# Patient Record
Sex: FEMALE | Race: WHITE | NOT HISPANIC OR LATINO | Employment: OTHER | URBAN - METROPOLITAN AREA
[De-identification: names, ages, dates, MRNs, and addresses within clinical notes are randomized per-mention and may not be internally consistent; named-entity substitution may affect disease eponyms.]

---

## 2017-01-02 DIAGNOSIS — I48.19 PERSISTENT ATRIAL FIBRILLATION (H): Primary | ICD-10-CM

## 2017-01-02 RX ORDER — APIXABAN 5 MG/1
TABLET, FILM COATED ORAL
Qty: 180 TABLET | Refills: 3 | Status: SHIPPED | OUTPATIENT
Start: 2017-01-02 | End: 2017-10-06

## 2017-01-02 NOTE — TELEPHONE ENCOUNTER
apixaban ANTICOAGULANT (ELIQUIS) 5 MG tablet 180 tablet 1 8/25/2016                Last Written Prescription Date: 08/25/2016  Last Fill Quantity: 180 tablet, # refills: 1    Last Office Visit with G, [facility] or [facility] prescribing provider:  10/31/2016   Future Office Visit:    Next 5 appointments (look out 90 days)     Jan 05, 2017  9:00 AM   Return Visit with Anabell Paiz MD   [facility] ([facility])    [facility]  [address] [address]   [phone]            Feb 13, 2017  9:30 AM   Office Visit with Christian Caro MD   [facility]    [address] [facility] [address]   [phone]                   WBC      5.4   10/31/2016  RBC     4.37   10/31/2016  HGB     13.5   10/31/2016  HCT     40.8   10/31/2016  No components found with this name: mct  MCV       93   10/31/2016  MCH     30.9   10/31/2016  MCHC     33.1   10/31/2016  RDW     17.0   10/31/2016  PLT      328   10/31/2016  PLT       73   5/9/2011  AST       43   10/22/2016  ALT       42   10/22/2016  CREATININE   Date Value Ref Range Status   10/24/2016 0.61 0.52 - 1.04 mg/dL Final

## 2017-01-04 ENCOUNTER — PRE VISIT (OUTPATIENT)
Dept: CARDIOLOGY | Facility: CLINIC | Age: 77
End: 2017-01-04

## 2017-01-05 ENCOUNTER — OFFICE VISIT (OUTPATIENT)
Dept: CARDIOLOGY | Facility: CLINIC | Age: 77
End: 2017-01-05
Payer: COMMERCIAL

## 2017-01-05 ENCOUNTER — ONCOLOGY VISIT (OUTPATIENT)
Dept: ONCOLOGY | Facility: CLINIC | Age: 77
End: 2017-01-05
Attending: INTERNAL MEDICINE
Payer: COMMERCIAL

## 2017-01-05 VITALS
BODY MASS INDEX: 41.96 KG/M2 | SYSTOLIC BLOOD PRESSURE: 110 MMHG | WEIGHT: 228 LBS | OXYGEN SATURATION: 90 % | HEIGHT: 62 IN | HEART RATE: 72 BPM | DIASTOLIC BLOOD PRESSURE: 64 MMHG

## 2017-01-05 VITALS
BODY MASS INDEX: 40.23 KG/M2 | WEIGHT: 220 LBS | DIASTOLIC BLOOD PRESSURE: 72 MMHG | HEART RATE: 69 BPM | RESPIRATION RATE: 14 BRPM | SYSTOLIC BLOOD PRESSURE: 124 MMHG | TEMPERATURE: 97.7 F

## 2017-01-05 DIAGNOSIS — C34.32 MALIGNANT NEOPLASM OF LOWER LOBE OF LEFT LUNG (H): Primary | ICD-10-CM

## 2017-01-05 DIAGNOSIS — I27.20 PULMONARY HTN (H): Primary | ICD-10-CM

## 2017-01-05 PROCEDURE — 99213 OFFICE O/P EST LOW 20 MIN: CPT | Performed by: INTERNAL MEDICINE

## 2017-01-05 PROCEDURE — 99211 OFF/OP EST MAY X REQ PHY/QHP: CPT

## 2017-01-05 ASSESSMENT — PAIN SCALES - GENERAL: PAINLEVEL: NO PAIN (0)

## 2017-01-05 NOTE — PROGRESS NOTES
"Jaqueline Canales is a 76 year old female who presents for:  Chief Complaint   Patient presents with     Oncology Clinic Visit     F/U of lung cancer        Initial Vitals:  /72 mmHg  Pulse 69  Temp(Src) 97.7  F (36.5  C) (Oral)  Resp 14  Wt 99.791 kg (220 lb) Estimated body mass index is 40.23 kg/(m^2) as calculated from the following:    Height as of 10/31/16: 1.575 m (5' 2\").    Weight as of this encounter: 99.791 kg (220 lb).. There is no height on file to calculate BSA. BP completed using cuff size: regular  No Pain (0) No LMP recorded. Patient is postmenopausal. Allergies and medications reviewed.     Medications: Medication refills not needed today.  Pharmacy name entered into Single [employer] Systems:    [facility] [address] [facility] - [place], [place] - [address]    Comments: Patient here for hx of lung ca     5 minutes for nursing intake (face to face time)   Iona Spears, RN      DISCHARGE PLAN:  Next appointments: See patient instruction section-- Rosalind  Departure Mode: Ambulatory  Accompanied by: daughter  4 minutes for nursing discharge (face to face time)   Anushka Felix, RN    1.  RTC MD in 3 months  2.  CT scan chest without contrast before next visit      4/6/2017 Thu  10:00 A 15 [facility] [[id number]] CHESTER DELATORRE [[id number]] RETURN [657] Return - Lung Cancer f/u       4/4/2017 Tue  10:00 A 15 [facility] [[id number]] SHCT1 [[id number]] CT CHEST WO [[id number]] sb Rosalind [phone]  CT CHEST W/O CONTRAST [[id number]]       "

## 2017-01-05 NOTE — MR AVS SNAPSHOT
After Visit Summary   1/5/2017    Jaqueline Canales    MRN: [id number]           Patient Information     Date Of Birth          1940        Visit Information        Provider Department      1/5/2017 11:00 AM Shay Marquez MD [facility]ANS HEART AT [place]        Care Instructions    Medication Changes:  No medication changes at this time. Please continue current medication regiment.     Patient Instructions:      Follow up Appointment Information:  1 year with an Echocardiogram    For scheduling at [facility] please call [phone]  For scheduling at the [place] please call [phone]  We are located on the second floor [address] at the [facility].  Our address is     [address] [place] [address] [facility],   [address]  [place]  [address]    Thank you for allowing us to be a part of your care here at the [facility]    If you have questions or concerns please contact us at:    Saritha Liriano, RN      Nurse Coordinator       Pulmonary Hypertension     [facility]   [phone])[phone]  (F)[phone]    ** Please note that you will NOT receive a reminder call regarding your scheduled testing, reminder calls are for provider appointments only.  If you are scheduled for testing within the [facility] system you may receive a call regarding pre-registration for billing purposes only.**     Remember to weigh yourself daily after voiding and before you consume any food or beverages and log the numbers.  If you have gained/lost 2 pounds overnight or 5 pounds in a week contact us immediately for medication adjustments or further instructions.        Follow-ups after your visit        Your next 10 appointments already scheduled     Feb 08, 2017  8:00 AM   [facility]  [facility]    [address] [facility]  3rd [facility] [address]    [phone]            Feb 08, 2017  9:00 AM   (Arrive by 8:45 AM)   Return Interstitial Lung with Rubens Quispe MD   [facility] ([facility])    [address] [facility]  3rd [facility] [address]   [phone]            Feb 13, 2017  9:30 AM   Office Visit with Christian Caro MD   [facility]    [address] [facility] [address] [phone]           Bring a current list of meds and any records pertaining to this visit.  For Physicals, please bring immunization records and any forms needing to be filled out.  Please arrive 10 minutes early to complete paperwork.            Apr 04, 2017 10:00 AM   CT CHEST W/O CONTRAST with SHCT1   [facility] ([facility])    [address] [facility] [address] [phone]           Please bring any scans or X-rays taken at other hospitals, if similar tests were done. Also bring a list of your medicines, including vitamins, minerals and over-the-counter drugs. It is safest to leave personal items at home.  Be sure to tell your doctor:   If you have any allergies.   If there s any chance you are pregnant.   If you are breastfeeding.   If you have any special needs.  You do not need to do anything special to prepare.  Please wear loose clothing, such as a sweat suit or jogging clothes. Avoid snaps, zippers and other metal. We may ask you to undress and put on a hospital gown.            Apr 13, 2017 10:00 AM   Return Visit with Anabell Paiz MD   [facility] ([facility])    n [facility] Ctr [facility]  [address]  [facility] [address] [phone]              Future tests that were ordered for you today     Open Future Orders        Priority Expected Expires Ordered    CT Chest w/o Contrast Routine  1/5/2018 1/5/2017            Who to contact     If you have questions  "or need follow up information about today's clinic visit or your schedule please contact [facility] PHYSICIANS HEART AT [place] directly at [phone].  Normal or non-critical lab and imaging results will be communicated to you by MyChart, letter or phone within 4 business days after the clinic has received the results. If you do not hear from us within 7 days, please contact the clinic through MyChart or phone. If you have a critical or abnormal lab result, we will notify you by phone as soon as possible.  Submit refill requests through [employer] or call your pharmacy and they will forward the refill request to us. Please allow 3 business days for your refill to be completed.          Additional Information About Your Visit        Care EveryWhere ID     This is your Care EveryWhere ID. This could be used by other organizations to access your [place] medical records  [id number]        Your Vitals Were     Pulse Height BMI (Body Mass Index) Pulse Oximetry          72 1.575 m (5' 2\") 41.69 kg/m2 90%         Blood Pressure from Last 3 Encounters:   01/05/17 110/64   01/05/17 124/72   11/28/16 112/61    Weight from Last 3 Encounters:   01/05/17 103.42 kg (228 lb)   01/05/17 99.791 kg (220 lb)   11/02/16 101.606 kg (224 lb)              Today, you had the following     No orders found for display       Primary Care Provider Office Phone # Fax #    Christian Carlos Caro [phone]       [facility] [address] [facility] [address]  [facility] [address]        Thank you!     Thank you for choosing [facility] HEART AT [place]  for your care. Our goal is always to provide you with excellent care. Hearing back from our patients is one way we can continue to improve our services. Please take a few minutes to complete the written survey that you may receive in the mail after your visit with us. Thank you!             Your Updated Medication List - Protect " others around you: Learn how to safely use, store and throw away your medicines at www.disposemymeds.org.          This list is accurate as of: 1/5/17 11:30 AM.  Always use your most recent med list.                   Brand Name Dispense Instructions for use    albuterol 108 (90 BASE) MCG/ACT Inhaler    PROAIR HFA/PROVENTIL HFA/VENTOLIN HFA    3 Inhaler    Inhale 2 puffs into the lungs every 6 hours       * ICAPS Caps      Take 1 capsule by mouth 2 times daily       * CENTRUM SILVER per tablet      1 TABLET DAILY       CITRACAL + D PO      Take 2 tablets by mouth 2 times daily       ELIQUIS 5 MG tablet   Generic drug:  apixaban ANTICOAGULANT     180 tablet    TAKE 1 TABLET TWICE DAILY       folic acid 1 MG tablet    FOLVITE     1 TABLET DAILY       Lactobacillus Acidophilus Powd      Take 1 packet by mouth daily       levothyroxine 75 MCG tablet    SYNTHROID/LEVOTHROID    90 tablet    Take 1 tablet (75 mcg) by mouth daily       MELATONIN PO      Take 5 mg by mouth nightly as needed       METHOTREXATE PO      Take 2.5 mg by mouth 10 tablets weekly       metoprolol 25 MG 24 hr tablet    TOPROL-XL    90 tablet    Take 1 tablet (25 mg) by mouth daily       NIFEDICAL XL 60 MG Tb24   Generic drug:  NIFEdipine ER osmotic      1 TABLET DAILY       omeprazole 20 MG CR capsule    priLOSEC    180 capsule    Take 1 capsule (20 mg) by mouth 2 times daily Take 30-60 minutes prior to meals       REMICADE IV      Inject 600 mg into the vein Every 8 weeks       simvastatin 40 MG tablet    ZOCOR    90 tablet    Take 1 tablet (40 mg) by mouth At Bedtime       * Notice:  This list has 2 medication(s) that are the same as other medications prescribed for you. Read the directions carefully, and ask your doctor or other care provider to review them with you.

## 2017-01-05 NOTE — PATIENT INSTRUCTIONS
Medication Changes:  No medication changes at this time. Please continue current medication regiment.     Patient Instructions:      Follow up Appointment Information:  1 year with an Echocardiogram    For scheduling at [facility] please call [phone]  For scheduling at the [facility] please call [phone]  We are located on the second floor [address] at the [facility].  Our address is     [facility] [address]   [address]  [place]  [address]    Thank you for allowing us to be a part of your care here at the [facility]    If you have questions or concerns please contact us at:    Saritha Liriano, RN      Nurse Coordinator       Pulmonary Hypertension     [facility]   [phone])[phone]  (F)[phone]    ** Please note that you will NOT receive a reminder call regarding your scheduled testing, reminder calls are for provider appointments only.  If you are scheduled for testing within the [place] system you may receive a call regarding pre-registration for billing purposes only.**     Remember to weigh yourself daily after voiding and before you consume any food or beverages and log the numbers.  If you have gained/lost 2 pounds overnight or 5 pounds in a week contact us immediately for medication adjustments or further instructions.

## 2017-01-05 NOTE — NURSING NOTE
Med Reconcile: Reviewed and verified all current medications with the patient. The updated medication list was printed and given to the patient.  Return Appointment: Patient given instructions regarding scheduling next clinic visit. Patient demonstrated understanding of this information and agreed to call with further questions or concerns.  Patient stated she understood all health information given and agreed to call with further questions or concerns.     Medication Changes:   No medication changes at this time. Please continue current medication regiment.   Patient Instructions:   Follow up Appointment Information:   1 year with an Echocardiogram

## 2017-01-05 NOTE — MR AVS SNAPSHOT
After Visit Summary   1/5/2017    Jaqueline Canales    MRN: [id number]           Patient Information     Date Of Birth          1940        Visit Information        Provider Department      1/5/2017 9:00 AM Anabell Paiz MD [facility]        Today's Diagnoses     Malignant neoplasm of lower lobe of left lung (H)    -  1       Care Instructions    1.  RTC MD in 3 months  2.  CT scan chest without contrast before next visit        Follow-ups after your visit        Your next 10 appointments already scheduled     Jan 05, 2017 11:00 AM   Pulmonary Hypertension Return with Shay Marquez MD   [facility] AT [place] ([facility])    [id number]  [facility] [address] [phone]            Feb 08, 2017  8:00 AM   FULL PULMONARY FUNCTION with  P[facility] Pulmonary Function Testing ([facility])    [address] [facility]  3rd [facility] [address] [phone]            Feb 08, 2017  9:00 AM   (Arrive by 8:45 AM)   Return Interstitial Lung with Rubens Quispe MD   [facility] ([facility])    [address] [address] [phone]            Feb 13, 2017  9:30 AM   Office Visit with Christian Caro MD   [facility]    [address] [facility] [id number] [phone]           Bring a current list of meds and any records pertaining to this visit.  For Physicals, please bring immunization records and any forms needing to be filled out.  Please arrive 10 minutes early to complete paperwork.            Apr 04, 2017 10:00 AM   CT CHEST W/O CONTRAST with SHCT1   [facility] CT ([facility])    [address] [facility] [address] [phone]           Please bring any scans or X-rays taken at other  hospitals, if similar tests were done. Also bring a list of your medicines, including vitamins, minerals and over-the-counter drugs. It is safest to leave personal items at home.  Be sure to tell your doctor:   If you have any allergies.   If there s any chance you are pregnant.   If you are breastfeeding.   If you have any special needs.  You do not need to do anything special to prepare.  Please wear loose clothing, such as a sweat suit or jogging clothes. Avoid snaps, zippers and other metal. We may ask you to undress and put on a hospital gown.            Apr 13, 2017 10:00 AM   Return Visit with Anabell Paiz MD   [facility] ([facility])    [facility]  [address] [facility] [address]  [facility] [address] [phone]              Future tests that were ordered for you today     Open Future Orders        Priority Expected Expires Ordered    CT Chest w/o Contrast Routine  1/5/2018 1/5/2017            Who to contact     If you have questions or need follow up information about today's clinic visit or your schedule please contact [facility] directly at [phone].  Normal or non-critical lab and imaging results will be communicated to you by MyChart, letter or phone within 4 business days after the clinic has received the results. If you do not hear from us within 7 days, please contact the clinic through MyChart or phone. If you have a critical or abnormal lab result, we will notify you by phone as soon as possible.  Submit refill requests through [employer] or call your pharmacy and they will forward the refill request to us. Please allow 3 business days for your refill to be completed.          Additional Information About Your Visit        Care EveryWhere ID     This is your Care EveryWhere ID. This could be used by other organizations to access your [place] medical records  [id number]        Your Vitals Were     Pulse Temperature Respirations              69 97.7  F (36.5  C) (Oral) 14          Blood Pressure from Last 3 Encounters:   01/05/17 124/72   11/28/16 112/61   11/02/16 127/57    Weight from Last 3 Encounters:   01/05/17 99.791 kg (220 lb)   11/02/16 101.606 kg (224 lb)   10/31/16 100.88 kg (222 lb 6.4 oz)               Primary Care Provider Office Phone # Fax #    Christian Gonzalez Bill Caro [phone]       [facility] [address]  [place] [address]        Thank you!     Thank you for choosing [facility]  for your care. Our goal is always to provide you with excellent care. Hearing back from our patients is one way we can continue to improve our services. Please take a few minutes to complete the written survey that you may receive in the mail after your visit with us. Thank you!             Your Updated Medication List - Protect others around you: Learn how to safely use, store and throw away your medicines at www.disposemymeds.org.          This list is accurate as of: 1/5/17  9:36 AM.  Always use your most recent med list.                   Brand Name Dispense Instructions for use    albuterol 108 (90 BASE) MCG/ACT Inhaler    PROAIR HFA/PROVENTIL HFA/VENTOLIN HFA    3 Inhaler    Inhale 2 puffs into the lungs every 6 hours       * ICAPS Caps      Take 1 capsule by mouth 2 times daily       * CENTRUM SILVER per tablet      1 TABLET DAILY       CITRACAL + D PO      Take 2 tablets by mouth 2 times daily       ELIQUIS 5 MG tablet   Generic drug:  apixaban ANTICOAGULANT     180 tablet    TAKE 1 TABLET TWICE DAILY       folic acid 1 MG tablet    FOLVITE     1 TABLET DAILY       Lactobacillus Acidophilus Powd      Take 1 packet by mouth daily       levothyroxine 75 MCG tablet    SYNTHROID/LEVOTHROID    90 tablet    Take 1 tablet (75 mcg) by mouth daily       MELATONIN PO      Take 5 mg by mouth nightly as needed       METHOTREXATE PO      Take 2.5 mg by mouth 10 tablets weekly       metoprolol 25  MG 24 hr tablet    TOPROL-XL    90 tablet    Take 1 tablet (25 mg) by mouth daily       NIFEDICAL XL 60 MG Tb24   Generic drug:  NIFEdipine ER osmotic      1 TABLET DAILY       omeprazole 20 MG CR capsule    priLOSEC    180 capsule    Take 1 capsule (20 mg) by mouth 2 times daily Take 30-60 minutes prior to meals       REMICADE IV      Inject 600 mg into the vein Every 8 weeks       simvastatin 40 MG tablet    ZOCOR    90 tablet    Take 1 tablet (40 mg) by mouth At Bedtime       * Notice:  This list has 2 medication(s) that are the same as other medications prescribed for you. Read the directions carefully, and ask your doctor or other care provider to review them with you.

## 2017-01-05 NOTE — PROGRESS NOTES
[facility] PHYSICIANS  HEMATOLOGY ONCOLOGY     DIAGNOSES:     1.  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 The patient had a left lower lobe nodule 11/2015.  It was biopsied 12/04/2015, indicated a poorly differentiated squamous cell carcinoma.  PET CT scan 12/14/2015 hypermetabolic nodular lesion in the left lower lobe.     2.  History of breast cancer in 1990, treated with right-sided mastectomy.   3.  Interstitial lung disease.   4.  Rheumatoid arthritis.  Jaqueline Canales is on Remicade for interstitial lung disease and rheumatoid arthritis.       TREATMENT:  S/P SBRT for lung cancer.      SUBJECTIVE:  The patient comes in for followup today. She has been feeling well. No change in breathing status.     REVIEW OF SYSTEMS:  A complete review of systems was performed and found to be negative other than pertinent positives mentioned in history of present illness.     Past medical, social histories reviewed.    Meds- Reviewed.     PHYSICAL EXAMINATION:   VITAL SIGNS:/72 mmHg  Pulse 69  Temp(Src) 97.7  F (36.5  C) (Oral)  Resp 14  Wt 99.791 kg (220 lb)  GENERAL: Sitting comfortably.   HEENT: Pupils are equal. Oropharynx is clear.   NECK: No cervical or supraclavicular lymphadenopathy.   LUNGS: Clear bilaterally.   HEART: S1, S2, regular.   ABDOMEN: Soft, nontender, nondistended, no hepatosplenomegaly.   EXTREMITIES: Warm, well perfused.   NEUROLOGIC: Alert, awake.   SKIN: No rash.   LYMPHATICS: Bilateral edema.     DATA:      Results for orders placed or performed during the hospital encounter of 12/27/16   CT Chest w/o contrast    Narrative    CT CHEST WITHOUT CONTRAST December 27, 2016 11:40 AM     HISTORY: Follow up on right sided lung lesions. Malignant neoplasm of  lower lobe, left bronchus or lung.    COMPARISON: 10/22/2016.    TECHNIQUE: Volumetric helical acquisition of CT images of the chest  from the clavicles to the kidneys were acquired without IV  contrast.  Radiation dose for this scan was reduced using automated exposure  control, adjustment of the mA and/or kV according to patient size, or  iterative reconstruction technique.    FINDINGS: Right-sided fibrotic changes appear stable. Somewhat nodular  density at the left base has resolved and may have been an area of  consolidation and/or active inflammation. Honeycombing and traction  bronchiectasis persist. Moderate hiatal hernia. No pleural or  pericardial effusion. Mild cardiomegaly. There are moderate coronary  vascular calcifications consistent with coronary artery disease. There  are mild atherosclerotic changes of the visualized aorta and its  branches. There is no evidence of aortic aneurysm. No acute findings  in the visualized upper abdomen.      Impression    IMPRESSION:  1. 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  2. The nodular density at the left base has resolved.    ARELI RICKETTS MD     ECOG PS: 1    ASSESSMENT:  A 74-year-old lady with squamous cell carcinoma of the lung which was clinically T1 N0 MX.  She had a 1.7 to 1.1 cm left lower lobe lesion which was FDG avid, multiple non-FDG avid mediastinal lymph nodes, prevascular, precarinal and subcarinal which appeared to be stable.  There was FDG activity in tonsil and neck lymph nodes which were evaluated by Dr. Trevor Murdock of ENT and this was considered to be a physiological uptake.  Given her history of interstitial lung disease she was referred to Radiation Oncology for SBRT of this lung lesion.  Her MRI of brain was negative for metastases.     PET CT scan 10/31/16 showed right upper and middle lobe areas of hypermetabolism without defined nodularities. Her case was reviewed at our multidisciplinary thoracic oncology conference 11/1/16 and consensus recommendation was to proceed with close observation.   CT scan 12/27/16 results and images were reviewed with the  patient-  showed stability of findings.  I will repeat a CT scan in three months.      PLAN:   1.  RTC MD in 3 months  2.  CT scan chest without contrast before next visit    CHESTER DELATORRE MD    11/2/2016

## 2017-01-17 ENCOUNTER — TRANSFERRED RECORDS (OUTPATIENT)
Dept: HEALTH INFORMATION MANAGEMENT | Facility: CLINIC | Age: 77
End: 2017-01-17

## 2017-01-17 LAB
ALT SERPL-CCNC: 19 IU/L (ref 5–35)
AST SERPL-CCNC: 24 U/L (ref 5–34)
CREAT SERPL-MCNC: 0.72 MG/DL (ref 0.5–1.3)
GFR SERPL CREATININE-BSD FRML MDRD: 83.5 ML/MIN/1.73M2

## 2017-01-20 NOTE — PROGRESS NOTES
76 year old white female seen for follow-up.  The patient has scleroderma and ILD and was incidentally found to have SCC of left lower lobe that was treated with radiation therapy.  She is clinically stable and has no interim history of right hear failure.  The most recent echocardiogram discloses elevated, estimated PA pressure.  She has no significant functional limitation at this time.  We discussed the nature of right heart catherization and medication for PH; and for the moment, in the absence of symptoms, she wishes to defer diagnostic and therapeutic intervention.    Results for GINA MCKEON (MRN [id number]) as of 1/20/2017 08:43   Ref. Range 10/31/2016 14:55   CRP Inflammation Latest Ref Range: 0.0-8.0 mg/L 4.1   WBC Latest Ref Range: 4.0-11.0 10e9/L 5.4   Hemoglobin Latest Ref Range: 11.7-15.7 g/dL 13.5   Hematocrit Latest Ref Range: 35.0-47.0 % 40.8   Platelet Count Latest Ref Range: 150-450 10e9/L 328   RBC Count Latest Ref Range: 3.8-5.2 10e12/L 4.37   MCV Latest Ref Range:  fl 93   MCH Latest Ref Range: 26.5-33.0 pg 30.9   MCHC Latest Ref Range: 31.5-36.5 g/dL 33.1   RDW Latest Ref Range: 10.0-15.0 % 17.0 (H)   Diff Method Unknown Automated Method   % Neutrophils Latest Units: % 73.8   % Lymphocytes Latest Units: % 17.1   % Monocytes Latest Units: % 6.5   % Eosinophils Latest Units: % 2.0   % Basophils Latest Units: % 0.6   Absolute Neutrophil Latest Ref Range: 1.6-8.3 10e9/L 4.0   Absolute Lymphocytes Latest Ref Range: 0.8-5.3 10e9/L 0.9   Absolute Monocytes Latest Ref Range: 0.0-1.3 10e9/L 0.4   Absolute Eosinophils Latest Ref Range: 0.0-0.7 10e9/L 0.1   Absolute Basophils Latest Ref Range: 0.0-0.2 10e9/L 0.0   CT CHEST WITHOUT CONTRAST December 27, 2016 11:40 AM       HISTORY: Follow up on right sided lung lesions. Malignant neoplasm of  lower lobe, left bronchus or lung.     COMPARISON: 10/22/2016.     TECHNIQUE: Volumetric helical acquisition of CT images of the chest  from the  clavicles to the kidneys were acquired without IV contrast.  Radiation dose for this scan was reduced using automated exposure  control, adjustment of the mA and/or kV according to patient size, or  iterative reconstruction technique.     FINDINGS: Right-sided fibrotic changes appear stable. Somewhat nodular  density at the left base has resolved and may have been an area of  consolidation and/or active inflammation. Honeycombing and traction  bronchiectasis persist. Moderate hiatal hernia. No pleural or  pericardial effusion. Mild cardiomegaly. There are moderate coronary  vascular calcifications consistent with coronary artery disease. There  are mild atherosclerotic changes of the visualized aorta and its  branches. There is no evidence of aortic aneurysm. No acute findings  in the visualized upper abdomen.                                                                       IMPRESSION:  1. 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  2. The nodular density at the left base has resolved  Davie Iyer MD 2016             Narrative           Interpretation Summary              [facility]  [facility]  [address]  [address]  [place] [address]  Phone [phone]  Fax [phone]        Name: GINA MCKEON  MRN: [id number]  : 1940  Study Date: 2016 07:39 AM  Age: 75 yrs  Gender: Female  Patient Location: [facility]  Reason For Study: Unspecified atrial fibrillation  Ordering Physician: HAZEL RIBEIRO  Referring Physician: HAZEL RIBEIRO  Performed By: Rosanna Manning RDCS  BSA: 2.0 m2  Height: 62 in  Weight: 226 lb  HR: 89  BP: 100/58 mmHg     ______________________________________________________________________________     Procedure  Complete Echo  Adult.     ______________________________________________________________________________     Interpretation Summary     Left ventricular systolic function is normal.The visual ejection fraction is  estimated at 60-65%.  The right ventricle is mildly dilated.The right ventricular systolic function  is normal.  The left atrium is severely dilated.  There is moderate mitral annular calcification.There is mild (1+) mitral  regurgitation.  Pulmonary hypertension- RVSP 42 mm hg +RA.  The IVC is normal in size and reactivity with respiration, suggesting normal  central venous pressure.  ______________________________________________________________________________           Left Ventricle  The left ventricle is normal in size. There is mild concentric left  ventricular hypertrophy. Left ventricular systolic function is normal. The  visual ejection fraction is estimated at 60-65%. E prime velocity may be  unreliable due to presence of significant mitral annular calcification. No  regional wall motion abnormalities noted.  Right Ventricle  The right ventricle is mildly dilated. The right ventricular systolic  function is normal.     Atria  The left atrium is severely dilated. The right atrium is mild to moderately  dilated. There is no color Doppler evidence of an atrial shunt.     Mitral Valve  There is moderate mitral annular calcification. There is mild (1+) mitral  regurgitation.     Tricuspid Valve  There is mild (1+) tricuspid regurgitation. The right ventricular systolic  pressure is approximated at 42.2 mmHg plus the right atrial pressure.  Pulmonary hypertension.     Aortic Valve  There is mild trileaflet aortic sclerosis. No aortic regurgitation is  present. No aortic stenosis is present.     Pulmonic Valve  The pulmonic valve is not well visualized. There is no pulmonic valvular  stenosis.  Vessels  The aortic root is normal size. Normal size ascending aorta. The IVC is  normal in size and reactivity with  respiration, suggesting normal central  venous pressure.     Pericardium  There is no pericardial effusion.     Rhythm  The rhythm was normal sinus.  ______________________________________________________________________________     MMode/2D Measurements & Calculations  IVSd: 1.2 cm  LVIDd: 5.3 cm  LVIDs: 3.4 cm  LVPWd: 0.96 cm  FS: 35.9 %  EDV(Teich): 132.7 ml  ESV(Teich): 46.3 ml  LV mass(C)d: 224.6 grams  Ao root diam: 2.8 cm  LA dimension: 4.7 cm  asc Aorta Diam: 3.5 cm  LA/Ao: 1.7  LVOT diam: 1.9 cm  LVOT area: 2.7 cm2  LA Volume (BP): 101.0 ml  LA Volume Index (BP): 50.2 ml/m2           Doppler Measurements & Calculations  MV E max carrington: 89.7 cm/sec  MV A max carrington: 94.6 cm/sec  MV E/A: 0.95  MV dec time: 0.20 sec  TR max carrington: 324.7 cm/sec  TR max P.2 mmHg  Lateral E/e': 12.9  Medial E/e': 17.2                   Current Outpatient Prescriptions   Medication     ELIQUIS 5 MG tablet     simvastatin (ZOCOR) 40 MG tablet     metoprolol (TOPROL-XL) 25 MG 24 hr tablet     Lactobacillus Acidophilus POWD     levothyroxine (SYNTHROID, LEVOTHROID) 75 MCG tablet     omeprazole (PRILOSEC) 20 MG capsule     Methotrexate Sodium (METHOTREXATE PO)     albuterol (PROAIR HFA, PROVENTIL HFA, VENTOLIN HFA) 108 (90 BASE) MCG/ACT inhaler     MELATONIN PO     Calcium Citrate-Vitamin D (CITRACAL + D PO)     Multiple Vitamins-Minerals (ICAPS) CAPS     InFLIXimab (REMICADE IV)     NIFEDICAL XL 60 MG PO TB24     FOLIC ACID 1 MG OR TABS     CENTRUM SILVER OR TABS     No current facility-administered medications for this visit.       We spent 20 minutes of which > 50% centered on diagnostic and therapeutic counseling.

## 2017-01-23 NOTE — TELEPHONE ENCOUNTER
SHOULD HAVE ANOTHER REFILL ON FILE   --we  escribed ok #90 with 2 adiditonal refills to [facility]  On 11-    simvastatin (ZOCOR) 40 MG tablet 90 tablet 2 11/28/2016  No      Sig: Take 1 tablet (40 mg) by mouth At Bedtime     Class: E-Prescribe     Route: Oral     Order: [id number]     E-Prescribing Status: Receipt confirmed by pharmacy (11/28/2016  9:49 AM CST)       Pharmacy      [facility] - [place] [address] [address]         Last Lipids 7/2016 , saw cardiology 1-5-2017

## 2017-01-25 RX ORDER — SIMVASTATIN 40 MG
40 TABLET ORAL AT BEDTIME
Qty: 90 TABLET | Refills: 0
Start: 2017-01-25

## 2017-02-08 ENCOUNTER — OFFICE VISIT (OUTPATIENT)
Dept: PULMONOLOGY | Facility: CLINIC | Age: 77
End: 2017-02-08
Attending: INTERNAL MEDICINE
Payer: MEDICARE

## 2017-02-08 VITALS
HEIGHT: 62 IN | WEIGHT: 228 LBS | DIASTOLIC BLOOD PRESSURE: 76 MMHG | BODY MASS INDEX: 41.96 KG/M2 | HEART RATE: 86 BPM | TEMPERATURE: 98.4 F | OXYGEN SATURATION: 96 % | RESPIRATION RATE: 18 BRPM | SYSTOLIC BLOOD PRESSURE: 124 MMHG

## 2017-02-08 DIAGNOSIS — J84.9 ILD (INTERSTITIAL LUNG DISEASE) (H): Primary | ICD-10-CM

## 2017-02-08 DIAGNOSIS — R91.8 PULMONARY NODULES: ICD-10-CM

## 2017-02-08 PROCEDURE — 99212 OFFICE O/P EST SF 10 MIN: CPT | Mod: ZF

## 2017-02-08 ASSESSMENT — ENCOUNTER SYMPTOMS
HYPOTENSION: 0
LIGHT-HEADEDNESS: 0
SLEEP DISTURBANCES DUE TO BREATHING: 0
PALPITATIONS: 0
CLAUDICATION: 0
ORTHOPNEA: 0
SYNCOPE: 0
TACHYCARDIA: 0
HYPERTENSION: 0
LEG SWELLING: 0
LEG PAIN: 0
EXERCISE INTOLERANCE: 0

## 2017-02-08 ASSESSMENT — PAIN SCALES - GENERAL: PAINLEVEL: NO PAIN (0)

## 2017-02-08 NOTE — LETTER
2/8/2017       RE: Jaqueline Canales  [address]  [facility] [address]     Dear Colleague,    Thank you for referring your patient, Jaqueline Canales, to the [facility] at [facility]. Please see a copy of my visit note below.          [facility]   Pulmonary Clinic Follow Up Note  February 8, 2017      Jaqueline Canales MRN# [id number]   Age: 76 year old YOB: 1940     Date of Consultation: February 8, 2017    Primary care provider: Christian Caro     History taken from; Patient      Jaqueline Canales is a 76 year old female seen in the Pulmonary Clinic  for f/u.  Chief Complaint   Patient presents with     RECHECK     4 month recheck   .          Pulmonary Problem List / Reason for follow up:   1. ILD  2. Lung cancer  3. Airway disease           Assessment and Plan:     ASSESSMENT AND PLAN:  The patient is a 76-year-old lady with a history of interstitial lung disease-associated scleroderma and lung cancer, coming for the followup visit.  The patient is currently stable.   1.  Interstitial lung disease associated with scleroderma.  The patient's PFTs are stable.  We will continue to follow the patient with another pulmonary function test in 6 months.   2.  Lung cancer.  The patient is followed by Oncology, and the patient is going to have a CT of the chest in follow up.   3.  Airway disease.  The patient has mild wheezing.  We advised the patient to start to use albuterol on a scheduled basis when she has symptoms and signs of the cold.  Otherwise, after that the patient can do it on an as-needed basis.      We explained the plan to the patient, including the risks and benefits.  The patient expressed understanding and agreed with the plan.      Thank you very much for the opportunity to participate in the care of this very pleasant patient.         Return visit in 6  months      Rubens Quispe M.D.         History of Present Illness / Interval History:     CHIEF COMPLAINT:  Scleroderma-associated interstitial lung disease and lung cancer.      HISTORY OF PRESENT ILLNESS:  The patient is a 76-year-old lady with a history of interstitial lung disease associated with scleroderma, currently stable.  The patient recently had an upper respiratory infection.  However, the patient is feeling that her symptoms are subsiding.  The patient had pulmonary function tests, which are stable.  The patient also had lung cancer, and had a suspicious lesion, which on the followup scan resolved.  Overall, the patient is doing well and she is stable.                      Pulmonary Data:         Exposure history: Asbestos;  No , TB;  No , Radiation;   No          Medications:     Current Outpatient Prescriptions   Medication Sig     ELIQUIS 5 MG tablet TAKE 1 TABLET TWICE DAILY     simvastatin (ZOCOR) 40 MG tablet Take 1 tablet (40 mg) by mouth At Bedtime     metoprolol (TOPROL-XL) 25 MG 24 hr tablet Take 1 tablet (25 mg) by mouth daily     Lactobacillus Acidophilus POWD Take 1 packet by mouth daily     levothyroxine (SYNTHROID, LEVOTHROID) 75 MCG tablet Take 1 tablet (75 mcg) by mouth daily     omeprazole (PRILOSEC) 20 MG capsule Take 1 capsule (20 mg) by mouth 2 times daily Take 30-60 minutes prior to meals     Methotrexate Sodium (METHOTREXATE PO) Take 2.5 mg by mouth 10 tablets weekly     albuterol (PROAIR HFA, PROVENTIL HFA, VENTOLIN HFA) 108 (90 BASE) MCG/ACT inhaler Inhale 2 puffs into the lungs every 6 hours     MELATONIN PO Take 5 mg by mouth nightly as needed     Calcium Citrate-Vitamin D (CITRACAL + D PO) Take 2 tablets by mouth 2 times daily      Multiple Vitamins-Minerals (ICAPS) CAPS Take 1 capsule by mouth 2 times daily      InFLIXimab (REMICADE IV) Inject 600 mg into the vein Every 8 weeks     NIFEDICAL XL 60 MG PO TB24 1 TABLET DAILY     FOLIC ACID 1 MG OR TABS 1 TABLET DAILY     CENTRUM  SILVER OR TABS 1 TABLET DAILY     No current facility-administered medications for this visit.             Past Medical History:     Past Medical History   Diagnosis Date     Amaurosis fugax 5-11     Right     RA (rheumatoid arthritis) (H) 2/8/2010     Hyperlipidemia LDL goal <70 6/20/2011     hx of CA in situ RT breast-1990.mastectomy 4/17/2007     HELICOBACTER PYLORI INFECTION 7/11/2006     Raynaud's syndrome 4/10/2006     Esophageal reflux 3/11/2004     OSTEOPOROSIS NOS 3/11/2004     OBESITY NOS 3/11/2004     Other psoriasis 3/11/2004     Sleep apnea      CPAP     Carotid artery stenosis      Lung cancer (H)      squamous cell lung ca left lower lobe     Scleroderma (H)              Past Surgical History:     Past Surgical History   Procedure Laterality Date     C nonspecific procedure  1990     mastectomy RT breast     Tonsillectomy       Repair hammer toe       Conization       Endarterectomy carotid  2011     Hc colonoscopy thru stoma, diagnostic  2001 2011     diverticulosis     Colonoscopy  5/24/16     Breast surgery               Social History:     Social History     Social History     Marital Status:      Spouse Name: N/A     Number of Children: N/A     Years of Education: N/A     Occupational History     Not on file.     Social History Main Topics     Smoking status: Former Smoker -- 1.00 packs/day for 30 years     Types: Cigarettes     Quit date: 03/11/1992     Smokeless tobacco: Never Used     Alcohol Use: 0.0 oz/week     0 Standard drinks or equivalent per week      Comment: rare     Drug Use: No     Sexual Activity:     Partners: Male     Other Topics Concern     Parent/Sibling W/ Cabg, Mi Or Angioplasty Before 65f 55m? No     Social History Narrative             Family History:     Family History   Problem Relation Age of Onset     Cancer - colorectal Mother      CEREBROVASCULAR DISEASE Father      CANCER Sister 31     ovarian     HEART DISEASE Sister      GASTROINTESTINAL DISEASE Sister       crohns     GASTROINTESTINAL DISEASE Sister      diverticulitis     GASTROINTESTINAL DISEASE Brother      diverticulitis     Alzheimer Disease Sister      CEREBROVASCULAR DISEASE Maternal Grandmother      DIABETES Maternal Grandmother      MENTAL ILLNESS Maternal Grandmother      DIABETES Maternal Uncle              Immunization:     Immunization History   Administered Date(s) Administered     Hepatitis A Vac Ped/Adol-2 Dose 03/21/1996, 09/26/1996     Hepatitis B 03/21/1996, 04/18/1996, 09/26/1996     Influenza (H1N1) 01/04/2010     Influenza (High Dose) 3 valent vaccine 10/05/2014, 10/12/2015, 10/26/2016     Influenza (IIV3) 11/11/2003, 11/17/2005, 10/23/2006, 10/17/2007, 11/17/2008, 09/27/2010, 09/01/2012     Pneumococcal (PCV 13) 07/07/2016     Pneumococcal 23 valent 10/17/2007     TD (ADULT, 7+) 03/05/2009, 03/05/2009     TDAP (ADACEL AGES 11-64) 06/07/2012     Zoster vaccine, live 08/27/2012              Review of Systems:   I have done 10 points of review systems and pertinent findings are as above , otherwise negative.             Physical Examination:   General: Alert, oriented, not in distress  B/P: 124/76, T: 98.4, P: 86, R: 18  HEENT: neck supple, symmetrical, no lymph node enlargement, thyromegaly, bruit, JVD, pupils are isocoric and equally responsive to the light.   Lungs: both hemithoraces are symmetrical, normal to palpation, no dullness to percussion, auscultation of lungs revealed few wheezes.  CVS: Normal S1 and S2, no additional heart sounds, murmur, rub, normal peripheral pulses  Abdomen: Bowel sounds present, soft, non tender, non distended, no organomegaly, ascitis, mass  Extremities/musculoskeletal: no peripheral edema, deformity, cyanosis, clubbing  Neurology: alert, orientedx3, no motor/sensorial deficits, cranial nerves grossly normal  Skin: no rash  Psychiatry: Mood and affect are appropriate             DATA:     CBC RESULTS:     Recent Labs   Lab Test  10/31/16   1455  10/23/16   0553    WBC  5.4  7.3   RBC  4.37  3.55*   HGB  13.5  10.7*   HCT  40.8  32.4*   PLT  328  112*       Basic Metabolic Panel:  Recent Labs   Lab Test 01/17/17  10/24/16   0530  10/23/16   0533   NA   --   137  138   POTASSIUM   --   3.9  3.9   CHLORIDE   --   103  104   CO2   --   28  28   BUN   --   18  12   CR  0.720  0.61  0.58   GLC   --   96  99   TU   --   8.9  8.4*       INR  Recent Labs   Lab Test  12/04/15   0745   INR  0.95        PFT   PFT 2/8/2017   FVC 1.41   FEV1 1.24   FVC% 57   FEV1% 65       Thank you for allowing me participate in the care of Jaqueline Canales.      Rubens Quispe M.D.  Associate Professor of Medicine  Pulmonary, Allergy, Critical Care and Sleep

## 2017-02-08 NOTE — LETTER
2/8/2017      RE: Jaqueline Canales  [address]  [facility] [address]             [facility]   Pulmonary Clinic Follow Up Note  February 8, 2017      Jaqueline Canales MRN# [id number]   Age: 76 year old YOB: 1940     Date of Consultation: February 8, 2017    Primary care provider: Christian Caro     History taken from; Patient      Jaqueline Canales is a 76 year old female seen in the Pulmonary Clinic  for f/u.  Chief Complaint   Patient presents with     RECHECK     4 month recheck   .          Pulmonary Problem List / Reason for follow up:   1. ILD  2. Lung cancer  3. Airway disease           Assessment and Plan:     ASSESSMENT AND PLAN:  The patient is a 76-year-old lady with a history of interstitial lung disease-associated scleroderma and lung cancer, coming for the followup visit.  The patient is currently stable.   1.  Interstitial lung disease associated with scleroderma.  The patient's PFTs are stable.  We will continue to follow the patient with another pulmonary function test in 6 months.   2.  Lung cancer.  The patient is followed by Oncology, and the patient is going to have a CT of the chest in follow up.   3.  Airway disease.  The patient has mild wheezing.  We advised the patient to start to use albuterol on a scheduled basis when she has symptoms and signs of the cold.  Otherwise, after that the patient can do it on an as-needed basis.      We explained the plan to the patient, including the risks and benefits.  The patient expressed understanding and agreed with the plan.      Thank you very much for the opportunity to participate in the care of this very pleasant patient.         Return visit in 6 months      Rubens Quispe M.D.         History of Present Illness / Interval History:     CHIEF COMPLAINT:  Scleroderma-associated interstitial lung disease and lung cancer.      HISTORY OF PRESENT ILLNESS:  The patient is a  76-year-old lady with a history of interstitial lung disease associated with scleroderma, currently stable.  The patient recently had an upper respiratory infection.  However, the patient is feeling that her symptoms are subsiding.  The patient had pulmonary function tests, which are stable.  The patient also had lung cancer, and had a suspicious lesion, which on the followup scan resolved.  Overall, the patient is doing well and she is stable.                      Pulmonary Data:         Exposure history: Asbestos;  No , TB;  No , Radiation;   No          Medications:     Current Outpatient Prescriptions   Medication Sig     ELIQUIS 5 MG tablet TAKE 1 TABLET TWICE DAILY     simvastatin (ZOCOR) 40 MG tablet Take 1 tablet (40 mg) by mouth At Bedtime     metoprolol (TOPROL-XL) 25 MG 24 hr tablet Take 1 tablet (25 mg) by mouth daily     Lactobacillus Acidophilus POWD Take 1 packet by mouth daily     levothyroxine (SYNTHROID, LEVOTHROID) 75 MCG tablet Take 1 tablet (75 mcg) by mouth daily     omeprazole (PRILOSEC) 20 MG capsule Take 1 capsule (20 mg) by mouth 2 times daily Take 30-60 minutes prior to meals     Methotrexate Sodium (METHOTREXATE PO) Take 2.5 mg by mouth 10 tablets weekly     albuterol (PROAIR HFA, PROVENTIL HFA, VENTOLIN HFA) 108 (90 BASE) MCG/ACT inhaler Inhale 2 puffs into the lungs every 6 hours     MELATONIN PO Take 5 mg by mouth nightly as needed     Calcium Citrate-Vitamin D (CITRACAL + D PO) Take 2 tablets by mouth 2 times daily      Multiple Vitamins-Minerals (ICAPS) CAPS Take 1 capsule by mouth 2 times daily      InFLIXimab (REMICADE IV) Inject 600 mg into the vein Every 8 weeks     NIFEDICAL XL 60 MG PO TB24 1 TABLET DAILY     FOLIC ACID 1 MG OR TABS 1 TABLET DAILY     CENTRUM SILVER OR TABS 1 TABLET DAILY     No current facility-administered medications for this visit.             Past Medical History:     Past Medical History   Diagnosis Date     Amaurosis fugax 5-11     Right     RA  (rheumatoid arthritis) (H) 2/8/2010     Hyperlipidemia LDL goal <70 6/20/2011     hx of CA in situ RT breast-1990.mastectomy 4/17/2007     HELICOBACTER PYLORI INFECTION 7/11/2006     Raynaud's syndrome 4/10/2006     Esophageal reflux 3/11/2004     OSTEOPOROSIS NOS 3/11/2004     OBESITY NOS 3/11/2004     Other psoriasis 3/11/2004     Sleep apnea      CPAP     Carotid artery stenosis      Lung cancer (H)      squamous cell lung ca left lower lobe     Scleroderma (H)              Past Surgical History:     Past Surgical History   Procedure Laterality Date     C nonspecific procedure  1990     mastectomy RT breast     Tonsillectomy       Repair hammer toe       Conization       Endarterectomy carotid  2011     Hc colonoscopy thru stoma, diagnostic  2001 2011     diverticulosis     Colonoscopy  5/24/16     Breast surgery               Social History:     Social History     Social History     Marital Status:      Spouse Name: N/A     Number of Children: N/A     Years of Education: N/A     Occupational History     Not on file.     Social History Main Topics     Smoking status: Former Smoker -- 1.00 packs/day for 30 years     Types: Cigarettes     Quit date: 03/11/1992     Smokeless tobacco: Never Used     Alcohol Use: 0.0 oz/week     0 Standard drinks or equivalent per week      Comment: rare     Drug Use: No     Sexual Activity:     Partners: Male     Other Topics Concern     Parent/Sibling W/ Cabg, Mi Or Angioplasty Before 65f 55m? No     Social History Narrative             Family History:     Family History   Problem Relation Age of Onset     Cancer - colorectal Mother      CEREBROVASCULAR DISEASE Father      CANCER Sister 31     ovarian     HEART DISEASE Sister      GASTROINTESTINAL DISEASE Sister      crohns     GASTROINTESTINAL DISEASE Sister      diverticulitis     GASTROINTESTINAL DISEASE Brother      diverticulitis     Alzheimer Disease Sister      CEREBROVASCULAR DISEASE Maternal Grandmother      DIABETES  Maternal Grandmother      MENTAL ILLNESS Maternal Grandmother      DIABETES Maternal Uncle              Immunization:     Immunization History   Administered Date(s) Administered     Hepatitis A Vac Ped/Adol-2 Dose 03/21/1996, 09/26/1996     Hepatitis B 03/21/1996, 04/18/1996, 09/26/1996     Influenza (H1N1) 01/04/2010     Influenza (High Dose) 3 valent vaccine 10/05/2014, 10/12/2015, 10/26/2016     Influenza (IIV3) 11/11/2003, 11/17/2005, 10/23/2006, 10/17/2007, 11/17/2008, 09/27/2010, 09/01/2012     Pneumococcal (PCV 13) 07/07/2016     Pneumococcal 23 valent 10/17/2007     TD (ADULT, 7+) 03/05/2009, 03/05/2009     TDAP (ADACEL AGES 11-64) 06/07/2012     Zoster vaccine, live 08/27/2012              Review of Systems:   I have done 10 points of review systems and pertinent findings are as above , otherwise negative.             Physical Examination:   General: Alert, oriented, not in distress  B/P: 124/76, T: 98.4, P: 86, R: 18  HEENT: neck supple, symmetrical, no lymph node enlargement, thyromegaly, bruit, JVD, pupils are isocoric and equally responsive to the light.   Lungs: both hemithoraces are symmetrical, normal to palpation, no dullness to percussion, auscultation of lungs revealed few wheezes.  CVS: Normal S1 and S2, no additional heart sounds, murmur, rub, normal peripheral pulses  Abdomen: Bowel sounds present, soft, non tender, non distended, no organomegaly, ascitis, mass  Extremities/musculoskeletal: no peripheral edema, deformity, cyanosis, clubbing  Neurology: alert, orientedx3, no motor/sensorial deficits, cranial nerves grossly normal  Skin: no rash  Psychiatry: Mood and affect are appropriate             DATA:     CBC RESULTS:     Recent Labs   Lab Test  10/31/16   1455  10/23/16   0533   WBC  5.4  7.3   RBC  4.37  3.55*   HGB  13.5  10.7*   HCT  40.8  32.4*   PLT  328  112*       Basic Metabolic Panel:  Recent Labs   Lab Test 01/17/17  10/24/16   0530  10/23/16   0533   NA   --   137  138    POTASSIUM   --   3.9  3.9   CHLORIDE   --   103  104   CO2   --   28  28   BUN   --   18  12   CR  0.720  0.61  0.58   GLC   --   96  99   TU   --   8.9  8.4*       INR  Recent Labs   Lab Test  12/04/15   0745   INR  0.95        PFT   PFT 2/8/2017   FVC 1.41   FEV1 1.24   FVC% 57   FEV1% 65             Thank you for allowing me participate in the care of Jaqueline Canales.      Rubens Quispe M.D.  Associate Professor of Medicine  Pulmonary, Allergy, Critical Care and Sleep        Answers for HPI/ROS submitted by the patient on 2/8/2017   General Symptoms: No  Skin Symptoms: No  HENT Symptoms: No  EYE SYMPTOMS: No  HEART SYMPTOMS: Yes  LUNG SYMPTOMS: No  INTESTINAL SYMPTOMS: No  URINARY SYMPTOMS: No  GYNECOLOGIC SYMPTOMS: No  BREAST SYMPTOMS: No  SKELETAL SYMPTOMS: No  BLOOD SYMPTOMS: No  NERVOUS SYSTEM SYMPTOMS: No  MENTAL HEALTH SYMPTOMS: No  Chest pain or pressure: No  Fast or irregular heartbeat: No  Pain in legs with walking: No  Swelling in feet or ankles: No  Trouble breathing while lying down: No  Fingers or Toes appear blue: No  High blood pressure: No  Low blood pressure: No  Fainting: No  Murmurs: No  Chest pain on exertion: No  Chest pain at rest: No  Cramping pain in leg during exercise: No  Pacemaker: No  Varicose veins: No  Edema or swelling: No  Fast heart beat: No  Wake up at night with shortness of breath: No  Heart flutters: No  Light-headedness: No  Exercise intolerance: No        Rubens Quispe MD

## 2017-02-08 NOTE — MR AVS SNAPSHOT
After Visit Summary   2/8/2017    Jaqueline Canales    MRN: [id number]           Patient Information     Date Of Birth          1940        Visit Information        Provider Department      2/8/2017 9:00 AM Rubens Quispe MD [facility]        Today's Diagnoses     ILD (interstitial lung disease) (H)    -  1       Care Instructions    Patient is instructed to call if there is any changes in symptoms.        Follow-ups after your visit        Follow-up notes from your care team     Return in about 6 months (around 8/8/2017).      Your next 10 appointments already scheduled     Feb 13, 2017  9:30 AM   [facility] Visit with Christian Caro MD   [facility]    [address] [facility] [id number] [phone]           Bring a current list of meds and any records pertaining to this visit.  For Physicals, please bring immunization records and any forms needing to be filled out.  Please arrive 10 minutes early to complete paperwork.            Apr 04, 2017 10:00 AM   CT CHEST W/O CONTRAST with SHCT1   [facility] ([facility])    [address] [id number] [phone]           Please bring any scans or X-rays taken at other hospitals, if similar tests were done. Also bring a list of your medicines, including vitamins, minerals and over-the-counter drugs. It is safest to leave personal items at home.  Be sure to tell your doctor:   If you have any allergies.   If there s any chance you are pregnant.   If you are breastfeeding.   If you have any special needs.  You do not need to do anything special to prepare.  Please wear loose clothing, such as a sweat suit or jogging clothes. Avoid snaps, zippers and other metal. We may ask you to undress and put on a hospital gown.            Apr 13, 2017 10:00 AM   Return Visit with Anabell Paiz MD   [facility]  "([facility])    [facility]  [address]  [facility] [address]   [phone]            Aug 09, 2017 10:30 AM   FULL PULMONARY FUNCTION with  PFL C   [facility] ([facility])    [address]  [address] [facility] [id number] [phone]            Aug 09, 2017 11:30 AM   (Arrive by 11:15 AM)   Return Interstitial Lung with Rubens Quispe MD   [facility] ([facility])    [address]  [address] [facility] [id number] [phone]              Future tests that were ordered for you today     Open Future Orders        Priority Expected Expires Ordered    General PFT Lab (Please always keep checked) Routine  2/8/2018 2/8/2017    Pulmonary Function Test Routine  2/8/2018 2/8/2017            Who to contact     If you have questions or need follow up information about today's clinic visit or your schedule please contact [facility] directly at [phone].  Normal or non-critical lab and imaging results will be communicated to you by [employer], letter or phone within 4 business days after the clinic has received the results. If you do not hear from us within 7 days, please contact the clinic through [employer] or phone. If you have a critical or abnormal lab result, we will notify you by phone as soon as possible.  Submit refill requests through [employer] or call your pharmacy and they will forward the refill request to us. Please allow 3 business days for your refill to be completed.          Additional Information About Your Visit        [employer] Information     [employer] lets you send messages to your doctor, view your test results, renew your prescriptions, schedule appointments and more. To sign up, go to [email or web address] . Click on \"Log in\" on the left side of the screen, which will take you to the Welcome " "page. Then click on \"Sign up Now\" on the right side of the page.     You will be asked to enter the access code listed below, as well as some personal information. Please follow the directions to create your username and password.     Your access code is: [email or web address]  Expires: 2017 11:44 AM     Your access code will  in 90 days. If you need help or a new code, please call your [facility] clinic or [phone].        Care EveryWhere ID     This is your Care EveryWhere ID. This could be used by other organizations to access your [facility] medical records  [id number]        Your Vitals Were     Pulse Temperature Respirations Height BMI (Body Mass Index) Pulse Oximetry    86 98.4  F (36.9  C) 18 1.575 m (5' 2\") 41.69 kg/m2 96%       Blood Pressure from Last 3 Encounters:   17 124/76   17 110/64   17 124/72    Weight from Last 3 Encounters:   17 103.42 kg (228 lb)   17 103.42 kg (228 lb)   17 99.791 kg (220 lb)               Primary Care Provider Office Phone # Fax #    [facility] Carlos Caro [phone]       [facility] [address]  [facility] [address]        Thank you!     Thank you for choosing [facility]  for your care. Our goal is always to provide you with excellent care. Hearing back from our patients is one way we can continue to improve our services. Please take a few minutes to complete the written survey that you may receive in the mail after your visit with us. Thank you!             Your Updated Medication List - Protect others around you: Learn how to safely use, store and throw away your medicines at www.disposemymeds.org.          This list is accurate as of: 17  9:31 AM.  Always use your most recent med list.                   Brand Name Dispense Instructions for use    albuterol 108 (90 BASE) MCG/ACT Inhaler    PROAIR HFA/PROVENTIL HFA/VENTOLIN HFA    3 Inhaler    " Inhale 2 puffs into the lungs every 6 hours       * ICAPS Caps      Take 1 capsule by mouth 2 times daily       * CENTRUM SILVER per tablet      1 TABLET DAILY       CITRACAL + D PO      Take 2 tablets by mouth 2 times daily       ELIQUIS 5 MG tablet   Generic drug:  apixaban ANTICOAGULANT     180 tablet    TAKE 1 TABLET TWICE DAILY       folic acid 1 MG tablet    FOLVITE     1 TABLET DAILY       Lactobacillus Acidophilus Powd      Take 1 packet by mouth daily       levothyroxine 75 MCG tablet    SYNTHROID/LEVOTHROID    90 tablet    Take 1 tablet (75 mcg) by mouth daily       MELATONIN PO      Take 5 mg by mouth nightly as needed       METHOTREXATE PO      Take 2.5 mg by mouth 10 tablets weekly       metoprolol 25 MG 24 hr tablet    TOPROL-XL    90 tablet    Take 1 tablet (25 mg) by mouth daily       NIFEDICAL XL 60 MG Tb24   Generic drug:  NIFEdipine ER osmotic      1 TABLET DAILY       omeprazole 20 MG CR capsule    priLOSEC    180 capsule    Take 1 capsule (20 mg) by mouth 2 times daily Take 30-60 minutes prior to meals       REMICADE IV      Inject 600 mg into the vein Every 8 weeks       simvastatin 40 MG tablet    ZOCOR    90 tablet    Take 1 tablet (40 mg) by mouth At Bedtime       * Notice:  This list has 2 medication(s) that are the same as other medications prescribed for you. Read the directions carefully, and ask your doctor or other care provider to review them with you.

## 2017-02-08 NOTE — PROGRESS NOTES
[facility]   Pulmonary Clinic Follow Up Note  February 8, 2017      Jaqueline Canales MRN# [id number]   Age: 76 year old YOB: 1940     Date of Consultation: February 8, 2017    Primary care provider: Christian Caro     History taken from; Patient      Jaqueline Canales is a 76 year old female seen in the Pulmonary Clinic  for f/u.  Chief Complaint   Patient presents with     RECHECK     4 month recheck   .          Pulmonary Problem List / Reason for follow up:   1. ILD  2. Lung cancer  3. Airway disease           Assessment and Plan:     ASSESSMENT AND PLAN:  The patient is a 76-year-old lady with a history of interstitial lung disease-associated scleroderma and lung cancer, coming for the followup visit.  The patient is currently stable.   1.  Interstitial lung disease associated with scleroderma.  The patient's PFTs are stable.  We will continue to follow the patient with another pulmonary function test in 6 months.   2.  Lung cancer.  The patient is followed by Oncology, and the patient is going to have a CT of the chest in follow up.   3.  Airway disease.  The patient has mild wheezing.  We advised the patient to start to use albuterol on a scheduled basis when she has symptoms and signs of the cold.  Otherwise, after that the patient can do it on an as-needed basis.      We explained the plan to the patient, including the risks and benefits.  The patient expressed understanding and agreed with the plan.      Thank you very much for the opportunity to participate in the care of this very pleasant patient.         Return visit in 6 months      Rubens Quispe M.D.         History of Present Illness / Interval History:     CHIEF COMPLAINT:  Scleroderma-associated interstitial lung disease and lung cancer.      HISTORY OF PRESENT ILLNESS:  The patient is a 76-year-old lady with a history of interstitial lung disease associated with  scleroderma, currently stable.  The patient recently had an upper respiratory infection.  However, the patient is feeling that her symptoms are subsiding.  The patient had pulmonary function tests, which are stable.  The patient also had lung cancer, and had a suspicious lesion, which on the followup scan resolved.  Overall, the patient is doing well and she is stable.                      Pulmonary Data:         Exposure history: Asbestos;  No , TB;  No , Radiation;   No          Medications:     Current Outpatient Prescriptions   Medication Sig     ELIQUIS 5 MG tablet TAKE 1 TABLET TWICE DAILY     simvastatin (ZOCOR) 40 MG tablet Take 1 tablet (40 mg) by mouth At Bedtime     metoprolol (TOPROL-XL) 25 MG 24 hr tablet Take 1 tablet (25 mg) by mouth daily     Lactobacillus Acidophilus POWD Take 1 packet by mouth daily     levothyroxine (SYNTHROID, LEVOTHROID) 75 MCG tablet Take 1 tablet (75 mcg) by mouth daily     omeprazole (PRILOSEC) 20 MG capsule Take 1 capsule (20 mg) by mouth 2 times daily Take 30-60 minutes prior to meals     Methotrexate Sodium (METHOTREXATE PO) Take 2.5 mg by mouth 10 tablets weekly     albuterol (PROAIR HFA, PROVENTIL HFA, VENTOLIN HFA) 108 (90 BASE) MCG/ACT inhaler Inhale 2 puffs into the lungs every 6 hours     MELATONIN PO Take 5 mg by mouth nightly as needed     Calcium Citrate-Vitamin D (CITRACAL + D PO) Take 2 tablets by mouth 2 times daily      Multiple Vitamins-Minerals (ICAPS) CAPS Take 1 capsule by mouth 2 times daily      InFLIXimab (REMICADE IV) Inject 600 mg into the vein Every 8 weeks     NIFEDICAL XL 60 MG PO TB24 1 TABLET DAILY     FOLIC ACID 1 MG OR TABS 1 TABLET DAILY     CENTRUM SILVER OR TABS 1 TABLET DAILY     No current facility-administered medications for this visit.             Past Medical History:     Past Medical History   Diagnosis Date     Amaurosis fugax 5-11     Right     RA (rheumatoid arthritis) (H) 2/8/2010     Hyperlipidemia LDL goal <70 6/20/2011     hx  of CA in situ RT breast-1990.mastectomy 4/17/2007     HELICOBACTER PYLORI INFECTION 7/11/2006     Raynaud's syndrome 4/10/2006     Esophageal reflux 3/11/2004     OSTEOPOROSIS NOS 3/11/2004     OBESITY NOS 3/11/2004     Other psoriasis 3/11/2004     Sleep apnea      CPAP     Carotid artery stenosis      Lung cancer (H)      squamous cell lung ca left lower lobe     Scleroderma (H)              Past Surgical History:     Past Surgical History   Procedure Laterality Date     C nonspecific procedure  1990     mastectomy RT breast     Tonsillectomy       Repair hammer toe       Conization       Endarterectomy carotid  2011     Hc colonoscopy thru stoma, diagnostic  2001 2011     diverticulosis     Colonoscopy  5/24/16     Breast surgery               Social History:     Social History     Social History     Marital Status:      Spouse Name: N/A     Number of Children: N/A     Years of Education: N/A     Occupational History     Not on file.     Social History Main Topics     Smoking status: Former Smoker -- 1.00 packs/day for 30 years     Types: Cigarettes     Quit date: 03/11/1992     Smokeless tobacco: Never Used     Alcohol Use: 0.0 oz/week     0 Standard drinks or equivalent per week      Comment: rare     Drug Use: No     Sexual Activity:     Partners: Male     Other Topics Concern     Parent/Sibling W/ Cabg, Mi Or Angioplasty Before 65f 55m? No     Social History Narrative             Family History:     Family History   Problem Relation Age of Onset     Cancer - colorectal Mother      CEREBROVASCULAR DISEASE Father      CANCER Sister 31     ovarian     HEART DISEASE Sister      GASTROINTESTINAL DISEASE Sister      crohns     GASTROINTESTINAL DISEASE Sister      diverticulitis     GASTROINTESTINAL DISEASE Brother      diverticulitis     Alzheimer Disease Sister      CEREBROVASCULAR DISEASE Maternal Grandmother      DIABETES Maternal Grandmother      MENTAL ILLNESS Maternal Grandmother      DIABETES Maternal  Uncle              Immunization:     Immunization History   Administered Date(s) Administered     Hepatitis A Vac Ped/Adol-2 Dose 03/21/1996, 09/26/1996     Hepatitis B 03/21/1996, 04/18/1996, 09/26/1996     Influenza (H1N1) 01/04/2010     Influenza (High Dose) 3 valent vaccine 10/05/2014, 10/12/2015, 10/26/2016     Influenza (IIV3) 11/11/2003, 11/17/2005, 10/23/2006, 10/17/2007, 11/17/2008, 09/27/2010, 09/01/2012     Pneumococcal (PCV 13) 07/07/2016     Pneumococcal 23 valent 10/17/2007     TD (ADULT, 7+) 03/05/2009, 03/05/2009     TDAP (ADACEL AGES 11-64) 06/07/2012     Zoster vaccine, live 08/27/2012              Review of Systems:   I have done 10 points of review systems and pertinent findings are as above , otherwise negative.             Physical Examination:   General: Alert, oriented, not in distress  B/P: 124/76, T: 98.4, P: 86, R: 18  HEENT: neck supple, symmetrical, no lymph node enlargement, thyromegaly, bruit, JVD, pupils are isocoric and equally responsive to the light.   Lungs: both hemithoraces are symmetrical, normal to palpation, no dullness to percussion, auscultation of lungs revealed few wheezes.  CVS: Normal S1 and S2, no additional heart sounds, murmur, rub, normal peripheral pulses  Abdomen: Bowel sounds present, soft, non tender, non distended, no organomegaly, ascitis, mass  Extremities/musculoskeletal: no peripheral edema, deformity, cyanosis, clubbing  Neurology: alert, orientedx3, no motor/sensorial deficits, cranial nerves grossly normal  Skin: no rash  Psychiatry: Mood and affect are appropriate             DATA:     CBC RESULTS:     Recent Labs   Lab Test  10/31/16   1455  10/23/16   0533   WBC  5.4  7.3   RBC  4.37  3.55*   HGB  13.5  10.7*   HCT  40.8  32.4*   PLT  328  112*       Basic Metabolic Panel:  Recent Labs   Lab Test 01/17/17  10/24/16   0530  10/23/16   0533   NA   --   137  138   POTASSIUM   --   3.9  3.9   CHLORIDE   --   103  104   CO2   --   28  28   BUN   --   18   12   CR  0.720  0.61  0.58   GLC   --   96  99   TU   --   8.9  8.4*       INR  Recent Labs   Lab Test  12/04/15   0745   INR  0.95        PFT   PFT 2/8/2017   FVC 1.41   FEV1 1.24   FVC% 57   FEV1% 65             Thank you for allowing me participate in the care of Jaqueline Canales.      Rubens Quispe M.D.  Associate Professor of Medicine  Pulmonary, Allergy, Critical Care and Sleep        Answers for HPI/ROS submitted by the patient on 2/8/2017   General Symptoms: No  Skin Symptoms: No  HENT Symptoms: No  EYE SYMPTOMS: No  HEART SYMPTOMS: Yes  LUNG SYMPTOMS: No  INTESTINAL SYMPTOMS: No  URINARY SYMPTOMS: No  GYNECOLOGIC SYMPTOMS: No  BREAST SYMPTOMS: No  SKELETAL SYMPTOMS: No  BLOOD SYMPTOMS: No  NERVOUS SYSTEM SYMPTOMS: No  MENTAL HEALTH SYMPTOMS: No  Chest pain or pressure: No  Fast or irregular heartbeat: No  Pain in legs with walking: No  Swelling in feet or ankles: No  Trouble breathing while lying down: No  Fingers or Toes appear blue: No  High blood pressure: No  Low blood pressure: No  Fainting: No  Murmurs: No  Chest pain on exertion: No  Chest pain at rest: No  Cramping pain in leg during exercise: No  Pacemaker: No  Varicose veins: No  Edema or swelling: No  Fast heart beat: No  Wake up at night with shortness of breath: No  Heart flutters: No  Light-headedness: No  Exercise intolerance: No

## 2017-02-08 NOTE — NURSING NOTE
Chief Complaint   Patient presents with     RECHECK     4 month recheck     Swati Flynn LPN  Neuroscience- Movement Disorders and Epilepsy

## 2017-02-13 ENCOUNTER — RADIANT APPOINTMENT (OUTPATIENT)
Dept: GENERAL RADIOLOGY | Facility: CLINIC | Age: 77
End: 2017-02-13
Attending: PREVENTIVE MEDICINE
Payer: COMMERCIAL

## 2017-02-13 ENCOUNTER — OFFICE VISIT (OUTPATIENT)
Dept: FAMILY MEDICINE | Facility: CLINIC | Age: 77
End: 2017-02-13
Payer: COMMERCIAL

## 2017-02-13 VITALS
WEIGHT: 220 LBS | SYSTOLIC BLOOD PRESSURE: 103 MMHG | BODY MASS INDEX: 40.48 KG/M2 | DIASTOLIC BLOOD PRESSURE: 67 MMHG | OXYGEN SATURATION: 96 % | TEMPERATURE: 96.8 F | HEART RATE: 76 BPM | HEIGHT: 62 IN

## 2017-02-13 DIAGNOSIS — R05.9 COUGH: Primary | ICD-10-CM

## 2017-02-13 DIAGNOSIS — R05.9 COUGH: ICD-10-CM

## 2017-02-13 PROCEDURE — 71020 XR CHEST 2 VW: CPT

## 2017-02-13 PROCEDURE — 99214 OFFICE O/P EST MOD 30 MIN: CPT | Performed by: PREVENTIVE MEDICINE

## 2017-02-13 RX ORDER — DOXYCYCLINE 100 MG/1
100 CAPSULE ORAL 2 TIMES DAILY
Qty: 14 CAPSULE | Refills: 0 | Status: SHIPPED | OUTPATIENT
Start: 2017-02-13 | End: 2017-02-20

## 2017-02-13 RX ORDER — PREDNISONE 20 MG/1
60 TABLET ORAL DAILY
Qty: 15 TABLET | Refills: 0 | Status: SHIPPED | OUTPATIENT
Start: 2017-02-13 | End: 2017-04-13

## 2017-02-13 NOTE — PROGRESS NOTES
"SUBJECTIVE:  Jaqueline Canales, a 76 year old female scheduled an appointment to discuss the following issues:  Cough  Pt with cough for a couple weeks.  Also some wheezing, no fever.  Has had uri symptoms prior    Medical, social, surgical, and family histories reviewed.    ROS:  C: NEGATIVE for fever, chills  E: NEGATIVE for vision changes   R: NEGATIVE for significant cough or SOB  CV: NEGATIVE for chest pain, palpitations   GI: NEGATIVE for nausea, abdominal pain, heartburn, or change in bowel habits  : NEGATIVE for frequency, dysuria, or hematuria  M: NEGATIVE for significant arthralgias or myalgia  N: NEGATIVE for weakness, dizziness or paresthesias or headache    OBJECTIVE:  /67 (Patient Position: Left side, Cuff Size: Adult Large)  Pulse 76  Temp 96.8  F (36  C) (Oral)  Ht 5' 2\" (1.575 m)  Wt 220 lb (99.8 kg)  SpO2 96%  Breastfeeding? Unknown  BMI 40.24 kg/m2  EXAM:  GENERAL APPEARANCE: healthy, alert and no distress  EYES: EOMI,  PERRL  HENT: ear canals and TM's normal and nose and mouth without ulcers or lesions  RESP: lungs with bilateral expiratory wheezing  CV: regular rates and rhythm, normal S1 S2, no S3 or S4 and no murmur, click or rub -  ABDOMEN:  soft, nontender, no HSM or masses and bowel sounds normal    ASSESSMENT/PLAN:  (R05) Cough  (primary encounter diagnosis)  Plan: XR Chest 2 Views, predniSONE (DELTASONE) 20 MG         tablet, doxycycline Monohydrate 100 MG CAPS  Most consistent with reactive airways  Treat with prednisone and doxycycline (given immunosuppression)  Follow up if not improving    25 minutes spent with patient, over 50% time counseling, coordinating care and explaining about nature of the patient's conditions.    All risks, benefits of treatment and further evaluation was reviewed with patient.  Pt expressed understanding.  Pt was in agreement with this plan.  Christian Caro MD    "

## 2017-02-13 NOTE — NURSING NOTE
"Chief Complaint   Patient presents with     RECHECK       Initial /67 (Patient Position: Left side, Cuff Size: Adult Large)  Pulse 76  Temp 96.8  F (36  C) (Oral)  Ht 5' 2\" (1.575 m)  Wt 220 lb (99.8 kg)  SpO2 96%  Breastfeeding? Unknown  BMI 40.24 kg/m2 Estimated body mass index is 40.24 kg/(m^2) as calculated from the following:    Height as of this encounter: 5' 2\" (1.575 m).    Weight as of this encounter: 220 lb (99.8 kg).  Medication Reconciliation: complete   Charlette HILL CMA      "

## 2017-02-13 NOTE — MR AVS SNAPSHOT
After Visit Summary   2/13/2017    Jaqueline Canales    MRN: [id number]           Patient Information     Date Of Birth          1940        Visit Information        Provider Department      2/13/2017 9:30 AM Christian Caro MD [facility]         Follow-ups after your visit        Your next 10 appointments already scheduled     Apr 04, 2017 10:00 AM CDT   CT CHEST W/O CONTRAST with SHCT1   [facility] CT ([facility])    [address] [address]   [phone]           Please bring any scans or X-rays taken at other hospitals, if similar tests were done. Also bring a list of your medicines, including vitamins, minerals and over-the-counter drugs. It is safest to leave personal items at home.  Be sure to tell your doctor:   If you have any allergies.   If there s any chance you are pregnant.   If you are breastfeeding.   If you have any special needs.  You do not need to do anything special to prepare.  Please wear loose clothing, such as a sweat suit or jogging clothes. Avoid snaps, zippers and other metal. We may ask you to undress and put on a hospital gown.            Apr 13, 2017 10:00 AM CDT   Return Visit with Anabell Paiz MD   [facility] ([facility])    [facility]  [address] [place] [id number]   [phone]            Aug 09, 2017 10:30 AM CDT   FULL PULMONARY FUNCTION with  PFL C   [facility]monary Function Testing ([facility])    [address] [address] [phone]            Aug 09, 2017 11:30 AM CDT   (Arrive by 11:15 AM)   Return Interstitial Lung with Rubens Quispe MD   [facility] ([facility])    [address] [place] [id number] [phone]              Who to contact     If you have questions  "or need follow up information about today's clinic visit or your schedule please contact [facility] directly at [phone].  Normal or non-critical lab and imaging results will be communicated to you by MyChart, letter or phone within 4 business days after the clinic has received the results. If you do not hear from us within 7 days, please contact the clinic through [employer] or phone. If you have a critical or abnormal lab result, we will notify you by phone as soon as possible.  Submit refill requests through [employer] or call your pharmacy and they will forward the refill request to us. Please allow 3 business days for your refill to be completed.          Additional Information About Your Visit        [employer] Information     [employer] lets you send messages to your doctor, view your test results, renew your prescriptions, schedule appointments and more. To sign up, go to [email or web address] . Click on \"Log in\" on the left side of the screen, which will take you to the Welcome page. Then click on \"Sign up Now\" on the right side of the page.     You will be asked to enter the access code listed below, as well as some personal information. Please follow the directions to create your username and password.     Your access code is: [email or web address]  Expires: 2017 11:44 AM     Your access code will  in 90 days. If you need help or a new code, please call your [facility] clinic or [phone].        Care EveryWhere ID     This is your Care EveryWhere ID. This could be used by other organizations to access your [facility] medical records  [id number]        Your Vitals Were     Pulse Temperature Height Pulse Oximetry Breastfeeding? BMI (Body Mass Index)    76 96.8  F (36  C) (Oral) 5' 2\" (1.575 m) 96% Unknown 40.24 kg/m2       Blood Pressure from Last 3 Encounters:   17 103/67   17 124/76   17 110/64    Weight from Last 3 Encounters:   17 220 lb (99.8 kg)   17 228 lb " (103.4 kg)   01/05/17 228 lb (103.4 kg)              Today, you had the following     No orders found for display       Primary Care Provider Office Phone # Fax #    Christian Gonzalez Bill Caro [phone]       [facility] [address]  [facility] [address]        Thank you!     Thank you for choosing [facility]  for your care. Our goal is always to provide you with excellent care. Hearing back from our patients is one way we can continue to improve our services. Please take a few minutes to complete the written survey that you may receive in the mail after your visit with us. Thank you!             Your Updated Medication List - Protect others around you: Learn how to safely use, store and throw away your medicines at www.disposemymeds.org.          This list is accurate as of: 2/13/17  9:43 AM.  Always use your most recent med list.                   Brand Name Dispense Instructions for use    albuterol 108 (90 BASE) MCG/ACT Inhaler    PROAIR HFA/PROVENTIL HFA/VENTOLIN HFA    3 Inhaler    Inhale 2 puffs into the lungs every 6 hours       * ICAPS Caps      Take 1 capsule by mouth 2 times daily       * CENTRUM SILVER per tablet      1 TABLET DAILY       CITRACAL + D PO      Take 2 tablets by mouth 2 times daily       ELIQUIS 5 MG tablet   Generic drug:  apixaban ANTICOAGULANT     180 tablet    TAKE 1 TABLET TWICE DAILY       folic acid 1 MG tablet    FOLVITE     1 TABLET DAILY       Lactobacillus Acidophilus Powd      Take 1 packet by mouth daily       levothyroxine 75 MCG tablet    SYNTHROID/LEVOTHROID    90 tablet    Take 1 tablet (75 mcg) by mouth daily       MELATONIN PO      Take 5 mg by mouth nightly as needed       METHOTREXATE PO      Take 2.5 mg by mouth 10 tablets weekly       metoprolol 25 MG 24 hr tablet    TOPROL-XL    90 tablet    Take 1 tablet (25 mg) by mouth daily       NIFEDICAL XL 60 MG Tb24   Generic drug:  NIFEdipine ER osmotic      1  TABLET DAILY       omeprazole 20 MG CR capsule    priLOSEC    180 capsule    Take 1 capsule (20 mg) by mouth 2 times daily Take 30-60 minutes prior to meals       REMICADE IV      Inject 600 mg into the vein Every 8 weeks       simvastatin 40 MG tablet    ZOCOR    90 tablet    Take 1 tablet (40 mg) by mouth At Bedtime       * Notice:  This list has 2 medication(s) that are the same as other medications prescribed for you. Read the directions carefully, and ask your doctor or other care provider to review them with you.

## 2017-02-15 ENCOUNTER — TELEPHONE (OUTPATIENT)
Dept: FAMILY MEDICINE | Facility: CLINIC | Age: 77
End: 2017-02-15

## 2017-02-15 NOTE — TELEPHONE ENCOUNTER
Your chest xray shows an infiltrate at the left base. I advise repeating this chest xray in 2-3 weeks. I left a message for patient to call us back.

## 2017-02-17 ENCOUNTER — TELEPHONE (OUTPATIENT)
Dept: FAMILY MEDICINE | Facility: CLINIC | Age: 77
End: 2017-02-17

## 2017-02-17 DIAGNOSIS — R91.8 PULMONARY INFILTRATE: Primary | ICD-10-CM

## 2017-02-17 NOTE — TELEPHONE ENCOUNTER
Reason for Call:  Other     Detailed comments: Pt is requesting to get xray done.. States she got a letter to have an xray from dr. alvarez    Phone Number Patient can be reached at: Home number on file [phone] (home)    Best Time: anytime    Can we leave a detailed message on this number? YES    Call taken on 2/17/2017 at 11:08 AM by Nicolle Herrera

## 2017-02-17 NOTE — TELEPHONE ENCOUNTER
Notes from 02/15 say       Your chest xray shows an infiltrate at the left base. I advise repeating this chest xray in 2-3 weeks.

## 2017-03-08 ENCOUNTER — RADIANT APPOINTMENT (OUTPATIENT)
Dept: GENERAL RADIOLOGY | Facility: CLINIC | Age: 77
End: 2017-03-08
Attending: PREVENTIVE MEDICINE
Payer: COMMERCIAL

## 2017-03-08 DIAGNOSIS — R91.8 PULMONARY INFILTRATE: ICD-10-CM

## 2017-03-08 PROCEDURE — 71020 XR CHEST 2 VW: CPT

## 2017-03-09 LAB
DLCOUNC-%PRED-PRE: 65 %
DLCOUNC-PRE: 12.44 ML/MIN/MMHG
DLCOUNC-PRED: 18.91 ML/MIN/MMHG
ERV-PRE: 0.13 L
EXPTIME-PRE: 7.01 SEC
FEF2575-%PRED-PRE: 95 %
FEF2575-PRE: 1.5 L/SEC
FEF2575-PRED: 1.57 L/SEC
FEFMAX-%PRED-PRE: 97 %
FEFMAX-PRE: 4.63 L/SEC
FEFMAX-PRED: 4.76 L/SEC
FEV1-%PRED-PRE: 65 %
FEV1-PRE: 1.24 L
FEV1FEV6-PRE: 88 %
FEV1FEV6-PRED: 78 %
FEV1FVC-PRE: 88 %
FEV1FVC-PRED: 78 %
FEV1SVC-PRE: 87 %
FEV1SVC-PRED: 71 %
FIFMAX-PRE: 3.36 L/SEC
FVC-%PRED-PRE: 57 %
FVC-PRE: 1.41 L
FVC-PRED: 2.44 L
IC-%PRED-PRE: 48 %
IC-PRE: 1.3 L
IC-PRED: 2.66 L
VA-%PRED-PRE: 59 %
VA-PRE: 2.8 L
VC-%PRED-PRE: 53 %
VC-PRE: 1.43 L
VC-PRED: 2.66 L

## 2017-04-10 ENCOUNTER — TRANSFERRED RECORDS (OUTPATIENT)
Dept: HEALTH INFORMATION MANAGEMENT | Facility: CLINIC | Age: 77
End: 2017-04-10

## 2017-04-10 LAB
ALT SERPL-CCNC: 20 IU/L (ref 5–35)
AST SERPL-CCNC: 27 U/L (ref 5–34)
CREAT SERPL-MCNC: 0.57 MG/DL (ref 0.5–1.3)
GFR SERPL CREATININE-BSD FRML MDRD: 109.3 ML/MIN/1.73M2

## 2017-04-11 ENCOUNTER — HOSPITAL ENCOUNTER (OUTPATIENT)
Dept: CT IMAGING | Facility: CLINIC | Age: 77
Discharge: HOME | End: 2017-04-11
Attending: INTERNAL MEDICINE | Admitting: INTERNAL MEDICINE
Payer: MEDICARE

## 2017-04-11 DIAGNOSIS — C34.32 MALIGNANT NEOPLASM OF LOWER LOBE OF LEFT LUNG (H): ICD-10-CM

## 2017-04-11 PROCEDURE — 71250 CT THORAX DX C-: CPT

## 2017-04-13 ENCOUNTER — ONCOLOGY VISIT (OUTPATIENT)
Dept: ONCOLOGY | Facility: CLINIC | Age: 77
End: 2017-04-13
Attending: INTERNAL MEDICINE
Payer: COMMERCIAL

## 2017-04-13 VITALS
OXYGEN SATURATION: 94 % | RESPIRATION RATE: 18 BRPM | TEMPERATURE: 97.4 F | WEIGHT: 234.6 LBS | BODY MASS INDEX: 42.91 KG/M2 | SYSTOLIC BLOOD PRESSURE: 101 MMHG | HEART RATE: 66 BPM | DIASTOLIC BLOOD PRESSURE: 64 MMHG

## 2017-04-13 DIAGNOSIS — C34.32 MALIGNANT NEOPLASM OF LOWER LOBE OF LEFT LUNG (H): Primary | ICD-10-CM

## 2017-04-13 PROCEDURE — 99213 OFFICE O/P EST LOW 20 MIN: CPT | Performed by: INTERNAL MEDICINE

## 2017-04-13 PROCEDURE — 99211 OFF/OP EST MAY X REQ PHY/QHP: CPT

## 2017-04-13 ASSESSMENT — PAIN SCALES - GENERAL: PAINLEVEL: NO PAIN (0)

## 2017-04-13 NOTE — MR AVS SNAPSHOT
After Visit Summary   4/13/2017    Jaqueline Canales    MRN: [id number]           Patient Information     Date Of Birth          1940        Visit Information        Provider Department      4/13/2017 10:00 AM Anabell Paiz MD [facility]        Today's Diagnoses     Malignant neoplasm of lower lobe of left lung (H)    -  1      Care Instructions    1.  RTC MD in 3 months  2.  CT scan chest without contrast before next visit  3- Refer to lymphedema for right arm edema        Follow-ups after your visit        Additional Services     LYMPHEDEMA THERAPY REFERRAL       *This therapy referral will be filtered to a centralized scheduling office at [facility] and the patient will receive a call to schedule an appointment at a [place] location most convenient for them. *   If you have not heard from the scheduling office within 2 business days, please call [phone] for all locations, with the exception of [place], please call [phone].     Treatment: PT or OT Evaluation & Treatment  Special Instructions: history of right breast cancer  PT/OT Treatment Diagnosis: Edema    Please be aware that coverage of these services is subject to the terms and limitations of your health insurance plan.  Call member services at your health plan with any benefit or coverage questions.      **Note to Provider:  If you are referring outside of [place] for the therapy appointment, please list the name of the location in the  special instructions  above, print the referral and give to the patient to schedule the appointment.                  Your next 10 appointments already scheduled     Jun 26, 2017  9:00 AM CDT   MA SCREENING DIGITAL BILATERAL with SHBCMA2   [facility] ([facility])    [address] [place] [address], [address] [place] [id number] [phone]           Do not use any powder, lotion or deodorant under your arms or on your breast.  If you do, we will ask you to remove it before your exam.  Wear comfortable, two-piece clothing.  If you have any allergies, tell your care team.  Bring any previous mammograms from other facilities or have them mailed to the breast center. This mammogram location, [facility], now offers 3D mammography. It doesn't replace a screening mammogram and can be done with a regular screening mammogram. It is optional and not all insurances will pay for it. 3D mammography is a special kind of mammogram that produces a three-dimensional image of the breast by using low dose-xrays. 3D allows the radiologist to see the breast tissue differently from 2D, which reduces the chance of repeat testing due to overlapping breast tissue. If you are interested in have a 3D mammogram, please check with your insurance before you arrive for your exam. On the day of your exam you will be asked if you would like 3D imaging.            Jul 11, 2017 10:00 AM CDT   CT CHEST W/O CONTRAST with SHCT1   [facility] ([facility])    [facility] [address] [phone]           Please bring any scans or X-rays taken at other hospitals, if similar tests were done. Also bring a list of your medicines, including vitamins, minerals and over-the-counter drugs. It is safest to leave personal items at home.  Be sure to tell your doctor:   If you have any allergies.   If there s any chance you are pregnant.   If you are breastfeeding.   If you have any special needs.  You do not need to do anything special to prepare.  Please wear loose clothing, such as a sweat suit or jogging clothes. Avoid snaps, zippers and other metal. We may ask you to undress and put on a hospital gown.            Jul 13, 2017 10:30 AM CDT   Return Visit with Anabell Paiz MD   [facility] ([facility])    [facility]  [address] [address]  "  [phone]            Aug 09, 2017 10:30 AM CDT   FULL PULMONARY FUNCTION with  PFL C   [facility] ([facility])    [address] [place] [address] [phone]            Aug 09, 2017 11:30 AM CDT   (Arrive by 11:15 AM)   Return Interstitial Lung with Rubens Quispe MD   [facility] ([facility])    [address] [place] [address] [phone]              Who to contact     If you have questions or need follow up information about today's clinic visit or your schedule please contact [facility] directly at [phone].  Normal or non-critical lab and imaging results will be communicated to you by [employer], letter or phone within 4 business days after the clinic has received the results. If you do not hear from us within 7 days, please contact the clinic through [employer] or phone. If you have a critical or abnormal lab result, we will notify you by phone as soon as possible.  Submit refill requests through [employer] or call your pharmacy and they will forward the refill request to us. Please allow 3 business days for your refill to be completed.          Additional Information About Your Visit        [employer] Information     [employer] lets you send messages to your doctor, view your test results, renew your prescriptions, schedule appointments and more. To sign up, go to [email or web address] . Click on \"Log in\" on the left side of the screen, which will take you to the Welcome page. Then click on \"Sign up Now\" on the right side of the page.     You will be asked to enter the access code listed below, as well as some personal information. Please follow the directions to create your username and password.     Your access code is: [email or web address]  Expires: 2017  4:14 PM     Your access code will  in 90 days. If you need help or a new " code, please call your [place] clinic or [phone].        Care EveryWhere ID     This is your Care EveryWhere ID. This could be used by other organizations to access your [place] medical records  [id number]        Your Vitals Were     Pulse Temperature Respirations Pulse Oximetry BMI (Body Mass Index)       66 97.4  F (36.3  C) (Oral) 18 94% 42.91 kg/m2        Blood Pressure from Last 3 Encounters:   04/13/17 101/64   02/13/17 103/67   02/08/17 124/76    Weight from Last 3 Encounters:   04/13/17 106.4 kg (234 lb 9.6 oz)   02/13/17 99.8 kg (220 lb)   02/08/17 103.4 kg (228 lb)              We Performed the Following     LYMPHEDEMA THERAPY REFERRAL        Primary Care Provider Office Phone # Fax #    Christian Gonzalez Bill Caro [phone]       [facility] [address]  [place] [address]        Thank you!     Thank you for choosing [facility]  for your care. Our goal is always to provide you with excellent care. Hearing back from our patients is one way we can continue to improve our services. Please take a few minutes to complete the written survey that you may receive in the mail after your visit with us. Thank you!             Your Updated Medication List - Protect others around you: Learn how to safely use, store and throw away your medicines at www.disposemymeds.org.          This list is accurate as of: 4/13/17 11:59 PM.  Always use your most recent med list.                   Brand Name Dispense Instructions for use    albuterol 108 (90 BASE) MCG/ACT Inhaler    PROAIR HFA/PROVENTIL HFA/VENTOLIN HFA    3 Inhaler    Inhale 2 puffs into the lungs every 6 hours       * ICAPS Caps      Take 1 capsule by mouth 2 times daily       * CENTRUM SILVER per tablet      1 TABLET DAILY       CITRACAL + D PO      Take 2 tablets by mouth 2 times daily       ELIQUIS 5 MG tablet   Generic drug:  apixaban ANTICOAGULANT     180 tablet    TAKE 1 TABLET TWICE DAILY        folic acid 1 MG tablet    FOLVITE     1 TABLET DAILY       Lactobacillus Acidophilus Powd      Take 1 packet by mouth daily       levothyroxine 75 MCG tablet    SYNTHROID/LEVOTHROID    90 tablet    Take 1 tablet (75 mcg) by mouth daily       MELATONIN PO      Take 5 mg by mouth nightly as needed       METHOTREXATE PO      Take 2.5 mg by mouth 10 tablets weekly       metoprolol 25 MG 24 hr tablet    TOPROL-XL    90 tablet    Take 1 tablet (25 mg) by mouth daily       NIFEDICAL XL 60 MG Tb24   Generic drug:  NIFEdipine ER osmotic      1 TABLET DAILY       omeprazole 20 MG CR capsule    priLOSEC    180 capsule    Take 1 capsule (20 mg) by mouth 2 times daily Take 30-60 minutes prior to meals       REMICADE IV      Inject 600 mg into the vein Every 8 weeks       simvastatin 40 MG tablet    ZOCOR    90 tablet    Take 1 tablet (40 mg) by mouth At Bedtime       * Notice:  This list has 2 medication(s) that are the same as other medications prescribed for you. Read the directions carefully, and ask your doctor or other care provider to review them with you.

## 2017-04-13 NOTE — PROGRESS NOTES
"Jaqueline Canales is a 76 year old female who presents for:  Chief Complaint   Patient presents with     Oncology Clinic Visit     lung cancer        Initial Vitals:  /64 (BP Location: Left arm, Patient Position: Chair, Cuff Size: Adult Large)  Pulse 66  Temp 97.4  F (36.3  C) (Oral)  Resp 18  Wt 106.4 kg (234 lb 9.6 oz)  SpO2 94%  BMI 42.91 kg/m2 Estimated body mass index is 42.91 kg/(m^2) as calculated from the following:    Height as of 2/13/17: 1.575 m (5' 2\").    Weight as of this encounter: 106.4 kg (234 lb 9.6 oz).. Body surface area is 2.16 meters squared. BP completed using cuff size: large  No Pain (0) No LMP recorded. Patient is postmenopausal. Allergies and medications reviewed.     Medications: Medication refills not needed today.  Pharmacy name entered into [facility]: [facility] - [facility] [address]    Comments: Follow up lung cancer    4 minutes for nursing intake (face to face time)   Aurea Tran MA    DISCHARGE PLAN:  Next appointments: See patient instruction section-- Melania  Departure Mode: Ambulatory  Accompanied by: self  4 minutes for nursing discharge (face to face time)   Anushka Felix, RN    1.  RTC MD in 3 months  2.  CT scan chest without contrast before next visit  3- Refer to lymphedema for right arm edema--ordered in [facility] & Pt was told to call back next week if she hasn't received a call back to schedule by Lymphedema.      5/15/2017 Mon  9:30 AM  9:30 A 75 SHLDOT [[id number]] MARKELL BRAVO [[id number]] LYMPHEDEMA EVALUATION [[id number]] Medicare//edema, Malignant neoplasm of lower lobe of left lung (H  LYMPHEDEMA THERAPY REFERRAL [9050.065]     7/11/2017 Tue 10:00 AM 10:00 A 15 SHCT [[id number]] SHCT1 [[id number]] CT CHEST WO [[id number]] Epic Order, Sb Melania [phone]  CT CHEST W/O CONTRAST [[id number]]     7/13/2017 Thu 10:30 AM 10:30 A 15 SHCC [[id number]] [place] [facility] [[id number]] RETURN [657] Return - Lung Cancer f/u     "

## 2017-04-13 NOTE — PROGRESS NOTES
[facility]  HEMATOLOGY ONCOLOGY     DIAGNOSES:     1.  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 The patient had a left lower lobe nodule 11/2015.  It was biopsied 12/04/2015, indicated a poorly differentiated squamous cell carcinoma.  PET CT scan 12/14/2015 hypermetabolic nodular lesion in the left lower lobe.     2.  History of breast cancer in 1990, treated with right-sided mastectomy.   3.  Interstitial lung disease.   4.  Rheumatoid arthritis.  Jaqueline Canales is on Remicade for interstitial lung disease and rheumatoid arthritis.       TREATMENT:  S/P SBRT for lung cancer.      SUBJECTIVE:  The patient comes in for followup today. She has been at her baseline. No new respiratory symptoms.     REVIEW OF SYSTEMS:  A complete review of systems was performed and found to be negative other than pertinent positives mentioned in history of present illness.     Past medical, social histories reviewed.    Meds- Reviewed.     PHYSICAL EXAMINATION:   VITAL SIGNS:/64 (BP Location: Left arm, Patient Position: Chair, Cuff Size: Adult Large)  Pulse 66  Temp 97.4  F (36.3  C) (Oral)  Resp 18  Wt 106.4 kg (234 lb 9.6 oz)  SpO2 94%  BMI 42.91 kg/m2  GENERAL: Sitting comfortably.   HEENT: Pupils are equal. Oropharynx is clear.   NECK: No cervical or supraclavicular lymphadenopathy.   LUNGS: Clear bilaterally.   HEART: S1, S2, regular.   ABDOMEN: Soft, nontender, nondistended, no hepatosplenomegaly.   EXTREMITIES: Warm, well perfused.   NEUROLOGIC: Alert, awake.   SKIN: No rash.   LYMPHATICS: Bilateral edema.     DATA:    Results for orders placed or performed during the hospital encounter of 04/11/17   CT Chest w/o Contrast    Narrative    CT CHEST WITHOUT CONTRAST  4/11/2017 10:55 AM     HISTORY: Follow up lung cancer.    TECHNIQUE:  CT chest without contrast. Radiation dose for this scan  was reduced using automated exposure control, adjustment of the  mA  and/or kV according to patient size, or iterative reconstruction  technique. MIP and coronal images were reformatted.    COMPARISON: 12/27/2016, 10/22/2016, 8/15/2016, 7/7/2016, 4/29/2016,  12/4/2015.    FINDINGS:   Chest: Fibrotic changes throughout the right lung are stable.  Minimally increased size of nodular density in the medial left midlung  (series 3, images 24 through 29). Again noted there is honeycombing  and traction bronchiectasis.    Moderate hiatal hernia, unchanged. No pleural effusion or pericardial  effusion. Scattered mildly prominent mediastinal lymph nodes are  unchanged; for example there is a pretracheal lymph node measuring 9.4  x 1.4 cm, not significantly changed. No axillary lymphadenopathy.  Thyroid is normal. Atherosclerotic disease is seen throughout the  aortic arch. Mitral valve annulus is present. Coronary artery  calcifications are seen.    Upper abdomen: Limited evaluation of the upper abdomen shows no focal  abnormality.    Bones: No suspicious bony lesions.      Impression    IMPRESSION:  1. Increased size of nodular area of consolidation in the medial left  mid lung; followup with attention to this area is recommended.  2. Large hiatal hernia.  3. Fibrotic changes in the lungs with traction bronchiectasis,  unchanged.  4. No change in mildly prominent mediastinal lymph nodes.       ECOG PS: 1    ASSESSMENT:  A 74-year-old lady with squamous cell carcinoma of the lung which was clinically T1 N0 MX.  She had a 1.7 to 1.1 cm left lower lobe lesion which was FDG avid, multiple non-FDG avid mediastinal lymph nodes, prevascular, precarinal and subcarinal which appeared to be stable.  There was FDG activity in tonsil and neck lymph nodes which were evaluated by Dr. Trevor Murdock of ENT and this was considered to be a physiological uptake.  Given her history of interstitial lung disease she was referred to Radiation Oncology for SBRT of this lung lesion.  Her MRI of brain was negative  for metastases. PET CT scan 10/31/16 showed right upper and middle lobe areas of hypermetabolism without defined nodularities. Her case was reviewed at our multidisciplinary thoracic oncology conference 11/1/16 and consensus recommendation was to proceed with close observation.   - CT scan 4/11/17 results were reviewed with the patient-  It is read as minimal increase in size of medial left mid lung. The images were reviewed by myself. It appears that change is minimal and a repeat scan in three month is reasonable. If there is significant change in next scan then we will get a PET scan.  - She has history of right breast surgery and has right arm lymphedema. We will refer her for therapy.      PLAN:   1.  RTC MD in 3 months  2.  CT scan chest without contrast before next visit  3- Refer to lymphedema for right arm edema    [place] DELATORRE MD    4/13/2017

## 2017-04-13 NOTE — PATIENT INSTRUCTIONS
1.  RTC MD in 3 months  2.  CT scan chest without contrast before next visit  3- Refer to lymphedema for right arm edema

## 2017-04-18 DIAGNOSIS — E78.5 HYPERLIPIDEMIA LDL GOAL <100: ICD-10-CM

## 2017-04-19 RX ORDER — SIMVASTATIN 40 MG
TABLET ORAL
Qty: 90 TABLET | Refills: 1 | OUTPATIENT
Start: 2017-04-19

## 2017-04-19 NOTE — TELEPHONE ENCOUNTER
simvastatin (ZOCOR) 40 MG tablet       Last Written Prescription Date: 11/28/2016  Last Fill Quantity: 90, # refills: 2  Last Office Visit with G, P or [facility] prescribing provider: 2/13/2017  Next 5 appointments (look out 90 days)     Jul 13, 2017 10:30 AM CDT   Return Visit with Anabell Paiz MD   [facility] ([facility])    [facility]  [address] [facility] [address]  [facility] [address]   [phone]                   Lab Results   Component Value Date    CHOL 160 07/01/2016     Lab Results   Component Value Date    HDL 54 07/01/2016     Lab Results   Component Value Date    LDL 75 07/01/2016     Lab Results   Component Value Date    TRIG 156 07/01/2016     Lab Results   Component Value Date    CHOLHDLRATIO 2.6 06/01/2015

## 2017-05-03 ENCOUNTER — TELEPHONE (OUTPATIENT)
Dept: FAMILY MEDICINE | Facility: CLINIC | Age: 77
End: 2017-05-03

## 2017-05-03 NOTE — TELEPHONE ENCOUNTER
Dr. Yadiel Burroughs is the provider patient is thinking of wanting to establish care with once he starts. But would like your input to see maybe who else may be best for her. Please give her a call at [phone] (home) to discuss this. Thank you.    Central SchedulerOsmin.

## 2017-05-22 ENCOUNTER — HOSPITAL ENCOUNTER (OUTPATIENT)
Dept: OCCUPATIONAL THERAPY | Facility: CLINIC | Age: 77
Setting detail: THERAPIES SERIES
End: 2017-05-22
Attending: INTERNAL MEDICINE
Payer: MEDICARE

## 2017-05-22 PROCEDURE — G8989 SELF CARE D/C STATUS: HCPCS | Mod: GO,CH | Performed by: OCCUPATIONAL THERAPIST

## 2017-05-22 PROCEDURE — 40000445 ZZHC STATISTIC OT VISIT, LYMPHEDEMA: Performed by: OCCUPATIONAL THERAPIST

## 2017-05-22 PROCEDURE — G8988 SELF CARE GOAL STATUS: HCPCS | Mod: GO,CH | Performed by: OCCUPATIONAL THERAPIST

## 2017-05-22 PROCEDURE — 97165 OT EVAL LOW COMPLEX 30 MIN: CPT | Mod: GO | Performed by: OCCUPATIONAL THERAPIST

## 2017-05-22 PROCEDURE — G8987 SELF CARE CURRENT STATUS: HCPCS | Mod: GO,CH | Performed by: OCCUPATIONAL THERAPIST

## 2017-05-22 PROCEDURE — 97535 SELF CARE MNGMENT TRAINING: CPT | Mod: GO | Performed by: OCCUPATIONAL THERAPIST

## 2017-05-22 NOTE — PROGRESS NOTES
"   05/22/17 0921   Rehab Discipline   Discipline OT   Type of Visit   Type of visit Initial Edema Evaluation   General Information   Start of care 05/22/17   Referring physician Anabell Paiz MD   Orders Evaluate and treat as indicated   Order date 04/13/17   Medical diagnosis Lung Cancer  (Will seek clarification of MD order for dx lymphedema)   Onset of illness / date of surgery 04/13/17   Edema onset 04/13/17   Edema etiology comments R breast cancer 1990 with mastectomy, no RT or chemo, pt unsure re # of LND.   Dr. Paiz requested eval for RUE lymphedema, pt reports she is unsure re onset RUE lymphedema and states it has not bothered her much.   Pertinent history of current problem (PT: include personal factors and/or comorbidities that impact the POC; OT: include additional occupational profile info) PMHx includes: SCCa L lower lobe lung dx'd 12'15 treated successfully with RT, interstitial lung dz, RA, obese, scleroderma, acute pericarditis 10'16, persistent A-fib, hypothyroid, SRINIVAS, HTN, s/p carotid endarterectom d/t TIA, hammer toe surgery, Raynaud's syndrome   Surgical / medical history reviewed Yes   Prior level of functional mobility Uses single point cane for ambulation, \"That's for balance.\"   Pt cites decr balance d/t arthritic knees.   Living environment House / [facility]   Living environment comments Lives with spouse in own home.  Has difficulty with stairs, \"It's difficult, but I do it.\"  Pt rarely enters upstairs in her house, has 2 steps to enter from outside with grab bars.   Fall Screening   Fall screen completed by OT   Per patient, fall 2 or more times in past year? No   Per patient, fall with injury in past year? No   Is patient a fall risk? No   System Outcome Measures   Lymphedema Life Impact Scale (score range 0-72). A higher score indicates greater impairment. 2   Subjective Report   Patient report of symptoms Mild swelling RUE   Patient / Family Goals   Patient / family goals statement Pt " presents for ED re RUE lymphedema post mastectomy per Dr. Paiz's recommendation   Cognitive Status   Orientation Orientation to person, place and time   Level of consciousness Alert   Follows commands and answers questions 100% of the time   Personal safety and judgement Intact   Memory Intact   Edema Exam / Assessment   Skin condition comments BUE and BUQ appear approx symmetrical.   Soft-tissues BUE are firm to palpation, fingers appear puffy (R side potentially involved with post-mastectomy lymphedema) likely d/t scleroderma.   Skin is otherwise intact.   Girth Measurements   Girth Measurements Refer to separate girth measurement flowsheet   Volume UE   Right UE (mL) 2758   Left UE (mL) 2682   UE volume comparison RUE volume greater than LUE volume  (Pt is R hand dominant)   % difference RUE > LUE = 3% (WNL for R dominance)   Activities of Daily Living   Activities of Daily Living Pt reports independent all ADLs/IADLs   Gait / Locomotion   Gait / Locomotion Uses standard cane for community ambulation, is otherwise independent in all transfers, mobility, and ambulation.   Has grab bars in her bathroom.   Sensory   Sensory perception comments Pt has no complaint of sensory deficits RUE/RUQ s/p mastectomy   Clinical Impression   Criteria for skilled therapeutic intervention met Yes   Therapy diagnosis Stage 0 lymphedema RUE/RUQ s/p mastectomy (stage 0 = potential for lymphedema s/p LND)   Assessment of Occupational Performance 1-3 Performance Deficits   Identified Performance Deficits Pt has had no education re potential for lymphedema s/p mastectomy, complex PMHx   Clinical Decision Making (Complexity) Low complexity   Treatment frequency (1 x eval & education only)   Patient / family and/or staff in agreement with plan of care Yes   Risks and benefits of therapy have been explained Yes   Clinical impression comments No obvious lymphedema RUE/RUQ.   Skin appears firm & puffy BUE likely secondary to scleroderma.    Goal 1   Goal identifier Lymphedema precautions   Goal description For decreased risk infection and progressive soft-tissue fibrosis patient will verbalize understanding re lymphedema precautions.   Target date 05/22/17   Date met 05/22/17   Total Evaluation Time   Total evaluation time 30   Self Care   Self Care: Current Status , Goal ,  Discharge -Rsqx Only- Modifier the same for all G-codes CH: 0% impairment   Self Care: Current & Discharge Modifier Rationale-Eval Only No apparent lymphedema   Self Care Comments Pt attentive to ED provided re potential of RUE/RUQ lymphedema s/p mastectomy & verbalized + understanding re lymphedema precautions.

## 2017-06-19 ENCOUNTER — TRANSFERRED RECORDS (OUTPATIENT)
Dept: HEALTH INFORMATION MANAGEMENT | Facility: CLINIC | Age: 77
End: 2017-06-19

## 2017-06-26 ENCOUNTER — HOSPITAL ENCOUNTER (OUTPATIENT)
Dept: MAMMOGRAPHY | Facility: CLINIC | Age: 77
Discharge: HOME | End: 2017-06-26
Attending: OPHTHALMOLOGY | Admitting: OPHTHALMOLOGY
Payer: MEDICARE

## 2017-06-26 DIAGNOSIS — Z12.31 VISIT FOR SCREENING MAMMOGRAM: ICD-10-CM

## 2017-06-26 PROCEDURE — G0202 SCR MAMMO BI INCL CAD: HCPCS | Mod: 52

## 2017-07-06 DIAGNOSIS — I48.19 PERSISTENT ATRIAL FIBRILLATION (H): ICD-10-CM

## 2017-07-06 DIAGNOSIS — K22.2 ESOPHAGEAL STRICTURE: ICD-10-CM

## 2017-07-06 NOTE — TELEPHONE ENCOUNTER
Pending Prescriptions:                       Disp   Refills    omeprazole (PRILOSEC) 20 MG CR capsule    180 ca*3            Sig: Take 1 capsule (20 mg) by mouth 2 times daily           Take 30-60 minutes prior to meals    metoprolol (TOPROL-XL) 25 MG 24 hr tablet 90 tab*1            Sig: Take 1 tablet (25 mg) by mouth daily      Omeprazole      Last Written Prescription Date: 7/7/16  Last Fill Quantity: 180,  # refills: 3   Last Office Visit with [facility], [facility] or [facility] prescribing provider: 2/13/17 Gordo                                         Next 5 appointments (look out 90 days)     Jul 13, 2017 10:30 AM CDT   Return Visit with Anabell Piaz MD   [facility] ([facility])    [facility] [place] [id number]  [facility] [address]   [phone]                  Metoprolol      Last Written Prescription Date: 10/31/16  Last Fill Quantity: 90, # refills: 1    Last Office Visit with [facility], [facility] or [facility] prescribing provider:  2/13/17 Gordo     Future Office Visit:    Next 5 appointments (look out 90 days)     Jul 13, 2017 10:30 AM CDT   Return Visit with Anabell Paiz MD   [facility] ([facility])    [facility] [place] [id number]  [facility] [address]   [phone]                    BP Readings from Last 3 Encounters:   04/13/17 101/64   02/13/17 103/67   02/08/17 124/76         Anastasiia Russell, RT(R)

## 2017-07-07 RX ORDER — METOPROLOL SUCCINATE 25 MG/1
25 TABLET, EXTENDED RELEASE ORAL DAILY
Qty: 90 TABLET | Refills: 0 | Status: SHIPPED | OUTPATIENT
Start: 2017-07-07 | End: 2017-10-06

## 2017-07-07 NOTE — TELEPHONE ENCOUNTER
Routing refill request to provider for review/approval because:  Drug interaction warning  Methotrexate / Proton Pump Inhibitors    Significance: Major     Warning: Proton pump inhibitors may decrease the renal elimination of methotrexate, METHOTREXATE PO and potentially increase toxicity related to methotrexate, METHOTREXATE PO, especially in patients receiving high-dose intravenous methotrexate for the treatment of cancer.    Onset: Rapid    Document Level: Suspected    Please review refill.  Thank you.  Kenya Shukla RN

## 2017-07-11 ENCOUNTER — HOSPITAL ENCOUNTER (OUTPATIENT)
Dept: CT IMAGING | Facility: CLINIC | Age: 77
Discharge: HOME | End: 2017-07-11
Attending: INTERNAL MEDICINE | Admitting: INTERNAL MEDICINE
Payer: MEDICARE

## 2017-07-11 DIAGNOSIS — C34.32 MALIGNANT NEOPLASM OF LOWER LOBE OF LEFT LUNG (H): ICD-10-CM

## 2017-07-11 PROCEDURE — 71250 CT THORAX DX C-: CPT

## 2017-07-13 ENCOUNTER — ONCOLOGY VISIT (OUTPATIENT)
Dept: ONCOLOGY | Facility: CLINIC | Age: 77
End: 2017-07-13
Attending: INTERNAL MEDICINE
Payer: COMMERCIAL

## 2017-07-13 VITALS
BODY MASS INDEX: 42.8 KG/M2 | DIASTOLIC BLOOD PRESSURE: 69 MMHG | RESPIRATION RATE: 16 BRPM | SYSTOLIC BLOOD PRESSURE: 107 MMHG | TEMPERATURE: 98 F | OXYGEN SATURATION: 97 % | HEART RATE: 75 BPM | WEIGHT: 234 LBS

## 2017-07-13 DIAGNOSIS — C34.32 MALIGNANT NEOPLASM OF LOWER LOBE OF LEFT LUNG (H): Primary | ICD-10-CM

## 2017-07-13 PROCEDURE — 99213 OFFICE O/P EST LOW 20 MIN: CPT | Performed by: INTERNAL MEDICINE

## 2017-07-13 PROCEDURE — 99211 OFF/OP EST MAY X REQ PHY/QHP: CPT

## 2017-07-13 ASSESSMENT — PAIN SCALES - GENERAL: PAINLEVEL: NO PAIN (0)

## 2017-07-13 NOTE — PATIENT INSTRUCTIONS
1.  RTC MD in 3 months   Done - Bella  2.  CT scan chest without contrast before next visit   Done - Bella    AVS given to patient

## 2017-07-13 NOTE — MR AVS SNAPSHOT
After Visit Summary   7/13/2017    Jaqueline Canales    MRN: [id number]           Patient Information     Date Of Birth          1940        Visit Information        Provider Department      7/13/2017 10:30 AM Anabell Paiz MD [facility]        Today's Diagnoses     Malignant neoplasm of lower lobe of left lung (H)    -  1      Care Instructions    1.  RTC MD in 3 months  2.  CT scan chest without contrast before next visit          Follow-ups after your visit        Your next 10 appointments already scheduled     Aug 09, 2017 10:30 AM CDT   FULL PULMONARY FUNCTION with  PFL C   [facility] Pulmonary Function Testing ([facility])    [address] [place] [id number]   [phone]            Aug 09, 2017 11:30 AM CDT   (Arrive by 11:15 AM)   Return Interstitial Lung with Rubens Quispe MD   [facility] ([facility])    [address] [place] [id number]   [phone]            Oct 16, 2017 10:30 AM CDT   CT CHEST W/O CONTRAST with SHCT1   [facility] CT ([facility])    [address] [place] [id number] [phone]           Please bring any scans or X-rays taken at other hospitals, if similar tests were done. Also bring a list of your medicines, including vitamins, minerals and over-the-counter drugs. It is safest to leave personal items at home.  Be sure to tell your doctor:   If you have any allergies.   If there s any chance you are pregnant.   If you are breastfeeding.   If you have any special needs.  You do not need to do anything special to prepare.  Please wear loose clothing, such as a sweat suit or jogging clothes. Avoid snaps, zippers and other metal. We may ask you to undress and put on a hospital gown.            Oct 19, 2017 10:30 AM CDT   Return Visit with Anabell Paiz MD   [facility] ([place]  "[facility])    [facility] [place]  [address]  [place] [id number] [phone]              Future tests that were ordered for you today     Open Future Orders        Priority Expected Expires Ordered    CT Chest w/o contrast Routine  2018            Who to contact     If you have questions or need follow up information about today's clinic visit or your schedule please contact [facility] directly at [phone].  Normal or non-critical lab and imaging results will be communicated to you by [employer] Sp[employer], letter or phone within 4 business days after the clinic has received the results. If you do not hear from us within 7 days, please contact the clinic through [employer] or phone. If you have a critical or abnormal lab result, we will notify you by phone as soon as possible.  Submit refill requests through [employer] or call your pharmacy and they will forward the refill request to us. Please allow 3 business days for your refill to be completed.          Additional Information About Your Visit        [employer] Information     [employer] lets you send messages to your doctor, view your test results, renew your prescriptions, schedule appointments and more. To sign up, go to [email or web address] . Click on \"Log in\" on the left side of the screen, which will take you to the Welcome page. Then click on \"Sign up Now\" on the right side of the page.     You will be asked to enter the access code listed below, as well as some personal information. Please follow the directions to create your username and password.     Your access code is: [email or web address]  Expires: 2017  4:14 PM     Your access code will  in 90 days. If you need help or a new code, please call your [place] clinic or [phone].        Care EveryWhere ID     This is your Care EveryWhere ID. This could be used by other organizations to access your [place] medical records  [id number]        Your " Vitals Were     Pulse Temperature Respirations Pulse Oximetry BMI (Body Mass Index)       75 98  F (36.7  C) (Oral) 16 97% 42.8 kg/m2        Blood Pressure from Last 3 Encounters:   07/13/17 107/69   04/13/17 101/64   02/13/17 103/67    Weight from Last 3 Encounters:   07/13/17 106.1 kg (234 lb)   04/13/17 106.4 kg (234 lb 9.6 oz)   02/13/17 99.8 kg (220 lb)               Primary Care Provider Office Phone # Fax #    Carissa Yadiel Burroughs [phone]       [facility] [address] [place] [address]  [facility] [address]        Equal Access to Services     ERVIN CERNA : Catrachito quevedoo Soana luisa, waaxda luqadaha, qaybta kaalmada adeannabelyaelsie, phillip gaines . So [facility] [phone].    ATENCIÓN: Si habla español, tiene a esquivel disposición servicios gratuitos de asistencia lingüística. [facility] [phone].    We comply with applicable federal civil rights laws and [place] laws. We do not discriminate on the basis of race, color, national origin, age, disability sex, sexual orientation or gender identity.            Thank you!     Thank you for choosing [facility]  for your care. Our goal is always to provide you with excellent care. Hearing back from our patients is one way we can continue to improve our services. Please take a few minutes to complete the written survey that you may receive in the mail after your visit with us. Thank you!             Your Updated Medication List - Protect others around you: Learn how to safely use, store and throw away your medicines at www.disposemymeds.org.          This list is accurate as of: 7/13/17 10:56 AM.  Always use your most recent med list.                   Brand Name Dispense Instructions for use Diagnosis    albuterol 108 (90 BASE) MCG/ACT Inhaler    PROAIR HFA/PROVENTIL HFA/VENTOLIN HFA    3 Inhaler    Inhale 2 puffs into the lungs every 6 hours    ILD (interstitial lung disease) (H)       * ICAPS Caps      Take 1 capsule  by mouth 2 times daily        * CENTRUM SILVER per tablet      1 TABLET DAILY        CITRACAL + D PO      Take 2 tablets by mouth 2 times daily        ELIQUIS 5 MG tablet   Generic drug:  apixaban ANTICOAGULANT     180 tablet    TAKE 1 TABLET TWICE DAILY    Persistent atrial fibrillation (H)       folic acid 1 MG tablet    FOLVITE     1 TABLET DAILY    Mixed hyperlipidemia       Lactobacillus Acidophilus Powd      Take 1 packet by mouth daily        levothyroxine 75 MCG tablet    SYNTHROID/LEVOTHROID    90 tablet    Take 1 tablet (75 mcg) by mouth daily    Hypothyroidism       MELATONIN PO      Take 5 mg by mouth nightly as needed        METHOTREXATE PO      Take 2.5 mg by mouth 10 tablets weekly        metoprolol 25 MG 24 hr tablet    TOPROL-XL    90 tablet    Take 1 tablet (25 mg) by mouth daily    Persistent atrial fibrillation (H)       NIFEDICAL XL 60 MG Tb24   Generic drug:  NIFEdipine ER osmotic      1 TABLET DAILY    Raynaud's syndrome       omeprazole 20 MG CR capsule    priLOSEC    180 capsule    Take 1 capsule (20 mg) by mouth 2 times daily Take 30-60 minutes prior to meals    Esophageal stricture       REMICADE IV      Inject 600 mg into the vein Every 8 weeks        simvastatin 40 MG tablet    ZOCOR    90 tablet    Take 1 tablet (40 mg) by mouth At Bedtime    Hyperlipidemia LDL goal <100       * Notice:  This list has 2 medication(s) that are the same as other medications prescribed for you. Read the directions carefully, and ask your doctor or other care provider to review them with you.

## 2017-07-13 NOTE — PROGRESS NOTES
"Oncology Rooming Note    July 13, 2017 10:30 AM   Jaqueline Canales is a 76 year old female who presents for:    Chief Complaint   Patient presents with     Oncology Clinic Visit     Initial Vitals: /69 (BP Location: Left arm, Patient Position: Chair, Cuff Size: Adult Large)  Pulse 75  Temp 98  F (36.7  C) (Oral)  Resp 16  Wt 106.1 kg (234 lb)  SpO2 97%  BMI 42.8 kg/m2 Estimated body mass index is 42.8 kg/(m^2) as calculated from the following:    Height as of 2/13/17: 1.575 m (5' 2\").    Weight as of this encounter: 106.1 kg (234 lb). Body surface area is 2.15 meters squared.  No Pain (0) Comment: Data Unavailable   No LMP recorded. Patient is postmenopausal.  Allergies reviewed: Yes  Medications reviewed: Yes    Medications: Medication refills not needed today.  Pharmacy name entered into [employer]: [facility] - [facility] [id number]    Clinical concerns: None     5 minutes for nursing intake (face to face time)     Winsome Reid CMA              "

## 2017-07-13 NOTE — PROGRESS NOTES
[facility]  HEMATOLOGY ONCOLOGY     DIAGNOSES:     1.  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 The patient had a left lower lobe nodule 11/2015.  It was biopsied 12/04/2015, indicated a poorly differentiated squamous cell carcinoma.  PET CT scan 12/14/2015 hypermetabolic nodular lesion in the left lower lobe.     2.  History of breast cancer in 1990, treated with right-sided mastectomy.   3.  Interstitial lung disease.   4.  Rheumatoid arthritis.  Jaqueline Canales is on Remicade for interstitial lung disease and rheumatoid arthritis.       TREATMENT:  S/P SBRT for lung cancer.      SUBJECTIVE:  The patient comes in for followup today. She has been feeling well. No new complaints.     REVIEW OF SYSTEMS:  A complete review of systems was performed and found to be negative other than pertinent positives mentioned in history of present illness.     Past medical, social histories reviewed.    Meds- Reviewed.     PHYSICAL EXAMINATION:   VITAL SIGNS:/69 (BP Location: Left arm, Patient Position: Chair, Cuff Size: Adult Large)  Pulse 75  Temp 98  F (36.7  C) (Oral)  Resp 16  Wt 106.1 kg (234 lb)  SpO2 97%  BMI 42.8 kg/m2  CONSTITUTIONAL: Sitting comfortably.   HEENT: Pupils are equal. Oropharynx is clear.   NECK: No cervical or supraclavicular lymphadenopathy.   RESPIRATORY: Clear bilaterally.   CARD/VASC: S1, S2, regular.   GI: Soft, nontender, nondistended, no hepatosplenomegaly.   MUSKULOSKELETAL: Warm, well perfused.   NEUROLOGIC: Alert, awake.   INTEGUMENT: No rash.   LYMPHATICS: No edema.   PSYCH: Mood and affect was normal.    LABORATORY DATA AND IMAGING REVIEWED DURING THIS VISIT:    Results for orders placed or performed during the hospital encounter of 07/11/17   CT Chest w/o contrast    Narrative    CT CHEST WITHOUT CONTRAST July 11, 2017 10:00 AM    HISTORY: Follow up lung cancer, squamous cell left lower lobe lung  cancer. Right  mastectomy.    TECHNIQUE: Axial images from thoracic inlet to diaphragm. Noncontrast  technique. Radiation dose for this scan was reduced using automated  exposure control, adjustment of the mA and/or kV according to patient  size, or iterative reconstruction technique.    COMPARISON: 4/11/2017 CT chest.    FINDINGS:   CHEST: Mild mediastinal adenopathy unchanged. Coronary artery  calcifications and calcification in the thoracic aorta unchanged.     Redemonstrated volume loss of the left lung. The focal rounded opacity  in the posteromedial left lower lung series 4 image 23 and 24 is not  significantly changed since April 2017. This is 1.8 cm AP dimension  but increased since prior study of 12/27/2016.    Bilateral pulmonary fibrosis with traction bronchiectasis greatest in  the lingula and right middle lobe redemonstrated. No acute appearing  pulmonary disease. No significant interval change since 4/11/2017.  Large hiatal hernia.       Impression    IMPRESSION: Stable CT since 4/11/2017. The focal consolidation medial  left lower lung is not significantly changed. Few mildly prominent  mediastinal nodes also unchanged.    TIFFANY MODI MD       ECOG PS: 1    ASSESSMENT:  A 74-year-old lady with squamous cell carcinoma of the lung which was clinically T1 N0 MX.  She had a 1.7 to 1.1 cm left lower lobe lesion which was FDG avid, multiple non-FDG avid mediastinal lymph nodes, prevascular, precarinal and subcarinal which appeared to be stable.  There was FDG activity in tonsil and neck lymph nodes which were evaluated by Dr. Trevor Murdock of ENT and this was considered to be a physiological uptake.  Given her history of interstitial lung disease she was referred to Radiation Oncology for SBRT of this lung lesion.  Her MRI of brain was negative for metastases. PET CT scan 10/31/16 showed right upper and middle lobe areas of hypermetabolism without defined nodularities. Her case was reviewed at our multidisciplinary  thoracic oncology conference 11/1/16 and consensus recommendation was to proceed with close observation.   - CT scan 7/11/71 results were reviewed with the patient-  Stable findings.   - I will repeat a scan in three months.      PLAN:   1.  RTC MD in 3 months  2.  CT scan chest without contrast before next visit    CHESTER DELATORRE MD    7/13/2017

## 2017-08-09 ENCOUNTER — OFFICE VISIT (OUTPATIENT)
Dept: PULMONOLOGY | Facility: CLINIC | Age: 77
End: 2017-08-09
Attending: INTERNAL MEDICINE
Payer: MEDICARE

## 2017-08-09 VITALS
HEIGHT: 62 IN | WEIGHT: 234 LBS | RESPIRATION RATE: 18 BRPM | DIASTOLIC BLOOD PRESSURE: 80 MMHG | BODY MASS INDEX: 43.06 KG/M2 | OXYGEN SATURATION: 96 % | HEART RATE: 72 BPM | SYSTOLIC BLOOD PRESSURE: 128 MMHG

## 2017-08-09 DIAGNOSIS — J84.9 ILD (INTERSTITIAL LUNG DISEASE) (H): Primary | ICD-10-CM

## 2017-08-09 DIAGNOSIS — J84.9 ILD (INTERSTITIAL LUNG DISEASE) (H): ICD-10-CM

## 2017-08-09 DIAGNOSIS — R06.00 DYSPNEA, UNSPECIFIED TYPE: ICD-10-CM

## 2017-08-09 PROCEDURE — 99212 OFFICE O/P EST SF 10 MIN: CPT | Mod: ZF

## 2017-08-09 ASSESSMENT — PAIN SCALES - GENERAL: PAINLEVEL: NO PAIN (0)

## 2017-08-09 NOTE — NURSING NOTE
Chief Complaint   Patient presents with     Interstitial Lung Disease (ILD)     Follow up on Jaqueline and her ILD     Marco Euceda CMA at 9:54 AM on 8/9/2017

## 2017-08-09 NOTE — PROGRESS NOTES
[facility]   Pulmonary Clinic Follow Up Note  August 9, 2017      Jaqueline Canales MRN# [id number]   Age: 76 year old YOB: 1940     Date of Consultation: August 9, 2017    Primary care provider: Carissa Burroughs     History taken from; Patient      Jaqueline Canales is a 76 year old female seen in the Pulmonary Clinic  for f/u.  Chief Complaint   Patient presents with     Interstitial Lung Disease (ILD)     Follow up on Jaqueline and her ILD   .          Pulmonary Problem List / Reason for follow up:   1. ILD  2. 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  The patient is doing well.      1.  Interstitial lung disease.  We will continue with current regimen.  The patient's FVC is stable for a long period of time.  The patient is doing well.   2.  Airway disease.  The patient is using albuterol on and off with good results.  We will follow up with the patient in 6 months with another PFT, and the patient is stable.     3.  The patient's diffusion capacity is slightly low.  On the last visit, the patient had borderline pulmonary hypertension.  We will repeat echocardiogram today.     4.  Lung cancer.  The patient is followed by Oncology, and the patient is currently in remission.      We explained the plan to the patient including the risks and benefits.  The patient expressed understanding and agreed with the plan.      Thank you very much for the opportunity to participate in the care of this very pleasant patient.             Return visit in 6 months      Rubens Quispe M.D.         History of Present Illness / Interval History:     CHIEF COMPLAINT:  Interstitial lung disease and lung cancer.      HISTORY OF PRESENT ILLNESS:  The patient is currently doing very well.  The patient is stable on the Remicade and  methotrexate.  The patient's pulmonary function tests are stable.  The patient denies any increase in shortness of breath.  The patient is using albuterol from time to time but not on a regular basis, and the patient is improving.  The patient also has a history of lung cancer which is followed by Oncology, and the patient is currently in remission.  The patient has borderline pulmonary hypertension and diffusion capacity on this visit is slightly worse.  Overall, the patient is stable.                      Pulmonary Data:       Exposure history: Asbestos;  No , TB;  No , Radiation;   No          Medications:     Current Outpatient Prescriptions   Medication Sig     omeprazole (PRILOSEC) 20 MG CR capsule Take 1 capsule (20 mg) by mouth 2 times daily Take 30-60 minutes prior to meals     metoprolol (TOPROL-XL) 25 MG 24 hr tablet Take 1 tablet (25 mg) by mouth daily     ELIQUIS 5 MG tablet TAKE 1 TABLET TWICE DAILY     simvastatin (ZOCOR) 40 MG tablet Take 1 tablet (40 mg) by mouth At Bedtime     Lactobacillus Acidophilus POWD Take 1 packet by mouth daily     levothyroxine (SYNTHROID, LEVOTHROID) 75 MCG tablet Take 1 tablet (75 mcg) by mouth daily     Methotrexate Sodium (METHOTREXATE PO) Take 2.5 mg by mouth 10 tablets weekly     albuterol (PROAIR HFA, PROVENTIL HFA, VENTOLIN HFA) 108 (90 BASE) MCG/ACT inhaler Inhale 2 puffs into the lungs every 6 hours     MELATONIN PO Take 5 mg by mouth nightly as needed     Calcium Citrate-Vitamin D (CITRACAL + D PO) Take 2 tablets by mouth 2 times daily      Multiple Vitamins-Minerals (ICAPS) CAPS Take 1 capsule by mouth 2 times daily      InFLIXimab (REMICADE IV) Inject 600 mg into the vein Every 8 weeks     NIFEDICAL XL 60 MG PO TB24 1 TABLET DAILY     FOLIC ACID 1 MG OR TABS 1 TABLET DAILY     CENTRUM SILVER OR TABS 1 TABLET DAILY     No current facility-administered medications for this visit.              Past Medical History:     Past Medical History:   Diagnosis Date      Amaurosis fugax 5-11    Right     Carotid artery stenosis      Esophageal reflux 3/11/2004     HELICOBACTER PYLORI INFECTION 7/11/2006     hx of CA in situ RT breast-1990.mastectomy 4/17/2007     Hyperlipidemia LDL goal <70 6/20/2011     Lung cancer (H)     squamous cell lung ca left lower lobe     OBESITY NOS 3/11/2004     OSTEOPOROSIS NOS 3/11/2004     Other psoriasis 3/11/2004     RA (rheumatoid arthritis) (H) 2/8/2010     Raynaud's syndrome 4/10/2006     Scleroderma (H)      Sleep apnea     CPAP             Past Surgical History:     Past Surgical History:   Procedure Laterality Date     BREAST SURGERY       C NONSPECIFIC PROCEDURE  1990    mastectomy RT breast     COLONOSCOPY  5/24/16     CONIZATION       ENDARTERECTOMY CAROTID  2011     HC COLONOSCOPY THRU STOMA, DIAGNOSTIC  2001 2011    diverticulosis     REPAIR HAMMER TOE       TONSILLECTOMY               Social History:     Social History     Social History     Marital status:      Spouse name: N/A     Number of children: N/A     Years of education: N/A     Occupational History     Not on file.     Social History Main Topics     Smoking status: Former Smoker     Packs/day: 1.00     Years: 30.00     Types: Cigarettes     Quit date: 3/11/1992     Smokeless tobacco: Never Used     Alcohol use 0.0 oz/week     0 Standard drinks or equivalent per week      Comment: rare     Drug use: No     Sexual activity: Not Currently     Partners: Male     Other Topics Concern     Parent/Sibling W/ Cabg, Mi Or Angioplasty Before 65f 55m? No     Social History Narrative             Family History:     Family History   Problem Relation Age of Onset     Cancer - colorectal Mother      CEREBROVASCULAR DISEASE Father      CANCER Sister 31     ovarian     HEART DISEASE Sister      GASTROINTESTINAL DISEASE Sister      crohns     GASTROINTESTINAL DISEASE Sister      diverticulitis     GASTROINTESTINAL DISEASE Brother      diverticulitis     Alzheimer Disease Sister       CEREBROVASCULAR DISEASE Maternal Grandmother      DIABETES Maternal Grandmother      MENTAL ILLNESS Maternal Grandmother      DIABETES Maternal Uncle              Immunization:     Immunization History   Administered Date(s) Administered     HepB-Peds 03/21/1996, 04/18/1996, 09/26/1996     Hepatitis A Vac Ped/Adol-2 Dose 03/21/1996, 09/26/1996     Influenza (H1N1) 01/04/2010     Influenza (High Dose) 3 valent vaccine 10/05/2014, 10/12/2015, 10/26/2016     Influenza (IIV3) 11/11/2003, 11/17/2005, 10/23/2006, 10/17/2007, 11/17/2008, 09/27/2010, 09/01/2012     Pneumococcal (PCV 13) 07/07/2016     Pneumococcal 23 valent 10/17/2007     TD (ADULT, 7+) 03/05/2009, 03/05/2009     TDAP Vaccine (Adacel) 06/07/2012     Zoster vaccine, live 08/27/2012              Review of Systems:   I have done 10 points of review systems and pertinent findings are as above, otherwise negative.             Physical Examination:   General: Alert, oriented, not in distress  B/P: 128/80, T: Data Unavailable, P: 72, R: 18  HEENT: neck supple, symmetrical, no lymph node enlargement, thyromegaly, bruit, JVD, pupils are isocoric and equally responsive to the light.   Lungs: both hemithoraces are symmetrical, normal to palpation, no dullness to percussion, auscultation of lungs revealed bibasilar crackles  CVS: Normal S1 and S2, no additional heart sounds, murmur, rub, normal peripheral pulses  Abdomen: Bowel sounds present, soft, non tender, non distended, no organomegaly, ascitis, mass  Extremities/musculoskeletal: no peripheral edema, deformity, cyanosis, clubbing  Neurology: alert, orientedx3, no motor/sensorial deficits, cranial nerves grossly normal  Skin: no rash  Psychiatry: Mood and affect are appropriate             DATA:     CBC RESULTS:     Recent Labs   Lab Test  10/31/16   1455  10/23/16   0533   WBC  5.4  7.3   RBC  4.37  3.55*   HGB  13.5  10.7*   HCT  40.8  32.4*   PLT  328  112*       Basic Metabolic Panel:  Recent Labs   Lab Test  04/10/17 01/17/17  10/24/16   0530  10/23/16   0533   NA   --    --   137  138   POTASSIUM   --    --   3.9  3.9   CHLORIDE   --    --   103  104   CO2   --    --   28  28   BUN   --    --   18  12   CR  0.570  0.720  0.61  0.58   GLC   --    --   96  99   TU   --    --   8.9  8.4*       INR  Recent Labs   Lab Test  12/04/15   0745   INR  0.95        PFT   PFT Latest Ref Rng & Units 8/9/2017   FVC L 1.46   FEV1 L 1.33   FVC% % 60   FEV1% % 71               Thank you for allowing me participate in the care of Jaqueline SHINE Hacketttt.      Rubens Quispe M.D.  Associate Professor of Medicine  Pulmonary, Allergy, Critical Care and Sleep

## 2017-08-09 NOTE — LETTER
8/9/2017      RE: Jaqueline Canales  [address]  [facility] [address]             [facility]   Pulmonary Clinic Follow Up Note  August 9, 2017      Jaqueline Canales MRN# [id number]   Age: 76 year old YOB: 1940     Date of Consultation: August 9, 2017    Primary care provider: Carissa Burroughs     History taken from; Patient      Jaqueline Canales is a 76 year old female seen in the Pulmonary Clinic  for f/u.  Chief Complaint   Patient presents with     Interstitial Lung Disease (ILD)     Follow up on Jaqueline and her ILD   .          Pulmonary Problem List / Reason for follow up:   1. ILD  2. 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  The patient is doing well.      1.  Interstitial lung disease.  We will continue with current regimen.  The patient's FVC is stable for a long period of time.  The patient is doing well.   2.  Airway disease.  The patient is using albuterol on and off with good results.  We will follow up with the patient in 6 months with another PFT, and the patient is stable.     3.  The patient's diffusion capacity is slightly low.  On the last visit, the patient had borderline pulmonary hypertension.  We will repeat echocardiogram today.     4.  Lung cancer.  The patient is followed by Oncology, and the patient is currently in remission.      We explained the plan to the patient including the risks and benefits.  The patient expressed understanding and agreed with the plan.      Thank you very much for the opportunity to participate in the care of this very pleasant patient.             Return visit in 6 months      Rubens Quispe M.D.         History of Present Illness / Interval History:     CHIEF COMPLAINT:  Interstitial lung disease and lung cancer.      HISTORY OF PRESENT ILLNESS:   The patient is currently doing very well.  The patient is stable on the Remicade and methotrexate.  The patient's pulmonary function tests are stable.  The patient denies any increase in shortness of breath.  The patient is using albuterol from time to time but not on a regular basis, and the patient is improving.  The patient also has a history of lung cancer which is followed by Oncology, and the patient is currently in remission.  The patient has borderline pulmonary hypertension and diffusion capacity on this visit is slightly worse.  Overall, the patient is stable.                      Pulmonary Data:       Exposure history: Asbestos;  No , TB;  No , Radiation;   No          Medications:     Current Outpatient Prescriptions   Medication Sig     omeprazole (PRILOSEC) 20 MG CR capsule Take 1 capsule (20 mg) by mouth 2 times daily Take 30-60 minutes prior to meals     metoprolol (TOPROL-XL) 25 MG 24 hr tablet Take 1 tablet (25 mg) by mouth daily     ELIQUIS 5 MG tablet TAKE 1 TABLET TWICE DAILY     simvastatin (ZOCOR) 40 MG tablet Take 1 tablet (40 mg) by mouth At Bedtime     Lactobacillus Acidophilus POWD Take 1 packet by mouth daily     levothyroxine (SYNTHROID, LEVOTHROID) 75 MCG tablet Take 1 tablet (75 mcg) by mouth daily     Methotrexate Sodium (METHOTREXATE PO) Take 2.5 mg by mouth 10 tablets weekly     albuterol (PROAIR HFA, PROVENTIL HFA, VENTOLIN HFA) 108 (90 BASE) MCG/ACT inhaler Inhale 2 puffs into the lungs every 6 hours     MELATONIN PO Take 5 mg by mouth nightly as needed     Calcium Citrate-Vitamin D (CITRACAL + D PO) Take 2 tablets by mouth 2 times daily      Multiple Vitamins-Minerals (ICAPS) CAPS Take 1 capsule by mouth 2 times daily      InFLIXimab (REMICADE IV) Inject 600 mg into the vein Every 8 weeks     NIFEDICAL XL 60 MG PO TB24 1 TABLET DAILY     FOLIC ACID 1 MG OR TABS 1 TABLET DAILY     CENTRUM SILVER OR TABS 1 TABLET DAILY     No current facility-administered medications for this  visit.              Past Medical History:     Past Medical History:   Diagnosis Date     Amaurosis fugax 5-11    Right     Carotid artery stenosis      Esophageal reflux 3/11/2004     HELICOBACTER PYLORI INFECTION 7/11/2006     hx of CA in situ RT breast-1990.mastectomy 4/17/2007     Hyperlipidemia LDL goal <70 6/20/2011     Lung cancer (H)     squamous cell lung ca left lower lobe     OBESITY NOS 3/11/2004     OSTEOPOROSIS NOS 3/11/2004     Other psoriasis 3/11/2004     RA (rheumatoid arthritis) (H) 2/8/2010     Raynaud's syndrome 4/10/2006     Scleroderma (H)      Sleep apnea     CPAP             Past Surgical History:     Past Surgical History:   Procedure Laterality Date     BREAST SURGERY       C NONSPECIFIC PROCEDURE  1990    mastectomy RT breast     COLONOSCOPY  5/24/16     CONIZATION       ENDARTERECTOMY CAROTID  2011     HC COLONOSCOPY THRU STOMA, DIAGNOSTIC  2001 2011    diverticulosis     REPAIR HAMMER TOE       TONSILLECTOMY               Social History:     Social History     Social History     Marital status:      Spouse name: N/A     Number of children: N/A     Years of education: N/A     Occupational History     Not on file.     Social History Main Topics     Smoking status: Former Smoker     Packs/day: 1.00     Years: 30.00     Types: Cigarettes     Quit date: 3/11/1992     Smokeless tobacco: Never Used     Alcohol use 0.0 oz/week     0 Standard drinks or equivalent per week      Comment: rare     Drug use: No     Sexual activity: Not Currently     Partners: Male     Other Topics Concern     Parent/Sibling W/ Cabg, Mi Or Angioplasty Before 65f 55m? No     Social History Narrative             Family History:     Family History   Problem Relation Age of Onset     Cancer - colorectal Mother      CEREBROVASCULAR DISEASE Father      CANCER Sister 31     ovarian     HEART DISEASE Sister      GASTROINTESTINAL DISEASE Sister      crohns     GASTROINTESTINAL DISEASE Sister      diverticulitis      GASTROINTESTINAL DISEASE Brother      diverticulitis     Alzheimer Disease Sister      CEREBROVASCULAR DISEASE Maternal Grandmother      DIABETES Maternal Grandmother      MENTAL ILLNESS Maternal Grandmother      DIABETES Maternal Uncle              Immunization:     Immunization History   Administered Date(s) Administered     HepB-Peds 03/21/1996, 04/18/1996, 09/26/1996     Hepatitis A Vac Ped/Adol-2 Dose 03/21/1996, 09/26/1996     Influenza (H1N1) 01/04/2010     Influenza (High Dose) 3 valent vaccine 10/05/2014, 10/12/2015, 10/26/2016     Influenza (IIV3) 11/11/2003, 11/17/2005, 10/23/2006, 10/17/2007, 11/17/2008, 09/27/2010, 09/01/2012     Pneumococcal (PCV 13) 07/07/2016     Pneumococcal 23 valent 10/17/2007     TD (ADULT, 7+) 03/05/2009, 03/05/2009     TDAP Vaccine (Adacel) 06/07/2012     Zoster vaccine, live 08/27/2012              Review of Systems:   I have done 10 points of review systems and pertinent findings are as above, otherwise negative.             Physical Examination:   General: Alert, oriented, not in distress  B/P: 128/80, T: Data Unavailable, P: 72, R: 18  HEENT: neck supple, symmetrical, no lymph node enlargement, thyromegaly, bruit, JVD, pupils are isocoric and equally responsive to the light.   Lungs: both hemithoraces are symmetrical, normal to palpation, no dullness to percussion, auscultation of lungs revealed bibasilar crackles  CVS: Normal S1 and S2, no additional heart sounds, murmur, rub, normal peripheral pulses  Abdomen: Bowel sounds present, soft, non tender, non distended, no organomegaly, ascitis, mass  Extremities/musculoskeletal: no peripheral edema, deformity, cyanosis, clubbing  Neurology: alert, orientedx3, no motor/sensorial deficits, cranial nerves grossly normal  Skin: no rash  Psychiatry: Mood and affect are appropriate             DATA:     CBC RESULTS:     Recent Labs   Lab Test  10/31/16   1455  10/23/16   0533   WBC  5.4  7.3   RBC  4.37  3.55*   HGB  13.5  10.7*    HCT  40.8  32.4*   PLT  328  112*       Basic Metabolic Panel:  Recent Labs   Lab Test 04/10/17 01/17/17  10/24/16   0530  10/23/16   0533   NA   --    --   137  138   POTASSIUM   --    --   3.9  3.9   CHLORIDE   --    --   103  104   CO2   --    --   28  28   BUN   --    --   18  12   CR  0.570  0.720  0.61  0.58   GLC   --    --   96  99   TU   --    --   8.9  8.4*       INR  Recent Labs   Lab Test  12/04/15   0745   INR  0.95        PFT   PFT Latest Ref Rng & Units 8/9/2017   FVC L 1.46   FEV1 L 1.33   FVC% % 60   FEV1% % 71     Thank you for allowing me participate in the care of Jaqueline Canales.      Rubens Quispe M.D.  Associate Professor of Medicine  Pulmonary, Allergy, Critical Care and Sleep

## 2017-08-09 NOTE — LETTER
8/9/2017       RE: Jaqueline Canales  [address]  [facility] [address]     Dear Colleague,    Thank you for referring your patient, Jaqueline Canales, to the [facility] at [facility]. Please see a copy of my visit note below.          [facility]   Pulmonary Clinic Follow Up Note  August 9, 2017      Jaqueline Canales MRN# [id number]   Age: 76 year old YOB: 1940     Date of Consultation: August 9, 2017    Primary care provider: Carissa Burroughs     History taken from; Patient      Jaqueline Canales is a 76 year old female seen in the Pulmonary Clinic  for f/u.  Chief Complaint   Patient presents with     Interstitial Lung Disease (ILD)     Follow up on Jaqueline and her ILD   .          Pulmonary Problem List / Reason for follow up:   1. ILD  2. 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  The patient is doing well.      1.  Interstitial lung disease.  We will continue with current regimen.  The patient's FVC is stable for a long period of time.  The patient is doing well.   2.  Airway disease.  The patient is using albuterol on and off with good results.  We will follow up with the patient in 6 months with another PFT, and the patient is stable.     3.  The patient's diffusion capacity is slightly low.  On the last visit, the patient had borderline pulmonary hypertension.  We will repeat echocardiogram today.     4.  Lung cancer.  The patient is followed by Oncology, and the patient is currently in remission.      We explained the plan to the patient including the risks and benefits.  The patient expressed understanding and agreed with the plan.      Thank you very much for the opportunity to participate in the care of this very pleasant  patient.             Return visit in 6 months      Rubens Quispe M.D.         History of Present Illness / Interval History:     CHIEF COMPLAINT:  Interstitial lung disease and lung cancer.      HISTORY OF PRESENT ILLNESS:  The patient is currently doing very well.  The patient is stable on the Remicade and methotrexate.  The patient's pulmonary function tests are stable.  The patient denies any increase in shortness of breath.  The patient is using albuterol from time to time but not on a regular basis, and the patient is improving.  The patient also has a history of lung cancer which is followed by Oncology, and the patient is currently in remission.  The patient has borderline pulmonary hypertension and diffusion capacity on this visit is slightly worse.  Overall, the patient is stable.                      Pulmonary Data:       Exposure history: Asbestos;  No , TB;  No , Radiation;   No          Medications:     Current Outpatient Prescriptions   Medication Sig     omeprazole (PRILOSEC) 20 MG CR capsule Take 1 capsule (20 mg) by mouth 2 times daily Take 30-60 minutes prior to meals     metoprolol (TOPROL-XL) 25 MG 24 hr tablet Take 1 tablet (25 mg) by mouth daily     ELIQUIS 5 MG tablet TAKE 1 TABLET TWICE DAILY     simvastatin (ZOCOR) 40 MG tablet Take 1 tablet (40 mg) by mouth At Bedtime     Lactobacillus Acidophilus POWD Take 1 packet by mouth daily     levothyroxine (SYNTHROID, LEVOTHROID) 75 MCG tablet Take 1 tablet (75 mcg) by mouth daily     Methotrexate Sodium (METHOTREXATE PO) Take 2.5 mg by mouth 10 tablets weekly     albuterol (PROAIR HFA, PROVENTIL HFA, VENTOLIN HFA) 108 (90 BASE) MCG/ACT inhaler Inhale 2 puffs into the lungs every 6 hours     MELATONIN PO Take 5 mg by mouth nightly as needed     Calcium Citrate-Vitamin D (CITRACAL + D PO) Take 2 tablets by mouth 2 times daily      Multiple Vitamins-Minerals (ICAPS) CAPS Take 1 capsule by mouth 2 times daily      InFLIXimab (REMICADE IV) Inject 600 mg  into the vein Every 8 weeks     NIFEDICAL XL 60 MG PO TB24 1 TABLET DAILY     FOLIC ACID 1 MG OR TABS 1 TABLET DAILY     CENTRUM SILVER OR TABS 1 TABLET DAILY     No current facility-administered medications for this visit.              Past Medical History:     Past Medical History:   Diagnosis Date     Amaurosis fugax 5-11    Right     Carotid artery stenosis      Esophageal reflux 3/11/2004     HELICOBACTER PYLORI INFECTION 7/11/2006     hx of CA in situ RT breast-1990.mastectomy 4/17/2007     Hyperlipidemia LDL goal <70 6/20/2011     Lung cancer (H)     squamous cell lung ca left lower lobe     OBESITY NOS 3/11/2004     OSTEOPOROSIS NOS 3/11/2004     Other psoriasis 3/11/2004     RA (rheumatoid arthritis) (H) 2/8/2010     Raynaud's syndrome 4/10/2006     Scleroderma (H)      Sleep apnea     CPAP             Past Surgical History:     Past Surgical History:   Procedure Laterality Date     BREAST SURGERY       C NONSPECIFIC PROCEDURE  1990    mastectomy RT breast     COLONOSCOPY  5/24/16     CONIZATION       ENDARTERECTOMY CAROTID  2011     HC COLONOSCOPY THRU STOMA, DIAGNOSTIC  2001 2011    diverticulosis     REPAIR HAMMER TOE       TONSILLECTOMY               Social History:     Social History     Social History     Marital status:      Spouse name: N/A     Number of children: N/A     Years of education: N/A     Occupational History     Not on file.     Social History Main Topics     Smoking status: Former Smoker     Packs/day: 1.00     Years: 30.00     Types: Cigarettes     Quit date: 3/11/1992     Smokeless tobacco: Never Used     Alcohol use 0.0 oz/week     0 Standard drinks or equivalent per week      Comment: rare     Drug use: No     Sexual activity: Not Currently     Partners: Male     Other Topics Concern     Parent/Sibling W/ Cabg, Mi Or Angioplasty Before 65f 55m? No     Social History Narrative             Family History:     Family History   Problem Relation Age of Onset     Cancer -  colorectal Mother      CEREBROVASCULAR DISEASE Father      CANCER Sister 31     ovarian     HEART DISEASE Sister      GASTROINTESTINAL DISEASE Sister      crohns     GASTROINTESTINAL DISEASE Sister      diverticulitis     GASTROINTESTINAL DISEASE Brother      diverticulitis     Alzheimer Disease Sister      CEREBROVASCULAR DISEASE Maternal Grandmother      DIABETES Maternal Grandmother      MENTAL ILLNESS Maternal Grandmother      DIABETES Maternal Uncle              Immunization:     Immunization History   Administered Date(s) Administered     HepB-Peds 03/21/1996, 04/18/1996, 09/26/1996     Hepatitis A Vac Ped/Adol-2 Dose 03/21/1996, 09/26/1996     Influenza (H1N1) 01/04/2010     Influenza (High Dose) 3 valent vaccine 10/05/2014, 10/12/2015, 10/26/2016     Influenza (IIV3) 11/11/2003, 11/17/2005, 10/23/2006, 10/17/2007, 11/17/2008, 09/27/2010, 09/01/2012     Pneumococcal (PCV 13) 07/07/2016     Pneumococcal 23 valent 10/17/2007     TD (ADULT, 7+) 03/05/2009, 03/05/2009     TDAP Vaccine (Adacel) 06/07/2012     Zoster vaccine, live 08/27/2012              Review of Systems:   I have done 10 points of review systems and pertinent findings are as above, otherwise negative.             Physical Examination:   General: Alert, oriented, not in distress  B/P: 128/80, T: Data Unavailable, P: 72, R: 18  HEENT: neck supple, symmetrical, no lymph node enlargement, thyromegaly, bruit, JVD, pupils are isocoric and equally responsive to the light.   Lungs: both hemithoraces are symmetrical, normal to palpation, no dullness to percussion, auscultation of lungs revealed bibasilar crackles  CVS: Normal S1 and S2, no additional heart sounds, murmur, rub, normal peripheral pulses  Abdomen: Bowel sounds present, soft, non tender, non distended, no organomegaly, ascitis, mass  Extremities/musculoskeletal: no peripheral edema, deformity, cyanosis, clubbing  Neurology: alert, orientedx3, no motor/sensorial deficits, cranial nerves  grossly normal  Skin: no rash  Psychiatry: Mood and affect are appropriate             DATA:     CBC RESULTS:     Recent Labs   Lab Test  10/31/16   1455  10/23/16   0533   WBC  5.4  7.3   RBC  4.37  3.55*   HGB  13.5  10.7*   HCT  40.8  32.4*   PLT  328  112*       Basic Metabolic Panel:  Recent Labs   Lab Test 04/10/17 01/17/17  10/24/16   0530  10/23/16   0533   NA   --    --   137  138   POTASSIUM   --    --   3.9  3.9   CHLORIDE   --    --   103  104   CO2   --    --   28  28   BUN   --    --   18  12   CR  0.570  0.720  0.61  0.58   GLC   --    --   96  99   TU   --    --   8.9  8.4*       INR  Recent Labs   Lab Test  12/04/15   0745   INR  0.95        PFT   PFT Latest Ref Rng & Units 8/9/2017   FVC L 1.46   FEV1 L 1.33   FVC% % 60   FEV1% % 71               Thank you for allowing me participate in the care of Jaqueline Canales.      Rubens Quispe M.D.  Associate Professor of Medicine  Pulmonary, Allergy, Critical Care and Sleep      Again, thank you for allowing me to participate in the care of your patient.      Sincerely,    Rubens Quispe MD

## 2017-08-09 NOTE — MR AVS SNAPSHOT
After Visit Summary   8/9/2017    Jaqueline Canales    MRN: [id number]           Patient Information     Date Of Birth          1940        Visit Information        Provider Department      8/9/2017 11:30 AM Rubens Quispe MD [facility]        Today's Diagnoses     ILD (interstitial lung disease) (H)    -  1    Dyspnea, unspecified type          Care Instructions    Patient is instructed to call if there is any changes in symptoms.          Follow-ups after your visit        Follow-up notes from your care team     Return in about 6 months (around 2/9/2018).      Your next 10 appointments already scheduled     Aug 09, 2017 11:30 AM CDT   (Arrive by 11:15 AM)   Return Interstitial Lung with MD OFELIA Seaman [facility] ([facility])    [address] [place] [address] [phone]            Oct 16, 2017 10:30 AM CDT   CT CHEST W/O CONTRAST with SHCT1   [facility] CT ([facility])    [address] [place] [address] [phone]           Please bring any scans or X-rays taken at other hospitals, if similar tests were done. Also bring a list of your medicines, including vitamins, minerals and over-the-counter drugs. It is safest to leave personal items at home.  Be sure to tell your doctor:   If you have any allergies.   If there s any chance you are pregnant.   If you are breastfeeding.   If you have any special needs.  You do not need to do anything special to prepare.  Please wear loose clothing, such as a sweat suit or jogging clothes. Avoid snaps, zippers and other metal. We may ask you to undress and put on a hospital gown.            Oct 19, 2017 10:30 AM CDT   Return Visit with Anabell Paiz MD   [facility] ([facility])    [facility]  [address] [address]  "  [phone]            Feb 14, 2018 10:00 AM CST   PFT VISIT with UC PFL A   [facility] Pulmonary Function Testing ([facility])    [address] [address] [phone]            Feb 14, 2018 10:30 AM CST   (Arrive by 10:15 AM)   Return Interstitial Lung with Rubens Quispe MD   [facility] ([facility])    [address] [address] [phone]              Future tests that were ordered for you today     Open Future Orders        Priority Expected Expires Ordered    Echocardiogram Complete Routine  8/9/2018 8/9/2017    General PFT Lab (Please always keep checked) Routine  8/9/2018 8/9/2017    Pulmonary Function Test Routine  8/9/2018 8/9/2017            Who to contact     If you have questions or need follow up information about today's clinic visit or your schedule please contact [facility] directly at [phone].  Normal or non-critical lab and imaging results will be communicated to you by Acerhart, letter or phone within 4 business days after the clinic has received the results. If you do not hear from us within 7 days, please contact the clinic through [employer] or phone. If you have a critical or abnormal lab result, we will notify you by phone as soon as possible.  Submit refill requests through [employer] or call your pharmacy and they will forward the refill request to us. Please allow 3 business days for your refill to be completed.          Additional Information About Your Visit        [employer] Information     [employer] lets you send messages to your doctor, view your test results, renew your prescriptions, schedule appointments and more. To sign up, go to [email or web address] . Click on \"Log in\" on the left side of the screen, which will take you to the Welcome page. Then click on \"Sign up Now\" on the right side of the " "page.     You will be asked to enter the access code listed below, as well as some personal information. Please follow the directions to create your username and password.     Your access code is: [email or web address]  Expires: 2017  4:14 PM     Your access code will  in 90 days. If you need help or a new code, please call your [place] clinic or [phone].        Care EveryWhere ID     This is your Care EveryWhere ID. This could be used by other organizations to access your [place] medical records  [id number]        Your Vitals Were     Pulse Respirations Height Pulse Oximetry BMI (Body Mass Index)       72 18 1.575 m (5' 2\") 96% 42.8 kg/m2        Blood Pressure from Last 3 Encounters:   17 128/80   17 107/69   17 101/64    Weight from Last 3 Encounters:   17 106.1 kg (234 lb)   17 106.1 kg (234 lb)   17 106.4 kg (234 lb 9.6 oz)               Primary Care Provider Office Phone # Fax #    Carissa Yadiel Burroughs [phone]       [address] [address]        Equal Access to Services     ERVIN CERNA AH: Hadii aad ku hadasho Soomaali, waaxda luqadaha, qaybta kaalmada adeegyada, phillip small hayreggie gaines . So [facility] [phone].    ATENCIÓN: Si habla español, tiene a esquivel disposición servicios gratuitos de asistencia lingüística. Llame al [phone].    We comply with applicable federal civil rights laws and [place] laws. We do not discriminate on the basis of race, color, national origin, age, disability sex, sexual orientation or gender identity.            Thank you!     Thank you for choosing [facility]  for your care. Our goal is always to provide you with excellent care. Hearing back from our patients is one way we can continue to improve our services. Please take a few minutes to complete the written survey that you may receive in the mail after your visit with us. Thank " you!             Your Updated Medication List - Protect others around you: Learn how to safely use, store and throw away your medicines at www.disposemymeds.org.          This list is accurate as of: 8/9/17 11:06 AM.  Always use your most recent med list.                   Brand Name Dispense Instructions for use Diagnosis    albuterol 108 (90 BASE) MCG/ACT Inhaler    PROAIR HFA/PROVENTIL HFA/VENTOLIN HFA    3 Inhaler    Inhale 2 puffs into the lungs every 6 hours    ILD (interstitial lung disease) (H)       * ICAPS Caps      Take 1 capsule by mouth 2 times daily        * CENTRUM SILVER per tablet      1 TABLET DAILY        CITRACAL + D PO      Take 2 tablets by mouth 2 times daily        ELIQUIS 5 MG tablet   Generic drug:  apixaban ANTICOAGULANT     180 tablet    TAKE 1 TABLET TWICE DAILY    Persistent atrial fibrillation (H)       folic acid 1 MG tablet    FOLVITE     1 TABLET DAILY    Mixed hyperlipidemia       Lactobacillus Acidophilus Powd      Take 1 packet by mouth daily        levothyroxine 75 MCG tablet    SYNTHROID/LEVOTHROID    90 tablet    Take 1 tablet (75 mcg) by mouth daily    Hypothyroidism       MELATONIN PO      Take 5 mg by mouth nightly as needed        METHOTREXATE PO      Take 2.5 mg by mouth 10 tablets weekly        metoprolol 25 MG 24 hr tablet    TOPROL-XL    90 tablet    Take 1 tablet (25 mg) by mouth daily    Persistent atrial fibrillation (H)       NIFEDICAL XL 60 MG Tb24   Generic drug:  NIFEdipine ER osmotic      1 TABLET DAILY    Raynaud's syndrome       omeprazole 20 MG CR capsule    priLOSEC    180 capsule    Take 1 capsule (20 mg) by mouth 2 times daily Take 30-60 minutes prior to meals    Esophageal stricture       REMICADE IV      Inject 600 mg into the vein Every 8 weeks        simvastatin 40 MG tablet    ZOCOR    90 tablet    Take 1 tablet (40 mg) by mouth At Bedtime    Hyperlipidemia LDL goal <100       * Notice:  This list has 2 medication(s) that are the same as other  medications prescribed for you. Read the directions carefully, and ask your doctor or other care provider to review them with you.

## 2017-08-15 LAB
DLCOUNC-%PRED-PRE: 57 %
DLCOUNC-PRE: 10.88 ML/MIN/MMHG
DLCOUNC-PRED: 18.86 ML/MIN/MMHG
ERV-%PRED-PRE: -694 %
ERV-PRE: 0.21 L
ERV-PRED: -0.03 L
EXPTIME-PRE: 6.7 SEC
FEF2575-%PRED-PRE: 146 %
FEF2575-PRE: 2.28 L/SEC
FEF2575-PRED: 1.55 L/SEC
FEFMAX-%PRED-PRE: 89 %
FEFMAX-PRE: 4.22 L/SEC
FEFMAX-PRED: 4.71 L/SEC
FEV1-%PRED-PRE: 71 %
FEV1-PRE: 1.33 L
FEV1FEV6-PRE: 91 %
FEV1FEV6-PRED: 78 %
FEV1FVC-PRE: 91 %
FEV1FVC-PRED: 78 %
FEV1SVC-PRE: 80 %
FEV1SVC-PRED: 70 %
FIFMAX-PRE: 4.5 L/SEC
FRCPLETH-%PRED-PRE: 58 %
FRCPLETH-PRE: 1.54 L
FRCPLETH-PRED: 2.61 L
FVC-%PRED-PRE: 60 %
FVC-PRE: 1.46 L
FVC-PRED: 2.43 L
IC-%PRED-PRE: 54 %
IC-PRE: 1.46 L
IC-PRED: 2.68 L
RVPLETH-%PRED-PRE: 63 %
RVPLETH-PRE: 1.33 L
RVPLETH-PRED: 2.09 L
TLCPLETH-%PRED-PRE: 65 %
TLCPLETH-PRE: 3 L
TLCPLETH-PRED: 4.6 L
VA-%PRED-PRE: 56 %
VA-PRE: 2.69 L
VC-%PRED-PRE: 62 %
VC-PRE: 1.67 L
VC-PRED: 2.65 L

## 2017-08-21 ENCOUNTER — TRANSFERRED RECORDS (OUTPATIENT)
Dept: HEALTH INFORMATION MANAGEMENT | Facility: CLINIC | Age: 77
End: 2017-08-21

## 2017-10-06 ENCOUNTER — OFFICE VISIT (OUTPATIENT)
Dept: FAMILY MEDICINE | Facility: CLINIC | Age: 77
End: 2017-10-06
Payer: COMMERCIAL

## 2017-10-06 VITALS
HEART RATE: 66 BPM | OXYGEN SATURATION: 96 % | BODY MASS INDEX: 42.69 KG/M2 | DIASTOLIC BLOOD PRESSURE: 66 MMHG | HEIGHT: 62 IN | TEMPERATURE: 96.8 F | SYSTOLIC BLOOD PRESSURE: 109 MMHG | WEIGHT: 232 LBS

## 2017-10-06 DIAGNOSIS — E78.5 HYPERLIPIDEMIA LDL GOAL <100: ICD-10-CM

## 2017-10-06 DIAGNOSIS — E03.9 HYPOTHYROIDISM, UNSPECIFIED TYPE: ICD-10-CM

## 2017-10-06 DIAGNOSIS — R22.2 ABDOMINAL WALL MASS: Primary | ICD-10-CM

## 2017-10-06 DIAGNOSIS — I48.19 PERSISTENT ATRIAL FIBRILLATION (H): ICD-10-CM

## 2017-10-06 DIAGNOSIS — K22.2 ESOPHAGEAL STRICTURE: ICD-10-CM

## 2017-10-06 LAB — TSH SERPL DL<=0.005 MIU/L-ACNC: 3.51 MU/L (ref 0.4–4)

## 2017-10-06 PROCEDURE — 99214 OFFICE O/P EST MOD 30 MIN: CPT | Performed by: INTERNAL MEDICINE

## 2017-10-06 PROCEDURE — 36415 COLL VENOUS BLD VENIPUNCTURE: CPT | Performed by: INTERNAL MEDICINE

## 2017-10-06 PROCEDURE — 84443 ASSAY THYROID STIM HORMONE: CPT | Performed by: INTERNAL MEDICINE

## 2017-10-06 RX ORDER — METOPROLOL SUCCINATE 25 MG/1
25 TABLET, EXTENDED RELEASE ORAL DAILY
Qty: 90 TABLET | Refills: 0 | Status: SHIPPED | OUTPATIENT
Start: 2017-10-06 | End: 2018-02-05

## 2017-10-06 RX ORDER — SIMVASTATIN 40 MG
40 TABLET ORAL AT BEDTIME
Qty: 90 TABLET | Refills: 2 | Status: SHIPPED | OUTPATIENT
Start: 2017-10-06 | End: 2018-07-18

## 2017-10-06 RX ORDER — LEVOTHYROXINE SODIUM 75 UG/1
75 TABLET ORAL DAILY
Qty: 90 TABLET | Refills: 3 | Status: SHIPPED | OUTPATIENT
Start: 2017-10-06 | End: 2018-10-08

## 2017-10-06 NOTE — MR AVS SNAPSHOT
After Visit Summary   10/6/2017    Jaqueline Canales    MRN: [id number]           Patient Information     Date Of Birth          1940        Visit Information        Provider Department      10/6/2017 9:30 AM Carissa Burroughs MD [facility]        Today's Diagnoses     Abdominal wall mass    -  1    Hyperlipidemia LDL goal <100        Persistent atrial fibrillation (H)        Esophageal stricture        Hypothyroidism, unspecified type          Care Instructions      Preventive Health Recommendations    Female Ages 65 +    Yearly exam:     See your health care provider every year in order to  o Review health changes.   o Discuss preventive care.    o Review your medicines if your doctor has prescribed any.      You no longer need a yearly Pap test unless you've had an abnormal Pap test in the past 10 years. If you have vaginal symptoms, such as bleeding or discharge, be sure to talk with your provider about a Pap test.      Every 1 to 2 years, have a mammogram.  If you are over 69, talk with your health care provider about whether or not you want to continue having screening mammograms.      Every 10 years, have a colonoscopy. Or, have a yearly FIT test (stool test). These exams will check for colon cancer.       Have a cholesterol test every 5 years, or more often if your doctor advises it.       Have a diabetes test (fasting glucose) every three years. If you are at risk for diabetes, you should have this test more often.       At age 65, have a bone density scan (DEXA) to check for osteoporosis (brittle bone disease).    Shots:    Get a flu shot each year.    Get a tetanus shot every 10 years.    Talk to your doctor about your pneumonia vaccines. There are now two you should receive - Pneumovax (PPSV 23) and Prevnar (PCV 13).    Talk to your doctor about the shingles vaccine.    Talk to your doctor about the hepatitis B vaccine.    Nutrition:     Eat at least 5 servings of fruits  and vegetables each day.      Eat whole-grain bread, whole-wheat pasta and brown rice instead of white grains and rice.      Talk to your provider about Calcium and Vitamin D.     Lifestyle    Exercise at least 150 minutes a week (30 minutes a day, 5 days a week). This will help you control your weight and prevent disease.      Limit alcohol to one drink per day.      No smoking.       Wear sunscreen to prevent skin cancer.       See your dentist twice a year for an exam and cleaning.      See your eye doctor every 1 to 2 years to screen for conditions such as glaucoma, macular degeneration and cataracts.          Follow-ups after your visit        Follow-up notes from your care team     Return in about 1 year (around 10/6/2018).      Your next 10 appointments already scheduled     Oct 16, 2017  9:00 AM CDT   US ABDOMEN LIMITED with SHUS5   [facility] Ultrasound ([facility])    [address] [facility] [id number] [phone]           Please bring a list of your medicines (including vitamins, minerals and over-the-counter drugs). Also, tell your doctor about any allergies you may have. Wear comfortable clothes and leave your valuables at home.  Adults: No eating or drinking for 8 hours before the exam. You may take medicine with a small sip of water.  Children: - Children 6+ years: No food or drink for 6 hours before exam. - Children 1-5 years: No food or drink for 4 hours before exam. - Infants, breast-fed: may have breast milk up to 2 hours before exam. - Infants, formula: may have bottle until 4 hours before exam.  Please call the Imaging Department at your exam site with any questions.            Oct 16, 2017  9:30 AM CDT   Ech Complete with SHCVECHR2   [facility] CV Echocardiography ([facility])    [address] [address] [phone]           1. Please bring or wear a comfortable two-piece  outfit. 2. You may eat, drink and take your normal medicines. 3. For any questions that cannot be answered, please contact the ordering physician            Oct 16, 2017 10:30 AM CDT   CT CHEST W/O CONTRAST with SHCT1   [facility] CT ([facility])    [address] [facility] [address]   [phone]           Please bring any scans or X-rays taken at other hospitals, if similar tests were done. Also bring a list of your medicines, including vitamins, minerals and over-the-counter drugs. It is safest to leave personal items at home.  Be sure to tell your doctor:   If you have any allergies.   If there s any chance you are pregnant.   If you are breastfeeding.   If you have any special needs.  You do not need to do anything special to prepare.  Please wear loose clothing, such as a sweat suit or jogging clothes. Avoid snaps, zippers and other metal. We may ask you to undress and put on a hospital gown.            Oct 19, 2017 10:30 AM CDT   Return Visit with Anabell Paiz MD   [facility] ([facility])    [facility]  [address] [address]   [phone]            Feb 14, 2018 10:00 AM CST   PFT VISIT with  PFL SUHAS   [facility] Pulmonary Function Testing ([facility])    [address] [facility]  [address] [facility] [address] [phone]            Feb 14, 2018 10:30 AM CST   (Arrive by 10:15 AM)   Return Interstitial Lung with Rubens Quispe MD   [facility] for Lung Science and Health ([facility])    [address] [facility]  [address] [facility] [address] [phone]              Future tests that were ordered for you today     Open Future Orders        Priority Expected Expires Ordered    US Abdomen Limited Routine  10/6/2018 10/6/2017            Who to contact     If you have questions or need follow up information about today's clinic visit or your  "schedule please contact [facility] directly at [phone].  Normal or non-critical lab and imaging results will be communicated to you by MyChart, letter or phone within 4 business days after the clinic has received the results. If you do not hear from us within 7 days, please contact the clinic through MyChart or phone. If you have a critical or abnormal lab result, we will notify you by phone as soon as possible.  Submit refill requests through [employer]ula or call your pharmacy and they will forward the refill request to us. Please allow 3 business days for your refill to be completed.          Additional Information About Your Visit        Care EveryWhere ID     This is your Care EveryWhere ID. This could be used by other organizations to access your [place] medical records  [id number]        Your Vitals Were     Pulse Temperature Height Pulse Oximetry BMI (Body Mass Index)       66 96.8  F (36  C) (Oral) 5' 2\" (1.575 m) 96% 42.43 kg/m2        Blood Pressure from Last 3 Encounters:   10/06/17 109/66   08/09/17 128/80   07/13/17 107/69    Weight from Last 3 Encounters:   10/06/17 232 lb (105.2 kg)   08/09/17 234 lb (106.1 kg)   07/13/17 234 lb (106.1 kg)              We Performed the Following     TSH with free T4 reflex          Today's Medication Changes          These changes are accurate as of: 10/6/17 10:36 AM.  If you have any questions, ask your nurse or doctor.               These medicines have changed or have updated prescriptions.        Dose/Directions    apixaban ANTICOAGULANT 5 MG tablet   Commonly known as:  ELIQUIS   This may have changed:  See the new instructions.   Used for:  Persistent atrial fibrillation (H)   Changed by:  Carissa Burroughs MD        Dose:  5 mg   Take 1 tablet (5 mg) by mouth 2 times daily   Quantity:  180 tablet   Refills:  3            Where to get your medicines      These medications were sent to [facility] - [facility] [address]   [address] [facility] [address], [facility] [address]     Phone:  [phone]     apixaban ANTICOAGULANT 5 MG tablet    levothyroxine 75 MCG tablet    metoprolol 25 MG 24 hr tablet    omeprazole 20 MG CR capsule    simvastatin 40 MG tablet                Primary Care Provider Office Phone # Fax #    Carissa Yadiel Burroughs -[phone]       [facility] [address] [facility] [address]  [facility] [address]        Equal Access to Services     ERVIN CERNA : Hadii aad ku hadasho Soomaali, waaxda luqadaha, qaybta kaalmada adeegyada, waxay idiin hayaan adeeg kharabina la'reggie . So [facility] [phone].    ATENCIÓN: Si habla español, tiene a esquivel disposición servicios gratuitos de asistencia lingüística. [facility] [phone].    We comply with applicable federal civil rights laws and [place] laws. We do not discriminate on the basis of race, color, national origin, age, disability, sex, sexual orientation, or gender identity.            Thank you!     Thank you for choosing [facility]  for your care. Our goal is always to provide you with excellent care. Hearing back from our patients is one way we can continue to improve our services. Please take a few minutes to complete the written survey that you may receive in the mail after your visit with us. Thank you!             Your Updated Medication List - Protect others around you: Learn how to safely use, store and throw away your medicines at www.disposemymeds.org.          This list is accurate as of: 10/6/17 10:36 AM.  Always use your most recent med list.                   Brand Name Dispense Instructions for use Diagnosis    albuterol 108 (90 BASE) MCG/ACT Inhaler    PROAIR HFA/PROVENTIL HFA/VENTOLIN HFA    3 Inhaler    Inhale 2 puffs into the lungs every 6 hours    ILD (interstitial lung disease) (H)       apixaban ANTICOAGULANT 5 MG tablet    ELIQUIS    180 tablet    Take 1 tablet (5 mg) by mouth 2 times daily    Persistent atrial fibrillation (H)       *  ICAPS Caps      Take 1 capsule by mouth 2 times daily        * CENTRUM SILVER per tablet      1 TABLET DAILY        CITRACAL + D PO      Take 2 tablets by mouth 2 times daily        folic acid 1 MG tablet    FOLVITE     1 TABLET DAILY    Mixed hyperlipidemia       Lactobacillus Acidophilus Powd      Take 1 packet by mouth daily        levothyroxine 75 MCG tablet    SYNTHROID/LEVOTHROID    90 tablet    Take 1 tablet (75 mcg) by mouth daily    Hypothyroidism, unspecified type       MELATONIN PO      Take 5 mg by mouth nightly as needed        METHOTREXATE PO      Take 2.5 mg by mouth 10 tablets weekly        metoprolol 25 MG 24 hr tablet    TOPROL-XL    90 tablet    Take 1 tablet (25 mg) by mouth daily    Persistent atrial fibrillation (H)       NIFEDICAL XL 60 MG Tb24   Generic drug:  NIFEdipine ER osmotic      1 TABLET DAILY    Raynaud's syndrome       omeprazole 20 MG CR capsule    priLOSEC    180 capsule    Take 1 capsule (20 mg) by mouth 2 times daily Take 30-60 minutes prior to meals    Esophageal stricture       REMICADE IV      Inject 600 mg into the vein Every 8 weeks        simvastatin 40 MG tablet    ZOCOR    90 tablet    Take 1 tablet (40 mg) by mouth At Bedtime    Hyperlipidemia LDL goal <100       * Notice:  This list has 2 medication(s) that are the same as other medications prescribed for you. Read the directions carefully, and ask your doctor or other care provider to review them with you.

## 2017-10-06 NOTE — PROGRESS NOTES
Chief Complaint:     Establish care    HPI:   Patient Jaqueline Canales is a very pleasant 76 year old female with history of hypothyroidism, hyperlipidemia who presents to Internal Medicine clinic today to establish care and follow up of multiple health concerns and medication refills. Regarding the patient's atrial fibrillation, the patient is due for a refill of her Eliquis and metoprolol medications today. Patient denies any current chest pain, headaches, fever or chills. Patient is also concerned about a palpable subcutaneous nodule noted present over the upper abdominal wall above the umbilicus recently. Regarding the patient's hypothyroidism, the patient is due for a repeat TSH lab today to monitor hypothyroidism control. In addition, the patient is due for a refill of her levothyroxine thyroid medication at this time. Patient is also due for a refill of her Omeprazole medication for esophageal reflux.         Current Medications:     Current Outpatient Prescriptions   Medication Sig Dispense Refill     levothyroxine (SYNTHROID/LEVOTHROID) 75 MCG tablet Take 1 tablet (75 mcg) by mouth daily 90 tablet 3     simvastatin (ZOCOR) 40 MG tablet Take 1 tablet (40 mg) by mouth At Bedtime 90 tablet 2     apixaban ANTICOAGULANT (ELIQUIS) 5 MG tablet Take 1 tablet (5 mg) by mouth 2 times daily 180 tablet 3     metoprolol (TOPROL-XL) 25 MG 24 hr tablet Take 1 tablet (25 mg) by mouth daily 90 tablet 0     omeprazole (PRILOSEC) 20 MG CR capsule Take 1 capsule (20 mg) by mouth 2 times daily Take 30-60 minutes prior to meals 180 capsule 0     Lactobacillus Acidophilus POWD Take 1 packet by mouth daily       Methotrexate Sodium (METHOTREXATE PO) Take 2.5 mg by mouth 10 tablets weekly       albuterol (PROAIR HFA, PROVENTIL HFA, VENTOLIN HFA) 108 (90 BASE) MCG/ACT inhaler Inhale 2 puffs into the lungs every 6 hours 3 Inhaler 3     MELATONIN PO Take 5 mg by mouth nightly as needed       Calcium Citrate-Vitamin D (CITRACAL + D  PO) Take 2 tablets by mouth 2 times daily        Multiple Vitamins-Minerals (ICAPS) CAPS Take 1 capsule by mouth 2 times daily        InFLIXimab (REMICADE IV) Inject 600 mg into the vein Every 8 weeks       NIFEDICAL XL 60 MG PO TB24 1 TABLET DAILY       FOLIC ACID 1 MG OR TABS 1 TABLET DAILY       CENTRUM SILVER OR TABS 1 TABLET DAILY       [DISCONTINUED] metoprolol (TOPROL-XL) 25 MG 24 hr tablet Take 1 tablet (25 mg) by mouth daily 90 tablet 0     [DISCONTINUED] simvastatin (ZOCOR) 40 MG tablet Take 1 tablet (40 mg) by mouth At Bedtime 90 tablet 2     [DISCONTINUED] levothyroxine (SYNTHROID, LEVOTHROID) 75 MCG tablet Take 1 tablet (75 mcg) by mouth daily 90 tablet 3         Allergies:      Allergies   Allergen Reactions     No Known Allergies             Past Medical History:     Past Medical History:   Diagnosis Date     Amaurosis fugax 5-11    Right     Carotid artery stenosis      Esophageal reflux 3/11/2004     HELICOBACTER PYLORI INFECTION 7/11/2006     hx of CA in situ RT breast-1990.mastectomy 4/17/2007     Hyperlipidemia LDL goal <70 6/20/2011     Lung cancer (H)     squamous cell lung ca left lower lobe     OBESITY NOS 3/11/2004     OSTEOPOROSIS NOS 3/11/2004     Other psoriasis 3/11/2004     RA (rheumatoid arthritis) (H) 2/8/2010     Raynaud's syndrome 4/10/2006     Scleroderma (H)      Sleep apnea     CPAP         Past Surgical History:     Past Surgical History:   Procedure Laterality Date     BREAST SURGERY       C NONSPECIFIC PROCEDURE  1990    mastectomy RT breast     COLONOSCOPY  5/24/16     CONIZATION       ENDARTERECTOMY CAROTID  2011     HC COLONOSCOPY THRU STOMA, DIAGNOSTIC  2001 2011    diverticulosis     REPAIR HAMMER TOE       TONSILLECTOMY           Family Medical History:     Family History   Problem Relation Age of Onset     Cancer - colorectal Mother      CEREBROVASCULAR DISEASE Father      CANCER Sister 31     ovarian     HEART DISEASE Sister      GASTROINTESTINAL DISEASE Sister       "crohns     GASTROINTESTINAL DISEASE Sister      diverticulitis     GASTROINTESTINAL DISEASE Brother      diverticulitis     Alzheimer Disease Sister      CEREBROVASCULAR DISEASE Maternal Grandmother      DIABETES Maternal Grandmother      MENTAL ILLNESS Maternal Grandmother      DIABETES Maternal Uncle          Social History:     Social History     Social History     Marital status:      Spouse name: N/A     Number of children: N/A     Years of education: N/A     Occupational History     Not on file.     Social History Main Topics     Smoking status: Former Smoker     Packs/day: 1.00     Years: 30.00     Types: Cigarettes     Quit date: 3/11/1992     Smokeless tobacco: Never Used     Alcohol use 0.0 oz/week     0 Standard drinks or equivalent per week      Comment: rare     Drug use: No     Sexual activity: Not Currently     Partners: Male     Other Topics Concern     Parent/Sibling W/ Cabg, Mi Or Angioplasty Before 65f 55m? No     Social History Narrative           Review of System:     Constitutional: Negative for fever or chills  Skin: Negative for rashes.   Ears/Nose/Throat: Negative for nasal congestion, sore throat  Respiratory: No shortness of breath, dyspnea on exertion, cough, or hemoptysis  Cardiovascular: Negative for chest pain. Positive for chronic atrial fibrillation  Gastrointestinal: Negative for nausea, vomiting. Positive for recent palpable subcutaneous nodule noted present over the upper abdominal wall above the umbilicus  Genitourinary: Negative for dysuria, hematuria  Musculoskeletal: Negative for myalgias  Neurologic: Negative for headaches  Psychiatric: Negative for depression, anxiety  Hematologic/Lymphatic/Immunologic: Negative  Endocrine: Negative  Behavioral: Negative for tobacco use       Physical Exam:   /66 (BP Location: Left arm, Cuff Size: Adult Large)  Pulse 66  Temp 96.8  F (36  C) (Oral)  Ht 5' 2\" (1.575 m)  Wt 232 lb (105.2 kg)  SpO2 96%  BMI 42.43 " kg/m2    GENERAL: healthy, alert and no distress  EYES: eyes grossly normal to inspection, and conjunctivae and sclerae normal  HENT: Normocephalic atraumatic. Nose and mouth without ulcers or lesions  NECK: supple  RESP: lungs clear to auscultation   CV: irregularly irregular, heart rate is well controlled  LYMPH: no peripheral edema   ABDOMEN: obese, palpable subcutaneous nodule noted present over the upper abdominal wall above the umbilicus.  MS: no gross musculoskeletal defects noted  SKIN: no suspicious lesions or rashes  NEURO: Alert & Oriented x 3.   PSYCH: mentation appears normal, affect normal        Diagnostic Test Results:     US abdomen wall soft tissue ordered today is pending at this time.    ASSESSMENT/PLAN:       (R22.2) Abdominal wall mass  (primary encounter diagnosis)  Comment: recent new symptoms of a subcutaneous nodule of the upper anterior abdominal wall above the umbilicus of unclear etiology.  Plan: I have ordered US Abdomen Limited to further evaluation.      (E78.5) Hyperlipidemia LDL goal <100  Comment: Patient is due for a refill of her Zocor cholesterol medication today.  Plan: I have ordered refill of simvastatin (ZOCOR) 40 MG tablet medication today.            (I48.1) Persistent atrial fibrillation (H)  Comment: The patient's heart rate is currently well controlled.  Plan: I have ordered refill of the patient's apixaban ANTICOAGULANT (ELIQUIS) 5 MG tablet, metoprolol (TOPROL-XL) 25 MG 24 hr tablet cardiac medications today.      (K22.2) Esophageal stricture  Comment: Patient's is due for refill of her omeprazole medication today.  Plan: I have refilled omeprazole (PRILOSEC) 20 MG CR capsule today.      (E03.9) Hypothyroidism, unspecified type  Comment: Patient is due for TSH lab for monitoring of hypothyroidism control.  Plan: I have ordered lab for TSH for monitoring of hypothyroidism control. I have also refilled the patient's levothyroxine (SYNTHROID/LEVOTHROID) 75 MCG tablet  medication today.        Follow Up Plan:     Patient is instructed to return to Internal Medicine clinic for follow-up visit in 1 year or sooner as needed      Carissa Burroughs MD  Internal Medicine  [facility]

## 2017-10-06 NOTE — NURSING NOTE
"Chief Complaint   Patient presents with     Wellness Visit     fasting       Initial /66 (BP Location: Left arm, Cuff Size: Adult Large)  Pulse 66  Temp 96.8  F (36  C) (Oral)  Ht 5' 2\" (1.575 m)  Wt 232 lb (105.2 kg)  SpO2 96%  BMI 42.43 kg/m2 Estimated body mass index is 42.43 kg/(m^2) as calculated from the following:    Height as of this encounter: 5' 2\" (1.575 m).    Weight as of this encounter: 232 lb (105.2 kg).  Medication Reconciliation: complete       Clare Armstrong CMA      "

## 2017-10-06 NOTE — LETTER
[address]  [address]  [place]  [address]  Tel: [phone]    October 17, 2017    Jaqueline Canales  [address] [place] [id number]        Dear Ms. Canales,    Your TSH lab result is OK.     If you have any further questions or problems, please contact our office.      Sincerely,    Yadiel Burroughs MD/ Joyce PORTER, CMA  Results for orders placed or performed in visit on 10/06/17   TSH with free T4 reflex   Result Value Ref Range    TSH 3.51 0.40 - 4.00 mU/L               Enclosure: Lab Results

## 2017-10-16 ENCOUNTER — HOSPITAL ENCOUNTER (OUTPATIENT)
Dept: ULTRASOUND IMAGING | Facility: CLINIC | Age: 77
End: 2017-10-16
Attending: INTERNAL MEDICINE
Payer: MEDICARE

## 2017-10-16 ENCOUNTER — HOSPITAL ENCOUNTER (OUTPATIENT)
Dept: CT IMAGING | Facility: CLINIC | Age: 77
Discharge: HOME | End: 2017-10-16
Attending: INTERNAL MEDICINE | Admitting: INTERNAL MEDICINE
Payer: MEDICARE

## 2017-10-16 ENCOUNTER — HOSPITAL ENCOUNTER (OUTPATIENT)
Dept: CARDIOLOGY | Facility: CLINIC | Age: 77
End: 2017-10-16
Attending: INTERNAL MEDICINE
Payer: MEDICARE

## 2017-10-16 DIAGNOSIS — C34.32 MALIGNANT NEOPLASM OF LOWER LOBE OF LEFT LUNG (H): ICD-10-CM

## 2017-10-16 DIAGNOSIS — R22.2 ABDOMINAL WALL MASS: ICD-10-CM

## 2017-10-16 DIAGNOSIS — R06.00 DYSPNEA, UNSPECIFIED TYPE: ICD-10-CM

## 2017-10-16 LAB
CREAT BLD-MCNC: 0.7 MG/DL (ref 0.52–1.04)
GFR SERPL CREATININE-BSD FRML MDRD: 81 ML/MIN/1.7M2

## 2017-10-16 PROCEDURE — 93306 TTE W/DOPPLER COMPLETE: CPT | Mod: 26 | Performed by: INTERNAL MEDICINE

## 2017-10-16 PROCEDURE — 93306 TTE W/DOPPLER COMPLETE: CPT

## 2017-10-16 RX ORDER — IOPAMIDOL 755 MG/ML
75 INJECTION, SOLUTION INTRAVASCULAR ONCE
Status: COMPLETED | OUTPATIENT
Start: 2017-10-16 | End: 2017-10-16

## 2017-10-16 RX ADMIN — SODIUM CHLORIDE 70 ML: 9 INJECTION, SOLUTION INTRAVENOUS at 10:49

## 2017-10-16 RX ADMIN — IOPAMIDOL 75 ML: 755 INJECTION, SOLUTION INTRAVENOUS at 10:49

## 2017-10-17 ENCOUNTER — CARE COORDINATION (OUTPATIENT)
Dept: PULMONOLOGY | Facility: CLINIC | Age: 77
End: 2017-10-17

## 2017-10-17 NOTE — PROGRESS NOTES
Telephone Encounter: Outgoing    Reason for Outgoing Call: Dr Quispe reviewed Echo results, shows mild worsening, recommending following up with Cardiology.    Nursing Action/Patient Instruction: Informed patient.     Patient Response/Questions/Concerns: Agreed with plan.

## 2017-10-19 ENCOUNTER — ONCOLOGY VISIT (OUTPATIENT)
Dept: ONCOLOGY | Facility: CLINIC | Age: 77
End: 2017-10-19
Attending: INTERNAL MEDICINE
Payer: COMMERCIAL

## 2017-10-19 VITALS
TEMPERATURE: 97.5 F | SYSTOLIC BLOOD PRESSURE: 102 MMHG | HEART RATE: 79 BPM | WEIGHT: 237.6 LBS | BODY MASS INDEX: 43.46 KG/M2 | OXYGEN SATURATION: 92 % | DIASTOLIC BLOOD PRESSURE: 64 MMHG

## 2017-10-19 DIAGNOSIS — C34.32 MALIGNANT NEOPLASM OF LOWER LOBE OF LEFT LUNG (H): Primary | ICD-10-CM

## 2017-10-19 DIAGNOSIS — I89.0 LYMPHEDEMA: ICD-10-CM

## 2017-10-19 PROCEDURE — 99211 OFF/OP EST MAY X REQ PHY/QHP: CPT

## 2017-10-19 PROCEDURE — 99213 OFFICE O/P EST LOW 20 MIN: CPT | Performed by: INTERNAL MEDICINE

## 2017-10-19 ASSESSMENT — PAIN SCALES - GENERAL: PAINLEVEL: NO PAIN (0)

## 2017-10-19 NOTE — PATIENT INSTRUCTIONS
1.  RTC MD in 3 months  Scheduled/janice  2.  CT scan chest without contrast before next visit  Scheduled/Janice  3- Refer to lymphedema     AVS printed & given to patient/janice

## 2017-10-19 NOTE — PROGRESS NOTES
[facility]  HEMATOLOGY ONCOLOGY     DIAGNOSES:     1.  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 The patient had a left lower lobe nodule 11/2015.  It was biopsied 12/04/2015, indicated a poorly differentiated squamous cell carcinoma.  PET CT scan 12/14/2015 hypermetabolic nodular lesion in the left lower lobe.     2.  History of breast cancer in 1990, treated with right-sided mastectomy.   3.  Interstitial lung disease.   4.  Rheumatoid arthritis.  Jaqueline Canales is on Remicade for interstitial lung disease and rheumatoid arthritis.       TREATMENT:  S/P SBRT for lung cancer. Completed 02/2016.      SUBJECTIVE:  The patient comes in for followup today. She has been feeling well. No new complaints.     REVIEW OF SYSTEMS:  A complete review of systems was performed and found to be negative other than pertinent positives mentioned in history of present illness.     Past medical, social histories reviewed.    Meds- Reviewed.     PHYSICAL EXAMINATION:   VITAL SIGNS:/64 (BP Location: Left arm, Patient Position: Chair, Cuff Size: Adult Large)  Pulse 79  Temp 97.5  F (36.4  C) (Oral)  Wt 107.8 kg (237 lb 9.6 oz)  SpO2 92%  BMI 43.46 kg/m2  CONSTITUTIONAL: Sitting comfortably.   HEENT: Pupils are equal. Oropharynx is clear.   NECK: No cervical or supraclavicular lymphadenopathy.   RESPIRATORY: Clear bilaterally.   CARD/VASC: S1, S2, regular.   GI: Soft, nontender, nondistended, no hepatosplenomegaly.   MUSKULOSKELETAL: Warm, well perfused.   NEUROLOGIC: Alert, awake.   INTEGUMENT: No rash.   LYMPHATICS:Bilteral edema.   PSYCH: Mood and affect was normal.    LABORATORY DATA AND IMAGING REVIEWED DURING THIS VISIT:  Results for orders placed or performed during the hospital encounter of 10/16/17   CT Chest w/o contrast    Narrative    CT CHEST WITHOUT CONTRAST  10/16/2017 10:54 AM     HISTORY: Follow up lung cancer.    TECHNIQUE:  75 mL Isovue-370. CT  images of the chest after nonionic  intravenous contrast. Radiation dose for this scan was reduced using  automated exposure control, adjustment of the mA and/or kV according  to patient size, or iterative reconstruction technique.    COMPARISON: 7/11/2017, 4/11/2017    FINDINGS: Severe fibrotic lung abnormalities again noted. There is  abnormal airspace opacity in the pleural-based medial aspect of the  superior segment of the left lower lobe. This abnormality measures  approximately 2.7 x 2.0 cm (series 4, image 25) compared to 2.7 x 1.8  cm when measured in a similar manner on the prior study. No new lung  abnormality. Prominent mediastinal lymph nodes are unchanged. No hilar  or axillary adenopathy. No pleural or pericardial effusion. Patient  has undergone right mastectomy. The thyroid gland appears normal.  Coronary and aortic atherosclerosis is noted. Patient has a large  hiatal hernia.      Impression    IMPRESSION: Appearance of the chest not significantly changed compared  to 7/11/2017 and 4/11/2017. Airspace consolidation in the medial  aspect of the superior segment left lower lobe is unchanged. Prominent  mediastinal lymph nodes also unchanged.    JOSH MOULTON MD       ECOG PS: 1    ASSESSMENT:  A 76-year-old lady with squamous cell carcinoma of the lung which was clinically T1 N0 MX.  She had a 1.7 to 1.1 cm left lower lobe lesion which was FDG avid, multiple non-FDG avid mediastinal lymph nodes, prevascular, precarinal and subcarinal which appeared to be stable.  There was FDG activity in tonsil and neck lymph nodes which were evaluated by Dr. Trevor Murdock of ENT and this was considered to be a physiological uptake.  Given her history of interstitial lung disease she was referred to Radiation Oncology for SBRT of this lung lesion.  Her MRI of brain was negative for metastases.   She is on surveillance.     - CT scan 10/16/17 results were reviewed with the patient. I reviewed the images myself-   Stable findings.   - I will repeat a scan in three months.   - I will refer her to lymphedema for leg swelling.      PLAN:   1.  RTC MD in 3 months  2.  CT scan chest without contrast before next visit  3- Refer to lymphedema     CHESTER DELATORRE MD    10/19/2017

## 2017-10-19 NOTE — MR AVS SNAPSHOT
"              After Visit Summary   10/19/2017    Jaqueline Canales    MRN: [id number]           Patient Information     Date Of Birth          1940        Visit Information        Provider Department      10/19/2017 10:30 AM Anabell Paiz MD [facility]        Today's Diagnoses     Malignant neoplasm of lower lobe of left lung (H)    -  1    Lymphedema          Care Instructions    1.  RTC MD in 3 months  2.  CT scan chest without contrast before next visit  3- Refer to lymphedema           Follow-ups after your visit        Additional Services     LYMPHEDEMA THERAPY REFERRAL       *This therapy referral will be filtered to a centralized scheduling office at [facility] and the patient will receive a call to schedule an appointment at a [facility] location most convenient for them. *   If you have not heard from the scheduling office within 2 business days, please call [phone] for all locations, with the exception of [place], please call [phone].     Treatment: PT or OT Evaluation & Treatment  Special Instructions: None  PT/OT Treatment Diagnosis: Edema    Please be aware that coverage of these services is subject to the terms and limitations of your health insurance plan.  Call member services at your health plan with any benefit or coverage questions.      **Note to Provider:  If you are referring outside of [facility] for the therapy appointment, please list the name of the location in the \"special instructions\" above, print the referral and give to the patient to schedule the appointment.                  Your next 10 appointments already scheduled     Jan 29, 2018 10:30 AM CST   CT CHEST W/O CONTRAST with SHCT1   [facility] CT ([facility])    [address] [place] [address] [phone]           Please bring any scans or X-rays taken at other hospitals, if similar tests were done. Also bring a list of your medicines, including " vitamins, minerals and over-the-counter drugs. It is safest to leave personal items at home.  Be sure to tell your doctor:   If you have any allergies.   If there s any chance you are pregnant.   If you are breastfeeding.   If you have any special needs.  You do not need to do anything special to prepare.  Please wear loose clothing, such as a sweat suit or jogging clothes. Avoid snaps, zippers and other metal. We may ask you to undress and put on a hospital gown.            Jan 31, 2018 10:30 AM CST   Return Visit with Anabell Paiz MD   [facility] ([facility])    [facility]  [address]  [facility] [address]   [phone]            Feb 14, 2018 10:00 AM CST   PFT VISIT with  PFL SUHAS   [facility] ([facility])    [address] [place] [address] [phone]            Feb 14, 2018 10:30 AM CST   (Arrive by 10:15 AM)   Return Interstitial Lung with Rubens Quispe MD   [facility] ([facility])    [address] [place] [address] [phone]              Future tests that were ordered for you today     Open Future Orders        Priority Expected Expires Ordered    CT Chest w/o contrast Routine  10/19/2018 10/19/2017            Who to contact     If you have questions or need follow up information about today's clinic visit or your schedule please contact [facility] directly at [phone].  Normal or non-critical lab and imaging results will be communicated to you by MyChart, letter or phone within 4 business days after the clinic has received the results. If you do not hear from us within 7 days, please contact the clinic through MyChart or phone. If you have a critical or abnormal lab result, we will notify you by phone as soon as possible.  Submit refill requests through archify or  call your pharmacy and they will forward the refill request to us. Please allow 3 business days for your refill to be completed.          Additional Information About Your Visit        Care EveryWhere ID     This is your Care EveryWhere ID. This could be used by other organizations to access your [facility] medical records  [id number]        Your Vitals Were     Pulse Temperature Pulse Oximetry BMI (Body Mass Index)          79 97.5  F (36.4  C) (Oral) 92% 43.46 kg/m2         Blood Pressure from Last 3 Encounters:   10/19/17 102/64   10/06/17 109/66   08/09/17 128/80    Weight from Last 3 Encounters:   10/19/17 107.8 kg (237 lb 9.6 oz)   10/06/17 105.2 kg (232 lb)   08/09/17 106.1 kg (234 lb)              We Performed the Following     LYMPHEDEMA THERAPY REFERRAL        Primary Care Provider Office Phone # Fax #    Carissa Yadiel Burroughs [phone]       [address] [address]        Equal Access to Services     [facility]: Hadii aad ku hadasho Soana luisa, waaxda luqadaha, qaybta kaalmada judd, phillip gaines . So [facility] [phone].    ATENCIÓN: Si habla español, tiene a esquivel disposición servicios gratuitos de asistencia lingüística. [facility] [phone].    We comply with applicable federal civil rights laws and [place] laws. We do not discriminate on the basis of race, color, national origin, age, disability, sex, sexual orientation, or gender identity.            Thank you!     Thank you for choosing [facility]  for your care. Our goal is always to provide you with excellent care. Hearing back from our patients is one way we can continue to improve our services. Please take a few minutes to complete the written survey that you may receive in the mail after your visit with us. Thank you!             Your Updated Medication List - Protect others around you: Learn how to safely use, store and throw away your medicines at  www.disposemymeds.org.          This list is accurate as of: 10/19/17 10:39 AM.  Always use your most recent med list.                   Brand Name Dispense Instructions for use Diagnosis    albuterol 108 (90 BASE) MCG/ACT Inhaler    PROAIR HFA/PROVENTIL HFA/VENTOLIN HFA    3 Inhaler    Inhale 2 puffs into the lungs every 6 hours    ILD (interstitial lung disease) (H)       apixaban ANTICOAGULANT 5 MG tablet    ELIQUIS    180 tablet    Take 1 tablet (5 mg) by mouth 2 times daily    Persistent atrial fibrillation (H)       * ICAPS Caps      Take 1 capsule by mouth 2 times daily        * CENTRUM SILVER per tablet      1 TABLET DAILY        CITRACAL + D PO      Take 2 tablets by mouth 2 times daily        folic acid 1 MG tablet    FOLVITE     1 TABLET DAILY    Mixed hyperlipidemia       Lactobacillus Acidophilus Powd      Take 1 packet by mouth daily        levothyroxine 75 MCG tablet    SYNTHROID/LEVOTHROID    90 tablet    Take 1 tablet (75 mcg) by mouth daily    Hypothyroidism, unspecified type       MELATONIN PO      Take 5 mg by mouth nightly as needed        METHOTREXATE PO      Take 2.5 mg by mouth 10 tablets weekly        metoprolol 25 MG 24 hr tablet    TOPROL-XL    90 tablet    Take 1 tablet (25 mg) by mouth daily    Persistent atrial fibrillation (H)       NIFEDICAL XL 60 MG Tb24   Generic drug:  NIFEdipine ER osmotic      1 TABLET DAILY    Raynaud's syndrome       omeprazole 20 MG CR capsule    priLOSEC    180 capsule    Take 1 capsule (20 mg) by mouth 2 times daily Take 30-60 minutes prior to meals    Esophageal stricture       REMICADE IV      Inject 600 mg into the vein Every 8 weeks        simvastatin 40 MG tablet    ZOCOR    90 tablet    Take 1 tablet (40 mg) by mouth At Bedtime    Hyperlipidemia LDL goal <100       * Notice:  This list has 2 medication(s) that are the same as other medications prescribed for you. Read the directions carefully, and ask your doctor or other care provider to review them  with you.

## 2017-10-19 NOTE — PROGRESS NOTES
"Oncology Rooming Note    October 19, 2017 10:22 AM   Jaqueline Canales is a 76 year old female who presents for:    Chief Complaint   Patient presents with     Oncology Clinic Visit     Malignant neoplasm of lower lobe of left lung      Initial Vitals: /64 (BP Location: Left arm, Patient Position: Chair, Cuff Size: Adult Large)  Pulse 79  Temp 97.5  F (36.4  C) (Oral)  Wt 107.8 kg (237 lb 9.6 oz)  SpO2 92%  BMI 43.46 kg/m2 Estimated body mass index is 43.46 kg/(m^2) as calculated from the following:    Height as of 10/6/17: 1.575 m (5' 2\").    Weight as of this encounter: 107.8 kg (237 lb 9.6 oz). Body surface area is 2.17 meters squared.  No Pain (0) Comment: Data Unavailable   No LMP recorded. Patient is postmenopausal.  Allergies reviewed: Yes  Medications reviewed: Yes    Medications: Medication refills not needed today.  Pharmacy name entered into [employer]: [facility] [place] [id number] [address]    Clinical concerns: None     5 minutes for nursing intake (face to face time)     Winsome Reid CMA        1.  RTC MD in 3 months  Scheduled/janice  2.  CT scan chest without contrast before next visit  Scheduled/Janice  3- Refer to lymphedema     AVS printed & given to patient/janice    DISCHARGE PLAN:  Next appointments: See patient instruction section  Departure Mode: Ambulatory  Accompanied by: unknown/self discharged  0 minutes for nursing discharge (face to face time)   Iona Spears RN          "

## 2017-10-30 ENCOUNTER — TRANSFERRED RECORDS (OUTPATIENT)
Dept: HEALTH INFORMATION MANAGEMENT | Facility: CLINIC | Age: 77
End: 2017-10-30

## 2017-10-30 LAB
ALT SERPL-CCNC: 21 IU/L (ref 5–35)
AST SERPL-CCNC: 27 U/L (ref 5–34)
CREAT SERPL-MCNC: 0.56 MG/DL (ref 0.5–1.3)
GFR SERPL CREATININE-BSD FRML MDRD: 111.6 ML/MIN/1.73M2

## 2017-11-06 NOTE — PROGRESS NOTES
76 year old white female seen for follow-up.  The patient has scleroderma and ILD and was incidentally found to have SCC of left lower lobe that was treated with radiation therapy.  She is clinically stable and has no interim history of right hear failure.  The most recent echocardiogram discloses elevated, estimated PA pressure.  She has no significant functional limitation at this time.  We discussed the nature of right heart catherization and medication for PH; and for the moment, in the absence of symptoms, she wishes to defer diagnostic and therapeutic intervention.    She returns now with exertional shortness of breath and decreased exercise tolerance but without symptoms of right heart failure, exertional -pre-syncope or syncope.      REVIEW of SYMPTOMS    Constitutional: without fever, chills, night sweats.  Weight is ___  HEENT: without dry eyes, dry mouth, sinusitis, corryza, visual changes  Endocrine: without polyuria, polydypsia, polyphagia, heat or cold intolerance, changing mental status  Cardiology: without chest pain, tightness, heaviness, pressure, paroxysmal dyspnea, orthopnea, palpitation, pre-syncope or syncope or device discharge (if present)  Pulmonary: without asthma, wheezing, cough, hemoptysis  GI: without nausea, emesis, jaundice, pain, hematemesis, melena  : without frequency, urgency, hematuria, stones, pain, abnormal bleeding, frequency, urgency  Neurologic: without TIA, CVA, trauma, seizure  Dermatologic: without lesions, abrasion rash,   Orthopedic/Rheum: without significant joint pain, impairment, limb, polyserositis, ulceration, Raynauds  Heme: without mass, bruising, frequent infection, anemia  Psychiatric: without substance abuse, hallucination, medication, depression    Exercise tolerance:    PERRLA, EOM full, normal gait and station, normal mentation, skin without lesions, chest is clear, no evidence of right or left ventricular overactivity, sternum stable, no carotid bruits,  regular rhythm, S1, S2, no murmurs, abdomen without tenderness, rebound guarding or masses, no edema, no focal neurologic defects.    Results for GINA MCKEON (MRN [id number]) as of 1/20/2017 08:43   Ref. Range 10/31/2016 14:55   CRP Inflammation Latest Ref Range: 0.0-8.0 mg/L 4.1   WBC Latest Ref Range: 4.0-11.0 10e9/L 5.4   Hemoglobin Latest Ref Range: 11.7-15.7 g/dL 13.5   Hematocrit Latest Ref Range: 35.0-47.0 % 40.8   Platelet Count Latest Ref Range: 150-450 10e9/L 328   RBC Count Latest Ref Range: 3.8-5.2 10e12/L 4.37   MCV Latest Ref Range:  fl 93   MCH Latest Ref Range: 26.5-33.0 pg 30.9   MCHC Latest Ref Range: 31.5-36.5 g/dL 33.1   RDW Latest Ref Range: 10.0-15.0 % 17.0 (H)   Diff Method Unknown Automated Method   % Neutrophils Latest Units: % 73.8   % Lymphocytes Latest Units: % 17.1   % Monocytes Latest Units: % 6.5   % Eosinophils Latest Units: % 2.0   % Basophils Latest Units: % 0.6   Absolute Neutrophil Latest Ref Range: 1.6-8.3 10e9/L 4.0   Absolute Lymphocytes Latest Ref Range: 0.8-5.3 10e9/L 0.9   Absolute Monocytes Latest Ref Range: 0.0-1.3 10e9/L 0.4   Absolute Eosinophils Latest Ref Range: 0.0-0.7 10e9/L 0.1   Absolute Basophils Latest Ref Range: 0.0-0.2 10e9/L 0.0   CT CHEST WITHOUT CONTRAST December 27, 2016 11:40 AM       HISTORY: Follow up on right sided lung lesions. Malignant neoplasm of  lower lobe, left bronchus or lung.     COMPARISON: 10/22/2016.     TECHNIQUE: Volumetric helical acquisition of CT images of the chest  from the clavicles to the kidneys were acquired without IV contrast.  Radiation dose for this scan was reduced using automated exposure  control, adjustment of the mA and/or kV according to patient size, or  iterative reconstruction technique.     FINDINGS: Right-sided fibrotic changes appear stable. Somewhat nodular  density at the left base has resolved and may have been an area of  consolidation and/or active inflammation. Honeycombing and  traction  bronchiectasis persist. Moderate hiatal hernia. No pleural or  pericardial effusion. Mild cardiomegaly. There are moderate coronary  vascular calcifications consistent with coronary artery disease. There  are mild atherosclerotic changes of the visualized aorta and its  branches. There is no evidence of aortic aneurysm. No acute findings  in the visualized upper abdomen.                                                                       IMPRESSION:  1. 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  2. The nodular density at the left base has resolved  Davie Iyer MD 2016             Narrative           Interpretation Summary              [facility]  [address]  [place] [address]  Phone [phone]  Fax [phone]        Name: GINA MCKEON  MRN: [id number]  : 1940  Study Date: 2016 07:39 AM  Age: 75 yrs  Gender: Female  Patient Location: [facility]  Reason For Study: Unspecified atrial fibrillation  Ordering Physician: HAZEL RIBEIRO  Referring Physician: HAZEL RIBEIRO  Performed By: Rosanna Manning RDCS  BSA: 2.0 m2  Height: 62 in  Weight: 226 lb  HR: 89  BP: 100/58 mmHg     ______________________________________________________________________________     Procedure  Complete Echo Adult.     ______________________________________________________________________________     Interpretation Summary     Left ventricular systolic function is normal.The visual ejection fraction is  estimated at 60-65%.  The right ventricle is mildly dilated.The right ventricular systolic function  is normal.  The left atrium is severely dilated.  There is moderate mitral annular calcification.There is mild (1+) mitral  regurgitation.  Pulmonary hypertension- RVSP 42 mm hg +RA.  The IVC is normal in size and reactivity with  respiration, suggesting normal  central venous pressure.  ______________________________________________________________________________           Left Ventricle  The left ventricle is normal in size. There is mild concentric left  ventricular hypertrophy. Left ventricular systolic function is normal. The  visual ejection fraction is estimated at 60-65%. E prime velocity may be  unreliable due to presence of significant mitral annular calcification. No  regional wall motion abnormalities noted.  Right Ventricle  The right ventricle is mildly dilated. The right ventricular systolic  function is normal.     Atria  The left atrium is severely dilated. The right atrium is mild to moderately  dilated. There is no color Doppler evidence of an atrial shunt.     Mitral Valve  There is moderate mitral annular calcification. There is mild (1+) mitral  regurgitation.     Tricuspid Valve  There is mild (1+) tricuspid regurgitation. The right ventricular systolic  pressure is approximated at 42.2 mmHg plus the right atrial pressure.  Pulmonary hypertension.     Aortic Valve  There is mild trileaflet aortic sclerosis. No aortic regurgitation is  present. No aortic stenosis is present.     Pulmonic Valve  The pulmonic valve is not well visualized. There is no pulmonic valvular  stenosis.  Vessels  The aortic root is normal size. Normal size ascending aorta. The IVC is  normal in size and reactivity with respiration, suggesting normal central  venous pressure.     Pericardium  There is no pericardial effusion.     Rhythm  The rhythm was normal sinus.  ______________________________________________________________________________     MMode/2D Measurements & Calculations  IVSd: 1.2 cm  LVIDd: 5.3 cm  LVIDs: 3.4 cm  LVPWd: 0.96 cm  FS: 35.9 %  EDV(Teich): 132.7 ml  ESV(Teich): 46.3 ml  LV mass(C)d: 224.6 grams  Ao root diam: 2.8 cm  LA dimension: 4.7 cm  asc Aorta Diam: 3.5 cm  LA/Ao: 1.7  LVOT diam: 1.9 cm  LVOT area: 2.7 cm2  LA  Volume (BP): 101.0 ml  LA Volume Index (BP): 50.2 ml/m2           Doppler Measurements & Calculations  MV E max carrington: 89.7 cm/sec  MV A max carrington: 94.6 cm/sec  MV E/A: 0.95  MV dec time: 0.20 sec  TR max carrington: 324.7 cm/sec  TR max P.2 mmHg  Lateral E/e': 12.9  Medial E/e': 17.2                   Current Outpatient Prescriptions   Medication     levothyroxine (SYNTHROID/LEVOTHROID) 75 MCG tablet     simvastatin (ZOCOR) 40 MG tablet     apixaban ANTICOAGULANT (ELIQUIS) 5 MG tablet     metoprolol (TOPROL-XL) 25 MG 24 hr tablet     omeprazole (PRILOSEC) 20 MG CR capsule     Lactobacillus Acidophilus POWD     Methotrexate Sodium (METHOTREXATE PO)     albuterol (PROAIR HFA, PROVENTIL HFA, VENTOLIN HFA) 108 (90 BASE) MCG/ACT inhaler     MELATONIN PO     Calcium Citrate-Vitamin D (CITRACAL + D PO)     Multiple Vitamins-Minerals (ICAPS) CAPS     InFLIXimab (REMICADE IV)     NIFEDICAL XL 60 MG PO TB24     FOLIC ACID 1 MG OR TABS     CENTRUM SILVER OR TABS     No current facility-administered medications for this visit.        The patient was seen with her niece and we discussed the nature of pulmonary hypertension, potential causes, the nature of right heart catherization and its risks and benefits.  She wishes to proceed with diagnostic right heart catherization to establish diagnosis, define PCP and therefore define therapy.  Statistically this is WHO II.  .

## 2017-11-07 ENCOUNTER — OFFICE VISIT (OUTPATIENT)
Dept: CARDIOLOGY | Facility: CLINIC | Age: 77
End: 2017-11-07
Payer: COMMERCIAL

## 2017-11-07 VITALS
HEIGHT: 62 IN | BODY MASS INDEX: 42.14 KG/M2 | OXYGEN SATURATION: 94 % | HEART RATE: 69 BPM | WEIGHT: 229 LBS | DIASTOLIC BLOOD PRESSURE: 72 MMHG | SYSTOLIC BLOOD PRESSURE: 126 MMHG

## 2017-11-07 DIAGNOSIS — I27.20 PULMONARY HTN (H): ICD-10-CM

## 2017-11-07 PROCEDURE — 99214 OFFICE O/P EST MOD 30 MIN: CPT | Performed by: INTERNAL MEDICINE

## 2017-11-07 RX ORDER — POTASSIUM CHLORIDE 1500 MG/1
20 TABLET, EXTENDED RELEASE ORAL
Status: CANCELLED | OUTPATIENT
Start: 2017-11-07

## 2017-11-07 RX ORDER — SODIUM CHLORIDE 9 MG/ML
1000 INJECTION, SOLUTION INTRAVENOUS CONTINUOUS
Status: CANCELLED | OUTPATIENT
Start: 2017-11-07

## 2017-11-07 RX ORDER — LIDOCAINE 40 MG/G
CREAM TOPICAL
Status: CANCELLED | OUTPATIENT
Start: 2017-11-07

## 2017-11-07 NOTE — MR AVS SNAPSHOT
After Visit Summary   11/7/2017    Jaqueline Canales    MRN: [id number]           Patient Information     Date Of Birth          1940        Visit Information        Provider Department      11/7/2017 3:30 PM Shay Marquez MD [facility]        Today's Diagnoses     Pulmonary HTN          Care Instructions    Medication Changes:  No medication changes at this time. Please continue current medication regiment.      Patient Instructions:  Continue heart healthy, low sodium diet.    Check-In  Time Check-In Location Estimated Length Procedure   Name        [facility]  waiting room 60-90 minutes Right Heart Catheterization**     Procedure Preparations & Instructions     This is an invasive procedure that DOES require preparation:    - Nothing to eat for 6 hours   - You may have clear liquids up until the time of your procedure  - A ride should be arranged for you in the instance you are unable to drive home, however you should be able to function as you normally would after the procedure     *For Patients with Diabetes: ? DO NOT take any oral diabetic medication, short-acting diabetes medications/insulin, humalog or regular insulin the morning of your test  ? Take   dose of long-acting insulin (Lantus, Levemir) the day of your test  ? Remember to  bring your glucometer and insulin with you to take after your test if needed     *For Patients on anticoagulants: ? Your Coumadin Clinic will give you instructions on medication adjustments or bridging prior to the procedure      HOLD Eliquis the day before your procedure and the morning of procedure.      Follow up Appointment Information:  If necessary, you will see Dr. Marquez after your right heart cath at the hospital.    Celestino will call you with time of procedure. Call Sasha with questions/concerns at [phone].    For scheduling at [facility] please call [phone]  For scheduling at the [place]  please call [phone]  We are located on the second floor [address] at the [facility].  Our address is     [address],   [address]  [place]  [address]    Thank you for allowing us to be a part of your care here at the [facility]    If you have questions or concerns please contact us at:    Sasha Alicea, RN      Nurse Coordinator       Pulmonary Hypertension     [facility]   (P)[phone]  (F)[phone]    ** Please note that you will NOT receive a reminder call regarding your scheduled testing, reminder calls are for provider appointments only.  If you are scheduled for testing within the [facility] system you may receive a call regarding pre-registration for billing purposes only.**     Remember to weigh yourself daily after voiding and before you consume any food or beverages and log the numbers.  If you have gained/lost 2 pounds overnight or 5 pounds in a week contact us immediately for medication adjustments or further instructions.    Support Group:  Pulmonary Hypertension Association  Https://www.phassociation.org/  **Look at the Events Tab** They even have Support Groups that you can call into    [facility] PH Support Group  First Saturday of the Month from 1-3 PM   [facility] [address]  Leader: Johny Cuello  Phone: [phone]  Email: [email or web address]              Follow-ups after your visit        Follow-up notes from your care team     Return if symptoms worsen or fail to improve.      Your next 10 appointments already scheduled     Jan 29, 2018 10:30 AM CST   CT CHEST W/O CONTRAST with SHCT1   [facility] ([facility])    [address] [id number] [phone]           Please bring any scans or X-rays taken at other hospitals, if similar tests were done. Also bring a list of your medicines, including vitamins, minerals and over-the-counter  drugs. It is safest to leave personal items at home.  Be sure to tell your doctor:   If you have any allergies.   If there s any chance you are pregnant.   If you are breastfeeding.   If you have any special needs.  You do not need to do anything special to prepare.  Please wear loose clothing, such as a sweat suit or jogging clothes. Avoid snaps, zippers and other metal. We may ask you to undress and put on a hospital gown.            Jan 31, 2018 10:30 AM CST   Return Visit with Anabell Paiz MD   [facility] ([facility])    [facility]  [address]  [facility] [address]   [phone]            Feb 14, 2018 10:00 AM CST   PFT VISIT with SUNSHINE PFL SUHAS   [facility] Pulmonary Function Testing ([facility])    [address] [place] [address] [phone]            Feb 14, 2018 10:30 AM CST   (Arrive by 10:15 AM)   Return Interstitial Lung with Rubens Quispe MD   [facility] ([facility])    [address] [place] [address] [phone]              Future tests that were ordered for you today     Open Future Orders        Priority Expected Expires Ordered    Heart Cath Right heart cath Routine  11/7/2018 11/7/2017            Who to contact     If you have questions or need follow up information about today's clinic visit or your schedule please contact [facility] directly at [phone].  Normal or non-critical lab and imaging results will be communicated to you by MyChart, letter or phone within 4 business days after the clinic has received the results. If you do not hear from us within 7 days, please contact the clinic through MyChart or phone. If you have a critical or abnormal lab result, we will notify you by phone as soon as possible.  Submit refill requests through [employer] or call your  "pharmacy and they will forward the refill request to us. Please allow 3 business days for your refill to be completed.          Additional Information About Your Visit        Care EveryWhere ID     This is your Care EveryWhere ID. This could be used by other organizations to access your [place] medical records  [id number]        Your Vitals Were     Pulse Height Pulse Oximetry BMI (Body Mass Index)          69 1.575 m (5' 2\") 94% 41.88 kg/m2         Blood Pressure from Last 3 Encounters:   11/07/17 126/72   10/19/17 102/64   10/06/17 109/66    Weight from Last 3 Encounters:   11/07/17 103.9 kg (229 lb)   10/19/17 107.8 kg (237 lb 9.6 oz)   10/06/17 105.2 kg (232 lb)              We Performed the Following     Follow-Up with Pulmonary Hypertension Clinic        Primary Care Provider Office Phone # Fax #    Carissa Yadiel Burroughs [phone] [address] [address]        Equal Access to Services     [facility]: Hadii aad ku hadasho Soomaali, waaxda luqadaha, qaybta kaalmada adeegyada, phillip small hayreggie gaines . So [facility] [phone].    ATENCIÓN: Si habla español, tiene a esquivel disposición servicios gratuitos de asistencia lingüística. Llame al [phone].    We comply with applicable federal civil rights laws and [place] laws. We do not discriminate on the basis of race, color, national origin, age, disability, sex, sexual orientation, or gender identity.            Thank you!     Thank you for choosing [facility]  for your care. Our goal is always to provide you with excellent care. Hearing back from our patients is one way we can continue to improve our services. Please take a few minutes to complete the written survey that you may receive in the mail after your visit with us. Thank you!             Your Updated Medication List - Protect others around you: Learn how to safely use, store and throw away your medicines at " www.disposemymeds.org.          This list is accurate as of: 11/7/17  4:34 PM.  Always use your most recent med list.                   Brand Name Dispense Instructions for use Diagnosis    albuterol 108 (90 BASE) MCG/ACT Inhaler    PROAIR HFA/PROVENTIL HFA/VENTOLIN HFA    3 Inhaler    Inhale 2 puffs into the lungs every 6 hours    ILD (interstitial lung disease) (H)       apixaban ANTICOAGULANT 5 MG tablet    ELIQUIS    180 tablet    Take 1 tablet (5 mg) by mouth 2 times daily    Persistent atrial fibrillation (H)       * ICAPS Caps      Take 1 capsule by mouth 2 times daily        * CENTRUM SILVER per tablet      1 TABLET DAILY        CITRACAL + D PO      Take 2 tablets by mouth 2 times daily        folic acid 1 MG tablet    FOLVITE     1 TABLET DAILY    Mixed hyperlipidemia       Lactobacillus Acidophilus Powd      Take 1 packet by mouth daily        levothyroxine 75 MCG tablet    SYNTHROID/LEVOTHROID    90 tablet    Take 1 tablet (75 mcg) by mouth daily    Hypothyroidism, unspecified type       MELATONIN PO      Take 5 mg by mouth nightly as needed        METHOTREXATE PO      Take 2.5 mg by mouth 10 tablets weekly        metoprolol 25 MG 24 hr tablet    TOPROL-XL    90 tablet    Take 1 tablet (25 mg) by mouth daily    Persistent atrial fibrillation (H)       MUCUS D PO           NIFEDICAL XL 60 MG Tb24   Generic drug:  NIFEdipine ER osmotic      1 TABLET DAILY    Raynaud's syndrome       omeprazole 20 MG CR capsule    priLOSEC    180 capsule    Take 1 capsule (20 mg) by mouth 2 times daily Take 30-60 minutes prior to meals    Esophageal stricture       REMICADE IV      Inject 600 mg into the vein Every 8 weeks        simvastatin 40 MG tablet    ZOCOR    90 tablet    Take 1 tablet (40 mg) by mouth At Bedtime    Hyperlipidemia LDL goal <100       * Notice:  This list has 2 medication(s) that are the same as other medications prescribed for you. Read the directions carefully, and ask your doctor or other care  provider to review them with you.

## 2017-11-07 NOTE — LETTER
11/7/2017    Carissa Burroughs MD  [address] [facility] [address]  [facility] [address]    RE: Jaqueline Canales       Dear Colleague,    I had the pleasure of seeing Jaqueline RIOJAS Parker in the [facility].    76 year old white female seen for follow-up.  The patient has scleroderma and ILD and was incidentally found to have SCC of left lower lobe that was treated with radiation therapy.  She is clinically stable and has no interim history of right hear failure.  The most recent echocardiogram discloses elevated, estimated PA pressure.  She has no significant functional limitation at this time.  We discussed the nature of right heart catherization and medication for PH; and for the moment, in the absence of symptoms, she wishes to defer diagnostic and therapeutic intervention.    She returns now with exertional shortness of breath and decreased exercise tolerance but without symptoms of right heart failure, exertional -pre-syncope or syncope.      REVIEW of SYMPTOMS    Constitutional: without fever, chills, night sweats.  Weight is ___  HEENT: without dry eyes, dry mouth, sinusitis, corryza, visual changes  Endocrine: without polyuria, polydypsia, polyphagia, heat or cold intolerance, changing mental status  Cardiology: without chest pain, tightness, heaviness, pressure, paroxysmal dyspnea, orthopnea, palpitation, pre-syncope or syncope or device discharge (if present)  Pulmonary: without asthma, wheezing, cough, hemoptysis  GI: without nausea, emesis, jaundice, pain, hematemesis, melena  : without frequency, urgency, hematuria, stones, pain, abnormal bleeding, frequency, urgency  Neurologic: without TIA, CVA, trauma, seizure  Dermatologic: without lesions, abrasion rash,   Orthopedic/Rheum: without significant joint pain, impairment, limb, polyserositis, ulceration, Raynauds  Heme: without mass, bruising, frequent infection, anemia  Psychiatric: without substance abuse, hallucination, medication,  depression    Exercise tolerance:    PERRLA, EOM full, normal gait and station, normal mentation, skin without lesions, chest is clear, no evidence of right or left ventricular overactivity, sternum stable, no carotid bruits, regular rhythm, S1, S2, no murmurs, abdomen without tenderness, rebound guarding or masses, no edema, no focal neurologic defects.    Results for GINA MCKEON (MRN [id number]) as of 1/20/2017 08:43   Ref. Range 10/31/2016 14:55   CRP Inflammation Latest Ref Range: 0.0-8.0 mg/L 4.1   WBC Latest Ref Range: 4.0-11.0 10e9/L 5.4   Hemoglobin Latest Ref Range: 11.7-15.7 g/dL 13.5   Hematocrit Latest Ref Range: 35.0-47.0 % 40.8   Platelet Count Latest Ref Range: 150-450 10e9/L 328   RBC Count Latest Ref Range: 3.8-5.2 10e12/L 4.37   MCV Latest Ref Range:  fl 93   MCH Latest Ref Range: 26.5-33.0 pg 30.9   MCHC Latest Ref Range: 31.5-36.5 g/dL 33.1   RDW Latest Ref Range: 10.0-15.0 % 17.0 (H)   Diff Method Unknown Automated Method   % Neutrophils Latest Units: % 73.8   % Lymphocytes Latest Units: % 17.1   % Monocytes Latest Units: % 6.5   % Eosinophils Latest Units: % 2.0   % Basophils Latest Units: % 0.6   Absolute Neutrophil Latest Ref Range: 1.6-8.3 10e9/L 4.0   Absolute Lymphocytes Latest Ref Range: 0.8-5.3 10e9/L 0.9   Absolute Monocytes Latest Ref Range: 0.0-1.3 10e9/L 0.4   Absolute Eosinophils Latest Ref Range: 0.0-0.7 10e9/L 0.1   Absolute Basophils Latest Ref Range: 0.0-0.2 10e9/L 0.0   CT CHEST WITHOUT CONTRAST December 27, 2016 11:40 AM       HISTORY: Follow up on right sided lung lesions. Malignant neoplasm of  lower lobe, left bronchus or lung.     COMPARISON: 10/22/2016.     TECHNIQUE: Volumetric helical acquisition of CT images of the chest  from the clavicles to the kidneys were acquired without IV contrast.  Radiation dose for this scan was reduced using automated exposure  control, adjustment of the mA and/or kV according to patient size, or  iterative reconstruction  technique.     FINDINGS: Right-sided fibrotic changes appear stable. Somewhat nodular  density at the left base has resolved and may have been an area of  consolidation and/or active inflammation. Honeycombing and traction  bronchiectasis persist. Moderate hiatal hernia. No pleural or  pericardial effusion. Mild cardiomegaly. There are moderate coronary  vascular calcifications consistent with coronary artery disease. There  are mild atherosclerotic changes of the visualized aorta and its  branches. There is no evidence of aortic aneurysm. No acute findings  in the visualized upper abdomen.                                                                       IMPRESSION:  1. 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  2. The nodular density at the left base has resolved  Davie Iyer MD 2016             Narrative           Interpretation Summary              [facility]  [facility]  Echocardiography Laboratory  [address]  [place] [address]  Phone [phone]  Fax [phone]        Name: GINA MCKEON  MRN: [id number]  : 1940  Study Date: 2016 07:39 AM  Age: 75 yrs  Gender: Female  Patient Location: [facility]  Reason For Study: Unspecified atrial fibrillation  Ordering Physician: HAZEL RIBEIRO  Referring Physician: HAZEL RIBEIRO  Performed By: Rosanna Manning RDCS  BSA: 2.0 m2  Height: 62 in  Weight: 226 lb  HR: 89  BP: 100/58 mmHg     ______________________________________________________________________________     Procedure  Complete Echo Adult.     ______________________________________________________________________________     Interpretation Summary     Left ventricular systolic function is normal.The visual ejection fraction is  estimated at 60-65%.  The right ventricle is mildly dilated.The right ventricular systolic function  is normal.  The  left atrium is severely dilated.  There is moderate mitral annular calcification.There is mild (1+) mitral  regurgitation.  Pulmonary hypertension- RVSP 42 mm hg +RA.  The IVC is normal in size and reactivity with respiration, suggesting normal  central venous pressure.  ______________________________________________________________________________           Left Ventricle  The left ventricle is normal in size. There is mild concentric left  ventricular hypertrophy. Left ventricular systolic function is normal. The  visual ejection fraction is estimated at 60-65%. E prime velocity may be  unreliable due to presence of significant mitral annular calcification. No  regional wall motion abnormalities noted.  Right Ventricle  The right ventricle is mildly dilated. The right ventricular systolic  function is normal.     Atria  The left atrium is severely dilated. The right atrium is mild to moderately  dilated. There is no color Doppler evidence of an atrial shunt.     Mitral Valve  There is moderate mitral annular calcification. There is mild (1+) mitral  regurgitation.     Tricuspid Valve  There is mild (1+) tricuspid regurgitation. The right ventricular systolic  pressure is approximated at 42.2 mmHg plus the right atrial pressure.  Pulmonary hypertension.     Aortic Valve  There is mild trileaflet aortic sclerosis. No aortic regurgitation is  present. No aortic stenosis is present.     Pulmonic Valve  The pulmonic valve is not well visualized. There is no pulmonic valvular  stenosis.  Vessels  The aortic root is normal size. Normal size ascending aorta. The IVC is  normal in size and reactivity with respiration, suggesting normal central  venous pressure.     Pericardium  There is no pericardial effusion.     Rhythm  The rhythm was normal sinus.  ______________________________________________________________________________     MMode/2D Measurements & Calculations  IVSd: 1.2 cm  LVIDd: 5.3 cm  LVIDs: 3.4 cm  LVPWd:  0.96 cm  FS: 35.9 %  EDV(Teich): 132.7 ml  ESV(Teich): 46.3 ml  LV mass(C)d: 224.6 grams  Ao root diam: 2.8 cm  LA dimension: 4.7 cm  asc Aorta Diam: 3.5 cm  LA/Ao: 1.7  LVOT diam: 1.9 cm  LVOT area: 2.7 cm2  LA Volume (BP): 101.0 ml  LA Volume Index (BP): 50.2 ml/m2           Doppler Measurements & Calculations  MV E max carrington: 89.7 cm/sec  MV A max carrington: 94.6 cm/sec  MV E/A: 0.95  MV dec time: 0.20 sec  TR max carrington: 324.7 cm/sec  TR max P.2 mmHg  Lateral E/e': 12.9  Medial E/e': 17.2                   Current Outpatient Prescriptions   Medication     levothyroxine (SYNTHROID/LEVOTHROID) 75 MCG tablet     simvastatin (ZOCOR) 40 MG tablet     apixaban ANTICOAGULANT (ELIQUIS) 5 MG tablet     metoprolol (TOPROL-XL) 25 MG 24 hr tablet     omeprazole (PRILOSEC) 20 MG CR capsule     Lactobacillus Acidophilus POWD     Methotrexate Sodium (METHOTREXATE PO)     albuterol (PROAIR HFA, PROVENTIL HFA, VENTOLIN HFA) 108 (90 BASE) MCG/ACT inhaler     MELATONIN PO     Calcium Citrate-Vitamin D (CITRACAL + D PO)     Multiple Vitamins-Minerals (ICAPS) CAPS     InFLIXimab (REMICADE IV)     NIFEDICAL XL 60 MG PO TB24     FOLIC ACID 1 MG OR TABS     CENTRUM SILVER OR TABS     No current facility-administered medications for this visit.        The patient was seen with her niece and we discussed the nature of pulmonary hypertension, potential causes, the nature of right heart catherization and its risks and benefits.  She wishes to proceed with diagnostic right heart catherization to establish diagnosis, define PCP and therefore define therapy.  Statistically this is WHO II.  .      Thank you for allowing me to participate in the care of your patient.    Sincerely,     Shay Marquez MD     [facility]

## 2017-11-07 NOTE — PATIENT INSTRUCTIONS
Medication Changes:  No medication changes at this time. Please continue current medication regiment.      Patient Instructions:  Continue heart healthy, low sodium diet.    Check-In  Time Check-In Location Estimated Length Procedure   Name        GOLD  waiting room 60-90 minutes Right Heart Catheterization**     Procedure Preparations & Instructions     This is an invasive procedure that DOES require preparation:    - Nothing to eat for 6 hours   - You may have clear liquids up until the time of your procedure  - A ride should be arranged for you in the instance you are unable to drive home, however you should be able to function as you normally would after the procedure     *For Patients with Diabetes: ? DO NOT take any oral diabetic medication, short-acting diabetes medications/insulin, humalog or regular insulin the morning of your test  ? Take   dose of long-acting insulin (Lantus, Levemir) the day of your test  ? Remember to  bring your glucometer and insulin with you to take after your test if needed     *For Patients on anticoagulants: ? Your Coumadin Clinic will give you instructions on medication adjustments or bridging prior to the procedure      HOLD Eliquis the day before your procedure and the morning of procedure.      Follow up Appointment Information:  If necessary, you will see Dr. Marquez after your right heart cath at the hospital.    Celestino will call you with time of procedure. Call Sasha with questions/concerns at [phone].    For scheduling at [facility] please call [phone]  For scheduling at the [facility] please call [phone]  We are located on the second floor [address] at the [facility].  Our address is     [address] [place] [address]   [address]  [place]  [address]    Thank you for allowing us to be a part of your care here at the [facility]    If you have questions or concerns please contact us at:    Sasha Alicea RN      Nurse  Coordinator       Pulmonary Hypertension     [facility]   [phone])[phone]  (F)[phone]    ** Please note that you will NOT receive a reminder call regarding your scheduled testing, reminder calls are for provider appointments only.  If you are scheduled for testing within the [employer] system you may receive a call regarding pre-registration for billing purposes only.**     Remember to weigh yourself daily after voiding and before you consume any food or beverages and log the numbers.  If you have gained/lost 2 pounds overnight or 5 pounds in a week contact us immediately for medication adjustments or further instructions.    Support Group:  Pulmonary Hypertension Association  Https://www.phassociation.org/  **Look at the Events Tab** They even have Support Groups that you can call into    [facility] PH Support Group  First Saturday of the Month from 1-3 PM   Location: [address] [place] [id number]  Leader: Johny Cuello  Phone: [phone]  Email: [email or web address]

## 2017-11-15 ENCOUNTER — HOSPITAL ENCOUNTER (OUTPATIENT)
Facility: CLINIC | Age: 77
Discharge: HOME | End: 2017-11-15
Attending: INTERNAL MEDICINE | Admitting: INTERNAL MEDICINE
Payer: MEDICARE

## 2017-11-15 ENCOUNTER — APPOINTMENT (OUTPATIENT)
Dept: CARDIOLOGY | Facility: CLINIC | Age: 77
End: 2017-11-15
Attending: INTERNAL MEDICINE
Payer: MEDICARE

## 2017-11-15 ENCOUNTER — APPOINTMENT (OUTPATIENT)
Dept: MEDSURG UNIT | Facility: CLINIC | Age: 77
End: 2017-11-15
Attending: INTERNAL MEDICINE
Payer: MEDICARE

## 2017-11-15 VITALS
SYSTOLIC BLOOD PRESSURE: 132 MMHG | TEMPERATURE: 97.8 F | BODY MASS INDEX: 42.33 KG/M2 | DIASTOLIC BLOOD PRESSURE: 49 MMHG | WEIGHT: 230 LBS | HEART RATE: 60 BPM | OXYGEN SATURATION: 97 % | RESPIRATION RATE: 18 BRPM | HEIGHT: 62 IN

## 2017-11-15 DIAGNOSIS — I27.20 PULMONARY HTN (H): ICD-10-CM

## 2017-11-15 PROCEDURE — 40000166 ZZH STATISTIC PP CARE STAGE 1

## 2017-11-15 PROCEDURE — 93451 RIGHT HEART CATH: CPT

## 2017-11-15 PROCEDURE — 4A023N6 MEASUREMENT OF CARDIAC SAMPLING AND PRESSURE, RIGHT HEART, PERCUTANEOUS APPROACH: ICD-10-PCS | Performed by: INTERNAL MEDICINE

## 2017-11-15 PROCEDURE — 93451 RIGHT HEART CATH: CPT | Mod: 26 | Performed by: INTERNAL MEDICINE

## 2017-11-15 PROCEDURE — 27211181 ZZH BALLOON TIP PRESSURE CR5

## 2017-11-15 PROCEDURE — 27210982 ZZH KIT RT HC TOTES DISP CR7

## 2017-11-15 PROCEDURE — 27210787 ZZH MANIFOLD CR2

## 2017-11-15 RX ORDER — LIDOCAINE 40 MG/G
CREAM TOPICAL
Status: COMPLETED | OUTPATIENT
Start: 2017-11-15 | End: 2017-11-15

## 2017-11-15 RX ADMIN — LIDOCAINE: 40 CREAM TOPICAL at 08:38

## 2017-11-15 NOTE — IP AVS SNAPSHOT
[address] [address]                                       After Visit Summary   11/15/2017    Jaqueline Canales    MRN: [id number]           After Visit Summary Signature Page     I have received my discharge instructions, and my questions have been answered. I have discussed any challenges I see with this plan with the nurse or doctor.    ..........................................................................................................................................  Patient/Patient Representative Signature      ..........................................................................................................................................  Patient Representative Print Name and Relationship to Patient    ..................................................               ................................................  Date                                            Time    ..........................................................................................................................................  Reviewed by Signature/Title    ...................................................              ..............................................  Date                                                            Time

## 2017-11-15 NOTE — IP AVS SNAPSHOT
MRN:[id number]                      After Visit Summary   11/15/2017    Jaqueline Canales    MRN: [id number]           Visit Information        Department      11/15/2017  8:05 AM [address] [facility]          Review of your medicines      UNREVIEWED medicines. Ask your doctor about these medicines        Dose / Directions    albuterol 108 (90 BASE) MCG/ACT Inhaler   Commonly known as:  PROAIR HFA/PROVENTIL HFA/VENTOLIN HFA   Used for:  ILD (interstitial lung disease) (H)        Dose:  2 puff   Inhale 2 puffs into the lungs every 6 hours   Quantity:  3 Inhaler   Refills:  3       apixaban ANTICOAGULANT 5 MG tablet   Commonly known as:  ELIQUIS   Used for:  Persistent atrial fibrillation (H)        Dose:  5 mg   Take 1 tablet (5 mg) by mouth 2 times daily   Quantity:  180 tablet   Refills:  3       * ICAPS Caps        Dose:  1 capsule   Take 1 capsule by mouth 2 times daily   Refills:  0       * CENTRUM SILVER per tablet        1 TABLET DAILY   Refills:  0       CITRACAL + D PO        Dose:  2 tablet   Take 2 tablets by mouth 2 times daily   Refills:  0       folic acid 1 MG tablet   Commonly known as:  FOLVITE   Used for:  Mixed hyperlipidemia        1 TABLET DAILY   Refills:  0       Lactobacillus Acidophilus Powd        Dose:  1 packet   Take 1 packet by mouth daily   Refills:  0       levothyroxine 75 MCG tablet   Commonly known as:  SYNTHROID/LEVOTHROID   Used for:  Hypothyroidism, unspecified type        Dose:  75 mcg   Take 1 tablet (75 mcg) by mouth daily   Quantity:  90 tablet   Refills:  3       MELATONIN PO        Dose:  5 mg   Take 5 mg by mouth nightly as needed   Refills:  0       METHOTREXATE PO        Dose:  2.5 mg   Take 2.5 mg by mouth 10 tablets weekly   Refills:  0       metoprolol 25 MG 24 hr tablet   Commonly known as:  TOPROL-XL   Used for:  Persistent atrial fibrillation (H)        Dose:  25 mg   Take 1 tablet (25 mg) by mouth daily   Quantity:  90 tablet   Refills:  0        MUCUS D PO        Refills:  0       NIFEDICAL XL 60 MG Tb24   Used for:  Raynaud's syndrome   Generic drug:  NIFEdipine ER osmotic        1 TABLET DAILY   Refills:  0       omeprazole 20 MG CR capsule   Commonly known as:  priLOSEC   Used for:  Esophageal stricture        Dose:  20 mg   Take 1 capsule (20 mg) by mouth 2 times daily Take 30-60 minutes prior to meals   Quantity:  180 capsule   Refills:  0       REMICADE IV        Dose:  600 mg   Inject 600 mg into the vein Every 8 weeks   Refills:  0       simvastatin 40 MG tablet   Commonly known as:  ZOCOR   Used for:  Hyperlipidemia LDL goal <100        Dose:  40 mg   Take 1 tablet (40 mg) by mouth At Bedtime   Quantity:  90 tablet   Refills:  2       * Notice:  This list has 2 medication(s) that are the same as other medications prescribed for you. Read the directions carefully, and ask your doctor or other care provider to review them with you.             Protect others around you: Learn how to safely use, store and throw away your medicines at www.disposemymeds.org.         Follow-ups after your visit        Your next 10 appointments already scheduled     Nov 15, 2017  9:30 AM CST   Heart Cath Right Heart Cath with [id number]   [facility],  Heart Cath Lab ([facility])    [address] [id number] [phone]            Jan 29, 2018 10:30 AM CST   CT CHEST W/O CONTRAST with SHCT1   [facility] CT ([facility])    [address] [place] [id number] [phone]           Please bring any scans or X-rays taken at other hospitals, if similar tests were done. Also bring a list of your medicines, including vitamins, minerals and over-the-counter drugs. It is safest to leave personal items at home.  Be sure to tell your doctor:   If you have any allergies.   If there s any chance you are pregnant.   If you are breastfeeding.   If you have any special needs.  You do not  need to do anything special to prepare.  Please wear loose clothing, such as a sweat suit or jogging clothes. Avoid snaps, zippers and other metal. We may ask you to undress and put on a hospital gown.            Jan 31, 2018 10:30 AM CST   Return Visit with Anabell Paiz MD   [facility] ([facility])    [facility]  [address]  [facility] [address]   [phone]            Feb 14, 2018 10:00 AM CST   PFT VISIT with  PFL SUHAS   [facility] Pulmonary Function Testing ([facility])    [address] [id number] [phone]            Feb 14, 2018 10:30 AM CST   (Arrive by 10:15 AM)   Return Interstitial Lung with Rubens Quispe MD   [facility] ([facility] [place])    [address] [place] [id number] [phone]               Care Instructions        Further instructions from your care team       [facility]                        Interventional Cardiology  Discharge Instructions   Post Right Heart Cath      AFTER YOU GO HOME:    DO drink plenty of fluids    DO resume your regular diet and medications unless otherwise instructed by your Primary Physician    Do Not scrub the procedure site vigorously    No lotion or powder to the puncture site for 3 days    CALL YOUR PRIMARY PHYSICIAN IF: You may resume all normal activity.  Monitor neck site for bleeding, swelling, or voice changes. If you notice bleeding or swelling immediately apply pressure to the site and call number below to speak with Cardiology Fellow.  If you experience any changes in your breathing you should call your doctor immediately or come to the closest Emergency Department.  Do not drive yourself.    ADDITIONAL INSTRUCTIONS: Medications: You are to resume all home medications including anticoagulation therapy unless otherwise advised by your  primary cardiologist or nurse coordinator.    Follow Up: Per your primary cardiology team    If you have any questions or concerns regarding your procedure site please call [phone] at anytime and ask for Cardiology Fellow on call.  They are available 24 hours a day.  You may also contact the Cardiology Clinic after hours number at [phone].                                                       Telephone Numbers [phone] Monday-Friday 8:00 am to 4:30 pm    [phone] After 4:30 pm Monday-Friday, Weekends & Holidays  Ask for Interventional Cardiologist on call. Someone is on call 24 hours/day   [facility] toll free number [phone] Monday-Friday 8:00 am to 4:30 pm   [facility] Emergency Dept [phone]                 [facility]                        Interventional Cardiology  Discharge Instructions   Post Right Heart Cath      AFTER YOU GO HOME:    DO drink plenty of fluids    DO resume your regular diet and medications unless otherwise instructed by your Primary Physician    Do Not scrub the procedure site vigorously    No lotion or powder to the puncture site for 3 days    CALL YOUR PRIMARY PHYSICIAN IF: You may resume all normal activity.  Monitor neck site for bleeding, swelling, or voice changes. If you notice bleeding or swelling immediately apply pressure to the site and call number below to speak with Cardiology Fellow.  If you experience any changes in your breathing you should call your doctor immediately or come to the closest Emergency Department.  Do not drive yourself.    ADDITIONAL INSTRUCTIONS: Medications: You are to resume all home medications including anticoagulation therapy unless otherwise advised by your primary cardiologist or nurse coordinator.    Follow Up: Per your primary cardiology team    If you have any questions or concerns regarding your procedure site please call [phone] at anytime and ask for Cardiology Fellow on call.  They are  "available 24 hours a day.  You may also contact the Cardiology Clinic after hours number at [phone].                                                       Telephone Numbers [phone] Monday-Friday 8:00 am to 4:30 pm    [phone] After 4:30 pm Monday-Friday, Weekends & Holidays  Ask for Interventional Cardiologist on call. Someone is on call 24 hours/day   [facility] toll free number [phone] Monday-Friday 8:00 am to 4:30 pm   [facility] Emergency Dept [phone]                    Additional Information About Your Visit        Care EveryWhere ID     This is your Care EveryWhere ID. This could be used by other organizations to access your [facility] medical records  [id number]        Your Vitals Were     Blood Pressure Pulse Temperature Respirations Height Weight    139/70 (BP Location: Left arm) 70 97.8  F (36.6  C) (Oral) 20 1.575 m (5' 2\") 104.3 kg (230 lb)    Pulse Oximetry BMI (Body Mass Index)                97% 42.07 kg/m2           Primary Care Provider Office Phone # Fax #    Carissa Yadiel Burroughs [phone]      Equal Access to Services     ERVIN CERNA AH: Hadii aad ku hadasho Soomaali, waaxda luqadaha, qaybta kaalmada adeegyada, phillip kwokin hayreggie monroy. So [facility] [phone].    ATENCIÓN: Si habla español, tiene a esquivel disposición servicios gratuitos de asistencia lingüística. Llame al [phone].    We comply with applicable federal civil rights laws and [place] laws. We do not discriminate on the basis of race, color, national origin, age, disability, sex, sexual orientation, or gender identity.            Thank you!     Thank you for choosing [facility] for your care. Our goal is always to provide you with excellent care. Hearing back from our patients is one way we can continue to improve our services. Please take a few minutes to complete the written survey that you may receive in the mail after you visit with us. Thank you!             Medication " List: This is a list of all your medications and when to take them. Check marks below indicate your daily home schedule. Keep this list as a reference.      Medications           Morning Afternoon Evening Bedtime As Needed    albuterol 108 (90 BASE) MCG/ACT Inhaler   Commonly known as:  PROAIR HFA/PROVENTIL HFA/VENTOLIN HFA   Inhale 2 puffs into the lungs every 6 hours                                apixaban ANTICOAGULANT 5 MG tablet   Commonly known as:  ELIQUIS   Take 1 tablet (5 mg) by mouth 2 times daily                                * ICAPS Caps   Take 1 capsule by mouth 2 times daily                                * CENTRUM SILVER per tablet   1 TABLET DAILY                                CITRACAL + D PO   Take 2 tablets by mouth 2 times daily                                folic acid 1 MG tablet   Commonly known as:  FOLVITE   1 TABLET DAILY                                Lactobacillus Acidophilus Powd   Take 1 packet by mouth daily                                levothyroxine 75 MCG tablet   Commonly known as:  SYNTHROID/LEVOTHROID   Take 1 tablet (75 mcg) by mouth daily                                MELATONIN PO   Take 5 mg by mouth nightly as needed                                METHOTREXATE PO   Take 2.5 mg by mouth 10 tablets weekly                                metoprolol 25 MG 24 hr tablet   Commonly known as:  TOPROL-XL   Take 1 tablet (25 mg) by mouth daily                                MUCUS D PO                                NIFEDICAL XL 60 MG Tb24   1 TABLET DAILY   Generic drug:  NIFEdipine ER osmotic                                omeprazole 20 MG CR capsule   Commonly known as:  priLOSEC   Take 1 capsule (20 mg) by mouth 2 times daily Take 30-60 minutes prior to meals                                REMICADE IV   Inject 600 mg into the vein Every 8 weeks                                simvastatin 40 MG tablet   Commonly known as:  ZOCOR   Take 1 tablet (40 mg) by mouth At Bedtime                                 * Notice:  This list has 2 medication(s) that are the same as other medications prescribed for you. Read the directions carefully, and ask your doctor or other care provider to review them with you.

## 2017-11-15 NOTE — PROGRESS NOTES
Returned form [facility] s/p RHC.  VSS.  Denies pain.  [facility] site CDI.  Paperwork for discharge previously reviewed.  Discharged to home.

## 2017-11-15 NOTE — DISCHARGE INSTRUCTIONS
[facility]                        Interventional Cardiology  Discharge Instructions   Post Right Heart Cath      AFTER YOU GO HOME:    DO drink plenty of fluids    DO resume your regular diet and medications unless otherwise instructed by your Primary Physician    Do Not scrub the procedure site vigorously    No lotion or powder to the puncture site for 3 days    CALL YOUR PRIMARY PHYSICIAN IF: You may resume all normal activity.  Monitor neck site for bleeding, swelling, or voice changes. If you notice bleeding or swelling immediately apply pressure to the site and call number below to speak with Cardiology Fellow.  If you experience any changes in your breathing you should call your doctor immediately or come to the closest Emergency Department.  Do not drive yourself.    ADDITIONAL INSTRUCTIONS: Medications: You are to resume all home medications including anticoagulation therapy unless otherwise advised by your primary cardiologist or nurse coordinator.    Follow Up: Per your primary cardiology team    If you have any questions or concerns regarding your procedure site please call [phone] at anytime and ask for Cardiology Fellow on call.  They are available 24 hours a day.  You may also contact the Cardiology Clinic after hours number at [phone].                                                       Telephone Numbers [phone] Monday-Friday 8:00 am to 4:30 pm    [phone] After 4:30 pm Monday-Friday, Weekends & Holidays  Ask for Interventional Cardiologist on call. Someone is on call 24 hours/day   [facility] toll free number [phone] Monday-Friday 8:00 am to 4:30 pm   [facility] Emergency Dept [phone]                 [facility]                        Interventional Cardiology  Discharge Instructions   Post Right Heart Cath      AFTER YOU GO HOME:    DO drink plenty of fluids    DO resume your regular diet and medications unless otherwise  instructed by your Primary Physician    Do Not scrub the procedure site vigorously    No lotion or powder to the puncture site for 3 days    CALL YOUR PRIMARY PHYSICIAN IF: You may resume all normal activity.  Monitor neck site for bleeding, swelling, or voice changes. If you notice bleeding or swelling immediately apply pressure to the site and call number below to speak with Cardiology Fellow.  If you experience any changes in your breathing you should call your doctor immediately or come to the closest Emergency Department.  Do not drive yourself.    ADDITIONAL INSTRUCTIONS: Medications: You are to resume all home medications including anticoagulation therapy unless otherwise advised by your primary cardiologist or nurse coordinator.    Follow Up: Per your primary cardiology team    If you have any questions or concerns regarding your procedure site please call [phone] at anytime and ask for Cardiology Fellow on call.  They are available 24 hours a day.  You may also contact the Cardiology Clinic after hours number at [phone].                                                       Telephone Numbers [phone] Monday-Friday 8:00 am to 4:30 pm    [phone] After 4:30 pm Monday-Friday, Weekends & Holidays  Ask for Interventional Cardiologist on call. Someone is on call 24 hours/day   [facility] toll free number [phone] Monday-Friday 8:00 am to 4:30 pm   [facility] Emergency Dept [phone]

## 2017-11-15 NOTE — PROGRESS NOTES
Patient prepped for RHC.  Denies pain.  Family with her.  Consent needed.  No labs ordered.  H & P current.

## 2017-11-15 NOTE — PROCEDURES
PRELIMINARY CARDIAC CATH REPORT:     PROCEDURES PERFORMED:   Right Heart Catheterization    PHYSICIANS:  Attending Physician: Keith Benoit MD  Interventional Cardiology Fellow: None  Cardiology Fellow: Milton Altamirano MD    INDICATION:  Jaqueline Canales is a 76 year old female with scleroderma and ILD with elevated pulmonary pressures on echocardiograms referred for right heart catheterization.    DESCRIPTION:  1. Consent obtained with discussion of risks.  All questions were answered.  2. Sterile prep and procedure.  3. Location with Sheaths:   Rt IJ  7 Fr 10 cm [short]  4. Access: Local anesthetic with lidocaine.  A standard 18 guage needle with ultrasound guidance was used to establish vascular access using a modified Seldinger technique.  5. Diagnostic Catheters:   7 Fr  [place]  6. Guiding Catheters:  None  6. Estimated blood loss: < 5 ml    MEDICATIONS:  Heart rate, BP, respiration, oxygen saturation and patient responses were monitored throughout the procedure with the assistance of the RN under my supervision.    Procedures:    HEMODYNAMICS:  BSA 1.589 m2  1. HR 62 bpm  2. /63/91 mmHg  3. RA 8/2/4   4. RV 48/12  5. PA 47/11/27   6. PCW 11/11/12   7. PA sat 67%   8. PCW sat 88%  9. Hgb 11.6 g/dL   10. Josh CO 3.7   11. Josh CI 1.8   12. TD CO 4.6   13. TD CI 2.3   14. PVR 3.3  15. SVR 1513    Sheath Removal:  The RIJ sheath was manually removed in the cardiac catheterization laboratory.    Contrast: Isovue, 0 ml     Fluoroscopy Time: 1.3 min    COMPLICATIONS:  1. None    SUMMARY:   >> Normal right sided filling pressures.  >> Normal left sided filling pressures.  >> Mild pulmonary artery hypertension  >> Normal cardiac output, 4.6 L/min with index 2.3 L/min/m2 by TD      PLAN:   > Follow up as scheduled with Dr. Marquez for ongoing medical management    The attending interventional cardiologist was present and supervised all critical aspects the procedure.    Findings discussed with patient and   Cy contacted Dr. Marquez.    See CVIS report for final draft.    Milton Altamirano MD  Cardiology Fellow    Keith Benoit MD  Cardiology Staff

## 2017-11-28 ENCOUNTER — TELEPHONE (OUTPATIENT)
Dept: FAMILY MEDICINE | Facility: CLINIC | Age: 77
End: 2017-11-28

## 2017-11-28 NOTE — TELEPHONE ENCOUNTER
"Spoke with patient:   Was given discharge paperwork from 11/15/17 hospital stay and notes state, \"Notice:  This list has 2 medication(s) that are the same as other medications prescribed for you. Read the directions carefully, and ask your doctor or other care provider to review them with you.\"    Patient states she does take all the medications on the discharge list she was given. Reviewed and unable to find duplicate medication(s).   Pt asking PCP to review med list also     Carmen PORTER RN      "

## 2017-11-28 NOTE — TELEPHONE ENCOUNTER
Reason for Call:  Medication or medication refill:    Name of the medication requested: She doesn't know which Medications but received a   Letter from [employer] stating she is taking 2 medications which are used for the same thing and  She doesn't know which one's & wants a call back to discuss    Other request: none    Can we leave a detailed message on this number? YES    Phone number patient can be reached at: Home number on file [phone] (home)    Best Time: anytime    Call taken on 11/28/2017 at 3:17 PM by Keith Farah

## 2017-12-11 ENCOUNTER — TRANSFERRED RECORDS (OUTPATIENT)
Dept: HEALTH INFORMATION MANAGEMENT | Facility: CLINIC | Age: 77
End: 2017-12-11

## 2017-12-11 LAB
ALT SERPL-CCNC: 20 IU/L (ref 5–35)
AST SERPL-CCNC: 25 U/L (ref 5–34)
CREAT SERPL-MCNC: 0.57 MG/DL (ref 0.5–1.3)
GFR SERPL CREATININE-BSD FRML MDRD: 109.3 ML/MIN/1.73M2

## 2018-01-02 DIAGNOSIS — E03.9 HYPOTHYROIDISM, UNSPECIFIED TYPE: ICD-10-CM

## 2018-01-02 NOTE — TELEPHONE ENCOUNTER
Reason for Call:  Medication or medication refill:    Do you use a [facility]?  Name of the pharmacy and phone number for the current request:    [facility] - [place], [place] - [address]      Name of the medication requested: Levothyroxine 75mcg    Other request: none    Can we leave a detailed message on this number? YES    Phone number patient can be reached at: Home number on file [phone] (home)    Best Time: anytime    Call taken on 1/2/2018 at 4:54 PM by Keith Farah

## 2018-01-03 RX ORDER — LEVOTHYROXINE SODIUM 75 UG/1
75 TABLET ORAL DAILY
Start: 2018-01-03

## 2018-01-03 NOTE — TELEPHONE ENCOUNTER
Requested Prescriptions   Pending Prescriptions Disp Refills     levothyroxine (SYNTHROID/LEVOTHROID) 75 MCG tablet 90 tablet 3     Sig: Take 1 tablet (75 mcg) by mouth daily    There is no refill protocol information for this order        Pending Prescriptions:                       Disp   Refills    levothyroxine (SYNTHROID/LEVOTHROID) 75 M*90 tab*3            Sig: Take 1 tablet (75 mcg) by mouth daily    LOV:10/06/2017

## 2018-01-29 ENCOUNTER — HOSPITAL ENCOUNTER (OUTPATIENT)
Dept: CT IMAGING | Facility: CLINIC | Age: 78
Discharge: HOME | End: 2018-01-29
Attending: INTERNAL MEDICINE | Admitting: INTERNAL MEDICINE
Payer: MEDICARE

## 2018-01-29 DIAGNOSIS — C34.32 MALIGNANT NEOPLASM OF LOWER LOBE OF LEFT LUNG (H): ICD-10-CM

## 2018-01-29 PROCEDURE — 71250 CT THORAX DX C-: CPT

## 2018-01-31 ENCOUNTER — ONCOLOGY VISIT (OUTPATIENT)
Dept: ONCOLOGY | Facility: CLINIC | Age: 78
End: 2018-01-31
Attending: INTERNAL MEDICINE
Payer: MEDICARE

## 2018-01-31 VITALS
SYSTOLIC BLOOD PRESSURE: 126 MMHG | RESPIRATION RATE: 20 BRPM | TEMPERATURE: 97.8 F | BODY MASS INDEX: 42.47 KG/M2 | DIASTOLIC BLOOD PRESSURE: 67 MMHG | WEIGHT: 232.2 LBS | HEART RATE: 78 BPM

## 2018-01-31 DIAGNOSIS — C34.32 MALIGNANT NEOPLASM OF LOWER LOBE OF LEFT LUNG (H): Primary | ICD-10-CM

## 2018-01-31 PROCEDURE — 99213 OFFICE O/P EST LOW 20 MIN: CPT | Performed by: INTERNAL MEDICINE

## 2018-01-31 PROCEDURE — G0463 HOSPITAL OUTPT CLINIC VISIT: HCPCS

## 2018-01-31 ASSESSMENT — PAIN SCALES - GENERAL: PAINLEVEL: NO PAIN (0)

## 2018-01-31 NOTE — PATIENT INSTRUCTIONS
1.  RTC MD in 4 months  Scheduled/janice  2.  CT scan chest without contrast before next visit  Scheduled/janice    AVS printed & given to patient/michelle

## 2018-01-31 NOTE — PROGRESS NOTES
[facility] PHYSICIANS  HEMATOLOGY ONCOLOGY     DIAGNOSES:     1.  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 The patient had a left lower lobe nodule 11/2015.  It was biopsied 12/04/2015, indicated a poorly differentiated squamous cell carcinoma.  PET CT scan 12/14/2015 hypermetabolic nodular lesion in the left lower lobe.     2.  History of breast cancer in 1990, treated with right-sided mastectomy.   3.  Interstitial lung disease.   4.  Rheumatoid arthritis.       TREATMENT:  S/P SBRT for lung cancer. Completed 02/2016.      SUBJECTIVE:  The patient comes in for followup today. She has been feeling well. No new complaints.     REVIEW OF SYSTEMS:  A complete review of systems was performed and found to be negative other than pertinent positives mentioned in history of present illness.     Past medical, social histories reviewed.    Meds- Reviewed.     PHYSICAL EXAMINATION:   VITAL SIGNS:/67 (BP Location: Left arm, Patient Position: Chair, Cuff Size: Adult Large)  Pulse 78  Temp 97.8  F (36.6  C) (Oral)  Resp 20  Wt 105.3 kg (232 lb 3.2 oz)  BMI 42.47 kg/m2  CONSTITUTIONAL: Sitting comfortably.   HEENT: Pupils are equal. Oropharynx is clear.   NECK: No cervical or supraclavicular lymphadenopathy.   RESPIRATORY: Clear bilaterally.   CARD/VASC: S1, S2, regular.   GI: Soft, nontender, nondistended, no hepatosplenomegaly.   MUSKULOSKELETAL: Warm, well perfused.   NEUROLOGIC: Alert, awake.   INTEGUMENT: No rash.   LYMPHATICS:Bilteral edema.   PSYCH: Mood and affect was normal.    LABORATORY DATA AND IMAGING REVIEWED DURING THIS VISIT:  Results for orders placed or performed during the hospital encounter of 01/29/18   CT Chest w/o contrast    Narrative    CT CHEST WITHOUT CONTRAST  1/29/2018 10:31 AM     HISTORY:  Follow up malignant neoplasm of lower lobe of left lung (H).    COMPARISON: 10/16/2017    TECHNIQUE: Volumetric helical acquisition of CT images of the  chest  from the clavicles to the kidneys were acquired without IV contrast.  Radiation dose for this scan was reduced using automated exposure  control, adjustment of the mA and/or kV according to patient size, or  iterative reconstruction technique.    FINDINGS:  Nonspecific peripheral and basilar fibrotic changes with  honeycombing and associated moderate bronchiectasis is again evident.  Density in the left lower lobe now roughly measures 1.6 x 2.6 cm,  previously 2.7 x 2.0 cm, although there is some surrounding airspace  disease. Moderate hiatal hernia. There are moderate atherosclerotic  changes of the visualized aorta and its branches. There is no evidence  of aortic aneurysm. There are moderate coronary vascular  calcifications consistent with coronary artery disease. Lymph nodes in  the mediastinum are less well seen in the absence of contrast but are  likely not significantly changed. No pleural or pericardial effusion.  Cardiomegaly. No acute findings in the visualized upper abdomen.      Impression    IMPRESSION: Stable to minimal decrease in size in the left lower lobe  mass since the comparison study. Stable moderate fibrosis.    ARELI RICKETTS MD       ECOG PS: 1    ASSESSMENT:  A 76-year-old lady with squamous cell carcinoma of the lung which was clinically T1 N0 MX.  She had a 1.7 to 1.1 cm left lower lobe lesion which was FDG avid, multiple non-FDG avid mediastinal lymph nodes, prevascular, precarinal and subcarinal which appeared to be stable.  There was FDG activity in tonsil and neck lymph nodes which were evaluated by Dr. Trevor Murdock of ENT and this was considered to be a physiological uptake.  Given her history of interstitial lung disease she was referred to Radiation Oncology for SBRT of this lung lesion.  Her MRI of brain was negative for metastases.   She is on surveillance.     - CT scan 1/29/18 results were reviewed with the patient. Stable findings.   - I will repeat a scan in 4 months.       PLAN:   1.  RTC MD in 4 months  2.  CT scan chest without contrast before next visit    CHESTER DELATORRE MD    1/31/2018

## 2018-01-31 NOTE — MR AVS SNAPSHOT
After Visit Summary   1/31/2018    Jaqueline Canales    MRN: [id number]           Patient Information     Date Of Birth          1940        Visit Information        Provider Department      1/31/2018 10:30 AM Anabell Paiz MD [facility]        Today's Diagnoses     Malignant neoplasm of lower lobe of left lung (H)    -  1      Care Instructions    1.  RTC MD in 4 months  2.  CT scan chest without contrast before next visit          Follow-ups after your visit        Your next 10 appointments already scheduled     Mar 07, 2018  9:30 AM CST   PFT VISIT with  PFL JANELLE   [facility] Pulmonary Function Testing ([facility])    [address]  3rd Floor  [place] [address]   [phone]            Mar 07, 2018 10:00 AM CST   (Arrive by 9:45 AM)   Return Interstitial Lung with Rubens Quispe MD   [facility] ([facility])    [address]  [address]  [place] [address]   [phone]            May 21, 2018 10:30 AM CDT   (Arrive by 10:15 AM)   CT CHEST W/O CONTRAST with SHCT1   [facility] ([facility])    [address] [place] [address] [phone]           Please bring any scans or X-rays taken at other hospitals, if similar tests were done. Also bring a list of your medicines, including vitamins, minerals and over-the-counter drugs. It is safest to leave personal items at home.  Be sure to tell your doctor:   If you have any allergies.   If there s any chance you are pregnant.   If you are breastfeeding.   If you have any special needs.  You do not need to do anything special to prepare.  Please wear loose clothing, such as a sweat suit or jogging clothes. Avoid snaps, zippers and other metal. We may ask you to undress and put on a hospital gown.            May 23, 2018 10:15 AM CDT   Return Visit with Anabell Paiz MD   [facility]  "([facility])    [facility] [place]  [address]  [place] [address] [phone]              Future tests that were ordered for you today     Open Future Orders        Priority Expected Expires Ordered    CT Chest w/o contrast Routine  2019            Who to contact     If you have questions or need follow up information about today's clinic visit or your schedule please contact [facility] directly at [phone].  Normal or non-critical lab and imaging results will be communicated to you by [employer], letter or phone within 4 business days after the clinic has received the results. If you do not hear from us within 7 days, please contact the clinic through [employer] or phone. If you have a critical or abnormal lab result, we will notify you by phone as soon as possible.  Submit refill requests through [employer] or call your pharmacy and they will forward the refill request to us. Please allow 3 business days for your refill to be completed.          Additional Information About Your Visit        [employer] Information     [employer] lets you send messages to your doctor, view your test results, renew your prescriptions, schedule appointments and more. To sign up, go to [email or web address] . Click on \"Log in\" on the left side of the screen, which will take you to the Welcome page. Then click on \"Sign up Now\" on the right side of the page.     You will be asked to enter the access code listed below, as well as some personal information. Please follow the directions to create your username and password.     Your access code is: [email or web address]  Expires: 2018 10:50 AM     Your access code will  in 90 days. If you need help or a new code, please call your [place] clinic or [phone].        Care EveryWhere ID     This is your Care EveryWhere ID. This could be used by other organizations to access your [place] medical records  [id number]   "      Your Vitals Were     Pulse Temperature Respirations BMI (Body Mass Index)          78 97.8  F (36.6  C) (Oral) 20 42.47 kg/m2         Blood Pressure from Last 3 Encounters:   01/31/18 126/67   11/15/17 132/49   11/07/17 126/72    Weight from Last 3 Encounters:   01/31/18 105.3 kg (232 lb 3.2 oz)   11/15/17 104.3 kg (230 lb)   11/07/17 103.9 kg (229 lb)               Primary Care Provider Office Phone # Fax #    Carissa Yadiel Burroughs [phone] [address]  [place] [address]        Equal Access to Services     ERVIN CERNA : Hadii michael Barreto, wagarima watts, qaybta kaalmada judd, phillip gaines . So [facility] [phone].    ATENCIÓN: Si habla español, tiene a esquivel disposición servicios gratuitos de asistencia lingüística. [facility] [phone].    We comply with applicable federal civil rights laws and [place] laws. We do not discriminate on the basis of race, color, national origin, age, disability, sex, sexual orientation, or gender identity.            Thank you!     Thank you for choosing [facility]  for your care. Our goal is always to provide you with excellent care. Hearing back from our patients is one way we can continue to improve our services. Please take a few minutes to complete the written survey that you may receive in the mail after your visit with us. Thank you!             Your Updated Medication List - Protect others around you: Learn how to safely use, store and throw away your medicines at www.disposemymeds.org.          This list is accurate as of 1/31/18 10:51 AM.  Always use your most recent med list.                   Brand Name Dispense Instructions for use Diagnosis    albuterol 108 (90 BASE) MCG/ACT Inhaler    PROAIR HFA/PROVENTIL HFA/VENTOLIN HFA    3 Inhaler    Inhale 2 puffs into the lungs every 6 hours    ILD (interstitial lung disease) (H)       apixaban ANTICOAGULANT 5 MG tablet    ELIQUIS     180 tablet    Take 1 tablet (5 mg) by mouth 2 times daily    Persistent atrial fibrillation (H)       * ICAPS Caps      Take 1 capsule by mouth 2 times daily        * CENTRUM SILVER per tablet      1 TABLET DAILY        CITRACAL + D PO      Take 2 tablets by mouth 2 times daily        folic acid 1 MG tablet    FOLVITE     1 TABLET DAILY    Mixed hyperlipidemia       Lactobacillus Acidophilus Powd      Take 1 packet by mouth daily        levothyroxine 75 MCG tablet    SYNTHROID/LEVOTHROID    90 tablet    Take 1 tablet (75 mcg) by mouth daily    Hypothyroidism, unspecified type       MELATONIN PO      Take 5 mg by mouth nightly as needed        METHOTREXATE PO      Take 2.5 mg by mouth 10 tablets weekly        metoprolol succinate 25 MG 24 hr tablet    TOPROL-XL    90 tablet    Take 1 tablet (25 mg) by mouth daily    Persistent atrial fibrillation (H)       MUCUS D PO           NIFEDICAL XL 60 MG Tb24   Generic drug:  NIFEdipine ER osmotic      1 TABLET DAILY    Raynaud's syndrome       omeprazole 20 MG CR capsule    priLOSEC    180 capsule    Take 1 capsule (20 mg) by mouth 2 times daily Take 30-60 minutes prior to meals    Esophageal stricture       REMICADE IV      Inject 600 mg into the vein Every 8 weeks        simvastatin 40 MG tablet    ZOCOR    90 tablet    Take 1 tablet (40 mg) by mouth At Bedtime    Hyperlipidemia LDL goal <100       * Notice:  This list has 2 medication(s) that are the same as other medications prescribed for you. Read the directions carefully, and ask your doctor or other care provider to review them with you.

## 2018-01-31 NOTE — LETTER
1/31/2018         RE: Jaqueline Canales  [address]  [place] [address]        Dear Colleague,    Thank you for referring your patient, Jaqueline Canales, to the [facility]. Please see a copy of my visit note below.    [facility] PHYSICIANS  HEMATOLOGY ONCOLOGY     DIAGNOSES:     1.  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 The patient had a left lower lobe nodule 11/2015.  It was biopsied 12/04/2015, indicated a poorly differentiated squamous cell carcinoma.  PET CT scan 12/14/2015 hypermetabolic nodular lesion in the left lower lobe.     2.  History of breast cancer in 1990, treated with right-sided mastectomy.   3.  Interstitial lung disease.   4.  Rheumatoid arthritis.       TREATMENT:  S/P SBRT for lung cancer. Completed 02/2016.      SUBJECTIVE:  The patient comes in for followup today. She has been feeling well. No new complaints.     REVIEW OF SYSTEMS:  A complete review of systems was performed and found to be negative other than pertinent positives mentioned in history of present illness.     Past medical, social histories reviewed.    Meds- Reviewed.     PHYSICAL EXAMINATION:   VITAL SIGNS:/67 (BP Location: Left arm, Patient Position: Chair, Cuff Size: Adult Large)  Pulse 78  Temp 97.8  F (36.6  C) (Oral)  Resp 20  Wt 105.3 kg (232 lb 3.2 oz)  BMI 42.47 kg/m2  CONSTITUTIONAL: Sitting comfortably.   HEENT: Pupils are equal. Oropharynx is clear.   NECK: No cervical or supraclavicular lymphadenopathy.   RESPIRATORY: Clear bilaterally.   CARD/VASC: S1, S2, regular.   GI: Soft, nontender, nondistended, no hepatosplenomegaly.   MUSKULOSKELETAL: Warm, well perfused.   NEUROLOGIC: Alert, awake.   INTEGUMENT: No rash.   LYMPHATICS:Bilteral edema.   PSYCH: Mood and affect was normal.    LABORATORY DATA AND IMAGING REVIEWED DURING THIS VISIT:  Results for orders placed or performed during the hospital encounter of 01/29/18   CT  Chest w/o contrast    Narrative    CT CHEST WITHOUT CONTRAST  1/29/2018 10:31 AM     HISTORY:  Follow up malignant neoplasm of lower lobe of left lung (H).    COMPARISON: 10/16/2017    TECHNIQUE: Volumetric helical acquisition of CT images of the chest  from the clavicles to the kidneys were acquired without IV contrast.  Radiation dose for this scan was reduced using automated exposure  control, adjustment of the mA and/or kV according to patient size, or  iterative reconstruction technique.    FINDINGS:  Nonspecific peripheral and basilar fibrotic changes with  honeycombing and associated moderate bronchiectasis is again evident.  Density in the left lower lobe now roughly measures 1.6 x 2.6 cm,  previously 2.7 x 2.0 cm, although there is some surrounding airspace  disease. Moderate hiatal hernia. There are moderate atherosclerotic  changes of the visualized aorta and its branches. There is no evidence  of aortic aneurysm. There are moderate coronary vascular  calcifications consistent with coronary artery disease. Lymph nodes in  the mediastinum are less well seen in the absence of contrast but are  likely not significantly changed. No pleural or pericardial effusion.  Cardiomegaly. No acute findings in the visualized upper abdomen.      Impression    IMPRESSION: Stable to minimal decrease in size in the left lower lobe  mass since the comparison study. Stable moderate fibrosis.    ARELI RICKETTS MD       ECOG PS: 1    ASSESSMENT:  A 76-year-old lady with squamous cell carcinoma of the lung which was clinically T1 N0 MX.  She had a 1.7 to 1.1 cm left lower lobe lesion which was FDG avid, multiple non-FDG avid mediastinal lymph nodes, prevascular, precarinal and subcarinal which appeared to be stable.  There was FDG activity in tonsil and neck lymph nodes which were evaluated by Dr. Trevor Murdock of ENT and this was considered to be a physiological uptake.  Given her history of interstitial lung disease she was  "referred to Radiation Oncology for SBRT of this lung lesion.  Her MRI of brain was negative for metastases.   She is on surveillance.     - CT scan 1/29/18 results were reviewed with the patient. Stable findings.   - I will repeat a scan in 4 months.      PLAN:   1.  RTC MD in 4 months  2.  CT scan chest without contrast before next visit    ANABELL PAIZ MD    1/31/2018           Oncology Rooming Note    January 31, 2018 10:07 AM   Jaqueline Canales is a 77 year old female who presents for:    Chief Complaint   Patient presents with     Oncology Clinic Visit     Initial Vitals: /67 (BP Location: Left arm, Patient Position: Chair, Cuff Size: Adult Large)  Pulse 78  Temp 97.8  F (36.6  C) (Oral)  Resp 20  Wt 105.3 kg (232 lb 3.2 oz)  BMI 42.47 kg/m2 Estimated body mass index is 42.47 kg/(m^2) as calculated from the following:    Height as of 11/15/17: 1.575 m (5' 2\").    Weight as of this encounter: 105.3 kg (232 lb 3.2 oz). Body surface area is 2.15 meters squared.  No Pain (0) Comment: Data Unavailable   No LMP recorded. Patient is postmenopausal.  Allergies reviewed: Yes  Medications reviewed: Yes    Medications: Medication refills not needed today.  Pharmacy name entered into myContactCard: [facility] [address]    Clinical concerns: None     5 minutes for nursing intake (face to face time)     [facility]                Again, thank you for allowing me to participate in the care of your patient.        Sincerely,        Anabell Paiz MD    "

## 2018-01-31 NOTE — PROGRESS NOTES
"Oncology Rooming Note    January 31, 2018 10:07 AM   Jaqueline Canales is a 77 year old female who presents for:    Chief Complaint   Patient presents with     Oncology Clinic Visit     Initial Vitals: /67 (BP Location: Left arm, Patient Position: Chair, Cuff Size: Adult Large)  Pulse 78  Temp 97.8  F (36.6  C) (Oral)  Resp 20  Wt 105.3 kg (232 lb 3.2 oz)  BMI 42.47 kg/m2 Estimated body mass index is 42.47 kg/(m^2) as calculated from the following:    Height as of 11/15/17: 1.575 m (5' 2\").    Weight as of this encounter: 105.3 kg (232 lb 3.2 oz). Body surface area is 2.15 meters squared.  No Pain (0) Comment: Data Unavailable   No LMP recorded. Patient is postmenopausal.  Allergies reviewed: Yes  Medications reviewed: Yes    Medications: Medication refills not needed today.  Pharmacy name entered into [employer]: [facility] [address]    Clinical concerns: None     5 minutes for nursing intake (face to face time)     Winsome Reid CMA              "

## 2018-02-05 ENCOUNTER — NURSE TRIAGE (OUTPATIENT)
Dept: NURSING | Facility: CLINIC | Age: 78
End: 2018-02-05

## 2018-02-05 NOTE — TELEPHONE ENCOUNTER
----- Message from Katy Nunez sent at 2/5/2018  5:20 PM CST -----  Reason for call:  Medication   If this is a refill request, has the caller requested the refill from the pharmacy already? Yes  Will the patient be using a [facility]? No  Name of the pharmacy and phone number for the current request: [facility] [phone]    Name of the medication requested: Omeprazole, eliquis, metoprolol,     Other request: No.    Phone number to reach patient:  Home number on file [phone] (home)    Best Time:  Anytime    Can we leave a detailed message on this number?  YES

## 2018-02-05 NOTE — TELEPHONE ENCOUNTER
"  Additional Information    Negative: Drug overdose and nurse unable to answer question    Negative: Caller requesting information not related to medicine    Negative: Caller requesting a prescription for Strep throat and has a positive culture result    Negative: Rash while taking a medication or within 3 days of stopping it    Negative: Immunization reaction suspected    Negative: [1] Asthma and [2] having symptoms of asthma (cough, wheezing, etc)    Negative: MORE THAN A DOUBLE DOSE of a prescription or over-the-counter (OTC) drug    Negative: [1] DOUBLE DOSE (an extra dose or lesser amount) of over-the-counter (OTC) drug AND [2] any symptoms (e.g., dizziness, nausea, pain, sleepiness)    Negative: [1] DOUBLE DOSE (an extra dose or lesser amount) of prescription drug AND [2] any symptoms (e.g., dizziness, nausea, pain, sleepiness)    Negative: Took another person's prescription drug    Negative: [1] DOUBLE DOSE (an extra dose or lesser amount) of prescription drug AND [2] NO symptoms (Exception: a double dose of antibiotics)    Negative: Diabetes drug error or overdose (e.g., insulin or extra dose)    Negative: [1] Request for URGENT new prescription or refill of \"essential\" medication (i.e., likelihood of harm to patient if not taken) AND [2] triager unable to fill per unit policy    Negative: [1] Prescription not at pharmacy AND [2] was prescribed today by PCP    Negative: Pharmacy calling with prescription questions and triager unable to answer question    Negative: Caller has URGENT medication question about med that PCP prescribed and triager unable to answer question    Negative: Caller has NON-URGENT medication question about med that PCP prescribed and triager unable to answer question    Negative: Caller requesting a NON-URGENT new prescription or refill and triager unable to refill per unit policy    Negative: Caller has medication question about med not prescribed by PCP and triager unable to answer " question (e.g., compatibility with other med, storage)    Negative: [1] DOUBLE DOSE (an extra dose or lesser amount) of over-the-counter (OTC) drug AND [2] NO symptoms (all triage questions negative)    Negative: [1] DOUBLE DOSE (an extra dose or lesser amount) of antibiotic drug AND [2] NO symptoms (all triage questions negative)    Caller has medication question only, adult not sick, and triager answers question     I need a refills on my omeprazole (PRILOSEC) 20 MG CR capsule 180 capsule 0 10/6/2017  No  Sig: Take 1 capsule (20 mg) by mouth 2 times daily Take 30-60 minutes prior to meals  And metoprolol (TOPROL-XL) 25 MG 24 hr tablet 90 tablet 0 10/6/2017  No  Sig: Take 1 tablet (25 mg) by mouth daily    Per [employer] pt already has refill on her elaquis. Called with verbal refill ok per [facility] refill protocol.    Protocols used: MEDICATION QUESTION [facility]

## 2018-02-15 ENCOUNTER — HOSPITAL ENCOUNTER (OUTPATIENT)
Dept: OCCUPATIONAL THERAPY | Facility: CLINIC | Age: 78
Setting detail: THERAPIES SERIES
End: 2018-02-15
Attending: INTERNAL MEDICINE
Payer: MEDICARE

## 2018-02-15 PROCEDURE — 97166 OT EVAL MOD COMPLEX 45 MIN: CPT | Mod: GO

## 2018-02-15 PROCEDURE — 40000445 ZZHC STATISTIC OT VISIT, LYMPHEDEMA

## 2018-02-15 PROCEDURE — 97535 SELF CARE MNGMENT TRAINING: CPT | Mod: GO

## 2018-02-15 PROCEDURE — G8988 SELF CARE GOAL STATUS: HCPCS | Mod: GO,CJ

## 2018-02-15 PROCEDURE — G8987 SELF CARE CURRENT STATUS: HCPCS | Mod: GO,CN

## 2018-02-15 NOTE — PROGRESS NOTES
[facility]        OUTPATIENT OCCUPATIONAL THERAPY EDEMA EVALUATION  PLAN OF TREATMENT FOR OUTPATIENT REHABILITATION  (COMPLETE FOR INITIAL CLAIMS ONLY)  Patient's Last Name, First Name, Jaqueline Gomez                           Provider s Name:   [facility] Medical Record No.  [id number]     Start of Care Date:  02/15/18   Onset Date:  01/31/18   Type:  OT   Medical Diagnosis:  malignant neoplasm of lower lobe of left lung   Therapy Diagnosis:  stage 2 lymphedema (does not reverse with elevation) 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 and RA (systemic inflammation) with features of lipidema Visits from SOC:  1                                     __________________________________________________________________________________   Plan of Treatment/Functional Goals:    Manual lymph drainage, Gradient compression bandaging, Fit for compression garment, Exercises, Precautions to prevent infection / exacerbation, Education, Skin care / precautions, Home management program development  recommend CDT     GOALS  1. Goal description: For decreased risk of infection, skin breakdown/wounds & progressive soft-tissue fibrosis and improved fit of footwear, volume will be reduced by 350 mL in RLE, 200 mL LLE.       Target date: 04/16/18  2. Goal description: To reduce volume of lymphedema and risk of soft tissue fibrosis, pt will tolerate up to 23hr/day wear gradient compression bandaging (GCB) of BLE.       Target date: 04/16/18  3. Goal description: For long-term home mgmt chronic lymphedema pt/caregiver independent in home program a. GCB/GCB alternative garment for night wear b. compression garment for day wear c. ex to incr lymph flow/self-MLD.       Target date: 04/16/18  4. Goal description: Pt will independently verbalize the signs/symptoms of  lymphedema, precautions and how to obtain a future lymphedema referral if edema persists to preserve skin integrity, prevent infection and preserve functional mobility.          Target date: 04/16/18              Treatment frequency: 5 times / week   Treatment duration: 2 weeks    Yeny Meeks OT                                    I CERTIFY THE NEED FOR THESE SERVICES FURNISHED UNDER        THIS PLAN OF TREATMENT AND WHILE UNDER MY CARE     (Physician co-signature of this document indicates review and certification of the therapy plan).                   Certification date from: 02/15/18       Certification date to: 05/15/18           Referring physician: Anabell Paiz MD   Initial Assessment  See Epic Evaluation- Start of care: 02/15/18

## 2018-02-15 NOTE — PROGRESS NOTES
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 treated successfully with RT, interstitial lung dz, RA, obese, scleroderma, acute pericarditis 10'16, persistent A-fib, hypothyroid, SRINIVAS, HTN, s/p carotid endarterectom d/t TIA, hammer toe surgery, Raynaud's syndrome   Surgical / medical history reviewed Yes   Prior level of functional mobility mod I   Living environment [place] [facility]   Living environment comments lives with spouse, assists him with LB dressing and driving   Current assistive devices Standard cane   Fall Risk Screen   Fall screen completed by OT   Have you fallen 2 or more times in the past year? No   Have you fallen and had an injury in the past year? No   Is patient a fall risk? Yes;Department fall risk interventions implemented   Fall screen comments patient reports one fall in past year, no injury \"it was just a little one\"   System Outcome Measures   Outcome Measures Lymphedema   Lymphedema Life Impact Scale (score range 0-72). A higher score indicates greater impairment. 28  (\"I don't know how to answer this\" required assist)   Subjective Report   Patient report of symptoms poor fit of pants, " "heaviness, tightness   Patient / Family Goals   Patient / family goals statement \"do what I have to do to take care of this\"   Pain   Patient currently in pain No;Denies   Cognitive Status   Orientation Orientation to person, place and time   Level of consciousness Alert   Follows commands and answers questions 100% of the time   Memory Impaired   Cognitive status comments patient is a poor historian \"I can never remember anything, thanks for writing these things down\"   Edema Exam / Assessment   Skin condition comments patient presents with 3+ BL pre-tibial swelling, 1+ b/l dorsum of feet with cuffs around b/l ankles, R >L \"my feet never swell\". Varicosities BLE, notably R foot with spider veins b/l thighs; no pitting in thighs.  Skin dry, with mild erythema lower 1/3 BBK.  Dorsum of b/l hands with poor visualization of veins and tendons, poor fit of watch band, 1+ swelling b/l forearms   Stemmer sign Positive   Stemmer sign comments 2nd digit b/l feet   Girth Measurements   Girth Measurements Other  (deferred for ed)   Activities of Daily Living   Activities of Daily Living independent ADL, mod I IADL, assists spouse with lower body dressing and driving   Gait / Locomotion   Gait / Locomotion SPC   Vascular Assessment   Vascular Assessment Vascular concerns   Planned Edema Interventions   Planned edema interventions Manual lymph drainage;Gradient compression bandaging;Fit for compression garment;Exercises;Precautions to prevent infection / exacerbation;Education;Skin care / precautions;Home management program development   Planned edema intervention comments recommend CDT   Clinical Impression   Criteria for skilled therapeutic intervention met Yes   Therapy diagnosis stage 2 lymphedema (does not reverse with elevation) 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 and RA (systemic inflammation) with features of lipidema   Influenced by the following impairments / conditions Edema;Stage " 2;Phlebolymphedema;Lipolymphedema   Assessment of Occupational Performance 3-5 Performance Deficits   Identified Performance Deficits impaired IADL, impaired mobility, possible cognitive issues (poor memory), limited assistance at home   Clinical Decision Making (Complexity) Moderate complexity   Treatment frequency 5 times / week   Treatment duration 2 weeks   Patient / family and/or staff in agreement with plan of care Yes   Risks and benefits of therapy have been explained Yes   Goals   Edema Eval Goals 1;2;3;4   Goal 1   Goal identifier volume   Goal description For decreased risk of infection, skin breakdown/wounds & progressive soft-tissue fibrosis and improved fit of footwear, volume will be reduced by 350 mL in RLE, 200 mL LLE.   Target date 04/16/18   Goal 2   Goal identifier GCB   Goal description To reduce volume of lymphedema and risk of soft tissue fibrosis, pt will tolerate up to 23hr/day wear gradient compression bandaging (GCB) of BLE.   Target date 04/16/18   Goal 3   Goal identifier home program   Goal description For long-term home mgmt chronic lymphedema pt/caregiver independent in home program a. GCB/GCB alternative garment for night wear b. compression garment for day wear c. ex to incr lymph flow/self-MLD.   Target date 04/16/18   Goal 4   Goal identifier precautions   Goal description Pt will independently verbalize the signs/symptoms of lymphedema, precautions and how to obtain a future lymphedema referral if edema persists to preserve skin integrity, prevent infection and preserve functional mobility.      Target date 04/16/18   Total Evaluation Time   Total evaluation time 20   Certification   Certification date from 02/15/18   Certification date to 05/15/18   Medical Diagnosis malignant neoplasm of lower lobe of left lung   Certification I certify the need for these services furnished under this plan of treatment and while under my care.  (Physician co-signature of this document indicates  review and certification of the therapy plan).

## 2018-02-19 ENCOUNTER — TRANSFERRED RECORDS (OUTPATIENT)
Dept: HEALTH INFORMATION MANAGEMENT | Facility: CLINIC | Age: 78
End: 2018-02-19

## 2018-02-19 LAB
ALT SERPL-CCNC: 33 IU/L (ref 5–35)
AST SERPL-CCNC: 38 U/L (ref 5–34)
CREAT SERPL-MCNC: 0.59 MG/DL (ref 0.5–1.3)
GFR SERPL CREATININE-BSD FRML MDRD: 104.8 ML/MIN/1.73M2

## 2018-03-05 ENCOUNTER — HOSPITAL ENCOUNTER (OUTPATIENT)
Dept: OCCUPATIONAL THERAPY | Facility: CLINIC | Age: 78
Setting detail: THERAPIES SERIES
End: 2018-03-05
Attending: INTERNAL MEDICINE
Payer: MEDICARE

## 2018-03-05 PROCEDURE — 40000445 ZZHC STATISTIC OT VISIT, LYMPHEDEMA

## 2018-03-05 PROCEDURE — 97140 MANUAL THERAPY 1/> REGIONS: CPT | Mod: GO

## 2018-03-06 ENCOUNTER — HOSPITAL ENCOUNTER (OUTPATIENT)
Dept: OCCUPATIONAL THERAPY | Facility: CLINIC | Age: 78
Setting detail: THERAPIES SERIES
End: 2018-03-06
Attending: INTERNAL MEDICINE
Payer: MEDICARE

## 2018-03-06 PROCEDURE — 97535 SELF CARE MNGMENT TRAINING: CPT | Mod: GO

## 2018-03-06 PROCEDURE — 40000445 ZZHC STATISTIC OT VISIT, LYMPHEDEMA

## 2018-03-06 PROCEDURE — 97140 MANUAL THERAPY 1/> REGIONS: CPT | Mod: GO

## 2018-03-07 ENCOUNTER — OFFICE VISIT (OUTPATIENT)
Dept: PULMONOLOGY | Facility: CLINIC | Age: 78
End: 2018-03-07
Attending: INTERNAL MEDICINE
Payer: MEDICARE

## 2018-03-07 VITALS
DIASTOLIC BLOOD PRESSURE: 65 MMHG | OXYGEN SATURATION: 94 % | SYSTOLIC BLOOD PRESSURE: 112 MMHG | RESPIRATION RATE: 16 BRPM | HEART RATE: 72 BPM

## 2018-03-07 DIAGNOSIS — J84.9 ILD (INTERSTITIAL LUNG DISEASE) (H): ICD-10-CM

## 2018-03-07 DIAGNOSIS — J84.9 ILD (INTERSTITIAL LUNG DISEASE) (H): Primary | ICD-10-CM

## 2018-03-07 PROCEDURE — G0463 HOSPITAL OUTPT CLINIC VISIT: HCPCS | Mod: ZF

## 2018-03-07 RX ORDER — ALBUTEROL SULFATE 90 UG/1
2 AEROSOL, METERED RESPIRATORY (INHALATION) EVERY 6 HOURS
Qty: 3 INHALER | Refills: 3 | Status: SHIPPED | OUTPATIENT
Start: 2018-03-07 | End: 2019-01-15

## 2018-03-07 ASSESSMENT — PAIN SCALES - GENERAL: PAINLEVEL: NO PAIN (0)

## 2018-03-07 NOTE — MR AVS SNAPSHOT
"              After Visit Summary   3/7/2018    Jaqueline Canales    MRN: [id number]           Patient Information     Date Of Birth          1940        Visit Information        Provider Department      3/7/2018 10:00 AM Rubens Quispe MD [facility]        Today's Diagnoses     ILD (interstitial lung disease) (H)    -  1       Follow-ups after your visit        Additional Services     PULMONARY REHAB REFERRAL       *This therapy referral will be filtered to a centralized scheduling office at [facility] and the patient will receive a call to schedule an appointment at a [facility] location most convenient for them.*     If you have not heard from the scheduling office within 2 business days, please call [phone] for all locations.    Please be aware that coverage of these services is subject to the terms and limitations of your health insurance plan.  Call member services at your health plan with any benefit or coverage questions.      **Note to Provider:  If you are referring outside of [facility] for the therapy appointment, please list the name of the location in the \"special instructions\" above, print the referral and give to the patient to schedule the appointment.                     Your next 10 appointments already scheduled     Mar 08, 2018 10:00 AM CST   Lymphedema Treatment with Yeny Meeks, OT   [facility] ([facility])    [address]  [address]  [facility] [address]   [phone]            Mar 09, 2018 12:00 PM CST   Lymphedema Treatment with Deborah Cervantes, OT   [facility] ([facility])    [address] [place] [address]  [address]  [facility] [address]   [phone]            Mar 12, 2018  1:15 PM CDT   Lymphedema Treatment with Yeny Meeks, OT   [facility] ([facility])    [address] [place] [address]  [address]  [facility] [address]   [phone]            Mar 13, " 2018 10:00 AM CDT   Lymphedema Treatment with Yeny R Meeks, OT   [facility] ([facility])    [address]  [address]  [place] [address]   [phone]            Mar 14, 2018 11:15 AM CDT   Lymphedema Treatment with Yeny R Meeks, OT   [facility] ([facility])    [address]  [address]  [place] [address]   [phone]            Mar 15, 2018 10:15 AM CDT   Lymphedema Treatment with Yeny R Meeks, OT   [facility] ([facility])    [address]  [address]  [place] [address]   [phone]            Mar 20, 2018 10:00 AM CDT   Lymphedema Treatment with Yeny R Meeks, OT   [facility] ([facility])    [address]  [address]  [place] [address]   [phone]            Mar 21, 2018 10:00 AM CDT   Lymphedema Treatment with Yeny R Meeks, OT   [facility] ([facility])    [address]  [address]  [place] [address]   [phone]            May 21, 2018 10:30 AM CDT   (Arrive by 10:15 AM)   CT CHEST W/O CONTRAST with SHCT1   [facility] CT ([facility])    [address] [place] [address]   [phone]           Please bring any scans or X-rays taken at other hospitals, if similar tests were done. Also bring a list of your medicines, including vitamins, minerals and over-the-counter drugs. It is safest to leave personal items at home.  Be sure to tell your doctor:   If you have any allergies.   If there s any chance you are pregnant.   If you are breastfeeding.  You do not need to do anything special to prepare for this exam.  Please wear loose clothing, such as a sweat suit or jogging clothes. Avoid snaps, zippers and other metal. We may ask you to undress and put on a hospital gown.            May 23, 2018 10:15 AM CDT   Return Visit with Anabell Paiz MD   [facility]  "[facility])    [facility]  [address]  [place] [address]   [phone]              Future tests that were ordered for you today     Open Future Orders        Priority Expected Expires Ordered    PULMONARY REHAB REFERRAL Routine  3/7/2019 3/7/2018    General PFT Lab (Please always keep checked) Routine  3/7/2019 3/7/2018    Pulmonary Function Test Routine  3/7/2019 3/7/2018            Who to contact     If you have questions or need follow up information about today's clinic visit or your schedule please contact [facility] directly at [phone].  Normal or non-critical lab and imaging results will be communicated to you by MyChart, letter or phone within 4 business days after the clinic has received the results. If you do not hear from us within 7 days, please contact the clinic through [employer] or phone. If you have a critical or abnormal lab result, we will notify you by phone as soon as possible.  Submit refill requests through [employer] or call your pharmacy and they will forward the refill request to us. Please allow 3 business days for your refill to be completed.          Additional Information About Your Visit        MyChart Information     [employer] lets you send messages to your doctor, view your test results, renew your prescriptions, schedule appointments and more. To sign up, go to [email or web address] . Click on \"Log in\" on the left side of the screen, which will take you to the Welcome page. Then click on \"Sign up Now\" on the right side of the page.     You will be asked to enter the access code listed below, as well as some personal information. Please follow the directions to create your username and password.     Your access code is: [email or web address]  Expires: 2018 10:50 AM     Your access code will  in 90 days. If you need help or a new code, please call your [facility] clinic or [phone].        Care EveryWhere ID     " This is your Care EveryWhere ID. This could be used by other organizations to access your [place] medical records  [id number]        Your Vitals Were     Pulse Respirations Pulse Oximetry             72 16 94%          Blood Pressure from Last 3 Encounters:   03/07/18 112/65   01/31/18 126/67   11/15/17 132/49    Weight from Last 3 Encounters:   01/31/18 105.3 kg (232 lb 3.2 oz)   11/15/17 104.3 kg (230 lb)   11/07/17 103.9 kg (229 lb)                 Where to get your medicines      These medications were sent to [facility] - [place], [place] - [address]  [address] [facility] [address]     Phone:  [phone]     albuterol 108 (90 BASE) MCG/ACT Inhaler          Primary Care Provider Office Phone # Fax #    Carissa Yadiel Burroughs [phone] [address] [facility] [address]  [place] [address]        Equal Access to Services     [facility] Hospital[facility]PIERRE : Hadii aad ku hadasho Soomaali, waaxda luqadaha, qaybta kaalmada adeegyada, waxay sundarin divine gaines . So [facility] [phone].    ATENCIÓN: Si habla español, tiene a esquivel disposición servicios gratuitos de asistencia lingüística. [facility] [phone].    We comply with applicable federal civil rights laws and [place] laws. We do not discriminate on the basis of race, color, national origin, age, disability, sex, sexual orientation, or gender identity.            Thank you!     Thank you for choosing [facility]  for your care. Our goal is always to provide you with excellent care. Hearing back from our patients is one way we can continue to improve our services. Please take a few minutes to complete the written survey that you may receive in the mail after your visit with us. Thank you!             Your Updated Medication List - Protect others around you: Learn how to safely use, store and throw away your medicines at www.disposemymeds.org.          This list is accurate as of 3/7/18  10:16 AM.  Always use your most recent med list.                   Brand Name Dispense Instructions for use Diagnosis    albuterol 108 (90 BASE) MCG/ACT Inhaler    PROAIR HFA/PROVENTIL HFA/VENTOLIN HFA    3 Inhaler    Inhale 2 puffs into the lungs every 6 hours    ILD (interstitial lung disease) (H)       apixaban ANTICOAGULANT 5 MG tablet    ELIQUIS    180 tablet    Take 1 tablet (5 mg) by mouth 2 times daily    Persistent atrial fibrillation (H)       * ICAPS Caps      Take 1 capsule by mouth 2 times daily        * CENTRUM SILVER per tablet      1 TABLET DAILY        CITRACAL + D PO      Take 2 tablets by mouth 2 times daily        folic acid 1 MG tablet    FOLVITE     1 TABLET DAILY    Mixed hyperlipidemia       Lactobacillus Acidophilus Powd      Take 1 packet by mouth daily        levothyroxine 75 MCG tablet    SYNTHROID/LEVOTHROID    90 tablet    Take 1 tablet (75 mcg) by mouth daily    Hypothyroidism, unspecified type       MELATONIN PO      Take 5 mg by mouth nightly as needed        METHOTREXATE PO      Take 2.5 mg by mouth 10 tablets weekly        metoprolol succinate 25 MG 24 hr tablet    TOPROL-XL    90 tablet    Take 1 tablet (25 mg) by mouth daily    Persistent atrial fibrillation (H)       MUCUS D PO           NIFEDICAL XL 60 MG Tb24   Generic drug:  NIFEdipine ER osmotic      1 TABLET DAILY    Raynaud's syndrome       omeprazole 20 MG CR capsule    priLOSEC    180 capsule    Take 1 capsule (20 mg) by mouth 2 times daily Take 30-60 minutes prior to meals    Esophageal stricture       REMICADE IV      Inject 600 mg into the vein Every 8 weeks        simvastatin 40 MG tablet    ZOCOR    90 tablet    Take 1 tablet (40 mg) by mouth At Bedtime    Hyperlipidemia LDL goal <100       * Notice:  This list has 2 medication(s) that are the same as other medications prescribed for you. Read the directions carefully, and ask your doctor or other care provider to review them with you.

## 2018-03-07 NOTE — LETTER
3/7/2018       RE: Jaqueline Canales  [address]  [facility] [address]     Dear Colleague,    Thank you for referring your patient, Jaqueline Canales, to the [facility] LUNG SCIENCE AND HE[facility] at [facility]. Please see a copy of my visit note below.        [facility]   Pulmonary Clinic Follow Up Note  March 7, 2018      Jaqueline Canales MRN# [id number]   Age: 77 year old YOB: 1940     Date of Consultation: March 7, 2018    Primary care provider: Carissa Burroughs     History taken from; Patient     Jaqueline Canales is a 77 year old female seen in the Pulmonary Clinic  for f/u.  Chief Complaint   Patient presents with     Interstitial Lung Disease (ILD)     Patient is being seen for ILD follow up   .          Pulmonary Problem List / Reason for follow up:   1. ILD  2. COPD  3. Lung cancer           Assessment and Plan:   ASSESSMENT AND PLAN:  The patient is a 77-year-old lady with a history of interstitial lung disease related to rheumatoid arthritis and COPD.  The patient unfortunately also had lung cancer.  The patient is followed by Oncology.  The patient's FVC is slightly declined and the patient is feeling slightly more symptomatic.   1.  Interstitial lung disease.  The patient is currently on methotrexate and Remicade.  We will consider switching methotrexate to CellCept.  However, the patient's most recent CT of the chest revealed stable findings in regard to pulmonary fibrosis.  Certainly if we will stop the patient's methotrexate we are concerned that patient's rheumatoid arthritis symptoms will get worse because CellCept probably will not have a significant extrathoracic effect.  We will repeat pulmonary function test in 3 months and we will await the results of the CT of the chest which will be in May.  If there is a need to discontinue methotrexate, we will discuss this with the patient's  rheumatologist.  The patient will have pulmonary function tests also in 3 months.   2.  Chronic obstructive pulmonary disease.  We will reorder albuterol for the patient.  The albuterol that the patient had  in 2017.  We will also enroll the patient in pulmonary rehab.    3.  Lung cancer.  The patient is followed by the oncologist and the patient will have another CT of the chest in May.       We explained the plan to the patient including the risks and benefits.  The patient expressed understanding and agreed with the plan.       Thank you very much for the opportunity to participate in the care of this very pleasant patient.     Return visit in 3 months      Rubens Quispe M.D.         History of Present Illness / Interval History:   CHIEF COMPLAINT:  CPFE.      HISTORY OF PRESENT ILLNESS:  The patient is a 77-year-old lady with a history of rheumatoid arthritis and associated interstitial lung disease.  The patient is currently on methotrexate and Remicade for rheumatoid arthritis.  The patient also has emphysema.  The patient is on albuterol which she is using occasionally.  The patient has a slight decline in the FVC.  However, the patient had a CT of the chest in January which revealed moderate fibrosis which is stable.  The patient noticed that she is coughing slightly more and the patient is also probably slightly more short of breath.            Pulmonary Data:   Exposure history: Asbestos;  No , TB;  No , Radiation;   No          Medications:     Current Outpatient Prescriptions   Medication Sig     albuterol (PROAIR HFA/PROVENTIL HFA/VENTOLIN HFA) 108 (90 BASE) MCG/ACT Inhaler Inhale 2 puffs into the lungs every 6 hours     metoprolol succinate (TOPROL-XL) 25 MG 24 hr tablet Take 1 tablet (25 mg) by mouth daily     omeprazole (PRILOSEC) 20 MG CR capsule Take 1 capsule (20 mg) by mouth 2 times daily Take 30-60 minutes prior to meals     Pseudoephedrine-Guaifenesin (MUCUS D PO)      levothyroxine  (SYNTHROID/LEVOTHROID) 75 MCG tablet Take 1 tablet (75 mcg) by mouth daily     simvastatin (ZOCOR) 40 MG tablet Take 1 tablet (40 mg) by mouth At Bedtime     apixaban ANTICOAGULANT (ELIQUIS) 5 MG tablet Take 1 tablet (5 mg) by mouth 2 times daily     Lactobacillus Acidophilus POWD Take 1 packet by mouth daily     Methotrexate Sodium (METHOTREXATE PO) Take 2.5 mg by mouth 10 tablets weekly     MELATONIN PO Take 5 mg by mouth nightly as needed     Calcium Citrate-Vitamin D (CITRACAL + D PO) Take 2 tablets by mouth 2 times daily      Multiple Vitamins-Minerals (ICAPS) CAPS Take 1 capsule by mouth 2 times daily      InFLIXimab (REMICADE IV) Inject 600 mg into the vein Every 8 weeks     NIFEDICAL XL 60 MG PO TB24 1 TABLET DAILY     FOLIC ACID 1 MG OR TABS 1 TABLET DAILY     CENTRUM SILVER OR TABS 1 TABLET DAILY     [DISCONTINUED] albuterol (PROAIR HFA, PROVENTIL HFA, VENTOLIN HFA) 108 (90 BASE) MCG/ACT inhaler Inhale 2 puffs into the lungs every 6 hours     No current facility-administered medications for this visit.            Past Medical History:     Past Medical History:   Diagnosis Date     Amaurosis fugax 5-11    Right     Carotid artery stenosis      Esophageal reflux 3/11/2004     HELICOBACTER PYLORI INFECTION 7/11/2006     hx of CA in situ RT breast-1990.mastectomy 4/17/2007     Hyperlipidemia LDL goal <70 6/20/2011     Lung cancer (H)     squamous cell lung ca left lower lobe     OBESITY NOS 3/11/2004     OSTEOPOROSIS NOS 3/11/2004     Other psoriasis 3/11/2004     RA (rheumatoid arthritis) (H) 2/8/2010     Raynaud's syndrome 4/10/2006     Scleroderma (H)      Sleep apnea     CPAP             Past Surgical History:     Past Surgical History:   Procedure Laterality Date     BREAST SURGERY       C NONSPECIFIC PROCEDURE  1990    mastectomy RT breast     COLONOSCOPY  5/24/16     CONIZATION       ENDARTERECTOMY CAROTID  2011     HC COLONOSCOPY THRU STOMA, DIAGNOSTIC  2001 2011    diverticulosis     REPAIR HAMMER TOE        TONSILLECTOMY               Social History:     Social History     Social History     Marital status:      Spouse name: N/A     Number of children: N/A     Years of education: N/A     Occupational History     Not on file.     Social History Main Topics     Smoking status: Former Smoker     Packs/day: 1.00     Years: 30.00     Types: Cigarettes     Quit date: 3/11/1992     Smokeless tobacco: Never Used     Alcohol use 0.0 oz/week     0 Standard drinks or equivalent per week      Comment: rare     Drug use: No     Sexual activity: Not Currently     Partners: Male     Other Topics Concern     Parent/Sibling W/ Cabg, Mi Or Angioplasty Before 65f 55m? No     Social History Narrative             Family History:     Family History   Problem Relation Age of Onset     Cancer - colorectal Mother      CEREBROVASCULAR DISEASE Father      CANCER Sister 31     ovarian     HEART DISEASE Sister      GASTROINTESTINAL DISEASE Sister      crohns     GASTROINTESTINAL DISEASE Sister      diverticulitis     GASTROINTESTINAL DISEASE Brother      diverticulitis     Alzheimer Disease Sister      CEREBROVASCULAR DISEASE Maternal Grandmother      DIABETES Maternal Grandmother      MENTAL ILLNESS Maternal Grandmother      DIABETES Maternal Uncle              Immunization:     Immunization History   Administered Date(s) Administered     HEPA 03/21/1996, 09/26/1996     HepB 03/21/1996, 04/18/1996, 09/26/1996     Influenza (H1N1) 01/04/2010     Influenza (High Dose) 3 valent vaccine 10/05/2014, 10/12/2015, 10/26/2016     Influenza (IIV3) PF 11/11/2003, 11/17/2005, 10/23/2006, 10/17/2007, 11/17/2008, 09/27/2010, 09/01/2012     Pneumo Conj 13-V (2010&after) 07/07/2016     Pneumococcal 23 valent 10/17/2007     TD (ADULT, 7+) 03/05/2009, 03/05/2009     TDAP Vaccine (Adacel) 06/07/2012     Zoster vaccine, live 08/27/2012            Review of Systems:   I have done 10 points of review systems and pertinent findings are as above , otherwise  negative.         Physical Examination:   General: Alert, oriented, not in distress  B/P: 112/65, T: Data Unavailable, P: 72, R: 16  HEENT: neck supple, symmetrical, no lymph node enlargement, thyromegaly, bruit, JVD, pupils are isocoric and equally responsive to the light.   Lungs: both hemithoraces are symmetrical, normal to palpation, no dullness to percussion, auscultation of lungs revealed bibasilar crackles  CVS: Normal S1 and S2, no additional heart sounds, murmur, rub, normal peripheral pulses  Abdomen: Bowel sounds present, soft, non tender, non distended, no organomegaly, ascitis, mass  Extremities/musculoskeletal: no peripheral edema, deformity, cyanosis, clubbing  Neurology: alert, orientedx3, no motor/sensorial deficits, cranial nerves grossly normal  Skin: no rash  Psychiatry: Mood and affect are appropriate             DATA:     CBC RESULTS:  Recent Labs   Lab Test  10/31/16   1455  10/23/16   0533   WBC  5.4  7.3   RBC  4.37  3.55*   HGB  13.5  10.7*   HCT  40.8  32.4*   PLT  328  112*       Basic Metabolic Panel:  Recent Labs   Lab Test 12/11/17 10/30/17   10/24/16   0530  10/23/16   0533   NA   --    --    --   137  138   POTASSIUM   --    --    --   3.9  3.9   CHLORIDE   --    --    --   103  104   CO2   --    --    --   28  28   BUN   --    --    --   18  12   CR  0.570  0.560   < >  0.61  0.58   GLC   --    --    --   96  99   TU   --    --    --   8.9  8.4*    < > = values in this interval not displayed.       INR  Recent Labs   Lab Test  12/04/15   0745   INR  0.95        PFT   PFT Latest Ref Rng & Units 3/7/2018   FVC L 1.26   FEV1 L 1.18   FVC% % 52   FEV1% % 63       Thank you for allowing me participate in the care of Jaqueline Canales.  Rubens Quispe M.D.  Associate Professor of Medicine  Pulmonary, Allergy, Critical Care and Sleep

## 2018-03-07 NOTE — NURSING NOTE
Chief Complaint   Patient presents with     Interstitial Lung Disease (ILD)     Patient is being seen for ILD follow up      Guadalupe Lewis CMA at 9:42 AM on 3/7/2018

## 2018-03-08 ENCOUNTER — HOSPITAL ENCOUNTER (OUTPATIENT)
Dept: OCCUPATIONAL THERAPY | Facility: CLINIC | Age: 78
Setting detail: THERAPIES SERIES
End: 2018-03-08
Attending: INTERNAL MEDICINE
Payer: MEDICARE

## 2018-03-08 PROCEDURE — 97140 MANUAL THERAPY 1/> REGIONS: CPT | Mod: GO

## 2018-03-08 PROCEDURE — 97535 SELF CARE MNGMENT TRAINING: CPT | Mod: GO

## 2018-03-08 PROCEDURE — 40000445 ZZHC STATISTIC OT VISIT, LYMPHEDEMA

## 2018-03-09 ENCOUNTER — HOSPITAL ENCOUNTER (OUTPATIENT)
Dept: OCCUPATIONAL THERAPY | Facility: CLINIC | Age: 78
Setting detail: THERAPIES SERIES
End: 2018-03-09
Attending: INTERNAL MEDICINE
Payer: MEDICARE

## 2018-03-09 PROCEDURE — 40000445 ZZHC STATISTIC OT VISIT, LYMPHEDEMA: Performed by: OCCUPATIONAL THERAPIST

## 2018-03-09 PROCEDURE — 97140 MANUAL THERAPY 1/> REGIONS: CPT | Mod: GO | Performed by: OCCUPATIONAL THERAPIST

## 2018-03-09 PROCEDURE — 97535 SELF CARE MNGMENT TRAINING: CPT | Mod: GO | Performed by: OCCUPATIONAL THERAPIST

## 2018-03-13 ENCOUNTER — HOSPITAL ENCOUNTER (OUTPATIENT)
Dept: OCCUPATIONAL THERAPY | Facility: CLINIC | Age: 78
Setting detail: THERAPIES SERIES
End: 2018-03-13
Attending: INTERNAL MEDICINE
Payer: MEDICARE

## 2018-03-13 PROCEDURE — 97140 MANUAL THERAPY 1/> REGIONS: CPT | Mod: GO

## 2018-03-13 PROCEDURE — 97535 SELF CARE MNGMENT TRAINING: CPT | Mod: GO

## 2018-03-13 PROCEDURE — 40000445 ZZHC STATISTIC OT VISIT, LYMPHEDEMA

## 2018-03-14 ENCOUNTER — HOSPITAL ENCOUNTER (OUTPATIENT)
Dept: OCCUPATIONAL THERAPY | Facility: CLINIC | Age: 78
Setting detail: THERAPIES SERIES
End: 2018-03-14
Attending: INTERNAL MEDICINE
Payer: MEDICARE

## 2018-03-14 LAB
DLCOUNC-%PRED-PRE: 58 %
DLCOUNC-PRE: 10.9 ML/MIN/MMHG
DLCOUNC-PRED: 18.79 ML/MIN/MMHG
ERV-%PRED-PRE: -907 %
ERV-PRE: 0.27 L
ERV-PRED: -0.03 L
EXPTIME-PRE: 7.44 SEC
FEF2575-%PRED-PRE: 145 %
FEF2575-PRE: 2.24 L/SEC
FEF2575-PRED: 1.54 L/SEC
FEFMAX-%PRED-PRE: 103 %
FEFMAX-PRE: 4.84 L/SEC
FEFMAX-PRED: 4.66 L/SEC
FEV1-%PRED-PRE: 63 %
FEV1-PRE: 1.18 L
FEV1FEV6-PRE: 93 %
FEV1FEV6-PRED: 78 %
FEV1FVC-PRE: 93 %
FEV1FVC-PRED: 74 %
FEV1SVC-PRE: 75 %
FEV1SVC-PRED: 70 %
FIFMAX-PRE: 3.96 L/SEC
FVC-%PRED-PRE: 52 %
FVC-PRE: 1.26 L
FVC-PRED: 2.42 L
IC-%PRED-PRE: 48 %
IC-PRE: 1.3 L
IC-PRED: 2.67 L
VA-%PRED-PRE: 50 %
VA-PRE: 2.38 L
VC-%PRED-PRE: 59 %
VC-PRE: 1.57 L
VC-PRED: 2.64 L

## 2018-03-14 PROCEDURE — 40000445 ZZHC STATISTIC OT VISIT, LYMPHEDEMA

## 2018-03-14 PROCEDURE — 97140 MANUAL THERAPY 1/> REGIONS: CPT | Mod: GO

## 2018-03-20 ENCOUNTER — HOSPITAL ENCOUNTER (OUTPATIENT)
Dept: OCCUPATIONAL THERAPY | Facility: CLINIC | Age: 78
Setting detail: THERAPIES SERIES
End: 2018-03-20
Attending: INTERNAL MEDICINE
Payer: MEDICARE

## 2018-03-20 PROCEDURE — 97140 MANUAL THERAPY 1/> REGIONS: CPT | Mod: GO

## 2018-03-20 PROCEDURE — 40000445 ZZHC STATISTIC OT VISIT, LYMPHEDEMA

## 2018-04-16 ENCOUNTER — TRANSFERRED RECORDS (OUTPATIENT)
Dept: HEALTH INFORMATION MANAGEMENT | Facility: CLINIC | Age: 78
End: 2018-04-16

## 2018-04-16 LAB
ALT SERPL-CCNC: 16 IU/L (ref 5–35)
AST SERPL-CCNC: 29 U/L (ref 5–34)
CREAT SERPL-MCNC: 0.58 MG/DL (ref 0.5–1.3)
GFR SERPL CREATININE-BSD FRML MDRD: 106.9 ML/MIN/1.73M2

## 2018-04-17 ENCOUNTER — HOSPITAL ENCOUNTER (OUTPATIENT)
Dept: OCCUPATIONAL THERAPY | Facility: CLINIC | Age: 78
Setting detail: THERAPIES SERIES
End: 2018-04-17
Attending: INTERNAL MEDICINE
Payer: MEDICARE

## 2018-04-17 PROCEDURE — 40000445 ZZHC STATISTIC OT VISIT, LYMPHEDEMA

## 2018-04-17 PROCEDURE — G8988 SELF CARE GOAL STATUS: HCPCS | Mod: GO,CJ

## 2018-04-17 PROCEDURE — G8989 SELF CARE D/C STATUS: HCPCS | Mod: GO,CI

## 2018-04-17 PROCEDURE — 97535 SELF CARE MNGMENT TRAINING: CPT | Mod: GO

## 2018-04-17 NOTE — PROGRESS NOTES
"Outpatient Occupational Therapy Discharge Note     Patient: Jaqueline Canales  : 1940    Beginning/End Dates of Reporting Period:  2/15/2018 to 2018    Referring Provider: Dr. Anabell Paiz    Therapy Diagnosis: lymphedema BLE, BUE    Client Self Report: 'the binders have been pretty good, they slide down on me overnight and sometimes during the day, but they're pretty easy to work with\"     Objective Measurements:     Objective Measure: BBK volume   Details: see attached flowsheet; patient shows continued volume reduction, total reduction (vs. start of episode 3/5 measurements)  mL (15%),  mL (21%)       Outcome Measures (most recent score):  Lymphedema Life Impact Scale (score range 0-72). A higher score indicates greater impairment.: 16    Goals:   Goal Identifier volume   Goal Description For decreased risk of infection, skin breakdown/wounds & progressive soft-tissue fibrosis and improved fit of footwear, volume will be reduced by 150 mL in RLE, 200 mL LLE.   Target Date 18   Date Met  18 (GOAL MET, EXCEEDED)   Progress:     Goal Identifier GCB   Goal Description To reduce volume of lymphedema and risk of soft tissue fibrosis, pt will tolerate up to 23hr/day wear gradient compression bandaging (GCB) of BLE.   Target Date 18   Date Met  18 (GOAL FOR BBK MET, WILL ASSESS BUE AT NEXT VISIT)   Progress:     Goal Identifier home program   Goal Description For long-term home mgmt chronic lymphedema pt/caregiver independent in home program a. GCB/GCB alternative garment for night wear b. compression garment for day wear c. ex to incr lymph flow/self-MLD.   Target Date 18   Date Met  18 (GOAL MET)   Progress:     Goal Identifier precautions   Goal Description Pt will independently verbalize the signs/symptoms of lymphedema, precautions and how to obtain a future lymphedema referral if edema persists to preserve skin integrity, prevent infection and preserve " "functional mobility.      Target Date 04/16/18   Date Met  04/17/18 (GOAL MET)   Progress:         Progress Toward Goals:   All goals met.      Plan:  Discharge from therapy.    Discharge:    Reason for Discharge: Patient has met all goals.    Equipment Issued: one set GCB supplies    Discharge Plan: Patient to continue home program, including HEP, self-MLD, use of BBK Velcro compression binders for day and night wear.  Patient also has BUE swelling, does not wish to pursue Complete Decongestive Therapy to address at this time.  \"I will see Dr. Paiz and see what he thinks.\"  "

## 2018-04-23 DIAGNOSIS — I48.19 PERSISTENT ATRIAL FIBRILLATION (H): ICD-10-CM

## 2018-04-23 DIAGNOSIS — K22.2 ESOPHAGEAL STRICTURE: ICD-10-CM

## 2018-04-23 NOTE — TELEPHONE ENCOUNTER
Reason for Call:  Medication or medication refill:    Do you use a [facility]?  Name of the pharmacy and phone number for the current request:  .  [facility] - [place], [place] [address]      Name of the medication requested: omeprazole (PRILOSEC) 20 MG CR capsule  And  metoprolol succinate (TOPROL-XL) 25 MG 24 hr tablet    Other request: no    Can we leave a detailed message on this number? YES    Phone number patient can be reached at: Home number on file [phone] (home)    Best Time: any time    Call taken on 4/23/2018 at 1:42 PM by Carmen Avilez

## 2018-04-24 NOTE — TELEPHONE ENCOUNTER
"Last Written Prescription Date:  2/5/2018  Last Fill Quantity: 180,  # refills: 0   Last office visit: 10/6/2017 with prescribing provider:     Future Office Visit:   Next 5 appointments (look out 90 days)     May 23, 2018 10:15 AM CDT   Return Visit with Anabell Paiz MD   [facility] ([facility])    [facility] [place]  [address]  [place] [id number] [phone]                   Requested Prescriptions   Pending Prescriptions Disp Refills     omeprazole (PRILOSEC) 20 MG CR capsule 180 capsule 2     Sig: Take 1 capsule (20 mg) by mouth 2 times daily Take 30-60 minutes prior to meals    PPI Protocol Failed    4/23/2018  2:29 PM       Failed - No diagnosis of osteoporosis on record       Passed - Not on Clopidogrel (unless Pantoprazole ordered)       Passed - Recent (12 mo) or future (30 days) visit within the authorizing provider's specialty    Patient had office visit in the last 12 months or has a visit in the next 30 days with authorizing provider or within the authorizing provider's specialty.  See \"Patient Info\" tab in inbasket, or \"Choose Columns\" in Meds & Orders section of the refill encounter.           Passed - Patient is age 18 or older       Passed - No active pregnacy on record       Passed - No positive pregnancy test in past 12 months        metoprolol succinate (TOPROL-XL) 25 MG 24 hr tablet 90 tablet 2     Sig: Take 1 tablet (25 mg) by mouth daily    Beta-Blockers Protocol Passed    4/23/2018  2:29 PM       Passed - Blood pressure under 140/90 in past 12 months    BP Readings from Last 3 Encounters:   03/07/18 112/65   01/31/18 126/67   11/15/17 132/49                Passed - Patient is age 6 or older       Passed - Recent (12 mo) or future (30 days) visit within the authorizing provider's specialty    Patient had office visit in the last 12 months or has a visit in the next 30 days with authorizing provider or within the authorizing " "provider's specialty.  See \"Patient Info\" tab in inbasket, or \"Choose Columns\" in Meds & Orders section of the refill encounter.              "

## 2018-04-25 RX ORDER — METOPROLOL SUCCINATE 25 MG/1
25 TABLET, EXTENDED RELEASE ORAL DAILY
Qty: 90 TABLET | Refills: 1 | Status: SHIPPED | OUTPATIENT
Start: 2018-04-25 | End: 2019-02-13

## 2018-04-25 NOTE — TELEPHONE ENCOUNTER
Prescription approved per [facility] Refill Protocol.  Sarah RODRIGUEZ RN

## 2018-05-21 ENCOUNTER — HOSPITAL ENCOUNTER (OUTPATIENT)
Dept: CT IMAGING | Facility: CLINIC | Age: 78
Discharge: HOME | End: 2018-05-21
Attending: INTERNAL MEDICINE | Admitting: INTERNAL MEDICINE
Payer: MEDICARE

## 2018-05-21 DIAGNOSIS — C34.32 MALIGNANT NEOPLASM OF LOWER LOBE OF LEFT LUNG (H): ICD-10-CM

## 2018-05-21 PROCEDURE — 71250 CT THORAX DX C-: CPT

## 2018-05-23 ENCOUNTER — ONCOLOGY VISIT (OUTPATIENT)
Dept: ONCOLOGY | Facility: CLINIC | Age: 78
End: 2018-05-23
Attending: INTERNAL MEDICINE
Payer: MEDICARE

## 2018-05-23 VITALS
BODY MASS INDEX: 40.57 KG/M2 | RESPIRATION RATE: 22 BRPM | DIASTOLIC BLOOD PRESSURE: 45 MMHG | WEIGHT: 221.8 LBS | TEMPERATURE: 97.7 F | OXYGEN SATURATION: 96 % | SYSTOLIC BLOOD PRESSURE: 104 MMHG | HEART RATE: 68 BPM

## 2018-05-23 DIAGNOSIS — I89.0 LYMPHEDEMA: Primary | ICD-10-CM

## 2018-05-23 PROCEDURE — 99213 OFFICE O/P EST LOW 20 MIN: CPT | Performed by: INTERNAL MEDICINE

## 2018-05-23 PROCEDURE — G0463 HOSPITAL OUTPT CLINIC VISIT: HCPCS

## 2018-05-23 ASSESSMENT — PAIN SCALES - GENERAL: PAINLEVEL: NO PAIN (0)

## 2018-05-23 NOTE — PROGRESS NOTES
[facility] PHYSICIANS  HEMATOLOGY ONCOLOGY     DIAGNOSES:     1.  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 The patient had a left lower lobe nodule 11/2015.  It was biopsied 12/04/2015, indicated a poorly differentiated squamous cell carcinoma.  PET CT scan 12/14/2015 hypermetabolic nodular lesion in the left lower lobe.     2.  History of breast cancer in 1990, treated with right-sided mastectomy.   3.  Interstitial lung disease.   4.  Rheumatoid arthritis.       TREATMENT:  S/P SBRT for lung cancer. Completed 02/2016.      SUBJECTIVE:  The patient comes in for followup today. She has been at her baseline. No new symptoms.     REVIEW OF SYSTEMS:  A complete review of systems was performed and found to be negative other than pertinent positives mentioned in history of present illness.     Past medical, social histories reviewed.    Meds- Reviewed.     PHYSICAL EXAMINATION:   VITAL SIGNS:/45 (BP Location: Left arm, Patient Position: Chair, Cuff Size: Adult Large)  Pulse 68  Temp 97.7  F (36.5  C) (Oral)  Resp 22  Wt 100.6 kg (221 lb 12.8 oz)  SpO2 96%  BMI 40.57 kg/m2  CONSTITUTIONAL: Sitting comfortably.   HEENT: Pupils are equal. Oropharynx is clear.   NECK: No cervical or supraclavicular lymphadenopathy.   RESPIRATORY: Clear bilaterally.   CARD/VASC: S1, S2, regular.   GI: Soft, nontender, nondistended, no hepatosplenomegaly.   MUSKULOSKELETAL: Warm, well perfused.   NEUROLOGIC: Alert, awake.   INTEGUMENT: No rash.   LYMPHATICS:Bilteral edema.   PSYCH: Mood and affect was normal.    LABORATORY DATA AND IMAGING REVIEWED DURING THIS VISIT:  Recent Labs   Lab Test 04/16/18 02/19/18   10/24/16   0530  10/23/16   0533  10/22/16   1626   NA   --    --    --   137  138   --    POTASSIUM   --    --    --   3.9  3.9   --    CHLORIDE   --    --    --   103  104   --    CO2   --    --    --   28  28   --    ANIONGAP   --    --    --   6  6   --    BUN   --    --     --   18  12   --    CR  0.580  0.590   < >  0.61  0.58   --    GLC   --    --    --   96  99   --    TU   --    --    --   8.9  8.4*   --    MAG   --    --    --    --   2.5*  2.2    < > = values in this interval not displayed.     Recent Labs   Lab Test  10/31/16   1455  10/23/16   0533  10/22/16   1130   07/01/16   0805   WBC  5.4  7.3  11.4*   < >  5.3   HGB  13.5  10.7*  13.1   < >  13.1   PLT  328  112*  135*   < >  294   MCV  93  91  91   < >  95   NEUTROPHIL  73.8   --   89.2   --   61.1    < > = values in this interval not displayed.     Recent Labs   Lab Test 04/16/18 02/19/18 12/11/17   10/22/16   1130   07/01/16   0805   10/12/15   0906   06/01/15   0824   BILITOTAL   --    --    --    --   1.1   --   0.5   --    --    --   0.5   ALKPHOS   --    --    --    --   131   --   87   --    --    --   88   ALT  16  33  20   < >  42   < >  23   < >  26   < >  48   AST  29  38*  25   < >  43   < >  25   < >  21   < >  39   ALBUMIN   --    --    --    --   3.6   --   3.6   --   3.7   --   3.6    < > = values in this interval not displayed.         Results for orders placed or performed during the hospital encounter of 05/21/18   CT Chest w/o contrast    Narrative    CT CHEST WITHOUT CONTRAST 5/21/2018 10:50 AM     HISTORY: Follow up lung cancer. Malignant neoplasm of lower lobe of  left lung (H).    COMPARISON: January 29, 2018.    TECHNIQUE: Volumetric helical acquisition of CT images of the chest  from the clavicles to the kidneys were acquired without IV contrast.  Radiation dose for this scan was reduced using automated exposure  control, adjustment of the mA and/or kV according to patient size, or  iterative reconstruction technique.    FINDINGS:  Moderate peripheral and basilar fibrosis. Moderate diffuse  bronchiectasis. Moderate to large hiatal hernia. Opacity in the  posteromedial left hemithorax now measures 2.4 x 1.7 previously 2.6 x  1.6 cm. This is probably not significantly changed. This may  simply  represent some posttreatment fibrosis. There are moderate coronary  vascular calcifications consistent with coronary artery disease. No  pleural or pericardial effusion. A few small mediastinal lymph nodes  are grossly stable in the absence of contrast. No acute findings in  the visualized upper abdomen.      Impression    IMPRESSION: Stable mass versus opacity in the medial left lung since  the comparison study. Stable moderate fibrosis.    ARELI RICKETTS MD       ECOG PS: 1    ASSESSMENT:  A 77-year-old lady with squamous cell carcinoma of the lung which was clinically T1 N0 MX.  She had a 1.7 to 1.1 cm left lower lobe lesion which was FDG avid, multiple non-FDG avid mediastinal lymph nodes, prevascular, precarinal and subcarinal which appeared to be stable.  There was FDG activity in tonsil and neck lymph nodes which were evaluated by Dr. Trevor Murdock of ENT and this was considered to be a physiological uptake.  Given her history of interstitial lung disease she was referred to Radiation Oncology for SBRT of this lung lesion.  Her MRI of brain was negative for metastases.   She is on surveillance.     - CT scan 5/21/18 results were reviewed with the patient. Stable findings.   - I will repeat a scan in 6 months.   - She has lymphedema and a referral was placed.      PLAN:   1.  RTC MD in 6 months  2.  CT scan chest without contrast before next visit    CHESTER DELATORRE MD    5/23/2018

## 2018-05-23 NOTE — PROGRESS NOTES
"Oncology Rooming Note    May 23, 2018 10:18 AM   Jaqueline Canales is a 77 year old female who presents for:    Chief Complaint   Patient presents with     Oncology Clinic Visit     Initial Vitals: /45 (BP Location: Left arm, Patient Position: Chair, Cuff Size: Adult Large)  Pulse 68  Temp 97.7  F (36.5  C) (Oral)  Resp 22  Wt 100.6 kg (221 lb 12.8 oz)  SpO2 96%  BMI 40.57 kg/m2 Estimated body mass index is 40.57 kg/(m^2) as calculated from the following:    Height as of 11/15/17: 1.575 m (5' 2\").    Weight as of this encounter: 100.6 kg (221 lb 12.8 oz). Body surface area is 2.1 meters squared.  No Pain (0) Comment: Data Unavailable   No LMP recorded. Patient is postmenopausal.  Allergies reviewed: Yes  Medications reviewed: Yes    Medications: Medication refills not needed today.  Pharmacy name entered into [employer] [facility]: [facility] - [facility] [address]    Clinical concerns: None     5 minutes for nursing intake (face to face time)     Winsome Reid CMA              "

## 2018-05-23 NOTE — MR AVS SNAPSHOT
"              After Visit Summary   5/23/2018    Jaqueline Canales    MRN: [id number]           Patient Information     Date Of Birth          1940        Visit Information        Provider Department      5/23/2018 10:15 AM Anabell Paiz MD [facility]        Today's Diagnoses     Lymphedema    -  1      Care Instructions    1.  RTC MD in 6 months  Scheduled/janice  2.  CT scan chest without contrast before next visit  3.  Refer to lymphedema          Follow-ups after your visit        Additional Services     LYMPHEDEMA THERAPY REFERRAL       *This therapy referral will be filtered to a centralized scheduling office at [facility] and the patient will receive a call to schedule an appointment at a [facility] location most convenient for them. *   If you have not heard from the scheduling office within 2 business days, please call [phone] for all locations, with the exception of [place], please call [phone].     Treatment: PT or OT Evaluation & Treatment  Special Instructions: none  PT/OT Treatment Diagnosis: Lymphedema    Please be aware that coverage of these services is subject to the terms and limitations of your health insurance plan.  Call member services at your health plan with any benefit or coverage questions.      **Note to Provider:  If you are referring outside of [facility] for the therapy appointment, please list the name of the location in the \"special instructions\" above, print the referral and give to the patient to schedule the appointment.                  Your next 10 appointments already scheduled     May 30, 2018  2:00 PM CDT   FULL PULMONARY FUNCTION with  PFL B   [facility] ([facility])    [address] [address] [phone]            May 30, 2018  3:00 PM CDT   (Arrive by 2:45 PM)   Return Interstitial Lung with Rubens Quispe MD   [facility] ([facility] [place])    [address]  [address]  [facility] [address] [phone]            Nov 12, 2018 11:00 AM CST   CT CHEST W/O CONTRAST with SHCT1   [facility] CT ([facility])    [address] [address] [phone]           Please bring any scans or X-rays taken at other hospitals, if similar tests were done. Also bring a list of your medicines, including vitamins, minerals and over-the-counter drugs. It is safest to leave personal items at home.  Be sure to tell your doctor:   If you have any allergies.   If there s any chance you are pregnant.   If you are breastfeeding.  You do not need to do anything special to prepare for this exam.  Please wear loose clothing, such as a sweat suit or jogging clothes. Avoid snaps, zippers and other metal. We may ask you to undress and put on a hospital gown.            Nov 15, 2018 10:45 AM CST   Return Visit with Anabell Paiz MD   [facility] ([facility])    [facility]  [address] [facility] [address]  [facility] [address] [phone]              Future tests that were ordered for you today     Open Future Orders        Priority Expected Expires Ordered    CT Chest w/o Contrast Routine 10/1/2018 11/30/2018 5/23/2018            Who to contact     If you have questions or need follow up information about today's clinic visit or your schedule please contact [facility] directly at [phone].  Normal or non-critical lab and imaging results will be communicated to you by MyChart, letter or phone within 4 business days after the clinic has received the results. If you do not hear from us within 7 days, please contact the clinic through MyChart or phone. If you have a critical or abnormal lab result, we will notify you by phone as soon as possible.  Submit refill requests through Uplike or call your pharmacy and they will  "forward the refill request to us. Please allow 3 business days for your refill to be completed.          Additional Information About Your Visit        YOOWALKhart Information     PulseOn lets you send messages to your doctor, view your test results, renew your prescriptions, schedule appointments and more. To sign up, go to [email or web address] . Click on \"Log in\" on the left side of the screen, which will take you to the Welcome page. Then click on \"Sign up Now\" on the right side of the page.     You will be asked to enter the access code listed below, as well as some personal information. Please follow the directions to create your username and password.     Your access code is: [email or web address]  Expires: 2018  6:30 AM     Your access code will  in 90 days. If you need help or a new code, please call your [facility] clinic or [phone].        Care EveryWhere ID     This is your Care EveryWhere ID. This could be used by other organizations to access your [facility] medical records  [id number]        Your Vitals Were     Pulse Temperature Respirations Pulse Oximetry BMI (Body Mass Index)       68 97.7  F (36.5  C) (Oral) 22 96% 40.57 kg/m2        Blood Pressure from Last 3 Encounters:   18 104/45   18 112/65   18 126/67    Weight from Last 3 Encounters:   18 100.6 kg (221 lb 12.8 oz)   18 105.3 kg (232 lb 3.2 oz)   11/15/17 104.3 kg (230 lb)              We Performed the Following     LYMPHEDEMA THERAPY REFERRAL        Primary Care Provider Office Phone # Fax #    Carissa Yadiel Burroughs [phone] [address]  [facility] [address]        Equal Access to Services     [facility] : Catrachito Barreto, waponchoda luqadaha, qaybta kaalmada judd, phillip monroy. [facility] [phone].    ATENCIÓN: Si habla español, tiene a esquivel disposición servicios gratuitos de asistencia lingüística. Llame al [phone].    We comply " with applicable federal civil rights laws and [place] laws. We do not discriminate on the basis of race, color, national origin, age, disability, sex, sexual orientation, or gender identity.            Thank you!     Thank you for choosing [facility]  for your care. Our goal is always to provide you with excellent care. Hearing back from our patients is one way we can continue to improve our services. Please take a few minutes to complete the written survey that you may receive in the mail after your visit with us. Thank you!             Your Updated Medication List - Protect others around you: Learn how to safely use, store and throw away your medicines at www.disposemymeds.org.          This list is accurate as of 5/23/18 12:36 PM.  Always use your most recent med list.                   Brand Name Dispense Instructions for use Diagnosis    albuterol 108 (90 Base) MCG/ACT Inhaler    PROAIR HFA/PROVENTIL HFA/VENTOLIN HFA    3 Inhaler    Inhale 2 puffs into the lungs every 6 hours    ILD (interstitial lung disease) (H)       apixaban ANTICOAGULANT 5 MG tablet    ELIQUIS    180 tablet    Take 1 tablet (5 mg) by mouth 2 times daily    Persistent atrial fibrillation (H)       * ICAPS Caps      Take 1 capsule by mouth 2 times daily        * CENTRUM SILVER per tablet      1 TABLET DAILY        CITRACAL + D PO      Take 2 tablets by mouth 2 times daily        folic acid 1 MG tablet    FOLVITE     1 TABLET DAILY    Mixed hyperlipidemia       Lactobacillus Acidophilus Powd      Take 1 packet by mouth daily        levothyroxine 75 MCG tablet    SYNTHROID/LEVOTHROID    90 tablet    Take 1 tablet (75 mcg) by mouth daily    Hypothyroidism, unspecified type       MELATONIN PO      Take 5 mg by mouth nightly as needed        METHOTREXATE PO      Take 2.5 mg by mouth 10 tablets weekly        metoprolol succinate 25 MG 24 hr tablet    TOPROL-XL    90 tablet    Take 1 tablet (25 mg) by mouth daily    Persistent atrial  fibrillation (H)       MUCUS D PO           NIFEDICAL XL 60 MG Tb24   Generic drug:  NIFEdipine ER osmotic      1 TABLET DAILY    Raynaud's syndrome       omeprazole 20 MG CR capsule    priLOSEC    180 capsule    Take 1 capsule (20 mg) by mouth 2 times daily Take 30-60 minutes prior to meals    Esophageal stricture       REMICADE IV      Inject 600 mg into the vein Every 8 weeks        simvastatin 40 MG tablet    ZOCOR    90 tablet    Take 1 tablet (40 mg) by mouth At Bedtime    Hyperlipidemia LDL goal <100       * Notice:  This list has 2 medication(s) that are the same as other medications prescribed for you. Read the directions carefully, and ask your doctor or other care provider to review them with you.

## 2018-05-23 NOTE — PATIENT INSTRUCTIONS
1.  RTC MD in 6 months  Scheduled/janice  2.  CT scan chest without contrast before next visit  scheduled/janice  3.  Refer to lymphedema/order in epic      AVS printed & mailed to patient/janice

## 2018-05-23 NOTE — LETTER
"    5/23/2018         RE: Jaqueline Canales  [address] [facility] [address]        Dear Colleague,    Thank you for referring your patient, Jaqueline Canales, to the [facility]. Please see a copy of my visit note below.    Oncology Rooming Note    May 23, 2018 10:18 AM   Jaqueline Canales is a 77 year old female who presents for:    Chief Complaint   Patient presents with     Oncology Clinic Visit     Initial Vitals: /45 (BP Location: Left arm, Patient Position: Chair, Cuff Size: Adult Large)  Pulse 68  Temp 97.7  F (36.5  C) (Oral)  Resp 22  Wt 100.6 kg (221 lb 12.8 oz)  SpO2 96%  BMI 40.57 kg/m2 Estimated body mass index is 40.57 kg/(m^2) as calculated from the following:    Height as of 11/15/17: 1.575 m (5' 2\").    Weight as of this encounter: 100.6 kg (221 lb 12.8 oz). Body surface area is 2.1 meters squared.  No Pain (0) Comment: Data Unavailable   No LMP recorded. Patient is postmenopausal.  Allergies reviewed: Yes  Medications reviewed: Yes    Medications: Medication refills not needed today.  Pharmacy name entered into [employer]: [facility] - [facility] [address]    Clinical concerns: None     5 minutes for nursing intake (face to face time)     [facility]  HEMATOLOGY ONCOLOGY     DIAGNOSES:     1.  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 The patient had a left lower lobe nodule 11/2015.  It was biopsied 12/04/2015, indicated a poorly differentiated squamous cell carcinoma.  PET CT scan 12/14/2015 hypermetabolic nodular lesion in the left lower lobe.     2.  History of breast cancer in 1990, treated with right-sided mastectomy.   3.  Interstitial lung disease.   4.  Rheumatoid arthritis.       TREATMENT:  S/P SBRT for lung cancer. Completed 02/2016.      SUBJECTIVE:  The patient comes in for followup today. She has been at her baseline. No " new symptoms.     REVIEW OF SYSTEMS:  A complete review of systems was performed and found to be negative other than pertinent positives mentioned in history of present illness.     Past medical, social histories reviewed.    Meds- Reviewed.     PHYSICAL EXAMINATION:   VITAL SIGNS:/45 (BP Location: Left arm, Patient Position: Chair, Cuff Size: Adult Large)  Pulse 68  Temp 97.7  F (36.5  C) (Oral)  Resp 22  Wt 100.6 kg (221 lb 12.8 oz)  SpO2 96%  BMI 40.57 kg/m2  CONSTITUTIONAL: Sitting comfortably.   HEENT: Pupils are equal. Oropharynx is clear.   NECK: No cervical or supraclavicular lymphadenopathy.   RESPIRATORY: Clear bilaterally.   CARD/VASC: S1, S2, regular.   GI: Soft, nontender, nondistended, no hepatosplenomegaly.   MUSKULOSKELETAL: Warm, well perfused.   NEUROLOGIC: Alert, awake.   INTEGUMENT: No rash.   LYMPHATICS:Bilteral edema.   PSYCH: Mood and affect was normal.    LABORATORY DATA AND IMAGING REVIEWED DURING THIS VISIT:  Recent Labs   Lab Test 04/16/18 02/19/18   10/24/16   0530  10/23/16   0533  10/22/16   1626   NA   --    --    --   137  138   --    POTASSIUM   --    --    --   3.9  3.9   --    CHLORIDE   --    --    --   103  104   --    CO2   --    --    --   28  28   --    ANIONGAP   --    --    --   6  6   --    BUN   --    --    --   18  12   --    CR  0.580  0.590   < >  0.61  0.58   --    GLC   --    --    --   96  99   --    TU   --    --    --   8.9  8.4*   --    MAG   --    --    --    --   2.5*  2.2    < > = values in this interval not displayed.     Recent Labs   Lab Test  10/31/16   1455  10/23/16   0533  10/22/16   1130   07/01/16   0805   WBC  5.4  7.3  11.4*   < >  5.3   HGB  13.5  10.7*  13.1   < >  13.1   PLT  328  112*  135*   < >  294   MCV  93  91  91   < >  95   NEUTROPHIL  73.8   --   89.2   --   61.1    < > = values in this interval not displayed.     Recent Labs   Lab Test 04/16/18 02/19/18 12/11/17   10/22/16   1130   07/01/16   0805   10/12/15   0906    06/01/15   0824   BILITOTAL   --    --    --    --   1.1   --   0.5   --    --    --   0.5   ALKPHOS   --    --    --    --   131   --   87   --    --    --   88   ALT  16  33  20   < >  42   < >  23   < >  26   < >  48   AST  29  38*  25   < >  43   < >  25   < >  21   < >  39   ALBUMIN   --    --    --    --   3.6   --   3.6   --   3.7   --   3.6    < > = values in this interval not displayed.         Results for orders placed or performed during the hospital encounter of 05/21/18   CT Chest w/o contrast    Narrative    CT CHEST WITHOUT CONTRAST 5/21/2018 10:50 AM     HISTORY: Follow up lung cancer. Malignant neoplasm of lower lobe of  left lung (H).    COMPARISON: January 29, 2018.    TECHNIQUE: Volumetric helical acquisition of CT images of the chest  from the clavicles to the kidneys were acquired without IV contrast.  Radiation dose for this scan was reduced using automated exposure  control, adjustment of the mA and/or kV according to patient size, or  iterative reconstruction technique.    FINDINGS:  Moderate peripheral and basilar fibrosis. Moderate diffuse  bronchiectasis. Moderate to large hiatal hernia. Opacity in the  posteromedial left hemithorax now measures 2.4 x 1.7 previously 2.6 x  1.6 cm. This is probably not significantly changed. This may simply  represent some posttreatment fibrosis. There are moderate coronary  vascular calcifications consistent with coronary artery disease. No  pleural or pericardial effusion. A few small mediastinal lymph nodes  are grossly stable in the absence of contrast. No acute findings in  the visualized upper abdomen.      Impression    IMPRESSION: Stable mass versus opacity in the medial left lung since  the comparison study. Stable moderate fibrosis.    ARELI RICKETTS MD       ECOG PS: 1    ASSESSMENT:  A 77-year-old lady with squamous cell carcinoma of the lung which was clinically T1 N0 MX.  She had a 1.7 to 1.1 cm left lower lobe lesion which was FDG avid,  multiple non-FDG avid mediastinal lymph nodes, prevascular, precarinal and subcarinal which appeared to be stable.  There was FDG activity in tonsil and neck lymph nodes which were evaluated by Dr. Trevor Murdock of ENT and this was considered to be a physiological uptake.  Given her history of interstitial lung disease she was referred to Radiation Oncology for SBRT of this lung lesion.  Her MRI of brain was negative for metastases.   She is on surveillance.     - CT scan 5/21/18 results were reviewed with the patient. Stable findings.   - I will repeat a scan in 6 months.   - She has lymphedema and a referral was placed.      PLAN:   1.  RTC MD in 6 months  2.  CT scan chest without contrast before next visit    ANABELL PAIZ MD    5/23/2018             Again, thank you for allowing me to participate in the care of your patient.        Sincerely,        Anabell Paiz MD

## 2018-05-30 ENCOUNTER — OFFICE VISIT (OUTPATIENT)
Dept: PULMONOLOGY | Facility: CLINIC | Age: 78
End: 2018-05-30
Attending: INTERNAL MEDICINE
Payer: MEDICARE

## 2018-05-30 VITALS
WEIGHT: 221 LBS | HEIGHT: 62 IN | SYSTOLIC BLOOD PRESSURE: 112 MMHG | OXYGEN SATURATION: 93 % | HEART RATE: 68 BPM | BODY MASS INDEX: 40.67 KG/M2 | RESPIRATION RATE: 20 BRPM | DIASTOLIC BLOOD PRESSURE: 74 MMHG

## 2018-05-30 DIAGNOSIS — R06.09 DYSPNEA ON EXERTION: ICD-10-CM

## 2018-05-30 DIAGNOSIS — J84.9 ILD (INTERSTITIAL LUNG DISEASE) (H): ICD-10-CM

## 2018-05-30 DIAGNOSIS — J84.9 ILD (INTERSTITIAL LUNG DISEASE) (H): Primary | ICD-10-CM

## 2018-05-30 PROCEDURE — G0463 HOSPITAL OUTPT CLINIC VISIT: HCPCS | Mod: ZF

## 2018-05-30 ASSESSMENT — PAIN SCALES - GENERAL: PAINLEVEL: NO PAIN (0)

## 2018-05-30 NOTE — PATIENT INSTRUCTIONS
1. Pulmonary rehab referral placed  2. Follow up in 3 months with PFTs, will repeat echocardiogram at that time

## 2018-05-30 NOTE — MR AVS SNAPSHOT
After Visit Summary   5/30/2018    Jaqueline Canales    MRN: [id number]           Patient Information     Date Of Birth          1940        Visit Information        Provider Department      5/30/2018 3:00 PM Rubens Quispe MD [facility]        Care Instructions    1. Pulmonary rehab referral placed  2. Follow up in 3 months with PFTs, will repeat echocardiogram at that time          Follow-ups after your visit        Follow-up notes from your care team     Return in about 3 months (around 8/30/2018) for Routine Visit, Physical Exam.      Your next 10 appointments already scheduled     Nov 12, 2018 11:00 AM CST   CT CHEST W/O CONTRAST with SHCT1   [facility] CT ([facility])    [address] [facility] [address] [phone]           Please bring any scans or X-rays taken at other hospitals, if similar tests were done. Also bring a list of your medicines, including vitamins, minerals and over-the-counter drugs. It is safest to leave personal items at home.  Be sure to tell your doctor:   If you have any allergies.   If there s any chance you are pregnant.   If you are breastfeeding.  You do not need to do anything special to prepare for this exam.  Please wear loose clothing, such as a sweat suit or jogging clothes. Avoid snaps, zippers and other metal. We may ask you to undress and put on a hospital gown.            Nov 15, 2018 10:45 AM CST   Return Visit with Anabell Paiz MD   [facility] ([facility])    [facility]  [address] [address] [phone]              Who to contact     If you have questions or need follow up information about today's clinic visit or your schedule please contact [facility] directly at [phone].  Normal or non-critical lab and imaging results will be communicated to you by MyChart, letter  "or phone within 4 business days after the clinic has received the results. If you do not hear from us within 7 days, please contact the clinic through WindowsWear or phone. If you have a critical or abnormal lab result, we will notify you by phone as soon as possible.  Submit refill requests through WindowsWear or call your pharmacy and they will forward the refill request to us. Please allow 3 business days for your refill to be completed.          Additional Information About Your Visit        WindowsWear Information     WindowsWear lets you send messages to your doctor, view your test results, renew your prescriptions, schedule appointments and more. To sign up, go to [email or web address] . Click on \"Log in\" on the left side of the screen, which will take you to the Welcome page. Then click on \"Sign up Now\" on the right side of the page.     You will be asked to enter the access code listed below, as well as some personal information. Please follow the directions to create your username and password.     Your access code is: [email or web address]  Expires: 2018  6:30 AM     Your access code will  in 90 days. If you need help or a new code, please call your [facility] clinic or [phone].        Care EveryWhere ID     This is your Care EveryWhere ID. This could be used by other organizations to access your [place] medical records  [id number]        Your Vitals Were     Pulse Respirations Height Pulse Oximetry BMI (Body Mass Index)       68 20 1.575 m (5' 2\") 93% 40.42 kg/m2        Blood Pressure from Last 3 Encounters:   18 112/74   18 104/45   18 112/65    Weight from Last 3 Encounters:   18 100.2 kg (221 lb)   18 100.6 kg (221 lb 12.8 oz)   18 105.3 kg (232 lb 3.2 oz)              Today, you had the following     No orders found for display       Primary Care Provider Office Phone # Fax #    Carissa Yadiel Burroughs [phone] [address] [facility] [address]  [place] " [address]        Equal Access to Services     [facility]: Hadii michael meza brandon Barreto, waponchoda luqadaha, qaybta kaalmada judd, waxnicolas staci gaines . [facility] [phone].    ATENCIÓN: Si habla español, tiene a esquivel disposición servicios gratuitos de asistencia lingüística. Llame al [phone].    We comply with applicable federal civil rights laws and [place] laws. We do not discriminate on the basis of race, color, national origin, age, disability, sex, sexual orientation, or gender identity.            Thank you!     Thank you for choosing [facility]  for your care. Our goal is always to provide you with excellent care. Hearing back from our patients is one way we can continue to improve our services. Please take a few minutes to complete the written survey that you may receive in the mail after your visit with us. Thank you!             Your Updated Medication List - Protect others around you: Learn how to safely use, store and throw away your medicines at www.disposemymeds.org.          This list is accurate as of 5/30/18  3:43 PM.  Always use your most recent med list.                   Brand Name Dispense Instructions for use Diagnosis    albuterol 108 (90 Base) MCG/ACT Inhaler    PROAIR HFA/PROVENTIL HFA/VENTOLIN HFA    3 Inhaler    Inhale 2 puffs into the lungs every 6 hours    ILD (interstitial lung disease) (H)       apixaban ANTICOAGULANT 5 MG tablet    ELIQUIS    180 tablet    Take 1 tablet (5 mg) by mouth 2 times daily    Persistent atrial fibrillation (H)       * ICAPS Caps      Take 1 capsule by mouth 2 times daily        * CENTRUM SILVER per tablet      1 TABLET DAILY        CITRACAL + D PO      Take 2 tablets by mouth 2 times daily        folic acid 1 MG tablet    FOLVITE     1 TABLET DAILY    Mixed hyperlipidemia       Lactobacillus Acidophilus Powd      Take 1 packet by mouth daily        levothyroxine 75 MCG tablet     SYNTHROID/LEVOTHROID    90 tablet    Take 1 tablet (75 mcg) by mouth daily    Hypothyroidism, unspecified type       MELATONIN PO      Take 5 mg by mouth nightly as needed        METHOTREXATE PO      Take 2.5 mg by mouth 10 tablets weekly        metoprolol succinate 25 MG 24 hr tablet    TOPROL-XL    90 tablet    Take 1 tablet (25 mg) by mouth daily    Persistent atrial fibrillation (H)       MUCUS D PO           NIFEDICAL XL 60 MG Tb24   Generic drug:  NIFEdipine ER osmotic      1 TABLET DAILY    Raynaud's syndrome       omeprazole 20 MG CR capsule    priLOSEC    180 capsule    Take 1 capsule (20 mg) by mouth 2 times daily Take 30-60 minutes prior to meals    Esophageal stricture       REMICADE IV      Inject 600 mg into the vein Every 8 weeks        simvastatin 40 MG tablet    ZOCOR    90 tablet    Take 1 tablet (40 mg) by mouth At Bedtime    Hyperlipidemia LDL goal <100       * Notice:  This list has 2 medication(s) that are the same as other medications prescribed for you. Read the directions carefully, and ask your doctor or other care provider to review them with you.

## 2018-05-30 NOTE — LETTER
5/30/2018       RE: Jaqueline Canales  [address]  [facility] [address]     Dear Colleague,    Thank you for referring your patient, Jaqueline Canales, to the [facility] at [facility]. Please see a copy of my visit note below.    [facility]   Pulmonary Clinic Follow Up Note  May 30, 2018      Jaqueline Canales MRN# [id number]   Age: 77 year old YOB: 1940     Date of Consultation: May 30, 2018    Primary care provider: Carissa Burroughs     History taken from; Patient   Through an     Jaqueline Canales is a 77 year old female seen in the Pulmonary Clinic  for   Chief Complaint   Patient presents with     RECHECK     Follow up ILD   .          Pulmonary Problem List / Reason for follow up:   1. ILD  2. COPD  3.  Squamous cell lung cancer           Assessment and Plan:     Ms. Canales is a 78yo F with history of ILD related to RA and COPD, squamous cell lung cancer, followed by oncology currently on surveillance here for 3 month follow up. Overall stable since last visit.    1. ILD  Currently on methotrexate and Remicade. PFTs have been stable since last visit. Symptoms remain stable with stable dyspnea on exertion, occasional cough that is not too bothersome. She is using albuterol twice weekly when she thinks of it. She has not yet started pulmonary rehab.  -- pulmonary rehab referral  -- continue MTX ad Remicade  -- repeat ECHO at next visit  -- RTC in 3 months with PFTs    2. COPD  -- pulmonary rehab referral placed  -- continue albuterol, consider controller medication in the future    3. Lung cancer  Followed by oncology, plan for repeat CT scans every 6 months.      Patient seen and discussed with Dr. Quispe.    Taty Knight MD PGY-3  Internal Medicine  [phone]           History of Present Illness / Interval History:     Ms. Canales is a 78 yo female with history of  "rheumatoid arthritis and associated interstitial lung disease. She is currently on MTX and Remicade for RA. She uses albuterol about twice weekly \"when she thinks of it\". OVerall, symptoms have been stable for the last couple of months. Reports occasional cough that is unchanged from her baseline. Reports HANEY that varies day to day, but she does get short of breath when walking 1 block- this is relatively unchanged from prior. PFTs today are unchanged from 3 months ago, but did show a decline 3 months ago. She has not yet started pulmonary rehab.            Pulmonary Data:     PFT: FVC 1.28 (stable ) FEV1/FVC 89%, FEV1 1.13 ( 61%)     CT chest: Stable mass versus opacity in the medial left lung since  the comparison study. Stable moderate fibrosis.     TTE:  10/2017    Exposure history: Asbestos;  No , TB;  No , Radiation;   No          Medications:     Current Outpatient Prescriptions   Medication Sig     albuterol (PROAIR HFA/PROVENTIL HFA/VENTOLIN HFA) 108 (90 BASE) MCG/ACT Inhaler Inhale 2 puffs into the lungs every 6 hours     apixaban ANTICOAGULANT (ELIQUIS) 5 MG tablet Take 1 tablet (5 mg) by mouth 2 times daily     Calcium Citrate-Vitamin D (CITRACAL + D PO) Take 2 tablets by mouth 2 times daily      CENTRUM SILVER OR TABS 1 TABLET DAILY     FOLIC ACID 1 MG OR TABS 1 TABLET DAILY     InFLIXimab (REMICADE IV) Inject 600 mg into the vein Every 8 weeks     Lactobacillus Acidophilus POWD Take 1 packet by mouth daily     levothyroxine (SYNTHROID/LEVOTHROID) 75 MCG tablet Take 1 tablet (75 mcg) by mouth daily     MELATONIN PO Take 5 mg by mouth nightly as needed     Methotrexate Sodium (METHOTREXATE PO) Take 2.5 mg by mouth 10 tablets weekly     metoprolol succinate (TOPROL-XL) 25 MG 24 hr tablet Take 1 tablet (25 mg) by mouth daily     Multiple Vitamins-Minerals (ICAPS) CAPS Take 1 capsule by mouth 2 times daily      NIFEDICAL XL 60 MG PO TB24 1 TABLET DAILY     omeprazole (PRILOSEC) 20 MG CR capsule Take 1 " capsule (20 mg) by mouth 2 times daily Take 30-60 minutes prior to meals     Pseudoephedrine-Guaifenesin (MUCUS D PO)      simvastatin (ZOCOR) 40 MG tablet Take 1 tablet (40 mg) by mouth At Bedtime     No current facility-administered medications for this visit.              Past Medical History:     Past Medical History:   Diagnosis Date     Amaurosis fugax 5-11    Right     Carotid artery stenosis      Esophageal reflux 3/11/2004     HELICOBACTER PYLORI INFECTION 7/11/2006     hx of CA in situ RT breast-1990.mastectomy 4/17/2007     Hyperlipidemia LDL goal <70 6/20/2011     Lung cancer (H)     squamous cell lung ca left lower lobe     OBESITY NOS 3/11/2004     OSTEOPOROSIS NOS 3/11/2004     Other psoriasis 3/11/2004     RA (rheumatoid arthritis) (H) 2/8/2010     Raynaud's syndrome 4/10/2006     Scleroderma (H)      Sleep apnea     CPAP             Past Surgical History:     Past Surgical History:   Procedure Laterality Date     BREAST SURGERY       C NONSPECIFIC PROCEDURE  1990    mastectomy RT breast     COLONOSCOPY  5/24/16     CONIZATION       ENDARTERECTOMY CAROTID  2011     HC COLONOSCOPY THRU STOMA, DIAGNOSTIC  2001 2011    diverticulosis     REPAIR HAMMER TOE       TONSILLECTOMY               Social History:     Social History     Social History     Marital status:      Spouse name: N/A     Number of children: N/A     Years of education: N/A     Occupational History     Not on file.     Social History Main Topics     Smoking status: Former Smoker     Packs/day: 1.00     Years: 30.00     Types: Cigarettes     Quit date: 3/11/1992     Smokeless tobacco: Never Used     Alcohol use 0.0 oz/week     0 Standard drinks or equivalent per week      Comment: rare     Drug use: No     Sexual activity: Not Currently     Partners: Male     Other Topics Concern     Parent/Sibling W/ Cabg, Mi Or Angioplasty Before 65f 55m? No     Social History Narrative             Family History:     Family History   Problem  Relation Age of Onset     Cancer - colorectal Mother      CEREBROVASCULAR DISEASE Father      CANCER Sister 31     ovarian     HEART DISEASE Sister      GASTROINTESTINAL DISEASE Sister      crohns     GASTROINTESTINAL DISEASE Sister      diverticulitis     GASTROINTESTINAL DISEASE Brother      diverticulitis     Alzheimer Disease Sister      CEREBROVASCULAR DISEASE Maternal Grandmother      DIABETES Maternal Grandmother      MENTAL ILLNESS Maternal Grandmother      DIABETES Maternal Uncle              Immunization:     Immunization History   Administered Date(s) Administered     HEPA 03/21/1996, 09/26/1996     HepB 03/21/1996, 04/18/1996, 09/26/1996     Influenza (H1N1) 01/04/2010     Influenza (High Dose) 3 valent vaccine 10/05/2014, 10/12/2015, 10/26/2016     Influenza (IIV3) PF 11/11/2003, 11/17/2005, 10/23/2006, 10/17/2007, 11/17/2008, 09/27/2010, 09/01/2012     Pneumo Conj 13-V (2010&after) 07/07/2016     Pneumococcal 23 valent 10/17/2007     TD (ADULT, 7+) 03/05/2009, 03/05/2009     TDAP Vaccine (Adacel) 06/07/2012     Zoster vaccine, live 08/27/2012              Review of Systems:   I have done 10 points of review systems and pertinent findings are   , otherwise negative.    General: no fever, chills, weakness  HEENT: No loss of hearing, vertigo, ear ringing, double/blurry vision, sore throat, epistaxis  Pulmonary: + HANEY, + occasional cough, No wheezing,hemoptysis, chest pain  Sleep: No EDS, snoring, insomnia, cataplexy, restless legs, REM behavior   CVS: No chest discomfort, palpitations  GI: No diarrhea, constipation, dysphagia, early satiety, bleeding  Renal: No pain on urination, hematuria, freq micturation  Musculoskeletal: No muscle ache, joint pain, swelling  Skin: No rash, ecchymosis, itching, icterus  Neurology: No tingling, weakness, numbness  Heme: No easy bruisibility or bleeding  Allergy: runny nose, sneezing, urticeria  Psychiatry: no change in mood and affect         Physical Examination:    General: Alert, oriented, not in distress  B/P: 112/74, T: Data Unavailable, P: 68, R: 20  HEENT: neck supple, symmetrical, no lymph node enlargement, Lungs: both hemithoraces are symmetrical, bibasilar crackles  CVS: Normal S1 and S2, no additional heart sounds, murmur, rub, normal peripheral pulses  Abdomen: Bowel sounds present, soft, non tender, non distended, no organomegaly, ascitis, mass  Extremities/musculoskeletal: no peripheral edema, deformity, cyanosis, clubbing  Neurology: alert, orientedx3, no motor/sensorial deficits, cranial nerves grossly normal  Skin: no rash  Psychiatry: Mood and affect are appropriate             DATA:     CBC RESULTS:     Recent Labs   Lab Test  10/31/16   1455  10/23/16   0533   WBC  5.4  7.3   RBC  4.37  3.55*   HGB  13.5  10.7*   HCT  40.8  32.4*   PLT  328  112*       Basic Metabolic Panel:  Recent Labs   Lab Test 04/16/18 02/19/18   10/24/16   0530  10/23/16   0533   NA   --    --    --   137  138   POTASSIUM   --    --    --   3.9  3.9   CHLORIDE   --    --    --   103  104   CO2   --    --    --   28  28   BUN   --    --    --   18  12   CR  0.580  0.590   < >  0.61  0.58   GLC   --    --    --   96  99   TU   --    --    --   8.9  8.4*    < > = values in this interval not displayed.       INR  Recent Labs   Lab Test  12/04/15   0745   INR  0.95        PFT   PFT Latest Ref Rng & Units 5/30/2018   FVC L 1.28   FEV1 L 1.13   FVC% % 53   FEV1% % 61               Pulmonary Consult Attending Note:    I saw and examined the patient w the resident/fellow. I reviewed the labs, imaging studies and cultures.    Please see the resident's/fellow's note for detailed physical exam, assessment and plan which I agree and formulated together.    Rubens Quispe MD      Again, thank you for allowing me to participate in the care of your patient.      Sincerely,    Rubens Quispe MD

## 2018-05-30 NOTE — PROGRESS NOTES
"[facility]   Pulmonary Clinic Follow Up Note  May 30, 2018      Jaqueline Canales MRN# [id number]   Age: 77 year old YOB: 1940     Date of Consultation: May 30, 2018    Primary care provider: Carissa Burroughs     History taken from; Patient   Through an     Jaqueline Canales is a 77 year old female seen in the Pulmonary Clinic  for   Chief Complaint   Patient presents with     RECHECK     Follow up ILD   .          Pulmonary Problem List / Reason for follow up:   1. ILD  2. COPD  3.  Squamous cell lung cancer           Assessment and Plan:     Ms. Canales is a 76yo F with history of ILD related to RA and COPD, squamous cell lung cancer, followed by oncology currently on surveillance here for 3 month follow up. Overall stable since last visit.    1. ILD  Currently on methotrexate and Remicade. PFTs have been stable since last visit. Symptoms remain stable with stable dyspnea on exertion, occasional cough that is not too bothersome. She is using albuterol twice weekly when she thinks of it. She has not yet started pulmonary rehab.  -- pulmonary rehab referral  -- continue MTX ad Remicade  -- repeat ECHO at next visit  -- RTC in 3 months with PFTs    2. COPD  -- pulmonary rehab referral placed  -- continue albuterol, consider controller medication in the future    3. Lung cancer  Followed by oncology, plan for repeat CT scans every 6 months.      Patient seen and discussed with Dr. Quispe.    Taty Knight MD PGY-3  Internal Medicine  [phone]           History of Present Illness / Interval History:     Ms. Canales is a 76 yo female with history of rheumatoid arthritis and associated interstitial lung disease. She is currently on MTX and Remicade for RA. She uses albuterol about twice weekly \"when she thinks of it\". OVerall, symptoms have been stable for the last couple of months. Reports occasional cough that is unchanged from her baseline. Reports " HANEY that varies day to day, but she does get short of breath when walking 1 block- this is relatively unchanged from prior. PFTs today are unchanged from 3 months ago, but did show a decline 3 months ago. She has not yet started pulmonary rehab.            Pulmonary Data:     PFT: FVC 1.28 (stable ) FEV1/FVC 89%, FEV1 1.13 ( 61%)     CT chest: Stable mass versus opacity in the medial left lung since  the comparison study. Stable moderate fibrosis.     TTE:  10/2017    Exposure history: Asbestos;  No , TB;  No , Radiation;   No          Medications:     Current Outpatient Prescriptions   Medication Sig     albuterol (PROAIR HFA/PROVENTIL HFA/VENTOLIN HFA) 108 (90 BASE) MCG/ACT Inhaler Inhale 2 puffs into the lungs every 6 hours     apixaban ANTICOAGULANT (ELIQUIS) 5 MG tablet Take 1 tablet (5 mg) by mouth 2 times daily     Calcium Citrate-Vitamin D (CITRACAL + D PO) Take 2 tablets by mouth 2 times daily      CENTRUM SILVER OR TABS 1 TABLET DAILY     FOLIC ACID 1 MG OR TABS 1 TABLET DAILY     InFLIXimab (REMICADE IV) Inject 600 mg into the vein Every 8 weeks     Lactobacillus Acidophilus POWD Take 1 packet by mouth daily     levothyroxine (SYNTHROID/LEVOTHROID) 75 MCG tablet Take 1 tablet (75 mcg) by mouth daily     MELATONIN PO Take 5 mg by mouth nightly as needed     Methotrexate Sodium (METHOTREXATE PO) Take 2.5 mg by mouth 10 tablets weekly     metoprolol succinate (TOPROL-XL) 25 MG 24 hr tablet Take 1 tablet (25 mg) by mouth daily     Multiple Vitamins-Minerals (ICAPS) CAPS Take 1 capsule by mouth 2 times daily      NIFEDICAL XL 60 MG PO TB24 1 TABLET DAILY     omeprazole (PRILOSEC) 20 MG CR capsule Take 1 capsule (20 mg) by mouth 2 times daily Take 30-60 minutes prior to meals     Pseudoephedrine-Guaifenesin (MUCUS D PO)      simvastatin (ZOCOR) 40 MG tablet Take 1 tablet (40 mg) by mouth At Bedtime     No current facility-administered medications for this visit.              Past Medical History:     Past  Medical History:   Diagnosis Date     Amaurosis fugax 5-11    Right     Carotid artery stenosis      Esophageal reflux 3/11/2004     HELICOBACTER PYLORI INFECTION 7/11/2006     hx of CA in situ RT breast-1990.mastectomy 4/17/2007     Hyperlipidemia LDL goal <70 6/20/2011     Lung cancer (H)     squamous cell lung ca left lower lobe     OBESITY NOS 3/11/2004     OSTEOPOROSIS NOS 3/11/2004     Other psoriasis 3/11/2004     RA (rheumatoid arthritis) (H) 2/8/2010     Raynaud's syndrome 4/10/2006     Scleroderma (H)      Sleep apnea     CPAP             Past Surgical History:     Past Surgical History:   Procedure Laterality Date     BREAST SURGERY       C NONSPECIFIC PROCEDURE  1990    mastectomy RT breast     COLONOSCOPY  5/24/16     CONIZATION       ENDARTERECTOMY CAROTID  2011     HC COLONOSCOPY THRU STOMA, DIAGNOSTIC  2001 2011    diverticulosis     REPAIR HAMMER TOE       TONSILLECTOMY               Social History:     Social History     Social History     Marital status:      Spouse name: N/A     Number of children: N/A     Years of education: N/A     Occupational History     Not on file.     Social History Main Topics     Smoking status: Former Smoker     Packs/day: 1.00     Years: 30.00     Types: Cigarettes     Quit date: 3/11/1992     Smokeless tobacco: Never Used     Alcohol use 0.0 oz/week     0 Standard drinks or equivalent per week      Comment: rare     Drug use: No     Sexual activity: Not Currently     Partners: Male     Other Topics Concern     Parent/Sibling W/ Cabg, Mi Or Angioplasty Before 65f 55m? No     Social History Narrative             Family History:     Family History   Problem Relation Age of Onset     Cancer - colorectal Mother      CEREBROVASCULAR DISEASE Father      CANCER Sister 31     ovarian     HEART DISEASE Sister      GASTROINTESTINAL DISEASE Sister      crohns     GASTROINTESTINAL DISEASE Sister      diverticulitis     GASTROINTESTINAL DISEASE Brother      diverticulitis      Alzheimer Disease Sister      CEREBROVASCULAR DISEASE Maternal Grandmother      DIABETES Maternal Grandmother      MENTAL ILLNESS Maternal Grandmother      DIABETES Maternal Uncle              Immunization:     Immunization History   Administered Date(s) Administered     HEPA 03/21/1996, 09/26/1996     HepB 03/21/1996, 04/18/1996, 09/26/1996     Influenza (H1N1) 01/04/2010     Influenza (High Dose) 3 valent vaccine 10/05/2014, 10/12/2015, 10/26/2016     Influenza (IIV3) PF 11/11/2003, 11/17/2005, 10/23/2006, 10/17/2007, 11/17/2008, 09/27/2010, 09/01/2012     Pneumo Conj 13-V (2010&after) 07/07/2016     Pneumococcal 23 valent 10/17/2007     TD (ADULT, 7+) 03/05/2009, 03/05/2009     TDAP Vaccine (Adacel) 06/07/2012     Zoster vaccine, live 08/27/2012              Review of Systems:   I have done 10 points of review systems and pertinent findings are   , otherwise negative.    General: no fever, chills, weakness  HEENT: No loss of hearing, vertigo, ear ringing, double/blurry vision, sore throat, epistaxis  Pulmonary: + HANEY, + occasional cough, No wheezing,hemoptysis, chest pain  Sleep: No EDS, snoring, insomnia, cataplexy, restless legs, REM behavior   CVS: No chest discomfort, palpitations  GI: No diarrhea, constipation, dysphagia, early satiety, bleeding  Renal: No pain on urination, hematuria, freq micturation  Musculoskeletal: No muscle ache, joint pain, swelling  Skin: No rash, ecchymosis, itching, icterus  Neurology: No tingling, weakness, numbness  Heme: No easy bruisibility or bleeding  Allergy: runny nose, sneezing, urticeria  Psychiatry: no change in mood and affect         Physical Examination:   General: Alert, oriented, not in distress  B/P: 112/74, T: Data Unavailable, P: 68, R: 20  HEENT: neck supple, symmetrical, no lymph node enlargement, Lungs: both hemithoraces are symmetrical, bibasilar crackles  CVS: Normal S1 and S2, no additional heart sounds, murmur, rub, normal peripheral pulses  Abdomen:  Bowel sounds present, soft, non tender, non distended, no organomegaly, ascitis, mass  Extremities/musculoskeletal: no peripheral edema, deformity, cyanosis, clubbing  Neurology: alert, orientedx3, no motor/sensorial deficits, cranial nerves grossly normal  Skin: no rash  Psychiatry: Mood and affect are appropriate             DATA:     CBC RESULTS:     Recent Labs   Lab Test  10/31/16   1455  10/23/16   0533   WBC  5.4  7.3   RBC  4.37  3.55*   HGB  13.5  10.7*   HCT  40.8  32.4*   PLT  328  112*       Basic Metabolic Panel:  Recent Labs   Lab Test 04/16/18 02/19/18   10/24/16   0530  10/23/16   0533   NA   --    --    --   137  138   POTASSIUM   --    --    --   3.9  3.9   CHLORIDE   --    --    --   103  104   CO2   --    --    --   28  28   BUN   --    --    --   18  12   CR  0.580  0.590   < >  0.61  0.58   GLC   --    --    --   96  99   TU   --    --    --   8.9  8.4*    < > = values in this interval not displayed.       INR  Recent Labs   Lab Test  12/04/15   0745   INR  0.95        PFT   PFT Latest Ref Rng & Units 5/30/2018   FVC L 1.28   FEV1 L 1.13   FVC% % 53   FEV1% % 61               Pulmonary Consult Attending Note:    I saw and examined the patient w the resident/fellow. I reviewed the labs, imaging studies and cultures.    Please see the resident's/fellow's note for detailed physical exam, assessment and plan which I agree and formulated together.    Rubens Quispe MD

## 2018-06-18 ENCOUNTER — TRANSFERRED RECORDS (OUTPATIENT)
Dept: HEALTH INFORMATION MANAGEMENT | Facility: CLINIC | Age: 78
End: 2018-06-18

## 2018-06-18 LAB
ALT SERPL-CCNC: 10 IU/L (ref 5–35)
AST SERPL-CCNC: 21 U/L (ref 5–34)
CREAT SERPL-MCNC: 0.59 MG/DL (ref 0.5–1.3)
GFR SERPL CREATININE-BSD FRML MDRD: 104.8 ML/MIN/1.73M2

## 2018-06-25 LAB
DLCOUNC-%PRED-PRE: 60 %
DLCOUNC-PRE: 11.33 ML/MIN/MMHG
DLCOUNC-PRED: 18.77 ML/MIN/MMHG
ERV-%PRED-PRE: -889 %
ERV-PRE: 0.36 L
ERV-PRED: -0.04 L
EXPTIME-PRE: 6.71 SEC
FEF2575-%PRED-PRE: 98 %
FEF2575-PRE: 1.5 L/SEC
FEF2575-PRED: 1.53 L/SEC
FEFMAX-%PRED-PRE: 87 %
FEFMAX-PRE: 4.08 L/SEC
FEFMAX-PRED: 4.64 L/SEC
FEV1-%PRED-PRE: 61 %
FEV1-PRE: 1.13 L
FEV1FEV6-PRE: 89 %
FEV1FEV6-PRED: 78 %
FEV1FVC-PRE: 89 %
FEV1FVC-PRED: 74 %
FEV1SVC-PRE: 70 %
FEV1SVC-PRED: 70 %
FIFMAX-PRE: 3.26 L/SEC
FRCPLETH-%PRED-PRE: 60 %
FRCPLETH-PRE: 1.58 L
FRCPLETH-PRED: 2.61 L
FVC-%PRED-PRE: 53 %
FVC-PRE: 1.28 L
FVC-PRED: 2.41 L
IC-%PRED-PRE: 46 %
IC-PRE: 1.25 L
IC-PRED: 2.67 L
RVPLETH-%PRED-PRE: 58 %
RVPLETH-PRE: 1.23 L
RVPLETH-PRED: 2.11 L
TLCPLETH-%PRED-PRE: 61 %
TLCPLETH-PRE: 2.84 L
TLCPLETH-PRED: 4.6 L
VA-%PRED-PRE: 50 %
VA-PRE: 2.37 L
VC-%PRED-PRE: 61 %
VC-PRE: 1.61 L
VC-PRED: 2.63 L

## 2018-06-27 ENCOUNTER — HOSPITAL ENCOUNTER (OUTPATIENT)
Dept: MAMMOGRAPHY | Facility: CLINIC | Age: 78
Discharge: HOME | End: 2018-06-27
Attending: INTERNAL MEDICINE | Admitting: INTERNAL MEDICINE
Payer: MEDICARE

## 2018-06-27 DIAGNOSIS — Z12.31 VISIT FOR SCREENING MAMMOGRAM: ICD-10-CM

## 2018-06-27 PROCEDURE — 77067 SCR MAMMO BI INCL CAD: CPT | Mod: 52

## 2018-06-28 ENCOUNTER — HOSPITAL ENCOUNTER (OUTPATIENT)
Dept: CARDIAC REHAB | Facility: CLINIC | Age: 78
End: 2018-06-28
Attending: INTERNAL MEDICINE
Payer: MEDICARE

## 2018-06-28 DIAGNOSIS — J84.9 ILD (INTERSTITIAL LUNG DISEASE) (H): ICD-10-CM

## 2018-06-28 PROCEDURE — 40000244 ZZH STATISTIC VISIT PULM REHAB: Performed by: CLINICAL EXERCISE PHYSIOLOGIST

## 2018-06-28 PROCEDURE — G0238 OTH RESP PROC, INDIV: HCPCS | Performed by: CLINICAL EXERCISE PHYSIOLOGIST

## 2018-07-03 ENCOUNTER — HOSPITAL ENCOUNTER (OUTPATIENT)
Dept: CARDIAC REHAB | Facility: CLINIC | Age: 78
End: 2018-07-03
Payer: MEDICARE

## 2018-07-03 PROCEDURE — G0238 OTH RESP PROC, INDIV: HCPCS

## 2018-07-03 PROCEDURE — 40000244 ZZH STATISTIC VISIT PULM REHAB

## 2018-07-05 ENCOUNTER — HOSPITAL ENCOUNTER (OUTPATIENT)
Dept: CARDIAC REHAB | Facility: CLINIC | Age: 78
End: 2018-07-05
Payer: MEDICARE

## 2018-07-05 PROCEDURE — 40000244 ZZH STATISTIC VISIT PULM REHAB: Performed by: CLINICAL EXERCISE PHYSIOLOGIST

## 2018-07-05 PROCEDURE — G0239 OTH RESP PROC, GROUP: HCPCS | Performed by: CLINICAL EXERCISE PHYSIOLOGIST

## 2018-07-10 ENCOUNTER — HOSPITAL ENCOUNTER (OUTPATIENT)
Dept: CARDIAC REHAB | Facility: CLINIC | Age: 78
End: 2018-07-10
Payer: MEDICARE

## 2018-07-10 PROCEDURE — G0239 OTH RESP PROC, GROUP: HCPCS | Performed by: CLINICAL EXERCISE PHYSIOLOGIST

## 2018-07-10 PROCEDURE — 40000244 ZZH STATISTIC VISIT PULM REHAB: Performed by: CLINICAL EXERCISE PHYSIOLOGIST

## 2018-07-12 ENCOUNTER — HOSPITAL ENCOUNTER (OUTPATIENT)
Dept: CARDIAC REHAB | Facility: CLINIC | Age: 78
End: 2018-07-12
Payer: MEDICARE

## 2018-07-12 PROCEDURE — G0239 OTH RESP PROC, GROUP: HCPCS

## 2018-07-12 PROCEDURE — 40000244 ZZH STATISTIC VISIT PULM REHAB

## 2018-07-17 ENCOUNTER — HOSPITAL ENCOUNTER (OUTPATIENT)
Dept: CARDIAC REHAB | Facility: CLINIC | Age: 78
End: 2018-07-17
Payer: MEDICARE

## 2018-07-17 PROCEDURE — 40000244 ZZH STATISTIC VISIT PULM REHAB: Performed by: CLINICAL EXERCISE PHYSIOLOGIST

## 2018-07-17 PROCEDURE — G0239 OTH RESP PROC, GROUP: HCPCS | Performed by: CLINICAL EXERCISE PHYSIOLOGIST

## 2018-07-18 DIAGNOSIS — E78.5 HYPERLIPIDEMIA LDL GOAL <100: ICD-10-CM

## 2018-07-18 NOTE — TELEPHONE ENCOUNTER
"Pending Prescriptions:                       Disp   Refills    simvastatin (ZOCOR) 40 MG tablet          90 tab*2            Sig: Take 1 tablet (40 mg) by mouth At Bedtime      Last Written Prescription Date:  10/06/2017  Last Fill Quantity: 90,  # refills: 2   Last office visit: 10/6/2017 with prescribing provider:     Future Office Visit:    Requested Prescriptions   Pending Prescriptions Disp Refills     simvastatin (ZOCOR) 40 MG tablet 90 tablet 2     Sig: Take 1 tablet (40 mg) by mouth At Bedtime    Statins Protocol Failed    7/18/2018  4:26 PM       Failed - LDL on file in past 12 months    Recent Labs   Lab Test  07/01/16   0805   LDL  75            Passed - No abnormal creatine kinase in past 12 months    No lab results found.            Passed - Recent (12 mo) or future (30 days) visit within the authorizing provider's specialty    Patient had office visit in the last 12 months or has a visit in the next 30 days with authorizing provider or within the authorizing provider's specialty.  See \"Patient Info\" tab in inbasket, or \"Choose Columns\" in Meds & Orders section of the refill encounter.           Passed - Patient is age 18 or older       Passed - No active pregnancy on record       Passed - No positive pregnancy test in past 12 months          "

## 2018-07-19 ENCOUNTER — HOSPITAL ENCOUNTER (OUTPATIENT)
Dept: CARDIAC REHAB | Facility: CLINIC | Age: 78
End: 2018-07-19
Payer: MEDICARE

## 2018-07-19 PROCEDURE — G0239 OTH RESP PROC, GROUP: HCPCS | Performed by: CLINICAL EXERCISE PHYSIOLOGIST

## 2018-07-19 PROCEDURE — 40000244 ZZH STATISTIC VISIT PULM REHAB: Performed by: CLINICAL EXERCISE PHYSIOLOGIST

## 2018-07-19 RX ORDER — SIMVASTATIN 40 MG
40 TABLET ORAL AT BEDTIME
Qty: 90 TABLET | Refills: 2 | Status: SHIPPED | OUTPATIENT
Start: 2018-07-19 | End: 2019-02-13

## 2018-07-24 ENCOUNTER — HOSPITAL ENCOUNTER (OUTPATIENT)
Dept: CARDIAC REHAB | Facility: CLINIC | Age: 78
End: 2018-07-24
Payer: MEDICARE

## 2018-07-24 PROCEDURE — 40000244 ZZH STATISTIC VISIT PULM REHAB: Performed by: CLINICAL EXERCISE PHYSIOLOGIST

## 2018-07-24 PROCEDURE — G0239 OTH RESP PROC, GROUP: HCPCS | Performed by: CLINICAL EXERCISE PHYSIOLOGIST

## 2018-07-26 ENCOUNTER — HOSPITAL ENCOUNTER (OUTPATIENT)
Dept: CARDIAC REHAB | Facility: CLINIC | Age: 78
End: 2018-07-26
Payer: MEDICARE

## 2018-07-26 PROCEDURE — 40000244 ZZH STATISTIC VISIT PULM REHAB

## 2018-07-26 PROCEDURE — G0239 OTH RESP PROC, GROUP: HCPCS

## 2018-07-31 ENCOUNTER — HOSPITAL ENCOUNTER (OUTPATIENT)
Dept: CARDIAC REHAB | Facility: CLINIC | Age: 78
End: 2018-07-31
Payer: MEDICARE

## 2018-07-31 PROCEDURE — 40000244 ZZH STATISTIC VISIT PULM REHAB: Performed by: CLINICAL EXERCISE PHYSIOLOGIST

## 2018-07-31 PROCEDURE — G0239 OTH RESP PROC, GROUP: HCPCS | Performed by: CLINICAL EXERCISE PHYSIOLOGIST

## 2018-08-02 ENCOUNTER — HOSPITAL ENCOUNTER (OUTPATIENT)
Dept: CARDIAC REHAB | Facility: CLINIC | Age: 78
End: 2018-08-02
Payer: MEDICARE

## 2018-08-02 PROCEDURE — G0239 OTH RESP PROC, GROUP: HCPCS | Performed by: REHABILITATION PRACTITIONER

## 2018-08-02 PROCEDURE — 40000244 ZZH STATISTIC VISIT PULM REHAB: Performed by: REHABILITATION PRACTITIONER

## 2018-08-07 ENCOUNTER — HOSPITAL ENCOUNTER (OUTPATIENT)
Dept: CARDIAC REHAB | Facility: CLINIC | Age: 78
End: 2018-08-07
Payer: MEDICARE

## 2018-08-07 PROCEDURE — G0239 OTH RESP PROC, GROUP: HCPCS

## 2018-08-07 PROCEDURE — 40000244 ZZH STATISTIC VISIT PULM REHAB

## 2018-08-09 ENCOUNTER — HOSPITAL ENCOUNTER (OUTPATIENT)
Dept: CARDIAC REHAB | Facility: CLINIC | Age: 78
End: 2018-08-09
Payer: MEDICARE

## 2018-08-09 PROCEDURE — 40000244 ZZH STATISTIC VISIT PULM REHAB: Performed by: CLINICAL EXERCISE PHYSIOLOGIST

## 2018-08-09 PROCEDURE — G0239 OTH RESP PROC, GROUP: HCPCS | Performed by: CLINICAL EXERCISE PHYSIOLOGIST

## 2018-08-13 ENCOUNTER — TRANSFERRED RECORDS (OUTPATIENT)
Dept: HEALTH INFORMATION MANAGEMENT | Facility: CLINIC | Age: 78
End: 2018-08-13

## 2018-08-16 ENCOUNTER — HOSPITAL ENCOUNTER (OUTPATIENT)
Dept: CARDIAC REHAB | Facility: CLINIC | Age: 78
End: 2018-08-16
Payer: MEDICARE

## 2018-08-16 PROCEDURE — G0239 OTH RESP PROC, GROUP: HCPCS

## 2018-08-16 PROCEDURE — 40000244 ZZH STATISTIC VISIT PULM REHAB

## 2018-08-21 ENCOUNTER — HOSPITAL ENCOUNTER (OUTPATIENT)
Dept: CARDIAC REHAB | Facility: CLINIC | Age: 78
End: 2018-08-21
Payer: MEDICARE

## 2018-08-21 PROCEDURE — G0239 OTH RESP PROC, GROUP: HCPCS | Performed by: REHABILITATION PRACTITIONER

## 2018-08-21 PROCEDURE — 40000244 ZZH STATISTIC VISIT PULM REHAB: Performed by: REHABILITATION PRACTITIONER

## 2018-08-23 ENCOUNTER — HOSPITAL ENCOUNTER (OUTPATIENT)
Dept: CARDIAC REHAB | Facility: CLINIC | Age: 78
End: 2018-08-23
Payer: MEDICARE

## 2018-08-23 PROCEDURE — 40000244 ZZH STATISTIC VISIT PULM REHAB: Performed by: REHABILITATION PRACTITIONER

## 2018-08-23 PROCEDURE — G0239 OTH RESP PROC, GROUP: HCPCS | Performed by: REHABILITATION PRACTITIONER

## 2018-08-28 ENCOUNTER — HOSPITAL ENCOUNTER (OUTPATIENT)
Dept: CARDIAC REHAB | Facility: CLINIC | Age: 78
End: 2018-08-28
Payer: MEDICARE

## 2018-08-28 PROCEDURE — G0239 OTH RESP PROC, GROUP: HCPCS

## 2018-08-28 PROCEDURE — 40000244 ZZH STATISTIC VISIT PULM REHAB

## 2018-09-04 ENCOUNTER — HOSPITAL ENCOUNTER (OUTPATIENT)
Dept: CARDIAC REHAB | Facility: CLINIC | Age: 78
End: 2018-09-04
Payer: MEDICARE

## 2018-09-04 PROCEDURE — 40000244 ZZH STATISTIC VISIT PULM REHAB

## 2018-09-04 PROCEDURE — G0239 OTH RESP PROC, GROUP: HCPCS

## 2018-09-06 ENCOUNTER — HOSPITAL ENCOUNTER (OUTPATIENT)
Dept: CARDIAC REHAB | Facility: CLINIC | Age: 78
End: 2018-09-06
Payer: MEDICARE

## 2018-09-06 PROCEDURE — G0239 OTH RESP PROC, GROUP: HCPCS | Performed by: REHABILITATION PRACTITIONER

## 2018-09-06 PROCEDURE — 40000244 ZZH STATISTIC VISIT PULM REHAB: Performed by: REHABILITATION PRACTITIONER

## 2018-09-13 ENCOUNTER — OFFICE VISIT (OUTPATIENT)
Dept: PULMONOLOGY | Facility: CLINIC | Age: 78
End: 2018-09-13
Attending: INTERNAL MEDICINE
Payer: MEDICARE

## 2018-09-13 ENCOUNTER — RADIANT APPOINTMENT (OUTPATIENT)
Dept: CARDIOLOGY | Facility: CLINIC | Age: 78
End: 2018-09-13
Attending: INTERNAL MEDICINE
Payer: MEDICARE

## 2018-09-13 VITALS
DIASTOLIC BLOOD PRESSURE: 64 MMHG | OXYGEN SATURATION: 96 % | HEART RATE: 78 BPM | WEIGHT: 221 LBS | BODY MASS INDEX: 40.67 KG/M2 | RESPIRATION RATE: 17 BRPM | SYSTOLIC BLOOD PRESSURE: 106 MMHG | HEIGHT: 62 IN

## 2018-09-13 DIAGNOSIS — R06.09 DYSPNEA ON EXERTION: ICD-10-CM

## 2018-09-13 DIAGNOSIS — R91.1 LUNG NODULE: Primary | ICD-10-CM

## 2018-09-13 DIAGNOSIS — J84.9 ILD (INTERSTITIAL LUNG DISEASE) (H): Primary | ICD-10-CM

## 2018-09-13 DIAGNOSIS — J84.10 PULMONARY FIBROSIS (H): ICD-10-CM

## 2018-09-13 PROCEDURE — G0463 HOSPITAL OUTPT CLINIC VISIT: HCPCS | Mod: ZF

## 2018-09-13 ASSESSMENT — PAIN SCALES - GENERAL: PAINLEVEL: NO PAIN (0)

## 2018-09-13 NOTE — PROGRESS NOTES
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  She was briefly followed by Dr. Quispe.  She is seen today for follow-up.  Overall she feels she is doing reasonably well and does feel her breathing is stable.  She does certainly have some mild to moderate dyspnea on exertion and notes that she has good days and bad days however she does not feel that this is significantly changed compared to previous.  She is currently maintained on 10 mg of methotrexate and Remicade. She recently completed a course of pulmonary rehab and did feel that this helped her.  She also has a history of squamous cell cancer of the lung and follows in the oncology clinic. [facility].    Current Outpatient Prescriptions   Medication     albuterol (PROAIR HFA/PROVENTIL HFA/VENTOLIN HFA) 108 (90 BASE) MCG/ACT Inhaler     apixaban ANTICOAGULANT (ELIQUIS) 5 MG tablet     Calcium Citrate-Vitamin D (CITRACAL + D PO)     CENTRUM SILVER OR TABS     FOLIC ACID 1 MG OR TABS     InFLIXimab (REMICADE IV)     Lactobacillus Acidophilus POWD     levothyroxine (SYNTHROID/LEVOTHROID) 75 MCG tablet     MELATONIN PO     Methotrexate Sodium (METHOTREXATE PO)     metoprolol succinate (TOPROL-XL) 25 MG 24 hr tablet     Multiple Vitamins-Minerals (ICAPS) CAPS     NIFEDICAL XL 60 MG PO TB24     omeprazole (PRILOSEC) 20 MG CR capsule     simvastatin (ZOCOR) 40 MG tablet     Pseudoephedrine-Guaifenesin (MUCUS D PO)     No current facility-administered medications for this visit.      Allergies   Allergen Reactions     No Known Allergies      Past Medical History:   Diagnosis Date     Amaurosis fugax 5-11    Right     Carotid artery stenosis      Esophageal reflux 3/11/2004     HELICOBACTER PYLORI  INFECTION 7/11/2006     hx of CA in situ RT breast-1990.mastectomy 4/17/2007     Hyperlipidemia LDL goal <70 6/20/2011     Lung cancer (H)     squamous cell lung ca left lower lobe     OBESITY NOS 3/11/2004     OSTEOPOROSIS NOS 3/11/2004     Other psoriasis 3/11/2004     RA (rheumatoid arthritis) (H) 2/8/2010     Raynaud's syndrome 4/10/2006     Scleroderma (H)      Sleep apnea     CPAP       Past Surgical History:   Procedure Laterality Date     BREAST SURGERY       C NONSPECIFIC PROCEDURE  1990    mastectomy RT breast     COLONOSCOPY  5/24/16     CONIZATION       ENDARTERECTOMY CAROTID  2011     HC COLONOSCOPY THRU STOMA, DIAGNOSTIC  2001 2011    diverticulosis     REPAIR HAMMER TOE       TONSILLECTOMY         Social History     Social History     Marital status:      Spouse name: N/A     Number of children: N/A     Years of education: N/A     Occupational History     Not on file.     Social History Main Topics     Smoking status: Former Smoker     Packs/day: 1.00     Years: 30.00     Types: Cigarettes     Quit date: 3/11/1992     Smokeless tobacco: Never Used     Alcohol use 0.0 oz/week     0 Standard drinks or equivalent per week      Comment: rare     Drug use: No     Sexual activity: Not Currently     Partners: Male     Other Topics Concern     Parent/Sibling W/ Cabg, Mi Or Angioplasty Before 65f 55m? No     Social History Narrative       Family History   Problem Relation Age of Onset     Cancer - colorectal Mother      Cerebrovascular Disease Father      Cancer Sister 31     ovarian     HEART DISEASE Sister      GASTROINTESTINAL DISEASE Sister      crohns     GASTROINTESTINAL DISEASE Sister      diverticulitis     GASTROINTESTINAL DISEASE Brother      diverticulitis     Alzheimer Disease Sister      Cerebrovascular Disease Maternal Grandmother      Diabetes Maternal Grandmother      Mental Illness Maternal Grandmother      Diabetes Maternal Uncle        ROS Pulmonary    A complete ROS was otherwise  "negative except as noted in the HPI.  Vitals:    09/13/18 0857   BP: 106/64   Pulse: 78   Resp: 17   SpO2: 96%   Weight: 100.2 kg (221 lb)   Height: 1.575 m (5' 2\")     Exam:   GENERAL APPEARANCE: Well developed, well nourished, alert, and in no apparent distress.  NECK: supple, no masses, no thyromegaly.  LYMPHATICS: No significant axillary, cervical, or supraclavicular nodes.  RESP: good air flow throughout, - bibasilar crackles, some inspiratory squeaks, R>L  CV: Normal S1, S2, regular rhythm, normal rate, no rub, no murmur,  no gallop, no LE edema.   ABDOMEN:  Bowel sounds normal, soft, nontender, no HSM or masses.   MS: extremities normal- no clubbing, no cyanosis.  SKIN: no rash on limited exam  NEURO: Mentation intact, speech normal, normal strength and tone, normal gait and stance  PSYCH: mentation appears normal. and affect normal/bright  Results: I have reviewed all imaging, PFTs and other relavent tests, please see below for details, PFT and imaging results were reviewed with the patient.  PFTs: Moderate restriction with moderate reduction in diffusing capacity.  These are stable from previous.    Assessment and plan:    77-year-old female with RA associated ILD that appears to be relatively stable currently on methotrexate and rituximab.  Would not make any changes in her regimen.  I did encourage her to continue with the exercise that she has been doing in the pulmonary rehab.  Otherwise we will have her continue with the rituximab and the methotrexate.  I will continue to follow her every 3-4 months with pulmonary function test.  I have instructed her to call sooner if she notices any significant changes in her breathing or signs or symptoms of respiratory infection.      CBC   Recent Labs   Lab Test  10/31/16   1455  10/23/16   0533   RBC  4.37  3.55*   HGB  13.5  10.7*   HCT  40.8  32.4*   PLT  328  112*       Basic Metabolic Panel  Recent Labs   Lab Test  10/24/16   0530  10/23/16   0533   NA  137  " 138   POTASSIUM  3.9  3.9   CHLORIDE  103  104   CO2  28  28   BUN  18  12   GLC  96  99   TU  8.9  8.4*       INR  Recent Labs   Lab Test  12/04/15   0745   INR  0.95       PFT  PFT Latest Ref Rng & Units 9/13/2018   FVC L 1.40   FEV1 L 1.29   FVC% % 58   FEV1% % 70           CC:

## 2018-09-13 NOTE — LETTER
9/13/2018       RE: Jaqueline Canales  [address]  [facility] [address]     Dear Colleague,    Thank you for referring your patient, Jaqueline Canales, to the [facility] at [facility]. Please see a copy of my visit note below.    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  She was briefly followed by Dr. Quispe.  She is seen today for follow-up.  Overall she feels she is doing reasonably well and does feel her breathing is stable.  She does certainly have some mild to moderate dyspnea on exertion and notes that she has good days and bad days however she does not feel that this is significantly changed compared to previous.  She is currently maintained on 10 mg of methotrexate and Remicade. She recently completed a course of pulmonary rehab and did feel that this helped her.  She also has a history of squamous cell cancer of the lung and follows in the oncology clinic. She [facility]    Current Outpatient Prescriptions   Medication     albuterol (PROAIR HFA/PROVENTIL HFA/VENTOLIN HFA) 108 (90 BASE) MCG/ACT Inhaler     apixaban ANTICOAGULANT (ELIQUIS) 5 MG tablet     Calcium Citrate-Vitamin D (CITRACAL + D PO)     CENTRUM SILVER OR TABS     FOLIC ACID 1 MG OR TABS     InFLIXimab (REMICADE IV)     Lactobacillus Acidophilus POWD     levothyroxine (SYNTHROID/LEVOTHROID) 75 MCG tablet     MELATONIN PO     Methotrexate Sodium (METHOTREXATE PO)     metoprolol succinate (TOPROL-XL) 25 MG 24 hr tablet     Multiple Vitamins-Minerals (ICAPS) CAPS     NIFEDICAL XL 60 MG PO TB24     omeprazole (PRILOSEC) 20 MG CR capsule     simvastatin (ZOCOR) 40 MG tablet     Pseudoephedrine-Guaifenesin  (MUCUS D PO)     No current facility-administered medications for this visit.      Allergies   Allergen Reactions     No Known Allergies      Past Medical History:   Diagnosis Date     Amaurosis fugax 5-11    Right     Carotid artery stenosis      Esophageal reflux 3/11/2004     HELICOBACTER PYLORI INFECTION 7/11/2006     hx of CA in situ RT breast-1990.mastectomy 4/17/2007     Hyperlipidemia LDL goal <70 6/20/2011     Lung cancer (H)     squamous cell lung ca left lower lobe     OBESITY NOS 3/11/2004     OSTEOPOROSIS NOS 3/11/2004     Other psoriasis 3/11/2004     RA (rheumatoid arthritis) (H) 2/8/2010     Raynaud's syndrome 4/10/2006     Scleroderma (H)      Sleep apnea     CPAP       Past Surgical History:   Procedure Laterality Date     BREAST SURGERY       C NONSPECIFIC PROCEDURE  1990    mastectomy RT breast     COLONOSCOPY  5/24/16     CONIZATION       ENDARTERECTOMY CAROTID  2011     HC COLONOSCOPY THRU STOMA, DIAGNOSTIC  2001 2011    diverticulosis     REPAIR HAMMER TOE       TONSILLECTOMY         Social History     Social History     Marital status:      Spouse name: N/A     Number of children: N/A     Years of education: N/A     Occupational History     Not on file.     Social History Main Topics     Smoking status: Former Smoker     Packs/day: 1.00     Years: 30.00     Types: Cigarettes     Quit date: 3/11/1992     Smokeless tobacco: Never Used     Alcohol use 0.0 oz/week     0 Standard drinks or equivalent per week      Comment: rare     Drug use: No     Sexual activity: Not Currently     Partners: Male     Other Topics Concern     Parent/Sibling W/ Cabg, Mi Or Angioplasty Before 65f 55m? No     Social History Narrative       Family History   Problem Relation Age of Onset     Cancer - colorectal Mother      Cerebrovascular Disease Father      Cancer Sister 31     ovarian     HEART DISEASE Sister      GASTROINTESTINAL DISEASE Sister      crohns     GASTROINTESTINAL DISEASE Sister       "diverticulitis     GASTROINTESTINAL DISEASE Brother      diverticulitis     Alzheimer Disease Sister      Cerebrovascular Disease Maternal Grandmother      Diabetes Maternal Grandmother      Mental Illness Maternal Grandmother      Diabetes Maternal Uncle        ROS Pulmonary    A complete ROS was otherwise negative except as noted in the HPI.  Vitals:    09/13/18 0857   BP: 106/64   Pulse: 78   Resp: 17   SpO2: 96%   Weight: 100.2 kg (221 lb)   Height: 1.575 m (5' 2\")     Exam:   GENERAL APPEARANCE: Well developed, well nourished, alert, and in no apparent distress.  NECK: supple, no masses, no thyromegaly.  LYMPHATICS: No significant axillary, cervical, or supraclavicular nodes.  RESP: good air flow throughout, - bibasilar crackles, some inspiratory squeaks, R>L  CV: Normal S1, S2, regular rhythm, normal rate, no rub, no murmur,  no gallop, no LE edema.   ABDOMEN:  Bowel sounds normal, soft, nontender, no HSM or masses.   MS: extremities normal- no clubbing, no cyanosis.  SKIN: no rash on limited exam  NEURO: Mentation intact, speech normal, normal strength and tone, normal gait and stance  PSYCH: mentation appears normal. and affect normal/bright  Results: I have reviewed all imaging, PFTs and other relavent tests, please see below for details, PFT and imaging results were reviewed with the patient.  PFTs: Moderate restriction with moderate reduction in diffusing capacity.  These are stable from previous.    Assessment and plan:    77-year-old female with RA associated ILD that appears to be relatively stable currently on methotrexate and rituximab.  Would not make any changes in her regimen.  I did encourage her to continue with the exercise that she has been doing in the pulmonary rehab.  Otherwise we will have her continue with the rituximab and the methotrexate.  I will continue to follow her every 3-4 months with pulmonary function test.  I have instructed her to call sooner if she notices any significant " changes in her breathing or signs or symptoms of respiratory infection.      CBC   Recent Labs   Lab Test  10/31/16   1455  10/23/16   0533   RBC  4.37  3.55*   HGB  13.5  10.7*   HCT  40.8  32.4*   PLT  328  112*       Basic Metabolic Panel  Recent Labs   Lab Test  10/24/16   0530  10/23/16   0533   NA  137  138   POTASSIUM  3.9  3.9   CHLORIDE  103  104   CO2  28  28   BUN  18  12   GLC  96  99   TU  8.9  8.4*       INR  Recent Labs   Lab Test  12/04/15   0745   INR  0.95       PFT  PFT Latest Ref Rng & Units 9/13/2018   FVC L 1.40   FEV1 L 1.29   FVC% % 58   FEV1% % 70       Again, thank you for allowing me to participate in the care of your patient.      Sincerely,    David Morris Perlman, MD

## 2018-09-13 NOTE — MR AVS SNAPSHOT
After Visit Summary   9/13/2018    Jaqueline Canales    MRN: [id number]           Patient Information     Date Of Birth          1940        Visit Information        Provider Department      9/13/2018 9:30 AM Perlman, David Morris, MD [facility]        Today's Diagnoses     ILD (interstitial lung disease) (H)    -  1       Follow-ups after your visit        Follow-up notes from your care team     Return in about 4 months (around 1/13/2019).      Your next 10 appointments already scheduled     Sep 13, 2018 10:00 AM CDT   Ech Complete with [id number]   [facility] ([facility])    [address]  3rd Floor  [place] [address] [phone]           1.  Please bring or wear a comfortable two-piece outfit. 2.  You may eat, drink and take your normal medicines. 3.  For any questions that cannot be answered, please contact the ordering physician 4.  Please do not wear perfumes or scented lotions on the day of your exam.            Sep 27, 2018 10:30 AM CDT   Pulmonary D/C Session with  Pulmonary Rehab 2   [facility] ([facility])    [address], [address]  [facility] [address] [phone]            Nov 12, 2018 11:00 AM CST   CT CHEST W/O CONTRAST with SHCT1   [facility] ([facility])    [address] [address] [phone]           How do I prepare for my exam? (Food and drink instructions) No Food and Drink Restrictions.  How do I prepare for my exam? (Other instructions) You do not need to do anything special to prepare for this exam. For a sinus scan: Use your nose spray (nasal decongestant spray) as directed.  What should I wear: Please wear loose clothing, such as a sweat suit or jogging clothes. Avoid snaps, zippers and other metal. We may ask you to undress and put on a hospital gown.  How long does the exam take: Most  scans take less than 20 minutes.  What should I bring: Please bring any scans or X-rays taken at other hospitals, if similar tests were done. Also bring a list of your medicines, including vitamins, minerals and over-the-counter drugs. It is safest to leave personal items at home.  Do I need a : No  is needed.  What do I need to tell my doctor? Be sure to tell your doctor: * If you have any allergies. * If there s any chance you are pregnant. * If you are breastfeeding.  What should I do after the exam: No restrictions, You may resume normal activities.  What is this test: A CT (computed tomography) scan is a series of pictures that allows us to look inside your body. The scanner creates images of the body in cross sections, much like slices of bread. This helps us see any problems more clearly.  Who should I call with questions: If you have any questions, please call the Imaging Department where you will have your exam. Directions, parking instructions, and other information is available on our website, [email or web address].            Nov 15, 2018 10:45 AM CST   Return Visit with Anabell Paiz MD   [facility] ([facility])    [facility]  [address]  [facility] [address]   [phone]            Amando 15, 2019  1:30 PM CST   PFT VISIT with SUNSHINE DAI   [facility] ([facility])    [address]  3rd Floor  [facility] [id number] [phone]            Amando 15, 2019  2:00 PM CST   (Arrive by 1:45 PM)   Return Interstitial Lung with David Morris Perlman, MD   [facility] ([facility])    [address]  [address] [place] [id number] [phone]              Future tests that were ordered for you today     Open Future Orders        Priority Expected Expires Ordered    General PFT Lab (Please always keep checked) Routine  " 9/13/2019 9/13/2018    Pulmonary Function Test Routine  9/13/2019 9/13/2018            Who to contact     If you have questions or need follow up information about today's clinic visit or your schedule please contact [facility] directly at [phone].  Normal or non-critical lab and imaging results will be communicated to you by MyChart, letter or phone within 4 business days after the clinic has received the results. If you do not hear from us within 7 days, please contact the clinic through MyChart or phone. If you have a critical or abnormal lab result, we will notify you by phone as soon as possible.  Submit refill requests through [employer] or call your pharmacy and they will forward the refill request to us. Please allow 3 business days for your refill to be completed.          Additional Information About Your Visit        Care EveryWhere ID     This is your Care EveryWhere ID. This could be used by other organizations to access your [facility] medical records  [id number]        Your Vitals Were     Pulse Respirations Height Pulse Oximetry BMI (Body Mass Index)       78 17 1.575 m (5' 2\") 96% 40.42 kg/m2        Blood Pressure from Last 3 Encounters:   09/13/18 106/64   05/30/18 112/74   05/23/18 104/45    Weight from Last 3 Encounters:   09/13/18 100.2 kg (221 lb)   05/30/18 100.2 kg (221 lb)   05/23/18 100.6 kg (221 lb 12.8 oz)               Primary Care Provider Office Phone # Fax #    Carissa Yadiel Burroughs [phone] [address] [address]        Equal Access to Services     [facility] : Hadeladio Telles, phillip smith. So [facility] [phone].    ATENCIÓN: Si habla español, tiene a esquivel disposición servicios gratuitos de asistencia lingüística. Valeria al [phone].    We comply with applicable federal civil rights laws and [place] laws. We do not " discriminate on the basis of race, color, national origin, age, disability, sex, sexual orientation, or gender identity.            Thank you!     Thank you for choosing [facility]  for your care. Our goal is always to provide you with excellent care. Hearing back from our patients is one way we can continue to improve our services. Please take a few minutes to complete the written survey that you may receive in the mail after your visit with us. Thank you!             Your Updated Medication List - Protect others around you: Learn how to safely use, store and throw away your medicines at www.disposemymeds.org.          This list is accurate as of 9/13/18  9:52 AM.  Always use your most recent med list.                   Brand Name Dispense Instructions for use Diagnosis    albuterol 108 (90 Base) MCG/ACT inhaler    PROAIR HFA/PROVENTIL HFA/VENTOLIN HFA    3 Inhaler    Inhale 2 puffs into the lungs every 6 hours    ILD (interstitial lung disease) (H)       apixaban ANTICOAGULANT 5 MG tablet    ELIQUIS    180 tablet    Take 1 tablet (5 mg) by mouth 2 times daily    Persistent atrial fibrillation (H)       * ICAPS Caps      Take 1 capsule by mouth 2 times daily        * CENTRUM SILVER per tablet      1 TABLET DAILY        CITRACAL + D PO      Take 2 tablets by mouth 2 times daily        folic acid 1 MG tablet    FOLVITE     1 TABLET DAILY    Mixed hyperlipidemia       Lactobacillus Acidophilus Powd      Take 1 packet by mouth daily        levothyroxine 75 MCG tablet    SYNTHROID/LEVOTHROID    90 tablet    Take 1 tablet (75 mcg) by mouth daily    Hypothyroidism, unspecified type       MELATONIN PO      Take 5 mg by mouth nightly as needed        METHOTREXATE PO      Take 2.5 mg by mouth 10 tablets weekly        metoprolol succinate 25 MG 24 hr tablet    TOPROL-XL    90 tablet    Take 1 tablet (25 mg) by mouth daily    Persistent atrial fibrillation (H)       MUCUS D PO            NIFEDICAL XL 60 MG Tb24   Generic drug:  NIFEdipine ER osmotic      1 TABLET DAILY    Raynaud's syndrome       omeprazole 20 MG CR capsule    priLOSEC    180 capsule    Take 1 capsule (20 mg) by mouth 2 times daily Take 30-60 minutes prior to meals    Esophageal stricture       REMICADE IV      Inject 600 mg into the vein Every 8 weeks        simvastatin 40 MG tablet    ZOCOR    90 tablet    Take 1 tablet (40 mg) by mouth At Bedtime    Hyperlipidemia LDL goal <100       * Notice:  This list has 2 medication(s) that are the same as other medications prescribed for you. Read the directions carefully, and ask your doctor or other care provider to review them with you.

## 2018-09-14 LAB
DLCOUNC-%PRED-PRE: 57 %
DLCOUNC-PRE: 10.72 ML/MIN/MMHG
DLCOUNC-PRED: 18.74 ML/MIN/MMHG
ERV-%PRED-PRE: 2958 %
ERV-PRE: 0.3 L
ERV-PRED: 0.01 L
EXPTIME-PRE: 6.99 SEC
FEF2575-%PRED-PRE: 138 %
FEF2575-PRE: 2.1 L/SEC
FEF2575-PRED: 1.52 L/SEC
FEFMAX-%PRED-PRE: 113 %
FEFMAX-PRE: 5.22 L/SEC
FEFMAX-PRED: 4.61 L/SEC
FEV1-%PRED-PRE: 70 %
FEV1-PRE: 1.29 L
FEV1FEV6-PRE: 92 %
FEV1FEV6-PRED: 78 %
FEV1FVC-PRE: 92 %
FEV1FVC-PRED: 74 %
FEV1SVC-PRE: 83 %
FEV1SVC-PRED: 70 %
FIFMAX-PRE: 3.62 L/SEC
FRCPLETH-%PRED-PRE: 57 %
FRCPLETH-PRE: 1.51 L
FRCPLETH-PRED: 2.61 L
FVC-%PRED-PRE: 58 %
FVC-PRE: 1.4 L
FVC-PRED: 2.4 L
IC-%PRED-PRE: 47 %
IC-PRE: 1.25 L
IC-PRED: 2.61 L
RVPLETH-%PRED-PRE: 57 %
RVPLETH-PRE: 1.21 L
RVPLETH-PRED: 2.11 L
TLCPLETH-%PRED-PRE: 59 %
TLCPLETH-PRE: 2.76 L
TLCPLETH-PRED: 4.6 L
VA-%PRED-PRE: 49 %
VA-PRE: 2.32 L
VC-%PRED-PRE: 59 %
VC-PRE: 1.55 L
VC-PRED: 2.62 L

## 2018-09-27 ENCOUNTER — HOSPITAL ENCOUNTER (OUTPATIENT)
Dept: CARDIAC REHAB | Facility: CLINIC | Age: 78
End: 2018-09-27
Payer: MEDICARE

## 2018-09-27 PROCEDURE — G0238 OTH RESP PROC, INDIV: HCPCS | Performed by: CLINICAL EXERCISE PHYSIOLOGIST

## 2018-09-27 PROCEDURE — 40000244 ZZH STATISTIC VISIT PULM REHAB: Performed by: CLINICAL EXERCISE PHYSIOLOGIST

## 2018-10-08 ENCOUNTER — OFFICE VISIT (OUTPATIENT)
Dept: FAMILY MEDICINE | Facility: CLINIC | Age: 78
End: 2018-10-08
Payer: COMMERCIAL

## 2018-10-08 VITALS
BODY MASS INDEX: 40.3 KG/M2 | HEIGHT: 62 IN | OXYGEN SATURATION: 95 % | WEIGHT: 219 LBS | HEART RATE: 73 BPM | TEMPERATURE: 97.3 F | DIASTOLIC BLOOD PRESSURE: 54 MMHG | SYSTOLIC BLOOD PRESSURE: 112 MMHG

## 2018-10-08 DIAGNOSIS — B07.9 VIRAL WARTS, UNSPECIFIED TYPE: ICD-10-CM

## 2018-10-08 DIAGNOSIS — E03.9 HYPOTHYROIDISM, UNSPECIFIED TYPE: ICD-10-CM

## 2018-10-08 DIAGNOSIS — Z00.00 ROUTINE HISTORY AND PHYSICAL EXAMINATION OF ADULT: Primary | ICD-10-CM

## 2018-10-08 PROBLEM — E66.01 MORBID OBESITY (H): Status: ACTIVE | Noted: 2018-10-08

## 2018-10-08 PROCEDURE — 99397 PER PM REEVAL EST PAT 65+ YR: CPT | Mod: 25 | Performed by: INTERNAL MEDICINE

## 2018-10-08 PROCEDURE — 17110 DESTRUCTION B9 LES UP TO 14: CPT | Performed by: INTERNAL MEDICINE

## 2018-10-08 RX ORDER — LEVOTHYROXINE SODIUM 75 UG/1
75 TABLET ORAL DAILY
Qty: 90 TABLET | Refills: 3 | Status: SHIPPED | OUTPATIENT
Start: 2018-10-08 | End: 2019-02-13

## 2018-10-08 NOTE — PROGRESS NOTES
SUBJECTIVE:   Jaqueline Canales is a 77 year old female who presents for Preventive Visit.  Are you in the first 12 months of your Medicare Part B coverage?  No    Healthy Habits:    Do you get at least three servings of calcium containing foods daily (dairy, green leafy vegetables, etc.)? No, 1-2 taking supplements.    Amount of exercise or daily activities, outside of work: no    Problems taking medications regularly No    Medication side effects: No    Have you had an eye exam in the past two years? yes    Do you see a dentist twice per year? yes    Do you have sleep apnea, excessive snoring or daytime drowsiness?yes, daytime drowsiness.    Ability to successfully perform activities of daily living: Yes, no assistance needed    Home safety:  none identified     Hearing impairment: No    Fall risk:  Fallen 2 or more times in the past year?: No  Any fall with injury in the past year?: No        COGNITIVE SCREEN  1) Repeat 3 items (Leader, Season, Table)    2) Clock draw: NORMAL  3) 3 item recall: Recalls 3 objects  Results: 3 items recalled: COGNITIVE IMPAIRMENT LESS LIKELY    Mini-CogTM Kvng Perez. Licensed by the author for use in [facility]; reprinted with permission (adelfo@[facility]). All rights reserved.                Reviewed and updated as needed this visit by clinical staff  Tobacco  Allergies  Soc Hx        Reviewed and updated as needed this visit by Provider        Social History   Substance Use Topics     Smoking status: Former Smoker     Packs/day: 1.00     Years: 30.00     Types: Cigarettes     Quit date: 3/11/1992     Smokeless tobacco: Never Used     Alcohol use 0.0 oz/week     0 Standard drinks or equivalent per week      Comment: rare       If you drink alcohol do you typically have >3 drinks per day or >7 drinks per week? No                        Today's PHQ-2 Score:   PHQ-2 ( 1999 Pf[employer]) 10/6/2017 10/31/2016   Q1: Little interest or pleasure in doing things 0 0   Q2:  "Feeling down, depressed or hopeless 0 0   PHQ-2 Score 0 0       Do you feel safe in your environment - YES    Do you have a Health Care Directive?: Yes: Advance Directive has been received and scanned.    Current providers sharing in care for this patient include:   Patient Care Team:  Carissa Burroughs MD as PCP - General (Internal Medicine)  Lorena Valencia MD as MD (Pulmonary)  Rubens Quispe MD as MD (Pulmonary)  Natalie Bustillos (Rheumatology)  Anabell Paiz MD as Hospitalist (Oncology)  Saritha Liriano RN as Registered Nurse (Cardiology)    The following health maintenance items are reviewed in Epic and correct as of today:  Health Maintenance   Topic Date Due     INFLUENZA VACCINE (1) 09/01/2018     FALL RISK ASSESSMENT  10/06/2018     PHQ-2 Q1 YR  10/06/2018     DEXA Q2 YR  04/10/2019     COLONOSCOPY Q5 YR  05/24/2021     LIPID SCREEN Q5 YR FEMALE (SYSTEM ASSIGNED)  07/01/2021     ADVANCE DIRECTIVE PLANNING Q5 YRS  07/13/2021     TETANUS IMMUNIZATION (SYSTEM ASSIGNED)  06/07/2022     PNEUMOCOCCAL  Completed     Labs reviewed in EPIC      ROS:  Constitutional, HEENT, cardiovascular, pulmonary, GI, , musculoskeletal, neuro, skin, endocrine and psych systems are negative, except as otherwise noted.    OBJECTIVE:   /54 (BP Location: Left arm, Patient Position: Sitting, Cuff Size: Adult Large)  Pulse 73  Temp 97.3  F (36.3  C) (Oral)  Ht 5' 2\" (1.575 m)  Wt 219 lb (99.3 kg)  SpO2 95%  Breastfeeding? No  BMI 40.06 kg/m2 Estimated body mass index is 40.42 kg/(m^2) as calculated from the following:    Height as of 9/13/18: 5' 2\" (1.575 m).    Weight as of 9/13/18: 221 lb (100.2 kg).  EXAM:   GENERAL APPEARANCE: morbidly obese, alert and no distress  EYES: Eyes grossly normal to inspection, PERRL and conjunctivae and sclerae normal  HENT: ear canals and TM's normal, nose and mouth without ulcers or lesions, oropharynx clear and oral mucous membranes moist  NECK: no adenopathy, no asymmetry, " masses, or scars and thyroid normal to palpation  RESP: lungs clear to auscultation - no rales, rhonchi or wheezes  BREAST: normal without masses, tenderness or nipple discharge and no palpable axillary masses or adenopathy  CV: regular rate and rhythm, normal S1 S2, no S3 or S4, no murmur, click or rub, no peripheral edema and peripheral pulses strong  ABDOMEN: soft, nontender, no hepatosplenomegaly, no masses and bowel sounds normal  MS: no musculoskeletal defects are noted and gait is age appropriate without ataxia  SKIN: 2 warts present on the dorsal surface of the right ring finger, which I treated with freeze/thaw times 3 with the cryotherapy/liquid nitrogen.  NEURO: Normal strength and tone, sensory exam grossly normal, mentation intact and speech normal  PSYCH: mentation appears normal and affect normal/bright    Diagnostic Test Results:  Results for orders placed or performed in visit on 18   Echocardiogram Complete    Narrative    [id number]  [id number]  [id number]  [id number]^DOLORES^GIRISH           [facility]  [facility]  [address] [place] [address]     Name: GINA MCKEON  MRN: [id number]  : 1940  Study Date: 2018 09:55 AM  Age: 77 yrs  Gender: Female  [facility] [place] [facility]  Reason For Study: Dyspnea on exertion  History: Dyspnea on exertion  Ordering Physician: GIRISH BERNAL  Referring Physician: THEO HUIZAR  Performed By: Sofía Braden RDCS     BSA: 2.0 m2  Height: 62 in  Weight: 221 lb  BP: 112/74 mmHg  _____________________________________________________________________________  __        Procedure  Echocardiogram with two-dimensional, color and spectral Doppler performed.  _____________________________________________________________________________  __        Interpretation Summary  Global and regional left ventricular function is normal with an EF of 55-60%.  Global right ventricular  function is normal.Mild right ventricular dilation is  present.  Mild to moderate TR. Right ventricular systolic pressure is 49mmHg above the  right atrial pressure. Moderate (pulmonary artery systolic pressure 50-75mmHg)  pulmonary hypertension is present.  The inferior vena cava was normal in size with preserved respiratory  variability.Estimated mean right atrial pressure is 3 mmHg (normal).  No pericardial effusion is present.     This study was compared with the study from 10/16/17, no significant change .  _____________________________________________________________________________  __        Left Ventricle  Left ventricular wall thickness is normal. Left ventricular size is normal.  Global and regional left ventricular function is normal with an EF of 55-60%.  The Ejection Fraction was visually estimated. Diastolic function not assessed  due to significant mitral annular calcification. No regional wall motion  abnormalities are seen.     Right Ventricle  Global right ventricular function is normal. Mild right ventricular dilation  is present.     Atria  Severe left atrial enlargement is present. Mild right atrial enlargement is  present.     Mitral Valve  Moderate mitral annular calcification is present. Mild mitral insufficiency is  present.        Aortic Valve  Trileaflet aortic sclerosis without stenosis.     Tricuspid Valve  Mild to moderate tricuspid insufficiency is present. Right ventricular  systolic pressure is 49mmHg above the right atrial pressure. Moderate  (pulmonary artery systolic pressure 50-75mmHg) pulmonary hypertension is  present.     Pulmonic Valve  Trace pulmonic insufficiency is present.     Vessels  The inferior vena cava was normal in size with preserved respiratory  variability. Visualized portions of the aorta are normal in size. Estimated  mean right atrial pressure is 3 mmHg (normal).     Pericardium  No pericardial effusion is present.        Compared to Previous Study  This  study was compared with the study from 10/16/17, no significant change .     Attestation  I have personally viewed the imaging and agree with the interpretation and  report as documented by the fellow, Nupur Luz, and/or edited by me.  _____________________________________________________________________________  __     MMode/2D Measurements & Calculations  IVSd: 0.83 cm  LVIDd: 5.3 cm  LVIDs: 2.4 cm  LVPWd: 0.88 cm  FS: 54.8 %  LV mass(C)d: 162.3 grams  LV mass(C)dI: 81.4 grams/m2  Ao root diam: 2.6 cm  LA dimension: 5.7 cm  asc Aorta Diam: 3.5 cm  LA/Ao: 2.2  LVOT diam: 1.9 cm  LVOT area: 2.8 cm2  LA Volume (BP): 105.0 ml     LA Volume Index (BP): 52.8 ml/m2  RWT: 0.33        Doppler Measurements & Calculations  MV E max el: 88.3 cm/sec  MV A max el: 75.2 cm/sec  MV E/A: 1.2  MV dec time: 0.20 sec  Ao V2 max: 171.9 cm/sec  Ao max P.0 mmHg  Ao V2 mean: 134.9 cm/sec  Ao mean P.9 mmHg  Ao V2 VTI: 43.7 cm  JACOB(I,D): 1.9 cm2  JACOB(V,D): 1.9 cm2  LV V1 max P.6 mmHg  LV V1 max: 118.8 cm/sec  LV V1 VTI: 29.8 cm  SV(LVOT): 82.1 ml  SI(LVOT): 41.1 ml/m2  PA acc time: 0.05 sec  TR max el: 320.4 cm/sec  TR max P.4 mmHg  AV El Ratio (DI): 0.69  JACOB Index (cm2/m2): 0.94  E/E' av.0  Lateral E/e': 15.6     Medial E/e': 18.5     _____________________________________________________________________________  __           Report approved by: Boaz Brenner 2018 01:24 PM          ASSESSMENT / PLAN:   (Z00.00) Routine history and physical examination of adult  (primary encounter diagnosis)  (E03.9) Hypothyroidism, unspecified type  Comment: patient is due for refill Levothyroxine medication  Plan: levothyroxine (SYNTHROID/LEVOTHROID) 75 MCG tablet      (B07.9) Viral warts, unspecified type  Comment: 2 warts of the right ring finger  Plan: I have ordered DERMATOLOGY REFERRAL, DESTRUCT BENIGN LESION, UP TO 14, which I treated with freeze/thaw times 3 with the cryotherapy/liquid  "nitrogen.            End of Life Planning:  Patient currently has an advanced directive: No.  I have verified the patient's ablity to prepare an advanced directive/make health care decisions.     COUNSELING:  Reviewed preventive health counseling, as reflected in patient instructions  Special attention given to:       Regular exercise       Healthy diet/nutrition    BP Readings from Last 1 Encounters:   09/13/18 106/64     Estimated body mass index is 40.42 kg/(m^2) as calculated from the following:    Height as of 9/13/18: 5' 2\" (1.575 m).    Weight as of 9/13/18: 221 lb (100.2 kg).      Weight management plan: Discussed healthy diet and exercise guidelines and patient will follow up in 12 months in clinic to re-evaluate.     reports that she quit smoking about 26 years ago. Her smoking use included Cigarettes. She has a 30.00 pack-year smoking history. She has never used smokeless tobacco.      Appropriate preventive services were discussed with this patient, including applicable screening as appropriate for cardiovascular disease, diabetes, osteopenia/osteoporosis, and glaucoma.  As appropriate for age/gender, discussed screening for colorectal cancer, prostate cancer, breast cancer, and cervical cancer. Checklist reviewing preventive services available has been given to the patient.    Reviewed patients plan of care and provided an AVS. The Basic Care Plan (routine screening as documented in Health Maintenance) for Jaqueline meets the Care Plan requirement. This Care Plan has been established and reviewed with the Patient.    Counseling Resources:  ATP IV Guidelines  Pooled Cohorts Equation Calculator  Breast Cancer Risk Calculator  FRAX Risk Assessment  ICSI Preventive Guidelines  Dietary Guidelines for Americans, 2010  USDA's MyPlate  ASA Prophylaxis  Lung CA Screening    Carissa Burroughs MD  [facility]  "

## 2018-10-08 NOTE — MR AVS SNAPSHOT
After Visit Summary   10/8/2018    Jaqueline Canales    MRN: [id number]           Patient Information     Date Of Birth          1940        Visit Information        Provider Department      10/8/2018 10:00 AM Carissa Burroughs MD [facility]        Today's Diagnoses     Routine history and physical examination of adult    -  1    Hypothyroidism, unspecified type        Viral warts, unspecified type          Care Instructions      Preventive Health Recommendations    Female Ages 65 +    Yearly exam:     See your health care provider every year in order to  o Review health changes.   o Discuss preventive care.    o Review your medicines if your doctor has prescribed any.      You no longer need a yearly Pap test unless you've had an abnormal Pap test in the past 10 years. If you have vaginal symptoms, such as bleeding or discharge, be sure to talk with your provider about a Pap test.      Every 1 to 2 years, have a mammogram.  If you are over 69, talk with your health care provider about whether or not you want to continue having screening mammograms.      Every 10 years, have a colonoscopy. Or, have a yearly FIT test (stool test). These exams will check for colon cancer.       Have a cholesterol test every 5 years, or more often if your doctor advises it.       Have a diabetes test (fasting glucose) every three years. If you are at risk for diabetes, you should have this test more often.       At age 65, have a bone density scan (DEXA) to check for osteoporosis (brittle bone disease).    Shots:    Get a flu shot each year.    Get a tetanus shot every 10 years.    Talk to your doctor about your pneumonia vaccines. There are now two you should receive - Pneumovax (PPSV 23) and Prevnar (PCV 13).    Talk to your pharmacist about the shingles vaccine.    Talk to your doctor about the hepatitis B vaccine.    Nutrition:     Eat at least 5 servings of fruits and vegetables each day.      Eat  whole-grain bread, whole-wheat pasta and brown rice instead of white grains and rice.      Get adequate Calcium and Vitamin D.     Lifestyle    Exercise at least 150 minutes a week (30 minutes a day, 5 days a week). This will help you control your weight and prevent disease.      Limit alcohol to one drink per day.      No smoking.       Wear sunscreen to prevent skin cancer.       See your dentist twice a year for an exam and cleaning.      See your eye doctor every 1 to 2 years to screen for conditions such as glaucoma, macular degeneration and cataracts.          Follow-ups after your visit        Additional Services     DERMATOLOGY REFERRAL       Your provider has referred you to: FMG: [facility] [phone]   [email or web address]  FHN: [facility] [phone]   [email or web address]  FHN: [facility] [phone]   [email or web address]  N: [facility] [phone]   [email or web address]    Please be aware that coverage of these services is subject to the terms and limitations of your health insurance plan.  Call member services at your health plan with any benefit or coverage questions.      Please bring the following with you to your appointment:    (1) Any X-Rays, CTs or MRIs which have been performed.  Contact the facility where they were done to arrange for  prior to your scheduled appointment.    (2) List of current medications  (3) This referral request   (4) Any documents/labs given to you for this referral                  Your next 10 appointments already scheduled     Nov 12, 2018 11:00 AM CST   CT CHEST W/O CONTRAST with SHCT1   [facility] ([facility])    [address] [address] [phone]           How do I prepare for my exam? (Food and drink instructions) No Food and Drink  Restrictions.  How do I prepare for my exam? (Other instructions) You do not need to do anything special to prepare for this exam. For a sinus scan: Use your nose spray (nasal decongestant spray) as directed.  What should I wear: Please wear loose clothing, such as a sweat suit or jogging clothes. Avoid snaps, zippers and other metal. We may ask you to undress and put on a hospital gown.  How long does the exam take: Most scans take less than 20 minutes.  What should I bring: Please bring any scans or X-rays taken at other hospitals, if similar tests were done. Also bring a list of your medicines, including vitamins, minerals and over-the-counter drugs. It is safest to leave personal items at home.  Do I need a : No  is needed.  What do I need to tell my doctor? Be sure to tell your doctor: * If you have any allergies. * If there s any chance you are pregnant. * If you are breastfeeding.  What should I do after the exam: No restrictions, You may resume normal activities.  What is this test: A CT (computed tomography) scan is a series of pictures that allows us to look inside your body. The scanner creates images of the body in cross sections, much like slices of bread. This helps us see any problems more clearly.  Who should I call with questions: If you have any questions, please call the Imaging Department where you will have your exam. Directions, parking instructions, and other information is available on our website, [email or web address].            Nov 15, 2018 10:45 AM CST   Return Visit with Anabell Paiz MD   [facility] ([facility])    [facility]  [address]  [facility] [address]   [phone]            Amando 15, 2019  1:30 PM CST   PFT VISIT with  QUIQUE DAI   [facility] Pulmonary Function Testing ([facility])    [address]  [address] [facility] [id number] [phone]            Amando 15, 2019   "2:00 PM CST   (Arrive by 1:45 PM)   Return Interstitial Lung with David Morris Perlman, MD   [facility] ([facility])    [address]  [address] [place] [address] [phone]              Who to contact     If you have questions or need follow up information about today's clinic visit or your schedule please contact [facility] directly at [phone].  Normal or non-critical lab and imaging results will be communicated to you by MyChart, letter or phone within 4 business days after the clinic has received the results. If you do not hear from us within 7 days, please contact the clinic through MyChart or phone. If you have a critical or abnormal lab result, we will notify you by phone as soon as possible.  Submit refill requests through [facility] or call your pharmacy and they will forward the refill request to us. Please allow 3 business days for your refill to be completed.          Additional Information About Your Visit        [facility] Information     [facility] lets you send messages to your doctor, view your test results, renew your prescriptions, schedule appointments and more. To sign up, go to [email or web address] . Click on \"Log in\" on the left side of the screen, which will take you to the Welcome page. Then click on \"Sign up Now\" on the right side of the page.     You will be asked to enter the access code listed below, as well as some personal information. Please follow the directions to create your username and password.     Your access code is: [email or web address]  Expires: 2019 11:30 AM     Your access code will  in 90 days. If you need help or a new code, please call your [place] clinic or [phone].        Care EveryWhere ID     This is your Care EveryWhere ID. This could be used by other organizations to access your [facility] medical records  [id number]        Your Vitals Were     Pulse Temperature Height " "Pulse Oximetry Breastfeeding? BMI (Body Mass Index)    73 97.3  F (36.3  C) (Oral) 5' 2\" (1.575 m) 95% No 40.06 kg/m2       Blood Pressure from Last 3 Encounters:   10/08/18 112/54   09/13/18 106/64   05/30/18 112/74    Weight from Last 3 Encounters:   10/08/18 219 lb (99.3 kg)   09/13/18 221 lb (100.2 kg)   05/30/18 221 lb (100.2 kg)              We Performed the Following     DERMATOLOGY REFERRAL     DESTRUCT BENIGN LESION, UP TO 14          Where to get your medicines      These medications were sent to [facility] - [place], [place] - [address]  [address] [facility] [address]     Phone:  [phone]     levothyroxine 75 MCG tablet          Primary Care Provider Office Phone # Fax #    Carissa Yadiel Burroughs [phone] [address] [address]        Equal Access to Services     [facility]: Hadii aad ku hadasho Soomaali, waaxda luqadaha, qaybta kaalmada adeegyada, phillip gaines . So [facility] [phone].    ATENCIÓN: Si habla español, tiene a esquivel disposición servicios gratuitos de asistencia lingüística. Llame al [phone].    We comply with applicable federal civil rights laws and [place] laws. We do not discriminate on the basis of race, color, national origin, age, disability, sex, sexual orientation, or gender identity.            Thank you!     Thank you for choosing [facility]  for your care. Our goal is always to provide you with excellent care. Hearing back from our patients is one way we can continue to improve our services. Please take a few minutes to complete the written survey that you may receive in the mail after your visit with us. Thank you!             Your Updated Medication List - Protect others around you: Learn how to safely use, store and throw away your medicines at www.disposemymeds.org.          This list is accurate as of 10/8/18 11:30 AM.  Always use your most recent med " list.                   Brand Name Dispense Instructions for use Diagnosis    albuterol 108 (90 Base) MCG/ACT inhaler    PROAIR HFA/PROVENTIL HFA/VENTOLIN HFA    3 Inhaler    Inhale 2 puffs into the lungs every 6 hours    ILD (interstitial lung disease) (H)       apixaban ANTICOAGULANT 5 MG tablet    ELIQUIS    180 tablet    Take 1 tablet (5 mg) by mouth 2 times daily    Persistent atrial fibrillation (H)       * ICAPS Caps      Take 1 capsule by mouth 2 times daily        * CENTRUM SILVER per tablet      1 TABLET DAILY        CITRACAL + D PO      Take 2 tablets by mouth 2 times daily        folic acid 1 MG tablet    FOLVITE     1 TABLET DAILY    Mixed hyperlipidemia       Lactobacillus Acidophilus Powd      Take 1 packet by mouth daily        levothyroxine 75 MCG tablet    SYNTHROID/LEVOTHROID    90 tablet    Take 1 tablet (75 mcg) by mouth daily    Hypothyroidism, unspecified type       MELATONIN PO      Take 5 mg by mouth nightly as needed        METHOTREXATE PO      Take 2.5 mg by mouth 10 tablets weekly        metoprolol succinate 25 MG 24 hr tablet    TOPROL-XL    90 tablet    Take 1 tablet (25 mg) by mouth daily    Persistent atrial fibrillation (H)       MUCUS D PO           NIFEDICAL XL 60 MG Tb24   Generic drug:  NIFEdipine ER osmotic      1 TABLET DAILY    Raynaud's syndrome       omeprazole 20 MG CR capsule    priLOSEC    180 capsule    Take 1 capsule (20 mg) by mouth 2 times daily Take 30-60 minutes prior to meals    Esophageal stricture       REMICADE IV      Inject 600 mg into the vein Every 8 weeks        simvastatin 40 MG tablet    ZOCOR    90 tablet    Take 1 tablet (40 mg) by mouth At Bedtime    Hyperlipidemia LDL goal <100       * Notice:  This list has 2 medication(s) that are the same as other medications prescribed for you. Read the directions carefully, and ask your doctor or other care provider to review them with you.

## 2018-10-15 ENCOUNTER — TRANSFERRED RECORDS (OUTPATIENT)
Dept: HEALTH INFORMATION MANAGEMENT | Facility: CLINIC | Age: 78
End: 2018-10-15

## 2018-10-22 ENCOUNTER — TELEPHONE (OUTPATIENT)
Dept: FAMILY MEDICINE | Facility: CLINIC | Age: 78
End: 2018-10-22

## 2018-10-22 DIAGNOSIS — Z00.00 ROUTINE HISTORY AND PHYSICAL EXAMINATION OF ADULT: Primary | ICD-10-CM

## 2018-10-22 NOTE — TELEPHONE ENCOUNTER
Pt had physical 10/8 and was going to have labs done at rheumatologists office- unable to have labs done at rheumatologists office. Pt requesting fasting physical labs to be ordered and she'll have them done here.   Scheduled for fasting lab 10/25.    Chidi GILBERT RN

## 2018-10-22 NOTE — TELEPHONE ENCOUNTER
Pt requesting lab orders to be placed from 10/08/2018 annual px with .  Pt had blood drawn at her rheumatologist but they were unable to process her blood due to significant platelet clumping.  She may be reached at [phone]. Ok to leave a detailed message.

## 2018-10-25 DIAGNOSIS — Z00.00 ROUTINE HISTORY AND PHYSICAL EXAMINATION OF ADULT: ICD-10-CM

## 2018-10-25 LAB
CHOLEST SERPL-MCNC: 139 MG/DL
ERYTHROCYTE [DISTWIDTH] IN BLOOD BY AUTOMATED COUNT: 16.2 % (ref 10–15)
HBA1C MFR BLD: 5.1 % (ref 0–5.6)
HCT VFR BLD AUTO: 35.4 % (ref 35–47)
HDLC SERPL-MCNC: 58 MG/DL
HGB BLD-MCNC: 11.4 G/DL (ref 11.7–15.7)
LDLC SERPL CALC-MCNC: 56 MG/DL
MCH RBC QN AUTO: 30.8 PG (ref 26.5–33)
MCHC RBC AUTO-ENTMCNC: 32.2 G/DL (ref 31.5–36.5)
MCV RBC AUTO: 96 FL (ref 78–100)
NONHDLC SERPL-MCNC: 81 MG/DL
PLATELET # BLD AUTO: 215 10E9/L (ref 150–450)
RBC # BLD AUTO: 3.7 10E12/L (ref 3.8–5.2)
TRIGL SERPL-MCNC: 125 MG/DL
TSH SERPL DL<=0.005 MIU/L-ACNC: 3.7 MU/L (ref 0.4–4)
WBC # BLD AUTO: 6.8 10E9/L (ref 4–11)

## 2018-10-25 PROCEDURE — 36415 COLL VENOUS BLD VENIPUNCTURE: CPT | Performed by: INTERNAL MEDICINE

## 2018-10-25 PROCEDURE — 83036 HEMOGLOBIN GLYCOSYLATED A1C: CPT | Performed by: INTERNAL MEDICINE

## 2018-10-25 PROCEDURE — 84443 ASSAY THYROID STIM HORMONE: CPT | Performed by: INTERNAL MEDICINE

## 2018-10-25 PROCEDURE — 80061 LIPID PANEL: CPT | Performed by: INTERNAL MEDICINE

## 2018-10-25 PROCEDURE — 85027 COMPLETE CBC AUTOMATED: CPT | Performed by: INTERNAL MEDICINE

## 2018-11-12 ENCOUNTER — HOSPITAL ENCOUNTER (OUTPATIENT)
Dept: CT IMAGING | Facility: CLINIC | Age: 78
Discharge: HOME | End: 2018-11-12
Attending: INTERNAL MEDICINE | Admitting: INTERNAL MEDICINE
Payer: MEDICARE

## 2018-11-12 DIAGNOSIS — I89.0 LYMPHEDEMA: ICD-10-CM

## 2018-11-12 PROCEDURE — 71250 CT THORAX DX C-: CPT

## 2018-11-15 ENCOUNTER — ONCOLOGY VISIT (OUTPATIENT)
Dept: ONCOLOGY | Facility: CLINIC | Age: 78
End: 2018-11-15
Attending: INTERNAL MEDICINE
Payer: MEDICARE

## 2018-11-15 VITALS
TEMPERATURE: 97.6 F | RESPIRATION RATE: 16 BRPM | BODY MASS INDEX: 39.87 KG/M2 | OXYGEN SATURATION: 97 % | HEART RATE: 67 BPM | DIASTOLIC BLOOD PRESSURE: 61 MMHG | SYSTOLIC BLOOD PRESSURE: 109 MMHG | WEIGHT: 218 LBS

## 2018-11-15 DIAGNOSIS — C34.32 MALIGNANT NEOPLASM OF LOWER LOBE OF LEFT LUNG (H): Primary | ICD-10-CM

## 2018-11-15 PROCEDURE — 99213 OFFICE O/P EST LOW 20 MIN: CPT | Performed by: INTERNAL MEDICINE

## 2018-11-15 PROCEDURE — G0463 HOSPITAL OUTPT CLINIC VISIT: HCPCS

## 2018-11-15 ASSESSMENT — PAIN SCALES - GENERAL: PAINLEVEL: NO PAIN (0)

## 2018-11-15 NOTE — MR AVS SNAPSHOT
After Visit Summary   11/15/2018    Jaqueline Canales    MRN: [id number]           Patient Information     Date Of Birth          1940        Visit Information        Provider Department      11/15/2018 10:45 AM Anabell Paiz MD [facility]        Today's Diagnoses     Malignant neoplasm of lower lobe of left lung (H)    -  1      Care Instructions    1.  RTC MD in 6 months  2.  CT scan chest without contrast before next visit          Follow-ups after your visit        Your next 10 appointments already scheduled     Amando 15, 2019  1:30 PM CST   PFT VISIT with  PFL SUHAS   [facility] ([facility])    [address]  3rd Floor  [place] [address]   [phone]            Amando 15, 2019  2:00 PM CST   (Arrive by 1:45 PM)   Return Interstitial Lung with David Morris Perlman, MD   [facility] ([facility])    [address]  [address]  [place] [address]   [phone]            May 13, 2019 11:00 AM CDT   CT CHEST W/O CONTRAST with SHCT1   [facility] ([facility])    [address] [address]   [phone]           How do I prepare for my exam? (Food and drink instructions) No Food and Drink Restrictions.  How do I prepare for my exam? (Other instructions) You do not need to do anything special to prepare for this exam. For a sinus scan: Use your nose spray (nasal decongestant spray) as directed.  What should I wear: Please wear loose clothing, such as a sweat suit or jogging clothes. Avoid snaps, zippers and other metal. We may ask you to undress and put on a hospital gown.  How long does the exam take: Most scans take less than 20 minutes.  What should I bring: Please bring any scans or X-rays taken at other hospitals, if similar tests were done. Also bring a list of your medicines, including vitamins, minerals  and over-the-counter drugs. It is safest to leave personal items at home.  Do I need a : No  is needed.  What do I need to tell my doctor? Be sure to tell your doctor: * If you have any allergies. * If there s any chance you are pregnant. * If you are breastfeeding.  What should I do after the exam: No restrictions, You may resume normal activities.  What is this test: A CT (computed tomography) scan is a series of pictures that allows us to look inside your body. The scanner creates images of the body in cross sections, much like slices of bread. This helps us see any problems more clearly.  Who should I call with questions: If you have any questions, please call the Imaging Department where you will have your exam. Directions, parking instructions, and other information is available on our website, [email or web address].            May 15, 2019 11:15 AM CDT   Return Visit with Anabell Paiz MD   [facility] ([facility])    [facility]  [address]  [facility] [address] [phone]              Future tests that were ordered for you today     Open Future Orders        Priority Expected Expires Ordered    CT Chest w/o contrast Routine  11/15/2019 11/15/2018            Who to contact     If you have questions or need follow up information about today's clinic visit or your schedule please contact [facility] directly at [phone].  Normal or non-critical lab and imaging results will be communicated to you by MyChart, letter or phone within 4 business days after the clinic has received the results. If you do not hear from us within 7 days, please contact the clinic through MyChart or phone. If you have a critical or abnormal lab result, we will notify you by phone as soon as possible.  Submit refill requests through [employer] system master or call your pharmacy and they will forward the refill request to us. Please allow 3 business days for your  refill to be completed.          Additional Information About Your Visit        Care EveryWhere ID     This is your Care EveryWhere ID. This could be used by other organizations to access your [facility] medical records  [id number]        Your Vitals Were     Pulse Temperature Respirations Pulse Oximetry BMI (Body Mass Index)       67 97.6  F (36.4  C) (Oral) 16 97% 39.87 kg/m2        Blood Pressure from Last 3 Encounters:   11/15/18 109/61   10/08/18 112/54   09/13/18 106/64    Weight from Last 3 Encounters:   11/15/18 98.9 kg (218 lb)   10/08/18 99.3 kg (219 lb)   09/13/18 100.2 kg (221 lb)               Primary Care Provider Office Phone # Fax #    Carissa Yadiel Burroughs [phone] [address] [facility] [address]  [place] [address]        Equal Access to Services     [facility]RE : Hadii aad ku hadasho Soomaali, waaxda luqadaha, qaybta kaalmada adeegyada, waxay sundarin haysofian julianne gaines . So [facility] [phone].    ATENCIÓN: Si habla español, tiene a esquivel disposición servicios gratuitos de asistencia lingüística. Valeria al [phone].    We comply with applicable federal civil rights laws and [place] laws. We do not discriminate on the basis of race, color, national origin, age, disability, sex, sexual orientation, or gender identity.            Thank you!     Thank you for choosing [facility]  for your care. Our goal is always to provide you with excellent care. Hearing back from our patients is one way we can continue to improve our services. Please take a few minutes to complete the written survey that you may receive in the mail after your visit with us. Thank you!             Your Updated Medication List - Protect others around you: Learn how to safely use, store and throw away your medicines at www.disposemymeds.org.          This list is accurate as of 11/15/18 11:35 AM.  Always use your most recent med list.                   Brand Name Dispense Instructions for use Diagnosis     albuterol 108 (90 Base) MCG/ACT inhaler    PROAIR HFA/PROVENTIL HFA/VENTOLIN HFA    3 Inhaler    Inhale 2 puffs into the lungs every 6 hours    ILD (interstitial lung disease) (H)       apixaban ANTICOAGULANT 5 MG tablet    ELIQUIS    180 tablet    Take 1 tablet (5 mg) by mouth 2 times daily    Persistent atrial fibrillation (H)       * ICAPS Caps      Take 1 capsule by mouth 2 times daily        * CENTRUM SILVER per tablet      1 TABLET DAILY        CITRACAL + D PO      Take 2 tablets by mouth 2 times daily        folic acid 1 MG tablet    FOLVITE     1 TABLET DAILY    Mixed hyperlipidemia       Lactobacillus Acidophilus Powd      Take 1 packet by mouth daily        levothyroxine 75 MCG tablet    SYNTHROID/LEVOTHROID    90 tablet    Take 1 tablet (75 mcg) by mouth daily    Hypothyroidism, unspecified type       MELATONIN PO      Take 5 mg by mouth nightly as needed        METHOTREXATE PO      Take 2.5 mg by mouth 10 tablets weekly        metoprolol succinate 25 MG 24 hr tablet    TOPROL-XL    90 tablet    Take 1 tablet (25 mg) by mouth daily    Persistent atrial fibrillation (H)       MUCUS D PO           NIFEDICAL XL 60 MG Tb24   Generic drug:  NIFEdipine ER osmotic      1 TABLET DAILY    Raynaud's syndrome       omeprazole 20 MG CR capsule    priLOSEC    180 capsule    Take 1 capsule (20 mg) by mouth 2 times daily Take 30-60 minutes prior to meals    Esophageal stricture       REMICADE IV      Inject 600 mg into the vein Every 8 weeks        simvastatin 40 MG tablet    ZOCOR    90 tablet    Take 1 tablet (40 mg) by mouth At Bedtime    Hyperlipidemia LDL goal <100       * Notice:  This list has 2 medication(s) that are the same as other medications prescribed for you. Read the directions carefully, and ask your doctor or other care provider to review them with you.

## 2018-11-15 NOTE — PROGRESS NOTES
[facility] PHYSIC[facility]ANS  HEMATOLOGY ONCOLOGY     DIAGNOSES:     1.  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 The patient had a left lower lobe nodule 11/2015.  It was biopsied 12/04/2015, indicated a poorly differentiated squamous cell carcinoma.  PET CT scan 12/14/2015 hypermetabolic nodular lesion in the left lower lobe.     2.  History of breast cancer in 1990, treated with right-sided mastectomy.   3.  Interstitial lung disease.   4.  Rheumatoid arthritis.       TREATMENT:  S/P SBRT for lung cancer. Completed 02/2016.      SUBJECTIVE:  The patient comes in for followup today. She has been feeling well. No new symptoms.     REVIEW OF SYSTEMS:  A complete review of systems was performed and found to be negative other than pertinent positives mentioned in history of present illness.     Past medical, social histories reviewed.    Meds- Reviewed.     PHYSICAL EXAMINATION:   VITAL SIGNS:/61 (BP Location: Left arm, Patient Position: Sitting, Cuff Size: Adult Large)  Pulse 67  Temp 97.6  F (36.4  C) (Oral)  Resp 16  Wt 98.9 kg (218 lb)  SpO2 97%  BMI 39.87 kg/m2  CONSTITUTIONAL: Sitting comfortably.   HEENT: Pupils are equal. Oropharynx is clear.   NECK: No cervical or supraclavicular lymphadenopathy.   RESPIRATORY: Clear bilaterally.   CARD/VASC: S1, S2, regular.   GI: Soft, nontender, nondistended, no hepatosplenomegaly.   MUSKULOSKELETAL: Warm, well perfused.   NEUROLOGIC: Alert, awake.   INTEGUMENT: No rash.   LYMPHATICS:Bilteral edema.   PSYCH: Mood and affect was normal.    LABORATORY DATA AND IMAGING REVIEWED DURING THIS VISIT:  Recent Labs   Lab Test 06/18/18 04/16/18   10/24/16   0530  10/23/16   0533  10/22/16   1626   NA   --    --    --   137  138   --    POTASSIUM   --    --    --   3.9  3.9   --    CHLORIDE   --    --    --   103  104   --    CO2   --    --    --   28  28   --    ANIONGAP   --    --    --   6  6   --    BUN   --    --    --   18   12   --    CR  0.590  0.580   < >  0.61  0.58   --    GLC   --    --    --   96  99   --    TU   --    --    --   8.9  8.4*   --    MAG   --    --    --    --   2.5*  2.2    < > = values in this interval not displayed.     Recent Labs   Lab Test  10/25/18   0759  10/31/16   1455  10/23/16   0533  10/22/16   1130   07/01/16   0805   WBC  6.8  5.4  7.3  11.4*   < >  5.3   HGB  11.4*  13.5  10.7*  13.1   < >  13.1   PLT  215  328  112*  135*   < >  294   MCV  96  93  91  91   < >  95   NEUTROPHIL   --   73.8   --   89.2   --   61.1    < > = values in this interval not displayed.     Recent Labs   Lab Test 06/18/18 04/16/18 02/19/18   10/22/16   1130   07/01/16   0805   10/12/15   0906   06/01/15   0824   BILITOTAL   --    --    --    --   1.1   --   0.5   --    --    --   0.5   ALKPHOS   --    --    --    --   131   --   87   --    --    --   88   ALT  10  16  33   < >  42   < >  23   < >  26   < >  48   AST  21  29  38*   < >  43   < >  25   < >  21   < >  39   ALBUMIN   --    --    --    --   3.6   --   3.6   --   3.7   --   3.6    < > = values in this interval not displayed.         Results for orders placed or performed during the hospital encounter of 11/12/18   CT Chest w/o Contrast    Narrative    CT CHEST WITHOUT CONTRAST November 12, 2018 11:08 AM     HISTORY: Lymphedema.    TECHNIQUE: No IV contrast material. Radiation dose for this scan was  reduced using automated exposure control, adjustment of the mA and/or  kV according to patient size, or iterative reconstruction technique.    COMPARISON: CT chest dated 5/21/2018.    FINDINGS: Again identified are peripheral fibrotic changes within the  lungs most predominantly in the lower lobes and right middle lobe.  Areas of bronchiectasis are not significantly changed. There is a  bandlike area of consolidation/atelectasis within the medial left  lower lobe. This is not significantly changed. No definite new  opacities within the lungs are identified.    Again  noted is a large hiatal hernia. Again noted are calcifications  within the coronary arteries and at the mitral valve annulus.  Previously seen mediastinal lymph nodes are unchanged in size. Again  noted is cardiomegaly. There are tiny gallstones.      Impression    IMPRESSION: Stable bandlike area of consolidation/atelectasis within  the medial left lung. Fibrotic changes within the lungs are again  identified.    DARYL NYE MD       ECOG PS: 1    ASSESSMENT:  A 77-year-old lady with squamous cell carcinoma of the lung which was clinically T1 N0 MX.  She had a 1.7 to 1.1 cm left lower lobe lesion which was FDG avid, multiple non-FDG avid mediastinal lymph nodes, prevascular, precarinal and subcarinal which appeared to be stable.  There was FDG activity in tonsil and neck lymph nodes which were evaluated by Dr. Trevor Murdock of ENT and this was considered to be a physiological uptake.  Given her history of interstitial lung disease she was referred to Radiation Oncology for SBRT of this lung lesion.  Her MRI of brain was negative for metastases.   She is on surveillance.     - CT scan 11/12/18 results were reviewed with the patient. Stable findings.   - I will repeat a scan in 6 months.      PLAN:   1.  RTC MD in 6 months  2.  CT scan chest without contrast before next visit    CHESTER DELATORRE MD    11/15/2018

## 2018-11-15 NOTE — PATIENT INSTRUCTIONS
1.  RTC MD in 6 months  Scheduled/janice   2.  CT scan chest without contrast before next visit  Scheduled/janice       AVS printed & given to patient/michelle

## 2018-11-15 NOTE — LETTER
11/15/2018         RE: Jaqueline Canales  [address]  [place] [address]        Dear Colleague,    Thank you for referring your patient, Jaqueline Canales, to the [facility]. Please see a copy of my visit note below.    [facility] PHYSICIANS  HEMATOLOGY ONCOLOGY     DIAGNOSES:     1.  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 The patient had a left lower lobe nodule 11/2015.  It was biopsied 12/04/2015, indicated a poorly differentiated squamous cell carcinoma.  PET CT scan 12/14/2015 hypermetabolic nodular lesion in the left lower lobe.     2.  History of breast cancer in 1990, treated with right-sided mastectomy.   3.  Interstitial lung disease.   4.  Rheumatoid arthritis.       TREATMENT:  S/P SBRT for lung cancer. Completed 02/2016.      SUBJECTIVE:  The patient comes in for followup today. She has been feeling well. No new symptoms.     REVIEW OF SYSTEMS:  A complete review of systems was performed and found to be negative other than pertinent positives mentioned in history of present illness.     Past medical, social histories reviewed.    Meds- Reviewed.     PHYSICAL EXAMINATION:   VITAL SIGNS:/61 (BP Location: Left arm, Patient Position: Sitting, Cuff Size: Adult Large)  Pulse 67  Temp 97.6  F (36.4  C) (Oral)  Resp 16  Wt 98.9 kg (218 lb)  SpO2 97%  BMI 39.87 kg/m2  CONSTITUTIONAL: Sitting comfortably.   HEENT: Pupils are equal. Oropharynx is clear.   NECK: No cervical or supraclavicular lymphadenopathy.   RESPIRATORY: Clear bilaterally.   CARD/VASC: S1, S2, regular.   GI: Soft, nontender, nondistended, no hepatosplenomegaly.   MUSKULOSKELETAL: Warm, well perfused.   NEUROLOGIC: Alert, awake.   INTEGUMENT: No rash.   LYMPHATICS:Bilteral edema.   PSYCH: Mood and affect was normal.    LABORATORY DATA AND IMAGING REVIEWED DURING THIS VISIT:  Recent Labs   Lab Test 06/18/18 04/16/18   10/24/16   0530  10/23/16   0500   10/22/16   1626   NA   --    --    --   137  138   --    POTASSIUM   --    --    --   3.9  3.9   --    CHLORIDE   --    --    --   103  104   --    CO2   --    --    --   28  28   --    ANIONGAP   --    --    --   6  6   --    BUN   --    --    --   18  12   --    CR  0.590  0.580   < >  0.61  0.58   --    GLC   --    --    --   96  99   --    TU   --    --    --   8.9  8.4*   --    MAG   --    --    --    --   2.5*  2.2    < > = values in this interval not displayed.     Recent Labs   Lab Test  10/25/18   0759  10/31/16   1455  10/23/16   0533  10/22/16   1130   07/01/16   0805   WBC  6.8  5.4  7.3  11.4*   < >  5.3   HGB  11.4*  13.5  10.7*  13.1   < >  13.1   PLT  215  328  112*  135*   < >  294   MCV  96  93  91  91   < >  95   NEUTROPHIL   --   73.8   --   89.2   --   61.1    < > = values in this interval not displayed.     Recent Labs   Lab Test 06/18/18 04/16/18 02/19/18   10/22/16   1130   07/01/16   0805   10/12/15   0906   06/01/15   0824   BILITOTAL   --    --    --    --   1.1   --   0.5   --    --    --   0.5   ALKPHOS   --    --    --    --   131   --   87   --    --    --   88   ALT  10  16  33   < >  42   < >  23   < >  26   < >  48   AST  21  29  38*   < >  43   < >  25   < >  21   < >  39   ALBUMIN   --    --    --    --   3.6   --   3.6   --   3.7   --   3.6    < > = values in this interval not displayed.         Results for orders placed or performed during the hospital encounter of 11/12/18   CT Chest w/o Contrast    Narrative    CT CHEST WITHOUT CONTRAST November 12, 2018 11:08 AM     HISTORY: Lymphedema.    TECHNIQUE: No IV contrast material. Radiation dose for this scan was  reduced using automated exposure control, adjustment of the mA and/or  kV according to patient size, or iterative reconstruction technique.    COMPARISON: CT chest dated 5/21/2018.    FINDINGS: Again identified are peripheral fibrotic changes within the  lungs most predominantly in the lower lobes and right middle  lobe.  Areas of bronchiectasis are not significantly changed. There is a  bandlike area of consolidation/atelectasis within the medial left  lower lobe. This is not significantly changed. No definite new  opacities within the lungs are identified.    Again noted is a large hiatal hernia. Again noted are calcifications  within the coronary arteries and at the mitral valve annulus.  Previously seen mediastinal lymph nodes are unchanged in size. Again  noted is cardiomegaly. There are tiny gallstones.      Impression    IMPRESSION: Stable bandlike area of consolidation/atelectasis within  the medial left lung. Fibrotic changes within the lungs are again  identified.    DARYL NYE MD       ECOG PS: 1    ASSESSMENT:  A 77-year-old lady with squamous cell carcinoma of the lung which was clinically T1 N0 MX.  She had a 1.7 to 1.1 cm left lower lobe lesion which was FDG avid, multiple non-FDG avid mediastinal lymph nodes, prevascular, precarinal and subcarinal which appeared to be stable.  There was FDG activity in tonsil and neck lymph nodes which were evaluated by Dr. Trevor Murdock of ENT and this was considered to be a physiological uptake.  Given her history of interstitial lung disease she was referred to Radiation Oncology for SBRT of this lung lesion.  Her MRI of brain was negative for metastases.   She is on surveillance.     - CT scan 11/12/18 results were reviewed with the patient. Stable findings.   - I will repeat a scan in 6 months.      PLAN:   1.  RTC MD in 6 months  2.  CT scan chest without contrast before next visit    CEHSTER DELATORRE MD    11/15/2018           Oncology Rooming Note    November 15, 2018 10:58 AM   Jaqueline Canales is a 77 year old female who presents for:    Chief Complaint   Patient presents with     Oncology Clinic Visit     Malignant neoplasm of lower lobe of left lung (H)     Initial Vitals: /61 (BP Location: Left arm, Patient Position: Sitting, Cuff Size: Adult Large)  Pulse  "67  Temp 97.6  F (36.4  C) (Oral)  Resp 16  Wt 98.9 kg (218 lb)  SpO2 97%  BMI 39.87 kg/m2 Estimated body mass index is 39.87 kg/(m^2) as calculated from the following:    Height as of 10/8/18: 1.575 m (5' 2\").    Weight as of this encounter: 98.9 kg (218 lb). Body surface area is 2.08 meters squared.  No Pain (0) Comment: Data Unavailable   No LMP recorded. Patient is postmenopausal.  Allergies reviewed: Yes  Medications reviewed: Yes    Medications: MEDICATION REFILLS NEEDED TODAY. Provider was notified. Refill   Pharmacy name entered into [employer]: [facility] - [place], [place] - [address]    Clinical concerns: no    5 minutes for nursing intake (face to face time)          Radha Miller MA                Again, thank you for allowing me to participate in the care of your patient.        Sincerely,        Anabell Paiz MD    "

## 2018-11-15 NOTE — PROGRESS NOTES
"Oncology Rooming Note    November 15, 2018 10:58 AM   Jaqueline Canales is a 77 year old female who presents for:    Chief Complaint   Patient presents with     Oncology Clinic Visit     Malignant neoplasm of lower lobe of left lung (H)     Initial Vitals: /61 (BP Location: Left arm, Patient Position: Sitting, Cuff Size: Adult Large)  Pulse 67  Temp 97.6  F (36.4  C) (Oral)  Resp 16  Wt 98.9 kg (218 lb)  SpO2 97%  BMI 39.87 kg/m2 Estimated body mass index is 39.87 kg/(m^2) as calculated from the following:    Height as of 10/8/18: 1.575 m (5' 2\").    Weight as of this encounter: 98.9 kg (218 lb). Body surface area is 2.08 meters squared.  No Pain (0) Comment: Data Unavailable   No LMP recorded. Patient is postmenopausal.  Allergies reviewed: Yes  Medications reviewed: Yes    Medications: MEDICATION REFILLS NEEDED TODAY. Provider was notified. Refill   Pharmacy name entered into [employer]: [facility] - [facility] [id number]    Clinical concerns: no    5 minutes for nursing intake (face to face time)          Radha Miller MA              "

## 2018-12-10 ENCOUNTER — TRANSFERRED RECORDS (OUTPATIENT)
Dept: HEALTH INFORMATION MANAGEMENT | Facility: CLINIC | Age: 78
End: 2018-12-10

## 2019-01-15 ENCOUNTER — OFFICE VISIT (OUTPATIENT)
Dept: PULMONOLOGY | Facility: CLINIC | Age: 79
End: 2019-01-15
Attending: INTERNAL MEDICINE
Payer: COMMERCIAL

## 2019-01-15 VITALS
RESPIRATION RATE: 17 BRPM | BODY MASS INDEX: 40.12 KG/M2 | DIASTOLIC BLOOD PRESSURE: 77 MMHG | OXYGEN SATURATION: 95 % | WEIGHT: 218 LBS | HEIGHT: 62 IN | HEART RATE: 63 BPM | SYSTOLIC BLOOD PRESSURE: 125 MMHG

## 2019-01-15 DIAGNOSIS — J84.9 ILD (INTERSTITIAL LUNG DISEASE) (H): ICD-10-CM

## 2019-01-15 PROCEDURE — G0463 HOSPITAL OUTPT CLINIC VISIT: HCPCS | Mod: ZF

## 2019-01-15 RX ORDER — ALBUTEROL SULFATE 90 UG/1
2 AEROSOL, METERED RESPIRATORY (INHALATION) EVERY 6 HOURS
Qty: 3 INHALER | Refills: 3 | Status: SHIPPED | OUTPATIENT
Start: 2019-01-15 | End: 2019-04-11

## 2019-01-15 ASSESSMENT — MIFFLIN-ST. JEOR: SCORE: 1417.09

## 2019-01-15 ASSESSMENT — PAIN SCALES - GENERAL: PAINLEVEL: NO PAIN (0)

## 2019-01-15 NOTE — PROGRESS NOTES
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  She has been maintained on 25 mg weekly of methotrexate and every 8-week Remicade infusions.  She has had quite stable pulmonary function test and we have been following her without it directing any specific therapy towards her lungs.  She returns clinic today overall feeling that she is stable.  She completed a course of pulmonary rehab prior to the last visit and has been doing some exercising although admittedly not as much as she was during the rehab time.  Otherwise she feels her breathing is stable has day-to-day variations but no change.  No other new complaints today.      Current Outpatient Medications   Medication     albuterol (PROAIR HFA/PROVENTIL HFA/VENTOLIN HFA) 108 (90 Base) MCG/ACT inhaler     apixaban ANTICOAGULANT (ELIQUIS) 5 MG tablet     Calcium Citrate-Vitamin D (CITRACAL + D PO)     CENTRUM SILVER OR TABS     FOLIC ACID 1 MG OR TABS     InFLIXimab (REMICADE IV)     Lactobacillus Acidophilus POWD     levothyroxine (SYNTHROID/LEVOTHROID) 75 MCG tablet     MELATONIN PO     Methotrexate Sodium (METHOTREXATE PO)     metoprolol succinate (TOPROL-XL) 25 MG 24 hr tablet     Multiple Vitamins-Minerals (ICAPS) CAPS     NIFEDICAL XL 60 MG PO TB24     omeprazole (PRILOSEC) 20 MG CR capsule     Pseudoephedrine-Guaifenesin (MUCUS D PO)     simvastatin (ZOCOR) 40 MG tablet     No current facility-administered medications for this visit.      Allergies   Allergen Reactions     No Known Allergies      Past Medical History:   Diagnosis Date     Amaurosis fugax 5-11    Right     Carotid artery stenosis      Esophageal reflux 3/11/2004     HELICOBACTER PYLORI INFECTION 7/11/2006     hx of CA in situ RT breast-1990.mastectomy 4/17/2007      Hyperlipidemia LDL goal <70 2011     Lung cancer (H)     squamous cell lung ca left lower lobe     OBESITY NOS 3/11/2004     OSTEOPOROSIS NOS 3/11/2004     Other psoriasis 3/11/2004     RA (rheumatoid arthritis) (H) 2010     Raynaud's syndrome 4/10/2006     Scleroderma (H)      Sleep apnea     CPAP       Past Surgical History:   Procedure Laterality Date     BREAST SURGERY       C NONSPECIFIC PROCEDURE      mastectomy RT breast     COLONOSCOPY  16     CONIZATION       ENDARTERECTOMY CAROTID       HC COLONOSCOPY THRU STOMA, DIAGNOSTIC  2001    diverticulosis     REPAIR HAMMER TOE       TONSILLECTOMY         Social History     Socioeconomic History     Marital status:      Spouse name: Not on file     Number of children: Not on file     Years of education: Not on file     Highest education level: Not on file   Social Needs     Financial resource strain: Not on file     Food insecurity - worry: Not on file     Food insecurity - inability: Not on file     Transportation needs - medical: Not on file     Transportation needs - non-medical: Not on file   Occupational History     Not on file   Tobacco Use     Smoking status: Former Smoker     Packs/day: 1.00     Years: 30.00     Pack years: 30.00     Types: Cigarettes     Last attempt to quit: 3/11/1992     Years since quittin.8     Smokeless tobacco: Never Used   Substance and Sexual Activity     Alcohol use: Yes     Alcohol/week: 0.0 oz     Comment: rare     Drug use: No     Sexual activity: Not Currently     Partners: Male   Other Topics Concern     Parent/sibling w/ CABG, MI or angioplasty before 65F 55M? No   Social History Narrative     Not on file       Family History   Problem Relation Age of Onset     Cancer - colorectal Mother      Cerebrovascular Disease Father      Cancer Sister 31        ovarian     Heart Disease Sister      Gastrointestinal Disease Sister         crohns     Gastrointestinal Disease Sister          "diverticulitis     Gastrointestinal Disease Brother         diverticulitis     Alzheimer Disease Sister      Cerebrovascular Disease Maternal Grandmother      Diabetes Maternal Grandmother      Mental Illness Maternal Grandmother      Diabetes Maternal Uncle        ROS Pulmonary    A complete ROS was otherwise negative except as noted in the HPI.  Vitals:    01/15/19 1335   BP: 125/77   Pulse: 63   Resp: 17   SpO2: 95%   Weight: 98.9 kg (218 lb)   Height: 1.575 m (5' 2\")     Exam:   GENERAL APPEARANCE: Well developed, well nourished, alert, and in no apparent distress.  NECK: supple, no masses, no thyromegaly.  LYMPHATICS: No significant axillary, cervical, or supraclavicular nodes.  RESP: good air flow throughout, - mild bibasilar crackles with some upper lobe inspiratory wheezes..  CV: Normal S1, S2, regular rhythm, normal rate, no rub, no murmur,  no gallop, no LE edema.   ABDOMEN:  Bowel sounds normal, soft, nontender, no HSM or masses.   MS: extremities normal- no clubbing, no cyanosis.  PSYCH: mentation appears normal. and affect normal/bright  Results: I have reviewed all imaging, PFTs and other relavent tests, please see below for details, PFT and imaging results were reviewed with the patient.  PFTs: Mild restriction with mild reduction in diffusing capacity.  Stable from previous.    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  Overall stable we will continue to follow the patient every 4 months with pulmonary function test she will call sooner with any changes in her breathing.      CBC   Recent Labs   Lab Test 10/25/18  0759 10/31/16  1455   RBC 3.70* 4.37   HGB 11.4* 13.5   HCT 35.4 40.8    328       Basic Metabolic Panel  Recent Labs   Lab Test 10/24/16  0530 10/23/16  0533    138   POTASSIUM 3.9 3.9   CHLORIDE 103 104   CO2 28 28   BUN 18 12   GLC 96 99   TU 8.9 8.4*       INR  Recent Labs   Lab Test 12/04/15  0745 "   INR 0.95       PFT  PFT Latest Ref Rng & Units 1/15/2019   FVC L 1.46   FEV1 L 1.33   FVC% % 61   FEV1% % 72           CC:

## 2019-01-15 NOTE — LETTER
1/15/2019       RE: Jaqueline Canales  [address]  [facility] [address]     Dear Colleague,    Thank you for referring your patient, Jaqueline Canales, to the [facility] at [facility]. Please see a copy of my visit note below.    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  She has been maintained on 25 mg weekly of methotrexate and every 8-week Remicade infusions.  She has had quite stable pulmonary function test and we have been following her without it directing any specific therapy towards her lungs.  She returns clinic today overall feeling that she is stable.  She completed a course of pulmonary rehab prior to the last visit and has been doing some exercising although admittedly not as much as she was during the rehab time.  Otherwise she feels her breathing is stable has day-to-day variations but no change.  No other new complaints today.      Current Outpatient Medications   Medication     albuterol (PROAIR HFA/PROVENTIL HFA/VENTOLIN HFA) 108 (90 Base) MCG/ACT inhaler     apixaban ANTICOAGULANT (ELIQUIS) 5 MG tablet     Calcium Citrate-Vitamin D (CITRACAL + D PO)     CENTRUM SILVER OR TABS     FOLIC ACID 1 MG OR TABS     InFLIXimab (REMICADE IV)     Lactobacillus Acidophilus POWD     levothyroxine (SYNTHROID/LEVOTHROID) 75 MCG tablet     MELATONIN PO     Methotrexate Sodium (METHOTREXATE PO)     metoprolol succinate (TOPROL-XL) 25 MG 24 hr tablet     Multiple Vitamins-Minerals (ICAPS) CAPS     NIFEDICAL XL 60 MG PO TB24     omeprazole (PRILOSEC) 20 MG CR capsule     Pseudoephedrine-Guaifenesin (MUCUS D PO)     simvastatin (ZOCOR) 40 MG tablet     No current facility-administered medications for this visit.       Allergies   Allergen Reactions     No Known Allergies      Past Medical History:   Diagnosis Date     Amaurosis fugax     Right     Carotid artery stenosis      Esophageal reflux 3/11/2004     HELICOBACTER PYLORI INFECTION 2006     hx of CA in situ RT breast-.mastectomy 2007     Hyperlipidemia LDL goal <70 2011     Lung cancer (H)     squamous cell lung ca left lower lobe     OBESITY NOS 3/11/2004     OSTEOPOROSIS NOS 3/11/2004     Other psoriasis 3/11/2004     RA (rheumatoid arthritis) (H) 2010     Raynaud's syndrome 4/10/2006     Scleroderma (H)      Sleep apnea     CPAP       Past Surgical History:   Procedure Laterality Date     BREAST SURGERY       C NONSPECIFIC PROCEDURE      mastectomy RT breast     COLONOSCOPY  16     CONIZATION       ENDARTERECTOMY CAROTID       HC COLONOSCOPY THRU STOMA, DIAGNOSTIC  2001    diverticulosis     REPAIR HAMMER TOE       TONSILLECTOMY         Social History     Socioeconomic History     Marital status:      Spouse name: Not on file     Number of children: Not on file     Years of education: Not on file     Highest education level: Not on file   Social Needs     Financial resource strain: Not on file     Food insecurity - worry: Not on file     Food insecurity - inability: Not on file     Transportation needs - medical: Not on file     Transportation needs - non-medical: Not on file   Occupational History     Not on file   Tobacco Use     Smoking status: Former Smoker     Packs/day: 1.00     Years: 30.00     Pack years: 30.00     Types: Cigarettes     Last attempt to quit: 3/11/1992     Years since quittin.8     Smokeless tobacco: Never Used   Substance and Sexual Activity     Alcohol use: Yes     Alcohol/week: 0.0 oz     Comment: rare     Drug use: No     Sexual activity: Not Currently     Partners: Male   Other Topics Concern     Parent/sibling w/ CABG, MI or angioplasty before 65F 55M? No   Social History Narrative  "    Not on file       Family History   Problem Relation Age of Onset     Cancer - colorectal Mother      Cerebrovascular Disease Father      Cancer Sister 31        ovarian     Heart Disease Sister      Gastrointestinal Disease Sister         crohns     Gastrointestinal Disease Sister         diverticulitis     Gastrointestinal Disease Brother         diverticulitis     Alzheimer Disease Sister      Cerebrovascular Disease Maternal Grandmother      Diabetes Maternal Grandmother      Mental Illness Maternal Grandmother      Diabetes Maternal Uncle        ROS Pulmonary    A complete ROS was otherwise negative except as noted in the HPI.  Vitals:    01/15/19 1335   BP: 125/77   Pulse: 63   Resp: 17   SpO2: 95%   Weight: 98.9 kg (218 lb)   Height: 1.575 m (5' 2\")     Exam:   GENERAL APPEARANCE: Well developed, well nourished, alert, and in no apparent distress.  NECK: supple, no masses, no thyromegaly.  LYMPHATICS: No significant axillary, cervical, or supraclavicular nodes.  RESP: good air flow throughout, - mild bibasilar crackles with some upper lobe inspiratory wheezes..  CV: Normal S1, S2, regular rhythm, normal rate, no rub, no murmur,  no gallop, no LE edema.   ABDOMEN:  Bowel sounds normal, soft, nontender, no HSM or masses.   MS: extremities normal- no clubbing, no cyanosis.  PSYCH: mentation appears normal. and affect normal/bright  Results: I have reviewed all imaging, PFTs and other relavent tests, please see below for details, PFT and imaging results were reviewed with the patient.  PFTs: Mild restriction with mild reduction in diffusing capacity.  Stable from previous.    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  Overall stable we will continue to follow the patient every 4 months with pulmonary function test she will call sooner with any changes in her breathing.      CBC   Recent Labs   Lab Test 10/25/18  0759 10/31/16  0105 "   RBC 3.70* 4.37   HGB 11.4* 13.5   HCT 35.4 40.8    328       Basic Metabolic Panel  Recent Labs   Lab Test 10/24/16  0530 10/23/16  0533    138   POTASSIUM 3.9 3.9   CHLORIDE 103 104   CO2 28 28   BUN 18 12   GLC 96 99   TU 8.9 8.4*       INR  Recent Labs   Lab Test 12/04/15  0745   INR 0.95       PFT  PFT Latest Ref Rng & Units 1/15/2019   FVC L 1.46   FEV1 L 1.33   FVC% % 61   FEV1% % 72           Again, thank you for allowing me to participate in the care of your patient.      Sincerely,    David Morris Perlman, MD

## 2019-01-24 ENCOUNTER — OFFICE VISIT (OUTPATIENT)
Dept: FAMILY MEDICINE | Facility: CLINIC | Age: 79
End: 2019-01-24
Payer: COMMERCIAL

## 2019-01-24 VITALS
BODY MASS INDEX: 40.48 KG/M2 | HEART RATE: 63 BPM | TEMPERATURE: 97 F | OXYGEN SATURATION: 96 % | WEIGHT: 220 LBS | HEIGHT: 62 IN | SYSTOLIC BLOOD PRESSURE: 112 MMHG | DIASTOLIC BLOOD PRESSURE: 63 MMHG

## 2019-01-24 DIAGNOSIS — K59.00 CONSTIPATION, UNSPECIFIED CONSTIPATION TYPE: Primary | ICD-10-CM

## 2019-01-24 DIAGNOSIS — K22.2 ESOPHAGEAL STRICTURE: ICD-10-CM

## 2019-01-24 DIAGNOSIS — I48.19 PERSISTENT ATRIAL FIBRILLATION (H): ICD-10-CM

## 2019-01-24 DIAGNOSIS — E78.2 MIXED HYPERLIPIDEMIA: ICD-10-CM

## 2019-01-24 PROCEDURE — 99214 OFFICE O/P EST MOD 30 MIN: CPT | Performed by: INTERNAL MEDICINE

## 2019-01-24 RX ORDER — POLYETHYLENE GLYCOL 3350 17 G/17G
1 POWDER, FOR SOLUTION ORAL DAILY
Qty: 90 PACKET | Refills: 3 | Status: SHIPPED | OUTPATIENT
Start: 2019-01-24 | End: 2019-01-24

## 2019-01-24 RX ORDER — FOLIC ACID 1 MG/1
1000 TABLET ORAL DAILY
Status: CANCELLED | OUTPATIENT
Start: 2019-01-24

## 2019-01-24 RX ORDER — DOCUSATE SODIUM 100 MG/1
100 CAPSULE, LIQUID FILLED ORAL 2 TIMES DAILY
Qty: 180 CAPSULE | Refills: 3 | Status: SHIPPED | OUTPATIENT
Start: 2019-01-24 | End: 2019-04-02

## 2019-01-24 RX ORDER — POLYETHYLENE GLYCOL 3350 17 G/17G
1 POWDER, FOR SOLUTION ORAL DAILY
Qty: 90 PACKET | Refills: 3 | Status: SHIPPED | OUTPATIENT
Start: 2019-01-24 | End: 2019-04-02

## 2019-01-24 RX ORDER — DOCUSATE SODIUM 100 MG/1
100 CAPSULE, LIQUID FILLED ORAL 2 TIMES DAILY
Qty: 180 CAPSULE | Refills: 0 | Status: SHIPPED | OUTPATIENT
Start: 2019-01-24 | End: 2019-01-24

## 2019-01-24 RX ORDER — FOLIC ACID 1 MG/1
1000 TABLET ORAL DAILY
Qty: 90 TABLET | Refills: 3 | Status: SHIPPED | OUTPATIENT
Start: 2019-01-24 | End: 2021-07-09

## 2019-01-24 ASSESSMENT — MIFFLIN-ST. JEOR: SCORE: 1426.16

## 2019-01-24 NOTE — PROGRESS NOTES
SUBJECTIVE:   Jaqueline Canales is a 78 year old female who presents to clinic today for the following health issues:    Multiple concerns including constipation    HPI:   Patient Jaqueline Canales is a very pleasant 78 year old female with history of esophageal stricture, constipation, permanent atrial fibrillation on long term anticoagulation therapy with Eliquis who presents to Internal Medicine clinic today for evaluation of multiple concerns including constipation. Regarding the patient's chronic constipation, the patient reports that her chronic constipation symptoms are not well controled at this time. Patient is not on any bowel regimen medication at this time. Regarding the patient's known chronic esophageal stricture, the patient is due for a refill of omeprazole (PRILOSEC) 20 MG DR capsule medication at this time. Regarding the patient's Persistent atrial fibrillation, the patient is compliant with her long term anticoagulation therapy with Eliquis. Heart rate is well controlled at this time. No chest pains. Patient is due for a refill of her Eliquis medication.    Current Medications:     Current Outpatient Medications   Medication Sig Dispense Refill     albuterol (PROAIR HFA/PROVENTIL HFA/VENTOLIN HFA) 108 (90 Base) MCG/ACT inhaler Inhale 2 puffs into the lungs every 6 hours 3 Inhaler 3     apixaban ANTICOAGULANT (ELIQUIS) 5 MG tablet Take 1 tablet (5 mg) by mouth 2 times daily 180 tablet 3     Calcium Citrate-Vitamin D (CITRACAL + D PO) Take 2 tablets by mouth 2 times daily        CENTRUM SILVER OR TABS 1 TABLET DAILY       docusate sodium (COLACE) 100 MG capsule Take 1 capsule (100 mg) by mouth 2 times daily 180 capsule 3     folic acid (FOLVITE) 1 MG tablet Take 1 tablet (1,000 mcg) by mouth daily 90 tablet 3     InFLIXimab (REMICADE IV) Inject 600 mg into the vein Every 8 weeks       Lactobacillus Acidophilus POWD Take 1 packet by mouth daily       levothyroxine (SYNTHROID/LEVOTHROID) 75 MCG  tablet Take 1 tablet (75 mcg) by mouth daily 90 tablet 3     MELATONIN PO Take 5 mg by mouth nightly as needed       Methotrexate Sodium (METHOTREXATE PO) Take 2.5 mg by mouth 10 tablets weekly       metoprolol succinate (TOPROL-XL) 25 MG 24 hr tablet Take 1 tablet (25 mg) by mouth daily 90 tablet 1     Multiple Vitamins-Minerals (ICAPS) CAPS Take 1 capsule by mouth 2 times daily        NIFEDICAL XL 60 MG PO TB24 1 TABLET DAILY       omeprazole (PRILOSEC) 20 MG DR capsule Take 1 capsule (20 mg) by mouth 2 times daily Take 30-60 minutes prior to meals 180 capsule 3     polyethylene glycol (MIRALAX/GLYCOLAX) packet Take 17 g by mouth daily 90 packet 3     Pseudoephedrine-Guaifenesin (MUCUS D PO)        simvastatin (ZOCOR) 40 MG tablet Take 1 tablet (40 mg) by mouth At Bedtime 90 tablet 2         Allergies:      Allergies   Allergen Reactions     No Known Allergies             Past Medical History:     Past Medical History:   Diagnosis Date     Amaurosis fugax 5-11    Right     Carotid artery stenosis      Esophageal reflux 3/11/2004     HELICOBACTER PYLORI INFECTION 7/11/2006     hx of CA in situ RT breast-1990.mastectomy 4/17/2007     Hyperlipidemia LDL goal <70 6/20/2011     Lung cancer (H)     squamous cell lung ca left lower lobe     OBESITY NOS 3/11/2004     OSTEOPOROSIS NOS 3/11/2004     Other psoriasis 3/11/2004     RA (rheumatoid arthritis) (H) 2/8/2010     Raynaud's syndrome 4/10/2006     Scleroderma (H)      Sleep apnea     CPAP         Past Surgical History:     Past Surgical History:   Procedure Laterality Date     BREAST SURGERY       C NONSPECIFIC PROCEDURE  1990    mastectomy RT breast     COLONOSCOPY  5/24/16     CONIZATION       ENDARTERECTOMY CAROTID  2011     HC COLONOSCOPY THRU STOMA, DIAGNOSTIC  2001 2011    diverticulosis     REPAIR HAMMER TOE       TONSILLECTOMY           Family Medical History:     Family History   Problem Relation Age of Onset     Cancer - colorectal Mother       Cerebrovascular Disease Father      Cancer Sister 31        ovarian     Heart Disease Sister      Gastrointestinal Disease Sister         crohns     Gastrointestinal Disease Sister         diverticulitis     Gastrointestinal Disease Brother         diverticulitis     Alzheimer Disease Sister      Cerebrovascular Disease Maternal Grandmother      Diabetes Maternal Grandmother      Mental Illness Maternal Grandmother      Diabetes Maternal Uncle          Social History:     Social History     Socioeconomic History     Marital status:      Spouse name: Not on file     Number of children: Not on file     Years of education: Not on file     Highest education level: Not on file   Social Needs     Financial resource strain: Not on file     Food insecurity - worry: Not on file     Food insecurity - inability: Not on file     Transportation needs - medical: Not on file     Transportation needs - non-medical: Not on file   Occupational History     Not on file   Tobacco Use     Smoking status: Former Smoker     Packs/day: 1.00     Years: 30.00     Pack years: 30.00     Types: Cigarettes     Last attempt to quit: 3/11/1992     Years since quittin.8     Smokeless tobacco: Never Used   Substance and Sexual Activity     Alcohol use: Yes     Alcohol/week: 0.0 oz     Comment: rare     Drug use: No     Sexual activity: Not Currently     Partners: Male   Other Topics Concern     Parent/sibling w/ CABG, MI or angioplasty before 65F 55M? No   Social History Narrative     Not on file           Review of System:     Constitutional: Negative for fever or chills  Skin: Negative for rashes  Ears/Nose/Throat: Negative for nasal congestion, sore throat  Respiratory: No shortness of breath, dyspnea on exertion, cough, or hemoptysis  Cardiovascular: Negative for chest pain, positive for chronic atrial fibrillation.  Gastrointestinal: Negative for nausea, vomiting, positive for chronic constipation, esophageal  "stricture  Genitourinary: Negative for dysuria, hematuria  Musculoskeletal: Negative for myalgias  Neurologic: Negative for headaches  Psychiatric: Negative for depression, anxiety  Hematologic/Lymphatic/Immunologic: Negative  Endocrine: Negative  Behavioral: Negative for tobacco use       Physical Exam:   /63 (BP Location: Left arm, Patient Position: Sitting, Cuff Size: Adult Large)   Pulse 63   Temp 97  F (36.1  C) (Oral)   Ht 1.575 m (5' 2\")   Wt 99.8 kg (220 lb)   SpO2 96%   BMI 40.24 kg/m      GENERAL: chronically ill appearing elderly female, alert and no acute distress  EYES: eyes grossly normal to inspection, and conjunctivae and sclerae normal  HENT: Normocephalic atraumatic. Nose and mouth without ulcers or lesions  NECK: supple  RESP: lungs clear to auscultation   CV: irregularly irregular, heart rate is well controlled  LYMPH: no peripheral edema   ABDOMEN: nondistended  MS: no gross musculoskeletal defects noted  SKIN: no suspicious lesions or rashes  NEURO: Alert & Oriented x 3.   PSYCH: mentation appears normal, affect normal        Diagnostic Test Results:     Diagnostic Test Results:  Results for orders placed or performed in visit on 01/15/19   General PFT Lab (Please always keep checked)   Result Value Ref Range    FVC-Pred 2.39 L    FVC-Pre 1.46 L    FVC-%Pred-Pre 61 %    FEV1-Pre 1.33 L    FEV1-%Pred-Pre 72 %    FEV1FVC-Pred 74 %    FEV1FVC-Pre 91 %    FEFMax-Pred 4.58 L/sec    FEFMax-Pre 4.67 L/sec    FEFMax-%Pred-Pre 102 %    FEF2575-Pred 1.51 L/sec    FEF2575-Pre 2.12 L/sec    KOM2073-%Pred-Pre 140 %    ExpTime-Pre 5.13 sec    FIFMax-Pre 4.09 L/sec    VC-Pred 2.62 L    VC-Pre 1.58 L    VC-%Pred-Pre 60 %    IC-Pred 2.61 L    IC-Pre 1.37 L    IC-%Pred-Pre 52 %    ERV-Pred 0.01 L    ERV-Pre 0.20 L    ERV-%Pred-Pre 2,037 %    FEV1FEV6-Pred 78 %    FEV1FEV6-Pre 91 %    DLCOunc-Pred 18.70 ml/min/mmHg    DLCOunc-Pre 10.77 ml/min/mmHg    DLCOunc-%Pred-Pre 57 %    VA-Pre 2.64 L    " VA-%Pred-Pre 55 %    FEV1SVC-Pred 70 %    FEV1SVC-Pre 84 %       ASSESSMENT/PLAN:       (K59.00) Constipation, unspecified constipation type  (primary encounter diagnosis)  Comment: chronic constipation symptoms, not well controled. Patient is not on any bowel regimen medication at this time.  Plan: docusate sodium (COLACE) 100 MG capsule,         polyethylene glycol (MIRALAX/GLYCOLAX) packet       (E78.2) Mixed hyperlipidemia  Comment: patient is due for a refill of Folic acid  Plan: folic acid (FOLVITE) 1 MG tablet      (K22.2) Esophageal stricture  Comment: chronic esophageal stricture  Plan: omeprazole (PRILOSEC) 20 MG DR capsule      (I48.1) Persistent atrial fibrillation (H)  Comment: patient is compliant with her long term anticoagulation therapy with Eliquis. Heart rate is well controlled at this time. No chest pains. Patient is due for a refill of her Eliquis medication.  Plan: apixaban ANTICOAGULANT (ELIQUIS) 5 MG tablet           Follow Up Plan:     Patient is instructed to return to Internal Medicine clinic for follow-up visit in 1 week.        Carissa Burroughs MD  Internal Medicine  [facility]

## 2019-02-07 LAB
DLCOUNC-%PRED-PRE: 57 %
DLCOUNC-PRE: 10.77 ML/MIN/MMHG
DLCOUNC-PRED: 18.7 ML/MIN/MMHG
ERV-%PRED-PRE: 2037 %
ERV-PRE: 0.2 L
ERV-PRED: 0.01 L
EXPTIME-PRE: 5.13 SEC
FEF2575-%PRED-PRE: 140 %
FEF2575-PRE: 2.12 L/SEC
FEF2575-PRED: 1.51 L/SEC
FEFMAX-%PRED-PRE: 102 %
FEFMAX-PRE: 4.67 L/SEC
FEFMAX-PRED: 4.58 L/SEC
FEV1-%PRED-PRE: 72 %
FEV1-PRE: 1.33 L
FEV1FEV6-PRE: 91 %
FEV1FEV6-PRED: 78 %
FEV1FVC-PRE: 91 %
FEV1FVC-PRED: 74 %
FEV1SVC-PRE: 84 %
FEV1SVC-PRED: 70 %
FIFMAX-PRE: 4.09 L/SEC
FVC-%PRED-PRE: 61 %
FVC-PRE: 1.46 L
FVC-PRED: 2.39 L
IC-%PRED-PRE: 52 %
IC-PRE: 1.37 L
IC-PRED: 2.61 L
VA-%PRED-PRE: 55 %
VA-PRE: 2.64 L
VC-%PRED-PRE: 60 %
VC-PRE: 1.58 L
VC-PRED: 2.62 L

## 2019-02-11 ENCOUNTER — TRANSFERRED RECORDS (OUTPATIENT)
Dept: HEALTH INFORMATION MANAGEMENT | Facility: CLINIC | Age: 79
End: 2019-02-11

## 2019-02-13 ENCOUNTER — OFFICE VISIT (OUTPATIENT)
Dept: FAMILY MEDICINE | Facility: CLINIC | Age: 79
End: 2019-02-13
Payer: COMMERCIAL

## 2019-02-13 VITALS
BODY MASS INDEX: 40.48 KG/M2 | TEMPERATURE: 97.6 F | HEART RATE: 60 BPM | WEIGHT: 220 LBS | SYSTOLIC BLOOD PRESSURE: 130 MMHG | OXYGEN SATURATION: 97 % | DIASTOLIC BLOOD PRESSURE: 68 MMHG | HEIGHT: 62 IN

## 2019-02-13 DIAGNOSIS — I48.19 PERSISTENT ATRIAL FIBRILLATION (H): ICD-10-CM

## 2019-02-13 DIAGNOSIS — K59.00 CONSTIPATION, UNSPECIFIED CONSTIPATION TYPE: ICD-10-CM

## 2019-02-13 DIAGNOSIS — E78.5 HYPERLIPIDEMIA LDL GOAL <100: Primary | ICD-10-CM

## 2019-02-13 DIAGNOSIS — E03.9 HYPOTHYROIDISM, UNSPECIFIED TYPE: ICD-10-CM

## 2019-02-13 PROCEDURE — 99214 OFFICE O/P EST MOD 30 MIN: CPT | Performed by: INTERNAL MEDICINE

## 2019-02-13 RX ORDER — SIMVASTATIN 40 MG
40 TABLET ORAL AT BEDTIME
Qty: 90 TABLET | Refills: 2 | Status: SHIPPED | OUTPATIENT
Start: 2019-02-13 | End: 2019-08-21

## 2019-02-13 RX ORDER — METOPROLOL SUCCINATE 25 MG/1
25 TABLET, EXTENDED RELEASE ORAL DAILY
Qty: 90 TABLET | Refills: 3 | Status: SHIPPED | OUTPATIENT
Start: 2019-02-13 | End: 2019-04-25

## 2019-02-13 RX ORDER — LEVOTHYROXINE SODIUM 75 UG/1
75 TABLET ORAL DAILY
Qty: 90 TABLET | Refills: 3 | Status: SHIPPED | OUTPATIENT
Start: 2019-02-13 | End: 2019-09-30

## 2019-02-13 ASSESSMENT — MIFFLIN-ST. JEOR: SCORE: 1426.16

## 2019-02-13 NOTE — PROGRESS NOTES
SUBJECTIVE:   Jaqueline Canales is a 78 year old female who presents to clinic today for the following health issues:      Chief Complaint   Patient presents with     Follow Up     Constipation         HPI:   Patient Jaqueline Canales is a very pleasant 78 year old female with history of constipation, hypertension, hypothyroidism who presents to Internal Medicine clinic today for follow up of multiple concerns including constipation follow up. Regarding the patient's constipation symptoms, the patient rep[orts that her current constipation bowel regimen medications with docusate and miralax are working well. Regarding the patient's hypothyoridism, the patient reports that she is due for a refill of her levothyroxine medication. Regarding her chronic atrial fibrillation, she denies any chest pain, her heart rate is well cotnrolled.       Current Medications:     Current Outpatient Medications   Medication Sig Dispense Refill     albuterol (PROAIR HFA/PROVENTIL HFA/VENTOLIN HFA) 108 (90 Base) MCG/ACT inhaler Inhale 2 puffs into the lungs every 6 hours 3 Inhaler 3     apixaban ANTICOAGULANT (ELIQUIS) 5 MG tablet Take 1 tablet (5 mg) by mouth 2 times daily 180 tablet 3     Calcium Citrate-Vitamin D (CITRACAL + D PO) Take 2 tablets by mouth 2 times daily        CENTRUM SILVER OR TABS 1 TABLET DAILY       docusate sodium (COLACE) 100 MG capsule Take 1 capsule (100 mg) by mouth 2 times daily 180 capsule 3     folic acid (FOLVITE) 1 MG tablet Take 1 tablet (1,000 mcg) by mouth daily 90 tablet 3     InFLIXimab (REMICADE IV) Inject 600 mg into the vein Every 8 weeks       Lactobacillus Acidophilus POWD Take 1 packet by mouth daily       levothyroxine (SYNTHROID/LEVOTHROID) 75 MCG tablet Take 1 tablet (75 mcg) by mouth daily 90 tablet 3     MELATONIN PO Take 5 mg by mouth nightly as needed       Methotrexate Sodium (METHOTREXATE PO) Take 2.5 mg by mouth 10 tablets weekly       metoprolol succinate ER (TOPROL-XL) 25 MG 24 hr  tablet Take 1 tablet (25 mg) by mouth daily 90 tablet 3     Multiple Vitamins-Minerals (ICAPS) CAPS Take 1 capsule by mouth 2 times daily        NIFEDICAL XL 60 MG PO TB24 1 TABLET DAILY       omeprazole (PRILOSEC) 20 MG DR capsule Take 1 capsule (20 mg) by mouth 2 times daily Take 30-60 minutes prior to meals 180 capsule 3     polyethylene glycol (MIRALAX/GLYCOLAX) packet Take 17 g by mouth daily 90 packet 3     Pseudoephedrine-Guaifenesin (MUCUS D PO)        simvastatin (ZOCOR) 40 MG tablet Take 1 tablet (40 mg) by mouth At Bedtime 90 tablet 2         Allergies:      Allergies   Allergen Reactions     No Known Allergies             Past Medical History:     Past Medical History:   Diagnosis Date     Amaurosis fugax 5-11    Right     Carotid artery stenosis      Esophageal reflux 3/11/2004     HELICOBACTER PYLORI INFECTION 7/11/2006     hx of CA in situ RT breast-1990.mastectomy 4/17/2007     Hyperlipidemia LDL goal <70 6/20/2011     Lung cancer (H)     squamous cell lung ca left lower lobe     OBESITY NOS 3/11/2004     OSTEOPOROSIS NOS 3/11/2004     Other psoriasis 3/11/2004     RA (rheumatoid arthritis) (H) 2/8/2010     Raynaud's syndrome 4/10/2006     Scleroderma (H)      Sleep apnea     CPAP         Past Surgical History:     Past Surgical History:   Procedure Laterality Date     BREAST SURGERY       C NONSPECIFIC PROCEDURE  1990    mastectomy RT breast     COLONOSCOPY  5/24/16     CONIZATION       ENDARTERECTOMY CAROTID  2011     HC COLONOSCOPY THRU STOMA, DIAGNOSTIC  2001 2011    diverticulosis     REPAIR HAMMER TOE       TONSILLECTOMY           Family Medical History:     Family History   Problem Relation Age of Onset     Cancer - colorectal Mother      Cerebrovascular Disease Father      Cancer Sister 31        ovarian     Heart Disease Sister      Gastrointestinal Disease Sister         crohns     Gastrointestinal Disease Sister         diverticulitis     Gastrointestinal Disease Brother          diverticulitis     Alzheimer Disease Sister      Cerebrovascular Disease Maternal Grandmother      Diabetes Maternal Grandmother      Mental Illness Maternal Grandmother      Diabetes Maternal Uncle          Social History:     Social History     Socioeconomic History     Marital status:      Spouse name: Not on file     Number of children: Not on file     Years of education: Not on file     Highest education level: Not on file   Social Needs     Financial resource strain: Not on file     Food insecurity - worry: Not on file     Food insecurity - inability: Not on file     Transportation needs - medical: Not on file     Transportation needs - non-medical: Not on file   Occupational History     Not on file   Tobacco Use     Smoking status: Former Smoker     Packs/day: 1.00     Years: 30.00     Pack years: 30.00     Types: Cigarettes     Last attempt to quit: 3/11/1992     Years since quittin.9     Smokeless tobacco: Never Used   Substance and Sexual Activity     Alcohol use: Yes     Alcohol/week: 0.0 oz     Comment: rare     Drug use: No     Sexual activity: Not Currently     Partners: Male   Other Topics Concern     Parent/sibling w/ CABG, MI or angioplasty before 65F 55M? No   Social History Narrative     Not on file           Review of System:     Constitutional: Negative for fever or chills  Skin: Negative for rashes  Ears/Nose/Throat: Negative for nasal congestion, sore throat  Respiratory: No shortness of breath, dyspnea on exertion, cough, or hemoptysis  Cardiovascular: Negative for chest pain, positive for chronic atrial fibrillation  Gastrointestinal: Negative for nausea, vomiting  Genitourinary: Negative for dysuria, hematuria  Musculoskeletal: Negative for myalgias  Neurologic: Negative for headaches  Psychiatric: Negative for depression, anxiety  Hematologic/Lymphatic/Immunologic: Negative  Endocrine: Negative  Behavioral: Negative for tobacco use       Physical Exam:   /68 (BP Location:  "Left arm, Patient Position: Sitting, Cuff Size: Adult Large)   Pulse 60   Temp 97.6  F (36.4  C) (Oral)   Ht 1.575 m (5' 2\")   Wt 99.8 kg (220 lb)   SpO2 97%   BMI 40.24 kg/m      GENERAL: chronically ill appearing elderly female, alert and no distress  EYES: eyes grossly normal to inspection, and conjunctivae and sclerae normal  HENT: Normocephalic atraumatic. Nose and mouth without ulcers or lesions  NECK: supple  RESP: lungs clear to auscultation   CV: irregularly irregular, heart rate is well controlled  LYMPH: no peripheral edema   ABDOMEN: nondistended  MS: no gross musculoskeletal defects noted  SKIN: no suspicious lesions or rashes  NEURO: Alert & Oriented x 3.   PSYCH: mentation appears normal, affect normal        Diagnostic Test Results:     Diagnostic Test Results:  Results for orders placed or performed in visit on 01/15/19   General PFT Lab (Please always keep checked)   Result Value Ref Range    FVC-Pred 2.39 L    FVC-Pre 1.46 L    FVC-%Pred-Pre 61 %    FEV1-Pre 1.33 L    FEV1-%Pred-Pre 72 %    FEV1FVC-Pred 74 %    FEV1FVC-Pre 91 %    FEFMax-Pred 4.58 L/sec    FEFMax-Pre 4.67 L/sec    FEFMax-%Pred-Pre 102 %    FEF2575-Pred 1.51 L/sec    FEF2575-Pre 2.12 L/sec    AEN6627-%Pred-Pre 140 %    ExpTime-Pre 5.13 sec    FIFMax-Pre 4.09 L/sec    VC-Pred 2.62 L    VC-Pre 1.58 L    VC-%Pred-Pre 60 %    IC-Pred 2.61 L    IC-Pre 1.37 L    IC-%Pred-Pre 52 %    ERV-Pred 0.01 L    ERV-Pre 0.20 L    ERV-%Pred-Pre 2,037 %    FEV1FEV6-Pred 78 %    FEV1FEV6-Pre 91 %    DLCOunc-Pred 18.70 ml/min/mmHg    DLCOunc-Pre 10.77 ml/min/mmHg    DLCOunc-%Pred-Pre 57 %    VA-Pre 2.64 L    VA-%Pred-Pre 55 %    FEV1SVC-Pred 70 %    FEV1SVC-Pre 84 %    Narrative    The FVC is reduced and the FEV1/FVC ratio is increased.  The diffusion capacity is moderately reduced.  However, the diffusing capacity was not corrected for the patient's hemoglobin.  IMPRESSION:  Mild Restriction   Moderate Diffusion Defect  Compared to 9/13/18 there " is no change in the FEV1 and FVC  ____________________________________________M.D.  Wendy Figueroa  ï  ï  This interpretation has been electronically signed:  WENDY FIGUEROA 02/03/2019  04:27:30 PMï  ï         ASSESSMENT/PLAN:       (E78.5) Hyperlipidemia LDL goal <100  (primary encounter diagnosis)  Comment: symptoms stable on Zocor. She is due for a refill of Zocor.  Plan: simvastatin (ZOCOR) 40 MG tablet      (I48.1) Persistent atrial fibrillation (H)  Comment: heart rate is well controlled  Plan: metoprolol succinate ER (TOPROL-XL) 25 MG 24 hr        tablet      (E03.9) Hypothyroidism, unspecified type  Comment: symptoms stable.   Plan: levothyroxine (SYNTHROID/LEVOTHROID) 75 MCG         tablet      (K59.00) Constipation, unspecified constipation type  Comment: chronic constipation symptoms well controlled current docusate and Miralax medicaitons  Plan: continue current Docusate and Miralax bowel regimen medications         Follow Up Plan:     Patient is instructed to return to Internal Medicine clinic for follow-up visit in 1 week.        Carissa Burroughs MD  Internal Medicine  [facility]

## 2019-03-19 ENCOUNTER — TELEPHONE (OUTPATIENT)
Dept: FAMILY MEDICINE | Facility: CLINIC | Age: 79
End: 2019-03-19

## 2019-03-19 NOTE — TELEPHONE ENCOUNTER
Latanya calling form [facility]   requesting hold and bridges orders protocal is 3 days  Did Dr Burroughs want to bridge with alternative  blood thinner?    Call Latanya back at [phone] option 1 (x 2 times)    Thank you

## 2019-03-19 NOTE — TELEPHONE ENCOUNTER
PCP,    Pt requesting colonoscopy order.  [facility] requesting hold and bridge orders. Pt on Eliquis. Did you want to bridge with something?    Thank you,  Chidi GILBERT RN

## 2019-03-19 NOTE — TELEPHONE ENCOUNTER
Reason for Call: Request for an order or referral:    Order or referral being requested: Colonoscopy    Date needed: as soon as possible    Has the patient been seen by the PCP for this problem? YES    Additional comments: The patient said it time for her next Colonoscopy   Please place Order    Phone number Patient can be reached at:  Home number on file [phone] (home)    Best Time:  anytime    Can we leave a detailed message on this number?  YES    Call taken on 3/19/2019 at 12:30 PM by Suellen Villagran

## 2019-04-01 NOTE — PROGRESS NOTES
76 year old white female seen for follow-up.  The patient has scleroderma and ILD and was incidentally found to have SCC of left lower lobe that was treated with radiation therapy.  She is clinically stable and has no interim history of right hear failure.  The most recent echocardiogram discloses elevated, estimated PA pressure.  She has no significant functional limitation at this time.  We discussed the nature of right heart catherization and medication for PH; and for the moment, in the absence of symptoms, she wishes to defer diagnostic and therapeutic intervention.    She returns now with exertional shortness of breath and decreased exercise tolerance but without symptoms of right heart failure, exertional -pre-syncope or syncope.      REVIEW of SYMPTOMS    Constitutional: without fever, chills, night sweats.  Weight is ___  HEENT: without dry eyes, dry mouth, sinusitis, corryza, visual changes  Endocrine: without polyuria, polydypsia, polyphagia, heat or cold intolerance, changing mental status  Cardiology: without chest pain, tightness, heaviness, pressure, paroxysmal dyspnea, orthopnea, palpitation, pre-syncope or syncope or device discharge (if present)  Pulmonary: without asthma, wheezing, cough, hemoptysis  GI: without nausea, emesis, jaundice, pain, hematemesis, melena  : without frequency, urgency, hematuria, stones, pain, abnormal bleeding, frequency, urgency  Neurologic: without TIA, CVA, trauma, seizure  Dermatologic: without lesions, abrasion rash,   Orthopedic/Rheum: without significant joint pain, impairment, limb, polyserositis, ulceration, Raynauds  Heme: without mass, bruising, frequent infection, anemia  Psychiatric: without substance abuse, hallucination, medication, depression    Exercise tolerance:    PERRLA, EOM full, normal gait and station, normal mentation, skin without lesions, chest is clear, no evidence of right or left ventricular overactivity, sternum stable, no carotid bruits,  regular rhythm, S1, S2, no murmurs, abdomen without tenderness, rebound guarding or masses, no edema, no focal neurologic defects.  Results for GINA MCKEON (MRN [id number]) as of 4/3/2019 08:10   Ref. Range 2/11/2019 00:00 4/2/2019 00:00 4/2/2019 13:08   Sodium Latest Ref Range: 133 - 144 mmol/L   140   Potassium Latest Ref Range: 3.4 - 5.3 mmol/L   3.9   Chloride Latest Ref Range: 94 - 109 mmol/L   106   Carbon Dioxide Latest Ref Range: 20 - 32 mmol/L   25   Urea Nitrogen Latest Ref Range: 7 - 30 mg/dL   18   Creatinine Latest Ref Range: 0.52 - 1.04 mg/dL   0.74   GFR Estimate Latest Ref Range: >60 mL/min/1.73_m2   76   GFR Estimate If Black Latest Ref Range: >60 mL/min/1.73_m2   89   Calcium Latest Ref Range: 8.5 - 10.1 mg/dL   9.7   Anion Gap Latest Ref Range: 3 - 14 mmol/L   9   Albumin Latest Ref Range: 3.4 - 5.0 g/dL   4.1   Protein Total Latest Ref Range: 6.8 - 8.8 g/dL   7.4   Bilirubin Total Latest Ref Range: 0.2 - 1.3 mg/dL   0.7   Alkaline Phosphatase Latest Ref Range: 40 - 150 U/L   88   ALT Latest Ref Range: 0 - 50 U/L   36   AST Latest Ref Range: 0 - 45 U/L   27   Iron Latest Ref Range: 35 - 180 ug/dL   55   Iron Binding Cap Latest Ref Range: 240 - 430 ug/dL   334   Iron Saturation Index Latest Ref Range: 15 - 46 %   16   N-Terminal Pro Bnp Latest Ref Range: 0 - 450 pg/mL   3,011 (H)   Glucose Latest Ref Range: 70 - 99 mg/dL   98   WBC Latest Ref Range: 4.0 - 11.0 10e9/L   6.5   Hemoglobin Latest Ref Range: 11.7 - 15.7 g/dL   12.9   Hematocrit Latest Ref Range: 35.0 - 47.0 %   38.8   Platelet Count Latest Ref Range: 150 - 450 10e9/L   114 (L)   RBC Count Latest Ref Range: 3.8 - 5.2 10e12/L   4.05   MCV Latest Ref Range: 78 - 100 fl   96   MCH Latest Ref Range: 26.5 - 33.0 pg   31.9   MCHC Latest Ref Range: 31.5 - 36.5 g/dL   33.2   RDW Latest Ref Range: 10.0 - 15.0 %   15.7 (H)   LAB RESULT - HIM SCAN Unknown Attch     EKG 12-LEAD COMPLETE W/READ - CLINICS Unknown  Attch    EKG 12-LEAD, TRACING  ONLY Unknown  Attch         Ref. Range 10/31/2016 14:55   CRP Inflammation Latest Ref Range: 0.0-8.0 mg/L 4.1   WBC Latest Ref Range: 4.0-11.0 10e9/L 5.4   Hemoglobin Latest Ref Range: 11.7-15.7 g/dL 13.5   Hematocrit Latest Ref Range: 35.0-47.0 % 40.8   Platelet Count Latest Ref Range: 150-450 10e9/L 328   RBC Count Latest Ref Range: 3.8-5.2 10e12/L 4.37   MCV Latest Ref Range:  fl 93   MCH Latest Ref Range: 26.5-33.0 pg 30.9   MCHC Latest Ref Range: 31.5-36.5 g/dL 33.1   RDW Latest Ref Range: 10.0-15.0 % 17.0 (H)   Diff Method Unknown Automated Method   % Neutrophils Latest Units: % 73.8   % Lymphocytes Latest Units: % 17.1   % Monocytes Latest Units: % 6.5   % Eosinophils Latest Units: % 2.0   % Basophils Latest Units: % 0.6   Absolute Neutrophil Latest Ref Range: 1.6-8.3 10e9/L 4.0   Absolute Lymphocytes Latest Ref Range: 0.8-5.3 10e9/L 0.9   Absolute Monocytes Latest Ref Range: 0.0-1.3 10e9/L 0.4   Absolute Eosinophils Latest Ref Range: 0.0-0.7 10e9/L 0.1   Absolute Basophils Latest Ref Range: 0.0-0.2 10e9/L 0.0   CT CHEST WITHOUT CONTRAST December 27, 2016 11:40 AM       HISTORY: Follow up on right sided lung lesions. Malignant neoplasm of  lower lobe, left bronchus or lung.     COMPARISON: 10/22/2016.     TECHNIQUE: Volumetric helical acquisition of CT images of the chest  from the clavicles to the kidneys were acquired without IV contrast.  Radiation dose for this scan was reduced using automated exposure  control, adjustment of the mA and/or kV according to patient size, or  iterative reconstruction technique.     FINDINGS: Right-sided fibrotic changes appear stable. Somewhat nodular  density at the left base has resolved and may have been an area of  consolidation and/or active inflammation. Honeycombing and traction  bronchiectasis persist. Moderate hiatal hernia. No pleural or  pericardial effusion. Mild cardiomegaly. There are moderate coronary  vascular calcifications consistent with coronary  artery disease. There  are mild atherosclerotic changes of the visualized aorta and its  branches. There is no evidence of aortic aneurysm. No acute findings  in the visualized upper abdomen.                                                                       IMPRESSION:  1. 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  2. The nodular density at the left base has resolved  Davie Iyer MD 2016             Narrative           Interpretation Summary              [facility]  [facility]  Echocardiography Laboratory  [address]  [address]  [place] [address]  Phone [phone]  Fax [phone]        Name: GINA MCKEON  MRN: [id number]  : 1940  Study Date: 2016 07:39 AM  Age: 75 yrs  Gender: Female  Patient Location: [facility]  Reason For Study: Unspecified atrial fibrillation  Ordering Physician: HAZEL RIBEIRO  Referring Physician: HAZEL RIBEIRO  Performed By: Rosanna Manning RDCS  BSA: 2.0 m2  Height: 62 in  Weight: 226 lb  HR: 89  BP: 100/58 mmHg     ______________________________________________________________________________     Procedure  Complete Echo Adult.     ______________________________________________________________________________     Interpretation Summary     Left ventricular systolic function is normal.The visual ejection fraction is  estimated at 60-65%.  The right ventricle is mildly dilated.The right ventricular systolic function  is normal.  The left atrium is severely dilated.  There is moderate mitral annular calcification.There is mild (1+) mitral  regurgitation.  Pulmonary hypertension- RVSP 42 mm hg +RA.  The IVC is normal in size and reactivity with respiration, suggesting normal  central venous pressure.  ______________________________________________________________________________           Left Ventricle  The left ventricle is normal  in size. There is mild concentric left  ventricular hypertrophy. Left ventricular systolic function is normal. The  visual ejection fraction is estimated at 60-65%. E prime velocity may be  unreliable due to presence of significant mitral annular calcification. No  regional wall motion abnormalities noted.  Right Ventricle  The right ventricle is mildly dilated. The right ventricular systolic  function is normal.     Atria  The left atrium is severely dilated. The right atrium is mild to moderately  dilated. There is no color Doppler evidence of an atrial shunt.     Mitral Valve  There is moderate mitral annular calcification. There is mild (1+) mitral  regurgitation.     Tricuspid Valve  There is mild (1+) tricuspid regurgitation. The right ventricular systolic  pressure is approximated at 42.2 mmHg plus the right atrial pressure.  Pulmonary hypertension.     Aortic Valve  There is mild trileaflet aortic sclerosis. No aortic regurgitation is  present. No aortic stenosis is present.     Pulmonic Valve  The pulmonic valve is not well visualized. There is no pulmonic valvular  stenosis.  Vessels  The aortic root is normal size. Normal size ascending aorta. The IVC is  normal in size and reactivity with respiration, suggesting normal central  venous pressure.     Pericardium  There is no pericardial effusion.     Rhythm  The rhythm was normal sinus.  ______________________________________________________________________________     MMode/2D Measurements & Calculations  IVSd: 1.2 cm  LVIDd: 5.3 cm  LVIDs: 3.4 cm  LVPWd: 0.96 cm  FS: 35.9 %  EDV(Teich): 132.7 ml  ESV(Teich): 46.3 ml  LV mass(C)d: 224.6 grams  Ao root diam: 2.8 cm  LA dimension: 4.7 cm  asc Aorta Diam: 3.5 cm  LA/Ao: 1.7  LVOT diam: 1.9 cm  LVOT area: 2.7 cm2  LA Volume (BP): 101.0 ml  LA Volume Index (BP): 50.2 ml/m2           Doppler Measurements & Calculations  MV E max carrington: 89.7 cm/sec  MV A max carrington: 94.6 cm/sec  MV E/A: 0.95  MV dec time: 0.20  sec  TR max carrington: 324.7 cm/sec  TR max P.2 mmHg  Lateral E/e': 12.9  Medial E/e': 17.2                   Current Outpatient Medications   Medication     albuterol (PROAIR HFA/PROVENTIL HFA/VENTOLIN HFA) 108 (90 Base) MCG/ACT inhaler     apixaban ANTICOAGULANT (ELIQUIS) 5 MG tablet     Calcium Citrate-Vitamin D (CITRACAL + D PO)     CENTRUM SILVER OR TABS     docusate sodium (COLACE) 100 MG capsule     folic acid (FOLVITE) 1 MG tablet     InFLIXimab (REMICADE IV)     Lactobacillus Acidophilus POWD     levothyroxine (SYNTHROID/LEVOTHROID) 75 MCG tablet     MELATONIN PO     Methotrexate Sodium (METHOTREXATE PO)     metoprolol succinate ER (TOPROL-XL) 25 MG 24 hr tablet     Multiple Vitamins-Minerals (ICAPS) CAPS     NIFEDICAL XL 60 MG PO TB24     omeprazole (PRILOSEC) 20 MG DR capsule     polyethylene glycol (MIRALAX/GLYCOLAX) packet     Pseudoephedrine-Guaifenesin (MUCUS D PO)     simvastatin (ZOCOR) 40 MG tablet     No current facility-administered medications for this visit.       The patient notes increasing shortness and decreased exercise tolerance and physical examination suggests atrial fibrillation which previously has been paroxysmal.  Rate is controlled.  The patient is maintained on NWOAC without bleeding complication.  Would consider augmented diuresis and potential use of amiodarone to convert to sinus rthythm.  She feels her main obstacle to improved mobility is orthopedic not cardiorespiratory    CC: Dr Burroughs

## 2019-04-02 ENCOUNTER — OFFICE VISIT (OUTPATIENT)
Dept: CARDIOLOGY | Facility: CLINIC | Age: 79
End: 2019-04-02
Payer: MEDICARE

## 2019-04-02 VITALS
DIASTOLIC BLOOD PRESSURE: 63 MMHG | HEIGHT: 62 IN | WEIGHT: 220 LBS | HEART RATE: 78 BPM | SYSTOLIC BLOOD PRESSURE: 97 MMHG | BODY MASS INDEX: 40.48 KG/M2

## 2019-04-02 DIAGNOSIS — R06.09 DYSPNEA ON EXERTION: ICD-10-CM

## 2019-04-02 DIAGNOSIS — R00.2 PALPITATIONS: Primary | ICD-10-CM

## 2019-04-02 DIAGNOSIS — I27.20 PULMONARY HYPERTENSION (H): ICD-10-CM

## 2019-04-02 DIAGNOSIS — R79.9 ABNORMAL FINDING OF BLOOD CHEMISTRY: ICD-10-CM

## 2019-04-02 DIAGNOSIS — R00.2 PALPITATIONS: ICD-10-CM

## 2019-04-02 LAB
ALBUMIN SERPL-MCNC: 4.1 G/DL (ref 3.4–5)
ALP SERPL-CCNC: 88 U/L (ref 40–150)
ALT SERPL W P-5'-P-CCNC: 36 U/L (ref 0–50)
ANION GAP SERPL CALCULATED.3IONS-SCNC: 9 MMOL/L (ref 3–14)
AST SERPL W P-5'-P-CCNC: 27 U/L (ref 0–45)
BILIRUB SERPL-MCNC: 0.7 MG/DL (ref 0.2–1.3)
BUN SERPL-MCNC: 18 MG/DL (ref 7–30)
CALCIUM SERPL-MCNC: 9.7 MG/DL (ref 8.5–10.1)
CHLORIDE SERPL-SCNC: 106 MMOL/L (ref 94–109)
CO2 SERPL-SCNC: 25 MMOL/L (ref 20–32)
CREAT SERPL-MCNC: 0.74 MG/DL (ref 0.52–1.04)
ERYTHROCYTE [DISTWIDTH] IN BLOOD BY AUTOMATED COUNT: 15.7 % (ref 10–15)
GFR SERPL CREATININE-BSD FRML MDRD: 76 ML/MIN/{1.73_M2}
GLUCOSE SERPL-MCNC: 98 MG/DL (ref 70–99)
HCT VFR BLD AUTO: 38.8 % (ref 35–47)
HGB BLD-MCNC: 12.9 G/DL (ref 11.7–15.7)
IRON SATN MFR SERPL: 16 % (ref 15–46)
IRON SERPL-MCNC: 55 UG/DL (ref 35–180)
MCH RBC QN AUTO: 31.9 PG (ref 26.5–33)
MCHC RBC AUTO-ENTMCNC: 33.2 G/DL (ref 31.5–36.5)
MCV RBC AUTO: 96 FL (ref 78–100)
NT-PROBNP SERPL-MCNC: 3011 PG/ML (ref 0–450)
PLATELET # BLD AUTO: 114 10E9/L (ref 150–450)
POTASSIUM SERPL-SCNC: 3.9 MMOL/L (ref 3.4–5.3)
PROT SERPL-MCNC: 7.4 G/DL (ref 6.8–8.8)
RBC # BLD AUTO: 4.05 10E12/L (ref 3.8–5.2)
SODIUM SERPL-SCNC: 140 MMOL/L (ref 133–144)
TIBC SERPL-MCNC: 334 UG/DL (ref 240–430)
WBC # BLD AUTO: 6.5 10E9/L (ref 4–11)

## 2019-04-02 PROCEDURE — 83550 IRON BINDING TEST: CPT | Performed by: INTERNAL MEDICINE

## 2019-04-02 PROCEDURE — 80053 COMPREHEN METABOLIC PANEL: CPT | Performed by: INTERNAL MEDICINE

## 2019-04-02 PROCEDURE — 85027 COMPLETE CBC AUTOMATED: CPT | Performed by: INTERNAL MEDICINE

## 2019-04-02 PROCEDURE — 99214 OFFICE O/P EST MOD 30 MIN: CPT | Performed by: INTERNAL MEDICINE

## 2019-04-02 PROCEDURE — 83880 ASSAY OF NATRIURETIC PEPTIDE: CPT | Performed by: INTERNAL MEDICINE

## 2019-04-02 PROCEDURE — 36415 COLL VENOUS BLD VENIPUNCTURE: CPT | Performed by: INTERNAL MEDICINE

## 2019-04-02 PROCEDURE — 93005 ELECTROCARDIOGRAM TRACING: CPT | Performed by: INTERNAL MEDICINE

## 2019-04-02 PROCEDURE — 83540 ASSAY OF IRON: CPT | Performed by: INTERNAL MEDICINE

## 2019-04-02 RX ORDER — POTASSIUM CHLORIDE 1500 MG/1
20 TABLET, EXTENDED RELEASE ORAL
Status: CANCELLED | OUTPATIENT
Start: 2019-04-02

## 2019-04-02 RX ORDER — SODIUM CHLORIDE 9 MG/ML
1000 INJECTION, SOLUTION INTRAVENOUS CONTINUOUS
Status: CANCELLED | OUTPATIENT
Start: 2019-04-02

## 2019-04-02 RX ORDER — LIDOCAINE 40 MG/G
CREAM TOPICAL
Status: CANCELLED | OUTPATIENT
Start: 2019-04-02

## 2019-04-02 ASSESSMENT — MIFFLIN-ST. JEOR: SCORE: 1426.16

## 2019-04-02 NOTE — PATIENT INSTRUCTIONS
Medication Changes:  No medication changes at this time. Please continue current medication regiment.    Patient Instructions:  1. Continue staying active and eat a heart healthy diet.    2. Please keep current list of medications with you at all times.    3. Remember to weigh yourself daily after voiding and before you consume any food or beverages and log the numbers.  If you have gained/lost 2 pounds overnight or 5 pounds in a week contact us immediately for medication adjustments or further instructions.    4. **Please call us immediately if you have any syncope (fainting or passing out), chest pain, edema (swelling or weight gain), or decline in your functional status (general decline in how you are feeling).      Check-In  Time Check-In Location Estimated Length Procedure   Name        [facility]  waiting room 60-90 minutes Right Heart Catheterization**     Procedure Preparations & Instructions     This is an invasive procedure that DOES require preparation:    - Nothing to eat for 6 hours   - You may have clear liquids up until the time of your procedure  - A ride should be arranged for you in the instance you are unable to drive home, however you should be able to function as you normally would after the procedure     *For Patients on anticoagulants: ? Hold your Eliquis 24 hours prior to the procedure     Follow up Appointment Information:  Follow up after testing        For scheduling at [facility] please call [phone]  For scheduling at the [facility] please call [phone]  We are located on the second floor [address] at the [facility].  Our address is     [address] [place] [address] [address]   [address]  [place]  [address]    Thank you for allowing us to be a part of your care here at the [facility]    If you have questions or concerns please contact us at:    Saritha Liriano, RN, BSN      Nurse Coordinator       Pulmonary Hypertension     [facility]   [phone])[phone]  (F)[phone]    ** Please note that you will NOT receive a reminder call regarding your scheduled testing, reminder calls are for provider appointments only.  If you are scheduled for testing within the [facility] system you may receive a call regarding pre-registration for billing purposes only.**     Remember to weigh yourself daily after voiding and before you consume any food or beverages and log the numbers.  If you have gained/lost 2 pounds overnight or 5 pounds in a week contact us immediately for medication adjustments or further instructions.    Support Group:  Pulmonary Hypertension Association  Https://www.phassociation.org/  **Look at the Events Tab** They even have Support Groups that you can call into    [facility] PH Support Group  Second Saturday of the Month from 1-3 PM   Location: [address] [place] [id number]  Leader: Eleni Galloway  Phone: [phone] or [phone]  Email: [email or web address]

## 2019-04-02 NOTE — NURSING NOTE
Right Heart Catheterization: Patient was instructed regarding right heart catheterization, including discussion of the procedure, preparation, intra-procedural steps, and recovery at home. Patient demonstrated understanding of this information and agreed to call with further questions or concerns.  Med Reconcile: Reviewed and verified all current medications with the patient. The updated medication list was printed and given to the patient.  Diet: Patient instructed regarding a heart healthy diet, including discussion of reduced fat and sodium intake. Patient demonstrated understanding of this information and agreed to call with further questions or concerns.  Return Appointment: Patient given instructions regarding scheduling next clinic visit. Patient demonstrated understanding of this information and agreed to call with further questions or concerns.  Patient stated she understood all health information given and agreed to call with further questions or concerns.     Medication Changes:  No medication changes at this time. Please continue current medication regiment.    Patient Instructions:  1. Continue staying active and eat a heart healthy diet.    2. Please keep current list of medications with you at all times.    3. Remember to weigh yourself daily after voiding and before you consume any food or beverages and log the numbers.  If you have gained/lost 2 pounds overnight or 5 pounds in a week contact us immediately for medication adjustments or further instructions.    4. **Please call us immediately if you have any syncope (fainting or passing out), chest pain, edema (swelling or weight gain), or decline in your functional status (general decline in how you are feeling).      Check-In  Time Check-In Location Estimated Length Procedure   Name        [facility] [place]  waiting room 60-90 minutes Right Heart Catheterization**     Procedure Preparations & Instructions     This is an invasive procedure that DOES require  preparation:    - Nothing to eat for 6 hours   - You may have clear liquids up until the time of your procedure  - A ride should be arranged for you in the instance you are unable to drive home, however you should be able to function as you normally would after the procedure     *For Patients on anticoagulants: ? Hold your Eliquis 24 hours prior to the procedure     Follow up Appointment Information:  Follow up after testing

## 2019-04-02 NOTE — LETTER
4/2/2019    Carissa Burroughs MD  [address] [facility] [address]  [facility] [address]    RE: Jaqueline Canales       Dear Colleague,    I had the pleasure of seeing Jaqueline RIOJAS Parker in the [facility].    76 year old white female seen for follow-up.  The patient has scleroderma and ILD and was incidentally found to have SCC of left lower lobe that was treated with radiation therapy.  She is clinically stable and has no interim history of right hear failure.  The most recent echocardiogram discloses elevated, estimated PA pressure.  She has no significant functional limitation at this time.  We discussed the nature of right heart catherization and medication for PH; and for the moment, in the absence of symptoms, she wishes to defer diagnostic and therapeutic intervention.    She returns now with exertional shortness of breath and decreased exercise tolerance but without symptoms of right heart failure, exertional -pre-syncope or syncope.      REVIEW of SYMPTOMS    Constitutional: without fever, chills, night sweats.  Weight is ___  HEENT: without dry eyes, dry mouth, sinusitis, corryza, visual changes  Endocrine: without polyuria, polydypsia, polyphagia, heat or cold intolerance, changing mental status  Cardiology: without chest pain, tightness, heaviness, pressure, paroxysmal dyspnea, orthopnea, palpitation, pre-syncope or syncope or device discharge (if present)  Pulmonary: without asthma, wheezing, cough, hemoptysis  GI: without nausea, emesis, jaundice, pain, hematemesis, melena  : without frequency, urgency, hematuria, stones, pain, abnormal bleeding, frequency, urgency  Neurologic: without TIA, CVA, trauma, seizure  Dermatologic: without lesions, abrasion rash,   Orthopedic/Rheum: without significant joint pain, impairment, limb, polyserositis, ulceration, Raynauds  Heme: without mass, bruising, frequent infection, anemia  Psychiatric: without substance abuse, hallucination, medication,  HISTORY OF PRESENT ILLNESS  Domo Zaldivar is a 27 y.o. female. HPI Comments: Ms. Liban Dent is a 27 yr old french/creole speaking pt with a h/o PCKD and HTN. She is followed by nephrology and is currently managed with lisinopril. She presents today to discuss her family planning choices. She currently has a 1year old son and will like to start trying for another baby in 2018. She has a Mirena IUD at this time for [facility]. She reports that she has been informed by her nephrologist that she should not get pregnant. C/o cough which is productive at times. Denies any fever, chills or nasal congestion. She has not tried any OTC medications for this. Other   The history is provided by the patient. This is a new problem. Hypertension    The history is provided by the patient. This is a chronic problem. The current episode started more than 1 week ago. The problem has been gradually improving. Risk factors include family history (PCKD). Review of Systems   Constitutional: Negative. HENT: Negative. Eyes: Negative. Respiratory: Positive for cough. Cardiovascular: Negative. Genitourinary: Negative. Musculoskeletal: Negative. Neurological: Negative. Endo/Heme/Allergies: Negative. No past medical history on file. No past surgical history on file. Current Outpatient Prescriptions on File Prior to Visit   Medication Sig Dispense Refill    lisinopril (PRINIVIL, ZESTRIL) 40 mg tablet Take 40 mg by mouth daily.  acetaminophen (TYLENOL EXTRA STRENGTH) 500 mg tablet Take 1 Tab by mouth every six (6) hours as needed for Pain. 120 Tab 0    ferrous sulfate 325 mg (65 mg iron) tablet Take 1 Tab by mouth three (3) times daily (with meals). 90 Tab 10     No current facility-administered medications on file prior to visit. Allergies and Intolerances:    Allergies   Allergen Reactions    Nitrofurantoin Other (comments)     Headache and depresssion       Family History:   Family History   Problem Relation Age of Onset    Hypertension Mother     Hypertension Father        Social History:   She  reports that she has never smoked. She has never used smokeless tobacco. She  reports that she drinks alcohol. Vitals:   Visit Vitals    /84 (BP 1 Location: Right arm, BP Patient Position: Sitting)  Comment: lg cuff automated    Pulse 96    Temp 98.3 °F (36.8 °C) (Oral)    Resp 16    Ht 5' 3\" (1.6 m)    Wt 152 lb (68.9 kg)    LMP 11/30/2017    SpO2 100%    BMI 26.93 kg/m2     Body surface area is 1.75 meters squared. Physical Exam   Constitutional: She is oriented to person, place, and time. She appears well-developed and well-nourished. HENT:   Head: Atraumatic. Cardiovascular: Normal rate and regular rhythm. Pulmonary/Chest: Effort normal and breath sounds normal.   Neurological: She is alert and oriented to person, place, and time. Skin: Skin is warm. Psychiatric: She has a normal mood and affect. Her behavior is normal.   Nursing note and vitals reviewed. ASSESSMENT and PLAN    ICD-10-CM ICD-9-CM    1. Cough R05 786.2 benzonatate (TESSALON) 200 mg capsule   2. Family planning counseling Z30.09 V25.09 REFERRAL TO OBSTETRICS AND GYNECOLOGY   3. HTN, goal below 130/80 I10 401.9    4. Polycystic kidney disease Q61.3 753.12      Follow-up Disposition:  Return in about 6 months (around 6/6/2018), or if symptoms worsen or fail to improve. cardiovascular risk and specific lipid/LDL goals reviewed  reviewed medications and side effects in detail  Will refer her to [facility] fetal medicine for family planning counseling in light of her h/o polycystic kidney disease.    - Alarm signals discussed. ER precautions  - Plan of care reviewed with patient. Understanding verbalized and they are in agreement with plan of care. depression    Exercise tolerance:    PERRLA, EOM full, normal gait and station, normal mentation, skin without lesions, chest is clear, no evidence of right or left ventricular overactivity, sternum stable, no carotid bruits, regular rhythm, S1, S2, no murmurs, abdomen without tenderness, rebound guarding or masses, no edema, no focal neurologic defects.  Results for GINA MCKEON (MRN [id number]) as of 4/3/2019 08:10   Ref. Range 2/11/2019 00:00 4/2/2019 00:00 4/2/2019 13:08   Sodium Latest Ref Range: 133 - 144 mmol/L   140   Potassium Latest Ref Range: 3.4 - 5.3 mmol/L   3.9   Chloride Latest Ref Range: 94 - 109 mmol/L   106   Carbon Dioxide Latest Ref Range: 20 - 32 mmol/L   25   Urea Nitrogen Latest Ref Range: 7 - 30 mg/dL   18   Creatinine Latest Ref Range: 0.52 - 1.04 mg/dL   0.74   GFR Estimate Latest Ref Range: >60 mL/min/1.73_m2   76   GFR Estimate If Black Latest Ref Range: >60 mL/min/1.73_m2   89   Calcium Latest Ref Range: 8.5 - 10.1 mg/dL   9.7   Anion Gap Latest Ref Range: 3 - 14 mmol/L   9   Albumin Latest Ref Range: 3.4 - 5.0 g/dL   4.1   Protein Total Latest Ref Range: 6.8 - 8.8 g/dL   7.4   Bilirubin Total Latest Ref Range: 0.2 - 1.3 mg/dL   0.7   Alkaline Phosphatase Latest Ref Range: 40 - 150 U/L   88   ALT Latest Ref Range: 0 - 50 U/L   36   AST Latest Ref Range: 0 - 45 U/L   27   Iron Latest Ref Range: 35 - 180 ug/dL   55   Iron Binding Cap Latest Ref Range: 240 - 430 ug/dL   334   Iron Saturation Index Latest Ref Range: 15 - 46 %   16   N-Terminal Pro Bnp Latest Ref Range: 0 - 450 pg/mL   3,011 (H)   Glucose Latest Ref Range: 70 - 99 mg/dL   98   WBC Latest Ref Range: 4.0 - 11.0 10e9/L   6.5   Hemoglobin Latest Ref Range: 11.7 - 15.7 g/dL   12.9   Hematocrit Latest Ref Range: 35.0 - 47.0 %   38.8   Platelet Count Latest Ref Range: 150 - 450 10e9/L   114 (L)   RBC Count Latest Ref Range: 3.8 - 5.2 10e12/L   4.05   MCV Latest Ref Range: 78 - 100 fl   96   MCH Latest Ref Range: 26.5 - 33.0  pg   31.9   MCHC Latest Ref Range: 31.5 - 36.5 g/dL   33.2   RDW Latest Ref Range: 10.0 - 15.0 %   15.7 (H)   LAB RESULT - HIM SCAN Unknown Attch     EKG 12-LEAD COMPLETE W/READ - CLINICS Unknown  Attch    EKG 12-LEAD, TRACING ONLY Unknown  Attch         Ref. Range 10/31/2016 14:55   CRP Inflammation Latest Ref Range: 0.0-8.0 mg/L 4.1   WBC Latest Ref Range: 4.0-11.0 10e9/L 5.4   Hemoglobin Latest Ref Range: 11.7-15.7 g/dL 13.5   Hematocrit Latest Ref Range: 35.0-47.0 % 40.8   Platelet Count Latest Ref Range: 150-450 10e9/L 328   RBC Count Latest Ref Range: 3.8-5.2 10e12/L 4.37   MCV Latest Ref Range:  fl 93   MCH Latest Ref Range: 26.5-33.0 pg 30.9   MCHC Latest Ref Range: 31.5-36.5 g/dL 33.1   RDW Latest Ref Range: 10.0-15.0 % 17.0 (H)   Diff Method Unknown Automated Method   % Neutrophils Latest Units: % 73.8   % Lymphocytes Latest Units: % 17.1   % Monocytes Latest Units: % 6.5   % Eosinophils Latest Units: % 2.0   % Basophils Latest Units: % 0.6   Absolute Neutrophil Latest Ref Range: 1.6-8.3 10e9/L 4.0   Absolute Lymphocytes Latest Ref Range: 0.8-5.3 10e9/L 0.9   Absolute Monocytes Latest Ref Range: 0.0-1.3 10e9/L 0.4   Absolute Eosinophils Latest Ref Range: 0.0-0.7 10e9/L 0.1   Absolute Basophils Latest Ref Range: 0.0-0.2 10e9/L 0.0   CT CHEST WITHOUT CONTRAST December 27, 2016 11:40 AM       HISTORY: Follow up on right sided lung lesions. Malignant neoplasm of  lower lobe, left bronchus or lung.     COMPARISON: 10/22/2016.     TECHNIQUE: Volumetric helical acquisition of CT images of the chest  from the clavicles to the kidneys were acquired without IV contrast.  Radiation dose for this scan was reduced using automated exposure  control, adjustment of the mA and/or kV according to patient size, or  iterative reconstruction technique.     FINDINGS: Right-sided fibrotic changes appear stable. Somewhat nodular  density at the left base has resolved and may have been an area of  consolidation and/or active  inflammation. Honeycombing and traction  bronchiectasis persist. Moderate hiatal hernia. No pleural or  pericardial effusion. Mild cardiomegaly. There are moderate coronary  vascular calcifications consistent with coronary artery disease. There  are mild atherosclerotic changes of the visualized aorta and its  branches. There is no evidence of aortic aneurysm. No acute findings  in the visualized upper abdomen.                                                                       IMPRESSION:  1. 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  2. The nodular density at the left base has resolved  Davie Iyer MD 2016             Narrative           Interpretation Summary              [facility]  [facility]ch[facility]  [address]  [address]  [place] [address]  Phone [phone]  Fax [phone]        Name: GINA MCKEON  MRN: [id number]  : 1940  Study Date: 2016 07:39 AM  Age: 75 yrs  Gender: Female  [place] [facility] [place]  Reason For Study: Unspecified atrial fibrillation  Ordering Physician: HAZEL RIBEIRO  Referring Physician: HAZEL RIBEIRO  Performed By: Rosanna Manning AJ  BSA: 2.0 m2  Height: 62 in  Weight: 226 lb  HR: 89  BP: 100/58 mmHg     ______________________________________________________________________________     Procedure  Complete Echo Adult.     ______________________________________________________________________________     Interpretation Summary     Left ventricular systolic function is normal.The visual ejection fraction is  estimated at 60-65%.  The right ventricle is mildly dilated.The right ventricular systolic function  is normal.  The left atrium is severely dilated.  There is moderate mitral annular calcification.There is mild (1+) mitral  regurgitation.  Pulmonary hypertension- RVSP 42 mm hg +RA.  The IVC is  normal in size and reactivity with respiration, suggesting normal  central venous pressure.  ______________________________________________________________________________           Left Ventricle  The left ventricle is normal in size. There is mild concentric left  ventricular hypertrophy. Left ventricular systolic function is normal. The  visual ejection fraction is estimated at 60-65%. E prime velocity may be  unreliable due to presence of significant mitral annular calcification. No  regional wall motion abnormalities noted.  Right Ventricle  The right ventricle is mildly dilated. The right ventricular systolic  function is normal.     Atria  The left atrium is severely dilated. The right atrium is mild to moderately  dilated. There is no color Doppler evidence of an atrial shunt.     Mitral Valve  There is moderate mitral annular calcification. There is mild (1+) mitral  regurgitation.     Tricuspid Valve  There is mild (1+) tricuspid regurgitation. The right ventricular systolic  pressure is approximated at 42.2 mmHg plus the right atrial pressure.  Pulmonary hypertension.     Aortic Valve  There is mild trileaflet aortic sclerosis. No aortic regurgitation is  present. No aortic stenosis is present.     Pulmonic Valve  The pulmonic valve is not well visualized. There is no pulmonic valvular  stenosis.  Vessels  The aortic root is normal size. Normal size ascending aorta. The IVC is  normal in size and reactivity with respiration, suggesting normal central  venous pressure.     Pericardium  There is no pericardial effusion.     Rhythm  The rhythm was normal sinus.  ______________________________________________________________________________     MMode/2D Measurements & Calculations  IVSd: 1.2 cm  LVIDd: 5.3 cm  LVIDs: 3.4 cm  LVPWd: 0.96 cm  FS: 35.9 %  EDV(Teich): 132.7 ml  ESV(Teich): 46.3 ml  LV mass(C)d: 224.6 grams  Ao root diam: 2.8 cm  LA dimension: 4.7 cm  asc Aorta Diam: 3.5 cm  LA/Ao: 1.7  LVOT diam:  1.9 cm  LVOT area: 2.7 cm2  LA Volume (BP): 101.0 ml  LA Volume Index (BP): 50.2 ml/m2           Doppler Measurements & Calculations  MV E max carrington: 89.7 cm/sec  MV A max carrington: 94.6 cm/sec  MV E/A: 0.95  MV dec time: 0.20 sec  TR max carrington: 324.7 cm/sec  TR max P.2 mmHg  Lateral E/e': 12.9  Medial E/e': 17.2                   Current Outpatient Medications   Medication     albuterol (PROAIR HFA/PROVENTIL HFA/VENTOLIN HFA) 108 (90 Base) MCG/ACT inhaler     apixaban ANTICOAGULANT (ELIQUIS) 5 MG tablet     Calcium Citrate-Vitamin D (CITRACAL + D PO)     CENTRUM SILVER OR TABS     docusate sodium (COLACE) 100 MG capsule     folic acid (FOLVITE) 1 MG tablet     InFLIXimab (REMICADE IV)     Lactobacillus Acidophilus POWD     levothyroxine (SYNTHROID/LEVOTHROID) 75 MCG tablet     MELATONIN PO     Methotrexate Sodium (METHOTREXATE PO)     metoprolol succinate ER (TOPROL-XL) 25 MG 24 hr tablet     Multiple Vitamins-Minerals (ICAPS) CAPS     NIFEDICAL XL 60 MG PO TB24     omeprazole (PRILOSEC) 20 MG DR capsule     polyethylene glycol (MIRALAX/GLYCOLAX) packet     Pseudoephedrine-Guaifenesin (MUCUS D PO)     simvastatin (ZOCOR) 40 MG tablet     No current facility-administered medications for this visit.       The patient notes increasing shortness and decreased exercise tolerance and physical examination suggests atrial fibrillation which previously has been paroxysmal.  Rate is controlled.  The patient is maintained on NWOAC without bleeding complication.  Would consider augmented diuresis and potential use of amiodarone to convert to sinus rthythm.  She feels her main obstacle to improved mobility is orthopedic not cardiorespiratory    CC: Dr Burroughs    Thank you for allowing me to participate in the care of your patient.    Sincerely,     Shay Marquez MD     [facility]    cc:   Christian Caro MD  [address]  [place] [address]

## 2019-04-10 ENCOUNTER — TELEPHONE (OUTPATIENT)
Dept: PULMONOLOGY | Facility: CLINIC | Age: 79
End: 2019-04-10

## 2019-04-10 ENCOUNTER — TRANSFERRED RECORDS (OUTPATIENT)
Dept: HEALTH INFORMATION MANAGEMENT | Facility: CLINIC | Age: 79
End: 2019-04-10

## 2019-04-10 DIAGNOSIS — J84.9 ILD (INTERSTITIAL LUNG DISEASE) (H): Primary | ICD-10-CM

## 2019-04-10 NOTE — TELEPHONE ENCOUNTER
[facility] Call Center    Phone Message    May a detailed message be left on voicemail: yes    Reason for Call: Medication Question or concern regarding medication   Prescription Clarification  Name of Medication: albuterol (PROAIR HFA/PROVENTIL HFA/VENTOLIN HFA) 108 (90 Base) MCG/ACT inhaler  Prescribing Provider: Dr Perlman   Pharmacy: [facility], [address] [place] [address], Ph # [phone]   What on the order needs clarification? Pt does not get as much dispensed to her in each puff, it is smaller and shorted, it is red (the old one was blue and longer), she said old one worked better for her - she got more from it than this one - felt better - wants the old brand - this one does not work as well for her - wants to know if you can change her back to old brand and send new Rx to Pharmacy - Pt used to use [facility] and when insurance changed her Pharmacy changed too - Please return Pt call to discuss - Thanks!      Action Taken: Message routed to:  [facility] ([facility] CLAU

## 2019-04-11 RX ORDER — ALBUTEROL SULFATE 90 UG/1
2 AEROSOL, METERED RESPIRATORY (INHALATION) EVERY 6 HOURS
Qty: 18 G | Refills: 3 | Status: SHIPPED | OUTPATIENT
Start: 2019-04-11 | End: 2019-06-24

## 2019-04-15 ENCOUNTER — SURGERY (OUTPATIENT)
Age: 79
End: 2019-04-15
Payer: MEDICARE

## 2019-04-15 ENCOUNTER — HOSPITAL ENCOUNTER (OUTPATIENT)
Facility: CLINIC | Age: 79
Discharge: HOME | End: 2019-04-15
Admitting: INTERNAL MEDICINE
Payer: MEDICARE

## 2019-04-15 VITALS
BODY MASS INDEX: 41.22 KG/M2 | SYSTOLIC BLOOD PRESSURE: 131 MMHG | DIASTOLIC BLOOD PRESSURE: 82 MMHG | WEIGHT: 224 LBS | OXYGEN SATURATION: 97 % | RESPIRATION RATE: 18 BRPM | TEMPERATURE: 97.7 F | HEART RATE: 81 BPM | HEIGHT: 62 IN

## 2019-04-15 DIAGNOSIS — I27.20 PULMONARY HYPERTENSION (H): ICD-10-CM

## 2019-04-15 PROBLEM — Z98.890 STATUS POST CORONARY ANGIOGRAM: Status: ACTIVE | Noted: 2019-04-15

## 2019-04-15 LAB
ANION GAP SERPL CALCULATED.3IONS-SCNC: 5 MMOL/L (ref 3–14)
APTT PPP: 36 SEC (ref 22–37)
BUN SERPL-MCNC: 23 MG/DL (ref 7–30)
CALCIUM SERPL-MCNC: 9.5 MG/DL (ref 8.5–10.1)
CHLORIDE SERPL-SCNC: 110 MMOL/L (ref 94–109)
CO2 SERPL-SCNC: 26 MMOL/L (ref 20–32)
CREAT SERPL-MCNC: 0.73 MG/DL (ref 0.52–1.04)
ERYTHROCYTE [DISTWIDTH] IN BLOOD BY AUTOMATED COUNT: 16 % (ref 10–15)
GFR SERPL CREATININE-BSD FRML MDRD: 78 ML/MIN/{1.73_M2}
GLUCOSE SERPL-MCNC: 88 MG/DL (ref 70–99)
HCT VFR BLD AUTO: 36.4 % (ref 35–47)
HGB BLD-MCNC: 12.2 G/DL (ref 11.7–15.7)
INR PPP: 1.05 (ref 0.86–1.14)
MCH RBC QN AUTO: 32.1 PG (ref 26.5–33)
MCHC RBC AUTO-ENTMCNC: 33.5 G/DL (ref 31.5–36.5)
MCV RBC AUTO: 96 FL (ref 78–100)
PLATELET # BLD AUTO: 75 10E9/L (ref 150–450)
POTASSIUM SERPL-SCNC: 4.1 MMOL/L (ref 3.4–5.3)
RBC # BLD AUTO: 3.8 10E12/L (ref 3.8–5.2)
SODIUM SERPL-SCNC: 141 MMOL/L (ref 133–144)
WBC # BLD AUTO: 5.9 10E9/L (ref 4–11)

## 2019-04-15 PROCEDURE — 40000235 ZZH STATISTIC TELEMETRY

## 2019-04-15 PROCEDURE — 25800030 ZZH RX IP 258 OP 636: Performed by: INTERNAL MEDICINE

## 2019-04-15 PROCEDURE — 93463 DRUG ADMIN & HEMODYNMIC MEAS: CPT

## 2019-04-15 PROCEDURE — 80048 BASIC METABOLIC PNL TOTAL CA: CPT

## 2019-04-15 PROCEDURE — 93005 ELECTROCARDIOGRAM TRACING: CPT

## 2019-04-15 PROCEDURE — 36415 COLL VENOUS BLD VENIPUNCTURE: CPT

## 2019-04-15 PROCEDURE — 25000125 ZZHC RX 250: Performed by: INTERNAL MEDICINE

## 2019-04-15 PROCEDURE — 93010 ELECTROCARDIOGRAM REPORT: CPT | Performed by: INTERNAL MEDICINE

## 2019-04-15 PROCEDURE — 85730 THROMBOPLASTIN TIME PARTIAL: CPT

## 2019-04-15 PROCEDURE — 82803 BLOOD GASES ANY COMBINATION: CPT | Mod: 91

## 2019-04-15 PROCEDURE — 85027 COMPLETE CBC AUTOMATED: CPT

## 2019-04-15 PROCEDURE — 27210794 ZZH OR GENERAL SUPPLY STERILE: Performed by: INTERNAL MEDICINE

## 2019-04-15 PROCEDURE — 40000852 ZZH STATISTIC HEART CATH LAB OR EP LAB

## 2019-04-15 PROCEDURE — 80048 BASIC METABOLIC PNL TOTAL CA: CPT | Performed by: INTERNAL MEDICINE

## 2019-04-15 PROCEDURE — 93463 DRUG ADMIN & HEMODYNMIC MEAS: CPT | Performed by: INTERNAL MEDICINE

## 2019-04-15 PROCEDURE — C1894 INTRO/SHEATH, NON-LASER: HCPCS | Performed by: INTERNAL MEDICINE

## 2019-04-15 PROCEDURE — 93451 RIGHT HEART CATH: CPT | Performed by: INTERNAL MEDICINE

## 2019-04-15 PROCEDURE — 93451 RIGHT HEART CATH: CPT | Mod: 26 | Performed by: INTERNAL MEDICINE

## 2019-04-15 PROCEDURE — 85610 PROTHROMBIN TIME: CPT

## 2019-04-15 PROCEDURE — 40000065 ZZH STATISTIC EKG NON-CHARGEABLE

## 2019-04-15 RX ORDER — NALOXONE HYDROCHLORIDE 0.4 MG/ML
.2-.4 INJECTION, SOLUTION INTRAMUSCULAR; INTRAVENOUS; SUBCUTANEOUS
Status: DISCONTINUED | OUTPATIENT
Start: 2019-04-15 | End: 2019-04-15 | Stop reason: HOSPADM

## 2019-04-15 RX ORDER — NALOXONE HYDROCHLORIDE 0.4 MG/ML
.1-.4 INJECTION, SOLUTION INTRAMUSCULAR; INTRAVENOUS; SUBCUTANEOUS
Status: DISCONTINUED | OUTPATIENT
Start: 2019-04-15 | End: 2019-04-15 | Stop reason: HOSPADM

## 2019-04-15 RX ORDER — LORAZEPAM 2 MG/ML
0.5 INJECTION INTRAMUSCULAR
Status: CANCELLED | OUTPATIENT
Start: 2019-04-15

## 2019-04-15 RX ORDER — FLUMAZENIL 0.1 MG/ML
0.2 INJECTION, SOLUTION INTRAVENOUS
Status: DISCONTINUED | OUTPATIENT
Start: 2019-04-15 | End: 2019-04-15 | Stop reason: HOSPADM

## 2019-04-15 RX ORDER — SODIUM CHLORIDE 9 MG/ML
1000 INJECTION, SOLUTION INTRAVENOUS CONTINUOUS
Status: DISCONTINUED | OUTPATIENT
Start: 2019-04-15 | End: 2019-04-15 | Stop reason: HOSPADM

## 2019-04-15 RX ORDER — LORAZEPAM 0.5 MG/1
0.5 TABLET ORAL
Status: CANCELLED | OUTPATIENT
Start: 2019-04-15

## 2019-04-15 RX ORDER — LIDOCAINE 40 MG/G
CREAM TOPICAL
Status: CANCELLED | OUTPATIENT
Start: 2019-04-15

## 2019-04-15 RX ORDER — DOPAMINE HYDROCHLORIDE 160 MG/100ML
2-20 INJECTION, SOLUTION INTRAVENOUS CONTINUOUS PRN
Status: DISCONTINUED | OUTPATIENT
Start: 2019-04-15 | End: 2019-04-15 | Stop reason: HOSPADM

## 2019-04-15 RX ORDER — EPTIFIBATIDE 2 MG/ML
180 INJECTION, SOLUTION INTRAVENOUS EVERY 10 MIN PRN
Status: DISCONTINUED | OUTPATIENT
Start: 2019-04-15 | End: 2019-04-15 | Stop reason: HOSPADM

## 2019-04-15 RX ORDER — SODIUM CHLORIDE 9 MG/ML
INJECTION, SOLUTION INTRAVENOUS CONTINUOUS
Status: DISCONTINUED | OUTPATIENT
Start: 2019-04-15 | End: 2019-04-15 | Stop reason: HOSPADM

## 2019-04-15 RX ORDER — DOBUTAMINE HYDROCHLORIDE 200 MG/100ML
2-20 INJECTION INTRAVENOUS CONTINUOUS PRN
Status: DISCONTINUED | OUTPATIENT
Start: 2019-04-15 | End: 2019-04-15 | Stop reason: HOSPADM

## 2019-04-15 RX ORDER — FENTANYL CITRATE 50 UG/ML
25-50 INJECTION, SOLUTION INTRAMUSCULAR; INTRAVENOUS
Status: DISCONTINUED | OUTPATIENT
Start: 2019-04-15 | End: 2019-04-15 | Stop reason: HOSPADM

## 2019-04-15 RX ORDER — SODIUM CHLORIDE 9 MG/ML
INJECTION, SOLUTION INTRAVENOUS CONTINUOUS
Status: CANCELLED | OUTPATIENT
Start: 2019-04-15

## 2019-04-15 RX ORDER — HYDROCODONE BITARTRATE AND ACETAMINOPHEN 5; 325 MG/1; MG/1
1-2 TABLET ORAL EVERY 4 HOURS PRN
Status: DISCONTINUED | OUTPATIENT
Start: 2019-04-15 | End: 2019-04-15 | Stop reason: HOSPADM

## 2019-04-15 RX ORDER — EPTIFIBATIDE 2 MG/ML
1 INJECTION, SOLUTION INTRAVENOUS CONTINUOUS PRN
Status: DISCONTINUED | OUTPATIENT
Start: 2019-04-15 | End: 2019-04-15 | Stop reason: HOSPADM

## 2019-04-15 RX ORDER — POTASSIUM CHLORIDE 1500 MG/1
20 TABLET, EXTENDED RELEASE ORAL
Status: CANCELLED | OUTPATIENT
Start: 2019-04-15

## 2019-04-15 RX ORDER — ATROPINE SULFATE 0.1 MG/ML
0.5 INJECTION INTRAVENOUS EVERY 5 MIN PRN
Status: DISCONTINUED | OUTPATIENT
Start: 2019-04-15 | End: 2019-04-15 | Stop reason: HOSPADM

## 2019-04-15 RX ORDER — LIDOCAINE 40 MG/G
CREAM TOPICAL
Status: DISCONTINUED | OUTPATIENT
Start: 2019-04-15 | End: 2019-04-15 | Stop reason: HOSPADM

## 2019-04-15 RX ORDER — NOREPINEPHRINE BITARTRATE/D5W 16MG/250ML
.03-.4 PLASTIC BAG, INJECTION (ML) INTRAVENOUS CONTINUOUS PRN
Status: DISCONTINUED | OUTPATIENT
Start: 2019-04-15 | End: 2019-04-15 | Stop reason: HOSPADM

## 2019-04-15 RX ORDER — ACETAMINOPHEN 325 MG/1
325-650 TABLET ORAL EVERY 4 HOURS PRN
Status: DISCONTINUED | OUTPATIENT
Start: 2019-04-15 | End: 2019-04-15 | Stop reason: HOSPADM

## 2019-04-15 RX ORDER — POTASSIUM CHLORIDE 1500 MG/1
20 TABLET, EXTENDED RELEASE ORAL
Status: DISCONTINUED | OUTPATIENT
Start: 2019-04-15 | End: 2019-04-15 | Stop reason: HOSPADM

## 2019-04-15 RX ORDER — NITROGLYCERIN 20 MG/100ML
.07-2 INJECTION INTRAVENOUS CONTINUOUS PRN
Status: DISCONTINUED | OUTPATIENT
Start: 2019-04-15 | End: 2019-04-15 | Stop reason: HOSPADM

## 2019-04-15 RX ORDER — ARGATROBAN 1 MG/ML
350 INJECTION, SOLUTION INTRAVENOUS
Status: DISCONTINUED | OUTPATIENT
Start: 2019-04-15 | End: 2019-04-15 | Stop reason: HOSPADM

## 2019-04-15 RX ORDER — ARGATROBAN 1 MG/ML
150 INJECTION, SOLUTION INTRAVENOUS
Status: DISCONTINUED | OUTPATIENT
Start: 2019-04-15 | End: 2019-04-15 | Stop reason: HOSPADM

## 2019-04-15 RX ADMIN — SODIUM CHLORIDE 1000 ML: 9 INJECTION, SOLUTION INTRAVENOUS at 07:04

## 2019-04-15 RX ADMIN — LIDOCAINE HYDROCHLORIDE 4 ML: 10 INJECTION, SOLUTION EPIDURAL; INFILTRATION; INTRACAUDAL; PERINEURAL at 08:57

## 2019-04-15 SDOH — HEALTH STABILITY: MENTAL HEALTH: HOW OFTEN DO YOU HAVE A DRINK CONTAINING ALCOHOL?: NEVER

## 2019-04-15 ASSESSMENT — MIFFLIN-ST. JEOR: SCORE: 1444.31

## 2019-04-15 NOTE — PROGRESS NOTES
Murmur, no femoral bruit.  Here with niece.  Explained pre heart cath and answered questions.  Resting in bed with call light in reach.

## 2019-04-15 NOTE — PROGRESS NOTES
No c/o post right heart cath.  See flow sheets.  Ate and up to BR to void.  Care Suites Discharge Summary    Discharge Criteria:   Discharge Criteria met per MD orders: Yes.   Vital signs stable.     Pt demonstrates ability to ambulate safely: Yes.  (See discharge questionnaire for additional information)    Discharge instructions & education:   Discharge instructions reviewed with patient and neice. Patient verbalizes  understanding.       Medications:   Patient will be discharging on new medications- No. Patient verbalizes reason for use, start date, and side effects NA.    Items returned to patient:   Home and hospital acquired medications returned to patient NA   Listed belongings gathered and returned to patient: Yes    Patient discharged to home via w/c with neice.    Carmen Mchugh

## 2019-04-15 NOTE — DISCHARGE INSTRUCTIONS
Right Heart Cath Discharge Instructions - Neck     After you go home:      Have an adult stay with you until tomorrow.    Drink extra fluids for 2 days.    You may resume your normal diet.    No smoking       For 24 hours - due to the sedation you received:    Relax and take it easy.    Do NOT make any important or legal decisions.    Do NOT drive or operate machines at home or at work.    Do NOT drink alcohol.    Care of Neck Puncture Site:      For the first 24 hrs - check the puncture site every 1-2 hours while awake.    It is normal to have soreness at the puncture site and mild tingling in your hand for up to 3 days.    Remove the bandaid after 24 hours. If there is minor oozing, apply another bandaid and remove it after 12 hours.    You may shower tomorrow. Do NOT take a bath, or use a hot tub or pool for at least 3 days. Do NOT scrub the site. Do not use lotion or powder near the puncture site.           Activity - For 2 days:      Avoid heavy lifting or the overuse of your shoulder.      Bleeding:       If you start bleeding from the site in your neck, sit down and press gently on the site for 10 minutes.     Once bleeding stops, sit still for 2 hours.     Call [facility] as soon as you can    Call 911 right away if you have heavy bleeding or bleeding that does not stop.      Medicines:      If you are taking an antiplatelet medication such as Plavix, Brilinta or Effient, do not stop taking it until you talk to your cardiologist.        If you are on Metformin (Glucophage), do not restart it until you have blood tests (within 2 to 3 days after discharge).  After you have your blood drawn, you may restart the Metformin.     Take your medications, including blood thinners, unless your provider tells you not to.  If you take Coumadin (Warfarin), have your INR checked by your provider in  3-5 days. Call your clinic to schedule this.    If you have stopped any medicines, check with your provider about when to  restart them.    Follow Up Appointments:      Follow up with [facility] Nurse Practitioner at [facility] of patient preference in 7-10 days.    Call the clinic if:      You have increased pain or a large or growing hard lump around the site.    The site is red, swollen, hot or tender.    Blood or fluid is draining from the site.    You have chills or a fever greater than 101 F (38 C).    You have hives, a rash or unusual itching.    Any questions or concerns.      [facility] [place]:    [phone] [facility] (7 days a week)

## 2019-04-16 ENCOUNTER — OFFICE VISIT (OUTPATIENT)
Dept: CARDIOLOGY | Facility: CLINIC | Age: 79
End: 2019-04-16
Payer: MEDICARE

## 2019-04-16 VITALS
OXYGEN SATURATION: 92 % | HEART RATE: 90 BPM | DIASTOLIC BLOOD PRESSURE: 79 MMHG | HEIGHT: 62 IN | WEIGHT: 224.1 LBS | SYSTOLIC BLOOD PRESSURE: 122 MMHG | BODY MASS INDEX: 41.24 KG/M2

## 2019-04-16 DIAGNOSIS — R06.09 DYSPNEA ON EXERTION: ICD-10-CM

## 2019-04-16 DIAGNOSIS — I10 BENIGN ESSENTIAL HYPERTENSION: Primary | ICD-10-CM

## 2019-04-16 LAB
CO2 BLDCOV-SCNC: 24 MMOL/L (ref 21–28)
CO2 BLDCOV-SCNC: 25 MMOL/L (ref 21–28)
PCO2 BLDV: 43 MM HG (ref 40–50)
PCO2 BLDV: 46 MM HG (ref 40–50)
PH BLDV: 7.34 PH (ref 7.32–7.43)
PH BLDV: 7.36 PH (ref 7.32–7.43)
PO2 BLDV: 27 MM HG (ref 25–47)
PO2 BLDV: 34 MM HG (ref 25–47)
SAO2 % BLDV FROM PO2: 49 %
SAO2 % BLDV FROM PO2: 61 %

## 2019-04-16 PROCEDURE — 99214 OFFICE O/P EST MOD 30 MIN: CPT | Performed by: INTERNAL MEDICINE

## 2019-04-16 RX ORDER — FUROSEMIDE 20 MG
TABLET ORAL
Qty: 30 TABLET | Refills: 1 | Status: SHIPPED | OUTPATIENT
Start: 2019-04-16 | End: 2019-05-21

## 2019-04-16 RX ORDER — FUROSEMIDE 20 MG
TABLET ORAL
Qty: 30 TABLET | Refills: 3 | Status: SHIPPED | OUTPATIENT
Start: 2019-04-16 | End: 2019-05-21

## 2019-04-16 ASSESSMENT — MIFFLIN-ST. JEOR: SCORE: 1444.76

## 2019-04-16 NOTE — LETTER
4/16/2019    Carissa Burroughs MD  [address] [facility] [address]  [facility] [address]    RE: Jaqueline Canales       Dear Colleague,    I had the pleasure of seeing Jaqueline Canales in the [facility].        Natalie Bustillos M.D.  Arthritis and Rheumatolotgy    Jeffy Burroughs M.D      The patient has scleroderma and ILD and was incidentally found to have SCC of left lower lobe that was treated with radiation therapy.  She is clinically stable and has no interim history of right heart failure.  The most recent echocardiogram discloses elevated, estimated PA pressure.  She has no significant functional limitation at this time.  We discussed the nature of right heart catherization and medication for PH; and for the moment, in the absence of symptoms, she wishes to defer diagnostic and therapeutic intervention.    She returns now with exertional shortness of breath and decreased exercise tolerance but without symptoms of right heart failure, exertional -pre-syncope or syncope.      Current Outpatient Medications   Medication     albuterol (VENTOLIN HFA) 108 (90 Base) MCG/ACT inhaler     apixaban ANTICOAGULANT (ELIQUIS) 5 MG tablet     Bacillus Coagulans-Inulin (PROBIOTIC-PREBIOTIC PO)     Calcium Citrate-Vitamin D (CITRACAL + D PO)     CENTRUM SILVER OR TABS     folic acid (FOLVITE) 1 MG tablet     InFLIXimab (REMICADE IV)     levothyroxine (SYNTHROID/LEVOTHROID) 75 MCG tablet     MELATONIN PO     Methotrexate Sodium (METHOTREXATE PO)     metoprolol succinate ER (TOPROL-XL) 25 MG 24 hr tablet     Multiple Vitamins-Minerals (ICAPS) CAPS     NIFEDICAL XL 60 MG PO TB24     omeprazole (PRILOSEC) 20 MG DR capsule     simvastatin (ZOCOR) 40 MG tablet     No current facility-administered medications for this visit.        REVIEW of SYMPTOMS    Constitutional: without fever, chills, night sweats.  Weight is ___  HEENT: without dry eyes, dry mouth, sinusitis, corryza, visual changes  Endocrine: without polyuria,  polydypsia, polyphagia, heat or cold intolerance, changing mental status  Cardiology: without chest pain, tightness, heaviness, pressure, paroxysmal dyspnea, orthopnea, palpitation, pre-syncope or syncope or device discharge (if present)  Pulmonary: without asthma, wheezing, cough, hemoptysis  GI: without nausea, emesis, jaundice, pain, hematemesis, melena  : without frequency, urgency, hematuria, stones, pain, abnormal bleeding, frequency, urgency  Neurologic: without TIA, CVA, trauma, seizure  Dermatologic: without lesions, abrasion rash,   Orthopedic/Rheum: without significant joint pain, impairment, limb, polyserositis, ulceration, Raynauds  Heme: without mass, bruising, frequent infection, anemia  Psychiatric: without substance abuse, hallucination, medication, depression    Exercise tolerance:    PERRLA, EOM full, normal gait and station, normal mentation, skin without lesions, chest is clear, no evidence of right or left ventricular overactivity, sternum stable, no carotid bruits, regular rhythm, S1, S2, no murmurs, abdomen without tenderness, rebound guarding or masses, no edema, no focal neurologic      Conclusion     1) Elevated biventricular filling pressures   2) Moderate to severe mostly primary pulmonary hypertension  3) Improvement in hemodynamics with nitric oxide; however, with no improvement in mean PA pressure       Plan     1) Suggest initiation of diuretic  2) Consider initiation of Sildenafil   3) Discussed with Dr. Marquez - he will visit the patient in clinic tomorrow   CV Right/Left Heart Cath     Right Heart Pressures     Systemic blood pressure 140-150 throughout the case.    BASELINE:  1) Elevated right-sided filling pressures (mean RA 16 mmHg)  2) Moderate to severe pulmonary hypertension (mean PA 35 mmHg) secondary to intrinsic pulmonary vascular disease (PVRI 14 rod*m2) and elevated left-sided filling pressures (PCWP 20 mmHg).  3) O2 sats at baseline 93%  4) Mildly depressed CO/CI:  3.8/1.9    NO 80 PPM:  1) Pulmonary artery pressures did not change (mean PA 34 mmHg)  2) O2 sats improved to 100%  3) CO/CI improved 4.2/2.1 Right sided filling pressures are severely elevated.Left sided filling pressures are moderately elevated. Severely elevated pulmonary artery hypertension.Left ventricular filling pressures are moderately elevated .Normal cardiac output level.   Pressures      Time Systolic Diastolic Mean A Wave V Wave EDP Max dp/dt HR   RA Pressures  9:01 AM   16 mmHg    14 mmHg    22 mmHg      87 bpm      RV Pressures  9:01 AM 52 mmHg    5 mmHg       15 mmHg     86 bpm      PA Pressures  9:02 AM 51 mmHg    26 mmHg    35 mmHg        76 bpm      PCW Pressures  9:02 AM   18 mmHg    23 mmHg    24 mmHg      72 bpm      AO Pressures  9:03  mmHg    87 mmHg    111 mmHg        82 bpm      Pressures Phase: Drug Study Level 1      Time Systolic Diastolic Mean A Wave V Wave EDP Max dp/dt HR   PA Pressures  9:11 AM 48 mmHg    26 mmHg    34 mmHg        77 bpm      Pressures Phase: Drug Study Level 2      Time Systolic Diastolic Mean A Wave V Wave EDP Max dp/dt HR   PA Pressures  9:14 AM 52 mmHg    33 mmHg    36 mmHg        84 bpm      PCW Pressures  9:14 AM   22 mmHg    26 mmHg    23 mmHg      79 bpm      AO Pressures  9:11  mmHg    67 mmHg    97 mmHg        80 bpm      Blood Flow Results Phase: Drug Study Level 2      Time Results Indexed Values   QP  9:11 AM 4.19 L/min    2.1 L/min/m2      QS  9:11 AM 4.19 L/min    2.1 L/min/m2      Blood Oximetry Phase: Drug Study Level 2      Time Hb SAT(%) PO2 Content PA Sat   Art  9:11 AM  100 %     18.22 mL/dL       Cardiac Output Phase: Drug Study Level 2      Time TDCO TDCI Josh C.O. Josh C.I. Josh HR   Cardiac Output Results  9:11 AM   4.19 L/min    2.1 L/min/m2       Resistance Results Phase: Drug Study Level 2      Time PVR SVR PVR-I SVR-I TPR TVR TPR-I TVR-I PVR/SVR TPR/TVR   Resistance Results (Metric)  9:11 .55 dsc-5     534.11 dsc-5/m2      687.98 dsc-5    1853.72 dsc-5    1373.43 dsc-5/m2    3700.63 dsc-5/m2     0.37      Resistance Results (Wood)  9:11 AM 3.35 ROQUE     6.68 ROQUE/m2     8.6 ROQUE    23.18 ROQUE    17.17                1/15/2019 1/24/2019   /77 112/63       Results for GINA MCKEON (MRN [id number]) as of 4/16/2019 13:41   Ref. Range 4/15/2019 07:00 4/15/2019 08:35   Sodium Latest Ref Range: 133 - 144 mmol/L 141    Potassium Latest Ref Range: 3.4 - 5.3 mmol/L 4.1    Chloride Latest Ref Range: 94 - 109 mmol/L 110 (H)    Carbon Dioxide Latest Ref Range: 20 - 32 mmol/L 26    Urea Nitrogen Latest Ref Range: 7 - 30 mg/dL 23    Creatinine Latest Ref Range: 0.52 - 1.04 mg/dL 0.73    GFR Estimate Latest Ref Range: >60 mL/min/1.73_m2 78    GFR Estimate If Black Latest Ref Range: >60 mL/min/1.73_m2 >90    Calcium Latest Ref Range: 8.5 - 10.1 mg/dL 9.5    Anion Gap Latest Ref Range: 3 - 14 mmol/L 5    Glucose Latest Ref Range: 70 - 99 mg/dL 88    WBC Latest Ref Range: 4.0 - 11.0 10e9/L 5.9    Hemoglobin Latest Ref Range: 11.7 - 15.7 g/dL 12.2    Hematocrit Latest Ref Range: 35.0 - 47.0 % 36.4    Platelet Count Latest Ref Range: 150 - 450 10e9/L 75 (L)    RBC Count Latest Ref Range: 3.8 - 5.2 10e12/L 3.80    MCV Latest Ref Range: 78 - 100 fl 96    MCH Latest Ref Range: 26.5 - 33.0 pg 32.1    MCHC Latest Ref Range: 31.5 - 36.5 g/dL 33.5    RDW Latest Ref Range: 10.0 - 15.0 % 16.0 (H)    INR Latest Ref Range: 0.86 - 1.14  1.05    PTT Latest Ref Range: 22 - 37 sec 36    EKG 12-LEAD, TRACING ONLY Unknown  Rpt       CT CHEST WITHOUT CONTRAST December 27, 2016 11:40 AM       HISTORY: Follow up on right sided lung lesions. Malignant neoplasm of  lower lobe, left bronchus or lung.     COMPARISON: 10/22/2016.     TECHNIQUE: Volumetric helical acquisition of CT images of the chest  from the clavicles to the kidneys were acquired without IV contrast.  Radiation dose for this scan was reduced using automated exposure  control, adjustment of the mA and/or kV  according to patient size, or  iterative reconstruction technique.     FINDINGS: Right-sided fibrotic changes appear stable. Somewhat nodular  density at the left base has resolved and may have been an area of  consolidation and/or active inflammation. Honeycombing and traction  bronchiectasis persist. Moderate hiatal hernia. No pleural or  pericardial effusion. Mild cardiomegaly. There are moderate coronary  vascular calcifications consistent with coronary artery disease. There  are mild atherosclerotic changes of the visualized aorta and its  branches. There is no evidence of aortic aneurysm. No acute findings  in the visualized upper abdomen.                                                                       IMPRESSION:  1. 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  2. The nodular density at the left base has resolved  Davie Iyer MD 2016             Narrative           Interpretation Summary              [facility]  [facility]  Echocardiography Laboratory  [address]  [place] [address]  Phone [phone]  Fax [phone]        Name: GINA MCKEON  MRN: [id number]  : 1940  Study Date: 2016 07:39 AM  Age: 75 yrs  Gender: Female  [place] [facility] [place]: Unspecified atrial fibrillation  Ordering Physician: HAZEL RIBEIRO  Referring Physician: HAZEL RIBEIRO  Performed By: Rosanna Manning AJ  BSA: 2.0 m2  Height: 62 in  Weight: 226 lb  HR: 89  BP: 100/58 mmHg     ______________________________________________________________________________     Procedure  Complete Echo Adult.     ______________________________________________________________________________     Interpretation Summary     Left ventricular systolic function is normal.The visual ejection fraction is  estimated at 60-65%.  The right ventricle is mildly  dilated.The right ventricular systolic function  is normal.  The left atrium is severely dilated.  There is moderate mitral annular calcification.There is mild (1+) mitral  regurgitation.  Pulmonary hypertension- RVSP 42 mm hg +RA.  The IVC is normal in size and reactivity with respiration, suggesting normal  central venous pressure.  ______________________________________________________________________________           Left Ventricle  The left ventricle is normal in size. There is mild concentric left  ventricular hypertrophy. Left ventricular systolic function is normal. The  visual ejection fraction is estimated at 60-65%. E prime velocity may be  unreliable due to presence of significant mitral annular calcification. No  regional wall motion abnormalities noted.  Right Ventricle  The right ventricle is mildly dilated. The right ventricular systolic  function is normal.     Atria  The left atrium is severely dilated. The right atrium is mild to moderately  dilated. There is no color Doppler evidence of an atrial shunt.     Mitral Valve  There is moderate mitral annular calcification. There is mild (1+) mitral  regurgitation.     Tricuspid Valve  There is mild (1+) tricuspid regurgitation. The right ventricular systolic  pressure is approximated at 42.2 mmHg plus the right atrial pressure.  Pulmonary hypertension.     Aortic Valve  There is mild trileaflet aortic sclerosis. No aortic regurgitation is  present. No aortic stenosis is present.     Pulmonic Valve  The pulmonic valve is not well visualized. There is no pulmonic valvular  stenosis.  Vessels  The aortic root is normal size. Normal size ascending aorta. The IVC is  normal in size and reactivity with respiration, suggesting normal central  venous pressure.     Pericardium  There is no pericardial effusion.     Rhythm  The rhythm was normal sinus.  ______________________________________________________________________________     MMode/2D Measurements &  Calculations  IVSd: 1.2 cm  LVIDd: 5.3 cm  LVIDs: 3.4 cm  LVPWd: 0.96 cm  FS: 35.9 %  EDV(Teich): 132.7 ml  ESV(Teich): 46.3 ml  LV mass(C)d: 224.6 grams  Ao root diam: 2.8 cm  LA dimension: 4.7 cm  asc Aorta Diam: 3.5 cm  LA/Ao: 1.7  LVOT diam: 1.9 cm  LVOT area: 2.7 cm2  LA Volume (BP): 101.0 ml  LA Volume Index (BP): 50.2 ml/m2           Doppler Measurements & Calculations  MV E max carrington: 89.7 cm/sec  MV A max carrington: 94.6 cm/sec  MV E/A: 0.95  MV dec time: 0.20 sec  TR max carrington: 324.7 cm/sec  TR max P.2 mmHg  Lateral E/e': 12.9  Medial E/e': 17.2                   Current Outpatient Medications   Medication     albuterol (VENTOLIN HFA) 108 (90 Base) MCG/ACT inhaler     apixaban ANTICOAGULANT (ELIQUIS) 5 MG tablet     Bacillus Coagulans-Inulin (PROBIOTIC-PREBIOTIC PO)     Calcium Citrate-Vitamin D (CITRACAL + D PO)     CENTRUM SILVER OR TABS     folic acid (FOLVITE) 1 MG tablet     InFLIXimab (REMICADE IV)     levothyroxine (SYNTHROID/LEVOTHROID) 75 MCG tablet     MELATONIN PO     Methotrexate Sodium (METHOTREXATE PO)     metoprolol succinate ER (TOPROL-XL) 25 MG 24 hr tablet     Multiple Vitamins-Minerals (ICAPS) CAPS     NIFEDICAL XL 60 MG PO TB24     omeprazole (PRILOSEC) 20 MG DR capsule     simvastatin (ZOCOR) 40 MG tablet     No current facility-administered medications for this visit.       The patient notes increasing shortness and decreased exercise tolerance and physical examination suggests atrial fibrillation which previously has been paroxysmal.  Rate is controlled.  The patient is maintained on NWOAC without bleeding complication. Her pressure elevation has a dominant contributory effect from the post-capillary component (elevated RA and PCP).  I would propose:    1. Furosemide 20mgs every Tuesday and Thursday  2. See Dr. Burroughs in 7-10 days for blood pressure check and basic metabolic panel to check renal function and see if potassium supplement is necessary  3. Regular walking program behind cart in  "[employer] store Monday, Wednesday and Friday and possibly one weekend day to enlist \"conditioning\"      CC: Dr Manjeet Bustillos M.D.    Thank you for allowing me to participate in the care of your patient.    Sincerely,     Shay Marquez MD     [facility]  "

## 2019-04-16 NOTE — PROGRESS NOTES
Natalie Bustillos M.D.  Arthritis and Rheumatolotgamaya Burroughs M.D      The patient has scleroderma and ILD and was incidentally found to have SCC of left lower lobe that was treated with radiation therapy.  She is clinically stable and has no interim history of right heart failure.  The most recent echocardiogram discloses elevated, estimated PA pressure.  She has no significant functional limitation at this time.  We discussed the nature of right heart catherization and medication for PH; and for the moment, in the absence of symptoms, she wishes to defer diagnostic and therapeutic intervention.    She returns now with exertional shortness of breath and decreased exercise tolerance but without symptoms of right heart failure, exertional -pre-syncope or syncope.      Current Outpatient Medications   Medication     albuterol (VENTOLIN HFA) 108 (90 Base) MCG/ACT inhaler     apixaban ANTICOAGULANT (ELIQUIS) 5 MG tablet     Bacillus Coagulans-Inulin (PROBIOTIC-PREBIOTIC PO)     Calcium Citrate-Vitamin D (CITRACAL + D PO)     CENTRUM SILVER OR TABS     folic acid (FOLVITE) 1 MG tablet     InFLIXimab (REMICADE IV)     levothyroxine (SYNTHROID/LEVOTHROID) 75 MCG tablet     MELATONIN PO     Methotrexate Sodium (METHOTREXATE PO)     metoprolol succinate ER (TOPROL-XL) 25 MG 24 hr tablet     Multiple Vitamins-Minerals (ICAPS) CAPS     NIFEDICAL XL 60 MG PO TB24     omeprazole (PRILOSEC) 20 MG DR capsule     simvastatin (ZOCOR) 40 MG tablet     No current facility-administered medications for this visit.        REVIEW of SYMPTOMS    Constitutional: without fever, chills, night sweats.  Weight is ___  HEENT: without dry eyes, dry mouth, sinusitis, corryza, visual changes  Endocrine: without polyuria, polydypsia, polyphagia, heat or cold intolerance, changing mental status  Cardiology: without chest pain, tightness, heaviness, pressure, paroxysmal dyspnea, orthopnea, palpitation, pre-syncope or syncope or device discharge  (if present)  Pulmonary: without asthma, wheezing, cough, hemoptysis  GI: without nausea, emesis, jaundice, pain, hematemesis, melena  : without frequency, urgency, hematuria, stones, pain, abnormal bleeding, frequency, urgency  Neurologic: without TIA, CVA, trauma, seizure  Dermatologic: without lesions, abrasion rash,   Orthopedic/Rheum: without significant joint pain, impairment, limb, polyserositis, ulceration, Raynauds  Heme: without mass, bruising, frequent infection, anemia  Psychiatric: without substance abuse, hallucination, medication, depression    Exercise tolerance:    PERRLA, EOM full, normal gait and station, normal mentation, skin without lesions, chest is clear, no evidence of right or left ventricular overactivity, sternum stable, no carotid bruits, regular rhythm, S1, S2, no murmurs, abdomen without tenderness, rebound guarding or masses, no edema, no focal neurologic      Conclusion     1) Elevated biventricular filling pressures   2) Moderate to severe mostly primary pulmonary hypertension  3) Improvement in hemodynamics with nitric oxide; however, with no improvement in mean PA pressure       Plan     1) Suggest initiation of diuretic  2) Consider initiation of Sildenafil   3) Discussed with Dr. Marquez - he will visit the patient in clinic tomorrow   CV Right/Left Heart Cath     Right Heart Pressures     Systemic blood pressure 140-150 throughout the case.    BASELINE:  1) Elevated right-sided filling pressures (mean RA 16 mmHg)  2) Moderate to severe pulmonary hypertension (mean PA 35 mmHg) secondary to intrinsic pulmonary vascular disease (PVRI 14 rod*m2) and elevated left-sided filling pressures (PCWP 20 mmHg).  3) O2 sats at baseline 93%  4) Mildly depressed CO/CI: 3.8/1.9    NO 80 PPM:  1) Pulmonary artery pressures did not change (mean PA 34 mmHg)  2) O2 sats improved to 100%  3) CO/CI improved 4.2/2.1 Right sided filling pressures are severely elevated.Left sided filling pressures are  moderately elevated. Severely elevated pulmonary artery hypertension.Left ventricular filling pressures are moderately elevated .Normal cardiac output level.   Pressures      Time Systolic Diastolic Mean A Wave V Wave EDP Max dp/dt HR   RA Pressures  9:01 AM   16 mmHg    14 mmHg    22 mmHg      87 bpm      RV Pressures  9:01 AM 52 mmHg    5 mmHg       15 mmHg     86 bpm      PA Pressures  9:02 AM 51 mmHg    26 mmHg    35 mmHg        76 bpm      PCW Pressures  9:02 AM   18 mmHg    23 mmHg    24 mmHg      72 bpm      AO Pressures  9:03  mmHg    87 mmHg    111 mmHg        82 bpm      Pressures Phase: Drug Study Level 1      Time Systolic Diastolic Mean A Wave V Wave EDP Max dp/dt HR   PA Pressures  9:11 AM 48 mmHg    26 mmHg    34 mmHg        77 bpm      Pressures Phase: Drug Study Level 2      Time Systolic Diastolic Mean A Wave V Wave EDP Max dp/dt HR   PA Pressures  9:14 AM 52 mmHg    33 mmHg    36 mmHg        84 bpm      PCW Pressures  9:14 AM   22 mmHg    26 mmHg    23 mmHg      79 bpm      AO Pressures  9:11  mmHg    67 mmHg    97 mmHg        80 bpm      Blood Flow Results Phase: Drug Study Level 2      Time Results Indexed Values   QP  9:11 AM 4.19 L/min    2.1 L/min/m2      QS  9:11 AM 4.19 L/min    2.1 L/min/m2      Blood Oximetry Phase: Drug Study Level 2      Time Hb SAT(%) PO2 Content PA Sat   Art  9:11 AM  100 %     18.22 mL/dL       Cardiac Output Phase: Drug Study Level 2      Time TDCO TDCI Josh C.O. Josh C.I. Josh HR   Cardiac Output Results  9:11 AM   4.19 L/min    2.1 L/min/m2       Resistance Results Phase: Drug Study Level 2      Time PVR SVR PVR-I SVR-I TPR TVR TPR-I TVR-I PVR/SVR TPR/TVR   Resistance Results (Metric)  9:11 .55 dsc-5     534.11 dsc-5/m2     687.98 dsc-5    1853.72 dsc-5    1373.43 dsc-5/m2    3700.63 dsc-5/m2     0.37      Resistance Results (Wood)  9:11 AM 3.35 ROQUE     6.68 ROQUE/m2     8.6 ROQUE    23.18 ROQUE    17.17                1/15/2019 1/24/2019   /77  112/63       Results for GINA MCKEON (MRN [id number]) as of 4/16/2019 13:41   Ref. Range 4/15/2019 07:00 4/15/2019 08:35   Sodium Latest Ref Range: 133 - 144 mmol/L 141    Potassium Latest Ref Range: 3.4 - 5.3 mmol/L 4.1    Chloride Latest Ref Range: 94 - 109 mmol/L 110 (H)    Carbon Dioxide Latest Ref Range: 20 - 32 mmol/L 26    Urea Nitrogen Latest Ref Range: 7 - 30 mg/dL 23    Creatinine Latest Ref Range: 0.52 - 1.04 mg/dL 0.73    GFR Estimate Latest Ref Range: >60 mL/min/1.73_m2 78    GFR Estimate If Black Latest Ref Range: >60 mL/min/1.73_m2 >90    Calcium Latest Ref Range: 8.5 - 10.1 mg/dL 9.5    Anion Gap Latest Ref Range: 3 - 14 mmol/L 5    Glucose Latest Ref Range: 70 - 99 mg/dL 88    WBC Latest Ref Range: 4.0 - 11.0 10e9/L 5.9    Hemoglobin Latest Ref Range: 11.7 - 15.7 g/dL 12.2    Hematocrit Latest Ref Range: 35.0 - 47.0 % 36.4    Platelet Count Latest Ref Range: 150 - 450 10e9/L 75 (L)    RBC Count Latest Ref Range: 3.8 - 5.2 10e12/L 3.80    MCV Latest Ref Range: 78 - 100 fl 96    MCH Latest Ref Range: 26.5 - 33.0 pg 32.1    MCHC Latest Ref Range: 31.5 - 36.5 g/dL 33.5    RDW Latest Ref Range: 10.0 - 15.0 % 16.0 (H)    INR Latest Ref Range: 0.86 - 1.14  1.05    PTT Latest Ref Range: 22 - 37 sec 36    EKG 12-LEAD, TRACING ONLY Unknown  Rpt         CT CHEST WITHOUT CONTRAST December 27, 2016 11:40 AM       HISTORY: Follow up on right sided lung lesions. Malignant neoplasm of  lower lobe, left bronchus or lung.     COMPARISON: 10/22/2016.     TECHNIQUE: Volumetric helical acquisition of CT images of the chest  from the clavicles to the kidneys were acquired without IV contrast.  Radiation dose for this scan was reduced using automated exposure  control, adjustment of the mA and/or kV according to patient size, or  iterative reconstruction technique.     FINDINGS: Right-sided fibrotic changes appear stable. Somewhat nodular  density at the left base has resolved and may have been an area  of  consolidation and/or active inflammation. Honeycombing and traction  bronchiectasis persist. Moderate hiatal hernia. No pleural or  pericardial effusion. Mild cardiomegaly. There are moderate coronary  vascular calcifications consistent with coronary artery disease. There  are mild atherosclerotic changes of the visualized aorta and its  branches. There is no evidence of aortic aneurysm. No acute findings  in the visualized upper abdomen.                                                                       IMPRESSION:  1. 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  2. The nodular density at the left base has resolved  Davie Iyer MD 2016             Narrative           Interpretation Summary              [facility]  [facility]ch[facility]  [address]  [address]  [place] [address]  Phone [phone]  Fax [phone]        Name: GINA MCKEON  MRN: [id number]  : 1940  Study Date: 2016 07:39 AM  Age: 75 yrs  Gender: Female  Patient Location: [facility]  Reason For Study: Unspecified atrial fibrillation  Ordering Physician: HAZEL RIBEIRO  Referring Physician: HAZEL RIBEIRO  Performed By: Rosanna Manning AJ  BSA: 2.0 m2  Height: 62 in  Weight: 226 lb  HR: 89  BP: 100/58 mmHg     ______________________________________________________________________________     Procedure  Complete Echo Adult.     ______________________________________________________________________________     Interpretation Summary     Left ventricular systolic function is normal.The visual ejection fraction is  estimated at 60-65%.  The right ventricle is mildly dilated.The right ventricular systolic function  is normal.  The left atrium is severely dilated.  There is moderate mitral annular calcification.There is mild (1+) mitral  regurgitation.  Pulmonary hypertension- RVSP  42 mm hg +RA.  The IVC is normal in size and reactivity with respiration, suggesting normal  central venous pressure.  ______________________________________________________________________________           Left Ventricle  The left ventricle is normal in size. There is mild concentric left  ventricular hypertrophy. Left ventricular systolic function is normal. The  visual ejection fraction is estimated at 60-65%. E prime velocity may be  unreliable due to presence of significant mitral annular calcification. No  regional wall motion abnormalities noted.  Right Ventricle  The right ventricle is mildly dilated. The right ventricular systolic  function is normal.     Atria  The left atrium is severely dilated. The right atrium is mild to moderately  dilated. There is no color Doppler evidence of an atrial shunt.     Mitral Valve  There is moderate mitral annular calcification. There is mild (1+) mitral  regurgitation.     Tricuspid Valve  There is mild (1+) tricuspid regurgitation. The right ventricular systolic  pressure is approximated at 42.2 mmHg plus the right atrial pressure.  Pulmonary hypertension.     Aortic Valve  There is mild trileaflet aortic sclerosis. No aortic regurgitation is  present. No aortic stenosis is present.     Pulmonic Valve  The pulmonic valve is not well visualized. There is no pulmonic valvular  stenosis.  Vessels  The aortic root is normal size. Normal size ascending aorta. The IVC is  normal in size and reactivity with respiration, suggesting normal central  venous pressure.     Pericardium  There is no pericardial effusion.     Rhythm  The rhythm was normal sinus.  ______________________________________________________________________________     MMode/2D Measurements & Calculations  IVSd: 1.2 cm  LVIDd: 5.3 cm  LVIDs: 3.4 cm  LVPWd: 0.96 cm  FS: 35.9 %  EDV(Teich): 132.7 ml  ESV(Teich): 46.3 ml  LV mass(C)d: 224.6 grams  Ao root diam: 2.8 cm  LA dimension: 4.7 cm  asc Aorta Diam: 3.5  "cm  LA/Ao: 1.7  LVOT diam: 1.9 cm  LVOT area: 2.7 cm2  LA Volume (BP): 101.0 ml  LA Volume Index (BP): 50.2 ml/m2           Doppler Measurements & Calculations  MV E max carrington: 89.7 cm/sec  MV A max carrington: 94.6 cm/sec  MV E/A: 0.95  MV dec time: 0.20 sec  TR max carrington: 324.7 cm/sec  TR max P.2 mmHg  Lateral E/e': 12.9  Medial E/e': 17.2                   Current Outpatient Medications   Medication     albuterol (VENTOLIN HFA) 108 (90 Base) MCG/ACT inhaler     apixaban ANTICOAGULANT (ELIQUIS) 5 MG tablet     Bacillus Coagulans-Inulin (PROBIOTIC-PREBIOTIC PO)     Calcium Citrate-Vitamin D (CITRACAL + D PO)     CENTRUM SILVER OR TABS     folic acid (FOLVITE) 1 MG tablet     InFLIXimab (REMICADE IV)     levothyroxine (SYNTHROID/LEVOTHROID) 75 MCG tablet     MELATONIN PO     Methotrexate Sodium (METHOTREXATE PO)     metoprolol succinate ER (TOPROL-XL) 25 MG 24 hr tablet     Multiple Vitamins-Minerals (ICAPS) CAPS     NIFEDICAL XL 60 MG PO TB24     omeprazole (PRILOSEC) 20 MG DR capsule     simvastatin (ZOCOR) 40 MG tablet     No current facility-administered medications for this visit.       The patient notes increasing shortness and decreased exercise tolerance and physical examination suggests atrial fibrillation which previously has been paroxysmal.  Rate is controlled.  The patient is maintained on NWOAC without bleeding complication. Her pressure elevation has a dominant contributory effect from the post-capillary component (elevated RA and PCP).  I would propose:    1. Furosemide 20mgs every Tuesday and Thursday  2. See Dr. Burroughs in 7-10 days for blood pressure check and basic metabolic panel to check renal function and see if potassium supplement is necessary  3. Regular walking program behind cart in big box store Monday, Wednesday and Friday and possibly one weekend day to enlist \"conditioning\"        CC: Dr Manjeet Bustillos M.D.  "

## 2019-04-16 NOTE — LETTER
4/16/2019    Carissa Burroughs MD  [address] [facility] [address]  [facility] [address]    RE: Jaqueline Canales       Dear Colleague,    I had the pleasure of seeing Jaqueline Canales in the [facility].        Natalie Bustillos M.D.  Arthritis and Rheumatolotgy    Jeffy Burroughs M.D      The patient has scleroderma and ILD and was incidentally found to have SCC of left lower lobe that was treated with radiation therapy.  She is clinically stable and has no interim history of right heart failure.  The most recent echocardiogram discloses elevated, estimated PA pressure.  She has no significant functional limitation at this time.  We discussed the nature of right heart catherization and medication for PH; and for the moment, in the absence of symptoms, she wishes to defer diagnostic and therapeutic intervention.    She returns now with exertional shortness of breath and decreased exercise tolerance but without symptoms of right heart failure, exertional -pre-syncope or syncope.      Current Outpatient Medications   Medication     albuterol (VENTOLIN HFA) 108 (90 Base) MCG/ACT inhaler     apixaban ANTICOAGULANT (ELIQUIS) 5 MG tablet     Bacillus Coagulans-Inulin (PROBIOTIC-PREBIOTIC PO)     Calcium Citrate-Vitamin D (CITRACAL + D PO)     CENTRUM SILVER OR TABS     folic acid (FOLVITE) 1 MG tablet     InFLIXimab (REMICADE IV)     levothyroxine (SYNTHROID/LEVOTHROID) 75 MCG tablet     MELATONIN PO     Methotrexate Sodium (METHOTREXATE PO)     metoprolol succinate ER (TOPROL-XL) 25 MG 24 hr tablet     Multiple Vitamins-Minerals (ICAPS) CAPS     NIFEDICAL XL 60 MG PO TB24     omeprazole (PRILOSEC) 20 MG DR capsule     simvastatin (ZOCOR) 40 MG tablet     No current facility-administered medications for this visit.        REVIEW of SYMPTOMS    Constitutional: without fever, chills, night sweats.  Weight is ___  HEENT: without dry eyes, dry mouth, sinusitis, corryza, visual changes  Endocrine: without polyuria,  polydypsia, polyphagia, heat or cold intolerance, changing mental status  Cardiology: without chest pain, tightness, heaviness, pressure, paroxysmal dyspnea, orthopnea, palpitation, pre-syncope or syncope or device discharge (if present)  Pulmonary: without asthma, wheezing, cough, hemoptysis  GI: without nausea, emesis, jaundice, pain, hematemesis, melena  : without frequency, urgency, hematuria, stones, pain, abnormal bleeding, frequency, urgency  Neurologic: without TIA, CVA, trauma, seizure  Dermatologic: without lesions, abrasion rash,   Orthopedic/Rheum: without significant joint pain, impairment, limb, polyserositis, ulceration, Raynauds  Heme: without mass, bruising, frequent infection, anemia  Psychiatric: without substance abuse, hallucination, medication, depression    Exercise tolerance:    PERRLA, EOM full, normal gait and station, normal mentation, skin without lesions, chest is clear, no evidence of right or left ventricular overactivity, sternum stable, no carotid bruits, regular rhythm, S1, S2, no murmurs, abdomen without tenderness, rebound guarding or masses, no edema, no focal neurologic      Conclusion     1) Elevated biventricular filling pressures   2) Moderate to severe mostly primary pulmonary hypertension  3) Improvement in hemodynamics with nitric oxide; however, with no improvement in mean PA pressure       Plan     1) Suggest initiation of diuretic  2) Consider initiation of Sildenafil   3) Discussed with Dr. Marquez - he will visit the patient in clinic tomorrow   CV Right/Left Heart Cath     Right Heart Pressures     Systemic blood pressure 140-150 throughout the case.    BASELINE:  1) Elevated right-sided filling pressures (mean RA 16 mmHg)  2) Moderate to severe pulmonary hypertension (mean PA 35 mmHg) secondary to intrinsic pulmonary vascular disease (PVRI 14 rod*m2) and elevated left-sided filling pressures (PCWP 20 mmHg).  3) O2 sats at baseline 93%  4) Mildly depressed CO/CI:  3.8/1.9    NO 80 PPM:  1) Pulmonary artery pressures did not change (mean PA 34 mmHg)  2) O2 sats improved to 100%  3) CO/CI improved 4.2/2.1 Right sided filling pressures are severely elevated.Left sided filling pressures are moderately elevated. Severely elevated pulmonary artery hypertension.Left ventricular filling pressures are moderately elevated .Normal cardiac output level.   Pressures      Time Systolic Diastolic Mean A Wave V Wave EDP Max dp/dt HR   RA Pressures  9:01 AM   16 mmHg    14 mmHg    22 mmHg      87 bpm      RV Pressures  9:01 AM 52 mmHg    5 mmHg       15 mmHg     86 bpm      PA Pressures  9:02 AM 51 mmHg    26 mmHg    35 mmHg        76 bpm      PCW Pressures  9:02 AM   18 mmHg    23 mmHg    24 mmHg      72 bpm      AO Pressures  9:03  mmHg    87 mmHg    111 mmHg        82 bpm      Pressures Phase: Drug Study Level 1      Time Systolic Diastolic Mean A Wave V Wave EDP Max dp/dt HR   PA Pressures  9:11 AM 48 mmHg    26 mmHg    34 mmHg        77 bpm      Pressures Phase: Drug Study Level 2      Time Systolic Diastolic Mean A Wave V Wave EDP Max dp/dt HR   PA Pressures  9:14 AM 52 mmHg    33 mmHg    36 mmHg        84 bpm      PCW Pressures  9:14 AM   22 mmHg    26 mmHg    23 mmHg      79 bpm      AO Pressures  9:11  mmHg    67 mmHg    97 mmHg        80 bpm      Blood Flow Results Phase: Drug Study Level 2      Time Results Indexed Values   QP  9:11 AM 4.19 L/min    2.1 L/min/m2      QS  9:11 AM 4.19 L/min    2.1 L/min/m2      Blood Oximetry Phase: Drug Study Level 2      Time Hb SAT(%) PO2 Content PA Sat   Art  9:11 AM  100 %     18.22 mL/dL       Cardiac Output Phase: Drug Study Level 2      Time TDCO TDCI Josh C.O. Josh C.I. Josh HR   Cardiac Output Results  9:11 AM   4.19 L/min    2.1 L/min/m2       Resistance Results Phase: Drug Study Level 2      Time PVR SVR PVR-I SVR-I TPR TVR TPR-I TVR-I PVR/SVR TPR/TVR   Resistance Results (Metric)  9:11 .55 dsc-5     534.11 dsc-5/m2      687.98 dsc-5    1853.72 dsc-5    1373.43 dsc-5/m2    3700.63 dsc-5/m2     0.37      Resistance Results (Wood)  9:11 AM 3.35 ROQUE     6.68 ROQUE/m2     8.6 ROQUE    23.18 ROQUE    17.17                1/15/2019 1/24/2019   /77 112/63       Results for GINA MCKEON (MRN [id number]) as of 4/16/2019 13:41   Ref. Range 4/15/2019 07:00 4/15/2019 08:35   Sodium Latest Ref Range: 133 - 144 mmol/L 141    Potassium Latest Ref Range: 3.4 - 5.3 mmol/L 4.1    Chloride Latest Ref Range: 94 - 109 mmol/L 110 (H)    Carbon Dioxide Latest Ref Range: 20 - 32 mmol/L 26    Urea Nitrogen Latest Ref Range: 7 - 30 mg/dL 23    Creatinine Latest Ref Range: 0.52 - 1.04 mg/dL 0.73    GFR Estimate Latest Ref Range: >60 mL/min/1.73_m2 78    GFR Estimate If Black Latest Ref Range: >60 mL/min/1.73_m2 >90    Calcium Latest Ref Range: 8.5 - 10.1 mg/dL 9.5    Anion Gap Latest Ref Range: 3 - 14 mmol/L 5    Glucose Latest Ref Range: 70 - 99 mg/dL 88    WBC Latest Ref Range: 4.0 - 11.0 10e9/L 5.9    Hemoglobin Latest Ref Range: 11.7 - 15.7 g/dL 12.2    Hematocrit Latest Ref Range: 35.0 - 47.0 % 36.4    Platelet Count Latest Ref Range: 150 - 450 10e9/L 75 (L)    RBC Count Latest Ref Range: 3.8 - 5.2 10e12/L 3.80    MCV Latest Ref Range: 78 - 100 fl 96    MCH Latest Ref Range: 26.5 - 33.0 pg 32.1    MCHC Latest Ref Range: 31.5 - 36.5 g/dL 33.5    RDW Latest Ref Range: 10.0 - 15.0 % 16.0 (H)    INR Latest Ref Range: 0.86 - 1.14  1.05    PTT Latest Ref Range: 22 - 37 sec 36    EKG 12-LEAD, TRACING ONLY Unknown  Rpt         CT CHEST WITHOUT CONTRAST December 27, 2016 11:40 AM       HISTORY: Follow up on right sided lung lesions. Malignant neoplasm of  lower lobe, left bronchus or lung.     COMPARISON: 10/22/2016.     TECHNIQUE: Volumetric helical acquisition of CT images of the chest  from the clavicles to the kidneys were acquired without IV contrast.  Radiation dose for this scan was reduced using automated exposure  control, adjustment of the mA and/or  kV according to patient size, or  iterative reconstruction technique.     FINDINGS: Right-sided fibrotic changes appear stable. Somewhat nodular  density at the left base has resolved and may have been an area of  consolidation and/or active inflammation. Honeycombing and traction  bronchiectasis persist. Moderate hiatal hernia. No pleural or  pericardial effusion. Mild cardiomegaly. There are moderate coronary  vascular calcifications consistent with coronary artery disease. There  are mild atherosclerotic changes of the visualized aorta and its  branches. There is no evidence of aortic aneurysm. No acute findings  in the visualized upper abdomen.                                                                       IMPRESSION:  1. 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  2. The nodular density at the left base has resolved  Davie Iyer MD 2016             Narrative           Interpretation Summary              [facility]  [facility]  Echocardiography Laboratory  [address]  [place] [address]  Phone [phone]  Fax [phone]        Name: GINA MCKEON  MRN: [id number]  : 1940  Study Date: 2016 07:39 AM  Age: 75 yrs  Gender: Female  Patient Location: [facility]  Reason For Study: Unspecified atrial fibrillation  Ordering Physician: HAZEL RIBEIRO  Referring Physician: HAZEL RIBEIRO  Performed By: Rosanna Manning AJ  BSA: 2.0 m2  Height: 62 in  Weight: 226 lb  HR: 89  BP: 100/58 mmHg     ______________________________________________________________________________     Procedure  Complete Echo Adult.     ______________________________________________________________________________     Interpretation Summary     Left ventricular systolic function is normal.The visual ejection fraction is  estimated at 60-65%.  The right ventricle is mildly  dilated.The right ventricular systolic function  is normal.  The left atrium is severely dilated.  There is moderate mitral annular calcification.There is mild (1+) mitral  regurgitation.  Pulmonary hypertension- RVSP 42 mm hg +RA.  The IVC is normal in size and reactivity with respiration, suggesting normal  central venous pressure.  ______________________________________________________________________________           Left Ventricle  The left ventricle is normal in size. There is mild concentric left  ventricular hypertrophy. Left ventricular systolic function is normal. The  visual ejection fraction is estimated at 60-65%. E prime velocity may be  unreliable due to presence of significant mitral annular calcification. No  regional wall motion abnormalities noted.  Right Ventricle  The right ventricle is mildly dilated. The right ventricular systolic  function is normal.     Atria  The left atrium is severely dilated. The right atrium is mild to moderately  dilated. There is no color Doppler evidence of an atrial shunt.     Mitral Valve  There is moderate mitral annular calcification. There is mild (1+) mitral  regurgitation.     Tricuspid Valve  There is mild (1+) tricuspid regurgitation. The right ventricular systolic  pressure is approximated at 42.2 mmHg plus the right atrial pressure.  Pulmonary hypertension.     Aortic Valve  There is mild trileaflet aortic sclerosis. No aortic regurgitation is  present. No aortic stenosis is present.     Pulmonic Valve  The pulmonic valve is not well visualized. There is no pulmonic valvular  stenosis.  Vessels  The aortic root is normal size. Normal size ascending aorta. The IVC is  normal in size and reactivity with respiration, suggesting normal central  venous pressure.     Pericardium  There is no pericardial effusion.     Rhythm  The rhythm was normal sinus.  ______________________________________________________________________________     MMode/2D Measurements &  Calculations  IVSd: 1.2 cm  LVIDd: 5.3 cm  LVIDs: 3.4 cm  LVPWd: 0.96 cm  FS: 35.9 %  EDV(Teich): 132.7 ml  ESV(Teich): 46.3 ml  LV mass(C)d: 224.6 grams  Ao root diam: 2.8 cm  LA dimension: 4.7 cm  asc Aorta Diam: 3.5 cm  LA/Ao: 1.7  LVOT diam: 1.9 cm  LVOT area: 2.7 cm2  LA Volume (BP): 101.0 ml  LA Volume Index (BP): 50.2 ml/m2           Doppler Measurements & Calculations  MV E max carrington: 89.7 cm/sec  MV A max carrington: 94.6 cm/sec  MV E/A: 0.95  MV dec time: 0.20 sec  TR max carrington: 324.7 cm/sec  TR max P.2 mmHg  Lateral E/e': 12.9  Medial E/e': 17.2                   Current Outpatient Medications   Medication     albuterol (VENTOLIN HFA) 108 (90 Base) MCG/ACT inhaler     apixaban ANTICOAGULANT (ELIQUIS) 5 MG tablet     Bacillus Coagulans-Inulin (PROBIOTIC-PREBIOTIC PO)     Calcium Citrate-Vitamin D (CITRACAL + D PO)     CENTRUM SILVER OR TABS     folic acid (FOLVITE) 1 MG tablet     InFLIXimab (REMICADE IV)     levothyroxine (SYNTHROID/LEVOTHROID) 75 MCG tablet     MELATONIN PO     Methotrexate Sodium (METHOTREXATE PO)     metoprolol succinate ER (TOPROL-XL) 25 MG 24 hr tablet     Multiple Vitamins-Minerals (ICAPS) CAPS     NIFEDICAL XL 60 MG PO TB24     omeprazole (PRILOSEC) 20 MG DR capsule     simvastatin (ZOCOR) 40 MG tablet     No current facility-administered medications for this visit.       The patient notes increasing shortness and decreased exercise tolerance and physical examination suggests atrial fibrillation which previously has been paroxysmal.  Rate is controlled.  The patient is maintained on NWOAC without bleeding complication. Her pressure elevation has a dominant contributory effect from the post-capillary component (elevated RA and PCP).  I would propose:    1. Furosemide 20mgs every Tuesday and Thursday  2. See Dr. Burroughs in 7-10 days for blood pressure check and basic metabolic panel to check renal function and see if potassium supplement is necessary  3. Regular walking program behind cart in  "big box store Monday, Wednesday and Friday and possibly one weekend day to enlist \"conditioning\"        CC: Dr Manjeet Bustillos M.D.    Thank you for allowing me to participate in the care of your patient.      Sincerely,     Shay Marquez MD     [facility]    cc:   No referring provider defined for this encounter.        "

## 2019-04-16 NOTE — NURSING NOTE
"Med Reconcile: Reviewed and verified all current medications with the patient. The updated medication list was printed and given to the patient.  Diet: Patient instructed regarding a heart healthy diet, including discussion of reduced fat and sodium intake. Patient demonstrated understanding of this information and agreed to call with further questions or concerns.  Return Appointment: Patient given instructions regarding scheduling next clinic visit. Patient demonstrated understanding of this information and agreed to call with further questions or concerns.  Patient stated she understood all health information given and agreed to call with further questions or concerns.     Medication Changes:  Furosemide 20mgs every Tuesday and Thursday    Patient Instructions:  1. Continue staying active and eat a heart healthy diet.    2. Please keep current list of medications with you at all times.    3. Remember to weigh yourself daily after voiding and before you consume any food or beverages and log the numbers.  If you have gained/lost 2 pounds overnight or 5 pounds in a week contact us immediately for medication adjustments or further instructions.    4. **Please call us immediately if you have any syncope (fainting or passing out), chest pain, edema (swelling or weight gain), or decline in your functional status (general decline in how you are feeling).    Regular walking program behind cart in big box store Monday, Wednesday and Friday and possibly one weekend day to enlist \"conditioning\"    Follow up Appointment Information:  See Dr. Burroughs in 7-10 days for blood pressure check and basic metabolic panel to check renal function and see if potassium supplement is necessary    Follow up with us in 10-12 weeks with labs    Results:  At this appointment we reviewed your Right heart catheterization and Labs  Component      Latest Ref Rng & Units 4/15/2019 4/15/2019 4/15/2019           7:00 AM  9:06 AM  9:16 AM   Sodium      " 133 - 144 mmol/L 141     Potassium      3.4 - 5.3 mmol/L 4.1     Chloride      94 - 109 mmol/L 110 (H)     Carbon Dioxide      20 - 32 mmol/L 26     Anion Gap      3 - 14 mmol/L 5     Glucose      70 - 99 mg/dL 88     Urea Nitrogen      7 - 30 mg/dL 23     Creatinine      0.52 - 1.04 mg/dL 0.73     GFR Estimate      >60 mL/min/1.73:m2 78     GFR Estimate If Black      >60 mL/min/1.73:m2 >90     Calcium      8.5 - 10.1 mg/dL 9.5     WBC      4.0 - 11.0 10e9/L 5.9     RBC Count      3.8 - 5.2 10e12/L 3.80     Hemoglobin      11.7 - 15.7 g/dL 12.2     Hematocrit      35.0 - 47.0 % 36.4     MCV      78 - 100 fl 96     MCH      26.5 - 33.0 pg 32.1     MCHC      31.5 - 36.5 g/dL 33.5     RDW      10.0 - 15.0 % 16.0 (H)     Platelet Count      150 - 450 10e9/L 75 (L)     Ph Venous      7.32 - 7.43 pH  7.36 7.34   PCO2 Venous      40 - 50 mm Hg  43 46   PO2 Venous      25 - 47 mm Hg  27 34   Bicarbonate Venous      21 - 28 mmol/L  24 25   O2 Sat Venous      %  49 61   INR      0.86 - 1.14 1.05     PTT      22 - 37 sec 36

## 2019-04-16 NOTE — PATIENT INSTRUCTIONS
"Medication Changes:  Furosemide 20mgs every Tuesday and Thursday    Patient Instructions:  1. Continue staying active and eat a heart healthy diet.    2. Please keep current list of medications with you at all times.    3. Remember to weigh yourself daily after voiding and before you consume any food or beverages and log the numbers.  If you have gained/lost 2 pounds overnight or 5 pounds in a week contact us immediately for medication adjustments or further instructions.    4. **Please call us immediately if you have any syncope (fainting or passing out), chest pain, edema (swelling or weight gain), or decline in your functional status (general decline in how you are feeling).    Regular walking program behind cart in big box store Monday, Wednesday and Friday and possibly one weekend day to enlist \"conditioning\"    Follow up Appointment Information:  See Dr. Burroughs in 7-10 days for blood pressure check and basic metabolic panel to check renal function and see if potassium supplement is necessary    Follow up with us in 10-12 weeks with labs    Results:  At this appointment we reviewed your Right heart catheterization and Labs  Component      Latest Ref Rng & Units 4/15/2019 4/15/2019 4/15/2019           7:00 AM  9:06 AM  9:16 AM   Sodium      133 - 144 mmol/L 141     Potassium      3.4 - 5.3 mmol/L 4.1     Chloride      94 - 109 mmol/L 110 (H)     Carbon Dioxide      20 - 32 mmol/L 26     Anion Gap      3 - 14 mmol/L 5     Glucose      70 - 99 mg/dL 88     Urea Nitrogen      7 - 30 mg/dL 23     Creatinine      0.52 - 1.04 mg/dL 0.73     GFR Estimate      >60 mL/min/1.73:m2 78     GFR Estimate If Black      >60 mL/min/1.73:m2 >90     Calcium      8.5 - 10.1 mg/dL 9.5     WBC      4.0 - 11.0 10e9/L 5.9     RBC Count      3.8 - 5.2 10e12/L 3.80     Hemoglobin      11.7 - 15.7 g/dL 12.2     Hematocrit      35.0 - 47.0 % 36.4     MCV      78 - 100 fl 96     MCH      26.5 - 33.0 pg 32.1     MCHC      31.5 - 36.5 g/dL 33.5  "    RDW      10.0 - 15.0 % 16.0 (H)     Platelet Count      150 - 450 10e9/L 75 (L)     Ph Venous      7.32 - 7.43 pH  7.36 7.34   PCO2 Venous      40 - 50 mm Hg  43 46   PO2 Venous      25 - 47 mm Hg  27 34   Bicarbonate Venous      21 - 28 mmol/L  24 25   O2 Sat Venous      %  49 61   INR      0.86 - 1.14 1.05     PTT      22 - 37 sec 36           For scheduling at [facility] please call [phone]  For scheduling at the [facility] please call [phone]  We are located on the second floor [address] at the [facility].  Our address is     [address] [place] [address] [address]   [address]  [place]  [address]    Thank you for allowing us to be a part of your care here at the [facility]    If you have questions or concerns please contact us at:    Saritha Liriano, RN, BSN      Nurse Coordinator       Pulmonary Hypertension     [facility]   (P)[phone]  (F)[phone]    ** Please note that you will NOT receive a reminder call regarding your scheduled testing, reminder calls are for provider appointments only.  If you are scheduled for testing within the [facility] system you may receive a call regarding pre-registration for billing purposes only.**     Remember to weigh yourself daily after voiding and before you consume any food or beverages and log the numbers.  If you have gained/lost 2 pounds overnight or 5 pounds in a week contact us immediately for medication adjustments or further instructions.    Support Group:  Pulmonary Hypertension Association  Https://www.phassociation.org/  **Look at the Events Tab** They even have Support Groups that you can call into    [facility] PH Support Group  Second Saturday of the Month from 1-3 PM   Location: [address] [place] [id number]  Leader: Eleni Almodovar and Joyce Galloway  Phone: [phone] or [phone]  Email: [email or web address]

## 2019-04-17 LAB — INTERPRETATION ECG - MUSE: NORMAL

## 2019-04-23 DIAGNOSIS — E78.5 HYPERLIPIDEMIA LDL GOAL <100: ICD-10-CM

## 2019-04-23 DIAGNOSIS — K22.2 ESOPHAGEAL STRICTURE: ICD-10-CM

## 2019-04-23 DIAGNOSIS — I48.19 PERSISTENT ATRIAL FIBRILLATION (H): ICD-10-CM

## 2019-04-23 DIAGNOSIS — I10 BENIGN ESSENTIAL HYPERTENSION: ICD-10-CM

## 2019-04-23 NOTE — TELEPHONE ENCOUNTER
As of today 4/23/19 pt will be using the pharmacy below    [facility] - [place], [place] - [address] AT PORTAL TO REGISTERED [facility] SITES    Please send refills on ...  apixaban ANTICOAGULANT (ELIQUIS) 5 MG tablet    omeprazole (PRILOSEC) 20 MG DR capsule    metoprolol succinate ER (TOPROL-XL) 25 MG 24 hr tablet

## 2019-04-23 NOTE — TELEPHONE ENCOUNTER
Pending Prescriptions:                       Disp   Refills    apixaban ANTICOAGULANT (ELIQUIS) 5 MG tab*180 ta*3            Sig: Take 1 tablet (5 mg) by mouth 2 times daily    omeprazole (PRILOSEC) 20 MG DR capsule    180 ca*3            Sig: Take 1 capsule (20 mg) by mouth 2 times daily           Take 30-60 minutes prior to meals    metoprolol succinate ER (TOPROL-XL) 25 MG*90 tab*3            Sig: Take 1 tablet (25 mg) by mouth daily    Apixaban  Last Written Prescription Date:  01/24/2019  Last Fill Quantity: 180,  # refills: 3   Last office visit: 2/13/2019 with prescribing provider:     Future Office Visit:     Omeprazole  Last Written Prescription Date:  01/24/2019  Last Fill Quantity: 180,  # refills: 3   Last office visit: 2/13/2019 with prescribing provider:       Metoprolol  Last Written Prescription Date:  02/13/2019  Last Fill Quantity: 90,  # refills: 3   Last office visit: 2/13/2019 with prescribing provider:     Future Office Visit:   Next 5 appointments (look out 90 days)    May 01, 2019 10:30 AM CDT  Return Visit with Janell Charles PA-C  [facility] ([facility]) [address]  [facility] [address] [phone] OPT 2   May 15, 2019 11:15 AM CDT  Return Visit with Anabell Paiz MD  [facility] ([facility]) [facility]  [address]  [facility] [address] [phone]   Jun 13, 2019 10:20 AM CDT  [facility] physical with [facility]roughs [facility]  [facility] [address] [facility] [address] [phone]                 Requested Prescriptions   Pending Prescriptions Disp Refills     apixaban ANTICOAGULANT (ELIQUIS) 5 MG tablet 180 tablet 3     Sig: Take 1 tablet (5 mg) by mouth 2 times daily       Direct Oral Anticoagulant Agents Failed - 4/23/2019  5:09 PM        Failed - Normal Platelets on file in past 12 months  "    Recent Labs   Lab Test 04/15/19  0700   PLT 75*               Passed - Medication is active on med list        Passed - Patient is 18-79 years of age        Passed - Serum creatinine less than or equal to 1.4 on file in past 12 mos     Recent Labs   Lab Test 04/15/19  0700  10/16/17  1044   CR 0.73   < >  --    CREAT  --   --  0.7    < > = values in this interval not displayed.             Passed - Weight is greater than 60 kg for the past year     Wt Readings from Last 3 Encounters:   04/16/19 101.7 kg (224 lb 1.6 oz)   04/15/19 101.6 kg (224 lb)   04/02/19 99.8 kg (220 lb)             Passed - No active pregnancy on record        Passed - No positive pregnancy test within past 12 months        Passed - Recent (6 mo) or future (30 days) visit within the authorizing provider's specialty        omeprazole (PRILOSEC) 20 MG DR capsule 180 capsule 3     Sig: Take 1 capsule (20 mg) by mouth 2 times daily Take 30-60 minutes prior to meals       PPI Protocol Failed - 4/23/2019  5:09 PM        Failed - No diagnosis of osteoporosis on record        Passed - Not on Clopidogrel (unless Pantoprazole ordered)        Passed - Recent (12 mo) or future (30 days) visit within the authorizing provider's specialty     Patient had office visit in the last 12 months or has a visit in the next 30 days with authorizing provider or within the authorizing provider's specialty.  See \"Patient Info\" tab in inbasket, or \"Choose Columns\" in Meds & Orders section of the refill encounter.              Passed - Medication is active on med list        Passed - Patient is age 18 or older        Passed - No active pregnacy on record        Passed - No positive pregnancy test in past 12 months        metoprolol succinate ER (TOPROL-XL) 25 MG 24 hr tablet 90 tablet 3     Sig: Take 1 tablet (25 mg) by mouth daily       Beta-Blockers Protocol Passed - 4/23/2019  5:09 PM        Passed - Blood pressure under 140/90 in past 12 months     BP Readings from " "Last 3 Encounters:   04/16/19 122/79   04/15/19 131/82   04/02/19 97/63                 Passed - Patient is age 6 or older        Passed - Recent (12 mo) or future (30 days) visit within the authorizing provider's specialty     Patient had office visit in the last 12 months or has a visit in the next 30 days with authorizing provider or within the authorizing provider's specialty.  See \"Patient Info\" tab in inbasket, or \"Choose Columns\" in Meds & Orders section of the refill encounter.              Passed - Medication is active on med list          "

## 2019-04-24 RX ORDER — FUROSEMIDE 20 MG
TABLET ORAL
Qty: 30 TABLET | Refills: 1 | OUTPATIENT
Start: 2019-04-24

## 2019-04-25 RX ORDER — METOPROLOL SUCCINATE 25 MG/1
25 TABLET, EXTENDED RELEASE ORAL DAILY
Qty: 90 TABLET | Refills: 2 | Status: ON HOLD | OUTPATIENT
Start: 2019-04-25 | End: 2019-12-05

## 2019-05-01 ENCOUNTER — OFFICE VISIT (OUTPATIENT)
Dept: CARDIOLOGY | Facility: CLINIC | Age: 79
End: 2019-05-01
Payer: MEDICARE

## 2019-05-01 VITALS
OXYGEN SATURATION: 95 % | SYSTOLIC BLOOD PRESSURE: 116 MMHG | BODY MASS INDEX: 40.89 KG/M2 | HEART RATE: 87 BPM | DIASTOLIC BLOOD PRESSURE: 74 MMHG | WEIGHT: 222.2 LBS | HEIGHT: 62 IN

## 2019-05-01 DIAGNOSIS — R06.09 DYSPNEA ON EXERTION: ICD-10-CM

## 2019-05-01 DIAGNOSIS — I27.20 PULMONARY HYPERTENSION (H): ICD-10-CM

## 2019-05-01 DIAGNOSIS — I27.20 PULMONARY HYPERTENSION (H): Primary | ICD-10-CM

## 2019-05-01 LAB
ANION GAP SERPL CALCULATED.3IONS-SCNC: 13 MMOL/L (ref 6–17)
BASOPHILS # BLD AUTO: 0 10E9/L (ref 0–0.2)
BASOPHILS NFR BLD AUTO: 0.5 %
BUN SERPL-MCNC: 19 MG/DL (ref 7–30)
CALCIUM SERPL-MCNC: 10 MG/DL (ref 8.5–10.5)
CHLORIDE SERPL-SCNC: 104 MMOL/L (ref 98–107)
CO2 SERPL-SCNC: 27 MMOL/L (ref 23–29)
CREAT SERPL-MCNC: 0.91 MG/DL (ref 0.7–1.3)
DIFFERENTIAL METHOD BLD: ABNORMAL
EOSINOPHIL # BLD AUTO: 0.1 10E9/L (ref 0–0.7)
EOSINOPHIL NFR BLD AUTO: 2 %
ERYTHROCYTE [DISTWIDTH] IN BLOOD BY AUTOMATED COUNT: 16.5 % (ref 10–15)
GFR SERPL CREATININE-BSD FRML MDRD: 60 ML/MIN/{1.73_M2}
GLUCOSE SERPL-MCNC: 95 MG/DL (ref 70–105)
HCT VFR BLD AUTO: 36.8 % (ref 35–47)
HGB BLD-MCNC: 12 G/DL (ref 11.7–15.7)
IMM GRANULOCYTES # BLD: 0 10E9/L (ref 0–0.4)
IMM GRANULOCYTES NFR BLD: 0.3 %
LYMPHOCYTES # BLD AUTO: 1.2 10E9/L (ref 0.8–5.3)
LYMPHOCYTES NFR BLD AUTO: 19.5 %
MCH RBC QN AUTO: 31.6 PG (ref 26.5–33)
MCHC RBC AUTO-ENTMCNC: 32.6 G/DL (ref 31.5–36.5)
MCV RBC AUTO: 97 FL (ref 78–100)
MONOCYTES # BLD AUTO: 0.4 10E9/L (ref 0–1.3)
MONOCYTES NFR BLD AUTO: 6.4 %
NEUTROPHILS # BLD AUTO: 4.4 10E9/L (ref 1.6–8.3)
NEUTROPHILS NFR BLD AUTO: 71.3 %
NRBC # BLD AUTO: 0 10*3/UL
NRBC BLD AUTO-RTO: 0 /100
NT-PROBNP SERPL-MCNC: 2900 PG/ML (ref 0–450)
PLATELET # BLD AUTO: 130 10E9/L (ref 150–450)
POTASSIUM SERPL-SCNC: 4 MMOL/L (ref 3.5–5.1)
RBC # BLD AUTO: 3.8 10E12/L (ref 3.8–5.2)
SODIUM SERPL-SCNC: 140 MMOL/L (ref 136–145)
WBC # BLD AUTO: 6.1 10E9/L (ref 4–11)

## 2019-05-01 PROCEDURE — 85025 COMPLETE CBC W/AUTO DIFF WBC: CPT | Performed by: INTERNAL MEDICINE

## 2019-05-01 PROCEDURE — 99213 OFFICE O/P EST LOW 20 MIN: CPT | Performed by: PHYSICIAN ASSISTANT

## 2019-05-01 PROCEDURE — 36415 COLL VENOUS BLD VENIPUNCTURE: CPT | Performed by: INTERNAL MEDICINE

## 2019-05-01 PROCEDURE — 83880 ASSAY OF NATRIURETIC PEPTIDE: CPT | Performed by: INTERNAL MEDICINE

## 2019-05-01 PROCEDURE — 80048 BASIC METABOLIC PNL TOTAL CA: CPT | Performed by: INTERNAL MEDICINE

## 2019-05-01 ASSESSMENT — MIFFLIN-ST. JEOR: SCORE: 1436.14

## 2019-05-01 NOTE — PROGRESS NOTES
[id number]  HPI and Plan:   See dictation    Orders this Visit:  No orders of the defined types were placed in this encounter.    No orders of the defined types were placed in this encounter.    There are no discontinued medications.      No diagnosis found.    CURRENT MEDICATIONS:  Current Outpatient Medications   Medication Sig Dispense Refill     albuterol (VENTOLIN HFA) 108 (90 Base) MCG/ACT inhaler Inhale 2 puffs into the lungs every 6 hours 18 g 3     apixaban ANTICOAGULANT (ELIQUIS) 5 MG tablet Take 1 tablet (5 mg) by mouth 2 times daily 180 tablet 2     Bacillus Coagulans-Inulin (PROBIOTIC-PREBIOTIC PO)        Calcium Citrate-Vitamin D (CITRACAL + D PO) Take 2 tablets by mouth 2 times daily        CENTRUM SILVER OR TABS 1 TABLET DAILY       folic acid (FOLVITE) 1 MG tablet Take 1 tablet (1,000 mcg) by mouth daily 90 tablet 3     furosemide (LASIX) 20 MG tablet One tablet every Tuesday and Thursday 30 tablet 1     InFLIXimab (REMICADE IV) Inject 600 mg into the vein Every 8 weeks       levothyroxine (SYNTHROID/LEVOTHROID) 75 MCG tablet Take 1 tablet (75 mcg) by mouth daily 90 tablet 3     MELATONIN PO Take 5 mg by mouth nightly as needed       Methotrexate Sodium (METHOTREXATE PO) Take 2.5 mg by mouth 10 tablets weekly       metoprolol succinate ER (TOPROL-XL) 25 MG 24 hr tablet Take 1 tablet (25 mg) by mouth daily 90 tablet 2     Multiple Vitamins-Minerals (ICAPS) CAPS Take 1 capsule by mouth 2 times daily        NIFEDICAL XL 60 MG PO TB24 1 TABLET DAILY       omeprazole (PRILOSEC) 20 MG DR capsule Take 1 capsule (20 mg) by mouth 2 times daily Take 30-60 minutes prior to meals 180 capsule 2     simvastatin (ZOCOR) 40 MG tablet Take 1 tablet (40 mg) by mouth At Bedtime 90 tablet 2     furosemide (LASIX) 20 MG tablet Take 20 mg on Tuesdays and Thursdays 30 tablet 3       ALLERGIES     Allergies   Allergen Reactions     No Known Allergies        PAST MEDICAL HISTORY:  Past Medical History:   Diagnosis Date      Amaurosis fugax 5-11    Right     Carotid artery stenosis      Esophageal reflux 3/11/2004     HELICOBACTER PYLORI INFECTION 7/11/2006     hx of CA in situ RT breast-1990.mastectomy 4/17/2007     Hyperlipidemia LDL goal <70 6/20/2011     Lung cancer (H)     squamous cell lung ca left lower lobe     OBESITY NOS 3/11/2004     OSTEOPOROSIS NOS 3/11/2004     Other psoriasis 3/11/2004     RA (rheumatoid arthritis) (H) 2/8/2010     Raynaud's syndrome 4/10/2006     Scleroderma (H)      Sleep apnea     CPAP       PAST SURGICAL HISTORY:  Past Surgical History:   Procedure Laterality Date     BREAST SURGERY       C NONSPECIFIC PROCEDURE  1990    mastectomy RT breast     COLONOSCOPY  5/24/16     CONIZATION       CV RIGHT HEART CATH N/A 4/15/2019    Procedure: Heart Cath Right Heart Cath;  Surgeon: Naresh Cyr MD;  Location:  HEART CARDIAC CATH LAB     ENDARTERECTOMY CAROTID  2011     HC COLONOSCOPY THRU STOMA, DIAGNOSTIC  2001 2011    diverticulosis     REPAIR HAMMER TOE       TONSILLECTOMY         FAMILY HISTORY:  Family History   Problem Relation Age of Onset     Cancer - colorectal Mother      Cerebrovascular Disease Father      Cancer Sister 31        ovarian     Heart Disease Sister      Gastrointestinal Disease Sister         crohns     Gastrointestinal Disease Sister         diverticulitis     Gastrointestinal Disease Brother         diverticulitis     Alzheimer Disease Sister      Cerebrovascular Disease Maternal Grandmother      Diabetes Maternal Grandmother      Mental Illness Maternal Grandmother      Diabetes Maternal Uncle        SOCIAL HISTORY:  Social History     Socioeconomic History     Marital status:      Spouse name: None     Number of children: None     Years of education: None     Highest education level: None   Occupational History     None   Social Needs     Financial resource strain: None     Food insecurity:     Worry: None     Inability: None     Transportation needs:     Medical:  "None     Non-medical: None   Tobacco Use     Smoking status: Former Smoker     Packs/day: 1.00     Years: 30.00     Pack years: 30.00     Types: Cigarettes     Start date:      Last attempt to quit: 3/11/1992     Years since quittin.1     Smokeless tobacco: Never Used   Substance and Sexual Activity     Alcohol use: Not Currently     Alcohol/week: 0.0 oz     Frequency: Never     Drug use: No     Sexual activity: Not Currently     Partners: Male   Lifestyle     Physical activity:     Days per week: None     Minutes per session: None     Stress: None   Relationships     Social connections:     Talks on phone: None     Gets together: None     Attends [employer] service: None     Active member of club or organization: None     Attends meetings of clubs or organizations: None     Relationship status: None     Intimate partner violence:     Fear of current or ex partner: None     Emotionally abused: None     Physically abused: None     Forced sexual activity: None   Other Topics Concern     Parent/sibling w/ CABG, MI or angioplasty before 65F 55M? No   Social History Narrative     None       Review of Systems:  Skin:  Negative     Eyes:  Positive for glasses  ENT:  Negative    Respiratory:  Positive for cough;wheezing;CPAP;sleep apnea;shortness of breath  Cardiovascular:  palpitations;chest pain;dizziness;Negative;cyanosis Positive for;edema;exercise intolerance;fatigue  Gastroenterology: Positive for reflux;constipation  Genitourinary:  Positive for urinary frequency;nocturia  Musculoskeletal:  Positive for back pain;joint pain  Neurologic:  Negative    Psychiatric:  Positive for sleep disturbances  Heme/Lymph/Imm:  Negative    Endocrine:  Positive for thyroid disorder    Physical Exam:  Vitals: /74   Pulse 87   Ht 1.575 m (5' 2\")   Wt 100.8 kg (222 lb 3.2 oz)   SpO2 95%   BMI 40.64 kg/m     Please refer to dictation for physical exam    Recent Lab Results:  LIPID RESULTS:  Lab Results   Component " Value Date    CHOL 139 10/25/2018    HDL 58 10/25/2018    LDL 56 10/25/2018    TRIG 125 10/25/2018    CHOLHDLRATIO 2.6 06/01/2015       LIVER ENZYME RESULTS:  Lab Results   Component Value Date    AST 27 04/02/2019    ALT 36 04/02/2019       CBC RESULTS:  Lab Results   Component Value Date    WBC 6.1 05/01/2019    RBC 3.80 05/01/2019    HGB 12.0 05/01/2019    HCT 36.8 05/01/2019    MCV 97 05/01/2019    MCH 31.6 05/01/2019    MCHC 32.6 05/01/2019    RDW 16.5 (H) 05/01/2019     (L) 05/01/2019       BMP RESULTS:  Lab Results   Component Value Date     05/01/2019    POTASSIUM 4.0 05/01/2019    CHLORIDE 104 05/01/2019    CO2 27 05/01/2019    ANIONGAP 13 05/01/2019    GLC 95 05/01/2019    BUN 19 05/01/2019    CR 0.91 05/01/2019    GFRESTIMATED 60 (L) 05/01/2019    GFRESTBLACK 72 05/01/2019    TU 10.0 05/01/2019        A1C RESULTS:  Lab Results   Component Value Date    A1C 5.1 10/25/2018       INR RESULTS:  Lab Results   Component Value Date    INR 1.05 04/15/2019    INR 0.95 12/04/2015           CC  Carissa Burroughs MD  [address]  [place] [address]

## 2019-05-01 NOTE — LETTER
5/1/2019    Carissa Burroughs MD  [address] [facility] [address]  [place] [address]    RE: Jaqueline Canales       Dear Colleague,    I had the pleasure of seeing Jaqueline SHINE Canales in the [facility].    [id number]  HPI and Plan:   See dictation    Orders this Visit:  No orders of the defined types were placed in this encounter.    No orders of the defined types were placed in this encounter.    There are no discontinued medications.      No diagnosis found.    CURRENT MEDICATIONS:  Current Outpatient Medications   Medication Sig Dispense Refill     albuterol (VENTOLIN HFA) 108 (90 Base) MCG/ACT inhaler Inhale 2 puffs into the lungs every 6 hours 18 g 3     apixaban ANTICOAGULANT (ELIQUIS) 5 MG tablet Take 1 tablet (5 mg) by mouth 2 times daily 180 tablet 2     Bacillus Coagulans-Inulin (PROBIOTIC-PREBIOTIC PO)        Calcium Citrate-Vitamin D (CITRACAL + D PO) Take 2 tablets by mouth 2 times daily        CENTRUM SILVER OR TABS 1 TABLET DAILY       folic acid (FOLVITE) 1 MG tablet Take 1 tablet (1,000 mcg) by mouth daily 90 tablet 3     furosemide (LASIX) 20 MG tablet One tablet every Tuesday and Thursday 30 tablet 1     InFLIXimab (REMICADE IV) Inject 600 mg into the vein Every 8 weeks       levothyroxine (SYNTHROID/LEVOTHROID) 75 MCG tablet Take 1 tablet (75 mcg) by mouth daily 90 tablet 3     MELATONIN PO Take 5 mg by mouth nightly as needed       Methotrexate Sodium (METHOTREXATE PO) Take 2.5 mg by mouth 10 tablets weekly       metoprolol succinate ER (TOPROL-XL) 25 MG 24 hr tablet Take 1 tablet (25 mg) by mouth daily 90 tablet 2     Multiple Vitamins-Minerals (ICAPS) CAPS Take 1 capsule by mouth 2 times daily        NIFEDICAL XL 60 MG PO TB24 1 TABLET DAILY       omeprazole (PRILOSEC) 20 MG DR capsule Take 1 capsule (20 mg) by mouth 2 times daily Take 30-60 minutes prior to meals 180 capsule 2     simvastatin (ZOCOR) 40 MG tablet Take 1 tablet (40 mg) by mouth At Bedtime 90 tablet 2     furosemide  (LASIX) 20 MG tablet Take 20 mg on Tuesdays and Thursdays 30 tablet 3       ALLERGIES     Allergies   Allergen Reactions     No Known Allergies        PAST MEDICAL HISTORY:  Past Medical History:   Diagnosis Date     Amaurosis fugax 5-11    Right     Carotid artery stenosis      Esophageal reflux 3/11/2004     HELICOBACTER PYLORI INFECTION 7/11/2006     hx of CA in situ RT breast-1990.mastectomy 4/17/2007     Hyperlipidemia LDL goal <70 6/20/2011     Lung cancer (H)     squamous cell lung ca left lower lobe     OBESITY NOS 3/11/2004     OSTEOPOROSIS NOS 3/11/2004     Other psoriasis 3/11/2004     RA (rheumatoid arthritis) (H) 2/8/2010     Raynaud's syndrome 4/10/2006     Scleroderma (H)      Sleep apnea     CPAP       PAST SURGICAL HISTORY:  Past Surgical History:   Procedure Laterality Date     BREAST SURGERY       C NONSPECIFIC PROCEDURE  1990    mastectomy RT breast     COLONOSCOPY  5/24/16     CONIZATION       CV RIGHT HEART CATH N/A 4/15/2019    Procedure: Heart Cath Right Heart Cath;  Surgeon: Naresh Cyr MD;  Location:  HEART CARDIAC CATH LAB     ENDARTERECTOMY CAROTID 2011     HC COLONOSCOPY THRU STOMA, DIAGNOSTIC  2001 2011    diverticulosis     REPAIR HAMMER TOE       TONSILLECTOMY         FAMILY HISTORY:  Family History   Problem Relation Age of Onset     Cancer - colorectal Mother      Cerebrovascular Disease Father      Cancer Sister 31        ovarian     Heart Disease Sister      Gastrointestinal Disease Sister         crohns     Gastrointestinal Disease Sister         diverticulitis     Gastrointestinal Disease Brother         diverticulitis     Alzheimer Disease Sister      Cerebrovascular Disease Maternal Grandmother      Diabetes Maternal Grandmother      Mental Illness Maternal Grandmother      Diabetes Maternal Uncle        SOCIAL HISTORY:  Social History     Socioeconomic History     Marital status:      Spouse name: None     Number of children: None     Years of education:  None     Highest education level: None   Occupational History     None   Social Needs     Financial resource strain: None     Food insecurity:     Worry: None     Inability: None     Transportation needs:     Medical: None     Non-medical: None   Tobacco Use     Smoking status: Former Smoker     Packs/day: 1.00     Years: 30.00     Pack years: 30.00     Types: Cigarettes     Start date:      Last attempt to quit: 3/11/1992     Years since quittin.1     Smokeless tobacco: Never Used   Substance and Sexual Activity     Alcohol use: Not Currently     Alcohol/week: 0.0 oz     Frequency: Never     Drug use: No     Sexual activity: Not Currently     Partners: Male   Lifestyle     Physical activity:     Days per week: None     Minutes per session: None     Stress: None   Relationships     Social connections:     Talks on phone: None     Gets together: None     Attends Taoism service: None     Active member of club or organization: None     Attends meetings of clubs or organizations: None     Relationship status: None     Intimate partner violence:     Fear of current or ex partner: None     Emotionally abused: None     Physically abused: None     Forced sexual activity: None   Other Topics Concern     Parent/sibling w/ CABG, MI or angioplasty before 65F 55M? No   Social History Narrative     None       Review of Systems:  Skin:  Negative     Eyes:  Positive for glasses  ENT:  Negative    Respiratory:  Positive for cough;wheezing;CPAP;sleep apnea;shortness of breath  Cardiovascular:  palpitations;chest pain;dizziness;Negative;cyanosis Positive for;edema;exercise intolerance;fatigue  Gastroenterology: Positive for reflux;constipation  Genitourinary:  Positive for urinary frequency;nocturia  Musculoskeletal:  Positive for back pain;joint pain  Neurologic:  Negative    Psychiatric:  Positive for sleep disturbances  Heme/Lymph/Imm:  Negative    Endocrine:  Positive for thyroid disorder    Physical Exam:  Vitals: BP  "116/74   Pulse 87   Ht 1.575 m (5' 2\")   Wt 100.8 kg (222 lb 3.2 oz)   SpO2 95%   BMI 40.64 kg/m      Please refer to dictation for physical exam    Recent Lab Results:  LIPID RESULTS:  Lab Results   Component Value Date    CHOL 139 10/25/2018    HDL 58 10/25/2018    LDL 56 10/25/2018    TRIG 125 10/25/2018    CHOLHDLRATIO 2.6 06/01/2015       LIVER ENZYME RESULTS:  Lab Results   Component Value Date    AST 27 04/02/2019    ALT 36 04/02/2019       CBC RESULTS:  Lab Results   Component Value Date    WBC 6.1 05/01/2019    RBC 3.80 05/01/2019    HGB 12.0 05/01/2019    HCT 36.8 05/01/2019    MCV 97 05/01/2019    MCH 31.6 05/01/2019    MCHC 32.6 05/01/2019    RDW 16.5 (H) 05/01/2019     (L) 05/01/2019       BMP RESULTS:  Lab Results   Component Value Date     05/01/2019    POTASSIUM 4.0 05/01/2019    CHLORIDE 104 05/01/2019    CO2 27 05/01/2019    ANIONGAP 13 05/01/2019    GLC 95 05/01/2019    BUN 19 05/01/2019    CR 0.91 05/01/2019    GFRESTIMATED 60 (L) 05/01/2019    GFRESTBLACK 72 05/01/2019    TU 10.0 05/01/2019        A1C RESULTS:  Lab Results   Component Value Date    A1C 5.1 10/25/2018       INR RESULTS:  Lab Results   Component Value Date    INR 1.05 04/15/2019    INR 0.95 12/04/2015           CC  Carissa Burroughs MD  [address]  [place] [address]        Thank you for allowing me to participate in the care of your patient.      Sincerely,     JOLANTA FrancoC     [facility]    cc:   Carissa Burroughs MD  [address]  [place] [id number]        "

## 2019-05-01 NOTE — LETTER
5/1/2019      Carissa Burroughs MD  [address] [facility] [address]  [facility] [address]      RE: Jaqueline Canales       Dear Colleague,    I had the pleasure of seeing Jaqueline Canales in the [facility].    Service Date: 05/01/2019      PRIMARY CARDIOLOGIST:  Shay Marquez MD      HISTORY OF PRESENT ILLNESS:  Ms. Canales is a delightful 78-year-old woman who follows with Dr. Marquez for pulmonary hypertension.  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  She was seen by Dr. Marquez in April and at that point referred for right heart cath.  This demonstrated an RA of 16, RV of 52/15, a PA of 51/26/35, wedge of 18.  With administration of nitric oxide, her wedge was 22.  Her PA was 52/33/36.  She was suggested to start on Lasix, and this was initiated at 20 mg Tuesdays and Thursdays.      She comes in today stating she feels the same.  She is not short of breath getting dressed or taking a shower, but she is short of breath walking any distance, anywhere along 40-50 feet is too much.  She denies orthopnea or PND.  She has peripheral edema in her legs; this does not seem better either.  She has not had any syncope or presyncope.  Overall, she feels like she is stable, has not worsened nor has improved.      SOCIAL HISTORY:  She comes in today with her niece, Shruthi.  She says she does not add much salt to her food, but she does not particularly read labels.  She does not add as much as her .  She is a former smoker, about a 30-pack-year history.  No alcohol use.      PHYSICAL EXAMINATION:   GENERAL:  Well-developed, well-nourished, obese woman in no acute distress.   HEENT:  Normocephalic, atraumatic.   HEART:  Irregularly irregular.  I do not appreciate  murmur, rub or gallop.   RESPIRATORY:  Lungs are clear without wheezes, rales or rhonchi.   EXTREMITIES:  With 1+ pitting edema to the thigh with Velcro wraps in place.  I do not appreciate any JVP at 90 degrees.   MUSCULOSKELETAL:  She walks with a cane slowly.  I did not ask her to get on exam table.      LABORATORY DATA:  Labs done today show creatinine of 0.91, BUN 19, potassium 4.0, sodium 140.  Hemoglobin 12.0, platelets 130.  N-terminal proBNP is 2900.      ASSESSMENT AND PLAN:  This is a pleasant 78-year-old woman with interstitial lung disease, rheumatoid arthritis, left lower lobe lung cancer with radiation, breast cancer and elevated pulmonary pressures, who was recently sent for right heart cath.  Diuretics were added at 20 mg twice a week and she has had no change in her symptoms or her weight.  Her labs today are stable.  I will defer to Dr. Marquez for possible admission of vasodilators as that is outside my area of expertise.  I did see an opportunity, though, to provide some sodium education.  She is clearly eating much more than 2000 mg a day, and I asked her to head that direction.  She is willing to consider this and education was given.  Her access site is clean and dry and I will defer the remainder of her care to Dr. Marquez and his team.      Thank you for allowing me to participate in this delightful patient's care.      Janell Suazo PA-C      cc:   Carissa Burroughs MD   [facility]   [address]   [place] [address]      Natalie Bustillos MD   [facility]   [address]   [place] [address]      Shay Marquez MD   [facility] [address]         JANELL SUAZO PA-C             D: 2019   T: 2019   MT: al      Name:     GNIA MCKEON   MRN:      [id number]        Account:      [id number]   :      1940           Service Date: 2019      Document: [id number]         Outpatient Encounter Medications as of 2019    Medication Sig Dispense Refill     albuterol (VENTOLIN HFA) 108 (90 Base) MCG/ACT inhaler Inhale 2 puffs into the lungs every 6 hours 18 g 3     apixaban ANTICOAGULANT (ELIQUIS) 5 MG tablet Take 1 tablet (5 mg) by mouth 2 times daily 180 tablet 2     Bacillus Coagulans-Inulin (PROBIOTIC-PREBIOTIC PO)        Calcium Citrate-Vitamin D (CITRACAL + D PO) Take 2 tablets by mouth 2 times daily        CENTRUM SILVER OR TABS 1 TABLET DAILY       folic acid (FOLVITE) 1 MG tablet Take 1 tablet (1,000 mcg) by mouth daily 90 tablet 3     furosemide (LASIX) 20 MG tablet One tablet every Tuesday and Thursday 30 tablet 1     InFLIXimab (REMICADE IV) Inject 600 mg into the vein Every 8 weeks       levothyroxine (SYNTHROID/LEVOTHROID) 75 MCG tablet Take 1 tablet (75 mcg) by mouth daily 90 tablet 3     MELATONIN PO Take 5 mg by mouth nightly as needed       Methotrexate Sodium (METHOTREXATE PO) Take 2.5 mg by mouth 10 tablets weekly       metoprolol succinate ER (TOPROL-XL) 25 MG 24 hr tablet Take 1 tablet (25 mg) by mouth daily 90 tablet 2     Multiple Vitamins-Minerals (ICAPS) CAPS Take 1 capsule by mouth 2 times daily        NIFEDICAL XL 60 MG PO TB24 1 TABLET DAILY       omeprazole (PRILOSEC) 20 MG DR capsule Take 1 capsule (20 mg) by mouth 2 times daily Take 30-60 minutes prior to meals 180 capsule 2     simvastatin (ZOCOR) 40 MG tablet Take 1 tablet (40 mg) by mouth At Bedtime 90 tablet 2     furosemide (LASIX) 20 MG tablet Take 20 mg on Tuesdays and Thursdays 30 tablet 3     No facility-administered encounter medications on file as of 5/1/2019.        Again, thank you for allowing me to participate in the care of your patient.      Sincerely,    Janell Charles PA-C     [facility]

## 2019-05-01 NOTE — PROGRESS NOTES
Service Date: 05/01/2019      PRIMARY CARDIOLOGIST:  Shay Marquez MD      HISTORY OF PRESENT ILLNESS:  Ms. Canales is a delightful 78-year-old woman who follows with Dr. Marquez for pulmonary hypertension.  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  She was seen by Dr. Marquez in April and at that point referred for right heart cath.  This demonstrated an RA of 16, RV of 52/15, a PA of 51/26/35, wedge of 18.  With administration of nitric oxide, her wedge was 22.  Her PA was 52/33/36.  She was suggested to start on Lasix, and this was initiated at 20 mg Tuesdays and Thursdays.      She comes in today stating she feels the same.  She is not short of breath getting dressed or taking a shower, but she is short of breath walking any distance, anywhere along 40-50 feet is too much.  She denies orthopnea or PND.  She has peripheral edema in her legs; this does not seem better either.  She has not had any syncope or presyncope.  Overall, she feels like she is stable, has not worsened nor has improved.      SOCIAL HISTORY:  She comes in today with her niece, Shruthi.  She says she does not add much salt to her food, but she does not particularly read labels.  She does not add as much as her .  She is a former smoker, about a 30-pack-year history.  No alcohol use.      PHYSICAL EXAMINATION:   GENERAL:  Well-developed, well-nourished, obese woman in no acute distress.   HEENT:  Normocephalic, atraumatic.   HEART:  Irregularly irregular.  I do not appreciate murmur, rub or gallop.   RESPIRATORY:  Lungs are clear without wheezes, rales or rhonchi.   EXTREMITIES:  With 1+ pitting edema to the thigh with Velcro wraps in place.  I do not appreciate any JVP at 90 degrees.   MUSCULOSKELETAL:   She walks with a cane slowly.  I did not ask her to get on exam table.      LABORATORY DATA:  Labs done today show creatinine of 0.91, BUN 19, potassium 4.0, sodium 140.  Hemoglobin 12.0, platelets 130.  N-terminal proBNP is 2900.      ASSESSMENT AND PLAN:  This is a pleasant 78-year-old woman with interstitial lung disease, rheumatoid arthritis, left lower lobe lung cancer with radiation, breast cancer and elevated pulmonary pressures, who was recently sent for right heart cath.  Diuretics were added at 20 mg twice a week and she has had no change in her symptoms or her weight.  Her labs today are stable.  I will defer to Dr. Marquez for possible admission of vasodilators as that is outside my area of expertise.  I did see an opportunity, though, to provide some sodium education.  She is clearly eating much more than 2000 mg a day, and I asked her to head that direction.  She is willing to consider this and education was given.  Her access site is clean and dry and I will defer the remainder of her care to Dr. Marquez and his team.      Thank you for allowing me to participate in this delightful patient's care.      Janell Suazo PA-C      cc:   Carissa Burroughs MD   [facility]   [address]   [place] [address]      Natalie Bustillos MD   [facility]   [address]   [place] [address]      Shay Marquez MD   [facility] [address]         JANELL SUAZO PA-C             D: 2019   T: 2019   MT: al      Name:     GINA MCKEON   MRN:      [id number]        Account:      [id number]   :      1940           Service Date: 2019      Document: [id number]

## 2019-05-01 NOTE — PATIENT INSTRUCTIONS
Thanks for coming into [facility] today.    We discussed: start looking at labels and trying to eat less salt.  Please try to stay less than 2,000 mg of salt a day.    You can use Mrs. Foster.      Medication changes:  Continue current medications.      We'll draw blood today.      Follow up: as scheduled with Dr. Barone.        Please call Dr. Barone's nurse with any questions or concerns.    Reminder: Please bring in all current medications, over the counter supplements and vitamin bottles to your next appointment.

## 2019-05-12 NOTE — TELEPHONE ENCOUNTER
Refills remaining on 11/28/16 script sent to same requesting pharmacy  Sent denial to pharmacy that they should have refills remaining.     Silvana Rodrigues RN     [facility]

## 2019-05-13 ENCOUNTER — HOSPITAL ENCOUNTER (OUTPATIENT)
Dept: CT IMAGING | Facility: CLINIC | Age: 79
Discharge: HOME | End: 2019-05-13
Attending: INTERNAL MEDICINE | Admitting: INTERNAL MEDICINE
Payer: MEDICARE

## 2019-05-13 DIAGNOSIS — C34.32 MALIGNANT NEOPLASM OF LOWER LOBE OF LEFT LUNG (H): ICD-10-CM

## 2019-05-13 PROCEDURE — 71250 CT THORAX DX C-: CPT

## 2019-05-15 ENCOUNTER — ONCOLOGY VISIT (OUTPATIENT)
Dept: ONCOLOGY | Facility: CLINIC | Age: 79
End: 2019-05-15
Attending: INTERNAL MEDICINE
Payer: MEDICARE

## 2019-05-15 VITALS
WEIGHT: 222.8 LBS | BODY MASS INDEX: 41 KG/M2 | HEIGHT: 62 IN | SYSTOLIC BLOOD PRESSURE: 108 MMHG | HEART RATE: 76 BPM | OXYGEN SATURATION: 95 % | TEMPERATURE: 97.7 F | DIASTOLIC BLOOD PRESSURE: 69 MMHG

## 2019-05-15 DIAGNOSIS — C34.32 MALIGNANT NEOPLASM OF LOWER LOBE OF LEFT LUNG (H): Primary | ICD-10-CM

## 2019-05-15 PROCEDURE — G0463 HOSPITAL OUTPT CLINIC VISIT: HCPCS

## 2019-05-15 PROCEDURE — 99213 OFFICE O/P EST LOW 20 MIN: CPT | Performed by: INTERNAL MEDICINE

## 2019-05-15 ASSESSMENT — PAIN SCALES - GENERAL: PAINLEVEL: NO PAIN (0)

## 2019-05-15 ASSESSMENT — MIFFLIN-ST. JEOR: SCORE: 1438.86

## 2019-05-15 NOTE — LETTER
"    5/15/2019         RE: Jaqueline Canales  [address] [facility] [address]        Dear Colleague,    Thank you for referring your patient, Jaqueline Canales, to the [facility]. Please see a copy of my visit note below.    [facility]  HEMATOLOGY ONCOLOGY     DIAGNOSES:     1.  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 The patient had a left lower lobe nodule 11/2015.  It was biopsied 12/04/2015, indicated a poorly differentiated squamous cell carcinoma.  PET CT scan 12/14/2015 hypermetabolic nodular lesion in the left lower lobe.     2.  History of breast cancer in 1990, treated with right-sided mastectomy.   3.  Interstitial lung disease.   4.  Rheumatoid arthritis.       TREATMENT:  S/P SBRT for lung cancer. Completed 02/2016.      SUBJECTIVE:  The patient comes in for followup today. She has been at her baseline. No new symptoms.     REVIEW OF SYSTEMS:  A complete review of systems was performed and found to be negative other than pertinent positives mentioned in history of present illness.     Past medical, social histories reviewed.    Meds- Reviewed.     PHYSICAL EXAMINATION:   VITAL SIGNS:/69   Pulse 76   Temp 97.7  F (36.5  C) (Oral)   Ht 1.575 m (5' 2\")   Wt 101.1 kg (222 lb 12.8 oz)   SpO2 95%   BMI 40.75 kg/m     CONSTITUTIONAL: Sitting comfortably.   HEENT: Pupils are equal. Oropharynx is clear.   NECK: No cervical or supraclavicular lymphadenopathy.   RESPIRATORY: Clear bilaterally.   CARD/VASC: S1, S2, regular.   GI: Soft, nontender, nondistended, no hepatosplenomegaly.   MUSKULOSKELETAL: Warm, well perfused.   NEUROLOGIC: Alert, awake.   INTEGUMENT: No rash.   LYMPHATICS:Bilteral edema.   PSYCH: Mood and affect was normal.    LABORATORY DATA AND IMAGING REVIEWED DURING THIS VISIT:  Results for orders placed or performed during the hospital encounter of 05/13/19   CT Chest w/o contrast    Narrative    CT CHEST " WITHOUT CONTRAST  5/13/2019 10:56 AM     HISTORY: Follow up malignant neoplasm of lower lobe of left lung (H).    COMPARISON: November 12, 2018    TECHNIQUE: Volumetric helical acquisition of CT images of the chest  from the clavicles to the kidneys were acquired without IV contrast.  Radiation dose for this scan was reduced using automated exposure  control, adjustment of the mA and/or kV according to patient size, or  iterative reconstruction technique.    FINDINGS:  Moderate to severe peripheral and basilar fibrosis with  honeycombing and traction bronchiectasis compatible with usual  interstitial pneumonitis. Findings are slightly worse than previous.  Stable perihilar density on the left. Moderate to large hiatal hernia.  Cardiomegaly. There are moderate coronary vascular calcifications  consistent with coronary artery disease. There are moderate  atherosclerotic changes of the visualized aorta and its branches.  There is no evidence of aortic aneurysm. Survey of the visualized bony  structures demonstrates no destructive bony lesions. The visualized  lung bases are unremarkable.      Impression    IMPRESSION:  1. Slight interval progression in peripheral and basilar fibrosis  compatible with usual interstitial pneumonitis.  2. Stable perihilar density on the left presumably related to  radiation change.    ARELI RICKETTS MD       ECOG PS: 1    ASSESSMENT:  A 78-year-old lady with squamous cell carcinoma of the lung which was clinically T1 N0 MX.  She had a 1.7 to 1.1 cm left lower lobe lesion which was FDG avid, multiple non-FDG avid mediastinal lymph nodes, prevascular, precarinal and subcarinal which appeared to be stable.  There was FDG activity in tonsil and neck lymph nodes which were evaluated by Dr. Trevor Murdock of ENT and this was considered to be a physiological uptake.  Given her history of interstitial lung disease she was referred to Radiation Oncology for SBRT of this lung lesion.  Her MRI of brain  was negative for metastases.   She is on surveillance.     - CT scan 11/12/18 results were reviewed with the patient. No evidence of recurrence, she has UIP and follows up with pulmonology.   - I will repeat a scan in 6 months.      PLAN:   1.  RTC MD in 6 months  2.  CT scan chest without contrast before next visit    ANABELL PAIZ MD    5/15/2019             Again, thank you for allowing me to participate in the care of your patient.        Sincerely,        Anabell Paiz MD

## 2019-05-15 NOTE — PROGRESS NOTES
"[facility]  HEMATOLOGY ONCOLOGY     DIAGNOSES:     1.  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 The patient had a left lower lobe nodule 11/2015.  It was biopsied 12/04/2015, indicated a poorly differentiated squamous cell carcinoma.  PET CT scan 12/14/2015 hypermetabolic nodular lesion in the left lower lobe.     2.  History of breast cancer in 1990, treated with right-sided mastectomy.   3.  Interstitial lung disease.   4.  Rheumatoid arthritis.       TREATMENT:  S/P SBRT for lung cancer. Completed 02/2016.      SUBJECTIVE:  The patient comes in for followup today. She has been at her baseline. No new symptoms.     REVIEW OF SYSTEMS:  A complete review of systems was performed and found to be negative other than pertinent positives mentioned in history of present illness.     Past medical, social histories reviewed.    Meds- Reviewed.     PHYSICAL EXAMINATION:   VITAL SIGNS:/69   Pulse 76   Temp 97.7  F (36.5  C) (Oral)   Ht 1.575 m (5' 2\")   Wt 101.1 kg (222 lb 12.8 oz)   SpO2 95%   BMI 40.75 kg/m    CONSTITUTIONAL: Sitting comfortably.   HEENT: Pupils are equal. Oropharynx is clear.   NECK: No cervical or supraclavicular lymphadenopathy.   RESPIRATORY: Clear bilaterally.   CARD/VASC: S1, S2, regular.   GI: Soft, nontender, nondistended, no hepatosplenomegaly.   MUSKULOSKELETAL: Warm, well perfused.   NEUROLOGIC: Alert, awake.   INTEGUMENT: No rash.   LYMPHATICS:Bilteral edema.   PSYCH: Mood and affect was normal.    LABORATORY DATA AND IMAGING REVIEWED DURING THIS VISIT:  Results for orders placed or performed during the hospital encounter of 05/13/19   CT Chest w/o contrast    Narrative    CT CHEST WITHOUT CONTRAST  5/13/2019 10:56 AM     HISTORY: Follow up malignant neoplasm of lower lobe of left lung (H).    COMPARISON: November 12, 2018    TECHNIQUE: Volumetric helical acquisition of CT images of the chest  from the clavicles to the " kidneys were acquired without IV contrast.  Radiation dose for this scan was reduced using automated exposure  control, adjustment of the mA and/or kV according to patient size, or  iterative reconstruction technique.    FINDINGS:  Moderate to severe peripheral and basilar fibrosis with  honeycombing and traction bronchiectasis compatible with usual  interstitial pneumonitis. Findings are slightly worse than previous.  Stable perihilar density on the left. Moderate to large hiatal hernia.  Cardiomegaly. There are moderate coronary vascular calcifications  consistent with coronary artery disease. There are moderate  atherosclerotic changes of the visualized aorta and its branches.  There is no evidence of aortic aneurysm. Survey of the visualized bony  structures demonstrates no destructive bony lesions. The visualized  lung bases are unremarkable.      Impression    IMPRESSION:  1. Slight interval progression in peripheral and basilar fibrosis  compatible with usual interstitial pneumonitis.  2. Stable perihilar density on the left presumably related to  radiation change.    ARELI RICKETTS MD       ECOG PS: 1    ASSESSMENT:  A 78-year-old lady with squamous cell carcinoma of the lung which was clinically T1 N0 MX.  She had a 1.7 to 1.1 cm left lower lobe lesion which was FDG avid, multiple non-FDG avid mediastinal lymph nodes, prevascular, precarinal and subcarinal which appeared to be stable.  There was FDG activity in tonsil and neck lymph nodes which were evaluated by Dr. Trevor Murdock of ENT and this was considered to be a physiological uptake.  Given her history of interstitial lung disease she was referred to Radiation Oncology for SBRT of this lung lesion.  Her MRI of brain was negative for metastases.   She is on surveillance.     - CT scan 11/12/18 results were reviewed with the patient. No evidence of recurrence, she has UIP and follows up with pulmonology.   - I will repeat a scan in 6 months.      PLAN:    1.  RTC MD in 6 months  2.  CT scan chest without contrast before next visit    CHESTER DELATORRE MD    5/15/2019

## 2019-05-19 NOTE — PROGRESS NOTES
Natalie Bustillos M.D.  Arthritis and Rheumatolotgy    Jeffy Burroughs M.D      The patient has scleroderma and ILD and was incidentally found to have SCC of left lower lobe that was treated with radiation therapy.  She is clinically stable and has no interim history of right heart failure.  The most recent echocardiogram discloses elevated, estimated PA pressure.  She has no significant functional limitation at this time.  We discussed the nature of right heart catherization and medication for PH; and for the moment, in the absence of symptoms, she wishes to defer diagnostic and therapeutic intervention.      Wt Readings from Last 24 Encounters:   05/21/19 100.7 kg (222 lb)   05/15/19 101.1 kg (222 lb 12.8 oz)   05/01/19 100.8 kg (222 lb 3.2 oz)   04/16/19 101.7 kg (224 lb 1.6 oz)   04/15/19 101.6 kg (224 lb)   04/02/19 99.8 kg (220 lb)   02/13/19 99.8 kg (220 lb)   01/24/19 99.8 kg (220 lb)   01/15/19 98.9 kg (218 lb)   11/15/18 98.9 kg (218 lb)   10/08/18 99.3 kg (219 lb)   09/13/18 100.2 kg (221 lb)   05/30/18 100.2 kg (221 lb)   05/23/18 100.6 kg (221 lb 12.8 oz)   01/31/18 105.3 kg (232 lb 3.2 oz)   11/15/17 104.3 kg (230 lb)   11/07/17 103.9 kg (229 lb)   10/19/17 107.8 kg (237 lb 9.6 oz)   10/06/17 105.2 kg (232 lb)   08/09/17 106.1 kg (234 lb)   07/13/17 106.1 kg (234 lb)   04/13/17 106.4 kg (234 lb 9.6 oz)   02/13/17 99.8 kg (220 lb)   02/08/17 103.4 kg (228 lb)       Current Outpatient Medications   Medication     albuterol (VENTOLIN HFA) 108 (90 Base) MCG/ACT inhaler     apixaban ANTICOAGULANT (ELIQUIS) 5 MG tablet     Bacillus Coagulans-Inulin (PROBIOTIC-PREBIOTIC PO)     Calcium Citrate-Vitamin D (CITRACAL + D PO)     CENTRUM SILVER OR TABS     folic acid (FOLVITE) 1 MG tablet     furosemide (LASIX) 20 MG tablet     InFLIXimab (REMICADE IV)     levothyroxine (SYNTHROID/LEVOTHROID) 75 MCG tablet     MELATONIN PO     Methotrexate Sodium (METHOTREXATE PO)     metoprolol succinate ER (TOPROL-XL) 25 MG 24 hr  tablet     Multiple Vitamins-Minerals (ICAPS) CAPS     NIFEDICAL XL 60 MG PO TB24     omeprazole (PRILOSEC) 20 MG DR capsule     simvastatin (ZOCOR) 40 MG tablet     No current facility-administered medications for this visit.      Results for GINA MCKEON (MRN [id number]) as of 5/21/2019 11:41   Ref. Range 5/1/2019 11:15 5/13/2019 10:56 5/21/2019 09:59   Sodium Latest Ref Range: 136 - 145 mmol/L 140  140   Potassium Latest Ref Range: 3.5 - 5.1 mmol/L 4.0  4.3   Chloride Latest Ref Range: 98 - 107 mmol/L 104  104   Carbon Dioxide Latest Ref Range: 23 - 29 mmol/L 27  27   Urea Nitrogen Latest Ref Range: 7 - 30 mg/dL 19  21   Creatinine Latest Ref Range: 0.70 - 1.30 mg/dL 0.91  0.95   GFR Estimate Latest Ref Range: >60 mL/min/1.73_m2 60 (L)  57 (L)   GFR Estimate If Black Latest Ref Range: >60 mL/min/1.73_m2 72  69   Calcium Latest Ref Range: 8.5 - 10.5 mg/dL 10.0  10.0   Anion Gap Latest Ref Range: 6 - 17 mmol/L 13  13.3   N-Terminal Pro Bnp Latest Ref Range: 0 - 450 pg/mL 2,900 (H)     Glucose Latest Ref Range: 70 - 105 mg/dL 95  97   WBC Latest Ref Range: 4.0 - 11.0 10e9/L 6.1     Hemoglobin Latest Ref Range: 11.7 - 15.7 g/dL 12.0     Hematocrit Latest Ref Range: 35.0 - 47.0 % 36.8     Platelet Count Latest Ref Range: 150 - 450 10e9/L 130 (L)     RBC Count Latest Ref Range: 3.8 - 5.2 10e12/L 3.80     MCV Latest Ref Range: 78 - 100 fl 97     MCH Latest Ref Range: 26.5 - 33.0 pg 31.6     MCHC Latest Ref Range: 31.5 - 36.5 g/dL 32.6     RDW Latest Ref Range: 10.0 - 15.0 % 16.5 (H)     Diff Method Unknown Automated Method     % Neutrophils Latest Units: % 71.3     % Lymphocytes Latest Units: % 19.5     % Monocytes Latest Units: % 6.4     % Eosinophils Latest Units: % 2.0     % Basophils Latest Units: % 0.5     % Immature Granulocytes Latest Units: % 0.3     Nucleated RBCs Latest Ref Range: 0 /100 0     Absolute Neutrophil Latest Ref Range: 1.6 - 8.3 10e9/L 4.4     Absolute Lymphocytes Latest Ref Range: 0.8 - 5.3  10e9/L 1.2     Absolute Monocytes Latest Ref Range: 0.0 - 1.3 10e9/L 0.4     Absolute Eosinophils Latest Ref Range: 0.0 - 0.7 10e9/L 0.1     Absolute Basophils Latest Ref Range: 0.0 - 0.2 10e9/L 0.0     Abs Immature Granulocytes Latest Ref Range: 0 - 0.4 10e9/L 0.0     Absolute Nucleated RBC Unknown 0.0     CT CHEST W/O CONTRAST Unknown  Rpt        REVIEW of SYMPTOMS    Constitutional: without fever, chills, night sweats.  Weight is ___  HEENT: without dry eyes, dry mouth, sinusitis, corryza, visual changes  Endocrine: without polyuria, polydypsia, polyphagia, heat or cold intolerance, changing mental status  Cardiology: without chest pain, tightness, heaviness, pressure, paroxysmal dyspnea, orthopnea, palpitation, pre-syncope or syncope or device discharge (if present)  Pulmonary: without asthma, wheezing, cough, hemoptysis  GI: without nausea, emesis, jaundice, pain, hematemesis, melena  : without frequency, urgency, hematuria, stones, pain, abnormal bleeding, frequency, urgency  Neurologic: without TIA, CVA, trauma, seizure  Dermatologic: without lesions, abrasion rash,   Orthopedic/Rheum: without significant joint pain, impairment, limb, polyserositis, ulceration, Raynauds  Heme: without mass, bruising, frequent infection, anemia  Psychiatric: without substance abuse, hallucination, medication, depression    Exercise tolerance:    PERRLA, EOM full, normal gait and station, normal mentation, skin without lesions, chest is clear, no evidence of right or left ventricular overactivity, sternum stable, no carotid bruits, regular rhythm, S1, S2, no murmurs, abdomen without tenderness, rebound guarding or masses, no edema, no focal neurologic      Conclusion     1) Elevated biventricular filling pressures   2) Moderate to severe mostly primary pulmonary hypertension  3) Improvement in hemodynamics with nitric oxide; however, with no improvement in mean PA pressure       Plan     1) Suggest initiation of diuretic  2)  Consider initiation of Sildenafil   3) Discussed with Dr. Marquez - he will visit the patient in clinic tomorrow   CV Right/Left Heart Cath     Right Heart Pressures     Systemic blood pressure 140-150 throughout the case.    BASELINE:  1) Elevated right-sided filling pressures (mean RA 16 mmHg)  2) Moderate to severe pulmonary hypertension (mean PA 35 mmHg) secondary to intrinsic pulmonary vascular disease (PVRI 14 rod*m2) and elevated left-sided filling pressures (PCWP 20 mmHg).  3) O2 sats at baseline 93%  4) Mildly depressed CO/CI: 3.8/1.9    NO 80 PPM:  1) Pulmonary artery pressures did not change (mean PA 34 mmHg)  2) O2 sats improved to 100%  3) CO/CI improved 4.2/2.1 Right sided filling pressures are severely elevated.Left sided filling pressures are moderately elevated. Severely elevated pulmonary artery hypertension.Left ventricular filling pressures are moderately elevated .Normal cardiac output level.   Pressures      Time Systolic Diastolic Mean A Wave V Wave EDP Max dp/dt HR   RA Pressures  9:01 AM   16 mmHg    14 mmHg    22 mmHg      87 bpm      RV Pressures  9:01 AM 52 mmHg    5 mmHg       15 mmHg     86 bpm      PA Pressures  9:02 AM 51 mmHg    26 mmHg    35 mmHg        76 bpm      PCW Pressures  9:02 AM   18 mmHg    23 mmHg    24 mmHg      72 bpm      AO Pressures  9:03  mmHg    87 mmHg    111 mmHg        82 bpm      Pressures Phase: Drug Study Level 1      Time Systolic Diastolic Mean A Wave V Wave EDP Max dp/dt HR   PA Pressures  9:11 AM 48 mmHg    26 mmHg    34 mmHg        77 bpm      Pressures Phase: Drug Study Level 2      Time Systolic Diastolic Mean A Wave V Wave EDP Max dp/dt HR   PA Pressures  9:14 AM 52 mmHg    33 mmHg    36 mmHg        84 bpm      PCW Pressures  9:14 AM   22 mmHg    26 mmHg    23 mmHg      79 bpm      AO Pressures  9:11  mmHg    67 mmHg    97 mmHg        80 bpm      Blood Flow Results Phase: Drug Study Level 2      Time Results Indexed Values   QP  9:11 AM  4.19 L/min    2.1 L/min/m2      QS  9:11 AM 4.19 L/min    2.1 L/min/m2      Blood Oximetry Phase: Drug Study Level 2      Time Hb SAT(%) PO2 Content PA Sat   Art  9:11 AM  100 %     18.22 mL/dL       Cardiac Output Phase: Drug Study Level 2      Time TDCO TDCI Josh C.O. Josh C.I. Josh HR   Cardiac Output Results  9:11 AM   4.19 L/min    2.1 L/min/m2       Resistance Results Phase: Drug Study Level 2      Time PVR SVR PVR-I SVR-I TPR TVR TPR-I TVR-I PVR/SVR TPR/TVR   Resistance Results (Metric)  9:11 .55 dsc-5     534.11 dsc-5/m2     687.98 dsc-5    1853.72 dsc-5    1373.43 dsc-5/m2    3700.63 dsc-5/m2     0.37      Resistance Results (Wood)  9:11 AM 3.35 ROQUE     6.68 ROQUE/m2     8.6 ROQUE    23.18 ROQUE    17.17                1/15/2019 1/24/2019   /77 112/63       CT CHEST WITHOUT CONTRAST December 27, 2016 11:40 AM       HISTORY: Follow up on right sided lung lesions. Malignant neoplasm of  lower lobe, left bronchus or lung.     COMPARISON: 10/22/2016.     TECHNIQUE: Volumetric helical acquisition of CT images of the chest  from the clavicles to the kidneys were acquired without IV contrast.  Radiation dose for this scan was reduced using automated exposure  control, adjustment of the mA and/or kV according to patient size, or  iterative reconstruction technique.     FINDINGS: Right-sided fibrotic changes appear stable. Somewhat nodular  density at the left base has resolved and may have been an area of  consolidation and/or active inflammation. Honeycombing and traction  bronchiectasis persist. Moderate hiatal hernia. No pleural or  pericardial effusion. Mild cardiomegaly. There are moderate coronary  vascular calcifications consistent with coronary artery disease. There  are mild atherosclerotic changes of the visualized aorta and its  branches. There is no evidence of aortic aneurysm. No acute findings  in the visualized upper abdomen.                                                                        IMPRESSION:  1. 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  2. The nodular density at the left base has resolved  Davie Iyer MD 2016             Narrative           Interpretation Summary                            Name: GINA MCKEON  MRN: [id number]  : 1940  Study Date: 2018 09:55 AM  Age: 77 yrs  Gender: Female  Patient Location: [facility] [place]  Reason For Study: Dyspnea on exertion  History: Dyspnea on exertion  E  Referring Physician: THEO HUIZAR  Performed By: Sofía Braden RDCS     BSA: 2.0 m2  Height: 62 in  Weight: 221 lb  BP: 112/74 mmHg  _____________________________________________________________________________  __        Procedure  Echocardiogram with two-dimensional, color and spectral Doppler performed.  _____________________________________________________________________________  __        Interpretation Summary  Global and regional left ventricular function is normal with an EF of 55-60%.  Global right ventricular function is normal.Mild right ventricular dilation is  present.  Mild to moderate TR. Right ventricular systolic pressure is 49mmHg above the  right atrial pressure. Moderate (pulmonary artery systolic pressure 50-75mmHg)  pulmonary hypertension is present.  The inferior vena cava was normal in size with preserved respiratory  variability.Estimated mean right atrial pressure is 3 mmHg (normal).  No pericardial effusion is present.     This study was compared with the study from 10/16/17, no significant change .  _____________________________________________________________________________  __        Left Ventricle  Left ventricular wall thickness is normal. Left ventricular size is normal.  Global and regional left ventricular function is normal with an EF of 55-60%.  The Ejection Fraction was visually estimated. Diastolic function not assessed  due to significant mitral annular  calcification. No regional wall motion  abnormalities are seen.     Right Ventricle  Global right ventricular function is normal. Mild right ventricular dilation  is present.     Atria  Severe left atrial enlargement is present. Mild right atrial enlargement is  present.     Mitral Valve  Moderate mitral annular calcification is present. Mild mitral insufficiency is  present.        Aortic Valve  Trileaflet aortic sclerosis without stenosis.     Tricuspid Valve  Mild to moderate tricuspid insufficiency is present. Right ventricular  systolic pressure is 49mmHg above the right atrial pressure. Moderate  (pulmonary artery systolic pressure 50-75mmHg) pulmonary hypertension is  present.     Pulmonic Valve  Trace pulmonic insufficiency is present.     Vessels  The inferior vena cava was normal in size with preserved respiratory  variability. Visualized portions of the aorta are normal in size. Estimated  mean right atrial pressure is 3 mmHg (normal).     Pericardium  No pericardial effusion is present.        Compared to Previous Study  This study was compared with the study from 10/16/17, no significant change .     Attestation  I have personally viewed the imaging and agree with the interpretation and  report as documented by the fellow, Nupur Luz, and/or edited by me.  _____________________________________________________________________________  __     MMode/2D Measurements & Calculations  IVSd: 0.83 cm  LVIDd: 5.3 cm  LVIDs: 2.4 cm  LVPWd: 0.88 cm  FS: 54.8 %  LV mass(C)d: 162.3 grams  LV mass(C)dI: 81.4 grams/m2  Ao root diam: 2.6 cm  LA dimension: 5.7 cm  asc Aorta Diam: 3.5 cm  LA/Ao: 2.2  LVOT diam: 1.9 cm  LVOT area: 2.8 cm2  LA Volume (BP): 105.0 ml     LA Volume Index (BP): 52.8 ml/m2  RWT: 0.33        Doppler Measurements & Calculations  MV E max carrington: 88.3 cm/sec  MV A max carrington: 75.2 cm/sec  MV E/A: 1.2  MV dec time: 0.20 sec  Ao V2 max: 171.9 cm/sec  Ao max P.0 mmHg  Ao V2 mean: 134.9  cm/sec  Ao mean P.9 mmHg  Ao V2 VTI: 43.7 cm  JACOB(I,D): 1.9 cm2  JACOB(V,D): 1.9 cm2  LV V1 max P.6 mmHg  LV V1 max: 118.8 cm/sec  LV V1 VTI: 29.8 cm  SV(LVOT): 82.1 ml  SI(LVOT): 41.1 ml/m2  PA acc time: 0.05 sec  TR max el: 320.4 cm/sec  TR max P.4 mmHg  AV El Ratio (DI): 0.69  JACOB Index (cm2/m2): 0.94  E/E' av.0  Lateral E/e': 15.6     Medial E/e': 18.5     _____________________________________________________________________________  __       Discussion:  1. Patient remains short of breath.  Her weight is stable despite introduction of diuretics.  Her pulmonary capillary wedge pressure was approximately 20.  We will try her on diuretics on Tues, Thursday and Saturday to see if incremental diuretics make her less short of breath.  She has multiple reasons for shortness of breath (malignancy, pulmonary fibrosis, deconditioning and the like)    2. She will see Dr. Burroughs next week to assess weight/water loss and have basic metabolic panel check for renal function and potassium.    3. See me in two months for assessment of PH          CC: Dr Manjeet Bustillos M.D.

## 2019-05-21 ENCOUNTER — OFFICE VISIT (OUTPATIENT)
Dept: CARDIOLOGY | Facility: CLINIC | Age: 79
End: 2019-05-21
Attending: INTERNAL MEDICINE
Payer: MEDICARE

## 2019-05-21 VITALS
HEART RATE: 73 BPM | HEIGHT: 62 IN | WEIGHT: 222 LBS | DIASTOLIC BLOOD PRESSURE: 73 MMHG | OXYGEN SATURATION: 99 % | SYSTOLIC BLOOD PRESSURE: 115 MMHG | BODY MASS INDEX: 40.85 KG/M2

## 2019-05-21 DIAGNOSIS — I27.20 PULMONARY HYPERTENSION (H): Primary | ICD-10-CM

## 2019-05-21 DIAGNOSIS — R06.09 DYSPNEA ON EXERTION: ICD-10-CM

## 2019-05-21 DIAGNOSIS — I10 BENIGN ESSENTIAL HYPERTENSION: ICD-10-CM

## 2019-05-21 LAB
ANION GAP SERPL CALCULATED.3IONS-SCNC: 13.3 MMOL/L (ref 6–17)
BUN SERPL-MCNC: 21 MG/DL (ref 7–30)
CALCIUM SERPL-MCNC: 10 MG/DL (ref 8.5–10.5)
CHLORIDE SERPL-SCNC: 104 MMOL/L (ref 98–107)
CO2 SERPL-SCNC: 27 MMOL/L (ref 23–29)
CREAT SERPL-MCNC: 0.95 MG/DL (ref 0.7–1.3)
GFR SERPL CREATININE-BSD FRML MDRD: 57 ML/MIN/{1.73_M2}
GLUCOSE SERPL-MCNC: 97 MG/DL (ref 70–105)
POTASSIUM SERPL-SCNC: 4.3 MMOL/L (ref 3.5–5.1)
SODIUM SERPL-SCNC: 140 MMOL/L (ref 136–145)

## 2019-05-21 PROCEDURE — 99214 OFFICE O/P EST MOD 30 MIN: CPT | Performed by: INTERNAL MEDICINE

## 2019-05-21 PROCEDURE — 36415 COLL VENOUS BLD VENIPUNCTURE: CPT | Performed by: INTERNAL MEDICINE

## 2019-05-21 PROCEDURE — 80048 BASIC METABOLIC PNL TOTAL CA: CPT | Performed by: INTERNAL MEDICINE

## 2019-05-21 RX ORDER — FUROSEMIDE 20 MG
TABLET ORAL
Qty: 40 TABLET | Refills: 3 | Status: SHIPPED | OUTPATIENT
Start: 2019-05-21 | End: 2019-07-16

## 2019-05-21 ASSESSMENT — MIFFLIN-ST. JEOR: SCORE: 1435.24

## 2019-05-21 NOTE — LETTER
5/21/2019    Carissa Burroughs MD  [address] [facility] [address]  [facility] [address]    RE: Jaqueline Canales       Dear Colleague,    I had the pleasure of seeing Jaqueline Canales in the [facility].        Natalie Bustillos M.D.  Arthritis and Rheumatolotgy    Jeffy Burroughs M.D      The patient has scleroderma and ILD and was incidentally found to have SCC of left lower lobe that was treated with radiation therapy.  She is clinically stable and has no interim history of right heart failure.  The most recent echocardiogram discloses elevated, estimated PA pressure.  She has no significant functional limitation at this time.  We discussed the nature of right heart catherization and medication for PH; and for the moment, in the absence of symptoms, she wishes to defer diagnostic and therapeutic intervention.      Wt Readings from Last 24 Encounters:   05/21/19 100.7 kg (222 lb)   05/15/19 101.1 kg (222 lb 12.8 oz)   05/01/19 100.8 kg (222 lb 3.2 oz)   04/16/19 101.7 kg (224 lb 1.6 oz)   04/15/19 101.6 kg (224 lb)   04/02/19 99.8 kg (220 lb)   02/13/19 99.8 kg (220 lb)   01/24/19 99.8 kg (220 lb)   01/15/19 98.9 kg (218 lb)   11/15/18 98.9 kg (218 lb)   10/08/18 99.3 kg (219 lb)   09/13/18 100.2 kg (221 lb)   05/30/18 100.2 kg (221 lb)   05/23/18 100.6 kg (221 lb 12.8 oz)   01/31/18 105.3 kg (232 lb 3.2 oz)   11/15/17 104.3 kg (230 lb)   11/07/17 103.9 kg (229 lb)   10/19/17 107.8 kg (237 lb 9.6 oz)   10/06/17 105.2 kg (232 lb)   08/09/17 106.1 kg (234 lb)   07/13/17 106.1 kg (234 lb)   04/13/17 106.4 kg (234 lb 9.6 oz)   02/13/17 99.8 kg (220 lb)   02/08/17 103.4 kg (228 lb)       Current Outpatient Medications   Medication     albuterol (VENTOLIN HFA) 108 (90 Base) MCG/ACT inhaler     apixaban ANTICOAGULANT (ELIQUIS) 5 MG tablet     Bacillus Coagulans-Inulin (PROBIOTIC-PREBIOTIC PO)     Calcium Citrate-Vitamin D (CITRACAL + D PO)     CENTRUM SILVER OR TABS     folic acid (FOLVITE) 1 MG tablet      furosemide (LASIX) 20 MG tablet     InFLIXimab (REMICADE IV)     levothyroxine (SYNTHROID/LEVOTHROID) 75 MCG tablet     MELATONIN PO     Methotrexate Sodium (METHOTREXATE PO)     metoprolol succinate ER (TOPROL-XL) 25 MG 24 hr tablet     Multiple Vitamins-Minerals (ICAPS) CAPS     NIFEDICAL XL 60 MG PO TB24     omeprazole (PRILOSEC) 20 MG DR capsule     simvastatin (ZOCOR) 40 MG tablet     No current facility-administered medications for this visit.      Results for GINA MCKEON (MRN [id number]) as of 5/21/2019 11:41   Ref. Range 5/1/2019 11:15 5/13/2019 10:56 5/21/2019 09:59   Sodium Latest Ref Range: 136 - 145 mmol/L 140  140   Potassium Latest Ref Range: 3.5 - 5.1 mmol/L 4.0  4.3   Chloride Latest Ref Range: 98 - 107 mmol/L 104  104   Carbon Dioxide Latest Ref Range: 23 - 29 mmol/L 27  27   Urea Nitrogen Latest Ref Range: 7 - 30 mg/dL 19  21   Creatinine Latest Ref Range: 0.70 - 1.30 mg/dL 0.91  0.95   GFR Estimate Latest Ref Range: >60 mL/min/1.73_m2 60 (L)  57 (L)   GFR Estimate If Black Latest Ref Range: >60 mL/min/1.73_m2 72  69   Calcium Latest Ref Range: 8.5 - 10.5 mg/dL 10.0  10.0   Anion Gap Latest Ref Range: 6 - 17 mmol/L 13  13.3   N-Terminal Pro Bnp Latest Ref Range: 0 - 450 pg/mL 2,900 (H)     Glucose Latest Ref Range: 70 - 105 mg/dL 95  97   WBC Latest Ref Range: 4.0 - 11.0 10e9/L 6.1     Hemoglobin Latest Ref Range: 11.7 - 15.7 g/dL 12.0     Hematocrit Latest Ref Range: 35.0 - 47.0 % 36.8     Platelet Count Latest Ref Range: 150 - 450 10e9/L 130 (L)     RBC Count Latest Ref Range: 3.8 - 5.2 10e12/L 3.80     MCV Latest Ref Range: 78 - 100 fl 97     MCH Latest Ref Range: 26.5 - 33.0 pg 31.6     MCHC Latest Ref Range: 31.5 - 36.5 g/dL 32.6     RDW Latest Ref Range: 10.0 - 15.0 % 16.5 (H)     Diff Method Unknown Automated Method     % Neutrophils Latest Units: % 71.3     % Lymphocytes Latest Units: % 19.5     % Monocytes Latest Units: % 6.4     % Eosinophils Latest Units: % 2.0     % Basophils  Latest Units: % 0.5     % Immature Granulocytes Latest Units: % 0.3     Nucleated RBCs Latest Ref Range: 0 /100 0     Absolute Neutrophil Latest Ref Range: 1.6 - 8.3 10e9/L 4.4     Absolute Lymphocytes Latest Ref Range: 0.8 - 5.3 10e9/L 1.2     Absolute Monocytes Latest Ref Range: 0.0 - 1.3 10e9/L 0.4     Absolute Eosinophils Latest Ref Range: 0.0 - 0.7 10e9/L 0.1     Absolute Basophils Latest Ref Range: 0.0 - 0.2 10e9/L 0.0     Abs Immature Granulocytes Latest Ref Range: 0 - 0.4 10e9/L 0.0     Absolute Nucleated RBC Unknown 0.0     CT CHEST W/O CONTRAST Unknown  Rpt        REVIEW of SYMPTOMS    Constitutional: without fever, chills, night sweats.  Weight is ___  HEENT: without dry eyes, dry mouth, sinusitis, corryza, visual changes  Endocrine: without polyuria, polydypsia, polyphagia, heat or cold intolerance, changing mental status  Cardiology: without chest pain, tightness, heaviness, pressure, paroxysmal dyspnea, orthopnea, palpitation, pre-syncope or syncope or device discharge (if present)  Pulmonary: without asthma, wheezing, cough, hemoptysis  GI: without nausea, emesis, jaundice, pain, hematemesis, melena  : without frequency, urgency, hematuria, stones, pain, abnormal bleeding, frequency, urgency  Neurologic: without TIA, CVA, trauma, seizure  Dermatologic: without lesions, abrasion rash,   Orthopedic/Rheum: without significant joint pain, impairment, limb, polyserositis, ulceration, Raynauds  Heme: without mass, bruising, frequent infection, anemia  Psychiatric: without substance abuse, hallucination, medication, depression    Exercise tolerance:    PERRLA, EOM full, normal gait and station, normal mentation, skin without lesions, chest is clear, no evidence of right or left ventricular overactivity, sternum stable, no carotid bruits, regular rhythm, S1, S2, no murmurs, abdomen without tenderness, rebound guarding or masses, no edema, no focal neurologic      Conclusion     1) Elevated biventricular  filling pressures   2) Moderate to severe mostly primary pulmonary hypertension  3) Improvement in hemodynamics with nitric oxide; however, with no improvement in mean PA pressure       Plan     1) Suggest initiation of diuretic  2) Consider initiation of Sildenafil   3) Discussed with Dr. Marquez - he will visit the patient in clinic tomorrow   CV Right/Left Heart Cath     Right Heart Pressures     Systemic blood pressure 140-150 throughout the case.    BASELINE:  1) Elevated right-sided filling pressures (mean RA 16 mmHg)  2) Moderate to severe pulmonary hypertension (mean PA 35 mmHg) secondary to intrinsic pulmonary vascular disease (PVRI 14 rod*m2) and elevated left-sided filling pressures (PCWP 20 mmHg).  3) O2 sats at baseline 93%  4) Mildly depressed CO/CI: 3.8/1.9    NO 80 PPM:  1) Pulmonary artery pressures did not change (mean PA 34 mmHg)  2) O2 sats improved to 100%  3) CO/CI improved 4.2/2.1 Right sided filling pressures are severely elevated.Left sided filling pressures are moderately elevated. Severely elevated pulmonary artery hypertension.Left ventricular filling pressures are moderately elevated .Normal cardiac output level.   Pressures      Time Systolic Diastolic Mean A Wave V Wave EDP Max dp/dt HR   RA Pressures  9:01 AM   16 mmHg    14 mmHg    22 mmHg      87 bpm      RV Pressures  9:01 AM 52 mmHg    5 mmHg       15 mmHg     86 bpm      PA Pressures  9:02 AM 51 mmHg    26 mmHg    35 mmHg        76 bpm      PCW Pressures  9:02 AM   18 mmHg    23 mmHg    24 mmHg      72 bpm      AO Pressures  9:03  mmHg    87 mmHg    111 mmHg        82 bpm      Pressures Phase: Drug Study Level 1      Time Systolic Diastolic Mean A Wave V Wave EDP Max dp/dt HR   PA Pressures  9:11 AM 48 mmHg    26 mmHg    34 mmHg        77 bpm      Pressures Phase: Drug Study Level 2      Time Systolic Diastolic Mean A Wave V Wave EDP Max dp/dt HR   PA Pressures  9:14 AM 52 mmHg    33 mmHg    36 mmHg        84 bpm      PCW  Pressures  9:14 AM   22 mmHg    26 mmHg    23 mmHg      79 bpm      AO Pressures  9:11  mmHg    67 mmHg    97 mmHg        80 bpm      Blood Flow Results Phase: Drug Study Level 2      Time Results Indexed Values   QP  9:11 AM 4.19 L/min    2.1 L/min/m2      QS  9:11 AM 4.19 L/min    2.1 L/min/m2      Blood Oximetry Phase: Drug Study Level 2      Time Hb SAT(%) PO2 Content PA Sat   Art  9:11 AM  100 %     18.22 mL/dL       Cardiac Output Phase: Drug Study Level 2      Time TDCO TDCI Josh C.O. Josh C.I. Josh HR   Cardiac Output Results  9:11 AM   4.19 L/min    2.1 L/min/m2       Resistance Results Phase: Drug Study Level 2      Time PVR SVR PVR-I SVR-I TPR TVR TPR-I TVR-I PVR/SVR TPR/TVR   Resistance Results (Metric)  9:11 .55 dsc-5     534.11 dsc-5/m2     687.98 dsc-5    1853.72 dsc-5    1373.43 dsc-5/m2    3700.63 dsc-5/m2     0.37      Resistance Results (Wood)  9:11 AM 3.35 ROQUE     6.68 ROQUE/m2     8.6 ROQUE    23.18 ROQUE    17.17                1/15/2019 1/24/2019   /77 112/63       CT CHEST WITHOUT CONTRAST December 27, 2016 11:40 AM       HISTORY: Follow up on right sided lung lesions. Malignant neoplasm of  lower lobe, left bronchus or lung.     COMPARISON: 10/22/2016.     TECHNIQUE: Volumetric helical acquisition of CT images of the chest  from the clavicles to the kidneys were acquired without IV contrast.  Radiation dose for this scan was reduced using automated exposure  control, adjustment of the mA and/or kV according to patient size, or  iterative reconstruction technique.     FINDINGS: Right-sided fibrotic changes appear stable. Somewhat nodular  density at the left base has resolved and may have been an area of  consolidation and/or active inflammation. Honeycombing and traction  bronchiectasis persist. Moderate hiatal hernia. No pleural or  pericardial effusion. Mild cardiomegaly. There are moderate coronary  vascular calcifications consistent with coronary artery disease. There  are mild  atherosclerotic changes of the visualized aorta and its  branches. There is no evidence of aortic aneurysm. No acute findings  in the visualized upper abdomen.                                                                       IMPRESSION:  1. 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  2. The nodular density at the left base has resolved  Davie Iyer MD 2016             Narrative           Interpretation Summary                            Name: GINA MCKEON  MRN: [id number]  : 1940  Study Date: 2018 09:55 AM  Age: 77 yrs  Gender: Female  Patient Location: [facility]  Reason For Study: Dyspnea on exertion  History: Dyspnea on exertion  E  Referring Physician: THEO HUIZAR  Performed By: Sofía Braden RDCS     BSA: 2.0 m2  Height: 62 in  Weight: 221 lb  BP: 112/74 mmHg  _____________________________________________________________________________  __        Procedure  Echocardiogram with two-dimensional, color and spectral Doppler performed.  _____________________________________________________________________________  __        Interpretation Summary  Global and regional left ventricular function is normal with an EF of 55-60%.  Global right ventricular function is normal.Mild right ventricular dilation is  present.  Mild to moderate TR. Right ventricular systolic pressure is 49mmHg above the  right atrial pressure. Moderate (pulmonary artery systolic pressure 50-75mmHg)  pulmonary hypertension is present.  The inferior vena cava was normal in size with preserved respiratory  variability.Estimated mean right atrial pressure is 3 mmHg (normal).  No pericardial effusion is present.     This study was compared with the study from 10/16/17, no significant change .  _____________________________________________________________________________  __        Left Ventricle  Left ventricular wall thickness is normal. Left  ventricular size is normal.  Global and regional left ventricular function is normal with an EF of 55-60%.  The Ejection Fraction was visually estimated. Diastolic function not assessed  due to significant mitral annular calcification. No regional wall motion  abnormalities are seen.     Right Ventricle  Global right ventricular function is normal. Mild right ventricular dilation  is present.     Atria  Severe left atrial enlargement is present. Mild right atrial enlargement is  present.     Mitral Valve  Moderate mitral annular calcification is present. Mild mitral insufficiency is  present.        Aortic Valve  Trileaflet aortic sclerosis without stenosis.     Tricuspid Valve  Mild to moderate tricuspid insufficiency is present. Right ventricular  systolic pressure is 49mmHg above the right atrial pressure. Moderate  (pulmonary artery systolic pressure 50-75mmHg) pulmonary hypertension is  present.     Pulmonic Valve  Trace pulmonic insufficiency is present.     Vessels  The inferior vena cava was normal in size with preserved respiratory  variability. Visualized portions of the aorta are normal in size. Estimated  mean right atrial pressure is 3 mmHg (normal).     Pericardium  No pericardial effusion is present.        Compared to Previous Study  This study was compared with the study from 10/16/17, no significant change .     Attestation  I have personally viewed the imaging and agree with the interpretation and  report as documented by the fellow, uNpur Luz, and/or edited by me.  _____________________________________________________________________________  __     MMode/2D Measurements & Calculations  IVSd: 0.83 cm  LVIDd: 5.3 cm  LVIDs: 2.4 cm  LVPWd: 0.88 cm  FS: 54.8 %  LV mass(C)d: 162.3 grams  LV mass(C)dI: 81.4 grams/m2  Ao root diam: 2.6 cm  LA dimension: 5.7 cm  asc Aorta Diam: 3.5 cm  LA/Ao: 2.2  LVOT diam: 1.9 cm  LVOT area: 2.8 cm2  LA Volume (BP): 105.0 ml     LA Volume Index (BP): 52.8  ml/m2  RWT: 0.33        Doppler Measurements & Calculations  MV E max el: 88.3 cm/sec  MV A max el: 75.2 cm/sec  MV E/A: 1.2  MV dec time: 0.20 sec  Ao V2 max: 171.9 cm/sec  Ao max P.0 mmHg  Ao V2 mean: 134.9 cm/sec  Ao mean P.9 mmHg  Ao V2 VTI: 43.7 cm  JACOB(I,D): 1.9 cm2  JACOB(V,D): 1.9 cm2  LV V1 max P.6 mmHg  LV V1 max: 118.8 cm/sec  LV V1 VTI: 29.8 cm  SV(LVOT): 82.1 ml  SI(LVOT): 41.1 ml/m2  PA acc time: 0.05 sec  TR max el: 320.4 cm/sec  TR max P.4 mmHg  AV El Ratio (DI): 0.69  JACOB Index (cm2/m2): 0.94  E/E' av.0  Lateral E/e': 15.6     Medial E/e': 18.5     _____________________________________________________________________________  __       Discussion:  1. Patient remains short of breath.  Her weight is stable despite introduction of diuretics.  Her pulmonary capillary wedge pressure was approximately 20.  We will try her on diuretics on Tues, Thursday and Saturday to see if incremental diuretics make her less short of breath.  She has multiple reasons for shortness of breath (malignancy, pulmonary fibrosis, deconditioning and the like)    2. She will see Dr. Burroughs next week to assess weight/water loss and have basic metabolic panel check for renal function and potassium.    3. See me in two months for assessment of PH          CC: Dr Manjeet Bustillos M.D.    Thank you for allowing me to participate in the care of your patient.      Sincerely,     Shay Marquez MD     [facility]    cc:   Shay Marquez MD  [address] [place] [address] [id number]  [place] [address]

## 2019-05-21 NOTE — LETTER
5/21/2019    Carissa Burroughs MD  [address] [facility] [address]  [facility] [address]    RE: Jaqueline Canales       Dear Colleague,    I had the pleasure of seeing Jaqueline Canales in the [facility].        Natalie Bustillos M.D.  Arthritis and Rheumatolotgy    Jeffy Burroughs M.D      The patient has scleroderma and ILD and was incidentally found to have SCC of left lower lobe that was treated with radiation therapy.  She is clinically stable and has no interim history of right heart failure.  The most recent echocardiogram discloses elevated, estimated PA pressure.  She has no significant functional limitation at this time.  We discussed the nature of right heart catherization and medication for PH; and for the moment, in the absence of symptoms, she wishes to defer diagnostic and therapeutic intervention.      Wt Readings from Last 24 Encounters:   05/21/19 100.7 kg (222 lb)   05/15/19 101.1 kg (222 lb 12.8 oz)   05/01/19 100.8 kg (222 lb 3.2 oz)   04/16/19 101.7 kg (224 lb 1.6 oz)   04/15/19 101.6 kg (224 lb)   04/02/19 99.8 kg (220 lb)   02/13/19 99.8 kg (220 lb)   01/24/19 99.8 kg (220 lb)   01/15/19 98.9 kg (218 lb)   11/15/18 98.9 kg (218 lb)   10/08/18 99.3 kg (219 lb)   09/13/18 100.2 kg (221 lb)   05/30/18 100.2 kg (221 lb)   05/23/18 100.6 kg (221 lb 12.8 oz)   01/31/18 105.3 kg (232 lb 3.2 oz)   11/15/17 104.3 kg (230 lb)   11/07/17 103.9 kg (229 lb)   10/19/17 107.8 kg (237 lb 9.6 oz)   10/06/17 105.2 kg (232 lb)   08/09/17 106.1 kg (234 lb)   07/13/17 106.1 kg (234 lb)   04/13/17 106.4 kg (234 lb 9.6 oz)   02/13/17 99.8 kg (220 lb)   02/08/17 103.4 kg (228 lb)       Current Outpatient Medications   Medication     albuterol (VENTOLIN HFA) 108 (90 Base) MCG/ACT inhaler     apixaban ANTICOAGULANT (ELIQUIS) 5 MG tablet     Bacillus Coagulans-Inulin (PROBIOTIC-PREBIOTIC PO)     Calcium Citrate-Vitamin D (CITRACAL + D PO)     CENTRUM SILVER OR TABS     folic acid (FOLVITE) 1 MG tablet      furosemide (LASIX) 20 MG tablet     InFLIXimab (REMICADE IV)     levothyroxine (SYNTHROID/LEVOTHROID) 75 MCG tablet     MELATONIN PO     Methotrexate Sodium (METHOTREXATE PO)     metoprolol succinate ER (TOPROL-XL) 25 MG 24 hr tablet     Multiple Vitamins-Minerals (ICAPS) CAPS     NIFEDICAL XL 60 MG PO TB24     omeprazole (PRILOSEC) 20 MG DR capsule     simvastatin (ZOCOR) 40 MG tablet     No current facility-administered medications for this visit.      Results for GINA MCKEON (MRN [id number]) as of 5/21/2019 11:41   Ref. Range 5/1/2019 11:15 5/13/2019 10:56 5/21/2019 09:59   Sodium Latest Ref Range: 136 - 145 mmol/L 140  140   Potassium Latest Ref Range: 3.5 - 5.1 mmol/L 4.0  4.3   Chloride Latest Ref Range: 98 - 107 mmol/L 104  104   Carbon Dioxide Latest Ref Range: 23 - 29 mmol/L 27  27   Urea Nitrogen Latest Ref Range: 7 - 30 mg/dL 19  21   Creatinine Latest Ref Range: 0.70 - 1.30 mg/dL 0.91  0.95   GFR Estimate Latest Ref Range: >60 mL/min/1.73_m2 60 (L)  57 (L)   GFR Estimate If Black Latest Ref Range: >60 mL/min/1.73_m2 72  69   Calcium Latest Ref Range: 8.5 - 10.5 mg/dL 10.0  10.0   Anion Gap Latest Ref Range: 6 - 17 mmol/L 13  13.3   N-Terminal Pro Bnp Latest Ref Range: 0 - 450 pg/mL 2,900 (H)     Glucose Latest Ref Range: 70 - 105 mg/dL 95  97   WBC Latest Ref Range: 4.0 - 11.0 10e9/L 6.1     Hemoglobin Latest Ref Range: 11.7 - 15.7 g/dL 12.0     Hematocrit Latest Ref Range: 35.0 - 47.0 % 36.8     Platelet Count Latest Ref Range: 150 - 450 10e9/L 130 (L)     RBC Count Latest Ref Range: 3.8 - 5.2 10e12/L 3.80     MCV Latest Ref Range: 78 - 100 fl 97     MCH Latest Ref Range: 26.5 - 33.0 pg 31.6     MCHC Latest Ref Range: 31.5 - 36.5 g/dL 32.6     RDW Latest Ref Range: 10.0 - 15.0 % 16.5 (H)     Diff Method Unknown Automated Method     % Neutrophils Latest Units: % 71.3     % Lymphocytes Latest Units: % 19.5     % Monocytes Latest Units: % 6.4     % Eosinophils Latest Units: % 2.0     % Basophils  Latest Units: % 0.5     % Immature Granulocytes Latest Units: % 0.3     Nucleated RBCs Latest Ref Range: 0 /100 0     Absolute Neutrophil Latest Ref Range: 1.6 - 8.3 10e9/L 4.4     Absolute Lymphocytes Latest Ref Range: 0.8 - 5.3 10e9/L 1.2     Absolute Monocytes Latest Ref Range: 0.0 - 1.3 10e9/L 0.4     Absolute Eosinophils Latest Ref Range: 0.0 - 0.7 10e9/L 0.1     Absolute Basophils Latest Ref Range: 0.0 - 0.2 10e9/L 0.0     Abs Immature Granulocytes Latest Ref Range: 0 - 0.4 10e9/L 0.0     Absolute Nucleated RBC Unknown 0.0     CT CHEST W/O CONTRAST Unknown  Rpt        REVIEW of SYMPTOMS    Constitutional: without fever, chills, night sweats.  Weight is ___  HEENT: without dry eyes, dry mouth, sinusitis, corryza, visual changes  Endocrine: without polyuria, polydypsia, polyphagia, heat or cold intolerance, changing mental status  Cardiology: without chest pain, tightness, heaviness, pressure, paroxysmal dyspnea, orthopnea, palpitation, pre-syncope or syncope or device discharge (if present)  Pulmonary: without asthma, wheezing, cough, hemoptysis  GI: without nausea, emesis, jaundice, pain, hematemesis, melena  : without frequency, urgency, hematuria, stones, pain, abnormal bleeding, frequency, urgency  Neurologic: without TIA, CVA, trauma, seizure  Dermatologic: without lesions, abrasion rash,   Orthopedic/Rheum: without significant joint pain, impairment, limb, polyserositis, ulceration, Raynauds  Heme: without mass, bruising, frequent infection, anemia  Psychiatric: without substance abuse, hallucination, medication, depression    Exercise tolerance:    PERRLA, EOM full, normal gait and station, normal mentation, skin without lesions, chest is clear, no evidence of right or left ventricular overactivity, sternum stable, no carotid bruits, regular rhythm, S1, S2, no murmurs, abdomen without tenderness, rebound guarding or masses, no edema, no focal neurologic      Conclusion     1) Elevated biventricular  filling pressures   2) Moderate to severe mostly primary pulmonary hypertension  3) Improvement in hemodynamics with nitric oxide; however, with no improvement in mean PA pressure       Plan     1) Suggest initiation of diuretic  2) Consider initiation of Sildenafil   3) Discussed with Dr. Marquez - he will visit the patient in clinic tomorrow   CV Right/Left Heart Cath     Right Heart Pressures     Systemic blood pressure 140-150 throughout the case.    BASELINE:  1) Elevated right-sided filling pressures (mean RA 16 mmHg)  2) Moderate to severe pulmonary hypertension (mean PA 35 mmHg) secondary to intrinsic pulmonary vascular disease (PVRI 14 rod*m2) and elevated left-sided filling pressures (PCWP 20 mmHg).  3) O2 sats at baseline 93%  4) Mildly depressed CO/CI: 3.8/1.9    NO 80 PPM:  1) Pulmonary artery pressures did not change (mean PA 34 mmHg)  2) O2 sats improved to 100%  3) CO/CI improved 4.2/2.1 Right sided filling pressures are severely elevated.Left sided filling pressures are moderately elevated. Severely elevated pulmonary artery hypertension.Left ventricular filling pressures are moderately elevated .Normal cardiac output level.   Pressures      Time Systolic Diastolic Mean A Wave V Wave EDP Max dp/dt HR   RA Pressures  9:01 AM   16 mmHg    14 mmHg    22 mmHg      87 bpm      RV Pressures  9:01 AM 52 mmHg    5 mmHg       15 mmHg     86 bpm      PA Pressures  9:02 AM 51 mmHg    26 mmHg    35 mmHg        76 bpm      PCW Pressures  9:02 AM   18 mmHg    23 mmHg    24 mmHg      72 bpm      AO Pressures  9:03  mmHg    87 mmHg    111 mmHg        82 bpm      Pressures Phase: Drug Study Level 1      Time Systolic Diastolic Mean A Wave V Wave EDP Max dp/dt HR   PA Pressures  9:11 AM 48 mmHg    26 mmHg    34 mmHg        77 bpm      Pressures Phase: Drug Study Level 2      Time Systolic Diastolic Mean A Wave V Wave EDP Max dp/dt HR   PA Pressures  9:14 AM 52 mmHg    33 mmHg    36 mmHg        84 bpm      PCW  Pressures  9:14 AM   22 mmHg    26 mmHg    23 mmHg      79 bpm      AO Pressures  9:11  mmHg    67 mmHg    97 mmHg        80 bpm      Blood Flow Results Phase: Drug Study Level 2      Time Results Indexed Values   QP  9:11 AM 4.19 L/min    2.1 L/min/m2      QS  9:11 AM 4.19 L/min    2.1 L/min/m2      Blood Oximetry Phase: Drug Study Level 2      Time Hb SAT(%) PO2 Content PA Sat   Art  9:11 AM  100 %     18.22 mL/dL       Cardiac Output Phase: Drug Study Level 2      Time TDCO TDCI Josh C.O. Josh C.I. Josh HR   Cardiac Output Results  9:11 AM   4.19 L/min    2.1 L/min/m2       Resistance Results Phase: Drug Study Level 2      Time PVR SVR PVR-I SVR-I TPR TVR TPR-I TVR-I PVR/SVR TPR/TVR   Resistance Results (Metric)  9:11 .55 dsc-5     534.11 dsc-5/m2     687.98 dsc-5    1853.72 dsc-5    1373.43 dsc-5/m2    3700.63 dsc-5/m2     0.37      Resistance Results (Wood)  9:11 AM 3.35 ROQUE     6.68 ROQUE/m2     8.6 ROQUE    23.18 ROQUE    17.17                1/15/2019 1/24/2019   /77 112/63       CT CHEST WITHOUT CONTRAST December 27, 2016 11:40 AM       HISTORY: Follow up on right sided lung lesions. Malignant neoplasm of  lower lobe, left bronchus or lung.     COMPARISON: 10/22/2016.     TECHNIQUE: Volumetric helical acquisition of CT images of the chest  from the clavicles to the kidneys were acquired without IV contrast.  Radiation dose for this scan was reduced using automated exposure  control, adjustment of the mA and/or kV according to patient size, or  iterative reconstruction technique.     FINDINGS: Right-sided fibrotic changes appear stable. Somewhat nodular  density at the left base has resolved and may have been an area of  consolidation and/or active inflammation. Honeycombing and traction  bronchiectasis persist. Moderate hiatal hernia. No pleural or  pericardial effusion. Mild cardiomegaly. There are moderate coronary  vascular calcifications consistent with coronary artery disease. There  are mild  atherosclerotic changes of the visualized aorta and its  branches. There is no evidence of aortic aneurysm. No acute findings  in the visualized upper abdomen.                                                                       IMPRESSION:  1. 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  2. The nodular density at the left base has resolved  Davie Iyer MD 2016             Narrative           Interpretation Summary                            Name: GINA MCKEON  MRN: [id number]  : 1940  Study Date: 2018 09:55 AM  Age: 77 yrs  Gender: Female  Patient Location: [facility]  Reason For Study: Dyspnea on exertion  History: Dyspnea on exertion  E  Referring Physician: THEO HUIZAR  Performed By: Sofía Braden RDCS     BSA: 2.0 m2  Height: 62 in  Weight: 221 lb  BP: 112/74 mmHg  _____________________________________________________________________________  __        Procedure  Echocardiogram with two-dimensional, color and spectral Doppler performed.  _____________________________________________________________________________  __        Interpretation Summary  Global and regional left ventricular function is normal with an EF of 55-60%.  Global right ventricular function is normal.Mild right ventricular dilation is  present.  Mild to moderate TR. Right ventricular systolic pressure is 49mmHg above the  right atrial pressure. Moderate (pulmonary artery systolic pressure 50-75mmHg)  pulmonary hypertension is present.  The inferior vena cava was normal in size with preserved respiratory  variability.Estimated mean right atrial pressure is 3 mmHg (normal).  No pericardial effusion is present.     This study was compared with the study from 10/16/17, no significant change .  _____________________________________________________________________________  __        Left Ventricle  Left ventricular wall thickness is normal. Left  ventricular size is normal.  Global and regional left ventricular function is normal with an EF of 55-60%.  The Ejection Fraction was visually estimated. Diastolic function not assessed  due to significant mitral annular calcification. No regional wall motion  abnormalities are seen.     Right Ventricle  Global right ventricular function is normal. Mild right ventricular dilation  is present.     Atria  Severe left atrial enlargement is present. Mild right atrial enlargement is  present.     Mitral Valve  Moderate mitral annular calcification is present. Mild mitral insufficiency is  present.        Aortic Valve  Trileaflet aortic sclerosis without stenosis.     Tricuspid Valve  Mild to moderate tricuspid insufficiency is present. Right ventricular  systolic pressure is 49mmHg above the right atrial pressure. Moderate  (pulmonary artery systolic pressure 50-75mmHg) pulmonary hypertension is  present.     Pulmonic Valve  Trace pulmonic insufficiency is present.     Vessels  The inferior vena cava was normal in size with preserved respiratory  variability. Visualized portions of the aorta are normal in size. Estimated  mean right atrial pressure is 3 mmHg (normal).     Pericardium  No pericardial effusion is present.        Compared to Previous Study  This study was compared with the study from 10/16/17, no significant change .     Attestation  I have personally viewed the imaging and agree with the interpretation and  report as documented by the fellow, Nupur Luz, and/or edited by me.  _____________________________________________________________________________  __     MMode/2D Measurements & Calculations  IVSd: 0.83 cm  LVIDd: 5.3 cm  LVIDs: 2.4 cm  LVPWd: 0.88 cm  FS: 54.8 %  LV mass(C)d: 162.3 grams  LV mass(C)dI: 81.4 grams/m2  Ao root diam: 2.6 cm  LA dimension: 5.7 cm  asc Aorta Diam: 3.5 cm  LA/Ao: 2.2  LVOT diam: 1.9 cm  LVOT area: 2.8 cm2  LA Volume (BP): 105.0 ml     LA Volume Index (BP): 52.8  ml/m2  RWT: 0.33        Doppler Measurements & Calculations  MV E max el: 88.3 cm/sec  MV A max el: 75.2 cm/sec  MV E/A: 1.2  MV dec time: 0.20 sec  Ao V2 max: 171.9 cm/sec  Ao max P.0 mmHg  Ao V2 mean: 134.9 cm/sec  Ao mean P.9 mmHg  Ao V2 VTI: 43.7 cm  JACOB(I,D): 1.9 cm2  JACOB(V,D): 1.9 cm2  LV V1 max P.6 mmHg  LV V1 max: 118.8 cm/sec  LV V1 VTI: 29.8 cm  SV(LVOT): 82.1 ml  SI(LVOT): 41.1 ml/m2  PA acc time: 0.05 sec  TR max el: 320.4 cm/sec  TR max P.4 mmHg  AV El Ratio (DI): 0.69  JACOB Index (cm2/m2): 0.94  E/E' av.0  Lateral E/e': 15.6     Medial E/e': 18.5     _____________________________________________________________________________  __       Discussion:  1. Patient remains short of breath.  Her weight is stable despite introduction of diuretics.  Her pulmonary capillary wedge pressure was approximately 20.  We will try her on diuretics on Tues, Thursday and Saturday to see if incremental diuretics make her less short of breath.  She has multiple reasons for shortness of breath (malignancy, pulmonary fibrosis, deconditioning and the like)    2. She will see Dr. Burroughs next week to assess weight/water loss and have basic metabolic panel check for renal function and potassium.    3. See me in two months for assessment of PH          CC: Dr Manjeet Bustillos M.D.    Thank you for allowing me to participate in the care of your patient.    Sincerely,     Shay Marquez MD     [facility]

## 2019-05-21 NOTE — PATIENT INSTRUCTIONS
Medication Changes:  -Take Furosemide 20mg on Tues, Thursday and Saturday's     Patient Instructions:  1. Continue staying active and eat a heart healthy diet.    2. Please keep current list of medications with you at all times.    3. Remember to weigh yourself daily after voiding and before you consume any food or beverages and log the numbers.  If you have gained 2 pounds overnight or 5 pounds in a week contact us immediately for medication adjustments or further instructions.    4. **Please call us immediately if you have any syncope (fainting or passing out), chest pain, edema (swelling or weight gain), or decline in your functional status (general decline in how you are feeling).    Follow up Appointment Information:  1) See Dr. Burroughs next week to assess weight/water loss and have basic metabolic panel check for renal function and potassium.  2) See Dr. Marquez in two months     Results:  Component      Latest Ref Rng & Units 5/21/2019   Sodium      136 - 145 mmol/L 140   Potassium      3.5 - 5.1 mmol/L 4.3   Chloride      98 - 107 mmol/L 104   Carbon Dioxide      23 - 29 mmol/L 27   Anion Gap      6 - 17 mmol/L 13.3   Glucose      70 - 105 mg/dL 97   Urea Nitrogen      7 - 30 mg/dL 21   Creatinine      0.70 - 1.30 mg/dL 0.95   GFR Estimate      >60 mL/min/1.73:m2 57 (L)   GFR Estimate If Black      >60 mL/min/1.73:m2 69   Calcium      8.5 - 10.5 mg/dL 10.0     For scheduling at [facility] please call [phone]  For scheduling at the [facility] please call [phone]  We are located on the second floor [address] at the [facility].  Our address is     [facility] [address]   [address]  [place]  [address]    Thank you for allowing us to be a part of your care here at the [facility]    If you have questions or concerns please contact us at:    Kenisha Walton, RN, BSN, PHN      Nurse Coordinator       Pulmonary Hypertension     [facility]   (P)[phone]  (F)[phone]    ** Please note that you will NOT receive a reminder call regarding your scheduled testing, reminder calls are for provider appointments only.  If you are scheduled for testing within the [place] [facility] system you may receive a call regarding pre-registration for billing purposes only.**     Remember to weigh yourself daily after voiding and before you consume any food or beverages and log the numbers.  If you have gained/lost 2 pounds overnight or 5 pounds in a week contact us immediately for medication adjustments or further instructions.    Support Group:  Pulmonary Hypertension Association  Https://www.phassociation.org/  **Look at the Events Tab** They even have Support Groups that you can call into    [facility] PH Support Group  Second Saturday of the Month from 1-3 PM   Location: [address] [place] [id number]  Leader: Eleni Galloway  Phone: [phone] or [phone]  Email: [email or web address]

## 2019-05-21 NOTE — NURSING NOTE
Med Reconcile: Reviewed and verified all current medications with the patient. The updated medication list was printed and given to the patient.    Medication Change: Patient was educated regarding prescribed medication change(increasing Lasix), including discussion of the indication, administration, side effects, and when to report to MD or RN. Patient demonstrated understanding of this information and agreed to call with further questions or concerns.    Diet: Patient instructed regarding a heart healthy diet, including discussion of reduced fat and sodium intake. Patient demonstrated understanding of this information and agreed to call with further questions or concerns.    Return Appointment: Patient given instructions regarding scheduling next clinic visit. Patient demonstrated understanding of this information and agreed to call with further questions or concerns.    Patient stated she understood all health information given and agreed to call with further questions or concerns.    Medication Changes:  -Take Furosemide 20mg on Tues, Thursday and Saturday's     Patient Instructions:  1. Continue staying active and eat a heart healthy diet.    2. Please keep current list of medications with you at all times.    3. Remember to weigh yourself daily after voiding and before you consume any food or beverages and log the numbers.  If you have gained 2 pounds overnight or 5 pounds in a week contact us immediately for medication adjustments or further instructions.    4. **Please call us immediately if you have any syncope (fainting or passing out), chest pain, edema (swelling or weight gain), or decline in your functional status (general decline in how you are feeling).    Follow up Appointment Information:  1) See Dr. Burroughs next week to assess weight/water loss and have basic metabolic panel check for renal function and potassium.  2) See Dr. Marquez in two months     Results:  Component      Latest Ref Rng & Units  5/21/2019   Sodium      136 - 145 mmol/L 140   Potassium      3.5 - 5.1 mmol/L 4.3   Chloride      98 - 107 mmol/L 104   Carbon Dioxide      23 - 29 mmol/L 27   Anion Gap      6 - 17 mmol/L 13.3   Glucose      70 - 105 mg/dL 97   Urea Nitrogen      7 - 30 mg/dL 21   Creatinine      0.70 - 1.30 mg/dL 0.95   GFR Estimate      >60 mL/min/1.73:m2 57 (L)   GFR Estimate If Black      >60 mL/min/1.73:m2 69   Calcium      8.5 - 10.5 mg/dL 10.0       **patient was escorted to scheduling to make follow up appt.  Verified patient will call her PMD today when she gets home to make appt for next week. Kenisha Walton RN on 5/21/2019 at 12:11 PM

## 2019-05-24 ENCOUNTER — OFFICE VISIT (OUTPATIENT)
Dept: PULMONOLOGY | Facility: CLINIC | Age: 79
End: 2019-05-24
Attending: INTERNAL MEDICINE
Payer: MEDICARE

## 2019-05-24 VITALS
WEIGHT: 222 LBS | SYSTOLIC BLOOD PRESSURE: 102 MMHG | BODY MASS INDEX: 40.85 KG/M2 | OXYGEN SATURATION: 95 % | DIASTOLIC BLOOD PRESSURE: 67 MMHG | RESPIRATION RATE: 17 BRPM | HEIGHT: 62 IN | HEART RATE: 79 BPM

## 2019-05-24 DIAGNOSIS — J84.9 ILD (INTERSTITIAL LUNG DISEASE) (H): Primary | ICD-10-CM

## 2019-05-24 DIAGNOSIS — J84.9 ILD (INTERSTITIAL LUNG DISEASE) (H): ICD-10-CM

## 2019-05-24 PROCEDURE — G0463 HOSPITAL OUTPT CLINIC VISIT: HCPCS | Mod: ZF

## 2019-05-24 RX ORDER — ALBUTEROL SULFATE 90 UG/1
2 AEROSOL, METERED RESPIRATORY (INHALATION) EVERY 6 HOURS
Qty: 18 G | Refills: 11 | Status: SHIPPED | OUTPATIENT
Start: 2019-05-24 | End: 2019-11-25

## 2019-05-24 ASSESSMENT — MIFFLIN-ST. JEOR: SCORE: 1435.25

## 2019-05-24 ASSESSMENT — PAIN SCALES - GENERAL: PAINLEVEL: NO PAIN (0)

## 2019-05-24 NOTE — NURSING NOTE
Chief Complaint   Patient presents with     RECHECK     ILD    Medications reviewed and vital signs taken.   Stacie Stoddard, JUAN

## 2019-05-24 NOTE — LETTER
5/24/2019       RE: Jaqueline Canales  [address]  [facility] [place] [address]     Dear Colleague,    Thank you for referring your patient, Jaqueline Canales, to the [facility] FOR LUNG SCIENCE AND HEALTH at [facility]. Please see a copy of my visit note below.    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  She has rheumatoid arthritis/systemic sclerosis overlap syndrome.  She is on currently 25 mg weekly of methotrexate and Remicade infusions every 8 weeks per her rheumatologist.  She also has a history of lower lobe presumed lung cancer that was treated empirically with radiation.  She returns clinic today overall stating that she is doing relatively well though she still continues to have significant dyspnea on exertion.  She also is noted to have significant pulmonary hypertension she had a hospital station in April with a right heart cath showing significant pulmonary hypertension she followed up with Dr. Marquez who initiated diuretic therapy as all her filling pressures were elevated and he felt that this would help her.  This she has been on the diuretics her weight has not changed in her breathing is been stable he is recently plan to increase the regimen she has not really started this quite yet.  Otherwise she feels overall doing relatively well with no other new complaints today.      Current Outpatient Medications   Medication     albuterol (VENTOLIN HFA) 108 (90 Base) MCG/ACT inhaler     apixaban ANTICOAGULANT (ELIQUIS) 5 MG tablet     Bacillus Coagulans-Inulin (PROBIOTIC-PREBIOTIC PO)     Calcium Citrate-Vitamin D (CITRACAL + D PO)     CENTRUM SILVER OR TABS     folic acid (FOLVITE) 1 MG tablet     furosemide (LASIX) 20 MG tablet     InFLIXimab  (REMICADE IV)     levothyroxine (SYNTHROID/LEVOTHROID) 75 MCG tablet     MELATONIN PO     Methotrexate Sodium (METHOTREXATE PO)     metoprolol succinate ER (TOPROL-XL) 25 MG 24 hr tablet     Multiple Vitamins-Minerals (ICAPS) CAPS     NIFEDICAL XL 60 MG PO TB24     omeprazole (PRILOSEC) 20 MG DR capsule     simvastatin (ZOCOR) 40 MG tablet     VENTOLIN  (90 Base) MCG/ACT inhaler     No current facility-administered medications for this visit.      Allergies   Allergen Reactions     No Known Allergies      Past Medical History:   Diagnosis Date     Amaurosis fugax 5-11    Right     Carotid artery stenosis      Esophageal reflux 3/11/2004     HELICOBACTER PYLORI INFECTION 7/11/2006     hx of CA in situ RT breast-1990.mastectomy 4/17/2007     Hyperlipidemia LDL goal <70 6/20/2011     Lung cancer (H)     squamous cell lung ca left lower lobe     OBESITY NOS 3/11/2004     OSTEOPOROSIS NOS 3/11/2004     Other psoriasis 3/11/2004     RA (rheumatoid arthritis) (H) 2/8/2010     Raynaud's syndrome 4/10/2006     Scleroderma (H)      Sleep apnea     CPAP       Past Surgical History:   Procedure Laterality Date     BREAST SURGERY       C NONSPECIFIC PROCEDURE  1990    mastectomy RT breast     COLONOSCOPY  5/24/16     CONIZATION       CV RIGHT HEART CATH N/A 4/15/2019    Procedure: Heart Cath Right Heart Cath;  Surgeon: Naresh Cyr MD;  Location:  HEART CARDIAC CATH LAB     ENDARTERECTOMY CAROTID  2011     HC COLONOSCOPY THRU STOMA, DIAGNOSTIC  2001 2011    diverticulosis     REPAIR HAMMER TOE       TONSILLECTOMY         Social History     Socioeconomic History     Marital status:      Spouse name: Not on file     Number of children: Not on file     Years of education: Not on file     Highest education level: Not on file   Occupational History     Not on file   Social Needs     Financial resource strain: Not on file     Food insecurity:     Worry: Not on file     Inability: Not on file      Transportation needs:     Medical: Not on file     Non-medical: Not on file   Tobacco Use     Smoking status: Former Smoker     Packs/day: 1.00     Years: 30.00     Pack years: 30.00     Types: Cigarettes     Start date:      Last attempt to quit: 3/11/1992     Years since quittin.2     Smokeless tobacco: Never Used   Substance and Sexual Activity     Alcohol use: Not Currently     Alcohol/week: 0.0 oz     Frequency: Never     Drug use: No     Sexual activity: Not Currently     Partners: Male   Lifestyle     Physical activity:     Days per week: Not on file     Minutes per session: Not on file     Stress: Not on file   Relationships     Social connections:     Talks on phone: Not on file     Gets together: Not on file     Attends Gnosticist service: Not on file     Active member of club or organization: Not on file     Attends meetings of clubs or organizations: Not on file     Relationship status: Not on file     Intimate partner violence:     Fear of current or ex partner: Not on file     Emotionally abused: Not on file     Physically abused: Not on file     Forced sexual activity: Not on file   Other Topics Concern     Parent/sibling w/ CABG, MI or angioplasty before 65F 55M? No   Social History Narrative     Not on file       Family History   Problem Relation Age of Onset     Cancer - colorectal Mother      Cerebrovascular Disease Father      Cancer Sister 31        ovarian     Heart Disease Sister      Gastrointestinal Disease Sister         crohns     Gastrointestinal Disease Sister         diverticulitis     Gastrointestinal Disease Brother         diverticulitis     Alzheimer Disease Sister      Cerebrovascular Disease Maternal Grandmother      Diabetes Maternal Grandmother      Mental Illness Maternal Grandmother      Diabetes Maternal Uncle      Cancer Nephew                ROS Pulmonary    A complete ROS was otherwise negative except as noted in the HPI.  Vitals:    19 1008   BP:  "102/67   Pulse: 79   Resp: 17   SpO2: 95%   Weight: 100.7 kg (222 lb 0.1 oz)   Height: 1.575 m (5' 2\")     Exam:   GENERAL APPEARANCE: Well developed, well nourished, alert, and in no apparent distress.  NECK: supple, no masses, no thyromegaly.  LYMPHATICS: No significant axillary, cervical, or supraclavicular nodes.  RESP: good air flow throughout, - bilateral crackles  CV: Normal S1, S2, regular rhythm, normal rate, no rub, no murmur,  no gallop, no LE edema.   ABDOMEN:  Bowel sounds normal, soft, nontender, no HSM or masses.   MS: extremities normal- no clubbing, no cyanosis.  SKIN: no rash on limited exam  NEURO: Mentation intact, speech normal, normal strength and tone, normal gait and stance  PSYCH: mentation appears normal. and affect normal/bright  Results: I have reviewed all imaging, PFTs and other relavent tests, please see below for details, PFT and imaging results were reviewed with the patient.  PFT: Moderate restriction with moderate reduction in diffusing capacity.  Overall stable from previous.    Assessment and plan:    78-year-old female with connective tissue disease associated ILD that is relatively stable she is currently on methotrexate and Remicade not on any other therapy directed towards her lungs.  We will continue with the current regimen she is benefiting from her inhaler use so we discussed using this more frequently and we refilled that.  Otherwise will not make any other changes in her regimen I think she is stable enough that I can see her back in 6 months with pulmonary function tests she will call sooner with any worsening of her symptoms.      CBC   Recent Labs   Lab Test 05/01/19  1115 04/15/19  0700   RBC 3.80 3.80   HGB 12.0 12.2   HCT 36.8 36.4   * 75*       Basic Metabolic Panel  Recent Labs   Lab Test 05/21/19  0959 05/01/19  1115    140   POTASSIUM 4.3 4.0   CHLORIDE 104 104   CO2 27 27   BUN 21 19   GLC 97 95   TU 10.0 10.0       INR  Recent Labs   Lab Test " 04/15/19  0700 12/04/15  0745   INR 1.05 0.95       PFT  PFT Latest Ref Rng & Units 5/24/2019   FVC L 1.35   FEV1 L 1.21   FVC% % 56   FEV1% % 65             Again, thank you for allowing me to participate in the care of your patient.      Sincerely,    David Morris Perlman, MD

## 2019-05-24 NOTE — PROGRESS NOTES
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  She has rheumatoid arthritis/systemic sclerosis overlap syndrome.  She is on currently 25 mg weekly of methotrexate and Remicade infusions every 8 weeks per her rheumatologist.  She also has a history of lower lobe presumed lung cancer that was treated empirically with radiation.  She returns clinic today overall stating that she is doing relatively well though she still continues to have significant dyspnea on exertion.  She also is noted to have significant pulmonary hypertension she had a hospital station in April with a right heart cath showing significant pulmonary hypertension she followed up with Dr. Marquez who initiated diuretic therapy as all her filling pressures were elevated and he felt that this would help her.  This she has been on the diuretics her weight has not changed in her breathing is been stable he is recently plan to increase the regimen she has not really started this quite yet.  Otherwise she feels overall doing relatively well with no other new complaints today.      Current Outpatient Medications   Medication     albuterol (VENTOLIN HFA) 108 (90 Base) MCG/ACT inhaler     apixaban ANTICOAGULANT (ELIQUIS) 5 MG tablet     Bacillus Coagulans-Inulin (PROBIOTIC-PREBIOTIC PO)     Calcium Citrate-Vitamin D (CITRACAL + D PO)     CENTRUM SILVER OR TABS     folic acid (FOLVITE) 1 MG tablet     furosemide (LASIX) 20 MG tablet     InFLIXimab (REMICADE IV)     levothyroxine (SYNTHROID/LEVOTHROID) 75 MCG tablet     MELATONIN PO     Methotrexate Sodium (METHOTREXATE PO)     metoprolol succinate ER (TOPROL-XL) 25 MG 24 hr tablet     Multiple Vitamins-Minerals (ICAPS) CAPS     NIFEDICAL XL 60 MG PO TB24     omeprazole (PRILOSEC) 20 MG DR capsule      simvastatin (ZOCOR) 40 MG tablet     VENTOLIN  (90 Base) MCG/ACT inhaler     No current facility-administered medications for this visit.      Allergies   Allergen Reactions     No Known Allergies      Past Medical History:   Diagnosis Date     Amaurosis fugax     Right     Carotid artery stenosis      Esophageal reflux 3/11/2004     HELICOBACTER PYLORI INFECTION 2006     hx of CA in situ RT breast-.mastectomy 2007     Hyperlipidemia LDL goal <70 2011     Lung cancer (H)     squamous cell lung ca left lower lobe     OBESITY NOS 3/11/2004     OSTEOPOROSIS NOS 3/11/2004     Other psoriasis 3/11/2004     RA (rheumatoid arthritis) (H) 2010     Raynaud's syndrome 4/10/2006     Scleroderma (H)      Sleep apnea     CPAP       Past Surgical History:   Procedure Laterality Date     BREAST SURGERY       C NONSPECIFIC PROCEDURE      mastectomy RT breast     COLONOSCOPY  16     CONIZATION       CV RIGHT HEART CATH N/A 4/15/2019    Procedure: Heart Cath Right Heart Cath;  Surgeon: Naresh Cyr MD;  Location:  HEART CARDIAC CATH LAB     ENDARTERECTOMY CAROTID       HC COLONOSCOPY THRU STOMA, DIAGNOSTIC  2001    diverticulosis     REPAIR HAMMER TOE       TONSILLECTOMY         Social History     Socioeconomic History     Marital status:      Spouse name: Not on file     Number of children: Not on file     Years of education: Not on file     Highest education level: Not on file   Occupational History     Not on file   Social Needs     Financial resource strain: Not on file     Food insecurity:     Worry: Not on file     Inability: Not on file     Transportation needs:     Medical: Not on file     Non-medical: Not on file   Tobacco Use     Smoking status: Former Smoker     Packs/day: 1.00     Years: 30.00     Pack years: 30.00     Types: Cigarettes     Start date:      Last attempt to quit: 3/11/1992     Years since quittin.2     Smokeless tobacco: Never  "Used   Substance and Sexual Activity     Alcohol use: Not Currently     Alcohol/week: 0.0 oz     Frequency: Never     Drug use: No     Sexual activity: Not Currently     Partners: Male   Lifestyle     Physical activity:     Days per week: Not on file     Minutes per session: Not on file     Stress: Not on file   Relationships     Social connections:     Talks on phone: Not on file     Gets together: Not on file     Attends Uatsdin service: Not on file     Active member of club or organization: Not on file     Attends meetings of clubs or organizations: Not on file     Relationship status: Not on file     Intimate partner violence:     Fear of current or ex partner: Not on file     Emotionally abused: Not on file     Physically abused: Not on file     Forced sexual activity: Not on file   Other Topics Concern     Parent/sibling w/ CABG, MI or angioplasty before 65F 55M? No   Social History Narrative     Not on file       Family History   Problem Relation Age of Onset     Cancer - colorectal Mother      Cerebrovascular Disease Father      Cancer Sister 31        ovarian     Heart Disease Sister      Gastrointestinal Disease Sister         crohns     Gastrointestinal Disease Sister         diverticulitis     Gastrointestinal Disease Brother         diverticulitis     Alzheimer Disease Sister      Cerebrovascular Disease Maternal Grandmother      Diabetes Maternal Grandmother      Mental Illness Maternal Grandmother      Diabetes Maternal Uncle      Cancer Nephew                ROS Pulmonary    A complete ROS was otherwise negative except as noted in the HPI.  Vitals:    19 1008   BP: 102/67   Pulse: 79   Resp: 17   SpO2: 95%   Weight: 100.7 kg (222 lb 0.1 oz)   Height: 1.575 m (5' 2\")     Exam:   GENERAL APPEARANCE: Well developed, well nourished, alert, and in no apparent distress.  NECK: supple, no masses, no thyromegaly.  LYMPHATICS: No significant axillary, cervical, or supraclavicular nodes.  RESP: " good air flow throughout, - bilateral crackles  CV: Normal S1, S2, regular rhythm, normal rate, no rub, no murmur,  no gallop, no LE edema.   ABDOMEN:  Bowel sounds normal, soft, nontender, no HSM or masses.   MS: extremities normal- no clubbing, no cyanosis.  SKIN: no rash on limited exam  NEURO: Mentation intact, speech normal, normal strength and tone, normal gait and stance  PSYCH: mentation appears normal. and affect normal/bright  Results: I have reviewed all imaging, PFTs and other relavent tests, please see below for details, PFT and imaging results were reviewed with the patient.  PFT: Moderate restriction with moderate reduction in diffusing capacity.  Overall stable from previous.    Assessment and plan:    78-year-old female with connective tissue disease associated ILD that is relatively stable she is currently on methotrexate and Remicade not on any other therapy directed towards her lungs.  We will continue with the current regimen she is benefiting from her inhaler use so we discussed using this more frequently and we refilled that.  Otherwise will not make any other changes in her regimen I think she is stable enough that I can see her back in 6 months with pulmonary function tests she will call sooner with any worsening of her symptoms.      CBC   Recent Labs   Lab Test 05/01/19  1115 04/15/19  0700   RBC 3.80 3.80   HGB 12.0 12.2   HCT 36.8 36.4   * 75*       Basic Metabolic Panel  Recent Labs   Lab Test 05/21/19  0959 05/01/19  1115    140   POTASSIUM 4.3 4.0   CHLORIDE 104 104   CO2 27 27   BUN 21 19   GLC 97 95   TU 10.0 10.0       INR  Recent Labs   Lab Test 04/15/19  0700 12/04/15  0745   INR 1.05 0.95       PFT  PFT Latest Ref Rng & Units 5/24/2019   FVC L 1.35   FEV1 L 1.21   FVC% % 56   FEV1% % 65           CC:

## 2019-05-29 ENCOUNTER — OFFICE VISIT (OUTPATIENT)
Dept: FAMILY MEDICINE | Facility: CLINIC | Age: 79
End: 2019-05-29
Payer: MEDICARE

## 2019-05-29 VITALS
OXYGEN SATURATION: 96 % | DIASTOLIC BLOOD PRESSURE: 70 MMHG | HEIGHT: 62 IN | SYSTOLIC BLOOD PRESSURE: 104 MMHG | BODY MASS INDEX: 40.3 KG/M2 | WEIGHT: 219 LBS | HEART RATE: 77 BPM | TEMPERATURE: 97.6 F

## 2019-05-29 DIAGNOSIS — E03.9 HYPOTHYROIDISM, UNSPECIFIED TYPE: ICD-10-CM

## 2019-05-29 DIAGNOSIS — I10 BENIGN ESSENTIAL HYPERTENSION: Primary | ICD-10-CM

## 2019-05-29 DIAGNOSIS — E78.5 HYPERLIPIDEMIA LDL GOAL <100: ICD-10-CM

## 2019-05-29 DIAGNOSIS — I48.19 PERSISTENT ATRIAL FIBRILLATION (H): ICD-10-CM

## 2019-05-29 PROCEDURE — 99214 OFFICE O/P EST MOD 30 MIN: CPT | Performed by: INTERNAL MEDICINE

## 2019-05-29 ASSESSMENT — MIFFLIN-ST. JEOR: SCORE: 1421.63

## 2019-05-29 NOTE — PROGRESS NOTES
Chief Complaint:     Follow up on multiple concerns including chronic hypertension     HPI:   Hypertension Follow-up      Do you check your blood pressure regularly outside of the clinic? No     No chest pain, headaches, fever or chills    Patient is compliant with her BP medication regimen.    Are you following a low salt diet? No    Are your blood pressures ever more than 140 on the top number (systolic) OR more   than 90 on the bottom number (diastolic), for example 140/90? Yes          Current Medications:     Current Outpatient Medications   Medication Sig Dispense Refill     albuterol (VENTOLIN HFA) 108 (90 Base) MCG/ACT inhaler Inhale 2 puffs into the lungs every 6 hours 18 g 3     apixaban ANTICOAGULANT (ELIQUIS) 5 MG tablet Take 1 tablet (5 mg) by mouth 2 times daily 180 tablet 2     Bacillus Coagulans-Inulin (PROBIOTIC-PREBIOTIC PO)        Calcium Citrate-Vitamin D (CITRACAL + D PO) Take 2 tablets by mouth 2 times daily        CENTRUM SILVER OR TABS 1 TABLET DAILY       folic acid (FOLVITE) 1 MG tablet Take 1 tablet (1,000 mcg) by mouth daily 90 tablet 3     furosemide (LASIX) 20 MG tablet Take 1 tablet by mouth every Tuesday, Thursday and Saturday 40 tablet 3     InFLIXimab (REMICADE IV) Inject 600 mg into the vein Every 8 weeks       levothyroxine (SYNTHROID/LEVOTHROID) 75 MCG tablet Take 1 tablet (75 mcg) by mouth daily 90 tablet 3     MELATONIN PO Take 5 mg by mouth nightly as needed       Methotrexate Sodium (METHOTREXATE PO) Take 2.5 mg by mouth 10 tablets weekly       metoprolol succinate ER (TOPROL-XL) 25 MG 24 hr tablet Take 1 tablet (25 mg) by mouth daily 90 tablet 2     Multiple Vitamins-Minerals (ICAPS) CAPS Take 1 capsule by mouth 2 times daily        NIFEDICAL XL 60 MG PO TB24 1 TABLET DAILY       omeprazole (PRILOSEC) 20 MG DR capsule Take 1 capsule (20 mg) by mouth 2 times daily Take 30-60 minutes prior to meals 180 capsule 2     simvastatin (ZOCOR) 40 MG tablet Take 1 tablet (40 mg) by  mouth At Bedtime 90 tablet 2     VENTOLIN  (90 Base) MCG/ACT inhaler Inhale 2 puffs into the lungs every 6 hours 18 g 11         Allergies:      Allergies   Allergen Reactions     No Known Allergies             Past Medical History:     Past Medical History:   Diagnosis Date     Amaurosis fugax     Right     Carotid artery stenosis      Esophageal reflux 3/11/2004     HELICOBACTER PYLORI INFECTION 2006     hx of CA in situ RT breast-.mastectomy 2007     Hyperlipidemia LDL goal <70 2011     Lung cancer (H)     squamous cell lung ca left lower lobe     OBESITY NOS 3/11/2004     OSTEOPOROSIS NOS 3/11/2004     Other psoriasis 3/11/2004     RA (rheumatoid arthritis) (H) 2010     Raynaud's syndrome 4/10/2006     Scleroderma (H)      Sleep apnea     CPAP         Past Surgical History:     Past Surgical History:   Procedure Laterality Date     BREAST SURGERY       C NONSPECIFIC PROCEDURE      mastectomy RT breast     COLONOSCOPY  16     CONIZATION       CV RIGHT HEART CATH N/A 4/15/2019    Procedure: Heart Cath Right Heart Cath;  Surgeon: Naresh Cyr MD;  Location:  HEART CARDIAC CATH LAB     ENDARTERECTOMY CAROTID       HC COLONOSCOPY THRU STOMA, DIAGNOSTIC  2001    diverticulosis     REPAIR HAMMER TOE       TONSILLECTOMY           Family Medical History:     Family History   Problem Relation Age of Onset     Cancer - colorectal Mother      Cerebrovascular Disease Father      Cancer Sister 31        ovarian     Heart Disease Sister      Gastrointestinal Disease Sister         crohns     Gastrointestinal Disease Sister         diverticulitis     Gastrointestinal Disease Brother         diverticulitis     Alzheimer Disease Sister      Cerebrovascular Disease Maternal Grandmother      Diabetes Maternal Grandmother      Mental Illness Maternal Grandmother      Diabetes Maternal Uncle      Cancer Nephew                  Social History:     Social History      Socioeconomic History     Marital status:      Spouse name: Not on file     Number of children: Not on file     Years of education: Not on file     Highest education level: Not on file   Occupational History     Not on file   Social Needs     Financial resource strain: Not on file     Food insecurity:     Worry: Not on file     Inability: Not on file     Transportation needs:     Medical: Not on file     Non-medical: Not on file   Tobacco Use     Smoking status: Former Smoker     Packs/day: 1.00     Years: 30.00     Pack years: 30.00     Types: Cigarettes     Start date:      Last attempt to quit: 3/11/1992     Years since quittin.2     Smokeless tobacco: Never Used   Substance and Sexual Activity     Alcohol use: Not Currently     Alcohol/week: 0.0 oz     Frequency: Never     Drug use: No     Sexual activity: Not Currently     Partners: Male   Lifestyle     Physical activity:     Days per week: Not on file     Minutes per session: Not on file     Stress: Not on file   Relationships     Social connections:     Talks on phone: Not on file     Gets together: Not on file     Attends Alevism service: Not on file     Active member of club or organization: Not on file     Attends meetings of clubs or organizations: Not on file     Relationship status: Not on file     Intimate partner violence:     Fear of current or ex partner: Not on file     Emotionally abused: Not on file     Physically abused: Not on file     Forced sexual activity: Not on file   Other Topics Concern     Parent/sibling w/ CABG, MI or angioplasty before 65F 55M? No   Social History Narrative     Not on file           Review of System:     Constitutional: Negative for fever or chills  Skin: Negative for rashes  Ears/Nose/Throat: Negative for nasal congestion, sore throat  Respiratory: No shortness of breath, dyspnea on exertion, cough, or hemoptysis  Cardiovascular: Negative for chest pain, positive for chronic atrial  "fibrillation  Gastrointestinal: Negative for nausea, vomiting  Genitourinary: Negative for dysuria, hematuria  Musculoskeletal: Negative for myalgias  Neurologic: Negative for headaches  Psychiatric: Negative for depression, anxiety  Hematologic/Lymphatic/Immunologic: Negative  Endocrine: Negative  Behavioral: Negative for tobacco use       Physical Exam:   /70 (BP Location: Left arm, Patient Position: Sitting, Cuff Size: Adult Large)   Pulse 77   Temp 97.6  F (36.4  C) (Oral)   Ht 1.575 m (5' 2\")   Wt 99.3 kg (219 lb)   SpO2 96%   BMI 40.06 kg/m      GENERAL: alert and no distress  EYES: eyes grossly normal to inspection, and conjunctivae and sclerae normal  HENT: Normocephalic atraumatic. Nose and mouth without ulcers or lesions  NECK: supple  RESP: lungs clear to auscultation   CV: irregularly irregular, heart rate is well controlled  LYMPH: no peripheral edema   ABDOMEN: nondistended  MS: no gross musculoskeletal defects noted  SKIN: no suspicious lesions or rashes  NEURO: Alert & Oriented x 3.   PSYCH: mentation appears normal, affect normal        Diagnostic Test Results:     Diagnostic Test Results:  Results for orders placed or performed in visit on 05/24/19   General PFT Lab (Please always keep checked)   Result Value Ref Range    FVC-Pred 2.39 L    FVC-Pre 1.35 L    FVC-%Pred-Pre 56 %    FEV1-Pre 1.21 L    FEV1-%Pred-Pre 65 %    FEV1FVC-Pred 77 %    FEV1FVC-Pre 90 %    FEFMax-Pred 4.58 L/sec    FEFMax-Pre 4.30 L/sec    FEFMax-%Pred-Pre 93 %    FEF2575-Pred 1.51 L/sec    FEF2575-Pre 1.89 L/sec    GTC6306-%Pred-Pre 125 %    ExpTime-Pre 5.57 sec    FIFMax-Pre 3.88 L/sec    VC-Pred 2.62 L    VC-Pre 1.36 L    VC-%Pred-Pre 52 %    IC-Pred 2.60 L    IC-Pre 1.16 L    IC-%Pred-Pre 44 %    ERV-Pred 0.02 L    ERV-Pre 0.21 L    ERV-%Pred-Pre 994 %    FEV1FEV6-Pred 78 %    FEV1FEV6-Pre 90 %    DLCOunc-Pred 17.60 ml/min/mmHg    DLCOunc-Pre 8.72 ml/min/mmHg    DLCOunc-%Pred-Pre 49 %    VA-Pre 2.31 L    " VA-%Pred-Pre 54 %    FEV1SVC-Pred 70 %    FEV1SVC-Pre 88 %       ASSESSMENT/PLAN:       (I10) Benign essential hypertension  (primary encounter diagnosis)  Comment: chronic hypertension well controlled  Plan: continue current BP medication regimen.    (I48.1) Persistent atrial fibrillation (H)  Comment: heart rate is well controlled  Plan: continue current long term anticoagulation therapy with Eliquis and outpatient cardiology clinic follow up going forward.    (E78.5) Hyperlipidemia LDL goal <100  Comment: symptoms stable on current simvastatin cholesterol medicatiopn  Plan: continue Simvastatin medication going forward.    (E03.9) Hypothyroidism, unspecified type  Comment: stable on current levothyroxine medicaiton  Plan: continue current Levothyroxine medication going forward.        Follow Up Plan:     Patient is instructed to return to Internal Medicine clinic for follow-up visit in 1 month.        Carissa Burroughs MD  Internal Medicine  [facility]

## 2019-05-31 LAB
DLCOUNC-%PRED-PRE: 49 %
DLCOUNC-PRE: 8.72 ML/MIN/MMHG
DLCOUNC-PRED: 17.6 ML/MIN/MMHG
ERV-%PRED-PRE: 994 %
ERV-PRE: 0.21 L
ERV-PRED: 0.02 L
EXPTIME-PRE: 5.57 SEC
FEF2575-%PRED-PRE: 125 %
FEF2575-PRE: 1.89 L/SEC
FEF2575-PRED: 1.51 L/SEC
FEFMAX-%PRED-PRE: 93 %
FEFMAX-PRE: 4.3 L/SEC
FEFMAX-PRED: 4.58 L/SEC
FEV1-%PRED-PRE: 65 %
FEV1-PRE: 1.21 L
FEV1FEV6-PRE: 90 %
FEV1FEV6-PRED: 78 %
FEV1FVC-PRE: 90 %
FEV1FVC-PRED: 77 %
FEV1SVC-PRE: 88 %
FEV1SVC-PRED: 70 %
FIFMAX-PRE: 3.88 L/SEC
FVC-%PRED-PRE: 56 %
FVC-PRE: 1.35 L
FVC-PRED: 2.39 L
IC-%PRED-PRE: 44 %
IC-PRE: 1.16 L
IC-PRED: 2.6 L
VA-%PRED-PRE: 54 %
VA-PRE: 2.31 L
VC-%PRED-PRE: 52 %
VC-PRE: 1.36 L
VC-PRED: 2.62 L

## 2019-06-13 ENCOUNTER — OFFICE VISIT (OUTPATIENT)
Dept: FAMILY MEDICINE | Facility: CLINIC | Age: 79
End: 2019-06-13
Payer: MEDICARE

## 2019-06-13 VITALS
TEMPERATURE: 97 F | WEIGHT: 215 LBS | OXYGEN SATURATION: 98 % | DIASTOLIC BLOOD PRESSURE: 70 MMHG | HEART RATE: 60 BPM | BODY MASS INDEX: 39.56 KG/M2 | SYSTOLIC BLOOD PRESSURE: 104 MMHG | HEIGHT: 62 IN

## 2019-06-13 DIAGNOSIS — Z01.818 PREOP GENERAL PHYSICAL EXAM: Primary | ICD-10-CM

## 2019-06-13 DIAGNOSIS — Z12.11 SPECIAL SCREENING FOR MALIGNANT NEOPLASMS, COLON: ICD-10-CM

## 2019-06-13 DIAGNOSIS — Z86.0100 HISTORY OF COLONIC POLYPS: ICD-10-CM

## 2019-06-13 PROCEDURE — 99214 OFFICE O/P EST MOD 30 MIN: CPT | Performed by: INTERNAL MEDICINE

## 2019-06-13 ASSESSMENT — MIFFLIN-ST. JEOR: SCORE: 1403.48

## 2019-06-13 NOTE — PROGRESS NOTES
[facility]  [address] [place] [id number]  [phone]  Dept: [phone]    PRE-OP EVALUATION:  Today's date: 2019    Jaqueline Canales (: 1940) presents for pre-operative evaluation assessment as requested by Neto Tejada.  He requires evaluation and anesthesia risk assessment prior to undergoing surgery/procedure for treatment of Colonoscopy for colon cancer screening    Proposed Surgery/ Procedure:COLONOSCOPY   Date of Surgery/ Procedure: 2019  Time of Surgery/ Procedure: 8:15 AM  Hospital/Surgical Facility: [facility]  Fax number for surgical facility: [facility]  Primary Physician: Carissa Burroughs  Type of Anesthesia Anticipated: Monitor Anesthesia Care    Patient has a Health Care Directive or Living Will:  YES,document scanned.    1. YES - DO YOU HAVE A HISTORY OF HEART ATTACK, STROKE, STENT, BYPASS OR SURGERY ON AN ARTERY IN THE HEAD, NECK, HEART OR LEG? Neck and Stroke  2. YES - DO YOU EVER HAVE ANY PAIN OR DISCOMFORT IN YOUR CHEST? Yes, mild  3. NO - Do you have a history of  Heart Failure?  4. YES - ARE YOUR TROUBLED BY SHORTNESS OF BREATH WHEN WALKING ON THE LEVEL, UP A SLIGHT HILL OR AT NIGHT? Walking up  5. NO - Do you currently have a cold, bronchitis or other respiratory infection?  6. YES - DO YOU HAVE A COUGH, SHORTNESS OF BREATH OR WHEEZING? Occasionally  7. NO - Do you sometimes get pains in the calves of your legs when you walk?  8. NO - Do you or anyone in your family have previous history of blood clots?  9. NO - Do you or does anyone in your family have a serious bleeding problem such as prolonged bleeding following surgeries or cuts?  10. NO - Have you ever had problems with anemia or been told to take iron pills?  11. NO - Have you had any abnormal blood loss such as black, tarry or bloody stools, or abnormal vaginal bleeding?  12. NO - Have you ever had a blood transfusion?  13. NO - Have you or any of your relatives ever had problems  with anesthesia?  14. YES - DO YOU HAVE SLEEP APNEA, EXCESSIVE SNORING OR DAYTIME DROWSINESS? Daytime drowsiness  15. NO - Do you have any prosthetic heart valves?  16. NO - Do you have prosthetic joints?  17. NO - Is there any chance that you may be pregnant?      HPI:     HPI related to upcoming procedure: patient Jaqueline Canales is a very pleasant 78 year old female with atrial fibrillation who presents to internal medicine clinic today for a pre op cardiac evaluation for upcoming colonoscopy that's scheduled for 6/28/2019 for colon cancer screening with a history of colon polyps. Patient denies any chest pain, headaches, fever or chills. Patient is currently on daily Eliquis long term anticoagulation therapy for atrial fibrillation. Patient denies any known allergies to anesthesia agents.       MEDICAL HISTORY:     Patient Active Problem List    Diagnosis Date Noted     Status post coronary angiogram 04/15/2019     Priority: Medium     Pulmonary hypertension (H) 04/02/2019     Priority: Medium     Added automatically from request for surgery [id number]       Morbid obesity (H) 10/08/2018     Priority: Medium     Chest pain 10/22/2016     Priority: Medium     Persistent atrial fibrillation (H) 08/25/2016     Priority: Medium     Malignant neoplasm of lower lobe of left lung (H) 02/02/2016     Priority: Medium     Lung nodule 11/23/2015     Priority: Medium     Low back pain 07/01/2015     Priority: Medium     Diagnosis updated by automated process. Provider to review and confirm.       IVCD (intraventricular conduction defect) 06/03/2013     Priority: Medium     Esophageal stricture 10/25/2012     Priority: Medium     Hiatal hernia 10/25/2012     Priority: Medium     Hypothyroidism 09/18/2012     Priority: Medium     S/P carotid endarterectomy 02/06/2012     Priority: Medium     Right 2011       Pulmonary fibrosis (H) 02/06/2012     Priority: Medium     secodary to RA, Dr Lennox [place] [facility]       SRINIVAS (obstructive  sleep apnea) 02/06/2012     Priority: Medium     On cpap, Dr Mccracken, [place] [facility]       Constipation 02/06/2012     Priority: Medium     Hyperlipidemia LDL goal <100 11/06/2011     Priority: Medium     Diverticulosis 10/19/2011     Priority: Medium     Advance Care Planning 10/17/2011     Priority: Medium     Advance Care Planning 7/13/2016: Receipt of ACP document:  Received: Health Care Directive which was witnessed or notarized on 6-30-16.  Document previously scanned on 7-8-16.  Validation form completed and sent to be scanned.  Code Status reflects choices in most recent ACP document.  Confirmed/documented designated decision maker(s).  Added by Coral Stover RN, System Director ACP-Honoring Choices   Advance Directive Initial Visit 10/17/11 Jaqueline Canales presents in person for initial session regarding completion of advanced directive form. She was referred to the facilitator by provider.  She currently has no advance directive.Plan:  Advanced directive form, healthcare agent information card, advanced care planning information booklet provided to patient. Follow up meeting: pt will call when ready to schedule a follow up. Patient instructed to bring healthcare agent to this meeting. 1st visit. DESI Jacob LPN           [facility] 06/27/2011     Priority: Medium     Care Coordination services declined 6/27/2011.    EMERGENCY CARE PLAN  Presenting Problem Signs and Symptoms Treatment Plan    Questions or conerns during clinic hours    I will call the clinic directly     Questions or conerns outside clinic hours    I will call the 24 hour nurse line at [phone]    Patient needs to schedule an appointment    I will call the 24 hour scheduling team at [phone] or clinic directly    Same day treatment     I will call the clinic first, nurse line if after hours, urgent care and express care if needed                        Esthela Marshall, RN, BSN  Care Coordinator  DX V65.8 REPLACED WITH [facility] (04/08/2013)       Amaurosis fugax of right eye 05/26/2011     Priority: Medium     Scleroderma (H) 10/05/2010     Priority: Medium     RA (rheumatoid arthritis) (H) 02/08/2010     Priority: Medium     Dr Natalie Garcia Rheumatology       Malignant neoplasm of female breast (H) 04/17/2007     Priority: Medium     Problem list name updated by automated process. Provider to review       Helicobacter pylori infection 07/11/2006     Priority: Medium     Problem list name updated by automated process. Provider to review       Raynaud's syndrome 04/10/2006     Priority: Medium     Lumbago 12/15/2005     Priority: Medium     Other psoriasis 03/11/2004     Priority: Medium     Obesity 03/11/2004     Priority: Medium     Problem list name updated by automated process. Provider to review       Osteoporosis 03/11/2004     Priority: Medium     Reclast annually  Problem list name updated by automated process. Provider to review       Esophageal reflux 03/11/2004     Priority: Medium      Past Medical History:   Diagnosis Date     Amaurosis fugax 5-11    Right     Carotid artery stenosis      Esophageal reflux 3/11/2004     HELICOBACTER PYLORI INFECTION 7/11/2006     hx of CA in situ RT breast-1990.mastectomy 4/17/2007     Hyperlipidemia LDL goal <70 6/20/2011     Lung cancer (H)     squamous cell lung ca left lower lobe     OBESITY NOS 3/11/2004     OSTEOPOROSIS NOS 3/11/2004     Other psoriasis 3/11/2004     RA (rheumatoid arthritis) (H) 2/8/2010     Raynaud's syndrome 4/10/2006     Scleroderma (H)      Sleep apnea     CPAP     Past Surgical History:   Procedure Laterality Date     BREAST SURGERY       C NONSPECIFIC PROCEDURE  1990    mastectomy RT breast     COLONOSCOPY  5/24/16     CONIZATION       CV RIGHT HEART CATH N/A 4/15/2019    Procedure: Heart Cath Right Heart Cath;  Surgeon: Naresh Cyr MD;  Location:  HEART CARDIAC CATH LAB     ENDARTERECTOMY CAROTID  2011     HC COLONOSCOPY THRU STOMA,  DIAGNOSTIC  2001 2011    diverticulosis     REPAIR HAMMER TOE       TONSILLECTOMY       Current Outpatient Medications   Medication Sig Dispense Refill     albuterol (VENTOLIN HFA) 108 (90 Base) MCG/ACT inhaler Inhale 2 puffs into the lungs every 6 hours 18 g 3     apixaban ANTICOAGULANT (ELIQUIS) 5 MG tablet Take 1 tablet (5 mg) by mouth 2 times daily 180 tablet 2     Bacillus Coagulans-Inulin (PROBIOTIC-PREBIOTIC PO)        Calcium Citrate-Vitamin D (CITRACAL + D PO) Take 2 tablets by mouth 2 times daily        CENTRUM SILVER OR TABS 1 TABLET DAILY       folic acid (FOLVITE) 1 MG tablet Take 1 tablet (1,000 mcg) by mouth daily 90 tablet 3     furosemide (LASIX) 20 MG tablet Take 1 tablet by mouth every Tuesday, Thursday and Saturday 40 tablet 3     InFLIXimab (REMICADE IV) Inject 600 mg into the vein Every 8 weeks       levothyroxine (SYNTHROID/LEVOTHROID) 75 MCG tablet Take 1 tablet (75 mcg) by mouth daily 90 tablet 3     MELATONIN PO Take 5 mg by mouth nightly as needed       Methotrexate Sodium (METHOTREXATE PO) Take 2.5 mg by mouth 10 tablets weekly       metoprolol succinate ER (TOPROL-XL) 25 MG 24 hr tablet Take 1 tablet (25 mg) by mouth daily 90 tablet 2     Multiple Vitamins-Minerals (ICAPS) CAPS Take 1 capsule by mouth 2 times daily        NIFEDICAL XL 60 MG PO TB24 1 TABLET DAILY       omeprazole (PRILOSEC) 20 MG DR capsule Take 1 capsule (20 mg) by mouth 2 times daily Take 30-60 minutes prior to meals 180 capsule 2     simvastatin (ZOCOR) 40 MG tablet Take 1 tablet (40 mg) by mouth At Bedtime 90 tablet 2     VENTOLIN  (90 Base) MCG/ACT inhaler Inhale 2 puffs into the lungs every 6 hours 18 g 11     OTC products: None, except as noted above    Allergies   Allergen Reactions     No Known Allergies       Latex Allergy: NO    Social History     Tobacco Use     Smoking status: Former Smoker     Packs/day: 1.00     Years: 30.00     Pack years: 30.00     Types: Cigarettes     Start date: 1962     Last  "attempt to quit: 3/11/1992     Years since quittin.2     Smokeless tobacco: Never Used   Substance Use Topics     Alcohol use: Not Currently     Alcohol/week: 0.0 oz     Frequency: Never     History   Drug Use No       REVIEW OF SYSTEMS:   Constitutional, neuro, ENT, endocrine, pulmonary, cardiac, gastrointestinal, genitourinary, musculoskeletal, integument and psychiatric systems are negative, except as otherwise noted.    EXAM:   /70 (BP Location: Left arm, Patient Position: Sitting, Cuff Size: Adult Regular)   Pulse 60   Temp 97  F (36.1  C) (Oral)   Ht 1.575 m (5' 2\")   Wt 97.5 kg (215 lb)   SpO2 98%   Breastfeeding? No   BMI 39.32 kg/m      GENERAL APPEARANCE: alert and no distress     EYES: EOMI, PERRL     HENT: ear canals and TM's normal and nose and mouth without ulcers or lesions     NECK: no adenopathy, no asymmetry, masses, or scars and thyroid normal to palpation     RESP: lungs clear to auscultation - no rales, rhonchi or wheezes     CV: irregularly irregular, heart rate is well controlled     ABDOMEN:  soft, nontender, no HSM or masses and bowel sounds normal     MS: extremities normal- no gross deformities noted, no evidence of inflammation in joints, FROM in all extremities.     SKIN: no suspicious lesions or rashes     NEURO: Normal strength and tone, sensory exam grossly normal, mentation intact and speech normal     PSYCH: mentation appears normal. and affect normal/bright     LYMPHATICS: No cervical adenopathy    DIAGNOSTICS:   EKG 2019: Atrial fibrillation  -irregular conduction   -Nonspecific QRS widening and anterior fascicular block.    -  Nonspecific T-abnormality.       Recent Labs   Lab Test 19  0959 19  1115 04/15/19  0700  10/25/18  0759  12/04/15  0745  11  0641   HGB  --  12.0 12.2   < > 11.4*   < >  --    < > 12.5   PLT  --  130* 75*   < > 215   < > 90*   < > 107*   INR  --   --  1.05  --   --   --  0.95  --   --     140 141   < >  --    < " >  --    < >  --    POTASSIUM 4.3 4.0 4.1   < >  --    < >  --    < > 4.4   CR 0.95 0.91 0.73   < >  --    < >  --    < > 0.68   A1C  --   --   --   --  5.1  --   --   --  5.8    < > = values in this interval not displayed.        IMPRESSION:   Reason for surgery/procedure: colon cancer screening.  Diagnosis/reason for consult: pre op cardiac evaluation for upcoming colonoscopy that's scheduled for 6/28/2019 for colon cancer screening with a history of colon polyps.    The proposed surgical procedure is considered LOW risk.    REVISED CARDIAC RISK INDEX  The patient has the following serious cardiovascular risks for perioperative complications such as (MI, PE, VFib and 3  AV Block):  No serious cardiac risks  INTERPRETATION: 0 risks: Class I (very low risk - 0.4% complication rate)    The patient has the following additional risks for perioperative complications:  No identified additional risks      ICD-10-CM    1. Preop general physical exam Z01.818    2. Special screening for malignant neoplasms, colon Z12.11    3. History of colonic polyps Z86.010        RECOMMENDATIONS:     --Patient is to take all scheduled medications on the day of surgery EXCEPT for modifications listed below.    Anticoagulant or Antiplatelet Medication Use  Patient is advised to hold her Eliquis long term anticoagulation therapy medication for 3 days before the colonoscopy.        APPROVAL GIVEN to proceed with proposed procedure, without further diagnostic evaluation       Signed Electronically by: Carissa Burroughs MD    Copy of this evaluation report is provided to requesting physician.    [place] Preop Guidelines    Revised Cardiac Risk Index

## 2019-06-21 DIAGNOSIS — R06.09 DYSPNEA ON EXERTION: ICD-10-CM

## 2019-06-21 DIAGNOSIS — I27.20 PULMONARY HYPERTENSION (H): ICD-10-CM

## 2019-06-21 LAB
ANION GAP SERPL CALCULATED.3IONS-SCNC: 13.2 MMOL/L (ref 6–17)
BUN SERPL-MCNC: 18 MG/DL (ref 7–30)
CALCIUM SERPL-MCNC: 10.3 MG/DL (ref 8.5–10.5)
CHLORIDE SERPL-SCNC: 102 MMOL/L (ref 98–107)
CO2 SERPL-SCNC: 28 MMOL/L (ref 23–29)
CREAT SERPL-MCNC: 0.9 MG/DL (ref 0.7–1.3)
GFR SERPL CREATININE-BSD FRML MDRD: 60 ML/MIN/{1.73_M2}
GLUCOSE SERPL-MCNC: 90 MG/DL (ref 70–105)
POTASSIUM SERPL-SCNC: 4.2 MMOL/L (ref 3.5–5.1)
SODIUM SERPL-SCNC: 139 MMOL/L (ref 136–145)

## 2019-06-21 PROCEDURE — 36415 COLL VENOUS BLD VENIPUNCTURE: CPT | Performed by: INTERNAL MEDICINE

## 2019-06-21 PROCEDURE — 80048 BASIC METABOLIC PNL TOTAL CA: CPT | Performed by: INTERNAL MEDICINE

## 2019-06-24 ENCOUNTER — OFFICE VISIT (OUTPATIENT)
Dept: FAMILY MEDICINE | Facility: CLINIC | Age: 79
End: 2019-06-24
Payer: MEDICARE

## 2019-06-24 VITALS
HEIGHT: 62 IN | OXYGEN SATURATION: 96 % | BODY MASS INDEX: 40.43 KG/M2 | HEART RATE: 80 BPM | SYSTOLIC BLOOD PRESSURE: 101 MMHG | WEIGHT: 219.7 LBS | TEMPERATURE: 97.1 F | DIASTOLIC BLOOD PRESSURE: 66 MMHG

## 2019-06-24 DIAGNOSIS — D36.9 ADENOMATOUS POLYP: Primary | ICD-10-CM

## 2019-06-24 DIAGNOSIS — J84.9 ILD (INTERSTITIAL LUNG DISEASE) (H): ICD-10-CM

## 2019-06-24 DIAGNOSIS — I10 BENIGN ESSENTIAL HYPERTENSION: ICD-10-CM

## 2019-06-24 DIAGNOSIS — I48.19 PERSISTENT ATRIAL FIBRILLATION (H): ICD-10-CM

## 2019-06-24 PROCEDURE — 99214 OFFICE O/P EST MOD 30 MIN: CPT | Performed by: INTERNAL MEDICINE

## 2019-06-24 RX ORDER — ALBUTEROL SULFATE 90 UG/1
2 AEROSOL, METERED RESPIRATORY (INHALATION) EVERY 6 HOURS
Qty: 18 G | Refills: 3 | Status: SHIPPED | OUTPATIENT
Start: 2019-06-24 | End: 2019-07-26

## 2019-06-24 RX ORDER — ACETAMINOPHEN 500 MG
1000 TABLET ORAL 2 TIMES DAILY PRN
COMMUNITY

## 2019-06-24 ASSESSMENT — MIFFLIN-ST. JEOR: SCORE: 1424.8

## 2019-06-24 NOTE — PROGRESS NOTES
Chief Complaint:       Jaqueline Canales is a 78 year old female who presents to clinic today for the following health issues:    Follow up on multiple concerns including hypertension, colon polyp    HPI:   Patient Jaqueline Canales is a very pleasant 78 year old female with history of hypertension, colon polyps who presents to Internal Medicine clinic today for follow up on multiple concerns including hypertension, colon polyps. Regarding the patient's hypertension, the patient is compliant with her BP medication regimen and her BP is currently well controlled. Regarding her colon polyps, the patient is here to review colonoscopy orders scheduled on 6-. Patient will start holding her Eliquis long term anticoagulation therapy starting today prior to her upcoming colonoscopy procedure this Friday on 6/28/2019. No chest pain, headaches, fever or chills at this time.       Current Medications:     Current Outpatient Medications   Medication Sig Dispense Refill     albuterol (VENTOLIN HFA) 108 (90 Base) MCG/ACT inhaler Inhale 2 puffs into the lungs every 6 hours 18 g 3     Bacillus Coagulans-Inulin (PROBIOTIC-PREBIOTIC PO)        Calcium Citrate-Vitamin D (CITRACAL + D PO) Take 2 tablets by mouth 2 times daily        folic acid (FOLVITE) 1 MG tablet Take 1 tablet (1,000 mcg) by mouth daily 90 tablet 3     furosemide (LASIX) 20 MG tablet Take 1 tablet by mouth every Tuesday, Thursday and Saturday 40 tablet 3     InFLIXimab (REMICADE IV) Inject 600 mg into the vein Every 8 weeks       levothyroxine (SYNTHROID/LEVOTHROID) 75 MCG tablet Take 1 tablet (75 mcg) by mouth daily 90 tablet 3     MELATONIN PO Take 5 mg by mouth nightly as needed       Methotrexate Sodium (METHOTREXATE PO) Take 2.5 mg by mouth 10 tablets weekly       metoprolol succinate ER (TOPROL-XL) 25 MG 24 hr tablet Take 1 tablet (25 mg) by mouth daily 90 tablet 2     Multiple Vitamins-Minerals (ICAPS) CAPS Take 1 capsule by mouth 2 times daily         NIFEDICAL XL 60 MG PO TB24 1 TABLET DAILY       omeprazole (PRILOSEC) 20 MG DR capsule Take 1 capsule (20 mg) by mouth 2 times daily Take 30-60 minutes prior to meals 180 capsule 2     simvastatin (ZOCOR) 40 MG tablet Take 1 tablet (40 mg) by mouth At Bedtime 90 tablet 2     VENTOLIN  (90 Base) MCG/ACT inhaler Inhale 2 puffs into the lungs every 6 hours 18 g 11     acetaminophen (TYLENOL) 500 MG tablet Take 500-1,000 mg by mouth every 6 hours as needed for mild pain       apixaban ANTICOAGULANT (ELIQUIS) 5 MG tablet Take 1 tablet (5 mg) by mouth 2 times daily (Patient not taking: Reported on 6/24/2019) 180 tablet 2     CENTRUM SILVER OR TABS 1 TABLET DAILY (Patient not taking: Reported on 6/24/2019)           Allergies:      Allergies   Allergen Reactions     No Known Allergies             Past Medical History:     Past Medical History:   Diagnosis Date     Amaurosis fugax 5-11    Right     Carotid artery stenosis      Esophageal reflux 3/11/2004     HELICOBACTER PYLORI INFECTION 7/11/2006     hx of CA in situ RT breast-1990.mastectomy 4/17/2007     Hyperlipidemia LDL goal <70 6/20/2011     Lung cancer (H)     squamous cell lung ca left lower lobe     OBESITY NOS 3/11/2004     OSTEOPOROSIS NOS 3/11/2004     Other psoriasis 3/11/2004     RA (rheumatoid arthritis) (H) 2/8/2010     Raynaud's syndrome 4/10/2006     Scleroderma (H)      Sleep apnea     CPAP         Past Surgical History:     Past Surgical History:   Procedure Laterality Date     BREAST SURGERY       C NONSPECIFIC PROCEDURE  1990    mastectomy RT breast     COLONOSCOPY  5/24/16     CONIZATION       CV RIGHT HEART CATH N/A 4/15/2019    Procedure: Heart Cath Right Heart Cath;  Surgeon: Naresh Cyr MD;  Location:  HEART CARDI[facility] CATH LAB     ENDARTERECTOMY CAROTID  2011     HC COLONOSCOPY THRU STOMA, DIAGNOSTIC  2001 2011    diverticulosis     REPAIR HAMMER TOE       TONSILLECTOMY           Family Medical History:     Family History    Problem Relation Age of Onset     Cancer - colorectal Mother      Cerebrovascular Disease Father      Cancer Sister 31        ovarian     Heart Disease Sister      Gastrointestinal Disease Sister         crohns     Gastrointestinal Disease Sister         diverticulitis     Gastrointestinal Disease Brother         diverticulitis     Alzheimer Disease Sister      Cerebrovascular Disease Maternal Grandmother      Diabetes Maternal Grandmother      Mental Illness Maternal Grandmother      Diabetes Maternal Uncle      Cancer Nephew                  Social History:     Social History     Socioeconomic History     Marital status:      Spouse name: Not on file     Number of children: Not on file     Years of education: Not on file     Highest education level: Not on file   Occupational History     Not on file   Social Needs     Financial resource strain: Not on file     Food insecurity:     Worry: Not on file     Inability: Not on file     Transportation needs:     Medical: Not on file     Non-medical: Not on file   Tobacco Use     Smoking status: Former Smoker     Packs/day: 1.00     Years: 30.00     Pack years: 30.00     Types: Cigarettes     Start date:      Last attempt to quit: 3/11/1992     Years since quittin.3     Smokeless tobacco: Never Used   Substance and Sexual Activity     Alcohol use: Not Currently     Alcohol/week: 0.0 oz     Frequency: Never     Drug use: No     Sexual activity: Not Currently     Partners: Male   Lifestyle     Physical activity:     Days per week: Not on file     Minutes per session: Not on file     Stress: Not on file   Relationships     Social connections:     Talks on phone: Not on file     Gets together: Not on file     Attends [employer] service: Not on file     Active member of club or organization: Not on file     Attends meetings of clubs or organizations: Not on file     Relationship status: Not on file     Intimate partner violence:     Fear of current or  "ex partner: Not on file     Emotionally abused: Not on file     Physically abused: Not on file     Forced sexual activity: Not on file   Other Topics Concern     Parent/sibling w/ CABG, MI or angioplasty before 65F 55M? No   Social History Narrative     Not on file           Review of System:     Constitutional: Negative for fever or chills  Skin: Negative for rashes  Ears/Nose/Throat: Negative for nasal congestion, sore throat  Respiratory: No shortness of breath, dyspnea on exertion, cough, or hemoptysis  Cardiovascular: Negative for chest pain, positive for chronic atrial fibrillation  Gastrointestinal: Negative for nausea, vomiting, positive for known history of adenomatous polyp  Genitourinary: Negative for dysuria, hematuria  Musculoskeletal: Negative for myalgias  Neurologic: Negative for headaches  Psychiatric: Negative for depression, anxiety  Hematologic/Lymphatic/Immunologic: Negative  Endocrine: Negative  Behavioral: Negative for tobacco use       Physical Exam:   /66 (BP Location: Left arm, Cuff Size: Adult Large)   Pulse 80   Temp 97.1  F (36.2  C) (Oral)   Ht 1.575 m (5' 2\")   Wt 99.7 kg (219 lb 11.2 oz)   SpO2 96%   BMI 40.18 kg/m      GENERAL: alert and no distress  EYES: eyes grossly normal to inspection, and conjunctivae and sclerae normal  HENT: Normocephalic atraumatic. Nose and mouth without ulcers or lesions  NECK: supple  RESP: lungs clear to auscultation   CV: irregularly irregular, heart rate is well controlled  LYMPH: no peripheral edema   ABDOMEN: nondistended  MS: no gross musculoskeletal defects noted  SKIN: no suspicious lesions or rashes  NEURO: Alert & Oriented x 3.   PSYCH: mentation appears normal, affect normal        Diagnostic Test Results:     Diagnostic Test Results:  Results for orders placed or performed in visit on 06/21/19   Basic metabolic panel   Result Value Ref Range    Sodium 139 136 - 145 mmol/L    Potassium 4.2 3.5 - 5.1 mmol/L    Chloride 102 98 - 107 " mmol/L    Carbon Dioxide 28 23 - 29 mmol/L    Anion Gap 13.2 6 - 17 mmol/L    Glucose 90 70 - 105 mg/dL    Urea Nitrogen 18 7 - 30 mg/dL    Creatinine 0.90 0.70 - 1.30 mg/dL    GFR Estimate 60 (L) >60 mL/min/[1.73_m2]    GFR Estimate If Black 73 >60 mL/min/[1.73_m2]    Calcium 10.3 8.5 - 10.5 mg/dL       ASSESSMENT/PLAN:     (D36.9) Adenomatous polyp (primary encounter diagnosis)  Comment:  history of adenomatous polyp  Plan: patient is scheduled for colonoscopy on 6/28/2019.    (I48.1) Persistent atrial fibrillation (H)  Comment: heart rate is well controlled. No chest pains.  Plan: patient is advised to start holding her long term anticoagulation therapy with Eliquis today in anticipation of her upcoming colonoscopy    (I10) Benign essential hypertension  Comment: BP is well controlled  Plan: continue current BP medication regimen.    (J84.9) ILD (interstitial lung disease) (H)  Comment: no cough or wheezing. Patient is due for a refill of albuterol inhaler medication.  Plan: albuterol (VENTOLIN HFA) 108 (90 Base) MCG/ACT         inhaler    Follow Up Plan:     Patient is instructed to return to Internal Medicine clinic for follow-up visit in 2 months.        Carissa Burroughs MD  Internal Medicine  [facility]

## 2019-06-28 ENCOUNTER — HOSPITAL ENCOUNTER (OUTPATIENT)
Facility: CLINIC | Age: 79
Discharge: HOME | End: 2019-06-28
Attending: INTERNAL MEDICINE | Admitting: INTERNAL MEDICINE
Payer: MEDICARE

## 2019-06-28 ENCOUNTER — ANESTHESIA EVENT (OUTPATIENT)
Dept: GASTROENTEROLOGY | Facility: CLINIC | Age: 79
End: 2019-06-28
Payer: MEDICARE

## 2019-06-28 ENCOUNTER — TRANSFERRED RECORDS (OUTPATIENT)
Dept: HEALTH INFORMATION MANAGEMENT | Facility: CLINIC | Age: 79
End: 2019-06-28

## 2019-06-28 ENCOUNTER — ANESTHESIA (OUTPATIENT)
Dept: GASTROENTEROLOGY | Facility: CLINIC | Age: 79
End: 2019-06-28
Payer: MEDICARE

## 2019-06-28 VITALS
SYSTOLIC BLOOD PRESSURE: 121 MMHG | DIASTOLIC BLOOD PRESSURE: 69 MMHG | OXYGEN SATURATION: 98 % | WEIGHT: 220 LBS | BODY MASS INDEX: 40.48 KG/M2 | HEIGHT: 62 IN | RESPIRATION RATE: 18 BRPM | HEART RATE: 79 BPM

## 2019-06-28 DIAGNOSIS — I27.20 PULMONARY HYPERTENSION (H): ICD-10-CM

## 2019-06-28 LAB — COLONOSCOPY: NORMAL

## 2019-06-28 PROCEDURE — 40000010 ZZH STATISTIC ANES STAT CODE-CRNA PER MINUTE: Performed by: INTERNAL MEDICINE

## 2019-06-28 PROCEDURE — 93005 ELECTROCARDIOGRAM TRACING: CPT

## 2019-06-28 PROCEDURE — 25800030 ZZH RX IP 258 OP 636: Performed by: NURSE ANESTHETIST, CERTIFIED REGISTERED

## 2019-06-28 PROCEDURE — 88305 TISSUE EXAM BY PATHOLOGIST: CPT | Performed by: INTERNAL MEDICINE

## 2019-06-28 PROCEDURE — 45380 COLONOSCOPY AND BIOPSY: CPT | Performed by: INTERNAL MEDICINE

## 2019-06-28 PROCEDURE — 25000128 H RX IP 250 OP 636: Performed by: ANESTHESIOLOGY

## 2019-06-28 PROCEDURE — 25000128 H RX IP 250 OP 636: Performed by: NURSE ANESTHETIST, CERTIFIED REGISTERED

## 2019-06-28 PROCEDURE — 37000008 ZZH ANESTHESIA TECHNICAL FEE, 1ST 30 MIN: Performed by: INTERNAL MEDICINE

## 2019-06-28 PROCEDURE — 45385 COLONOSCOPY W/LESION REMOVAL: CPT | Mod: PT | Performed by: INTERNAL MEDICINE

## 2019-06-28 PROCEDURE — 37000009 ZZH ANESTHESIA TECHNICAL FEE, EACH ADDTL 15 MIN: Performed by: INTERNAL MEDICINE

## 2019-06-28 PROCEDURE — 88305 TISSUE EXAM BY PATHOLOGIST: CPT | Mod: 26,76 | Performed by: INTERNAL MEDICINE

## 2019-06-28 PROCEDURE — 25000125 ZZHC RX 250: Performed by: NURSE ANESTHETIST, CERTIFIED REGISTERED

## 2019-06-28 PROCEDURE — 93010 ELECTROCARDIOGRAM REPORT: CPT | Performed by: INTERNAL MEDICINE

## 2019-06-28 PROCEDURE — 40000065 ZZH STATISTIC EKG NON-CHARGEABLE

## 2019-06-28 RX ORDER — HYDROMORPHONE HYDROCHLORIDE 1 MG/ML
.3-.5 INJECTION, SOLUTION INTRAMUSCULAR; INTRAVENOUS; SUBCUTANEOUS EVERY 10 MIN PRN
Status: DISCONTINUED | OUTPATIENT
Start: 2019-06-28 | End: 2019-07-03 | Stop reason: HOSPADM

## 2019-06-28 RX ORDER — PROPOFOL 10 MG/ML
INJECTION, EMULSION INTRAVENOUS CONTINUOUS PRN
Status: DISCONTINUED | OUTPATIENT
Start: 2019-06-28 | End: 2019-06-28

## 2019-06-28 RX ORDER — ONDANSETRON 2 MG/ML
4 INJECTION INTRAMUSCULAR; INTRAVENOUS EVERY 6 HOURS PRN
Status: DISCONTINUED | OUTPATIENT
Start: 2019-06-28 | End: 2019-07-03 | Stop reason: HOSPADM

## 2019-06-28 RX ORDER — NALOXONE HYDROCHLORIDE 0.4 MG/ML
.1-.4 INJECTION, SOLUTION INTRAMUSCULAR; INTRAVENOUS; SUBCUTANEOUS
Status: DISCONTINUED | OUTPATIENT
Start: 2019-06-28 | End: 2019-06-29 | Stop reason: HOSPADM

## 2019-06-28 RX ORDER — SODIUM CHLORIDE, SODIUM LACTATE, POTASSIUM CHLORIDE, CALCIUM CHLORIDE 600; 310; 30; 20 MG/100ML; MG/100ML; MG/100ML; MG/100ML
INJECTION, SOLUTION INTRAVENOUS CONTINUOUS PRN
Status: DISCONTINUED | OUTPATIENT
Start: 2019-06-28 | End: 2019-06-28

## 2019-06-28 RX ORDER — FENTANYL CITRATE 50 UG/ML
25-50 INJECTION, SOLUTION INTRAMUSCULAR; INTRAVENOUS
Status: DISCONTINUED | OUTPATIENT
Start: 2019-06-28 | End: 2019-07-03 | Stop reason: HOSPADM

## 2019-06-28 RX ORDER — ONDANSETRON 2 MG/ML
4 INJECTION INTRAMUSCULAR; INTRAVENOUS EVERY 30 MIN PRN
Status: DISCONTINUED | OUTPATIENT
Start: 2019-06-28 | End: 2019-07-03 | Stop reason: HOSPADM

## 2019-06-28 RX ORDER — LIDOCAINE 40 MG/G
CREAM TOPICAL
Status: DISCONTINUED | OUTPATIENT
Start: 2019-06-28 | End: 2019-07-03 | Stop reason: HOSPADM

## 2019-06-28 RX ORDER — ONDANSETRON 4 MG/1
4 TABLET, ORALLY DISINTEGRATING ORAL EVERY 30 MIN PRN
Status: DISCONTINUED | OUTPATIENT
Start: 2019-06-28 | End: 2019-07-03 | Stop reason: HOSPADM

## 2019-06-28 RX ORDER — POTASSIUM CHLORIDE 1500 MG/1
20 TABLET, EXTENDED RELEASE ORAL
Status: DISCONTINUED | OUTPATIENT
Start: 2019-06-28 | End: 2019-07-03 | Stop reason: HOSPADM

## 2019-06-28 RX ORDER — ONDANSETRON 2 MG/ML
4 INJECTION INTRAMUSCULAR; INTRAVENOUS
Status: DISCONTINUED | OUTPATIENT
Start: 2019-06-28 | End: 2019-07-03 | Stop reason: HOSPADM

## 2019-06-28 RX ORDER — LORAZEPAM 2 MG/ML
0.5 INJECTION INTRAMUSCULAR
Status: DISCONTINUED | OUTPATIENT
Start: 2019-06-28 | End: 2019-07-03 | Stop reason: HOSPADM

## 2019-06-28 RX ORDER — LORAZEPAM 0.5 MG/1
0.5 TABLET ORAL
Status: DISCONTINUED | OUTPATIENT
Start: 2019-06-28 | End: 2019-07-03 | Stop reason: HOSPADM

## 2019-06-28 RX ORDER — LIDOCAINE HYDROCHLORIDE 20 MG/ML
INJECTION, SOLUTION INFILTRATION; PERINEURAL PRN
Status: DISCONTINUED | OUTPATIENT
Start: 2019-06-28 | End: 2019-06-28

## 2019-06-28 RX ORDER — FLUMAZENIL 0.1 MG/ML
0.2 INJECTION, SOLUTION INTRAVENOUS
Status: DISCONTINUED | OUTPATIENT
Start: 2019-06-28 | End: 2019-06-28 | Stop reason: HOSPADM

## 2019-06-28 RX ORDER — PROPOFOL 10 MG/ML
INJECTION, EMULSION INTRAVENOUS PRN
Status: DISCONTINUED | OUTPATIENT
Start: 2019-06-28 | End: 2019-06-28

## 2019-06-28 RX ORDER — SODIUM CHLORIDE, SODIUM LACTATE, POTASSIUM CHLORIDE, CALCIUM CHLORIDE 600; 310; 30; 20 MG/100ML; MG/100ML; MG/100ML; MG/100ML
INJECTION, SOLUTION INTRAVENOUS CONTINUOUS
Status: DISCONTINUED | OUTPATIENT
Start: 2019-06-28 | End: 2019-07-03 | Stop reason: HOSPADM

## 2019-06-28 RX ORDER — MEPERIDINE HYDROCHLORIDE 25 MG/ML
12.5 INJECTION INTRAMUSCULAR; INTRAVENOUS; SUBCUTANEOUS
Status: DISCONTINUED | OUTPATIENT
Start: 2019-06-28 | End: 2019-07-03 | Stop reason: HOSPADM

## 2019-06-28 RX ORDER — ONDANSETRON 4 MG/1
4 TABLET, ORALLY DISINTEGRATING ORAL EVERY 6 HOURS PRN
Status: DISCONTINUED | OUTPATIENT
Start: 2019-06-28 | End: 2019-07-03 | Stop reason: HOSPADM

## 2019-06-28 RX ORDER — DOCUSATE SODIUM 100 MG/1
100 CAPSULE, LIQUID FILLED ORAL PRN
COMMUNITY
End: 2019-11-25

## 2019-06-28 RX ORDER — SODIUM CHLORIDE 9 MG/ML
INJECTION, SOLUTION INTRAVENOUS CONTINUOUS
Status: DISCONTINUED | OUTPATIENT
Start: 2019-06-28 | End: 2019-07-03 | Stop reason: HOSPADM

## 2019-06-28 RX ADMIN — ONDANSETRON 4 MG: 2 INJECTION INTRAMUSCULAR; INTRAVENOUS at 08:22

## 2019-06-28 RX ADMIN — DEXMEDETOMIDINE HYDROCHLORIDE 8 MCG: 100 INJECTION, SOLUTION INTRAVENOUS at 08:18

## 2019-06-28 RX ADMIN — DEXMEDETOMIDINE HYDROCHLORIDE 4 MCG: 100 INJECTION, SOLUTION INTRAVENOUS at 08:20

## 2019-06-28 RX ADMIN — PROPOFOL 30 MG: 10 INJECTION, EMULSION INTRAVENOUS at 08:21

## 2019-06-28 RX ADMIN — LIDOCAINE HYDROCHLORIDE 60 MG: 20 INJECTION, SOLUTION INFILTRATION; PERINEURAL at 08:15

## 2019-06-28 RX ADMIN — SODIUM CHLORIDE, POTASSIUM CHLORIDE, SODIUM LACTATE AND CALCIUM CHLORIDE: 600; 310; 30; 20 INJECTION, SOLUTION INTRAVENOUS at 08:08

## 2019-06-28 RX ADMIN — PROPOFOL 100 MCG/KG/MIN: 10 INJECTION, EMULSION INTRAVENOUS at 08:15

## 2019-06-28 RX ADMIN — DEXMEDETOMIDINE HYDROCHLORIDE 8 MCG: 100 INJECTION, SOLUTION INTRAVENOUS at 08:15

## 2019-06-28 ASSESSMENT — LIFESTYLE VARIABLES: TOBACCO_USE: 1

## 2019-06-28 ASSESSMENT — MIFFLIN-ST. JEOR: SCORE: 1426.16

## 2019-06-28 ASSESSMENT — ENCOUNTER SYMPTOMS: DYSRHYTHMIAS: 1

## 2019-06-28 NOTE — ANESTHESIA CARE TRANSFER NOTE
Patient: Jaqueline Canales    Procedure(s):  COLONOSCOPY, FLEXIBLE, WITH LESION REMOVAL USING SNARE  Colonoscopy, With Polypectomy And Biopsy    Diagnosis: FAMILY HISTORY OF COLON CANCER  Diagnosis Additional Information: No value filed.    Anesthesia Type:   MAC     Note:  Airway :Nasal Cannula  Patient transferred to:PACU  Handoff Report: Identifed the Patient, Identified the Reponsible Provider, Reviewed the pertinent medical history, Discussed the surgical course, Reviewed Intra-OP anesthesia mangement and issues during anesthesia, Set expectations for post-procedure period and Allowed opportunity for questions and acknowledgement of understanding      Vitals: (Last set prior to Anesthesia Care Transfer)    CRNA VITALS  6/28/2019 0813 - 6/28/2019 0846      6/28/2019             NIBP:  86/66  (Abnormal)     NIBP Mean:  74                Electronically Signed By: AUBRIE Lyons CRNA  June 28, 2019  8:46 AM

## 2019-06-28 NOTE — ANESTHESIA POSTPROCEDURE EVALUATION
Patient: Jaqueline Canales    Procedure(s):  COLONOSCOPY, FLEXIBLE, WITH LESION REMOVAL USING SNARE  Colonoscopy, With Polypectomy And Biopsy    Diagnosis:FAMILY HISTORY OF COLON CANCER  Diagnosis Additional Information: No value filed.    Anesthesia Type:  MAC    Note:  Anesthesia Post Evaluation    Patient location during evaluation: bedside  Patient participation: Able to fully participate in evaluation  Level of consciousness: awake  Pain management: adequate  Airway patency: patent  Cardiovascular status: acceptable  Respiratory status: acceptable  Hydration status: acceptable  PONV: none     Anesthetic complications: None          Last vitals:  Vitals:    06/28/19 0920 06/28/19 0930 06/28/19 0938   BP: 115/64 110/71 121/69   Pulse: 82 68 79   Resp:      SpO2: 98% (!) 89% 98%         Electronically Signed By: Chava Valencia DO, DO  June 28, 2019  12:58 PM

## 2019-06-29 LAB — INTERPRETATION ECG - MUSE: NORMAL

## 2019-07-01 LAB — COPATH REPORT: NORMAL

## 2019-07-13 NOTE — PROGRESS NOTES
Natalie Bustillos M.D.  [facility]ritis [facility] Rheumat[facility]    Jeffy Burroughs M.D      The patient has scleroderma and ILD and was incidentally found to have SCC of left lower lobe that was treated with radiation therapy.  She is clinically stable and has no interim history of right heart failure.  Priviously an echocardiogram disclosed elevated, estimated PA pressure.  She has no significant functional limitation at this time.  We discussed the nature of right heart catherization and medication for PH; and for the moment, in the absence of symptoms, she wishes to defer diagnostic and therapeutic intervention.    Wt Readings from Last 24 Encounters:   07/16/19 96.2 kg (212 lb)   06/28/19 99.8 kg (220 lb)   06/24/19 99.7 kg (219 lb 11.2 oz)   06/13/19 97.5 kg (215 lb)   05/29/19 99.3 kg (219 lb)   05/24/19 100.7 kg (222 lb 0.1 oz)   05/21/19 100.7 kg (222 lb)   05/15/19 101.1 kg (222 lb 12.8 oz)   05/01/19 100.8 kg (222 lb 3.2 oz)   04/16/19 101.7 kg (224 lb 1.6 oz)   04/15/19 101.6 kg (224 lb)   04/02/19 99.8 kg (220 lb)   02/13/19 99.8 kg (220 lb)   01/24/19 99.8 kg (220 lb)   01/15/19 98.9 kg (218 lb)   11/15/18 98.9 kg (218 lb)   10/08/18 99.3 kg (219 lb)   09/13/18 100.2 kg (221 lb)   05/30/18 100.2 kg (221 lb)   05/23/18 100.6 kg (221 lb 12.8 oz)   01/31/18 105.3 kg (232 lb 3.2 oz)   11/15/17 104.3 kg (230 lb)   11/07/17 103.9 kg (229 lb)   10/19/17 107.8 kg (237 lb 9.6 oz)             Current Outpatient Medications   Medication     acetaminophen (TYLENOL) 500 MG tablet     albuterol (VENTOLIN HFA) 108 (90 Base) MCG/ACT inhaler     apixaban ANTICOAGULANT (ELIQUIS) 5 MG tablet     Bacillus Coagulans-Inulin (PROBIOTIC-PREBIOTIC PO)     Calcium Citrate-Vitamin D (CITRACAL + D PO)     CENTRUM SILVER OR TABS     docusate sodium (COLACE) 100 MG capsule     folic acid (FOLVITE) 1 MG tablet     furosemide (LASIX) 20 MG tablet     InFLIXimab (REMICADE IV)     levothyroxine (SYNTHROID/LEVOTHROID) 75 MCG tablet     MELATONIN PO      Methotrexate Sodium (METHOTREXATE PO)     metoprolol succinate ER (TOPROL-XL) 25 MG 24 hr tablet     Multiple Vitamins-Minerals (ICAPS) CAPS     NIFEDICAL XL 60 MG PO TB24     omeprazole (PRILOSEC) 20 MG DR capsule     simvastatin (ZOCOR) 40 MG tablet     VENTOLIN  (90 Base) MCG/ACT inhaler     No current facility-administered medications for this visit.           REVIEW of SYMPTOMS    Constitutional: without fever, chills, night sweats.  Weight is ___  HEENT: without dry eyes, dry mouth, sinusitis, corryza, visual changes  Endocrine: without polyuria, polydypsia, polyphagia, heat or cold intolerance, changing mental status  Cardiology: without chest pain, tightness, heaviness, pressure, paroxysmal dyspnea, orthopnea, palpitation, pre-syncope or syncope or device discharge (if present)  Pulmonary: without asthma, wheezing, cough, hemoptysis  GI: without nausea, emesis, jaundice, pain, hematemesis, melena  : without frequency, urgency, hematuria, stones, pain, abnormal bleeding, frequency, urgency  Neurologic: without TIA, CVA, trauma, seizure  Dermatologic: without lesions, abrasion rash,   Orthopedic/Rheum: without significant joint pain, impairment, limb, polyserositis, ulceration, Raynauds  Heme: without mass, bruising, frequent infection, anemia  Psychiatric: without substance abuse, hallucination, medication, depression    Exercise tolerance:    PERRLA, EOM full, normal gait and station, normal mentation, skin without lesions, chest is clear, no evidence of right or left ventricular overactivity, sternum stable, no carotid bruits, regular rhythm, S1, S2, no murmurs, abdomen without tenderness, rebound guarding or masses, no edema, no focal neurologic      Conclusion     1) Elevated biventricular filling pressures   2) Moderate to severe mostly primary pulmonary hypertension  3) Improvement in hemodynamics with nitric oxide; however, with no improvement in mean PA pressure         CV Right/Left Heart  Cath     Right Heart Pressures     Systemic blood pressure 140-150 throughout the case.    BASELINE:  1) Elevated right-sided filling pressures (mean RA 16 mmHg)  2) Moderate to severe pulmonary hypertension (mean PA 35 mmHg) secondary to intrinsic pulmonary vascular disease (PVRI 14 rod*m2) and elevated left-sided filling pressures (PCWP 20 mmHg).  3) O2 sats at baseline 93%  4) Mildly depressed CO/CI: 3.8/1.9    NO 80 PPM:  1) Pulmonary artery pressures did not change (mean PA 34 mmHg)  2) O2 sats improved to 100%  3) CO/CI improved 4.2/2.1 Right sided filling pressures are severely elevated.Left sided filling pressures are moderately elevated. Severely elevated pulmonary artery hypertension.Left ventricular filling pressures are moderately elevated .Normal cardiac output level.   Pressures      Time Systolic Diastolic Mean A Wave V Wave EDP Max dp/dt HR   RA Pressures  9:01 AM   16 mmHg    14 mmHg    22 mmHg      87 bpm      RV Pressures  9:01 AM 52 mmHg    5 mmHg       15 mmHg     86 bpm      PA Pressures  9:02 AM 51 mmHg    26 mmHg    35 mmHg        76 bpm      PCW Pressures  9:02 AM   18 mmHg    23 mmHg    24 mmHg      72 bpm      AO Pressures  9:03  mmHg    87 mmHg    111 mmHg        82 bpm      Pressures Phase: Drug Study Level 1      Time Systolic Diastolic Mean A Wave V Wave EDP Max dp/dt HR   PA Pressures  9:11 AM 48 mmHg    26 mmHg    34 mmHg        77 bpm      Pressures Phase: Drug Study Level 2      Time Systolic Diastolic Mean A Wave V Wave EDP Max dp/dt HR   PA Pressures  9:14 AM 52 mmHg    33 mmHg    36 mmHg        84 bpm      PCW Pressures  9:14 AM   22 mmHg    26 mmHg    23 mmHg      79 bpm      AO Pressures  9:11  mmHg    67 mmHg    97 mmHg        80 bpm      Blood Flow Results Phase: Drug Study Level 2      Time Results Indexed Values   QP  9:11 AM 4.19 L/min    2.1 L/min/m2      QS  9:11 AM 4.19 L/min    2.1 L/min/m2      Blood Oximetry Phase: Drug Study Level 2      Time Hb SAT(%)  PO2 Content PA Sat   Art  9:11 AM  100 %     18.22 mL/dL       Cardiac Output Phase: Drug Study Level 2      Time TDCO TDCI Josh C.O. Josh C.I. Josh HR   Cardiac Output Results  9:11 AM   4.19 L/min    2.1 L/min/m2       Resistance Results Phase: Drug Study Level 2      Time PVR SVR PVR-I SVR-I TPR TVR TPR-I TVR-I PVR/SVR TPR/TVR   Resistance Results (Metric)  9:11 .55 dsc-5     534.11 dsc-5/m2     687.98 dsc-5    1853.72 dsc-5    1373.43 dsc-5/m2    3700.63 dsc-5/m2     0.37      Resistance Results (Wood)  9:11 AM 3.35 ROQUE     6.68 ROQUE/m2     8.6 ROQUE    23.18 ROQUE    17.17                1/15/2019 1/24/2019   /77 112/63       CT CHEST WITHOUT CONTRAST December 27, 2016 11:40 AM       HISTORY: Follow up on right sided lung lesions. Malignant neoplasm of  lower lobe, left bronchus or lung.     COMPARISON: 10/22/2016.     TECHNIQUE: Volumetric helical acquisition of CT images of the chest  from the clavicles to the kidneys were acquired without IV contrast.  Radiation dose for this scan was reduced using automated exposure  control, adjustment of the mA and/or kV according to patient size, or  iterative reconstruction technique.     FINDINGS: Right-sided fibrotic changes appear stable. Somewhat nodular  density at the left base has resolved and may have been an area of  consolidation and/or active inflammation. Honeycombing and traction  bronchiectasis persist. Moderate hiatal hernia. No pleural or  pericardial effusion. Mild cardiomegaly. There are moderate coronary  vascular calcifications consistent with coronary artery disease. There  are mild atherosclerotic changes of the visualized aorta and its  branches. There is no evidence of aortic aneurysm. No acute findings  in the visualized upper abdomen.                                                                       IMPRESSION:  1. 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  2. The nodular  density at the left base has resolved  Davie Iyer MD 2016             Narrative           Interpretation Summary                            Name: GINA CMKEON  MRN: [id number]  : 1940  Study Date: 2018 09:55 AM  Age: 77 yrs  Gender: Female  [place] [facility] [place] Study: Dyspnea on exertion  History: Dyspnea on exertion  E  Referring Physician: THEO HUIZAR  Performed By: Sofía Braden RDCS     BSA: 2.0 m2  Height: 62 in  Weight: 221 lb  BP: 112/74 mmHg  _____________________________________________________________________________  __        Procedure  Echocardiogram with two-dimensional, color and spectral Doppler performed.  _____________________________________________________________________________  __        Interpretation Summary  Global and regional left ventricular function is normal with an EF of 55-60%.  Global right ventricular function is normal.Mild right ventricular dilation is  present.  Mild to moderate TR. Right ventricular systolic pressure is 49mmHg above the  right atrial pressure. Moderate (pulmonary artery systolic pressure 50-75mmHg)  pulmonary hypertension is present.  The inferior vena cava was normal in size with preserved respiratory  variability.Estimated mean right atrial pressure is 3 mmHg (normal).  No pericardial effusion is present.     This study was compared with the study from 10/16/17, no significant change .  _____________________________________________________________________________  __        Left Ventricle  Left ventricular wall thickness is normal. Left ventricular size is normal.  Global and regional left ventricular function is normal with an EF of 55-60%.  The Ejection Fraction was visually estimated. Diastolic function not assessed  due to significant mitral annular calcification. No regional wall motion  abnormalities are seen.     Right Ventricle  Global right ventricular function is normal. Mild right  ventricular dilation  is present.     Atria  Severe left atrial enlargement is present. Mild right atrial enlargement is  present.     Mitral Valve  Moderate mitral annular calcification is present. Mild mitral insufficiency is  present.        Aortic Valve  Trileaflet aortic sclerosis without stenosis.     Tricuspid Valve  Mild to moderate tricuspid insufficiency is present. Right ventricular  systolic pressure is 49mmHg above the right atrial pressure. Moderate  (pulmonary artery systolic pressure 50-75mmHg) pulmonary hypertension is  present.     Pulmonic Valve  Trace pulmonic insufficiency is present.     Vessels  The inferior vena cava was normal in size with preserved respiratory  variability. Visualized portions of the aorta are normal in size. Estimated  mean right atrial pressure is 3 mmHg (normal).     Pericardium  No pericardial effusion is present.        Compared to Previous Study  This study was compared with the study from 10/16/17, no significant change .     Attestation  I have personally viewed the imaging and agree with the interpretation and  report as documented by the fellow, Nupur Luz, and/or edited by me.  _____________________________________________________________________________  __     MMode/2D Measurements & Calculations  IVSd: 0.83 cm  LVIDd: 5.3 cm  LVIDs: 2.4 cm  LVPWd: 0.88 cm  FS: 54.8 %  LV mass(C)d: 162.3 grams  LV mass(C)dI: 81.4 grams/m2  Ao root diam: 2.6 cm  LA dimension: 5.7 cm  asc Aorta Diam: 3.5 cm  LA/Ao: 2.2  LVOT diam: 1.9 cm  LVOT area: 2.8 cm2  LA Volume (BP): 105.0 ml     LA Volume Index (BP): 52.8 ml/m2  RWT: 0.33        Doppler Measurements & Calculations  MV E max carrington: 88.3 cm/sec  MV A max carrington: 75.2 cm/sec  MV E/A: 1.2  MV dec time: 0.20 sec  Ao V2 max: 171.9 cm/sec  Ao max P.0 mmHg  Ao V2 mean: 134.9 cm/sec  Ao mean P.9 mmHg  Ao V2 VTI: 43.7 cm  JACOB(I,D): 1.9 cm2  JACOB(V,D): 1.9 cm2  LV V1 max P.6 mmHg  LV V1 max: 118.8 cm/sec  LV V1 VTI:  29.8 cm  SV(LVOT): 82.1 ml  SI(LVOT): 41.1 ml/m2  PA acc time: 0.05 sec  TR max el: 320.4 cm/sec  TR max P.4 mmHg  AV El Ratio (DI): 0.69  JACOB Index (cm2/m2): 0.94  E/E' av.0  Lateral E/e': 15.6     Medial E/e': 18.5     _____________________________________________________________________________  __       Discussion:    1. Permission is being secured to get Revatio for treating pulmonary hypertension.  This will be taken as one pill three times daily.    2. Increase furosemide to one 20mg pill daily.    3. See Dr. Burroughs in 7-10 days to have kidney function and minerals checked    4. See us one month after starting Revatio to look at adding second medication.      CC: Dr Manjeet Bustillos M.D.

## 2019-07-15 DIAGNOSIS — I27.20 PULMONARY HYPERTENSION (H): Primary | ICD-10-CM

## 2019-07-16 ENCOUNTER — OFFICE VISIT (OUTPATIENT)
Dept: CARDIOLOGY | Facility: CLINIC | Age: 79
End: 2019-07-16
Attending: INTERNAL MEDICINE
Payer: MEDICARE

## 2019-07-16 VITALS
SYSTOLIC BLOOD PRESSURE: 124 MMHG | DIASTOLIC BLOOD PRESSURE: 78 MMHG | HEART RATE: 71 BPM | BODY MASS INDEX: 39.01 KG/M2 | HEIGHT: 62 IN | WEIGHT: 212 LBS

## 2019-07-16 DIAGNOSIS — I27.20 PULMONARY HYPERTENSION (H): ICD-10-CM

## 2019-07-16 DIAGNOSIS — R06.09 DYSPNEA ON EXERTION: ICD-10-CM

## 2019-07-16 DIAGNOSIS — I10 BENIGN ESSENTIAL HYPERTENSION: ICD-10-CM

## 2019-07-16 LAB
ANION GAP SERPL CALCULATED.3IONS-SCNC: 11.8 MMOL/L (ref 6–17)
BUN SERPL-MCNC: 16 MG/DL (ref 7–30)
CALCIUM SERPL-MCNC: 10.2 MG/DL (ref 8.5–10.5)
CHLORIDE SERPL-SCNC: 103 MMOL/L (ref 98–107)
CO2 SERPL-SCNC: 26 MMOL/L (ref 23–29)
CREAT SERPL-MCNC: 0.88 MG/DL (ref 0.7–1.3)
ERYTHROCYTE [DISTWIDTH] IN BLOOD BY AUTOMATED COUNT: 16.3 % (ref 10–15)
GFR SERPL CREATININE-BSD FRML MDRD: 62 ML/MIN/{1.73_M2}
GLUCOSE SERPL-MCNC: 122 MG/DL (ref 70–105)
HCT VFR BLD AUTO: 36.5 % (ref 35–47)
HGB BLD-MCNC: 12 G/DL (ref 11.7–15.7)
MCH RBC QN AUTO: 31.5 PG (ref 26.5–33)
MCHC RBC AUTO-ENTMCNC: 32.9 G/DL (ref 31.5–36.5)
MCV RBC AUTO: 96 FL (ref 78–100)
NT-PROBNP SERPL-MCNC: 3089 PG/ML (ref 0–450)
PLATELET # BLD AUTO: 115 10E9/L (ref 150–450)
POTASSIUM SERPL-SCNC: 3.8 MMOL/L (ref 3.5–5.1)
RBC # BLD AUTO: 3.81 10E12/L (ref 3.8–5.2)
SODIUM SERPL-SCNC: 137 MMOL/L (ref 136–145)
WBC # BLD AUTO: 4.7 10E9/L (ref 4–11)

## 2019-07-16 PROCEDURE — 83880 ASSAY OF NATRIURETIC PEPTIDE: CPT | Performed by: INTERNAL MEDICINE

## 2019-07-16 PROCEDURE — 99214 OFFICE O/P EST MOD 30 MIN: CPT | Performed by: INTERNAL MEDICINE

## 2019-07-16 PROCEDURE — 80048 BASIC METABOLIC PNL TOTAL CA: CPT | Performed by: INTERNAL MEDICINE

## 2019-07-16 PROCEDURE — 36415 COLL VENOUS BLD VENIPUNCTURE: CPT | Performed by: INTERNAL MEDICINE

## 2019-07-16 PROCEDURE — 85027 COMPLETE CBC AUTOMATED: CPT | Performed by: INTERNAL MEDICINE

## 2019-07-16 RX ORDER — FUROSEMIDE 20 MG
20 TABLET ORAL DAILY
Qty: 90 TABLET | Refills: 3 | Status: SHIPPED | OUTPATIENT
Start: 2019-07-16 | End: 2019-08-02

## 2019-07-16 ASSESSMENT — MIFFLIN-ST. JEOR: SCORE: 1389.88

## 2019-07-16 NOTE — PATIENT INSTRUCTIONS
Medication Changes:  1. We are starting a Prior Authorization for Revatio (sildenafil) 20 mg three times a day.  1.This medication needs to be approved by your insurance company and may take up to 2-3 weeks for approval.  You will not be able to pick it up from your pharmacy today.  2.  This medication can be expensive. If your co-pay is high, call us before you pick it up.  Also, express financial need to your Pharmacy by stating (this medication is to expensive, I cannot afford this.)  3. Please call us when you start medication so we can schedule you for your follow up appointments.    2. Increase furosemide to one 20mg pill daily.    Patient Instructions:  1. Continue staying active and eat a heart healthy diet.    2. Please keep current list of medications with you at all times.    3. Remember to weigh yourself daily after voiding and before you consume any food or beverages and log the numbers.  If you have gained 2 pounds overnight or 5 pounds in a week contact us immediately for medication adjustments or further instructions.    4. **Please call us immediately if you have any syncope (fainting or passing out), chest pain, edema (swelling or weight gain), or decline in your functional status (general decline in how you are feeling).    Follow up Appointment Information:  See Dr. Burroughs in 7-10 days to have kidney function and minerals checked    See us one month after starting Revatio to look at adding second medication.    Results:  Component      Latest Ref Rng & Units 7/16/2019   Sodium      136 - 145 mmol/L 137   Potassium      3.5 - 5.1 mmol/L 3.8   Chloride      98 - 107 mmol/L 103   Carbon Dioxide      23 - 29 mmol/L 26   Anion Gap      6 - 17 mmol/L 11.8   Glucose      70 - 105 mg/dL 122 (H)   Urea Nitrogen      7 - 30 mg/dL 16   Creatinine      0.70 - 1.30 mg/dL 0.88   GFR Estimate      >60 mL/min/1.73:m2 62   GFR Estimate If Black      >60 mL/min/1.73:m2 75   Calcium      8.5 - 10.5 mg/dL 10.2   WBC       4.0 - 11.0 10e9/L 4.7   RBC Count      3.8 - 5.2 10e12/L 3.81   Hemoglobin      11.7 - 15.7 g/dL 12.0   Hematocrit      35.0 - 47.0 % 36.5   MCV      78 - 100 fl 96   MCH      26.5 - 33.0 pg 31.5   MCHC      31.5 - 36.5 g/dL 32.9   RDW      10.0 - 15.0 % 16.3 (H)   Platelet Count      150 - 450 10e9/L 115 (L)   N-Terminal Pro Bnp      0 - 450 pg/mL 3,089 (H)         For scheduling at [facility] please call [phone]  For scheduling at the [facility] please call [phone]  We are located on the second floor [address] at the [facility].  Our address is     [address] [place] [address] [address]   [address]  [place]  [address]    Thank you for allowing us to be a part of your care here at the [facility]    If you have questions or concerns please contact us at:    Saritha Liriano, RN, BSN      Nurse Coordinator       Pulmonary Hypertension     [facility]   (P)[phone]  (F)[phone]    ** Please note that you will NOT receive a reminder call regarding your scheduled testing, reminder calls are for provider appointments only.  If you are scheduled for testing within the [facility] system you may receive a call regarding pre-registration for billing purposes only.**     Remember to weigh yourself daily after voiding and before you consume any food or beverages and log the numbers.  If you have gained/lost 2 pounds overnight or 5 pounds in a week contact us immediately for medication adjustments or further instructions.    Support Group:  Pulmonary Hypertension Association  Https://www.phassociation.org/  **Look at the Events Tab** They even have Support Groups that you can call into    [facility] PH Support Group  Second Saturday of the Month from 1-3 PM   Location: [address] [place] [address]  Leader: Eleni Almodovar and Joyce Galloway  Phone: [phone] or [phone]  Email: [email or web address]

## 2019-07-16 NOTE — LETTER
7/16/2019    Carissa Burroughs MD  [address] [facility] [address]  [facility] [address]    RE: Jaqueline aCnales       Dear Colleague,    I had the pleasure of seeing Jaqueline Canales in the [facility].        Natalie Bustillos M.D.  Arthritis and Rheumatolotgy    Jeffy Burroughs M.D      The patient has scleroderma and ILD and was incidentally found to have SCC of left lower lobe that was treated with radiation therapy.  She is clinically stable and has no interim history of right heart failure.  Priviously an echocardiogram disclosed elevated, estimated PA pressure.  She has no significant functional limitation at this time.  We discussed the nature of right heart catherization and medication for PH; and for the moment, in the absence of symptoms, she wishes to defer diagnostic and therapeutic intervention.    Wt Readings from Last 24 Encounters:   07/16/19 96.2 kg (212 lb)   06/28/19 99.8 kg (220 lb)   06/24/19 99.7 kg (219 lb 11.2 oz)   06/13/19 97.5 kg (215 lb)   05/29/19 99.3 kg (219 lb)   05/24/19 100.7 kg (222 lb 0.1 oz)   05/21/19 100.7 kg (222 lb)   05/15/19 101.1 kg (222 lb 12.8 oz)   05/01/19 100.8 kg (222 lb 3.2 oz)   04/16/19 101.7 kg (224 lb 1.6 oz)   04/15/19 101.6 kg (224 lb)   04/02/19 99.8 kg (220 lb)   02/13/19 99.8 kg (220 lb)   01/24/19 99.8 kg (220 lb)   01/15/19 98.9 kg (218 lb)   11/15/18 98.9 kg (218 lb)   10/08/18 99.3 kg (219 lb)   09/13/18 100.2 kg (221 lb)   05/30/18 100.2 kg (221 lb)   05/23/18 100.6 kg (221 lb 12.8 oz)   01/31/18 105.3 kg (232 lb 3.2 oz)   11/15/17 104.3 kg (230 lb)   11/07/17 103.9 kg (229 lb)   10/19/17 107.8 kg (237 lb 9.6 oz)             Current Outpatient Medications   Medication     acetaminophen (TYLENOL) 500 MG tablet     albuterol (VENTOLIN HFA) 108 (90 Base) MCG/ACT inhaler     apixaban ANTICOAGULANT (ELIQUIS) 5 MG tablet     Bacillus Coagulans-Inulin (PROBIOTIC-PREBIOTIC PO)     Calcium Citrate-Vitamin D (CITRACAL + D PO)     CENTRUM SILVER OR TABS      docusate sodium (COLACE) 100 MG capsule     folic acid (FOLVITE) 1 MG tablet     furosemide (LASIX) 20 MG tablet     InFLIXimab (REMICADE IV)     levothyroxine (SYNTHROID/LEVOTHROID) 75 MCG tablet     MELATONIN PO     Methotrexate Sodium (METHOTREXATE PO)     metoprolol succinate ER (TOPROL-XL) 25 MG 24 hr tablet     Multiple Vitamins-Minerals (ICAPS) CAPS     NIFEDICAL XL 60 MG PO TB24     omeprazole (PRILOSEC) 20 MG DR capsule     simvastatin (ZOCOR) 40 MG tablet     VENTOLIN  (90 Base) MCG/ACT inhaler     No current facility-administered medications for this visit.           REVIEW of SYMPTOMS    Constitutional: without fever, chills, night sweats.  Weight is ___  HEENT: without dry eyes, dry mouth, sinusitis, corryza, visual changes  Endocrine: without polyuria, polydypsia, polyphagia, heat or cold intolerance, changing mental status  Cardiology: without chest pain, tightness, heaviness, pressure, paroxysmal dyspnea, orthopnea, palpitation, pre-syncope or syncope or device discharge (if present)  Pulmonary: without asthma, wheezing, cough, hemoptysis  GI: without nausea, emesis, jaundice, pain, hematemesis, melena  : without frequency, urgency, hematuria, stones, pain, abnormal bleeding, frequency, urgency  Neurologic: without TIA, CVA, trauma, seizure  Dermatologic: without lesions, abrasion rash,   Orthopedic/Rheum: without significant joint pain, impairment, limb, polyserositis, ulceration, Raynauds  Heme: without mass, bruising, frequent infection, anemia  Psychiatric: without substance abuse, hallucination, medication, depression    Exercise tolerance:    PERRLA, EOM full, normal gait and station, normal mentation, skin without lesions, chest is clear, no evidence of right or left ventricular overactivity, sternum stable, no carotid bruits, regular rhythm, S1, S2, no murmurs, abdomen without tenderness, rebound guarding or masses, no edema, no focal neurologic      Conclusion     1) Elevated  biventricular filling pressures   2) Moderate to severe mostly primary pulmonary hypertension  3) Improvement in hemodynamics with nitric oxide; however, with no improvement in mean PA pressure         CV Right/Left Heart Cath     Right Heart Pressures     Systemic blood pressure 140-150 throughout the case.    BASELINE:  1) Elevated right-sided filling pressures (mean RA 16 mmHg)  2) Moderate to severe pulmonary hypertension (mean PA 35 mmHg) secondary to intrinsic pulmonary vascular disease (PVRI 14 rod*m2) and elevated left-sided filling pressures (PCWP 20 mmHg).  3) O2 sats at baseline 93%  4) Mildly depressed CO/CI: 3.8/1.9    NO 80 PPM:  1) Pulmonary artery pressures did not change (mean PA 34 mmHg)  2) O2 sats improved to 100%  3) CO/CI improved 4.2/2.1 Right sided filling pressures are severely elevated.Left sided filling pressures are moderately elevated. Severely elevated pulmonary artery hypertension.Left ventricular filling pressures are moderately elevated .Normal cardiac output level.   Pressures      Time Systolic Diastolic Mean A Wave V Wave EDP Max dp/dt HR   RA Pressures  9:01 AM   16 mmHg    14 mmHg    22 mmHg      87 bpm      RV Pressures  9:01 AM 52 mmHg    5 mmHg       15 mmHg     86 bpm      PA Pressures  9:02 AM 51 mmHg    26 mmHg    35 mmHg        76 bpm      PCW Pressures  9:02 AM   18 mmHg    23 mmHg    24 mmHg      72 bpm      AO Pressures  9:03  mmHg    87 mmHg    111 mmHg        82 bpm      Pressures Phase: Drug Study Level 1      Time Systolic Diastolic Mean A Wave V Wave EDP Max dp/dt HR   PA Pressures  9:11 AM 48 mmHg    26 mmHg    34 mmHg        77 bpm      Pressures Phase: Drug Study Level 2      Time Systolic Diastolic Mean A Wave V Wave EDP Max dp/dt HR   PA Pressures  9:14 AM 52 mmHg    33 mmHg    36 mmHg        84 bpm      PCW Pressures  9:14 AM   22 mmHg    26 mmHg    23 mmHg      79 bpm      AO Pressures  9:11  mmHg    67 mmHg    97 mmHg        80 bpm      Blood  Flow Results Phase: Drug Study Level 2      Time Results Indexed Values   QP  9:11 AM 4.19 L/min    2.1 L/min/m2      QS  9:11 AM 4.19 L/min    2.1 L/min/m2      Blood Oximetry Phase: Drug Study Level 2      Time Hb SAT(%) PO2 Content PA Sat   Art  9:11 AM  100 %     18.22 mL/dL       Cardiac Output Phase: Drug Study Level 2      Time TDCO TDCI Josh C.O. Josh C.I. Josh HR   Cardiac Output Results  9:11 AM   4.19 L/min    2.1 L/min/m2       Resistance Results Phase: Drug Study Level 2      Time PVR SVR PVR-I SVR-I TPR TVR TPR-I TVR-I PVR/SVR TPR/TVR   Resistance Results (Metric)  9:11 .55 dsc-5     534.11 dsc-5/m2     687.98 dsc-5    1853.72 dsc-5    1373.43 dsc-5/m2    3700.63 dsc-5/m2     0.37      Resistance Results (Wood)  9:11 AM 3.35 ROQUE     6.68 ROQUE/m2     8.6 ROQUE    23.18 ROQUE    17.17                1/15/2019 1/24/2019   /77 112/63       CT CHEST WITHOUT CONTRAST December 27, 2016 11:40 AM       HISTORY: Follow up on right sided lung lesions. Malignant neoplasm of  lower lobe, left bronchus or lung.     COMPARISON: 10/22/2016.     TECHNIQUE: Volumetric helical acquisition of CT images of the chest  from the clavicles to the kidneys were acquired without IV contrast.  Radiation dose for this scan was reduced using automated exposure  control, adjustment of the mA and/or kV according to patient size, or  iterative reconstruction technique.     FINDINGS: Right-sided fibrotic changes appear stable. Somewhat nodular  density at the left base has resolved and may have been an area of  consolidation and/or active inflammation. Honeycombing and traction  bronchiectasis persist. Moderate hiatal hernia. No pleural or  pericardial effusion. Mild cardiomegaly. There are moderate coronary  vascular calcifications consistent with coronary artery disease. There  are mild atherosclerotic changes of the visualized aorta and its  branches. There is no evidence of aortic aneurysm. No acute findings  in the visualized  upper abdomen.                                                                       IMPRESSION:  1. 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  2. The nodular density at the left base has resolved  Davie Iyer MD 2016             Narrative           Interpretation Summary                            Name: GINA MCKEON  MRN: [id number]  : 1940  Study Date: 2018 09:55 AM  Age: 77 yrs  Gender: Female  [place] [facility] [place] Study: Dyspnea on exertion  History: Dyspnea on exertion  E  Referring Physician: THEO HUIZAR  Performed By: Sofía Braden RDCS     BSA: 2.0 m2  Height: 62 in  Weight: 221 lb  BP: 112/74 mmHg  _____________________________________________________________________________  __        Procedure  Echocardiogram with two-dimensional, color and spectral Doppler performed.  _____________________________________________________________________________  __        Interpretation Summary  Global and regional left ventricular function is normal with an EF of 55-60%.  Global right ventricular function is normal.Mild right ventricular dilation is  present.  Mild to moderate TR. Right ventricular systolic pressure is 49mmHg above the  right atrial pressure. Moderate (pulmonary artery systolic pressure 50-75mmHg)  pulmonary hypertension is present.  The inferior vena cava was normal in size with preserved respiratory  variability.Estimated mean right atrial pressure is 3 mmHg (normal).  No pericardial effusion is present.     This study was compared with the study from 10/16/17, no significant change .  _____________________________________________________________________________  __        Left Ventricle  Left ventricular wall thickness is normal. Left ventricular size is normal.  Global and regional left ventricular function is normal with an EF of 55-60%.  The Ejection Fraction was visually  estimated. Diastolic function not assessed  due to significant mitral annular calcification. No regional wall motion  abnormalities are seen.     Right Ventricle  Global right ventricular function is normal. Mild right ventricular dilation  is present.     Atria  Severe left atrial enlargement is present. Mild right atrial enlargement is  present.     Mitral Valve  Moderate mitral annular calcification is present. Mild mitral insufficiency is  present.        Aortic Valve  Trileaflet aortic sclerosis without stenosis.     Tricuspid Valve  Mild to moderate tricuspid insufficiency is present. Right ventricular  systolic pressure is 49mmHg above the right atrial pressure. Moderate  (pulmonary artery systolic pressure 50-75mmHg) pulmonary hypertension is  present.     Pulmonic Valve  Trace pulmonic insufficiency is present.     Vessels  The inferior vena cava was normal in size with preserved respiratory  variability. Visualized portions of the aorta are normal in size. Estimated  mean right atrial pressure is 3 mmHg (normal).     Pericardium  No pericardial effusion is present.        Compared to Previous Study  This study was compared with the study from 10/16/17, no significant change .     Attestation  I have personally viewed the imaging and agree with the interpretation and  report as documented by the fellow, Nupur Luz, and/or edited by me.  _____________________________________________________________________________  __     MMode/2D Measurements & Calculations  IVSd: 0.83 cm  LVIDd: 5.3 cm  LVIDs: 2.4 cm  LVPWd: 0.88 cm  FS: 54.8 %  LV mass(C)d: 162.3 grams  LV mass(C)dI: 81.4 grams/m2  Ao root diam: 2.6 cm  LA dimension: 5.7 cm  asc Aorta Diam: 3.5 cm  LA/Ao: 2.2  LVOT diam: 1.9 cm  LVOT area: 2.8 cm2  LA Volume (BP): 105.0 ml     LA Volume Index (BP): 52.8 ml/m2  RWT: 0.33        Doppler Measurements & Calculations  MV E max carrington: 88.3 cm/sec  MV A max carrington: 75.2 cm/sec  MV E/A: 1.2  MV dec time: 0.20  sec  Ao V2 max: 171.9 cm/sec  Ao max P.0 mmHg  Ao V2 mean: 134.9 cm/sec  Ao mean P.9 mmHg  Ao V2 VTI: 43.7 cm  JACOB(I,D): 1.9 cm2  JACOB(V,D): 1.9 cm2  LV V1 max P.6 mmHg  LV V1 max: 118.8 cm/sec  LV V1 VTI: 29.8 cm  SV(LVOT): 82.1 ml  SI(LVOT): 41.1 ml/m2  PA acc time: 0.05 sec  TR max el: 320.4 cm/sec  TR max P.4 mmHg  AV El Ratio (DI): 0.69  JACOB Index (cm2/m2): 0.94  E/E' av.0  Lateral E/e': 15.6     Medial E/e': 18.5     _____________________________________________________________________________  __       Discussion:    1. Permission is being secured to get Revatio for treating pulmonary hypertension.  This will be taken as one pill three times daily.    2. Increase furosemide to one 20mg pill daily.    3. See Dr. Burroughs in 7-10 days to have kidney function and minerals checked    4. See us one month after starting Revatio to look at adding second medication.      CC: Dr Manjeet Bustillos M.D.    Thank you for allowing me to participate in the care of your patient.    Sincerely,     Shay Marquez MD     [facility]

## 2019-07-16 NOTE — LETTER
7/16/2019    Carissa Burroughs MD  [address] [facility] [address]  [facility] [address]    RE: Jaqueline Canales       Dear Colleague,    I had the pleasure of seeing Jaqueline Canales in the [facility].        Natalie Bustillos M.D.  Arthritis and Rheumatolotgy    Jeffy Burroughs M.D      The patient has scleroderma and ILD and was incidentally found to have SCC of left lower lobe that was treated with radiation therapy.  She is clinically stable and has no interim history of right heart failure.  Priviously an echocardiogram disclosed elevated, estimated PA pressure.  She has no significant functional limitation at this time.  We discussed the nature of right heart catherization and medication for PH; and for the moment, in the absence of symptoms, she wishes to defer diagnostic and therapeutic intervention.    Wt Readings from Last 24 Encounters:   07/16/19 96.2 kg (212 lb)   06/28/19 99.8 kg (220 lb)   06/24/19 99.7 kg (219 lb 11.2 oz)   06/13/19 97.5 kg (215 lb)   05/29/19 99.3 kg (219 lb)   05/24/19 100.7 kg (222 lb 0.1 oz)   05/21/19 100.7 kg (222 lb)   05/15/19 101.1 kg (222 lb 12.8 oz)   05/01/19 100.8 kg (222 lb 3.2 oz)   04/16/19 101.7 kg (224 lb 1.6 oz)   04/15/19 101.6 kg (224 lb)   04/02/19 99.8 kg (220 lb)   02/13/19 99.8 kg (220 lb)   01/24/19 99.8 kg (220 lb)   01/15/19 98.9 kg (218 lb)   11/15/18 98.9 kg (218 lb)   10/08/18 99.3 kg (219 lb)   09/13/18 100.2 kg (221 lb)   05/30/18 100.2 kg (221 lb)   05/23/18 100.6 kg (221 lb 12.8 oz)   01/31/18 105.3 kg (232 lb 3.2 oz)   11/15/17 104.3 kg (230 lb)   11/07/17 103.9 kg (229 lb)   10/19/17 107.8 kg (237 lb 9.6 oz)             Current Outpatient Medications   Medication     acetaminophen (TYLENOL) 500 MG tablet     albuterol (VENTOLIN HFA) 108 (90 Base) MCG/ACT inhaler     apixaban ANTICOAGULANT (ELIQUIS) 5 MG tablet     Bacillus Coagulans-Inulin (PROBIOTIC-PREBIOTIC PO)     Calcium Citrate-Vitamin D (CITRACAL + D PO)     CENTRUM SILVER OR TABS      docusate sodium (COLACE) 100 MG capsule     folic acid (FOLVITE) 1 MG tablet     furosemide (LASIX) 20 MG tablet     InFLIXimab (REMICADE IV)     levothyroxine (SYNTHROID/LEVOTHROID) 75 MCG tablet     MELATONIN PO     Methotrexate Sodium (METHOTREXATE PO)     metoprolol succinate ER (TOPROL-XL) 25 MG 24 hr tablet     Multiple Vitamins-Minerals (ICAPS) CAPS     NIFEDICAL XL 60 MG PO TB24     omeprazole (PRILOSEC) 20 MG DR capsule     simvastatin (ZOCOR) 40 MG tablet     VENTOLIN  (90 Base) MCG/ACT inhaler     No current facility-administered medications for this visit.           REVIEW of SYMPTOMS    Constitutional: without fever, chills, night sweats.  Weight is ___  HEENT: without dry eyes, dry mouth, sinusitis, corryza, visual changes  Endocrine: without polyuria, polydypsia, polyphagia, heat or cold intolerance, changing mental status  Cardiology: without chest pain, tightness, heaviness, pressure, paroxysmal dyspnea, orthopnea, palpitation, pre-syncope or syncope or device discharge (if present)  Pulmonary: without asthma, wheezing, cough, hemoptysis  GI: without nausea, emesis, jaundice, pain, hematemesis, melena  : without frequency, urgency, hematuria, stones, pain, abnormal bleeding, frequency, urgency  Neurologic: without TIA, CVA, trauma, seizure  Dermatologic: without lesions, abrasion rash,   Orthopedic/Rheum: without significant joint pain, impairment, limb, polyserositis, ulceration, Raynauds  Heme: without mass, bruising, frequent infection, anemia  Psychiatric: without substance abuse, hallucination, medication, depression    Exercise tolerance:    PERRLA, EOM full, normal gait and station, normal mentation, skin without lesions, chest is clear, no evidence of right or left ventricular overactivity, sternum stable, no carotid bruits, regular rhythm, S1, S2, no murmurs, abdomen without tenderness, rebound guarding or masses, no edema, no focal neurologic      Conclusion     1) Elevated  biventricular filling pressures   2) Moderate to severe mostly primary pulmonary hypertension  3) Improvement in hemodynamics with nitric oxide; however, with no improvement in mean PA pressure         CV Right/Left Heart Cath     Right Heart Pressures     Systemic blood pressure 140-150 throughout the case.    BASELINE:  1) Elevated right-sided filling pressures (mean RA 16 mmHg)  2) Moderate to severe pulmonary hypertension (mean PA 35 mmHg) secondary to intrinsic pulmonary vascular disease (PVRI 14 rod*m2) and elevated left-sided filling pressures (PCWP 20 mmHg).  3) O2 sats at baseline 93%  4) Mildly depressed CO/CI: 3.8/1.9    NO 80 PPM:  1) Pulmonary artery pressures did not change (mean PA 34 mmHg)  2) O2 sats improved to 100%  3) CO/CI improved 4.2/2.1 Right sided filling pressures are severely elevated.Left sided filling pressures are moderately elevated. Severely elevated pulmonary artery hypertension.Left ventricular filling pressures are moderately elevated .Normal cardiac output level.   Pressures      Time Systolic Diastolic Mean A Wave V Wave EDP Max dp/dt HR   RA Pressures  9:01 AM   16 mmHg    14 mmHg    22 mmHg      87 bpm      RV Pressures  9:01 AM 52 mmHg    5 mmHg       15 mmHg     86 bpm      PA Pressures  9:02 AM 51 mmHg    26 mmHg    35 mmHg        76 bpm      PCW Pressures  9:02 AM   18 mmHg    23 mmHg    24 mmHg      72 bpm      AO Pressures  9:03  mmHg    87 mmHg    111 mmHg        82 bpm      Pressures Phase: Drug Study Level 1      Time Systolic Diastolic Mean A Wave V Wave EDP Max dp/dt HR   PA Pressures  9:11 AM 48 mmHg    26 mmHg    34 mmHg        77 bpm      Pressures Phase: Drug Study Level 2      Time Systolic Diastolic Mean A Wave V Wave EDP Max dp/dt HR   PA Pressures  9:14 AM 52 mmHg    33 mmHg    36 mmHg        84 bpm      PCW Pressures  9:14 AM   22 mmHg    26 mmHg    23 mmHg      79 bpm      AO Pressures  9:11  mmHg    67 mmHg    97 mmHg        80 bpm      Blood  Flow Results Phase: Drug Study Level 2      Time Results Indexed Values   QP  9:11 AM 4.19 L/min    2.1 L/min/m2      QS  9:11 AM 4.19 L/min    2.1 L/min/m2      Blood Oximetry Phase: Drug Study Level 2      Time Hb SAT(%) PO2 Content PA Sat   Art  9:11 AM  100 %     18.22 mL/dL       Cardiac Output Phase: Drug Study Level 2      Time TDCO TDCI Josh C.O. Josh C.I. Josh HR   Cardiac Output Results  9:11 AM   4.19 L/min    2.1 L/min/m2       Resistance Results Phase: Drug Study Level 2      Time PVR SVR PVR-I SVR-I TPR TVR TPR-I TVR-I PVR/SVR TPR/TVR   Resistance Results (Metric)  9:11 .55 dsc-5     534.11 dsc-5/m2     687.98 dsc-5    1853.72 dsc-5    1373.43 dsc-5/m2    3700.63 dsc-5/m2     0.37      Resistance Results (Wood)  9:11 AM 3.35 ROQUE     6.68 ROQUE/m2     8.6 ROQUE    23.18 ROQUE    17.17                1/15/2019 1/24/2019   /77 112/63       CT CHEST WITHOUT CONTRAST December 27, 2016 11:40 AM       HISTORY: Follow up on right sided lung lesions. Malignant neoplasm of  lower lobe, left bronchus or lung.     COMPARISON: 10/22/2016.     TECHNIQUE: Volumetric helical acquisition of CT images of the chest  from the clavicles to the kidneys were acquired without IV contrast.  Radiation dose for this scan was reduced using automated exposure  control, adjustment of the mA and/or kV according to patient size, or  iterative reconstruction technique.     FINDINGS: Right-sided fibrotic changes appear stable. Somewhat nodular  density at the left base has resolved and may have been an area of  consolidation and/or active inflammation. Honeycombing and traction  bronchiectasis persist. Moderate hiatal hernia. No pleural or  pericardial effusion. Mild cardiomegaly. There are moderate coronary  vascular calcifications consistent with coronary artery disease. There  are mild atherosclerotic changes of the visualized aorta and its  branches. There is no evidence of aortic aneurysm. No acute findings  in the visualized  upper abdomen.                                                                       IMPRESSION:  1. 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  2. The nodular density at the left base has resolved  Davie Iyer MD 2016             Narrative           Interpretation Summary                            Name: GINA MCKEON  MRN: [id number]  : 1940  Study Date: 2018 09:55 AM  Age: 77 yrs  Gender: Female  Patient Location: [facility]  Reason For Study: Dyspnea on exertion  History: Dyspnea on exertion  E  Referring Physician: THEO HUIZAR  Performed By: Sofía Braden RDCS     BSA: 2.0 m2  Height: 62 in  Weight: 221 lb  BP: 112/74 mmHg  _____________________________________________________________________________  __        Procedure  Echocardiogram with two-dimensional, color and spectral Doppler performed.  _____________________________________________________________________________  __        Interpretation Summary  Global and regional left ventricular function is normal with an EF of 55-60%.  Global right ventricular function is normal.Mild right ventricular dilation is  present.  Mild to moderate TR. Right ventricular systolic pressure is 49mmHg above the  right atrial pressure. Moderate (pulmonary artery systolic pressure 50-75mmHg)  pulmonary hypertension is present.  The inferior vena cava was normal in size with preserved respiratory  variability.Estimated mean right atrial pressure is 3 mmHg (normal).  No pericardial effusion is present.     This study was compared with the study from 10/16/17, no significant change .  _____________________________________________________________________________  __        Left Ventricle  Left ventricular wall thickness is normal. Left ventricular size is normal.  Global and regional left ventricular function is normal with an EF of 55-60%.  The Ejection Fraction was visually  estimated. Diastolic function not assessed  due to significant mitral annular calcification. No regional wall motion  abnormalities are seen.     Right Ventricle  Global right ventricular function is normal. Mild right ventricular dilation  is present.     Atria  Severe left atrial enlargement is present. Mild right atrial enlargement is  present.     Mitral Valve  Moderate mitral annular calcification is present. Mild mitral insufficiency is  present.        Aortic Valve  Trileaflet aortic sclerosis without stenosis.     Tricuspid Valve  Mild to moderate tricuspid insufficiency is present. Right ventricular  systolic pressure is 49mmHg above the right atrial pressure. Moderate  (pulmonary artery systolic pressure 50-75mmHg) pulmonary hypertension is  present.     Pulmonic Valve  Trace pulmonic insufficiency is present.     Vessels  The inferior vena cava was normal in size with preserved respiratory  variability. Visualized portions of the aorta are normal in size. Estimated  mean right atrial pressure is 3 mmHg (normal).     Pericardium  No pericardial effusion is present.        Compared to Previous Study  This study was compared with the study from 10/16/17, no significant change .     Attestation  I have personally viewed the imaging and agree with the interpretation and  report as documented by the fellow, Nupur Luz, and/or edited by me.  _____________________________________________________________________________  __     MMode/2D Measurements & Calculations  IVSd: 0.83 cm  LVIDd: 5.3 cm  LVIDs: 2.4 cm  LVPWd: 0.88 cm  FS: 54.8 %  LV mass(C)d: 162.3 grams  LV mass(C)dI: 81.4 grams/m2  Ao root diam: 2.6 cm  LA dimension: 5.7 cm  asc Aorta Diam: 3.5 cm  LA/Ao: 2.2  LVOT diam: 1.9 cm  LVOT area: 2.8 cm2  LA Volume (BP): 105.0 ml     LA Volume Index (BP): 52.8 ml/m2  RWT: 0.33        Doppler Measurements & Calculations  MV E max carrington: 88.3 cm/sec  MV A max carrington: 75.2 cm/sec  MV E/A: 1.2  MV dec time: 0.20  sec  Ao V2 max: 171.9 cm/sec  Ao max P.0 mmHg  Ao V2 mean: 134.9 cm/sec  Ao mean P.9 mmHg  Ao V2 VTI: 43.7 cm  JACOB(I,D): 1.9 cm2  JACOB(V,D): 1.9 cm2  LV V1 max P.6 mmHg  LV V1 max: 118.8 cm/sec  LV V1 VTI: 29.8 cm  SV(LVOT): 82.1 ml  SI(LVOT): 41.1 ml/m2  PA acc time: 0.05 sec  TR max el: 320.4 cm/sec  TR max P.4 mmHg  AV El Ratio (DI): 0.69  JACOB Index (cm2/m2): 0.94  E/E' av.0  Lateral E/e': 15.6     Medial E/e': 18.5     _____________________________________________________________________________  __       Discussion:    1. Permission is being secured to get Revatio for treating pulmonary hypertension.  This will be taken as one pill three times daily.    2. Increase furosemide to one 20mg pill daily.    3. See Dr. Burroughs in 7-10 days to have kidney function and minerals checked    4. See us one month after starting Revatio to look at adding second medication.      CC: Dr Manjeet Bustillos M.D.    Thank you for allowing me to participate in the care of your patient.      Sincerely,     Shay Marquez MD     [facility]    cc:   Shay Marquez MD  [address] [place] [address] [address] [place] [id number]

## 2019-07-26 ENCOUNTER — OFFICE VISIT (OUTPATIENT)
Dept: FAMILY MEDICINE | Facility: CLINIC | Age: 79
End: 2019-07-26
Payer: MEDICARE

## 2019-07-26 VITALS
DIASTOLIC BLOOD PRESSURE: 66 MMHG | OXYGEN SATURATION: 94 % | TEMPERATURE: 97 F | SYSTOLIC BLOOD PRESSURE: 103 MMHG | HEIGHT: 62 IN | WEIGHT: 215 LBS | BODY MASS INDEX: 39.56 KG/M2 | HEART RATE: 79 BPM

## 2019-07-26 DIAGNOSIS — I10 BENIGN ESSENTIAL HYPERTENSION: Primary | ICD-10-CM

## 2019-07-26 DIAGNOSIS — J84.9 ILD (INTERSTITIAL LUNG DISEASE) (H): ICD-10-CM

## 2019-07-26 DIAGNOSIS — E03.9 HYPOTHYROIDISM, UNSPECIFIED TYPE: ICD-10-CM

## 2019-07-26 DIAGNOSIS — E78.5 HYPERLIPIDEMIA LDL GOAL <100: ICD-10-CM

## 2019-07-26 LAB
ANION GAP SERPL CALCULATED.3IONS-SCNC: 9 MMOL/L (ref 3–14)
BUN SERPL-MCNC: 24 MG/DL (ref 7–30)
CALCIUM SERPL-MCNC: 9 MG/DL (ref 8.5–10.1)
CHLORIDE SERPL-SCNC: 106 MMOL/L (ref 94–109)
CO2 SERPL-SCNC: 23 MMOL/L (ref 20–32)
CREAT SERPL-MCNC: 0.73 MG/DL (ref 0.52–1.04)
GFR SERPL CREATININE-BSD FRML MDRD: 78 ML/MIN/{1.73_M2}
GLUCOSE SERPL-MCNC: 88 MG/DL (ref 70–99)
POTASSIUM SERPL-SCNC: 4.2 MMOL/L (ref 3.4–5.3)
SODIUM SERPL-SCNC: 138 MMOL/L (ref 133–144)
TSH SERPL DL<=0.005 MIU/L-ACNC: 3.1 MU/L (ref 0.4–4)

## 2019-07-26 PROCEDURE — 36415 COLL VENOUS BLD VENIPUNCTURE: CPT | Performed by: INTERNAL MEDICINE

## 2019-07-26 PROCEDURE — 80048 BASIC METABOLIC PNL TOTAL CA: CPT | Performed by: INTERNAL MEDICINE

## 2019-07-26 PROCEDURE — 84443 ASSAY THYROID STIM HORMONE: CPT | Performed by: INTERNAL MEDICINE

## 2019-07-26 PROCEDURE — 99214 OFFICE O/P EST MOD 30 MIN: CPT | Performed by: INTERNAL MEDICINE

## 2019-07-26 RX ORDER — ALBUTEROL SULFATE 90 UG/1
2 AEROSOL, METERED RESPIRATORY (INHALATION) EVERY 6 HOURS
Qty: 18 G | Refills: 3 | Status: SHIPPED | OUTPATIENT
Start: 2019-07-26 | End: 2019-11-25

## 2019-07-26 ASSESSMENT — MIFFLIN-ST. JEOR: SCORE: 1403.48

## 2019-07-26 NOTE — PROGRESS NOTES
Chief Complaint:     Jaqueline Canales is a 78 year old female who presents to clinic today for the following health issues:      Chief Complaint   Patient presents with     Follow Up     Pt states being told by her Cardiologist to follow up with her provider for a BMP lab after starting lasix. Also states that she would like a refill on Albuterol.         HPI:   Patient Jaqueline Canales is a very pleasant 78 year old female with history of ILD, hypertension, hyperlipidemia who presents to Internal Medicine clinic today for follow up of multiple concerns. Regarding the patient's chronic ILD, the patient denies any cough and wheezing symptoms at this time. She is due for a refill of her albuterol inhaler medication at this time. Regarding the patient's hypertension, the patient is due for a repeat BMP lab at this time after starting Lasix recently. regarding her chronic hypothyroidism, the patient is due for a repeat TSH lab at this time for monitoring of hypothyroidism control. No chest pain, headaches, fever or chills.             Current Medications:     Current Outpatient Medications   Medication Sig Dispense Refill     acetaminophen (TYLENOL) 500 MG tablet Take 500-1,000 mg by mouth every 6 hours as needed for mild pain       albuterol (VENTOLIN HFA) 108 (90 Base) MCG/ACT inhaler Inhale 2 puffs into the lungs every 6 hours 18 g 3     apixaban ANTICOAGULANT (ELIQUIS) 5 MG tablet Take 1 tablet (5 mg) by mouth 2 times daily 180 tablet 2     Bacillus Coagulans-Inulin (PROBIOTIC-PREBIOTIC PO)        Calcium Citrate-Vitamin D (CITRACAL + D PO) Take 2 tablets by mouth 2 times daily        CENTRUM SILVER OR TABS 1 TABLET DAILY       docusate sodium (COLACE) 100 MG capsule Take 100 mg by mouth as needed        folic acid (FOLVITE) 1 MG tablet Take 1 tablet (1,000 mcg) by mouth daily 90 tablet 3     furosemide (LASIX) 20 MG tablet Take 1 tablet (20 mg) by mouth daily Take 1 tablet by mouth every Tuesday, Thursday and  Saturday 90 tablet 3     InFLIXimab (REMICADE IV) Inject 600 mg into the vein Every 8 weeks       levothyroxine (SYNTHROID/LEVOTHROID) 75 MCG tablet Take 1 tablet (75 mcg) by mouth daily 90 tablet 3     MELATONIN PO Take 5 mg by mouth nightly as needed       Methotrexate Sodium (METHOTREXATE PO) Take 2.5 mg by mouth 10 tablets weekly       metoprolol succinate ER (TOPROL-XL) 25 MG 24 hr tablet Take 1 tablet (25 mg) by mouth daily 90 tablet 2     Multiple Vitamins-Minerals (ICAPS) CAPS Take 1 capsule by mouth 2 times daily        NIFEDICAL XL 60 MG PO TB24 1 TABLET DAILY       omeprazole (PRILOSEC) 20 MG DR capsule Take 1 capsule (20 mg) by mouth 2 times daily Take 30-60 minutes prior to meals 180 capsule 2     simvastatin (ZOCOR) 40 MG tablet Take 1 tablet (40 mg) by mouth At Bedtime 90 tablet 2     VENTOLIN  (90 Base) MCG/ACT inhaler Inhale 2 puffs into the lungs every 6 hours 18 g 11         Allergies:      Allergies   Allergen Reactions     No Known Allergies             Past Medical History:     Past Medical History:   Diagnosis Date     Amaurosis fugax 5-11    Right     Carotid artery stenosis      Esophageal reflux 3/11/2004     HELICOBACTER PYLORI INFECTION 7/11/2006     hx of CA in situ RT breast-1990.mastectomy 4/17/2007     Hyperlipidemia LDL goal <70 6/20/2011     Lung cancer (H)     squamous cell lung ca left lower lobe     OBESITY NOS 3/11/2004     OSTEOPOROSIS NOS 3/11/2004     Other psoriasis 3/11/2004     RA (rheumatoid arthritis) (H) 2/8/2010     Raynaud's syndrome 4/10/2006     Scleroderma (H)      Sleep apnea     CPAP         Past Surgical History:     Past Surgical History:   Procedure Laterality Date     BREAST SURGERY       C NONSPECIFIC PROCEDURE  1990    mastectomy RT breast     COLONOSCOPY  5/24/16     COLONOSCOPY N/A 6/28/2019    Procedure: Colonoscopy, With Polypectomy And Biopsy;  Surgeon: Neto Kaminski MD;  Location:  GI     CONIZATION       CV RIGHT HEART CATH N/A  4/15/2019    Procedure: Heart Cath Right Heart Cath;  Surgeon: Naresh Cyr MD;  Location:  HEART CARDIAC CATH LAB     ENDARTERECTOMY CAROTID       HC COLONOSCOPY THRU STOMA, DIAGNOSTIC  2001    diverticulosis     REPAIR HAMMER TOE       TONSILLECTOMY           Family Medical History:     Family History   Problem Relation Age of Onset     Cancer - colorectal Mother      Cerebrovascular Disease Father      Cancer Sister 31        ovarian     Heart Disease Sister      Gastrointestinal Disease Sister         crohns     Gastrointestinal Disease Sister         diverticulitis     Gastrointestinal Disease Brother         diverticulitis     Alzheimer Disease Sister      Cerebrovascular Disease Maternal Grandmother      Diabetes Maternal Grandmother      Mental Illness Maternal Grandmother      Diabetes Maternal Uncle      Cancer Nephew                  Social History:     Social History     Socioeconomic History     Marital status:      Spouse name: Not on file     Number of children: Not on file     Years of education: Not on file     Highest education level: Not on file   Occupational History     Not on file   Social Needs     Financial resource strain: Not on file     Food insecurity:     Worry: Not on file     Inability: Not on file     Transportation needs:     Medical: Not on file     Non-medical: Not on file   Tobacco Use     Smoking status: Former Smoker     Packs/day: 1.00     Years: 30.00     Pack years: 30.00     Types: Cigarettes     Start date:      Last attempt to quit: 3/11/1992     Years since quittin.3     Smokeless tobacco: Never Used   Substance and Sexual Activity     Alcohol use: Not Currently     Alcohol/week: 0.0 oz     Frequency: Never     Drug use: No     Sexual activity: Not Currently     Partners: Male   Lifestyle     Physical activity:     Days per week: Not on file     Minutes per session: Not on file     Stress: Not on file   Relationships     Social  "connections:     Talks on phone: Not on file     Gets together: Not on file     Attends [employer] service: Not on file     Active member of club or organization: Not on file     Attends meetings of clubs or organizations: Not on file     Relationship status: Not on file     Intimate partner violence:     Fear of current or ex partner: Not on file     Emotionally abused: Not on file     Physically abused: Not on file     Forced sexual activity: Not on file   Other Topics Concern     Parent/sibling w/ CABG, MI or angioplasty before 65F 55M? No   Social History Narrative     Not on file           Review of System:     Constitutional: Negative for fever or chills  Skin: Negative for rashes  Ears/Nose/Throat: Negative for nasal congestion, sore throat  Respiratory: No shortness of breath, dyspnea on exertion, cough, or hemoptysis, positive for chronic ILD  Cardiovascular: Negative for chest pain  Gastrointestinal: Negative for nausea, vomiting  Genitourinary: Negative for dysuria, hematuria  Musculoskeletal: Negative for myalgias  Neurologic: Negative for headaches  Psychiatric: Negative for depression, anxiety  Hematologic/Lymphatic/Immunologic: Negative  Endocrine: positive for hypothyroidism  Behavioral: Negative for tobacco use       Physical Exam:   /66 (BP Location: Left arm, Patient Position: Sitting, Cuff Size: Adult Regular)   Pulse 79   Temp 97  F (36.1  C) (Oral)   Ht 1.575 m (5' 2\")   Wt 97.5 kg (215 lb)   SpO2 94%   Breastfeeding? No   BMI 39.32 kg/m      GENERAL: chronically ill appearing elderly female, alert and no acute distress  EYES: eyes grossly normal to inspection, and conjunctivae and sclerae normal  HENT: Normocephalic atraumatic. Nose and mouth without ulcers or lesions  NECK: supple  RESP: lungs clear to auscultation   CV: regular rate and rhythm, normal S1 S2  LYMPH: no peripheral edema   ABDOMEN: nondistended  MS: no gross musculoskeletal defects noted  SKIN: no suspicious lesions " or rashes  NEURO: Alert & Oriented x 3.   PSYCH: mentation appears normal, affect normal        Diagnostic Test Results:     Diagnostic Test Results:  Results for orders placed or performed in visit on 07/16/19   CBC with platelets   Result Value Ref Range    WBC 4.7 4.0 - 11.0 10e9/L    RBC Count 3.81 3.8 - 5.2 10e12/L    Hemoglobin 12.0 11.7 - 15.7 g/dL    Hematocrit 36.5 35.0 - 47.0 %    MCV 96 78 - 100 fl    MCH 31.5 26.5 - 33.0 pg    MCHC 32.9 31.5 - 36.5 g/dL    RDW 16.3 (H) 10.0 - 15.0 %    Platelet Count 115 (L) 150 - 450 10e9/L   N terminal pro BNP outpatient   Result Value Ref Range    N-Terminal Pro Bnp 3,089 (H) 0 - 450 pg/mL   Basic metabolic panel   Result Value Ref Range    Sodium 137 136 - 145 mmol/L    Potassium 3.8 3.5 - 5.1 mmol/L    Chloride 103 98 - 107 mmol/L    Carbon Dioxide 26 23 - 29 mmol/L    Anion Gap 11.8 6 - 17 mmol/L    Glucose 122 (H) 70 - 105 mg/dL    Urea Nitrogen 16 7 - 30 mg/dL    Creatinine 0.88 0.70 - 1.30 mg/dL    GFR Estimate 62 >60 mL/min/[1.73_m2]    GFR Estimate If Black 75 >60 mL/min/[1.73_m2]    Calcium 10.2 8.5 - 10.5 mg/dL       ASSESSMENT/PLAN:       (I10) Benign essential hypertension  (primary encounter diagnosis)  Comment: chronic HTN is well controlled  Plan: Basic metabolic panel  (Ca, Cl, CO2, Creat,         Gluc, K, Na, BUN), continue current BP medication regimen.      (J84.9) ILD (interstitial lung disease) (H)  Comment: no wheezing or acute ILD flare up symptoms  Plan: albuterol (VENTOLIN HFA) 108 (90 Base) MCG/ACT         inhaler      (E78.5) Hyperlipidemia LDL goal <100  Comment: patient is compliant with her Zocor medication  Plan: continue current Zocor cholesterol medication going forward.    (E03.9) Hypothyroidism, unspecified type  Comment: patient is due for a repeat TSH lab for monitoring of thyroid function  Plan: TSH with free T4 reflex      Follow Up Plan:     Patient is instructed to return to Internal Medicine clinic for follow-up visit in 3  months.        Carissa Burroughs MD  Internal Medicine  [facility]

## 2019-07-29 ENCOUNTER — OFFICE VISIT (OUTPATIENT)
Dept: FAMILY MEDICINE | Facility: CLINIC | Age: 79
End: 2019-07-29
Payer: MEDICARE

## 2019-07-29 VITALS
OXYGEN SATURATION: 93 % | BODY MASS INDEX: 39.27 KG/M2 | HEART RATE: 95 BPM | DIASTOLIC BLOOD PRESSURE: 81 MMHG | HEIGHT: 62 IN | WEIGHT: 213.4 LBS | SYSTOLIC BLOOD PRESSURE: 123 MMHG | TEMPERATURE: 97.4 F

## 2019-07-29 DIAGNOSIS — I10 ESSENTIAL HYPERTENSION: Primary | ICD-10-CM

## 2019-07-29 DIAGNOSIS — E03.9 HYPOTHYROIDISM, UNSPECIFIED TYPE: ICD-10-CM

## 2019-07-29 PROCEDURE — 99213 OFFICE O/P EST LOW 20 MIN: CPT | Performed by: INTERNAL MEDICINE

## 2019-07-29 ASSESSMENT — MIFFLIN-ST. JEOR: SCORE: 1396.23

## 2019-07-29 NOTE — PROGRESS NOTES
Chief Complaint:         Jaqueline Canales is a 78 year old female who presents to clinic today for the following health issues:      Follow up on hypertension, hypothyroidism       HPI:   Patient Jaqueline aCnales is a very pleasant 78 year old female with history of hypertension who presents to Internal Medicine clinic today for follow up on hypertension, hypothyroidism. Regarding the patient's chronic hypertension, the patient's BP is well controlled. Patient is also here for follow up of recent BMP lab results. Regarding the patient's chronic hypothyroidism, the patient is here for follow up of recent TSH lab result for monitoring of hypothyroidism control. No chest pain, headaches, fever or chills.       Current Medications:     Current Outpatient Medications   Medication Sig Dispense Refill     acetaminophen (TYLENOL) 500 MG tablet Take 500-1,000 mg by mouth every 6 hours as needed for mild pain       albuterol (VENTOLIN HFA) 108 (90 Base) MCG/ACT inhaler Inhale 2 puffs into the lungs every 6 hours 18 g 3     apixaban ANTICOAGULANT (ELIQUIS) 5 MG tablet Take 1 tablet (5 mg) by mouth 2 times daily 180 tablet 2     Bacillus Coagulans-Inulin (PROBIOTIC-PREBIOTIC PO)        Calcium Citrate-Vitamin D (CITRACAL + D PO) Take 2 tablets by mouth 2 times daily        CENTRUM SILVER OR TABS 1 TABLET DAILY       docusate sodium (COLACE) 100 MG capsule Take 100 mg by mouth as needed        folic acid (FOLVITE) 1 MG tablet Take 1 tablet (1,000 mcg) by mouth daily 90 tablet 3     furosemide (LASIX) 20 MG tablet Take 1 tablet (20 mg) by mouth daily Take 1 tablet by mouth every Tuesday, Thursday and Saturday 90 tablet 3     InFLIXimab (REMICADE IV) Inject 600 mg into the vein Every 8 weeks       levothyroxine (SYNTHROID/LEVOTHROID) 75 MCG tablet Take 1 tablet (75 mcg) by mouth daily 90 tablet 3     MELATONIN PO Take 5 mg by mouth nightly as needed       Methotrexate Sodium (METHOTREXATE PO) Take 2.5 mg by mouth 10 tablets  weekly       metoprolol succinate ER (TOPROL-XL) 25 MG 24 hr tablet Take 1 tablet (25 mg) by mouth daily 90 tablet 2     Multiple Vitamins-Minerals (ICAPS) CAPS Take 1 capsule by mouth 2 times daily        NIFEDICAL XL 60 MG PO TB24 1 TABLET DAILY       omeprazole (PRILOSEC) 20 MG DR capsule Take 1 capsule (20 mg) by mouth 2 times daily Take 30-60 minutes prior to meals 180 capsule 2     simvastatin (ZOCOR) 40 MG tablet Take 1 tablet (40 mg) by mouth At Bedtime 90 tablet 2     VENTOLIN  (90 Base) MCG/ACT inhaler Inhale 2 puffs into the lungs every 6 hours 18 g 11         Allergies:      Allergies   Allergen Reactions     No Known Allergies             Past Medical History:     Past Medical History:   Diagnosis Date     Amaurosis fugax 5-11    Right     Carotid artery stenosis      Esophageal reflux 3/11/2004     HELICOBACTER PYLORI INFECTION 7/11/2006     hx of CA in situ RT breast-1990.mastectomy 4/17/2007     Hyperlipidemia LDL goal <70 6/20/2011     Lung cancer (H)     squamous cell lung ca left lower lobe     OBESITY NOS 3/11/2004     OSTEOPOROSIS NOS 3/11/2004     Other psoriasis 3/11/2004     RA (rheumatoid arthritis) (H) 2/8/2010     Raynaud's syndrome 4/10/2006     Scleroderma (H)      Sleep apnea     CPAP         Past Surgical History:     Past Surgical History:   Procedure Laterality Date     BREAST SURGERY       C NONSPECIFIC PROCEDURE  1990    mastectomy RT breast     COLONOSCOPY  5/24/16     COLONOSCOPY N/A 6/28/2019    Procedure: Colonoscopy, With Polypectomy And Biopsy;  Surgeon: Neto Kaminski MD;  Location:  GI     CONIZATION       CV RIGHT HEART CATH N/A 4/15/2019    Procedure: Heart Cath Right Heart Cath;  Surgeon: Naresh Cyr MD;  Location:  HEART CARDIAC CATH LAB     ENDARTERECTOMY CAROTID  2011     HC COLONOSCOPY THRU STOMA, DIAGNOSTIC  2001 2011    diverticulosis     REPAIR HAMMER TOE       TONSILLECTOMY           Family Medical History:     Family History   Problem  Relation Age of Onset     Cancer - colorectal Mother      Cerebrovascular Disease Father      Cancer Sister 31        ovarian     Heart Disease Sister      Gastrointestinal Disease Sister         crohns     Gastrointestinal Disease Sister         diverticulitis     Gastrointestinal Disease Brother         diverticulitis     Alzheimer Disease Sister      Cerebrovascular Disease Maternal Grandmother      Diabetes Maternal Grandmother      Mental Illness Maternal Grandmother      Diabetes Maternal Uncle      Cancer Nephew                  Social History:     Social History     Socioeconomic History     Marital status:      Spouse name: Not on file     Number of children: Not on file     Years of education: Not on file     Highest education level: Not on file   Occupational History     Not on file   Social Needs     Financial resource strain: Not on file     Food insecurity:     Worry: Not on file     Inability: Not on file     Transportation needs:     Medical: Not on file     Non-medical: Not on file   Tobacco Use     Smoking status: Former Smoker     Packs/day: 1.00     Years: 30.00     Pack years: 30.00     Types: Cigarettes     Start date:      Last attempt to quit: 3/11/1992     Years since quittin.4     Smokeless tobacco: Never Used   Substance and Sexual Activity     Alcohol use: Not Currently     Alcohol/week: 0.0 oz     Frequency: Never     Drug use: No     Sexual activity: Not Currently     Partners: Male   Lifestyle     Physical activity:     Days per week: Not on file     Minutes per session: Not on file     Stress: Not on file   Relationships     Social connections:     Talks on phone: Not on file     Gets together: Not on file     Attends Orthodox service: Not on file     Active member of club or organization: Not on file     Attends meetings of clubs or organizations: Not on file     Relationship status: Not on file     Intimate partner violence:     Fear of current or ex  "partner: Not on file     Emotionally abused: Not on file     Physically abused: Not on file     Forced sexual activity: Not on file   Other Topics Concern     Parent/sibling w/ CABG, MI or angioplasty before 65F 55M? No   Social History Narrative     Not on file           Review of System:     Constitutional: Negative for fever or chills  Skin: Negative for rashes  Ears/Nose/Throat: Negative for nasal congestion, sore throat  Respiratory: No shortness of breath, dyspnea on exertion, cough, or hemoptysis  Cardiovascular: Negative for chest pain  Gastrointestinal: Negative for nausea, vomiting  Genitourinary: Negative for dysuria, hematuria  Musculoskeletal: Negative for myalgias  Neurologic: Negative for headaches  Psychiatric: Negative for depression, anxiety  Hematologic/Lymphatic/Immunologic: Negative  Endocrine: positive for chronic hypothyroidism  Behavioral: Negative for tobacco use       Physical Exam:   /81 (BP Location: Left arm, Patient Position: Sitting, Cuff Size: Adult Regular)   Pulse 95   Temp 97.4  F (36.3  C) (Oral)   Ht 1.575 m (5' 2\")   Wt 96.8 kg (213 lb 6.4 oz)   SpO2 93%   BMI 39.03 kg/m      GENERAL: alert and no distress  EYES: eyes grossly normal to inspection, and conjunctivae and sclerae normal  HENT: Normocephalic atraumatic. Nose and mouth without ulcers or lesions  NECK: supple  RESP: lungs clear to auscultation   CV: regular rate and rhythm, normal S1 S2  LYMPH: no peripheral edema   ABDOMEN: nondistended  MS: no gross musculoskeletal defects noted  SKIN: no suspicious lesions or rashes  NEURO: Alert & Oriented x 3.   PSYCH: mentation appears normal, affect normal        Diagnostic Test Results:     Diagnostic Test Results:  Results for orders placed or performed in visit on 07/26/19   Basic metabolic panel  (Ca, Cl, CO2, Creat, Gluc, K, Na, BUN)   Result Value Ref Range    Sodium 138 133 - 144 mmol/L    Potassium 4.2 3.4 - 5.3 mmol/L    Chloride 106 94 - 109 mmol/L    " Carbon Dioxide 23 20 - 32 mmol/L    Anion Gap 9 3 - 14 mmol/L    Glucose 88 70 - 99 mg/dL    Urea Nitrogen 24 7 - 30 mg/dL    Creatinine 0.73 0.52 - 1.04 mg/dL    GFR Estimate 78 >60 mL/min/[1.73_m2]    GFR Estimate If Black >90 >60 mL/min/[1.73_m2]    Calcium 9.0 8.5 - 10.1 mg/dL   TSH with free T4 reflex   Result Value Ref Range    TSH 3.10 0.40 - 4.00 mU/L       ASSESSMENT/PLAN:       (I10) Essential hypertension  (primary encounter diagnosis)  Comment: BP well controlled, recent BMP lab results are within normal limits.  Plan: continue current BP medication regimen going forward.      (E03.9) Hypothyroidism, unspecified type  Comment: most recent TSH lab result is within normal limits  Plan: continue current levothyroxine medication going forward          Follow Up Plan:     Patient is instructed to return to Internal Medicine clinic for follow-up visit in 3 months.        Carissa Burroughs MD  Internal Medicine  [facility]

## 2019-08-01 ENCOUNTER — TELEPHONE (OUTPATIENT)
Dept: CARDIOLOGY | Facility: CLINIC | Age: 79
End: 2019-08-01

## 2019-08-01 DIAGNOSIS — I27.20 PULMONARY HYPERTENSION (H): Primary | ICD-10-CM

## 2019-08-01 NOTE — TELEPHONE ENCOUNTER
PA Initiation    Medication: Sildenafil 20 mg  Insurance Company: [employer] - Phone [phone] Fax [phone]  Start Date: 8/1/2019

## 2019-08-02 DIAGNOSIS — R06.09 DYSPNEA ON EXERTION: ICD-10-CM

## 2019-08-02 DIAGNOSIS — I27.20 PULMONARY HYPERTENSION (H): ICD-10-CM

## 2019-08-02 RX ORDER — FUROSEMIDE 20 MG
20 TABLET ORAL DAILY
Qty: 90 TABLET | Refills: 3 | Status: SHIPPED | OUTPATIENT
Start: 2019-08-02 | End: 2019-08-15

## 2019-08-07 NOTE — TELEPHONE ENCOUNTER
Prior Authorization Approval    Authorization Effective Date: 5/9/2019  Authorization Expiration Date: 8/6/2022  Medication: Sildenafil 20 mg  Approved Dose/Quantity: 90 tablets/30 days    Insurance Company: [employer] Part D - Phone [phone] Fax [phone]  Which Pharmacy is filling the prescription (Not needed for infusion/clinic administered): [id number] [place], [place] - [address]

## 2019-08-07 NOTE — TELEPHONE ENCOUNTER
*Prior authorization was rejected by [facility] because they are not the insurance company to review the pt's prior authorization for medication. Was redirected to resubmit prior authorization through CoverMyMeds with Wes.     Prior Authorization was completed and submitted through CoverMyMeds

## 2019-08-08 ENCOUNTER — TELEPHONE (OUTPATIENT)
Dept: CARDIOLOGY | Facility: CLINIC | Age: 79
End: 2019-08-08

## 2019-08-08 DIAGNOSIS — Z53.9 ERRONEOUS ENCOUNTER--DISREGARD: Primary | ICD-10-CM

## 2019-08-08 RX ORDER — SILDENAFIL CITRATE 20 MG/1
20 TABLET ORAL 3 TIMES DAILY
Qty: 90 TABLET | Refills: 11 | Status: SHIPPED | OUTPATIENT
Start: 2019-08-08 | End: 2019-08-08

## 2019-08-08 RX ORDER — SILDENAFIL CITRATE 20 MG/1
20 TABLET ORAL 3 TIMES DAILY
Qty: 90 TABLET | Refills: 11 | Status: SHIPPED | OUTPATIENT
Start: 2019-08-08 | End: 2020-03-04

## 2019-08-08 NOTE — TELEPHONE ENCOUNTER
Medication Revatio (sildenafil) 20 mg three times a day was sent to the pharmacy and they stated that her co-payment was 114.00.  I have asked that we follow up with the pt and send her the pfizer application. Saritha Liriano RN on 8/8/2019 at 11:20 AM    I spoke with gabriela and she stated that it needs to go to [facility]. Saritha Liriano RN on 8/8/2019 at 11:22 AM

## 2019-08-09 NOTE — TELEPHONE ENCOUNTER
Contacted [facility] and spoke to Marco regarding the pt's co-pay for Sildenafil.  The co-pay is estimated to be $71.80.      Contacted pt to let her know of the co-pay cost and starting the Pfizer Patient Assistance application. Discussed the additional information needed and let pt know that the application will be sent to her early next week. Told pt to complete the application and mail it to [facility] [employer] ([id number], [place] [address]). Pt said she will complete the forms when received.    Vanita Westbrook  Clinic   Pulmonary Hypertension  [facility]  (P) [phone]

## 2019-08-12 NOTE — TELEPHONE ENCOUNTER
Pt contacted and left a voicemail regarding a Medicare BlueRx form about mental health and alcohol, as well as the status of her Sildenafil and Lasix.      Returned pt call. Requested pt mail the Medicare BlueRx form to the clinic for further review.      Provided pt an update for her Sildenafil. Explained that we are in the process of getting her approved for Pfizer Patient Assistance program. Told pt that if she receives a call from [facility] to set up the delivery of her Sildenafil, to ask what the co-pay is for the medication and not to move forward with the delivery until co-pay is resolved.    Pt also stated that the prior authorization approval for her Lasix does not start till 10/09/2019.  Wanted to know what the status of receiving Lasix was. Told pt I would reach out to Saritha to discuss further.      Vanita Westbrook  Clinic   Pulmonary Hypertension  [facility]  (P) [phone]

## 2019-08-14 ENCOUNTER — TRANSFERRED RECORDS (OUTPATIENT)
Dept: HEALTH INFORMATION MANAGEMENT | Facility: CLINIC | Age: 79
End: 2019-08-14

## 2019-08-15 DIAGNOSIS — I27.20 PULMONARY HYPERTENSION (H): ICD-10-CM

## 2019-08-15 DIAGNOSIS — R06.09 DYSPNEA ON EXERTION: ICD-10-CM

## 2019-08-15 RX ORDER — FUROSEMIDE 20 MG
20 TABLET ORAL DAILY
Qty: 90 TABLET | Refills: 3 | Status: SHIPPED | OUTPATIENT
Start: 2019-08-15 | End: 2019-10-28

## 2019-08-21 DIAGNOSIS — E78.5 HYPERLIPIDEMIA LDL GOAL <100: ICD-10-CM

## 2019-08-21 RX ORDER — SIMVASTATIN 40 MG
40 TABLET ORAL AT BEDTIME
Qty: 90 TABLET | Refills: 0 | Status: SHIPPED | OUTPATIENT
Start: 2019-08-21 | End: 2019-08-28

## 2019-08-21 NOTE — TELEPHONE ENCOUNTER
"     simvastatin (ZOCOR) 40 MG tablet 90 tablet 2 2/13/2019         Last Written Prescription Date:  02/13/2019  Last Fill Quantity: 90,  # refills: 2   Last office visit: 7/29/2019 with prescribing provider:     Future Office Visit:   Next 5 appointments (look out 90 days)    Aug 28, 2019 10:00 AM CDT  Office Visit with [facility]s MD  [facility] [address] [address]  [phone]   Oct 30, 2019 11:00 AM CDT  [place] Visit with Carissa [place]s [place]  [facility] [address] [address]  [phone]         Requested Prescriptions   Pending Prescriptions Disp Refills     simvastatin (ZOCOR) 40 MG tablet 90 tablet 2     Sig: Take 1 tablet (40 mg) by mouth At Bedtime       Statins Protocol Passed - 8/21/2019  3:37 PM        Passed - LDL on file in past 12 months     Recent Labs   Lab Test 10/25/18  0759   LDL 56             Passed - No abnormal creatine kinase in past 12 months     No lab results found.             Passed - Recent (12 mo) or future (30 days) visit within the authorizing provider's specialty     Patient had office visit in the last 12 months or has a visit in the next 30 days with authorizing provider or within the authorizing provider's specialty.  See \"Patient Info\" tab in inbasket, or \"Choose Columns\" in Meds & Orders section of the refill encounter.              Passed - Medication is active on med list        Passed - Patient is age 18 or older        Passed - No active pregnancy on record        Passed - No positive pregnancy test in past 12 months          "

## 2019-08-26 NOTE — TELEPHONE ENCOUNTER
Contacted pt to inform her that the MedicareBlue Rx Authorization to Release Information has been received and will have Saritha take a look at the document to make sure that it's something that is needed.    Asked if pt received the Pfizer Patient Assistance Application in the mail.  Pt stated she received something from the [employer], but could not recall what the documents were. Pt stated that she completed it and returned it back to the clinic sometime last week. Told pt that more information may be requested, but will wait till the documents are received in the mail.  Will contact pt when documents are received.      Vanita Westbrook  Clinic   Pulmonary Hypertension  [facility]  (P) [phone]

## 2019-08-27 NOTE — TELEPHONE ENCOUNTER
Received a denial fax for Revatio. Contacted Medicare[employer] Rx regarding the denial. Spoke w/ Bere (rep) who confirmed that the Sildenafil was approved, but the Revatio was denied.      Plan is to start pt on generic, not brand name. Will not appeal the decision for Revatio. No further action is required     Vanita Westbrook  Clinic   Pulmonary Hypertension  [facility]  (P) [phone]

## 2019-08-28 ENCOUNTER — OFFICE VISIT (OUTPATIENT)
Dept: FAMILY MEDICINE | Facility: CLINIC | Age: 79
End: 2019-08-28
Payer: MEDICARE

## 2019-08-28 VITALS
SYSTOLIC BLOOD PRESSURE: 104 MMHG | OXYGEN SATURATION: 94 % | DIASTOLIC BLOOD PRESSURE: 65 MMHG | HEIGHT: 62 IN | WEIGHT: 218 LBS | HEART RATE: 82 BPM | TEMPERATURE: 97 F | BODY MASS INDEX: 40.12 KG/M2

## 2019-08-28 DIAGNOSIS — M05.9 RHEUMATOID ARTHRITIS WITH POSITIVE RHEUMATOID FACTOR, INVOLVING UNSPECIFIED SITE (H): ICD-10-CM

## 2019-08-28 DIAGNOSIS — I48.19 PERSISTENT ATRIAL FIBRILLATION (H): ICD-10-CM

## 2019-08-28 DIAGNOSIS — E78.5 HYPERLIPIDEMIA LDL GOAL <100: Primary | ICD-10-CM

## 2019-08-28 PROCEDURE — 99214 OFFICE O/P EST MOD 30 MIN: CPT | Performed by: INTERNAL MEDICINE

## 2019-08-28 RX ORDER — SIMVASTATIN 40 MG
40 TABLET ORAL AT BEDTIME
Qty: 90 TABLET | Refills: 3 | Status: SHIPPED | OUTPATIENT
Start: 2019-08-28 | End: 2019-11-25

## 2019-08-28 ASSESSMENT — MIFFLIN-ST. JEOR: SCORE: 1417.09

## 2019-08-28 NOTE — PROGRESS NOTES
Chief Complaint:     Jaqueline Canales is a 78 year old female who presents to clinic today for the following health issues:      Hypertension Follow-up    BP is well controlled    No chest pain, headaches, fever or chills    Patient is on regular diet    Patient is compliant with her BP medication regimen including metoprolol, Lasix      Current Medications:     Current Outpatient Medications   Medication Sig Dispense Refill     acetaminophen (TYLENOL) 500 MG tablet Take 500-1,000 mg by mouth every 6 hours as needed for mild pain       albuterol (VENTOLIN HFA) 108 (90 Base) MCG/ACT inhaler Inhale 2 puffs into the lungs every 6 hours 18 g 3     apixaban ANTICOAGULANT (ELIQUIS) 5 MG tablet Take 1 tablet (5 mg) by mouth 2 times daily 180 tablet 2     Bacillus Coagulans-Inulin (PROBIOTIC-PREBIOTIC PO)        Calcium Citrate-Vitamin D (CITRACAL + D PO) Take 2 tablets by mouth 2 times daily        CENTRUM SILVER OR TABS 1 TABLET DAILY       docusate sodium (COLACE) 100 MG capsule Take 100 mg by mouth as needed        folic acid (FOLVITE) 1 MG tablet Take 1 tablet (1,000 mcg) by mouth daily 90 tablet 3     furosemide (LASIX) 20 MG tablet Take 1 tablet (20 mg) by mouth daily Updated - Script 90 tablet 3     InFLIXimab (REMICADE IV) Inject 600 mg into the vein Every 8 weeks       levothyroxine (SYNTHROID/LEVOTHROID) 75 MCG tablet Take 1 tablet (75 mcg) by mouth daily 90 tablet 3     MELATONIN PO Take 5 mg by mouth nightly as needed       Methotrexate Sodium (METHOTREXATE PO) Take 2.5 mg by mouth 10 tablets weekly       metoprolol succinate ER (TOPROL-XL) 25 MG 24 hr tablet Take 1 tablet (25 mg) by mouth daily 90 tablet 2     Multiple Vitamins-Minerals (ICAPS) CAPS Take 1 capsule by mouth 2 times daily        NIFEDICAL XL 60 MG PO TB24 1 TABLET DAILY       omeprazole (PRILOSEC) 20 MG DR capsule Take 1 capsule (20 mg) by mouth 2 times daily Take 30-60 minutes prior to meals 180 capsule 2     sildenafil (REVATIO) 20 MG  tablet Take 1 tablet (20 mg) by mouth 3 times daily 90 tablet 11     simvastatin (ZOCOR) 40 MG tablet Take 1 tablet (40 mg) by mouth At Bedtime 90 tablet 0     VENTOLIN  (90 Base) MCG/ACT inhaler Inhale 2 puffs into the lungs every 6 hours 18 g 11         Allergies:      Allergies   Allergen Reactions     No Known Allergies             Past Medical History:     Past Medical History:   Diagnosis Date     Amaurosis fugax 5-11    Right     Carotid artery stenosis      Esophageal reflux 3/11/2004     HELICOBACTER PYLORI INFECTION 7/11/2006     hx of CA in situ RT breast-1990.mastectomy 4/17/2007     Hyperlipidemia LDL goal <70 6/20/2011     Lung cancer (H)     squamous cell lung ca left lower lobe     OBESITY NOS 3/11/2004     OSTEOPOROSIS NOS 3/11/2004     Other psoriasis 3/11/2004     RA (rheumatoid arthritis) (H) 2/8/2010     Raynaud's syndrome 4/10/2006     Scleroderma (H)      Sleep apnea     CPAP         Past Surgical History:     Past Surgical History:   Procedure Laterality Date     BREAST SURGERY       C NONSPECIFIC PROCEDURE  1990    mastectomy RT breast     COLONOSCOPY  5/24/16     COLONOSCOPY N/A 6/28/2019    Procedure: Colonoscopy, With Polypectomy And Biopsy;  Surgeon: Neto Kaminski MD;  Location:  GI     CONIZATION       CV RIGHT HEART CATH N/A 4/15/2019    Procedure: Heart Cath Right Heart Cath;  Surgeon: Naresh Cyr MD;  Location:  HEART CARDIAC CATH LAB     ENDARTERECTOMY CAROTID  2011     HC COLONOSCOPY THRU STOMA, DIAGNOSTIC  2001 2011    diverticulosis     REPAIR HAMMER TOE       TONSILLECTOMY           Family Medical History:     Family History   Problem Relation Age of Onset     Cancer - colorectal Mother      Cerebrovascular Disease Father      Cancer Sister 31        ovarian     Heart Disease Sister      Gastrointestinal Disease Sister         crohns     Gastrointestinal Disease Sister         diverticulitis     Gastrointestinal Disease Brother         diverticulitis      Alzheimer Disease Sister      Cerebrovascular Disease Maternal Grandmother      Diabetes Maternal Grandmother      Mental Illness Maternal Grandmother      Diabetes Maternal Uncle      Cancer Nephew                  Social History:     Social History     Socioeconomic History     Marital status:      Spouse name: Not on file     Number of children: Not on file     Years of education: Not on file     Highest education level: Not on file   Occupational History     Not on file   Social Needs     Financial resource strain: Not on file     Food insecurity:     Worry: Not on file     Inability: Not on file     Transportation needs:     Medical: Not on file     Non-medical: Not on file   Tobacco Use     Smoking status: Former Smoker     Packs/day: 1.00     Years: 30.00     Pack years: 30.00     Types: Cigarettes     Start date:      Last attempt to quit: 3/11/1992     Years since quittin.4     Smokeless tobacco: Never Used   Substance and Sexual Activity     Alcohol use: Not Currently     Alcohol/week: 0.0 oz     Frequency: Never     Drug use: No     Sexual activity: Not Currently     Partners: Male   Lifestyle     Physical activity:     Days per week: Not on file     Minutes per session: Not on file     Stress: Not on file   Relationships     Social connections:     Talks on phone: Not on file     Gets together: Not on file     Attends Lutheran service: Not on file     Active member of club or organization: Not on file     Attends meetings of clubs or organizations: Not on file     Relationship status: Not on file     Intimate partner violence:     Fear of current or ex partner: Not on file     Emotionally abused: Not on file     Physically abused: Not on file     Forced sexual activity: Not on file   Other Topics Concern     Parent/sibling w/ CABG, MI or angioplasty before 65F 55M? No   Social History Narrative     Not on file           Review of System:     Constitutional: Negative for fever  "or chills  Skin: Negative for rashes  Ears/Nose/Throat: Negative for nasal congestion, sore throat  Respiratory: No shortness of breath, dyspnea on exertion, cough, or hemoptysis  Cardiovascular: Negative for chest pain  Gastrointestinal: Negative for nausea, vomiting  Genitourinary: Negative for dysuria, hematuria  Musculoskeletal: Negative for myalgias  Neurologic: Negative for headaches  Psychiatric: Negative for depression, anxiety  Hematologic/Lymphatic/Immunologic: Negative  Endocrine: positive for chronic hypothyroidism  Behavioral: Negative for tobacco use       Physical Exam:   /65 (BP Location: Left arm, Patient Position: Sitting, Cuff Size: Adult Large)   Pulse 82   Temp 97  F (36.1  C) (Oral)   Ht 1.575 m (5' 2\")   Wt 98.9 kg (218 lb)   SpO2 94%   BMI 39.87 kg/m      GENERAL: alert and no distress  EYES: eyes grossly normal to inspection, and conjunctivae and sclerae normal  HENT: Normocephalic atraumatic. Nose and mouth without ulcers or lesions  NECK: supple  RESP: lungs clear to auscultation   CV: irregularly irregular, heart rate is well controlled  LYMPH: no peripheral edema   ABDOMEN: nondistended  MS: no gross musculoskeletal defects noted  SKIN: no suspicious lesions or rashes  NEURO: Alert & Oriented x 3.   PSYCH: mentation appears normal, affect normal        Diagnostic Test Results:     Diagnostic Test Results:  Results for orders placed or performed in visit on 07/26/19   Basic metabolic panel  (Ca, Cl, CO2, Creat, Gluc, K, Na, BUN)   Result Value Ref Range    Sodium 138 133 - 144 mmol/L    Potassium 4.2 3.4 - 5.3 mmol/L    Chloride 106 94 - 109 mmol/L    Carbon Dioxide 23 20 - 32 mmol/L    Anion Gap 9 3 - 14 mmol/L    Glucose 88 70 - 99 mg/dL    Urea Nitrogen 24 7 - 30 mg/dL    Creatinine 0.73 0.52 - 1.04 mg/dL    GFR Estimate 78 >60 mL/min/[1.73_m2]    GFR Estimate If Black >90 >60 mL/min/[1.73_m2]    Calcium 9.0 8.5 - 10.1 mg/dL   TSH with free T4 reflex   Result Value Ref " Range    TSH 3.10 0.40 - 4.00 mU/L       ASSESSMENT/PLAN:       (I10) Essential hypertension  (primary encounter diagnosis)  Comment: BP well controlled.  Plan: continue current BP medication regimen going forward.      (E03.9) Hypothyroidism, unspecified type  Comment: most recent TSH lab result is within normal limits  Plan: continue current levothyroxine medication going forward      (E78.5) Hyperlipidemia LDL goal <100  (primary encounter diagnosis)  Comment: stable on current Zocor medication  Plan: I have refilled the patient's Zocor medication for hyperlipidemia treatment going forward    (I48.1) Persistent atrial fibrillation (H)  Comment: heart rate is well controlled, no chest pains  Plan: continue current cardiac medications including Eliquis long term anticoagulation therapy        Follow Up Plan:     Patient is instructed to return to Internal Medicine clinic for follow-up visit in 3 months.        Carissa Burroughs MD  Internal Medicine  [facility]

## 2019-09-13 NOTE — TELEPHONE ENCOUNTER
Contacted pt with regards to Pfizer application. Unable to reach. Left a voicemail.    Vanita Westbrook  Clinic   Pulmonary Hypertension  [facility]  (P) [phone]

## 2019-09-18 NOTE — TELEPHONE ENCOUNTER
Attempted to contact pt again with regards to Pfizer application. Left pt hillary Westbrook  Clinic   Pulmonary Hypertension  [facility]  (P) [phone]

## 2019-09-18 NOTE — TELEPHONE ENCOUNTER
"Pt returned call. Stated to pt that she needed to come into clinic to complete the Pfizer application. Pt stated she could come in on 2019.      Pt stated that she received Sildenafil and Lasix recently in the mail. Asked if pt confirmed what the cost of the co-pay was. Pt stated that one was not provided to her by the rep, but couldn't remember. The medication was shipped. Pt stated she has been on the medication for about a week. Stated to pt that if the co-pay is high and she agreed to the cost, she will be liable. Pt stated understanding, but stated again that one was not provided.    Contacted [facility] in [place], [place]. Spoke w/ Maricruz who stated the pt's co-pay was $107.35. Requested last shipment date. Maricruz stated that there is no record of the medication being shipped.    Contacted pt to confirm the name of the medication she received. Pt stated that the name of the medication is \"Sildenafil Citrate 20 mg\". Confirmed with pt that it is the medication she should be taking.   -------------------------------------------------  Called [facility] to request shipment of medication. Spoke w/ Jalil who stated the medication was shipped to the pt on 2019. Jalil stated that the co-pay for the medication was $0.00. Jalil transferred me to a pharmacy rep, Ton. Ton stated that the pt is receiving assistance through the [employer].     Contacted pt to discuss the information above and asked if pt had any questions. Pt stated she did not have any. Told pt for future shipments to make sure she knows what the co-pay cost is. Pt wanted [facility] phone number in case they do not contact her to schedule her next shipment. Provided the phone number to [facility] ([phone]). Informed pt that the grants do  every year and need to be renewed. Told pt that she may receive documentation in the mail for the Assistance Fund. Pt stated understanding and had " no further questions.    Scheduled follow-up for pt to see Dr. Marquez after being on the medication for a month. Stated to pt if she experiences any side effects to contact Saritha GUPTA. Provided pt Sairtha's phone number. Also told pt that Saritha may reach out in the next couple of weeks to follow-up on how she is tolerating the medication. Pt stated she is doing well.      Told pt she did not need to come in on 09/20/2019.     Vanita Westbrook  Clinic   Pulmonary Hypertension  [facility]  (P) [phone]

## 2019-09-26 ENCOUNTER — TELEPHONE (OUTPATIENT)
Dept: CARDIOLOGY | Facility: CLINIC | Age: 79
End: 2019-09-26

## 2019-09-26 NOTE — TELEPHONE ENCOUNTER
"Called patient to find out if she had started her sildenafil. Per patient, she thinks she started taking it two or three weeks ago. She says that she is feeling good on the medication and she has not experienced anything \"bad,\" just occasional flushing in the evenings. I told the patient that if anything changes, to give me a call because Saritha is out for the week. I gave the patient my name and number. Patient verbalized understanding and did not have any further questions. Mita Garcia RN on 9/26/2019 at 11:37 AM    "

## 2019-09-30 DIAGNOSIS — E03.9 HYPOTHYROIDISM, UNSPECIFIED TYPE: ICD-10-CM

## 2019-09-30 RX ORDER — LEVOTHYROXINE SODIUM 75 UG/1
75 TABLET ORAL DAILY
Qty: 90 TABLET | Refills: 2 | Status: SHIPPED | OUTPATIENT
Start: 2019-09-30 | End: 2020-03-26

## 2019-09-30 NOTE — TELEPHONE ENCOUNTER
Reason for Call:  Medication or medication refill:    Do you use a [place] [place] [facility]?  Name of the pharmacy and phone number for the current request: [facility] [place], [place] - [address] AT PORTAL TO REGISTERED [facility] SITES      Name of the medication requested: levothyroxine (SYNTHROID/LEVOTHROID) 75 MCG tablet  Other request: Please transfer from Local [facility] to the [facility].  Pt states that the transfer cannot be done from local to Mail order.    Can we leave a detailed message on this number? YES    Phone number patient can be reached at: Home number on file [phone] (home)    Best Time: anytime    Call taken on 9/30/2019 at 1:53 PM by Madeleine Moffett

## 2019-10-09 ENCOUNTER — TELEPHONE (OUTPATIENT)
Dept: FAMILY MEDICINE | Facility: CLINIC | Age: 79
End: 2019-10-09

## 2019-10-16 ENCOUNTER — TRANSFERRED RECORDS (OUTPATIENT)
Dept: HEALTH INFORMATION MANAGEMENT | Facility: CLINIC | Age: 79
End: 2019-10-16

## 2019-10-28 ENCOUNTER — OFFICE VISIT (OUTPATIENT)
Dept: FAMILY MEDICINE | Facility: CLINIC | Age: 79
End: 2019-10-28
Payer: MEDICARE

## 2019-10-28 VITALS
DIASTOLIC BLOOD PRESSURE: 70 MMHG | WEIGHT: 222.3 LBS | TEMPERATURE: 96.6 F | HEART RATE: 61 BPM | BODY MASS INDEX: 40.91 KG/M2 | OXYGEN SATURATION: 94 % | SYSTOLIC BLOOD PRESSURE: 106 MMHG | HEIGHT: 62 IN

## 2019-10-28 DIAGNOSIS — H25.9 AGE-RELATED CATARACT OF BOTH EYES, UNSPECIFIED AGE-RELATED CATARACT TYPE: ICD-10-CM

## 2019-10-28 DIAGNOSIS — Z01.818 PREOP GENERAL PHYSICAL EXAM: Primary | ICD-10-CM

## 2019-10-28 DIAGNOSIS — I48.19 PERSISTENT ATRIAL FIBRILLATION (H): ICD-10-CM

## 2019-10-28 PROCEDURE — 99214 OFFICE O/P EST MOD 30 MIN: CPT | Performed by: INTERNAL MEDICINE

## 2019-10-28 RX ORDER — FUROSEMIDE 20 MG
20 TABLET ORAL 2 TIMES DAILY
Qty: 180 TABLET | Refills: 3 | Status: SHIPPED | OUTPATIENT
Start: 2019-10-28 | End: 2019-11-18

## 2019-10-28 ASSESSMENT — MIFFLIN-ST. JEOR: SCORE: 1436.6

## 2019-10-28 NOTE — PROGRESS NOTES
[address] [address]  [phone]  Dept: [phone]    PRE-OP EVALUATION:  Today's date: 10/28/2019    Jaqueline Canales (: 1940) presents for pre-operative evaluation assessment as requested by Dr. Mejia.  She requires evaluation and anesthesia risk assessment prior to undergoing surgery/procedure for treatment of cataracts.    Proposed Surgery/ Procedure: cataract emulsification  Date of Surgery/ Procedure: 19 and 19  Time of Surgery/ Procedure: [facility]  Hospital/Surgical Facility: [facility]  Fax number for surgical facility: [phone]    Primary Physician: Carissa Burroughs  Type of Anesthesia Anticipated: Local with MAC    Patient has a Health Care Directive or Living Will:  YES    1.NO - DO YOU HAVE A HISTORY OF HEART ATTACK, STROKE, STENT, BYPASS OR SURGERY ON AN ARTERY IN THE HEAD, NECK, HEART OR LEG?   2. NO - Do you ever have any pain or discomfort in your chest?  3. NO - DO YOU HAVE A HISTORY OF HEART FAILURE   4. NO - ARE YOUR TROUBLED BY SHORTNESS OF BREATH WHEN WALKING ON THE LEVEL, UP A SLIGHT HILL OR AT NIGHT?  5. NO - Do you currently have a cold, bronchitis or other respiratory infection?  6. NO - DO YOU HAVE A COUGH, SHORTNESS OF BREATH OR WHEEZING?   7. NO - Do you sometimes get pains in the calves of your legs when you walk?  8. NO - Do you or anyone in your family have previous history of blood clots?  9. NO - Do you or does anyone in your family have a serious bleeding problem such as prolonged bleeding following surgeries or cuts?  10. NO - Have you ever had problems with anemia or been told to take iron pills?  11. NO - Have you had any abnormal blood loss such as black, tarry or bloody stools, or abnormal vaginal bleeding?  12. NO - Have you ever had a blood transfusion?  13. NO - Have you or any of your relatives ever had problems with anesthesia?  14. NO - DO YOU HAVE SLEEP APNEA, EXCESSIVE  SNORING OR DAYTIME DROWSINESS?   15. NO - Do you have any prosthetic heart valves?  16. NO - Do you have prosthetic joints?  17. NO - Is there any chance that you may be pregnant?      HPI:     HPI related to upcoming procedure: patient Jaqueline Canales is a very pleasant 78 year old female with bilateral cataracts who presents to internal medicine clinic for a pre op cardiac evaluation for upcoming bilateral cataracts surgeries for treatment of chronic bilateral cataracts. Patient denies any chest pain, headaches, fever or chills. Patient denies any known allergies to anesthesia agents. Patient is currently on Eliquis long term anticoagulation therapy medication for atrial fibrillation.      MEDICAL HISTORY:     Patient Active Problem List    Diagnosis Date Noted     Benign essential hypertension 06/24/2019     Priority: Medium     Adenomatous polyp 06/24/2019     Priority: Medium     Status post coronary angiogram 04/15/2019     Priority: Medium     Pulmonary hypertension (H) 04/02/2019     Priority: Medium     Added automatically from request for surgery [id number]       Morbid obesity (H) 10/08/2018     Priority: Medium     Chest pain 10/22/2016     Priority: Medium     Persistent atrial fibrillation 08/25/2016     Priority: Medium     Malignant neoplasm of lower lobe of left lung (H) 02/02/2016     Priority: Medium     Lung nodule 11/23/2015     Priority: Medium     Low back pain 07/01/2015     Priority: Medium     Diagnosis updated by automated process. Provider to review and confirm.       IVCD (intraventricular conduction defect) 06/03/2013     Priority: Medium     Esophageal stricture 10/25/2012     Priority: Medium     Hiatal hernia 10/25/2012     Priority: Medium     Hypothyroidism 09/18/2012     Priority: Medium     S/P carotid endarterectomy 02/06/2012     Priority: Medium     Right 2011       Pulmonary fibrosis (H) 02/06/2012     Priority: Medium     secodary to RA, Dr Lennox [place] [facility]       SRINIVAS (obstructive  sleep apnea) 02/06/2012     Priority: Medium     On cpap, Dr Mccracken, [place] [facility]       Constipation 02/06/2012     Priority: Medium     Hyperlipidemia LDL goal <100 11/06/2011     Priority: Medium     Diverticulosis 10/19/2011     Priority: Medium     Advance Care Planning 10/17/2011     Priority: Medium     Advance Care Planning 7/13/2016: Receipt of ACP document:  Received: Health Care Directive which was witnessed or notarized on 6-30-16.  Document previously scanned on 7-8-16.  Validation form completed and sent to be scanned.  Code Status reflects choices in most recent ACP document.  Confirmed/documented designated decision maker(s).  Added by Coral Stover RN, System Director ACP-Honoring Choices   Advance Directive Initial Visit 10/17/11 Jaqueline Canales presents in person for initial session regarding completion of advanced directive form. She was referred to the facilitator by provider.  She currently has no advance directive.Plan:  Advanced directive form, healthcare agent information card, advanced care planning information booklet provided to patient. Follow up meeting: pt will call when ready to schedule a follow up. Patient instructed to bring healthcare agent to this meeting. 1st visit. DESI Jacob LPN           [facility] 06/27/2011     Priority: Medium     Care Coordination services declined 6/27/2011.    EMERGENCY CARE PLAN  Presenting Problem Signs and Symptoms Treatment Plan    Questions or conerns during clinic hours    I will call the clinic directly     Questions or conerns outside clinic hours    I will call the 24 hour nurse line at [phone]    Patient needs to schedule an appointment    I will call the 24 hour scheduling team at [phone] or clinic directly    Same day treatment     I will call the clinic first, nurse line if after hours, urgent care and express care if needed                        Esthela Marshall, RN, BSN  Care Coordinator  DX V65.8 REPLACED WITH [facility] (04/08/2013)       Amaurosis fugax of right eye 05/26/2011     Priority: Medium     Scleroderma (H) 10/05/2010     Priority: Medium     RA (rheumatoid arthritis) (H) 02/08/2010     Priority: Medium     Dr Natalie Garcia Rheumatology       Malignant neoplasm of female breast (H) 04/17/2007     Priority: Medium     Problem list name updated by automated process. Provider to review       Helicobacter pylori infection 07/11/2006     Priority: Medium     Problem list name updated by automated process. Provider to review       Raynaud's syndrome 04/10/2006     Priority: Medium     Lumbago 12/15/2005     Priority: Medium     Other psoriasis 03/11/2004     Priority: Medium     Obesity 03/11/2004     Priority: Medium     Problem list name updated by automated process. Provider to review       Osteoporosis 03/11/2004     Priority: Medium     Reclast annually  Problem list name updated by automated process. Provider to review       Esophageal reflux 03/11/2004     Priority: Medium      Past Medical History:   Diagnosis Date     Amaurosis fugax 5-11    Right     Carotid artery stenosis      Esophageal reflux 3/11/2004     HELICOBACTER PYLORI INFECTION 7/11/2006     hx of CA in situ RT breast-1990.mastectomy 4/17/2007     Hyperlipidemia LDL goal <70 6/20/2011     Lung cancer (H)     squamous cell lung ca left lower lobe     OBESITY NOS 3/11/2004     OSTEOPOROSIS NOS 3/11/2004     Other psoriasis 3/11/2004     RA (rheumatoid arthritis) (H) 2/8/2010     Raynaud's syndrome 4/10/2006     Scleroderma (H)      Sleep apnea     CPAP     Past Surgical History:   Procedure Laterality Date     BREAST SURGERY       C NONSPECIFIC PROCEDURE  1990    mastectomy RT breast     COLONOSCOPY  5/24/16     COLONOSCOPY N/A 6/28/2019    Procedure: Colonoscopy, With Polypectomy And Biopsy;  Surgeon: Neto Kaminski MD;  Location:  GI     CONIZATION       CV RIGHT HEART CATH N/A 4/15/2019    Procedure: Heart Cath Right Heart Cath;   Surgeon: Naresh Cyr MD;  Location:  HEART CARDIAC CATH LAB     ENDARTERECTOMY CAROTID  2011     HC COLONOSCOPY THRU STOMA, DIAGNOSTIC  2001 2011    diverticulosis     REPAIR HAMMER TOE       TONSILLECTOMY       Current Outpatient Medications   Medication Sig Dispense Refill     acetaminophen (TYLENOL) 500 MG tablet Take 500-1,000 mg by mouth every 6 hours as needed for mild pain       albuterol (VENTOLIN HFA) 108 (90 Base) MCG/ACT inhaler Inhale 2 puffs into the lungs every 6 hours 18 g 3     apixaban ANTICOAGULANT (ELIQUIS) 5 MG tablet Take 1 tablet (5 mg) by mouth 2 times daily 180 tablet 2     Bacillus Coagulans-Inulin (PROBIOTIC-PREBIOTIC PO)        Calcium Citrate-Vitamin D (CITRACAL + D PO) Take 2 tablets by mouth 2 times daily        CENTRUM SILVER OR TABS 1 TABLET DAILY       docusate sodium (COLACE) 100 MG capsule Take 100 mg by mouth as needed        folic acid (FOLVITE) 1 MG tablet Take 1 tablet (1,000 mcg) by mouth daily 90 tablet 3     furosemide (LASIX) 20 MG tablet Take 1 tablet (20 mg) by mouth 2 times daily Updated - Script 180 tablet 3     InFLIXimab (REMICADE IV) Inject 600 mg into the vein Every 8 weeks       levothyroxine (SYNTHROID/LEVOTHROID) 75 MCG tablet Take 1 tablet (75 mcg) by mouth daily 90 tablet 2     MELATONIN PO Take 5 mg by mouth nightly as needed       Methotrexate Sodium (METHOTREXATE PO) Take 2.5 mg by mouth 10 tablets weekly       metoprolol succinate ER (TOPROL-XL) 25 MG 24 hr tablet Take 1 tablet (25 mg) by mouth daily 90 tablet 2     Multiple Vitamins-Minerals (ICAPS) CAPS Take 1 capsule by mouth 2 times daily        NIFEDICAL XL 60 MG PO TB24 1 TABLET DAILY       omeprazole (PRILOSEC) 20 MG DR capsule Take 1 capsule (20 mg) by mouth 2 times daily Take 30-60 minutes prior to meals 180 capsule 2     sildenafil (REVATIO) 20 MG tablet Take 1 tablet (20 mg) by mouth 3 times daily 90 tablet 11     simvastatin (ZOCOR) 40 MG tablet Take 1 tablet (40 mg) by mouth At  "Bedtime 90 tablet 3     VENTOLIN  (90 Base) MCG/ACT inhaler Inhale 2 puffs into the lungs every 6 hours 18 g 11     OTC products: None, except as noted above    Allergies   Allergen Reactions     No Known Allergies       Latex Allergy: NO    Social History     Tobacco Use     Smoking status: Former Smoker     Packs/day: 1.00     Years: 30.00     Pack years: 30.00     Types: Cigarettes     Start date:      Last attempt to quit: 3/11/1992     Years since quittin.6     Smokeless tobacco: Never Used   Substance Use Topics     Alcohol use: Not Currently     Alcohol/week: 0.0 standard drinks     Frequency: Never     History   Drug Use No       REVIEW OF SYSTEMS:   Constitutional, neuro, ENT, endocrine, pulmonary, cardiac, gastrointestinal, genitourinary, musculoskeletal, integument and psychiatric systems are negative, except as otherwise noted.    EXAM:   /70 (BP Location: Left arm, Patient Position: Sitting, Cuff Size: Adult Regular)   Pulse 61   Temp 96.6  F (35.9  C) (Oral)   Ht 1.575 m (5' 2\")   Wt 100.8 kg (222 lb 4.8 oz)   SpO2 94%   BMI 40.66 kg/m      GENERAL APPEARANCE: healthy, alert and no distress     EYES: EOMI, PERRL, bilateral cataracts present     HENT: ear canals and TM's normal and nose and mouth without ulcers or lesions     NECK: no adenopathy, no asymmetry, masses, or scars and thyroid normal to palpation     RESP: lungs clear to auscultation - no rales, rhonchi or wheezes     CV: regular rates and rhythm, normal S1 S2, no S3 or S4 and no murmur, click or rub     ABDOMEN:  soft, nontender, no HSM or masses and bowel sounds normal     MS: extremities normal- no gross deformities noted, no evidence of inflammation in joints, FROM in all extremities.     SKIN: no suspicious lesions or rashes     NEURO: Normal strength and tone, sensory exam grossly normal, mentation intact and speech normal     PSYCH: mentation appears normal. and affect normal/bright     LYMPHATICS: No " cervical adenopathy    DIAGNOSTICS:   No labs or EKG required for low risk surgery (cataract, skin procedure, breast biopsy, etc)    Recent Labs   Lab Test 10/16/19 07/26/19  0934 07/16/19  0959  04/15/19  0700  10/25/18  0759  12/04/15  0745   HGB 12.1  --  12.0   < > 12.2   < > 11.4*   < >  --      --  115*   < > 75*   < > 215   < > 90*   INR  --   --   --   --  1.05  --   --   --  0.95   NA  --  138 137   < > 141   < >  --    < >  --    POTASSIUM  --  4.2 3.8   < > 4.1   < >  --    < >  --    CR 0.550 0.73 0.88   < > 0.73   < >  --    < >  --    A1C  --   --   --   --   --   --  5.1  --   --     < > = values in this interval not displayed.        IMPRESSION:   Reason for surgery/procedure: chronic bilateral cataracts  Diagnosis/reason for consult: pre op cardiac evaluation for upcoming bilateral cataracts surgeries for treatment of chronic bilateral cataracts.    The proposed surgical procedure is considered LOW risk.    REVISED CARDIAC RISK INDEX  The patient has the following serious cardiovascular risks for perioperative complications such as (MI, PE, VFib and 3  AV Block):  No serious cardiac risks  INTERPRETATION: 0 risks: Class I (very low risk - 0.4% complication rate)    The patient has the following additional risks for perioperative complications:  No identified additional risks      ICD-10-CM    1. Preop general physical exam Z01.818    2. Age-related cataract of both eyes, unspecified age-related cataract type H25.9    3. Persistent atrial fibrillation I48.19        RECOMMENDATIONS:     --Patient is to take all scheduled medications on the day of surgery EXCEPT for modifications listed below.    Anticoagulant or Antiplatelet Medication Use  ELIQUIS: Bleeding risk is low for this procedure and apixaban (Eliquis) should be stopped at least 24 hours prior to procedure based on a creatinine clearance of  greater than or equal to 30.        APPROVAL GIVEN to proceed with proposed procedure, without  further diagnostic evaluation       Signed Electronically by: Carissa Burroughs MD    Copy of this evaluation report is provided to requesting physician.    [place] Preop Guidelines    Revised Cardiac Risk Index

## 2019-10-31 NOTE — TELEPHONE ENCOUNTER
Received a call from the pt who stated the cost of her co-pay for Sildenafil is $107.00. [facility] provided her phone numbers for patricio programs. Pt also stated that she received [employer] Assistance [employer] Application in the mail. Pt stated that she did not have the medication shipped to her and she has enough to last a few days.    Stated for pt to complete [employer] application as soon as possible because the grants close. Pt stated understanding and stated that she will complete it and get it mailed today. Pt wondered if the patricio would be good for a year. Stated to pt I would follow-up with [employer] and provide her an update tomorrow.     Told pt that when she is approved, [employer] will contact her. Requested pt to ask for the BIN, PCN and ID number to provide to [facility]. Told pt when she receives the PCN, BIN and ID number from [employer] to contact her pharmacy and provide them the information. Told pt when her pharmacy processes the information to ask what her new co-pay is. Pt stated understanding and had no further questions.   --------------------------------------------  Pt requested assistance with her Methitrexate, Stated to pt that is something that we are unable to assist with. Stated to pt to follow-up w/ the physician who prescribed it or go online and look up [facility] for assistance. Pt stated she does not use a computer. Pt stated she will call Dr. Burroughs.

## 2019-11-01 NOTE — TELEPHONE ENCOUNTER
Contacted [employer] and spoke w/ Kamini. Kamini stated the the pt's application was received on October 9, 2019 and approved. Pt's patricio is active and good through December 31, 2020. Kamini provided the pt's patricio information as follows:    BIN: [id number]  PCN:AS  ID#: [id number]  GRP#: [id number]  -------------------  Contacted [facility] to provide them with the information provided by [employer] Assistance [employer]. Spoke w/ Chhaya who stated she will provide the information to the claims department to begin processing the pt's co-pay.     Stated to Chhaya if [facility] will be contacting the pt again to set up her delivery. Chhaya stated that she will make a note for the pharmacy to reach back out to the pt by Monday or Tuesday to reschedule the pt's delivery.    --------------------------  Attempted to contact the pt with regards to the co-pay and patricio. Was unable to reach - left a voicemail.    Vanita Westbrook  Clinic   Pulmonary Hypertension  [facility]  (P) [phone]

## 2019-11-04 NOTE — TELEPHONE ENCOUNTER
Pt returned call. Asked if [facility] has contacted the pt yet. Pt stated they did over the weekend, but she is not sure for which medication. Requested pt contact [facility] on Wednesday to follow-up with the shipment of the pt's medication as [facility] was to reach out to pt today or tomorrow. Recommended pt ask what the co-pay is when she contacts [facility]. Stated if there is still a co-pay for her Sildenafil, to not have the medication delivered and to contact the office. Pt stated she has about 9 days left of medication. Pt stated understanding and had no further questions.    Vanita Westbrook  Clinic   Pulmonary Hypertension  [facility]  (P) [phone]

## 2019-11-08 ENCOUNTER — TELEPHONE (OUTPATIENT)
Dept: FAMILY MEDICINE | Facility: CLINIC | Age: 79
End: 2019-11-08

## 2019-11-08 NOTE — TELEPHONE ENCOUNTER
"States she is on her increased dose of Lasix. 20mg bid, per 's recommendation recently.  Lower legs are quite edematous, \"weeping\" a bit.  Wondering what else to do.    Elevate legs whenever possible; she can wrap also.    Scheduled for follow up with PCP on Monday; she agrees to go to  over weekend with ANY open areas, worsening.    Debbie Navarrete RN on 11/8/2019 at 3:52 PM    "

## 2019-11-08 NOTE — TELEPHONE ENCOUNTER
Reason for call:  Patient reporting a symptom    Symptom or request: Lymphedema, swelling and redness around ankles    Duration (how long have symptoms been present): over a month, bad in the past few weeks    Have you been treated for this before? Yes, Dr Burroughs gave water pills    Additional comments: pt would like a call to discuss options for treating it    Phone Number patient can be reached at:  Home number on file [phone] (home)    Best Time:  any    Can we leave a detailed message on this number:  YES    Call taken on 11/8/2019 at 1:41 PM by German Keenan

## 2019-11-11 ENCOUNTER — OFFICE VISIT (OUTPATIENT)
Dept: FAMILY MEDICINE | Facility: CLINIC | Age: 79
End: 2019-11-11
Payer: MEDICARE

## 2019-11-11 VITALS
DIASTOLIC BLOOD PRESSURE: 59 MMHG | HEART RATE: 111 BPM | SYSTOLIC BLOOD PRESSURE: 98 MMHG | WEIGHT: 223 LBS | TEMPERATURE: 96.8 F | OXYGEN SATURATION: 93 % | HEIGHT: 62 IN | BODY MASS INDEX: 41.04 KG/M2

## 2019-11-11 DIAGNOSIS — M79.661 PAIN IN BOTH LOWER LEGS: ICD-10-CM

## 2019-11-11 DIAGNOSIS — R60.0 BILATERAL LEG EDEMA: ICD-10-CM

## 2019-11-11 DIAGNOSIS — L03.90 CELLULITIS, UNSPECIFIED CELLULITIS SITE: Primary | ICD-10-CM

## 2019-11-11 DIAGNOSIS — M79.662 PAIN IN BOTH LOWER LEGS: ICD-10-CM

## 2019-11-11 DIAGNOSIS — R21 RASH: ICD-10-CM

## 2019-11-11 DIAGNOSIS — I10 BENIGN ESSENTIAL HYPERTENSION: ICD-10-CM

## 2019-11-11 PROCEDURE — 99214 OFFICE O/P EST MOD 30 MIN: CPT | Performed by: INTERNAL MEDICINE

## 2019-11-11 RX ORDER — CEPHALEXIN 500 MG/1
500 CAPSULE ORAL 4 TIMES DAILY
Qty: 40 CAPSULE | Refills: 0 | Status: SHIPPED | OUTPATIENT
Start: 2019-11-11 | End: 2019-11-11

## 2019-11-11 RX ORDER — CEPHALEXIN 500 MG/1
500 CAPSULE ORAL 4 TIMES DAILY
Qty: 40 CAPSULE | Refills: 0 | Status: SHIPPED | OUTPATIENT
Start: 2019-11-11 | End: 2019-11-14

## 2019-11-11 ASSESSMENT — MIFFLIN-ST. JEOR: SCORE: 1439.77

## 2019-11-11 NOTE — PROGRESS NOTES
Chief Complaint:       Jaqueline Canales is a 78 year old female who presents to clinic today for the following health issues:      Musculoskeletal problem/bilateral leg pain      Duration: has been going on for a while. Patient states it has been getting worse    Description  Location: bilateral legs    Intensity:  mild, moderate    Accompanying signs and symptoms: swelling, redness and drainage    History  Previous similar problem: no   Previous evaluation:  none    Precipitating or alleviating factors:  Trauma or overuse: no   Aggravating factors include: walking, climbing stairs, lifting, exercise and lying down    Therapies tried and outcome: nothing        Current Medications:     Current Outpatient Medications   Medication Sig Dispense Refill     acetaminophen (TYLENOL) 500 MG tablet Take 500-1,000 mg by mouth every 6 hours as needed for mild pain       albuterol (VENTOLIN HFA) 108 (90 Base) MCG/ACT inhaler Inhale 2 puffs into the lungs every 6 hours 18 g 3     apixaban ANTICOAGULANT (ELIQUIS) 5 MG tablet Take 1 tablet (5 mg) by mouth 2 times daily 180 tablet 2     Bacillus Coagulans-Inulin (PROBIOTIC-PREBIOTIC PO)        Calcium Citrate-Vitamin D (CITRACAL + D PO) Take 2 tablets by mouth 2 times daily        CENTRUM SILVER OR TABS 1 TABLET DAILY       cephALEXin (KEFLEX) 500 MG capsule Take 1 capsule (500 mg) by mouth 4 times daily for 10 days 40 capsule 0     docusate sodium (COLACE) 100 MG capsule Take 100 mg by mouth as needed        folic acid (FOLVITE) 1 MG tablet Take 1 tablet (1,000 mcg) by mouth daily 90 tablet 3     furosemide (LASIX) 20 MG tablet Take 1 tablet (20 mg) by mouth 2 times daily Updated - Script 180 tablet 3     InFLIXimab (REMICADE IV) Inject 600 mg into the vein Every 8 weeks       levothyroxine (SYNTHROID/LEVOTHROID) 75 MCG tablet Take 1 tablet (75 mcg) by mouth daily 90 tablet 2     MELATONIN PO Take 5 mg by mouth nightly as needed       Methotrexate Sodium (METHOTREXATE PO) Take  2.5 mg by mouth 10 tablets weekly       metoprolol succinate ER (TOPROL-XL) 25 MG 24 hr tablet Take 1 tablet (25 mg) by mouth daily 90 tablet 2     Multiple Vitamins-Minerals (ICAPS) CAPS Take 1 capsule by mouth 2 times daily        NIFEDICAL XL 60 MG PO TB24 1 TABLET DAILY       omeprazole (PRILOSEC) 20 MG DR capsule Take 1 capsule (20 mg) by mouth 2 times daily Take 30-60 minutes prior to meals 180 capsule 2     sildenafil (REVATIO) 20 MG tablet Take 1 tablet (20 mg) by mouth 3 times daily 90 tablet 11     simvastatin (ZOCOR) 40 MG tablet Take 1 tablet (40 mg) by mouth At Bedtime 90 tablet 3     VENTOLIN  (90 Base) MCG/ACT inhaler Inhale 2 puffs into the lungs every 6 hours 18 g 11         Allergies:      Allergies   Allergen Reactions     No Known Allergies             Past Medical History:     Past Medical History:   Diagnosis Date     Amaurosis fugax 5-11    Right     Carotid artery stenosis      Esophageal reflux 3/11/2004     HELICOBACTER PYLORI INFECTION 7/11/2006     hx of CA in situ RT breast-1990.mastectomy 4/17/2007     Hyperlipidemia LDL goal <70 6/20/2011     Lung cancer (H)     squamous cell lung ca left lower lobe     OBESITY NOS 3/11/2004     OSTEOPOROSIS NOS 3/11/2004     Other psoriasis 3/11/2004     RA (rheumatoid arthritis) (H) 2/8/2010     Raynaud's syndrome 4/10/2006     Scleroderma (H)      Sleep apnea     CPAP         Past Surgical History:     Past Surgical History:   Procedure Laterality Date     BREAST SURGERY       C NONSPECIFIC PROCEDURE  1990    mastectomy RT breast     COLONOSCOPY  5/24/16     COLONOSCOPY N/A 6/28/2019    Procedure: Colonoscopy, With Polypectomy And Biopsy;  Surgeon: Neto Kaminski MD;  Location:  GI     [facility]       CV RIGHT HEART CATH N/A 4/15/2019    Procedure: Heart Cath Right Heart Cath;  Surgeon: Naresh Cyr MD;  Location:  HEART CARDIAC CATH LAB     ENDARTERECTOMY CAROTID  2011     HC COLONOSCOPY THRU STOMA, DIAGNOSTIC  2001 2011     diverticulosis     REPAIR HAMMER TOE       TONSILLECTOMY           Family Medical History:     Family History   Problem Relation Age of Onset     Cancer - colorectal Mother      Cerebrovascular Disease Father      Cancer Sister 31        ovarian     Heart Disease Sister      Gastrointestinal Disease Sister         crohns     Gastrointestinal Disease Sister         diverticulitis     Gastrointestinal Disease Brother         diverticulitis     Alzheimer Disease Sister      Cerebrovascular Disease Maternal Grandmother      Diabetes Maternal Grandmother      Mental Illness Maternal Grandmother      Diabetes Maternal Uncle      Cancer Nephew                  Social History:     Social History     Socioeconomic History     Marital status:      Spouse name: Not on file     Number of children: Not on file     Years of education: Not on file     Highest education level: Not on file   Occupational History     Not on file   Social Needs     Financial resource strain: Not on file     Food insecurity:     Worry: Not on file     Inability: Not on file     Transportation needs:     Medical: Not on file     Non-medical: Not on file   Tobacco Use     Smoking status: Former Smoker     Packs/day: 1.00     Years: 30.00     Pack years: 30.00     Types: Cigarettes     Start date:      Last attempt to quit: 3/11/1992     Years since quittin.6     Smokeless tobacco: Never Used   Substance and Sexual Activity     Alcohol use: Not Currently     Alcohol/week: 0.0 standard drinks     Frequency: Never     Drug use: No     Sexual activity: Not Currently     Partners: Male   Lifestyle     Physical activity:     Days per week: Not on file     Minutes per session: Not on file     Stress: Not on file   Relationships     Social connections:     Talks on phone: Not on file     Gets together: Not on file     Attends Religion service: Not on file     Active member of club or organization: Not on file     Attends meetings of  "clubs or organizations: Not on file     Relationship status: Not on file     Intimate partner violence:     Fear of current or ex partner: Not on file     Emotionally abused: Not on file     Physically abused: Not on file     Forced sexual activity: Not on file   Other Topics Concern     Parent/sibling w/ CABG, MI or angioplasty before 65F 55M? No   Social History Narrative     Not on file           Review of System:     Constitutional: Negative for fever or chills  Skin: positive for bilateral lower leg painful erythema rashes concerning for cellulitis  Ears/Nose/Throat: Negative for nasal congestion, sore throat  Respiratory: No shortness of breath, dyspnea on exertion, cough, or hemoptysis  Cardiovascular: Negative for chest pain  Gastrointestinal: Negative for nausea, vomiting  Genitourinary: Negative for dysuria, hematuria  Musculoskeletal: positive for bilateral lower leg pains  Neurologic: Negative for headaches  Psychiatric: Negative for depression, anxiety  Hematologic/Lymphatic/Immunologic: positive for bilateral lower leg edema  Endocrine: Negative  Behavioral: Negative for tobacco use       Physical Exam:   BP 98/59 (BP Location: Left arm, Patient Position: Sitting, Cuff Size: Adult Large)   Pulse 111   Temp 96.8  F (36  C) (Tympanic)   Ht 1.575 m (5' 2\")   Wt 101.2 kg (223 lb)   SpO2 93%   BMI 40.79 kg/m      GENERAL: alert and no distress  EYES: eyes grossly normal to inspection, and conjunctivae and sclerae normal  HENT: Normocephalic atraumatic. Nose and mouth without ulcers or lesions  NECK: supple  RESP: lungs clear to auscultation   CV: regular rate and rhythm, normal S1 S2  LYMPH: chronic bilateral lower leg peripheral edema noted  ABDOMEN: nondistended  MS: no gross musculoskeletal defects noted  SKIN: bilateral lower leg painful erythema rashes noted concerning for cellulitis  NEURO: Alert & Oriented x 3.   PSYCH: mentation appears normal, affect normal        Diagnostic Test Results: "     Diagnostic Test Results:    Lab Results   Component Value Date    WBC 5.1 10/16/2019     Lab Results   Component Value Date    RBC 3.81 07/16/2019     Lab Results   Component Value Date    HGB 12.1 10/16/2019     Lab Results   Component Value Date    HCT 36.6 10/16/2019       Lab Results   Component Value Date    MCV 98 10/16/2019     Lab Results   Component Value Date    MCH 32.5 10/16/2019     Lab Results   Component Value Date    MCHC 33.2 10/16/2019     Lab Results   Component Value Date    RDW 14.4 10/16/2019     Lab Results   Component Value Date     10/16/2019             ASSESSMENT/PLAN:   (R21) Rash  (M79.661,  M79.662) Pain in both lower legs  (L03.90) Cellulitis, unspecified cellulitis site  (primary encounter diagnosis)  Comment: bilateral lower leg painful erythema rashes concerning for cellulitis  Plan: cephALEXin (KEFLEX) 500 MG capsule 4xDay for 10 days           (R60.0) Bilateral leg edema  Comment: chronic bilateral lower leg lymphedema symptoms  Plan: continue Lasix diuretic therapy for edema control    (I10) Benign essential hypertension  Comment: BP is well controlled  Plan: continue current BP medication regimen.      Follow Up Plan:     Patient is instructed to return to Internal Medicine clinic for follow-up visit in 3 days.        Carissa Burroughs MD  Internal Medicine  [facility]

## 2019-11-14 ENCOUNTER — OFFICE VISIT (OUTPATIENT)
Dept: FAMILY MEDICINE | Facility: CLINIC | Age: 79
End: 2019-11-14
Payer: MEDICARE

## 2019-11-14 VITALS
HEIGHT: 62 IN | WEIGHT: 226 LBS | SYSTOLIC BLOOD PRESSURE: 104 MMHG | HEART RATE: 61 BPM | DIASTOLIC BLOOD PRESSURE: 70 MMHG | OXYGEN SATURATION: 94 % | TEMPERATURE: 96.4 F | BODY MASS INDEX: 41.59 KG/M2

## 2019-11-14 DIAGNOSIS — L03.90 CELLULITIS, UNSPECIFIED CELLULITIS SITE: Primary | ICD-10-CM

## 2019-11-14 DIAGNOSIS — R60.0 BILATERAL LEG EDEMA: ICD-10-CM

## 2019-11-14 DIAGNOSIS — I10 BENIGN ESSENTIAL HYPERTENSION: ICD-10-CM

## 2019-11-14 PROCEDURE — 99214 OFFICE O/P EST MOD 30 MIN: CPT | Performed by: INTERNAL MEDICINE

## 2019-11-14 RX ORDER — CEPHALEXIN 500 MG/1
500 CAPSULE ORAL 4 TIMES DAILY
Qty: 40 CAPSULE | Refills: 0 | Status: SHIPPED | OUTPATIENT
Start: 2019-11-14 | End: 2019-11-18

## 2019-11-14 ASSESSMENT — MIFFLIN-ST. JEOR: SCORE: 1453.38

## 2019-11-14 NOTE — PROGRESS NOTES
Chief Complaint:       Jaqueline Canales is a 78 year old female who presents to clinic today for the following health issues:      Chief Complaint   Patient presents with     Follow Up     Cellulitis, unspecified cellulitis site       Jaqueline Canales is a 78 year old female who presents to clinic today for the following health issues: cellulitis of both lower legs symptoms improved since last clinic visit after starting Keflex      Current Medications:     Current Outpatient Medications   Medication Sig Dispense Refill     acetaminophen (TYLENOL) 500 MG tablet Take 500-1,000 mg by mouth every 6 hours as needed for mild pain       albuterol (VENTOLIN HFA) 108 (90 Base) MCG/ACT inhaler Inhale 2 puffs into the lungs every 6 hours 18 g 3     apixaban ANTICOAGULANT (ELIQUIS) 5 MG tablet Take 1 tablet (5 mg) by mouth 2 times daily 180 tablet 2     Bacillus Coagulans-Inulin (PROBIOTIC-PREBIOTIC PO)        Calcium Citrate-Vitamin D (CITRACAL + D PO) Take 2 tablets by mouth 2 times daily        CENTRUM SILVER OR TABS 1 TABLET DAILY       cephALEXin (KEFLEX) 500 MG capsule Take 1 capsule (500 mg) by mouth 4 times daily 40 capsule 0     docusate sodium (COLACE) 100 MG capsule Take 100 mg by mouth as needed        folic acid (FOLVITE) 1 MG tablet Take 1 tablet (1,000 mcg) by mouth daily 90 tablet 3     furosemide (LASIX) 20 MG tablet Take 1 tablet (20 mg) by mouth 2 times daily Updated - Script 180 tablet 3     InFLIXimab (REMICADE IV) Inject 600 mg into the vein Every 8 weeks       levothyroxine (SYNTHROID/LEVOTHROID) 75 MCG tablet Take 1 tablet (75 mcg) by mouth daily 90 tablet 2     MELATONIN PO Take 5 mg by mouth nightly as needed       Methotrexate Sodium (METHOTREXATE PO) Take 2.5 mg by mouth 10 tablets weekly       metoprolol succinate ER (TOPROL-XL) 25 MG 24 hr tablet Take 1 tablet (25 mg) by mouth daily 90 tablet 2     Multiple Vitamins-Minerals (ICAPS) CAPS Take 1 capsule by mouth 2 times daily        NIFEDICAL  XL 60 MG PO TB24 1 TABLET DAILY       omeprazole (PRILOSEC) 20 MG DR capsule Take 1 capsule (20 mg) by mouth 2 times daily Take 30-60 minutes prior to meals 180 capsule 2     sildenafil (REVATIO) 20 MG tablet Take 1 tablet (20 mg) by mouth 3 times daily 90 tablet 11     simvastatin (ZOCOR) 40 MG tablet Take 1 tablet (40 mg) by mouth At Bedtime 90 tablet 3     VENTOLIN  (90 Base) MCG/ACT inhaler Inhale 2 puffs into the lungs every 6 hours 18 g 11         Allergies:      Allergies   Allergen Reactions     No Known Allergies             Past Medical History:     Past Medical History:   Diagnosis Date     Amaurosis fugax 5-11    Right     Carotid artery stenosis      Esophageal reflux 3/11/2004     HELICOBACTER PYLORI INFECTION 7/11/2006     hx of CA in situ RT breast-1990.mastectomy 4/17/2007     Hyperlipidemia LDL goal <70 6/20/2011     Lung cancer (H)     squamous cell lung ca left lower lobe     OBESITY NOS 3/11/2004     OSTEOPOROSIS NOS 3/11/2004     Other psoriasis 3/11/2004     RA (rheumatoid arthritis) (H) 2/8/2010     Raynaud's syndrome 4/10/2006     Scleroderma (H)      Sleep apnea     CPAP         Past Surgical History:     Past Surgical History:   Procedure Laterality Date     BREAST SURGERY       C NONSPECIFIC PROCEDURE  1990    mastectomy RT breast     COLONOSCOPY  5/24/16     COLONOSCOPY N/A 6/28/2019    Procedure: Colonoscopy, With Polypectomy And Biopsy;  Surgeon: Neto Kaminski MD;  Location:  GI     [facility]       CV RIGHT HEART CATH N/A 4/15/2019    Procedure: Heart Cath Right Heart Cath;  Surgeon: Naresh Cyr MD;  Location:  HEART CARDIAC CATH LAB     ENDARTERECTOMY CAROTID  2011     HC COLONOSCOPY THRU STOMA, DIAGNOSTIC  2001 2011    diverticulosis     REPAIR HAMMER TOE       TONSILLECTOMY           Family Medical History:     Family History   Problem Relation Age of Onset     Cancer - colorectal Mother      Cerebrovascular Disease Father      Cancer Sister 31         ovarian     Heart Disease Sister      Gastrointestinal Disease Sister         crohns     Gastrointestinal Disease Sister         diverticulitis     Gastrointestinal Disease Brother         diverticulitis     Alzheimer Disease Sister      Cerebrovascular Disease Maternal Grandmother      Diabetes Maternal Grandmother      Mental Illness Maternal Grandmother      Diabetes Maternal Uncle      Cancer Nephew                  Social History:     Social History     Socioeconomic History     Marital status:      Spouse name: Not on file     Number of children: Not on file     Years of education: Not on file     Highest education level: Not on file   Occupational History     Not on file   Social Needs     Financial resource strain: Not on file     Food insecurity:     Worry: Not on file     Inability: Not on file     Transportation needs:     Medical: Not on file     Non-medical: Not on file   Tobacco Use     Smoking status: Former Smoker     Packs/day: 1.00     Years: 30.00     Pack years: 30.00     Types: Cigarettes     Start date:      Last attempt to quit: 3/11/1992     Years since quittin.6     Smokeless tobacco: Never Used   Substance and Sexual Activity     Alcohol use: Not Currently     Alcohol/week: 0.0 standard drinks     Frequency: Never     Drug use: No     Sexual activity: Not Currently     Partners: Male   Lifestyle     Physical activity:     Days per week: Not on file     Minutes per session: Not on file     Stress: Not on file   Relationships     Social connections:     Talks on phone: Not on file     Gets together: Not on file     Attends Mandaen service: Not on file     Active member of club or organization: Not on file     Attends meetings of clubs or organizations: Not on file     Relationship status: Not on file     Intimate partner violence:     Fear of current or ex partner: Not on file     Emotionally abused: Not on file     Physically abused: Not on file     Forced sexual  "activity: Not on file   Other Topics Concern     Parent/sibling w/ CABG, MI or angioplasty before 65F 55M? No   Social History Narrative     Not on file           Review of System:     Constitutional: Negative for fever or chills  Skin: positive for improvement in recent bilateral lower leg painful erythema rashes concerning for cellulitis  Ears/Nose/Throat: Negative for nasal congestion, sore throat  Respiratory: No shortness of breath, dyspnea on exertion, cough, or hemoptysis  Cardiovascular: Negative for chest pain  Gastrointestinal: Negative for nausea, vomiting  Genitourinary: Negative for dysuria, hematuria  Musculoskeletal: positive for bilateral lower leg pains  Neurologic: Negative for headaches  Psychiatric: Negative for depression, anxiety  Hematologic/Lymphatic/Immunologic: positive for bilateral lower leg edema  Endocrine: Negative  Behavioral: Negative for tobacco use       Physical Exam:   /70 (BP Location: Left arm, Patient Position: Sitting, Cuff Size: Adult Large)   Pulse 61   Temp 96.4  F (35.8  C) (Oral)   Ht 1.575 m (5' 2\")   Wt 102.5 kg (226 lb)   SpO2 94%   BMI 41.34 kg/m      GENERAL: alert and no distress  EYES: eyes grossly normal to inspection, and conjunctivae and sclerae normal  HENT: Normocephalic atraumatic. Nose and mouth without ulcers or lesions  NECK: supple  RESP: lungs clear to auscultation   CV: regular rate and rhythm, normal S1 S2  LYMPH: chronic bilateral lower leg peripheral edema noted  ABDOMEN: nondistended  MS: no gross musculoskeletal defects noted  SKIN: positive for improvement in recent bilateral lower leg painful erythema rashes noted concerning for cellulitis  NEURO: Alert & Oriented x 3.   PSYCH: mentation appears normal, affect normal        Diagnostic Test Results:     Diagnostic Test Results:    Lab Results   Component Value Date    WBC 5.1 10/16/2019     Lab Results   Component Value Date    RBC 3.81 07/16/2019     Lab Results   Component Value " Date    HGB 12.1 10/16/2019     Lab Results   Component Value Date    HCT 36.6 10/16/2019       Lab Results   Component Value Date    MCV 98 10/16/2019     Lab Results   Component Value Date    MCH 32.5 10/16/2019     Lab Results   Component Value Date    MCHC 33.2 10/16/2019     Lab Results   Component Value Date    RDW 14.4 10/16/2019     Lab Results   Component Value Date     10/16/2019             ASSESSMENT/PLAN:   (R21) Rash  (M79.661,  M79.662) Pain in both lower legs  (L03.90) Cellulitis, unspecified cellulitis site  (primary encounter diagnosis)  Comment: symptoms improved since last clinic visit after starting Keflex  Plan: I have refilled and extended the patient's cephALEXin (KEFLEX) 500 MG capsule 4xDay for 10 days           (R60.0) Bilateral leg edema  Comment: chronic bilateral lower leg lymphedema symptoms  Plan: continue Lasix diuretic therapy for edema control    (I10) Benign essential hypertension  Comment: BP is well controlled  Plan: continue current BP medication regimen.      Follow Up Plan:     Patient is instructed to return to Internal Medicine clinic for follow-up visit in 3 days.        Carissa Burroughs MD  Internal Medicine  [facility]

## 2019-11-15 ENCOUNTER — TELEPHONE (OUTPATIENT)
Dept: FAMILY MEDICINE | Facility: CLINIC | Age: 79
End: 2019-11-15

## 2019-11-15 NOTE — TELEPHONE ENCOUNTER
Reason for Call:  Requested Provider:      PCP: Carissa Burroughs    Reason for visit: Follow-up appointment      Have you been treated for this in the past? Yes    Additional comments: Jaqueline has a 10:30 appointment over at the hospital for a CT and is needing her 11:00 appointment to be pushed out a little further in the day on 11/18    Can we leave a detailed message on this number? YES    Phone number patient can be reached at: Home number on file [phone] (home)    Best Time: any    Call taken on 11/15/2019 at 1:28 PM by Tami Ashford

## 2019-11-18 ENCOUNTER — HOSPITAL ENCOUNTER (OUTPATIENT)
Dept: CT IMAGING | Facility: CLINIC | Age: 79
Discharge: HOME | End: 2019-11-18
Attending: INTERNAL MEDICINE | Admitting: INTERNAL MEDICINE
Payer: MEDICARE

## 2019-11-18 ENCOUNTER — OFFICE VISIT (OUTPATIENT)
Dept: FAMILY MEDICINE | Facility: CLINIC | Age: 79
End: 2019-11-18
Payer: MEDICARE

## 2019-11-18 VITALS
OXYGEN SATURATION: 92 % | HEART RATE: 76 BPM | SYSTOLIC BLOOD PRESSURE: 100 MMHG | HEIGHT: 62 IN | TEMPERATURE: 96.9 F | DIASTOLIC BLOOD PRESSURE: 70 MMHG | BODY MASS INDEX: 41.34 KG/M2

## 2019-11-18 DIAGNOSIS — C34.32 MALIGNANT NEOPLASM OF LOWER LOBE OF LEFT LUNG (H): ICD-10-CM

## 2019-11-18 DIAGNOSIS — L03.90 CELLULITIS, UNSPECIFIED CELLULITIS SITE: Primary | ICD-10-CM

## 2019-11-18 DIAGNOSIS — R60.0 BILATERAL LEG EDEMA: ICD-10-CM

## 2019-11-18 DIAGNOSIS — I10 BENIGN ESSENTIAL HYPERTENSION: ICD-10-CM

## 2019-11-18 PROCEDURE — 71250 CT THORAX DX C-: CPT

## 2019-11-18 PROCEDURE — 99214 OFFICE O/P EST MOD 30 MIN: CPT | Performed by: INTERNAL MEDICINE

## 2019-11-18 RX ORDER — FUROSEMIDE 20 MG
20 TABLET ORAL 2 TIMES DAILY
Qty: 180 TABLET | Refills: 3 | Status: ON HOLD | OUTPATIENT
Start: 2019-11-18 | End: 2019-12-05

## 2019-11-18 RX ORDER — FUROSEMIDE 20 MG
20 TABLET ORAL 2 TIMES DAILY
Qty: 180 TABLET | Refills: 3 | Status: SHIPPED | OUTPATIENT
Start: 2019-11-18 | End: 2019-11-18

## 2019-11-18 RX ORDER — CEPHALEXIN 500 MG/1
500 CAPSULE ORAL 4 TIMES DAILY
Qty: 40 CAPSULE | Refills: 0 | Status: ON HOLD | OUTPATIENT
Start: 2019-11-18 | End: 2019-12-05

## 2019-11-18 NOTE — PROGRESS NOTES
Chief Complaint:       Jaqueline Canales is a 78 year old female who presents to clinic today for the following health issues:      Follow up bilateral lower leg cellulitis symptoms    Jaqueline Canales is a 78 year old female who presents to clinic today for the follow up of cellulitis of both lower legs symptoms, which have further improved since last clinic visit after starting Keflex. No chest pain, headaches, fever or chills. Patient denies any side effects of the Keflex antibiotics medication therapy.      Current Medications:     Current Outpatient Medications   Medication Sig Dispense Refill     acetaminophen (TYLENOL) 500 MG tablet Take 500-1,000 mg by mouth every 6 hours as needed for mild pain       albuterol (VENTOLIN HFA) 108 (90 Base) MCG/ACT inhaler Inhale 2 puffs into the lungs every 6 hours 18 g 3     apixaban ANTICOAGULANT (ELIQUIS) 5 MG tablet Take 1 tablet (5 mg) by mouth 2 times daily 180 tablet 2     Bacillus Coagulans-Inulin (PROBIOTIC-PREBIOTIC PO)        Calcium Citrate-Vitamin D (CITRACAL + D PO) Take 2 tablets by mouth 2 times daily        CENTRUM SILVER OR TABS 1 TABLET DAILY       cephALEXin (KEFLEX) 500 MG capsule Take 1 capsule (500 mg) by mouth 4 times daily 40 capsule 0     docusate sodium (COLACE) 100 MG capsule Take 100 mg by mouth as needed        folic acid (FOLVITE) 1 MG tablet Take 1 tablet (1,000 mcg) by mouth daily 90 tablet 3     furosemide (LASIX) 20 MG tablet Take 1 tablet (20 mg) by mouth 2 times daily Updated - Script 180 tablet 3     InFLIXimab (REMICADE IV) Inject 600 mg into the vein Every 8 weeks       levothyroxine (SYNTHROID/LEVOTHROID) 75 MCG tablet Take 1 tablet (75 mcg) by mouth daily 90 tablet 2     MELATONIN PO Take 5 mg by mouth nightly as needed       Methotrexate Sodium (METHOTREXATE PO) Take 2.5 mg by mouth 10 tablets weekly       metoprolol succinate ER (TOPROL-XL) 25 MG 24 hr tablet Take 1 tablet (25 mg) by mouth daily 90 tablet 2     Multiple  Vitamins-Minerals (ICAPS) CAPS Take 1 capsule by mouth 2 times daily        NIFEDICAL XL 60 MG PO TB24 1 TABLET DAILY       omeprazole (PRILOSEC) 20 MG DR capsule Take 1 capsule (20 mg) by mouth 2 times daily Take 30-60 minutes prior to meals 180 capsule 2     sildenafil (REVATIO) 20 MG tablet Take 1 tablet (20 mg) by mouth 3 times daily 90 tablet 11     simvastatin (ZOCOR) 40 MG tablet Take 1 tablet (40 mg) by mouth At Bedtime 90 tablet 3     VENTOLIN  (90 Base) MCG/ACT inhaler Inhale 2 puffs into the lungs every 6 hours 18 g 11         Allergies:      Allergies   Allergen Reactions     No Known Allergies             Past Medical History:     Past Medical History:   Diagnosis Date     Amaurosis fugax 5-11    Right     Carotid artery stenosis      Esophageal reflux 3/11/2004     HELICOBACTER PYLORI INFECTION 7/11/2006     hx of CA in situ RT breast-1990.mastectomy 4/17/2007     Hyperlipidemia LDL goal <70 6/20/2011     Lung cancer (H)     squamous cell lung ca left lower lobe     OBESITY NOS 3/11/2004     OSTEOPOROSIS NOS 3/11/2004     Other psoriasis 3/11/2004     RA (rheumatoid arthritis) (H) 2/8/2010     Raynaud's syndrome 4/10/2006     Scleroderma (H)      Sleep apnea     CPAP         Past Surgical History:     Past Surgical History:   Procedure Laterality Date     BREAST SURGERY       C NONSPECIFIC PROCEDURE  1990    mastectomy RT breast     COLONOSCOPY  5/24/16     COLONOSCOPY N/A 6/28/2019    Procedure: Colonoscopy, With Polypectomy And Biopsy;  Surgeon: Neto Kaminski MD;  Location:  GI     CONIZATION       CV RIGHT HEART CATH N/A 4/15/2019    Procedure: Heart Cath Right Heart Cath;  Surgeon: Naresh Cyr MD;  Location:  HEART CARDIAC CATH LAB     ENDARTERECTOMY CAROTID  2011     HC COLONOSCOPY THRU STOMA, DIAGNOSTIC  2001 2011    diverticulosis     REPAIR HAMMER TOE       TONSILLECTOMY           Family Medical History:     Family History   Problem Relation Age of Onset     Cancer  - colorectal Mother      Cerebrovascular Disease Father      Cancer Sister 31        ovarian     Heart Disease Sister      Gastrointestinal Disease Sister         crohns     Gastrointestinal Disease Sister         diverticulitis     Gastrointestinal Disease Brother         diverticulitis     Alzheimer Disease Sister      Cerebrovascular Disease Maternal Grandmother      Diabetes Maternal Grandmother      Mental Illness Maternal Grandmother      Diabetes Maternal Uncle      Cancer Nephew                  Social History:     Social History     Socioeconomic History     Marital status:      Spouse name: Not on file     Number of children: Not on file     Years of education: Not on file     Highest education level: Not on file   Occupational History     Not on file   Social Needs     Financial resource strain: Not on file     Food insecurity:     Worry: Not on file     Inability: Not on file     Transportation needs:     Medical: Not on file     Non-medical: Not on file   Tobacco Use     Smoking status: Former Smoker     Packs/day: 1.00     Years: 30.00     Pack years: 30.00     Types: Cigarettes     Start date:      Last attempt to quit: 3/11/1992     Years since quittin.7     Smokeless tobacco: Never Used   Substance and Sexual Activity     Alcohol use: Not Currently     Alcohol/week: 0.0 standard drinks     Frequency: Never     Drug use: No     Sexual activity: Not Currently     Partners: Male   Lifestyle     Physical activity:     Days per week: Not on file     Minutes per session: Not on file     Stress: Not on file   Relationships     Social connections:     Talks on phone: Not on file     Gets together: Not on file     Attends Bahai service: Not on file     Active member of club or organization: Not on file     Attends meetings of clubs or organizations: Not on file     Relationship status: Not on file     Intimate partner violence:     Fear of current or ex partner: Not on file      "Emotionally abused: Not on file     Physically abused: Not on file     Forced sexual activity: Not on file   Other Topics Concern     Parent/sibling w/ CABG, MI or angioplasty before 65F 55M? No   Social History Narrative     Not on file           Review of System:     Constitutional: Negative for fever or chills  Skin: positive for further improvement in recent bilateral lower leg painful erythema rashes concerning for cellulitis  Ears/Nose/Throat: Negative for nasal congestion, sore throat  Respiratory: No shortness of breath, dyspnea on exertion, cough, or hemoptysis  Cardiovascular: Negative for chest pain  Gastrointestinal: Negative for nausea, vomiting  Genitourinary: Negative for dysuria, hematuria  Musculoskeletal: positive for bilateral lower leg pains  Neurologic: Negative for headaches  Psychiatric: Negative for depression, anxiety  Hematologic/Lymphatic/Immunologic: positive for bilateral lower leg edema  Endocrine: Negative  Behavioral: Negative for tobacco use       Physical Exam:   /70 (BP Location: Left arm, Patient Position: Sitting, Cuff Size: Adult Large)   Pulse 76   Temp 96.9  F (36.1  C) (Tympanic)   Ht 1.575 m (5' 2\")   SpO2 92%   BMI 41.34 kg/m      GENERAL: alert and no distress  EYES: eyes grossly normal to inspection, and conjunctivae and sclerae normal  HENT: Normocephalic atraumatic. Nose and mouth without ulcers or lesions  NECK: supple  RESP: lungs clear to auscultation   CV: regular rate and rhythm, normal S1 S2  LYMPH: chronic bilateral lower leg peripheral edema noted  ABDOMEN: nondistended  MS: no gross musculoskeletal defects noted  SKIN: positive for further improvement in recent bilateral lower leg painful erythema rashes noted concerning for cellulitis  NEURO: Alert & Oriented x 3.   PSYCH: mentation appears normal, affect normal        Diagnostic Test Results:     Diagnostic Test Results:    Lab Results   Component Value Date    WBC 5.1 10/16/2019     Lab Results "   Component Value Date    RBC 3.81 07/16/2019     Lab Results   Component Value Date    HGB 12.1 10/16/2019     Lab Results   Component Value Date    HCT 36.6 10/16/2019       Lab Results   Component Value Date    MCV 98 10/16/2019     Lab Results   Component Value Date    MCH 32.5 10/16/2019     Lab Results   Component Value Date    MCHC 33.2 10/16/2019     Lab Results   Component Value Date    RDW 14.4 10/16/2019     Lab Results   Component Value Date     10/16/2019             ASSESSMENT/PLAN:   (R21) Rash  (M79.661,  M79.662) Pain in both lower legs  (L03.90) Cellulitis, unspecified cellulitis site  (primary encounter diagnosis)  Comment: symptoms further improved since last clinic visit after starting Keflex  Plan: I have refilled and extended the patient's cephALEXin (KEFLEX) 500 MG capsule 4xDay for 10 more days           (R60.0) Bilateral leg edema  Comment: chronic bilateral lower leg lymphedema symptoms  Plan: continue Lasix diuretic therapy for edema control. I have refilled the patient's Lasix medication today.    (I10) Benign essential hypertension  Comment: BP is well controlled  Plan: continue current BP medication regimen.      Follow Up Plan:     Patient is instructed to return to Internal Medicine clinic for follow-up visit in 1 week.        Carissa Burroughs MD  Internal Medicine  [facility]

## 2019-11-20 ENCOUNTER — ONCOLOGY VISIT (OUTPATIENT)
Dept: ONCOLOGY | Facility: CLINIC | Age: 79
End: 2019-11-20
Attending: INTERNAL MEDICINE
Payer: MEDICARE

## 2019-11-20 VITALS
BODY MASS INDEX: 41.96 KG/M2 | HEART RATE: 70 BPM | OXYGEN SATURATION: 96 % | DIASTOLIC BLOOD PRESSURE: 67 MMHG | RESPIRATION RATE: 16 BRPM | HEIGHT: 62 IN | SYSTOLIC BLOOD PRESSURE: 100 MMHG | WEIGHT: 228 LBS

## 2019-11-20 DIAGNOSIS — C34.32 MALIGNANT NEOPLASM OF LOWER LOBE OF LEFT LUNG (H): Primary | ICD-10-CM

## 2019-11-20 PROCEDURE — 99214 OFFICE O/P EST MOD 30 MIN: CPT | Performed by: INTERNAL MEDICINE

## 2019-11-20 PROCEDURE — G0463 HOSPITAL OUTPT CLINIC VISIT: HCPCS

## 2019-11-20 ASSESSMENT — MIFFLIN-ST. JEOR: SCORE: 1462.45

## 2019-11-20 ASSESSMENT — PAIN SCALES - GENERAL: PAINLEVEL: NO PAIN (0)

## 2019-11-20 NOTE — PATIENT INSTRUCTIONS
1. CT scan in 6 months.  (5/19/20 @ 11:00, 10:45 check in)  2. Follow-up in 6 months. (5/21/20 @ 10:40am    Scheduled

## 2019-11-20 NOTE — PROGRESS NOTES
"Oncology Rooming Note    November 20, 2019 2:00 PM   Jaqueline Canales is a 78 year old female who presents for:    Chief Complaint   Patient presents with     Oncology Clinic Visit     Initial Vitals: /67   Pulse 70   Resp 16   Ht 1.575 m (5' 2\")   Wt 103.4 kg (228 lb)   SpO2 96%   BMI 41.70 kg/m   Estimated body mass index is 41.7 kg/m  as calculated from the following:    Height as of this encounter: 1.575 m (5' 2\").    Weight as of this encounter: 103.4 kg (228 lb). Body surface area is 2.13 meters squared.  No Pain (0) Comment: Data Unavailable   No LMP recorded. Patient is postmenopausal.  Allergies reviewed: Yes  Medications reviewed: Yes    Medications: Medication refills not needed today.  Pharmacy name entered into EPIC:    [facility] [address] AT PORTAL TO REGISTERED [id number] - [place], [place] - [address] AT [address] [place] [address]/PHARMACY #[id number] - [place], [place] - [address] [facility]    Clinical concerns: NO      Saumya Pulliam CMA            "

## 2019-11-20 NOTE — Clinical Note
"    11/20/2019         RE: Jaqueline Canales  [address] [place] [id number]        Dear Colleague,    Thank you for referring your patient, Jaqueline Canales, to the [facility]. Please see a copy of my visit note below.    Oncology Rooming Note    November 20, 2019 2:00 PM   Jaqueline Canales is a 78 year old female who presents for:    Chief Complaint   Patient presents with     Oncology Clinic Visit     Initial Vitals: /67   Pulse 70   Resp 16   Ht 1.575 m (5' 2\")   Wt 103.4 kg (228 lb)   SpO2 96%   BMI 41.70 kg/m    Estimated body mass index is 41.7 kg/m  as calculated from the following:    Height as of this encounter: 1.575 m (5' 2\").    Weight as of this encounter: 103.4 kg (228 lb). Body surface area is 2.13 meters squared.  No Pain (0) Comment: Data Unavailable   No LMP recorded. Patient is postmenopausal.  Allergies reviewed: Yes  Medications reviewed: Yes    Medications: Medication refills not needed today.  Pharmacy name entered into EPIC:    [facility] [place], [place] - [address] AT PORTAL TO [id number], [place] - [address] AT [address] [place] [address] & [facility]/PHARMACY #[id number] - [place], [place] - [address] [facility]    Clinical concerns: NO      Saumya Pulliam CMA              Visit Date:   11/20/2019      ONCOLOGY HISTORY:  Ms. Canales is a female with squamous cell carcinoma of left lung.   1.  CT chest on 11/20/2015 revealed 1.2 cm left lower lobe lung nodule.   2.  CT-guided biopsy of left lung nodule on 12/04/2015 revealed poorly differentiated squamous cell carcinoma.   3.  PET scan on 12/14/2015 revealed hypermetabolic nodular lesion in left lower lobe.  No evidence of metastatic disease.   -- Brain MRI on 12/22/2015 did not reveal any brain metastasis.   4.  The patient was treated with SBRT, 5000 cGy in 5 fractions in 01/2016.  She had clinical T1a N0 M0 disease.  Last treatment was on " 01/28/2016.  First treatment was on 01/19/2016.      SUBJECTIVE:  Ms. Canales is a 78-year-old female with a history of left lung squamous cell carcinoma, treated with SBRT in 01/2016.  She has done well without any evidence of recurrence.  She had CT chest done on 11/18/2019.  There is no evidence of malignancy.      The patient is elderly.  She has multiple medical problems.  Overall condition is not very good.  She is very weak.  She has osteoarthritic joint pain.  She has leg swelling.  She has some cellulitis which is being treated.      REVIEW OF SYSTEMS:  No headache.  She gets some lightheadedness.  No chest pain.  She gets short of breath on minimal exertion.  No abdominal pain, nausea or vomiting.  No cough.  No coughing of blood.  No diarrhea.  Appetite decreased.      PHYSICAL EXAMINATION:   GENERAL:  She was alert and oriented x 3.   VITAL SIGNS:  Reviewed.     ECOG PS of 2.   FACE:  No swelling.   EYES:  No icterus.   THROAT:  No ulcer or thrush.   NECK:  Supple, no lymphadenopathy.   LUNGS:  Decreased air entry at the bases.   HEART:  Regular.   ABDOMEN:  Soft and nontender.  Difficult palpation because of her weight.   EXTREMITIES:  Bilateral edema.  No calf swelling or tenderness.  Minimal erythema in the shin area.  Nothing to suggest any severe infection.      LABORATORY DATA:  Reviewed.      ASSESSMENT:   1.  A 78-year-old female with stage I left lung cancer in 2015, is status post SBRT.  No evidence of recurrence.   2.  Multiple other medical problems including rheumatoid arthritis and pulmonary fibrosis.   3.  Multiple symptoms including fatigue, shortness of breath and pain due to her multiple medical problems.      PLAN:   1.  I discussed with her regarding lung cancer.  CT scan was reviewed.  I explained to her there is no evidence of recurrence of lung cancer.  She was happy to know that.   2.  For followup, will get CT chest without contrast in 6 months.  I will see her back after that.   She is agreeable for it.   3.  The patient advised to see a physician sooner if she has any lump, unexplained pain, weight loss, worsening weakness, shortness of breath, bleeding or any other concerns.  She had a few questions, which were all answered.         SOFÍA BEAN MD             D: 2019   T: 2019   MT: MARYANA      Name:     GINA MCKEON   MRN:      [id number]        Account:      [id number]   :      1940           Visit Date:   2019      Document: [id number]       Again, thank you for allowing me to participate in the care of your patient.        Sincerely,        Sofaí Bean MD

## 2019-11-22 NOTE — PROGRESS NOTES
Visit Date:   11/20/2019      ONCOLOGY HISTORY:  Ms. Canales is a female with squamous cell carcinoma of left lung. Clinical stage 1 (T1a N0 M0).  1.  CT chest on 11/20/2015 revealed 1.2 cm left lower lobe lung nodule.   2.  CT-guided biopsy of left lung nodule on 12/04/2015 revealed poorly differentiated squamous cell carcinoma.   3.  PET scan on 12/14/2015 revealed hypermetabolic nodular lesion in left lower lobe.  No evidence of metastatic disease.   4. Brain MRI on 12/22/2015 did not reveal any brain metastasis.   5. Patient was treated with SBRT. 5000 cGy in 5 fractions between 01/19/2016 and 02/02/2016.      SUBJECTIVE:  Ms. Canales is a 78-year-old female with left lung squamous cell carcinoma treated with SBRT in 01/2016.  She has done well without any evidence of recurrence.  She had CT chest done on 11/18/2019.  There is no evidence of malignancy.      The patient is elderly.  She has multiple medical problems.  Overall condition is not very good.  She is very weak.  She has osteoarthritic joint pain.  She has leg swelling.  She has some leg cellulitis which is being treated.      REVIEW OF SYSTEMS:  No headache.  She gets some lightheadedness.  No chest pain.  She gets short of breath on minimal exertion.  No abdominal pain, nausea or vomiting.  No cough.  No coughing of blood.  No diarrhea.  Appetite is  decreased.      PHYSICAL EXAMINATION:   GENERAL:  She was alert and oriented x 3.   VITAL SIGNS:  Reviewed. ECOG PS of 2.   FACE:  No swelling.   EYES:  No icterus.   THROAT:  No ulcer or thrush.   NECK:  Supple, no lymphadenopathy.   LUNGS:  Decreased air entry at the bases.   HEART:  Regular.   ABDOMEN:  Soft and nontender.  Difficult palpation because of her weight.   EXTREMITIES:  Bilateral edema.  No calf swelling or tenderness.  Minimal erythema in the shin area.  Nothing to suggest any severe infection.      LABORATORY DATA:  Reviewed.      ASSESSMENT:   1.  A 78-year-old female with stage I left  lung cancer status post SBRT in 2016. No evidence of recurrence.   2.  Multiple other medical problems including rheumatoid arthritis and pulmonary fibrosis.   3.  Multiple symptoms including fatigue, shortness of breath and pain due to her multiple medical problems.      PLAN:   1.  I discussed with her regarding lung cancer.  CT scan was reviewed.  I explained to her there is no evidence of recurrence of lung cancer.  She was happy to know that.   2.  For followup, will get CT chest without contrast in 6 months.  I will see her back after that.  She is agreeable for it.   3.  The patient advised to see a physician sooner if she has any lump, unexplained pain, weight loss, worsening weakness, shortness of breath, bleeding or any other concerns.  She had a few questions, which were all answered.         SOFÍA BEAN MD             D: 2019   T: 2019   MT: MARYANA      Name:     GINA MCKEON   MRN:      [id number]        Account:      [id number]   :      1940           Visit Date:   2019      Document: [id number]

## 2019-11-25 ENCOUNTER — HOSPITAL ENCOUNTER (INPATIENT)
Facility: CLINIC | Age: 79
LOS: 10 days | Discharge: NURSING, REHAB OR LONG-TERM CARE | DRG: 291 | End: 2019-12-05
Attending: EMERGENCY MEDICINE | Admitting: HOSPITALIST
Payer: MEDICARE

## 2019-11-25 ENCOUNTER — APPOINTMENT (OUTPATIENT)
Dept: GENERAL RADIOLOGY | Facility: CLINIC | Age: 79
DRG: 291 | End: 2019-11-25
Attending: EMERGENCY MEDICINE
Payer: MEDICARE

## 2019-11-25 ENCOUNTER — OFFICE VISIT (OUTPATIENT)
Dept: FAMILY MEDICINE | Facility: CLINIC | Age: 79
End: 2019-11-25
Payer: MEDICARE

## 2019-11-25 VITALS
TEMPERATURE: 97 F | BODY MASS INDEX: 41.7 KG/M2 | HEART RATE: 44 BPM | SYSTOLIC BLOOD PRESSURE: 108 MMHG | HEIGHT: 62 IN | OXYGEN SATURATION: 77 % | DIASTOLIC BLOOD PRESSURE: 65 MMHG

## 2019-11-25 DIAGNOSIS — I50.9 ACUTE CONGESTIVE HEART FAILURE, UNSPECIFIED HEART FAILURE TYPE (H): ICD-10-CM

## 2019-11-25 DIAGNOSIS — I27.20 PULMONARY HYPERTENSION (H): ICD-10-CM

## 2019-11-25 DIAGNOSIS — L03.119 CELLULITIS OF LOWER EXTREMITY, UNSPECIFIED LATERALITY: ICD-10-CM

## 2019-11-25 DIAGNOSIS — L29.9 ITCHING: ICD-10-CM

## 2019-11-25 DIAGNOSIS — I50.31 ACUTE DIASTOLIC CONGESTIVE HEART FAILURE (H): Primary | ICD-10-CM

## 2019-11-25 DIAGNOSIS — E87.6 HYPOKALEMIA: ICD-10-CM

## 2019-11-25 DIAGNOSIS — I48.19 PERSISTENT ATRIAL FIBRILLATION (H): ICD-10-CM

## 2019-11-25 DIAGNOSIS — E78.5 HYPERLIPIDEMIA LDL GOAL <100: ICD-10-CM

## 2019-11-25 DIAGNOSIS — I50.33 ACUTE ON CHRONIC DIASTOLIC CONGESTIVE HEART FAILURE (H): Primary | ICD-10-CM

## 2019-11-25 LAB
ANION GAP SERPL CALCULATED.3IONS-SCNC: 5 MMOL/L (ref 3–14)
BASOPHILS # BLD AUTO: 0 10E9/L (ref 0–0.2)
BASOPHILS NFR BLD AUTO: 0.5 %
BUN SERPL-MCNC: 16 MG/DL (ref 7–30)
CALCIUM SERPL-MCNC: 9.3 MG/DL (ref 8.5–10.1)
CHLORIDE SERPL-SCNC: 105 MMOL/L (ref 94–109)
CO2 SERPL-SCNC: 29 MMOL/L (ref 20–32)
CREAT SERPL-MCNC: 0.75 MG/DL (ref 0.52–1.04)
DIFFERENTIAL METHOD BLD: ABNORMAL
EOSINOPHIL # BLD AUTO: 0.2 10E9/L (ref 0–0.7)
EOSINOPHIL NFR BLD AUTO: 2.4 %
ERYTHROCYTE [DISTWIDTH] IN BLOOD BY AUTOMATED COUNT: 17.2 % (ref 10–15)
GFR SERPL CREATININE-BSD FRML MDRD: 75 ML/MIN/{1.73_M2}
GLUCOSE SERPL-MCNC: 104 MG/DL (ref 70–99)
HCT VFR BLD AUTO: 35.4 % (ref 35–47)
HGB BLD-MCNC: 11.6 G/DL (ref 11.7–15.7)
IMM GRANULOCYTES # BLD: 0 10E9/L (ref 0–0.4)
IMM GRANULOCYTES NFR BLD: 0.5 %
INTERPRETATION ECG - MUSE: NORMAL
LYMPHOCYTES # BLD AUTO: 0.8 10E9/L (ref 0.8–5.3)
LYMPHOCYTES NFR BLD AUTO: 13.3 %
MCH RBC QN AUTO: 30.2 PG (ref 26.5–33)
MCHC RBC AUTO-ENTMCNC: 32.8 G/DL (ref 31.5–36.5)
MCV RBC AUTO: 92 FL (ref 78–100)
MONOCYTES # BLD AUTO: 0.5 10E9/L (ref 0–1.3)
MONOCYTES NFR BLD AUTO: 8.1 %
NEUTROPHILS # BLD AUTO: 4.8 10E9/L (ref 1.6–8.3)
NEUTROPHILS NFR BLD AUTO: 75.2 %
NRBC # BLD AUTO: 0 10*3/UL
NRBC BLD AUTO-RTO: 0 /100
NT-PROBNP SERPL-MCNC: 3936 PG/ML (ref 0–1800)
PLATELET # BLD AUTO: 166 10E9/L (ref 150–450)
POTASSIUM SERPL-SCNC: 3.5 MMOL/L (ref 3.4–5.3)
RBC # BLD AUTO: 3.84 10E12/L (ref 3.8–5.2)
SODIUM SERPL-SCNC: 139 MMOL/L (ref 133–144)
TROPONIN I SERPL-MCNC: <0.015 UG/L (ref 0–0.04)
WBC # BLD AUTO: 6.3 10E9/L (ref 4–11)

## 2019-11-25 PROCEDURE — 93005 ELECTROCARDIOGRAM TRACING: CPT

## 2019-11-25 PROCEDURE — 99207 ZZC OFFICE-HOSPITAL ADMIT: CPT | Performed by: INTERNAL MEDICINE

## 2019-11-25 PROCEDURE — 21000001 ZZH R&B HEART CARE

## 2019-11-25 PROCEDURE — 99285 EMERGENCY DEPT VISIT HI MDM: CPT | Mod: 25

## 2019-11-25 PROCEDURE — 84484 ASSAY OF TROPONIN QUANT: CPT | Performed by: EMERGENCY MEDICINE

## 2019-11-25 PROCEDURE — 71046 X-RAY EXAM CHEST 2 VIEWS: CPT

## 2019-11-25 PROCEDURE — 83880 ASSAY OF NATRIURETIC PEPTIDE: CPT | Performed by: EMERGENCY MEDICINE

## 2019-11-25 PROCEDURE — 80048 BASIC METABOLIC PNL TOTAL CA: CPT | Performed by: EMERGENCY MEDICINE

## 2019-11-25 PROCEDURE — 99223 1ST HOSP IP/OBS HIGH 75: CPT | Mod: AI | Performed by: HOSPITALIST

## 2019-11-25 PROCEDURE — 85025 COMPLETE CBC W/AUTO DIFF WBC: CPT | Performed by: EMERGENCY MEDICINE

## 2019-11-25 RX ORDER — ONDANSETRON 4 MG/1
4 TABLET, ORALLY DISINTEGRATING ORAL EVERY 6 HOURS PRN
Status: DISCONTINUED | OUTPATIENT
Start: 2019-11-25 | End: 2019-12-05 | Stop reason: HOSPADM

## 2019-11-25 RX ORDER — ERGOCALCIFEROL (VITAMIN D2) 10 MCG
1600 TABLET ORAL DAILY
COMMUNITY

## 2019-11-25 RX ORDER — POLYETHYLENE GLYCOL 3350 17 G/17G
17 POWDER, FOR SOLUTION ORAL DAILY PRN
Status: DISCONTINUED | OUTPATIENT
Start: 2019-11-25 | End: 2019-12-05 | Stop reason: HOSPADM

## 2019-11-25 RX ORDER — SIMVASTATIN 40 MG
40 TABLET ORAL AT BEDTIME
Status: DISCONTINUED | OUTPATIENT
Start: 2019-11-26 | End: 2019-12-05 | Stop reason: HOSPADM

## 2019-11-25 RX ORDER — ONDANSETRON 2 MG/ML
4 INJECTION INTRAMUSCULAR; INTRAVENOUS EVERY 6 HOURS PRN
Status: DISCONTINUED | OUTPATIENT
Start: 2019-11-25 | End: 2019-12-05 | Stop reason: HOSPADM

## 2019-11-25 RX ORDER — NALOXONE HYDROCHLORIDE 0.4 MG/ML
.1-.4 INJECTION, SOLUTION INTRAMUSCULAR; INTRAVENOUS; SUBCUTANEOUS
Status: DISCONTINUED | OUTPATIENT
Start: 2019-11-25 | End: 2019-12-05 | Stop reason: HOSPADM

## 2019-11-25 RX ORDER — POTASSIUM CHLORIDE 7.45 MG/ML
10 INJECTION INTRAVENOUS
Status: DISCONTINUED | OUTPATIENT
Start: 2019-11-25 | End: 2019-11-26

## 2019-11-25 RX ORDER — POTASSIUM CHLORIDE 29.8 MG/ML
20 INJECTION INTRAVENOUS
Status: DISCONTINUED | OUTPATIENT
Start: 2019-11-25 | End: 2019-11-26

## 2019-11-25 RX ORDER — MULTIVIT WITH MINERALS/LUTEIN
1 TABLET ORAL DAILY
COMMUNITY

## 2019-11-25 RX ORDER — POTASSIUM CHLORIDE 1500 MG/1
20-40 TABLET, EXTENDED RELEASE ORAL
Status: DISCONTINUED | OUTPATIENT
Start: 2019-11-25 | End: 2019-11-26

## 2019-11-25 RX ORDER — METOPROLOL SUCCINATE 25 MG/1
25 TABLET, EXTENDED RELEASE ORAL DAILY
Status: DISCONTINUED | OUTPATIENT
Start: 2019-11-26 | End: 2019-12-02

## 2019-11-25 RX ORDER — ACETAMINOPHEN 325 MG/1
650 TABLET ORAL EVERY 4 HOURS PRN
Status: DISCONTINUED | OUTPATIENT
Start: 2019-11-25 | End: 2019-12-05 | Stop reason: HOSPADM

## 2019-11-25 RX ORDER — FUROSEMIDE 10 MG/ML
60 INJECTION INTRAMUSCULAR; INTRAVENOUS EVERY 8 HOURS
Status: DISCONTINUED | OUTPATIENT
Start: 2019-11-26 | End: 2019-11-26

## 2019-11-25 RX ORDER — POTASSIUM CL/LIDO/0.9 % NACL 10MEQ/0.1L
10 INTRAVENOUS SOLUTION, PIGGYBACK (ML) INTRAVENOUS
Status: DISCONTINUED | OUTPATIENT
Start: 2019-11-25 | End: 2019-11-26

## 2019-11-25 RX ORDER — ALBUTEROL SULFATE 90 UG/1
2 AEROSOL, METERED RESPIRATORY (INHALATION) EVERY 6 HOURS PRN
Status: DISCONTINUED | OUTPATIENT
Start: 2019-11-25 | End: 2019-11-28

## 2019-11-25 RX ORDER — PROCHLORPERAZINE MALEATE 5 MG
5 TABLET ORAL EVERY 6 HOURS PRN
Status: DISCONTINUED | OUTPATIENT
Start: 2019-11-25 | End: 2019-12-05 | Stop reason: HOSPADM

## 2019-11-25 RX ORDER — PREDNISOLONE ACETATE-GATIFLOXACIN-BROMFENAC .75; 5; 1 MG/ML; MG/ML; MG/ML
1 SUSPENSION/ DROPS OPHTHALMIC DAILY
Status: DISCONTINUED | OUTPATIENT
Start: 2019-11-26 | End: 2019-12-05 | Stop reason: HOSPADM

## 2019-11-25 RX ORDER — TRIAMCINOLONE ACETONIDE 1 MG/G
CREAM TOPICAL 2 TIMES DAILY
Qty: 30 G | Refills: 3 | Status: SHIPPED | OUTPATIENT
Start: 2019-11-25 | End: 2022-03-18

## 2019-11-25 RX ORDER — LEVOTHYROXINE SODIUM 75 UG/1
75 TABLET ORAL DAILY
Status: DISCONTINUED | OUTPATIENT
Start: 2019-11-26 | End: 2019-12-05 | Stop reason: HOSPADM

## 2019-11-25 RX ORDER — PREDNISOLONE ACETATE-GATIFLOXACIN-BROMFENAC .75; 5; 1 MG/ML; MG/ML; MG/ML
1 SUSPENSION/ DROPS OPHTHALMIC DAILY
COMMUNITY
End: 2021-05-18

## 2019-11-25 RX ORDER — POTASSIUM CHLORIDE 1500 MG/1
20 TABLET, EXTENDED RELEASE ORAL 2 TIMES DAILY
Status: DISCONTINUED | OUTPATIENT
Start: 2019-11-26 | End: 2019-11-26

## 2019-11-25 RX ORDER — MULTIPLE VITAMINS W/ MINERALS TAB 9MG-400MCG
1 TAB ORAL DAILY
Status: DISCONTINUED | OUTPATIENT
Start: 2019-11-26 | End: 2019-12-05 | Stop reason: HOSPADM

## 2019-11-25 RX ORDER — PROCHLORPERAZINE 25 MG
12.5 SUPPOSITORY, RECTAL RECTAL EVERY 12 HOURS PRN
Status: DISCONTINUED | OUTPATIENT
Start: 2019-11-25 | End: 2019-12-05 | Stop reason: HOSPADM

## 2019-11-25 RX ORDER — FUROSEMIDE 10 MG/ML
40 INJECTION INTRAMUSCULAR; INTRAVENOUS ONCE
Status: DISCONTINUED | OUTPATIENT
Start: 2019-11-25 | End: 2019-11-25

## 2019-11-25 RX ORDER — LIDOCAINE 40 MG/G
CREAM TOPICAL
Status: DISCONTINUED | OUTPATIENT
Start: 2019-11-25 | End: 2019-12-05 | Stop reason: HOSPADM

## 2019-11-25 RX ORDER — SILDENAFIL CITRATE 20 MG/1
20 TABLET ORAL 3 TIMES DAILY
Status: DISCONTINUED | OUTPATIENT
Start: 2019-11-26 | End: 2019-12-05 | Stop reason: HOSPADM

## 2019-11-25 RX ORDER — POTASSIUM CHLORIDE 1.5 G/1.58G
20-40 POWDER, FOR SOLUTION ORAL
Status: DISCONTINUED | OUTPATIENT
Start: 2019-11-25 | End: 2019-11-26

## 2019-11-25 RX ORDER — SIMVASTATIN 40 MG
40 TABLET ORAL AT BEDTIME
Qty: 90 TABLET | Refills: 3 | Status: SHIPPED | OUTPATIENT
Start: 2019-11-25 | End: 2020-12-16

## 2019-11-25 RX ORDER — FOLIC ACID 1 MG/1
1000 TABLET ORAL DAILY
Status: DISCONTINUED | OUTPATIENT
Start: 2019-11-26 | End: 2019-12-05 | Stop reason: HOSPADM

## 2019-11-25 RX ORDER — ALBUTEROL SULFATE 90 UG/1
2 AEROSOL, METERED RESPIRATORY (INHALATION) EVERY 6 HOURS PRN
COMMUNITY
End: 2020-07-27

## 2019-11-25 ASSESSMENT — ENCOUNTER SYMPTOMS
FREQUENCY: 1
SHORTNESS OF BREATH: 1
BLOOD IN STOOL: 0
HEMATURIA: 0

## 2019-11-25 ASSESSMENT — MIFFLIN-ST. JEOR
SCORE: 1467.89
SCORE: 1462.45

## 2019-11-25 NOTE — PROGRESS NOTES
Chief Complaint:       Jaqueline Canales is a 78 year old female who presents to clinic today for the following health issues:      Chief Complaint   Patient presents with     Follow Up     bilateral lower leg cellulitis       Dyspnea on exertion, worsening bilateral lower leg edema  Acute on chronic Diastolic CHF exacerbation    HPI:   Patient Jaqueline Canales is a very pleasant 78 year old female with history of pulmonary hypertension, chronic bilateral lower leg edema who presents to Internal Medicine clinic today for evaluation of multiple concerns including follow up of recent bilateral lower leg cellulitis symptoms. Regarding the patient's leg cellulitis symptoms, the patient reports further improvement with the Keflex antibiotics therapy. The cellulitis symptoms appear to have resolved at this time. Patient only complains of some diffuse skin itching symptoms of the lower legs. Regarding the patient's chronic diastolic CHF in the setting of pulmonary hypertension, the patient complains of worsening dyspnea on exertion and bilateral lower leg edema symptoms, not improved with oral Lasix diuretic therapy.           Current Medications:     Current Outpatient Prescriptions          Current Outpatient Medications   Medication Sig Dispense Refill     acetaminophen (TYLENOL) 500 MG tablet Take 500-1,000 mg by mouth every 6 hours as needed for mild pain         albuterol (VENTOLIN HFA) 108 (90 Base) MCG/ACT inhaler Inhale 2 puffs into the lungs every 6 hours 18 g 3     apixaban ANTICOAGULANT (ELIQUIS) 5 MG tablet Take 1 tablet (5 mg) by mouth 2 times daily 180 tablet 2     Bacillus Coagulans-Inulin (PROBIOTIC-PREBIOTIC PO)           Calcium Citrate-Vitamin D (CITRACAL + D PO) Take 2 tablets by mouth 2 times daily          CENTRUM SILVER OR TABS 1 TABLET DAILY         cephALEXin (KEFLEX) 500 MG capsule Take 1 capsule (500 mg) by mouth 4 times daily 40 capsule 0     docusate sodium (COLACE) 100 MG capsule Take 100 mg  by mouth as needed          folic acid (FOLVITE) 1 MG tablet Take 1 tablet (1,000 mcg) by mouth daily 90 tablet 3     furosemide (LASIX) 20 MG tablet Take 1 tablet (20 mg) by mouth 2 times daily Updated - Script 180 tablet 3     InFLIXimab (REMICADE IV) Inject 600 mg into the vein Every 8 weeks         levothyroxine (SYNTHROID/LEVOTHROID) 75 MCG tablet Take 1 tablet (75 mcg) by mouth daily 90 tablet 2     MELATONIN PO Take 5 mg by mouth nightly as needed         Methotrexate Sodium (METHOTREXATE PO) Take 2.5 mg by mouth 10 tablets weekly         metoprolol succinate ER (TOPROL-XL) 25 MG 24 hr tablet Take 1 tablet (25 mg) by mouth daily 90 tablet 2     Multiple Vitamins-Minerals (ICAPS) CAPS Take 1 capsule by mouth 2 times daily          NIFEDICAL XL 60 MG PO TB24 1 TABLET DAILY         omeprazole (PRILOSEC) 20 MG DR capsule Take 1 capsule (20 mg) by mouth 2 times daily Take 30-60 minutes prior to meals 180 capsule 2     sildenafil (REVATIO) 20 MG tablet Take 1 tablet (20 mg) by mouth 3 times daily 90 tablet 11     simvastatin (ZOCOR) 40 MG tablet Take 1 tablet (40 mg) by mouth At Bedtime 90 tablet 3     VENTOLIN  (90 Base) MCG/ACT inhaler Inhale 2 puffs into the lungs every 6 hours 18 g 11             Allergies:      Allergies   Allergen Reactions     No Known Allergies             Past Medical History:     Past Medical History:   Diagnosis Date     Amaurosis fugax 5-11    Right     Carotid artery stenosis      Esophageal reflux 3/11/2004     HELICOBACTER PYLORI INFECTION 7/11/2006     hx of CA in situ RT breast-1990.mastectomy 4/17/2007     Hyperlipidemia LDL goal <70 6/20/2011     Lung cancer (H)     squamous cell lung ca left lower lobe     OBESITY NOS 3/11/2004     OSTEOPOROSIS NOS 3/11/2004     Other psoriasis 3/11/2004     Pulmonary hypertension (H) 04/15/2019    Right heart catheterization demonstrated moderate pulmonary arterial hypertension with a mean PA pressure of 34 mmHg, no reversibility with  inhaled nitric oxide, elevated right and left-sided filling pressures, low normal cardiac index of 2.1.  Seen by Dr. Shay Marquez.  Started on sildenafil (Revatio) 20 mg 3 times daily.     RA (rheumatoid arthritis) (H) 2010     Raynaud's syndrome 4/10/2006     Scleroderma (H)      Sleep apnea     CPAP         Past Surgical History:     Past Surgical History:   Procedure Laterality Date     BREAST SURGERY       C NONSPECIFIC PROCEDURE      mastectomy RT breast     COLONOSCOPY  16     COLONOSCOPY N/A 2019    Procedure: Colonoscopy, With Polypectomy And Biopsy;  Surgeon: Neto Kaminski MD;  Location:  GI     CONIZATION       CV RIGHT HEART CATH N/A 4/15/2019    Procedure: Heart Cath Right Heart Cath;  Surgeon: Naresh Cyr MD;  Location:  HEART CARDIAC CATH LAB     ENDARTERECTOMY CAROTID       HC COLONOSCOPY THRU STOMA, DIAGNOSTIC  2001    diverticulosis     REPAIR HAMMER TOE       TONSILLECTOMY           Family Medical History:     Family History   Problem Relation Age of Onset     Cancer - colorectal Mother      Cerebrovascular Disease Father      Cancer Sister 31        ovarian     Heart Disease Sister      Gastrointestinal Disease Sister         crohns     Gastrointestinal Disease Sister         diverticulitis     Gastrointestinal Disease Brother         diverticulitis     Alzheimer Disease Sister      Cerebrovascular Disease Maternal Grandmother      Diabetes Maternal Grandmother      Mental Illness Maternal Grandmother      Diabetes Maternal Uncle      Cancer Nephew                  Social History:     Social History     Socioeconomic History     Marital status:      Spouse name: Not on file     Number of children: Not on file     Years of education: Not on file     Highest education level: Not on file   Occupational History     Not on file   Social Needs     Financial resource strain: Not on file     Food insecurity:     Worry: Not on file      Inability: Not on file     Transportation needs:     Medical: Not on file     Non-medical: Not on file   Tobacco Use     Smoking status: Former Smoker     Packs/day: 1.00     Years: 30.00     Pack years: 30.00     Types: Cigarettes     Start date:      Last attempt to quit: 3/11/1992     Years since quittin.7     Smokeless tobacco: Never Used   Substance and Sexual Activity     Alcohol use: Not Currently     Alcohol/week: 0.0 standard drinks     Frequency: Never     Drug use: No     Sexual activity: Not Currently     Partners: Male   Lifestyle     Physical activity:     Days per week: Not on file     Minutes per session: Not on file     Stress: Not on file   Relationships     Social connections:     Talks on phone: Not on file     Gets together: Not on file     Attends Mosque service: Not on file     Active member of club or organization: Not on file     Attends meetings of clubs or organizations: Not on file     Relationship status: Not on file     Intimate partner violence:     Fear of current or ex partner: Not on file     Emotionally abused: Not on file     Physically abused: Not on file     Forced sexual activity: Not on file   Other Topics Concern     Parent/sibling w/ CABG, MI or angioplasty before 65F 55M? No   Social History Narrative     Not on file           Review of System:     Constitutional: Negative for fever or chills  Skin: positive for residual skin itching symptoms following resolution of previous bilateral lower leg cellulitis symptoms  Ears/Nose/Throat: Negative for nasal congestion, sore throat  Respiratory: positive for shortness of breath, dyspnea on exertion  Cardiovascular: Negative for chest pain, positive for CHF exacerbation symptoms  Gastrointestinal: Negative for nausea, vomiting  Genitourinary: Negative for dysuria, hematuria  Musculoskeletal: Negative for myalgias  Neurologic: Negative for headaches  Psychiatric: Negative for depression,  "anxiety  Hematologic/Lymphatic/Immunologic: positive for worsening bilateral lower leg edema  Endocrine: Negative  Behavioral: Negative for tobacco use       Physical Exam:   /65 (BP Location: Left arm, Patient Position: Sitting, Cuff Size: Adult Regular)   Pulse (!) 44   Temp 97  F (36.1  C) (Oral)   Ht 1.575 m (5' 2\")   SpO2 (!) 77%   Breastfeeding No   BMI 41.70 kg/m      GENERAL: chronically ill appearing elderly female, alert  EYES: eyes grossly normal to inspection, and conjunctivae and sclerae normal  HENT: Normocephalic atraumatic. Nose and mouth without ulcers or lesions  NECK: supple  RESP: dyspnea on exertion and hypoxia present  CV: irregularly irregular, heart rate is bradycardiac  LYMPH: 2+ peripheral bilateral lower leg edema present, worsened from prior baseline  ABDOMEN: nondistended  MS: no gross musculoskeletal defects noted  SKIN: positive for residual skin itching symptoms following resolution of previous bilateral lower leg cellulitis symptoms  NEURO: Alert & Oriented x 3.   PSYCH: mentation appears normal, affect normal        Diagnostic Test Results:   Echocardiogram Complete   Order: [id number]   Status:  Edited Result - FINAL   Visible to patient:  No (Inaccessible in MyChart) Next appt:  Today at 09:15 AM in Occupational Therapy (Goldie Hutton, OT) Dx:  Dyspnea on exertion   Details     Reading Physician Reading Date Result Priority   Jesús Sethi MD 2018       Narrative & Impression     [id number]  [facility]  [id number]  [id number]^DOLORES^GIRISH           [facility]  Diagnostic and Treamtent-3rd Floor  [address] [place] [id number]     Name: GINA MCKEON  MRN: [id number]  : 1940  Study Date: 2018 09:55 AM  Age: 77 yrs  Gender: Female  Patient Location: [facility]  Reason For Study: Dyspnea on exertion  History: Dyspnea on exertion  Ordering Physician: GIRISH BERNAL  Referring Physician: " THEO HUIZAR  Performed By: Sofía Braden RDCS     BSA: 2.0 m2  Height: 62 in  Weight: 221 lb  BP: 112/74 mmHg  _____________________________________________________________________________  __        Procedure  Echocardiogram with two-dimensional, color and spectral Doppler performed.  _____________________________________________________________________________  __        Interpretation Summary  Global and regional left ventricular function is normal with an EF of 55-60%.  Global right ventricular function is normal.Mild right ventricular dilation is  present.  Mild to moderate TR. Right ventricular systolic pressure is 49mmHg above the  right atrial pressure. Moderate (pulmonary artery systolic pressure 50-75mmHg)  pulmonary hypertension is present.  The inferior vena cava was normal in size with preserved respiratory  variability.Estimated mean right atrial pressure is 3 mmHg (normal).  No pericardial effusion is present.     This study was compared with the study from 10/16/17, no significant change .  _____________________________________________________________________________  __        Left Ventricle  Left ventricular wall thickness is normal. Left ventricular size is normal.  Global and regional left ventricular function is normal with an EF of 55-60%.  The Ejection Fraction was visually estimated. Diastolic function not assessed  due to significant mitral annular calcification. No regional wall motion  abnormalities are seen.     Right Ventricle  Global right ventricular function is normal. Mild right ventricular dilation  is present.     Atria  Severe left atrial enlargement is present. Mild right atrial enlargement is  present.     Mitral Valve  Moderate mitral annular calcification is present. Mild mitral insufficiency is  present.        Aortic Valve  Trileaflet aortic sclerosis without stenosis.     Tricuspid Valve  Mild to moderate tricuspid insufficiency is present. Right  ventricular  systolic pressure is 49mmHg above the right atrial pressure. Moderate  (pulmonary artery systolic pressure 50-75mmHg) pulmonary hypertension is  present.     Pulmonic Valve  Trace pulmonic insufficiency is present.     Vessels  The inferior vena cava was normal in size with preserved respiratory  variability. Visualized portions of the aorta are normal in size. Estimated  mean right atrial pressure is 3 mmHg (normal).     Pericardium  No pericardial effusion is present.        Compared to Previous Study  This study was compared with the study from 10/16/17, no significant change .     Attestation  I have personally viewed the imaging and agree with the interpretation and  report as documented by the fellow, Nupur Luz, and/or edited by me.  _____________________________________________________________________________  __     MMode/2D Measurements & Calculations  IVSd: 0.83 cm  LVIDd: 5.3 cm  LVIDs: 2.4 cm  LVPWd: 0.88 cm  FS: 54.8 %  LV mass(C)d: 162.3 grams  LV mass(C)dI: 81.4 grams/m2  Ao root diam: 2.6 cm  LA dimension: 5.7 cm  asc Aorta Diam: 3.5 cm  LA/Ao: 2.2  LVOT diam: 1.9 cm  LVOT area: 2.8 cm2  LA Volume (BP): 105.0 ml     LA Volume Index (BP): 52.8 ml/m2  RWT: 0.33        Doppler Measurements & Calculations  MV E max el: 88.3 cm/sec  MV A max el: 75.2 cm/sec  MV E/A: 1.2  MV dec time: 0.20 sec  Ao V2 max: 171.9 cm/sec  Ao max P.0 mmHg  Ao V2 mean: 134.9 cm/sec  Ao mean P.9 mmHg  Ao V2 VTI: 43.7 cm  JACOB(I,D): 1.9 cm2  JACOB(V,D): 1.9 cm2  LV V1 max P.6 mmHg  LV V1 max: 118.8 cm/sec  LV V1 VTI: 29.8 cm  SV(LVOT): 82.1 ml  SI(LVOT): 41.1 ml/m2  PA acc time: 0.05 sec  TR max el: 320.4 cm/sec  TR max P.4 mmHg  AV El Ratio (DI): 0.69  JACOB Index (cm2/m2): 0.94  E/E' av.0  Lateral E/e': 15.6     Medial E/e': 18.5     _____________________________________________________________________________  __           Report approved by: Boaz Brenner 2018 01:24  PM                  ASSESSMENT/PLAN:     (I27.20) Pulmonary hypertension (H)  (I50.33) Acute on chronic diastolic congestive heart failure (H)  (primary encounter diagnosis)  Comment: acute on chronic diastolic CHF exacerbation symptoms with dyspnea on exertion and hypoxia  Plan: given the patient's worsening CHF exacerbation sympotms with dyspnea on exertion, hypoxia, and worsening bilateral lower leg edema, not improved with outpatient oral Lasix diuretic therapy, I have decided to transfer the patient to the [facility] ER for further CHF evaluation and management today.      (E78.5) Hyperlipidemia LDL goal <100  Comment: patient is due for a refill of Zocor cholesterol medication  Plan: simvastatin (ZOCOR) 40 MG tablet      (I48.19) Persistent atrial fibrillation  Comment: patient is compliant with Eliquis long term anticoagulation therapy  Plan: continue Eliquis      (L29.9) Itching  (L03.119) Cellulitis of lower extremity, unspecified laterality  Comment: recent cellulitis symptoms are resolved with Keflex, patient complains of residual itching of the skin  Plan: OK to discontinue Keflex, triamcinolone (KENALOG) 0.1 % external cream for itching symptoms relief      Follow Up Plan:     Patient will be transferred from the Internal Medicine clinic to the [facility] ER for further evaluation and management going forward.        Carissa Burroughs MD  Internal Medicine  [facility]

## 2019-11-26 ENCOUNTER — APPOINTMENT (OUTPATIENT)
Dept: CARDIOLOGY | Facility: CLINIC | Age: 79
DRG: 291 | End: 2019-11-26
Attending: HOSPITALIST
Payer: MEDICARE

## 2019-11-26 ENCOUNTER — APPOINTMENT (OUTPATIENT)
Dept: PHYSICAL THERAPY | Facility: CLINIC | Age: 79
DRG: 291 | End: 2019-11-26
Attending: HOSPITALIST
Payer: MEDICARE

## 2019-11-26 ENCOUNTER — APPOINTMENT (OUTPATIENT)
Dept: OCCUPATIONAL THERAPY | Facility: CLINIC | Age: 79
DRG: 291 | End: 2019-11-26
Attending: HOSPITALIST
Payer: MEDICARE

## 2019-11-26 LAB
ANION GAP SERPL CALCULATED.3IONS-SCNC: 7 MMOL/L (ref 3–14)
BUN SERPL-MCNC: 14 MG/DL (ref 7–30)
CALCIUM SERPL-MCNC: 9.3 MG/DL (ref 8.5–10.1)
CHLORIDE SERPL-SCNC: 106 MMOL/L (ref 94–109)
CO2 SERPL-SCNC: 27 MMOL/L (ref 20–32)
CREAT SERPL-MCNC: 0.68 MG/DL (ref 0.52–1.04)
GFR SERPL CREATININE-BSD FRML MDRD: 83 ML/MIN/{1.73_M2}
GLUCOSE SERPL-MCNC: 87 MG/DL (ref 70–99)
LACTATE BLD-SCNC: 1.3 MMOL/L (ref 0.7–2)
POTASSIUM SERPL-SCNC: 3.2 MMOL/L (ref 3.4–5.3)
POTASSIUM SERPL-SCNC: 3.9 MMOL/L (ref 3.4–5.3)
SODIUM SERPL-SCNC: 140 MMOL/L (ref 133–144)

## 2019-11-26 PROCEDURE — 36415 COLL VENOUS BLD VENIPUNCTURE: CPT | Performed by: HOSPITALIST

## 2019-11-26 PROCEDURE — 36415 COLL VENOUS BLD VENIPUNCTURE: CPT | Performed by: INTERNAL MEDICINE

## 2019-11-26 PROCEDURE — 84132 ASSAY OF SERUM POTASSIUM: CPT | Performed by: INTERNAL MEDICINE

## 2019-11-26 PROCEDURE — 97161 PT EVAL LOW COMPLEX 20 MIN: CPT | Mod: GP

## 2019-11-26 PROCEDURE — 25000125 ZZHC RX 250: Performed by: INTERNAL MEDICINE

## 2019-11-26 PROCEDURE — 25000128 H RX IP 250 OP 636: Performed by: HOSPITALIST

## 2019-11-26 PROCEDURE — 97110 THERAPEUTIC EXERCISES: CPT | Mod: GP

## 2019-11-26 PROCEDURE — 97165 OT EVAL LOW COMPLEX 30 MIN: CPT | Mod: GO

## 2019-11-26 PROCEDURE — 25000132 ZZH RX MED GY IP 250 OP 250 PS 637: Mod: GY | Performed by: INTERNAL MEDICINE

## 2019-11-26 PROCEDURE — A9270 NON-COVERED ITEM OR SERVICE: HCPCS | Mod: GY | Performed by: HOSPITALIST

## 2019-11-26 PROCEDURE — 97530 THERAPEUTIC ACTIVITIES: CPT | Mod: GP

## 2019-11-26 PROCEDURE — 99232 SBSQ HOSP IP/OBS MODERATE 35: CPT | Performed by: INTERNAL MEDICINE

## 2019-11-26 PROCEDURE — 25000132 ZZH RX MED GY IP 250 OP 250 PS 637: Mod: GY | Performed by: HOSPITALIST

## 2019-11-26 PROCEDURE — 93306 TTE W/DOPPLER COMPLETE: CPT | Mod: 26 | Performed by: INTERNAL MEDICINE

## 2019-11-26 PROCEDURE — 97535 SELF CARE MNGMENT TRAINING: CPT | Mod: GO

## 2019-11-26 PROCEDURE — 99222 1ST HOSP IP/OBS MODERATE 55: CPT | Mod: 25 | Performed by: INTERNAL MEDICINE

## 2019-11-26 PROCEDURE — 21000001 ZZH R&B HEART CARE

## 2019-11-26 PROCEDURE — 97116 GAIT TRAINING THERAPY: CPT | Mod: GP

## 2019-11-26 PROCEDURE — 40000264 ECHOCARDIOGRAM COMPLETE

## 2019-11-26 PROCEDURE — 83605 ASSAY OF LACTIC ACID: CPT | Performed by: INTERNAL MEDICINE

## 2019-11-26 PROCEDURE — 80048 BASIC METABOLIC PNL TOTAL CA: CPT | Performed by: HOSPITALIST

## 2019-11-26 PROCEDURE — 25000128 H RX IP 250 OP 636: Performed by: INTERNAL MEDICINE

## 2019-11-26 PROCEDURE — 25500064 ZZH RX 255 OP 636: Performed by: HOSPITALIST

## 2019-11-26 RX ORDER — POTASSIUM CHLORIDE 1500 MG/1
40 TABLET, EXTENDED RELEASE ORAL 2 TIMES DAILY
Status: DISCONTINUED | OUTPATIENT
Start: 2019-11-26 | End: 2019-11-27

## 2019-11-26 RX ORDER — BUMETANIDE 0.25 MG/ML
1 INJECTION INTRAMUSCULAR; INTRAVENOUS ONCE
Status: COMPLETED | OUTPATIENT
Start: 2019-11-26 | End: 2019-11-26

## 2019-11-26 RX ORDER — POTASSIUM CHLORIDE 7.45 MG/ML
10 INJECTION INTRAVENOUS
Status: DISCONTINUED | OUTPATIENT
Start: 2019-11-26 | End: 2019-11-26

## 2019-11-26 RX ORDER — POTASSIUM CL/LIDO/0.9 % NACL 10MEQ/0.1L
10 INTRAVENOUS SOLUTION, PIGGYBACK (ML) INTRAVENOUS
Status: DISCONTINUED | OUTPATIENT
Start: 2019-11-26 | End: 2019-11-26

## 2019-11-26 RX ORDER — POTASSIUM CHLORIDE 1.5 G/1.58G
20-40 POWDER, FOR SOLUTION ORAL
Status: DISCONTINUED | OUTPATIENT
Start: 2019-11-26 | End: 2019-11-26

## 2019-11-26 RX ORDER — POTASSIUM CHLORIDE 1500 MG/1
20-40 TABLET, EXTENDED RELEASE ORAL
Status: DISCONTINUED | OUTPATIENT
Start: 2019-11-26 | End: 2019-11-26

## 2019-11-26 RX ORDER — POTASSIUM CHLORIDE 29.8 MG/ML
20 INJECTION INTRAVENOUS
Status: DISCONTINUED | OUTPATIENT
Start: 2019-11-26 | End: 2019-11-26

## 2019-11-26 RX ADMIN — METOPROLOL SUCCINATE 25 MG: 25 TABLET, EXTENDED RELEASE ORAL at 10:26

## 2019-11-26 RX ADMIN — FUROSEMIDE 60 MG: 10 INJECTION, SOLUTION INTRAVENOUS at 00:33

## 2019-11-26 RX ADMIN — SILDENAFIL 20 MG: 20 TABLET, FILM COATED ORAL at 10:26

## 2019-11-26 RX ADMIN — POTASSIUM CHLORIDE 40 MEQ: 1500 TABLET, EXTENDED RELEASE ORAL at 09:04

## 2019-11-26 RX ADMIN — POTASSIUM CHLORIDE 20 MEQ: 1500 TABLET, EXTENDED RELEASE ORAL at 09:04

## 2019-11-26 RX ADMIN — CHOLECALCIFEROL TAB 10 MCG (400 UNIT) 1600 UNITS: 10 TAB at 09:04

## 2019-11-26 RX ADMIN — Medication 1 CAPSULE: at 09:04

## 2019-11-26 RX ADMIN — FUROSEMIDE 60 MG: 10 INJECTION, SOLUTION INTRAVENOUS at 09:03

## 2019-11-26 RX ADMIN — SIMVASTATIN 40 MG: 40 TABLET, FILM COATED ORAL at 00:32

## 2019-11-26 RX ADMIN — SILDENAFIL 20 MG: 20 TABLET, FILM COATED ORAL at 02:13

## 2019-11-26 RX ADMIN — HUMAN ALBUMIN MICROSPHERES AND PERFLUTREN 9 ML: 10; .22 INJECTION, SOLUTION INTRAVENOUS at 16:00

## 2019-11-26 RX ADMIN — FOLIC ACID 1000 MCG: 1 TABLET ORAL at 09:04

## 2019-11-26 RX ADMIN — BUMETANIDE 0.25 MG/HR: 0.25 INJECTION INTRAMUSCULAR; INTRAVENOUS at 16:14

## 2019-11-26 RX ADMIN — PREDNISOLONE ACETATE-GATIFLOXACIN-BROMFENAC 1 DROP: .75; 5; 1 SUSPENSION/ DROPS OPHTHALMIC at 12:07

## 2019-11-26 RX ADMIN — APIXABAN 5 MG: 5 TABLET, FILM COATED ORAL at 09:04

## 2019-11-26 RX ADMIN — APIXABAN 5 MG: 5 TABLET, FILM COATED ORAL at 00:33

## 2019-11-26 RX ADMIN — SIMVASTATIN 40 MG: 40 TABLET, FILM COATED ORAL at 21:36

## 2019-11-26 RX ADMIN — POTASSIUM CHLORIDE 40 MEQ: 1500 TABLET, EXTENDED RELEASE ORAL at 20:01

## 2019-11-26 RX ADMIN — OMEPRAZOLE 20 MG: 20 CAPSULE, DELAYED RELEASE ORAL at 06:37

## 2019-11-26 RX ADMIN — NIFEDIPINE 60 MG: 60 TABLET, FILM COATED, EXTENDED RELEASE ORAL at 12:07

## 2019-11-26 RX ADMIN — MULTIPLE VITAMINS W/ MINERALS TAB 1 TABLET: TAB at 09:04

## 2019-11-26 RX ADMIN — LEVOTHYROXINE SODIUM 75 MCG: 75 TABLET ORAL at 06:37

## 2019-11-26 RX ADMIN — SILDENAFIL 20 MG: 20 TABLET, FILM COATED ORAL at 21:36

## 2019-11-26 RX ADMIN — ALBUTEROL SULFATE 2 PUFF: 90 AEROSOL, METERED RESPIRATORY (INHALATION) at 10:27

## 2019-11-26 RX ADMIN — BUMETANIDE 1 MG: 0.25 INJECTION INTRAMUSCULAR; INTRAVENOUS at 16:14

## 2019-11-26 RX ADMIN — POTASSIUM CHLORIDE 20 MEQ: 1500 TABLET, EXTENDED RELEASE ORAL at 12:08

## 2019-11-26 RX ADMIN — APIXABAN 5 MG: 5 TABLET, FILM COATED ORAL at 20:01

## 2019-11-26 RX ADMIN — OMEPRAZOLE 20 MG: 20 CAPSULE, DELAYED RELEASE ORAL at 16:34

## 2019-11-26 RX ADMIN — POTASSIUM CHLORIDE 20 MEQ: 1500 TABLET, EXTENDED RELEASE ORAL at 00:33

## 2019-11-26 RX ADMIN — SILDENAFIL 20 MG: 20 TABLET, FILM COATED ORAL at 16:34

## 2019-11-26 ASSESSMENT — ACTIVITIES OF DAILY LIVING (ADL)
ADLS_ACUITY_SCORE: 14
ADLS_ACUITY_SCORE: 17
ADLS_ACUITY_SCORE: 14
ADLS_ACUITY_SCORE: 16
ADLS_ACUITY_SCORE: 21
PREVIOUS_RESPONSIBILITIES: MEDICATION MANAGEMENT;DRIVING;FINANCES
ADLS_ACUITY_SCORE: 17

## 2019-11-26 ASSESSMENT — MIFFLIN-ST. JEOR: SCORE: 1444.31

## 2019-11-26 NOTE — PHARMACY-ADMISSION MEDICATION HISTORY
Pharmacy Medication History  Admission medication history interview status for the 11/25/2019  admission is complete. See EPIC admission navigator for prior to admission medications     Medication history sources: Patient, patient's   Medication history source reliability: Moderate  Adherence assessment: Good    Significant changes made to the medication list:  Added multivitamin, vitamin d, metamucil, eye drop      Additional medication history information:   Cephalexin started 11/11 - last doses yesterday    Medication reconciliation completed by provider prior to medication history? No    Time spent in this activity: 30 minutes      Prior to Admission medications    Medication Sig Last Dose Taking? Auth Provider   albuterol (PROAIR HFA/PROVENTIL HFA/VENTOLIN HFA) 108 (90 Base) MCG/ACT inhaler Inhale 2 puffs into the lungs every 6 hours as needed for shortness of breath / dyspnea or wheezing  at prn Yes Unknown, Entered By History   apixaban ANTICOAGULANT (ELIQUIS) 5 MG tablet Take 1 tablet (5 mg) by mouth 2 times daily 11/25/2019 at am Yes Carissa Burroughs MD   Bacillus Coagulans-Inulin (PROBIOTIC-PREBIOTIC PO) Take 1 chew tab by mouth 2 times daily  11/25/2019 at Unknown time Yes Reported, Patient   cephALEXin (KEFLEX) 500 MG capsule Take 1 capsule (500 mg) by mouth 4 times daily 11/24/2019 at Unknown time Yes Carissa Burroughs MD   folic acid (FOLVITE) 1 MG tablet Take 1 tablet (1,000 mcg) by mouth daily 11/25/2019 at am Yes Carsisa Burroughs MD   furosemide (LASIX) 20 MG tablet Take 1 tablet (20 mg) by mouth 2 times daily Updated - Script 11/25/2019 at am Yes Carissa Burroughs MD   levothyroxine (SYNTHROID/LEVOTHROID) 75 MCG tablet Take 1 tablet (75 mcg) by mouth daily 11/25/2019 at Unknown time Yes Carissa Burroughs MD   melatonin 5 MG tablet Take 5 mg by mouth nightly as needed for sleep  at prn Yes Unknown, Entered By History   metoprolol succinate ER (TOPROL-XL) 25 MG 24 hr tablet Take 1  tablet (25 mg) by mouth daily 11/25/2019 at Unknown time Yes Carissa Burroughs MD   Multiple Vitamins-Minerals (ICAPS) CAPS Take 1 capsule by mouth 2 times daily  11/25/2019 at Unknown time Yes Reported, Patient   multivitamin (CENTRUM SILVER) tablet Take 1 tablet by mouth daily 11/25/2019 at am Yes Unknown, Entered By History   NIFEdipine ER (ADALAT CC) 60 MG 24 hr tablet Take 60 mg by mouth daily 11/25/2019 at am Yes Unknown, Entered By History   omeprazole (PRILOSEC) 20 MG DR capsule Take 1 capsule (20 mg) by mouth 2 times daily Take 30-60 minutes prior to meals 11/25/2019 at Unknown time Yes Carissa Burroughs MD   Prednisolon-Gatiflox-Bromfenac 1-0.5-0.075 % SUSP Place 1 drop into the right eye daily 11/25/2019 at am Yes Unknown, Entered By History   psyllium (METAMUCIL/KONSYL) capsule Take 1 capsule by mouth daily 11/25/2019 at am Yes Unknown, Entered By History   sildenafil (REVATIO) 20 MG tablet Take 1 tablet (20 mg) by mouth 3 times daily 11/25/2019 at Unknown time Yes Shay Marquez MD   simvastatin (ZOCOR) 40 MG tablet Take 1 tablet (40 mg) by mouth At Bedtime 11/24/2019 at Unknown time Yes Carissa Burroughs MD   Vitamin D, Cholecalciferol, 10 MCG (400 UNIT) TABS Take 1,600 Units by mouth daily 11/25/2019 at am Yes Unknown, Entered By History   acetaminophen (TYLENOL) 500 MG tablet Take 500-1,000 mg by mouth every 6 hours as needed for mild pain  at prn  Reported, Patient   InFLIXimab (REMICADE IV) Inject 600 mg into the vein Every 8 weeks  at 1 month ago  Reported, Patient   Methotrexate Sodium (METHOTREXATE PO) Take 2.5 mg by mouth 10 tablets weekly - Wednesdays 11/20/2019  Reported, Patient   triamcinolone (KENALOG) 0.1 % external cream Apply topically 2 times daily  at new today  Carissa Burroughs MD Chelsea Mayer, PharmD  Inpatient Clinical Pharmacist  [phone]

## 2019-11-26 NOTE — PLAN OF CARE
OT: Evaluation and treatment initiated. Pt lives in a rambler style home with her spouse. Prior pt independent in all I/ADLs.   Discharge Planner OT   Patient plan for discharge: Home  Current status: Pt ambulated to the bathroom with minimum assist and cane and transferred to/from the toilet with CGA. Pt unsteady with functional mobility. While seated/standing pt completed LE dressing with minimum assist and extra time. Pt ambulated with CGA-minimum assist with the walker.     Received Lymph Orders, pt may benefit from lymph wraps, however pt presents with SOB. Will allow pt to diurese today and will reassess tomorrow if pt is appropriate lymph wraps.     Barriers to return to prior living situation: Current level of A; decreased activity tolerance for I/ADLs    Recommendations for discharge: Home with assist for I/ADLs as needed, and home OT    Rationale for recommendations: Anticipate pt will progress in established  IP OT goal areas with continued therapy. Home therapy to address safety, activity tolerance and independence in I/ADLs. Home therapy appropriate as pt will require significant effort to leave the home.        Entered by: Vazquez Andrea 11/26/2019 11:58 AM

## 2019-11-26 NOTE — PLAN OF CARE
Heart Failure Care Pathway  GOALS TO BE MET BEFORE DISCHARGE:    1. Decrease congestion and/or edema with diuretic therapy to achieve near      optimal volume status.            Dyspnea improved:  no            Edema improved:    no        Net I/O and Weights since admission:          No intake/output data recorded.            Vitals:    11/25/19 1646 11/25/19 2350 11/26/19 0538   Weight: 103.4 kg (228 lb) 104 kg (229 lb 3.2 oz) 101.6 kg (224 lb)       2.  O2 sats > 92% on RA or at prior home O2 therapy level.          Current oxygenation status:       SpO2: 93 %         O2 Device: Nasal cannula,  Oxygen Delivery: 2 LPM         Able to wean O2 this shift to keep sats > 92%: no       Does patient use Home O2? no    3.  Tolerates ambulation and mobility near baseline: yes        How many times did the patient ambulate with nursing staff this shift? 4    Please review the Heart Failure Care Pathway for additional HF goal outcomes.    Jenn River RN RN  11/26/2019    A&OX4,HANEY, on  2L nasal cannula sating in the low 90s but drops to the mid 80s with activities requiring more oxygen.Tele-AFib CVR, on low sat fat diet, up with assist of [facility]. +3 BLE edema, lasix given this night. On 1500 ml fluid restrictions.

## 2019-11-26 NOTE — CONSULTS
"REASON FOR ASSESSMENT:  CHF Consult for 2 gm NA Diet Education    NUTRITION HISTORY:  Information obtained from:  Patient    Living situation:   Lives with  in their home.     Grocery shopping:  She and her     Meal preparation:  Marie does most of the \"cooking\" however she has lately stopped doing much from scratch. She is sick of cooking after \"all these years\".     Breakfast:  Takes pills, then may eat something small like oatmeal, a banana, a cookie, or OJ    Lunch:  They like to go out often. [employer], [employer], [employer], sometimes [employer]. Also like [employer] - eats a burger and fries. Getting fries w/o the salt \"would ruin them\".   Likes potato soup    Dinner:   Leftovers from lunch.     Previous diet instructions:  Pt verbalized prior low salt diet ed. She was unsure how much salt she should eat daily. She does not read labels or ask for any accomodation at restaurants to lessen sodium content of meals.       CURRENT DIET:  Low Sat Fat, Na <2400 mg  No Caffeine  FR 1500 mL    NUTRITION DIAGNOSIS:  Food- and nutrition-related knowledge deficit R/t 2g Na diet for CHF AEB patient gives diet recall quite high in sodium.       INTERVENTIONS:    Nutrition Prescription:  2g Na (changed diet order), LSF, FR per MD    Implementation:    Assessed learning needs, learning preferences, and willingness to learn    Nutrition Education (Content):  a) Provided handouts:  1) Tips for Low Na Diet  2) Label Reading  3) Low Na Foods/Drinks  4) Seasoning Your Food Without Salt  5) Low Na Recipe Booklet  b) Discussed rational for limiting Na for CHF and stressed importance of following 2 gm Na guidelines     Nutrition Education (Application):  a) Discussed current eating habits and recommended alternative food choices  b) Suggested pt ask for sauces/dressings on the side at restaurants  c) Encouraged her to start looking at labels  d) Avoid salt shaker  e) Ask for fries w/o salt.     Anticipated " poor-fair compliance    Diet Education - refer to Education Flowsheet    Goals:    Patient verbalizes understanding of diet    All of the above goals met during the education session    Follow Up:    Provided RD contact information for future questions.    Recommend Out-Patient Nutrition Referral, if further diet instructions are needed.    Rizwana Ruiz RD,   Heart [place], 65, 54, [place]   Pager: [phone]  Weekend Pager: [phone]

## 2019-11-26 NOTE — CONSULTS
Consult Date:  11/26/2019      PRIMARY CARDIOLOGIST:  Shay Marquez MD.      REFERRAL SOURCE:  Hernan Vang MD, Hospitalist Service.      REASON FOR REFERRAL:  Acute decompensated heart failure in a patient with scleroderma, rheumatoid arthritis and pulmonary arterial hypertension (on sildenafil therapy).      HISTORY OF PRESENT ILLNESS:    Jaqueline Canales is 78 years old (appears older than her stated age), lives at home with her 83-year-old , remote never-tobacco user.  She is known to have scleroderma, rheumatoid arthritis (on methotrexate and infliximab), permanent atrial fibrillation (rate control and apixaban anticoagulation strategy), treated hypertension, treated hypothyroidism, moderate pulmonary arterial hypertension based on recent right heart catheterization in 04/2019 (mean PA pressure 34 mmHg, negative reversibility testing with inhaled nitric oxide, elevated right and left-sided filling pressures, low normal cardiac index of 2.1) for which she was started on Revatio (sildenafil) 20 mg 3 times daily.  She is also on Nifedipine ER 60 mg daily.      Jaqueline is not sure of temporal details, but thinks she started the pulmonary vasodilators about 3 months ago.  For the last 2 months, she has had progressive bilateral lower extremity (extending from her ankles up to the abdominal wall) swelling, worsening exertional dyspnea, no orthopnea (1 pillow) or PND.  Her appetite is diminished and she is more fatigued.      This prompted her to present to the emergency room where workup was suggestive of decompensated heart failure with preserved ejection fraction.  Her NT-proBNP is markedly up at 4000 (baseline is 300-500).  Chest x-ray showed cardiomegaly with pulmonary edema and small left-sided pleural effusion.  Renal function is normal with a creatinine of 0.68 and estimated GFR of 83, mild hypokalemia with a potassium of 3.2 (in the context of being diuresed with IV furosemide), mild anemia with a  hemoglobin of 11.6.  ECG shows rate controlled atrial fibrillation of 80-90 BPM.  Her last transthoracic echocardiogram was a year ago and showed normal biventricular systolic function, LVEF 55%-60%.  Mild to moderate tricuspid valve regurgitation, elevated RVSP of 50-75 mmHg, normal IVC, mild MR.  Echocardiogram today is pending.      Her right heart catheterization from 04/2019 showed elevated right-sided filling pressures with a mean RA of 16 mmHg, elevated left-sided filling pressures with a wedge pressure of 20 mmHg, moderate pulmonary arterial hypertension with a mean PA of 35 mmHg, pulmonary vascular resistance of 3.35 Wood units, assumed Josh cardiac index of 2.1, and no reversibility of pulmonary arterial pressures with inhaled nitric oxide.      PHYSICAL EXAMINATION:    She appears older than her stated age and fatigued, no conversational dyspnea or cyanosis, mild pallor.  No icterus.  She has facial features typical of scleroderma with perioral tightening of skin, a few telangiectasias and tightening of skin over her hands (sclerodactyly).  Her jugular venous pressure is markedly elevated to the angle of the jaw.  She has massive bilateral lower extremity edema, 4+, extending up to her upper thigh.  No abdominal wall edema or hepatosplenomegaly.  Unable to assess for free fluid as she is not very mobile due to the gross edema.  She has widespread rales up to her lung apices.  Heart sounds are irregularly irregular, 3/6 pansystolic murmur of tricuspid valve regurgitation.  Mentally alert and oriented.      DIAGNOSES:   1.  Decompensated heart failure with preserved ejection fraction, possibly precipitated by Pulmonary, vasodilator therapy.   2.  Moderate pulmonary arterial hypertension in the setting of scleroderma (mean PA 34 mmHg).   3.  Right and left-sided elevated filling pressures on recent right heart catheterization (04/2019).   4.  Permanent atrial fibrillation -- rate controlled on apixaban  anticoagulation strategy.   5.  Hypertension.   6.  Mild anemia.   7.  Elderly frailty.   8.  Hypokalemia.   9.  Scleroderma and rheumatoid arthritis, on disease-modifying immunosuppressive agents.      ASSESSMENT AND PLAN:    Jaqueline has decompensated heart failure.  Her last LVEF was preserved.  I think the fluid overload may have gradually progressed after initiation of pulmonary vasodilators and is a known side effect of sildenafil.  At home she was taking furosemide 20 mg daily, which was recently increased to b.i.d. by her primary care doctor yesterday.  She has pulmonary edema on exam and chest x-ray.  I recommend aggressive IV diuresis with an IV infusion while continuing her sildenafil.  If she becomes hypotensive, the sildenafil may have to be temporarily cut back or held while getting her back to euvolemic status.     1.  IV bumetanide 1 mg followed by infusion at 0.25 mg per hour.   2.  Stop divided dose IV furosemide.   3.  Continue all other medications including sildenafil for now.   4.  Strict intake and output, 2-gram sodium restriction, 2 liter fluid restriction, inpatient physical therapy.      Thank you for consulting Cardiology.  We will follow.      Total time is 50 minutes, greater than 50% spent in counseling and coordination of care.         ADRIANA JONES MD             D: 2019   T: 2019   MT: CATE      Name:     JAQUELINE MCKEON   MRN:      [id number]        Account:       [id number]   :      1940           Consult Date:  2019      Document: [id number]

## 2019-11-26 NOTE — CONSULTS
Inpatient Cardiology Consultation   [facility]  Date of Admission:11/25/2019  Date of Consult: 11/26/2019    Inpatient cardiology consultation note has been dictated. Dictation number [id number]        Tami Lopez MD [facility]  Cardiology        REVIEW OF SYSTEMS:  A comprehensive 10 point review of systems was completed and the pertinent positives are documented in history of present illness.    MEDICATIONS:  Prior to Admission Medications   Prescriptions Last Dose Informant Patient Reported? Taking?   Bacillus Coagulans-Inulin (PROBIOTIC-PREBIOTIC PO) 11/25/2019 at Unknown time  Yes Yes   Sig: Take 1 chew tab by mouth 2 times daily    InFLIXimab (REMICADE IV)  at 1 month ago Self Yes No   Sig: Inject 600 mg into the vein Every 8 weeks   Methotrexate Sodium (METHOTREXATE PO) 11/20/2019 Self Yes No   Sig: Take 2.5 mg by mouth 10 tablets weekly - Wednesdays   Multiple Vitamins-Minerals (ICAPS) CAPS 11/25/2019 at Unknown time Self Yes Yes   Sig: Take 1 capsule by mouth 2 times daily    NIFEdipine ER (ADALAT CC) 60 MG 24 hr tablet 11/25/2019 at am  Yes Yes   Sig: Take 60 mg by mouth daily   Prednisolon-Gatiflox-Bromfenac 1-0.5-0.075 % SUSP 11/25/2019 at am  Yes Yes   Sig: Place 1 drop into the right eye daily   Vitamin D, Cholecalciferol, 10 MCG (400 UNIT) TABS 11/25/2019 at am  Yes Yes   Sig: Take 1,600 Units by mouth daily   acetaminophen (TYLENOL) 500 MG tablet  at prn  Yes No   Sig: Take 500-1,000 mg by mouth every 6 hours as needed for mild pain   albuterol (PROAIR HFA/PROVENTIL HFA/VENTOLIN HFA) 108 (90 Base) MCG/ACT inhaler  at prn  Yes Yes   Sig: Inhale 2 puffs into the lungs every 6 hours as needed for shortness of breath / dyspnea or wheezing   apixaban ANTICOAGULANT (ELIQUIS) 5 MG tablet 11/25/2019 at am  No Yes   Sig: Take 1 tablet (5 mg) by mouth 2 times daily   cephALEXin (KEFLEX) 500 MG capsule 11/24/2019 at Unknown time  No Yes   Sig: Take 1 capsule (500 mg) by mouth 4 times daily    folic acid (FOLVITE) 1 MG tablet 11/25/2019 at am  No Yes   Sig: Take 1 tablet (1,000 mcg) by mouth daily   furosemide (LASIX) 20 MG tablet 11/25/2019 at am  No Yes   Sig: Take 1 tablet (20 mg) by mouth 2 times daily Updated - Script   levothyroxine (SYNTHROID/LEVOTHROID) 75 MCG tablet 11/25/2019 at Unknown time  No Yes   Sig: Take 1 tablet (75 mcg) by mouth daily   melatonin 5 MG tablet  at prn  Yes Yes   Sig: Take 5 mg by mouth nightly as needed for sleep   metoprolol succinate ER (TOPROL-XL) 25 MG 24 hr tablet 11/25/2019 at Unknown time  No Yes   Sig: Take 1 tablet (25 mg) by mouth daily   multivitamin (CENTRUM SILVER) tablet 11/25/2019 at am  Yes Yes   Sig: Take 1 tablet by mouth daily   omeprazole (PRILOSEC) 20 MG DR capsule 11/25/2019 at Unknown time  No Yes   Sig: Take 1 capsule (20 mg) by mouth 2 times daily Take 30-60 minutes prior to meals   psyllium (METAMUCIL/KONSYL) capsule 11/25/2019 at am  Yes Yes   Sig: Take 1 capsule by mouth daily   sildenafil (REVATIO) 20 MG tablet 11/25/2019 at Unknown time  No Yes   Sig: Take 1 tablet (20 mg) by mouth 3 times daily   simvastatin (ZOCOR) 40 MG tablet 11/24/2019 at Unknown time  No Yes   Sig: Take 1 tablet (40 mg) by mouth At Bedtime   triamcinolone (KENALOG) 0.1 % external cream  at new today  No No   Sig: Apply topically 2 times daily      Facility-Administered Medications: None       ALLERGIES:  Allergies   Allergen Reactions     No Known Allergies        PAST MEDICAL HISTORY:  Past Medical History:   Diagnosis Date     Amaurosis fugax 5-11    Right     Carotid artery stenosis      Esophageal reflux 3/11/2004     HELICOBACTER PYLORI INFECTION 7/11/2006     hx of CA in situ RT breast-1990.mastectomy 4/17/2007     Hyperlipidemia LDL goal <70 6/20/2011     Lung cancer (H)     squamous cell lung ca left lower lobe     OBESITY NOS 3/11/2004     OSTEOPOROSIS NOS 3/11/2004     Other psoriasis 3/11/2004     Pulmonary hypertension (H) 04/15/2019    Right heart  catheterization demonstrated moderate pulmonary arterial hypertension with a mean PA pressure of 34 mmHg, no reversibility with inhaled nitric oxide, elevated right and left-sided filling pressures, low normal cardiac index of 2.1.  Seen by Dr. Shay Marquez.  Started on sildenafil (Revatio) 20 mg 3 times daily.     RA (rheumatoid arthritis) (H) 2/8/2010     Raynaud's syndrome 4/10/2006     Scleroderma (H)      Sleep apnea     CPAP       PAST SURGICAL HISTORY:  Past Surgical History:   Procedure Laterality Date     BREAST SURGERY       C NONSPECIFIC PROCEDURE  1990    mastectomy RT breast     COLONOSCOPY  5/24/16     COLONOSCOPY N/A 6/28/2019    Procedure: Colonoscopy, With Polypectomy And Biopsy;  Surgeon: Neto Kaminski MD;  Location:  GI     CONIZATION       CV RIGHT HEART CATH N/A 4/15/2019    Procedure: Heart Cath Right Heart Cath;  Surgeon: Naresh Cyr MD;  Location:  HEART CARDIAC CATH LAB     ENDARTERECTOMY CAROTID  2011     HC COLONOSCOPY THRU STOMA, DIAGNOSTIC  2001 2011    diverticulosis     REPAIR HAMMER TOE       TONSILLECTOMY         SOCIAL HISTORY:   Jaqueline Canales  reports that she quit smoking about 27 years ago. Her smoking use included cigarettes. She started smoking about 57 years ago. She has a 30.00 pack-year smoking history. She has never used smokeless tobacco. She reports previous alcohol use. She reports that she does not use drugs.    FAMILY HISTORY:  Family History   Problem Relation Age of Onset     Cancer - colorectal Mother      Cerebrovascular Disease Father      Cancer Sister 31        ovarian     Heart Disease Sister      Gastrointestinal Disease Sister         crohns     Gastrointestinal Disease Sister         diverticulitis     Gastrointestinal Disease Brother         diverticulitis     Alzheimer Disease Sister      Cerebrovascular Disease Maternal Grandmother      Diabetes Maternal Grandmother      Mental Illness Maternal Grandmother      Diabetes Maternal  Uncle      Cancer Nephew                PHYSICAL EXAMINATION:  Temp: 97.6  F (36.4  C) Temp src: Oral BP: 138/68 Pulse: 80 Heart Rate: 131 Resp: 24 SpO2: 94 % O2 Device: Nasal cannula Oxygen Delivery: 2 LPM  No intake/output data recorded.  Vitals:    19 1646 19 2350 19 0538   Weight: 103.4 kg (228 lb) 104 kg (229 lb 3.2 oz) 101.6 kg (224 lb)         Tami Lopez MD

## 2019-11-26 NOTE — PROGRESS NOTES
11/26/19 1400   Quick Adds   Type of Visit Initial PT Evaluation   Living Environment   Lives With spouse   Living Arrangements house   Home Accessibility stairs to enter home;stairs within home   Number of Stairs, Main Entrance 2   Stair Railings, Main Entrance railings safe and in good condition;railing on left side (ascending)   Number of Stairs, Within Home, Primary 10  (Basement)   Stair Railings, Within Home, Primary railings safe and in good condition;railing on right side (ascending)   Transportation Anticipated family or friend will provide   Self-Care   Usual Activity Tolerance good   Current Activity Tolerance moderate   Regular Exercise No   Equipment Currently Used at Home cane, straight;shower chair;grab bar, toilet;grab bar, tub/shower   Activity/Exercise/Self-Care Comment Straight cane for community ambulation   Functional Level Prior   Ambulation 1-->assistive equipment   Transferring 0-->independent   Fall history within last six months no   Which of the above functional risks had a recent onset or change? ambulation   Prior Functional Level Comment Pt reports independence with mobility prior to admit   General Information   Onset of Illness/Injury or Date of Surgery - Date 11/25/19   Referring Physician Dr. Lopez   Patient/Family Goals Statement To go home   Pertinent History of Current Problem (include personal factors and/or comorbidities that impact the POC) Pt is a 78 year old female admitted with acute on chronic CHF with SOB.   Cognitive Status Examination   Level of Consciousness alert   Pain Assessment   Patient Currently in Pain No   Integumentary/Edema   Integumentary/Edema Comments Significant amount of edema noted in bilateral LEs   Range of Motion (ROM)   ROM Quick Adds No deficits were identified   Strength   Strength Comments Gross LE strength 4/5 bilaterally.    Bed Mobility   Bed Mobility Comments Supine to sit SBA   Transfer Skills   Transfer Comments Sit to/from stand CGA  "  Gait   Gait Comments 15' FWW CGA, decreased shu and step length   Balance   Balance Comments Good in sitting, fair in standing   General Therapy Interventions   Planned Therapy Interventions balance training;bed mobility training;gait training;strengthening;transfer training;home program guidelines;progressive activity/exercise   Clinical Impression   Criteria for Skilled Therapeutic Intervention yes, treatment indicated   PT Diagnosis Impaired ambulation   Influenced by the following impairments Decreased strength, decreased endurance, decreased balance   Functional limitations due to impairments Difficulty with bed mobility, transfers, ambulation, stair management   Clinical Presentation Stable/Uncomplicated   Clinical Presentation Rationale VSS, pain controlled   Clinical Decision Making (Complexity) Low complexity   Therapy Frequency Daily   Predicted Duration of Therapy Intervention (days/wks) 4 days   Anticipated Equipment Needs at Discharge walker   Anticipated Discharge Disposition Home with Home Therapy;Home with Assist   Risk & Benefits of therapy have been explained Yes   Patient, Family & other staff in agreement with plan of care Yes   [facility] TM \"6 Clicks\"   2016, Trustees of [facility], under license to [employer].  All rights reserved.   6 Clicks Short Forms Basic Mobility Inpatient Short Form   [facility]  \"6 Clicks\" V.2 Basic Mobility Inpatient Short Form   1. Turning from your back to your side while in a flat bed without using bedrails? 3 - A Little   2. Moving from lying on your back to sitting on the side of a flat bed without using bedrails? 3 - A Little   3. Moving to and from a bed to a chair (including a wheelchair)? 3 - A Little   4. Standing up from a chair using your arms (e.g., wheelchair, or bedside chair)? 3 - A Little   5. To walk in hospital room? 3 - A Little   6. Climbing 3-5 steps with a railing? 3 - A Little   Basic Mobility Raw " Score (Score out of 24.Lower scores equate to lower levels of function) 18   Total Evaluation Time   Total Evaluation Time (Minutes) 12

## 2019-11-26 NOTE — ED NOTES
Patient road tested with assist of one to bathroom. Patient sats dropped to 83% on 3 L of O2. Patient short of breath but says that this is her baseline. Patient steady on her feet and walks with a cane which is her baseline.

## 2019-11-26 NOTE — ED PROVIDER NOTES
History     Chief Complaint:  Leg Swelling      HPI   Jaqueline Canales is a 78 year old female former smoker with persistent atrial fibrillation anticoagulated on Eliquis who presents with bilateral leg swelling. The patient says that she has had increased leg swelling over the past week as well as difficulty breathing when moving. She says that the upper leg swelling is new in the past week. She notes that breathing changes have occurred over the past 3 weeks. She says that she saw her PCP this morning and was told to stop taking the Lasix that she had been on for the past 1-2 months. She notes that she had a previous infection in her legs and is currently tapering off antibiotics. She endorses a higher frequency. She denies any chest pain, black or bloody stools, hematuria, recent respiratory illness, use of oxygen at home, or any history of blood clots.     Allergies:   No Known Drug Allergies     Medications:    Albuterol  Eliquis  Keflex  Colace  Folvite  Lasix  Remicade  Synthroid  Melatonin  Methotrexate  Toprol  Icaps  Nifedical  Prilosec   Revatio  Zocor  Kenalog      Past Medical History:    Sleep apnea  Scleroderma  Raynaud's syndrome  rheumatoid arthritis   Psoriasis  Osteoporosis   Obesity  Lung cancer  Helicobacter  pylori infection   Esophageal reflux  Carotid artery stenosis  Amaurosis fugax   hypertension  adenomatous polyp  pulmonary hypertension  persistent atrial fibrillation   Malignant neoplasm of lower lobe of left lung   IVCD  Esophageal stricture  Hypothyroidism  Pulmonary fibrosis  Diverticulosis  Scleroderma   Malignant neoplasm of female breast       Past Surgical History:    Breast surgery  Mastectomy  Colonoscopy x2  Conization  CV right heart cath  Endarterectomy carotid  Colonoscopy thru stoma  Repair hammer toe  Tonsillectomy     Family History:    Cancer   Diabetes  Mental illness  Cerebrovascular disease  Alzheimer disease  Diverticulitis   Chron's  Heart disease  Ovarian  "cancer  Colorectal cancer    Social History:  The patient was accompanied to the ED by .  Smoking Status: Former Smoker  Smokeless Tobacco: Never Used  Alcohol Use: Negative   Drug Use: Negative  PCP: Carissa Burroughs   Marital Status:        Review of Systems   Respiratory: Positive for shortness of breath.    Cardiovascular: Positive for leg swelling. Negative for chest pain.   Gastrointestinal: Negative for blood in stool.   Genitourinary: Positive for frequency. Negative for hematuria.   All other systems reviewed and are negative.    Physical Exam     Patient Vitals for the past 24 hrs:   BP Temp Pulse Heart Rate Resp SpO2 Height Weight   11/25/19 2100 105/70 -- 84 79 22 -- -- --   11/25/19 1649 -- -- -- 68 -- 92 % -- --   11/25/19 1646 102/60 97.6  F (36.4  C) -- -- 24 -- 1.575 m (5' 2\") 103.4 kg (228 lb)        Physical Exam  SKIN:  Warm, dry.    HEMATOLOGIC/IMMUNOLOGIC/LYMPHATIC:  No pallor.  Bilateral lower extremity pitting edema.  HENT: No JVD.  EYES:  Conjunctivae normal.  CARDIOVASCULAR:  Regular rate and rhythm.  No murmur.  No rub.  RESPIRATORY:  No respiratory distress, breath sounds equal and normal.  GASTROINTESTINAL:  Soft, nontender abdomen.  MUSCULOSKELETAL: Painless range of motion of the lower extremities.  NEUROLOGIC:  Alert, conversant.  PSYCHIATRIC:  Normal mood.    Emergency Department Course     ECG:  ECG taken at 2000, ECG read at 2004  atrial fibrillation  Low voltage QRS  Left anterior fascicular block  Cannot rule out anterior infarct, age undetermined  Abnormal ECG  Rate 88 bpm. [place] interval * ms. QRS duration 102 ms. QT/QTc 404/488 ms. P-R-T axes * -54 71.    Imaging:  Radiology findings were communicated with the patient who voiced understanding of the findings.    XR Chest 2 Views  Increased interstitial changes since the comparison study.  Question some new consolidation at the left base. The cardiac  silhouette is prominent but stable. Pulmonary vasculature may " be  congested.  ARELI RICKETTS MD  Reading per radiology    Laboratory:  Laboratory findings were communicated with the patient who voiced understanding of the findings.    CBC: WBC 6.3, HGB 11.6 (L),   BMP:  (H) o/w WNL (Creatinine 0.75)  BNP: 3936 (H)  Troponin (Collected 1654): <0.015     Emergency Department Course:    1654 IV was inserted and blood was drawn for laboratory testing, results above.     1936 Nursing notes and vitals reviewed.    1948 I performed an exam of the patient as documented above.     2027 The patient was sent for a XR while in the emergency department, results above.      2048 Patient rechecked and updated.      2158 I spoke with Dr. Vang of the hospitalist service regarding patient's presentation, findings, and plan of care.      The patient is admitted into the care of Dr. Vang.     Impression & Plan    Medical Decision Making:  Jaqueline Canales is a 78 year old female who presents to the emergency department today for evaluation of increase in bilateral lower extremity edema and now increased working of breathing. Clinically most consistent with congestive heart failure which seems most supported by the testing. She desaturated with just walking down to the low 80's off her supplemental oxygen. She'll be admitted for further dieresis and testing as needed. I did not think a pulmonary embolism was likely given she is anticoagulated.     Diagnosis:    ICD-10-CM    1. Acute congestive heart failure, unspecified heart failure type (H) I50.9      Disposition:   Findings and plan explained to the Patient who consents to admission. Discussed the patient with Dr. Vang, who will admit the patient to a med bed inpatient bed for further monitoring, evaluation, and treatment.       Scribe Disclosure:  Nato PERDOMO, am serving as a scribe at 7:38 PM on 11/25/2019 to document services personally performed by Everardo Quiles MD based on my observations and the  provider's statements to me.       EMERGENCY DEPARTMENT       Everardo Quiles MD  11/27/19 1837

## 2019-11-26 NOTE — PLAN OF CARE
Discharge Planner PT   Patient plan for discharge: Home  Current status: Pt is a 78 year old female with history of CHF admitted with SOB. At baseline, pt lives with her  in a rambler style home with 2 steps to enter. Pt reports independence with mobility prior to admit, intermittent use of straight cane for community ambulation.  This date, pt requires SBA for bed mobility, good sitting balance EOB. Pt performs sit to/from stand and toilet transfer with SBA. Pt ambulates 100' with FWW and CGA to SBA. Pt on 2L O2 via NC with VSS throughout session.   Barriers to return to prior living situation: Decreased activity tolerance, steps, oxygen needs  Recommendations for discharge: Home with use of FWW, home health PT, assist from spouse on stairs  Rationale for recommendations: Anticipate with further medical management and therapy, patient will be safe to discharge home with family assist for safety on stairs. Pt currently below baseline mobility and would benefit from HHPT to increase functional mobility and endurance. Pt will require assist of one, assistive device, and considerable effort to come and go from house, therefore, home health PT is recommended.        Entered by: Silvana Lynn 11/26/2019 2:37 PM        [facility] [address] [facility] [place], [address] [place]  
[phone] fax Progress Note Patient name: Ladonna Patient Start of Care: 2019 Referral source: Tj SaabDO : 1979 Medical/Treatment Diagnosis: Pain in right knee [M25.561] Bilateral leg weakness [R29.898] Payor: BLUE CROSS / Plan: [facility] PPO / Product Type: PPO /  Onset Date:CVA 3 years ago, S/P of right knee: 8 months ago 
  
  
Prior Hospitalization: see medical history Provider#: [id number] Medications: Verified on Patient Summary List   
Comorbidities: stroke 3 years ago Prior Level of Function:difficulty with prolonged walking and standing, trouble performing household and community ADLs Visits from Start of Care: 15    Missed Visits: 0 Established Goals:         
Short term goals to be achieved in 1 week: 
1) Pt will report full compliance with HEP in order to progress in therapy. At eval: handed out HEP Current: Met: reports full compliance with HEP.  
  
Long term goals to be achieved in 8 weeks 1) Pt will have an improved tinetti score to >/= 24 indicating a low risk for falls to increase ease with outdoor ambulation Current:Progressing: tinetti=17 Current: Progressing: tinettie= 21  
2) Pt will have an increase in the five time sit to stand test to >/=15 seconds without use of hands to increase ease with household ADLs. José Miguel Antunez At PN: Progressing: five time sit to stand from 18'' table height, uses B hands = 20 seconds. Current: Progressing five time sit to stands without use of hands 20 seconds. 3) Pt will demonstrate an increase in SLS to >/=10 seconds on left to increase ease with stair management. At PN: able to right side: 30 seconds, left side 3 seconds, able to march without HH support Current: Progressing: left SLS= 15 seconds 4) Pt will have an improved FOTO to >/= 53 to increase ease with function At PN: FOTO= 53 Current: FOTO= 49  
5) Pt will report an increase in function of >/=70% to increase ease with playing with children At eval: 0% At PM MET, pt reports 80-85% SOC.  
 
 
Key Functional Changes: see above goals Updated Goals: to be achieved in 3 weeks: 
1) Pt will be able to ambulate 200 feet outdoors with SBA-CGA to increase overall independence Current: CGA-MN A for 100 feet ASSESSMENT/RECOMMENDATIONS:Pt is progressing well towards long term goals. Pt demonstrated with increased LE strength, improved single leg balance, improved gait mechanics and had an improved score of the tinitetti and five time sit to stand. Pt continues to report difficulty with out door ambulation and stair navigation. 
  
Patient will continue to benefit from skilled PT services to modify and progress therapeutic interventions, address functional mobility deficits, address ROM deficits, address strength deficits, analyze and address soft tissue restrictions, analyze and cue movement patterns, analyze and modify body mechanics/ergonomics, assess and modify postural abnormalities and instruct in home and community integration to attain remaining goals. [x]Continue therapy per initial plan/protocol at a frequency of  2x per week for 3 weeks []Continue therapy with the following recommended changes:_____________________      _____________________________________________________________________ []Discontinue therapy progressing towards or have reached established goals []Discontinue therapy due to lack of appreciable progress towards goals []Discontinue therapy due to lack of attendance or compliance []Await Physician's recommendations/decisions regarding therapy []Other:________________________________________________________________ Thank you for this referral.   
Anshu Fernandez 4/9/2019 11:12 AM 
NOTE TO PHYSICIAN:  PLEASE COMPLETE THE ORDERS BELOW AND FAX TO [facility]: [phone] If you are unable to process this request in 24 hours please contact our office: [phone] []  I have read the above report and request that my patient continue as recommended. []  I have read the above report and request that my patient continue therapy with the following changes/special instructions:________________________________________ []I have read the above report and request that my patient be discharged from therapy.  
 
[id number] Signature:____________Date:_________TIME:________ 
 
[facility] [employer], Date and Time must be completed for valid certification **

## 2019-11-26 NOTE — PROGRESS NOTES
[facility]    Hospitalist Progress Note    Assessment & Plan   Jaqueline Canales is a 78 year old female with PMHx of chronic afib on anticoagulation with apixaban, chronic diastolic CHF with chronic bilateral lymphedema, pulmonary hypertension, hypothyroidism and rheumatoid arthritis who was admitted on 11/25/2019 for evaluation of dyspnea and hypoxia with concern for suspected CHF exacerbation.      Hypoxia dt Acute diastolic CHF exacerbation  Chronic bilateral lymphedema  Last echo in 9/2018 showed EF 55-60% and moderate pulmonary hypertension; diastolic function not assessed. Underwent cardiac cath in 4/2019 which showed elevated biventricular filing pressures and moderate to severe primary pulmonary hypertension. Has chronic bilateral LE lymphedema for which she takes Lasix. Was seen by her PCP last month and treated for cellulitis. Was initially seen in clinic and observed to have increased LE edema and dyspnea. Referred to ED. Was afebrile, HR 100s with SBP 90s. Needing 2L NC on admission. ProBNP 3900. BMP unremarkable (Cr 0.75), WBC nl. CXR showed increased interstitial prominence. Given Lasix 40mg IV x1 in ED. Admitted to [facility]. Started aggressive diuresis with Lasix 60mg IV TID.   -- cont diuresis with Lasix 60mg IV TID wth sched K replacement  -- repeat echocardiogram ordered  -- monitor Is/Os and daily wts -- Is/Os show net neg 1.7L thus far, wts trending down (103.4kg -- 101.6kg)  -- wean off supplemental O2 as needed  -- cardiology following to assist with management  -- lymphedema consult      Chronic A. Fib, on anticoagulation  Moderate/severe pulmonary hypertension  Hyperlipidemia  Hypertension  PTA meds: metoprolol XL 25mg daily, nifedipine ER 60mg daily, sildenafil 20mg TID, simvastatin 40mg HS, Lasix 20mg BID, apixaban 5mg BID  -- conts on Toprol XL and nifedipine  -- conts on sildenafil  -- conts on apixaban  -- conts on statin  -- diuresis as above    Hypothyroidism  Chronic and  stable on levothyroxine     Rheumatoid arthritis  Chronic and stable on methotrexate (takes on Wednesdays)  Can resume Remicade infusions at discharge     FEN: no IVFs, lytes stable, 2g NA diet and fluid restriction (1.5L per 24h)  DVT Prophylaxis: anticoagulated on apixaban as above  Code Status: Full Code -- per discussions on admission, she does not want to remain on artifical life support of she were to have no quality of life    Disposition: Anticipate discharge home in the next 1-2d, pending adequate diuresis.    Lis Nielson    Interval History   Seen this morning. Feeling a little better this morning. HAs had good urine output. Denies cp/palpitations/cough, abd pain/n/v. Appetite okay.     -Data reviewed today: I reviewed all new labs and imaging results over the last 24 hours. I personally reviewed no images or EKG's today.    Physical Exam   Temp: 97.4  F (36.3  C) Temp src: Oral BP: 121/73 Pulse: 80 Heart Rate: 121 Resp: 20 SpO2: 94 % O2 Device: Nasal cannula Oxygen Delivery: 2 LPM  Vitals:    11/25/19 1646 11/25/19 2350 11/26/19 0538   Weight: 103.4 kg (228 lb) 104 kg (229 lb 3.2 oz) 101.6 kg (224 lb)     Vital Signs with Ranges  Temp:  [97  F (36.1  C)-97.6  F (36.4  C)] 97.4  F (36.3  C)  Pulse:  [44-84] 80  Heart Rate:  [] 121  Resp:  [19-24] 20  BP: ()/(53-83) 121/73  SpO2:  [77 %-95 %] 94 %  No intake/output data recorded.    Constitutional: Resting comfortably, alert and answering questions appropriately, NAD  Respiratory: +bibasilar crackles, no wheeze, breathing nonlabored  Cardiovascular: HR irregular, no MGR, ++bilateral LE edema  GI: S, NT, ND, +BS  Skin/Integumen: warm/dry, chronic stasis changes in bilateral LEs w/o findings to suggest cellulitis  Other:      Medications     - MEDICATION INSTRUCTIONS -       - MEDICATION INSTRUCTIONS -         apixaban ANTICOAGULANT  5 mg Oral BID     cholecalciferol  1,600 Units Oral Daily     folic acid  1,000 mcg Oral Daily      furosemide  60 mg Intravenous Q8H     levothyroxine  75 mcg Oral Daily     [START ON 11/27/2019] methotrexate  25 mg Oral Q7 Days     metoprolol succinate ER  25 mg Oral Daily     multivitamin w/minerals  1 tablet Oral Daily     NIFEdipine ER  60 mg Oral Daily     omeprazole  20 mg Oral BID AC     potassium chloride  20 mEq Oral BID     Prednisolon-Gatiflox-Bromfenac  1 drop Right Eye Daily     psyllium  1 capsule Oral Daily     sildenafil  20 mg Oral TID     simvastatin  40 mg Oral At Bedtime     sodium chloride (PF)  3 mL Intracatheter Q8H       Data   Recent Labs   Lab 11/26/19  0533 11/25/19  1654   WBC  --  6.3   HGB  --  11.6*   MCV  --  92   PLT  --  166    139   POTASSIUM 3.2* 3.5   CHLORIDE 106 105   CO2 27 29   BUN 14 16   CR 0.68 0.75   ANIONGAP 7 5   TU 9.3 9.3   GLC 87 104*   TROPI  --  <0.015       Recent Results (from the past 24 hour(s))   XR Chest 2 Views    Narrative    CHEST TWO VIEWS 11/25/2019 8:27 PM     HISTORY: Shortness of breath.     COMPARISON: March 8, 2017.       Impression    IMPRESSION: Increased interstitial changes since the comparison study.  Question some new consolidation at the left base. The cardiac  silhouette is prominent but stable. Pulmonary vasculature may be  congested.    ARELI RICKETTS MD

## 2019-11-26 NOTE — H&P
[facility]  History and Physical - Hospitalist Service       Date of Admission:  11/25/2019    Assessment & Plan   Jaqueline Canales is a 78 year old female with chronic A. fib, pulmonary hypertension, hypothyroidism, RA was sent to the ER for evaluation of leg swelling, dyspnea and hypoxia.  She is being admitted on 11/25/2019 for management of her acute CHF.    Acute diastolic CHF exacerbation  Chronic bilateral lymphedema  Hypoxia  Last echo over one year ago, LVEF 55 to 60%.  Patient had right heart cath April 2019, showed elevated biventricular filling pressures, moderate to severe sleep primary pulmonary hypertension has chronic bilateral lymphedema on Lasix.  Was treated for cellulitis earlier this month by her PCP.  Now with increased bilateral leg edema and dyspnea.  proBNP elevated.  Chest x-ray consistent with increased interstitial prominence.  Received Lasix 40 mg IV in ER.  -Admit to inpatient  -Lasix 60 mg IV 3 times daily. KCl 20 M EQ poor p.o. twice daily follow electrolytes and replace as needed.  -Strict intake and output and daily weight, pure wick catheter if needed to monitor output.  -Telemetry, will get 2D echo.  -Supplemental oxygen, wean as able with diuresis  -Cardiology consult given severe pulmonary hypertension as well on sildenafil.  -Lymphedema consult  -Core clinic follow up at discharge.    Chronic A. Fib  Moderate/severe pulmonary hypertension  Hyperlipidemia  HTN  -On A. fib, rate controlled, PTA Toprol-XL resumed  -PTA Nifedipine and revatio resumed.  -Apixaban for chronic anticoagulation, resumed  -PTA simvastatin resumed  -Monitor on telemetry    Hypothyroidism  PTA Synthroid resumed  -Check TSH    Rheumatoid arthritis  -PTA methotrexate resumed  -Resume Remicade infusion at discharge.       Diet: Cardiac, 2 g sodium, fluid restriction to 1.5L/24 hours  DVT Prophylaxis: On apixaban  Luther Catheter: not present  Code Status: Full code, discussed with patient,  she does not want to remain on artificial support if there is no quality of life.    Disposition Plan   Expected discharge: 2+ days, recommended to TBD, once Adequately diuresed.  Entered: Hernan Vang MD 11/25/2019, 10:32 PM     The patient's care was discussed with the Patient and Patient's Family.    Hernan Vang MD  [facility]    ______________________________________________________________________    Chief Complaint   Worsened leg swelling  Shortness of breath    History is obtained from the patient, her , chart review, discussion with ED physician    History of Present Illness   Jaqueline Canales is a 78 year old female with chronic A. fib, pulmonary hypertension, hypothyroidism, RA was sent to the ER for evaluation of leg swelling, dyspnea and hypoxia.    Patient was evaluated by her PCP earlier this month for leg cellulitis which was treated with Keflex but patient started noticing worsening leg swelling which has markedly worsened in the last week associated with dyspnea with minimum activity.  Patient denies any chest pain, fever, orthopnea or PND though.  Has cough productive of clear frothy sputum.    She went to the clinic to be evaluated today and was noted to be hypoxic with pulse oximetry in the 80s and was sent to ER for further evaluation.    In ER, patient was evaluated by Dr. Quiles.  She is afebrile.  Lab work-up revealed elevated proBNP of 3936.  BMP and CBC unremarkable except mild anemia, Hb 11.6.  Blood sugar 104.  Troponin negative.  Twelve-lead EKG showed underlying A. fib rhythm with controlled rate.  Chest x-ray showed increased interstitial changes with possible new consolidation at the left base.  Pulmonary vasculature congestion.  Patient received Lasix 40 mg IV x1 and is being admitted for further management.    Review of Systems    The 10 point Review of Systems is negative other than noted in the HPI or here.      Past Medical History    I have  reviewed this patient's medical history and updated it with pertinent information if needed.   Past Medical History:   Diagnosis Date     Amaurosis fugax     Right     Carotid artery stenosis      Esophageal reflux 3/11/2004     HELICOBACTER PYLORI INFECTION 2006     hx of CA in situ RT breast-.mastectomy 2007     Hyperlipidemia LDL goal <70 2011     Lung cancer (H)     squamous cell lung ca left lower lobe     OBESITY NOS 3/11/2004     OSTEOPOROSIS NOS 3/11/2004     Other psoriasis 3/11/2004     RA (rheumatoid arthritis) (H) 2010     Raynaud's syndrome 4/10/2006     Scleroderma (H)      Sleep apnea     CPAP       Past Surgical History   I have reviewed this patient's surgical history and updated it with pertinent information if needed.  Past Surgical History:   Procedure Laterality Date     BREAST SURGERY       C NONSPECIFIC PROCEDURE      mastectomy RT breast     COLONOSCOPY  15     COLONOSCOPY N/A 2019    Procedure: Colonoscopy, With Polypectomy And Biopsy;  Surgeon: Neto Kaminski MD;  Location:  GI     CONIZATION       CV RIGHT HEART CATH N/A 4/15/2019    Procedure: Heart Cath Right Heart Cath;  Surgeon: Naresh Cyr MD;  Location:  HEART CARDIAC CATH LAB     ENDARTERECTOMY CAROTID       HC COLONOSCOPY THRU STOMA, DIAGNOSTIC  2001    diverticulosis     REPAIR HAMMER TOE       TONSILLECTOMY         Social History   I have reviewed this patient's social history and updated it with pertinent information if needed.  Social History     Tobacco Use     Smoking status: Former Smoker     Packs/day: 1.00     Years: 30.00     Pack years: 30.00     Types: Cigarettes     Start date:      Last attempt to quit: 3/11/1992     Years since quittin.7     Smokeless tobacco: Never Used   Substance Use Topics     Alcohol use: Not Currently     Alcohol/week: 0.0 standard drinks     Frequency: Never     Drug use: No       Family History   I have reviewed  this patient's family history and updated it with pertinent information if needed.   Family History   Problem Relation Age of Onset     Cancer - colorectal Mother      Cerebrovascular Disease Father      Cancer Sister 31        ovarian     Heart Disease Sister      Gastrointestinal Disease Sister         crohns     Gastrointestinal Disease Sister         diverticulitis     Gastrointestinal Disease Brother         diverticulitis     Alzheimer Disease Sister      Cerebrovascular Disease Maternal Grandmother      Diabetes Maternal Grandmother      Mental Illness Maternal Grandmother      Diabetes Maternal Uncle      Cancer Nephew                 Prior to Admission Medications   Prior to Admission Medications   Prescriptions Last Dose Informant Patient Reported? Taking?   Bacillus Coagulans-Inulin (PROBIOTIC-PREBIOTIC PO)   Yes No   CENTRUM SILVER OR TABS  Self No No   Si TABLET DAILY   Calcium Citrate-Vitamin D (CITRACAL + D PO)  Self Yes No   Sig: Take 2 tablets by mouth 2 times daily    InFLIXimab (REMICADE IV)  Self Yes No   Sig: Inject 600 mg into the vein Every 8 weeks   MELATONIN PO  Self Yes No   Sig: Take 5 mg by mouth nightly as needed   Methotrexate Sodium (METHOTREXATE PO)  Self Yes No   Sig: Take 2.5 mg by mouth 10 tablets weekly   Multiple Vitamins-Minerals (ICAPS) CAPS  Self Yes No   Sig: Take 1 capsule by mouth 2 times daily    NIFEDICAL XL 60 MG PO TB24  Self Yes No   Si TABLET DAILY   VENTOLIN  (90 Base) MCG/ACT inhaler   No No   Sig: Inhale 2 puffs into the lungs every 6 hours   acetaminophen (TYLENOL) 500 MG tablet   Yes No   Sig: Take 500-1,000 mg by mouth every 6 hours as needed for mild pain   albuterol (VENTOLIN HFA) 108 (90 Base) MCG/ACT inhaler   No No   Sig: Inhale 2 puffs into the lungs every 6 hours   apixaban ANTICOAGULANT (ELIQUIS) 5 MG tablet   No No   Sig: Take 1 tablet (5 mg) by mouth 2 times daily   cephALEXin (KEFLEX) 500 MG capsule   No No   Sig: Take 1 capsule  (500 mg) by mouth 4 times daily   docusate sodium (COLACE) 100 MG capsule   Yes No   Sig: Take 100 mg by mouth as needed    folic acid (FOLVITE) 1 MG tablet   No No   Sig: Take 1 tablet (1,000 mcg) by mouth daily   furosemide (LASIX) 20 MG tablet   No No   Sig: Take 1 tablet (20 mg) by mouth 2 times daily Updated - Script   levothyroxine (SYNTHROID/LEVOTHROID) 75 MCG tablet   No No   Sig: Take 1 tablet (75 mcg) by mouth daily   metoprolol succinate ER (TOPROL-XL) 25 MG 24 hr tablet   No No   Sig: Take 1 tablet (25 mg) by mouth daily   omeprazole (PRILOSEC) 20 MG DR capsule   No No   Sig: Take 1 capsule (20 mg) by mouth 2 times daily Take 30-60 minutes prior to meals   sildenafil (REVATIO) 20 MG tablet   No No   Sig: Take 1 tablet (20 mg) by mouth 3 times daily   simvastatin (ZOCOR) 40 MG tablet   No No   Sig: Take 1 tablet (40 mg) by mouth At Bedtime   triamcinolone (KENALOG) 0.1 % external cream   No No   Sig: Apply topically 2 times daily      Facility-Administered Medications: None     Allergies   Allergies   Allergen Reactions     No Known Allergies        Physical Exam   Vital Signs: Temp: 97.6  F (36.4  C)   BP: 119/83 Pulse: 80 Heart Rate: 85 Resp: 19 SpO2: 95 %      Weight: 228 lbs 0 oz    General: AAOx3, appears comfortable.  Very pleasant.  HEENT: PERRLA EOMI. Mucosa moist.  Puffy face.  Lungs: Bilateral equal air entry.  Coarse with fine crepitations throughout, normal work of breathing.   CVS: S1S2 irregular, no tachycardia or murmur.   Abdomen: Soft, obese/distended. BS heard.  MSK: Both legs are very puffy and edematous.  Has component of chronic lymphedema.  Neuro: AAOX3. CN 2-12 normal. Strength symmetrical.  Skin: No rash.     Data   Data reviewed today: I reviewed all medications, new labs and imaging results over the last 24 hours. I personally reviewed twelve-lead EKG, atrial fibrillation.  Chest x-ray shows bilateral interstitial opacity.  Also possible left effusion    Recent Labs   Lab  11/25/19  1654   WBC 6.3   HGB 11.6*   MCV 92         POTASSIUM 3.5   CHLORIDE 105   CO2 29   BUN 16   CR 0.75   ANIONGAP 5   TU 9.3   *   TROPI <0.015     Recent Results (from the past 24 hour(s))   XR Chest 2 Views    Narrative    CHEST TWO VIEWS 11/25/2019 8:27 PM     HISTORY: Shortness of breath.     COMPARISON: March 8, 2017.       Impression    IMPRESSION: Increased interstitial changes since the comparison study.  Question some new consolidation at the left base. The cardiac  silhouette is prominent but stable. Pulmonary vasculature may be  congested.    ARELI RICKETTS MD

## 2019-11-26 NOTE — PLAN OF CARE
PLAN: Diuresis with bumex gtt.     CNS: A&O X 4, pain free, afebrile.   CVS: Afib CVR/RVR 90's to low 100's. Asymptomatic. Eliquis, metoprolol. Severe lower extremity edema. Pressures at times soft in 90's to 100's, asymptomatic.  RESP: 2LPM NC, unable to wean. HANEY, tachypneic in low 20's, appears SOB when speaking. Crackles throughout lung fields. Insp/exp wheezes at times, PRN albuterol given X 1.   GI: 2 g Na, 2000 mL fluid restrict.   : purewick in place, good output. IV bumex gtt at 0.25 mg/h, also given 1mg IV bumex bolus.  SKIN: Logan bilat LE anterior shins, applied lotion. Scabs from scratching legs.   MOB: 1PA with GB and w/w. Up to bathroom with PT/OT X 2.   IV: left ACF IV infusing bumex with chaser.  FAM: Tacos Hawkins in to visit, updated.        Heart Failure Care Pathway  GOALS TO BE MET BEFORE DISCHARGE:    1. Decrease congestion and/or edema with diuretic therapy to achieve near      optimal volume status.            Dyspnea improved:  No, please explain: 2LPM NC, HANEY, SOB with speaking, tachypneic in low 20's resp rate.            Edema improved:     No, please explain: +4 edema bilat LE.        Net I/O and Weights since admission:          10/27 2300 - 11/26 2259  In: 960 [P.O.:960]  Out: 3250 [Urine:3250]  Net: -2290            Vitals:    11/25/19 1646 11/25/19 2350 11/26/19 0538   Weight: 103.4 kg (228 lb) 104 kg (229 lb 3.2 oz) 101.6 kg (224 lb)       2.  O2 sats > 92% on RA or at prior home O2 therapy level.          Current oxygenation status:       SpO2: 94 %         O2 Device: Nasal cannula,  Oxygen Delivery: 2 LPM         Able to wean O2 this shift to keep sats > 92%:  No, please explain: 2LPM NC, desat to high 80's on RA.       Does patient use Home O2? No    3.  Tolerates ambulation and mobility near baseline: No, please explain: weak, HANEY.        How many times did the patient ambulate with nursing staff this shift? 2    Please review the Heart Failure Care Pathway for additional HF  goal outcomes.    Martha Green, RN RN  11/26/2019

## 2019-11-26 NOTE — ED NOTES
"[facility]  ED Nurse Handoff Report    ED Chief complaint: Leg Swelling      ED Diagnosis:   Final diagnoses:   Acute congestive heart failure, unspecified heart failure type (H)       Code Status: Full Code    Allergies:   Allergies   Allergen Reactions     No Known Allergies        Activity level - Baseline/Home:  Independent  Activity Level - Current:   Assist x1    Patient's Preferred language: english   Needed?: No    Isolation: No  Infection: Not Applicable  Bariatric?: No    Vital Signs:   Vitals:    11/25/19 1646 11/25/19 1649 11/25/19 2100   BP: 102/60  105/70   Pulse:   84   Resp: 24  22   Temp: 97.6  F (36.4  C)     SpO2:  92%    Weight: 103.4 kg (228 lb)     Height: 1.575 m (5' 2\")         Cardiac Rhythm: ,        Pain level:      Is this patient confused?: No   Does this patient have a guardian?  n/a         If yes, is there guardianship documents in the Epic \"Code/ACP\" activity?  N/A         Guardian Notified?  N/A  [place] - Suicide Severity Rating Scale Completed?  Yes  If yes, what color did the patient score?  White    Patient Report: Initial Complaint: pt c/o b/l leg swelling x1 week with increasing sob for past 3 weeks.   Focused Assessment: diminished bases upon auscultation, pt reports increasing sob over past three weeks. Denies chest pain. Pt reports bilateral leg swelling for past week  Tests Performed: cxr, labs  Abnormal Results: possible consolidation noted to left base on cxr, elevated bnp   pt de-sat to 83% upon ambulating with o2.   Treatments provided: lasix    Family Comments:  at bedside    OBS brochure/video discussed/provided to patient/family: N/A              Name of person given brochure if not patient: n/a              Relationship to patient: n/a    ED Medications: Medications - No data to display    Drips infusing?:  No    For the majority of the shift this patient was Green.   Interventions performed were reassurance and " information.    Severe Sepsis OR Septic Shock Diagnosis Present: No    To be done/followed up on inpatient unit:  n/a    ED NURSE PHONE NUMBER: *[phone]     RECEIVING UNIT ED HANDOFF REVIEW    ED Nurse Handoff Report was reviewed by: Jenn River RN on November 25, 2019 at 10:40 PM

## 2019-11-26 NOTE — PROGRESS NOTES
11/26/19 1052   Quick Adds   Type of Visit Initial Occupational Therapy Evaluation   Living Environment   Lives With spouse   Living Arrangements house  (Rambler style home )   Home Accessibility stairs to enter home;stairs within home   Number of Stairs, Main Entrance 2   Number of Stairs, Within Home, Primary   (12 stairs to downstairs )   Transportation Anticipated family or friend will provide  (Pt typically drives )   Living Environment Comment Spouse can assist as needed, however uses a cane.    Self-Care   Usual Activity Tolerance good   Current Activity Tolerance moderate   Regular Exercise No   Equipment Currently Used at Home cane, straight;shower chair;grab bar, toilet;grab bar, tub/shower   Activity/Exercise/Self-Care Comment Uses a cane in the community.    Functional Level   Ambulation 1-->assistive equipment   Transferring 1-->assistive equipment   Toileting 1-->assistive equipment  (grab bars )   Bathing 1-->assistive equipment  (Shower chair )   Dressing 0-->independent   Fall history within last six months no   General Information   Onset of Illness/Injury or Date of Surgery - Date 11/25/19   Referring Physician Hernan Vang MD   Patient/Family Goals Statement Home    Additional Occupational Profile Info/Pertinent History of Current Problem Per chart: Jaqueline Canales is a 78 year old female with chronic A. fib, pulmonary hypertension, hypothyroidism, RA was sent to the ER for evaluation of leg swelling, dyspnea and hypoxia.  She is being admitted on 11/25/2019 for management of her acute CHF.Acute diastolic CHF exacerbation   Cognitive Status Examination   Orientation orientation to person, place and time   Level of Consciousness alert   Sensory Examination   Sensory Quick Adds No deficits were identified   Pain Assessment   Patient Currently in Pain Yes, see Vital Sign flowsheet   Mobility   Bed Mobility Bed mobility skill: Sit to supine;Bed mobility skill: Supine to sit   Bed Mobility  Skill: Sit to Supine   Level of [place]: Sit/Supine contact guard   Physical Assist/Nonphysical Assist: Sit/Supine 1 person assist   Bed Mobility Skill: Supine to Sit   Level of [place]: Supine/Sit contact guard   Physical Assist/Nonphysical Assist: Supine/Sit 1 person assist   Transfer Skills   Transfer Transfer Safety Analysis Bed/Chair;Transfer Skill: Stand to Sit;Transfer Safety Analysis Sit/Stand   Transfer Skill: Sit to Stand   Level of [place]: Sit/Stand contact guard   Physical Assist/Nonphysical Assist: Sit/Stand 1 person assist   Toilet Transfer   Toilet Transfer Toilet Transfer Skill;Toilet Transfer Safety Analysis   Transfer Skill: Toilet Transfer   Level of [place]: Toilet contact guard   Physical Assist/Nonphysical Assist: Toilet 1 person assist   Lower Body Dressing   Level of [place]: Dress Lower Body minimum assist (75% patients effort)   Physical Assist/Nonphysical Assist: Dress Lower Body 1 person assist   Instrumental Activities of Daily Living (IADL)   Previous Responsibilities medication management;driving;finances   General Therapy Interventions   Planned Therapy Interventions ADL retraining;IADL retraining;transfer training;home program guidelines;progressive activity/exercise;risk factor education   Clinical Impression   Criteria for Skilled Therapeutic Interventions Met yes, treatment indicated   OT Diagnosis Decreased independence in I/ADLs    Influenced by the following impairments Decreased independence in I/ADLs    Assessment of Occupational Performance 1-3 Performance Deficits   Identified Performance Deficits Decreased independence in I/ADLs  (dressing, bathing, toileting)   Clinical Decision Making (Complexity) Low complexity   Therapy Frequency Daily   Predicted Duration of Therapy Intervention (days/wks) 4 days    Anticipated Discharge Disposition Home with Assist;Home with Home Therapy   Risks and Benefits of Treatment have been explained. Yes   Patient,  "Family & other staff in agreement with plan of care Yes   [facility] AM-PAC TM \"6 Clicks\"   2016, Trustees of [facility], under license to [employer]  All rights reserved.   6 Clicks Short Forms Daily Activity Inpatient Short Form   [facility] AM-PAC  \"6 Clicks\" Daily Activity Inpatient Short Form   1. Putting on and taking off regular lower body clothing? 3 - A Little   2. Bathing (including washing, rinsing, drying)? 3 - A Little   3. Toileting, which includes using toilet, bedpan or urinal? 3 - A Little   4. Putting on and taking off regular upper body clothing? 4 - None   5. Taking care of personal grooming such as brushing teeth? 3 - A Little   6. Eating meals? 4 - None   Daily Activity Raw Score (Score out of 24.Lower scores equate to lower levels of function) 20   Total Evaluation Time   Total Evaluation Time (Minutes) 8     "

## 2019-11-27 ENCOUNTER — APPOINTMENT (OUTPATIENT)
Dept: OCCUPATIONAL THERAPY | Facility: CLINIC | Age: 79
DRG: 291 | End: 2019-11-27
Payer: MEDICARE

## 2019-11-27 LAB
ALBUMIN SERPL-MCNC: 3.7 G/DL (ref 3.4–5)
ALP SERPL-CCNC: 135 U/L (ref 40–150)
ALT SERPL W P-5'-P-CCNC: 15 U/L (ref 0–50)
ANION GAP SERPL CALCULATED.3IONS-SCNC: 6 MMOL/L (ref 3–14)
AST SERPL W P-5'-P-CCNC: 26 U/L (ref 0–45)
BILIRUB SERPL-MCNC: 1.2 MG/DL (ref 0.2–1.3)
BUN SERPL-MCNC: 12 MG/DL (ref 7–30)
CALCIUM SERPL-MCNC: 9.3 MG/DL (ref 8.5–10.1)
CHLORIDE SERPL-SCNC: 100 MMOL/L (ref 94–109)
CO2 SERPL-SCNC: 31 MMOL/L (ref 20–32)
CREAT SERPL-MCNC: 0.67 MG/DL (ref 0.52–1.04)
GFR SERPL CREATININE-BSD FRML MDRD: 83 ML/MIN/{1.73_M2}
GLUCOSE SERPL-MCNC: 109 MG/DL (ref 70–99)
LACTATE BLD-SCNC: 1.5 MMOL/L (ref 0.7–2)
POTASSIUM SERPL-SCNC: 3.4 MMOL/L (ref 3.4–5.3)
PROT SERPL-MCNC: 7.6 G/DL (ref 6.8–8.8)
SODIUM SERPL-SCNC: 137 MMOL/L (ref 133–144)

## 2019-11-27 PROCEDURE — 80053 COMPREHEN METABOLIC PANEL: CPT | Performed by: HOSPITALIST

## 2019-11-27 PROCEDURE — 25000132 ZZH RX MED GY IP 250 OP 250 PS 637: Mod: GY | Performed by: HOSPITALIST

## 2019-11-27 PROCEDURE — 25000131 ZZH RX MED GY IP 250 OP 636 PS 637: Mod: GY | Performed by: HOSPITALIST

## 2019-11-27 PROCEDURE — 25000132 ZZH RX MED GY IP 250 OP 250 PS 637: Mod: GY | Performed by: INTERNAL MEDICINE

## 2019-11-27 PROCEDURE — 83605 ASSAY OF LACTIC ACID: CPT | Performed by: HOSPITALIST

## 2019-11-27 PROCEDURE — 97140 MANUAL THERAPY 1/> REGIONS: CPT | Mod: GO | Performed by: OCCUPATIONAL THERAPIST

## 2019-11-27 PROCEDURE — 99232 SBSQ HOSP IP/OBS MODERATE 35: CPT | Performed by: INTERNAL MEDICINE

## 2019-11-27 PROCEDURE — 21000001 ZZH R&B HEART CARE

## 2019-11-27 PROCEDURE — 99233 SBSQ HOSP IP/OBS HIGH 50: CPT | Performed by: INTERNAL MEDICINE

## 2019-11-27 PROCEDURE — 36415 COLL VENOUS BLD VENIPUNCTURE: CPT | Performed by: HOSPITALIST

## 2019-11-27 PROCEDURE — 25000132 ZZH RX MED GY IP 250 OP 250 PS 637: Mod: GY | Performed by: PHYSICIAN ASSISTANT

## 2019-11-27 RX ORDER — SPIRONOLACTONE 25 MG
12.5 TABLET ORAL DAILY
Status: DISCONTINUED | OUTPATIENT
Start: 2019-11-27 | End: 2019-11-27

## 2019-11-27 RX ORDER — POTASSIUM CHLORIDE 1500 MG/1
40 TABLET, EXTENDED RELEASE ORAL 3 TIMES DAILY
Status: DISCONTINUED | OUTPATIENT
Start: 2019-11-27 | End: 2019-11-29 | Stop reason: CLARIF

## 2019-11-27 RX ADMIN — OMEPRAZOLE 20 MG: 20 CAPSULE, DELAYED RELEASE ORAL at 16:21

## 2019-11-27 RX ADMIN — APIXABAN 5 MG: 5 TABLET, FILM COATED ORAL at 20:35

## 2019-11-27 RX ADMIN — SIMVASTATIN 40 MG: 40 TABLET, FILM COATED ORAL at 21:34

## 2019-11-27 RX ADMIN — OMEPRAZOLE 20 MG: 20 CAPSULE, DELAYED RELEASE ORAL at 06:49

## 2019-11-27 RX ADMIN — PREDNISOLONE ACETATE-GATIFLOXACIN-BROMFENAC 1 DROP: .75; 5; 1 SUSPENSION/ DROPS OPHTHALMIC at 10:54

## 2019-11-27 RX ADMIN — LEVOTHYROXINE SODIUM 75 MCG: 75 TABLET ORAL at 06:49

## 2019-11-27 RX ADMIN — SPIRONOLACTONE 12.5 MG: 25 TABLET ORAL at 16:21

## 2019-11-27 RX ADMIN — NIFEDIPINE 60 MG: 60 TABLET, FILM COATED, EXTENDED RELEASE ORAL at 10:56

## 2019-11-27 RX ADMIN — SILDENAFIL 20 MG: 20 TABLET, FILM COATED ORAL at 21:34

## 2019-11-27 RX ADMIN — APIXABAN 5 MG: 5 TABLET, FILM COATED ORAL at 10:55

## 2019-11-27 RX ADMIN — SILDENAFIL 20 MG: 20 TABLET, FILM COATED ORAL at 16:21

## 2019-11-27 RX ADMIN — POLYETHYLENE GLYCOL 3350 17 G: 17 POWDER, FOR SOLUTION ORAL at 10:53

## 2019-11-27 RX ADMIN — POTASSIUM CHLORIDE 40 MEQ: 1500 TABLET, EXTENDED RELEASE ORAL at 10:55

## 2019-11-27 RX ADMIN — CHOLECALCIFEROL TAB 10 MCG (400 UNIT) 1600 UNITS: 10 TAB at 10:54

## 2019-11-27 RX ADMIN — MULTIPLE VITAMINS W/ MINERALS TAB 1 TABLET: TAB at 10:57

## 2019-11-27 RX ADMIN — METHOTREXATE 25 MG: 2.5 TABLET ORAL at 10:58

## 2019-11-27 RX ADMIN — METOPROLOL SUCCINATE 25 MG: 25 TABLET, EXTENDED RELEASE ORAL at 10:56

## 2019-11-27 RX ADMIN — POTASSIUM CHLORIDE 40 MEQ: 1500 TABLET, EXTENDED RELEASE ORAL at 21:34

## 2019-11-27 RX ADMIN — FOLIC ACID 1000 MCG: 1 TABLET ORAL at 10:57

## 2019-11-27 RX ADMIN — Medication 1 CAPSULE: at 10:57

## 2019-11-27 RX ADMIN — SILDENAFIL 20 MG: 20 TABLET, FILM COATED ORAL at 10:56

## 2019-11-27 ASSESSMENT — ACTIVITIES OF DAILY LIVING (ADL)
ADLS_ACUITY_SCORE: 21
ADLS_ACUITY_SCORE: 20
ADLS_ACUITY_SCORE: 21

## 2019-11-27 ASSESSMENT — MIFFLIN-ST. JEOR
SCORE: 1410.29
SCORE: 1423.44

## 2019-11-27 NOTE — PROGRESS NOTES
[facility]    Hospitalist Progress Note    Assessment & Plan   Jaqueline Canales is a 78 year old female with PMHx of chronic afib on anticoagulation with apixaban, chronic diastolic CHF with chronic bilateral lymphedema, pulmonary hypertension, hypothyroidism and rheumatoid arthritis who was admitted on 11/25/2019 for evaluation of dyspnea and hypoxia with concern for suspected CHF exacerbation.      Acute Hypoxic Respiratory Failure dt Acute Diastolic CHF exacerbation  Chronic Bilateral lymphedema  Last echo in 9/2018 showed EF 55-60% and moderate pulmonary hypertension; diastolic function not assessed. Underwent cardiac cath in 4/2019 which showed elevated biventricular filing pressures and moderate to severe primary pulmonary hypertension. Has chronic bilateral LE lymphedema for which she takes Lasix. Was seen by her PCP last month and treated for cellulitis. Was initially seen in clinic and observed to have increased LE edema and dyspnea. Referred to ED. Was afebrile, HR 100s with SBP 90s. Needing 2L NC on admission. ProBNP 3900. BMP unremarkable (Cr 0.75), WBC nl. CXR showed increased interstitial prominence. Given Lasix 40mg IV x1 in ED. Admitted to [facility]. Started aggressive diuresis with Lasix 60mg IV TID. Echo this stay showed EF 50-55% with findings of severe biatrial enlargement, mod dilated RV and mildly decreased RVSF, mod to severe TR and pulmonary hypertension. Cardiology following -- diuretics changed to Bumex gtt on 11/26.   -- cont diuresis with Bumex gtt  -- conts on sched K replacement with 40mEq BID  -- monitor Is/Os and daily wts -- Is/Os show net neg nearly 5L thus far, wts trending down (103.4kg -- 98.2kg today)  -- wean off supplemental O2 as needed  -- cardiology following, appreciate assistance with care     Chronic A. Fib, on anticoagulation  Moderate/Severe pulmonary hypertension  Hyperlipidemia  Hypertension  PTA meds: metoprolol XL 25mg daily, nifedipine ER 60mg daily,  sildenafil 20mg TID, simvastatin 40mg HS, Lasix 20mg BID, apixaban 5mg BID  -- conts on Toprol XL and nifedipine  -- conts on sildenafil  -- conts on apixaban  -- conts on statin  -- diuresis as above    Hypothyroidism  Chronic and stable on levothyroxine     Rheumatoid arthritis  Scleroderma  Chronic and stable on methotrexate (takes on Wednesdays). Conts on folic acid.  Can resume Remicade infusions at discharge     FEN: no IVFs, lytes stable, 2g NA diet and fluid restriction (1.5L per 24h)  DVT Prophylaxis: anticoagulated on apixaban as above  Code Status: Full Code -- per discussions on admission, she does not want to remain on artifical life support of she were to have no quality of life    Disposition: Anticipate discharge home in the next 1-2d, pending adequate diuresis.    Lis Nielson    Interval History   Seen this morning. Tired. Says she didn't sleep well last night. Breathing okay. Denies cp/sob/cough, abd pain/n/v.     -Data reviewed today: I reviewed all new labs and imaging results over the last 24 hours. I personally reviewed no images or EKG's today.    Physical Exam   Temp: 97.8  F (36.6  C) Temp src: Oral BP: 114/76   Heart Rate: 102 Resp: 18 SpO2: 91 % O2 Device: Nasal cannula Oxygen Delivery: 2 LPM  Vitals:    11/26/19 0538 11/27/19 0022 11/27/19 0638   Weight: 101.6 kg (224 lb) 99.5 kg (219 lb 6.4 oz) 98.2 kg (216 lb 8 oz)     Vital Signs with Ranges  Temp:  [97.4  F (36.3  C)-97.9  F (36.6  C)] 97.8  F (36.6  C)  Heart Rate:  [] 102  Resp:  [18-24] 18  BP: ()/(53-76) 114/76  SpO2:  [87 %-96 %] 91 %  I/O last 3 completed shifts:  In: 1404.1 [P.O.:1360; I.V.:44.1]  Out: 6400 [Urine:6400]    Constitutional: Resting comfortably, alert and answering questions appropriately, NAD  Respiratory: +bibasilar crackles, no wheeze, breathing nonlabored  Cardiovascular: HR irregular, no MGR, ++bilateral LE edema  GI: S, NT, ND, +BS  Skin/Integumen: warm/dry, chronic stasis changes in  bilateral LEs w/o findings to suggest cellulitis  Other:      Medications     bumetanide 0.25 mg/hr (19 0738)     - MEDICATION INSTRUCTIONS -       - MEDICATION INSTRUCTIONS -         apixaban ANTICOAGULANT  5 mg Oral BID     cholecalciferol  1,600 Units Oral Daily     folic acid  1,000 mcg Oral Daily     levothyroxine  75 mcg Oral Daily     methotrexate  25 mg Oral Weekly     metoprolol succinate ER  25 mg Oral Daily     multivitamin w/minerals  1 tablet Oral Daily     NIFEdipine ER  60 mg Oral Daily     omeprazole  20 mg Oral BID AC     potassium chloride  40 mEq Oral BID     Prednisolon-Gatiflox-Bromfenac  1 drop Right Eye Daily     psyllium  1 capsule Oral Daily     sildenafil  20 mg Oral TID     simvastatin  40 mg Oral At Bedtime     sodium chloride (PF)  3 mL Intracatheter Q8H       Data   Recent Labs   Lab 19  0535 19  1630 19  0533 19  1654   WBC  --   --   --  6.3   HGB  --   --   --  11.6*   MCV  --   --   --  92   PLT  --   --   --  166     --  140 139   POTASSIUM 3.4 3.9 3.2* 3.5   CHLORIDE 100  --  106 105   CO2 31  --  27 29   BUN 12  --  14 16   CR 0.67  --  0.68 0.75   ANIONGAP 6  --  7 5   TU 9.3  --  9.3 9.3   *  --  87 104*   ALBUMIN 3.7  --   --   --    PROTTOTAL 7.6  --   --   --    BILITOTAL 1.2  --   --   --    ALKPHOS 135  --   --   --    ALT 15  --   --   --    AST 26  --   --   --    TROPI  --   --   --  <0.015       Recent Results (from the past 24 hour(s))   Echocardiogram Complete    Narrative    [id number]  [id number]  [id number]  [id number]^ELVIS^DIMITRI^R           [facility]  Echocardiography Laboratory  [address] [place] [id number]        Name: GINA MCKEON  MRN: [id number]  : 1940  Study Date: 2019 02:57 PM  Age: 78 yrs  Gender: Female  Patient [facility]: [facility]  Reason For Study: CHF  Ordering Physician: DIMITRI LEAL  Referring Physician: Carissa Burroughs  Performed By: Milton Guadalupe RDCS     BSA:  2.0 m2  Height: 62 in  Weight: 229 lb  HR: 100  BP: 115/71 mmHg  _____________________________________________________________________________  __        Procedure  Complete Portable Echo Adult. Optison (NDC #[id number]) given intravenously.  _____________________________________________________________________________  __        Interpretation Summary     Small pericardial effusion  The visual ejection fraction is estimated at 50-55%.  Flattened septum is consistent with RV pressure/volume overload.  Pulmonary hypertension  There is severe biatrial enlargement.  Very mild AS. MG is 9 mm Hg.  The right ventricle is moderately dilated. Mildly decreased right ventricular  systolic function  There is mod-severe to severe (3-4+) tricuspid regurgitation.  _____________________________________________________________________________  __        Left Ventricle  The left ventricle is normal in size. The visual ejection fraction is  estimated at 50-55%. Diastolic Doppler findings (E/E' ratio and/or other  parameters) suggest left ventricular filling pressures are indeterminate.  Flattened septum is consistent with RV pressure/volume overload.     Right Ventricle  The right ventricle is moderately dilated. Mildly decreased right ventricular  systolic function.     Atria  There is severe biatrial enlargement.     Mitral Valve  Thickened mitral valve posterior leaflet. There is moderate mitral annular  calcification. There is mild to moderate (1-2+) mitral regurgitation.        Tricuspid Valve  The right ventricular systolic pressure is elevated at 42.2 mmHg. Pulmonary  hypertension. There is mod-severe to severe (3-4+) tricuspid regurgitation.     Aortic Valve  There is severe trileaflet aortic sclerosis. No hemodynamically significant  valvular aortic stenosis.     Pulmonic Valve  The pulmonic valve is not well visualized.     Vessels  The aortic root is normal size. Dilation of the inferior vena cava is present  with abnormal  respiratory variation in diameter.     Pericardium  Small pericardial effusion.     _____________________________________________________________________________  __  MMode/2D Measurements & Calculations  IVSd: 0.83 cm  LVIDd: 4.4 cm  LVIDs: 3.4 cm  LVPWd: 0.95 cm  FS: 22.2 %  LV mass(C)d: 125.7 grams  LV mass(C)dI: 62.1 grams/m2     Ao root diam: 2.8 cm  LA dimension: 4.5 cm  asc Aorta Diam: 3.4 cm  LA/Ao: 1.6  LVOT diam: 1.9 cm  LVOT area: 3.0 cm2  LA Volume (BP): 103.0 ml  LA Volume Index (BP): 51.0 ml/m2  RWT: 0.43        Doppler Measurements & Calculations  MV E max el: 79.7 cm/sec  MV max P.4 mmHg  MV mean PG: 3.1 mmHg  MV V2 VTI: 23.8 cm  MVA(VTI): 3.1 cm2     MV dec slope: 562.6 cm/sec2  Ao V2 max: 188.8 cm/sec  Ao max P.0 mmHg  Ao V2 mean: 136.1 cm/sec  Ao mean P.4 mmHg  Ao V2 VTI: 36.2 cm  JACOB(I,D): 2.1 cm2  JACOB(V,D): 2.1 cm2  LV V1 max P.2 mmHg  LV V1 max: 134.4 cm/sec  LV V1 VTI: 25.1 cm  SV(LVOT): 74.2 ml  SI(LVOT): 36.6 ml/m2  PA acc time: 0.08 sec  TR max el: 324.8 cm/sec  TR max P.2 mmHg  AV El Ratio (DI): 0.71  JACOB Index (cm2/m2): 1.0  E/E' av.5  Lateral E/e': 8.7  Medial E/e': 10.4              _____________________________________________________________________________  __        Report approved by: Boaz Huang 2019 04:45 PM

## 2019-11-27 NOTE — PLAN OF CARE
Heart Failure Care Pathway  GOALS TO BE MET BEFORE DISCHARGE:    1. Decrease congestion and/or edema with diuretic therapy to achieve near      optimal volume status.            Dyspnea improved:  yes            Edema improved:     no        Net I/O and Weights since admission:          10/28 0700 - 11/27 0659  In: 1392 [P.O.:1360; I.V.:32]  Out: 5000 [Urine:5000]  Net: -3608            Vitals:    11/25/19 1646 11/25/19 2350 11/26/19 0538 11/27/19 0022   Weight: 103.4 kg (228 lb) 104 kg (229 lb 3.2 oz) 101.6 kg (224 lb) 99.5 kg (219 lb 6.4 oz)       2.  O2 sats > 92% on RA or at prior home O2 therapy level.          Current oxygenation status:       SpO2: 96 %         O2 Device: Nasal cannula,  Oxygen Delivery: 2 LPM         Able to wean O2 this shift to keep sats > 92%:  yes       Does patient use Home O2? no    3.  Tolerates ambulation and mobility near baseline: yes        How many times did the patient ambulate with nursing staff this shift? 2  Please review the Heart Failure Care Pathway for additional HF goal outcomes.    Jenn River, RN RN  11/27/2019    A&OX4 BP soft, HANEY, on 2L nasal cannula.Tele-AFib CVR-RVR nonsustained, on 2 gm Na diet 2 L fluid restrictions, up with assist of one with belt to BS, purewick used this night. +3 BLE edema, bumex gtt infusing. On 2000 ml fluid restrictions, crackles on the lung sounds.

## 2019-11-27 NOTE — CONSULTS
Care Transition Initial Assessment - RN        Met with: Patient.  DATA   Active Problems:    Acute CHF (congestive heart failure) (H)       Cognitive Status: awake and alert.        Contact information and PCP information verified: Yes  Lives With: spouse   Living Arrangements: house    Insurance concerns: No Insurance issues identified  ASSESSMENT  Patient currently receives the following services:  None lives independently with spouse        Identified issues/concerns regarding health management: Met w/ patient re: home care recommended by therapy.  Offered patient a choice of home care agencies. Pt/family was given the Medicare Compare list for Home Care, with associated star ratings to assist with choice for referrals/discharge planning; Yes.  Education was given to pt/family that star ratings are updated/maintained by Medicare and can be reviewed by visiting www.medicare.gov; Yes.  Pt would like to use [facility]. Pt understands they must be homebound. Pt informed of the plan and in agreement with the plan. E-mail referral sent to [facility] updating them on the orders.  Home Care phone number placed on discharge instructions.     PLAN  Financial costs for the patient include; copays.  Patient given options and choices for discharge; yes .  Patient/family is agreeable to the plan?  Yes:   Patient anticipates discharging to Home with Home RN/PT/OT/Lymph .        Patient anticipates needs for home equipment: Yes, FWW  Transportation/person available to transport on day of discharge is either Spouse Chava or corinna Hawkins and have they been notified/set up TBD  Plan/Disposition: Home   Appointments:     Dec 06, 2019  2:00 PM CST  Office Visit with Car[facility]s [facility]  [facility] [address] [facility] [id number] [phone]     Care  (CTS) will continue to follow as needed.    Linnette Davenport RN [facility]  Inpatient Care  Coordination  [address] [place] [address]  [email or web address]   Office: [phone]  Fax: [phone]  Gender pronouns: she/her/hers  Employed by [employer]

## 2019-11-27 NOTE — PLAN OF CARE
"Discharge Planner OT   Patient plan for discharge: Home  Current status: Pt reports she \"hasn't slept in 2 days\" and declined any activity out of bed. Pt seen for BLE Lymph treatment and was educated on skin hygiene, deep breathing, exercise and lymph wraps to help control edema. BLE edematous, mildly red near ankles and with several small scabs on lower legs. 2 small areas on each leg that are blister-like, but not open or draining. Skin dry and flaky. BLE were washed, dried, lotioned. Applied TG Soft followed by 8 cm wraps over feet and 12 cm wraps over lower leg, due to large size of legs. Pt tolerated well and reports wraps are comfortable. She was educated on symptoms of intolerance, including pain, increased SOB, blue/numb toes. BLE elevated on pillows with heels off bed. RN updated.  *Wraps to be left on 23/24 hours/day unless not tolerated. RN to remove wraps at 9 am Thursday and Lymph therapist to return at 10 am Thursday.  Barriers to return to prior living situation: decreased activity tolerance, level of assist with ADL and mobility  Recommendations for discharge: Home with spouse assist for household chores, home PT/OT/Lymph  Rationale for recommendations: Anticipate that patient will progress with activity tolerance and level of (I) while here, but would benefit from home therapies to maximize (I), safety and to continue lymph treatment. It would be a taxing effort to leave the home and she would not be able to leave without assist of another person and AD.       Entered by: Goldie Hutton 11/27/2019 10:03 AM       "

## 2019-11-27 NOTE — PROGRESS NOTES
[facility]  Cardiology Progress Note  Date of Service: 11/27/2019  Primary Cardiologist: Dr. 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      Assessment:  1.  Acute on chronic heart failure with preserved EF with congestion on cxr, and marked peripheral edema   - suspect secondary to initiation of vasodilator therapy although earlier this summer was eating large amounts of sodium.   - diuresing well on bumex gtt at 0.25 mg/h   - I/o neg 5L   - admit wt 224, current wt 216, dry wt low 200s?    2. Permanent afib, rates controlled, on apixaban appropriately    3.  HTN, well controlled    4.  Moderate pulmonary arterial hypertension in the setting of scleroderma (mean PA 34 mmHg). Started on sildenafil without symptomatic improvement but suspect she became hypervolemic shortly after this.    5.  Scleroderma and rheumatoid arthritis, on DMARDs      6.  Mod- severe TR, mild aortic stenosis- suspect will improve with improved volume status.      Plan:   1. Continue bumex gtt, anticipate another 24-48 hours  2. Start spironolactone 12.5 mg daily  3. Replace k per protocol  4. Will continue to follow with you  5. Core clinic f/u will be arranged    Janell Charles PA-C  [facility] Heart  Pager: [phone]     Interval History   Tired, legs improving, some cramping. Not sure if breathing is better yet.      Physical Exam   Temp: 97.8  F (36.6  C) Temp src: Oral BP: 114/76   Heart Rate: 102 Resp: 18 SpO2: 91 % O2 Device: Nasal cannula Oxygen Delivery: 2 LPM  Vitals:    11/26/19 0538 11/27/19 0022 11/27/19 0638   Weight: 101.6 kg (224 lb) 99.5 kg (219 lb 6.4 oz) 98.2 kg (216 lb 8 oz)      Elderly frail-appearing woman in no acute distress.  Normocephalic atraumatic.  Heart is irregularly irregular with a 3 out of 6 systolic murmur heard best at the lower left sternal border.  Lungs are diminished bilaterally but I do not appreciate wheezes or rales.  Extremities with 2+ pitting edema to upper thigh.  Wraps in place.  Skin is warm and dry.    Medications     bumetanide 0.25 mg/hr (11/27/19 0738)     - MEDICATION INSTRUCTIONS -       - MEDICATION INSTRUCTIONS -         apixaban ANTICOAGULANT  5 mg Oral BID     cholecalciferol  1,600 Units Oral Daily     folic acid  1,000 mcg Oral Daily     levothyroxine  75 mcg Oral Daily     methotrexate  25 mg Oral Weekly     metoprolol succinate ER  25 mg Oral Daily     multivitamin w/minerals  1 tablet Oral Daily     NIFEdipine ER  60 mg Oral Daily     omeprazole  20 mg Oral BID AC     potassium chloride  40 mEq Oral BID     Prednisolon-Gatiflox-Bromfenac  1 drop Right Eye Daily     psyllium  1 capsule Oral Daily     sildenafil  20 mg Oral TID     simvastatin  40 mg Oral At Bedtime     sodium chloride (PF)  3 mL Intracatheter Q8H       Data   Most Recent 3 CBC's:  Recent Labs   Lab Test 11/25/19  1654 10/16/19 07/16/19  0959   WBC 6.3 5.1 4.7   HGB 11.6* 12.1 12.0   MCV 92 98 96    214 115*     Most Recent 3 BMP's:  Recent Labs   Lab Test 11/27/19  0535 11/26/19  1630 11/26/19  0533 11/25/19  1654     --  140 139   POTASSIUM 3.4 3.9 3.2* 3.5   CHLORIDE 100  --  106 105   CO2 31  --  27 29   BUN 12  --  14 16   CR 0.67  --  0.68 0.75   ANIONGAP 6  --  7 5   TU 9.3  --  9.3 9.3   *  --  87 104*     Most Recent 3 BNP's:  Recent Labs   Lab Test 11/25/19  1654 07/16/19  0959 05/01/19  1115 04/02/19  1308 10/22/16  1626   NTBNPI 3,936*  --   --   --  1,007   NTBNP  --  3,089* 2,900* 3,011*  --      Last 24 hours labs reviewed     Tele: afib    Echo: 11/25/19  Small pericardial effusion  The visual ejection fraction is estimated at  50-55%.  Flattened septum is consistent with RV pressure/volume overload.  Pulmonary hypertension  There is severe biatrial enlargement.  Very mild AS. MG is 9 mm Hg.  The right ventricle is moderately dilated. Mildly decreased right ventricular  systolic function  There is mod-severe to severe (3-4+) tricuspid regurgitation.

## 2019-11-27 NOTE — PLAN OF CARE
A&O x4. Pt denied pain. VSS, on 2L. Up w/ 1+walker. Tele: A Fib RVR.  Bumex gtt w/ 5ml NS rider. Crackles in bases. LLE +3/4, lymph wraps on. Methotrexate given at 1100- requires chemo gown and gloves when handling bodily fluids for 48 hours. Good UOP w/ purewick. Plan for continued diureses tomorrow. Continue to monitor.

## 2019-11-27 NOTE — PLAN OF CARE
VSS, tele a-fib 90-110s, denies pain, A&OX4, 93% on 2L NC and states SOB is improving.  Up with assist of one, gait belt and walker.  Lymph wraps in place and tolerating well.  Diuresing on bumex gtt, cards discontinued aldactone.  Continue to monitor.

## 2019-11-27 NOTE — PLAN OF CARE
PT: Attempted to see patient for PT session. Pt states she is very tired and would like to just rest today.

## 2019-11-28 ENCOUNTER — APPOINTMENT (OUTPATIENT)
Dept: OCCUPATIONAL THERAPY | Facility: CLINIC | Age: 79
DRG: 291 | End: 2019-11-28
Payer: MEDICARE

## 2019-11-28 LAB
ANION GAP SERPL CALCULATED.3IONS-SCNC: 4 MMOL/L (ref 3–14)
BUN SERPL-MCNC: 11 MG/DL (ref 7–30)
CALCIUM SERPL-MCNC: 8.9 MG/DL (ref 8.5–10.1)
CHLORIDE SERPL-SCNC: 100 MMOL/L (ref 94–109)
CO2 SERPL-SCNC: 34 MMOL/L (ref 20–32)
CREAT SERPL-MCNC: 0.69 MG/DL (ref 0.52–1.04)
GFR SERPL CREATININE-BSD FRML MDRD: 82 ML/MIN/{1.73_M2}
GLUCOSE SERPL-MCNC: 103 MG/DL (ref 70–99)
POTASSIUM SERPL-SCNC: 3.5 MMOL/L (ref 3.4–5.3)
SODIUM SERPL-SCNC: 138 MMOL/L (ref 133–144)

## 2019-11-28 PROCEDURE — 97140 MANUAL THERAPY 1/> REGIONS: CPT | Mod: GO | Performed by: OCCUPATIONAL THERAPIST

## 2019-11-28 PROCEDURE — 21000001 ZZH R&B HEART CARE

## 2019-11-28 PROCEDURE — 36415 COLL VENOUS BLD VENIPUNCTURE: CPT | Performed by: INTERNAL MEDICINE

## 2019-11-28 PROCEDURE — 25000132 ZZH RX MED GY IP 250 OP 250 PS 637: Mod: GY | Performed by: INTERNAL MEDICINE

## 2019-11-28 PROCEDURE — 25000132 ZZH RX MED GY IP 250 OP 250 PS 637: Mod: GY | Performed by: HOSPITALIST

## 2019-11-28 PROCEDURE — 99233 SBSQ HOSP IP/OBS HIGH 50: CPT | Performed by: INTERNAL MEDICINE

## 2019-11-28 PROCEDURE — 80048 BASIC METABOLIC PNL TOTAL CA: CPT | Performed by: INTERNAL MEDICINE

## 2019-11-28 PROCEDURE — 99232 SBSQ HOSP IP/OBS MODERATE 35: CPT | Performed by: HOSPITALIST

## 2019-11-28 RX ORDER — IPRATROPIUM BROMIDE AND ALBUTEROL SULFATE 2.5; .5 MG/3ML; MG/3ML
3 SOLUTION RESPIRATORY (INHALATION) EVERY 4 HOURS PRN
Status: DISCONTINUED | OUTPATIENT
Start: 2019-11-28 | End: 2019-12-05 | Stop reason: HOSPADM

## 2019-11-28 RX ADMIN — MELATONIN 5 MG TABLET 5 MG: at 00:14

## 2019-11-28 RX ADMIN — POTASSIUM CHLORIDE 40 MEQ: 1500 TABLET, EXTENDED RELEASE ORAL at 16:58

## 2019-11-28 RX ADMIN — FOLIC ACID 1000 MCG: 1 TABLET ORAL at 09:34

## 2019-11-28 RX ADMIN — CHOLECALCIFEROL TAB 10 MCG (400 UNIT) 1600 UNITS: 10 TAB at 09:33

## 2019-11-28 RX ADMIN — LEVOTHYROXINE SODIUM 75 MCG: 75 TABLET ORAL at 06:37

## 2019-11-28 RX ADMIN — SILDENAFIL 20 MG: 20 TABLET, FILM COATED ORAL at 16:58

## 2019-11-28 RX ADMIN — ACETAMINOPHEN 650 MG: 325 TABLET, FILM COATED ORAL at 15:09

## 2019-11-28 RX ADMIN — ACETAMINOPHEN 650 MG: 325 TABLET, FILM COATED ORAL at 09:46

## 2019-11-28 RX ADMIN — POTASSIUM CHLORIDE 40 MEQ: 1500 TABLET, EXTENDED RELEASE ORAL at 09:34

## 2019-11-28 RX ADMIN — SILDENAFIL 20 MG: 20 TABLET, FILM COATED ORAL at 09:34

## 2019-11-28 RX ADMIN — PREDNISOLONE ACETATE-GATIFLOXACIN-BROMFENAC 1 DROP: .75; 5; 1 SUSPENSION/ DROPS OPHTHALMIC at 09:42

## 2019-11-28 RX ADMIN — NIFEDIPINE 60 MG: 60 TABLET, FILM COATED, EXTENDED RELEASE ORAL at 09:33

## 2019-11-28 RX ADMIN — MULTIPLE VITAMINS W/ MINERALS TAB 1 TABLET: TAB at 09:33

## 2019-11-28 RX ADMIN — APIXABAN 5 MG: 5 TABLET, FILM COATED ORAL at 09:33

## 2019-11-28 RX ADMIN — METOPROLOL SUCCINATE 25 MG: 25 TABLET, EXTENDED RELEASE ORAL at 09:33

## 2019-11-28 RX ADMIN — OMEPRAZOLE 20 MG: 20 CAPSULE, DELAYED RELEASE ORAL at 06:37

## 2019-11-28 RX ADMIN — OMEPRAZOLE 20 MG: 20 CAPSULE, DELAYED RELEASE ORAL at 16:58

## 2019-11-28 RX ADMIN — Medication 1 CAPSULE: at 09:33

## 2019-11-28 ASSESSMENT — ACTIVITIES OF DAILY LIVING (ADL)
ADLS_ACUITY_SCORE: 22

## 2019-11-28 NOTE — PROGRESS NOTES
[facility]    Hospitalist Progress Note    Assessment & Plan   Jaqueline Canales is a 78 year old female with PMHx of chronic afib on anticoagulation with apixaban, chronic diastolic CHF with chronic bilateral lymphedema, pulmonary hypertension, hypothyroidism and rheumatoid arthritis who was admitted on 11/25/2019 for evaluation of dyspnea and hypoxia with concern for suspected CHF exacerbation.      Acute Hypoxic Respiratory Failure dt Acute Diastolic CHF exacerbation  Chronic Bilateral lymphedema  Last echo in 9/2018 showed EF 55-60% and moderate pulmonary hypertension; diastolic function not assessed. Underwent cardiac cath in 4/2019 which showed elevated biventricular filing pressures and moderate to severe primary pulmonary hypertension. Has chronic bilateral LE lymphedema for which she takes Lasix. Was seen by her PCP last month and treated for cellulitis. Was initially seen in clinic and observed to have increased LE edema and dyspnea. Referred to ED. Was afebrile, HR 100s with SBP 90s. Needing 2L NC on admission. ProBNP 3900. BMP unremarkable (Cr 0.75), WBC nl. CXR showed increased interstitial prominence. Given Lasix 40mg IV x1 in ED. Admitted to [facility]. Started aggressive diuresis with Lasix 60mg IV TID. Echo this stay showed EF 50-55% with findings of severe biatrial enlargement, mod dilated RV and mildly decreased RVSF, mod to severe TR and pulmonary hypertension. Cardiology following -- diuretics changed to Bumex gtt on 11/26.   -- cont diuresis with Bumex gtt; net negative 2.1L past 24 hours, weight improving  -- initiate spironolactone 11/28 per Cards  -- conts on sched K replacement with 40mEq TID  -- wean off supplemental O2 as needed  -- cardiology following, appreciate assistance with care     Chronic A. Fib, on anticoagulation  Moderate/Severe pulmonary hypertension  Hyperlipidemia  Hypertension  PTA meds: metoprolol XL 25mg daily, nifedipine ER 60mg daily, sildenafil 20mg TID,  simvastatin 40mg HS, Lasix 20mg BID, apixaban 5mg BID  -- increasing tachycardia over past 24 hours - ?intravascularly dry; monitor  -- conts on Toprol XL and nifedipine  -- conts on sildenafil  -- conts on apixaban  -- conts on statin  -- diuresis as above    Hypothyroidism  Chronic and stable on levothyroxine     Rheumatoid arthritis  Scleroderma  Chronic and stable on methotrexate (takes on Wednesdays). Conts on folic acid.  Can resume Remicade infusions at discharge     FEN: no IVFs, lytes stable, 2g NA diet and fluid restriction (1.5L per 24h)  DVT Prophylaxis: anticoagulated on apixaban as above  Code Status: Full Code -- per discussions on admission, she does not want to remain on artifical life support of she were to have no quality of life    Disposition: Anticipate discharge home in the next 2-3d, pending adequate diuresis.    Tyrese Dale    Interval History   Offers no complaints.  Feels LE edema is improving, notes no change in dyspnea.  Denies any lightheadedness, chest pain/pressure.  No palpitations, but reports she is never symptomatic with A-fib.  No fever/chills.    -Data reviewed today: I reviewed all new labs and imaging results over the last 24 hours. I personally reviewed no images or EKG's today.    Physical Exam   Temp: 98  F (36.7  C) Temp src: Oral BP: 107/78   Heart Rate: 120 Resp: 18 SpO2: 92 % O2 Device: Nasal cannula Oxygen Delivery: 2 LPM  Vitals:    11/26/19 0538 11/27/19 0022 11/27/19 0638   Weight: 101.6 kg (224 lb) 99.5 kg (219 lb 6.4 oz) 98.2 kg (216 lb 8 oz)     Vital Signs with Ranges  Temp:  [97.6  F (36.4  C)-98.8  F (37.1  C)] 98  F (36.7  C)  Heart Rate:  [104-122] 120  Resp:  [18-22] 18  BP: (103-122)/(66-78) 107/78  SpO2:  [90 %-97 %] 92 %  I/O last 3 completed shifts:  In: 803.5 [P.O.:780; I.V.:23.5]  Out: 2952 [Urine:2952]    Constitutional: Well nourished female in NAD, sitting up in chair  Respiratory: +bibasilar crackles, mild scattered expiratory wheezes, no  tachypnea  Cardiovascular:  Irregular rhythm, mild tachycardia, no MGR, ++bilateral LE edema  GI: S, normal bowel sounds  Other:  alert and appropriate, cranial nerves grossly intact    Medications     bumetanide 0.25 mg/hr (11/28/19 0943)     - MEDICATION INSTRUCTIONS -       - MEDICATION INSTRUCTIONS -         apixaban ANTICOAGULANT  5 mg Oral BID     cholecalciferol  1,600 Units Oral Daily     folic acid  1,000 mcg Oral Daily     levothyroxine  75 mcg Oral Daily     methotrexate  25 mg Oral Weekly     metoprolol succinate ER  25 mg Oral Daily     multivitamin w/minerals  1 tablet Oral Daily     NIFEdipine ER  60 mg Oral Daily     omeprazole  20 mg Oral BID AC     potassium chloride  40 mEq Oral TID     Prednisolon-Gatiflox-Bromfenac  1 drop Right Eye Daily     psyllium  1 capsule Oral Daily     sildenafil  20 mg Oral TID     simvastatin  40 mg Oral At Bedtime     sodium chloride (PF)  3 mL Intracatheter Q8H       Data   Recent Labs   Lab 11/28/19  0533 11/27/19  0535 11/26/19  1630 11/26/19  0533 11/25/19  1654   WBC  --   --   --   --  6.3   HGB  --   --   --   --  11.6*   MCV  --   --   --   --  92   PLT  --   --   --   --  166    137  --  140 139   POTASSIUM 3.5 3.4 3.9 3.2* 3.5   CHLORIDE 100 100  --  106 105   CO2 34* 31  --  27 29   BUN 11 12  --  14 16   CR 0.69 0.67  --  0.68 0.75   ANIONGAP 4 6  --  7 5   TU 8.9 9.3  --  9.3 9.3   * 109*  --  87 104*   ALBUMIN  --  3.7  --   --   --    PROTTOTAL  --  7.6  --   --   --    BILITOTAL  --  1.2  --   --   --    ALKPHOS  --  135  --   --   --    ALT  --  15  --   --   --    AST  --  26  --   --   --    TROPI  --   --   --   --  <0.015       No results found for this or any previous visit (from the past 24 hour(s)).

## 2019-11-28 NOTE — PLAN OF CARE
Discharge Planner OT   Patient plan for discharge: Home   Current status: Pt seen for BLE Lymph treatment and was educated on lymph system, skin hygiene, deep breathing, exercise and lymph wraps to help control edema. BLE significantly reduced in size from yesterday. Applied TG Soft followed by 8 cm wraps over feet and 12 cm wraps over lower leg. RN present for teaching of how to apply wraps. Pt tolerated well and reports wraps are comfortable. She was educated on symptoms of intolerance, including pain, increased SOB, blue/numb toes. BLE elevated on pillows while up in recliner at end of session.   *Wraps to be left on 23/24 hours/day unless not tolerated. RN to remove wraps at 9 am Friday and Lymph therapist to return at 10 am Friday.  Barriers to return to prior living situation: Decreased activity tolerance, level of assist with ADL and mobility  Recommendations for discharge: Home with spouse assist for household chores, home PT/OT/Lymph  Rationale for recommendations: Anticipate that patient will progress with activity tolerance and level of (I) while here, but would benefit from home therapies to maximize (I), safety and to continue lymph treatment. It would be a taxing effort to leave the home and she would not be able to leave without assist of another person and AD.       Entered by: Goldie Hutton 11/28/2019 12:04 PM

## 2019-11-28 NOTE — PLAN OF CARE
Heart Failure Care Pathway  GOALS TO BE MET BEFORE DISCHARGE:    1. Decrease congestion and/or edema with diuretic therapy to achieve near      optimal volume status.            Dyspnea improved:  yes            Edema improved:     no        Net I/O and Weights since admission:          10/29 0700 - 11/28 0659  In: 2184.1 [P.O.:2140; I.V.:44.1]  Out: 9352 [Urine:9352]  Net: -7167.9            Vitals:    11/25/19 1646 11/25/19 2350 11/26/19 0538 11/27/19 0022   Weight: 103.4 kg (228 lb) 104 kg (229 lb 3.2 oz) 101.6 kg (224 lb) 99.5 kg (219 lb 6.4 oz)    11/27/19 0638   Weight: 98.2 kg (216 lb 8 oz)       2.  O2 sats > 92% on RA or at prior home O2 therapy level.          Current oxygenation status:       SpO2: 92 %         O2 Device: Nasal cannula,  Oxygen Delivery: 2 LPM         Able to wean O2 this shift to keep sats > 92%: no       Does patient use Home O2? no    3.  Tolerates ambulation and mobility near baseline: yes        How many times did the patient ambulate with nursing staff this shift? 2  Please review the Heart Failure Care Pathway for additional HF goal outcomes.    Jenn River RN RN  11/28/2019    A&OX4 BP soft, complain of HANEY, on 2L nasal cannula.Tele-AFib CVR/RVR, on 2 gm Na diet 2 L fluid restrictions, up with assist of one with belt to BSC, purewick in place, lymph wraps on this night, bumex gtt infusing at 1 ml/hr, crackles on the lung sounds. Melatonin given for sleep.Denies pain.

## 2019-11-28 NOTE — PROGRESS NOTES
[facility]    Cardiology Progress Note     Assessment & Plan   1.  Acute on chronic heart failure with preserved EF with congestion on cxr, and marked peripheral edema              - suspect secondary to initiation of vasodilator therapy although earlier this summer was eating large amounts of sodium.              - diuresing well on bumex gtt at 0.25 mg/h              - I/o neg 5L              - admit wt 224, current wt 216, dry wt low 200s?     2. Permanent afib, rates controlled, on apixaban appropriately     3.  HTN, well controlled     4.  Moderate pulmonary arterial hypertension in the setting of scleroderma (mean PA 34 mmHg). Started on sildenafil without symptomatic improvement but suspect she became hypervolemic shortly after this.     5.  Scleroderma and rheumatoid arthritis, on DMARDs       6.  Mod- severe TR, mild aortic stenosis- suspect will improve with improved volume status.       Plan:   1. Continue bumex gtt, anticipate another 48-72 hours  2. Added DuoNeb for wheezing   3. Started spironolactone 12.5 mg daily  4. Replace k per protocol  5. Will continue to follow with you  6. Core clinic f/u will be arranged    Everardo Hogue MD    Interval History   Has made progress with diuresis but continues to be short of breath and volume overloaded    Physical Exam   Temp: 98.1  F (36.7  C) Temp src: Axillary BP: 119/78   Heart Rate: 120 Resp: 18 SpO2: 93 % O2 Device: Nasal cannula Oxygen Delivery: 2 LPM  Vitals:    11/26/19 0538 11/27/19 0022 11/27/19 0638   Weight: 101.6 kg (224 lb) 99.5 kg (219 lb 6.4 oz) 98.2 kg (216 lb 8 oz)     Vital Signs with Ranges  Temp:  [97.6  F (36.4  C)-98.8  F (37.1  C)] 98.1  F (36.7  C)  Heart Rate:  [104-122] 120  Resp:  [18-22] 18  BP: (103-122)/(66-78) 119/78  SpO2:  [90 %-97 %] 93 %  I/O last 3 completed shifts:  In: 803.5 [P.O.:780; I.V.:23.5]  Out: 2952 [Urine:2952]    GENERAL APPEARANCE: Alert and oriented x3. No acute distress.  SKIN:  Inspection of the skin reveals no rashes, ulcerations, warm, dry  NECK: Supple and symmetric.   Difficult to assess but appears elevated JVP  LUNGS: Rales to the midlung bilaterally with wheezing  CARDIOVASCULAR: S1, S2, regular rate and rhythm without any murmurs, gallops, rubs. The carotid pulses were normal and 2+ bilaterally without bruits. Peripheral pulses were 2+ and symmetric.  ABDOMEN: Soft, non-tender, non-distended with normal bowel sounds.  No ascites noted.  EXTREMITIES: 2+ edema to the thighs edema.  NEUROLOGIC:  Normal mood and affect.  Sensation to touch was normal.    Medications     bumetanide 0.25 mg/hr (11/28/19 0943)     - MEDICATION INSTRUCTIONS -       - MEDICATION INSTRUCTIONS -         apixaban ANTICOAGULANT  5 mg Oral BID     cholecalciferol  1,600 Units Oral Daily     folic acid  1,000 mcg Oral Daily     levothyroxine  75 mcg Oral Daily     methotrexate  25 mg Oral Weekly     metoprolol succinate ER  25 mg Oral Daily     multivitamin w/minerals  1 tablet Oral Daily     NIFEdipine ER  60 mg Oral Daily     omeprazole  20 mg Oral BID AC     potassium chloride  40 mEq Oral TID     Prednisolon-Gatiflox-Bromfenac  1 drop Right Eye Daily     psyllium  1 capsule Oral Daily     sildenafil  20 mg Oral TID     simvastatin  40 mg Oral At Bedtime     sodium chloride (PF)  3 mL Intracatheter Q8H       Data   Results for orders placed or performed during the hospital encounter of 11/25/19 (from the past 24 hour(s))   Lactic acid level STAT   Result Value Ref Range    Lactate for Sepsis Protocol 1.5 0.7 - 2.0 mmol/L   Basic metabolic panel   Result Value Ref Range    Sodium 138 133 - 144 mmol/L    Potassium 3.5 3.4 - 5.3 mmol/L    Chloride 100 94 - 109 mmol/L    Carbon Dioxide 34 (H) 20 - 32 mmol/L    Anion Gap 4 3 - 14 mmol/L    Glucose 103 (H) 70 - 99 mg/dL    Urea Nitrogen 11 7 - 30 mg/dL    Creatinine 0.69 0.52 - 1.04 mg/dL    GFR Estimate 82 >60 mL/min/[1.73_m2]    GFR Estimate If Black >90 >60  mL/min/[1.73_m2]    Calcium 8.9 8.5 - 10.1 mg/dL

## 2019-11-29 ENCOUNTER — APPOINTMENT (OUTPATIENT)
Dept: OCCUPATIONAL THERAPY | Facility: CLINIC | Age: 79
DRG: 291 | End: 2019-11-29
Payer: MEDICARE

## 2019-11-29 ENCOUNTER — APPOINTMENT (OUTPATIENT)
Dept: PHYSICAL THERAPY | Facility: CLINIC | Age: 79
DRG: 291 | End: 2019-11-29
Payer: MEDICARE

## 2019-11-29 LAB
ANION GAP SERPL CALCULATED.3IONS-SCNC: 3 MMOL/L (ref 3–14)
BUN SERPL-MCNC: 15 MG/DL (ref 7–30)
CALCIUM SERPL-MCNC: 8.9 MG/DL (ref 8.5–10.1)
CHLORIDE SERPL-SCNC: 99 MMOL/L (ref 94–109)
CO2 SERPL-SCNC: 33 MMOL/L (ref 20–32)
CREAT SERPL-MCNC: 0.81 MG/DL (ref 0.52–1.04)
GFR SERPL CREATININE-BSD FRML MDRD: 69 ML/MIN/{1.73_M2}
GLUCOSE SERPL-MCNC: 100 MG/DL (ref 70–99)
POTASSIUM SERPL-SCNC: 4.9 MMOL/L (ref 3.4–5.3)
SODIUM SERPL-SCNC: 135 MMOL/L (ref 133–144)

## 2019-11-29 PROCEDURE — 99232 SBSQ HOSP IP/OBS MODERATE 35: CPT | Performed by: HOSPITALIST

## 2019-11-29 PROCEDURE — 25000132 ZZH RX MED GY IP 250 OP 250 PS 637: Mod: GY | Performed by: INTERNAL MEDICINE

## 2019-11-29 PROCEDURE — 36415 COLL VENOUS BLD VENIPUNCTURE: CPT | Performed by: PHYSICIAN ASSISTANT

## 2019-11-29 PROCEDURE — 25000132 ZZH RX MED GY IP 250 OP 250 PS 637: Mod: GY | Performed by: HOSPITALIST

## 2019-11-29 PROCEDURE — 97140 MANUAL THERAPY 1/> REGIONS: CPT | Mod: GO

## 2019-11-29 PROCEDURE — 21000001 ZZH R&B HEART CARE

## 2019-11-29 PROCEDURE — 99233 SBSQ HOSP IP/OBS HIGH 50: CPT | Performed by: INTERNAL MEDICINE

## 2019-11-29 PROCEDURE — 80048 BASIC METABOLIC PNL TOTAL CA: CPT | Performed by: PHYSICIAN ASSISTANT

## 2019-11-29 PROCEDURE — 25000128 H RX IP 250 OP 636: Performed by: INTERNAL MEDICINE

## 2019-11-29 PROCEDURE — 97530 THERAPEUTIC ACTIVITIES: CPT | Mod: GP

## 2019-11-29 RX ORDER — BUMETANIDE 0.25 MG/ML
1 INJECTION INTRAMUSCULAR; INTRAVENOUS ONCE
Status: COMPLETED | OUTPATIENT
Start: 2019-11-29 | End: 2019-11-29

## 2019-11-29 RX ADMIN — SILDENAFIL 20 MG: 20 TABLET, FILM COATED ORAL at 21:00

## 2019-11-29 RX ADMIN — MULTIPLE VITAMINS W/ MINERALS TAB 1 TABLET: TAB at 08:30

## 2019-11-29 RX ADMIN — BUMETANIDE 1 MG: 0.25 INJECTION INTRAMUSCULAR; INTRAVENOUS at 10:31

## 2019-11-29 RX ADMIN — FOLIC ACID 1000 MCG: 1 TABLET ORAL at 08:30

## 2019-11-29 RX ADMIN — Medication 1 CAPSULE: at 08:30

## 2019-11-29 RX ADMIN — BUMETANIDE 1 MG: 0.25 INJECTION INTRAMUSCULAR; INTRAVENOUS at 18:10

## 2019-11-29 RX ADMIN — ACETAMINOPHEN 650 MG: 325 TABLET, FILM COATED ORAL at 08:29

## 2019-11-29 RX ADMIN — LEVOTHYROXINE SODIUM 75 MCG: 75 TABLET ORAL at 07:07

## 2019-11-29 RX ADMIN — POTASSIUM CHLORIDE 40 MEQ: 1500 TABLET, EXTENDED RELEASE ORAL at 02:44

## 2019-11-29 RX ADMIN — SILDENAFIL 20 MG: 20 TABLET, FILM COATED ORAL at 08:30

## 2019-11-29 RX ADMIN — APIXABAN 5 MG: 5 TABLET, FILM COATED ORAL at 08:30

## 2019-11-29 RX ADMIN — APIXABAN 5 MG: 5 TABLET, FILM COATED ORAL at 21:00

## 2019-11-29 RX ADMIN — SIMVASTATIN 40 MG: 40 TABLET, FILM COATED ORAL at 02:44

## 2019-11-29 RX ADMIN — APIXABAN 5 MG: 5 TABLET, FILM COATED ORAL at 02:44

## 2019-11-29 RX ADMIN — CHOLECALCIFEROL TAB 10 MCG (400 UNIT) 1600 UNITS: 10 TAB at 08:29

## 2019-11-29 RX ADMIN — SILDENAFIL 20 MG: 20 TABLET, FILM COATED ORAL at 16:11

## 2019-11-29 RX ADMIN — OMEPRAZOLE 20 MG: 20 CAPSULE, DELAYED RELEASE ORAL at 07:07

## 2019-11-29 RX ADMIN — PREDNISOLONE ACETATE-GATIFLOXACIN-BROMFENAC 1 DROP: .75; 5; 1 SUSPENSION/ DROPS OPHTHALMIC at 10:31

## 2019-11-29 RX ADMIN — OMEPRAZOLE 20 MG: 20 CAPSULE, DELAYED RELEASE ORAL at 16:11

## 2019-11-29 RX ADMIN — SILDENAFIL 20 MG: 20 TABLET, FILM COATED ORAL at 02:44

## 2019-11-29 RX ADMIN — METOPROLOL SUCCINATE 25 MG: 25 TABLET, EXTENDED RELEASE ORAL at 08:30

## 2019-11-29 RX ADMIN — SIMVASTATIN 40 MG: 40 TABLET, FILM COATED ORAL at 21:00

## 2019-11-29 RX ADMIN — NIFEDIPINE 60 MG: 60 TABLET, FILM COATED, EXTENDED RELEASE ORAL at 08:30

## 2019-11-29 ASSESSMENT — ACTIVITIES OF DAILY LIVING (ADL)
ADLS_ACUITY_SCORE: 22
ADLS_ACUITY_SCORE: 22
ADLS_ACUITY_SCORE: 18
ADLS_ACUITY_SCORE: 22

## 2019-11-29 ASSESSMENT — MIFFLIN-ST. JEOR: SCORE: 1387.38

## 2019-11-29 NOTE — PROGRESS NOTES
[facility] Progress Note  -01    Visited pt per LOS. Pt's  was in the room. Pt is [employer] and mentioned that the [employer]  visited her few days ago.     Pt and the  shared their concerns for [employer] and young generation who do not go to [employer] anymore.  shared my own experience and  offered a prayer. [facility] will remain available when there is a need.    Micah Springer  Chaplain Resident

## 2019-11-29 NOTE — PLAN OF CARE
Heart Failure Care Pathway  GOALS TO BE MET BEFORE DISCHARGE:    1. Decrease congestion and/or edema with diuretic therapy to achieve near      optimal volume status.            Dyspnea improved:  Yes            Edema improved:     Yes        Net I/O and Weights since admission:          10/30 0700 - 11/29 0659  In: 3647.6 [P.O.:3580; I.V.:67.6]  Out: 98796 [Urine:44368]  Net: -8554.4            Vitals:    11/25/19 1646 11/25/19 2350 11/26/19 0538 11/27/19 0022   Weight: 103.4 kg (228 lb) 104 kg (229 lb 3.2 oz) 101.6 kg (224 lb) 99.5 kg (219 lb 6.4 oz)    11/27/19 0638   Weight: 98.2 kg (216 lb 8 oz)       2.  O2 sats > 92% on RA or at prior home O2 therapy level.          Current oxygenation status:       SpO2: 92 %         O2 Device: Nasal cannula,  Oxygen Delivery: 3 LPM         Able to wean O2 this shift to keep sats > 92%:  No, please explain: on 3L of O2       Does patient use Home O2? No    3.  Tolerates ambulation and mobility near baseline: Yes        How many times did the patient ambulate with nursing staff this shift? 0    Please review the Heart Failure Care Pathway for additional HF goal outcomes.    Campos Westbrook RN RN  11/29/2019     A/O x4. AVSS on 3L of O2 NC ex tachycardic at times. Tele A-fib CVR/RVR. Denies pain. LS crackles in RLE, RML, and LLL, wheezing inspiratory RUL and HERMAN, HANEY. Lymph wraps on LE. Assist x1, GB/walker. Incontinent of bladder, purewick changed, adequate output. 2g sodium diet, 2000ml fluid restriction. Bumex gtt infusing 1mL/hr. Plan to continue to diuresis, will continue to monitor.

## 2019-11-29 NOTE — PROGRESS NOTES
[facility]    Hospitalist Progress Note    Assessment & Plan   Jaqueline Canales is a 78 year old female with PMHx of chronic afib on anticoagulation with apixaban, chronic diastolic CHF with chronic bilateral lymphedema, pulmonary hypertension, hypothyroidism and rheumatoid arthritis who was admitted on 11/25/2019 for evaluation of dyspnea and hypoxia with concern for suspected CHF exacerbation.      Acute Hypoxic Respiratory Failure dt Acute Diastolic CHF exacerbation  Chronic Bilateral lymphedema  Last echo in 9/2018 showed EF 55-60% and moderate pulmonary hypertension; diastolic function not assessed. Underwent cardiac cath in 4/2019 which showed elevated biventricular filing pressures and moderate to severe primary pulmonary hypertension. Has chronic bilateral LE lymphedema for which she takes Lasix. Was seen by her PCP last month and treated for cellulitis. Was initially seen in clinic and observed to have increased LE edema and dyspnea. Referred to ED. Was afebrile, HR 100s with SBP 90s. Needing 2L NC on admission. ProBNP 3900. BMP unremarkable (Cr 0.75), WBC nl. CXR showed increased interstitial prominence. Given Lasix 40mg IV x1 in ED. Admitted to [facility]. Started aggressive diuresis with Lasix 60mg IV TID. Echo this stay showed EF 50-55% with findings of severe biatrial enlargement, mod dilated RV and mildly decreased RVSF, mod to severe TR and pulmonary hypertension. Cardiology following -- diuretics changed to Bumex gtt on 11/26.   -- cont diuresis with Bumex gtt; weight trending down, does appear I/O's are being regularly recorded  -- initiated spironolactone 11/28 per Cards  -- stop scheduled K supplement 11/29 as K borderline high  -- wean off supplemental O2 as needed  -- cardiology following, appreciate assistance with care     Chronic A. Fib, on anticoagulation  Moderate/Severe pulmonary hypertension  Hyperlipidemia  Hypertension  PTA meds: metoprolol XL 25mg daily, nifedipine ER 60mg  daily, sildenafil 20mg TID, simvastatin 40mg HS, Lasix 20mg BID, apixaban 5mg BID  -- intermittently tachy; monitor  -- conts on Toprol XL and nifedipine  -- conts on sildenafil  -- conts on apixaban  -- conts on statin  -- diuresis as above    Hypothyroidism  Chronic and stable on levothyroxine     Rheumatoid arthritis  Scleroderma  Chronic and stable on methotrexate (takes on Wednesdays). Conts on folic acid.  Can resume Remicade infusions at discharge       DVT Prophylaxis: anticoagulated on apixaban as above  Code Status: Full Code -- per discussions on admission, she does not want to remain on artifical life support of she were to have no quality of life    Disposition: Anticipate discharge home in the next 2-3d, pending adequate diuresis.    Tyrese Dale    Interval History   Reports no change in dyspnea, denies lightheadedness.  Feels edema is improving.  No fever/chills, chest pain/pressure.    -Data reviewed today: I reviewed all new labs and imaging results over the last 24 hours. I personally reviewed no images or EKG's today.    Physical Exam   Temp: 97.7  F (36.5  C) Temp src: Oral BP: 113/74   Heart Rate: 82 Resp: 16 SpO2: 91 % O2 Device: Nasal cannula Oxygen Delivery: 3 LPM  Vitals:    11/27/19 0022 11/27/19 0638 11/29/19 1025   Weight: 99.5 kg (219 lb 6.4 oz) 98.2 kg (216 lb 8 oz) 95.9 kg (211 lb 6.7 oz)     Vital Signs with Ranges  Temp:  [97.6  F (36.4  C)-98.1  F (36.7  C)] 97.7  F (36.5  C)  Heart Rate:  [] 82  Resp:  [16-18] 16  BP: (101-124)/(71-93) 113/74  SpO2:  [88 %-97 %] 91 %  I/O last 3 completed shifts:  In: 1440 [P.O.:1440]  Out: 2850 [Urine:2850]    Constitutional: Well nourished female in NAD, lying in bed  Respiratory: +bibasilar crackles, scattered wheezes improved today  Cardiovascular:  Irregular rhythm, normal rate, no MGR, 1-2+bilateral LE edema  GI: Soft, normal bowel sounds  Other:  alert and appropriate, cranial nerves grossly intact    Medications     bumetanide 0.25  mg/hr (11/29/19 0834)     - MEDICATION INSTRUCTIONS -       - MEDICATION INSTRUCTIONS -         apixaban ANTICOAGULANT  5 mg Oral BID     cholecalciferol  1,600 Units Oral Daily     folic acid  1,000 mcg Oral Daily     levothyroxine  75 mcg Oral Daily     methotrexate  25 mg Oral Weekly     metoprolol succinate ER  25 mg Oral Daily     multivitamin w/minerals  1 tablet Oral Daily     NIFEdipine ER  60 mg Oral Daily     omeprazole  20 mg Oral BID AC     Prednisolon-Gatiflox-Bromfenac  1 drop Right Eye Daily     psyllium  1 capsule Oral Daily     sildenafil  20 mg Oral TID     simvastatin  40 mg Oral At Bedtime     sodium chloride (PF)  3 mL Intracatheter Q8H       Data   Recent Labs   Lab 11/29/19  0535 11/28/19  0533 11/27/19  0535  11/25/19  1654   WBC  --   --   --   --  6.3   HGB  --   --   --   --  11.6*   MCV  --   --   --   --  92   PLT  --   --   --   --  166    138 137   < > 139   POTASSIUM 4.9 3.5 3.4   < > 3.5   CHLORIDE 99 100 100   < > 105   CO2 33* 34* 31   < > 29   BUN 15 11 12   < > 16   CR 0.81 0.69 0.67   < > 0.75   ANIONGAP 3 4 6   < > 5   TU 8.9 8.9 9.3   < > 9.3   * 103* 109*   < > 104*   ALBUMIN  --   --  3.7  --   --    PROTTOTAL  --   --  7.6  --   --    BILITOTAL  --   --  1.2  --   --    ALKPHOS  --   --  135  --   --    ALT  --   --  15  --   --    AST  --   --  26  --   --    TROPI  --   --   --   --  <0.015    < > = values in this interval not displayed.       No results found for this or any previous visit (from the past 24 hour(s)).

## 2019-11-29 NOTE — PLAN OF CARE
Heart Failure Care Pathway  GOALS TO BE MET BEFORE DISCHARGE:    1. Decrease congestion and/or edema with diuretic therapy to achieve near      optimal volume status.            Dyspnea improved:  Yes             Edema improved:     Yes         Net I/O and Weights since admission:          10/29 2300 - 11/28 2259  In: 3287.6 [P.O.:3220; I.V.:67.6]  Out: 57045 [Urine:30711]  Net: -8914.4            Vitals:    11/25/19 1646 11/25/19 2350 11/26/19 0538 11/27/19 0022   Weight: 103.4 kg (228 lb) 104 kg (229 lb 3.2 oz) 101.6 kg (224 lb) 99.5 kg (219 lb 6.4 oz)    11/27/19 0638   Weight: 98.2 kg (216 lb 8 oz)       2.  O2 sats > 92% on RA or at prior home O2 therapy level.          Current oxygenation status:       SpO2: 92 %         O2 Device: Nasal cannula,  Oxygen Delivery: 3 LPM         Able to wean O2 this shift to keep sats > 92%:  unable to wean, patient 3 L, desating to 88% on 2 L        Does patient use Home O2? No    3.  Tolerates ambulation and mobility near baseline: Improving         How many times did the patient ambulate with nursing staff this shift? 4     Please review the Heart Failure Care Pathway for additional HF goal outcomes.    Aurea David, RN RN  11/28/2019       A&O, VSS, weaned to 2 L NC. Tele: A fib CVR-RVR, HR . Bumex gtt @ 1 mg/hr. Baseline incontience, purewick to suction for accurate I&Os. Denies CP and SOB. HANEY. C/o R ankle pain, controlled with PRN tylenol. Up w/ 1 and walker/GB. Lymphedema wraps 23/24 hours per day,off for one hour @ 10 AM today. 2,000 ml fluid restriction. Plan to continue diuresis for a few more days.

## 2019-11-29 NOTE — PLAN OF CARE
"Discharge Planner PT   Patient plan for discharge: prefers home, open to TCU if needed; \"We'll see what happens\" per pt  Current status: PT in bed upon arrival, with LEs hanging off side of bed. Transitioned to sitting at EOB with SBA and HOB elevated. 3L O2 via NC at start of session with SpO2 88-89 sitting at EOB, not recovering above 90 with cues for breathing and rest, increased to 4L O2 for mobility with SpO2 to mid 90s sitting EOB. Sit>stand with FWW and CGA. Ambulated 1x 12' to/from bathroom with FWW and SBA. Sit<>stand at toilet with FWW and CGA, with use of grab bar. Unable to get accurate SpO2 with finger probe, switched to ear probe. SpO2 83-89 at toilet. Pt ind with pericares, maintaining balance while leaning L and R at toilet to reach back for hygiene. Upon return bed, SpO2 quickly recovered to 97 sitting at EOB. Bed>chair with FWW and SBA. Pt tolerated standing at chair ~1' with assist to don brief. Stand>sit at chair with FWW and CGA. Pt sitting up in chair upon departure, all needs in reach. BP stable pre and post mobility.    Barriers to return to prior living situation: new O2 needs, current level of assist  Recommendations for discharge: TCU  Rationale for recommendations: Pt desats and fatigues quickly with mobility today. Recommend TCU based on mobility today. Pt may be candidate to return home with Home PT if progresses ambulation distance with stable vitals. Will continue to update.       Entered by: Kathi Jolley 11/29/2019 5:22 PM       "

## 2019-11-29 NOTE — PROGRESS NOTES
[facility].  Inpatient Cardiology Progress Note.  Date of Service: 11/27/2019       INTERVAL HISTORY:  Has had a very good diuretic response with IV Bumex infusion.  Her weight is down by 13 pounds, her breathing is improved, lower extremity edema improved, creatinine stable at 0.8 with a BUN of 15, potassium 4.9 (on replacement therapy).  BP is 113/74, heart rate 80 bpm.    However, she still has bilateral rales up to mid lungs on exam, and 3+ bilateral leg edema up to her thighs.  She has telangiectasia on her face typical of scleroderma.  She is significantly physically deconditioned and the most she has done in the last 2 days is walk from her bed to the bathroom and back.    DIAGNOSES:  1.  Decompensated heart failure with preserved ejection fraction, possibly precipitated by Sildenafil pulmonary vasodilator therapy.  2.  Scleroderma and rheumatoid arthritis, on disease-modifying immunosuppressive agents.  3.  Moderate pulmonary arterial hypertension in the setting of scleroderma (mean PA 34 mmHg).   4.  Right and left-sided elevated filling pressures on recent right heart catheterization (04/2019).   5.  Permanent atrial fibrillation -- rate controlled on apixaban anticoagulation strategy.   6.  Hypertension.   7.  Mild anemia.   8.  Elderly frailty.     ASSESSMENT/PLAN:  Responding well to IV bumetanide infusion.  In sinus rhythm.  I suspect she is still has 10 to 15 pounds of extra volume for diuresis.  She will require another 24 to 48 hours of IV bumetanide, followed by oral diuretic therapy in the hospital for a day.  She is frail, will likely go to transitional care unit.    1.  Bumetanide 1 mg bolus followed by infusion at 0.25 mg/h.  2.  Monitor renal function.  3.  Serum potassium is 4.9.  Stop supplements.  4.  Continue nifedipine and sildenafil 20 mg 3 times daily that was started by Dr. Marquez for her pulmonary arterial hypertension.  5.  Inpatient physical therapy to address  deconditioning.  6.  Cardiology will follow.      Tami Lopez MD [facility]  Cardiology.        VITAL SIGNS:  Temp: 97.7  F (36.5  C) Temp src: Oral BP: 113/74   Heart Rate: 82 Resp: 16 SpO2: 91 % O2 Device: Nasal cannula Oxygen Delivery: 3 LPM  11/24 0700 - 11/29 0659  In: 3647.6 [P.O.:3580; I.V.:67.6]  Out: 62688 [Urine:31717]  Net: -8554.4  Vitals:    11/25/19 1646 11/25/19 2350 11/26/19 0538 11/27/19 0022   Weight: 103.4 kg (228 lb) 104 kg (229 lb 3.2 oz) 101.6 kg (224 lb) 99.5 kg (219 lb 6.4 oz)    11/27/19 0638 11/29/19 1025   Weight: 98.2 kg (216 lb 8 oz) 95.9 kg (211 lb 6.7 oz)

## 2019-11-29 NOTE — PLAN OF CARE
Heart Failure Care Pathway  GOALS TO BE MET BEFORE DISCHARGE:    1. Decrease congestion and/or edema with diuretic therapy to achieve near      optimal volume status.            Dyspnea improved:  Yes            Edema improved:     Yes        Net I/O and Weights since admission:          10/30 2300 - 11/29 2259  In: 4607.6 [P.O.:4540; I.V.:67.6]  Out: 92560 [Urine:09239]  Net: -8494.4            Vitals:    11/25/19 1646 11/25/19 2350 11/26/19 0538 11/27/19 0022   Weight: 103.4 kg (228 lb) 104 kg (229 lb 3.2 oz) 101.6 kg (224 lb) 99.5 kg (219 lb 6.4 oz)    11/27/19 0638 11/29/19 1025   Weight: 98.2 kg (216 lb 8 oz) 95.9 kg (211 lb 6.7 oz)       2.  O2 sats > 92% on RA or at prior home O2 therapy level.          Current oxygenation status:       SpO2: 93 %         O2 Device: Nasal cannula,  Oxygen Delivery: 3 LPM         Able to wean O2 this shift to keep sats > 92%:  No, please explain: still remains on 2-3 L NC. HANEY        Does patient use Home O2? No    3.  Tolerates ambulation and mobility near baseline: No, please explain: needs to increase ambulation        How many times did the patient ambulate with nursing staff this shift? 2    Please review the Heart Failure Care Pathway for additional HF goal outcomes.    Ebony Montero RN RN  11/29/2019   VSS tele: A fib rates in 100's. Lymphedema wraps on. Purewick in place. Continues on bumex drip. TCU at discharge. Will continue to monitor.

## 2019-11-29 NOTE — PLAN OF CARE
"Discharge Planner OT   Patient plan for discharge: home  Current status: Pt seen for BLE edema mgmt. BLE's cleansed and lotion applied from base of toes to just below knee. Applied TG soft from base of toes to just below knees then 8 cm wraps over feet/ankles, followed by 12 cm wraps. Pt tolerated well and trained in light \"sweeps\" for fluid mobilization along w/ BLE HEP for continued fluid reduction.     Lymph wraps turned over to nursing and instruction handout taped to pt's white board. RN aware.   Will continue to see pt for OT while hospitalized.  Barriers to return to prior living situation: decreased act tolerance, level of A for IADL's  Recommendations for discharge: : Home with spouse assist for household chores. HH OT/PH w/ lymph services  Rationale for recommendations: Anticipate that patient will progress with activity tolerance and level of (I) while here, but would benefit from home therapies to maximize (I), safety and to continue lymph treatment. It would be a taxing effort to leave the home and she would not be able to leave without assist of another person and AD.       Entered by: Lu Reis 11/29/2019 10:35 AM       "

## 2019-11-30 ENCOUNTER — APPOINTMENT (OUTPATIENT)
Dept: OCCUPATIONAL THERAPY | Facility: CLINIC | Age: 79
DRG: 291 | End: 2019-11-30
Payer: MEDICARE

## 2019-11-30 ENCOUNTER — APPOINTMENT (OUTPATIENT)
Dept: PHYSICAL THERAPY | Facility: CLINIC | Age: 79
DRG: 291 | End: 2019-11-30
Payer: MEDICARE

## 2019-11-30 LAB
ANION GAP SERPL CALCULATED.3IONS-SCNC: 4 MMOL/L (ref 3–14)
BUN SERPL-MCNC: 11 MG/DL (ref 7–30)
CALCIUM SERPL-MCNC: 9 MG/DL (ref 8.5–10.1)
CHLORIDE SERPL-SCNC: 95 MMOL/L (ref 94–109)
CO2 SERPL-SCNC: 36 MMOL/L (ref 20–32)
CREAT SERPL-MCNC: 0.62 MG/DL (ref 0.52–1.04)
GFR SERPL CREATININE-BSD FRML MDRD: 85 ML/MIN/{1.73_M2}
GLUCOSE SERPL-MCNC: 88 MG/DL (ref 70–99)
POTASSIUM SERPL-SCNC: 2.9 MMOL/L (ref 3.4–5.3)
POTASSIUM SERPL-SCNC: 4.1 MMOL/L (ref 3.4–5.3)
SODIUM SERPL-SCNC: 135 MMOL/L (ref 133–144)

## 2019-11-30 PROCEDURE — 25000132 ZZH RX MED GY IP 250 OP 250 PS 637: Mod: GY | Performed by: INTERNAL MEDICINE

## 2019-11-30 PROCEDURE — 36415 COLL VENOUS BLD VENIPUNCTURE: CPT | Performed by: HOSPITALIST

## 2019-11-30 PROCEDURE — 21000001 ZZH R&B HEART CARE

## 2019-11-30 PROCEDURE — 25000132 ZZH RX MED GY IP 250 OP 250 PS 637: Mod: GY | Performed by: HOSPITALIST

## 2019-11-30 PROCEDURE — 80048 BASIC METABOLIC PNL TOTAL CA: CPT | Performed by: PHYSICIAN ASSISTANT

## 2019-11-30 PROCEDURE — 36415 COLL VENOUS BLD VENIPUNCTURE: CPT | Performed by: PHYSICIAN ASSISTANT

## 2019-11-30 PROCEDURE — 99233 SBSQ HOSP IP/OBS HIGH 50: CPT | Performed by: INTERNAL MEDICINE

## 2019-11-30 PROCEDURE — 97116 GAIT TRAINING THERAPY: CPT | Mod: GP

## 2019-11-30 PROCEDURE — 97530 THERAPEUTIC ACTIVITIES: CPT | Mod: GP

## 2019-11-30 PROCEDURE — 25000125 ZZHC RX 250: Performed by: INTERNAL MEDICINE

## 2019-11-30 PROCEDURE — 84132 ASSAY OF SERUM POTASSIUM: CPT | Performed by: HOSPITALIST

## 2019-11-30 PROCEDURE — 25000128 H RX IP 250 OP 636: Performed by: INTERNAL MEDICINE

## 2019-11-30 PROCEDURE — 99232 SBSQ HOSP IP/OBS MODERATE 35: CPT | Performed by: HOSPITALIST

## 2019-11-30 PROCEDURE — 97535 SELF CARE MNGMENT TRAINING: CPT | Mod: GO

## 2019-11-30 RX ORDER — POTASSIUM CL/LIDO/0.9 % NACL 10MEQ/0.1L
10 INTRAVENOUS SOLUTION, PIGGYBACK (ML) INTRAVENOUS
Status: DISCONTINUED | OUTPATIENT
Start: 2019-11-30 | End: 2019-12-05 | Stop reason: HOSPADM

## 2019-11-30 RX ORDER — POTASSIUM CHLORIDE 29.8 MG/ML
20 INJECTION INTRAVENOUS
Status: DISCONTINUED | OUTPATIENT
Start: 2019-11-30 | End: 2019-12-05 | Stop reason: HOSPADM

## 2019-11-30 RX ORDER — BUMETANIDE 0.25 MG/ML
1 INJECTION INTRAMUSCULAR; INTRAVENOUS ONCE
Status: COMPLETED | OUTPATIENT
Start: 2019-11-30 | End: 2019-11-30

## 2019-11-30 RX ORDER — POTASSIUM CHLORIDE 7.45 MG/ML
10 INJECTION INTRAVENOUS
Status: DISCONTINUED | OUTPATIENT
Start: 2019-11-30 | End: 2019-12-05 | Stop reason: HOSPADM

## 2019-11-30 RX ORDER — POTASSIUM CHLORIDE 1500 MG/1
40 TABLET, EXTENDED RELEASE ORAL DAILY
Status: DISCONTINUED | OUTPATIENT
Start: 2019-11-30 | End: 2019-12-01

## 2019-11-30 RX ORDER — POTASSIUM CHLORIDE 1.5 G/1.58G
20-40 POWDER, FOR SOLUTION ORAL
Status: DISCONTINUED | OUTPATIENT
Start: 2019-11-30 | End: 2019-12-05 | Stop reason: HOSPADM

## 2019-11-30 RX ORDER — POTASSIUM CHLORIDE 1500 MG/1
20-40 TABLET, EXTENDED RELEASE ORAL
Status: DISCONTINUED | OUTPATIENT
Start: 2019-11-30 | End: 2019-12-05 | Stop reason: HOSPADM

## 2019-11-30 RX ADMIN — FOLIC ACID 1000 MCG: 1 TABLET ORAL at 09:26

## 2019-11-30 RX ADMIN — MULTIPLE VITAMINS W/ MINERALS TAB 1 TABLET: TAB at 09:26

## 2019-11-30 RX ADMIN — MELATONIN 5 MG TABLET 5 MG: at 22:59

## 2019-11-30 RX ADMIN — BUMETANIDE 0.25 MG/HR: 0.25 INJECTION INTRAMUSCULAR; INTRAVENOUS at 10:16

## 2019-11-30 RX ADMIN — APIXABAN 5 MG: 5 TABLET, FILM COATED ORAL at 21:02

## 2019-11-30 RX ADMIN — POTASSIUM CHLORIDE 40 MEQ: 1500 TABLET, EXTENDED RELEASE ORAL at 16:09

## 2019-11-30 RX ADMIN — APIXABAN 5 MG: 5 TABLET, FILM COATED ORAL at 09:26

## 2019-11-30 RX ADMIN — ACETAMINOPHEN 650 MG: 325 TABLET, FILM COATED ORAL at 16:09

## 2019-11-30 RX ADMIN — OMEPRAZOLE 20 MG: 20 CAPSULE, DELAYED RELEASE ORAL at 06:55

## 2019-11-30 RX ADMIN — BUMETANIDE 1 MG: 0.25 INJECTION INTRAMUSCULAR; INTRAVENOUS at 11:27

## 2019-11-30 RX ADMIN — SILDENAFIL 20 MG: 20 TABLET, FILM COATED ORAL at 16:10

## 2019-11-30 RX ADMIN — CHOLECALCIFEROL TAB 10 MCG (400 UNIT) 1600 UNITS: 10 TAB at 09:26

## 2019-11-30 RX ADMIN — POTASSIUM CHLORIDE 40 MEQ: 1500 TABLET, EXTENDED RELEASE ORAL at 13:53

## 2019-11-30 RX ADMIN — ACETAMINOPHEN 650 MG: 325 TABLET, FILM COATED ORAL at 23:00

## 2019-11-30 RX ADMIN — SILDENAFIL 20 MG: 20 TABLET, FILM COATED ORAL at 21:02

## 2019-11-30 RX ADMIN — PREDNISOLONE ACETATE-GATIFLOXACIN-BROMFENAC 1 DROP: .75; 5; 1 SUSPENSION/ DROPS OPHTHALMIC at 09:33

## 2019-11-30 RX ADMIN — Medication 1 CAPSULE: at 09:26

## 2019-11-30 RX ADMIN — POTASSIUM CHLORIDE 40 MEQ: 1500 TABLET, EXTENDED RELEASE ORAL at 09:26

## 2019-11-30 RX ADMIN — METOPROLOL SUCCINATE 25 MG: 25 TABLET, EXTENDED RELEASE ORAL at 09:26

## 2019-11-30 RX ADMIN — OMEPRAZOLE 20 MG: 20 CAPSULE, DELAYED RELEASE ORAL at 16:10

## 2019-11-30 RX ADMIN — NIFEDIPINE 60 MG: 60 TABLET, FILM COATED, EXTENDED RELEASE ORAL at 09:26

## 2019-11-30 RX ADMIN — SILDENAFIL 20 MG: 20 TABLET, FILM COATED ORAL at 09:26

## 2019-11-30 RX ADMIN — LEVOTHYROXINE SODIUM 75 MCG: 75 TABLET ORAL at 06:55

## 2019-11-30 RX ADMIN — SIMVASTATIN 40 MG: 40 TABLET, FILM COATED ORAL at 21:02

## 2019-11-30 ASSESSMENT — ACTIVITIES OF DAILY LIVING (ADL)
ADLS_ACUITY_SCORE: 18

## 2019-11-30 ASSESSMENT — MIFFLIN-ST. JEOR: SCORE: 1380.38

## 2019-11-30 NOTE — PROGRESS NOTES
[facility]    Hospitalist Progress Note    Assessment & Plan   Jaqueline Canales is a 78 year old female with PMHx of chronic afib on anticoagulation with apixaban, chronic diastolic CHF with chronic bilateral lymphedema, pulmonary hypertension, hypothyroidism and rheumatoid arthritis who was admitted on 11/25/2019 for evaluation of dyspnea and hypoxia with concern for suspected CHF exacerbation.      Acute Hypoxic Respiratory Failure dt Acute Diastolic CHF exacerbation  Chronic Bilateral lymphedema  Last echo in 9/2018 showed EF 55-60% and moderate pulmonary hypertension; diastolic function not assessed. Underwent cardiac cath in 4/2019 which showed elevated biventricular filing pressures and moderate to severe primary pulmonary hypertension. Has chronic bilateral LE lymphedema for which she takes Lasix. Was seen by her PCP last month and treated for cellulitis. Was initially seen in clinic and observed to have increased LE edema and dyspnea. Referred to ED. Was afebrile, HR 100s with SBP 90s. Needing 2L NC on admission. ProBNP 3900. BMP unremarkable (Cr 0.75), WBC nl. CXR showed increased interstitial prominence. Given Lasix 40mg IV x1 in ED. Admitted to [facility]. Started aggressive diuresis with Lasix 60mg IV TID. Echo this stay showed EF 50-55% with findings of severe biatrial enlargement, mod dilated RV and mildly decreased RVSF, mod to severe TR and pulmonary hypertension. Cardiology following -- diuretics changed to Bumex gtt on 11/26.   -- cont diuresis with Bumex gtt; net negative 1.7L past 24 hours and weight down 2 pounds  -- scheduled K supplement stopped 11/29 due to borderline high K; now 2.9 this AM, will start KCl 40 mEq daily in addition to K protocol  -- continue lymphedema wraps  -- wean off supplemental O2 as needed  -- cardiology following, appreciate assistance with care  -- therapies currently recommending TCU, patient is open to this - will consult social work     Chronic A. Fib,  on anticoagulation  Moderate/Severe pulmonary hypertension  Hyperlipidemia  Hypertension  PTA meds: metoprolol XL 25mg daily, nifedipine ER 60mg daily, sildenafil 20mg TID, simvastatin 40mg HS, Lasix 20mg BID, apixaban 5mg BID  -- intermittently tachy; monitor  -- conts on Toprol XL and nifedipine  -- conts on sildenafil  -- conts on apixaban  -- conts on statin  -- diuresis as above    Hypothyroidism  Chronic and stable on levothyroxine     Rheumatoid arthritis  Scleroderma  Chronic and stable on methotrexate (takes on Wednesdays). Conts on folic acid.  Can resume Remicade infusions at discharge       DVT Prophylaxis: anticoagulated on apixaban as above  Code Status: Full Code -- per discussions on admission, she does not want to remain on artifical life support of she were to have no quality of life    Disposition: Anticipate discharge home in the next 2d, pending adequate diuresis.    Tyrese Chito    Interval History   She reports no change in symptoms, has minimal activity.  Mild lightheadedness with ambulation.  No chest pain/pressure, moving bowels well.  Is open to TCU if indicated.    -Data reviewed today: I reviewed all new labs and imaging results over the last 24 hours. I personally reviewed no images or EKG's today.    Physical Exam   Temp: 98.4  F (36.9  C) Temp src: Oral BP: 109/59 Pulse: 86 Heart Rate: 90 Resp: 18 SpO2: 95 % O2 Device: Nasal cannula Oxygen Delivery: 3 LPM  Vitals:    11/27/19 0638 11/29/19 1025 11/30/19 0251   Weight: 98.2 kg (216 lb 8 oz) 95.9 kg (211 lb 6.7 oz) 95.2 kg (209 lb 14.1 oz)     Vital Signs with Ranges  Temp:  [97.5  F (36.4  C)-98.4  F (36.9  C)] 98.4  F (36.9  C)  Pulse:  [83-86] 86  Heart Rate:  [90-91] 90  Resp:  [16-19] 18  BP: (104-109)/(59-71) 109/59  SpO2:  [92 %-95 %] 95 %  I/O last 3 completed shifts:  In: 1320 [P.O.:1320]  Out: 3050 [Urine:3050]    Constitutional: Well nourished female in NAD, lying in bed  Respiratory: bibasilar crackles persist but wheezing  has essentially resolved  Cardiovascular:  Irregular rhythm, normal rate, no MGR, 1-2+bilateral LE edema, trace hip edema   GI: Soft, normal bowel sounds, nontender  Other:  alert and appropriate, cranial nerves grossly intact    Medications     bumetanide 0.25 mg/hr (11/29/19 1613)     - MEDICATION INSTRUCTIONS -       - MEDICATION INSTRUCTIONS -         apixaban ANTICOAGULANT  5 mg Oral BID     cholecalciferol  1,600 Units Oral Daily     folic acid  1,000 mcg Oral Daily     levothyroxine  75 mcg Oral Daily     methotrexate  25 mg Oral Weekly     metoprolol succinate ER  25 mg Oral Daily     multivitamin w/minerals  1 tablet Oral Daily     NIFEdipine ER  60 mg Oral Daily     omeprazole  20 mg Oral BID AC     potassium chloride  40 mEq Oral Daily     Prednisolon-Gatiflox-Bromfenac  1 drop Right Eye Daily     psyllium  1 capsule Oral Daily     sildenafil  20 mg Oral TID     simvastatin  40 mg Oral At Bedtime     sodium chloride (PF)  3 mL Intracatheter Q8H       Data   Recent Labs   Lab 11/30/19  0325 11/29/19  0535 11/28/19  0533 11/27/19  0535  11/25/19  1654   WBC  --   --   --   --   --  6.3   HGB  --   --   --   --   --  11.6*   MCV  --   --   --   --   --  92   PLT  --   --   --   --   --  166    135 138 137   < > 139   POTASSIUM 2.9* 4.9 3.5 3.4   < > 3.5   CHLORIDE 95 99 100 100   < > 105   CO2 36* 33* 34* 31   < > 29   BUN 11 15 11 12   < > 16   CR 0.62 0.81 0.69 0.67   < > 0.75   ANIONGAP 4 3 4 6   < > 5   TU 9.0 8.9 8.9 9.3   < > 9.3   GLC 88 100* 103* 109*   < > 104*   ALBUMIN  --   --   --  3.7  --   --    PROTTOTAL  --   --   --  7.6  --   --    BILITOTAL  --   --   --  1.2  --   --    ALKPHOS  --   --   --  135  --   --    ALT  --   --   --  15  --   --    AST  --   --   --  26  --   --    TROPI  --   --   --   --   --  <0.015    < > = values in this interval not displayed.       No results found for this or any previous visit (from the past 24 hour(s)).

## 2019-11-30 NOTE — CONSULTS
Care Transition Initial Assessment -      Met with: Patient and Family  ()  Active Problems:    Acute CHF (congestive heart failure) (H)       DATA  Lives With: spouse   Living Arrangements: house  Quality of Family Relationships: involved, supportive  Description of Support System: Supportive, Involved  Who is your support system?:   Support Assessment: Adequate family and caregiver support.   Identified issues/concerns regarding health management:       Quality of Family Relationships: involved, supportive  Transportation Anticipated: family or friend will provide    Per care transitions consult for discharge planning.  Patient was admitted on 11-25-19 with CHF exacerbation.  The tentative date of discharge is 12-2-19.  Reviewed chart and spoke with patient and  regarding discharge plans.  Per patient and  report, patient lives with her  in a house with 2 steps to enter.  There are 10 steps to the basement.   Patient uses no assistive devices within the home and she uses a straight cane for community ambulation.  Patient has a shower chair and grab bars in the bathroom.  Reviewed the therapy discharge recommendations of tcu placement on discharge and patient and  are in agreement.  Patient and  are asking for referrals to be sent to [facility] and [facility].  Referrals sent, via discharge on the double, to check bed availability.      Pt/family was given the Medicare Compare list for SNF, with associated star ratings to assist with choice for referrals/discharge planning Yes    Education was given to pt/family that star ratings are updated/maintained by Medicare and can be reviewed by visiting www.medicare.gov Yes  (patient and  declined)    Discussed transportation costs with patient and .  If patient goes to [facility] the transportation costs will be a non issue.  If patient goes to [facility] patient's  may transport, but he may have  [facility] transport.    ASSESSMENT  Cognitive Status:  awake and alert  Concerns to be addressed: discharge planning, tcu placement on discharge.     PLAN  Financial costs for the patient includes possible transportation costs.  Patient given options and choices for discharge TCU choices.  Patient/family is agreeable to the plan?  Yes  Transportation/person available to transport on day of discharge  is TBD and have they been notified/set up TBD  Patient Goals and Preferences: TCU on discharge.  Patient anticipates discharging to:  TCU.    Will confirm a bed, continue to follow, and assist with a safe discharge plan.      BRADEN Brown, [facility]  [phone]  [facility]        Addendum:  D:  Received a call back from [facility].  They have a bed available for patient on Monday.  Awaiting a return call from [facility].  P:  Will continue to follow.      BRADEN Brown, [facility]  [phone]  [facility]

## 2019-11-30 NOTE — PLAN OF CARE
Pt a/ox4, forgetful. Denies pain. Tachy at times. On 3L oxygen. Tele Afib CVR/RVR. LS dim. Bumex drip infusing. Up with assist x1 walker and gb. Purewick in place, pt having high output. RUE limb alert.

## 2019-11-30 NOTE — PLAN OF CARE
Discharge Planner PT   Patient plan for discharge: Home  Current status: Patient sitting in chair upon arrival of therapist, agreeable to working with PT. SpO2 monitored throughout session, greater than 93% throughout on 3L O2. Sit<>stand from chair with FWW and SBA. Patient ambulated 40 feet with FWW and SBA, short shuffling steps taken at times. Patient noting unsteadiness/shakiness after gait, therefore limited distance due to this. Returned to chair and completed seated exercises to increase prolonged activity for improved activity tolerance. Educated on benefits of exercises to also assist with edema management to assist in moving fluid. All needs in reach and chair alarm on upon therapist exit.   Barriers to return to prior living situation: Current level of A, decreased tolerance to activity, falls risk   Recommendations for discharge: TCU  Rationale for recommendations: Patient continues to be limited in tolerance to activity 2/2 to weakness and fatigue, thus patient continues to require increased level of A. Patient is also at increased risk for falls. Patient would benefit from continued skilled therapy to further improve strength, balance, and independence with mobility and ambulation to address functional limitations and decrease falls risk.         Entered by: Yelena Ogden 11/30/2019 11:15 AM

## 2019-11-30 NOTE — PLAN OF CARE
Discharge Planner OT   Patient plan for discharge: Home    Current status: Pt required min A with FWW and DME for toilet transfer and LE dressing mgmt. O2 sats dropped to 80% on room air during activity; thus, reapplied 2L with slow recovery.     Barriers to return to prior living situation: Current level of A required; Supplemental O2 needs; Fall risk; Stairs to enter and within home; Bathtub    Recommendations for discharge: TCU    Rationale for recommendations: Pt continues to be limited by impaired cognition and safety, decreased balance and activity tolerance, and pain; however, making gains with OT intervention. If pt declines TCU stay, recommend home with A for ADLs and transfers from family, home RN, and home OT.        Entered by: Mohamud Salvador 11/30/2019 9:18 AM

## 2019-12-01 ENCOUNTER — APPOINTMENT (OUTPATIENT)
Dept: OCCUPATIONAL THERAPY | Facility: CLINIC | Age: 79
DRG: 291 | End: 2019-12-01
Payer: MEDICARE

## 2019-12-01 ENCOUNTER — APPOINTMENT (OUTPATIENT)
Dept: PHYSICAL THERAPY | Facility: CLINIC | Age: 79
DRG: 291 | End: 2019-12-01
Payer: MEDICARE

## 2019-12-01 LAB
ANION GAP SERPL CALCULATED.3IONS-SCNC: 2 MMOL/L (ref 3–14)
BUN SERPL-MCNC: 13 MG/DL (ref 7–30)
CALCIUM SERPL-MCNC: 9 MG/DL (ref 8.5–10.1)
CHLORIDE SERPL-SCNC: 95 MMOL/L (ref 94–109)
CO2 SERPL-SCNC: 38 MMOL/L (ref 20–32)
CREAT SERPL-MCNC: 0.65 MG/DL (ref 0.52–1.04)
GFR SERPL CREATININE-BSD FRML MDRD: 84 ML/MIN/{1.73_M2}
GLUCOSE SERPL-MCNC: 88 MG/DL (ref 70–99)
MAGNESIUM SERPL-MCNC: 1.8 MG/DL (ref 1.6–2.3)
POTASSIUM SERPL-SCNC: 3.2 MMOL/L (ref 3.4–5.3)
POTASSIUM SERPL-SCNC: 3.9 MMOL/L (ref 3.4–5.3)
SODIUM SERPL-SCNC: 135 MMOL/L (ref 133–144)

## 2019-12-01 PROCEDURE — 25000132 ZZH RX MED GY IP 250 OP 250 PS 637: Mod: GY | Performed by: HOSPITALIST

## 2019-12-01 PROCEDURE — 25000128 H RX IP 250 OP 636: Performed by: INTERNAL MEDICINE

## 2019-12-01 PROCEDURE — 83735 ASSAY OF MAGNESIUM: CPT | Performed by: HOSPITALIST

## 2019-12-01 PROCEDURE — 36415 COLL VENOUS BLD VENIPUNCTURE: CPT | Performed by: PHYSICIAN ASSISTANT

## 2019-12-01 PROCEDURE — 36415 COLL VENOUS BLD VENIPUNCTURE: CPT | Performed by: HOSPITALIST

## 2019-12-01 PROCEDURE — 99232 SBSQ HOSP IP/OBS MODERATE 35: CPT | Performed by: HOSPITALIST

## 2019-12-01 PROCEDURE — 97535 SELF CARE MNGMENT TRAINING: CPT | Mod: GO | Performed by: OCCUPATIONAL THERAPIST

## 2019-12-01 PROCEDURE — 25000132 ZZH RX MED GY IP 250 OP 250 PS 637: Mod: GY | Performed by: INTERNAL MEDICINE

## 2019-12-01 PROCEDURE — 97530 THERAPEUTIC ACTIVITIES: CPT | Mod: GP | Performed by: PHYSICAL THERAPIST

## 2019-12-01 PROCEDURE — 80048 BASIC METABOLIC PNL TOTAL CA: CPT | Performed by: PHYSICIAN ASSISTANT

## 2019-12-01 PROCEDURE — 99233 SBSQ HOSP IP/OBS HIGH 50: CPT | Performed by: INTERNAL MEDICINE

## 2019-12-01 PROCEDURE — 97116 GAIT TRAINING THERAPY: CPT | Mod: GP | Performed by: PHYSICAL THERAPIST

## 2019-12-01 PROCEDURE — 84132 ASSAY OF SERUM POTASSIUM: CPT | Performed by: HOSPITALIST

## 2019-12-01 PROCEDURE — 21000001 ZZH R&B HEART CARE

## 2019-12-01 RX ORDER — POTASSIUM CHLORIDE 1500 MG/1
40 TABLET, EXTENDED RELEASE ORAL 2 TIMES DAILY
Status: DISCONTINUED | OUTPATIENT
Start: 2019-12-01 | End: 2019-12-03

## 2019-12-01 RX ORDER — BUMETANIDE 0.25 MG/ML
1 INJECTION INTRAMUSCULAR; INTRAVENOUS ONCE
Status: COMPLETED | OUTPATIENT
Start: 2019-12-01 | End: 2019-12-01

## 2019-12-01 RX ADMIN — MULTIPLE VITAMINS W/ MINERALS TAB 1 TABLET: TAB at 08:33

## 2019-12-01 RX ADMIN — OMEPRAZOLE 20 MG: 20 CAPSULE, DELAYED RELEASE ORAL at 15:35

## 2019-12-01 RX ADMIN — POTASSIUM CHLORIDE 40 MEQ: 1500 TABLET, EXTENDED RELEASE ORAL at 21:30

## 2019-12-01 RX ADMIN — FOLIC ACID 1000 MCG: 1 TABLET ORAL at 08:33

## 2019-12-01 RX ADMIN — NIFEDIPINE 60 MG: 60 TABLET, FILM COATED, EXTENDED RELEASE ORAL at 08:33

## 2019-12-01 RX ADMIN — POTASSIUM CHLORIDE 40 MEQ: 1500 TABLET, EXTENDED RELEASE ORAL at 08:32

## 2019-12-01 RX ADMIN — APIXABAN 5 MG: 5 TABLET, FILM COATED ORAL at 08:33

## 2019-12-01 RX ADMIN — POTASSIUM CHLORIDE 40 MEQ: 1500 TABLET, EXTENDED RELEASE ORAL at 13:29

## 2019-12-01 RX ADMIN — OMEPRAZOLE 20 MG: 20 CAPSULE, DELAYED RELEASE ORAL at 06:51

## 2019-12-01 RX ADMIN — SILDENAFIL 20 MG: 20 TABLET, FILM COATED ORAL at 21:30

## 2019-12-01 RX ADMIN — BUMETANIDE 1 MG: 0.25 INJECTION INTRAMUSCULAR; INTRAVENOUS at 11:00

## 2019-12-01 RX ADMIN — POTASSIUM CHLORIDE 20 MEQ: 1500 TABLET, EXTENDED RELEASE ORAL at 17:50

## 2019-12-01 RX ADMIN — LEVOTHYROXINE SODIUM 75 MCG: 75 TABLET ORAL at 06:51

## 2019-12-01 RX ADMIN — Medication 1 CAPSULE: at 08:32

## 2019-12-01 RX ADMIN — PREDNISOLONE ACETATE-GATIFLOXACIN-BROMFENAC 1 DROP: .75; 5; 1 SUSPENSION/ DROPS OPHTHALMIC at 08:37

## 2019-12-01 RX ADMIN — APIXABAN 5 MG: 5 TABLET, FILM COATED ORAL at 21:30

## 2019-12-01 RX ADMIN — SILDENAFIL 20 MG: 20 TABLET, FILM COATED ORAL at 15:35

## 2019-12-01 RX ADMIN — POTASSIUM CHLORIDE 40 MEQ: 1500 TABLET, EXTENDED RELEASE ORAL at 06:51

## 2019-12-01 RX ADMIN — SIMVASTATIN 40 MG: 40 TABLET, FILM COATED ORAL at 21:30

## 2019-12-01 RX ADMIN — CHOLECALCIFEROL TAB 10 MCG (400 UNIT) 1600 UNITS: 10 TAB at 08:32

## 2019-12-01 RX ADMIN — SILDENAFIL 20 MG: 20 TABLET, FILM COATED ORAL at 08:33

## 2019-12-01 RX ADMIN — METOPROLOL SUCCINATE 25 MG: 25 TABLET, EXTENDED RELEASE ORAL at 08:33

## 2019-12-01 RX ADMIN — MELATONIN 5 MG TABLET 5 MG: at 21:32

## 2019-12-01 ASSESSMENT — ACTIVITIES OF DAILY LIVING (ADL)
ADLS_ACUITY_SCORE: 18

## 2019-12-01 ASSESSMENT — MIFFLIN-ST. JEOR: SCORE: 1351.45

## 2019-12-01 NOTE — PLAN OF CARE
Discharge Planner PT   Patient plan for discharge: Home  Current status: Pt on 2L O2 via NC with SpO2 92%. Pt requires min-modA for bed mobility. Pt transfers sit<>stand to FWW with CGA, two attempts to stand. Pt ambulated 75' with FWW and CGA. Desaturates to 84% on RA with activity, improves to >90% with 2 min on 2L O2 via NC.  Barriers to return to prior living situation: Current level of assist, decreased activity tolerance, hypoxic with activity, weakness, falls risk   Recommendations for discharge: TCU  Rationale for recommendations: Pt is below baseline and would benefit from continued PT while IP and at TCU to improve functional strength, balance, activity tolerance, safety and IND with functional mobility and reduce falls risk prior to returning home.       Entered by: Deepali Valencia 12/01/2019 1:00 PM

## 2019-12-01 NOTE — PLAN OF CARE
Discharge Planner OT   Patient plan for discharge: Home  Current status: Pt up in chair, willing to work in bathroom for grooming and hygiene tasks.  Pt slow to move but cooperative, took extra time for the transfer.  Stood to complete 3 tasks on room air with SBA and verbal cues.  O2 sats at 93% on 2L O2.  Dropped to low 80s after standing at sink, slowly came up with addition of O2.  Pt set up for breakfast at end of session  Barriers to return to prior living situation: Current level of A required; Supplemental O2 needs; Fall risk; Stairs to enter and within home; Bathtub  Recommendations for discharge: TCU  Rationale for recommendations: Pt continues to be limited by impaired cognition and safety, decreased balance and activity tolerance, and pain; however, making gains with OT intervention. If pt declines TCU stay, recommend home with A for ADLs and transfers from family, home RN, and home OT.        Entered by: Ilan Yao 12/01/2019 9:16 AM

## 2019-12-01 NOTE — PLAN OF CARE
[id number]: A&Ox4. VSS on 3L NC. Denies CP. LS crackles in bases. HANEY. BLE +2 edema, lymph wraps in place. On bumex gtt @ 1. Purewick in place. K 2.9, replaced per protocol, recheck showed 4.1. Using prn tylenol for sore legs. Tele afib 100s. Up with 1 assist and walker. Plan for dismissal to TCU eventually.

## 2019-12-01 NOTE — PROGRESS NOTES
[facility]    Cardiology Progress Note     Assessment & Plan   1.  Decompensated heart failure with preserved ejection fraction, possibly precipitated by Sildenafil pulmonary vasodilator therapy.  2.  Scleroderma and rheumatoid arthritis, on disease-modifying immunosuppressive agents.  3.  Moderate pulmonary arterial hypertension in the setting of scleroderma (mean PA 34 mmHg).   4.  Right and left-sided elevated filling pressures on recent right heart catheterization (04/2019).   5.  Permanent atrial fibrillation -- rate controlled on apixaban anticoagulation strategy.   6.  Hypertension.   7.  Mild anemia.   8.  Elderly frailty.      ASSESSMENT/PLAN:  Responding well to IV bumetanide infusion.  In sinus rhythm.  I suspect she is still has 10 pounds of extra volume for diuresis.  She will require another 24 to 48 hours of IV bumetanide, followed by oral diuretic therapy in the hospital for a day.  She is frail and very deconditioned, will likely go to transitional care unit.     1.  Bumetanide 0.25 mg/h.  (-12.5 L during stay) Goal of -2L today, creatinine stable.  Will give 1mg Bumex bolus again today  2.  Monitor renal function (stable).  3.  Monitor electrolytes  4.  Continue nifedipine and sildenafil 20 mg 3 times daily that was started by Dr. Marquez for her pulmonary arterial hypertension.  5.  Inpatient physical therapy to address deconditioning.  6.  Cardiology will follow.       Everardo Hogue MD    Interval History   Has made progress with diuresis but continues to be short of breath and volume overloaded (improving)    Physical Exam   Temp: 97.2  F (36.2  C) Temp src: Oral BP: 106/64 Pulse: 96 Heart Rate: 94 Resp: 16 SpO2: 97 % O2 Device: Nasal cannula Oxygen Delivery: 2 LPM  Vitals:    11/29/19 1025 11/30/19 0251 12/01/19 0550   Weight: 95.9 kg (211 lb 6.7 oz) 95.2 kg (209 lb 14.1 oz) 92.3 kg (203 lb 8 oz)     Vital Signs with Ranges  Temp:  [97.2  F (36.2  C)-98  F (36.7  C)]  97.2  F (36.2  C)  Pulse:  [96] 96  Heart Rate:  [] 94  Resp:  [16-20] 16  BP: ()/(59-74) 106/64  SpO2:  [86 %-97 %] 97 %  I/O last 3 completed shifts:  In: 745 [P.O.:745]  Out: 3050 [Urine:3050]    GENERAL APPEARANCE: Alert and oriented x3. No acute distress.  SKIN: Inspection of the skin reveals no rashes, ulcerations, warm, dry  NECK: Supple and symmetric.   Difficult to assess but appears elevated JVP  LUNGS: Rales in the bases  CARDIOVASCULAR: S1, S2, regular rate and rhythm without any murmurs, gallops, rubs. The carotid pulses were normal and 2+ bilaterally without bruits. Peripheral pulses were 2+ and symmetric.  ABDOMEN: Soft, non-tender, non-distended with normal bowel sounds.  No ascites noted.  EXTREMITIES: 1-2+ edema to the thighs edema.  NEUROLOGIC:  Normal mood and affect.  Sensation to touch was normal.    Medications     bumetanide 0.25 mg/hr (11/30/19 1016)     - MEDICATION INSTRUCTIONS -       - MEDICATION INSTRUCTIONS -         apixaban ANTICOAGULANT  5 mg Oral BID     cholecalciferol  1,600 Units Oral Daily     folic acid  1,000 mcg Oral Daily     levothyroxine  75 mcg Oral Daily     methotrexate  25 mg Oral Weekly     metoprolol succinate ER  25 mg Oral Daily     multivitamin w/minerals  1 tablet Oral Daily     NIFEdipine ER  60 mg Oral Daily     omeprazole  20 mg Oral BID AC     potassium chloride  40 mEq Oral Daily     Prednisolon-Gatiflox-Bromfenac  1 drop Right Eye Daily     psyllium  1 capsule Oral Daily     sildenafil  20 mg Oral TID     simvastatin  40 mg Oral At Bedtime     sodium chloride (PF)  3 mL Intracatheter Q8H       Data   Results for orders placed or performed during the hospital encounter of 11/25/19 (from the past 24 hour(s))   Care Transition RN/SW IP Consult    Narrative    Linnette Caro LICSW     11/30/2019 12:12 PM  Care Transition Initial Assessment - SW     Met with: Patient and Family  ()  Active Problems:    Acute CHF (congestive heart failure)  (H)       DATA  Lives With: spouse   Living Arrangements: house  Quality of Family Relationships: involved, supportive  Description of Support System: Supportive, Involved  Who is your support system?:   Support Assessment: Adequate family and caregiver support.   Identified issues/concerns regarding health management:       Quality of Family Relationships: involved, supportive  Transportation Anticipated: family or friend will provide    Per care transitions consult for discharge planning.  Patient was   admitted on 11-25-19 with CHF exacerbation.  The tentative date   of discharge is 12-2-19.  Reviewed chart and spoke with patient   and  regarding discharge plans.  Per patient and    report, patient lives with her  in a house with 2 steps to   enter.  There are 10 steps to the basement.   Patient uses no   assistive devices within the home and she uses a straight cane   for community ambulation.  Patient has a shower chair and grab   bars in the bathroom.  Reviewed the therapy discharge   recommendations of tcu placement on discharge and patient and    are in agreement.  Patient and  are asking for   referrals to be sent to [place] and [place].  Referrals sent,   via discharge on the double, to check bed availability.      Pt/family was given the Medicare Compare list for SNF, with   associated star ratings to assist with choice for   referrals/discharge planning Yes    Education was given to pt/family that star ratings are   updated/maintained by Medicare and can be reviewed by visiting   www.medicare.gov Yes  (patient and  declined)    Discussed transportation costs with patient and .  If   patient goes to [place] the transportation costs will be a non   issue.  If patient goes to [place] patient's  may   transport, but he may have [facility] transport.    ASSESSMENT  Cognitive Status:  awake and alert  Concerns to be addressed: discharge  planning, tcu placement on   discharge.     PLAN  Financial costs for the patient includes possible transportation   costs.  Patient given options and choices for discharge TCU choices.  Patient/family is agreeable to the plan?  Yes  Transportation/person available to transport on day of discharge    is TBD and have they been notified/set up TBD  Patient Goals and Preferences: TCU on discharge.  Patient anticipates discharging to:  TCU.    Will confirm a bed, continue to follow, and assist with a safe   discharge plan.      BRADEN Brown, [facility]  [phone]  [facility]        Addendum:  D:  Received a call back from [facility].  They have a bed available   for patient on Monday.  Awaiting a return call from [place].  P:  Will continue to follow.      BRADEN Brown, [facility]  [phone]  [facility]         Potassium   Result Value Ref Range    Potassium 4.1 3.4 - 5.3 mmol/L   Basic metabolic panel   Result Value Ref Range    Sodium 135 133 - 144 mmol/L    Potassium 3.2 (L) 3.4 - 5.3 mmol/L    Chloride 95 94 - 109 mmol/L    Carbon Dioxide 38 (H) 20 - 32 mmol/L    Anion Gap 2 (L) 3 - 14 mmol/L    Glucose 88 70 - 99 mg/dL    Urea Nitrogen 13 7 - 30 mg/dL    Creatinine 0.65 0.52 - 1.04 mg/dL    GFR Estimate 84 >60 mL/min/[1.73_m2]    GFR Estimate If Black >90 >60 mL/min/[1.73_m2]    Calcium 9.0 8.5 - 10.1 mg/dL

## 2019-12-01 NOTE — PLAN OF CARE
Heart Failure Care Pathway  GOALS TO BE MET BEFORE DISCHARGE:    1. Decrease congestion and/or edema with diuretic therapy to achieve near      optimal volume status.            Dyspnea improved:  Yes, but some SOB/HANEY            Edema improved:     Yes but still 2+ bilat LATANYA        Net I/O and Weights since admission:          11/01 2300 - 12/01 2259  In: 6752.6 [P.O.:6685; I.V.:67.6]  Out: 34691 [Urine:68468]  Net: -93246.4            Vitals:    11/25/19 1646 11/25/19 2350 11/26/19 0538 11/27/19 0022   Weight: 103.4 kg (228 lb) 104 kg (229 lb 3.2 oz) 101.6 kg (224 lb) 99.5 kg (219 lb 6.4 oz)    11/27/19 0638 11/29/19 1025 11/30/19 0251 12/01/19 0550   Weight: 98.2 kg (216 lb 8 oz) 95.9 kg (211 lb 6.7 oz) 95.2 kg (209 lb 14.1 oz) 92.3 kg (203 lb 8 oz)       2.  O2 sats > 92% on RA or at prior home O2 therapy level.          Current oxygenation status:       SpO2: 95 %         O2 Device: Nasal cannula,  Oxygen Delivery: 2 LPM         Able to wean O2 this shift to keep sats > 92%:  Yes weaned to 2 L        Does patient use Home O2? No    3.  Tolerates ambulation and mobility near baseline: Yes        How many times did the patient ambulate with nursing staff this shift? 2    Please review the Heart Failure Care Pathway for additional HF goal outcomes.    Ebony Montero RN RN  12/1/2019     Tele: Afib rates 's. Potassium replaced 2x today. Purewick in place. Good urine output. Will continue to monitor.

## 2019-12-01 NOTE — PROGRESS NOTES
[facility]    Hospitalist Progress Note    Assessment & Plan   Jaqueline Canales is a 78 year old female with PMHx of chronic afib on anticoagulation with apixaban, chronic diastolic CHF with chronic bilateral lymphedema, pulmonary hypertension, hypothyroidism and rheumatoid arthritis who was admitted on 11/25/2019 for evaluation of dyspnea and hypoxia with concern for suspected CHF exacerbation.      Acute Hypoxic Respiratory Failure dt Acute Diastolic CHF exacerbation  Chronic Bilateral lymphedema  Last echo in 9/2018 showed EF 55-60% and moderate pulmonary hypertension; diastolic function not assessed. Underwent cardiac cath in 4/2019 which showed elevated biventricular filing pressures and moderate to severe primary pulmonary hypertension. Has chronic bilateral LE lymphedema for which she takes Lasix. Was seen by her PCP last month and treated for cellulitis. Was initially seen in clinic and observed to have increased LE edema and dyspnea. Referred to ED. Was afebrile, HR 100s with SBP 90s. Needing 2L NC on admission. ProBNP 3900. BMP unremarkable (Cr 0.75), WBC nl. CXR showed increased interstitial prominence. Given Lasix 40mg IV x1 in ED. Admitted to [facility]. Started aggressive diuresis with Lasix 60mg IV TID. Echo this stay showed EF 50-55% with findings of severe biatrial enlargement, mod dilated RV and mildly decreased RVSF, mod to severe TR and pulmonary hypertension. Cardiology following -- diuretics changed to Bumex gtt on 11/26.   -- cont diuresis with Bumex gtt, receiving intermittent boluses per Cards; net negative 2.3L past 24 hours and weight trending down  -- increase KCl 40 mEq to BID 12/1 (note became borderline high on tid dosing); K protocol  -- continue lymphedema wraps  -- wean off supplemental O2 as needed  -- cardiology following, appreciate assistance with care  -- therapies currently recommending TCU     Chronic A. Fib, on anticoagulation  Moderate/Severe pulmonary  hypertension  Hyperlipidemia  Hypertension  PTA meds: metoprolol XL 25mg daily, nifedipine ER 60mg daily, sildenafil 20mg TID, simvastatin 40mg HS, Lasix 20mg BID, apixaban 5mg BID  -- intermittently tachy; monitor  -- conts on Toprol XL and nifedipine  -- conts on sildenafil  -- conts on apixaban  -- conts on statin  -- diuresis as above    Hypothyroidism  Chronic and stable on levothyroxine     Rheumatoid arthritis  Scleroderma  Chronic and stable on methotrexate (takes on Wednesdays). Conts on folic acid.  Can resume Remicade infusions at discharge       DVT Prophylaxis: anticoagulated on apixaban as above  Code Status: Full Code -- per discussions on admission, she does not want to remain on artifical life support of she were to have no quality of life    Disposition: Anticipate discharge home in the next 1-2d, pending adequate diuresis and transition to oral diuretics.    Tyrese Dale    Interval History   Reports no change in symptoms.  Had a short walk this AM without any lightheadedness or HANEY.  Edema slowly improving.  No other complaints.    -Data reviewed today: I reviewed all new labs and imaging results over the last 24 hours. I personally reviewed no images or EKG's today.    Physical Exam   Temp: 97.2  F (36.2  C) Temp src: Oral BP: 106/64 Pulse: 96 Heart Rate: 94 Resp: 16 SpO2: 97 % O2 Device: Nasal cannula Oxygen Delivery: 2 LPM  Vitals:    11/29/19 1025 11/30/19 0251 12/01/19 0550   Weight: 95.9 kg (211 lb 6.7 oz) 95.2 kg (209 lb 14.1 oz) 92.3 kg (203 lb 8 oz)     Vital Signs with Ranges  Temp:  [97.2  F (36.2  C)-98  F (36.7  C)] 97.2  F (36.2  C)  Pulse:  [96] 96  Heart Rate:  [] 94  Resp:  [16-20] 16  BP: ()/(59-74) 106/64  SpO2:  [86 %-97 %] 97 %  I/O last 3 completed shifts:  In: 745 [P.O.:745]  Out: 3050 [Urine:3050]    Constitutional: Well nourished female in NAD, sitting in recliner  Respiratory: bibasilar crackles persist, no wheezing or tachypnea  Cardiovascular:  Irregular  rhythm, normal rate, no MGR, 2+bilateral LE edema  GI: Soft, normal bowel sounds  Other:  alert and appropriate, cranial nerves grossly intact    Medications     bumetanide 0.25 mg/hr (11/30/19 1016)     - MEDICATION INSTRUCTIONS -       - MEDICATION INSTRUCTIONS -         apixaban ANTICOAGULANT  5 mg Oral BID     bumetanide  1 mg Intravenous Once     cholecalciferol  1,600 Units Oral Daily     folic acid  1,000 mcg Oral Daily     levothyroxine  75 mcg Oral Daily     methotrexate  25 mg Oral Weekly     metoprolol succinate ER  25 mg Oral Daily     multivitamin w/minerals  1 tablet Oral Daily     NIFEdipine ER  60 mg Oral Daily     omeprazole  20 mg Oral BID AC     potassium chloride  40 mEq Oral Daily     Prednisolon-Gatiflox-Bromfenac  1 drop Right Eye Daily     psyllium  1 capsule Oral Daily     sildenafil  20 mg Oral TID     simvastatin  40 mg Oral At Bedtime     sodium chloride (PF)  3 mL Intracatheter Q8H       Data   Recent Labs   Lab 12/01/19  0552 11/30/19 2014 11/30/19  0325 11/29/19  0535  11/27/19  0535  11/25/19  1654   WBC  --   --   --   --   --   --   --  6.3   HGB  --   --   --   --   --   --   --  11.6*   MCV  --   --   --   --   --   --   --  92   PLT  --   --   --   --   --   --   --  166     --  135 135   < > 137   < > 139   POTASSIUM 3.2* 4.1 2.9* 4.9   < > 3.4   < > 3.5   CHLORIDE 95  --  95 99   < > 100   < > 105   CO2 38*  --  36* 33*   < > 31   < > 29   BUN 13  --  11 15   < > 12   < > 16   CR 0.65  --  0.62 0.81   < > 0.67   < > 0.75   ANIONGAP 2*  --  4 3   < > 6   < > 5   TU 9.0  --  9.0 8.9   < > 9.3   < > 9.3   GLC 88  --  88 100*   < > 109*   < > 104*   ALBUMIN  --   --   --   --   --  3.7  --   --    PROTTOTAL  --   --   --   --   --  7.6  --   --    BILITOTAL  --   --   --   --   --  1.2  --   --    ALKPHOS  --   --   --   --   --  135  --   --    ALT  --   --   --   --   --  15  --   --    AST  --   --   --   --   --  26  --   --    TROPI  --   --   --   --   --   --   --   <0.015    < > = values in this interval not displayed.       No results found for this or any previous visit (from the past 24 hour(s)).

## 2019-12-02 ENCOUNTER — APPOINTMENT (OUTPATIENT)
Dept: OCCUPATIONAL THERAPY | Facility: CLINIC | Age: 79
DRG: 291 | End: 2019-12-02
Payer: MEDICARE

## 2019-12-02 ENCOUNTER — APPOINTMENT (OUTPATIENT)
Dept: PHYSICAL THERAPY | Facility: CLINIC | Age: 79
DRG: 291 | End: 2019-12-02
Attending: PHYSICIAN ASSISTANT
Payer: MEDICARE

## 2019-12-02 LAB
ANION GAP SERPL CALCULATED.3IONS-SCNC: 3 MMOL/L (ref 3–14)
BUN SERPL-MCNC: 11 MG/DL (ref 7–30)
CALCIUM SERPL-MCNC: 9.6 MG/DL (ref 8.5–10.1)
CHLORIDE SERPL-SCNC: 94 MMOL/L (ref 94–109)
CO2 SERPL-SCNC: 37 MMOL/L (ref 20–32)
CREAT SERPL-MCNC: 0.63 MG/DL (ref 0.52–1.04)
GFR SERPL CREATININE-BSD FRML MDRD: 85 ML/MIN/{1.73_M2}
GLUCOSE SERPL-MCNC: 94 MG/DL (ref 70–99)
POTASSIUM SERPL-SCNC: 3.5 MMOL/L (ref 3.4–5.3)
SODIUM SERPL-SCNC: 134 MMOL/L (ref 133–144)

## 2019-12-02 PROCEDURE — 97530 THERAPEUTIC ACTIVITIES: CPT | Mod: GO

## 2019-12-02 PROCEDURE — 99232 SBSQ HOSP IP/OBS MODERATE 35: CPT | Performed by: INTERNAL MEDICINE

## 2019-12-02 PROCEDURE — 36415 COLL VENOUS BLD VENIPUNCTURE: CPT | Performed by: PHYSICIAN ASSISTANT

## 2019-12-02 PROCEDURE — 25000132 ZZH RX MED GY IP 250 OP 250 PS 637: Mod: GY | Performed by: PHYSICIAN ASSISTANT

## 2019-12-02 PROCEDURE — 80048 BASIC METABOLIC PNL TOTAL CA: CPT | Performed by: PHYSICIAN ASSISTANT

## 2019-12-02 PROCEDURE — 97530 THERAPEUTIC ACTIVITIES: CPT | Mod: GP

## 2019-12-02 PROCEDURE — 97535 SELF CARE MNGMENT TRAINING: CPT | Mod: GO

## 2019-12-02 PROCEDURE — 97116 GAIT TRAINING THERAPY: CPT | Mod: GP

## 2019-12-02 PROCEDURE — 25000132 ZZH RX MED GY IP 250 OP 250 PS 637: Mod: GY | Performed by: INTERNAL MEDICINE

## 2019-12-02 PROCEDURE — 25000132 ZZH RX MED GY IP 250 OP 250 PS 637: Mod: GY | Performed by: HOSPITALIST

## 2019-12-02 PROCEDURE — 21000001 ZZH R&B HEART CARE

## 2019-12-02 RX ORDER — METOPROLOL SUCCINATE 25 MG/1
25 TABLET, EXTENDED RELEASE ORAL ONCE
Status: COMPLETED | OUTPATIENT
Start: 2019-12-02 | End: 2019-12-02

## 2019-12-02 RX ORDER — METOPROLOL SUCCINATE 50 MG/1
50 TABLET, EXTENDED RELEASE ORAL DAILY
Status: DISCONTINUED | OUTPATIENT
Start: 2019-12-03 | End: 2019-12-05 | Stop reason: HOSPADM

## 2019-12-02 RX ADMIN — APIXABAN 5 MG: 5 TABLET, FILM COATED ORAL at 21:07

## 2019-12-02 RX ADMIN — METOPROLOL SUCCINATE 25 MG: 25 TABLET, EXTENDED RELEASE ORAL at 13:00

## 2019-12-02 RX ADMIN — SILDENAFIL 20 MG: 20 TABLET, FILM COATED ORAL at 15:50

## 2019-12-02 RX ADMIN — PREDNISOLONE ACETATE-GATIFLOXACIN-BROMFENAC 1 DROP: .75; 5; 1 SUSPENSION/ DROPS OPHTHALMIC at 09:15

## 2019-12-02 RX ADMIN — OMEPRAZOLE 20 MG: 20 CAPSULE, DELAYED RELEASE ORAL at 07:08

## 2019-12-02 RX ADMIN — SIMVASTATIN 40 MG: 40 TABLET, FILM COATED ORAL at 21:07

## 2019-12-02 RX ADMIN — NIFEDIPINE 60 MG: 60 TABLET, FILM COATED, EXTENDED RELEASE ORAL at 09:12

## 2019-12-02 RX ADMIN — LEVOTHYROXINE SODIUM 75 MCG: 75 TABLET ORAL at 07:08

## 2019-12-02 RX ADMIN — ACETAMINOPHEN 650 MG: 325 TABLET, FILM COATED ORAL at 04:22

## 2019-12-02 RX ADMIN — APIXABAN 5 MG: 5 TABLET, FILM COATED ORAL at 09:13

## 2019-12-02 RX ADMIN — OMEPRAZOLE 20 MG: 20 CAPSULE, DELAYED RELEASE ORAL at 15:50

## 2019-12-02 RX ADMIN — MULTIPLE VITAMINS W/ MINERALS TAB 1 TABLET: TAB at 09:12

## 2019-12-02 RX ADMIN — SILDENAFIL 20 MG: 20 TABLET, FILM COATED ORAL at 21:07

## 2019-12-02 RX ADMIN — METOPROLOL SUCCINATE 25 MG: 25 TABLET, EXTENDED RELEASE ORAL at 09:12

## 2019-12-02 RX ADMIN — ACETAMINOPHEN 650 MG: 325 TABLET, FILM COATED ORAL at 15:53

## 2019-12-02 RX ADMIN — CHOLECALCIFEROL TAB 10 MCG (400 UNIT) 1600 UNITS: 10 TAB at 09:12

## 2019-12-02 RX ADMIN — POTASSIUM CHLORIDE 40 MEQ: 1500 TABLET, EXTENDED RELEASE ORAL at 09:12

## 2019-12-02 RX ADMIN — SILDENAFIL 20 MG: 20 TABLET, FILM COATED ORAL at 09:12

## 2019-12-02 RX ADMIN — POTASSIUM CHLORIDE 40 MEQ: 1500 TABLET, EXTENDED RELEASE ORAL at 21:07

## 2019-12-02 RX ADMIN — FOLIC ACID 1000 MCG: 1 TABLET ORAL at 09:13

## 2019-12-02 RX ADMIN — Medication 1 CAPSULE: at 09:12

## 2019-12-02 ASSESSMENT — ACTIVITIES OF DAILY LIVING (ADL)
ADLS_ACUITY_SCORE: 18
ADLS_ACUITY_SCORE: 19
ADLS_ACUITY_SCORE: 19
ADLS_ACUITY_SCORE: 18

## 2019-12-02 ASSESSMENT — MIFFLIN-ST. JEOR: SCORE: 1344.19

## 2019-12-02 NOTE — PLAN OF CARE
Patient A&0x4 but forgetful at times. She is up with SBA and a walker. Pure wick is in place. Tolerating 2gm NA and 2000cc FR.  Trace edema noted on her feet and her R hand and wrist. +2 edema noted in bilateral knees and legs. CMS intact. Denies pain. Lungs are diminished and fine crackles noted in the bases of the lungs. Infrequent congested cough noted. On 3 ltrs 02 with sats in the mid 90's. C/o HANEY. Lymphemia wraps are on.  Continue on Bumex gtt.

## 2019-12-02 NOTE — PROVIDER NOTIFICATION
MD Notification    Notified Person: MD    Notified Person Name: Mendel    Notification Date/Time: 12/1/19 1955    Notification Interaction: amcon text page    Purpose of Notification: FYI pt had 25 beat run of VTach, pt was asymptomatic, resting in bed. Vitals stable. No CP.    Orders Received: new order for magnesium lab draw.     Comments: mag 1.8 within normal range

## 2019-12-02 NOTE — PLAN OF CARE
Pt A&O x4, forgetfulness observed on prior shifts, but pt seems aware with writer. VSS on 3L o2. LS diminished, crackles in bases. Tele Afib w/CVR. Upright in chair, denies pain, numbness tingling. CMS in tact. 2000 mL fluid restriction. Lymphadema wraps in place. Bumex @ 1 mL/hr, new bag available in pt's medication bin. SBA. Edema toes and legs +1, knees +2. Pt accepting of medication. Tolerating low sodium diet.

## 2019-12-02 NOTE — PROGRESS NOTES
[facility] Cardiology Progress Note  Date of Service: 12/02/2019  Primary Cardiologist: Dr. Lopez / Dr. Maqruez    Jaqueline Canales is a 78 year old female admitted on 11/25/2019 with CHF.    Subjective: Dyspnea and edema improving daily. Notes ongoing cough. No chest pain.    Assessment:  1. Decompensated HFpEF / anasarca, possibly precipitated by Sildenafil therapy.    - Adequate diuresis on bumex gtt at 0.25 mg/hr x one week [on Lasix 20 PTA]. Net -13L. Creatinine tolerating.   - Admit wt 228, current wt 201 lbs. Suspect dry wt <200 lbs. Remains on 3L O2 with bilateral crackles and pitting edema to knees.   - Maintained on sildenafil.     2. Chronic moderate PAH in setting of scleroderma  3. Mod-severe TR  4. Scleroderma and RA, on DMARDs  5. Chronic AF, borderline-rate controlled here, anticoagulated on Eliquis  6. 25-beat run of NSVT vs RVR at 7p on 12/1, asymptomatic.  7. HTN - BP currently marginal  8. Mild chronic anemia  9. Hypothyroid, supplemented  10. Frailty, deconditioning    Plan:   1. Increase Toprol to 50 mg daily.  2. Continue bumex gtt. Anticipate transition to torsemide 40 once O2 weaned.   3. Repeat proBNP (4,000 on admit).  4. Will order cardiac rehab. Continue lymphedema therapy.  5. Has follow-up with Dr. Marquez on 12/10.     Anni Snow PA-C  [facility] - [facility] Clinic  Pager: [phone]  Text Page  (7:30am - 4pm M-F)   __________________________________________________________________________  Cardiac diagnostics reviewed:  Tele:      Physical Exam   Temp: 97.5  F (36.4  C) Temp src: Oral BP: 101/68   Heart Rate: 105 Resp: 18 SpO2: 95 % O2 Device: Nasal cannula Oxygen Delivery: 3 LPM  Vitals:    11/30/19 0251 12/01/19 0550 12/02/19 0421   Weight: 95.2 kg (209 lb 14.1 oz) 92.3 kg (203 lb 8 oz) 91.6 kg (201 lb 14.4 oz)       GENERAL:  The patient is in no apparent distress.   NECK: CVP appears normal, no masses or thyromegaly.  PULMONARY:  There is a normal  respiratory effort. Bibasilar crackles noted.  CARDIOVASCULAR:  Irregularly irregular, mildly rapid, no murmurs appreciated.   GI:  Non tender abdomen with normoactive bowel sounds and no hepatosplenomegaly. There are no masses palpable.   EXTREMITIES:  2+ pitting edema to knees bilaterally. LE's wrapped.  VASCULAR: 2+ Pulses bilaterally in upper and lower extremities.    Medications     bumetanide 0.25 mg/hr (12/02/19 0919)     - MEDICATION INSTRUCTIONS -       - MEDICATION INSTRUCTIONS -         apixaban ANTICOAGULANT  5 mg Oral BID     cholecalciferol  1,600 Units Oral Daily     folic acid  1,000 mcg Oral Daily     levothyroxine  75 mcg Oral Daily     methotrexate  25 mg Oral Weekly     metoprolol succinate ER  25 mg Oral Daily     multivitamin w/minerals  1 tablet Oral Daily     NIFEdipine ER  60 mg Oral Daily     omeprazole  20 mg Oral BID AC     potassium chloride  40 mEq Oral BID     Prednisolon-Gatiflox-Bromfenac  1 drop Right Eye Daily     psyllium  1 capsule Oral Daily     sildenafil  20 mg Oral TID     simvastatin  40 mg Oral At Bedtime     sodium chloride (PF)  3 mL Intracatheter Q8H       Data   Most Recent 3 CBC's:  Recent Labs   Lab Test 11/25/19  1654 10/16/19 07/16/19  0959   WBC 6.3 5.1 4.7   HGB 11.6* 12.1 12.0   MCV 92 98 96    214 115*     Most Recent 3 BMP's:  Recent Labs   Lab Test 12/02/19  0714 12/01/19  1718 12/01/19  0552  11/30/19  0325     --  135  --  135   POTASSIUM 3.5 3.9 3.2*   < > 2.9*   CHLORIDE 94  --  95  --  95   CO2 37*  --  38*  --  36*   BUN 11  --  13  --  11   CR 0.63  --  0.65  --  0.62   ANIONGAP 3  --  2*  --  4   TU 9.6  --  9.0  --  9.0   GLC 94  --  88  --  88    < > = values in this interval not displayed.     Most Recent 2 LFT's:  Recent Labs   Lab Test 11/27/19  0535 10/16/19 04/02/19  1308   AST 26 29 27   ALT 15 15 36   ALKPHOS 135  --  88   BILITOTAL 1.2  --  0.7     Most Recent 3 Troponin's:  Recent Labs   Lab Test 11/25/19  1654 10/23/16  0504  10/22/16  1943   TROPI <0.015 <0.015  The 99th percentile for upper reference range is 0.045 ug/L.  Troponin values in   the range of 0.045 - 0.120 ug/L may be associated with risks of adverse   clinical events.   <0.015  The 99th percentile for upper reference range is 0.045 ug/L.  Troponin values in   the range of 0.045 - 0.120 ug/L may be associated with risks of adverse   clinical events.       Most Recent 3 BNP's:  Recent Labs   Lab Test 11/25/19  1654 07/16/19  0959 05/01/19  1115 04/02/19  1308 10/22/16  1626   NTBNPI 3,936*  --   --   --  1,007   NTBNP  --  3,089* 2,900* 3,011*  --      Most Recent TSH and T4:  Recent Labs   Lab Test 07/26/19  0934  08/17/15  0909   TSH 3.10   < > 4.53*   T4  --   --  0.96    < > = values in this interval not displayed.

## 2019-12-02 NOTE — PROGRESS NOTES
[facility]    Hospitalist Progress Note    Assessment & Plan   Jaqueline Canales is a 78 year old female with PMHx of chronic afib on anticoagulation with apixaban, chronic diastolic CHF with chronic bilateral lymphedema, pulmonary hypertension, hypothyroidism and rheumatoid arthritis who was admitted on 11/25/2019 for evaluation of dyspnea and hypoxia with concern for suspected CHF exacerbation.      Acute Hypoxic Respiratory Failure dt Acute Diastolic CHF exacerbation  Chronic Bilateral lymphedema  Last echo in 9/2018 showed EF 55-60% and moderate pulmonary hypertension; diastolic function not assessed. Underwent cardiac cath in 4/2019 which showed elevated biventricular filing pressures and moderate to severe primary pulmonary hypertension. Has chronic bilateral LE lymphedema for which she takes Lasix. Was seen by her PCP last month and treated for cellulitis. Was initially seen in clinic and observed to have increased LE edema and dyspnea. Referred to ED. Was afebrile, HR 100s with SBP 90s. Needing 2L NC on admission. ProBNP 3900. BMP unremarkable (Cr 0.75), WBC nl. CXR showed increased interstitial prominence. Given Lasix 40mg IV x1 in ED. Admitted to [facility]. Started aggressive diuresis with Lasix 60mg IV TID. Echo this stay showed EF 50-55% with findings of severe biatrial enlargement, mod dilated RV and mildly decreased RVSF, mod to severe TR and pulmonary hypertension. Cardiology following -- diuretics changed to Bumex gtt on 11/26.   -- cont diuresis with Bumex gtt, receiving intermittent boluses per cardiology (most recently 1mg IV x1 on 12/1); net neg 1.2L over the past 24hrs and weight conts trending down (103.4kg on admission -- 91.6kg today)  -- cont sched KCl replacement with 40mEq BID  -- cont lymphedema wraps  -- wean off supplemental O2 as able -- presently on 3L NC  -- cardiology following, appreciate assistance with care     Chronic A. Fib, on anticoagulation  Moderate/Severe  pulmonary hypertension  Hyperlipidemia   Hypertension  PTA meds: metoprolol XL 25mg daily, nifedipine ER 60mg daily, sildenafil 20mg TID, simvastatin 40mg HS, Lasix 20mg BID, apixaban 5mg BID  -- intermittently tachy -- conts on Toprol XL w/o changes, monitoring on telemetry  -- conts on nifedipine, sildenafil, statin and apixaban  -- diuresis as above     Hypothyroidism  Chronic and stable on levothyroxine     Rheumatoid arthritis  Scleroderma  Chronic and stable on methotrexate (takes on Wednesdays). Conts on folic acid.  Can resume Remicade infusions at discharge    FEN: no IVFs, lytes stable, cardiac diet  DVT Prophylaxis: anticoagulated on apixaban as above  Code Status: Full Code -- per discussions on admission, she does not want to remain on artifical life support of she were to have no quality of life    Disposition: Anticipate discharge in next 2-3d, pending adequate diuresis and recommendations per cardiology regarding plan for oral diuretic. Therapy now recommending TCU stay, patient not keen on this but willing to consider.     Lis Nielson    Interval History   Uneventful night. Seen this morning. Resting comfortably in chair. Pleasantly forgetful. Breathing okay. Denies cp/sob/cough. No abd pain/n/v.     -Data reviewed today: I reviewed all new labs and imaging results over the last 24 hours. I personally reviewed no images or EKG's today.    Physical Exam   Temp: 97.5  F (36.4  C) Temp src: Oral BP: 103/62   Heart Rate: 98 Resp: 18 SpO2: 95 % O2 Device: Nasal cannula Oxygen Delivery: 3 LPM  Vitals:    11/30/19 0251 12/01/19 0550 12/02/19 0421   Weight: 95.2 kg (209 lb 14.1 oz) 92.3 kg (203 lb 8 oz) 91.6 kg (201 lb 14.4 oz)     Vital Signs with Ranges  Temp:  [97.4  F (36.3  C)-97.5  F (36.4  C)] 97.5  F (36.4  C)  Heart Rate:  [91-98] 98  Resp:  [16-18] 18  BP: ()/(54-67) 103/62  SpO2:  [91 %-96 %] 95 %  I/O last 3 completed shifts:  In: 1350 [P.O.:1350]  Out: 3696  [Urine:2350]    Constitutional: Resting comfortably, alert and answering questions appropriately, NAD  Respiratory: +bibasilar crackles, no wheeze/rhonchi, breathing nonlabored  Cardiovascular: HR irregular, no MGR, +bilateral LE edema with lymphedema wraps on  GI: S, NT, ND, +BS  Skin/Integumen: warm/dry  Other:      Medications     bumetanide 0.25 mg/hr (11/30/19 1016)     - MEDICATION INSTRUCTIONS -       - MEDICATION INSTRUCTIONS -         apixaban ANTICOAGULANT  5 mg Oral BID     cholecalciferol  1,600 Units Oral Daily     folic acid  1,000 mcg Oral Daily     levothyroxine  75 mcg Oral Daily     methotrexate  25 mg Oral Weekly     metoprolol succinate ER  25 mg Oral Daily     multivitamin w/minerals  1 tablet Oral Daily     NIFEdipine ER  60 mg Oral Daily     omeprazole  20 mg Oral BID AC     potassium chloride  40 mEq Oral BID     Prednisolon-Gatiflox-Bromfenac  1 drop Right Eye Daily     psyllium  1 capsule Oral Daily     sildenafil  20 mg Oral TID     simvastatin  40 mg Oral At Bedtime     sodium chloride (PF)  3 mL Intracatheter Q8H       Data   Recent Labs   Lab 12/02/19  0714 12/01/19  1718 12/01/19  0552  11/30/19  0325  11/27/19  0535  11/25/19  1654   WBC  --   --   --   --   --   --   --   --  6.3   HGB  --   --   --   --   --   --   --   --  11.6*   MCV  --   --   --   --   --   --   --   --  92   PLT  --   --   --   --   --   --   --   --  166     --  135  --  135   < > 137   < > 139   POTASSIUM 3.5 3.9 3.2*   < > 2.9*   < > 3.4   < > 3.5   CHLORIDE 94  --  95  --  95   < > 100   < > 105   CO2 37*  --  38*  --  36*   < > 31   < > 29   BUN 11  --  13  --  11   < > 12   < > 16   CR 0.63  --  0.65  --  0.62   < > 0.67   < > 0.75   ANIONGAP 3  --  2*  --  4   < > 6   < > 5   TU 9.6  --  9.0  --  9.0   < > 9.3   < > 9.3   GLC 94  --  88  --  88   < > 109*   < > 104*   ALBUMIN  --   --   --   --   --   --  3.7  --   --    PROTTOTAL  --   --   --   --   --   --  7.6  --   --    BILITOTAL  --   --    --   --   --   --  1.2  --   --    ALKPHOS  --   --   --   --   --   --  135  --   --    ALT  --   --   --   --   --   --  15  --   --    AST  --   --   --   --   --   --  26  --   --    TROPI  --   --   --   --   --   --   --   --  <0.015    < > = values in this interval not displayed.       No results found for this or any previous visit (from the past 24 hour(s)).

## 2019-12-02 NOTE — PLAN OF CARE
[id number]: A&Ox4. VSS on 3L NC. LS crackles in bases. BLE +2 edema, lymphedema wraps in place. Bumex gtt @ 1. Purewick in place. Tele afib 80-100s. Had 25 beat run of [employer] at 1930, pt symptomatic, resting in bed. Up SBA with walker. Plan for continued diuresis. Eventually dismissal to TCU.

## 2019-12-02 NOTE — PROGRESS NOTES
SW:  D:  Received a call back from [facility].  They have a bed available for patient on discharge.  Spoke with patient to inquire as to what facility she would prefer.  Patient was on the phone talking to her  at the time of my conversation.  They both state they would prefer patient discharge to [facility].  Call placed to cancel the bed at [facility].  Call placed to to update [facility] as to the tentative discharge date of Wednesday or Thursday.  Per Ramona, they will have a bed available for patient on Wednesday.  P:  Will continue to follow.      BRADEN Brown, [facility] Hospital[facility]  Lead   [phone]  [facility]

## 2019-12-02 NOTE — PLAN OF CARE
Discharge Planner PT   Patient plan for discharge: Home  Current status: Pt on 3L O2 via NC with SpO2 92%.  Pt transfers sit<>stand to FWW with SBA,. Pt ambulated 50' x 2 with FWW and CGA. Pt provided with CHF education, including stop light tool and diet modifications.  Barriers to return to prior living situation: Current level of assist, decreased activity tolerance, weakness, falls risk   Recommendations for discharge: TCU  Rationale for recommendations: Pt is below baseline and would benefit from continued PT while IP and at TCU to improve functional strength, balance, activity tolerance, safety and IND with functional mobility and reduce falls risk prior to returning home.       Entered by: Silvana Lynn 12/02/2019 12:25 PM

## 2019-12-02 NOTE — PLAN OF CARE
"Discharge Planner OT   Patient plan for discharge: \"I don't know where, but not home\"  Current status: Patient supine agreeable to work with therapy. Patient completed bed mobility with SBA and verbal cues for positioning EOB. Sit <> stand with verbal cues for correct hand placement and SBA with FWW. Ambulated to bathroom on RA and completed 2 toilet transfers and g/h standing at sink requiring one break with PLB due to O2 saturation dropping to 83% on RA, denies SOB. Demonstration of PLB required for patient to correctly complete. O2 saturation recovering to 94% with 3L of O2 via nasal cannual following therapy. Cognition impacting patients safety awareness and ability to complete ADLs (I).   Barriers to return to prior living situation: current level of A, falls risk, decreased activity tolerance   Recommendations for discharge: TCU  Rationale for recommendations: Patient would benefit from TCU due to decreased activity tolerance impacting (I) with ADLs. TCU to help patient return to PLOF.        Entered by: Courtney Dent 12/02/2019 8:54 AM      "

## 2019-12-02 NOTE — PROGRESS NOTES
"CLINICAL NUTRITION SERVICES  -  ASSESSMENT NOTE    Malnutrition:   Does not meet criteria      REASON FOR ASSESSMENT  Jaqueline Canales is a 78 year old female seen by Registered Dietitian for LOS    NUTRITION HISTORY  - Information obtained from patient, EMR.   - PMH: chronic afib, CHF, pulmonary hypertension, hypothyroidism.   - Marie does not do much cooking anymore. They eat out often, do not pay much attention to sodium intake.   - She uses the salt shaker \"not as much as my !\"  - Had been eating normally at home, no issues with appetite.     Info obtained on 11/26  Breakfast:  Takes pills, then may eat something small like oatmeal, a banana, a cookie, or OJ     Lunch:  They like to go out often. [employer], [employer], [employer], sometimes [employer]. Also like [employer] - eats a burger and fries. Getting fries w/o the salt \"would ruin them\".   Likes potato soup     Dinner:   Leftovers from lunch.      Previous diet instructions:  Pt verbalized prior low salt diet ed. She was unsure how much salt she should eat daily. She does not read labels or ask for any accomodation at restaurants to lessen sodium content of meals.       CURRENT NUTRITION ORDERS  Diet Order:     2000 mg Sodium - Diet ed provided on 11/26    Current Intake/Tolerance:  100% intakes of meals for the past few days. Pt feels her appetite is improved, back to normal after some diuresis.       NUTRITION FOCUSED PHYSICAL ASSESSMENT FOR DIAGNOSING MALNUTRITION)  No:  Yes - visual         Observed:    No nutrition-related physical findings observed    Obtained from Chart/Interdisciplinary Team:  Mild edema to legs    ANTHROPOMETRICS  Height: 5' 2.008\"  Weight: 201 lbs 14.4 oz (91.6 kg)   Body mass index is 36.92 kg/m .  Weight Status:  Obesity Grade II BMI 35-39.9  IBW: 50 kg   % IBW: 183%  Weight History: Water retention impacting weight trending.   Wt Readings from Last 10 Encounters:   12/02/19 91.6 kg (201 lb 14.4 oz)   11/20/19 " 103.4 kg (228 lb)   11/14/19 102.5 kg (226 lb)   11/11/19 101.2 kg (223 lb)   10/28/19 100.8 kg (222 lb 4.8 oz)   08/28/19 98.9 kg (218 lb)   07/29/19 96.8 kg (213 lb 6.4 oz)   07/26/19 97.5 kg (215 lb)   07/16/19 96.2 kg (212 lb)   06/28/19 99.8 kg (220 lb)     LABS  Labs reviewed    MEDICATIONS  Medications reviewed  Vitamin D3 400 units  Folic acid 1000 mcg   Thera vit M  Bumex infusion    ASSESSED NUTRITION NEEDS PER APPROVED PRACTICE GUIDELINES:  Dosing Weight 60 kg  Estimated Energy Needs: 1027-7197 kcals (25-30 Kcal/Kg)  Justification: maintenance  Estimated Protein Needs: 72-90 grams protein (1.2-1.5 g pro/Kg)  Justification: preservation of lean body mass  Estimated Fluid Needs: Per MD pending fluid status    MALNUTRITION:  % Weight Loss:  None noted - fluid related  % Intake:  No decreased intake noted  Subcutaneous Fat Loss:  None observed  Muscle Loss:  None observed  Fluid Retention:  Mild 2+ - related to CHF    Malnutrition Diagnosis: Patient does not meet two of the above criteria necessary for diagnosing malnutrition    NUTRITION DIAGNOSIS:  Food- and nutrition-related knowledge deficit R/t 2g Na diet for CHF AEB patient gives diet recall quite high in sodium.         NUTRITION INTERVENTIONS  Recommendations / Nutrition Prescription  2g Na diet      Implementation  Nutrition education: Provided education on 2g Na - reinforced information given on 11/26. Pt not receptive.       Nutrition Goals  Intake of at least 75% meals TID.  <2000 mg Na daily.       MONITORING AND EVALUATION:  Progress towards goals will be monitored and evaluated per protocol and Practice Guidelines    Rizwana Ruiz RD,   [facility] [place], [address] 54, [facility]   Pager: [phone]  Weekend Pager: [phone]

## 2019-12-03 ENCOUNTER — APPOINTMENT (OUTPATIENT)
Dept: PHYSICAL THERAPY | Facility: CLINIC | Age: 79
DRG: 291 | End: 2019-12-03
Payer: MEDICARE

## 2019-12-03 ENCOUNTER — APPOINTMENT (OUTPATIENT)
Dept: OCCUPATIONAL THERAPY | Facility: CLINIC | Age: 79
DRG: 291 | End: 2019-12-03
Payer: MEDICARE

## 2019-12-03 LAB
ANION GAP SERPL CALCULATED.3IONS-SCNC: 5 MMOL/L (ref 3–14)
BUN SERPL-MCNC: 12 MG/DL (ref 7–30)
CALCIUM SERPL-MCNC: 9.4 MG/DL (ref 8.5–10.1)
CHLORIDE SERPL-SCNC: 93 MMOL/L (ref 94–109)
CO2 SERPL-SCNC: 37 MMOL/L (ref 20–32)
CREAT SERPL-MCNC: 0.65 MG/DL (ref 0.52–1.04)
GFR SERPL CREATININE-BSD FRML MDRD: 84 ML/MIN/{1.73_M2}
GLUCOSE SERPL-MCNC: 95 MG/DL (ref 70–99)
NT-PROBNP SERPL-MCNC: 3098 PG/ML (ref 0–1800)
POTASSIUM SERPL-SCNC: 2.9 MMOL/L (ref 3.4–5.3)
POTASSIUM SERPL-SCNC: 4.4 MMOL/L (ref 3.4–5.3)
SODIUM SERPL-SCNC: 135 MMOL/L (ref 133–144)

## 2019-12-03 PROCEDURE — 21000001 ZZH R&B HEART CARE

## 2019-12-03 PROCEDURE — 99232 SBSQ HOSP IP/OBS MODERATE 35: CPT | Performed by: INTERNAL MEDICINE

## 2019-12-03 PROCEDURE — 25000132 ZZH RX MED GY IP 250 OP 250 PS 637: Mod: GY | Performed by: PHYSICIAN ASSISTANT

## 2019-12-03 PROCEDURE — 25000132 ZZH RX MED GY IP 250 OP 250 PS 637: Mod: GY | Performed by: HOSPITALIST

## 2019-12-03 PROCEDURE — 36415 COLL VENOUS BLD VENIPUNCTURE: CPT | Performed by: PHYSICIAN ASSISTANT

## 2019-12-03 PROCEDURE — 83880 ASSAY OF NATRIURETIC PEPTIDE: CPT | Performed by: PHYSICIAN ASSISTANT

## 2019-12-03 PROCEDURE — 80048 BASIC METABOLIC PNL TOTAL CA: CPT | Performed by: PHYSICIAN ASSISTANT

## 2019-12-03 PROCEDURE — 97530 THERAPEUTIC ACTIVITIES: CPT | Mod: GO

## 2019-12-03 PROCEDURE — 84132 ASSAY OF SERUM POTASSIUM: CPT | Performed by: INTERNAL MEDICINE

## 2019-12-03 PROCEDURE — 36415 COLL VENOUS BLD VENIPUNCTURE: CPT | Performed by: INTERNAL MEDICINE

## 2019-12-03 PROCEDURE — 97110 THERAPEUTIC EXERCISES: CPT | Mod: GP

## 2019-12-03 PROCEDURE — 97535 SELF CARE MNGMENT TRAINING: CPT | Mod: GO

## 2019-12-03 PROCEDURE — 97530 THERAPEUTIC ACTIVITIES: CPT | Mod: GP

## 2019-12-03 PROCEDURE — 25000132 ZZH RX MED GY IP 250 OP 250 PS 637: Mod: GY | Performed by: INTERNAL MEDICINE

## 2019-12-03 RX ORDER — SPIRONOLACTONE 25 MG/1
25 TABLET ORAL DAILY
Status: DISCONTINUED | OUTPATIENT
Start: 2019-12-03 | End: 2019-12-05 | Stop reason: HOSPADM

## 2019-12-03 RX ORDER — POTASSIUM CHLORIDE 1500 MG/1
40 TABLET, EXTENDED RELEASE ORAL 3 TIMES DAILY
Status: DISCONTINUED | OUTPATIENT
Start: 2019-12-03 | End: 2019-12-04

## 2019-12-03 RX ADMIN — LEVOTHYROXINE SODIUM 75 MCG: 75 TABLET ORAL at 09:18

## 2019-12-03 RX ADMIN — SIMVASTATIN 40 MG: 40 TABLET, FILM COATED ORAL at 21:44

## 2019-12-03 RX ADMIN — SILDENAFIL 20 MG: 20 TABLET, FILM COATED ORAL at 09:19

## 2019-12-03 RX ADMIN — OMEPRAZOLE 20 MG: 20 CAPSULE, DELAYED RELEASE ORAL at 07:47

## 2019-12-03 RX ADMIN — POTASSIUM CHLORIDE 40 MEQ: 1500 TABLET, EXTENDED RELEASE ORAL at 07:47

## 2019-12-03 RX ADMIN — SPIRONOLACTONE 25 MG: 25 TABLET ORAL at 13:20

## 2019-12-03 RX ADMIN — ACETAMINOPHEN 650 MG: 325 TABLET, FILM COATED ORAL at 16:32

## 2019-12-03 RX ADMIN — CHOLECALCIFEROL TAB 10 MCG (400 UNIT) 1600 UNITS: 10 TAB at 09:19

## 2019-12-03 RX ADMIN — ACETAMINOPHEN 650 MG: 325 TABLET, FILM COATED ORAL at 09:18

## 2019-12-03 RX ADMIN — MULTIPLE VITAMINS W/ MINERALS TAB 1 TABLET: TAB at 09:19

## 2019-12-03 RX ADMIN — NIFEDIPINE 60 MG: 60 TABLET, FILM COATED, EXTENDED RELEASE ORAL at 09:19

## 2019-12-03 RX ADMIN — FOLIC ACID 1000 MCG: 1 TABLET ORAL at 09:19

## 2019-12-03 RX ADMIN — METOPROLOL SUCCINATE 50 MG: 50 TABLET, EXTENDED RELEASE ORAL at 09:18

## 2019-12-03 RX ADMIN — APIXABAN 5 MG: 5 TABLET, FILM COATED ORAL at 20:41

## 2019-12-03 RX ADMIN — POTASSIUM CHLORIDE 40 MEQ: 1500 TABLET, EXTENDED RELEASE ORAL at 11:51

## 2019-12-03 RX ADMIN — ACETAMINOPHEN 650 MG: 325 TABLET, FILM COATED ORAL at 00:07

## 2019-12-03 RX ADMIN — PREDNISOLONE ACETATE-GATIFLOXACIN-BROMFENAC 1 DROP: .75; 5; 1 SUSPENSION/ DROPS OPHTHALMIC at 13:20

## 2019-12-03 RX ADMIN — APIXABAN 5 MG: 5 TABLET, FILM COATED ORAL at 09:18

## 2019-12-03 RX ADMIN — MELATONIN 5 MG TABLET 5 MG: at 21:43

## 2019-12-03 RX ADMIN — POTASSIUM CHLORIDE 40 MEQ: 1500 TABLET, EXTENDED RELEASE ORAL at 09:18

## 2019-12-03 RX ADMIN — POTASSIUM CHLORIDE 40 MEQ: 1500 TABLET, EXTENDED RELEASE ORAL at 21:43

## 2019-12-03 RX ADMIN — SILDENAFIL 20 MG: 20 TABLET, FILM COATED ORAL at 16:33

## 2019-12-03 RX ADMIN — ACETAMINOPHEN 650 MG: 325 TABLET, FILM COATED ORAL at 21:43

## 2019-12-03 RX ADMIN — POTASSIUM CHLORIDE 40 MEQ: 1500 TABLET, EXTENDED RELEASE ORAL at 16:32

## 2019-12-03 RX ADMIN — OMEPRAZOLE 20 MG: 20 CAPSULE, DELAYED RELEASE ORAL at 16:34

## 2019-12-03 RX ADMIN — SILDENAFIL 20 MG: 20 TABLET, FILM COATED ORAL at 21:44

## 2019-12-03 ASSESSMENT — ACTIVITIES OF DAILY LIVING (ADL)
ADLS_ACUITY_SCORE: 18

## 2019-12-03 ASSESSMENT — MIFFLIN-ST. JEOR: SCORE: 1341.02

## 2019-12-03 NOTE — PLAN OF CARE
OT- Attempted session. Pt requesting therapist return later as able.Pt requesting to nap at this time.

## 2019-12-03 NOTE — PLAN OF CARE
Discharge Planner PT   Patient plan for discharge: Home  Current status: Pt on 3L O2 via NC with SpO2 92%.  Pt transfers sit<>stand to FWW with SBA,. Pt ambulated 50' x 2 with FWW and CGA. Pt reviewed CHF education, including stop light tool and diet modifications.  Barriers to return to prior living situation: Current level of assist, decreased activity tolerance, weakness, falls risk   Recommendations for discharge: TCU  Rationale for recommendations: Pt is below baseline and would benefit from continued PT while IP and at TCU to improve functional strength, balance, activity tolerance, safety and IND with functional mobility and reduce falls risk prior to returning home.       Entered by: Silvana Lynn 12/03/2019 2:58 PM

## 2019-12-03 NOTE — PROGRESS NOTES
[facility] Cardiology Progress Note  Date of Service: 12/03/2019  Primary Cardiologist: Dr. Lopez / Dr. Marquez    Jaqueline Canales is a 78 year old female admitted on 11/25/2019 with CHF.    Subjective: Dyspnea and edema improving daily. No chest pain.    Assessment:  1. Decompensated HFpEF / anasarca, possibly precipitated by Sildenafil therapy.    - Adequate diuresis on bumex gtt at 0.25 mg/hr since admit [on Lasix 20 PTA]. Net -14.5L. Creatinine tolerating.   - Admit wt 228, current wt 201 lbs. Suspect dry wt <200 lbs. O2 weaning, remains with (improving) crackles and pitting edema to knees. proBNP only marginally reduced (4K --> 3K).   - Maintained on sildenafil.     2. Chronic moderate PAH in setting of scleroderma, on sildenafil and nifedipine  3. Mod-severe TR  4. Scleroderma and RA, on DMARDs  5. Chronic AF, rates controlled, anticoagulated on Eliquis.  6. 25-beat run of NSVT vs RVR at 7p on 12/1, asymptomatic. Toprol increased to 50 mg daily.   7. HTN - BP currently marginal  8. Mild chronic anemia  9. Hypothyroid, supplemented  10. Frailty, deconditioning - ultimate discharge to TCU  11. Hypokalemia    Plan:   1. Add spironolactone 25 mg daily.   2. Continue bumex gtt. Anticipate transition to torsemide 40 once O2 weaned.   3. Continue cardiac rehab and lymphedema therapy.  4. Has follow-up with Dr. Marquez on 12/10.     Anni Snow PA-C  [facility] - Heart Clinic  Pager: [phone]  Text Page  (7:30am - 4pm M-F)   __________________________________________________________________________  Cardiac diagnostics reviewed:  Tele:      Physical Exam   Temp: 97.7  F (36.5  C) Temp src: Oral BP: 102/54 Pulse: 123 Heart Rate: 84 Resp: 18 SpO2: 94 % O2 Device: Nasal cannula Oxygen Delivery: 2 LPM  Vitals:    12/01/19 0550 12/02/19 0421 12/03/19 0349   Weight: 92.3 kg (203 lb 8 oz) 91.6 kg (201 lb 14.4 oz) 91.3 kg (201 lb 3.2 oz)       GENERAL:  The patient is in no apparent  distress.   NECK: CVP appears normal, no masses or thyromegaly.  PULMONARY:  There is a normal respiratory effort. LLL crackles.   CARDIOVASCULAR:  Irregularly irregular, no murmurs appreciated.   GI:  Non tender abdomen with normoactive bowel sounds and no hepatosplenomegaly. There are no masses palpable.   EXTREMITIES:  2+ pitting edema to knees bilaterally. LE's wrapped.  VASCULAR: 2+ Pulses bilaterally in upper and lower extremities.    Medications     bumetanide 0.25 mg/hr (12/02/19 1930)     - MEDICATION INSTRUCTIONS -       - MEDICATION INSTRUCTIONS -         apixaban ANTICOAGULANT  5 mg Oral BID     cholecalciferol  1,600 Units Oral Daily     folic acid  1,000 mcg Oral Daily     levothyroxine  75 mcg Oral Daily     methotrexate  25 mg Oral Weekly     metoprolol succinate ER  50 mg Oral Daily     multivitamin w/minerals  1 tablet Oral Daily     NIFEdipine ER  60 mg Oral Daily     omeprazole  20 mg Oral BID AC     potassium chloride  40 mEq Oral TID     Prednisolon-Gatiflox-Bromfenac  1 drop Right Eye Daily     psyllium  1 capsule Oral Daily     sildenafil  20 mg Oral TID     simvastatin  40 mg Oral At Bedtime     sodium chloride (PF)  3 mL Intracatheter Q8H       Data   Most Recent 3 CBC's:  Recent Labs   Lab Test 11/25/19  1654 10/16/19 07/16/19  0959   WBC 6.3 5.1 4.7   HGB 11.6* 12.1 12.0   MCV 92 98 96    214 115*     Most Recent 3 BMP's:  Recent Labs   Lab Test 12/03/19  0624 12/02/19  0714 12/01/19  1718 12/01/19  0552    134  --  135   POTASSIUM 2.9* 3.5 3.9 3.2*   CHLORIDE 93* 94  --  95   CO2 37* 37*  --  38*   BUN 12 11  --  13   CR 0.65 0.63  --  0.65   ANIONGAP 5 3  --  2*   TU 9.4 9.6  --  9.0   GLC 95 94  --  88     Most Recent 2 LFT's:  Recent Labs   Lab Test 11/27/19  0535 10/16/19 04/02/19  1308   AST 26 29 27   ALT 15 15 36   ALKPHOS 135  --  88   BILITOTAL 1.2  --  0.7     Most Recent 3 Troponin's:  Recent Labs   Lab Test 11/25/19  1654 10/23/16  0533 10/22/16  1943   TROPI  <0.015 <0.015  The 99th percentile for upper reference range is 0.045 ug/L.  Troponin values in   the range of 0.045 - 0.120 ug/L may be associated with risks of adverse   clinical events.   <0.015  The 99th percentile for upper reference range is 0.045 ug/L.  Troponin values in   the range of 0.045 - 0.120 ug/L may be associated with risks of adverse   clinical events.       Most Recent 3 BNP's:  Recent Labs   Lab Test 12/03/19  0624 11/25/19  1654 07/16/19  0959 05/01/19  1115 04/02/19  1308 10/22/16  1626   NTBNPI 3,098* 3,936*  --   --   --  1,007   NTBNP  --   --  3,089* 2,900* 3,011*  --      Most Recent TSH and T4:  Recent Labs   Lab Test 07/26/19  0934  08/17/15  0909   TSH 3.10   < > 4.53*   T4  --   --  0.96    < > = values in this interval not displayed.

## 2019-12-03 NOTE — PLAN OF CARE
RN: pt alert, oriented.  Up with SBA x1 and walker to bathroom, had BM today.  Leg care and lymphadema wraps re-applied at start of shift (had been removed overnight due to leg pain).  Tylenol given x1 this shift for LE discomfort rated 1-2/10, relief obtained.  Tele afib CVR.   Oxygen weaned from 3L/min via NC to 2L/min via NC.  Incentive spirometer provided, encouraged, lungs with fine crackles at bases.  Tolerating 2gm NA diet with 2000 ml fluid restriction.  Bumex gtt continues at 1ml/hr.  Dispo pending ability to transition to PO diuretics, possibly later this week.

## 2019-12-03 NOTE — PLAN OF CARE
A&Ox4.  VSS ex soft pressures.  Tele: afib CVR.  3 L NC, saturation at low 90's.  HANEY.  Reports of BLE chronic pain, PRN tylenol given.  BLE edema.  Lymph wraps rewrapped at 2200 yesterday, pt requested them off around 0230 due to pain and discomfort. Up with SBA and walker.  2gm NA with 2000 ml fluid restriction.  Bumex gtt @ 1ml.  LS diminished with fine crackles.    BM overnight.  Voiding well with purewick, changed this shift.  Discharge pending clinical improvement and safe disposition.

## 2019-12-03 NOTE — PLAN OF CARE
"Discharge Planner OT   Patient plan for discharge: home   Current status: Functional ambulation and toilet transfer, including clothing management with FWW and SBA. Patient noted to often ask \"what next\" despite already being told. To further assess safety and (I) with ADLs  SLUMs administered with patient scoring 18/30 with impairments noted in short term memory, recall, and attention.   Barriers to return to prior living situation: assist level, falls risk, decreased activity tolerance   Recommendations for discharge: TCU  Rationale for recommendations: Patient would benefit from TCU due to decreased activity tolerance impacting (I) with ADLs. TCU to help patient return to PLOF.        Entered by: Courtney Dent 12/03/2019 3:30 PM       "

## 2019-12-03 NOTE — PROGRESS NOTES
[facility]    Hospitalist Progress Note    Assessment & Plan   Jaqueline Canales is a 78 year old female with PMHx of chronic afib on anticoagulation with apixaban, chronic diastolic CHF with chronic bilateral lymphedema, pulmonary hypertension, hypothyroidism and rheumatoid arthritis who was admitted on 11/25/2019 for evaluation of dyspnea and hypoxia with concern for suspected CHF exacerbation.      Acute Hypoxic Respiratory Failure dt Acute Diastolic CHF exacerbation  Chronic Bilateral lymphedema  Last echo in 9/2018 showed EF 55-60% and moderate pulmonary hypertension; diastolic function not assessed. Underwent cardiac cath in 4/2019 which showed elevated biventricular filing pressures and moderate to severe primary pulmonary hypertension. Has chronic bilateral LE lymphedema for which she takes Lasix. Was seen by her PCP last month and treated for cellulitis. Was initially seen in clinic and observed to have increased LE edema and dyspnea. Referred to ED. Was afebrile, HR 100s with SBP 90s. Needing 2L NC on admission. ProBNP 3900. BMP unremarkable (Cr 0.75), WBC nl. CXR showed increased interstitial prominence. Given Lasix 40mg IV x1 in ED. [facility]. Started aggressive diuresis with Lasix 60mg IV TID. Echo this stay showed EF 50-55% with findings of severe biatrial enlargement, mod dilated RV and mildly decreased RVSF, mod to severe TR and pulmonary hypertension. Cardiology following -- diuretics changed to Bumex gtt on 11/26.   -- cont diuresis with Bumex gtt, receiving intermittent boluses per cardiology; net neg 1.8L over the past 24hrs (13.5L this stay) and weight conts trending down (103.4kg on admission -- 91.3kg today)  -- K 2.9 this morning, will increase sched KCl replacement from 40mEq BID to 40mEq TID for today, cont K replacement protocol  -- cont lymphedema wraps  -- wean off supplemental O2 as able -- presently on 2-3L NC  -- cardiology following, appreciate assistance with  care     Chronic A. Fib, on anticoagulation  Moderate/Severe pulmonary hypertension  Hyperlipidemia   Hypertension  PTA meds: metoprolol XL 25mg daily, nifedipine ER 60mg daily, sildenafil 20mg TID, simvastatin 40mg HS, Lasix 20mg BID, apixaban 5mg BID  -- intermittently tachy on monitor -- Toprol XL increased to 50mg daily on 12/2 -- BPs soft this morning but asymptomatic  -- conts on nifedipine, sildenafil, statin and apixaban  -- diuresis as above     Hypothyroidism  Chronic and stable on levothyroxine     Rheumatoid arthritis  Scleroderma  Chronic and stable on methotrexate (takes on Wednesdays). Conts on folic acid.  Can resume Remicade infusions at discharge    FEN: no IVFs, lytes stable, cardiac diet  DVT Prophylaxis: anticoagulated on apixaban as above  Code Status: Full Code -- per discussions on admission, she does not want to remain on artifical life support of she were to have no quality of life    Disposition: Anticipate discharge in next 1-2d, pending adequate diuresis and recommendations per cardiology regarding plan for oral diuretic. Therapy now recommending TCU stay, patient now agreeable and SW following.    Lis Nielson    Interval History   BPs soft overnight but patient asymptomatic. Denies dizziness/lightheadedness. No cp. Breathing better. Remains on 2-3L NC. Seems to have more energy this morning. Appetite good. No abd pain/n/v.     -Data reviewed today: I reviewed all new labs and imaging results over the last 24 hours. I personally reviewed no images or EKG's today.    Physical Exam   Temp: 97.3  F (36.3  C) Temp src: Oral BP: 95/57 Pulse: 123 Heart Rate: 80 Resp: 18 SpO2: 92 % O2 Device: Nasal cannula Oxygen Delivery: 3 LPM  Vitals:    12/01/19 0550 12/02/19 0421 12/03/19 0349   Weight: 92.3 kg (203 lb 8 oz) 91.6 kg (201 lb 14.4 oz) 91.3 kg (201 lb 3.2 oz)     Vital Signs with Ranges  Temp:  [97.3  F (36.3  C)-98.3  F (36.8  C)] 97.3  F (36.3  C)  Pulse:  [123] 123  Heart Rate:   [] 80  Resp:  [18] 18  BP: ()/(57-68) 95/57  SpO2:  [83 %-95 %] 92 %  I/O last 3 completed shifts:  In: 1140.5 [P.O.:1130; I.V.:10.5]  Out: 2600 [Urine:2600]    Constitutional: Resting comfortably, alert and answering questions appropriately, NAD  Respiratory: +bibasilar crackles, no wheeze/rhonchi, breathing nonlabored  Cardiovascular: HR irregular, no MGR, +bilateral LE edema with lymphedema wraps on  GI: S, NT, ND, +BS  Skin/Integumen: warm/dry  Other:      Medications     bumetanide 0.25 mg/hr (12/02/19 1930)     - MEDICATION INSTRUCTIONS -       - MEDICATION INSTRUCTIONS -         apixaban ANTICOAGULANT  5 mg Oral BID     cholecalciferol  1,600 Units Oral Daily     folic acid  1,000 mcg Oral Daily     levothyroxine  75 mcg Oral Daily     methotrexate  25 mg Oral Weekly     metoprolol succinate ER  50 mg Oral Daily     multivitamin w/minerals  1 tablet Oral Daily     NIFEdipine ER  60 mg Oral Daily     omeprazole  20 mg Oral BID AC     potassium chloride  40 mEq Oral BID     Prednisolon-Gatiflox-Bromfenac  1 drop Right Eye Daily     psyllium  1 capsule Oral Daily     sildenafil  20 mg Oral TID     simvastatin  40 mg Oral At Bedtime     sodium chloride (PF)  3 mL Intracatheter Q8H       Data   Recent Labs   Lab 12/03/19  0624 12/02/19  0714 12/01/19  1718 12/01/19  0552  11/27/19  0535    134  --  135   < > 137   POTASSIUM 2.9* 3.5 3.9 3.2*   < > 3.4   CHLORIDE 93* 94  --  95   < > 100   CO2 37* 37*  --  38*   < > 31   BUN 12 11  --  13   < > 12   CR 0.65 0.63  --  0.65   < > 0.67   ANIONGAP 5 3  --  2*   < > 6   TU 9.4 9.6  --  9.0   < > 9.3   GLC 95 94  --  88   < > 109*   ALBUMIN  --   --   --   --   --  3.7   PROTTOTAL  --   --   --   --   --  7.6   BILITOTAL  --   --   --   --   --  1.2   ALKPHOS  --   --   --   --   --  135   ALT  --   --   --   --   --  15   AST  --   --   --   --   --  26    < > = values in this interval not displayed.       No results found for this or any previous  visit (from the past 24 hour(s)).

## 2019-12-04 ENCOUNTER — APPOINTMENT (OUTPATIENT)
Dept: PHYSICAL THERAPY | Facility: CLINIC | Age: 79
DRG: 291 | End: 2019-12-04
Payer: MEDICARE

## 2019-12-04 ENCOUNTER — APPOINTMENT (OUTPATIENT)
Dept: OCCUPATIONAL THERAPY | Facility: CLINIC | Age: 79
DRG: 291 | End: 2019-12-04
Payer: MEDICARE

## 2019-12-04 LAB
ANION GAP SERPL CALCULATED.3IONS-SCNC: 4 MMOL/L (ref 3–14)
BUN SERPL-MCNC: 14 MG/DL (ref 7–30)
CALCIUM SERPL-MCNC: 9.2 MG/DL (ref 8.5–10.1)
CHLORIDE SERPL-SCNC: 98 MMOL/L (ref 94–109)
CO2 SERPL-SCNC: 36 MMOL/L (ref 20–32)
CREAT SERPL-MCNC: 0.64 MG/DL (ref 0.52–1.04)
GFR SERPL CREATININE-BSD FRML MDRD: 84 ML/MIN/{1.73_M2}
GLUCOSE SERPL-MCNC: 97 MG/DL (ref 70–99)
POTASSIUM SERPL-SCNC: 3.9 MMOL/L (ref 3.4–5.3)
SODIUM SERPL-SCNC: 138 MMOL/L (ref 133–144)

## 2019-12-04 PROCEDURE — 25000132 ZZH RX MED GY IP 250 OP 250 PS 637: Mod: GY | Performed by: HOSPITALIST

## 2019-12-04 PROCEDURE — 21000001 ZZH R&B HEART CARE

## 2019-12-04 PROCEDURE — 80048 BASIC METABOLIC PNL TOTAL CA: CPT | Performed by: PHYSICIAN ASSISTANT

## 2019-12-04 PROCEDURE — 25000131 ZZH RX MED GY IP 250 OP 636 PS 637: Mod: GY | Performed by: HOSPITALIST

## 2019-12-04 PROCEDURE — 97530 THERAPEUTIC ACTIVITIES: CPT | Mod: GO

## 2019-12-04 PROCEDURE — 97110 THERAPEUTIC EXERCISES: CPT | Mod: GP

## 2019-12-04 PROCEDURE — 36415 COLL VENOUS BLD VENIPUNCTURE: CPT | Performed by: PHYSICIAN ASSISTANT

## 2019-12-04 PROCEDURE — 25000132 ZZH RX MED GY IP 250 OP 250 PS 637: Mod: GY | Performed by: PHYSICIAN ASSISTANT

## 2019-12-04 PROCEDURE — 99232 SBSQ HOSP IP/OBS MODERATE 35: CPT | Performed by: INTERNAL MEDICINE

## 2019-12-04 PROCEDURE — 97535 SELF CARE MNGMENT TRAINING: CPT | Mod: GO

## 2019-12-04 PROCEDURE — 25000125 ZZHC RX 250: Performed by: INTERNAL MEDICINE

## 2019-12-04 PROCEDURE — 25000132 ZZH RX MED GY IP 250 OP 250 PS 637: Mod: GY | Performed by: INTERNAL MEDICINE

## 2019-12-04 PROCEDURE — 97530 THERAPEUTIC ACTIVITIES: CPT | Mod: GP

## 2019-12-04 RX ORDER — POTASSIUM CHLORIDE 1500 MG/1
40 TABLET, EXTENDED RELEASE ORAL 2 TIMES DAILY
Status: DISCONTINUED | OUTPATIENT
Start: 2019-12-04 | End: 2019-12-05 | Stop reason: HOSPADM

## 2019-12-04 RX ADMIN — ACETAMINOPHEN 650 MG: 325 TABLET, FILM COATED ORAL at 21:05

## 2019-12-04 RX ADMIN — SILDENAFIL 20 MG: 20 TABLET, FILM COATED ORAL at 20:14

## 2019-12-04 RX ADMIN — BUMETANIDE 0.25 MG/HR: 0.25 INJECTION INTRAMUSCULAR; INTRAVENOUS at 04:36

## 2019-12-04 RX ADMIN — LEVOTHYROXINE SODIUM 75 MCG: 75 TABLET ORAL at 07:59

## 2019-12-04 RX ADMIN — Medication 1 CAPSULE: at 08:04

## 2019-12-04 RX ADMIN — APIXABAN 5 MG: 5 TABLET, FILM COATED ORAL at 08:00

## 2019-12-04 RX ADMIN — SILDENAFIL 20 MG: 20 TABLET, FILM COATED ORAL at 08:00

## 2019-12-04 RX ADMIN — CHOLECALCIFEROL TAB 10 MCG (400 UNIT) 1600 UNITS: 10 TAB at 08:00

## 2019-12-04 RX ADMIN — ACETAMINOPHEN 650 MG: 325 TABLET, FILM COATED ORAL at 17:24

## 2019-12-04 RX ADMIN — SIMVASTATIN 40 MG: 40 TABLET, FILM COATED ORAL at 20:13

## 2019-12-04 RX ADMIN — PREDNISOLONE ACETATE-GATIFLOXACIN-BROMFENAC 1 DROP: .75; 5; 1 SUSPENSION/ DROPS OPHTHALMIC at 08:05

## 2019-12-04 RX ADMIN — METHOTREXATE 25 MG: 2.5 TABLET ORAL at 20:14

## 2019-12-04 RX ADMIN — METOPROLOL SUCCINATE 50 MG: 50 TABLET, EXTENDED RELEASE ORAL at 07:59

## 2019-12-04 RX ADMIN — OMEPRAZOLE 20 MG: 20 CAPSULE, DELAYED RELEASE ORAL at 17:22

## 2019-12-04 RX ADMIN — FOLIC ACID 1000 MCG: 1 TABLET ORAL at 08:00

## 2019-12-04 RX ADMIN — SPIRONOLACTONE 25 MG: 25 TABLET ORAL at 08:00

## 2019-12-04 RX ADMIN — POTASSIUM CHLORIDE 40 MEQ: 1500 TABLET, EXTENDED RELEASE ORAL at 20:12

## 2019-12-04 RX ADMIN — MELATONIN 5 MG TABLET 5 MG: at 21:08

## 2019-12-04 RX ADMIN — SILDENAFIL 20 MG: 20 TABLET, FILM COATED ORAL at 17:23

## 2019-12-04 RX ADMIN — POTASSIUM CHLORIDE 40 MEQ: 1500 TABLET, EXTENDED RELEASE ORAL at 08:00

## 2019-12-04 RX ADMIN — NIFEDIPINE 60 MG: 60 TABLET, FILM COATED, EXTENDED RELEASE ORAL at 08:00

## 2019-12-04 RX ADMIN — APIXABAN 5 MG: 5 TABLET, FILM COATED ORAL at 20:14

## 2019-12-04 RX ADMIN — MULTIPLE VITAMINS W/ MINERALS TAB 1 TABLET: TAB at 08:00

## 2019-12-04 RX ADMIN — OMEPRAZOLE 20 MG: 20 CAPSULE, DELAYED RELEASE ORAL at 07:59

## 2019-12-04 ASSESSMENT — ACTIVITIES OF DAILY LIVING (ADL)
ADLS_ACUITY_SCORE: 18
ADLS_ACUITY_SCORE: 24
ADLS_ACUITY_SCORE: 18
ADLS_ACUITY_SCORE: 26
ADLS_ACUITY_SCORE: 18
ADLS_ACUITY_SCORE: 26

## 2019-12-04 NOTE — PLAN OF CARE
Patient A&O x4, Denies pain, VSS with tele A-Fib CVR.  LS diminished and on 1.5 L NC, up with SBA and RW , on cardiac diet, skin is bruised , voiding well via Purewvick and it was changed at 2100 and last BM 12/3. IV is in  L arm and Bumex 1 mg/hr infusing. Plan to continue to diurese.

## 2019-12-04 NOTE — PROGRESS NOTES
SW:  D:  Call placed to update Jolene as to patient's status and potential for discharge tomorrow.  Per Dina, they will have a bed available for patient tomorrow.    P:  Will continue to follow.      BRADEN Brown, [facility]  Lead   [phone]  [facility]

## 2019-12-04 NOTE — PLAN OF CARE
Discharge Planner OT   Patient plan for discharge: Prefers to go home but considering TCU  Current status: Completed sit <> stand, stand <> sit, functional ambulation in room and BR, and g/h in stance at sink with FWW with SBA. Demo'd carry over FWW safety education and lack of carry over of education for safe functional transfer techniques. Pt. sequenced g/h tasks at sink with 1 verbal cue for reorientation to task. O2 96% with activity with 1.5 L O2 via nasal canula.  Barriers to return to prior living situation: weakness, fall risk, impaired activity tolerance  Recommendations for discharge: TCU  Rationale for recommendations: Below functional baseline and requires increased A for ADLs/IADLs. Would benefit from continued skilled OT in TCU to promote safety and I in ADLs and IADLs.       Entered by: Romelia Powell 12/04/2019 12:02 PM

## 2019-12-04 NOTE — PLAN OF CARE
Patient A&O x4, Denies chest pain/tightness/pressure and Tylenol 650 mg given for chronic legs pain on tele A-Fib CVR and VSS,  Lungs are crackly at bases  and on 1.5 L NC, up with SBA and RW , on cardiac diet, skin is bruised , voiding well via Purewvick and it was changed at 2100 and last BM 12/3. IV is in  L arm and Bumex 1 mg/hr infusing. Plan to continue to diurese.

## 2019-12-04 NOTE — PROGRESS NOTES
[facility]    Hospitalist Progress Note    Assessment & Plan   Jaqueline Canales is a 78 year old female with PMHx of chronic afib on anticoagulation with apixaban, chronic diastolic CHF with chronic bilateral lymphedema, pulmonary hypertension, hypothyroidism and rheumatoid arthritis who was admitted on 11/25/2019 for evaluation of dyspnea and hypoxia with concern for suspected CHF exacerbation.      Acute Hypoxic Respiratory Failure dt Acute Diastolic CHF exacerbation  Chronic Bilateral LE edema / Lymphedema  Last echo in 9/2018 showed EF 55-60% and moderate pulmonary hypertension; diastolic function not assessed. Underwent cardiac cath in 4/2019 which showed elevated biventricular filing pressures and moderate to severe primary pulmonary hypertension. Has chronic bilateral LE lymphedema for which she takes Lasix. Was seen by her PCP last month and treated for cellulitis. Was initially seen in clinic and observed to have increased LE edema and dyspnea. Referred to ED. Was afebrile, HR 100s with SBP 90s. Needing 2L NC on admission. ProBNP 3900. BMP unremarkable (Cr 0.75), WBC nl. CXR showed increased interstitial prominence. Given Lasix 40mg IV x1 in ED. Admitted to [facility]. Started aggressive diuresis with Lasix 60mg IV TID. Echo this stay showed EF 50-55% with findings of severe biatrial enlargement, mod dilated RV and mildly decreased RVSF, mod to severe TR and pulmonary hypertension. Cardiology following -- diuretics changed to Bumex gtt on 11/26.   -- cont diuresis with Bumex gtt, receiving intermittent boluses per cardiology; urine output 2.4L in the past 24hrs (net neg 14.3L this stay) and weight conts trending down (103.4kg on admission -- 91.3kg on 12/3)  -- defer to cardiology as to when to transition to oral diuretic (torsemide vs Bumex); spironolactone 25mg daily added on 12/3  -- K improved after additional K replacement (TID dosing yesterday + K replacement protocol), will decrease sched  KCl back to 40mEq po BID  -- cont lymphedema wraps  -- wean off supplemental O2 as able -- presently on 2L NC  -- cardiology following, appreciate assistance with care     Chronic A. Fib, on anticoagulation  Moderate/Severe pulmonary hypertension  Hyperlipidemia   Hypertension  PTA meds: metoprolol XL 25mg daily, nifedipine ER 60mg daily, sildenafil 20mg TID, simvastatin 40mg HS, Lasix 20mg BID, apixaban 5mg BID  -- intermittently tachy on monitor so Toprol XL increased to 50mg daily on 12/2 -- BPs remain soft this morning but asymptomatic  -- conts on nifedipine, sildenafil, statin and apixaban  -- diuresis as above     Hypothyroidism  Chronic and stable on levothyroxine     Rheumatoid arthritis  Scleroderma  Chronic and stable on methotrexate (takes on Wednesdays). Conts on folic acid.  Can resume Remicade infusions at discharge    FEN: no IVFs, lytes stable, cardiac diet  DVT Prophylaxis: anticoagulated on apixaban as above  Code Status: Full Code -- per discussions on admission, she does not want to remain on artifical life support of she were to have no quality of life    Disposition: Continues on Bumex gtt this morning, cards to determine when to transition to oral diuretics. Anticipate discharge in 1-2d once tolerating oral diuretics. Therapy recommending TCU stay, patient agreeable and SW following.    Lis Nielson    Interval History    Seen this morning. Awake and alert, sitting in chair. Feeling okay. Denies complaints. No dizziness/lightheadedness, cp/sob/cough, abd pain/n/v. O2 down to 1.5L NC this morning.     -Data reviewed today: I reviewed all new labs and imaging results over the last 24 hours. I personally reviewed no images or EKG's today.    Physical Exam   Temp: 97.5  F (36.4  C) Temp src: Oral BP: 97/51 Pulse: 77 Heart Rate: 88 Resp: 18 SpO2: 97 % O2 Device: Nasal cannula Oxygen Delivery: 1.5 LPM  Vitals:    12/01/19 0550 12/02/19 0421 12/03/19 0349   Weight: 92.3 kg (203 lb 8 oz)  91.6 kg (201 lb 14.4 oz) 91.3 kg (201 lb 3.2 oz)     Vital Signs with Ranges  Temp:  [97.3  F (36.3  C)-98.2  F (36.8  C)] 97.5  F (36.4  C)  Pulse:  [77] 77  Heart Rate:  [] 88  Resp:  [18] 18  BP: ()/(48-74) 97/51  SpO2:  [94 %-98 %] 97 %  I/O last 3 completed shifts:  In: 1500 [P.O.:1500]  Out: 1525 [Urine:1525]    Constitutional: Resting comfortably, alert and answering questions appropriately, NAD  Respiratory: +bibasilar crackles, no wheeze/rhonchi, breathing nonlabored  Cardiovascular: HR irregular, no MGR, +bilateral LE edema   GI: S, NT, ND, +BS  Skin/Integumen: warm/dry, chronic venous stasis changes in bilateral LEs  Other:      Medications     bumetanide 0.25 mg/hr (12/04/19 0436)     - MEDICATION INSTRUCTIONS -       - MEDICATION INSTRUCTIONS -         apixaban ANTICOAGULANT  5 mg Oral BID     cholecalciferol  1,600 Units Oral Daily     folic acid  1,000 mcg Oral Daily     levothyroxine  75 mcg Oral Daily     methotrexate  25 mg Oral Weekly     metoprolol succinate ER  50 mg Oral Daily     multivitamin w/minerals  1 tablet Oral Daily     NIFEdipine ER  60 mg Oral Daily     omeprazole  20 mg Oral BID AC     potassium chloride  40 mEq Oral TID     Prednisolon-Gatiflox-Bromfenac  1 drop Right Eye Daily     psyllium  1 capsule Oral Daily     sildenafil  20 mg Oral TID     simvastatin  40 mg Oral At Bedtime     sodium chloride (PF)  3 mL Intracatheter Q8H     spironolactone  25 mg Oral Daily       Data   Recent Labs   Lab 12/04/19  0530 12/03/19  1400 12/03/19  0624 12/02/19  0714     --  135 134   POTASSIUM 3.9 4.4 2.9* 3.5   CHLORIDE 98  --  93* 94   CO2 36*  --  37* 37*   BUN 14  --  12 11   CR 0.64  --  0.65 0.63   ANIONGAP 4  --  5 3   TU 9.2  --  9.4 9.6   GLC 97  --  95 94       No results found for this or any previous visit (from the past 24 hour(s)).

## 2019-12-04 NOTE — PROGRESS NOTES
[facility] Cardiology Progress Note  Date of Service: 12/04/2019  Primary Cardiologist: Dr. Lopez / Dr. Marquez    Jaqueline Canales is a 78 year old female admitted on 11/25/2019 with CHF.    Subjective: Dyspnea and edema improving daily. No chest pain.    Assessment:  1. Decompensated HFpEF / anasarca, possibly precipitated by Sildenafil therapy.    - Adequate diuresis on bumex gtt at 0.25 mg/hr since admit [on Lasix 20 PTA]. Net -15L. Sharath added 12/3. Creatinine tolerating. O2 weaning.    - Admit wt 228, current wt 201 lbs. Suspect dry wt <200 lbs. O2 weaning. Remains with pitting edema to knees, improved. proBNP only marginally reduced (4K --> 3K).   - Maintained on sildenafil.     2. Chronic moderate PAH in setting of scleroderma, on sildenafil and nifedipine  3. Mod-severe TR  4. Scleroderma and RA, on DMARDs  5. Chronic AF, rates controlled, anticoagulated on Eliquis.  6. 25-beat run of NSVT vs RVR at 7p on 12/1, asymptomatic. Toprol increased to 50 mg daily.   7. HTN - BP currently marginal, asymptomatic.  8. Mild chronic anemia  9. Hypothyroid, supplemented  10. Frailty, deconditioning - ultimate discharge to TCU  11. Hypokalemia - supplemented    Plan:   1. Continue bumex gtt until O2 completely weaned, then switch to torsemide 20 mg daily. Monitor one additional day after switch to ensure adequate output. Should not need K+ supplement with addition of Sharath.   2. Continue cardiac rehab and lymphedema therapy.  3. Has follow-up with Dr. Marquez on 12/10.   4. Cardiology will sign off. Please call with questions or concerns.     Anni Snow PA-C  [facility]  Pager: [phone]  Text Page  (7:30am - 4pm M-F)   __________________________________________________________________________  Cardiac diagnostics reviewed:  Tele:      Physical Exam   Temp: 97.5  F (36.4  C) Temp src: Oral BP: 97/51 Pulse: 77 Heart Rate: 88 Resp: 18 SpO2: 97 % O2 Device: Nasal cannula  Oxygen Delivery: 1.5 LPM  Vitals:    12/01/19 0550 12/02/19 0421 12/03/19 0349   Weight: 92.3 kg (203 lb 8 oz) 91.6 kg (201 lb 14.4 oz) 91.3 kg (201 lb 3.2 oz)       GENERAL:  The patient is in no apparent distress.   NECK: CVP appears normal, no masses or thyromegaly.  PULMONARY:  There is a normal respiratory effort. No crackles.   CARDIOVASCULAR:  Irregularly irregular, no murmurs appreciated.   GI:  Non tender abdomen with normoactive bowel sounds and no hepatosplenomegaly. There are no masses palpable.   EXTREMITIES:  2+ pitting edema to knees bilaterally. LE's wrapped.  VASCULAR: 2+ Pulses bilaterally in upper and lower extremities.    Medications     bumetanide 0.25 mg/hr (12/04/19 0750)     - MEDICATION INSTRUCTIONS -       - MEDICATION INSTRUCTIONS -         apixaban ANTICOAGULANT  5 mg Oral BID     cholecalciferol  1,600 Units Oral Daily     folic acid  1,000 mcg Oral Daily     levothyroxine  75 mcg Oral Daily     methotrexate  25 mg Oral Weekly     metoprolol succinate ER  50 mg Oral Daily     multivitamin w/minerals  1 tablet Oral Daily     NIFEdipine ER  60 mg Oral Daily     omeprazole  20 mg Oral BID AC     potassium chloride  40 mEq Oral BID     Prednisolon-Gatiflox-Bromfenac  1 drop Right Eye Daily     psyllium  1 capsule Oral Daily     sildenafil  20 mg Oral TID     simvastatin  40 mg Oral At Bedtime     sodium chloride (PF)  3 mL Intracatheter Q8H     spironolactone  25 mg Oral Daily       Data   Most Recent 3 CBC's:  Recent Labs   Lab Test 11/25/19  1654 10/16/19 07/16/19  0959   WBC 6.3 5.1 4.7   HGB 11.6* 12.1 12.0   MCV 92 98 96    214 115*     Most Recent 3 BMP's:  Recent Labs   Lab Test 12/04/19  0530 12/03/19  1400 12/03/19  0624 12/02/19  0714     --  135 134   POTASSIUM 3.9 4.4 2.9* 3.5   CHLORIDE 98  --  93* 94   CO2 36*  --  37* 37*   BUN 14  --  12 11   CR 0.64  --  0.65 0.63   ANIONGAP 4  --  5 3   TU 9.2  --  9.4 9.6   GLC 97  --  95 94     Most Recent 2 LFT's:  Recent  Labs   Lab Test 11/27/19  0535 10/16/19 04/02/19  1308   AST 26 29 27   ALT 15 15 36   ALKPHOS 135  --  88   BILITOTAL 1.2  --  0.7     Most Recent 3 Troponin's:  Recent Labs   Lab Test 11/25/19  1654 10/23/16  0533 10/22/16  1943   TROPI <0.015 <0.015  The 99th percentile for upper reference range is 0.045 ug/L.  Troponin values in   the range of 0.045 - 0.120 ug/L may be associated with risks of adverse   clinical events.   <0.015  The 99th percentile for upper reference range is 0.045 ug/L.  Troponin values in   the range of 0.045 - 0.120 ug/L may be associated with risks of adverse   clinical events.       Most Recent 3 BNP's:  Recent Labs   Lab Test 12/03/19  0624 11/25/19  1654 07/16/19  0959 05/01/19  1115 04/02/19  1308 10/22/16  1626   NTBNPI 3,098* 3,936*  --   --   --  1,007   NTBNP  --   --  3,089* 2,900* 3,011*  --      Most Recent TSH and T4:  Recent Labs   Lab Test 07/26/19  0934  08/17/15  0909   TSH 3.10   < > 4.53*   T4  --   --  0.96    < > = values in this interval not displayed.

## 2019-12-04 NOTE — PLAN OF CARE
Discharge Planner PT   Patient plan for discharge: Home  Current status: Pt on 1.5L O2 via NC with SpO2 96%.  Pt transfers sit<>stand to FWW with SBA,. Pt ambulated 125' x 1 and 75' x 1 with FWW and CGA. Pt reviewed CHF education, including stop light tool and diet modifications.  Barriers to return to prior living situation: Current level of assist, decreased activity tolerance, weakness, falls risk   Recommendations for discharge: TCU  Rationale for recommendations: Pt is below baseline and would benefit from continued PT while IP and at TCU to improve functional strength, balance, activity tolerance, safety and IND with functional mobility and reduce falls risk prior to returning home.       Entered by: Silvana Lynn 12/04/2019 10:51 AM

## 2019-12-05 VITALS
DIASTOLIC BLOOD PRESSURE: 60 MMHG | HEART RATE: 77 BPM | WEIGHT: 195.5 LBS | HEIGHT: 62 IN | SYSTOLIC BLOOD PRESSURE: 99 MMHG | BODY MASS INDEX: 35.98 KG/M2 | OXYGEN SATURATION: 92 % | RESPIRATION RATE: 18 BRPM | TEMPERATURE: 97.9 F

## 2019-12-05 LAB
ANION GAP SERPL CALCULATED.3IONS-SCNC: 5 MMOL/L (ref 3–14)
BUN SERPL-MCNC: 16 MG/DL (ref 7–30)
CALCIUM SERPL-MCNC: 9.5 MG/DL (ref 8.5–10.1)
CHLORIDE SERPL-SCNC: 97 MMOL/L (ref 94–109)
CO2 SERPL-SCNC: 34 MMOL/L (ref 20–32)
CREAT SERPL-MCNC: 0.71 MG/DL (ref 0.52–1.04)
GFR SERPL CREATININE-BSD FRML MDRD: 81 ML/MIN/{1.73_M2}
GLUCOSE SERPL-MCNC: 92 MG/DL (ref 70–99)
POTASSIUM SERPL-SCNC: 3.4 MMOL/L (ref 3.4–5.3)
SODIUM SERPL-SCNC: 136 MMOL/L (ref 133–144)

## 2019-12-05 PROCEDURE — 25000132 ZZH RX MED GY IP 250 OP 250 PS 637: Mod: GY | Performed by: HOSPITALIST

## 2019-12-05 PROCEDURE — 25000132 ZZH RX MED GY IP 250 OP 250 PS 637: Mod: GY | Performed by: PHYSICIAN ASSISTANT

## 2019-12-05 PROCEDURE — 99239 HOSP IP/OBS DSCHRG MGMT >30: CPT | Performed by: INTERNAL MEDICINE

## 2019-12-05 PROCEDURE — 80048 BASIC METABOLIC PNL TOTAL CA: CPT | Performed by: PHYSICIAN ASSISTANT

## 2019-12-05 PROCEDURE — 25000132 ZZH RX MED GY IP 250 OP 250 PS 637: Mod: GY | Performed by: INTERNAL MEDICINE

## 2019-12-05 PROCEDURE — 36415 COLL VENOUS BLD VENIPUNCTURE: CPT | Performed by: PHYSICIAN ASSISTANT

## 2019-12-05 RX ORDER — TORSEMIDE 20 MG/1
20 TABLET ORAL DAILY
Status: DISCONTINUED | OUTPATIENT
Start: 2019-12-05 | End: 2019-12-05 | Stop reason: HOSPADM

## 2019-12-05 RX ORDER — TORSEMIDE 20 MG/1
20 TABLET ORAL DAILY
DISCHARGE
Start: 2019-12-05 | End: 2019-12-24

## 2019-12-05 RX ORDER — SPIRONOLACTONE 25 MG/1
25 TABLET ORAL DAILY
DISCHARGE
Start: 2019-12-05 | End: 2019-12-13

## 2019-12-05 RX ORDER — POTASSIUM CHLORIDE 1500 MG/1
20 TABLET, EXTENDED RELEASE ORAL DAILY
DISCHARGE
Start: 2019-12-05 | End: 2020-01-10

## 2019-12-05 RX ORDER — METOPROLOL SUCCINATE 50 MG/1
50 TABLET, EXTENDED RELEASE ORAL DAILY
DISCHARGE
Start: 2019-12-05 | End: 2019-12-23

## 2019-12-05 RX ADMIN — CHOLECALCIFEROL TAB 10 MCG (400 UNIT) 1600 UNITS: 10 TAB at 08:28

## 2019-12-05 RX ADMIN — MULTIPLE VITAMINS W/ MINERALS TAB 1 TABLET: TAB at 08:28

## 2019-12-05 RX ADMIN — Medication 1 CAPSULE: at 08:28

## 2019-12-05 RX ADMIN — NIFEDIPINE 60 MG: 60 TABLET, FILM COATED, EXTENDED RELEASE ORAL at 08:28

## 2019-12-05 RX ADMIN — FOLIC ACID 1000 MCG: 1 TABLET ORAL at 08:28

## 2019-12-05 RX ADMIN — LEVOTHYROXINE SODIUM 75 MCG: 75 TABLET ORAL at 06:48

## 2019-12-05 RX ADMIN — APIXABAN 5 MG: 5 TABLET, FILM COATED ORAL at 08:28

## 2019-12-05 RX ADMIN — OMEPRAZOLE 20 MG: 20 CAPSULE, DELAYED RELEASE ORAL at 06:48

## 2019-12-05 RX ADMIN — SPIRONOLACTONE 25 MG: 25 TABLET ORAL at 08:28

## 2019-12-05 RX ADMIN — PREDNISOLONE ACETATE-GATIFLOXACIN-BROMFENAC 1 DROP: .75; 5; 1 SUSPENSION/ DROPS OPHTHALMIC at 08:33

## 2019-12-05 RX ADMIN — METOPROLOL SUCCINATE 50 MG: 50 TABLET, EXTENDED RELEASE ORAL at 08:28

## 2019-12-05 RX ADMIN — SILDENAFIL 20 MG: 20 TABLET, FILM COATED ORAL at 08:28

## 2019-12-05 RX ADMIN — TORSEMIDE 20 MG: 20 TABLET ORAL at 09:11

## 2019-12-05 RX ADMIN — POTASSIUM CHLORIDE 20 MEQ: 1500 TABLET, EXTENDED RELEASE ORAL at 06:48

## 2019-12-05 RX ADMIN — POTASSIUM CHLORIDE 40 MEQ: 1500 TABLET, EXTENDED RELEASE ORAL at 08:28

## 2019-12-05 ASSESSMENT — ACTIVITIES OF DAILY LIVING (ADL)
ADLS_ACUITY_SCORE: 26
ADLS_ACUITY_SCORE: 24
ADLS_ACUITY_SCORE: 24

## 2019-12-05 ASSESSMENT — MIFFLIN-ST. JEOR: SCORE: 1315.16

## 2019-12-05 NOTE — PROGRESS NOTES
Discharge to TCU via W/C ascort. All belongings sent with pt. Discharged on 1 liter NC. Stable at discharge.

## 2019-12-05 NOTE — DISCHARGE INSTRUCTIONS
[facility] around 11:[facility]. [facility]'s phone number is [phone].

## 2019-12-05 NOTE — PROGRESS NOTES
Patient has Remacaid infusion scheduled for Dec. 17th at [facility] (Dr. Bustillos); [phone].  If patient still in TCU at that time~appointment will need to be re-scheduled.  This was confirmed with [place], medical assistant to Dr. Bustillos.    Ana Maria Han RN  Care Coordinator  [facility]  [phone]

## 2019-12-05 NOTE — PROGRESS NOTES
Attempted to wean o2 today. Sats on 1 liter =93-95% . On room air sats 87-89% at rest. Drops to 83% with activity on room air. Pt placed back on 1 liter NC.Ambulated several times today. Up in chair. Cont to have 3+ edema of Lower extremities. Cont to be on bumex gtt at 1 mg. Cont to monitor I/O closely.

## 2019-12-05 NOTE — PLAN OF CARE
Physical Therapy Discharge Summary    Reason for therapy discharge:    Discharged to transitional care facility.    Progress towards therapy goal(s). See goals on Care Plan in [facility] electronic health record for goal details.  Goals partially met.  Barriers to achieving goals:   discharge from facility.    Therapy recommendation(s):    Continued therapy is recommended.  Rationale/Recommendations:  To further increase independence with mobility.

## 2019-12-05 NOTE — PROGRESS NOTES
SW:  D:  Received discharge orders for patient.  Bed available at [facility] for today.  Patient will transport, via the [facility]way, around 11:00.  Patient informed of the plan and in agreement to the plan.  Patient's is asking that I contact her  to update him.  Call placed to update patient's  as to the plan and he is in agreement.  Call placed to update [facility] and faxed the orders and the PAS.      PAS-RR    D: Per DHS regulation, SW completed and submitted PAS-RR to [place] [employer] Direct Connect via the Senior LinkAge Line.  PAS-RR confirmation # is : [id number].    I: SW spoke with patient and  and they are aware a PAS-RR has been submitted.  SW reviewed with patient and  that they may be contacted for a follow up appointment within 10 days of hospital discharge if their SNF stay is < 30 days.  Contact information for [facility] was also provided.    A: Patient and  verbalized understanding.    P: Further questions may be directed to [facility] LinkAge Line at #[phone], option #4 for PAS-RR staff.      BRADEN Brown, [facility]  Lead   [phone]  [facility]

## 2019-12-05 NOTE — PLAN OF CARE
VSS.  Remains on 1L nasal cannula with sats in low 90's at rest.  Lungs diminished. Bumex infusinf @ 1 mg.  LE with 3+ edema.  Purwick placed at hs per pt. Request.    Heart Failure Care Pathway  GOALS TO BE MET BEFORE DISCHARGE:    1. Decrease congestion and/or edema with diuretic therapy to achieve near      optimal volume status.            Dyspnea improved:  y            Edema improved:     No, please explain: still 3+        Net I/O and Weights since admission:          11/05 0700 - 12/05 0659  In: 58849.1 [P.O.:86057; I.V.:111.1]  Out: 73916 [Urine:79791]  Net: -35251.9            Vitals:    11/25/19 1646 11/25/19 2350 11/26/19 0538 11/27/19 0022   Weight: 103.4 kg (228 lb) 104 kg (229 lb 3.2 oz) 101.6 kg (224 lb) 99.5 kg (219 lb 6.4 oz)    11/27/19 0638 11/29/19 1025 11/30/19 0251 12/01/19 0550   Weight: 98.2 kg (216 lb 8 oz) 95.9 kg (211 lb 6.7 oz) 95.2 kg (209 lb 14.1 oz) 92.3 kg (203 lb 8 oz)    12/02/19 0421 12/03/19 0349   Weight: 91.6 kg (201 lb 14.4 oz) 91.3 kg (201 lb 3.2 oz)       2.  O2 sats > 92% on RA or at prior home O2 therapy level.          Current oxygenation status:       SpO2: 94 %         O2 Device: Nasal cannula,  Oxygen Delivery: 1 LPM         Able to wean O2 this shift to keep sats > 92%:  No, please explain: still in low 90's on 1L nc       Does patient use Home O2? No    3.  Tolerates ambulation and mobility near baseline: Yes        How many times did the patient ambulate with nursing staff this shift? 2    Please review the Heart Failure Care Pathway for additional HF goal outcomes.    Aicha Muir RN RN  12/5/2019

## 2019-12-05 NOTE — PLAN OF CARE
OT: attempted to see pt., was with nursing and not appropriate for therapy at this time as she is discharging to TCU within the hour.    Occupational Therapy Discharge Summary    Reason for therapy discharge:    Discharged to transitional care facility.    Progress towards therapy goal(s). See goals on Care Plan in [facility] electronic health record for goal details.  Goals partially met.  Barriers to achieving goals:   discharge from facility.    Therapy recommendation(s):    Continued therapy is recommended.  Rationale/Recommendations:  Pt requires increased A for ADLs and functional mobility and would benefit from continued skilled OT in TCU setting.

## 2019-12-05 NOTE — PROGRESS NOTES
[facility]  PRIMARY CARE PROVIDER AND CLINIC:  Carissa Burroughs MD, [address] / [place] [address]  Chief Complaint   Patient presents with     Hospital F/U     [facility] Record Number:  [id number]  Place of Service where encounter took place:  [facility] [[id number]]    Jaqueline Canales  is a 78 year old  (1940), admitted to the above facility from  [facility]. Hospital stay 11/25/19 through 12/5/19..  Admitted to this facility for  rehab, medical management and nursing care.    HPI:    HPI information obtained from: facility chart records, facility staff, patient report and [facility] chart review.   Brief Summary of Hospital Course: recent hospitalization due to LE edema and dyspnea and hypoxia with exertion. PMH includes atrial fibrillation (eliquis), pulmonary hypertension, CHF, hypothyroidism, rheumatoid arthritis, hypertension, lung cancer, pulmonary fibrosis/ scleroderma, lymphedema and smoking history (30 ppy)   She was admitted for acute diastolic heart failure and started on IV lasix. Cardiology was consulted and suspects decompensation likely due to pulmonary vasodilators. Cardiology  changed lasix to IV bumex and spironolactone was added.  She diuresed about 14l. she did have episodes of tachycardia so metoprolol was increased. She was transitioned to oral meds.  Due to ongoing weakness she was transferred to TCU.   Updates on Status Since Skilled nursing Admission: she denies shortness of breath. She does c/o LE itching. She would like to return home    CODE STATUS/ADVANCE DIRECTIVES DISCUSSION:   CPR/Full code   Patient's living condition: lives with spouse in house.   ALLERGIES: No known allergies  PAST MEDICAL HISTORY:  has a past medical history of Amaurosis fugax (5-11), Carotid artery stenosis, Esophageal reflux (3/11/2004), HELICOBACTER PYLORI INFECTION (7/11/2006), hx of CA in situ RT breast-1990.mastectomy  (4/17/2007), Hyperlipidemia LDL goal <70 (6/20/2011), Lung cancer (H), OBESITY NOS (3/11/2004), OSTEOPOROSIS NOS (3/11/2004), Other psoriasis (3/11/2004), Pulmonary hypertension (H) (04/15/2019), RA (rheumatoid arthritis) (H) (2/8/2010), Raynaud's syndrome (4/10/2006), Scleroderma (H), and Sleep apnea.  PAST SURGICAL HISTORY:   has a past surgical history that includes NONSPECIFIC PROCEDURE (1990); Tonsillectomy; Repair hammer toe; Conization; Endarterectomy carotid (2011); COLONOSCOPY THRU STOMA, DIAGNOSTIC (2001 2011); colonoscopy (5/24/16); Breast surgery; Right Heart Cath (N/A, 4/15/2019); and Colonoscopy (N/A, 6/28/2019).  FAMILY HISTORY: family history includes Alzheimer Disease in her sister; Cancer in her nephew; Cancer (age of onset: 31) in her sister; Cancer - colorectal in her mother; Cerebrovascular Disease in her father and maternal grandmother; Diabetes in her maternal grandmother and maternal uncle; Gastrointestinal Disease in her brother, sister, and sister; Heart Disease in her sister; Mental Illness in her maternal grandmother.  SOCIAL HISTORY:   reports that she quit smoking about 27 years ago. Her smoking use included cigarettes. She started smoking about 57 years ago. She has a 30.00 pack-year smoking history. She has never used smokeless tobacco. She reports previous alcohol use. She reports that she does not use drugs.    Post Discharge Medication Reconciliation Status: discharge medications reconciled, continue medications without change    Current Outpatient Medications   Medication Sig Dispense Refill     acetaminophen (TYLENOL) 500 MG tablet Take 500-1,000 mg by mouth every 6 hours as needed for mild pain       albuterol (PROAIR HFA/PROVENTIL HFA/VENTOLIN HFA) 108 (90 Base) MCG/ACT inhaler Inhale 2 puffs into the lungs every 6 hours as needed for shortness of breath / dyspnea or wheezing       apixaban ANTICOAGULANT (ELIQUIS) 5 MG tablet Take 1 tablet (5 mg) by mouth 2 times daily 180  tablet 2     Bacillus Coagulans-Inulin (PROBIOTIC-PREBIOTIC PO) Take 1 chew tab by mouth 2 times daily        folic acid (FOLVITE) 1 MG tablet Take 1 tablet (1,000 mcg) by mouth daily 90 tablet 3     InFLIXimab (REMICADE IV) Inject 600 mg into the vein Every 8 weeks       levothyroxine (SYNTHROID/LEVOTHROID) 75 MCG tablet Take 1 tablet (75 mcg) by mouth daily 90 tablet 2     melatonin 5 MG tablet Take 5 mg by mouth nightly as needed for sleep       Methotrexate Sodium (METHOTREXATE PO) Take 2.5 mg by mouth 10 tablets weekly - Wednesdays       metoprolol succinate ER (TOPROL-XL) 50 MG 24 hr tablet Take 1 tablet (50 mg) by mouth daily       Multiple Vitamins-Minerals (ICAPS) CAPS Take 1 capsule by mouth 2 times daily        multivitamin (CENTRUM SILVER) tablet Take 1 tablet by mouth daily       NIFEdipine ER (ADALAT CC) 60 MG 24 hr tablet Take 60 mg by mouth daily       omeprazole (PRILOSEC) 20 MG DR capsule Take 1 capsule (20 mg) by mouth 2 times daily Take 30-60 minutes prior to meals 180 capsule 2     potassium chloride ER (K-DUR/KLOR-CON M) 20 MEQ CR tablet Take 1 tablet (20 mEq) by mouth daily       Prednisolon-Gatiflox-Bromfenac 1-0.5-0.075 % SUSP Place 1 drop into the right eye daily       psyllium (METAMUCIL/KONSYL) capsule Take 1 capsule by mouth daily       sildenafil (REVATIO) 20 MG tablet Take 1 tablet (20 mg) by mouth 3 times daily 90 tablet 11     simvastatin (ZOCOR) 40 MG tablet Take 1 tablet (40 mg) by mouth At Bedtime 90 tablet 3     spironolactone (ALDACTONE) 25 MG tablet Take 1 tablet (25 mg) by mouth daily       torsemide (DEMADEX) 20 MG tablet Take 1 tablet (20 mg) by mouth daily       triamcinolone (KENALOG) 0.1 % external cream Apply topically 2 times daily 30 g 3     Vitamin D, Cholecalciferol, 10 MCG (400 UNIT) TABS Take 1,600 Units by mouth daily         ROS:  4 point ROS including Respiratory, CV, GI and , other than that noted in the HPI,  is negative    Vitals:  BP 95/57   Pulse 85    "Temp 96.9  F (36.1  C)   Resp 18   Ht 1.575 m (5' 2\")   Wt 90.5 kg (199 lb 9.6 oz)   SpO2 (!) 88%   BMI 36.51 kg/m    Exam:  GENERAL APPEARANCE:  Alert, in no distress  ENT:  Mouth and posterior oropharynx normal, moist mucous membranes, hearing acuity adequate   EYES:  EOM, conjunctivae, lids, pupils and irises normal    RESP:  respiratory effort and palpation of chest normal, no respiratory distress, Lung sounds BB rales   CV:  Palpation and auscultation of heart done , rate and rhythm irreg, systolic murmur, no rub or gallop, Edema trace to 1+  ABDOMEN:  normal bowel sounds, soft, nontender, no hepatosplenomegaly or other masses  M/S:   Gait and station not observed, Digits and nails nl  SKIN:  Inspection/Palpation of skin and subcutaneous tissue- shins with erythematous rash  NEURO: 2-12 in normal limits and at patient's baseline  PSYCH:  insight and judgement, memory intact , affect and mood normal    Lab/Diagnostic data:  CBC RESULTS:   Recent Labs   Lab Test 11/25/19  1654 10/16/19 07/16/19  0959   WBC 6.3 5.1 4.7   RBC 3.84  --  3.81   HGB 11.6* 12.1 12.0   HCT 35.4 36.6 36.5   MCV 92 98 96   MCH 30.2 32.5 31.5   MCHC 32.8 33.2 32.9   RDW 17.2* 14.4 16.3*    214 115*       Last Basic Metabolic Panel:  Recent Labs   Lab Test 12/05/19  0542 12/04/19  0530    138   POTASSIUM 3.4 3.9   CHLORIDE 97 98   TU 9.5 9.2   CO2 34* 36*   BUN 16 14   CR 0.71 0.64   GLC 92 97       Liver Function Studies -   Recent Labs   Lab Test 11/27/19  0535 10/16/19 04/02/19  1308   PROTTOTAL 7.6  --  7.4   ALBUMIN 3.7 4.0 4.1   BILITOTAL 1.2  --  0.7   ALKPHOS 135  --  88   AST 26 29 27   ALT 15 15 36       TSH   Date Value Ref Range Status   07/26/2019 3.10 0.40 - 4.00 mU/L Final   10/25/2018 3.70 0.40 - 4.00 mU/L Final   ]    Lab Results   Component Value Date    A1C 5.1 10/25/2018    A1C 5.8 06/24/2011           ASSESSMENT/PLAN:  (I50.31) Acute diastolic congestive heart failure (H)  (primary encounter " diagnosis)  Comment: recent hospitalization due to increased LE swelling and dyspnea with hypoxia. She was diuresed about 15litres of fluid or 31#.   Echo was done during hospitalization that showed EF 50-55% with severe biatrial enlargement, moderately dilated RV and mildly decreased RVSF, mod to severe TR and pulmonary hypertension. Today she denies shortness of breath at rest. She has not done much exercise yet.   Plan: continue Torsemide 20mg q day  Continue spironolactone 25mg q day  kcl 20 meq q day  Daily wts- call NP if wt >3# in one day or 5# in a week.   [facility] 12/9      (I89.0) Lymphedema of both lower extremities  Comment: patient presented with increased LE edema. She has h/o lymphedema. She was recently treated for LE cellulitis in outpatient setting. Much less swelling today  Plan: OCCUPATIONAL THERAPY to evaluate and treat for lymphedema  Elevate LEs  TMC 0.1% to pretibial areas prior to placing wraps.     (R09.02) Hypoxia  Comment: patient presented to ED with hypoxia. She continues on supplemental oxygen upon transfer to TCU. O2 sats in TCU about 90% on RA.   Plan: monitor O2 sats    (I48.19) Persistent atrial fibrillation  Comment: anticoagulated with eliquis. During hospitalization she did have episodes of tachycardia. Metoprolol was increased to 50mg q day. HR in TCU running 80s.   Plan: continue metoprolol ER 50mg q day  Continue apixaban 5mg BID    (I27.20) Pulmonary hypertension (H)  Comment: in setting of scleroderma. 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 Followed by Dr Marquez. She was started on Revatio in July 2019. During this most recent hospitalization it is thought the HF exacerbation is related to starting sildenafil.   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 She has had some pain in her hands and knees  Plan: continue remicade- next dose due 12/17 at [facility] (Dr Bustillos) [phone]  and methotrexate 2.5mg tablets- take 10 tablets q Wednesday for RA    (E03.9) Hypothyroidism, unspecified type  Comment:   Lab Results   Component Value Date    TSH 3.10 07/26/2019       Plan: continue current dose of synthroid    (R53.81) Physical deconditioning  Comment: lives in house with . There are stairs to get in and within home. She would like to go home soon. She was independent prior to admission.  At this point she is still quite weak and is a fall risk  Plan: physical therapy and OCCUPATIONAL THERAPY evaluation    Advanced care planning  Comment: patient states she has a HCD that is at 's office. She states she would not want to be kept alive if she could not take care of her self. Quality of life is important to her. She has a  but she asks me if he is capable of making decisions regarding advanced medical care for herself. She is not sure if her adoptive sons would be able to answer questions regarding her health.   We filled out a POLST. She was not sure about CPR.   Plan: due to inability to answer question about CPR we decided to leave her code status as CPR. She knows she can change this at any time.       Total time spent with patient visit at the skilled nursing facility was 44 minutes including patient visit and review of past records. Greater than 50% of total time spent with counseling and coordinating care due to review of multiple medical issues including pulmonary hypertension that is likely due to RA and scleroderma. She is recovering from CHF exacerbation likely due to SEs of medications. She will need close monitoring  of her weights and labs. She is high fall risk due to weakness due to being in hospital. We also spent time reviewing goals of care. She did not quite understand POLST . Advanced care planning will need to continue due to the nature of her multiple medical issues and likelihood that these issues will get worse over time.     Electronically signed by:  AUBRIE Tamayo CNP

## 2019-12-05 NOTE — DISCHARGE SUMMARY
[facility]    Discharge Summary  Hospitalist    Date of Admission:  11/25/2019  Date of Discharge:  12/5/2019  Discharging Provider: Lis Nielson    Discharge Diagnoses   Acute Hypoxic Respiratory Failure dt Acute Diastolic CHF exacerbation  Chronic Bilateral LE edema / Lymphedema  Chronic A. Fib, on anticoagulation  Moderate/Severe pulmonary hypertension  Hyperlipidemia   Hypertension  Hypothyroidism  Rheumatoid arthritis  Scleroderma    History of Present Illness   Jaqueline Canales is a 78 year old female with PMHx of chronic afib on anticoagulation with apixaban, chronic diastolic CHF with chronic bilateral lymphedema, pulmonary hypertension, hypothyroidism and rheumatoid arthritis who was admitted on 11/25/2019 for evaluation of dyspnea and hypoxia with concern for suspected CHF exacerbation.     Hospital Course   Jaqueline Canales was admitted on 11/25/2019.  The following problems were addressed during her hospitalization:    Acute Hypoxic Respiratory Failure dt Acute Diastolic CHF exacerbation  Chronic Bilateral LE edema / Lymphedema  Last echo in 9/2018 showed EF 55-60% and moderate pulmonary hypertension; diastolic function not assessed. Underwent cardiac cath in 4/2019 which showed elevated biventricular filing pressures and moderate to severe primary pulmonary hypertension. Has chronic bilateral LE lymphedema for which she takes Lasix. Was seen by her PCP last month and treated for cellulitis. Was initially seen in clinic and observed to have increased LE edema and dyspnea. Referred to ED. Was afebrile, HR 100s with SBP 90s. Needing 2L NC on admission. ProBNP 3900. BMP unremarkable (Cr 0.75), WBC nl. CXR showed increased interstitial prominence. Given Lasix 40mg IV x1 in ED. Admitted to [facility]. Started aggressive diuresis with Lasix 60mg IV TID. Echo this stay showed EF 50-55% with findings of severe biatrial enlargement, mod dilated RV and mildly decreased RVSF, mod to severe TR  and pulmonary hypertension. Cardiology following -- diuretics changed to Bumex gtt on 11/26. Maintained on Bumex gtt through 12/5. Net neg 15L by the time of discharge and wts improved (103.4kg on admission -- 88.7kg on 12/5).     Discharged on new regimen of diuretics including torsemide 20mg po daily and spironolactone 25mg daily.   Was still requiring 1L NC at time of discharge -- can wean off at TCU.   Recommended to continue lymphedema wraps.   Discharged on supplemental KCl given ongoing diuresis. Should have repeat BMP on 12/9 to reassess potassium.    Will follow up with medical providers as scheduled.      Chronic A. Fib, on anticoagulation  Moderate/Severe pulmonary hypertension  Hyperlipidemia   Hypertension  PTA meds: metoprolol XL 25mg daily, nifedipine ER 60mg daily, sildenafil 20mg TID, simvastatin 40mg HS, Lasix 20mg BID, apixaban 5mg BID  Intermittently tachy on monitor so Toprol XL increased to 50mg daily on 12/2 -- BPs soft but patient remained asymptomatic  Continued on nifedipine, sildenafil, statin and apixaban without changes  Diuresed as above.      Hypothyroidism  Chronic and stable on levothyroxine     Rheumatoid arthritis  Scleroderma  Chronic and stable on methotrexate (takes on Wednesdays). Conts on folic acid.  Can resume Remicade infusions at discharge    Discharged to TCU for ongoing PT/OT.     Lis Nielson, DO    Significant Results and Procedures   11/25/19 Echocardiogram:  Interpretation Summary     Small pericardial effusion  The visual ejection fraction is estimated at 50-55%.  Flattened septum is consistent with RV pressure/volume overload.  Pulmonary hypertension  There is severe biatrial enlargement.  Very mild AS. MG is 9 mm Hg.  The right ventricle is moderately dilated. Mildly decreased right ventricular systolic function  There is mod-severe to severe (3-4+) tricuspid regurgitation.  ___________________________________________________________     Left  Ventricle  The left ventricle is normal in size. The visual ejection fraction is  estimated at 50-55%. Diastolic Doppler findings (E/E' ratio and/or other  parameters) suggest left ventricular filling pressures are indeterminate.  Flattened septum is consistent with RV pressure/volume overload.     Right Ventricle  The right ventricle is moderately dilated. Mildly decreased right ventricular  systolic function.     Atria  There is severe biatrial enlargement.     Mitral Valve  Thickened mitral valve posterior leaflet. There is moderate mitral annular  calcification. There is mild to moderate (1-2+) mitral regurgitation.        Tricuspid Valve  The right ventricular systolic pressure is elevated at 42.2 mmHg. Pulmonary  hypertension. There is mod-severe to severe (3-4+) tricuspid regurgitation.     Aortic Valve  There is severe trileaflet aortic sclerosis. No hemodynamically significant  valvular aortic stenosis.     Pulmonic Valve  The pulmonic valve is not well visualized.     Vessels  The aortic root is normal size. Dilation of the inferior vena cava is present  with abnormal respiratory variation in diameter.     Pericardium  Small pericardial effusion.    Code Status   Full Code       Primary Care Physician   Carissa Burroughs    Physical Exam   Temp: 98.3  F (36.8  C) Temp src: Oral BP: 97/56 Pulse: 77 Heart Rate: 90 Resp: 18 SpO2: 94 % O2 Device: Nasal cannula Oxygen Delivery: 1 LPM  Vitals:    12/02/19 0421 12/03/19 0349 12/05/19 0200   Weight: 91.6 kg (201 lb 14.4 oz) 91.3 kg (201 lb 3.2 oz) 88.7 kg (195 lb 8 oz)     Vital Signs with Ranges  Temp:  [97.5  F (36.4  C)-98.3  F (36.8  C)] 98.3  F (36.8  C)  Pulse:  [77] 77  Heart Rate:  [81-98] 90  Resp:  [16-18] 18  BP: ()/(51-72) 97/56  SpO2:  [94 %-97 %] 94 %  I/O last 3 completed shifts:  In: 1263 [P.O.:1230; I.V.:33]  Out: 1650 [Urine:1650]    General: Resting comfortably, alert, conversive, NAD  CVS: HR irregular, no MGR, +bilateral LE edema    Respiratory: +bibasilar crackles, no wheeze/rhonchi, breathing nonlabored  GI: S, NT, ND, +BS  Skin: Warm/dry, chronic venous stasis changes in bilateral LEs    Discharge Disposition   Discharged to [facility]  Condition at discharge: Stable    Consultations This Hospital Stay   CARDIOLOGY IP CONSULT  [facility] EVALUATION IP CONSULT  PHYSICAL THERAPY ADULT IP CONSULT  OCCUPATIONAL THERAPY ADULT IP CONSULT  LYMPHEDEMA THERAPY IP CONSULT  SOCIAL WORK IP CONSULT    Time Spent on this Encounter   ILis DO, personally saw the patient today and spent greater than 30 minutes discharging this patient.    Discharge Orders      General info for SNF    Length of Stay Estimate: Short Term Care: Estimated # of Days <30  Condition at Discharge: Improving  Level of care:skilled   Rehabilitation Potential: Good  Admission H&P remains valid and up-to-date: Yes  Recent Chemotherapy: N/A  Use Nursing Home Standing Orders: N/A     Mantoux instructions    Give two-step Mantoux (PPD) Per Facility Policy Yes     Reason for your hospital stay    Management of your breathing difficulties and leg swelling, which was due to fluid overload and required treatment with an IV infusion of diuretics.     Daily weights    Call Provider for weight gain of more than 2 pounds per day or 5 pounds per week.     Follow Up and recommended labs and tests    1. Repeat BMP on 12/9/19 to assess electrolytes and renal function.  2. Follow up with facility physician or PCP in the next week.   3. Follow up with [facility] in [facility] as scheduled.     Activity - Up with nursing assistance     Physical Therapy Adult Consult    Evaluate and treat as clinically indicated.    Reason: Physical deconditioning     Occupational Therapy Adult Consult    Evaluate and treat as clinically indicated.    Reason:  Physical deconditioning     Oxygen - Nasal cannula    1-2 Lpm by nasal cannula to keep O2 sats 92% or greater.     Advance Diet as  Tolerated    Follow this diet upon discharge: 2 gram sodium diet, fluid restriction 2000 ML FLUID per day     Discharge Medications   Current Discharge Medication List      START taking these medications    Details   potassium chloride ER (K-DUR/KLOR-CON M) 20 MEQ CR tablet Take 1 tablet (20 mEq) by mouth daily    Associated Diagnoses: Hypokalemia      spironolactone (ALDACTONE) 25 MG tablet Take 1 tablet (25 mg) by mouth daily    Associated Diagnoses: Acute diastolic congestive heart failure (H)      torsemide (DEMADEX) 20 MG tablet Take 1 tablet (20 mg) by mouth daily    Associated Diagnoses: Acute diastolic congestive heart failure (H)         CONTINUE these medications which have CHANGED    Details   metoprolol succinate ER (TOPROL-XL) 50 MG 24 hr tablet Take 1 tablet (50 mg) by mouth daily    Associated Diagnoses: Persistent atrial fibrillation         CONTINUE these medications which have NOT CHANGED    Details   albuterol (PROAIR HFA/PROVENTIL HFA/VENTOLIN HFA) 108 (90 Base) MCG/ACT inhaler Inhale 2 puffs into the lungs every 6 hours as needed for shortness of breath / dyspnea or wheezing    Comments: Pharmacy may dispense brand covered by insurance (Proair, or proventil or ventolin or generic albuterol inhaler)      apixaban ANTICOAGULANT (ELIQUIS) 5 MG tablet Take 1 tablet (5 mg) by mouth 2 times daily  Qty: 180 tablet, Refills: 2    Associated Diagnoses: Persistent atrial fibrillation      Bacillus Coagulans-Inulin (PROBIOTIC-PREBIOTIC PO) Take 1 chew tab by mouth 2 times daily       folic acid (FOLVITE) 1 MG tablet Take 1 tablet (1,000 mcg) by mouth daily  Qty: 90 tablet, Refills: 3    Associated Diagnoses: Mixed hyperlipidemia      levothyroxine (SYNTHROID/LEVOTHROID) 75 MCG tablet Take 1 tablet (75 mcg) by mouth daily  Qty: 90 tablet, Refills: 2    Comments: [facility]?  That should be addressed/changed.  Associated Diagnoses: Hypothyroidism,  unspecified type      melatonin 5 MG tablet Take 5 mg by mouth nightly as needed for sleep      Multiple Vitamins-Minerals (ICAPS) CAPS Take 1 capsule by mouth 2 times daily       multivitamin (CENTRUM SILVER) tablet Take 1 tablet by mouth daily      NIFEdipine ER (ADALAT CC) 60 MG 24 hr tablet Take 60 mg by mouth daily      omeprazole (PRILOSEC) 20 MG DR capsule Take 1 capsule (20 mg) by mouth 2 times daily Take 30-60 minutes prior to meals  Qty: 180 capsule, Refills: 2    Associated Diagnoses: Esophageal stricture      Prednisolon-Gatiflox-Bromfenac 1-0.5-0.075 % SUSP Place 1 drop into the right eye daily      psyllium (METAMUCIL/KONSYL) capsule Take 1 capsule by mouth daily      sildenafil (REVATIO) 20 MG tablet Take 1 tablet (20 mg) by mouth 3 times daily  Qty: 90 tablet, Refills: 11    Associated Diagnoses: Pulmonary hypertension (H)      simvastatin (ZOCOR) 40 MG tablet Take 1 tablet (40 mg) by mouth At Bedtime  Qty: 90 tablet, Refills: 3    Associated Diagnoses: Hyperlipidemia LDL goal <100      Vitamin D, Cholecalciferol, 10 MCG (400 UNIT) TABS Take 1,600 Units by mouth daily      acetaminophen (TYLENOL) 500 MG tablet Take 500-1,000 mg by mouth every 6 hours as needed for mild pain      InFLIXimab (REMICADE IV) Inject 600 mg into the vein Every 8 weeks      Methotrexate Sodium (METHOTREXATE PO) Take 2.5 mg by mouth 10 tablets weekly - Wednesdays      triamcinolone (KENALOG) 0.1 % external cream Apply topically 2 times daily  Qty: 30 g, Refills: 3    Associated Diagnoses: Itching         STOP taking these medications       cephALEXin (KEFLEX) 500 MG capsule Comments:   Reason for Stopping:         furosemide (LASIX) 20 MG tablet Comments:   Reason for Stopping:             Allergies   Allergies   Allergen Reactions     No Known Allergies      Data   Most Recent 3 CBC's:  Recent Labs   Lab Test 11/25/19  1654 10/16/19 07/16/19  0959   WBC 6.3 5.1 4.7   HGB 11.6* 12.1 12.0   MCV 92 98 96    214 115*       Most Recent 3 BMP's:  Recent Labs   Lab Test 19  0542 19  0530 19  1400 19  0624    138  --  135   POTASSIUM 3.4 3.9 4.4 2.9*   CHLORIDE 97 98  --  93*   CO2 34* 36*  --  37*   BUN 16 14  --  12   CR 0.71 0.64  --  0.65   ANIONGAP 5 4  --  5   TU 9.5 9.2  --  9.4   GLC 92 97  --  95     Most Recent 2 LFT's:  Recent Labs   Lab Test 19  0535 10/16/19 04/02/19  1308   AST 26 29 27   ALT 15 15 36   ALKPHOS 135  --  88   BILITOTAL 1.2  --  0.7     Most Recent 3 Troponin's:  Recent Labs   Lab Test 19  1654 10/23/16  0533 10/22/16  1943   TROPI <0.015 <0.015  The 99th percentile for upper reference range is 0.045 ug/L.  Troponin values in   the range of 0.045 - 0.120 ug/L may be associated with risks of adverse   clinical events.   <0.015  The 99th percentile for upper reference range is 0.045 ug/L.  Troponin values in   the range of 0.045 - 0.120 ug/L may be associated with risks of adverse   clinical events.       Results for orders placed or performed during the hospital encounter of 19   XR Chest 2 Views    Narrative    CHEST TWO VIEWS 2019 8:27 PM     HISTORY: Shortness of breath.     COMPARISON: 2017.       Impression    IMPRESSION: Increased interstitial changes since the comparison study.  Question some new consolidation at the left base. The cardiac  silhouette is prominent but stable. Pulmonary vasculature may be  congested.    ARELI RICKETTS MD   Echocardiogram Complete    Narrative    [id number]  [id number]  [id number]  [id number]^ELVIS^DIMITRI^R           [facility]  Echocardiography Laboratory  [address] [place] [id number]        Name: GINA MCKEON  MRN: [id number]  : 1940  Study Date: 2019 02:57 PM  Age: 78 yrs  Gender: Female  Patient Location: [facility]  Reason For Study: CHF  Ordering Physician: DIMITRI LEAL  Referring Physician: Carissa Burroughs  Performed By: Milton Guadalupe RDCS     BSA: 2.0  m2  Height: 62 in  Weight: 229 lb  HR: 100  BP: 115/71 mmHg  _____________________________________________________________________________  __        Procedure  Complete Portable Echo Adult. Optison (NDC #2470-2069) given intravenously.  _____________________________________________________________________________  __        Interpretation Summary     Small pericardial effusion  The visual ejection fraction is estimated at 50-55%.  Flattened septum is consistent with RV pressure/volume overload.  Pulmonary hypertension  There is severe biatrial enlargement.  Very mild AS. MG is 9 mm Hg.  The right ventricle is moderately dilated. Mildly decreased right ventricular  systolic function  There is mod-severe to severe (3-4+) tricuspid regurgitation.  _____________________________________________________________________________  __        Left Ventricle  The left ventricle is normal in size. The visual ejection fraction is  estimated at 50-55%. Diastolic Doppler findings (E/E' ratio and/or other  parameters) suggest left ventricular filling pressures are indeterminate.  Flattened septum is consistent with RV pressure/volume overload.     Right Ventricle  The right ventricle is moderately dilated. Mildly decreased right ventricular  systolic function.     Atria  There is severe biatrial enlargement.     Mitral Valve  Thickened mitral valve posterior leaflet. There is moderate mitral annular  calcification. There is mild to moderate (1-2+) mitral regurgitation.        Tricuspid Valve  The right ventricular systolic pressure is elevated at 42.2 mmHg. Pulmonary  hypertension. There is mod-severe to severe (3-4+) tricuspid regurgitation.     Aortic Valve  There is severe trileaflet aortic sclerosis. No hemodynamically significant  valvular aortic stenosis.     Pulmonic Valve  The pulmonic valve is not well visualized.     Vessels  The aortic root is normal size. Dilation of the inferior vena cava is present  with abnormal  respiratory variation in diameter.     Pericardium  Small pericardial effusion.     _____________________________________________________________________________  __  MMode/2D Measurements & Calculations  IVSd: 0.83 cm  LVIDd: 4.4 cm  LVIDs: 3.4 cm  LVPWd: 0.95 cm  FS: 22.2 %  LV mass(C)d: 125.7 grams  LV mass(C)dI: 62.1 grams/m2     Ao root diam: 2.8 cm  LA dimension: 4.5 cm  asc Aorta Diam: 3.4 cm  LA/Ao: 1.6  LVOT diam: 1.9 cm  LVOT area: 3.0 cm2  LA Volume (BP): 103.0 ml  LA Volume Index (BP): 51.0 ml/m2  RWT: 0.43        Doppler Measurements & Calculations  MV E max el: 79.7 cm/sec  MV max P.4 mmHg  MV mean PG: 3.1 mmHg  MV V2 VTI: 23.8 cm  MVA(VTI): 3.1 cm2     MV dec slope: 562.6 cm/sec2  Ao V2 max: 188.8 cm/sec  Ao max P.0 mmHg  Ao V2 mean: 136.1 cm/sec  Ao mean P.4 mmHg  Ao V2 VTI: 36.2 cm  JACOB(I,D): 2.1 cm2  JACOB(V,D): 2.1 cm2  LV V1 max P.2 mmHg  LV V1 max: 134.4 cm/sec  LV V1 VTI: 25.1 cm  SV(LVOT): 74.2 ml  SI(LVOT): 36.6 ml/m2  PA acc time: 0.08 sec  TR max el: 324.8 cm/sec  TR max P.2 mmHg  AV El Ratio (DI): 0.71  JACOB Index (cm2/m2): 1.0  E/E' av.5  Lateral E/e': 8.7  Medial E/e': 10.4              _____________________________________________________________________________  __        Report approved by: Boaz Huang 2019 04:45 PM

## 2019-12-06 ENCOUNTER — NURSING HOME VISIT (OUTPATIENT)
Dept: GERIATRICS | Facility: CLINIC | Age: 79
End: 2019-12-06
Payer: MEDICARE

## 2019-12-06 VITALS
WEIGHT: 199.6 LBS | HEART RATE: 85 BPM | TEMPERATURE: 96.9 F | DIASTOLIC BLOOD PRESSURE: 57 MMHG | RESPIRATION RATE: 18 BRPM | SYSTOLIC BLOOD PRESSURE: 95 MMHG | HEIGHT: 62 IN | BODY MASS INDEX: 36.73 KG/M2 | OXYGEN SATURATION: 88 %

## 2019-12-06 VITALS
TEMPERATURE: 96.9 F | RESPIRATION RATE: 18 BRPM | HEART RATE: 85 BPM | HEIGHT: 62 IN | DIASTOLIC BLOOD PRESSURE: 57 MMHG | OXYGEN SATURATION: 88 % | SYSTOLIC BLOOD PRESSURE: 95 MMHG | BODY MASS INDEX: 36.73 KG/M2 | WEIGHT: 199.6 LBS

## 2019-12-06 DIAGNOSIS — I89.0 LYMPHEDEMA OF BOTH LOWER EXTREMITIES: ICD-10-CM

## 2019-12-06 DIAGNOSIS — M34.9 SCLERODERMA (H): ICD-10-CM

## 2019-12-06 DIAGNOSIS — J84.10 PULMONARY FIBROSIS (H): ICD-10-CM

## 2019-12-06 DIAGNOSIS — I27.20 PULMONARY HYPERTENSION (H): ICD-10-CM

## 2019-12-06 DIAGNOSIS — M05.9 RHEUMATOID ARTHRITIS WITH POSITIVE RHEUMATOID FACTOR, INVOLVING UNSPECIFIED SITE (H): ICD-10-CM

## 2019-12-06 DIAGNOSIS — E66.812 CLASS 2 SEVERE OBESITY DUE TO EXCESS CALORIES WITH SERIOUS COMORBIDITY AND BODY MASS INDEX (BMI) OF 36.0 TO 36.9 IN ADULT (H): ICD-10-CM

## 2019-12-06 DIAGNOSIS — I48.19 PERSISTENT ATRIAL FIBRILLATION (H): ICD-10-CM

## 2019-12-06 DIAGNOSIS — I50.31 ACUTE DIASTOLIC CONGESTIVE HEART FAILURE (H): Primary | ICD-10-CM

## 2019-12-06 DIAGNOSIS — R53.81 PHYSICAL DECONDITIONING: ICD-10-CM

## 2019-12-06 DIAGNOSIS — I10 BENIGN ESSENTIAL HYPERTENSION: ICD-10-CM

## 2019-12-06 DIAGNOSIS — E66.01 CLASS 2 SEVERE OBESITY DUE TO EXCESS CALORIES WITH SERIOUS COMORBIDITY AND BODY MASS INDEX (BMI) OF 36.0 TO 36.9 IN ADULT (H): ICD-10-CM

## 2019-12-06 DIAGNOSIS — R09.02 HYPOXIA: ICD-10-CM

## 2019-12-06 DIAGNOSIS — E03.9 HYPOTHYROIDISM, UNSPECIFIED TYPE: ICD-10-CM

## 2019-12-06 DIAGNOSIS — Z71.89 ADVANCED DIRECTIVES, COUNSELING/DISCUSSION: Chronic | ICD-10-CM

## 2019-12-06 PROCEDURE — 99310 SBSQ NF CARE HIGH MDM 45: CPT | Performed by: NURSE PRACTITIONER

## 2019-12-06 ASSESSMENT — MIFFLIN-ST. JEOR
SCORE: 1333.63
SCORE: 1333.63

## 2019-12-06 NOTE — LETTER
12/6/2019        RE: Jaqueline Canales  [address]  [place] [address]        [facility]  PRIMARY CARE PROVIDER AND CLINIC:  Carissa Burroughs MD, [address] / [place] [address]  Chief Complaint   Patient presents with     Hospital F/U     [facility] Record Number:  [id number]  Place of Service where encounter took place:  [facility] ([facility] [[id number]]    Jaqueline Canales  is a 78 year old  (1940), admitted to the above facility from  [facility]. Hospital stay 11/25/19 through 12/5/19..  Admitted to this facility for  rehab, medical management and nursing care.    HPI:    HPI information obtained from: facility chart records, facility staff, patient report and [facility] chart review.   Brief Summary of Hospital Course: recent hospitalization due to LE edema and dyspnea and hypoxia with exertion. PMH includes atrial fibrillation (eliquis), pulmonary hypertension, CHF, hypothyroidism, rheumatoid arthritis, hypertension, lung cancer, pulmonary fibrosis/ scleroderma, lymphedema and smoking history (30 ppy)   She was admitted for acute diastolic heart failure and started on IV lasix. Cardiology was consulted and suspects decompensation likely due to pulmonary vasodilators. Cardiology  changed lasix to IV bumex and spironolactone was added.  She diuresed about 14l. she did have episodes of tachycardia so metoprolol was increased. She was transitioned to oral meds.  Due to ongoing weakness she was transferred to TCU.   Updates on Status Since Skilled nursing Admission: she denies shortness of breath. She does c/o LE itching. She would like to return home    CODE STATUS/ADVANCE DIRECTIVES DISCUSSION:   CPR/Full code   Patient's living condition: lives with spouse in house.   ALLERGIES: No known allergies  PAST MEDICAL HISTORY:  has a past medical history of Amaurosis fugax (5-11), Carotid artery stenosis, Esophageal reflux (3/11/2004),  HELICOBACTER PYLORI INFECTION (7/11/2006), hx of CA in situ RT breast-1990.mastectomy (4/17/2007), Hyperlipidemia LDL goal <70 (6/20/2011), Lung cancer (H), OBESITY NOS (3/11/2004), OSTEOPOROSIS NOS (3/11/2004), Other psoriasis (3/11/2004), Pulmonary hypertension (H) (04/15/2019), RA (rheumatoid arthritis) (H) (2/8/2010), Raynaud's syndrome (4/10/2006), Scleroderma (H), and Sleep apnea.  PAST SURGICAL HISTORY:   has a past surgical history that includes NONSPECIFIC PROCEDURE (1990); Tonsillectomy; Repair hammer toe; Conization; Endarterectomy carotid (2011); COLONOSCOPY THRU STOMA, DIAGNOSTIC (2001 2011); colonoscopy (5/24/16); Breast surgery; Right Heart Cath (N/A, 4/15/2019); and Colonoscopy (N/A, 6/28/2019).  FAMILY HISTORY: family history includes Alzheimer Disease in her sister; Cancer in her nephew; Cancer (age of onset: 31) in her sister; Cancer - colorectal in her mother; Cerebrovascular Disease in her father and maternal grandmother; Diabetes in her maternal grandmother and maternal uncle; Gastrointestinal Disease in her brother, sister, and sister; Heart Disease in her sister; Mental Illness in her maternal grandmother.  SOCIAL HISTORY:   reports that she quit smoking about 27 years ago. Her smoking use included cigarettes. She started smoking about 57 years ago. She has a 30.00 pack-year smoking history. She has never used smokeless tobacco. She reports previous alcohol use. She reports that she does not use drugs.    Post Discharge Medication Reconciliation Status: discharge medications reconciled, continue medications without change    Current Outpatient Medications   Medication Sig Dispense Refill     acetaminophen (TYLENOL) 500 MG tablet Take 500-1,000 mg by mouth every 6 hours as needed for mild pain       albuterol (PROAIR HFA/PROVENTIL HFA/VENTOLIN HFA) 108 (90 Base) MCG/ACT inhaler Inhale 2 puffs into the lungs every 6 hours as needed for shortness of breath / dyspnea or wheezing       apixaban  ANTICOAGULANT (ELIQUIS) 5 MG tablet Take 1 tablet (5 mg) by mouth 2 times daily 180 tablet 2     Bacillus Coagulans-Inulin (PROBIOTIC-PREBIOTIC PO) Take 1 chew tab by mouth 2 times daily        folic acid (FOLVITE) 1 MG tablet Take 1 tablet (1,000 mcg) by mouth daily 90 tablet 3     InFLIXimab (REMICADE IV) Inject 600 mg into the vein Every 8 weeks       levothyroxine (SYNTHROID/LEVOTHROID) 75 MCG tablet Take 1 tablet (75 mcg) by mouth daily 90 tablet 2     melatonin 5 MG tablet Take 5 mg by mouth nightly as needed for sleep       Methotrexate Sodium (METHOTREXATE PO) Take 2.5 mg by mouth 10 tablets weekly - Wednesdays       metoprolol succinate ER (TOPROL-XL) 50 MG 24 hr tablet Take 1 tablet (50 mg) by mouth daily       Multiple Vitamins-Minerals (ICAPS) CAPS Take 1 capsule by mouth 2 times daily        multivitamin (CENTRUM SILVER) tablet Take 1 tablet by mouth daily       NIFEdipine ER (ADALAT CC) 60 MG 24 hr tablet Take 60 mg by mouth daily       omeprazole (PRILOSEC) 20 MG DR capsule Take 1 capsule (20 mg) by mouth 2 times daily Take 30-60 minutes prior to meals 180 capsule 2     potassium chloride ER (K-DUR/KLOR-CON M) 20 MEQ CR tablet Take 1 tablet (20 mEq) by mouth daily       Prednisolon-Gatiflox-Bromfenac 1-0.5-0.075 % SUSP Place 1 drop into the right eye daily       psyllium (METAMUCIL/KONSYL) capsule Take 1 capsule by mouth daily       sildenafil (REVATIO) 20 MG tablet Take 1 tablet (20 mg) by mouth 3 times daily 90 tablet 11     simvastatin (ZOCOR) 40 MG tablet Take 1 tablet (40 mg) by mouth At Bedtime 90 tablet 3     spironolactone (ALDACTONE) 25 MG tablet Take 1 tablet (25 mg) by mouth daily       torsemide (DEMADEX) 20 MG tablet Take 1 tablet (20 mg) by mouth daily       triamcinolone (KENALOG) 0.1 % external cream Apply topically 2 times daily 30 g 3     Vitamin D, Cholecalciferol, 10 MCG (400 UNIT) TABS Take 1,600 Units by mouth daily         ROS:  4 point ROS including Respiratory, CV, GI and ,  "other than that noted in the HPI,  is negative    Vitals:  BP 95/57   Pulse 85   Temp 96.9  F (36.1  C)   Resp 18   Ht 1.575 m (5' 2\")   Wt 90.5 kg (199 lb 9.6 oz)   SpO2 (!) 88%   BMI 36.51 kg/m     Exam:  GENERAL APPEARANCE:  Alert, in no distress  ENT:  Mouth and posterior oropharynx normal, moist mucous membranes, hearing acuity adequate   EYES:  EOM, conjunctivae, lids, pupils and irises normal    RESP:  respiratory effort and palpation of chest normal, no respiratory distress, Lung sounds BB rales   CV:  Palpation and auscultation of heart done , rate and rhythm irreg, systolic murmur, no rub or gallop, Edema trace to 1+  ABDOMEN:  normal bowel sounds, soft, nontender, no hepatosplenomegaly or other masses  M/S:   Gait and station not observed, Digits and nails nl  SKIN:  Inspection/Palpation of skin and subcutaneous tissue- shins with erythematous rash  NEURO: 2-12 in normal limits and at patient's baseline  PSYCH:  insight and judgement, memory intact , affect and mood normal    Lab/Diagnostic data:  CBC RESULTS:   Recent Labs   Lab Test 11/25/19  1654 10/16/19 07/16/19  0959   WBC 6.3 5.1 4.7   RBC 3.84  --  3.81   HGB 11.6* 12.1 12.0   HCT 35.4 36.6 36.5   MCV 92 98 96   MCH 30.2 32.5 31.5   MCHC 32.8 33.2 32.9   RDW 17.2* 14.4 16.3*    214 115*       Last Basic Metabolic Panel:  Recent Labs   Lab Test 12/05/19  0542 12/04/19  0530    138   POTASSIUM 3.4 3.9   CHLORIDE 97 98   TU 9.5 9.2   CO2 34* 36*   BUN 16 14   CR 0.71 0.64   GLC 92 97       Liver Function Studies -   Recent Labs   Lab Test 11/27/19  0535 10/16/19 04/02/19  1308   PROTTOTAL 7.6  --  7.4   ALBUMIN 3.7 4.0 4.1   BILITOTAL 1.2  --  0.7   ALKPHOS 135  --  88   AST 26 29 27   ALT 15 15 36       TSH   Date Value Ref Range Status   07/26/2019 3.10 0.40 - 4.00 mU/L Final   10/25/2018 3.70 0.40 - 4.00 mU/L Final   ]    Lab Results   Component Value Date    A1C 5.1 10/25/2018    A1C 5.8 06/24/2011 "           ASSESSMENT/PLAN:  (I50.31) Acute diastolic congestive heart failure (H)  (primary encounter diagnosis)  Comment: recent hospitalization due to increased LE swelling and dyspnea with hypoxia. She was diuresed about 15litres of fluid or 31#.   Echo was done during hospitalization that showed EF 50-55% with severe biatrial enlargement, moderately dilated RV and mildly decreased RVSF, mod to severe TR and pulmonary hypertension. Today she denies shortness of breath at rest. She has not done much exercise yet.   Plan: continue Torsemide 20mg q day  Continue spironolactone 25mg q day  kcl 20 meq q day  Daily wts- call NP if wt >3# in one day or 5# in a week.   [facility] 12/9      (I89.0) Lymphedema of both lower extremities  Comment: patient presented with increased LE edema. She has h/o lymphedema. She was recently treated for LE cellulitis in outpatient setting. Much less swelling today  Plan: OCCUPATIONAL THERAPY to evaluate and treat for lymphedema  Elevate LEs  TMC 0.1% to pretibial areas prior to placing wraps.     (R09.02) Hypoxia  Comment: patient presented to ED with hypoxia. She continues on supplemental oxygen upon transfer to TCU. O2 sats in TCU about 90% on RA.   Plan: monitor O2 sats    (I48.19) Persistent atrial fibrillation  Comment: anticoagulated with eliquis. During hospitalization she did have episodes of tachycardia. Metoprolol was increased to 50mg q day. HR in TCU running 80s.   Plan: continue metoprolol ER 50mg q day  Continue apixaban 5mg BID    (I27.20) Pulmonary hypertension (H)  Comment: in setting of scleroderma. 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 Followed by Dr Marquez. She was started on Revatio in July 2019. During this most recent hospitalization it is thought the HF exacerbation is related to starting sildenafil.   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 She has had some pain in her hands and knees  Plan: continue remicade- next dose due 12/17 at [facility] (Dr Bustillos) [phone]  and methotrexate 2.5mg tablets- take 10 tablets q Wednesday for RA    (E03.9) Hypothyroidism, unspecified type  Comment:   Lab Results   Component Value Date    TSH 3.10 07/26/2019       Plan: continue current dose of synthroid    (R53.81) Physical deconditioning  Comment: lives in house with . There are stairs to get in and within home. She would like to go home soon. She was independent prior to admission.  At this point she is still quite weak and is a fall risk  Plan: physical therapy and OCCUPATIONAL THERAPY evaluation    Advanced care planning  Comment: patient states she has a HCD that is at 's office. She states she would not want to be kept alive if she could not take care of her self. Quality of life is important to her. She has a  but she asks me if he is capable of making decisions regarding advanced medical care for herself. She is not sure if her adoptive sons would be able to answer questions regarding her health.   We filled out a POLST. She was not sure about CPR.   Plan: due to inability to answer question about CPR we decided to leave her code status as CPR. She knows she can change this at any time.       Total time spent with patient visit at the skilled nursing facility was 44 minutes including patient visit and review of past records. Greater than 50% of total time spent with counseling and coordinating care due to review of multiple medical issues including pulmonary hypertension that is likely due to RA and scleroderma. She is  recovering from CHF exacerbation likely due to SEs of medications. She will need close monitoring of her weights and labs. She is high fall risk due to weakness due to being in hospital. We also spent time reviewing goals of care. She did not quite understand POLST . Advanced care planning will need to continue due to the nature of her multiple medical issues and likelihood that these issues will get worse over time.     Electronically signed by:  AUBRIE Tamayo CNP                       Sincerely,        AUBRIE Tamayo CNP

## 2019-12-08 ENCOUNTER — TELEPHONE (OUTPATIENT)
Dept: GERIATRICS | Facility: CLINIC | Age: 79
End: 2019-12-08

## 2019-12-08 NOTE — PROGRESS NOTES
[place] [facility]  INITIAL VISIT NOTE  December 9, 2019    PRIMARY CARE PROVIDER AND CLINIC:  Carissa Burroughs [address] [facility] [address] / [place] [address]    Chief Complaint   Patient presents with     Hospital F/U       HPI:    Jaqueline Canales is a 78 year old  (1940) female who was seen at [facility] on December 9, 2019 for an initial visit. Medical history is notable for chronic diastolic heart failure, chronic atrial fibrillation, chronic lower extremity lymphedema, pulmonary hypertension, hypertension, dyslipidemia, hypothyroidism, breast and lung cancer, rheumatoid arthritis, scleroderma, interstitial lung disease, GERD, obesity, and obstructive sleep apnea.  She was hospitalized at [facility] from November 25 through December 5, 2019 for acute hypoxic respiratory failure secondary to acute decompensation of diastolic heart failure.  She originally presented with increasing dyspnea, hypoxia, and worsening lower extremity edema.  Electrocardiogram revealed atrial fibrillation with heart rate around 100.  She required supplemental oxygen 2 L upon admission.  BNP was 3900.  BMP showed creatinine of 0.75.  WBC was normal.  Chest x-ray demonstrated increased interstitial prominence.  She was admitted to [facility] and diuresed aggressively with IV furosemide 60 mg 3 times daily.  Echocardiogram showed LV ejection fraction of 50-55% with findings of severe biatrial enlargement, moderately dilated RV, mildly decreased RV systolic function, moderate to severe tricuspid regurgitation, and pulmonary hypertension.  Diuretic therapy changed to Bumex drip on November 26 which was continued through December 5.  She had net -15 L by time of discharge and her weight improved to 88.7 kg from initial 103.4 kg.  She was discharged on new diuretic regimen of torsemide 20 mg p.o. daily and Spironolactone 25 mg p.o. daily.    Patient is admitted to this facility for medical management,  nursing care, and rehab.     Patient was seen today in her room.  She is weak appearing.  She continues to require supplemental oxygen at 2 L/min.  She reports nonproductive cough.  She denies fever, chills, chest pain, nausea, vomiting, abdominal pain, urinary symptoms, diarrhea.  She continues to use ACE wraps to her legs for swelling.  Her blood pressure was running low yesterday and therefore her BP meds were held by staff.    CODE STATUS:   CPR/Full code     PAST MEDICAL HISTORY:   Chronic atrial fibrillation, on apixaban  Chronic diastolic heart failure, LV ejection fraction 50-55% by echocardiogram on November 26, 2019  Mildly decreased RV systolic function  Moderate-severe pulmonary hypertension  Moderate to severe tricuspid regurgitation  Hypertension  Dyslipidemia  Hypothyroidism  Rheumatoid arthritis  Scleroderma  Interstitial lung disease  Raynaud syndrome  Chronic bilateral lower extremity lymphedema  GERD  Left lower lobe squamous cell lung cancer, status post radiation therapy  Right breast cancer, status post mastectomy 1990  Obesity, BMI 36.5  Obstructive sleep apnea, using CPAP at night  Psoriasis  Helicobacter pylori infection  Osteoporosis  Carotid artery stenosis, status post endarterectomy    PAST SURGICAL HISTORY:   Past Surgical History:   Procedure Laterality Date     BREAST SURGERY       C NONSPECIFIC PROCEDURE  1990    mastectomy RT breast     COLONOSCOPY  5/24/16     COLONOSCOPY N/A 6/28/2019    Procedure: Colonoscopy, With Polypectomy And Biopsy;  Surgeon: Neto Kaminski MD;  Location:  GI     CONIZATION       CV RIGHT HEART CATH N/A 4/15/2019    Procedure: Heart Cath Right Heart Cath;  Surgeon: Naresh Cyr MD;  Location:  HEART CARDIAC CATH LAB     ENDARTERECTOMY CAROTID  2011     HC COLONOSCOPY THRU STOMA, DIAGNOSTIC  2001 2011    diverticulosis     REPAIR HAMMER TOE       TONSILLECTOMY         FAMILY HISTORY:   Family History   Problem Relation Age of Onset      Cancer - colorectal Mother      Cerebrovascular Disease Father      Cancer Sister 31        ovarian     Heart Disease Sister      Gastrointestinal Disease Sister         crohns     Gastrointestinal Disease Sister         diverticulitis     Gastrointestinal Disease Brother         diverticulitis     Alzheimer Disease Sister      Cerebrovascular Disease Maternal Grandmother      Diabetes Maternal Grandmother      Mental Illness Maternal Grandmother      Diabetes Maternal Uncle      Cancer Nephew                SOCIAL HISTORY:  Social History     Tobacco Use     Smoking status: Former Smoker     Packs/day: 1.00     Years: 30.00     Pack years: 30.00     Types: Cigarettes     Start date:      Last attempt to quit: 3/11/1992     Years since quittin.7     Smokeless tobacco: Never Used   Substance Use Topics     Alcohol use: Not Currently     Alcohol/week: 0.0 standard drinks     Frequency: Never       MEDICATIONS:  Current Outpatient Medications   Medication Sig Dispense Refill     acetaminophen (TYLENOL) 500 MG tablet Take 500-1,000 mg by mouth every 6 hours as needed for mild pain       albuterol (PROAIR HFA/PROVENTIL HFA/VENTOLIN HFA) 108 (90 Base) MCG/ACT inhaler Inhale 2 puffs into the lungs every 6 hours as needed for shortness of breath / dyspnea or wheezing       apixaban ANTICOAGULANT (ELIQUIS) 5 MG tablet Take 1 tablet (5 mg) by mouth 2 times daily 180 tablet 2     Bacillus Coagulans-Inulin (PROBIOTIC-PREBIOTIC PO) Take 1 chew tab by mouth 2 times daily        folic acid (FOLVITE) 1 MG tablet Take 1 tablet (1,000 mcg) by mouth daily 90 tablet 3     InFLIXimab (REMICADE IV) Inject 600 mg into the vein Every 8 weeks       levothyroxine (SYNTHROID/LEVOTHROID) 75 MCG tablet Take 1 tablet (75 mcg) by mouth daily 90 tablet 2     melatonin 5 MG tablet Take 5 mg by mouth nightly as needed for sleep       Methotrexate Sodium (METHOTREXATE PO) Take 2.5 mg by mouth 10 tablets weekly -         "metoprolol succinate ER (TOPROL-XL) 50 MG 24 hr tablet Take 1 tablet (50 mg) by mouth daily       Multiple Vitamins-Minerals (ICAPS) CAPS Take 1 capsule by mouth 2 times daily        multivitamin (CENTRUM SILVER) tablet Take 1 tablet by mouth daily       NIFEdipine ER (ADALAT CC) 60 MG 24 hr tablet Take 60 mg by mouth daily       omeprazole (PRILOSEC) 20 MG DR capsule Take 1 capsule (20 mg) by mouth 2 times daily Take 30-60 minutes prior to meals 180 capsule 2     potassium chloride ER (K-DUR/KLOR-CON M) 20 MEQ CR tablet Take 1 tablet (20 mEq) by mouth daily       Prednisolon-Gatiflox-Bromfenac 1-0.5-0.075 % SUSP Place 1 drop into the right eye daily       psyllium (METAMUCIL/KONSYL) capsule Take 1 capsule by mouth daily       sildenafil (REVATIO) 20 MG tablet Take 1 tablet (20 mg) by mouth 3 times daily 90 tablet 11     simvastatin (ZOCOR) 40 MG tablet Take 1 tablet (40 mg) by mouth At Bedtime 90 tablet 3     spironolactone (ALDACTONE) 25 MG tablet Take 1 tablet (25 mg) by mouth daily       torsemide (DEMADEX) 20 MG tablet Take 1 tablet (20 mg) by mouth daily       triamcinolone (KENALOG) 0.1 % external cream Apply topically 2 times daily 30 g 3     Vitamin D, Cholecalciferol, 10 MCG (400 UNIT) TABS Take 1,600 Units by mouth daily         ALLERGIES:  Allergies   Allergen Reactions     No Known Allergies        ROS:  10 point ROS neg other than the symptoms noted above in the HPI.    PHYSICAL EXAM:  BP 95/57   Pulse 85   Temp 96.9  F (36.1  C)   Resp 18   Ht 1.575 m (5' 2\")   Wt 90.5 kg (199 lb 9.6 oz)   SpO2 (!) 88%   BMI 36.51 kg/m     Gen: Cooperative, weak appearing  HEENT: Normocephalic; oropharynx clear  Card: Normal S1, S2, irregularly irregular rhythm, normal rate  Resp: Bilateral basilar crackles  GI: Abdomen soft, non-tender, non-distended, +BS  Ext: B/L 2-3+ LE edema  Neuro: CX II-XII grossly intact; ROM in all four extremities grossly in tact  Psych: Alert and oriented x3; normal affect  Skin: No " acute rash    LABORATORY/IMAGING DATA:  Lab Results   Component Value Date    WBC 6.3 11/25/2019     Lab Results   Component Value Date    RBC 3.84 11/25/2019     Lab Results   Component Value Date    HGB 11.6 11/25/2019     Lab Results   Component Value Date    HCT 35.4 11/25/2019     Lab Results   Component Value Date    MCV 92 11/25/2019     Lab Results   Component Value Date    MCH 30.2 11/25/2019     Lab Results   Component Value Date    MCHC 32.8 11/25/2019     Lab Results   Component Value Date    RDW 17.2 11/25/2019     Lab Results   Component Value Date     11/25/2019     Last Comprehensive Metabolic Panel:  Sodium   Date Value Ref Range Status   12/05/2019 136 133 - 144 mmol/L Final     Potassium   Date Value Ref Range Status   12/05/2019 3.4 3.4 - 5.3 mmol/L Final     Chloride   Date Value Ref Range Status   12/05/2019 97 94 - 109 mmol/L Final     Carbon Dioxide   Date Value Ref Range Status   12/05/2019 34 (H) 20 - 32 mmol/L Final     Anion Gap   Date Value Ref Range Status   12/05/2019 5 3 - 14 mmol/L Final     Glucose   Date Value Ref Range Status   12/05/2019 92 70 - 99 mg/dL Final     Urea Nitrogen   Date Value Ref Range Status   12/05/2019 16 7 - 30 mg/dL Final     Creatinine   Date Value Ref Range Status   12/05/2019 0.71 0.52 - 1.04 mg/dL Final     GFR Estimate   Date Value Ref Range Status   12/05/2019 81 >60 mL/min/[1.73_m2] Final     Comment:     Non  GFR Calc  Starting 12/18/2018, serum creatinine based estimated GFR (eGFR) will be   calculated using the Chronic Kidney Disease Epidemiology Collaboration   (CKD-EPI) equation.       Calcium   Date Value Ref Range Status   12/05/2019 9.5 8.5 - 10.1 mg/dL Final       ASSESSMENT/PLAN:  Acute decompensation of chronic diastolic heart failure,  Acute hypoxic respiratory failure due to acute heart failure,  Moderate to severe pulmonary hypertension,  Moderate to severe tricuspid regurgitation,  Chronic lymphedema of both lower  extremities.  In the hospital, the patient was treated aggressively with IV Bumex drip.  She had net -15 L by time of discharge and her weight improved to 88.7 kg from initial 103.4 kg.    She follows with Dr. Marquez for pulmonary hypertension and recently started on Revatio in July 2019.  She still requires 2 L/min supplemental O2.  Plan:  Continue PTA metoprolol XL 50 mg p.o. daily, torsemide 20 mg p.o. daily, and Spironolactone 25 mg p.o. daily  Continue PTA Revatio 20 mg p.o. 3 times daily and nifedipine ER 60 mg daily for pulmonary hypertension  Monitor daily weight, volume status, and lower extremity edema  Monitor renal function once a week  Wean supplemental oxygen as able  PRN albuterol is available  Elevate lower extremities  Continue ACE wraps of lower extremities  OT to evaluate and treat for lymphedema  Follow-up with Dr. Marquez tomorrow December 10    Benign essential hypertension,  Dyslipidemia,  Chronic atrial fibrillation.  Blood pressure is running soft to low normal, likely due to moderately severe pulmonary hypertension.   During recent hospitalization, metoprolol was increased to 50 mg p.o. daily for better heart rate control.  Plan:  Continue chronic anticoagulation therapy with Eliquis 5 mg p.o. twice daily  Continue PTA metoprolol XL 50 mg p.o. daily, nifedipine ER 60 mg p.o. daily, torsemide 20 mg daily, spironolactone 25 mg p.o. daily, and simvastatin 40 mg p.o. at bedtime  Monitor blood pressure and heart rate    Hypothyroidism.  Chronic.  Plan:  Continue PTA levothyroxine 75 mcg daily    Rheumatoid arthritis,  Scleroderma,  Pulmonary fibrosis.  She follows with Dr. Bustillos of rheumatology.  Plan:  Continue PTA methotrexate 25 mg p.o. every Wednesday  Continue PTA Remicade every 8 weeks as instructed  Follow with Dr. Bustillos on December 17, 2019    GERD.  Chronic.  Plan:  Continue PTA omeprazole 20 mg p.o. twice daily    Obstructive sleep apnea.  Plan:  Continue CPAP at  night    Physical deconditioning.  Plan:  PT evaluation/therapy      Total unit/floor time of 45 minutes consisted of the following: examination of patient, reviewing the record including pertinent labs and imaging. More than 50% of this time was spent in coordination of care with the patient and NP. This time was spent on discussing the care plan including medication changes, lower extremity edema, low BP, discharge/safe placement, and specialty follow up.        Electronically signed by:  Rosalva Lara MD

## 2019-12-08 NOTE — TELEPHONE ENCOUNTER
Patient received Sildenafil 20mg this  AM, Torsemide 20mg this morning and Spironolactone 25mg qDay; Nifedipeine held - BP was 92/56. Patient due for noon dose of Sildenafil - BP 88/62.    -Hold Sildenafil for SBP <90  -Recheck 2h later  -Follow-up with NP tomorrow for parameters on rest of regimen    Dr. Gwendolyn Kelly, APRN, DNP, A/GNP-[facility]  [address] [place] [id number]     Cell: [phone]  Fax: [phone]  Email: [email or web address]

## 2019-12-09 ENCOUNTER — NURSING HOME VISIT (OUTPATIENT)
Dept: GERIATRICS | Facility: CLINIC | Age: 79
End: 2019-12-09
Payer: MEDICARE

## 2019-12-09 ENCOUNTER — TELEPHONE (OUTPATIENT)
Dept: CARDIOLOGY | Facility: CLINIC | Age: 79
End: 2019-12-09

## 2019-12-09 ENCOUNTER — HOSPITAL LABORATORY (OUTPATIENT)
Dept: OTHER | Facility: CLINIC | Age: 79
End: 2019-12-09

## 2019-12-09 DIAGNOSIS — J96.01 ACUTE RESPIRATORY FAILURE WITH HYPOXIA (H): ICD-10-CM

## 2019-12-09 DIAGNOSIS — I89.0 CHRONIC ACQUIRED LYMPHEDEMA: ICD-10-CM

## 2019-12-09 DIAGNOSIS — M34.9 SCLERODERMA (H): ICD-10-CM

## 2019-12-09 DIAGNOSIS — I27.20 PULMONARY HYPERTENSION (H): ICD-10-CM

## 2019-12-09 DIAGNOSIS — E03.9 ACQUIRED HYPOTHYROIDISM: ICD-10-CM

## 2019-12-09 DIAGNOSIS — I48.19 PERSISTENT ATRIAL FIBRILLATION (H): ICD-10-CM

## 2019-12-09 DIAGNOSIS — R53.81 PHYSICAL DECONDITIONING: ICD-10-CM

## 2019-12-09 DIAGNOSIS — I50.31 ACUTE DIASTOLIC CONGESTIVE HEART FAILURE (H): Primary | ICD-10-CM

## 2019-12-09 DIAGNOSIS — E78.5 DYSLIPIDEMIA: ICD-10-CM

## 2019-12-09 DIAGNOSIS — M06.9 RHEUMATOID ARTHRITIS, INVOLVING UNSPECIFIED SITE, UNSPECIFIED RHEUMATOID FACTOR PRESENCE: ICD-10-CM

## 2019-12-09 DIAGNOSIS — G47.33 OSA (OBSTRUCTIVE SLEEP APNEA): ICD-10-CM

## 2019-12-09 DIAGNOSIS — I36.1 NONRHEUMATIC TRICUSPID VALVE REGURGITATION: ICD-10-CM

## 2019-12-09 DIAGNOSIS — I10 BENIGN ESSENTIAL HYPERTENSION: ICD-10-CM

## 2019-12-09 DIAGNOSIS — J84.10 PULMONARY FIBROSIS (H): ICD-10-CM

## 2019-12-09 LAB
ANION GAP SERPL CALCULATED.3IONS-SCNC: NORMAL MMOL/L (ref 6–17)
BUN SERPL-MCNC: NORMAL MG/DL (ref 7–30)
CALCIUM SERPL-MCNC: NORMAL MG/DL (ref 8.5–10.1)
CHLORIDE SERPL-SCNC: NORMAL MMOL/L (ref 94–109)
CO2 SERPL-SCNC: NORMAL MMOL/L (ref 20–32)
CREAT SERPL-MCNC: NORMAL MG/DL (ref 0.52–1.04)
GFR SERPL CREATININE-BSD FRML MDRD: NORMAL ML/MIN/{1.73_M2}
GLUCOSE SERPL-MCNC: NORMAL MG/DL (ref 70–99)
POTASSIUM SERPL-SCNC: NORMAL MMOL/L (ref 3.4–5.3)
SODIUM SERPL-SCNC: NORMAL MMOL/L (ref 133–144)

## 2019-12-09 PROCEDURE — 99305 1ST NF CARE MODERATE MDM 35: CPT | Performed by: INTERNAL MEDICINE

## 2019-12-09 PROCEDURE — 99207 ZZC CDG-MDM COMPONENT: MEETS LOW - DOWN CODED: CPT | Performed by: INTERNAL MEDICINE

## 2019-12-09 NOTE — PROGRESS NOTES
Natalie Bustillos M.D.  Arthritis and Rheumatolologan Burroughs M.D      Acute Hypoxic Respiratory Failure dt Acute Diastolic CHF exacerbation  Chronic Bilateral LE edema / Lymphedema  Chronic A. Fib, on anticoagulation  Moderate/Severe pulmonary hypertension  Hyperlipidemia   Hypertension  Hypothyroidism  Rheumatoid arthritis  Scleroderma    The patient has scleroderma and ILD and was incidentally found to have SCC of left lower lobe that was treated with radiation therapy(2016)  She has been recently hospitalized for weight gain and dependent edema. Her weight has gone up by 20-30 pounds. She has had diuretics such that her weight is down approximately 20 pounds but has persistent lymphedema/edema.  She has not been weaned from oxygen.     Alert, oriented fluent speech, basilar rales, increased JVP, regular rhythm, chronic edema/lymphedema    Results for GINA MCKEON (MRN [id number]) as of 12/10/2019 14:13   Ref. Range 12/10/2019 08:10   Sodium Latest Ref Range: 133 - 144 mmol/L 134   Potassium Latest Ref Range: 3.4 - 5.3 mmol/L 4.3   Chloride Latest Ref Range: 94 - 109 mmol/L 101   Carbon Dioxide Latest Ref Range: 20 - 32 mmol/L 28   Urea Nitrogen Latest Ref Range: 7 - 30 mg/dL 21   Creatinine Latest Ref Range: 0.52 - 1.04 mg/dL 0.64   GFR Estimate Latest Ref Range: >60 mL/min/1.73_m2 84   GFR Estimate If Black Latest Ref Range: >60 mL/min/1.73_m2 >90   Calcium Latest Ref Range: 8.5 - 10.1 mg/dL 9.5   Anion Gap Latest Ref Range: 3 - 14 mmol/L 5   Glucose Latest Ref Range: 70 - 99 mg/dL 88   WBC Latest Ref Range: 4.0 - 11.0 10e9/L 5.7   Hemoglobin Latest Ref Range: 11.7 - 15.7 g/dL 11.6 (L)   Hematocrit Latest Ref Range: 35.0 - 47.0 % 36.1   Platelet Count Latest Ref Range: 150 - 450 10e9/L 114 (L)   RBC Count Latest Ref Range: 3.8 - 5.2 10e12/L 3.90   MCV Latest Ref Range: 78 - 100 fl 93   MCH Latest Ref Range: 26.5 - 33.0 pg 29.7   MCHC Latest Ref Range: 31.5 - 36.5 g/dL 32.1   RDW Latest Ref Range:  10.0 - 15.0 % 16.9 (H)       Wt Readings from Last 24 Encounters:   12/06/19 90.5 kg (199 lb 9.6 oz)   12/06/19 90.5 kg (199 lb 9.6 oz)   12/05/19 88.7 kg (195 lb 8 oz)   11/20/19 103.4 kg (228 lb)   11/14/19 102.5 kg (226 lb)   11/11/19 101.2 kg (223 lb)   10/28/19 100.8 kg (222 lb 4.8 oz)   08/28/19 98.9 kg (218 lb)   07/29/19 96.8 kg (213 lb 6.4 oz)   07/26/19 97.5 kg (215 lb)   07/16/19 96.2 kg (212 lb)   06/28/19 99.8 kg (220 lb)   06/24/19 99.7 kg (219 lb 11.2 oz)   06/13/19 97.5 kg (215 lb)   05/29/19 99.3 kg (219 lb)   05/24/19 100.7 kg (222 lb 0.1 oz)   05/21/19 100.7 kg (222 lb)   05/15/19 101.1 kg (222 lb 12.8 oz)   05/01/19 100.8 kg (222 lb 3.2 oz)   04/16/19 101.7 kg (224 lb 1.6 oz)   04/15/19 101.6 kg (224 lb)   04/02/19 99.8 kg (220 lb)   02/13/19 99.8 kg (220 lb)   01/24/19 99.8 kg (220 lb)             Current Outpatient Medications   Medication     acetaminophen (TYLENOL) 500 MG tablet     albuterol (PROAIR HFA/PROVENTIL HFA/VENTOLIN HFA) 108 (90 Base) MCG/ACT inhaler     apixaban ANTICOAGULANT (ELIQUIS) 5 MG tablet     Bacillus Coagulans-Inulin (PROBIOTIC-PREBIOTIC PO)     folic acid (FOLVITE) 1 MG tablet     InFLIXimab (REMICADE IV)     levothyroxine (SYNTHROID/LEVOTHROID) 75 MCG tablet     melatonin 5 MG tablet     Methotrexate Sodium (METHOTREXATE PO)     metoprolol succinate ER (TOPROL-XL) 50 MG 24 hr tablet     Multiple Vitamins-Minerals (ICAPS) CAPS     multivitamin (CENTRUM SILVER) tablet     NIFEdipine ER (ADALAT CC) 60 MG 24 hr tablet     omeprazole (PRILOSEC) 20 MG DR capsule     potassium chloride ER (K-DUR/KLOR-CON M) 20 MEQ CR tablet     Prednisolon-Gatiflox-Bromfenac 1-0.5-0.075 % SUSP     psyllium (METAMUCIL/KONSYL) capsule     sildenafil (REVATIO) 20 MG tablet     simvastatin (ZOCOR) 40 MG tablet     spironolactone (ALDACTONE) 25 MG tablet     torsemide (DEMADEX) 20 MG tablet     triamcinolone (KENALOG) 0.1 % external cream     Vitamin D, Cholecalciferol, 10 MCG (400 UNIT) TABS      No current facility-administered medications for this visit.           REVIEW of SYMPTOMS    Constitutional: without fever, chills, night sweats.  Weight is down post discharge  HEENT: without dry eyes, dry mouth, sinusitis, corryza, visual changes  Endocrine: without polyuria, polydypsia, polyphagia, heat or cold intolerance, changing mental status  Cardiology: without chest pain, tightness, heaviness, pressure, paroxysmal dyspnea, orthopnea, palpitation, pre-syncope or syncope or device discharge (if present)  Pulmonary: without asthma, wheezing, cough, hemoptysis  GI: without nausea, emesis, jaundice, pain, hematemesis, melena  : without frequency, urgency, hematuria, stones, pain, abnormal bleeding, frequency, urgency  Neurologic: without TIA, CVA, trauma, seizure  Dermatologic: without lesions, abrasion rash,   Orthopedic/Rheum: without significant joint pain, impairment, limb, polyserositis, ulceration, Raynauds  Heme: without mass, bruising, frequent infection, anemia  Psychiatric: without substance abuse, hallucination, medication, depression    Exercise tolerance:    PERRLA, EOM full, normal gait and station, normal mentation, skin without lesions, chest is clear, no evidence of right or left ventricular overactivity, sternum stable, no carotid bruits, regular rhythm, S1, S2, no murmurs, abdomen without tenderness, rebound guarding or masses, no edema, no focal neurologic      Conclusion     1) Elevated biventricular filling pressures   2) Moderate to severe mostly primary pulmonary hypertension  3) Improvement in hemodynamics with nitric oxide; however, with no improvement in mean PA pressure         CV Right/Left Heart Cath     Right Heart Pressures     Systemic blood pressure 140-150 throughout the case.    BASELINE:  1) Elevated right-sided filling pressures (mean RA 16 mmHg)  2) Moderate to severe pulmonary hypertension (mean PA 35 mmHg) secondary to intrinsic pulmonary vascular disease (PVRI 14  rod*m2) and elevated left-sided filling pressures (PCWP 20 mmHg).  3) O2 sats at baseline 93%  4) Mildly depressed CO/CI: 3.8/1.9    NO 80 PPM:  1) Pulmonary artery pressures did not change (mean PA 34 mmHg)  2) O2 sats improved to 100%  3) CO/CI improved 4.2/2.1 Right sided filling pressures are severely elevated.Left sided filling pressures are moderately elevated. Severely elevated pulmonary artery hypertension.Left ventricular filling pressures are moderately elevated .Normal cardiac output level.   Pressures      Time Systolic Diastolic Mean A Wave V Wave EDP Max dp/dt HR   RA Pressures  9:01 AM   16 mmHg    14 mmHg    22 mmHg      87 bpm      RV Pressures  9:01 AM 52 mmHg    5 mmHg       15 mmHg     86 bpm      PA Pressures  9:02 AM 51 mmHg    26 mmHg    35 mmHg        76 bpm      PCW Pressures  9:02 AM   18 mmHg    23 mmHg    24 mmHg      72 bpm      AO Pressures  9:03  mmHg    87 mmHg    111 mmHg        82 bpm      Pressures Phase: Drug Study Level 1      Time Systolic Diastolic Mean A Wave V Wave EDP Max dp/dt HR   PA Pressures  9:11 AM 48 mmHg    26 mmHg    34 mmHg        77 bpm      Pressures Phase: Drug Study Level 2      Time Systolic Diastolic Mean A Wave V Wave EDP Max dp/dt HR   PA Pressures  9:14 AM 52 mmHg    33 mmHg    36 mmHg        84 bpm      PCW Pressures  9:14 AM   22 mmHg    26 mmHg    23 mmHg      79 bpm      AO Pressures  9:11  mmHg    67 mmHg    97 mmHg        80 bpm      Blood Flow Results Phase: Drug Study Level 2      Time Results Indexed Values   QP  9:11 AM 4.19 L/min    2.1 L/min/m2      QS  9:11 AM 4.19 L/min    2.1 L/min/m2      Blood Oximetry Phase: Drug Study Level 2      Time Hb SAT(%) PO2 Content PA Sat   Art  9:11 AM  100 %     18.22 mL/dL       Cardiac Output Phase: Drug Study Level 2      Time TDCO TDCI Josh C.O. Josh C.I. Josh HR   Cardiac Output Results  9:11 AM   4.19 L/min    2.1 L/min/m2       Resistance Results Phase: Drug Study Level 2      Time PVR SVR  PVR-I SVR-I TPR TVR TPR-I TVR-I PVR/SVR TPR/TVR   Resistance Results (Metric)  9:11 .55 dsc-5     534.11 dsc-5/m2     687.98 dsc-5    1853.72 dsc-5    1373.43 dsc-5/m2    3700.63 dsc-5/m2     0.37      Resistance Results (Wood)  9:11 AM 3.35 ROQUE     6.68 ROQUE/m2     8.6 ROQUE    23.18 ROQUE    17.17                1/15/2019 2019   /77 112/63       CT CHEST WITHOUT CONTRAST 2016 11:40 AM       HISTORY: Follow up on right sided lung lesions. Malignant neoplasm of  lower lobe, left bronchus or lung.     COMPARISON: 10/22/2016.     TECHNIQUE: Volumetric helical acquisition of CT images of the chest  from the clavicles to the kidneys were acquired without IV contrast.  Radiation dose for this scan was reduced using automated exposure  control, adjustment of the mA and/or kV according to patient size, or  iterative reconstruction technique.     FINDINGS: Right-sided fibrotic changes appear stable. Somewhat nodular  density at the left base has resolved and may have been an area of  consolidation and/or active inflammation. Honeycombing and traction  bronchiectasis persist. Moderate hiatal hernia. No pleural or  pericardial effusion. Mild cardiomegaly. There are moderate coronary  vascular calcifications consistent with coronary artery disease. There  are mild atherosclerotic changes of the visualized aorta and its  branches. There is no evidence of aortic aneurysm. No acute findings  in the visualized upper abdomen.                                                                       IMPRESSION:  1. 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  2. The nodular density at the left base has resolved  Davie Iyer MD 2016             Narrative           Interpretation Summary                            Name: GINA MCKEON  MRN: [id number]  : 1940  Study Date: 2018 09:55 AM  Age: 77 yrs  Gender: Female  Patient  [place] [facility] [place]  Reason For Study: Dyspnea on exertion  History: Dyspnea on exertion  E  Referring Physician: THEO HUIZAR  Performed By: Sofía Braden RDCS     BSA: 2.0 m2  Height: 62 in  Weight: 221 lb  BP: 112/74 mmHg  _____________________________________________________________________________  __        Procedure  Echocardiogram with two-dimensional, color and spectral Doppler performed.  _____________________________________________________________________________  __        Interpretation Summary  Global and regional left ventricular function is normal with an EF of 55-60%.  Global right ventricular function is normal.Mild right ventricular dilation is  present.  Mild to moderate TR. Right ventricular systolic pressure is 49mmHg above the  right atrial pressure. Moderate (pulmonary artery systolic pressure 50-75mmHg)  pulmonary hypertension is present.  The inferior vena cava was normal in size with preserved respiratory  variability.Estimated mean right atrial pressure is 3 mmHg (normal).  No pericardial effusion is present.     This study was compared with the study from 10/16/17, no significant change .  _____________________________________________________________________________  __        Left Ventricle  Left ventricular wall thickness is normal. Left ventricular size is normal.  Global and regional left ventricular function is normal with an EF of 55-60%.  The Ejection Fraction was visually estimated. Diastolic function not assessed  due to significant mitral annular calcification. No regional wall motion  abnormalities are seen.     Right Ventricle  Global right ventricular function is normal. Mild right ventricular dilation  is present.     Atria  Severe left atrial enlargement is present. Mild right atrial enlargement is  present.     Mitral Valve  Moderate mitral annular calcification is present. Mild mitral insufficiency is  present.        Aortic Valve  Trileaflet aortic sclerosis  without stenosis.     Tricuspid Valve  Mild to moderate tricuspid insufficiency is present. Right ventricular  systolic pressure is 49mmHg above the right atrial pressure. Moderate  (pulmonary artery systolic pressure 50-75mmHg) pulmonary hypertension is  present.     Pulmonic Valve  Trace pulmonic insufficiency is present.     Vessels  The inferior vena cava was normal in size with preserved respiratory  variability. Visualized portions of the aorta are normal in size. Estimated  mean right atrial pressure is 3 mmHg (normal).     Pericardium  No pericardial effusion is present.        Compared to Previous Study  This study was compared with the study from 10/16/17, no significant change .     Attestation  I have personally viewed the imaging and agree with the interpretation and  report as documented by the fellow, Nupur Luz, and/or edited by me.  _____________________________________________________________________________  __     MMode/2D Measurements & Calculations  IVSd: 0.83 cm  LVIDd: 5.3 cm  LVIDs: 2.4 cm  LVPWd: 0.88 cm  FS: 54.8 %  LV mass(C)d: 162.3 grams  LV mass(C)dI: 81.4 grams/m2  Ao root diam: 2.6 cm  LA dimension: 5.7 cm  asc Aorta Diam: 3.5 cm  LA/Ao: 2.2  LVOT diam: 1.9 cm  LVOT area: 2.8 cm2  LA Volume (BP): 105.0 ml     LA Volume Index (BP): 52.8 ml/m2  RWT: 0.33        Doppler Measurements & Calculations  MV E max carrington: 88.3 cm/sec  MV A max carrington: 75.2 cm/sec  MV E/A: 1.2  MV dec time: 0.20 sec  Ao V2 max: 171.9 cm/sec  Ao max P.0 mmHg  Ao V2 mean: 134.9 cm/sec  Ao mean P.9 mmHg  Ao V2 VTI: 43.7 cm  JACOB(I,D): 1.9 cm2  JACOB(V,D): 1.9 cm2  LV V1 max P.6 mmHg  LV V1 max: 118.8 cm/sec  LV V1 VTI: 29.8 cm  SV(LVOT): 82.1 ml  SI(LVOT): 41.1 ml/m2  PA acc time: 0.05 sec  TR max carrington: 320.4 cm/sec  TR max P.4 mmHg  AV Carrington Ratio (DI): 0.69  JACOB Index (cm2/m2): 0.94  E/E' av.0  Lateral E/e': 15.6     Medial E/e':  18.5     _____________________________________________________________________________  __Echo 2019   Name: GINA MCKEON  MRN: [id number]  : 1940  Study Date: 2019 02:57 PM  Age: 78 yrs  Gender: Female  Patient Location: [facility]  Reason For Study: CHF  Ordering Physician: DIMITRI LEAL  Referring Physician: Carissa Burroughs  Performed By: Milton Guadalupe RDCS     BSA: 2.0 m2  Height: 62 in  Weight: 229 lb  HR: 100  BP: 115/71 mmHg  _____________________________________________________________________________  __        Procedure  Complete Portable Echo Adult. Optison (NDC #9242-4529) given intravenously.  _____________________________________________________________________________  __        Interpretation Summary     Small pericardial effusion  The visual ejection fraction is estimated at 50-55%.  Flattened septum is consistent with RV pressure/volume overload.  Pulmonary hypertension  There is severe biatrial enlargement.  Very mild AS. MG is 9 mm Hg.  The right ventricle is moderately dilated. Mildly decreased right ventricular  systolic function  There is mod-severe to severe (3-4+) tricuspid regurgitation.  _____________________________________________________________________________  __        Left Ventricle  The left ventricle is normal in size. The visual ejection fraction is  estimated at 50-55%. Diastolic Doppler findings (E/E' ratio and/or other  parameters) suggest left ventricular filling pressures are indeterminate.  Flattened septum is consistent with RV pressure/volume overload.     Right Ventricle  The right ventricle is moderately dilated. Mildly decreased right ventricular  systolic function.     Atria  There is severe biatrial enlargement.     Mitral Valve  Thickened mitral valve posterior leaflet. There is moderate mitral annular  calcification. There is mild to moderate (1-2+) mitral regurgitation.        Tricuspid Valve  The right ventricular systolic pressure is  elevated at 42.2 mmHg. Pulmonary  hypertension. There is mod-severe to severe (3-4+) tricuspid regurgitation.     Aortic Valve  There is severe trileaflet aortic sclerosis. No hemodynamically significant  valvular aortic stenosis.     Pulmonic Valve  The pulmonic valve is not well visualized.     Vessels  The aortic root is normal size. Dilation of the inferior vena cava is present  with abnormal respiratory variation in diameter.     Pericardium  Small pericardial effusion.     _____________________________________________________________________________  __  MMode/2D Measurements & Calculations  IVSd: 0.83 cm  LVIDd: 4.4 cm  LVIDs: 3.4 cm  LVPWd: 0.95 cm  FS: 22.2 %  LV mass(C)d: 125.7 grams  LV mass(C)dI: 62.1 grams/m2     Ao root diam: 2.8 cm  LA dimension: 4.5 cm  asc Aorta Diam: 3.4 cm  LA/Ao: 1.6  LVOT diam: 1.9 cm  LVOT area: 3.0 cm2  LA Volume (BP): 103.0 ml  LA Volume Index (BP): 51.0 ml/m2  RWT: 0.43        Doppler Measurements & Calculations  MV E max el: 79.7 cm/sec  MV max P.4 mmHg  MV mean PG: 3.1 mmHg  MV V2 VTI: 23.8 cm  MVA(VTI): 3.1 cm2     MV dec slope: 562.6 cm/sec2  Ao V2 max: 188.8 cm/sec  Ao max P.0 mmHg  Ao V2 mean: 136.1 cm/sec  Ao mean P.4 mmHg  Ao V2 VTI: 36.2 cm  JACOB(I,D): 2.1 cm2  JACOB(V,D): 2.1 cm2  LV V1 max P.2 mmHg  LV V1 max: 134.4 cm/sec  LV V1 VTI: 25.1 cm  SV(LVOT): 74.2 ml  SI(LVOT): 36.6 ml/m2  PA acc time: 0.08 sec  TR max el: 324.8 cm/sec  TR max P.2 mmHg  AV El Ratio (DI): 0.71  JACOB Index (cm2/m2): 1.0  E/E' av.5  Lateral E/e': 8.7  Medial E/e': 10.4    Discussion    The patient likely still has some evidence of fluid overload but this is difficult to discern.  She has been unable to be weaned from oxygen.  I would think that another CT should be obtained to look for evidence of fluid.  She may need additional diuretics in the interim.  Subsequent to her discharge will need to weight daily, call if weight up more than 4 pounds from first home  weight, see Dr. Burroughs weekly, return to see us in one month, sooner if not doing well.     Plan:   1. Weekly BUN, creatinine, BNP magnesium, electrolytes  2. Continue daily weights  3. Will need home nursing assessment  4. See me one month  5. CT chest today             CC: Dr Manjeet Bustillos M.D.

## 2019-12-09 NOTE — TELEPHONE ENCOUNTER
Patient was evaluated by cardiology while inpatient for CHF exacerbation in presence of PAH, chronic AF, Scleroderma mod to severe TR. IV diuresed 27 lbs and transitioned to Torsemide. Pt had 25 beat run of asymptomatic NSVT during IP stay-Toprol dosage increased.[facility] Care Coordinator. RN confirmed with Care Coordinator that patient has a scheduled F/U appt on 12/10/19 with Dr. Marquez with labs prior. RN confirmed with Care Coordinator that patient is weighed daily, to call clinic with a weight gain of 2 lbs overnight or 5 lbs in a week and to bring list of daily weights/vitals, last progress note, MAR, active orders, and provider order sheet to appt. MELVI Hammond RN.

## 2019-12-09 NOTE — LETTER
12/9/2019        RE: Jaqueline Canales  [address]  [facility] [address]        [facility]  INITIAL VISIT NOTE  December 9, 2019    PRIMARY CARE PROVIDER AND CLINIC:  Carissa Burroughs [address] [facility] [address] / [place] [address]    Chief Complaint   Patient presents with     Hospital F/U       HPI:    Jaqueline Canales is a 78 year old  (1940) female who was seen at [place] [facility] on December 9, 2019 for an initial visit. Medical history is notable for chronic diastolic heart failure, chronic atrial fibrillation, chronic lower extremity lymphedema, pulmonary hypertension, hypertension, dyslipidemia, hypothyroidism, breast and lung cancer, rheumatoid arthritis, scleroderma, interstitial lung disease, GERD, obesity, and obstructive sleep apnea.  She was hospitalized at [facility] from November 25 through December 5, 2019 for acute hypoxic respiratory failure secondary to acute decompensation of diastolic heart failure.  She originally presented with increasing dyspnea, hypoxia, and worsening lower extremity edema.  Electrocardiogram revealed atrial fibrillation with heart rate around 100.  She required supplemental oxygen 2 L upon admission.  BNP was 3900.  BMP showed creatinine of 0.75.  WBC was normal.  Chest x-ray demonstrated increased interstitial prominence.  She was admitted to [facility] and diuresed aggressively with IV furosemide 60 mg 3 times daily.  Echocardiogram showed LV ejection fraction of 50-55% with findings of severe biatrial enlargement, moderately dilated RV, mildly decreased RV systolic function, moderate to severe tricuspid regurgitation, and pulmonary hypertension.  Diuretic therapy changed to Bumex drip on November 26 which was continued through December 5.  She had net -15 L by time of discharge and her weight improved to 88.7 kg from initial 103.4 kg.  She was discharged on new diuretic regimen of torsemide 20 mg p.o. daily and  Spironolactone 25 mg p.o. daily.    Patient is admitted to this facility for medical management, nursing care, and rehab.     Patient was seen today in her room.  She is weak appearing.  She continues to require supplemental oxygen at 2 L/min.  She reports nonproductive cough.  She denies fever, chills, chest pain, nausea, vomiting, abdominal pain, urinary symptoms, diarrhea.  She continues to use ACE wraps to her legs for swelling.  Her blood pressure was running low yesterday and therefore her BP meds were held by staff.    CODE STATUS:   CPR/Full code     PAST MEDICAL HISTORY:   Chronic atrial fibrillation, on apixaban  Chronic diastolic heart failure, LV ejection fraction 50-55% by echocardiogram on November 26, 2019  Mildly decreased RV systolic function  Moderate-severe pulmonary hypertension  Moderate to severe tricuspid regurgitation  Hypertension  Dyslipidemia  Hypothyroidism  Rheumatoid arthritis  Scleroderma  Interstitial lung disease  Raynaud syndrome  Chronic bilateral lower extremity lymphedema  GERD  Left lower lobe squamous cell lung cancer, status post radiation therapy  Right breast cancer, status post mastectomy 1990  Obesity, BMI 36.5  Obstructive sleep apnea, using CPAP at night  Psoriasis  Helicobacter pylori infection  Osteoporosis  Carotid artery stenosis, status post endarterectomy    PAST SURGICAL HISTORY:   Past Surgical History:   Procedure Laterality Date     BREAST SURGERY       C NONSPECIFIC PROCEDURE  1990    mastectomy RT breast     COLONOSCOPY  5/24/16     COLONOSCOPY N/A 6/28/2019    Procedure: Colonoscopy, With Polypectomy And Biopsy;  Surgeon: Neto Kaminski MD;  Location:  GI     CONIZATION       CV RIGHT HEART CATH N/A 4/15/2019    Procedure: Heart Cath Right Heart Cath;  Surgeon: Naresh Cyr MD;  Location:  HEART CARDIAC CATH LAB     ENDARTERECTOMY CAROTID  2011     HC COLONOSCOPY THRU STOMA, DIAGNOSTIC  2001 2011    diverticulosis     REPAIR HAMMER TOE        TONSILLECTOMY         FAMILY HISTORY:   Family History   Problem Relation Age of Onset     Cancer - colorectal Mother      Cerebrovascular Disease Father      Cancer Sister 31        ovarian     Heart Disease Sister      Gastrointestinal Disease Sister         crohns     Gastrointestinal Disease Sister         diverticulitis     Gastrointestinal Disease Brother         diverticulitis     Alzheimer Disease Sister      Cerebrovascular Disease Maternal Grandmother      Diabetes Maternal Grandmother      Mental Illness Maternal Grandmother      Diabetes Maternal Uncle      Cancer Nephew                SOCIAL HISTORY:  Social History     Tobacco Use     Smoking status: Former Smoker     Packs/day: 1.00     Years: 30.00     Pack years: 30.00     Types: Cigarettes     Start date:      Last attempt to quit: 3/11/1992     Years since quittin.7     Smokeless tobacco: Never Used   Substance Use Topics     Alcohol use: Not Currently     Alcohol/week: 0.0 standard drinks     Frequency: Never       MEDICATIONS:  Current Outpatient Medications   Medication Sig Dispense Refill     acetaminophen (TYLENOL) 500 MG tablet Take 500-1,000 mg by mouth every 6 hours as needed for mild pain       albuterol (PROAIR HFA/PROVENTIL HFA/VENTOLIN HFA) 108 (90 Base) MCG/ACT inhaler Inhale 2 puffs into the lungs every 6 hours as needed for shortness of breath / dyspnea or wheezing       apixaban ANTICOAGULANT (ELIQUIS) 5 MG tablet Take 1 tablet (5 mg) by mouth 2 times daily 180 tablet 2     Bacillus Coagulans-Inulin (PROBIOTIC-PREBIOTIC PO) Take 1 chew tab by mouth 2 times daily        folic acid (FOLVITE) 1 MG tablet Take 1 tablet (1,000 mcg) by mouth daily 90 tablet 3     InFLIXimab (REMICADE IV) Inject 600 mg into the vein Every 8 weeks       levothyroxine (SYNTHROID/LEVOTHROID) 75 MCG tablet Take 1 tablet (75 mcg) by mouth daily 90 tablet 2     melatonin 5 MG tablet Take 5 mg by mouth nightly as needed for sleep        "Methotrexate Sodium (METHOTREXATE PO) Take 2.5 mg by mouth 10 tablets weekly - Wednesdays       metoprolol succinate ER (TOPROL-XL) 50 MG 24 hr tablet Take 1 tablet (50 mg) by mouth daily       Multiple Vitamins-Minerals (ICAPS) CAPS Take 1 capsule by mouth 2 times daily        multivitamin (CENTRUM SILVER) tablet Take 1 tablet by mouth daily       NIFEdipine ER (ADALAT CC) 60 MG 24 hr tablet Take 60 mg by mouth daily       omeprazole (PRILOSEC) 20 MG DR capsule Take 1 capsule (20 mg) by mouth 2 times daily Take 30-60 minutes prior to meals 180 capsule 2     potassium chloride ER (K-DUR/KLOR-CON M) 20 MEQ CR tablet Take 1 tablet (20 mEq) by mouth daily       Prednisolon-Gatiflox-Bromfenac 1-0.5-0.075 % SUSP Place 1 drop into the right eye daily       psyllium (METAMUCIL/KONSYL) capsule Take 1 capsule by mouth daily       sildenafil (REVATIO) 20 MG tablet Take 1 tablet (20 mg) by mouth 3 times daily 90 tablet 11     simvastatin (ZOCOR) 40 MG tablet Take 1 tablet (40 mg) by mouth At Bedtime 90 tablet 3     spironolactone (ALDACTONE) 25 MG tablet Take 1 tablet (25 mg) by mouth daily       torsemide (DEMADEX) 20 MG tablet Take 1 tablet (20 mg) by mouth daily       triamcinolone (KENALOG) 0.1 % external cream Apply topically 2 times daily 30 g 3     Vitamin D, Cholecalciferol, 10 MCG (400 UNIT) TABS Take 1,600 Units by mouth daily         ALLERGIES:  Allergies   Allergen Reactions     No Known Allergies        ROS:  10 point ROS neg other than the symptoms noted above in the HPI.    PHYSICAL EXAM:  BP 95/57   Pulse 85   Temp 96.9  F (36.1  C)   Resp 18   Ht 1.575 m (5' 2\")   Wt 90.5 kg (199 lb 9.6 oz)   SpO2 (!) 88%   BMI 36.51 kg/m      Gen: Cooperative, weak appearing  HEENT: Normocephalic; oropharynx clear  Card: Normal S1, S2, irregularly irregular rhythm, normal rate  Resp: Bilateral basilar crackles  GI: Abdomen soft, non-tender, non-distended, +BS  Ext: B/L 2-3+ LE edema  Neuro: CX II-XII grossly intact; " ROM in all four extremities grossly in tact  Psych: Alert and oriented x3; normal affect  Skin: No acute rash    LABORATORY/IMAGING DATA:  Lab Results   Component Value Date    WBC 6.3 11/25/2019     Lab Results   Component Value Date    RBC 3.84 11/25/2019     Lab Results   Component Value Date    HGB 11.6 11/25/2019     Lab Results   Component Value Date    HCT 35.4 11/25/2019     Lab Results   Component Value Date    MCV 92 11/25/2019     Lab Results   Component Value Date    MCH 30.2 11/25/2019     Lab Results   Component Value Date    MCHC 32.8 11/25/2019     Lab Results   Component Value Date    RDW 17.2 11/25/2019     Lab Results   Component Value Date     11/25/2019     Last Comprehensive Metabolic Panel:  Sodium   Date Value Ref Range Status   12/05/2019 136 133 - 144 mmol/L Final     Potassium   Date Value Ref Range Status   12/05/2019 3.4 3.4 - 5.3 mmol/L Final     Chloride   Date Value Ref Range Status   12/05/2019 97 94 - 109 mmol/L Final     Carbon Dioxide   Date Value Ref Range Status   12/05/2019 34 (H) 20 - 32 mmol/L Final     Anion Gap   Date Value Ref Range Status   12/05/2019 5 3 - 14 mmol/L Final     Glucose   Date Value Ref Range Status   12/05/2019 92 70 - 99 mg/dL Final     Urea Nitrogen   Date Value Ref Range Status   12/05/2019 16 7 - 30 mg/dL Final     Creatinine   Date Value Ref Range Status   12/05/2019 0.71 0.52 - 1.04 mg/dL Final     GFR Estimate   Date Value Ref Range Status   12/05/2019 81 >60 mL/min/[1.73_m2] Final     Comment:     Non  GFR Calc  Starting 12/18/2018, serum creatinine based estimated GFR (eGFR) will be   calculated using the Chronic Kidney Disease Epidemiology Collaboration   (CKD-EPI) equation.       Calcium   Date Value Ref Range Status   12/05/2019 9.5 8.5 - 10.1 mg/dL Final       ASSESSMENT/PLAN:  Acute decompensation of chronic diastolic heart failure,  Acute hypoxic respiratory failure due to acute heart failure,  Moderate to severe  pulmonary hypertension,  Moderate to severe tricuspid regurgitation,  Chronic lymphedema of both lower extremities.  In the hospital, the patient was treated aggressively with IV Bumex drip.  She had net -15 L by time of discharge and her weight improved to 88.7 kg from initial 103.4 kg.    She follows with Dr. Marquez for pulmonary hypertension and recently started on Revatio in July 2019.  She still requires 2 L/min supplemental O2.  Plan:  Continue PTA metoprolol XL 50 mg p.o. daily, torsemide 20 mg p.o. daily, and Spironolactone 25 mg p.o. daily  Continue PTA Revatio 20 mg p.o. 3 times daily and nifedipine ER 60 mg daily for pulmonary hypertension  Monitor daily weight, volume status, and lower extremity edema  Monitor renal function once a week  Wean supplemental oxygen as able  PRN albuterol is available  Elevate lower extremities  Continue ACE wraps of lower extremities  OT to evaluate and treat for lymphedema  Follow-up with Dr. Marquez tomorrow December 10    Benign essential hypertension,  Dyslipidemia,  Chronic atrial fibrillation.  Blood pressure is running soft to low normal, likely due to moderately severe pulmonary hypertension.   During recent hospitalization, metoprolol was increased to 50 mg p.o. daily for better heart rate control.  Plan:  Continue chronic anticoagulation therapy with Eliquis 5 mg p.o. twice daily  Continue PTA metoprolol XL 50 mg p.o. daily, nifedipine ER 60 mg p.o. daily, torsemide 20 mg daily, spironolactone 25 mg p.o. daily, and simvastatin 40 mg p.o. at bedtime  Monitor blood pressure and heart rate    Hypothyroidism.  Chronic.  Plan:  Continue PTA levothyroxine 75 mcg daily    Rheumatoid arthritis,  Scleroderma,  Pulmonary fibrosis.  She follows with Dr. Bustillos of rheumatology.  Plan:  Continue PTA methotrexate 25 mg p.o. every Wednesday  Continue PTA Remicade every 8 weeks as instructed  Follow with Dr. Bustillos on December 17, 2019    GERD.  Chronic.  Plan:  Continue PTA  omeprazole 20 mg p.o. twice daily    Obstructive sleep apnea.  Plan:  Continue CPAP at night    Physical deconditioning.  Plan:  PT evaluation/therapy      Total unit/floor time of 45 minutes consisted of the following: examination of patient, reviewing the record including pertinent labs and imaging. More than 50% of this time was spent in coordination of care with the patient and NP. This time was spent on discussing the care plan including medication changes, lower extremity edema, low BP, discharge/safe placement, and specialty follow up.        Electronically signed by:  Rosalva Lara MD                      Sincerely,        Rosalva Lara MD

## 2019-12-10 ENCOUNTER — OFFICE VISIT (OUTPATIENT)
Dept: CARDIOLOGY | Facility: CLINIC | Age: 79
End: 2019-12-10
Payer: MEDICARE

## 2019-12-10 ENCOUNTER — APPOINTMENT (OUTPATIENT)
Dept: LAB | Facility: CLINIC | Age: 79
End: 2019-12-10
Payer: MEDICARE

## 2019-12-10 ENCOUNTER — HOSPITAL LABORATORY (OUTPATIENT)
Dept: OTHER | Facility: CLINIC | Age: 79
End: 2019-12-10

## 2019-12-10 VITALS
HEART RATE: 128 BPM | BODY MASS INDEX: 36.86 KG/M2 | SYSTOLIC BLOOD PRESSURE: 98 MMHG | HEIGHT: 62 IN | WEIGHT: 200.3 LBS | OXYGEN SATURATION: 96 % | DIASTOLIC BLOOD PRESSURE: 71 MMHG

## 2019-12-10 DIAGNOSIS — I48.19 PERSISTENT ATRIAL FIBRILLATION (H): ICD-10-CM

## 2019-12-10 DIAGNOSIS — I27.20 PULMONARY HYPERTENSION (H): ICD-10-CM

## 2019-12-10 DIAGNOSIS — I10 BENIGN ESSENTIAL HYPERTENSION: ICD-10-CM

## 2019-12-10 DIAGNOSIS — I50.31 ACUTE DIASTOLIC CONGESTIVE HEART FAILURE (H): Primary | ICD-10-CM

## 2019-12-10 LAB
ANION GAP SERPL CALCULATED.3IONS-SCNC: 5 MMOL/L (ref 3–14)
BUN SERPL-MCNC: 21 MG/DL (ref 7–30)
CALCIUM SERPL-MCNC: 9.5 MG/DL (ref 8.5–10.1)
CHLORIDE SERPL-SCNC: 101 MMOL/L (ref 94–109)
CO2 SERPL-SCNC: 28 MMOL/L (ref 20–32)
CREAT SERPL-MCNC: 0.64 MG/DL (ref 0.52–1.04)
ERYTHROCYTE [DISTWIDTH] IN BLOOD BY AUTOMATED COUNT: 16.9 % (ref 10–15)
GFR SERPL CREATININE-BSD FRML MDRD: 84 ML/MIN/{1.73_M2}
GLUCOSE SERPL-MCNC: 88 MG/DL (ref 70–99)
HCT VFR BLD AUTO: 36.1 % (ref 35–47)
HGB BLD-MCNC: 11.6 G/DL (ref 11.7–15.7)
MCH RBC QN AUTO: 29.7 PG (ref 26.5–33)
MCHC RBC AUTO-ENTMCNC: 32.1 G/DL (ref 31.5–36.5)
MCV RBC AUTO: 93 FL (ref 78–100)
PLATELET # BLD AUTO: 114 10E9/L (ref 150–450)
POTASSIUM SERPL-SCNC: 4.3 MMOL/L (ref 3.4–5.3)
RBC # BLD AUTO: 3.9 10E12/L (ref 3.8–5.2)
SODIUM SERPL-SCNC: 134 MMOL/L (ref 133–144)
WBC # BLD AUTO: 5.7 10E9/L (ref 4–11)

## 2019-12-10 PROCEDURE — 99215 OFFICE O/P EST HI 40 MIN: CPT | Performed by: INTERNAL MEDICINE

## 2019-12-10 ASSESSMENT — MIFFLIN-ST. JEOR: SCORE: 1336.8

## 2019-12-10 NOTE — LETTER
12/10/2019    Carissa Burroughs MD  [address]  [place] [address]    RE: Jaqueline Canales       Dear Colleague,    I had the pleasure of seeing Jaqueline Canales in the [facility].    Natalie Bustillos M.D.  [facility] M.D      Acute Hypoxic Respiratory Failure dt Acute Diastolic CHF exacerbation  Chronic Bilateral LE edema / Lymphedema  Chronic A. Fib, on anticoagulation  Moderate/Severe pulmonary hypertension  Hyperlipidemia   Hypertension  Hypothyroidism  Rheumatoid arthritis  Scleroderma    The patient has scleroderma and ILD and was incidentally found to have SCC of left lower lobe that was treated with radiation therapy(2016)  She has been recently hospitalized for weight gain and dependent edema. Her weight has gone up by 20-30 pounds. She has had diuretics such that her weight is down approximately 20 pounds but has persistent lymphedema/edema.  She has not been weaned from oxygen.     Alert, oriented fluent speech, basilar rales, increased JVP, regular rhythm, chronic edema/lymphedema    Results for JAQUELINE CANALES (MRN [id number]) as of 12/10/2019 14:13   Ref. Range 12/10/2019 08:10   Sodium Latest Ref Range: 133 - 144 mmol/L 134   Potassium Latest Ref Range: 3.4 - 5.3 mmol/L 4.3   Chloride Latest Ref Range: 94 - 109 mmol/L 101   Carbon Dioxide Latest Ref Range: 20 - 32 mmol/L 28   Urea Nitrogen Latest Ref Range: 7 - 30 mg/dL 21   Creatinine Latest Ref Range: 0.52 - 1.04 mg/dL 0.64   GFR Estimate Latest Ref Range: >60 mL/min/1.73_m2 84   GFR Estimate If Black Latest Ref Range: >60 mL/min/1.73_m2 >90   Calcium Latest Ref Range: 8.5 - 10.1 mg/dL 9.5   Anion Gap Latest Ref Range: 3 - 14 mmol/L 5   Glucose Latest Ref Range: 70 - 99 mg/dL 88   WBC Latest Ref Range: 4.0 - 11.0 10e9/L 5.7   Hemoglobin Latest Ref Range: 11.7 - 15.7 g/dL 11.6 (L)   Hematocrit Latest Ref Range: 35.0 - 47.0 % 36.1   Platelet Count Latest Ref Range: 150 - 450 10e9/L  114 (L)   RBC Count Latest Ref Range: 3.8 - 5.2 10e12/L 3.90   MCV Latest Ref Range: 78 - 100 fl 93   MCH Latest Ref Range: 26.5 - 33.0 pg 29.7   MCHC Latest Ref Range: 31.5 - 36.5 g/dL 32.1   RDW Latest Ref Range: 10.0 - 15.0 % 16.9 (H)       Wt Readings from Last 24 Encounters:   12/06/19 90.5 kg (199 lb 9.6 oz)   12/06/19 90.5 kg (199 lb 9.6 oz)   12/05/19 88.7 kg (195 lb 8 oz)   11/20/19 103.4 kg (228 lb)   11/14/19 102.5 kg (226 lb)   11/11/19 101.2 kg (223 lb)   10/28/19 100.8 kg (222 lb 4.8 oz)   08/28/19 98.9 kg (218 lb)   07/29/19 96.8 kg (213 lb 6.4 oz)   07/26/19 97.5 kg (215 lb)   07/16/19 96.2 kg (212 lb)   06/28/19 99.8 kg (220 lb)   06/24/19 99.7 kg (219 lb 11.2 oz)   06/13/19 97.5 kg (215 lb)   05/29/19 99.3 kg (219 lb)   05/24/19 100.7 kg (222 lb 0.1 oz)   05/21/19 100.7 kg (222 lb)   05/15/19 101.1 kg (222 lb 12.8 oz)   05/01/19 100.8 kg (222 lb 3.2 oz)   04/16/19 101.7 kg (224 lb 1.6 oz)   04/15/19 101.6 kg (224 lb)   04/02/19 99.8 kg (220 lb)   02/13/19 99.8 kg (220 lb)   01/24/19 99.8 kg (220 lb)             Current Outpatient Medications   Medication     acetaminophen (TYLENOL) 500 MG tablet     albuterol (PROAIR HFA/PROVENTIL HFA/VENTOLIN HFA) 108 (90 Base) MCG/ACT inhaler     apixaban ANTICOAGULANT (ELIQUIS) 5 MG tablet     Bacillus Coagulans-Inulin (PROBIOTIC-PREBIOTIC PO)     folic acid (FOLVITE) 1 MG tablet     InFLIXimab (REMICADE IV)     levothyroxine (SYNTHROID/LEVOTHROID) 75 MCG tablet     melatonin 5 MG tablet     Methotrexate Sodium (METHOTREXATE PO)     metoprolol succinate ER (TOPROL-XL) 50 MG 24 hr tablet     Multiple Vitamins-Minerals (ICAPS) CAPS     multivitamin (CENTRUM SILVER) tablet     NIFEdipine ER (ADALAT CC) 60 MG 24 hr tablet     omeprazole (PRILOSEC) 20 MG DR capsule     potassium chloride ER (K-DUR/KLOR-CON M) 20 MEQ CR tablet     Prednisolon-Gatiflox-Bromfenac 1-0.5-0.075 % SUSP     psyllium (METAMUCIL/KONSYL) capsule     sildenafil (REVATIO) 20 MG tablet      simvastatin (ZOCOR) 40 MG tablet     spironolactone (ALDACTONE) 25 MG tablet     torsemide (DEMADEX) 20 MG tablet     triamcinolone (KENALOG) 0.1 % external cream     Vitamin D, Cholecalciferol, 10 MCG (400 UNIT) TABS     No current facility-administered medications for this visit.         REVIEW of SYMPTOMS    Constitutional: without fever, chills, night sweats.  Weight is down post discharge  HEENT: without dry eyes, dry mouth, sinusitis, corryza, visual changes  Endocrine: without polyuria, polydypsia, polyphagia, heat or cold intolerance, changing mental status  Cardiology: without chest pain, tightness, heaviness, pressure, paroxysmal dyspnea, orthopnea, palpitation, pre-syncope or syncope or device discharge (if present)  Pulmonary: without asthma, wheezing, cough, hemoptysis  GI: without nausea, emesis, jaundice, pain, hematemesis, melena  : without frequency, urgency, hematuria, stones, pain, abnormal bleeding, frequency, urgency  Neurologic: without TIA, CVA, trauma, seizure  Dermatologic: without lesions, abrasion rash,   Orthopedic/Rheum: without significant joint pain, impairment, limb, polyserositis, ulceration, Raynauds  Heme: without mass, bruising, frequent infection, anemia  Psychiatric: without substance abuse, hallucination, medication, depression    Exercise tolerance:    PERRLA, EOM full, normal gait and station, normal mentation, skin without lesions, chest is clear, no evidence of right or left ventricular overactivity, sternum stable, no carotid bruits, regular rhythm, S1, S2, no murmurs, abdomen without tenderness, rebound guarding or masses, no edema, no focal neurologic      Conclusion     1) Elevated biventricular filling pressures   2) Moderate to severe mostly primary pulmonary hypertension  3) Improvement in hemodynamics with nitric oxide; however, with no improvement in mean PA pressure         CV Right/Left Heart Cath     Right Heart Pressures     Systemic blood pressure 140-150  throughout the case.    BASELINE:  1) Elevated right-sided filling pressures (mean RA 16 mmHg)  2) Moderate to severe pulmonary hypertension (mean PA 35 mmHg) secondary to intrinsic pulmonary vascular disease (PVRI 14 rod*m2) and elevated left-sided filling pressures (PCWP 20 mmHg).  3) O2 sats at baseline 93%  4) Mildly depressed CO/CI: 3.8/1.9    NO 80 PPM:  1) Pulmonary artery pressures did not change (mean PA 34 mmHg)  2) O2 sats improved to 100%  3) CO/CI improved 4.2/2.1 Right sided filling pressures are severely elevated.Left sided filling pressures are moderately elevated. Severely elevated pulmonary artery hypertension.Left ventricular filling pressures are moderately elevated .Normal cardiac output level.   Pressures      Time Systolic Diastolic Mean A Wave V Wave EDP Max dp/dt HR   RA Pressures  9:01 AM   16 mmHg    14 mmHg    22 mmHg      87 bpm      RV Pressures  9:01 AM 52 mmHg    5 mmHg       15 mmHg     86 bpm      PA Pressures  9:02 AM 51 mmHg    26 mmHg    35 mmHg        76 bpm      PCW Pressures  9:02 AM   18 mmHg    23 mmHg    24 mmHg      72 bpm      AO Pressures  9:03  mmHg    87 mmHg    111 mmHg        82 bpm      Pressures Phase: Drug Study Level 1      Time Systolic Diastolic Mean A Wave V Wave EDP Max dp/dt HR   PA Pressures  9:11 AM 48 mmHg    26 mmHg    34 mmHg        77 bpm      Pressures Phase: Drug Study Level 2      Time Systolic Diastolic Mean A Wave V Wave EDP Max dp/dt HR   PA Pressures  9:14 AM 52 mmHg    33 mmHg    36 mmHg        84 bpm      PCW Pressures  9:14 AM   22 mmHg    26 mmHg    23 mmHg      79 bpm      AO Pressures  9:11  mmHg    67 mmHg    97 mmHg        80 bpm      Blood Flow Results Phase: Drug Study Level 2      Time Results Indexed Values   QP  9:11 AM 4.19 L/min    2.1 L/min/m2      QS  9:11 AM 4.19 L/min    2.1 L/min/m2      Blood Oximetry Phase: Drug Study Level 2      Time Hb SAT(%) PO2 Content PA Sat   Art  9:11 AM  100 %     18.22 mL/dL        Cardiac Output Phase: Drug Study Level 2      Time TDCO TDCI Josh C.O. Josh C.I. Josh HR   Cardiac Output Results  9:11 AM   4.19 L/min    2.1 L/min/m2       Resistance Results Phase: Drug Study Level 2      Time PVR SVR PVR-I SVR-I TPR TVR TPR-I TVR-I PVR/SVR TPR/TVR   Resistance Results (Metric)  9:11 .55 dsc-5     534.11 dsc-5/m2     687.98 dsc-5    1853.72 dsc-5    1373.43 dsc-5/m2    3700.63 dsc-5/m2     0.37      Resistance Results (Wood)  9:11 AM 3.35 ROQUE     6.68 ROQUE/m2     8.6 ROQUE    23.18 ROQUE    17.17                1/15/2019 1/24/2019   /77 112/63       CT CHEST WITHOUT CONTRAST December 27, 2016 11:40 AM       HISTORY: Follow up on right sided lung lesions. Malignant neoplasm of  lower lobe, left bronchus or lung.     COMPARISON: 10/22/2016.     TECHNIQUE: Volumetric helical acquisition of CT images of the chest  from the clavicles to the kidneys were acquired without IV contrast.  Radiation dose for this scan was reduced using automated exposure  control, adjustment of the mA and/or kV according to patient size, or  iterative reconstruction technique.     FINDINGS: Right-sided fibrotic changes appear stable. Somewhat nodular  density at the left base has resolved and may have been an area of  consolidation and/or active inflammation. Honeycombing and traction  bronchiectasis persist. Moderate hiatal hernia. No pleural or  pericardial effusion. Mild cardiomegaly. There are moderate coronary  vascular calcifications consistent with coronary artery disease. There  are mild atherosclerotic changes of the visualized aorta and its  branches. There is no evidence of aortic aneurysm. No acute findings  in the visualized upper abdomen.                                                                       IMPRESSION:  1. 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  2. The nodular density at the left base has resolved  Davie Iyer MD  2016             Narrative           Interpretation Summary                            Name: GINA MCKEON  MRN: [id number]  : 1940  Study Date: 2018 09:55 AM  Age: 77 yrs  Gender: Female  Patient Location: [facility] [place]  Reason For Study: Dyspnea on exertion  History: Dyspnea on exertion  E  Referring Physician: THEO HUIZAR  Performed By: Sofía Braden RDCS     BSA: 2.0 m2  Height: 62 in  Weight: 221 lb  BP: 112/74 mmHg  _____________________________________________________________________________  __        Procedure  Echocardiogram with two-dimensional, color and spectral Doppler performed.  _____________________________________________________________________________  __        Interpretation Summary  Global and regional left ventricular function is normal with an EF of 55-60%.  Global right ventricular function is normal.Mild right ventricular dilation is  present.  Mild to moderate TR. Right ventricular systolic pressure is 49mmHg above the  right atrial pressure. Moderate (pulmonary artery systolic pressure 50-75mmHg)  pulmonary hypertension is present.  The inferior vena cava was normal in size with preserved respiratory  variability.Estimated mean right atrial pressure is 3 mmHg (normal).  No pericardial effusion is present.     This study was compared with the study from 10/16/17, no significant change .  _____________________________________________________________________________  __        Left Ventricle  Left ventricular wall thickness is normal. Left ventricular size is normal.  Global and regional left ventricular function is normal with an EF of 55-60%.  The Ejection Fraction was visually estimated. Diastolic function not assessed  due to significant mitral annular calcification. No regional wall motion  abnormalities are seen.     Right Ventricle  Global right ventricular function is normal. Mild right ventricular dilation  is present.     Atria  Severe left atrial  enlargement is present. Mild right atrial enlargement is  present.     Mitral Valve  Moderate mitral annular calcification is present. Mild mitral insufficiency is  present.        Aortic Valve  Trileaflet aortic sclerosis without stenosis.     Tricuspid Valve  Mild to moderate tricuspid insufficiency is present. Right ventricular  systolic pressure is 49mmHg above the right atrial pressure. Moderate  (pulmonary artery systolic pressure 50-75mmHg) pulmonary hypertension is  present.     Pulmonic Valve  Trace pulmonic insufficiency is present.     Vessels  The inferior vena cava was normal in size with preserved respiratory  variability. Visualized portions of the aorta are normal in size. Estimated  mean right atrial pressure is 3 mmHg (normal).     Pericardium  No pericardial effusion is present.        Compared to Previous Study  This study was compared with the study from 10/16/17, no significant change .     Attestation  I have personally viewed the imaging and agree with the interpretation and  report as documented by the fellow, Nupur Luz, and/or edited by me.  _____________________________________________________________________________  __     MMode/2D Measurements & Calculations  IVSd: 0.83 cm  LVIDd: 5.3 cm  LVIDs: 2.4 cm  LVPWd: 0.88 cm  FS: 54.8 %  LV mass(C)d: 162.3 grams  LV mass(C)dI: 81.4 grams/m2  Ao root diam: 2.6 cm  LA dimension: 5.7 cm  asc Aorta Diam: 3.5 cm  LA/Ao: 2.2  LVOT diam: 1.9 cm  LVOT area: 2.8 cm2  LA Volume (BP): 105.0 ml     LA Volume Index (BP): 52.8 ml/m2  RWT: 0.33        Doppler Measurements & Calculations  MV E max carrington: 88.3 cm/sec  MV A max carrington: 75.2 cm/sec  MV E/A: 1.2  MV dec time: 0.20 sec  Ao V2 max: 171.9 cm/sec  Ao max P.0 mmHg  Ao V2 mean: 134.9 cm/sec  Ao mean P.9 mmHg  Ao V2 VTI: 43.7 cm  JACOB(I,D): 1.9 cm2  JACOB(V,D): 1.9 cm2  LV V1 max P.6 mmHg  LV V1 max: 118.8 cm/sec  LV V1 VTI: 29.8 cm  SV(LVOT): 82.1 ml  SI(LVOT): 41.1 ml/m2  PA acc time:  0.05 sec  TR max el: 320.4 cm/sec  TR max P.4 mmHg  AV El Ratio (DI): 0.69  JACOB Index (cm2/m2): 0.94  E/E' av.0  Lateral E/e': 15.6     Medial E/e': 18.5     _____________________________________________________________________________  __Echo 2019   Name: GINA MCKEON  MRN: [id number]  : 1940  Study Date: 2019 02:57 PM  Age: 78 yrs  Gender: Female  Patient Location: [facility]  Reason For Study: CHF  Ordering Physician: DIMITRI LEAL  Referring Physician: Carissa Burroughs  Performed By: Milton Guadalupe RDCS     BSA: 2.0 m2  Height: 62 in  Weight: 229 lb  HR: 100  BP: 115/71 mmHg  _____________________________________________________________________________  __        Procedure  Complete Portable Echo Adult. Optison (NDC #3948-0419) given intravenously.  _____________________________________________________________________________  __        Interpretation Summary     Small pericardial effusion  The visual ejection fraction is estimated at 50-55%.  Flattened septum is consistent with RV pressure/volume overload.  Pulmonary hypertension  There is severe biatrial enlargement.  Very mild AS. MG is 9 mm Hg.  The right ventricle is moderately dilated. Mildly decreased right ventricular  systolic function  There is mod-severe to severe (3-4+) tricuspid regurgitation.  _____________________________________________________________________________  __        Left Ventricle  The left ventricle is normal in size. The visual ejection fraction is  estimated at 50-55%. Diastolic Doppler findings (E/E' ratio and/or other  parameters) suggest left ventricular filling pressures are indeterminate.  Flattened septum is consistent with RV pressure/volume overload.     Right Ventricle  The right ventricle is moderately dilated. Mildly decreased right ventricular  systolic function.     Atria  There is severe biatrial enlargement.     Mitral Valve  Thickened mitral valve posterior leaflet. There is  moderate mitral annular  calcification. There is mild to moderate (1-2+) mitral regurgitation.        Tricuspid Valve  The right ventricular systolic pressure is elevated at 42.2 mmHg. Pulmonary  hypertension. There is mod-severe to severe (3-4+) tricuspid regurgitation.     Aortic Valve  There is severe trileaflet aortic sclerosis. No hemodynamically significant  valvular aortic stenosis.     Pulmonic Valve  The pulmonic valve is not well visualized.     Vessels  The aortic root is normal size. Dilation of the inferior vena cava is present  with abnormal respiratory variation in diameter.     Pericardium  Small pericardial effusion.     _____________________________________________________________________________  __  MMode/2D Measurements & Calculations  IVSd: 0.83 cm  LVIDd: 4.4 cm  LVIDs: 3.4 cm  LVPWd: 0.95 cm  FS: 22.2 %  LV mass(C)d: 125.7 grams  LV mass(C)dI: 62.1 grams/m2     Ao root diam: 2.8 cm  LA dimension: 4.5 cm  asc Aorta Diam: 3.4 cm  LA/Ao: 1.6  LVOT diam: 1.9 cm  LVOT area: 3.0 cm2  LA Volume (BP): 103.0 ml  LA Volume Index (BP): 51.0 ml/m2  RWT: 0.43        Doppler Measurements & Calculations  MV E max el: 79.7 cm/sec  MV max P.4 mmHg  MV mean PG: 3.1 mmHg  MV V2 VTI: 23.8 cm  MVA(VTI): 3.1 cm2     MV dec slope: 562.6 cm/sec2  Ao V2 max: 188.8 cm/sec  Ao max P.0 mmHg  Ao V2 mean: 136.1 cm/sec  Ao mean P.4 mmHg  Ao V2 VTI: 36.2 cm  JACOB(I,D): 2.1 cm2  JACOB(V,D): 2.1 cm2  LV V1 max P.2 mmHg  LV V1 max: 134.4 cm/sec  LV V1 VTI: 25.1 cm  SV(LVOT): 74.2 ml  SI(LVOT): 36.6 ml/m2  PA acc time: 0.08 sec  TR max el: 324.8 cm/sec  TR max P.2 mmHg  AV El Ratio (DI): 0.71  JACOB Index (cm2/m2): 1.0  E/E' av.5  Lateral E/e': 8.7  Medial E/e': 10.4    Discussion    The patient likely still has some evidence of fluid overload but this is difficult to discern.  She has been unable to be weaned from oxygen.  I would think that another CT should be obtained to look for evidence of fluid.   She may need additional diuretics in the interim.  Subsequent to her discharge will need to weight daily, call if weight up more than 4 pounds from first home weight, see Dr. Burroughs weekly, return to see us in one month, sooner if not doing well.     Plan:   1. Weekly BUN, creatinine, BNP magnesium, electrolytes  2. Continue daily weights  3. Will need home nursing assessment  4. See me one month  5. CT chest today       CC: Dr Manjeet Bustillos M.D.    Thank you for allowing me to participate in the care of your patient.    Sincerely,     Shay Marquez MD     [facility]

## 2019-12-10 NOTE — LETTER
12/10/2019    Carissa Burroughs MD  [address]  [place] [address]    RE: Jaqueline Canales       Dear Colleague,    I had the pleasure of seeing Jaqueline Canales in the [facility].        Natalie Bustillos M.D.  Arthritis and Rheumatolologan Burroughs M.D      Acute Hypoxic Respiratory Failure dt Acute Diastolic CHF exacerbation  Chronic Bilateral LE edema / Lymphedema  Chronic A. Fib, on anticoagulation  Moderate/Severe pulmonary hypertension  Hyperlipidemia   Hypertension  Hypothyroidism  Rheumatoid arthritis  Scleroderma    The patient has scleroderma and ILD and was incidentally found to have SCC of left lower lobe that was treated with radiation therapy(2016)  She has been recently hospitalized for weight gain and dependent edema. Her weight has gone up by 20-30 pounds. She has had diuretics such that her weight is down approximately 20 pounds but has persistent lymphedema/edema.  She has not been weaned from oxygen.     Alert, oriented fluent speech, basilar rales, increased JVP, regular rhythm, chronic edema/lymphedema    Results for JAQUELINE CANALES (MRN [id number]) as of 12/10/2019 14:13   Ref. Range 12/10/2019 08:10   Sodium Latest Ref Range: 133 - 144 mmol/L 134   Potassium Latest Ref Range: 3.4 - 5.3 mmol/L 4.3   Chloride Latest Ref Range: 94 - 109 mmol/L 101   Carbon Dioxide Latest Ref Range: 20 - 32 mmol/L 28   Urea Nitrogen Latest Ref Range: 7 - 30 mg/dL 21   Creatinine Latest Ref Range: 0.52 - 1.04 mg/dL 0.64   GFR Estimate Latest Ref Range: >60 mL/min/1.73_m2 84   GFR Estimate If Black Latest Ref Range: >60 mL/min/1.73_m2 >90   Calcium Latest Ref Range: 8.5 - 10.1 mg/dL 9.5   Anion Gap Latest Ref Range: 3 - 14 mmol/L 5   Glucose Latest Ref Range: 70 - 99 mg/dL 88   WBC Latest Ref Range: 4.0 - 11.0 10e9/L 5.7   Hemoglobin Latest Ref Range: 11.7 - 15.7 g/dL 11.6 (L)   Hematocrit Latest Ref Range: 35.0 - 47.0 % 36.1   Platelet Count Latest Ref Range: 150 - 450  10e9/L 114 (L)   RBC Count Latest Ref Range: 3.8 - 5.2 10e12/L 3.90   MCV Latest Ref Range: 78 - 100 fl 93   MCH Latest Ref Range: 26.5 - 33.0 pg 29.7   MCHC Latest Ref Range: 31.5 - 36.5 g/dL 32.1   RDW Latest Ref Range: 10.0 - 15.0 % 16.9 (H)       Wt Readings from Last 24 Encounters:   12/06/19 90.5 kg (199 lb 9.6 oz)   12/06/19 90.5 kg (199 lb 9.6 oz)   12/05/19 88.7 kg (195 lb 8 oz)   11/20/19 103.4 kg (228 lb)   11/14/19 102.5 kg (226 lb)   11/11/19 101.2 kg (223 lb)   10/28/19 100.8 kg (222 lb 4.8 oz)   08/28/19 98.9 kg (218 lb)   07/29/19 96.8 kg (213 lb 6.4 oz)   07/26/19 97.5 kg (215 lb)   07/16/19 96.2 kg (212 lb)   06/28/19 99.8 kg (220 lb)   06/24/19 99.7 kg (219 lb 11.2 oz)   06/13/19 97.5 kg (215 lb)   05/29/19 99.3 kg (219 lb)   05/24/19 100.7 kg (222 lb 0.1 oz)   05/21/19 100.7 kg (222 lb)   05/15/19 101.1 kg (222 lb 12.8 oz)   05/01/19 100.8 kg (222 lb 3.2 oz)   04/16/19 101.7 kg (224 lb 1.6 oz)   04/15/19 101.6 kg (224 lb)   04/02/19 99.8 kg (220 lb)   02/13/19 99.8 kg (220 lb)   01/24/19 99.8 kg (220 lb)             Current Outpatient Medications   Medication     acetaminophen (TYLENOL) 500 MG tablet     albuterol (PROAIR HFA/PROVENTIL HFA/VENTOLIN HFA) 108 (90 Base) MCG/ACT inhaler     apixaban ANTICOAGULANT (ELIQUIS) 5 MG tablet     Bacillus Coagulans-Inulin (PROBIOTIC-PREBIOTIC PO)     folic acid (FOLVITE) 1 MG tablet     InFLIXimab (REMICADE IV)     levothyroxine (SYNTHROID/LEVOTHROID) 75 MCG tablet     melatonin 5 MG tablet     Methotrexate Sodium (METHOTREXATE PO)     metoprolol succinate ER (TOPROL-XL) 50 MG 24 hr tablet     Multiple Vitamins-Minerals (ICAPS) CAPS     multivitamin (CENTRUM SILVER) tablet     NIFEdipine ER (ADALAT CC) 60 MG 24 hr tablet     omeprazole (PRILOSEC) 20 MG DR capsule     potassium chloride ER (K-DUR/KLOR-CON M) 20 MEQ CR tablet     Prednisolon-Gatiflox-Bromfenac 1-0.5-0.075 % SUSP     psyllium (METAMUCIL/KONSYL) capsule     sildenafil (REVATIO) 20 MG tablet      simvastatin (ZOCOR) 40 MG tablet     spironolactone (ALDACTONE) 25 MG tablet     torsemide (DEMADEX) 20 MG tablet     triamcinolone (KENALOG) 0.1 % external cream     Vitamin D, Cholecalciferol, 10 MCG (400 UNIT) TABS     No current facility-administered medications for this visit.           REVIEW of SYMPTOMS    Constitutional: without fever, chills, night sweats.  Weight is down post discharge  HEENT: without dry eyes, dry mouth, sinusitis, corryza, visual changes  Endocrine: without polyuria, polydypsia, polyphagia, heat or cold intolerance, changing mental status  Cardiology: without chest pain, tightness, heaviness, pressure, paroxysmal dyspnea, orthopnea, palpitation, pre-syncope or syncope or device discharge (if present)  Pulmonary: without asthma, wheezing, cough, hemoptysis  GI: without nausea, emesis, jaundice, pain, hematemesis, melena  : without frequency, urgency, hematuria, stones, pain, abnormal bleeding, frequency, urgency  Neurologic: without TIA, CVA, trauma, seizure  Dermatologic: without lesions, abrasion rash,   Orthopedic/Rheum: without significant joint pain, impairment, limb, polyserositis, ulceration, Raynauds  Heme: without mass, bruising, frequent infection, anemia  Psychiatric: without substance abuse, hallucination, medication, depression    Exercise tolerance:    PERRLA, EOM full, normal gait and station, normal mentation, skin without lesions, chest is clear, no evidence of right or left ventricular overactivity, sternum stable, no carotid bruits, regular rhythm, S1, S2, no murmurs, abdomen without tenderness, rebound guarding or masses, no edema, no focal neurologic      Conclusion     1) Elevated biventricular filling pressures   2) Moderate to severe mostly primary pulmonary hypertension  3) Improvement in hemodynamics with nitric oxide; however, with no improvement in mean PA pressure         CV Right/Left Heart Cath     Right Heart Pressures     Systemic blood pressure 140-150  throughout the case.    BASELINE:  1) Elevated right-sided filling pressures (mean RA 16 mmHg)  2) Moderate to severe pulmonary hypertension (mean PA 35 mmHg) secondary to intrinsic pulmonary vascular disease (PVRI 14 rod*m2) and elevated left-sided filling pressures (PCWP 20 mmHg).  3) O2 sats at baseline 93%  4) Mildly depressed CO/CI: 3.8/1.9    NO 80 PPM:  1) Pulmonary artery pressures did not change (mean PA 34 mmHg)  2) O2 sats improved to 100%  3) CO/CI improved 4.2/2.1 Right sided filling pressures are severely elevated.Left sided filling pressures are moderately elevated. Severely elevated pulmonary artery hypertension.Left ventricular filling pressures are moderately elevated .Normal cardiac output level.   Pressures      Time Systolic Diastolic Mean A Wave V Wave EDP Max dp/dt HR   RA Pressures  9:01 AM   16 mmHg    14 mmHg    22 mmHg      87 bpm      RV Pressures  9:01 AM 52 mmHg    5 mmHg       15 mmHg     86 bpm      PA Pressures  9:02 AM 51 mmHg    26 mmHg    35 mmHg        76 bpm      PCW Pressures  9:02 AM   18 mmHg    23 mmHg    24 mmHg      72 bpm      AO Pressures  9:03  mmHg    87 mmHg    111 mmHg        82 bpm      Pressures Phase: Drug Study Level 1      Time Systolic Diastolic Mean A Wave V Wave EDP Max dp/dt HR   PA Pressures  9:11 AM 48 mmHg    26 mmHg    34 mmHg        77 bpm      Pressures Phase: Drug Study Level 2      Time Systolic Diastolic Mean A Wave V Wave EDP Max dp/dt HR   PA Pressures  9:14 AM 52 mmHg    33 mmHg    36 mmHg        84 bpm      PCW Pressures  9:14 AM   22 mmHg    26 mmHg    23 mmHg      79 bpm      AO Pressures  9:11  mmHg    67 mmHg    97 mmHg        80 bpm      Blood Flow Results Phase: Drug Study Level 2      Time Results Indexed Values   QP  9:11 AM 4.19 L/min    2.1 L/min/m2      QS  9:11 AM 4.19 L/min    2.1 L/min/m2      Blood Oximetry Phase: Drug Study Level 2      Time Hb SAT(%) PO2 Content PA Sat   Art  9:11 AM  100 %     18.22 mL/dL        Cardiac Output Phase: Drug Study Level 2      Time TDCO TDCI Josh C.O. Josh C.I. Josh HR   Cardiac Output Results  9:11 AM   4.19 L/min    2.1 L/min/m2       Resistance Results Phase: Drug Study Level 2      Time PVR SVR PVR-I SVR-I TPR TVR TPR-I TVR-I PVR/SVR TPR/TVR   Resistance Results (Metric)  9:11 .55 dsc-5     534.11 dsc-5/m2     687.98 dsc-5    1853.72 dsc-5    1373.43 dsc-5/m2    3700.63 dsc-5/m2     0.37      Resistance Results (Wood)  9:11 AM 3.35 ROQUE     6.68 ROQUE/m2     8.6 ROQUE    23.18 ROQUE    17.17                1/15/2019 1/24/2019   /77 112/63       CT CHEST WITHOUT CONTRAST December 27, 2016 11:40 AM       HISTORY: Follow up on right sided lung lesions. Malignant neoplasm of  lower lobe, left bronchus or lung.     COMPARISON: 10/22/2016.     TECHNIQUE: Volumetric helical acquisition of CT images of the chest  from the clavicles to the kidneys were acquired without IV contrast.  Radiation dose for this scan was reduced using automated exposure  control, adjustment of the mA and/or kV according to patient size, or  iterative reconstruction technique.     FINDINGS: Right-sided fibrotic changes appear stable. Somewhat nodular  density at the left base has resolved and may have been an area of  consolidation and/or active inflammation. Honeycombing and traction  bronchiectasis persist. Moderate hiatal hernia. No pleural or  pericardial effusion. Mild cardiomegaly. There are moderate coronary  vascular calcifications consistent with coronary artery disease. There  are mild atherosclerotic changes of the visualized aorta and its  branches. There is no evidence of aortic aneurysm. No acute findings  in the visualized upper abdomen.                                                                       IMPRESSION:  1. 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  2. The nodular density at the left base has resolved  Davie Iyer MD  2016             Narrative           Interpretation Summary                            Name: GINA MCKEON  MRN: [id number]  : 1940  Study Date: 2018 09:55 AM  Age: 77 yrs  Gender: Female  Patient Location: [facility] [place]  Reason For Study: Dyspnea on exertion  History: Dyspnea on exertion  E  Referring Physician: THEO HUIZAR  Performed By: Sofía Braden RDCS     BSA: 2.0 m2  Height: 62 in  Weight: 221 lb  BP: 112/74 mmHg  _____________________________________________________________________________  __        Procedure  Echocardiogram with two-dimensional, color and spectral Doppler performed.  _____________________________________________________________________________  __        Interpretation Summary  Global and regional left ventricular function is normal with an EF of 55-60%.  Global right ventricular function is normal.Mild right ventricular dilation is  present.  Mild to moderate TR. Right ventricular systolic pressure is 49mmHg above the  right atrial pressure. Moderate (pulmonary artery systolic pressure 50-75mmHg)  pulmonary hypertension is present.  The inferior vena cava was normal in size with preserved respiratory  variability.Estimated mean right atrial pressure is 3 mmHg (normal).  No pericardial effusion is present.     This study was compared with the study from 10/16/17, no significant change .  _____________________________________________________________________________  __        Left Ventricle  Left ventricular wall thickness is normal. Left ventricular size is normal.  Global and regional left ventricular function is normal with an EF of 55-60%.  The Ejection Fraction was visually estimated. Diastolic function not assessed  due to significant mitral annular calcification. No regional wall motion  abnormalities are seen.     Right Ventricle  Global right ventricular function is normal. Mild right ventricular dilation  is present.     Atria  Severe left atrial  enlargement is present. Mild right atrial enlargement is  present.     Mitral Valve  Moderate mitral annular calcification is present. Mild mitral insufficiency is  present.        Aortic Valve  Trileaflet aortic sclerosis without stenosis.     Tricuspid Valve  Mild to moderate tricuspid insufficiency is present. Right ventricular  systolic pressure is 49mmHg above the right atrial pressure. Moderate  (pulmonary artery systolic pressure 50-75mmHg) pulmonary hypertension is  present.     Pulmonic Valve  Trace pulmonic insufficiency is present.     Vessels  The inferior vena cava was normal in size with preserved respiratory  variability. Visualized portions of the aorta are normal in size. Estimated  mean right atrial pressure is 3 mmHg (normal).     Pericardium  No pericardial effusion is present.        Compared to Previous Study  This study was compared with the study from 10/16/17, no significant change .     Attestation  I have personally viewed the imaging and agree with the interpretation and  report as documented by the fellow, Nupur Luz, and/or edited by me.  _____________________________________________________________________________  __     MMode/2D Measurements & Calculations  IVSd: 0.83 cm  LVIDd: 5.3 cm  LVIDs: 2.4 cm  LVPWd: 0.88 cm  FS: 54.8 %  LV mass(C)d: 162.3 grams  LV mass(C)dI: 81.4 grams/m2  Ao root diam: 2.6 cm  LA dimension: 5.7 cm  asc Aorta Diam: 3.5 cm  LA/Ao: 2.2  LVOT diam: 1.9 cm  LVOT area: 2.8 cm2  LA Volume (BP): 105.0 ml     LA Volume Index (BP): 52.8 ml/m2  RWT: 0.33        Doppler Measurements & Calculations  MV E max carrington: 88.3 cm/sec  MV A max carrington: 75.2 cm/sec  MV E/A: 1.2  MV dec time: 0.20 sec  Ao V2 max: 171.9 cm/sec  Ao max P.0 mmHg  Ao V2 mean: 134.9 cm/sec  Ao mean P.9 mmHg  Ao V2 VTI: 43.7 cm  JACOB(I,D): 1.9 cm2  JACOB(V,D): 1.9 cm2  LV V1 max P.6 mmHg  LV V1 max: 118.8 cm/sec  LV V1 VTI: 29.8 cm  SV(LVOT): 82.1 ml  SI(LVOT): 41.1 ml/m2  PA acc time:  0.05 sec  TR max el: 320.4 cm/sec  TR max P.4 mmHg  AV El Ratio (DI): 0.69  JACOB Index (cm2/m2): 0.94  E/E' av.0  Lateral E/e': 15.6     Medial E/e': 18.5     _____________________________________________________________________________  __Echo 2019   Name: GINA MCKEON  MRN: [id number]  : 1940  Study Date: 2019 02:57 PM  Age: 78 yrs  Gender: Female  Patient Location: [facility]  Reason For Study: CHF  Ordering Physician: DIMITRI LEAL  Referring Physician: Carissa Burroughs  Performed By: Milton Guadalupe RDCS     BSA: 2.0 m2  Height: 62 in  Weight: 229 lb  HR: 100  BP: 115/71 mmHg  _____________________________________________________________________________  __        Procedure  Complete Portable Echo Adult. Optison (NDC #0159-2715) given intravenously.  _____________________________________________________________________________  __        Interpretation Summary     Small pericardial effusion  The visual ejection fraction is estimated at 50-55%.  Flattened septum is consistent with RV pressure/volume overload.  Pulmonary hypertension  There is severe biatrial enlargement.  Very mild AS. MG is 9 mm Hg.  The right ventricle is moderately dilated. Mildly decreased right ventricular  systolic function  There is mod-severe to severe (3-4+) tricuspid regurgitation.  _____________________________________________________________________________  __        Left Ventricle  The left ventricle is normal in size. The visual ejection fraction is  estimated at 50-55%. Diastolic Doppler findings (E/E' ratio and/or other  parameters) suggest left ventricular filling pressures are indeterminate.  Flattened septum is consistent with RV pressure/volume overload.     Right Ventricle  The right ventricle is moderately dilated. Mildly decreased right ventricular  systolic function.     Atria  There is severe biatrial enlargement.     Mitral Valve  Thickened mitral valve posterior leaflet. There is  moderate mitral annular  calcification. There is mild to moderate (1-2+) mitral regurgitation.        Tricuspid Valve  The right ventricular systolic pressure is elevated at 42.2 mmHg. Pulmonary  hypertension. There is mod-severe to severe (3-4+) tricuspid regurgitation.     Aortic Valve  There is severe trileaflet aortic sclerosis. No hemodynamically significant  valvular aortic stenosis.     Pulmonic Valve  The pulmonic valve is not well visualized.     Vessels  The aortic root is normal size. Dilation of the inferior vena cava is present  with abnormal respiratory variation in diameter.     Pericardium  Small pericardial effusion.     _____________________________________________________________________________  __  MMode/2D Measurements & Calculations  IVSd: 0.83 cm  LVIDd: 4.4 cm  LVIDs: 3.4 cm  LVPWd: 0.95 cm  FS: 22.2 %  LV mass(C)d: 125.7 grams  LV mass(C)dI: 62.1 grams/m2     Ao root diam: 2.8 cm  LA dimension: 4.5 cm  asc Aorta Diam: 3.4 cm  LA/Ao: 1.6  LVOT diam: 1.9 cm  LVOT area: 3.0 cm2  LA Volume (BP): 103.0 ml  LA Volume Index (BP): 51.0 ml/m2  RWT: 0.43        Doppler Measurements & Calculations  MV E max el: 79.7 cm/sec  MV max P.4 mmHg  MV mean PG: 3.1 mmHg  MV V2 VTI: 23.8 cm  MVA(VTI): 3.1 cm2     MV dec slope: 562.6 cm/sec2  Ao V2 max: 188.8 cm/sec  Ao max P.0 mmHg  Ao V2 mean: 136.1 cm/sec  Ao mean P.4 mmHg  Ao V2 VTI: 36.2 cm  JACOB(I,D): 2.1 cm2  JACOB(V,D): 2.1 cm2  LV V1 max P.2 mmHg  LV V1 max: 134.4 cm/sec  LV V1 VTI: 25.1 cm  SV(LVOT): 74.2 ml  SI(LVOT): 36.6 ml/m2  PA acc time: 0.08 sec  TR max el: 324.8 cm/sec  TR max P.2 mmHg  AV El Ratio (DI): 0.71  JACOB Index (cm2/m2): 1.0  E/E' av.5  Lateral E/e': 8.7  Medial E/e': 10.4    Discussion    The patient likely still has some evidence of fluid overload but this is difficult to discern.  She has been unable to be weaned from oxygen.  I would think that another CT should be obtained to look for evidence of fluid.   She may need additional diuretics in the interim.  Subsequent to her discharge will need to weight daily, call if weight up more than 4 pounds from first home weight, see Dr. Burroughs weekly, return to see us in one month, sooner if not doing well.     Plan:   1. Weekly BUN, creatinine, BNP magnesium, electrolytes  2. Continue daily weights  3. Will need home nursing assessment  4. See me one month  5. CT chest today             CC: Dr Manjeet Bustillos M.D.    Thank you for allowing me to participate in the care of your patient.      Sincerely,     Shay Marquez MD     [facility]    cc:   Carissa Burroughs MD  [address] [facility] [address] [address]

## 2019-12-10 NOTE — PATIENT INSTRUCTIONS
Medication Changes:  No medication changes at this time. Please continue current medication regiment.    Patient Instructions:  1. Continue staying active and eat a heart healthy diet.    2. Please keep current list of medications with you at all times.    3. Remember to weigh yourself daily after voiding and before you consume any food or beverages and log the numbers.  If you have gained 2 pounds overnight or 5 pounds in a week contact us immediately for medication adjustments or further instructions.    4. **Please call us immediately if you have any syncope (fainting or passing out), chest pain, edema (swelling or weight gain), or decline in your functional status (general decline in how you are feeling).    Follow up Appointment Information:  Chest CT today and follow up with us in one month        For scheduling at [facility] please call [phone]  For scheduling at the [facility] please call [phone]  We are located on the second floor [address] at the [facility].  Our address is     [address] [place] [address]   [address]  [place] [address]    Thank you for allowing us to be a part of your care here at the [facility]    If you have questions or concerns please contact us at:    Saritha Liriano RN, BSN      Nurse Coordinator       Pulmonary Hypertension     [facility]   (Phone)[phone]  (Fax)[phone]    ** Please note that you will NOT receive a reminder call regarding your scheduled testing, reminder calls are for provider appointments only.  If you are scheduled for testing within the [facility] system you may receive a call regarding pre-registration for billing purposes only.**     Remember to weigh yourself daily after voiding and before you consume any food or beverages and log the numbers.  If you have gained/lost 2 pounds overnight or 5 pounds in a week contact us immediately for medication adjustments or further  instructions.    Support Group:  Pulmonary Hypertension Association  Https://www.phassociation.org/  **Look at the Events Tab** They even have Support Groups that you can call into    [facility] PH Support Group  Second Saturday of the Month from 1-3 PM   Location: [address] [place] [id number]  Leader: Eleni Galloway  Phone: [phone] or [phone]  Email: [email or web address]

## 2019-12-11 ENCOUNTER — NURSING HOME VISIT (OUTPATIENT)
Dept: GERIATRICS | Facility: CLINIC | Age: 79
End: 2019-12-11
Payer: MEDICARE

## 2019-12-11 VITALS
DIASTOLIC BLOOD PRESSURE: 72 MMHG | HEART RATE: 106 BPM | WEIGHT: 199.8 LBS | SYSTOLIC BLOOD PRESSURE: 109 MMHG | HEIGHT: 62 IN | RESPIRATION RATE: 18 BRPM | OXYGEN SATURATION: 99 % | TEMPERATURE: 97.1 F | BODY MASS INDEX: 36.77 KG/M2

## 2019-12-11 DIAGNOSIS — I89.0 LYMPHEDEMA OF BOTH LOWER EXTREMITIES: ICD-10-CM

## 2019-12-11 DIAGNOSIS — I48.19 PERSISTENT ATRIAL FIBRILLATION (H): ICD-10-CM

## 2019-12-11 DIAGNOSIS — R53.81 PHYSICAL DECONDITIONING: ICD-10-CM

## 2019-12-11 DIAGNOSIS — I10 BENIGN ESSENTIAL HYPERTENSION: ICD-10-CM

## 2019-12-11 DIAGNOSIS — I50.31 ACUTE DIASTOLIC CONGESTIVE HEART FAILURE (H): Primary | ICD-10-CM

## 2019-12-11 DIAGNOSIS — E03.9 HYPOTHYROIDISM, UNSPECIFIED TYPE: ICD-10-CM

## 2019-12-11 DIAGNOSIS — R09.02 HYPOXIA: ICD-10-CM

## 2019-12-11 DIAGNOSIS — M06.9 RHEUMATOID ARTHRITIS, INVOLVING UNSPECIFIED SITE, UNSPECIFIED RHEUMATOID FACTOR PRESENCE: ICD-10-CM

## 2019-12-11 DIAGNOSIS — I27.20 PULMONARY HYPERTENSION (H): ICD-10-CM

## 2019-12-11 DIAGNOSIS — J84.10 PULMONARY FIBROSIS (H): ICD-10-CM

## 2019-12-11 DIAGNOSIS — M34.9 SCLERODERMA (H): ICD-10-CM

## 2019-12-11 PROCEDURE — 99309 SBSQ NF CARE MODERATE MDM 30: CPT | Performed by: NURSE PRACTITIONER

## 2019-12-11 ASSESSMENT — MIFFLIN-ST. JEOR: SCORE: 1334.54

## 2019-12-11 NOTE — PROGRESS NOTES
[facility]  [place] [facility] Record Number:  [id number]  Place of Service where encounter took place:  [facility] [[id number]]  Chief Complaint   Patient presents with     RECHECK       HPI:    Jaqueline Canales  is a 78 year old (1940), who is being seen today for an episodic care visit.  HPI information obtained from: facility chart records, facility staff, patient report and [facility] chart review. Today's concern is:  Brief Summary of Hospital Course: recent hospitalization due to LE edema and dyspnea and hypoxia with exertion. PMH includes atrial fibrillation (eliquis), pulmonary hypertension, CHF, hypothyroidism, rheumatoid arthritis, hypertension, lung cancer, pulmonary fibrosis/ scleroderma, lymphedema and smoking history (30 ppy)   She was admitted for acute diastolic heart failure and started on IV lasix. Cardiology was consulted and suspects decompensation likely due to pulmonary vasodilators. Cardiology  changed lasix to IV bumex and spironolactone was added.  She diuresed about 14l. she did have episodes of tachycardia so metoprolol was increased. She was transitioned to oral meds.  Due to ongoing weakness she was transferred to TCU.   Updates on Status Since Skilled nursing Admission: she denies shortness of breath. She does c/o LE itching. She would like to return home    Past Medical and Surgical History reviewed in Epic today.    MEDICATIONS:  Current Outpatient Medications   Medication Sig Dispense Refill     acetaminophen (TYLENOL) 500 MG tablet Take 1,000 mg by mouth 2 times daily        albuterol (PROAIR HFA/PROVENTIL HFA/VENTOLIN HFA) 108 (90 Base) MCG/ACT inhaler Inhale 2 puffs into the lungs every 6 hours as needed for shortness of breath / dyspnea or wheezing       apixaban ANTICOAGULANT (ELIQUIS) 5 MG tablet Take 1 tablet (5 mg) by mouth 2 times daily 180 tablet 2     Bacillus Coagulans-Inulin (PROBIOTIC-PREBIOTIC PO) Take 1 chew tab by  mouth 2 times daily        folic acid (FOLVITE) 1 MG tablet Take 1 tablet (1,000 mcg) by mouth daily 90 tablet 3     InFLIXimab (REMICADE IV) Inject 600 mg into the vein Every 8 weeks       levothyroxine (SYNTHROID/LEVOTHROID) 75 MCG tablet Take 1 tablet (75 mcg) by mouth daily 90 tablet 2     melatonin 5 MG tablet Take 5 mg by mouth nightly as needed for sleep       Methotrexate Sodium (METHOTREXATE PO) Take 2.5 mg by mouth 10 tablets weekly - Wednesdays       metoprolol succinate ER (TOPROL-XL) 50 MG 24 hr tablet Take 1 tablet (50 mg) by mouth daily       Multiple Vitamins-Minerals (ICAPS) CAPS Take 1 capsule by mouth 2 times daily        multivitamin (CENTRUM SILVER) tablet Take 1 tablet by mouth daily       NIFEdipine ER (ADALAT CC) 60 MG 24 hr tablet Take 60 mg by mouth daily       omeprazole (PRILOSEC) 20 MG DR capsule Take 1 capsule (20 mg) by mouth 2 times daily Take 30-60 minutes prior to meals 180 capsule 2     potassium chloride ER (K-DUR/KLOR-CON M) 20 MEQ CR tablet Take 1 tablet (20 mEq) by mouth daily       Prednisolon-Gatiflox-Bromfenac 1-0.5-0.075 % SUSP Place 1 drop into the right eye daily       psyllium (METAMUCIL/KONSYL) capsule Take 1 capsule by mouth daily       sildenafil (REVATIO) 20 MG tablet Take 1 tablet (20 mg) by mouth 3 times daily 90 tablet 11     simvastatin (ZOCOR) 40 MG tablet Take 1 tablet (40 mg) by mouth At Bedtime 90 tablet 3     Skin Protectants, Misc. (EUCERIN) cream Apply topically daily Apply to LEs topically one time a day for Edema If PT has lymph wrapping place or hold Eucerin.       spironolactone (ALDACTONE) 25 MG tablet Take 1 tablet (25 mg) by mouth daily       torsemide (DEMADEX) 20 MG tablet Take 1 tablet (20 mg) by mouth daily       triamcinolone (KENALOG) 0.1 % external cream Apply topically 2 times daily 30 g 3     Vitamin D, Cholecalciferol, 10 MCG (400 UNIT) TABS Take 1,600 Units by mouth daily       Wt Readings from Last 5 Encounters:   12/11/19 90.6 kg (199  "lb 12.8 oz)   12/10/19 90.9 kg (200 lb 4.8 oz)   12/06/19 90.5 kg (199 lb 9.6 oz)   12/06/19 90.5 kg (199 lb 9.6 oz)   12/05/19 88.7 kg (195 lb 8 oz)       REVIEW OF SYSTEMS:  4 point ROS including Respiratory, CV, GI and , other than that noted in the HPI,  is negative    Objective:  /72   Pulse 106   Temp 97.1  F (36.2  C)   Resp 18   Ht 1.575 m (5' 2\")   Wt 90.6 kg (199 lb 12.8 oz)   SpO2 99%   BMI 36.54 kg/m    Exam:  GENERAL APPEARANCE:  Alert, in no distress  ENT:  Mouth and posterior oropharynx normal, moist mucous membranes, hearing acuity adequate   EYES:  EOM, conjunctivae, lids, pupils and irises normal    RESP:  respiratory effort and palpation of chest normal, no respiratory distress, Lung sounds faint rales  CV:  Palpation and auscultation of heart done , rate and rhythm irreg, systolic murmur, no rub or gallop, Edema lymph wraps on   ABDOMEN:  normal bowel sounds, soft, nontender, no hepatosplenomegaly or other masses  M/S:   Gait and station steady with walker, Digits and nails normal   SKIN:  Inspection/Palpation of skin and subcutaneous tissue dry skin  NEURO: 2-12 in normal limits and at patient's baseline  PSYCH:  insight and judgement, memory intact , affect and mood normal    Labs:   CBC RESULTS:   Recent Labs   Lab Test 12/10/19  0810 11/25/19  1654   WBC 5.7 6.3   RBC 3.90 3.84   HGB 11.6* 11.6*   HCT 36.1 35.4   MCV 93 92   MCH 29.7 30.2   MCHC 32.1 32.8   RDW 16.9* 17.2*   * 166       Last Basic Metabolic Panel:  Recent Labs   Lab Test 12/10/19  0810 12/09/19  0940    Canceled, Test credited, specimen discarded   POTASSIUM 4.3 Canceled, Test credited, specimen discarded   CHLORIDE 101 Canceled, Test credited, specimen discarded   UT 9.5 Canceled, Test credited, specimen discarded   CO2 28 Canceled, Test credited, specimen discarded   BUN 21 Canceled, Test credited, specimen discarded   CR 0.64 Canceled, Test credited, specimen discarded   GLC 88 Canceled, Test " credited, specimen discarded       Liver Function Studies -   Recent Labs   Lab Test 11/27/19  0535 10/16/19 04/02/19  1308   PROTTOTAL 7.6  --  7.4   ALBUMIN 3.7 4.0 4.1   BILITOTAL 1.2  --  0.7   ALKPHOS 135  --  88   AST 26 29 27   ALT 15 15 36       TSH   Date Value Ref Range Status   07/26/2019 3.10 0.40 - 4.00 mU/L Final   10/25/2018 3.70 0.40 - 4.00 mU/L Final   ]    Lab Results   Component Value Date    A1C 5.1 10/25/2018    A1C 5.8 06/24/2011         ASSESSMENT/PLAN:  (I50.31) Acute diastolic congestive heart failure (H)  (primary encounter diagnosis)  Comment: recent hospitalization due to increased LE swelling and dyspnea with hypoxia. She was diuresed about 15litres of fluid or 31#.   Echo was done during hospitalization that showed EF 50-55% with severe biatrial enlargement, moderately dilated RV and mildly decreased RVSF, mod to severe TR and pulmonary hypertension. Today she denies shortness of breath at rest. She has not done much exercise yet.   Plan: continue Torsemide 20mg q day  disContinue spironolactone 25mg q day due to low BPS  kcl 20 meq q day  Daily wts- call NP if wt >3# in one day or 5# in a week.   [facility] 12/17    (I89.0) Lymphedema of both lower extremities  Comment: patient presented with increased LE edema. She has h/o lymphedema. She was recently treated for LE cellulitis in outpatient setting. Much less swelling today  Plan: OCCUPATIONAL THERAPY to evaluate and treat for lymphedema  Elevate LEs  TMC 0.1% to pretibial areas prior to placing wraps    (R09.02) Hypoxia  Comment: patient presented to ED with hypoxia. She continues on supplemental oxygen upon transfer to TCU. O2 sats in TCU about 90% on RA.   Plan: monitor O2 sats    (I48.19) Persistent atrial fibrillation  Comment: anticoagulated with eliquis. During hospitalization she did have episodes of tachycardia. Metoprolol was increased to 50mg q day. HR in TCU running 80s.   Plan: continue metoprolol ER 50mg q day  Continue  apixaban 5mg BID    (I27.20) Pulmonary hypertension (H)  Comment: in setting of scleroderma. 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 During hospitalization it was thought that the HF due to revatio.  Patient did go out to see Dr Marquez regarding PH. She continues to have volume overload and hypoxia. She did have CT of lungs done.   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 She has had some pain in her hands and knees  Plan: continue remicade- next dose due 12/17 at [facility] (Dr Bustillos) [phone]  and methotrexate 2.5mg tablets- take 10 tablets q Wednesday for RA    (E03.9) Hypothyroidism, unspecified type  Comment: euthryoid  Plan: no change    (R53.81) Physical deconditioning  Comment: lives in house with . There are stairs to get in and within home. She would like to go home soon. She was independent prior to admission.  At this point she is able to walk up to 200'.   Plan: physical therapy and OCCUPATIONAL THERAPY evaluation    Electronically signed by:  AUBRIE Tamayo CNP

## 2019-12-11 NOTE — LETTER
12/11/2019        RE: Jaqueline Canales  [address]  [facility] [address]        [facility]  [place] [facility] Record Number:  [id number]  Place of Service where encounter took place[facility] [[id number]]  Chief Complaint   Patient presents with     RECHECK       HPI:    Jaqueline Canales  is a 78 year old (1940), who is being seen today for an episodic care visit.  HPI information obtained from: facility chart records, facility staff, patient report and [facility] chart review. Today's concern is:  Brief Summary of Hospital Course: recent hospitalization due to LE edema and dyspnea and hypoxia with exertion. PMH includes atrial fibrillation (eliquis), pulmonary hypertension, CHF, hypothyroidism, rheumatoid arthritis, hypertension, lung cancer, pulmonary fibrosis/ scleroderma, lymphedema and smoking history (30 ppy)   She was admitted for acute diastolic heart failure and started on IV lasix. Cardiology was consulted and suspects decompensation likely due to pulmonary vasodilators. Cardiology  changed lasix to IV bumex and spironolactone was added.  She diuresed about 14l. she did have episodes of tachycardia so metoprolol was increased. She was transitioned to oral meds.  Due to ongoing weakness she was transferred to TCU.   Updates on Status Since Skilled nursing Admission: she denies shortness of breath. She does c/o LE itching. She would like to return home    Past Medical and Surgical History reviewed in Epic today.    MEDICATIONS:  Current Outpatient Medications   Medication Sig Dispense Refill     acetaminophen (TYLENOL) 500 MG tablet Take 1,000 mg by mouth 2 times daily        albuterol (PROAIR HFA/PROVENTIL HFA/VENTOLIN HFA) 108 (90 Base) MCG/ACT inhaler Inhale 2 puffs into the lungs every 6 hours as needed for shortness of breath / dyspnea or wheezing       apixaban ANTICOAGULANT (ELIQUIS) 5 MG tablet Take 1 tablet (5 mg) by mouth 2 times daily  180 tablet 2     Bacillus Coagulans-Inulin (PROBIOTIC-PREBIOTIC PO) Take 1 chew tab by mouth 2 times daily        folic acid (FOLVITE) 1 MG tablet Take 1 tablet (1,000 mcg) by mouth daily 90 tablet 3     InFLIXimab (REMICADE IV) Inject 600 mg into the vein Every 8 weeks       levothyroxine (SYNTHROID/LEVOTHROID) 75 MCG tablet Take 1 tablet (75 mcg) by mouth daily 90 tablet 2     melatonin 5 MG tablet Take 5 mg by mouth nightly as needed for sleep       Methotrexate Sodium (METHOTREXATE PO) Take 2.5 mg by mouth 10 tablets weekly - Wednesdays       metoprolol succinate ER (TOPROL-XL) 50 MG 24 hr tablet Take 1 tablet (50 mg) by mouth daily       Multiple Vitamins-Minerals (ICAPS) CAPS Take 1 capsule by mouth 2 times daily        multivitamin (CENTRUM SILVER) tablet Take 1 tablet by mouth daily       NIFEdipine ER (ADALAT CC) 60 MG 24 hr tablet Take 60 mg by mouth daily       omeprazole (PRILOSEC) 20 MG DR capsule Take 1 capsule (20 mg) by mouth 2 times daily Take 30-60 minutes prior to meals 180 capsule 2     potassium chloride ER (K-DUR/KLOR-CON M) 20 MEQ CR tablet Take 1 tablet (20 mEq) by mouth daily       Prednisolon-Gatiflox-Bromfenac 1-0.5-0.075 % SUSP Place 1 drop into the right eye daily       psyllium (METAMUCIL/KONSYL) capsule Take 1 capsule by mouth daily       sildenafil (REVATIO) 20 MG tablet Take 1 tablet (20 mg) by mouth 3 times daily 90 tablet 11     simvastatin (ZOCOR) 40 MG tablet Take 1 tablet (40 mg) by mouth At Bedtime 90 tablet 3     Skin Protectants, Misc. (EUCERIN) cream Apply topically daily Apply to LEs topically one time a day for Edema If PT has lymph wrapping place or hold Eucerin.       spironolactone (ALDACTONE) 25 MG tablet Take 1 tablet (25 mg) by mouth daily       torsemide (DEMADEX) 20 MG tablet Take 1 tablet (20 mg) by mouth daily       triamcinolone (KENALOG) 0.1 % external cream Apply topically 2 times daily 30 g 3     Vitamin D, Cholecalciferol, 10 MCG (400 UNIT) TABS Take 1,600  "Units by mouth daily       Wt Readings from Last 5 Encounters:   12/11/19 90.6 kg (199 lb 12.8 oz)   12/10/19 90.9 kg (200 lb 4.8 oz)   12/06/19 90.5 kg (199 lb 9.6 oz)   12/06/19 90.5 kg (199 lb 9.6 oz)   12/05/19 88.7 kg (195 lb 8 oz)       REVIEW OF SYSTEMS:  4 point ROS including Respiratory, CV, GI and , other than that noted in the HPI,  is negative    Objective:  /72   Pulse 106   Temp 97.1  F (36.2  C)   Resp 18   Ht 1.575 m (5' 2\")   Wt 90.6 kg (199 lb 12.8 oz)   SpO2 99%   BMI 36.54 kg/m     Exam:  GENERAL APPEARANCE:  Alert, in no distress  ENT:  Mouth and posterior oropharynx normal, moist mucous membranes, hearing acuity adequate   EYES:  EOM, conjunctivae, lids, pupils and irises normal    RESP:  respiratory effort and palpation of chest normal, no respiratory distress, Lung sounds faint rales  CV:  Palpation and auscultation of heart done , rate and rhythm irreg, systolic murmur, no rub or gallop, Edema lymph wraps on   ABDOMEN:  normal bowel sounds, soft, nontender, no hepatosplenomegaly or other masses  M/S:   Gait and station steady with walker, Digits and nails normal   SKIN:  Inspection/Palpation of skin and subcutaneous tissue dry skin  NEURO: 2-12 in normal limits and at patient's baseline  PSYCH:  insight and judgement, memory intact , affect and mood normal    Labs:   CBC RESULTS:   Recent Labs   Lab Test 12/10/19  0810 11/25/19  1654   WBC 5.7 6.3   RBC 3.90 3.84   HGB 11.6* 11.6*   HCT 36.1 35.4   MCV 93 92   MCH 29.7 30.2   MCHC 32.1 32.8   RDW 16.9* 17.2*   * 166       Last Basic Metabolic Panel:  Recent Labs   Lab Test 12/10/19  0810 12/09/19  0940    Canceled, Test credited, specimen discarded   POTASSIUM 4.3 Canceled, Test credited, specimen discarded   CHLORIDE 101 Canceled, Test credited, specimen discarded   TU 9.5 Canceled, Test credited, specimen discarded   CO2 28 Canceled, Test credited, specimen discarded   BUN 21 Canceled, Test credited, specimen " discarded   CR 0.64 Canceled, Test credited, specimen discarded   GLC 88 Canceled, Test credited, specimen discarded       Liver Function Studies -   Recent Labs   Lab Test 11/27/19  0535 10/16/19 04/02/19  1308   PROTTOTAL 7.6  --  7.4   ALBUMIN 3.7 4.0 4.1   BILITOTAL 1.2  --  0.7   ALKPHOS 135  --  88   AST 26 29 27   ALT 15 15 36       TSH   Date Value Ref Range Status   07/26/2019 3.10 0.40 - 4.00 mU/L Final   10/25/2018 3.70 0.40 - 4.00 mU/L Final   ]    Lab Results   Component Value Date    A1C 5.1 10/25/2018    A1C 5.8 06/24/2011         ASSESSMENT/PLAN:  (I50.31) Acute diastolic congestive heart failure (H)  (primary encounter diagnosis)  Comment: recent hospitalization due to increased LE swelling and dyspnea with hypoxia. She was diuresed about 15litres of fluid or 31#.   Echo was done during hospitalization that showed EF 50-55% with severe biatrial enlargement, moderately dilated RV and mildly decreased RVSF, mod to severe TR and pulmonary hypertension. Today she denies shortness of breath at rest. She has not done much exercise yet.   Plan: continue Torsemide 20mg q day  disContinue spironolactone 25mg q day due to low BPS  kcl 20 meq q day  Daily wts- call NP if wt >3# in one day or 5# in a week.   [facility] 12/17    (I89.0) Lymphedema of both lower extremities  Comment: patient presented with increased LE edema. She has h/o lymphedema. She was recently treated for LE cellulitis in outpatient setting. Much less swelling today  Plan: OCCUPATIONAL THERAPY to evaluate and treat for lymphedema  Elevate LEs  TMC 0.1% to pretibial areas prior to placing wraps    (R09.02) Hypoxia  Comment: patient presented to ED with hypoxia. She continues on supplemental oxygen upon transfer to TCU. O2 sats in TCU about 90% on RA.   Plan: monitor O2 sats    (I48.19) Persistent atrial fibrillation  Comment: anticoagulated with eliquis. During hospitalization she did have episodes of tachycardia. Metoprolol was increased to 50mg q  day. HR in TCU running 80s.   Plan: continue metoprolol ER 50mg q day  Continue apixaban 5mg BID    (I27.20) Pulmonary hypertension (H)  Comment: in setting of scleroderma. 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 During hospitalization it was thought that the HF due to revatio.  Patient did go out to see Dr Marquez regarding PH. She continues to have volume overload and hypoxia. She did have CT of lungs done.   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 She has had some pain in her hands and knees  Plan: continue remicade- next dose due 12/17 at [facility] (Dr Bustillos) [phone]  and methotrexate 2.5mg tablets- take 10 tablets q Wednesday for RA    (E03.9) Hypothyroidism, unspecified type  Comment: euthryoid  Plan: no change    (R53.81) Physical deconditioning  Comment: lives in house with . There are stairs to get in and within home. She would like to go home soon. She was independent prior to admission.  At this point she is able to walk up to 200'.   Plan: physical therapy and OCCUPATIONAL THERAPY evaluation    Electronically signed by:  AUBRIE Tamayo CNP               Sincerely,        AUBRIE Tamayo CNP

## 2019-12-13 ENCOUNTER — NURSING HOME VISIT (OUTPATIENT)
Dept: GERIATRICS | Facility: CLINIC | Age: 79
End: 2019-12-13
Payer: MEDICARE

## 2019-12-13 VITALS
DIASTOLIC BLOOD PRESSURE: 61 MMHG | RESPIRATION RATE: 18 BRPM | WEIGHT: 202 LBS | TEMPERATURE: 96.5 F | HEART RATE: 74 BPM | HEIGHT: 62 IN | OXYGEN SATURATION: 94 % | BODY MASS INDEX: 37.17 KG/M2 | SYSTOLIC BLOOD PRESSURE: 99 MMHG

## 2019-12-13 DIAGNOSIS — I27.20 PULMONARY HYPERTENSION (H): ICD-10-CM

## 2019-12-13 DIAGNOSIS — I10 BENIGN ESSENTIAL HYPERTENSION: ICD-10-CM

## 2019-12-13 DIAGNOSIS — I48.19 PERSISTENT ATRIAL FIBRILLATION (H): ICD-10-CM

## 2019-12-13 DIAGNOSIS — J84.10 PULMONARY FIBROSIS (H): ICD-10-CM

## 2019-12-13 DIAGNOSIS — I89.0 LYMPHEDEMA OF BOTH LOWER EXTREMITIES: ICD-10-CM

## 2019-12-13 DIAGNOSIS — M06.9 RHEUMATOID ARTHRITIS, INVOLVING UNSPECIFIED SITE, UNSPECIFIED RHEUMATOID FACTOR PRESENCE: ICD-10-CM

## 2019-12-13 DIAGNOSIS — M34.9 SCLERODERMA (H): ICD-10-CM

## 2019-12-13 DIAGNOSIS — R53.81 PHYSICAL DECONDITIONING: ICD-10-CM

## 2019-12-13 DIAGNOSIS — E03.9 HYPOTHYROIDISM, UNSPECIFIED TYPE: ICD-10-CM

## 2019-12-13 DIAGNOSIS — R09.02 HYPOXIA: ICD-10-CM

## 2019-12-13 DIAGNOSIS — I50.31 ACUTE DIASTOLIC CONGESTIVE HEART FAILURE (H): Primary | ICD-10-CM

## 2019-12-13 PROCEDURE — 99309 SBSQ NF CARE MODERATE MDM 30: CPT | Performed by: NURSE PRACTITIONER

## 2019-12-13 RX ORDER — SPIRONOLACTONE 25 MG/1
25 TABLET ORAL DAILY
Start: 2019-12-13 | End: 2019-12-23

## 2019-12-13 ASSESSMENT — MIFFLIN-ST. JEOR
SCORE: 1344.52
SCORE: 1334.54

## 2019-12-13 NOTE — PROGRESS NOTES
[facility]  [facility] Record Number:  [id number]  Place of Service where encounter took place:  [facility] [[id number]]  Chief Complaint   Patient presents with     RECHECK       HPI:    Jaqueline Canales  is a 78 year old (1940), who is being seen today for an episodic care visit.  HPI information obtained from: facility chart records, facility staff, patient report and [facility] chart review. Today's concern is:  Brief Summary of Hospital Course: recent hospitalization due to LE edema and dyspnea and hypoxia with exertion. PMH includes atrial fibrillation (eliquis), pulmonary hypertension, CHF, hypothyroidism, rheumatoid arthritis, hypertension, lung cancer, pulmonary fibrosis/ scleroderma, lymphedema and smoking history (30 ppy)   She was admitted for acute diastolic heart failure and started on IV lasix. Cardiology was consulted and suspects decompensation likely due to pulmonary vasodilators. Cardiology  changed lasix to IV bumex and spironolactone was added.  She diuresed about 14l. she did have episodes of tachycardia so metoprolol was increased. She was transitioned to oral meds.  Due to ongoing weakness she was transferred to TCU.   Updates on Status Since Skilled nursing Admission: care conference today. She denies shortness of breath. She would like to return home.  and niece are here today.     Past Medical and Surgical History reviewed in Epic today.    MEDICATIONS:  Current Outpatient Medications   Medication Sig Dispense Refill     acetaminophen (TYLENOL) 500 MG tablet Take 1,000 mg by mouth 2 times daily        albuterol (PROAIR HFA/PROVENTIL HFA/VENTOLIN HFA) 108 (90 Base) MCG/ACT inhaler Inhale 2 puffs into the lungs every 6 hours as needed for shortness of breath / dyspnea or wheezing       apixaban ANTICOAGULANT (ELIQUIS) 5 MG tablet Take 1 tablet (5 mg) by mouth 2 times daily 180 tablet 2     Bacillus Coagulans-Inulin  (PROBIOTIC-PREBIOTIC PO) Take 1 chew tab by mouth 2 times daily        folic acid (FOLVITE) 1 MG tablet Take 1 tablet (1,000 mcg) by mouth daily 90 tablet 3     levothyroxine (SYNTHROID/LEVOTHROID) 75 MCG tablet Take 1 tablet (75 mcg) by mouth daily 90 tablet 2     melatonin 5 MG tablet Take 5 mg by mouth nightly as needed for sleep       Methotrexate Sodium (METHOTREXATE PO) Take 2.5 mg by mouth 10 tablets weekly - Wednesdays       metoprolol succinate ER (TOPROL-XL) 50 MG 24 hr tablet Take 1 tablet (50 mg) by mouth daily       Multiple Vitamins-Minerals (ICAPS) CAPS Take 1 capsule by mouth 2 times daily        multivitamin (CENTRUM SILVER) tablet Take 1 tablet by mouth daily       NIFEdipine ER (ADALAT CC) 60 MG 24 hr tablet Take 60 mg by mouth daily       omeprazole (PRILOSEC) 20 MG DR capsule Take 1 capsule (20 mg) by mouth 2 times daily Take 30-60 minutes prior to meals 180 capsule 2     potassium chloride ER (K-DUR/KLOR-CON M) 20 MEQ CR tablet Take 1 tablet (20 mEq) by mouth daily       Prednisolon-Gatiflox-Bromfenac 1-0.5-0.075 % SUSP Place 1 drop into the right eye daily       psyllium (METAMUCIL/KONSYL) capsule Take 1 capsule by mouth daily       sildenafil (REVATIO) 20 MG tablet Take 1 tablet (20 mg) by mouth 3 times daily 90 tablet 11     simvastatin (ZOCOR) 40 MG tablet Take 1 tablet (40 mg) by mouth At Bedtime 90 tablet 3     Skin Protectants, Misc. (EUCERIN) cream Apply topically 2 times daily Apply to areas dry skin topically two times a day for dry skin and Apply to areas of dry skin topically as needed for dry skin daily       torsemide (DEMADEX) 20 MG tablet Take 1 tablet (20 mg) by mouth daily       triamcinolone (KENALOG) 0.1 % external cream Apply topically 2 times daily 30 g 3     Vitamin D, Cholecalciferol, 10 MCG (400 UNIT) TABS Take 1,600 Units by mouth daily       InFLIXimab (REMICADE IV) Inject 600 mg into the vein Every 8 weeks           REVIEW OF SYSTEMS:  4 point ROS including  "Respiratory, CV, GI and , other than that noted in the HPI,  is negative    Objective:  BP 99/61   Pulse 74   Temp 96.5  F (35.8  C)   Resp 18   Ht 1.575 m (5' 2\")   Wt 91.6 kg (202 lb)   SpO2 94%   BMI 36.95 kg/m    Exam:  GENERAL APPEARANCE:  Alert, in no distress  ENT:  Mouth and posterior oropharynx normal, moist mucous membranes, hearing acuity adequate   EYES:  EOM, conjunctivae, lids, pupils and irises normal    RESP:  respiratory effort and palpation of chest normal, no respiratory distress,   CV:  Edema legs are wrapped   ABDOMEN:  normal bowel sounds, soft, nontender, no hepatosplenomegaly or other masses  M/S:   Gait and station steady with walker, Digits and nails normal   SKIN:  Inspection/Palpation of skin and subcutaneous tissue scattered papules  NEURO: 2-12 in normal limits and at patient's baseline  PSYCH:  insight and judgement, memory intact , affect and mood normal  Labs:   CBC RESULTS:   Recent Labs   Lab Test 12/10/19  0810 11/25/19  1654   WBC 5.7 6.3   RBC 3.90 3.84   HGB 11.6* 11.6*   HCT 36.1 35.4   MCV 93 92   MCH 29.7 30.2   MCHC 32.1 32.8   RDW 16.9* 17.2*   * 166       Last Basic Metabolic Panel:  Lab Test 12/10/19  0810      POTASSIUM 4.3   CHLORIDE 101   UT 9.5   CO2 28   BUN 21   CR 0.64   GLC 88       Liver Function Studies -   Recent Labs   Lab Test 11/27/19  0535 10/16/19 04/02/19  1308   PROTTOTAL 7.6  --  7.4   ALBUMIN 3.7 4.0 4.1   BILITOTAL 1.2  --  0.7   ALKPHOS 135  --  88   AST 26 29 27   ALT 15 15 36       TSH   Date Value Ref Range Status   07/26/2019 3.10 0.40 - 4.00 mU/L Final   10/25/2018 3.70 0.40 - 4.00 mU/L Final   ]    Lab Results   Component Value Date    A1C 5.1 10/25/2018    A1C 5.8 06/24/2011         ASSESSMENT/PLAN:  (I50.31) Acute diastolic congestive heart failure (H)  (primary encounter diagnosis)  Comment: recent hospitalization due to increased LE swelling and dyspnea with hypoxia. She was diuresed about 15litres of fluid or 31#. "   Echo was done during hospitalization that showed EF 50-55% with severe biatrial enlargement, moderately dilated RV and mildly decreased RVSF, mod to severe TR and pulmonary hypertension. Today she denies shortness of breath at rest. No recent wts- request sent to nurse manager to make sure wts get done. Wt is up to 202 today  Plan: continue Torsemide 20mg q day  restart spironolactone 25mg q day  kcl 20 meq q day  Daily wts- call NP if wt >3# in one day or 5# in a week.   BMP 12/17    (I89.0) Lymphedema of both lower extremities  Comment: patient presented with increased LE edema. She has h/o lymphedema. She was recently treated for LE cellulitis in outpatient setting. Much less swelling today  Plan: OCCUPATIONAL THERAPY to evaluate and treat for lymphedema  Elevate LEs  TMC 0.1% to pretibial areas prior to placing wraps    (R09.02) Hypoxia  Comment: patient presented to ED with hypoxia. She continues on supplemental oxygen upon transfer to TCU. O2 sats in TCU about 90% on RA.   Plan: monitor O2 sats    (I48.19) Persistent atrial fibrillation  Comment: anticoagulated with eliquis. During hospitalization she did have episodes of tachycardia. Metoprolol was increased to 50mg q day. HR in TCU running 70-80s.   Plan: continue metoprolol ER 50mg q day  Continue apixaban 5mg BID    (I27.20) Pulmonary hypertension (H)  Comment: in setting of scleroderma. 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 During hospitalization it was thought that the HF due to revatio.  Patient did go out to see Dr Marquez regarding PH on 12/10. She continues to have volume overload and hypoxia. 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 She has had some pain in her hands and knees  Plan: continue remicade- next dose due 12/17 at [facility] (Dr Bustillos) [phone]  and methotrexate 2.5mg tablets- take 10 tablets q Wednesday for RA    (E03.9) Hypothyroidism, unspecified type  Comment: euthryoid  Plan: no change    (R53.81) Physical deconditioning  Comment: lives in house with . There are stairs to get in and within home. She would like to go home soon. She was independent prior to admission.  At this point she is able to walk up to 250'. she is working on stairs. CPT 5/5.6. home visit to be scheduled next week.  Plan: physical therapy and OCCUPATIONAL THERAPY evaluation  Likely return home in about 10d with home care        Electronically signed by:  AUBRIE Tamayo CNP

## 2019-12-13 NOTE — LETTER
12/13/2019        RE: Jaqueline Canales  [address]  [facility] [address]        [facility]  [facility] Record Number:  [id number]  Place of Service where encounter took place[facility] [[id number]]  Chief Complaint   Patient presents with     RECHECK       HPI:    Jaqueline Canales  is a 78 year old (1940), who is being seen today for an episodic care visit.  HPI information obtained from: facility chart records, facility staff, patient report and [facility] chart review. Today's concern is:  Brief Summary of Hospital Course: recent hospitalization due to LE edema and dyspnea and hypoxia with exertion. PMH includes atrial fibrillation (eliquis), pulmonary hypertension, CHF, hypothyroidism, rheumatoid arthritis, hypertension, lung cancer, pulmonary fibrosis/ scleroderma, lymphedema and smoking history (30 ppy)   She was admitted for acute diastolic heart failure and started on IV lasix. Cardiology was consulted and suspects decompensation likely due to pulmonary vasodilators. Cardiology  changed lasix to IV bumex and spironolactone was added.  She diuresed about 14l. she did have episodes of tachycardia so metoprolol was increased. She was transitioned to oral meds.  Due to ongoing weakness she was transferred to TCU.   Updates on Status Since Skilled nursing Admission: care conference today. She denies shortness of breath. She would like to return home.  and niece are here today.     Past Medical and Surgical History reviewed in Epic today.    MEDICATIONS:  Current Outpatient Medications   Medication Sig Dispense Refill     acetaminophen (TYLENOL) 500 MG tablet Take 1,000 mg by mouth 2 times daily        albuterol (PROAIR HFA/PROVENTIL HFA/VENTOLIN HFA) 108 (90 Base) MCG/ACT inhaler Inhale 2 puffs into the lungs every 6 hours as needed for shortness of breath / dyspnea or wheezing       apixaban ANTICOAGULANT (ELIQUIS) 5 MG tablet Take 1  tablet (5 mg) by mouth 2 times daily 180 tablet 2     Bacillus Coagulans-Inulin (PROBIOTIC-PREBIOTIC PO) Take 1 chew tab by mouth 2 times daily        folic acid (FOLVITE) 1 MG tablet Take 1 tablet (1,000 mcg) by mouth daily 90 tablet 3     levothyroxine (SYNTHROID/LEVOTHROID) 75 MCG tablet Take 1 tablet (75 mcg) by mouth daily 90 tablet 2     melatonin 5 MG tablet Take 5 mg by mouth nightly as needed for sleep       Methotrexate Sodium (METHOTREXATE PO) Take 2.5 mg by mouth 10 tablets weekly - Wednesdays       metoprolol succinate ER (TOPROL-XL) 50 MG 24 hr tablet Take 1 tablet (50 mg) by mouth daily       Multiple Vitamins-Minerals (ICAPS) CAPS Take 1 capsule by mouth 2 times daily        multivitamin (CENTRUM SILVER) tablet Take 1 tablet by mouth daily       NIFEdipine ER (ADALAT CC) 60 MG 24 hr tablet Take 60 mg by mouth daily       omeprazole (PRILOSEC) 20 MG DR capsule Take 1 capsule (20 mg) by mouth 2 times daily Take 30-60 minutes prior to meals 180 capsule 2     potassium chloride ER (K-DUR/KLOR-CON M) 20 MEQ CR tablet Take 1 tablet (20 mEq) by mouth daily       Prednisolon-Gatiflox-Bromfenac 1-0.5-0.075 % SUSP Place 1 drop into the right eye daily       psyllium (METAMUCIL/KONSYL) capsule Take 1 capsule by mouth daily       sildenafil (REVATIO) 20 MG tablet Take 1 tablet (20 mg) by mouth 3 times daily 90 tablet 11     simvastatin (ZOCOR) 40 MG tablet Take 1 tablet (40 mg) by mouth At Bedtime 90 tablet 3     Skin Protectants, Misc. (EUCERIN) cream Apply topically 2 times daily Apply to areas dry skin topically two times a day for dry skin and Apply to areas of dry skin topically as needed for dry skin daily       torsemide (DEMADEX) 20 MG tablet Take 1 tablet (20 mg) by mouth daily       triamcinolone (KENALOG) 0.1 % external cream Apply topically 2 times daily 30 g 3     Vitamin D, Cholecalciferol, 10 MCG (400 UNIT) TABS Take 1,600 Units by mouth daily       InFLIXimab (REMICADE IV) Inject 600 mg into the  "vein Every 8 weeks           REVIEW OF SYSTEMS:  4 point ROS including Respiratory, CV, GI and , other than that noted in the HPI,  is negative    Objective:  BP 99/61   Pulse 74   Temp 96.5  F (35.8  C)   Resp 18   Ht 1.575 m (5' 2\")   Wt 91.6 kg (202 lb)   SpO2 94%   BMI 36.95 kg/m     Exam:  GENERAL APPEARANCE:  Alert, in no distress  ENT:  Mouth and posterior oropharynx normal, moist mucous membranes, hearing acuity adequate   EYES:  EOM, conjunctivae, lids, pupils and irises normal    RESP:  respiratory effort and palpation of chest normal, no respiratory distress,   CV:  Edema legs are wrapped   ABDOMEN:  normal bowel sounds, soft, nontender, no hepatosplenomegaly or other masses  M/S:   Gait and station steady with walker, Digits and nails normal   SKIN:  Inspection/Palpation of skin and subcutaneous tissue scattered papules  NEURO: 2-12 in normal limits and at patient's baseline  PSYCH:  insight and judgement, memory intact , affect and mood normal  Labs:   CBC RESULTS:   Recent Labs   Lab Test 12/10/19  0810 11/25/19  1654   WBC 5.7 6.3   RBC 3.90 3.84   HGB 11.6* 11.6*   HCT 36.1 35.4   MCV 93 92   MCH 29.7 30.2   MCHC 32.1 32.8   RDW 16.9* 17.2*   * 166       Last Basic Metabolic Panel:  Lab Test 12/10/19  0810      POTASSIUM 4.3   CHLORIDE 101   TU 9.5   CO2 28   BUN 21   CR 0.64   GLC 88       Liver Function Studies -   Recent Labs   Lab Test 11/27/19  0535 10/16/19 04/02/19  1308   PROTTOTAL 7.6  --  7.4   ALBUMIN 3.7 4.0 4.1   BILITOTAL 1.2  --  0.7   ALKPHOS 135  --  88   AST 26 29 27   ALT 15 15 36       TSH   Date Value Ref Range Status   07/26/2019 3.10 0.40 - 4.00 mU/L Final   10/25/2018 3.70 0.40 - 4.00 mU/L Final   ]    Lab Results   Component Value Date    A1C 5.1 10/25/2018    A1C 5.8 06/24/2011         ASSESSMENT/PLAN:  (I50.31) Acute diastolic congestive heart failure (H)  (primary encounter diagnosis)  Comment: recent hospitalization due to increased LE swelling and " dyspnea with hypoxia. She was diuresed about 15litres of fluid or 31#.   Echo was done during hospitalization that showed EF 50-55% with severe biatrial enlargement, moderately dilated RV and mildly decreased RVSF, mod to severe TR and pulmonary hypertension. Today she denies shortness of breath at rest. No recent wts- request sent to nurse manager to make sure wts get done. Wt is up to 202 today  Plan: continue Torsemide 20mg q day  restart spironolactone 25mg q day  kcl 20 meq q day  Daily wts- call NP if wt >3# in one day or 5# in a week.   [facility] 12/17    (I89.0) Lymphedema of both lower extremities  Comment: patient presented with increased LE edema. She has h/o lymphedema. She was recently treated for LE cellulitis in outpatient setting. Much less swelling today  Plan: OCCUPATIONAL THERAPY to evaluate and treat for lymphedema  Elevate LEs  TMC 0.1% to pretibial areas prior to placing wraps    (R09.02) Hypoxia  Comment: patient presented to ED with hypoxia. She continues on supplemental oxygen upon transfer to TCU. O2 sats in TCU about 90% on RA.   Plan: monitor O2 sats    (I48.19) Persistent atrial fibrillation  Comment: anticoagulated with eliquis. During hospitalization she did have episodes of tachycardia. Metoprolol was increased to 50mg q day. HR in TCU running 70-80s.   Plan: continue metoprolol ER 50mg q day  Continue apixaban 5mg BID    (I27.20) Pulmonary hypertension (H)  Comment: in setting of scleroderma. 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 During hospitalization it was thought that the HF due to revatio.  Patient did go out to see Dr Marquez regarding PH on 12/10. She continues to have volume overload and hypoxia. 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 She has had some pain in her hands and knees  Plan: continue remicade- next dose due 12/17 at [facility] (Dr Bustillos) [phone]  and methotrexate 2.5mg tablets- take 10 tablets q Wednesday for RA    (E03.9) Hypothyroidism, unspecified type  Comment: euthryoid  Plan: no change    (R53.81) Physical deconditioning  Comment: lives in house with . There are stairs to get in and within home. She would like to go home soon. She was independent prior to admission.  At this point she is able to walk up to 250'.  she is working on stairs. CPT 5/5.6. home visit to be scheduled next week.  Plan: physical therapy and OCCUPATIONAL THERAPY evaluation  Likely return home in about 10d with home care        Electronically signed by:  AUBRIE Tamayo CNP               Sincerely,        AUBRIE Tamayo CNP

## 2019-12-17 ENCOUNTER — NURSING HOME VISIT (OUTPATIENT)
Dept: GERIATRICS | Facility: CLINIC | Age: 79
End: 2019-12-17
Payer: MEDICARE

## 2019-12-17 ENCOUNTER — HOSPITAL LABORATORY (OUTPATIENT)
Dept: OTHER | Facility: CLINIC | Age: 79
End: 2019-12-17

## 2019-12-17 VITALS
BODY MASS INDEX: 37.21 KG/M2 | TEMPERATURE: 97.4 F | DIASTOLIC BLOOD PRESSURE: 64 MMHG | HEIGHT: 62 IN | HEART RATE: 61 BPM | WEIGHT: 202.2 LBS | OXYGEN SATURATION: 98 % | RESPIRATION RATE: 17 BRPM | SYSTOLIC BLOOD PRESSURE: 103 MMHG

## 2019-12-17 DIAGNOSIS — E03.9 HYPOTHYROIDISM, UNSPECIFIED TYPE: ICD-10-CM

## 2019-12-17 DIAGNOSIS — J84.10 PULMONARY FIBROSIS (H): ICD-10-CM

## 2019-12-17 DIAGNOSIS — I89.0 LYMPHEDEMA OF BOTH LOWER EXTREMITIES: ICD-10-CM

## 2019-12-17 DIAGNOSIS — I50.31 ACUTE DIASTOLIC CONGESTIVE HEART FAILURE (H): Primary | ICD-10-CM

## 2019-12-17 DIAGNOSIS — R53.81 PHYSICAL DECONDITIONING: ICD-10-CM

## 2019-12-17 DIAGNOSIS — I48.19 PERSISTENT ATRIAL FIBRILLATION (H): ICD-10-CM

## 2019-12-17 DIAGNOSIS — I10 BENIGN ESSENTIAL HYPERTENSION: ICD-10-CM

## 2019-12-17 DIAGNOSIS — R09.02 HYPOXIA: ICD-10-CM

## 2019-12-17 DIAGNOSIS — M06.9 RHEUMATOID ARTHRITIS, INVOLVING UNSPECIFIED SITE, UNSPECIFIED RHEUMATOID FACTOR PRESENCE: ICD-10-CM

## 2019-12-17 DIAGNOSIS — I27.20 PULMONARY HYPERTENSION (H): ICD-10-CM

## 2019-12-17 DIAGNOSIS — M34.9 SCLERODERMA (H): ICD-10-CM

## 2019-12-17 LAB
ANION GAP SERPL CALCULATED.3IONS-SCNC: 6 MMOL/L (ref 3–14)
BUN SERPL-MCNC: 22 MG/DL (ref 7–30)
CALCIUM SERPL-MCNC: 9.9 MG/DL (ref 8.5–10.1)
CHLORIDE SERPL-SCNC: 102 MMOL/L (ref 94–109)
CO2 SERPL-SCNC: 26 MMOL/L (ref 20–32)
CREAT SERPL-MCNC: 0.78 MG/DL (ref 0.52–1.04)
GFR SERPL CREATININE-BSD FRML MDRD: 72 ML/MIN/{1.73_M2}
GLUCOSE SERPL-MCNC: 124 MG/DL (ref 70–99)
MAGNESIUM SERPL-MCNC: 2.1 MG/DL (ref 1.6–2.3)
NT-PROBNP SERPL-MCNC: 4366 PG/ML (ref 0–450)
POTASSIUM SERPL-SCNC: 4.7 MMOL/L (ref 3.4–5.3)
SODIUM SERPL-SCNC: 134 MMOL/L (ref 133–144)

## 2019-12-17 PROCEDURE — 99309 SBSQ NF CARE MODERATE MDM 30: CPT | Performed by: NURSE PRACTITIONER

## 2019-12-17 ASSESSMENT — MIFFLIN-ST. JEOR: SCORE: 1345.42

## 2019-12-17 NOTE — LETTER
12/17/2019        RE: Jaqueline Canales  [address]  [facility] [address]        [facility]  [facility] Record Number:  [id number]  [facility] of [facility] where encounter took place[facility] [[id number]]  Chief Complaint   Patient presents with     RECHECK       HPI:    Jaqueline Canales  is a 78 year old (1940), who is being seen today for an episodic care visit.  HPI information obtained from: facility chart records, facility staff, patient report and [facility] chart review. Today's concern is:  Brief Summary of Hospital Course: recent hospitalization due to LE edema and dyspnea and hypoxia with exertion. PMH includes atrial fibrillation (eliquis), pulmonary hypertension, CHF, hypothyroidism, rheumatoid arthritis, hypertension, lung cancer, pulmonary fibrosis/ scleroderma, lymphedema and smoking history (30 ppy)   She was admitted for acute diastolic heart failure and started on IV lasix. Cardiology was consulted and suspects decompensation likely due to pulmonary vasodilators. Cardiology  changed lasix to IV bumex and spironolactone was added.  She diuresed about 14l. she did have episodes of tachycardia so metoprolol was increased. She was transitioned to oral meds.  Due to ongoing weakness she was transferred to TCU.   Updates on Status Since Skilled nursing Admission:  She denies shortness of breath. She is working with therapy and can walk about 250'. Wt is up 3# today and 6# overall. Nursing has been unable to accurately assess O2 sats due to poor circulation     Past Medical and Surgical History reviewed in Epic today.    MEDICATIONS:  Current Outpatient Medications   Medication Sig Dispense Refill     acetaminophen (TYLENOL) 500 MG tablet Take 1,000 mg by mouth 2 times daily        albuterol (PROAIR HFA/PROVENTIL HFA/VENTOLIN HFA) 108 (90 Base) MCG/ACT inhaler Inhale 2 puffs into the lungs every 6 hours as needed for shortness of breath /  dyspnea or wheezing       apixaban ANTICOAGULANT (ELIQUIS) 5 MG tablet Take 1 tablet (5 mg) by mouth 2 times daily 180 tablet 2     Bacillus Coagulans-Inulin (PROBIOTIC-PREBIOTIC PO) Take 1 chew tab by mouth 2 times daily        folic acid (FOLVITE) 1 MG tablet Take 1 tablet (1,000 mcg) by mouth daily 90 tablet 3     levothyroxine (SYNTHROID/LEVOTHROID) 75 MCG tablet Take 1 tablet (75 mcg) by mouth daily 90 tablet 2     melatonin 5 MG tablet Take 5 mg by mouth nightly as needed for sleep       Methotrexate Sodium (METHOTREXATE PO) Take 2.5 mg by mouth 10 tablets weekly - Wednesdays       metoprolol succinate ER (TOPROL-XL) 50 MG 24 hr tablet Take 1 tablet (50 mg) by mouth daily       Multiple Vitamins-Minerals (ICAPS) CAPS Take 1 capsule by mouth 2 times daily        multivitamin (CENTRUM SILVER) tablet Take 1 tablet by mouth daily       NIFEdipine ER (ADALAT CC) 60 MG 24 hr tablet Take 60 mg by mouth daily       omeprazole (PRILOSEC) 20 MG DR capsule Take 1 capsule (20 mg) by mouth 2 times daily Take 30-60 minutes prior to meals 180 capsule 2     potassium chloride ER (K-DUR/KLOR-CON M) 20 MEQ CR tablet Take 1 tablet (20 mEq) by mouth daily       psyllium (METAMUCIL/KONSYL) capsule Take 1 capsule by mouth daily       sildenafil (REVATIO) 20 MG tablet Take 1 tablet (20 mg) by mouth 3 times daily 90 tablet 11     simvastatin (ZOCOR) 40 MG tablet Take 1 tablet (40 mg) by mouth At Bedtime 90 tablet 3     Skin Protectants, Misc. (EUCERIN) cream Apply topically 2 times daily Apply to areas dry skin topically two times a day for dry skin and Apply to areas of dry skin topically as needed for dry skin daily       spironolactone (ALDACTONE) 25 MG tablet Take 1 tablet (25 mg) by mouth daily       torsemide (DEMADEX) 20 MG tablet Take 1 tablet (20 mg) by mouth daily       triamcinolone (KENALOG) 0.1 % external cream Apply topically 2 times daily 30 g 3     Vitamin D, Cholecalciferol, 10 MCG (400 UNIT) TABS Take 1,600 Units by  "mouth daily       InFLIXimab (REMICADE IV) Inject 600 mg into the vein Every 8 weeks       Prednisolon-Gatiflox-Bromfenac 1-0.5-0.075 % SUSP Place 1 drop into the right eye daily           REVIEW OF SYSTEMS:  4 point ROS including Respiratory, CV, GI and , other than that noted in the HPI,  is negative    Objective:  /64   Pulse 61   Temp 97.4  F (36.3  C)   Resp 17   Ht 1.575 m (5' 2\")   Wt 91.7 kg (202 lb 3.2 oz)   SpO2 98%   BMI 36.98 kg/m     Exam:  GENERAL APPEARANCE:  Alert, in no distress  ENT:  Mouth and posterior oropharynx normal, moist mucous membranes, hearing acuity adequate   EYES:  EOM, conjunctivae, lids, pupils and irises normal  NECK:  No adenopathy,masses or thyromegaly  RESP:  respiratory effort and palpation of chest normal, no respiratory distress, Lung sounds crackles about 1/3 way up   CV:  Palpation and auscultation of heart done , rate and rhythm irreg,  Edema wearing lymphedema wraps today.   ABDOMEN:  normal bowel sounds, soft, nontender, no hepatosplenomegaly or other masses  M/S:   Gait and station not observed, Digits and nails normal   SKIN:  Inspection/Palpation of skin and subcutaneous tissue no rash  NEURO: 2-12 in normal limits and at patient's baseline  PSYCH:  insight and judgement, memory impaired , affect and mood normal    Labs:   CBC RESULTS:   Recent Labs   Lab Test 12/10/19  0810 11/25/19  1654   WBC 5.7 6.3   RBC 3.90 3.84   HGB 11.6* 11.6*   HCT 36.1 35.4   MCV 93 92   MCH 29.7 30.2   MCHC 32.1 32.8   RDW 16.9* 17.2*   * 166       Last Basic Metabolic Panel:  Recent Labs   Lab Test 12/17/19  1245 12/10/19  0810    134   POTASSIUM 4.7 4.3   CHLORIDE 102 101   TU 9.9 9.5   CO2 26 28   BUN 22 21   CR 0.78 0.64   * 88       Liver Function Studies -   Recent Labs   Lab Test 11/27/19  0535 10/16/19 04/02/19  1308   PROTTOTAL 7.6  --  7.4   ALBUMIN 3.7 4.0 4.1   BILITOTAL 1.2  --  0.7   ALKPHOS 135  --  88   AST 26 29 27   ALT 15 15 36 "       TSH   Date Value Ref Range Status   07/26/2019 3.10 0.40 - 4.00 mU/L Final   10/25/2018 3.70 0.40 - 4.00 mU/L Final   ]    Lab Results   Component Value Date    A1C 5.1 10/25/2018    A1C 5.8 06/24/2011 12/17 BNP 4366  12/3 BNP 3098      ASSESSMENT/PLAN:  (I50.31) Acute diastolic congestive heart failure (H)  (primary encounter diagnosis)  (I89.0) Lymphedema of both lower extremities  (R09.02) Hypoxia  (I27.20) Pulmonary hypertension (H)  Comment: patient in TCU following hospitalization due to increased edema, dyspnea and hypoxia. She was diuresed about 15l of fluid. Since in TCU she continues on supplemental oxygen. Wt is up 6#. She denies shortness of breath and is making progress with therapy. She did return to cardiology, Dr Marquez, on 12/10, who suspects she still has extra volume on board.   OCCUPATIONAL THERAPY is wrapping LEs with lymphedema wraps  Plan: I did attempt to call care coordinators for DR Marquez but unable to leave message.   I did discuss wt gain with Dr Lara  Increase torsemide 40mg q day  Continue spironolacton 25mg q day, kcl 20 meq q day, nifedipine 60mg q day, sildenafiil 20 mg q day  CT of chest 12/18  BMP 12/19  Daily wts  Continue wraps  Continue to attempt to monitor O2 sats but nursing reports monitor not picking up readings and they do not have ear lobe O2 sat monitors.     (I48.19) Persistent atrial fibrillation  Comment: anticoagulated with eliquis. During hospitalization she did have episodes of tachycardia. Metoprolol was increased to 50mg q day. HR in TCU running 70-80s.   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 She has had some pain in her hands and knees  Plan: continue remicade- per [facility] (Dr Bustillos) [phone]  and methotrexate 2.5mg tablets- take 10 tablets q Wednesday for RA    (E03.9) Hypothyroidism, unspecified type  Comment: euthryoid  Plan: no change    (R53.81) Physical deconditioning  Comment: lives in house with . There are stairs to get in and within home. She would like to go home soon. She was independent prior to admission.  At this point she is able to walk up to 250'. she is working on stairs. CPT 5/5.6. home visit to be scheduled soon  Plan: physical therapy and OCCUPATIONAL THERAPY evaluation  Likely return home in about next with home care          Electronically signed by:  AUBRIE Tamayo CNP               Sincerely,        AUBRIE Tamayo CNP

## 2019-12-17 NOTE — PROGRESS NOTES
[facility]  [facility] Record Number:  [id number]  [facility] of Service where encounter took place[facility] [[id number]]  Chief Complaint   Patient presents with     RECHECK       HPI:    Jaqueline Canales  is a 78 year old (1940), who is being seen today for an episodic care visit.  HPI information obtained from: facility chart records, facility staff, patient report and [facility] chart review. Today's concern is:  Brief Summary of Hospital Course: recent hospitalization due to LE edema and dyspnea and hypoxia with exertion. PMH includes atrial fibrillation (eliquis), pulmonary hypertension, CHF, hypothyroidism, rheumatoid arthritis, hypertension, lung cancer, pulmonary fibrosis/ scleroderma, lymphedema and smoking history (30 ppy)   She was admitted for acute diastolic heart failure and started on IV lasix. Cardiology was consulted and suspects decompensation likely due to pulmonary vasodilators. Cardiology  changed lasix to IV bumex and spironolactone was added.  She diuresed about 14l. she did have episodes of tachycardia so metoprolol was increased. She was transitioned to oral meds.  Due to ongoing weakness she was transferred to TCU.   Updates on Status Since Skilled nursing Admission:  She denies shortness of breath. She is working with therapy and can walk about 250'. Wt is up 3# today and 6# overall. Nursing has been unable to accurately assess O2 sats due to poor circulation     Past Medical and Surgical History reviewed in Epic today.    MEDICATIONS:  Current Outpatient Medications   Medication Sig Dispense Refill     acetaminophen (TYLENOL) 500 MG tablet Take 1,000 mg by mouth 2 times daily        albuterol (PROAIR HFA/PROVENTIL HFA/VENTOLIN HFA) 108 (90 Base) MCG/ACT inhaler Inhale 2 puffs into the lungs every 6 hours as needed for shortness of breath / dyspnea or wheezing       apixaban ANTICOAGULANT (ELIQUIS) 5 MG tablet Take 1 tablet (5 mg) by  mouth 2 times daily 180 tablet 2     Bacillus Coagulans-Inulin (PROBIOTIC-PREBIOTIC PO) Take 1 chew tab by mouth 2 times daily        folic acid (FOLVITE) 1 MG tablet Take 1 tablet (1,000 mcg) by mouth daily 90 tablet 3     levothyroxine (SYNTHROID/LEVOTHROID) 75 MCG tablet Take 1 tablet (75 mcg) by mouth daily 90 tablet 2     melatonin 5 MG tablet Take 5 mg by mouth nightly as needed for sleep       Methotrexate Sodium (METHOTREXATE PO) Take 2.5 mg by mouth 10 tablets weekly - Wednesdays       metoprolol succinate ER (TOPROL-XL) 50 MG 24 hr tablet Take 1 tablet (50 mg) by mouth daily       Multiple Vitamins-Minerals (ICAPS) CAPS Take 1 capsule by mouth 2 times daily        multivitamin (CENTRUM SILVER) tablet Take 1 tablet by mouth daily       NIFEdipine ER (ADALAT CC) 60 MG 24 hr tablet Take 60 mg by mouth daily       omeprazole (PRILOSEC) 20 MG DR capsule Take 1 capsule (20 mg) by mouth 2 times daily Take 30-60 minutes prior to meals 180 capsule 2     potassium chloride ER (K-DUR/KLOR-CON M) 20 MEQ CR tablet Take 1 tablet (20 mEq) by mouth daily       psyllium (METAMUCIL/KONSYL) capsule Take 1 capsule by mouth daily       sildenafil (REVATIO) 20 MG tablet Take 1 tablet (20 mg) by mouth 3 times daily 90 tablet 11     simvastatin (ZOCOR) 40 MG tablet Take 1 tablet (40 mg) by mouth At Bedtime 90 tablet 3     Skin Protectants, Misc. (EUCERIN) cream Apply topically 2 times daily Apply to areas dry skin topically two times a day for dry skin and Apply to areas of dry skin topically as needed for dry skin daily       spironolactone (ALDACTONE) 25 MG tablet Take 1 tablet (25 mg) by mouth daily       torsemide (DEMADEX) 20 MG tablet Take 1 tablet (20 mg) by mouth daily       triamcinolone (KENALOG) 0.1 % external cream Apply topically 2 times daily 30 g 3     Vitamin D, Cholecalciferol, 10 MCG (400 UNIT) TABS Take 1,600 Units by mouth daily       InFLIXimab (REMICADE IV) Inject 600 mg into the vein Every 8 weeks        "Prednisolon-Gatiflox-Bromfenac 1-0.5-0.075 % SUSP Place 1 drop into the right eye daily           REVIEW OF SYSTEMS:  4 point ROS including Respiratory, CV, GI and , other than that noted in the HPI,  is negative    Objective:  /64   Pulse 61   Temp 97.4  F (36.3  C)   Resp 17   Ht 1.575 m (5' 2\")   Wt 91.7 kg (202 lb 3.2 oz)   SpO2 98%   BMI 36.98 kg/m    Exam:  GENERAL APPEARANCE:  Alert, in no distress  ENT:  Mouth and posterior oropharynx normal, moist mucous membranes, hearing acuity adequate   EYES:  EOM, conjunctivae, lids, pupils and irises normal  NECK:  No adenopathy,masses or thyromegaly  RESP:  respiratory effort and palpation of chest normal, no respiratory distress, Lung sounds crackles about 1/3 way up   CV:  Palpation and auscultation of heart done , rate and rhythm irreg,  Edema wearing lymphedema wraps today.   ABDOMEN:  normal bowel sounds, soft, nontender, no hepatosplenomegaly or other masses  M/S:   Gait and station not observed, Digits and nails normal   SKIN:  Inspection/Palpation of skin and subcutaneous tissue no rash  NEURO: 2-12 in normal limits and at patient's baseline  PSYCH:  insight and judgement, memory impaired , affect and mood normal    Labs:   CBC RESULTS:   Recent Labs   Lab Test 12/10/19  0810 11/25/19  1654   WBC 5.7 6.3   RBC 3.90 3.84   HGB 11.6* 11.6*   HCT 36.1 35.4   MCV 93 92   MCH 29.7 30.2   MCHC 32.1 32.8   RDW 16.9* 17.2*   * 166       Last Basic Metabolic Panel:  Recent Labs   Lab Test 12/17/19  1245 12/10/19  0810    134   POTASSIUM 4.7 4.3   CHLORIDE 102 101   TU 9.9 9.5   CO2 26 28   BUN 22 21   CR 0.78 0.64   * 88       Liver Function Studies -   Recent Labs   Lab Test 11/27/19  0535 10/16/19 04/02/19  1308   PROTTOTAL 7.6  --  7.4   ALBUMIN 3.7 4.0 4.1   BILITOTAL 1.2  --  0.7   ALKPHOS 135  --  88   AST 26 29 27   ALT 15 15 36       TSH   Date Value Ref Range Status   07/26/2019 3.10 0.40 - 4.00 mU/L Final   10/25/2018 3.70 " 0.40 - 4.00 mU/L Final   ]    Lab Results   Component Value Date    A1C 5.1 10/25/2018    A1C 5.8 06/24/2011 12/17 BNP 4366  12/3 BNP 3098      ASSESSMENT/PLAN:  (I50.31) Acute diastolic congestive heart failure (H)  (primary encounter diagnosis)  (I89.0) Lymphedema of both lower extremities  (R09.02) Hypoxia  (I27.20) Pulmonary hypertension (H)  Comment: patient in TCU following hospitalization due to increased edema, dyspnea and hypoxia. She was diuresed about 15l of fluid. Since in TCU she continues on supplemental oxygen. Wt is up 6#. She denies shortness of breath and is making progress with therapy. She did return to cardiology, Dr Marquez, on 12/10, who suspects she still has extra volume on board.   OCCUPATIONAL THERAPY is wrapping LEs with lymphedema wraps  Plan: I did attempt to call care coordinators for DR Marquez but unable to leave message.   I did discuss wt gain with Dr Lara  Increase torsemide 40mg q day  Continue spironolacton 25mg q day, kcl 20 meq q day, nifedipine 60mg q day, sildenafiil 20 mg q day  CT of chest 12/18  BMP 12/19  Daily wts  Continue wraps  Continue to attempt to monitor O2 sats but nursing reports monitor not picking up readings and they do not have ear lobe O2 sat monitors.     (I48.19) Persistent atrial fibrillation  Comment: anticoagulated with eliquis. During hospitalization she did have episodes of tachycardia. Metoprolol was increased to 50mg q day. HR in TCU running 70-80s.   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 She has had some pain in her hands and knees  Plan: continue remicade- per [facility] (Dr Bustillos) [phone]  and methotrexate 2.5mg tablets- take 10 tablets q Wednesday for RA    (E03.9) Hypothyroidism, unspecified type  Comment: euthryoid  Plan: no change    (R53.81) Physical deconditioning  Comment: lives in house with . There are stairs to get in and within home. She would like to go home soon. She was independent prior to admission.  At this point she is able to walk up to 250'. she is working on stairs. CPT 5/5.6. home visit to be scheduled soon  Plan: physical therapy and OCCUPATIONAL THERAPY evaluation  Likely return home in about next with home care          Electronically signed by:  AUBRIE Tamayo CNP

## 2019-12-18 ENCOUNTER — HOSPITAL ENCOUNTER (OUTPATIENT)
Dept: CT IMAGING | Facility: CLINIC | Age: 79
Discharge: HOME | End: 2019-12-18
Attending: INTERNAL MEDICINE | Admitting: INTERNAL MEDICINE
Payer: MEDICARE

## 2019-12-18 DIAGNOSIS — I48.19 PERSISTENT ATRIAL FIBRILLATION (H): ICD-10-CM

## 2019-12-18 DIAGNOSIS — I27.20 PULMONARY HYPERTENSION (H): ICD-10-CM

## 2019-12-18 DIAGNOSIS — I50.31 ACUTE DIASTOLIC CONGESTIVE HEART FAILURE (H): ICD-10-CM

## 2019-12-18 DIAGNOSIS — I10 BENIGN ESSENTIAL HYPERTENSION: ICD-10-CM

## 2019-12-18 PROCEDURE — 71250 CT THORAX DX C-: CPT

## 2019-12-19 ENCOUNTER — HOSPITAL LABORATORY (OUTPATIENT)
Dept: OTHER | Facility: CLINIC | Age: 79
End: 2019-12-19

## 2019-12-19 ENCOUNTER — NURSING HOME VISIT (OUTPATIENT)
Dept: GERIATRICS | Facility: CLINIC | Age: 79
End: 2019-12-19
Payer: MEDICARE

## 2019-12-19 ENCOUNTER — PATIENT OUTREACH (OUTPATIENT)
Dept: CARDIOLOGY | Facility: CLINIC | Age: 79
End: 2019-12-19

## 2019-12-19 VITALS
OXYGEN SATURATION: 93 % | HEIGHT: 62 IN | BODY MASS INDEX: 37.47 KG/M2 | SYSTOLIC BLOOD PRESSURE: 97 MMHG | HEART RATE: 69 BPM | DIASTOLIC BLOOD PRESSURE: 64 MMHG | WEIGHT: 203.6 LBS | RESPIRATION RATE: 18 BRPM | TEMPERATURE: 96.9 F

## 2019-12-19 DIAGNOSIS — I10 BENIGN ESSENTIAL HYPERTENSION: ICD-10-CM

## 2019-12-19 DIAGNOSIS — I89.0 LYMPHEDEMA OF BOTH LOWER EXTREMITIES: ICD-10-CM

## 2019-12-19 DIAGNOSIS — E03.9 HYPOTHYROIDISM, UNSPECIFIED TYPE: ICD-10-CM

## 2019-12-19 DIAGNOSIS — M06.9 RHEUMATOID ARTHRITIS, INVOLVING UNSPECIFIED SITE, UNSPECIFIED RHEUMATOID FACTOR PRESENCE: ICD-10-CM

## 2019-12-19 DIAGNOSIS — J84.10 PULMONARY FIBROSIS (H): ICD-10-CM

## 2019-12-19 DIAGNOSIS — R09.02 HYPOXIA: ICD-10-CM

## 2019-12-19 DIAGNOSIS — R53.81 PHYSICAL DECONDITIONING: ICD-10-CM

## 2019-12-19 DIAGNOSIS — M34.9 SCLERODERMA (H): ICD-10-CM

## 2019-12-19 DIAGNOSIS — I50.31 ACUTE DIASTOLIC CONGESTIVE HEART FAILURE (H): Primary | ICD-10-CM

## 2019-12-19 DIAGNOSIS — I27.20 PULMONARY HYPERTENSION (H): ICD-10-CM

## 2019-12-19 DIAGNOSIS — I48.19 PERSISTENT ATRIAL FIBRILLATION (H): ICD-10-CM

## 2019-12-19 LAB
ANION GAP SERPL CALCULATED.3IONS-SCNC: 5 MMOL/L (ref 3–14)
BUN SERPL-MCNC: 22 MG/DL (ref 7–30)
CALCIUM SERPL-MCNC: 9.7 MG/DL (ref 8.5–10.1)
CHLORIDE SERPL-SCNC: 101 MMOL/L (ref 94–109)
CO2 SERPL-SCNC: 28 MMOL/L (ref 20–32)
CREAT SERPL-MCNC: 0.78 MG/DL (ref 0.52–1.04)
GFR SERPL CREATININE-BSD FRML MDRD: 72 ML/MIN/{1.73_M2}
GLUCOSE SERPL-MCNC: 77 MG/DL (ref 70–99)
MAGNESIUM SERPL-MCNC: 2.1 MG/DL (ref 1.6–2.3)
NT-PROBNP SERPL-MCNC: 3455 PG/ML (ref 0–450)
POTASSIUM SERPL-SCNC: 3.8 MMOL/L (ref 3.4–5.3)
SODIUM SERPL-SCNC: 134 MMOL/L (ref 133–144)

## 2019-12-19 PROCEDURE — 99309 SBSQ NF CARE MODERATE MDM 30: CPT | Performed by: NURSE PRACTITIONER

## 2019-12-19 ASSESSMENT — MIFFLIN-ST. JEOR: SCORE: 1351.77

## 2019-12-19 NOTE — PROGRESS NOTES
[facility]  [facility] Record Number:  [id number]  Place of Service where encounter took place:  [facility] [[id number]]  Chief Complaint   Patient presents with     RECHECK       HPI:    Jaqueline Canales  is a 78 year old (1940), who is being seen today for an episodic care visit.  HPI information obtained from: facility chart records, facility staff, patient report and [facility] chart review. Today's concern is:  Brief Summary of Hospital Course: recent hospitalization due to LE edema and dyspnea and hypoxia with exertion. PMH includes atrial fibrillation (eliquis), pulmonary hypertension, CHF, hypothyroidism, rheumatoid arthritis, hypertension, lung cancer, pulmonary fibrosis/ scleroderma, lymphedema and smoking history (30 ppy)   She was admitted for acute diastolic heart failure and started on IV lasix. Cardiology was consulted and suspects decompensation likely due to pulmonary vasodilators. Cardiology  changed lasix to IV bumex and spironolactone was added.  She diuresed about 14l. she did have episodes of tachycardia so metoprolol was increased. She was transitioned to oral meds.  Due to ongoing weakness she was transferred to TCU.   Updates on Status Since Skilled nursing Admission:  She denies shortness of breath. She is working with therapy and can walk about 250'. Wt was up 3# earlier this week and 6# overall. Nursing has been unable to accurately assess O2 sats due to poor circulation. She has some short term memory issues.     Past Medical and Surgical History reviewed in Epic today.    MEDICATIONS:  Current Outpatient Medications   Medication Sig Dispense Refill     acetaminophen (TYLENOL) 500 MG tablet Take 1,000 mg by mouth 2 times daily        albuterol (PROAIR HFA/PROVENTIL HFA/VENTOLIN HFA) 108 (90 Base) MCG/ACT inhaler Inhale 2 puffs into the lungs every 6 hours as needed for shortness of breath / dyspnea or wheezing       apixaban  ANTICOAGULANT (ELIQUIS) 5 MG tablet Take 1 tablet (5 mg) by mouth 2 times daily 180 tablet 2     Bacillus Coagulans-Inulin (PROBIOTIC-PREBIOTIC PO) Take 1 chew tab by mouth 2 times daily        folic acid (FOLVITE) 1 MG tablet Take 1 tablet (1,000 mcg) by mouth daily 90 tablet 3     InFLIXimab (REMICADE IV) Inject 600 mg into the vein Every 8 weeks       levothyroxine (SYNTHROID/LEVOTHROID) 75 MCG tablet Take 1 tablet (75 mcg) by mouth daily 90 tablet 2     melatonin 5 MG tablet Take 5 mg by mouth nightly as needed for sleep       Methotrexate Sodium (METHOTREXATE PO) Take 2.5 mg by mouth 10 tablets weekly - Wednesdays       metoprolol succinate ER (TOPROL-XL) 50 MG 24 hr tablet Take 1 tablet (50 mg) by mouth daily       Multiple Vitamins-Minerals (ICAPS) CAPS Take 1 capsule by mouth 2 times daily        multivitamin (CENTRUM SILVER) tablet Take 1 tablet by mouth daily       NIFEdipine ER (ADALAT CC) 60 MG 24 hr tablet Take 60 mg by mouth daily       omeprazole (PRILOSEC) 20 MG DR capsule Take 1 capsule (20 mg) by mouth 2 times daily Take 30-60 minutes prior to meals 180 capsule 2     potassium chloride ER (K-DUR/KLOR-CON M) 20 MEQ CR tablet Take 1 tablet (20 mEq) by mouth daily       Prednisolon-Gatiflox-Bromfenac 1-0.5-0.075 % SUSP Place 1 drop into the right eye daily       psyllium (METAMUCIL/KONSYL) capsule Take 1 capsule by mouth daily       sildenafil (REVATIO) 20 MG tablet Take 1 tablet (20 mg) by mouth 3 times daily 90 tablet 11     simvastatin (ZOCOR) 40 MG tablet Take 1 tablet (40 mg) by mouth At Bedtime 90 tablet 3     Skin Protectants, Misc. (EUCERIN) cream Apply topically 2 times daily Apply to areas dry skin topically two times a day for dry skin and Apply to areas of dry skin topically as needed for dry skin daily       spironolactone (ALDACTONE) 25 MG tablet Take 1 tablet (25 mg) by mouth daily       torsemide (DEMADEX) 20 MG tablet Take 1 tablet (20 mg) by mouth daily (Patient taking differently:  "Take 40 mg by mouth daily )       triamcinolone (KENALOG) 0.1 % external cream Apply topically 2 times daily 30 g 3     Vitamin D, Cholecalciferol, 10 MCG (400 UNIT) TABS Take 1,600 Units by mouth daily           REVIEW OF SYSTEMS:  4 point ROS including Respiratory, CV, GI and , other than that noted in the HPI,  is negative    Objective:  BP 97/64   Pulse 69   Temp 96.9  F (36.1  C)   Resp 18   Ht 1.575 m (5' 2\")   Wt 92.4 kg (203 lb 9.6 oz)   SpO2 93%   BMI 37.24 kg/m    Exam:  GENERAL APPEARANCE:  Alert, in no distress  ENT:  Mouth and posterior oropharynx normal, moist mucous membranes, hearing acuity adequate   EYES:  EOM, conjunctivae, lids, pupils and irises normal  RESP:  respiratory effort and palpation of chest normal, no respiratory distress, Lung sounds rales in bases  CV:  Palpation and auscultation of heart done , rate and rhythm reg, no murmur, no rub or gallop, Edema lymphedema wraps in place  ABDOMEN:  normal bowel sounds, soft, nontender, no hepatosplenomegaly or other masses  M/S:   Gait and station unsteady, Digits and nails normal   SKIN:  Inspection/Palpation of skin and subcutaneous tissue no rash  NEURO: 2-12 in normal limits and at patient's baseline  PSYCH:  insight and judgement, memory impaired , affect and mood normal    Labs:   CBC RESULTS:   Recent Labs   Lab Test 12/10/19  0810 11/25/19  1654   WBC 5.7 6.3   RBC 3.90 3.84   HGB 11.6* 11.6*   HCT 36.1 35.4   MCV 93 92   MCH 29.7 30.2   MCHC 32.1 32.8   RDW 16.9* 17.2*   * 166       Last Basic Metabolic Panel:  Recent Labs   Lab Test 12/19/19  0630 12/17/19  1245    134   POTASSIUM 3.8 4.7   CHLORIDE 101 102   TU 9.7 9.9   CO2 28 26   BUN 22 22   CR 0.78 0.78   GLC 77 124*       Liver Function Studies -   Recent Labs   Lab Test 11/27/19  0535 10/16/19 04/02/19  1308   PROTTOTAL 7.6  --  7.4   ALBUMIN 3.7 4.0 4.1   BILITOTAL 1.2  --  0.7   ALKPHOS 135  --  88   AST 26 29 27   ALT 15 15 36       TSH   Date Value " Ref Range Status   07/26/2019 3.10 0.40 - 4.00 mU/L Final   10/25/2018 3.70 0.40 - 4.00 mU/L Final   ]    Lab Results   Component Value Date    A1C 5.1 10/25/2018    A1C 5.8 06/24/2011               ASSESSMENT/PLAN:  (I50.31) Acute diastolic congestive heart failure (H)  (primary encounter diagnosis)  (I89.0) Lymphedema of both lower extremities  (R09.02) Hypoxia  (I27.20) Pulmonary hypertension (H)  Comment: patient in TCU following hospitalization due to increased edema, dyspnea and hypoxia. She was diuresed about 15l of fluid. Since in TCU she continues on supplemental oxygen. Wt is up 6# since admission to TCU. She denies shortness of breath and is making progress with therapy. She did return to cardiology, Dr Marquez, on 12/10, who suspects she still has extra volume on board. 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   Torsemide was increased to 40mg q day on 12/17.   OCCUPATIONAL THERAPY is wrapping LEs with lymphedema wraps  Looks like O2 sats are >90%  Plan: call placed to Saritha Liriano [phone]- care coordinator for Dr Marquez   continue torsemide 40mg q day  Continue spironolacton 25mg q day, kcl 20 meq q day, nifedipine 60mg q day, sildenafiil 20 mg q day  BMP today  Daily wts  Continue wraps  Continue to attempt to monitor O2 sats   (I48.19) Persistent atrial fibrillation  Comment: anticoagulated with eliquis. During hospitalization she did have episodes of tachycardia. Metoprolol was increased to 50mg q day. HR in TCU running 70-80s.   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 She has had some pain in her hands and knees  Plan: continue remicade- per [facility] (Dr Bustillos) [phone]  and methotrexate 2.5mg tablets- take 10 tablets q Wednesday for RA    (E03.9) Hypothyroidism, unspecified type  Comment: euthryoid  Plan: no change    (R53.81) Physical deconditioning  Comment: lives in house with . There are stairs to get in and within home. She would like to go home soon. She was independent prior to admission.  At this point she is able to walk up to 250'. she is working on stairs. CPT 5/5.6. home visit to be scheduled soon  Plan: physical therapy and OCCUPATIONAL THERAPY evaluation  Likely return home in about a week with home care        Electronically signed by:  AUBRIE Tamayo CNP

## 2019-12-19 NOTE — LETTER
12/19/2019        RE: Jaqueline Canales  [address]  [facility] [address]        [facility]  [facility] Record Number:  [id number]  [facility] where encounter took place[facility] [[id number]]  Chief Complaint   Patient presents with     RECHECK       HPI:    Jaqueline Canales  is a 78 year old (1940), who is being seen today for an episodic care visit.  HPI information obtained from: facility chart records, facility staff, patient report and [facility] chart review. Today's concern is:  Brief Summary of Hospital Course: recent hospitalization due to LE edema and dyspnea and hypoxia with exertion. PMH includes atrial fibrillation (eliquis), pulmonary hypertension, CHF, hypothyroidism, rheumatoid arthritis, hypertension, lung cancer, pulmonary fibrosis/ scleroderma, lymphedema and smoking history (30 ppy)   She was admitted for acute diastolic heart failure and started on IV lasix. Cardiology was consulted and suspects decompensation likely due to pulmonary vasodilators. Cardiology  changed lasix to IV bumex and spironolactone was added.  She diuresed about 14l. she did have episodes of tachycardia so metoprolol was increased. She was transitioned to oral meds.  Due to ongoing weakness she was transferred to TCU.   Updates on Status Since Skilled nursing Admission:  She denies shortness of breath. She is working with therapy and can walk about 250'. Wt was up 3# earlier this week and 6# overall. Nursing has been unable to accurately assess O2 sats due to poor circulation. She has some short term memory issues.     Past Medical and Surgical History reviewed in Epic today.    MEDICATIONS:  Current Outpatient Medications   Medication Sig Dispense Refill     acetaminophen (TYLENOL) 500 MG tablet Take 1,000 mg by mouth 2 times daily        albuterol (PROAIR HFA/PROVENTIL HFA/VENTOLIN HFA) 108 (90 Base) MCG/ACT inhaler Inhale 2 puffs into the lungs  every 6 hours as needed for shortness of breath / dyspnea or wheezing       apixaban ANTICOAGULANT (ELIQUIS) 5 MG tablet Take 1 tablet (5 mg) by mouth 2 times daily 180 tablet 2     Bacillus Coagulans-Inulin (PROBIOTIC-PREBIOTIC PO) Take 1 chew tab by mouth 2 times daily        folic acid (FOLVITE) 1 MG tablet Take 1 tablet (1,000 mcg) by mouth daily 90 tablet 3     InFLIXimab (REMICADE IV) Inject 600 mg into the vein Every 8 weeks       levothyroxine (SYNTHROID/LEVOTHROID) 75 MCG tablet Take 1 tablet (75 mcg) by mouth daily 90 tablet 2     melatonin 5 MG tablet Take 5 mg by mouth nightly as needed for sleep       Methotrexate Sodium (METHOTREXATE PO) Take 2.5 mg by mouth 10 tablets weekly - Wednesdays       metoprolol succinate ER (TOPROL-XL) 50 MG 24 hr tablet Take 1 tablet (50 mg) by mouth daily       Multiple Vitamins-Minerals (ICAPS) CAPS Take 1 capsule by mouth 2 times daily        multivitamin (CENTRUM SILVER) tablet Take 1 tablet by mouth daily       NIFEdipine ER (ADALAT CC) 60 MG 24 hr tablet Take 60 mg by mouth daily       omeprazole (PRILOSEC) 20 MG DR capsule Take 1 capsule (20 mg) by mouth 2 times daily Take 30-60 minutes prior to meals 180 capsule 2     potassium chloride ER (K-DUR/KLOR-CON M) 20 MEQ CR tablet Take 1 tablet (20 mEq) by mouth daily       Prednisolon-Gatiflox-Bromfenac 1-0.5-0.075 % SUSP Place 1 drop into the right eye daily       psyllium (METAMUCIL/KONSYL) capsule Take 1 capsule by mouth daily       sildenafil (REVATIO) 20 MG tablet Take 1 tablet (20 mg) by mouth 3 times daily 90 tablet 11     simvastatin (ZOCOR) 40 MG tablet Take 1 tablet (40 mg) by mouth At Bedtime 90 tablet 3     Skin Protectants, Misc. (EUCERIN) cream Apply topically 2 times daily Apply to areas dry skin topically two times a day for dry skin and Apply to areas of dry skin topically as needed for dry skin daily       spironolactone (ALDACTONE) 25 MG tablet Take 1 tablet (25 mg) by mouth daily       torsemide  "(DEMADEX) 20 MG tablet Take 1 tablet (20 mg) by mouth daily (Patient taking differently: Take 40 mg by mouth daily )       triamcinolone (KENALOG) 0.1 % external cream Apply topically 2 times daily 30 g 3     Vitamin D, Cholecalciferol, 10 MCG (400 UNIT) TABS Take 1,600 Units by mouth daily           REVIEW OF SYSTEMS:  4 point ROS including Respiratory, CV, GI and , other than that noted in the HPI,  is negative    Objective:  BP 97/64   Pulse 69   Temp 96.9  F (36.1  C)   Resp 18   Ht 1.575 m (5' 2\")   Wt 92.4 kg (203 lb 9.6 oz)   SpO2 93%   BMI 37.24 kg/m     Exam:  GENERAL APPEARANCE:  Alert, in no distress  ENT:  Mouth and posterior oropharynx normal, moist mucous membranes, hearing acuity adequate   EYES:  EOM, conjunctivae, lids, pupils and irises normal  RESP:  respiratory effort and palpation of chest normal, no respiratory distress, Lung sounds rales in bases  CV:  Palpation and auscultation of heart done , rate and rhythm reg, no murmur, no rub or gallop, Edema lymphedema wraps in place  ABDOMEN:  normal bowel sounds, soft, nontender, no hepatosplenomegaly or other masses  M/S:   Gait and station unsteady, Digits and nails normal   SKIN:  Inspection/Palpation of skin and subcutaneous tissue no rash  NEURO: 2-12 in normal limits and at patient's baseline  PSYCH:  insight and judgement, memory impaired , affect and mood normal    Labs:   CBC RESULTS:   Recent Labs   Lab Test 12/10/19  0810 11/25/19  1654   WBC 5.7 6.3   RBC 3.90 3.84   HGB 11.6* 11.6*   HCT 36.1 35.4   MCV 93 92   MCH 29.7 30.2   MCHC 32.1 32.8   RDW 16.9* 17.2*   * 166       Last Basic Metabolic Panel:  Recent Labs   Lab Test 12/19/19  0630 12/17/19  1245    134   POTASSIUM 3.8 4.7   CHLORIDE 101 102   TU 9.7 9.9   CO2 28 26   BUN 22 22   CR 0.78 0.78   GLC 77 124*       Liver Function Studies -   Recent Labs   Lab Test 11/27/19  0535 10/16/19 04/02/19  1308   PROTTOTAL 7.6  --  7.4   ALBUMIN 3.7 4.0 4.1   BILITOTAL " 1.2  --  0.7   ALKPHOS 135  --  88   AST 26 29 27   ALT 15 15 36       TSH   Date Value Ref Range Status   07/26/2019 3.10 0.40 - 4.00 mU/L Final   10/25/2018 3.70 0.40 - 4.00 mU/L Final   ]    Lab Results   Component Value Date    A1C 5.1 10/25/2018    A1C 5.8 06/24/2011               ASSESSMENT/PLAN:  (I50.31) Acute diastolic congestive heart failure (H)  (primary encounter diagnosis)  (I89.0) Lymphedema of both lower extremities  (R09.02) Hypoxia  (I27.20) Pulmonary hypertension (H)  Comment: patient in TCU following hospitalization due to increased edema, dyspnea and hypoxia. She was diuresed about 15l of fluid. Since in TCU she continues on supplemental oxygen. Wt is up 6# since admission to TCU. She denies shortness of breath and is making progress with therapy. She did return to cardiology, Dr Marquez, on 12/10, who suspects she still has extra volume on board. 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   Torsemide was increased to 40mg q day on 12/17.   OCCUPATIONAL THERAPY is wrapping LEs with lymphedema wraps  Looks like O2 sats are >90%  Plan:  call placed to Saritha Liriano [phone]- care coordinator for Dr Marquez   continue torsemide 40mg q day  Continue spironolacton 25mg q day, kcl 20 meq q day, nifedipine 60mg q day, sildenafiil 20 mg q day  BMP today  Daily wts  Continue wraps  Continue to attempt to monitor O2 sats   (I48.19) Persistent atrial fibrillation  Comment: anticoagulated with eliquis. During hospitalization she did have episodes of tachycardia. Metoprolol was increased to 50mg q day. HR in TCU running 70-80s.   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 She has had some pain in her hands and knees  Plan: continue remicade- per [facility] (Dr Bustillos) [phone]  and methotrexate 2.5mg tablets- take 10 tablets q Wednesday for RA    (E03.9) Hypothyroidism, unspecified type  Comment: euthryoid  Plan: no change    (R53.81) Physical deconditioning  Comment: lives in house with . There are stairs to get in and within home. She would like to go home soon. She was independent prior to admission.  At this point she is able to walk up to 250'. she is working on stairs. CPT 5/5.6. home visit to be scheduled soon  Plan: physical therapy and OCCUPATIONAL THERAPY evaluation  Likely return home in about a week with home care        Electronically signed by:  AUBRIE Tamayo CNP               Sincerely,        AUBRIE Tamayo CNP

## 2019-12-19 NOTE — TELEPHONE ENCOUNTER
Received a call from Taty Jacob NP ([phone]), she stated that they increased her torsemide temporarily and will follow up with her next week.  I also sent the Chest CT to our ILD team and requested a follow up appointment with their team.  Saritha Liriano RN on 12/19/2019 at 3:04 PM

## 2019-12-22 ENCOUNTER — TELEPHONE (OUTPATIENT)
Dept: GERIATRICS | Facility: CLINIC | Age: 79
End: 2019-12-22

## 2019-12-22 NOTE — TELEPHONE ENCOUNTER
Patient with SBP in the 90s this weekend. HR 70-80s. On multiple cardiac meds, metoprolol recently increased to 50 mg daily due to tachycardia in the hospital.    PLAN  Decrease Metoprolol back to 25 mg PO daily F/U with primary NP tomorrow     Electronically signed by AUBRIE Smith GNP

## 2019-12-23 ENCOUNTER — NURSING HOME VISIT (OUTPATIENT)
Dept: GERIATRICS | Facility: CLINIC | Age: 79
End: 2019-12-23
Payer: MEDICARE

## 2019-12-23 VITALS
HEIGHT: 62 IN | OXYGEN SATURATION: 97 % | RESPIRATION RATE: 18 BRPM | SYSTOLIC BLOOD PRESSURE: 102 MMHG | DIASTOLIC BLOOD PRESSURE: 60 MMHG | WEIGHT: 205.8 LBS | HEART RATE: 72 BPM | BODY MASS INDEX: 37.87 KG/M2 | TEMPERATURE: 96.8 F

## 2019-12-23 DIAGNOSIS — I10 BENIGN ESSENTIAL HYPERTENSION: ICD-10-CM

## 2019-12-23 DIAGNOSIS — I48.19 PERSISTENT ATRIAL FIBRILLATION (H): ICD-10-CM

## 2019-12-23 DIAGNOSIS — M34.9 SCLERODERMA (H): ICD-10-CM

## 2019-12-23 DIAGNOSIS — E03.9 HYPOTHYROIDISM, UNSPECIFIED TYPE: ICD-10-CM

## 2019-12-23 DIAGNOSIS — R09.02 HYPOXIA: ICD-10-CM

## 2019-12-23 DIAGNOSIS — I27.20 PULMONARY HYPERTENSION (H): ICD-10-CM

## 2019-12-23 DIAGNOSIS — I89.0 LYMPHEDEMA OF BOTH LOWER EXTREMITIES: ICD-10-CM

## 2019-12-23 DIAGNOSIS — R53.81 PHYSICAL DECONDITIONING: ICD-10-CM

## 2019-12-23 DIAGNOSIS — J84.10 PULMONARY FIBROSIS (H): ICD-10-CM

## 2019-12-23 DIAGNOSIS — M06.9 RHEUMATOID ARTHRITIS, INVOLVING UNSPECIFIED SITE, UNSPECIFIED RHEUMATOID FACTOR PRESENCE: ICD-10-CM

## 2019-12-23 DIAGNOSIS — I50.31 ACUTE DIASTOLIC CONGESTIVE HEART FAILURE (H): Primary | ICD-10-CM

## 2019-12-23 PROCEDURE — 99309 SBSQ NF CARE MODERATE MDM 30: CPT | Performed by: NURSE PRACTITIONER

## 2019-12-23 RX ORDER — SPIRONOLACTONE 25 MG/1
50 TABLET ORAL DAILY
Start: 2019-12-23 | End: 2020-01-10

## 2019-12-23 RX ORDER — METOPROLOL SUCCINATE 50 MG/1
26 TABLET, EXTENDED RELEASE ORAL DAILY
Start: 2019-12-23 | End: 2020-01-10

## 2019-12-23 ASSESSMENT — MIFFLIN-ST. JEOR: SCORE: 1361.75

## 2019-12-23 NOTE — LETTER
12/23/2019        RE: Jaqueline Canales  [address]  [facility] [id number]        [place] [facility]  [place] [facility] Record Number:  [id number]  Place of Service where encounter took place:  [facility] ([facility]) [[id number]]  Chief Complaint   Patient presents with     RECHECK       HPI:    Jaquleine Canales  is a 78 year old (1940), who is being seen today for an episodic care visit.  HPI information obtained from: facility chart records, facility staff, patient report and [facility] chart review. Today's concern is:  Brief Summary of Hospital Course: recent hospitalization due to LE edema and dyspnea and hypoxia with exertion. PMH includes atrial fibrillation (eliquis), pulmonary hypertension, CHF, hypothyroidism, rheumatoid arthritis, hypertension, lung cancer, pulmonary fibrosis/ scleroderma, lymphedema and smoking history (30 ppy)   She was admitted for acute diastolic heart failure and started on IV lasix. Cardiology was consulted and suspects decompensation likely due to pulmonary vasodilators. Cardiology  changed lasix to IV bumex and spironolactone was added.  She diuresed about 14l. she did have episodes of tachycardia so metoprolol was increased. She was transitioned to oral meds.  Due to ongoing weakness she was transferred to TCU.   Updates on Status Since Skilled nursing Admission:  She denies shortness of breath. She is working with therapy and can walk about 250'. Wt was up 3# last week and 6# overall. Nursing has been unable to accurately assess O2 sats due to poor circulation. She has some short term memory issues.  over the weekend she had low BP- metoprolol was reduced.     Past Medical and Surgical History reviewed in Epic today.    MEDICATIONS:  Current Outpatient Medications   Medication Sig Dispense Refill     acetaminophen (TYLENOL) 500 MG tablet Take 1,000 mg by mouth 2 times daily        albuterol (PROAIR HFA/PROVENTIL HFA/VENTOLIN HFA) 108 (90  Base) MCG/ACT inhaler Inhale 2 puffs into the lungs every 6 hours as needed for shortness of breath / dyspnea or wheezing       apixaban ANTICOAGULANT (ELIQUIS) 5 MG tablet Take 1 tablet (5 mg) by mouth 2 times daily 180 tablet 2     Bacillus Coagulans-Inulin (PROBIOTIC-PREBIOTIC PO) Take 1 chew tab by mouth 2 times daily        folic acid (FOLVITE) 1 MG tablet Take 1 tablet (1,000 mcg) by mouth daily 90 tablet 3     InFLIXimab (REMICADE IV) Inject 600 mg into the vein Every 8 weeks       levothyroxine (SYNTHROID/LEVOTHROID) 75 MCG tablet Take 1 tablet (75 mcg) by mouth daily 90 tablet 2     melatonin 5 MG tablet Take 5 mg by mouth nightly as needed for sleep       Methotrexate Sodium (METHOTREXATE PO) Take 2.5 mg by mouth 10 tablets weekly - Wednesdays       metoprolol succinate ER (TOPROL-XL) 50 MG 24 hr tablet Take 1 tablet (50 mg) by mouth daily       Multiple Vitamins-Minerals (ICAPS) CAPS Take 1 capsule by mouth 2 times daily        multivitamin (CENTRUM SILVER) tablet Take 1 tablet by mouth daily       NIFEdipine ER (ADALAT CC) 60 MG 24 hr tablet Take 60 mg by mouth daily       omeprazole (PRILOSEC) 20 MG DR capsule Take 1 capsule (20 mg) by mouth 2 times daily Take 30-60 minutes prior to meals 180 capsule 2     potassium chloride ER (K-DUR/KLOR-CON M) 20 MEQ CR tablet Take 1 tablet (20 mEq) by mouth daily       Prednisolon-Gatiflox-Bromfenac 1-0.5-0.075 % SUSP Place 1 drop into the right eye daily       psyllium (METAMUCIL/KONSYL) capsule Take 1 capsule by mouth daily       sildenafil (REVATIO) 20 MG tablet Take 1 tablet (20 mg) by mouth 3 times daily 90 tablet 11     simvastatin (ZOCOR) 40 MG tablet Take 1 tablet (40 mg) by mouth At Bedtime 90 tablet 3     Skin Protectants, Misc. (EUCERIN) cream Apply topically 2 times daily Apply to areas dry skin topically two times a day for dry skin and Apply to areas of dry skin topically as needed for dry skin daily       spironolactone (ALDACTONE) 25 MG tablet Take 2  "tablets (50 mg) by mouth daily       torsemide (DEMADEX) 20 MG tablet Take 1 tablet (20 mg) by mouth daily (Patient taking differently: Take 40 mg by mouth daily )       triamcinolone (KENALOG) 0.1 % external cream Apply topically 2 times daily 30 g 3     Vitamin D, Cholecalciferol, 10 MCG (400 UNIT) TABS Take 1,600 Units by mouth daily           REVIEW OF SYSTEMS:  4 point ROS including Respiratory, CV, GI and , other than that noted in the HPI,  is negative    Objective:  /60   Pulse 72   Temp 96.8  F (36  C)   Resp 18   Ht 1.575 m (5' 2\")   Wt 93.4 kg (205 lb 12.8 oz)   SpO2 97%   BMI 37.64 kg/m     Exam:  GENERAL APPEARANCE:  Alert, in no distress  ENT:  Mouth and posterior oropharynx normal, moist mucous membranes, hearing acuity adequate   EYES:  EOM, conjunctivae, lids, pupils and irises normal    RESP:  respiratory effort and palpation of chest normal, no respiratory distress, Lung sounds bb rales  CV:  Palpation and auscultation of heart done , rate and rhythm reg, no murmur, no rub or gallop, Edema lymphedema wraps on   ABDOMEN:  normal bowel sounds, soft, nontender, no hepatosplenomegaly or other masses  M/S:   Gait and station steady, Digits and nails normal   SKIN:  Inspection/Palpation of skin and subcutaneous tissue no rash  NEURO: 2-12 in normal limits and at patient's baseline  PSYCH:  insight and judgement, memory mild cognitive impairment  , affect and mood normal    Labs:   CBC RESULTS:   Recent Labs   Lab Test 12/10/19  0810 11/25/19  1654   WBC 5.7 6.3   RBC 3.90 3.84   HGB 11.6* 11.6*   HCT 36.1 35.4   MCV 93 92   MCH 29.7 30.2   MCHC 32.1 32.8   RDW 16.9* 17.2*   * 166       Last Basic Metabolic Panel:  Recent Labs   Lab Test 12/19/19  0630 12/17/19  1245    134   POTASSIUM 3.8 4.7   CHLORIDE 101 102   TU 9.7 9.9   CO2 28 26   BUN 22 22   CR 0.78 0.78   GLC 77 124*       Liver Function Studies -   Recent Labs   Lab Test 11/27/19  0535 10/16/19 04/02/19  1308 "   PROTTOTAL 7.6  --  7.4   ALBUMIN 3.7 4.0 4.1   BILITOTAL 1.2  --  0.7   ALKPHOS 135  --  88   AST 26 29 27   ALT 15 15 36       TSH   Date Value Ref Range Status   07/26/2019 3.10 0.40 - 4.00 mU/L Final   10/25/2018 3.70 0.40 - 4.00 mU/L Final   ]    Lab Results   Component Value Date    A1C 5.1 10/25/2018    A1C 5.8 06/24/2011           ASSESSMENT/PLAN:  (I50.31) Acute diastolic congestive heart failure (H)  (primary encounter diagnosis)  (I89.0) Lymphedema of both lower extremities  (R09.02) Hypoxia  (I27.20) Pulmonary hypertension (H)  Comment: patient in TCU following hospitalization due to increased edema, dyspnea and hypoxia. She was diuresed about 15l of fluid. EF 50-55% as of 11/25/19. It was thought that the volume overload was related to PH meds.  Since in TCU she continues on supplemental oxygen. Wt is up 6# since admission to TCU. She denies shortness of breath and is making progress with therapy. She did return to cardiology, Dr Marquez, on 12/10, who suspects she still has extra volume on board. 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   Torsemide was increased to 40mg q day on 12/17.   OCCUPATIONAL THERAPY is wrapping LEs with lymphedema wraps. Today OCCUPATIONAL THERAPY reports circumference of LEs is down a bit.   Looks like O2 sats are >90%  Plan:    continue torsemide 40mg q day- divide dose 20mg q am and 20mg q noonl  Continue spironolacton 25mg q day, kcl 20 meq q day, nifedipine 60mg q day, sildenafiil 20 mg q day  BMP  12/24  Daily wts  Continue wraps  Continue to attempt to monitor O2 sats  Discuss diuresis with Dr Lara    (I48.19) Persistent atrial fibrillation  Comment: anticoagulated with eliquis. During hospitalization she did have episodes of tachycardia. Metoprolol was increased to 50mg q day. HR in TCU running 70-80s. 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 She has had some pain in her hands and knees  Plan: continue remicade- per [facility] (Dr Bustillos) [phone]  and methotrexate 2.5mg tablets- take 10 tablets q Wednesday for RA    (E03.9) Hypothyroidism, unspecified type  Comment: euthryoid  Plan: no change    (R53.81) Physical deconditioning  Comment: lives in house with . There are stairs to get in and within home. She would like to go home soon. She was independent prior to admission.  At this point she is able to walk up to 250'. she is working on stairs. CPT 5/5.6. home visit done last week.   Plan: physical therapy and OCCUPATIONAL THERAPY   Likely return home in about a week with home care      Electronically signed by:  AUBRIE Tamayo CNP               Sincerely,        AUBRIE Tmaayo CNP

## 2019-12-23 NOTE — PROGRESS NOTES
[place] [facility]  [place] [facility] Record Number:  [id number]  Place of Service where encounter took place:  [facility] [[id number]]  Chief Complaint   Patient presents with     RECHECK       HPI:    Jaqueline Canales  is a 78 year old (1940), who is being seen today for an episodic care visit.  HPI information obtained from: facility chart records, facility staff, patient report and [facility] chart review. Today's concern is:  Brief Summary of Hospital Course: recent hospitalization due to LE edema and dyspnea and hypoxia with exertion. PMH includes atrial fibrillation (eliquis), pulmonary hypertension, CHF, hypothyroidism, rheumatoid arthritis, hypertension, lung cancer, pulmonary fibrosis/ scleroderma, lymphedema and smoking history (30 ppy)   She was admitted for acute diastolic heart failure and started on IV lasix. Cardiology was consulted and suspects decompensation likely due to pulmonary vasodilators. Cardiology  changed lasix to IV bumex and spironolactone was added.  She diuresed about 14l. she did have episodes of tachycardia so metoprolol was increased. She was transitioned to oral meds.  Due to ongoing weakness she was transferred to TCU.   Updates on Status Since Skilled nursing Admission:  She denies shortness of breath. She is working with therapy and can walk about 250'. Wt was up 3# last week and 6# overall. Nursing has been unable to accurately assess O2 sats due to poor circulation. She has some short term memory issues. over the weekend she had low BP- metoprolol was reduced.     Past Medical and Surgical History reviewed in Epic today.    MEDICATIONS:  Current Outpatient Medications   Medication Sig Dispense Refill     acetaminophen (TYLENOL) 500 MG tablet Take 1,000 mg by mouth 2 times daily        albuterol (PROAIR HFA/PROVENTIL HFA/VENTOLIN HFA) 108 (90 Base) MCG/ACT inhaler Inhale 2 puffs into the lungs every 6 hours as needed for shortness of  breath / dyspnea or wheezing       apixaban ANTICOAGULANT (ELIQUIS) 5 MG tablet Take 1 tablet (5 mg) by mouth 2 times daily 180 tablet 2     Bacillus Coagulans-Inulin (PROBIOTIC-PREBIOTIC PO) Take 1 chew tab by mouth 2 times daily        folic acid (FOLVITE) 1 MG tablet Take 1 tablet (1,000 mcg) by mouth daily 90 tablet 3     InFLIXimab (REMICADE IV) Inject 600 mg into the vein Every 8 weeks       levothyroxine (SYNTHROID/LEVOTHROID) 75 MCG tablet Take 1 tablet (75 mcg) by mouth daily 90 tablet 2     melatonin 5 MG tablet Take 5 mg by mouth nightly as needed for sleep       Methotrexate Sodium (METHOTREXATE PO) Take 2.5 mg by mouth 10 tablets weekly - Wednesdays       metoprolol succinate ER (TOPROL-XL) 50 MG 24 hr tablet Take 1 tablet (50 mg) by mouth daily       Multiple Vitamins-Minerals (ICAPS) CAPS Take 1 capsule by mouth 2 times daily        multivitamin (CENTRUM SILVER) tablet Take 1 tablet by mouth daily       NIFEdipine ER (ADALAT CC) 60 MG 24 hr tablet Take 60 mg by mouth daily       omeprazole (PRILOSEC) 20 MG DR capsule Take 1 capsule (20 mg) by mouth 2 times daily Take 30-60 minutes prior to meals 180 capsule 2     potassium chloride ER (K-DUR/KLOR-CON M) 20 MEQ CR tablet Take 1 tablet (20 mEq) by mouth daily       Prednisolon-Gatiflox-Bromfenac 1-0.5-0.075 % SUSP Place 1 drop into the right eye daily       psyllium (METAMUCIL/KONSYL) capsule Take 1 capsule by mouth daily       sildenafil (REVATIO) 20 MG tablet Take 1 tablet (20 mg) by mouth 3 times daily 90 tablet 11     simvastatin (ZOCOR) 40 MG tablet Take 1 tablet (40 mg) by mouth At Bedtime 90 tablet 3     Skin Protectants, Misc. (EUCERIN) cream Apply topically 2 times daily Apply to areas dry skin topically two times a day for dry skin and Apply to areas of dry skin topically as needed for dry skin daily       spironolactone (ALDACTONE) 25 MG tablet Take 2 tablets (50 mg) by mouth daily       torsemide (DEMADEX) 20 MG tablet Take 1 tablet (20 mg)  "by mouth daily (Patient taking differently: Take 40 mg by mouth daily )       triamcinolone (KENALOG) 0.1 % external cream Apply topically 2 times daily 30 g 3     Vitamin D, Cholecalciferol, 10 MCG (400 UNIT) TABS Take 1,600 Units by mouth daily           REVIEW OF SYSTEMS:  4 point ROS including Respiratory, CV, GI and , other than that noted in the HPI,  is negative    Objective:  /60   Pulse 72   Temp 96.8  F (36  C)   Resp 18   Ht 1.575 m (5' 2\")   Wt 93.4 kg (205 lb 12.8 oz)   SpO2 97%   BMI 37.64 kg/m    Exam:  GENERAL APPEARANCE:  Alert, in no distress  ENT:  Mouth and posterior oropharynx normal, moist mucous membranes, hearing acuity adequate   EYES:  EOM, conjunctivae, lids, pupils and irises normal    RESP:  respiratory effort and palpation of chest normal, no respiratory distress, Lung sounds bb rales  CV:  Palpation and auscultation of heart done , rate and rhythm reg, no murmur, no rub or gallop, Edema lymphedema wraps on   ABDOMEN:  normal bowel sounds, soft, nontender, no hepatosplenomegaly or other masses  M/S:   Gait and station steady, Digits and nails normal   SKIN:  Inspection/Palpation of skin and subcutaneous tissue no rash  NEURO: 2-12 in normal limits and at patient's baseline  PSYCH:  insight and judgement, memory mild cognitive impairment  , affect and mood normal    Labs:   CBC RESULTS:   Recent Labs   Lab Test 12/10/19  0810 11/25/19  1654   WBC 5.7 6.3   RBC 3.90 3.84   HGB 11.6* 11.6*   HCT 36.1 35.4   MCV 93 92   MCH 29.7 30.2   MCHC 32.1 32.8   RDW 16.9* 17.2*   * 166       Last Basic Metabolic Panel:  Recent Labs   Lab Test 12/19/19  0630 12/17/19  1245    134   POTASSIUM 3.8 4.7   CHLORIDE 101 102   TU 9.7 9.9   CO2 28 26   BUN 22 22   CR 0.78 0.78   GLC 77 124*       Liver Function Studies -   Recent Labs   Lab Test 11/27/19  0535 10/16/19 04/02/19  1308   PROTTOTAL 7.6  --  7.4   ALBUMIN 3.7 4.0 4.1   BILITOTAL 1.2  --  0.7   ALKPHOS 135  --  88 "   AST 26 29 27   ALT 15 15 36       TSH   Date Value Ref Range Status   07/26/2019 3.10 0.40 - 4.00 mU/L Final   10/25/2018 3.70 0.40 - 4.00 mU/L Final   ]    Lab Results   Component Value Date    A1C 5.1 10/25/2018    A1C 5.8 06/24/2011           ASSESSMENT/PLAN:  (I50.31) Acute diastolic congestive heart failure (H)  (primary encounter diagnosis)  (I89.0) Lymphedema of both lower extremities  (R09.02) Hypoxia  (I27.20) Pulmonary hypertension (H)  Comment: patient in TCU following hospitalization due to increased edema, dyspnea and hypoxia. She was diuresed about 15l of fluid. EF 50-55% as of 11/25/19. It was thought that the volume overload was related to PH meds.  Since in TCU she continues on supplemental oxygen. Wt is up 6# since admission to TCU. She denies shortness of breath and is making progress with therapy. She did return to cardiology, Dr Marquez, on 12/10, who suspects she still has extra volume on board. 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   Torsemide was increased to 40mg q day on 12/17.   OCCUPATIONAL THERAPY is wrapping LEs with lymphedema wraps. Today OCCUPATIONAL THERAPY reports circumference of LEs is down a bit.   Looks like O2 sats are >90%  Plan:    continue torsemide 40mg q day- divide dose 20mg q am and 20mg q noonl  Continue spironolacton 25mg q day, kcl 20 meq q day, nifedipine 60mg q day, sildenafiil 20 mg q day  BMP 12/24  Daily wts  Continue wraps  Continue to attempt to monitor O2 sats  Discuss diuresis with Dr Lara    (I48.19) Persistent atrial fibrillation  Comment: anticoagulated with eliquis. During hospitalization she did have episodes of tachycardia. Metoprolol was increased to 50mg q day. HR in TCU running 70-80s. 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 She has had some pain in her hands and knees  Plan: continue remicade- per [facility] (Dr Bustillos) [phone]  and methotrexate 2.5mg tablets- take 10 tablets q Wednesday for RA    (E03.9) Hypothyroidism, unspecified type  Comment: euthryoid  Plan: no change    (R53.81) Physical deconditioning  Comment: lives in house with . There are stairs to get in and within home. She would like to go home soon. She was independent prior to admission.  At this point she is able to walk up to 250'. she is working on stairs. CPT 5/5.6. home visit done last week.   Plan: physical therapy and OCCUPATIONAL THERAPY   Likely return home in about a week with home care      Electronically signed by:  AUBRIE Tamayo CNP

## 2019-12-24 ENCOUNTER — HOSPITAL LABORATORY (OUTPATIENT)
Dept: OTHER | Facility: CLINIC | Age: 79
End: 2019-12-24

## 2019-12-24 ENCOUNTER — NURSING HOME VISIT (OUTPATIENT)
Dept: GERIATRICS | Facility: CLINIC | Age: 79
End: 2019-12-24
Payer: MEDICARE

## 2019-12-24 VITALS
TEMPERATURE: 97.1 F | HEIGHT: 62 IN | WEIGHT: 205.8 LBS | SYSTOLIC BLOOD PRESSURE: 117 MMHG | DIASTOLIC BLOOD PRESSURE: 75 MMHG | BODY MASS INDEX: 37.87 KG/M2 | OXYGEN SATURATION: 95 % | RESPIRATION RATE: 20 BRPM | HEART RATE: 79 BPM

## 2019-12-24 DIAGNOSIS — R09.02 HYPOXIA: ICD-10-CM

## 2019-12-24 DIAGNOSIS — I50.31 ACUTE DIASTOLIC CONGESTIVE HEART FAILURE (H): Primary | ICD-10-CM

## 2019-12-24 DIAGNOSIS — R53.81 PHYSICAL DECONDITIONING: ICD-10-CM

## 2019-12-24 DIAGNOSIS — I27.20 PULMONARY HYPERTENSION (H): ICD-10-CM

## 2019-12-24 DIAGNOSIS — J84.10 PULMONARY FIBROSIS (H): ICD-10-CM

## 2019-12-24 DIAGNOSIS — M06.9 RHEUMATOID ARTHRITIS, INVOLVING UNSPECIFIED SITE, UNSPECIFIED RHEUMATOID FACTOR PRESENCE: ICD-10-CM

## 2019-12-24 DIAGNOSIS — I89.0 LYMPHEDEMA OF BOTH LOWER EXTREMITIES: ICD-10-CM

## 2019-12-24 DIAGNOSIS — I48.19 PERSISTENT ATRIAL FIBRILLATION (H): ICD-10-CM

## 2019-12-24 DIAGNOSIS — M34.9 SCLERODERMA (H): ICD-10-CM

## 2019-12-24 DIAGNOSIS — E03.9 HYPOTHYROIDISM, UNSPECIFIED TYPE: ICD-10-CM

## 2019-12-24 DIAGNOSIS — I10 BENIGN ESSENTIAL HYPERTENSION: ICD-10-CM

## 2019-12-24 LAB
ANION GAP SERPL CALCULATED.3IONS-SCNC: 4 MMOL/L (ref 3–14)
BUN SERPL-MCNC: 15 MG/DL (ref 7–30)
CALCIUM SERPL-MCNC: 10 MG/DL (ref 8.5–10.1)
CHLORIDE SERPL-SCNC: 102 MMOL/L (ref 94–109)
CO2 SERPL-SCNC: 32 MMOL/L (ref 20–32)
CREAT SERPL-MCNC: 0.78 MG/DL (ref 0.52–1.04)
GFR SERPL CREATININE-BSD FRML MDRD: 72 ML/MIN/{1.73_M2}
GLUCOSE SERPL-MCNC: 92 MG/DL (ref 70–99)
MAGNESIUM SERPL-MCNC: 2.1 MG/DL (ref 1.6–2.3)
NT-PROBNP SERPL-MCNC: 4326 PG/ML (ref 0–450)
POTASSIUM SERPL-SCNC: 4.4 MMOL/L (ref 3.4–5.3)
SODIUM SERPL-SCNC: 138 MMOL/L (ref 133–144)

## 2019-12-24 PROCEDURE — 99309 SBSQ NF CARE MODERATE MDM 30: CPT | Performed by: NURSE PRACTITIONER

## 2019-12-24 RX ORDER — TORSEMIDE 20 MG/1
TABLET ORAL
Start: 2019-12-24 | End: 2020-01-09

## 2019-12-24 ASSESSMENT — MIFFLIN-ST. JEOR: SCORE: 1361.75

## 2019-12-24 NOTE — LETTER
12/24/2019        RE: Jaqueline Canales  [address]  [facility] [address]        [facility]  [facility] Record Number:  [id number]  Place of Service where encounter took place:  [facility] ([facility]) [[id number]]  Chief Complaint   Patient presents with     RECHECK       HPI:    Jaqueline Canales  is a 78 year old (1940), who is being seen today for an episodic care visit.  HPI information obtained from: facility chart records, facility staff, patient report and [facility] chart review. Today's concern is:  Brief Summary of Hospital Course: recent hospitalization due to LE edema and dyspnea and hypoxia with exertion. PMH includes atrial fibrillation (eliquis), pulmonary hypertension, CHF, hypothyroidism, rheumatoid arthritis, hypertension, lung cancer, pulmonary fibrosis/ scleroderma, lymphedema and smoking history (30 ppy)   She was admitted for acute diastolic heart failure and started on IV lasix. Cardiology was consulted and suspects decompensation likely due to pulmonary vasodilators. Cardiology  changed lasix to IV bumex and spironolactone was added.  She diuresed about 14l. she did have episodes of tachycardia so metoprolol was increased. She was transitioned to oral meds.  Due to ongoing weakness she was transferred to TCU.   Updates on Status Since Skilled nursing Admission:  She denies shortness of breath. She is working with therapy and can walk about 250'. Wt was up 3# last week and 6# overall. Nursing has been unable to accurately assess O2 sats due to poor circulation. She has some short term memory issues. over the weekend she had low BP- metoprolol was reduced.     Past Medical and Surgical History reviewed in Epic today.    MEDICATIONS:  Current Outpatient Medications   Medication Sig Dispense Refill     acetaminophen (TYLENOL) 500 MG tablet Take 1,000 mg by mouth 2 times daily        albuterol (PROAIR HFA/PROVENTIL HFA/VENTOLIN HFA) 108 (90  Base) MCG/ACT inhaler Inhale 2 puffs into the lungs every 6 hours as needed for shortness of breath / dyspnea or wheezing       apixaban ANTICOAGULANT (ELIQUIS) 5 MG tablet Take 1 tablet (5 mg) by mouth 2 times daily 180 tablet 2     Bacillus Coagulans-Inulin (PROBIOTIC-PREBIOTIC PO) Take 1 chew tab by mouth 2 times daily        folic acid (FOLVITE) 1 MG tablet Take 1 tablet (1,000 mcg) by mouth daily 90 tablet 3     InFLIXimab (REMICADE IV) Inject 600 mg into the vein Every 8 weeks       levothyroxine (SYNTHROID/LEVOTHROID) 75 MCG tablet Take 1 tablet (75 mcg) by mouth daily 90 tablet 2     melatonin 5 MG tablet Take 5 mg by mouth nightly as needed for sleep       Methotrexate Sodium (METHOTREXATE PO) Take 2.5 mg by mouth 10 tablets weekly - Wednesdays       metoprolol succinate ER (TOPROL-XL) 50 MG 24 hr tablet Take 0.5 tablets (25 mg) by mouth daily       Multiple Vitamins-Minerals (ICAPS) CAPS Take 1 capsule by mouth 2 times daily        multivitamin (CENTRUM SILVER) tablet Take 1 tablet by mouth daily       NIFEdipine ER (ADALAT CC) 60 MG 24 hr tablet Take 60 mg by mouth daily       omeprazole (PRILOSEC) 20 MG DR capsule Take 1 capsule (20 mg) by mouth 2 times daily Take 30-60 minutes prior to meals 180 capsule 2     potassium chloride ER (K-DUR/KLOR-CON M) 20 MEQ CR tablet Take 1 tablet (20 mEq) by mouth daily       Prednisolon-Gatiflox-Bromfenac 1-0.5-0.075 % SUSP Place 1 drop into the right eye daily       psyllium (METAMUCIL/KONSYL) capsule Take 1 capsule by mouth daily       sildenafil (REVATIO) 20 MG tablet Take 1 tablet (20 mg) by mouth 3 times daily 90 tablet 11     simvastatin (ZOCOR) 40 MG tablet Take 1 tablet (40 mg) by mouth At Bedtime 90 tablet 3     Skin Protectants, Misc. (EUCERIN) cream Apply topically 2 times daily Apply to areas dry skin topically two times a day for dry skin and Apply to areas of dry skin topically as needed for dry skin daily       spironolactone (ALDACTONE) 25 MG tablet  "Take 2 tablets (50 mg) by mouth daily (Patient taking differently: Take 25 mg by mouth daily )       torsemide (DEMADEX) 20 MG tablet Take 1 tablet (20 mg) by mouth daily       triamcinolone (KENALOG) 0.1 % external cream Apply topically 2 times daily 30 g 3     Vitamin D, Cholecalciferol, 10 MCG (400 UNIT) TABS Take 1,600 Units by mouth daily           REVIEW OF SYSTEMS:  4 point ROS including Respiratory, CV, GI and , other than that noted in the HPI,  is negative    Objective:  /75   Pulse 79   Temp 97.1  F (36.2  C)   Resp 20   Ht 1.575 m (5' 2\")   Wt 93.4 kg (205 lb 12.8 oz)   SpO2 95%   BMI 37.64 kg/m     Exam:  Exam:  GENERAL APPEARANCE:  Alert, in no distress  ENT:  Mouth and posterior oropharynx normal, moist mucous membranes, hearing acuity adequate   EYES:  EOM, conjunctivae, lids, pupils and irises normal     RESP:  respiratory effort and palpation of chest normal, no respiratory distress, Lung sounds bb rales  CV:  Palpation and auscultation of heart done , rate and rhythm reg, no murmur, no rub or gallop, Edema lymphedema wraps on   ABDOMEN:  normal bowel sounds, soft, nontender, no hepatosplenomegaly or other masses  M/S:   Gait and station steady, Digits and nails normal   SKIN:  Inspection/Palpation of skin and subcutaneous tissue no rash  NEURO: 2-12 in normal limits and at patient's baseline  PSYCH:  insight and judgement, memory mild cognitive impairment  , affect and mood normal    Labs:   CBC RESULTS:   Recent Labs   Lab Test 12/10/19  0810 11/25/19  1654   WBC 5.7 6.3   RBC 3.90 3.84   HGB 11.6* 11.6*   HCT 36.1 35.4   MCV 93 92   MCH 29.7 30.2   MCHC 32.1 32.8   RDW 16.9* 17.2*   * 166       Last Basic Metabolic Panel:  Recent Labs   Lab Test 12/24/19  1025 12/19/19  0630    134   POTASSIUM 4.4 3.8   CHLORIDE 102 101   TU 10.0 9.7   CO2 32 28   BUN 15 22   CR 0.78 0.78   GLC 92 77       Liver Function Studies -   Recent Labs   Lab Test 11/27/19  0535 10/16/19 " 04/02/19  1308   PROTTOTAL 7.6  --  7.4   ALBUMIN 3.7 4.0 4.1   BILITOTAL 1.2  --  0.7   ALKPHOS 135  --  88   AST 26 29 27   ALT 15 15 36       TSH   Date Value Ref Range Status   07/26/2019 3.10 0.40 - 4.00 mU/L Final   10/25/2018 3.70 0.40 - 4.00 mU/L Final   ]    Lab Results   Component Value Date    A1C 5.1 10/25/2018    A1C 5.8 06/24/2011     Wt Readings from Last 5 Encounters:   12/24/19 93.4 kg (205 lb 12.8 oz)   12/23/19 93.4 kg (205 lb 12.8 oz)   12/19/19 92.4 kg (203 lb 9.6 oz)   12/17/19 91.7 kg (202 lb 3.2 oz)   12/13/19 91.6 kg (202 lb)         ASSESSMENT/PLAN:  (I50.31) Acute diastolic congestive heart failure (H)  (primary encounter diagnosis)  (I89.0) Lymphedema of both lower extremities  (R09.02) Hypoxia  (I27.20) Pulmonary hypertension (H)  Comment: patient in TCU following hospitalization due to increased edema, dyspnea and hypoxia. She was diuresed about 15l of fluid. EF 50-55% as of 11/25/19. It was thought that the volume overload was related to PH meds.  Since in TCU she continues on supplemental oxygen. Wt is up 6# since admission to TCU. She denies shortness of breath and is making progress with therapy. She did return to cardiology, Dr Marquez, on 12/10, who suspects she still has extra volume on board. 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   Torsemide was increased to 40mg q day on 12/17.   OCCUPATIONAL THERAPY is wrapping LEs with lymphedema wraps. OCCUPATIONAL THERAPY reports circumference of LEs is down a bit.   Looks like O2 sats are >90%  Plan:  discussed with DR Lara  increase torsemide 40mg q q am and 20mg q noon-   Continue spironolacton 25mg q day, kcl 20 meq q day, nifedipine 60mg q day, sildenafiil 20 mg q day  BMP 12/26  Daily wts  Continue wraps  Continue to attempt to monitor O2 sats  Follow up with DR Marquez    (I48.19) Persistent atrial  fibrillation  Comment: anticoagulated with eliquis. During hospitalization she did have episodes of tachycardia. Metoprolol was increased to 50mg q day. HR in TCU running 70-80s. 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 She has had some pain in her hands and knees  Plan: continue remicade- per [facility] (Dr Bustillos) [phone]  and methotrexate 2.5mg tablets- take 10 tablets q Wednesday for RA    (E03.9) Hypothyroidism, unspecified type  Comment: euthryoid  Plan: no change    (R53.81) Physical deconditioning  Comment: lives in house with . There are stairs to get in and within home. She would like to go home soon. She was independent prior to admission.  At this point she is able to walk up to 250'. she is working on stairs. CPT 5/5.6. home visit done last week.   Plan: physical therapy and OCCUPATIONAL THERAPY   Likely return home in about a week with home care        Electronically signed by:  AUBRIE Tamayo CNP               Sincerely,        AUBRIE Tamayo CNP

## 2019-12-24 NOTE — PROGRESS NOTES
[facility]  [place] [facility] Record Number:  [id number]  Place of Service where encounter took place:  [facility] ([facility] [[id number]]  Chief Complaint   Patient presents with     RECHECK       HPI:    Jaqueline Canales  is a 78 year old (1940), who is being seen today for an episodic care visit.  HPI information obtained from: facility chart records, facility staff, patient report and [facility] chart review. Today's concern is:  Brief Summary of Hospital Course: recent hospitalization due to LE edema and dyspnea and hypoxia with exertion. PMH includes atrial fibrillation (eliquis), pulmonary hypertension, CHF, hypothyroidism, rheumatoid arthritis, hypertension, lung cancer, pulmonary fibrosis/ scleroderma, lymphedema and smoking history (30 ppy)   She was admitted for acute diastolic heart failure and started on IV lasix. Cardiology was consulted and suspects decompensation likely due to pulmonary vasodilators. Cardiology  changed lasix to IV bumex and spironolactone was added.  She diuresed about 14l. she did have episodes of tachycardia so metoprolol was increased. She was transitioned to oral meds.  Due to ongoing weakness she was transferred to TCU.   Updates on Status Since Skilled nursing Admission:  She denies shortness of breath. She is working with therapy and can walk about 250'. Wt was up 3# last week and 6# overall. Nursing has been unable to accurately assess O2 sats due to poor circulation. She has some short term memory issues. over the weekend she had low BP- metoprolol was reduced.     Past Medical and Surgical History reviewed in Epic today.    MEDICATIONS:  Current Outpatient Medications   Medication Sig Dispense Refill     acetaminophen (TYLENOL) 500 MG tablet Take 1,000 mg by mouth 2 times daily        albuterol (PROAIR HFA/PROVENTIL HFA/VENTOLIN HFA) 108 (90 Base) MCG/ACT inhaler Inhale 2 puffs into the lungs every 6 hours as needed for shortness of  breath / dyspnea or wheezing       apixaban ANTICOAGULANT (ELIQUIS) 5 MG tablet Take 1 tablet (5 mg) by mouth 2 times daily 180 tablet 2     Bacillus Coagulans-Inulin (PROBIOTIC-PREBIOTIC PO) Take 1 chew tab by mouth 2 times daily        folic acid (FOLVITE) 1 MG tablet Take 1 tablet (1,000 mcg) by mouth daily 90 tablet 3     InFLIXimab (REMICADE IV) Inject 600 mg into the vein Every 8 weeks       levothyroxine (SYNTHROID/LEVOTHROID) 75 MCG tablet Take 1 tablet (75 mcg) by mouth daily 90 tablet 2     melatonin 5 MG tablet Take 5 mg by mouth nightly as needed for sleep       Methotrexate Sodium (METHOTREXATE PO) Take 2.5 mg by mouth 10 tablets weekly - Wednesdays       metoprolol succinate ER (TOPROL-XL) 50 MG 24 hr tablet Take 0.5 tablets (25 mg) by mouth daily       Multiple Vitamins-Minerals (ICAPS) CAPS Take 1 capsule by mouth 2 times daily        multivitamin (CENTRUM SILVER) tablet Take 1 tablet by mouth daily       NIFEdipine ER (ADALAT CC) 60 MG 24 hr tablet Take 60 mg by mouth daily       omeprazole (PRILOSEC) 20 MG DR capsule Take 1 capsule (20 mg) by mouth 2 times daily Take 30-60 minutes prior to meals 180 capsule 2     potassium chloride ER (K-DUR/KLOR-CON M) 20 MEQ CR tablet Take 1 tablet (20 mEq) by mouth daily       Prednisolon-Gatiflox-Bromfenac 1-0.5-0.075 % SUSP Place 1 drop into the right eye daily       psyllium (METAMUCIL/KONSYL) capsule Take 1 capsule by mouth daily       sildenafil (REVATIO) 20 MG tablet Take 1 tablet (20 mg) by mouth 3 times daily 90 tablet 11     simvastatin (ZOCOR) 40 MG tablet Take 1 tablet (40 mg) by mouth At Bedtime 90 tablet 3     Skin Protectants, Misc. (EUCERIN) cream Apply topically 2 times daily Apply to areas dry skin topically two times a day for dry skin and Apply to areas of dry skin topically as needed for dry skin daily       spironolactone (ALDACTONE) 25 MG tablet Take 2 tablets (50 mg) by mouth daily (Patient taking differently: Take 25 mg by mouth daily )    "    torsemide (DEMADEX) 20 MG tablet Take 1 tablet (20 mg) by mouth daily       triamcinolone (KENALOG) 0.1 % external cream Apply topically 2 times daily 30 g 3     Vitamin D, Cholecalciferol, 10 MCG (400 UNIT) TABS Take 1,600 Units by mouth daily           REVIEW OF SYSTEMS:  4 point ROS including Respiratory, CV, GI and , other than that noted in the HPI,  is negative    Objective:  /75   Pulse 79   Temp 97.1  F (36.2  C)   Resp 20   Ht 1.575 m (5' 2\")   Wt 93.4 kg (205 lb 12.8 oz)   SpO2 95%   BMI 37.64 kg/m    Exam:  Exam:  GENERAL APPEARANCE:  Alert, in no distress  ENT:  Mouth and posterior oropharynx normal, moist mucous membranes, hearing acuity adequate   EYES:  EOM, conjunctivae, lids, pupils and irises normal     RESP:  respiratory effort and palpation of chest normal, no respiratory distress, Lung sounds bb rales  CV:  Palpation and auscultation of heart done , rate and rhythm reg, no murmur, no rub or gallop, Edema lymphedema wraps on   ABDOMEN:  normal bowel sounds, soft, nontender, no hepatosplenomegaly or other masses  M/S:   Gait and station steady, Digits and nails normal   SKIN:  Inspection/Palpation of skin and subcutaneous tissue no rash  NEURO: 2-12 in normal limits and at patient's baseline  PSYCH:  insight and judgement, memory mild cognitive impairment  , affect and mood normal    Labs:   CBC RESULTS:   Recent Labs   Lab Test 12/10/19  0810 11/25/19  1654   WBC 5.7 6.3   RBC 3.90 3.84   HGB 11.6* 11.6*   HCT 36.1 35.4   MCV 93 92   MCH 29.7 30.2   MCHC 32.1 32.8   RDW 16.9* 17.2*   * 166       Last Basic Metabolic Panel:  Recent Labs   Lab Test 12/24/19  1025 12/19/19  0630    134   POTASSIUM 4.4 3.8   CHLORIDE 102 101   TU 10.0 9.7   CO2 32 28   BUN 15 22   CR 0.78 0.78   GLC 92 77       Liver Function Studies -   Recent Labs   Lab Test 11/27/19  0535 10/16/19 04/02/19  1308   PROTTOTAL 7.6  --  7.4   ALBUMIN 3.7 4.0 4.1   BILITOTAL 1.2  --  0.7   ALKPHOS 135  " --  88   AST 26 29 27   ALT 15 15 36       TSH   Date Value Ref Range Status   07/26/2019 3.10 0.40 - 4.00 mU/L Final   10/25/2018 3.70 0.40 - 4.00 mU/L Final   ]    Lab Results   Component Value Date    A1C 5.1 10/25/2018    A1C 5.8 06/24/2011     Wt Readings from Last 5 Encounters:   12/24/19 93.4 kg (205 lb 12.8 oz)   12/23/19 93.4 kg (205 lb 12.8 oz)   12/19/19 92.4 kg (203 lb 9.6 oz)   12/17/19 91.7 kg (202 lb 3.2 oz)   12/13/19 91.6 kg (202 lb)         ASSESSMENT/PLAN:  (I50.31) Acute diastolic congestive heart failure (H)  (primary encounter diagnosis)  (I89.0) Lymphedema of both lower extremities  (R09.02) Hypoxia  (I27.20) Pulmonary hypertension (H)  Comment: patient in TCU following hospitalization due to increased edema, dyspnea and hypoxia. She was diuresed about 15l of fluid. EF 50-55% as of 11/25/19. It was thought that the volume overload was related to PH meds.  Since in TCU she continues on supplemental oxygen. Wt is up 6# since admission to TCU. She denies shortness of breath and is making progress with therapy. She did return to cardiology, Dr Marquez, on 12/10, who suspects she still has extra volume on board. 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   Torsemide was increased to 40mg q day on 12/17.   OCCUPATIONAL THERAPY is wrapping LEs with lymphedema wraps. OCCUPATIONAL THERAPY reports circumference of LEs is down a bit.   Looks like O2 sats are >90%  Plan:  discussed with DR Lara  increase torsemide 40mg q q am and 20mg q noon-   Continue spironolacton 25mg q day, kcl 20 meq q day, nifedipine 60mg q day, sildenafiil 20 mg q day  BMP 12/26  Daily wts  Continue wraps  Continue to attempt to monitor O2 sats  Follow up with DR Marquez    (I48.19) Persistent atrial fibrillation  Comment: anticoagulated with eliquis. During hospitalization she did have episodes of tachycardia.  Metoprolol was increased to 50mg q day. HR in TCU running 70-80s. 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 She has had some pain in her hands and knees  Plan: continue remicade- per [facility] (Dr Bustillos) [phone]  and methotrexate 2.5mg tablets- take 10 tablets q Wednesday for RA    (E03.9) Hypothyroidism, unspecified type  Comment: euthryoid  Plan: no change    (R53.81) Physical deconditioning  Comment: lives in house with . There are stairs to get in and within home. She would like to go home soon. She was independent prior to admission.  At this point she is able to walk up to 250'. she is working on stairs. CPT 5/5.6. home visit done last week.   Plan: physical therapy and OCCUPATIONAL THERAPY   Likely return home in about a week with home care        Electronically signed by:  AUBRIE Tamayo CNP

## 2019-12-26 ENCOUNTER — HOSPITAL LABORATORY (OUTPATIENT)
Dept: OTHER | Facility: CLINIC | Age: 79
End: 2019-12-26

## 2019-12-26 LAB
ANION GAP SERPL CALCULATED.3IONS-SCNC: 6 MMOL/L (ref 3–14)
BUN SERPL-MCNC: 18 MG/DL (ref 7–30)
CALCIUM SERPL-MCNC: 9.9 MG/DL (ref 8.5–10.1)
CHLORIDE SERPL-SCNC: 100 MMOL/L (ref 94–109)
CO2 SERPL-SCNC: 31 MMOL/L (ref 20–32)
CREAT SERPL-MCNC: 0.82 MG/DL (ref 0.52–1.04)
GFR SERPL CREATININE-BSD FRML MDRD: 67 ML/MIN/{1.73_M2}
GLUCOSE SERPL-MCNC: 93 MG/DL (ref 70–99)
POTASSIUM SERPL-SCNC: 3.5 MMOL/L (ref 3.4–5.3)
SODIUM SERPL-SCNC: 137 MMOL/L (ref 133–144)

## 2019-12-27 ENCOUNTER — NURSING HOME VISIT (OUTPATIENT)
Dept: GERIATRICS | Facility: CLINIC | Age: 79
End: 2019-12-27
Payer: MEDICARE

## 2019-12-27 VITALS
WEIGHT: 203.6 LBS | HEART RATE: 61 BPM | BODY MASS INDEX: 37.47 KG/M2 | TEMPERATURE: 97 F | RESPIRATION RATE: 18 BRPM | SYSTOLIC BLOOD PRESSURE: 105 MMHG | OXYGEN SATURATION: 92 % | DIASTOLIC BLOOD PRESSURE: 72 MMHG | HEIGHT: 62 IN

## 2019-12-27 DIAGNOSIS — M06.9 RHEUMATOID ARTHRITIS, INVOLVING UNSPECIFIED SITE, UNSPECIFIED RHEUMATOID FACTOR PRESENCE: ICD-10-CM

## 2019-12-27 DIAGNOSIS — E03.9 HYPOTHYROIDISM, UNSPECIFIED TYPE: ICD-10-CM

## 2019-12-27 DIAGNOSIS — I89.0 LYMPHEDEMA OF BOTH LOWER EXTREMITIES: ICD-10-CM

## 2019-12-27 DIAGNOSIS — M34.9 SCLERODERMA (H): ICD-10-CM

## 2019-12-27 DIAGNOSIS — R53.81 PHYSICAL DECONDITIONING: ICD-10-CM

## 2019-12-27 DIAGNOSIS — I27.20 PULMONARY HYPERTENSION (H): ICD-10-CM

## 2019-12-27 DIAGNOSIS — I10 BENIGN ESSENTIAL HYPERTENSION: ICD-10-CM

## 2019-12-27 DIAGNOSIS — I48.19 PERSISTENT ATRIAL FIBRILLATION (H): ICD-10-CM

## 2019-12-27 DIAGNOSIS — I50.31 ACUTE DIASTOLIC CONGESTIVE HEART FAILURE (H): Primary | ICD-10-CM

## 2019-12-27 DIAGNOSIS — J84.10 PULMONARY FIBROSIS (H): ICD-10-CM

## 2019-12-27 DIAGNOSIS — R09.02 HYPOXIA: ICD-10-CM

## 2019-12-27 PROCEDURE — 99309 SBSQ NF CARE MODERATE MDM 30: CPT | Performed by: NURSE PRACTITIONER

## 2019-12-27 ASSESSMENT — MIFFLIN-ST. JEOR: SCORE: 1351.77

## 2019-12-27 NOTE — LETTER
12/27/2019        RE: Jaqueline Canales  [address]  [facility] [address]        [facility]  [place] Medical Record Number:  [id number]  Place of Service where encounter took place:  [facility] [[id number]]  Chief Complaint   Patient presents with     RECHECK       HPI:    Jaqueline Canales  is a 78 year old (1940), who is being seen today for an episodic care visit.  HPI information obtained from: facility chart records, facility staff, patient report and [facility] chart review. Today's concern is:  Brief Summary of Hospital Course: recent hospitalization due to LE edema and dyspnea and hypoxia with exertion. PMH includes atrial fibrillation (eliquis), pulmonary hypertension, CHF, hypothyroidism, rheumatoid arthritis, hypertension, lung cancer, pulmonary fibrosis/ scleroderma, lymphedema and smoking history (30 ppy)   She was admitted for acute diastolic heart failure and started on IV lasix. Cardiology was consulted and suspects decompensation likely due to pulmonary vasodilators. Cardiology  changed lasix to IV bumex and spironolactone was added.  She diuresed about 14l. she did have episodes of tachycardia so metoprolol was increased. She was transitioned to oral meds.  Due to ongoing weakness she was transferred to TCU.   Updates on Status Since Skilled nursing Admission:  She denies shortness of breath. She is working with therapy and can walk about 250'.    Past Medical and Surgical History reviewed in Epic today.    MEDICATIONS:  Current Outpatient Medications   Medication Sig Dispense Refill     acetaminophen (TYLENOL) 500 MG tablet Take 1,000 mg by mouth 2 times daily        albuterol (PROAIR HFA/PROVENTIL HFA/VENTOLIN HFA) 108 (90 Base) MCG/ACT inhaler Inhale 2 puffs into the lungs every 6 hours as needed for shortness of breath / dyspnea or wheezing       apixaban ANTICOAGULANT (ELIQUIS) 5 MG tablet Take 1 tablet (5 mg) by mouth 2 times daily  180 tablet 2     Bacillus Coagulans-Inulin (PROBIOTIC-PREBIOTIC PO) Take 1 chew tab by mouth 2 times daily        folic acid (FOLVITE) 1 MG tablet Take 1 tablet (1,000 mcg) by mouth daily 90 tablet 3     levothyroxine (SYNTHROID/LEVOTHROID) 75 MCG tablet Take 1 tablet (75 mcg) by mouth daily 90 tablet 2     melatonin 5 MG tablet Take 5 mg by mouth nightly as needed for sleep       Methotrexate Sodium (METHOTREXATE PO) Take 2.5 mg by mouth 10 tablets weekly - Wednesdays       metoprolol succinate ER (TOPROL-XL) 50 MG 24 hr tablet Take 0.5 tablets (25 mg) by mouth daily       Multiple Vitamins-Minerals (ICAPS) CAPS Take 1 capsule by mouth 2 times daily        multivitamin (CENTRUM SILVER) tablet Take 1 tablet by mouth daily       NIFEdipine ER (ADALAT CC) 60 MG 24 hr tablet Take 60 mg by mouth daily       omeprazole (PRILOSEC) 20 MG DR capsule Take 1 capsule (20 mg) by mouth 2 times daily Take 30-60 minutes prior to meals 180 capsule 2     potassium chloride ER (K-DUR/KLOR-CON M) 20 MEQ CR tablet Take 1 tablet (20 mEq) by mouth daily       Prednisolon-Gatiflox-Bromfenac 1-0.5-0.075 % SUSP Place 1 drop into the right eye daily       psyllium (METAMUCIL/KONSYL) capsule Take 1 capsule by mouth daily       sildenafil (REVATIO) 20 MG tablet Take 1 tablet (20 mg) by mouth 3 times daily 90 tablet 11     simvastatin (ZOCOR) 40 MG tablet Take 1 tablet (40 mg) by mouth At Bedtime 90 tablet 3     Skin Protectants, Misc. (EUCERIN) cream Apply topically 2 times daily Apply to areas dry skin topically two times a day for dry skin and Apply to areas of dry skin topically as needed for dry skin daily       spironolactone (ALDACTONE) 25 MG tablet Take 2 tablets (50 mg) by mouth daily (Patient taking differently: Take 25 mg by mouth daily )       torsemide (DEMADEX) 20 MG tablet Take 40mg q am and 20mg q noon       triamcinolone (KENALOG) 0.1 % external cream Apply topically 2 times daily 30 g 3     Vitamin D, Cholecalciferol, 10 MCG  "(400 UNIT) TABS Take 1,600 Units by mouth daily       InFLIXimab (REMICADE IV) Inject 600 mg into the vein Every 8 weeks           REVIEW OF SYSTEMS:  4 point ROS including Respiratory, CV, GI and , other than that noted in the HPI,  is negative    Objective:  /72   Pulse 61   Temp 97  F (36.1  C)   Resp 18   Ht 1.575 m (5' 2\")   Wt 92.4 kg (203 lb 9.6 oz)   SpO2 92%   BMI 37.24 kg/m     Exam:  GENERAL APPEARANCE:  Alert, in no distress  ENT:  Mouth and posterior oropharynx normal, moist mucous membranes, hearing acuity adequate   EYES:  EOM, conjunctivae, lids, pupils and irises normal    RESP:  respiratory effort and palpation of chest normal, no respiratory distress, Lung sounds clear  CV:  Palpation and auscultation of heart done , rate and rhythm irreg, Edema lymphedema wraps in place  ABDOMEN:  normal bowel sounds, soft, nontender, no hepatosplenomegaly or other masses  M/S:   Gait and station unsteady, Digits and nails normal   SKIN:  Inspection/Palpation of skin and subcutaneous tissue no rash  NEURO: 2-12 in normal limits and at patient's baseline  PSYCH:  insight and judgement, memory mild cognitive impairment  , affect and mood normal    Labs:   CBC RESULTS:   Recent Labs   Lab Test 12/10/19  0810 11/25/19  1654   WBC 5.7 6.3   RBC 3.90 3.84   HGB 11.6* 11.6*   HCT 36.1 35.4   MCV 93 92   MCH 29.7 30.2   MCHC 32.1 32.8   RDW 16.9* 17.2*   * 166       Last Basic Metabolic Panel:  Recent Labs   Lab Test 12/26/19  0850 12/24/19  1025    138   POTASSIUM 3.5 4.4   CHLORIDE 100 102   TU 9.9 10.0   CO2 31 32   BUN 18 15   CR 0.82 0.78   GLC 93 92       Liver Function Studies -   Recent Labs   Lab Test 11/27/19  0535 10/16/19 04/02/19  1308   PROTTOTAL 7.6  --  7.4   ALBUMIN 3.7 4.0 4.1   BILITOTAL 1.2  --  0.7   ALKPHOS 135  --  88   AST 26 29 27   ALT 15 15 36       TSH   Date Value Ref Range Status   07/26/2019 3.10 0.40 - 4.00 mU/L Final   10/25/2018 3.70 0.40 - 4.00 mU/L Final "   ]    Lab Results   Component Value Date    A1C 5.1 10/25/2018    A1C 5.8 06/24/2011           ASSESSMENT/PLAN:  (I50.31) Acute diastolic congestive heart failure (H)  (primary encounter diagnosis)  (I89.0) Lymphedema of both lower extremities  (R09.02) Hypoxia  (I27.20) Pulmonary hypertension (H)  Comment: patient in TCU following hospitalization due to increased edema, dyspnea and hypoxia. She was diuresed about 15l of fluid. EF 50-55% as of 11/25/19. It was thought that the volume overload was related to PH meds.  Since in TCU she continues on supplemental oxygen. Wt is up 6# since admission to TCU but has recently leveled off at 203# (about 3# up). She denies shortness of breath and is making progress with therapy. She did return to cardiology, Dr Marquez, on 12/10, who suspects she still has extra volume on board. 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   Torsemide was increased to 40mg q day on 12/17 then to 40mg and 20mg q day on 12/23  OCCUPATIONAL THERAPY is wrapping LEs with lymphedema wraps.  Looks like O2 sats are >90%  Plan:   continue torsemide 40mg q q am and 20mg q noon-   Continue spironolactone 25mg q day, kcl 20 meq q day, nifedipine 60mg q day, sildenafiil 20 mg q day  BMP 12/30  Daily wts  Continue wraps  Continue to attempt to monitor O2 sats  Follow up with DR Marquez    (I48.19) Persistent atrial fibrillation  Comment: anticoagulated with eliquis. During hospitalization she did have episodes of tachycardia. Metoprolol was increased to 50mg q day. HR in TCU running 70-80s. over weekend metop was reduced due to SBP 90s.   Plan: continue metoprolol ER 25mg q day  Continue apixaban 5mg BID    (I10) Benign essential hypertension  Comment: last few BPs 98/62, 102/60, 96/68. 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 She has had some pain in her hands and knees  Plan: continue remicade- per [facility] (Dr Bustillos) [phone]  and methotrexate 2.5mg tablets- take 10 tablets q Wednesday for RA    (E03.9) Hypothyroidism, unspecified type  Comment: euthryoid  Plan: no change    (R53.81) Physical deconditioning  Comment: lives in house with . There are stairs to get in and within home. She would like to go home soon. She was independent prior to admission.  At this point she is able to walk up to 250'. she is working on stairs. CPT 5/5.6. home visit done last week.   Plan: physical therapy and OCCUPATIONAL THERAPY   Likely return home in about a week with home care      Electronically signed by:  AUBRIE Tamayo CNP               Sincerely,        AUBRIE Tamaoy CNP

## 2019-12-27 NOTE — PROGRESS NOTES
[facility]  [place] Medical Record Number:  [id number]  Place of Service where encounter took place:  [facility] [[id number]]  Chief Complaint   Patient presents with     RECHECK       HPI:    Jaqueline Canales  is a 78 year old (1940), who is being seen today for an episodic care visit.  HPI information obtained from: facility chart records, facility staff, patient report and [facility] chart review. Today's concern is:  Brief Summary of Hospital Course: recent hospitalization due to LE edema and dyspnea and hypoxia with exertion. PMH includes atrial fibrillation (eliquis), pulmonary hypertension, CHF, hypothyroidism, rheumatoid arthritis, hypertension, lung cancer, pulmonary fibrosis/ scleroderma, lymphedema and smoking history (30 ppy)   She was admitted for acute diastolic heart failure and started on IV lasix. Cardiology was consulted and suspects decompensation likely due to pulmonary vasodilators. Cardiology  changed lasix to IV bumex and spironolactone was added.  She diuresed about 14l. she did have episodes of tachycardia so metoprolol was increased. She was transitioned to oral meds.  Due to ongoing weakness she was transferred to TCU.   Updates on Status Since Skilled nursing Admission:  She denies shortness of breath. She is working with therapy and can walk about 250'.    Past Medical and Surgical History reviewed in Epic today.    MEDICATIONS:  Current Outpatient Medications   Medication Sig Dispense Refill     acetaminophen (TYLENOL) 500 MG tablet Take 1,000 mg by mouth 2 times daily        albuterol (PROAIR HFA/PROVENTIL HFA/VENTOLIN HFA) 108 (90 Base) MCG/ACT inhaler Inhale 2 puffs into the lungs every 6 hours as needed for shortness of breath / dyspnea or wheezing       apixaban ANTICOAGULANT (ELIQUIS) 5 MG tablet Take 1 tablet (5 mg) by mouth 2 times daily 180 tablet 2     Bacillus Coagulans-Inulin (PROBIOTIC-PREBIOTIC PO) Take 1 chew tab by mouth  2 times daily        folic acid (FOLVITE) 1 MG tablet Take 1 tablet (1,000 mcg) by mouth daily 90 tablet 3     levothyroxine (SYNTHROID/LEVOTHROID) 75 MCG tablet Take 1 tablet (75 mcg) by mouth daily 90 tablet 2     melatonin 5 MG tablet Take 5 mg by mouth nightly as needed for sleep       Methotrexate Sodium (METHOTREXATE PO) Take 2.5 mg by mouth 10 tablets weekly - Wednesdays       metoprolol succinate ER (TOPROL-XL) 50 MG 24 hr tablet Take 0.5 tablets (25 mg) by mouth daily       Multiple Vitamins-Minerals (ICAPS) CAPS Take 1 capsule by mouth 2 times daily        multivitamin (CENTRUM SILVER) tablet Take 1 tablet by mouth daily       NIFEdipine ER (ADALAT CC) 60 MG 24 hr tablet Take 60 mg by mouth daily       omeprazole (PRILOSEC) 20 MG DR capsule Take 1 capsule (20 mg) by mouth 2 times daily Take 30-60 minutes prior to meals 180 capsule 2     potassium chloride ER (K-DUR/KLOR-CON M) 20 MEQ CR tablet Take 1 tablet (20 mEq) by mouth daily       Prednisolon-Gatiflox-Bromfenac 1-0.5-0.075 % SUSP Place 1 drop into the right eye daily       psyllium (METAMUCIL/KONSYL) capsule Take 1 capsule by mouth daily       sildenafil (REVATIO) 20 MG tablet Take 1 tablet (20 mg) by mouth 3 times daily 90 tablet 11     simvastatin (ZOCOR) 40 MG tablet Take 1 tablet (40 mg) by mouth At Bedtime 90 tablet 3     Skin Protectants, Misc. (EUCERIN) cream Apply topically 2 times daily Apply to areas dry skin topically two times a day for dry skin and Apply to areas of dry skin topically as needed for dry skin daily       spironolactone (ALDACTONE) 25 MG tablet Take 2 tablets (50 mg) by mouth daily (Patient taking differently: Take 25 mg by mouth daily )       torsemide (DEMADEX) 20 MG tablet Take 40mg q am and 20mg q noon       triamcinolone (KENALOG) 0.1 % external cream Apply topically 2 times daily 30 g 3     Vitamin D, Cholecalciferol, 10 MCG (400 UNIT) TABS Take 1,600 Units by mouth daily       InFLIXimab (REMICADE IV) Inject 600 mg  "into the vein Every 8 weeks           REVIEW OF SYSTEMS:  4 point ROS including Respiratory, CV, GI and , other than that noted in the HPI,  is negative    Objective:  /72   Pulse 61   Temp 97  F (36.1  C)   Resp 18   Ht 1.575 m (5' 2\")   Wt 92.4 kg (203 lb 9.6 oz)   SpO2 92%   BMI 37.24 kg/m    Exam:  GENERAL APPEARANCE:  Alert, in no distress  ENT:  Mouth and posterior oropharynx normal, moist mucous membranes, hearing acuity adequate   EYES:  EOM, conjunctivae, lids, pupils and irises normal    RESP:  respiratory effort and palpation of chest normal, no respiratory distress, Lung sounds clear  CV:  Palpation and auscultation of heart done , rate and rhythm irreg, Edema lymphedema wraps in place  ABDOMEN:  normal bowel sounds, soft, nontender, no hepatosplenomegaly or other masses  M/S:   Gait and station unsteady, Digits and nails normal   SKIN:  Inspection/Palpation of skin and subcutaneous tissue no rash  NEURO: 2-12 in normal limits and at patient's baseline  PSYCH:  insight and judgement, memory mild cognitive impairment  , affect and mood normal    Labs:   CBC RESULTS:   Recent Labs   Lab Test 12/10/19  0810 11/25/19  1654   WBC 5.7 6.3   RBC 3.90 3.84   HGB 11.6* 11.6*   HCT 36.1 35.4   MCV 93 92   MCH 29.7 30.2   MCHC 32.1 32.8   RDW 16.9* 17.2*   * 166       Last Basic Metabolic Panel:  Recent Labs   Lab Test 12/26/19  0850 12/24/19  1025    138   POTASSIUM 3.5 4.4   CHLORIDE 100 102   TU 9.9 10.0   CO2 31 32   BUN 18 15   CR 0.82 0.78   GLC 93 92       Liver Function Studies -   Recent Labs   Lab Test 11/27/19  0535 10/16/19 04/02/19  1308   PROTTOTAL 7.6  --  7.4   ALBUMIN 3.7 4.0 4.1   BILITOTAL 1.2  --  0.7   ALKPHOS 135  --  88   AST 26 29 27   ALT 15 15 36       TSH   Date Value Ref Range Status   07/26/2019 3.10 0.40 - 4.00 mU/L Final   10/25/2018 3.70 0.40 - 4.00 mU/L Final   ]    Lab Results   Component Value Date    A1C 5.1 10/25/2018    A1C 5.8 06/24/2011 "           ASSESSMENT/PLAN:  (I50.31) Acute diastolic congestive heart failure (H)  (primary encounter diagnosis)  (I89.0) Lymphedema of both lower extremities  (R09.02) Hypoxia  (I27.20) Pulmonary hypertension (H)  Comment: patient in TCU following hospitalization due to increased edema, dyspnea and hypoxia. She was diuresed about 15l of fluid. EF 50-55% as of 11/25/19. It was thought that the volume overload was related to PH meds.  Since in TCU she continues on supplemental oxygen. Wt is up 6# since admission to TCU but has recently leveled off at 203# (about 3# up). She denies shortness of breath and is making progress with therapy. She did return to cardiology, Dr Marquez, on 12/10, who suspects she still has extra volume on board. 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   Torsemide was increased to 40mg q day on 12/17 then to 40mg and 20mg q day on 12/23  OCCUPATIONAL THERAPY is wrapping LEs with lymphedema wraps.  Looks like O2 sats are >90%  Plan:   continue torsemide 40mg q q am and 20mg q noon-   Continue spironolactone 25mg q day, kcl 20 meq q day, nifedipine 60mg q day, sildenafiil 20 mg q day  BMP 12/30  Daily wts  Continue wraps  Continue to attempt to monitor O2 sats  Follow up with DR Marquez    (I48.19) Persistent atrial fibrillation  Comment: anticoagulated with eliquis. During hospitalization she did have episodes of tachycardia. Metoprolol was increased to 50mg q day. HR in TCU running 70-80s. over weekend metop was reduced due to SBP 90s.   Plan: continue metoprolol ER 25mg q day  Continue apixaban 5mg BID    (I10) Benign essential hypertension  Comment: last few BPs 98/62, 102/60, 96/68. 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 She has had some pain in her hands and knees  Plan: continue remicade- per [facility] (Dr Bustillos) [phone]  and methotrexate 2.5mg tablets- take 10 tablets q Wednesday for RA    (E03.9) Hypothyroidism, unspecified type  Comment: euthryoid  Plan: no change    (R53.81) Physical deconditioning  Comment: lives in house with . There are stairs to get in and within home. She would like to go home soon. She was independent prior to admission.  At this point she is able to walk up to 250'. she is working on stairs. CPT 5/5.6. home visit done last week.   Plan: physical therapy and OCCUPATIONAL THERAPY   Likely return home in about a week with home care      Electronically signed by:  AUBRIE Tamayo CNP

## 2019-12-30 ENCOUNTER — NURSING HOME VISIT (OUTPATIENT)
Dept: GERIATRICS | Facility: CLINIC | Age: 79
End: 2019-12-30
Payer: MEDICARE

## 2019-12-30 ENCOUNTER — HOSPITAL LABORATORY (OUTPATIENT)
Dept: OTHER | Facility: CLINIC | Age: 79
End: 2019-12-30

## 2019-12-30 ENCOUNTER — DOCUMENTATION ONLY (OUTPATIENT)
Dept: CARE COORDINATION | Facility: CLINIC | Age: 79
End: 2019-12-30

## 2019-12-30 VITALS
RESPIRATION RATE: 18 BRPM | OXYGEN SATURATION: 95 % | TEMPERATURE: 98 F | WEIGHT: 203.4 LBS | HEIGHT: 62 IN | SYSTOLIC BLOOD PRESSURE: 110 MMHG | HEART RATE: 88 BPM | BODY MASS INDEX: 37.43 KG/M2 | DIASTOLIC BLOOD PRESSURE: 66 MMHG

## 2019-12-30 DIAGNOSIS — R09.02 HYPOXIA: ICD-10-CM

## 2019-12-30 DIAGNOSIS — I48.19 PERSISTENT ATRIAL FIBRILLATION (H): ICD-10-CM

## 2019-12-30 DIAGNOSIS — R53.81 PHYSICAL DECONDITIONING: ICD-10-CM

## 2019-12-30 DIAGNOSIS — I50.31 ACUTE DIASTOLIC CONGESTIVE HEART FAILURE (H): Primary | ICD-10-CM

## 2019-12-30 DIAGNOSIS — I27.20 PULMONARY HYPERTENSION (H): ICD-10-CM

## 2019-12-30 DIAGNOSIS — I89.0 LYMPHEDEMA OF BOTH LOWER EXTREMITIES: ICD-10-CM

## 2019-12-30 DIAGNOSIS — J84.10 PULMONARY FIBROSIS (H): ICD-10-CM

## 2019-12-30 DIAGNOSIS — I10 BENIGN ESSENTIAL HYPERTENSION: ICD-10-CM

## 2019-12-30 DIAGNOSIS — M06.9 RHEUMATOID ARTHRITIS, INVOLVING UNSPECIFIED SITE, UNSPECIFIED RHEUMATOID FACTOR PRESENCE: ICD-10-CM

## 2019-12-30 DIAGNOSIS — M34.9 SCLERODERMA (H): ICD-10-CM

## 2019-12-30 DIAGNOSIS — E03.9 HYPOTHYROIDISM, UNSPECIFIED TYPE: ICD-10-CM

## 2019-12-30 LAB
ANION GAP SERPL CALCULATED.3IONS-SCNC: 6 MMOL/L (ref 3–14)
BUN SERPL-MCNC: 19 MG/DL (ref 7–30)
CALCIUM SERPL-MCNC: 9.9 MG/DL (ref 8.5–10.1)
CHLORIDE SERPL-SCNC: 104 MMOL/L (ref 94–109)
CO2 SERPL-SCNC: 28 MMOL/L (ref 20–32)
CREAT SERPL-MCNC: 0.81 MG/DL (ref 0.52–1.04)
GFR SERPL CREATININE-BSD FRML MDRD: 68 ML/MIN/{1.73_M2}
GLUCOSE SERPL-MCNC: 92 MG/DL (ref 70–99)
POTASSIUM SERPL-SCNC: 3.8 MMOL/L (ref 3.4–5.3)
SODIUM SERPL-SCNC: 138 MMOL/L (ref 133–144)

## 2019-12-30 PROCEDURE — 99309 SBSQ NF CARE MODERATE MDM 30: CPT | Performed by: NURSE PRACTITIONER

## 2019-12-30 ASSESSMENT — MIFFLIN-ST. JEOR: SCORE: 1345.87

## 2019-12-30 NOTE — LETTER
12/30/2019        RE: Jaqueline Canales  [address]  [facility] [id number]        [facility]  [facility] Record Number:  [id number]  Place of Service where encounter took place:  [facility] [[id number]]  Chief Complaint   Patient presents with     RECHECK       HPI:    Jaqueline Canales  is a 79 year old (1940), who is being seen today for an episodic care visit.  HPI information obtained from: facility chart records, facility staff, patient report and [facility] chart review.     Per recent TCU provider progress notes:  78 year old female with PMH of AFib (Eliquis), pHTN, CHF, hypothyroidism, RA, HTN, lung cancer, pulmonary fibrosis/scleroderma, lymphedema.  Hospitalized for acute dHF and started IV lasix. Cards: decompensation likely due to pulmonary vasodilators, changed lasix to IV Bumex and spironolactone was added, now on Torsemide and spironolactone as well as KCL. Diuresed about 14 liters. Metoprolol increased due to tachycardia. She was transitioned to oral meds.  Due to ongoing weakness she was transferred to TCU. LE lymphedema wrapped by therapy. pHTN on sildenafil 20mg TID, nifedipine ER 60mg q day and sees Dr Marquez for f/u.  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  She continues remicade- next dose due 12/17 at [facility] (Dr Bustillos) [phone] and and methotrexate 2.5mg tablets - take 10 tablets q Wednesday for RA. Due to weight gain and edema torsemide increased to 60 mg daily and due to low BPs Metoprolol dose decreased to 25 mg daily.     Today's concern is:  Patient seen for episodic TCU visit. Note weight is down 2.6 lbs in two days with increased diuretic dose. Denies headaches, chest pain, dyspnea, bowel or bladder issues. Continues with significant LE edema - legs are wrapped. Note SBP range  and sats 95% on NC. Walks 250 ft with 4WW.     Past Medical and Surgical  History reviewed in Epic today.    MEDICATIONS:  Current Outpatient Medications   Medication Sig Dispense Refill     acetaminophen (TYLENOL) 500 MG tablet Take 1,000 mg by mouth 2 times daily        albuterol (PROAIR HFA/PROVENTIL HFA/VENTOLIN HFA) 108 (90 Base) MCG/ACT inhaler Inhale 2 puffs into the lungs every 6 hours as needed for shortness of breath / dyspnea or wheezing       apixaban ANTICOAGULANT (ELIQUIS) 5 MG tablet Take 1 tablet (5 mg) by mouth 2 times daily 180 tablet 2     Bacillus Coagulans-Inulin (PROBIOTIC-PREBIOTIC PO) Take 1 chew tab by mouth 2 times daily        folic acid (FOLVITE) 1 MG tablet Take 1 tablet (1,000 mcg) by mouth daily 90 tablet 3     levothyroxine (SYNTHROID/LEVOTHROID) 75 MCG tablet Take 1 tablet (75 mcg) by mouth daily 90 tablet 2     melatonin 5 MG tablet Take 5 mg by mouth nightly as needed for sleep       Methotrexate Sodium (METHOTREXATE PO) Take 2.5 mg by mouth 10 tablets weekly - Wednesdays       metoprolol succinate ER (TOPROL-XL) 50 MG 24 hr tablet Take 0.5 tablets (25 mg) by mouth daily       Multiple Vitamins-Minerals (ICAPS) CAPS Take 1 capsule by mouth 2 times daily        multivitamin (CENTRUM SILVER) tablet Take 1 tablet by mouth daily       NIFEdipine ER (ADALAT CC) 60 MG 24 hr tablet Take 60 mg by mouth daily       omeprazole (PRILOSEC) 20 MG DR capsule Take 1 capsule (20 mg) by mouth 2 times daily Take 30-60 minutes prior to meals 180 capsule 2     potassium chloride ER (K-DUR/KLOR-CON M) 20 MEQ CR tablet Take 1 tablet (20 mEq) by mouth daily       psyllium (METAMUCIL/KONSYL) capsule Take 1 capsule by mouth daily       sildenafil (REVATIO) 20 MG tablet Take 1 tablet (20 mg) by mouth 3 times daily 90 tablet 11     simvastatin (ZOCOR) 40 MG tablet Take 1 tablet (40 mg) by mouth At Bedtime 90 tablet 3     Skin Protectants, Misc. (EUCERIN) cream Apply topically 2 times daily Apply to areas dry skin topically two times a day for dry skin and Apply to areas of dry  "skin topically as needed for dry skin daily       spironolactone (ALDACTONE) 25 MG tablet Take 2 tablets (50 mg) by mouth daily (Patient taking differently: Take 25 mg by mouth daily )       torsemide (DEMADEX) 20 MG tablet Take 40mg q am and 20mg q noon       triamcinolone (KENALOG) 0.1 % external cream Apply topically 2 times daily 30 g 3     Vitamin D, Cholecalciferol, 10 MCG (400 UNIT) TABS Take 1,600 Units by mouth daily       InFLIXimab (REMICADE IV) Inject 600 mg into the vein Every 8 weeks       Prednisolon-Gatiflox-Bromfenac 1-0.5-0.075 % SUSP Place 1 drop into the right eye daily         REVIEW OF SYSTEMS:  10 point ROS of systems including Constitutional, Eyes, Respiratory, Cardiovascular, Gastroenterology, Genitourinary, Integumentary, Musculoskeletal, Psychiatric were all negative except for pertinent positives noted in my HPI.    Objective:  /66   Pulse 88   Temp 98  F (36.7  C)   Resp 18   Ht 1.575 m (5' 2\")   Wt 92.3 kg (203 lb 6.4 oz)   SpO2 95%   BMI 37.20 kg/m     Exam:  GENERAL APPEARANCE:  Alert, in no distress, pleasant, cooperative, oriented x self, place and recent events  EYES:  EOM, lids, pupils and irises normal, sclera clear and conjunctiva normal, no discharge or mattering on lids or lashes noted  ENT:  Mouth normal, moist mucous membranes, nose normal without drainage or crusting, external ears without lesions, hearing acuity intact  RESP:  respiratory effort normal, no chest wall tenderness, no respiratory distress, Lung sounds clear, patient is on room air  CV:  Auscultation of heart done, rate and rhythm controlled and regular, no murmur, no rub or gallop. Edema +3 both lower legs wraps in place  ABDOMEN:  normal bowel sounds, soft, nontender, no palpable masses.  M/S:   Gait and station walks with assist , no tenderness or swelling of the joints; able to move all extremities, digits normal  NEURO: cranial nerves 2-12 grossly intact, no facial asymmetry, no speech " deficits and able to follow directions, moves all extremities symmetrically  PSYCH:  insight and judgement and memory at baseline, affect and mood normal     Labs:   Most Recent 3 CBC's:  Recent Labs   Lab Test 12/10/19  0810 11/25/19  1654 10/16/19   WBC 5.7 6.3 5.1   HGB 11.6* 11.6* 12.1   MCV 93 92 98   * 166 214     Most Recent 3 BMP's:  Recent Labs   Lab Test 12/30/19  0950 12/26/19  0850 12/24/19  1025    137 138   POTASSIUM 3.8 3.5 4.4   CHLORIDE 104 100 102   CO2 28 31 32   BUN 19 18 15   CR 0.81 0.82 0.78   ANIONGAP 6 6 4   TU 9.9 9.9 10.0   GLC 92 93 92     Most Recent TSH and T4:  Recent Labs   Lab Test 07/26/19  0934  08/17/15  0909   TSH 3.10   < > 4.53*   T4  --   --  0.96    < > = values in this interval not displayed.     Most Recent Hemoglobin A1c:  Recent Labs   Lab Test 10/25/18  0759   A1C 5.1       ASSESSMENT/PLAN:  Acute diastolic congestive heart failure (H)  Lymphedema of both lower extremities  Hypoxia  Benign essential hypertension  Pulmonary hypertension (H)  Acute on chronic, improving at this time. Continue meds, vs, wt, wraps as ordered and cards f/u per routine. Repeat BMP next week. Monitor, f/u for any changes in status.     Persistent atrial fibrillation  Chronic, continues on Eliquis and rate meds as above. Monitor.     Rheumatoid arthritis, involving unspecified site, unspecified rheumatoid factor presence (H)  Scleroderma (H)  Pulmonary fibrosis (H)  Chronic, meds and monitoring as PTA.     Hypothyroidism, unspecified type  Chronic, well replaced. Monitor per PCP routine and meds as above.     Physical deconditioning  Acute on chronic, therapies - f/u with progress next week.     Orders written by provider at facility  BMP check one week diagnosis HF    Electronically signed by:  AUBRIE Smith CNP               Sincerely,        AUBRIE Smith CNP

## 2019-12-30 NOTE — PROGRESS NOTES
[facility]  [facility] Record Number:  [id number]  Place of Service where encounter took place:  [facility] ([facility]) [[id number]]  Chief Complaint   Patient presents with     RECHECK       HPI:    Jaqueline Canales  is a 79 year old (1940), who is being seen today for an episodic care visit.  HPI information obtained from: facility chart records, facility staff, patient report and [facility] chart review.     Per recent TCU provider progress notes:  78 year old female with PMH of AFib (Eliquis), pHTN, CHF, hypothyroidism, RA, HTN, lung cancer, pulmonary fibrosis/scleroderma, lymphedema.  Hospitalized for acute dHF and started IV lasix. Cards: decompensation likely due to pulmonary vasodilators, changed lasix to IV Bumex and spironolactone was added, now on Torsemide and spironolactone as well as KCL. Diuresed about 14 liters. Metoprolol increased due to tachycardia. She was transitioned to oral meds.  Due to ongoing weakness she was transferred to TCU. LE lymphedema wrapped by therapy. pHTN on sildenafil 20mg TID, nifedipine ER 60mg q day and sees Dr Marquez for f/u.  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  She continues remicade- next dose due 12/17 at [facility] (Dr Bustillos) [phone] and and methotrexate 2.5mg tablets - take 10 tablets q Wednesday for RA. Due to weight gain and edema torsemide increased to 60 mg daily and due to low BPs Metoprolol dose decreased to 25 mg daily.     Today's concern is:  Patient seen for episodic TCU visit. Note weight is down 2.6 lbs in two days with increased diuretic dose. Denies headaches, chest pain, dyspnea, bowel or bladder issues. Continues with significant LE edema - legs are wrapped. Note SBP range  and sats 95% on NC. Walks 250 ft with 4WW.     Past Medical and Surgical History reviewed in Epic today.    MEDICATIONS:  Current Outpatient Medications   Medication  Sig Dispense Refill     acetaminophen (TYLENOL) 500 MG tablet Take 1,000 mg by mouth 2 times daily        albuterol (PROAIR HFA/PROVENTIL HFA/VENTOLIN HFA) 108 (90 Base) MCG/ACT inhaler Inhale 2 puffs into the lungs every 6 hours as needed for shortness of breath / dyspnea or wheezing       apixaban ANTICOAGULANT (ELIQUIS) 5 MG tablet Take 1 tablet (5 mg) by mouth 2 times daily 180 tablet 2     Bacillus Coagulans-Inulin (PROBIOTIC-PREBIOTIC PO) Take 1 chew tab by mouth 2 times daily        folic acid (FOLVITE) 1 MG tablet Take 1 tablet (1,000 mcg) by mouth daily 90 tablet 3     levothyroxine (SYNTHROID/LEVOTHROID) 75 MCG tablet Take 1 tablet (75 mcg) by mouth daily 90 tablet 2     melatonin 5 MG tablet Take 5 mg by mouth nightly as needed for sleep       Methotrexate Sodium (METHOTREXATE PO) Take 2.5 mg by mouth 10 tablets weekly - Wednesdays       metoprolol succinate ER (TOPROL-XL) 50 MG 24 hr tablet Take 0.5 tablets (25 mg) by mouth daily       Multiple Vitamins-Minerals (ICAPS) CAPS Take 1 capsule by mouth 2 times daily        multivitamin (CENTRUM SILVER) tablet Take 1 tablet by mouth daily       NIFEdipine ER (ADALAT CC) 60 MG 24 hr tablet Take 60 mg by mouth daily       omeprazole (PRILOSEC) 20 MG DR capsule Take 1 capsule (20 mg) by mouth 2 times daily Take 30-60 minutes prior to meals 180 capsule 2     potassium chloride ER (K-DUR/KLOR-CON M) 20 MEQ CR tablet Take 1 tablet (20 mEq) by mouth daily       psyllium (METAMUCIL/KONSYL) capsule Take 1 capsule by mouth daily       sildenafil (REVATIO) 20 MG tablet Take 1 tablet (20 mg) by mouth 3 times daily 90 tablet 11     simvastatin (ZOCOR) 40 MG tablet Take 1 tablet (40 mg) by mouth At Bedtime 90 tablet 3     Skin Protectants, Misc. (EUCERIN) cream Apply topically 2 times daily Apply to areas dry skin topically two times a day for dry skin and Apply to areas of dry skin topically as needed for dry skin daily       spironolactone (ALDACTONE) 25 MG tablet Take 2  "tablets (50 mg) by mouth daily (Patient taking differently: Take 25 mg by mouth daily )       torsemide (DEMADEX) 20 MG tablet Take 40mg q am and 20mg q noon       triamcinolone (KENALOG) 0.1 % external cream Apply topically 2 times daily 30 g 3     Vitamin D, Cholecalciferol, 10 MCG (400 UNIT) TABS Take 1,600 Units by mouth daily       InFLIXimab (REMICADE IV) Inject 600 mg into the vein Every 8 weeks       Prednisolon-Gatiflox-Bromfenac 1-0.5-0.075 % SUSP Place 1 drop into the right eye daily         REVIEW OF SYSTEMS:  10 point ROS of systems including Constitutional, Eyes, Respiratory, Cardiovascular, Gastroenterology, Genitourinary, Integumentary, Musculoskeletal, Psychiatric were all negative except for pertinent positives noted in my HPI.    Objective:  /66   Pulse 88   Temp 98  F (36.7  C)   Resp 18   Ht 1.575 m (5' 2\")   Wt 92.3 kg (203 lb 6.4 oz)   SpO2 95%   BMI 37.20 kg/m    Exam:  GENERAL APPEARANCE:  Alert, in no distress, pleasant, cooperative, oriented x self, place and recent events  EYES:  EOM, lids, pupils and irises normal, sclera clear and conjunctiva normal, no discharge or mattering on lids or lashes noted  ENT:  Mouth normal, moist mucous membranes, nose normal without drainage or crusting, external ears without lesions, hearing acuity intact  RESP:  respiratory effort normal, no chest wall tenderness, no respiratory distress, Lung sounds clear, patient is on room air  CV:  Auscultation of heart done, rate and rhythm controlled and regular, no murmur, no rub or gallop. Edema +3 both lower legs wraps in place  ABDOMEN:  normal bowel sounds, soft, nontender, no palpable masses.  M/S:   Gait and station walks with assist , no tenderness or swelling of the joints; able to move all extremities, digits normal  NEURO: cranial nerves 2-12 grossly intact, no facial asymmetry, no speech deficits and able to follow directions, moves all extremities symmetrically  PSYCH:  insight and " judgement and memory at baseline, affect and mood normal     Labs:   Most Recent 3 CBC's:  Recent Labs   Lab Test 12/10/19  0810 11/25/19  1654 10/16/19   WBC 5.7 6.3 5.1   HGB 11.6* 11.6* 12.1   MCV 93 92 98   * 166 214     Most Recent 3 BMP's:  Recent Labs   Lab Test 12/30/19  0950 12/26/19  0850 12/24/19  1025    137 138   POTASSIUM 3.8 3.5 4.4   CHLORIDE 104 100 102   CO2 28 31 32   BUN 19 18 15   CR 0.81 0.82 0.78   ANIONGAP 6 6 4   TU 9.9 9.9 10.0   GLC 92 93 92     Most Recent TSH and T4:  Recent Labs   Lab Test 07/26/19  0934  08/17/15  0909   TSH 3.10   < > 4.53*   T4  --   --  0.96    < > = values in this interval not displayed.     Most Recent Hemoglobin A1c:  Recent Labs   Lab Test 10/25/18  0759   A1C 5.1       ASSESSMENT/PLAN:  Acute diastolic congestive heart failure (H)  Lymphedema of both lower extremities  Hypoxia  Benign essential hypertension  Pulmonary hypertension (H)  Acute on chronic, improving at this time. Continue meds, vs, wt, wraps as ordered and cards f/u per routine. Repeat BMP next week. Monitor, f/u for any changes in status.     Persistent atrial fibrillation  Chronic, continues on Eliquis and rate meds as above. Monitor.     Rheumatoid arthritis, involving unspecified site, unspecified rheumatoid factor presence (H)  Scleroderma (H)  Pulmonary fibrosis (H)  Chronic, meds and monitoring as PTA.     Hypothyroidism, unspecified type  Chronic, well replaced. Monitor per PCP routine and meds as above.     Physical deconditioning  Acute on chronic, therapies - f/u with progress next week.     Orders written by provider at facility  BMP check one week diagnosis HF    Electronically signed by:  AUBRIE Smith CNP

## 2019-12-31 ENCOUNTER — HOSPITAL LABORATORY (OUTPATIENT)
Dept: OTHER | Facility: CLINIC | Age: 79
End: 2019-12-31

## 2019-12-31 LAB
ANION GAP SERPL CALCULATED.3IONS-SCNC: 4 MMOL/L (ref 3–14)
BUN SERPL-MCNC: 20 MG/DL (ref 7–30)
CALCIUM SERPL-MCNC: 9.7 MG/DL (ref 8.5–10.1)
CHLORIDE SERPL-SCNC: 102 MMOL/L (ref 94–109)
CO2 SERPL-SCNC: 32 MMOL/L (ref 20–32)
CREAT SERPL-MCNC: 0.8 MG/DL (ref 0.52–1.04)
GFR SERPL CREATININE-BSD FRML MDRD: 69 ML/MIN/{1.73_M2}
GLUCOSE SERPL-MCNC: 99 MG/DL (ref 70–99)
MAGNESIUM SERPL-MCNC: 2.2 MG/DL (ref 1.6–2.3)
NT-PROBNP SERPL-MCNC: 3249 PG/ML (ref 0–450)
POTASSIUM SERPL-SCNC: 3.7 MMOL/L (ref 3.4–5.3)
SODIUM SERPL-SCNC: 138 MMOL/L (ref 133–144)

## 2020-01-06 ENCOUNTER — HOSPITAL LABORATORY (OUTPATIENT)
Dept: OTHER | Facility: CLINIC | Age: 80
End: 2020-01-06

## 2020-01-06 LAB
ANION GAP SERPL CALCULATED.3IONS-SCNC: 9 MMOL/L (ref 3–14)
BUN SERPL-MCNC: 28 MG/DL (ref 7–30)
CALCIUM SERPL-MCNC: 10.2 MG/DL (ref 8.5–10.1)
CHLORIDE SERPL-SCNC: 99 MMOL/L (ref 94–109)
CO2 SERPL-SCNC: 27 MMOL/L (ref 20–32)
CREAT SERPL-MCNC: 0.96 MG/DL (ref 0.52–1.04)
GFR SERPL CREATININE-BSD FRML MDRD: 56 ML/MIN/{1.73_M2}
GLUCOSE SERPL-MCNC: 93 MG/DL (ref 70–99)
POTASSIUM SERPL-SCNC: 4 MMOL/L (ref 3.4–5.3)
SODIUM SERPL-SCNC: 135 MMOL/L (ref 133–144)

## 2020-01-07 LAB
MAGNESIUM SERPL-MCNC: 2.4 MG/DL (ref 1.6–2.3)
NT-PROBNP SERPL-MCNC: 4103 PG/ML (ref 0–450)

## 2020-01-09 ENCOUNTER — NURSING HOME VISIT (OUTPATIENT)
Dept: GERIATRICS | Facility: CLINIC | Age: 80
End: 2020-01-09
Payer: MEDICARE

## 2020-01-09 VITALS
OXYGEN SATURATION: 94 % | DIASTOLIC BLOOD PRESSURE: 63 MMHG | TEMPERATURE: 97.2 F | BODY MASS INDEX: 35.11 KG/M2 | HEIGHT: 62 IN | RESPIRATION RATE: 20 BRPM | SYSTOLIC BLOOD PRESSURE: 121 MMHG | WEIGHT: 190.8 LBS | HEART RATE: 94 BPM

## 2020-01-09 DIAGNOSIS — M34.9 SCLERODERMA (H): ICD-10-CM

## 2020-01-09 DIAGNOSIS — I50.31 ACUTE DIASTOLIC CONGESTIVE HEART FAILURE (H): Primary | ICD-10-CM

## 2020-01-09 DIAGNOSIS — I27.20 PULMONARY HYPERTENSION (H): ICD-10-CM

## 2020-01-09 DIAGNOSIS — J84.10 PULMONARY FIBROSIS (H): ICD-10-CM

## 2020-01-09 DIAGNOSIS — I48.19 PERSISTENT ATRIAL FIBRILLATION (H): ICD-10-CM

## 2020-01-09 DIAGNOSIS — E03.9 HYPOTHYROIDISM, UNSPECIFIED TYPE: ICD-10-CM

## 2020-01-09 DIAGNOSIS — M06.9 RHEUMATOID ARTHRITIS, INVOLVING UNSPECIFIED SITE, UNSPECIFIED RHEUMATOID FACTOR PRESENCE: ICD-10-CM

## 2020-01-09 DIAGNOSIS — I10 BENIGN ESSENTIAL HYPERTENSION: ICD-10-CM

## 2020-01-09 DIAGNOSIS — I89.0 LYMPHEDEMA OF BOTH LOWER EXTREMITIES: ICD-10-CM

## 2020-01-09 DIAGNOSIS — R09.02 HYPOXIA: ICD-10-CM

## 2020-01-09 DIAGNOSIS — R53.81 PHYSICAL DECONDITIONING: ICD-10-CM

## 2020-01-09 PROCEDURE — 99309 SBSQ NF CARE MODERATE MDM 30: CPT | Performed by: NURSE PRACTITIONER

## 2020-01-09 RX ORDER — TORSEMIDE 20 MG/1
40 TABLET ORAL DAILY
Start: 2020-01-09 | End: 2020-01-10

## 2020-01-09 ASSESSMENT — MIFFLIN-ST. JEOR: SCORE: 1288.71

## 2020-01-09 NOTE — PROGRESS NOTES
[facility]  [place] [facility] Record Number:  [id number]  Place of Service where encounter took place:  [facility] [[id number]]  Chief Complaint   Patient presents with     RECHECK       HPI:    Jaqueline Canales  is a 79 year old (1940), who is being seen today for an episodic care visit.  HPI information obtained from: facility chart records, facility staff, patient report and [facility] chart review. Today's concern is:  Brief Summary of Hospital Course: recent hospitalization due to LE edema and dyspnea and hypoxia with exertion. PMH includes atrial fibrillation (eliquis), pulmonary hypertension, CHF, hypothyroidism, rheumatoid arthritis, hypertension, lung cancer, pulmonary fibrosis/ scleroderma, lymphedema and smoking history (30 ppy)   She was admitted for acute diastolic heart failure and started on IV lasix. Cardiology was consulted and suspects decompensation likely due to pulmonary vasodilators. Cardiology  changed lasix to IV bumex and spironolactone was added.  She diuresed about 14l. she did have episodes of tachycardia so metoprolol was increased. She was transitioned to oral meds.  Due to ongoing weakness she was transferred to TCU.   Updates on Status Since Skilled nursing Admission:  She denies shortness of breath. She is working with therapy and can walk about 300'. She has been weaned from oxygen.     Past Medical and Surgical History reviewed in Epic today.    MEDICATIONS:  Current Outpatient Medications   Medication Sig Dispense Refill     acetaminophen (TYLENOL) 500 MG tablet Take 1,000 mg by mouth 2 times daily        albuterol (PROAIR HFA/PROVENTIL HFA/VENTOLIN HFA) 108 (90 Base) MCG/ACT inhaler Inhale 2 puffs into the lungs every 6 hours as needed for shortness of breath / dyspnea or wheezing       apixaban ANTICOAGULANT (ELIQUIS) 5 MG tablet Take 1 tablet (5 mg) by mouth 2 times daily 180 tablet 2     Bacillus Coagulans-Inulin  (PROBIOTIC-PREBIOTIC PO) Take 1 chew tab by mouth 2 times daily        folic acid (FOLVITE) 1 MG tablet Take 1 tablet (1,000 mcg) by mouth daily 90 tablet 3     InFLIXimab (REMICADE IV) Inject 600 mg into the vein Every 8 weeks       levothyroxine (SYNTHROID/LEVOTHROID) 75 MCG tablet Take 1 tablet (75 mcg) by mouth daily 90 tablet 2     melatonin 5 MG tablet Take 5 mg by mouth nightly as needed for sleep       Methotrexate Sodium (METHOTREXATE PO) Take 2.5 mg by mouth 10 tablets weekly - Wednesdays       metoprolol succinate ER (TOPROL-XL) 50 MG 24 hr tablet Take 0.5 tablets (25 mg) by mouth daily       Multiple Vitamins-Minerals (ICAPS) CAPS Take 1 capsule by mouth 2 times daily        multivitamin (CENTRUM SILVER) tablet Take 1 tablet by mouth daily       NIFEdipine ER (ADALAT CC) 60 MG 24 hr tablet Take 60 mg by mouth daily       omeprazole (PRILOSEC) 20 MG DR capsule Take 1 capsule (20 mg) by mouth 2 times daily Take 30-60 minutes prior to meals 180 capsule 2     potassium chloride ER (K-DUR/KLOR-CON M) 20 MEQ CR tablet Take 1 tablet (20 mEq) by mouth daily       Prednisolon-Gatiflox-Bromfenac 1-0.5-0.075 % SUSP Place 1 drop into the right eye daily       psyllium (METAMUCIL/KONSYL) capsule Take 1 capsule by mouth daily       sildenafil (REVATIO) 20 MG tablet Take 1 tablet (20 mg) by mouth 3 times daily 90 tablet 11     simvastatin (ZOCOR) 40 MG tablet Take 1 tablet (40 mg) by mouth At Bedtime 90 tablet 3     Skin Protectants, Misc. (EUCERIN) cream Apply topically 2 times daily Apply to areas dry skin topically two times a day for dry skin and Apply to areas of dry skin topically as needed for dry skin daily       spironolactone (ALDACTONE) 25 MG tablet Take 2 tablets (50 mg) by mouth daily (Patient taking differently: Take 25 mg by mouth daily )       torsemide (DEMADEX) 20 MG tablet Take 40mg q am and 20mg q noon       triamcinolone (KENALOG) 0.1 % external cream Apply topically 2 times daily 30 g 3      "Vitamin D, Cholecalciferol, 10 MCG (400 UNIT) TABS Take 1,600 Units by mouth daily       Wt Readings from Last 5 Encounters:   01/09/20 86.5 kg (190 lb 12.8 oz)   12/30/19 92.3 kg (203 lb 6.4 oz)   12/27/19 92.4 kg (203 lb 9.6 oz)   12/24/19 93.4 kg (205 lb 12.8 oz)   12/23/19 93.4 kg (205 lb 12.8 oz)       REVIEW OF SYSTEMS:  4 point ROS including Respiratory, CV, GI and , other than that noted in the HPI,  is negative    Objective:  /63   Pulse 94   Temp 97.2  F (36.2  C)   Resp 20   Ht 1.575 m (5' 2\")   Wt 86.5 kg (190 lb 12.8 oz)   SpO2 94%   BMI 34.90 kg/m    Exam:  GENERAL APPEARANCE:  Alert, in no distress  ENT:  Mouth and posterior oropharynx normal, moist mucous membranes, hearing acuity adequate   EYES:  EOM, conjunctivae, lids, pupils and irises normal  NECK:  No adenopathy,masses or thyromegaly  RESP:  respiratory effort and palpation of chest normal, no respiratory distress, Lung sounds BB rales  CV:  Palpation and auscultation of heart done , rate and rhythm reg, no murmur, no rub or gallop, Edema trace  ABDOMEN:  normal bowel sounds, soft, nontender, no hepatosplenomegaly or other masses  M/S:   Gait and station steady, Digits and nails normal   SKIN:  Inspection/Palpation of skin and subcutaneous tissue no rash  NEURO: 2-12 in normal limits and at patient's baseline  PSYCH:  insight and judgement, memory intact , affect and mood normal    Labs:   CBC RESULTS:   Recent Labs   Lab Test 12/10/19  0810 11/25/19  1654   WBC 5.7 6.3   RBC 3.90 3.84   HGB 11.6* 11.6*   HCT 36.1 35.4   MCV 93 92   MCH 29.7 30.2   MCHC 32.1 32.8   RDW 16.9* 17.2*   * 166       Last Basic Metabolic Panel:  Recent Labs   Lab Test 01/06/20  1758 12/31/19  0700    138   POTASSIUM 4.0 3.7   CHLORIDE 99 102   TU 10.2* 9.7   CO2 27 32   BUN 28 20   CR 0.96 0.80   GLC 93 99       Liver Function Studies -   Recent Labs   Lab Test 11/27/19  0535 10/16/19 04/02/19  1308   PROTTOTAL 7.6  --  7.4   ALBUMIN 3.7 " 4.0 4.1   BILITOTAL 1.2  --  0.7   ALKPHOS 135  --  88   AST 26 29 27   ALT 15 15 36       TSH   Date Value Ref Range Status   07/26/2019 3.10 0.40 - 4.00 mU/L Final   10/25/2018 3.70 0.40 - 4.00 mU/L Final   ]    Lab Results   Component Value Date    A1C 5.1 10/25/2018    A1C 5.8 06/24/2011         ASSESSMENT/PLAN:  (I50.31) Acute diastolic congestive heart failure (H)  (primary encounter diagnosis)  (I89.0) Lymphedema of both lower extremities  (R09.02) Hypoxia  (I27.20) Pulmonary hypertension (H)  Comment: patient in TCU following hospitalization due to increased edema, dyspnea and hypoxia. She was diuresed about 15l of fluid. EF 50-55% as of 11/25/19. It was thought that the volume overload was related to PH meds.  Since in TCU she continues on supplemental oxygen. Wt is up 6# since admission to TCU but has recently leveled off at 203# (about 3# up). She denies shortness of breath and is making progress with therapy. She did return to cardiology, Dr Marquez, on 12/10, who suspects she still has extra volume on board. 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   Torsemide was increased to 40mg q day on 12/17 then to 40mg and 20mg q day on 12/23  OCCUPATIONAL THERAPY have been wrapping LEs with lymphedema wraps. Now swelling is much reduced and only needs TG shapes on LEs.   Wt is now down by 10# since admission.   She has been weaned from oxygen. O2 sats on RA >90%  Plan:   reduce torsemide 40mg q q am  Continue spironolactone 25mg q day, kcl 20 meq q day, nifedipine 60mg q day, sildenafiil 20 mg q day  BMP 1/10  Daily wts  Continue TG shapes  Follow up with DR Marquez on 1/14    (I48.19) Persistent atrial fibrillation  Comment: anticoagulated with eliquis. During hospitalization she did have episodes of tachycardia. Metoprolol was increased to 50mg q day. HR in TCU running 70-80s. over weekend  metop was reduced due to SBP 90s.   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 She has had some pain in her hands and knees and shoulders.   Plan: continue remicade- per [facility] (Dr Bustillos) [phone] upon discharge from TCU  and methotrexate 2.5mg tablets- take 10 tablets q Wednesday for RA    (E03.9) Hypothyroidism, unspecified type  Comment: euthryoid  Plan: no change    (R53.81) Physical deconditioning  Comment: lives in house with . There are stairs to get in and within home. She would like to go home soon. She was independent prior to admission.  At this point she is able to walk up to 250'. she is working on stairs. CPT 5/5.6. home visit done last week. for some reason she does not remember this. OCCUPATIONAL THERAPY to review home equipment needs- bath chair, walker, etc  Plan: physical therapy and OCCUPATIONAL THERAPY   Likely return home on 1/13      Electronically signed by:  AUBRIE Tamayo CNP

## 2020-01-09 NOTE — LETTER
1/9/2020        RE: Jaqueline Canales  [address]  [facility] [address]        [facility]  [facility] Record Number:  [id number]  [facility] of Service where encounter took place:  [facility] [[id number]]  Chief Complaint   Patient presents with     RECHECK       HPI:    Jaqueline Canales  is a 79 year old (1940), who is being seen today for an episodic care visit.  HPI information obtained from: facility chart records, facility staff, patient report and [facility] chart review. Today's concern is:  Brief Summary of Hospital Course: recent hospitalization due to LE edema and dyspnea and hypoxia with exertion. PMH includes atrial fibrillation (eliquis), pulmonary hypertension, CHF, hypothyroidism, rheumatoid arthritis, hypertension, lung cancer, pulmonary fibrosis/ scleroderma, lymphedema and smoking history (30 ppy)   She was admitted for acute diastolic heart failure and started on IV lasix. Cardiology was consulted and suspects decompensation likely due to pulmonary vasodilators. Cardiology  changed lasix to IV bumex and spironolactone was added.  She diuresed about 14l. she did have episodes of tachycardia so metoprolol was increased. She was transitioned to oral meds.  Due to ongoing weakness she was transferred to TCU.   Updates on Status Since Skilled nursing Admission:  She denies shortness of breath. She is working with therapy and can walk about 300'. She has been weaned from oxygen.     Past Medical and Surgical History reviewed in Epic today.    MEDICATIONS:  Current Outpatient Medications   Medication Sig Dispense Refill     acetaminophen (TYLENOL) 500 MG tablet Take 1,000 mg by mouth 2 times daily        albuterol (PROAIR HFA/PROVENTIL HFA/VENTOLIN HFA) 108 (90 Base) MCG/ACT inhaler Inhale 2 puffs into the lungs every 6 hours as needed for shortness of breath / dyspnea or wheezing       apixaban ANTICOAGULANT (ELIQUIS) 5 MG tablet Take 1  tablet (5 mg) by mouth 2 times daily 180 tablet 2     Bacillus Coagulans-Inulin (PROBIOTIC-PREBIOTIC PO) Take 1 chew tab by mouth 2 times daily        folic acid (FOLVITE) 1 MG tablet Take 1 tablet (1,000 mcg) by mouth daily 90 tablet 3     InFLIXimab (REMICADE IV) Inject 600 mg into the vein Every 8 weeks       levothyroxine (SYNTHROID/LEVOTHROID) 75 MCG tablet Take 1 tablet (75 mcg) by mouth daily 90 tablet 2     melatonin 5 MG tablet Take 5 mg by mouth nightly as needed for sleep       Methotrexate Sodium (METHOTREXATE PO) Take 2.5 mg by mouth 10 tablets weekly - Wednesdays       metoprolol succinate ER (TOPROL-XL) 50 MG 24 hr tablet Take 0.5 tablets (25 mg) by mouth daily       Multiple Vitamins-Minerals (ICAPS) CAPS Take 1 capsule by mouth 2 times daily        multivitamin (CENTRUM SILVER) tablet Take 1 tablet by mouth daily       NIFEdipine ER (ADALAT CC) 60 MG 24 hr tablet Take 60 mg by mouth daily       omeprazole (PRILOSEC) 20 MG DR capsule Take 1 capsule (20 mg) by mouth 2 times daily Take 30-60 minutes prior to meals 180 capsule 2     potassium chloride ER (K-DUR/KLOR-CON M) 20 MEQ CR tablet Take 1 tablet (20 mEq) by mouth daily       Prednisolon-Gatiflox-Bromfenac 1-0.5-0.075 % SUSP Place 1 drop into the right eye daily       psyllium (METAMUCIL/KONSYL) capsule Take 1 capsule by mouth daily       sildenafil (REVATIO) 20 MG tablet Take 1 tablet (20 mg) by mouth 3 times daily 90 tablet 11     simvastatin (ZOCOR) 40 MG tablet Take 1 tablet (40 mg) by mouth At Bedtime 90 tablet 3     Skin Protectants, Misc. (EUCERIN) cream Apply topically 2 times daily Apply to areas dry skin topically two times a day for dry skin and Apply to areas of dry skin topically as needed for dry skin daily       spironolactone (ALDACTONE) 25 MG tablet Take 2 tablets (50 mg) by mouth daily (Patient taking differently: Take 25 mg by mouth daily )       torsemide (DEMADEX) 20 MG tablet Take 40mg q am and 20mg q noon        "triamcinolone (KENALOG) 0.1 % external cream Apply topically 2 times daily 30 g 3     Vitamin D, Cholecalciferol, 10 MCG (400 UNIT) TABS Take 1,600 Units by mouth daily       Wt Readings from Last 5 Encounters:   01/09/20 86.5 kg (190 lb 12.8 oz)   12/30/19 92.3 kg (203 lb 6.4 oz)   12/27/19 92.4 kg (203 lb 9.6 oz)   12/24/19 93.4 kg (205 lb 12.8 oz)   12/23/19 93.4 kg (205 lb 12.8 oz)       REVIEW OF SYSTEMS:  4 point ROS including Respiratory, CV, GI and , other than that noted in the HPI,  is negative    Objective:  /63   Pulse 94   Temp 97.2  F (36.2  C)   Resp 20   Ht 1.575 m (5' 2\")   Wt 86.5 kg (190 lb 12.8 oz)   SpO2 94%   BMI 34.90 kg/m     Exam:  GENERAL APPEARANCE:  Alert, in no distress  ENT:  Mouth and posterior oropharynx normal, moist mucous membranes, hearing acuity adequate   EYES:  EOM, conjunctivae, lids, pupils and irises normal  NECK:  No adenopathy,masses or thyromegaly  RESP:  respiratory effort and palpation of chest normal, no respiratory distress, Lung sounds BB rales  CV:  Palpation and auscultation of heart done , rate and rhythm reg, no murmur, no rub or gallop, Edema trace  ABDOMEN:  normal bowel sounds, soft, nontender, no hepatosplenomegaly or other masses  M/S:   Gait and station steady, Digits and nails normal   SKIN:  Inspection/Palpation of skin and subcutaneous tissue no rash  NEURO: 2-12 in normal limits and at patient's baseline  PSYCH:  insight and judgement, memory intact , affect and mood normal    Labs:   CBC RESULTS:   Recent Labs   Lab Test 12/10/19  0810 11/25/19  1654   WBC 5.7 6.3   RBC 3.90 3.84   HGB 11.6* 11.6*   HCT 36.1 35.4   MCV 93 92   MCH 29.7 30.2   MCHC 32.1 32.8   RDW 16.9* 17.2*   * 166       Last Basic Metabolic Panel:  Recent Labs   Lab Test 01/06/20  1758 12/31/19  0700    138   POTASSIUM 4.0 3.7   CHLORIDE 99 102   TU 10.2* 9.7   CO2 27 32   BUN 28 20   CR 0.96 0.80   GLC 93 99       Liver Function Studies -   Recent Labs "   Lab Test 11/27/19  0535 10/16/19 04/02/19  1308   PROTTOTAL 7.6  --  7.4   ALBUMIN 3.7 4.0 4.1   BILITOTAL 1.2  --  0.7   ALKPHOS 135  --  88   AST 26 29 27   ALT 15 15 36       TSH   Date Value Ref Range Status   07/26/2019 3.10 0.40 - 4.00 mU/L Final   10/25/2018 3.70 0.40 - 4.00 mU/L Final   ]    Lab Results   Component Value Date    A1C 5.1 10/25/2018    A1C 5.8 06/24/2011         ASSESSMENT/PLAN:  (I50.31) Acute diastolic congestive heart failure (H)  (primary encounter diagnosis)  (I89.0) Lymphedema of both lower extremities  (R09.02) Hypoxia  (I27.20) Pulmonary hypertension (H)  Comment: patient in TCU following hospitalization due to increased edema, dyspnea and hypoxia. She was diuresed about 15l of fluid. EF 50-55% as of 11/25/19. It was thought that the volume overload was related to PH meds.  Since in TCU she continues on supplemental oxygen. Wt is up 6# since admission to TCU but has recently leveled off at 203# (about 3# up). She denies shortness of breath and is making progress with therapy. She did return to cardiology, Dr Marquez, on 12/10, who suspects she still has extra volume on board. 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   Torsemide was increased to 40mg q day on 12/17 then to 40mg and 20mg q day on 12/23  OCCUPATIONAL THERAPY have been wrapping LEs with lymphedema wraps. Now swelling is much reduced and only needs TG shapes on LEs.   Wt is now down by 10# since admission.   She has been weaned from oxygen. O2 sats on RA >90%  Plan:   reduce torsemide 40mg q q am  Continue spironolactone 25mg q day, kcl 20 meq q day, nifedipine 60mg q day, sildenafiil 20 mg q day  BMP 1/10  Daily wts  Continue TG shapes  Follow up with DR Marquez on 1/14    (I48.19) Persistent atrial fibrillation  Comment: anticoagulated with eliquis. During hospitalization she did have episodes of  tachycardia. Metoprolol was increased to 50mg q day. HR in TCU running 70-80s. over weekend metop was reduced due to SBP 90s.   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 She has had some pain in her hands and knees and shoulders.   Plan: continue remicade- per [facility] (Dr Bustillos) [phone] upon discharge from TCU  and methotrexate 2.5mg tablets- take 10 tablets q Wednesday for RA    (E03.9) Hypothyroidism, unspecified type  Comment: euthryoid  Plan: no change    (R53.81) Physical deconditioning  Comment: lives in house with . There are stairs to get in and within home. She would like to go home soon. She was independent prior to admission.  At this point she is able to walk up to 250'. she is working on stairs. CPT 5/5.6. home visit done last week. for some reason she does not remember this. OCCUPATIONAL THERAPY to review home equipment needs- bath chair, walker, etc  Plan: physical therapy and OCCUPATIONAL THERAPY   Likely return home on 1/13      Electronically signed by:  AUBRIE Tamayo CNP               Sincerely,        AUBRIE Tamayo CNP

## 2020-01-10 ENCOUNTER — HOSPITAL LABORATORY (OUTPATIENT)
Dept: OTHER | Facility: CLINIC | Age: 80
End: 2020-01-10

## 2020-01-10 ENCOUNTER — DISCHARGE SUMMARY NURSING HOME (OUTPATIENT)
Dept: GERIATRICS | Facility: CLINIC | Age: 80
End: 2020-01-10
Payer: MEDICARE

## 2020-01-10 VITALS
TEMPERATURE: 97.4 F | WEIGHT: 188.8 LBS | HEART RATE: 77 BPM | BODY MASS INDEX: 34.74 KG/M2 | HEIGHT: 62 IN | OXYGEN SATURATION: 94 % | DIASTOLIC BLOOD PRESSURE: 61 MMHG | SYSTOLIC BLOOD PRESSURE: 105 MMHG | RESPIRATION RATE: 20 BRPM

## 2020-01-10 DIAGNOSIS — R09.02 HYPOXIA: ICD-10-CM

## 2020-01-10 DIAGNOSIS — M05.9 RHEUMATOID ARTHRITIS WITH POSITIVE RHEUMATOID FACTOR, INVOLVING UNSPECIFIED SITE (H): ICD-10-CM

## 2020-01-10 DIAGNOSIS — I50.31 ACUTE DIASTOLIC CONGESTIVE HEART FAILURE (H): Primary | ICD-10-CM

## 2020-01-10 DIAGNOSIS — I89.0 LYMPHEDEMA OF BOTH LOWER EXTREMITIES: ICD-10-CM

## 2020-01-10 DIAGNOSIS — I27.20 PULMONARY HYPERTENSION (H): ICD-10-CM

## 2020-01-10 DIAGNOSIS — M34.9 SCLERODERMA (H): ICD-10-CM

## 2020-01-10 DIAGNOSIS — I10 BENIGN ESSENTIAL HYPERTENSION: ICD-10-CM

## 2020-01-10 DIAGNOSIS — J84.10 PULMONARY FIBROSIS (H): ICD-10-CM

## 2020-01-10 DIAGNOSIS — R53.81 PHYSICAL DECONDITIONING: ICD-10-CM

## 2020-01-10 DIAGNOSIS — I48.19 PERSISTENT ATRIAL FIBRILLATION (H): ICD-10-CM

## 2020-01-10 DIAGNOSIS — E87.6 HYPOKALEMIA: ICD-10-CM

## 2020-01-10 DIAGNOSIS — E03.9 ACQUIRED HYPOTHYROIDISM: ICD-10-CM

## 2020-01-10 LAB
ANION GAP SERPL CALCULATED.3IONS-SCNC: 6 MMOL/L (ref 3–14)
BUN SERPL-MCNC: 25 MG/DL (ref 7–30)
CALCIUM SERPL-MCNC: 9.4 MG/DL (ref 8.5–10.1)
CHLORIDE SERPL-SCNC: 101 MMOL/L (ref 94–109)
CO2 SERPL-SCNC: 27 MMOL/L (ref 20–32)
CREAT SERPL-MCNC: 0.79 MG/DL (ref 0.52–1.04)
GFR SERPL CREATININE-BSD FRML MDRD: 71 ML/MIN/{1.73_M2}
GLUCOSE SERPL-MCNC: 90 MG/DL (ref 70–99)
POTASSIUM SERPL-SCNC: 3.6 MMOL/L (ref 3.4–5.3)
SODIUM SERPL-SCNC: 134 MMOL/L (ref 133–144)

## 2020-01-10 PROCEDURE — 99316 NF DSCHRG MGMT 30 MIN+: CPT | Performed by: NURSE PRACTITIONER

## 2020-01-10 RX ORDER — TORSEMIDE 20 MG/1
40 TABLET ORAL DAILY
Qty: 60 TABLET | Refills: 0 | Status: SHIPPED | OUTPATIENT
Start: 2020-01-10 | End: 2020-01-20

## 2020-01-10 RX ORDER — METOPROLOL SUCCINATE 50 MG/1
25 TABLET, EXTENDED RELEASE ORAL DAILY
Qty: 30 TABLET | Refills: 0 | Status: SHIPPED | OUTPATIENT
Start: 2020-01-10 | End: 2020-08-20 | Stop reason: DRUGHIGH

## 2020-01-10 RX ORDER — POTASSIUM CHLORIDE 1500 MG/1
20 TABLET, EXTENDED RELEASE ORAL DAILY
Qty: 30 TABLET | Refills: 0 | Status: SHIPPED | OUTPATIENT
Start: 2020-01-10 | End: 2020-03-19

## 2020-01-10 RX ORDER — SPIRONOLACTONE 25 MG/1
25 TABLET ORAL DAILY
Start: 2020-01-10 | End: 2020-01-10

## 2020-01-10 RX ORDER — SPIRONOLACTONE 25 MG/1
25 TABLET ORAL DAILY
Qty: 30 TABLET | Refills: 0 | Status: SHIPPED | OUTPATIENT
Start: 2020-01-10 | End: 2020-01-20

## 2020-01-10 ASSESSMENT — MIFFLIN-ST. JEOR: SCORE: 1279.64

## 2020-01-10 NOTE — PROGRESS NOTES
[place] [facility] DISCHARGE SUMMARY  PATIENT'S NAME: Jaqueline Canales  YOB: 1940  MEDICAL RECORD NUMBER:  [id number]  Place of Service where encounter took place:  [facility] [[id number]]    PRIMARY CARE PROVIDER AND CLINIC RESPONSIBLE AFTER TRANSFER:   Carissa Burroughs MD, [address] / [place] [address]    [facility] Provider     Transferring providers: AUBRIE Tamayo CNP, Rosalva Lara MD  Recent Hospitalization/ED:  [facility] Hospital stay 11/25/19 to 12/05/19.  Date of SNF Admission: December / 05 / 2019  Date of SNF (anticipated) Discharge: January / 13 / 2020  Discharged to: previous independent home  Cognitive Scores: SLUMS: 19/30 and CPT: 5/5.6  Physical Function: Ambulating 150 ft with walker  DME: none ordered    CODE STATUS/ADVANCE DIRECTIVES DISCUSSION:  Full Code   ALLERGIES: No known allergies    DISCHARGE DIAGNOSIS/NURSING FACILITY COURSE:   Brief Summary of Hospital Course: recent hospitalization due to LE edema and dyspnea and hypoxia with exertion. PMH includes atrial fibrillation (eliquis), pulmonary hypertension, CHF, hypothyroidism, rheumatoid arthritis, hypertension, lung cancer, pulmonary fibrosis/ scleroderma, lymphedema and smoking history (30 ppy)   She was admitted for acute diastolic heart failure and started on IV lasix. Cardiology was consulted and suspects decompensation likely due to pulmonary vasodilators. Cardiology  changed lasix to IV bumex and spironolactone was added.  She diuresed about 14l. she did have episodes of tachycardia so metoprolol was increased. She was transitioned to oral meds.  Due to ongoing weakness she was transferred to TCU.        ASSESSMENT/PLAN:  (I50.31) Acute diastolic congestive heart failure (H)  (primary encounter diagnosis)  (I89.0) Lymphedema of both lower extremities  (R09.02) Hypoxia  (I27.20) Pulmonary hypertension (H)  Comment: patient in TCU following  hospitalization due to increased edema, dyspnea and hypoxia. She was diuresed about 15l of fluid. EF 50-55% as of 11/25/19. It was thought that the volume overload was related to PH meds.  Since in TCU she continues on supplemental oxygen. Wt is up 6# since admission to TCU but has recently leveled off at 203# (about 3# up). She denies shortness of breath and is making progress with therapy. She did return to cardiology, Dr Marquez, on 12/10, who suspects she still has extra volume on board. 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   Torsemide was increased to 40mg q day on 12/17 then to 40mg and 20mg q day on 12/23  OCCUPATIONAL THERAPY have been wrapping LEs with lymphedema wraps. Now swelling is much reduced and only needs TG shapes on LEs.   Wt is now down by 10# since admission. We have reduced torsemide to 40mg q day on 1/9  She has been weaned from oxygen. O2 sats on RA >90%  Plan: continue torsemide 40mg q q am  Continue spironolactone 25mg q day, kcl 20 meq q day, nifedipine 60mg q day, sildenafiil 20 mg q day  Daily wts- will ask home care to assist with this  Continue TG shapes  Follow up with DR Marquez on 1/14     (I48.19) Persistent atrial fibrillation  Comment: anticoagulated with eliquis. During hospitalization she did have episodes of tachycardia. Metoprolol was increased to 50mg q day. HR in TCU running 70-80s. over weekend metop was reduced due to SBP 90s.   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 She has had some  pain in her hands and knees and shoulders.   Plan: continue remicade- per [facility] (Dr Bustillos) [phone] upon discharge from TCU  and methotrexate 2.5mg tablets- take 10 tablets q Wednesday for RA     (E03.9) Hypothyroidism, unspecified type  Comment: euthryoid  Plan: no change     (R53.81) Physical deconditioning  Comment: lives in house with . There are stairs to get in and within home. She would like to go home soon. She was independent prior to admission.  At this point she is able to walk up to 250'. she is working on stairs. CPT 5/5.6. home visit done last week. for some reason she does not remember this. OCCUPATIONAL THERAPY to review home equipment needs- bath chair, walker, etc  Plan:   Discharge to home on 1/13 with assist from  home care.   physical therapy and OCCUPATIONAL THERAPY, RN and HHA      Past Medical History:  has a past medical history of Amaurosis fugax (5-11), Carotid artery stenosis, Esophageal reflux (3/11/2004), HELICOBACTER PYLORI INFECTION (7/11/2006), hx of CA in situ RT breast-1990.mastectomy (4/17/2007), Hyperlipidemia LDL goal <70 (6/20/2011), Lung cancer (H), OBESITY NOS (3/11/2004), OSTEOPOROSIS NOS (3/11/2004), Other psoriasis (3/11/2004), Pulmonary hypertension (H) (04/15/2019), RA (rheumatoid arthritis) (H) (2/8/2010), Raynaud's syndrome (4/10/2006), Scleroderma (H), and Sleep apnea.    Discharge Medications:  Current Outpatient Medications   Medication Sig Dispense Refill     acetaminophen (TYLENOL) 500 MG tablet Take 1,000 mg by mouth 2 times daily        albuterol (PROAIR HFA/PROVENTIL HFA/VENTOLIN HFA) 108 (90 Base) MCG/ACT inhaler Inhale 2 puffs into the lungs every 6 hours as needed for shortness of breath / dyspnea or wheezing       apixaban ANTICOAGULANT (ELIQUIS) 5 MG tablet Take 1 tablet (5 mg) by mouth 2 times daily 180 tablet 2     Bacillus Coagulans-Inulin (PROBIOTIC-PREBIOTIC PO) Take 1 chew tab by mouth 2 times daily         folic acid (FOLVITE) 1 MG tablet Take 1 tablet (1,000 mcg) by mouth daily 90 tablet 3     levothyroxine (SYNTHROID/LEVOTHROID) 75 MCG tablet Take 1 tablet (75 mcg) by mouth daily 90 tablet 2     melatonin 5 MG tablet Take 5 mg by mouth nightly as needed for sleep       Methotrexate Sodium (METHOTREXATE PO) Take 2.5 mg by mouth 10 tablets weekly - Wednesdays       metoprolol succinate ER (TOPROL-XL) 50 MG 24 hr tablet Take 0.5 tablets (25 mg) by mouth daily       Multiple Vitamins-Minerals (ICAPS) CAPS Take 1 capsule by mouth 2 times daily        multivitamin (CENTRUM SILVER) tablet Take 1 tablet by mouth daily       NIFEdipine ER (ADALAT CC) 60 MG 24 hr tablet Take 60 mg by mouth daily       omeprazole (PRILOSEC) 20 MG DR capsule Take 1 capsule (20 mg) by mouth 2 times daily Take 30-60 minutes prior to meals 180 capsule 2     potassium chloride ER (K-DUR/KLOR-CON M) 20 MEQ CR tablet Take 1 tablet (20 mEq) by mouth daily       psyllium (METAMUCIL/KONSYL) capsule Take 1 capsule by mouth daily       sildenafil (REVATIO) 20 MG tablet Take 1 tablet (20 mg) by mouth 3 times daily 90 tablet 11     simvastatin (ZOCOR) 40 MG tablet Take 1 tablet (40 mg) by mouth At Bedtime 90 tablet 3     Skin Protectants, Misc. (EUCERIN) cream Apply topically 2 times daily Apply to areas dry skin topically two times a day for dry skin and Apply to areas of dry skin topically as needed for dry skin daily       spironolactone (ALDACTONE) 25 MG tablet Take 2 tablets (50 mg) by mouth daily (Patient taking differently: Take 25 mg by mouth daily )       torsemide (DEMADEX) 20 MG tablet Take 2 tablets (40 mg) by mouth daily Take 40mg q am       triamcinolone (KENALOG) 0.1 % external cream Apply topically 2 times daily 30 g 3     Vitamin D, Cholecalciferol, 10 MCG (400 UNIT) TABS Take 1,600 Units by mouth daily       InFLIXimab (REMICADE IV) Inject 600 mg into the vein Every 8 weeks       Prednisolon-Gatiflox-Bromfenac 1-0.5-0.075 % SUSP Place  "1 drop into the right eye daily         Medication Changes/Rationale:   12/11/19 hold spironolactone due to low BPs  12/13/19 restart spironolactone 25mg q day  12/17/19 increase torsemide 40mg q day  12/22/19 decrease metoprolol 25mg q day due to low BPs  12/24 increase torsemide 40mg q am and 20mg q noon  1/9/20 decrease torsem    Controlled medications sent with patient:   not applicable/none     ROS:   4 point ROS including Respiratory, CV, GI and , other than that noted in the HPI,  is negative    Physical Exam:   Vitals: /61   Pulse 77   Temp 97.4  F (36.3  C)   Resp 20   Ht 1.575 m (5' 2\")   Wt 85.6 kg (188 lb 12.8 oz)   SpO2 94%   BMI 34.53 kg/m    BMI= Body mass index is 34.53 kg/m .  GENERAL APPEARANCE:  Alert, in no distress  ENT:  Mouth and posterior oropharynx normal, moist mucous membranes, hearing acuity adequate   EYES:  EOM, conjunctivae, lids, pupils and irises normal  NECK:  No adenopathy,masses or thyromegaly  RESP:  respiratory effort and palpation of chest normal, no respiratory distress, Lung sounds BB rales  CV:  Palpation and auscultation of heart done , rate and rhythm reg, no murmur, no rub or gallop, Edema trace  ABDOMEN:  normal bowel sounds, soft, nontender, no hepatosplenomegaly or other masses  M/S:   Gait and station steady, Digits and nails normal   SKIN:  Inspection/Palpation of skin and subcutaneous tissue no rash  NEURO: 2-12 in normal limits and at patient's baseline  PSYCH:  insight and judgement, memory mild cognitive impairment  , affect and mood normal     SNF labs:  CBC RESULTS:   Recent Labs   Lab Test 12/10/19  0810 11/25/19  1654   WBC 5.7 6.3   RBC 3.90 3.84   HGB 11.6* 11.6*   HCT 36.1 35.4   MCV 93 92   MCH 29.7 30.2   MCHC 32.1 32.8   RDW 16.9* 17.2*   * 166       Last Basic Metabolic Panel:  Recent Labs   Lab Test 01/06/20  1758 12/31/19  0700    138   POTASSIUM 4.0 3.7   CHLORIDE 99 102   TU 10.2* 9.7   CO2 27 32   BUN 28 20   CR 0.96 " 0.80   GLC 93 99       Liver Function Studies -   Recent Labs   Lab Test 11/27/19  0535 10/16/19 04/02/19  1308   PROTTOTAL 7.6  --  7.4   ALBUMIN 3.7 4.0 4.1   BILITOTAL 1.2  --  0.7   ALKPHOS 135  --  88   AST 26 29 27   ALT 15 15 36       TSH   Date Value Ref Range Status   07/26/2019 3.10 0.40 - 4.00 mU/L Final   10/25/2018 3.70 0.40 - 4.00 mU/L Final   ]    Lab Results   Component Value Date    A1C 5.1 10/25/2018    A1C 5.8 06/24/2011           DISCHARGE PLAN:    Follow up labs: No labs orders/due    Medical Follow Up:      Follow up with primary care provider in 1 weeks    MTM referral needed and placed by this provider: No    Discharge Services: Home Care:  Occupational Therapy, Physical Therapy, Registered Nurse, Home Health Aide and From:  [facility]    Discharge Instructions Verbalized to Patient at Discharge:     None      TOTAL DISCHARGE TIME:   Greater than 30 minutes  Electronically signed by:  AUBRIE Tamayo CNP     Home care Face to Face documentation done in [employer] attached to Home care orders for [facility].

## 2020-01-10 NOTE — LETTER
1/10/2020        RE: Jaqueline Canales  [address]  [place] [address]        [place] [facility] DISCHARGE SUMMARY  PATIENT'S NAME: Jaqueline Canales  YOB: 1940  MEDICAL RECORD NUMBER:  [id number]  Place of Service where encounter took place:  [facility] [[id number]]    PRIMARY CARE PROVIDER AND CLINIC RESPONSIBLE AFTER TRANSFER:   Carissa Burroughs MD, [address] / [place] [address]    [facility] Provider     Transferring providers: AUBRIE Tamayo CNP, Rosalva Lara MD  Recent Hospitalization/ED:  [facility] Hospital stay 11/25/19 to 12/05/19.  Date of SNF Admission: December / 05 / 2019  Date of SNF (anticipated) Discharge: January / 13 / 2020  Discharged to: previous independent home  Cognitive Scores: SLUMS: 19/30 and CPT: 5/5.6  Physical Function: Ambulating 150 ft with walker  DME: none ordered    CODE STATUS/ADVANCE DIRECTIVES DISCUSSION:  Full Code   ALLERGIES: No known allergies    DISCHARGE DIAGNOSIS/NURSING FACILITY COURSE:   Brief Summary of Hospital Course: recent hospitalization due to LE edema and dyspnea and hypoxia with exertion. PMH includes atrial fibrillation (eliquis), pulmonary hypertension, CHF, hypothyroidism, rheumatoid arthritis, hypertension, lung cancer, pulmonary fibrosis/ scleroderma, lymphedema and smoking history (30 ppy)   She was admitted for acute diastolic heart failure and started on IV lasix. Cardiology was consulted and suspects decompensation likely due to pulmonary vasodilators. Cardiology  changed lasix to IV bumex and spironolactone was added.  She diuresed about 14l. she did have episodes of tachycardia so metoprolol was increased. She was transitioned to oral meds.  Due to ongoing weakness she was transferred to TCU.        ASSESSMENT/PLAN:  (I50.31) Acute diastolic congestive heart failure (H)  (primary encounter diagnosis)  (I89.0) Lymphedema of both lower extremities  (R09.02)  Hypoxia  (I27.20) Pulmonary hypertension (H)  Comment: patient in TCU following hospitalization due to increased edema, dyspnea and hypoxia. She was diuresed about 15l of fluid. EF 50-55% as of 11/25/19. It was thought that the volume overload was related to PH meds.  Since in TCU she continues on supplemental oxygen. Wt is up 6# since admission to TCU but has recently leveled off at 203# (about 3# up). She denies shortness of breath and is making progress with therapy. She did return to cardiology, Dr Marquez, on 12/10, who suspects she still has extra volume on board. 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   Torsemide was increased to 40mg q day on 12/17 then to 40mg and 20mg q day on 12/23  OCCUPATIONAL THERAPY have been wrapping LEs with lymphedema wraps. Now swelling is much reduced and only needs TG shapes on LEs.   Wt is now down by 10# since admission. We have reduced torsemide to 40mg q day on 1/9  She has been weaned from oxygen. O2 sats on RA >90%  Plan: continue torsemide 40mg q q am  Continue spironolactone 25mg q day, kcl 20 meq q day, nifedipine 60mg q day, sildenafiil 20 mg q day  Daily wts- will ask home care to assist with this  Continue TG shapes  Follow up with DR Marquez on 1/14     (I48.19) Persistent atrial fibrillation  Comment: anticoagulated with eliquis. During hospitalization she did have episodes of tachycardia. Metoprolol was increased to 50mg q day. HR in TCU running 70-80s. over weekend metop was reduced due to SBP 90s.   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 She has had some pain in her hands and knees and shoulders.   Plan: continue remicade- per [facility] (Dr Bustillos) [phone] upon discharge from TCU  and methotrexate 2.5mg tablets- take 10 tablets q Wednesday for RA     (E03.9) Hypothyroidism, unspecified type  Comment: euthryoid  Plan: no change     (R53.81) Physical deconditioning  Comment: lives in house with . There are stairs to get in and within home. She would like to go home soon. She was independent prior to admission.  At this point she is able to walk up to 250'. she is working on stairs. CPT 5/5.6. home visit done last week. for some reason she does not remember this. OCCUPATIONAL THERAPY to review home equipment needs- bath chair, walker, etc  Plan:   Discharge to home on 1/13 with assist from  home care.   physical therapy and OCCUPATIONAL THERAPY, RN and HHA      Past Medical History:  has a past medical history of Amaurosis fugax (5-11), Carotid artery stenosis, Esophageal reflux (3/11/2004), HELICOBACTER PYLORI INFECTION (7/11/2006), hx of CA in situ RT breast-1990.mastectomy (4/17/2007), Hyperlipidemia LDL goal <70 (6/20/2011), Lung cancer (H), OBESITY NOS (3/11/2004), OSTEOPOROSIS NOS (3/11/2004), Other psoriasis (3/11/2004), Pulmonary hypertension (H) (04/15/2019), RA (rheumatoid arthritis) (H) (2/8/2010), Raynaud's syndrome (4/10/2006), Scleroderma (H), and Sleep apnea.    Discharge Medications:  Current Outpatient Medications   Medication Sig Dispense Refill     acetaminophen (TYLENOL) 500 MG tablet Take 1,000 mg by mouth 2 times daily        albuterol (PROAIR HFA/PROVENTIL HFA/VENTOLIN HFA) 108 (90 Base) MCG/ACT inhaler Inhale 2 puffs into the lungs every 6 hours as needed for shortness of breath / dyspnea or wheezing       apixaban ANTICOAGULANT (ELIQUIS) 5 MG tablet Take 1 tablet (5 mg) by mouth 2 times daily 180 tablet 2      Bacillus Coagulans-Inulin (PROBIOTIC-PREBIOTIC PO) Take 1 chew tab by mouth 2 times daily        folic acid (FOLVITE) 1 MG tablet Take 1 tablet (1,000 mcg) by mouth daily 90 tablet 3     levothyroxine (SYNTHROID/LEVOTHROID) 75 MCG tablet Take 1 tablet (75 mcg) by mouth daily 90 tablet 2     melatonin 5 MG tablet Take 5 mg by mouth nightly as needed for sleep       Methotrexate Sodium (METHOTREXATE PO) Take 2.5 mg by mouth 10 tablets weekly - Wednesdays       metoprolol succinate ER (TOPROL-XL) 50 MG 24 hr tablet Take 0.5 tablets (25 mg) by mouth daily       Multiple Vitamins-Minerals (ICAPS) CAPS Take 1 capsule by mouth 2 times daily        multivitamin (CENTRUM SILVER) tablet Take 1 tablet by mouth daily       NIFEdipine ER (ADALAT CC) 60 MG 24 hr tablet Take 60 mg by mouth daily       omeprazole (PRILOSEC) 20 MG DR capsule Take 1 capsule (20 mg) by mouth 2 times daily Take 30-60 minutes prior to meals 180 capsule 2     potassium chloride ER (K-DUR/KLOR-CON M) 20 MEQ CR tablet Take 1 tablet (20 mEq) by mouth daily       psyllium (METAMUCIL/KONSYL) capsule Take 1 capsule by mouth daily       sildenafil (REVATIO) 20 MG tablet Take 1 tablet (20 mg) by mouth 3 times daily 90 tablet 11     simvastatin (ZOCOR) 40 MG tablet Take 1 tablet (40 mg) by mouth At Bedtime 90 tablet 3     Skin Protectants, Misc. (EUCERIN) cream Apply topically 2 times daily Apply to areas dry skin topically two times a day for dry skin and Apply to areas of dry skin topically as needed for dry skin daily       spironolactone (ALDACTONE) 25 MG tablet Take 2 tablets (50 mg) by mouth daily (Patient taking differently: Take 25 mg by mouth daily )       torsemide (DEMADEX) 20 MG tablet Take 2 tablets (40 mg) by mouth daily Take 40mg q am       triamcinolone (KENALOG) 0.1 % external cream Apply topically 2 times daily 30 g 3     Vitamin D, Cholecalciferol, 10 MCG (400 UNIT) TABS Take 1,600 Units by mouth daily       InFLIXimab (REMICADE IV) Inject  "600 mg into the vein Every 8 weeks       Prednisolon-Gatiflox-Bromfenac 1-0.5-0.075 % SUSP Place 1 drop into the right eye daily         Medication Changes/Rationale:   12/11/19 hold spironolactone due to low BPs  12/13/19 restart spironolactone 25mg q day  12/17/19 increase torsemide 40mg q day  12/22/19 decrease metoprolol 25mg q day due to low BPs  12/24 increase torsemide 40mg q am and 20mg q noon  1/9/20 decrease torsem    Controlled medications sent with patient:   not applicable/none     ROS:   4 point ROS including Respiratory, CV, GI and , other than that noted in the HPI,  is negative    Physical Exam:   Vitals: /61   Pulse 77   Temp 97.4  F (36.3  C)   Resp 20   Ht 1.575 m (5' 2\")   Wt 85.6 kg (188 lb 12.8 oz)   SpO2 94%   BMI 34.53 kg/m     BMI= Body mass index is 34.53 kg/m .  GENERAL APPEARANCE:  Alert, in no distress  ENT:  Mouth and posterior oropharynx normal, moist mucous membranes, hearing acuity adequate   EYES:  EOM, conjunctivae, lids, pupils and irises normal  NECK:  No adenopathy,masses or thyromegaly  RESP:  respiratory effort and palpation of chest normal, no respiratory distress, Lung sounds BB rales  CV:  Palpation and auscultation of heart done , rate and rhythm reg, no murmur, no rub or gallop, Edema trace  ABDOMEN:  normal bowel sounds, soft, nontender, no hepatosplenomegaly or other masses  M/S:   Gait and station steady, Digits and nails normal   SKIN:  Inspection/Palpation of skin and subcutaneous tissue no rash  NEURO: 2-12 in normal limits and at patient's baseline  PSYCH:  insight and judgement, memory mild cognitive impairment  , affect and mood normal     SNF labs:  CBC RESULTS:   Recent Labs   Lab Test 12/10/19  0810 11/25/19  1654   WBC 5.7 6.3   RBC 3.90 3.84   HGB 11.6* 11.6*   HCT 36.1 35.4   MCV 93 92   MCH 29.7 30.2   MCHC 32.1 32.8   RDW 16.9* 17.2*   * 166       Last Basic Metabolic Panel:  Recent Labs   Lab Test 01/06/20  1758 12/31/19  0700   NA " 135 138   POTASSIUM 4.0 3.7   CHLORIDE 99 102   TU 10.2* 9.7   CO2 27 32   BUN 28 20   CR 0.96 0.80   GLC 93 99       Liver Function Studies -   Recent Labs   Lab Test 11/27/19  0535 10/16/19 04/02/19  1308   PROTTOTAL 7.6  --  7.4   ALBUMIN 3.7 4.0 4.1   BILITOTAL 1.2  --  0.7   ALKPHOS 135  --  88   AST 26 29 27   ALT 15 15 36       TSH   Date Value Ref Range Status   07/26/2019 3.10 0.40 - 4.00 mU/L Final   10/25/2018 3.70 0.40 - 4.00 mU/L Final   ]    Lab Results   Component Value Date    A1C 5.1 10/25/2018    A1C 5.8 06/24/2011           DISCHARGE PLAN:    Follow up labs: No labs orders/due    Medical Follow Up:      Follow up with primary care provider in 1 weeks    MTM referral needed and placed by this provider: No    Discharge Services: Home Care:  Occupational Therapy, Physical Therapy, Registered Nurse, Home Health Aide and From:  [facility] [place]    Discharge Instructions Verbalized to Patient at Discharge:     None      TOTAL DISCHARGE TIME:   Greater than 30 minutes  Electronically signed by:  AUBRIE Tamayo CNP     Home care Face to Face documentation done in [employer] attached to Home care orders for [facility].                     Sincerely,        AUBRIE Tamayo CNP

## 2020-01-13 NOTE — PROGRESS NOTES
Natalie Bustillos M.D.  [facility]thritis [facility] Rheumatolologan Burroughs M.D      Acute Hypoxic Respiratory Failure    Diastolic CHF exacerbation  Chronic Bilateral LE edema / Lymphedema  Chronic A. Fib, on anticoagulation  Moderate/Severe pulmonary hypertension  Hyperlipidemia   Hypertension  Hypothyroidism  Rheumatoid arthritis  Scleroderma  CKD 3    The patient is just discharged from transitional care where she has been resident since .  She was discharge from [facility] yesterday.  She has not as yet received a call from the home services nurse.  It is not apparent from the chart that she was given follow-up in lymphedema clinic.  She lives with her elderly and mobility challenged .  She is not ambulatory enough to grocery shop!.  Her weight is down and legs are nominally mayte.  Her BNP is in her usual range of [id number] with /   And heart rate 77 and irregularly irregular.    Examination:    Constitutional: alert, oriented, normal gait and station, normal mentation.  Oral: moist mucous membrans  Lymph: without pathologic adenopathy  Chest: clear to ausculation and percussion  Cor: No evidence of left or right ventricular activity.  Rhythm is regular.  [id number], S2 split physiologically. Murmurs are TR and MR present  Abdomen: without tenderness, rebound, guarding, masses, ascites  Extremities: lymphedema  Neuro: no focal defects, normal mentation  Skin: without open lesions  Psych: oriented, verbal, mental status in tact      Wt Readings from Last 24 Encounters:   01/10/20 85.6 kg (188 lb 12.8 oz)   01/09/20 86.5 kg (190 lb 12.8 oz)   12/30/19 92.3 kg (203 lb 6.4 oz)   12/27/19 92.4 kg (203 lb 9.6 oz)   12/24/19 93.4 kg (205 lb 12.8 oz)   12/23/19 93.4 kg (205 lb 12.8 oz)   12/19/19 92.4 kg (203 lb 9.6 oz)   12/17/19 91.7 kg (202 lb 3.2 oz)   12/13/19 91.6 kg (202 lb)   12/11/19 90.6 kg (199 lb 12.8 oz)   12/10/19 90.9 kg (200 lb 4.8 oz)   12/06/19 90.5 kg (199 lb 9.6 oz)   12/06/19 90.5 kg (199 lb  9.6 oz)   12/05/19 88.7 kg (195 lb 8 oz)   11/20/19 103.4 kg (228 lb)   11/14/19 102.5 kg (226 lb)   11/11/19 101.2 kg (223 lb)   10/28/19 100.8 kg (222 lb 4.8 oz)   08/28/19 98.9 kg (218 lb)   07/29/19 96.8 kg (213 lb 6.4 oz)   07/26/19 97.5 kg (215 lb)   07/16/19 96.2 kg (212 lb)   06/28/19 99.8 kg (220 lb)   06/24/19 99.7 kg (219 lb 11.2 oz)         Current Outpatient Medications   Medication     acetaminophen (TYLENOL) 500 MG tablet     albuterol (PROAIR HFA/PROVENTIL HFA/VENTOLIN HFA) 108 (90 Base) MCG/ACT inhaler     apixaban ANTICOAGULANT (ELIQUIS) 5 MG tablet     Bacillus Coagulans-Inulin (PROBIOTIC-PREBIOTIC PO)     folic acid (FOLVITE) 1 MG tablet     InFLIXimab (REMICADE IV)     levothyroxine (SYNTHROID/LEVOTHROID) 75 MCG tablet     melatonin 5 MG tablet     Methotrexate Sodium (METHOTREXATE PO)     metoprolol succinate ER (TOPROL-XL) 50 MG 24 hr tablet     Multiple Vitamins-Minerals (ICAPS) CAPS     multivitamin (CENTRUM SILVER) tablet     NIFEdipine ER (ADALAT CC) 60 MG 24 hr tablet     omeprazole (PRILOSEC) 20 MG DR capsule     potassium chloride ER (K-DUR/KLOR-CON M) 20 MEQ CR tablet     Prednisolon-Gatiflox-Bromfenac 1-0.5-0.075 % SUSP     psyllium (METAMUCIL/KONSYL) capsule     sildenafil (REVATIO) 20 MG tablet     simvastatin (ZOCOR) 40 MG tablet     Skin Protectants, Misc. (EUCERIN) cream     spironolactone (ALDACTONE) 25 MG tablet     torsemide (DEMADEX) 20 MG tablet     triamcinolone (KENALOG) 0.1 % external cream     Vitamin D, Cholecalciferol, 10 MCG (400 UNIT) TABS     No current facility-administered medications for this visit.           REVIEW of SYMPTOMS    Constitutional: without fever, chills, night sweats.  Weight is down post discharge  HEENT: without dry eyes, dry mouth, sinusitis, corryza, visual changes  Endocrine: without polyuria, polydypsia, polyphagia, heat or cold intolerance, changing mental status  Cardiology: without chest pain, tightness, heaviness, pressure, paroxysmal  dyspnea, orthopnea, palpitation, pre-syncope or syncope or device discharge (if present)  Pulmonary: without asthma, wheezing, cough, hemoptysis  GI: without nausea, emesis, jaundice, pain, hematemesis, melena  : without frequency, urgency, hematuria, stones, pain, abnormal bleeding, frequency, urgency  Neurologic: without TIA, CVA, trauma, seizure  Dermatologic: without lesions, abrasion rash,   Orthopedic/Rheum: without significant joint pain, impairment, limb, polyserositis, ulceration, Raynauds  Heme: without mass, bruising, frequent infection, anemia  Psychiatric: without substance abuse, hallucination, medication, depression          Conclusion     1) Elevated biventricular filling pressures   2) Moderate to severe mostly primary pulmonary hypertension  3) Improvement in hemodynamics with nitric oxide; however, with no improvement in mean PA pressure         CV Right/Left Heart Cath     Right Heart Pressures     Systemic blood pressure 140-150 throughout the case.    BASELINE:  1) Elevated right-sided filling pressures (mean RA 16 mmHg)  2) Moderate to severe pulmonary hypertension (mean PA 35 mmHg) secondary to intrinsic pulmonary vascular disease (PVRI 14 rod*m2) and elevated left-sided filling pressures (PCWP 20 mmHg).  3) O2 sats at baseline 93%  4) Mildly depressed CO/CI: 3.8/1.9    NO 80 PPM:  1) Pulmonary artery pressures did not change (mean PA 34 mmHg)  2) O2 sats improved to 100%  3) CO/CI improved 4.2/2.1 Right sided filling pressures are severely elevated.Left sided filling pressures are moderately elevated. Severely elevated pulmonary artery hypertension.Left ventricular filling pressures are moderately elevated .Normal cardiac output level.   Pressures      Time Systolic Diastolic Mean A Wave V Wave EDP Max dp/dt HR   RA Pressures  9:01 AM   16 mmHg    14 mmHg    22 mmHg      87 bpm      RV Pressures  9:01 AM 52 mmHg    5 mmHg       15 mmHg     86 bpm      PA Pressures  9:02 AM 51 mmHg    26  mmHg    35 mmHg        76 bpm      PCW Pressures  9:02 AM   18 mmHg    23 mmHg    24 mmHg      72 bpm      AO Pressures  9:03  mmHg    87 mmHg    111 mmHg        82 bpm      Pressures Phase: Drug Study Level 1      Time Systolic Diastolic Mean A Wave V Wave EDP Max dp/dt HR   PA Pressures  9:11 AM 48 mmHg    26 mmHg    34 mmHg        77 bpm      Pressures Phase: Drug Study Level 2      Time Systolic Diastolic Mean A Wave V Wave EDP Max dp/dt HR   PA Pressures  9:14 AM 52 mmHg    33 mmHg    36 mmHg        84 bpm      PCW Pressures  9:14 AM   22 mmHg    26 mmHg    23 mmHg      79 bpm      AO Pressures  9:11  mmHg    67 mmHg    97 mmHg        80 bpm      Blood Flow Results Phase: Drug Study Level 2      Time Results Indexed Values   QP  9:11 AM 4.19 L/min    2.1 L/min/m2      QS  9:11 AM 4.19 L/min    2.1 L/min/m2      Blood Oximetry Phase: Drug Study Level 2      Time Hb SAT(%) PO2 Content PA Sat   Art  9:11 AM  100 %     18.22 mL/dL       Cardiac Output Phase: Drug Study Level 2      Time TDCO TDCI Josh C.O. Josh C.I. Josh HR   Cardiac Output Results  9:11 AM   4.19 L/min    2.1 L/min/m2       Resistance Results Phase: Drug Study Level 2      Time PVR SVR PVR-I SVR-I TPR TVR TPR-I TVR-I PVR/SVR TPR/TVR   Resistance Results (Metric)  9:11 .55 dsc-5     534.11 dsc-5/m2     687.98 dsc-5    1853.72 dsc-5    1373.43 dsc-5/m2    3700.63 dsc-5/m2     0.37      Resistance Results (Wood)  9:11 AM 3.35 ROQUE     6.68 ROQUE/m2     8.6 ROQUE    23.18 ROQUE    17.17                1/15/2019 1/24/2019   /77 112/63           CT CHEST WITHOUT CONTRAST December 18, 2019 12:03 PM      HISTORY: Chest pain or shortness of breath, pleurisy or effusion  suspected. Acute diastolic congestive heart failure (H). Persistent  atrial fibrillation. Pulmonary hypertension (H). Benign essential  hypertension.     COMPARISON: None.     TECHNIQUE: Volumetric helical acquisition of CT images of the chest  from the clavicles to the kidneys  were acquired without IV contrast.  Radiation dose for this scan was reduced using automated exposure  control, adjustment of the mA and/or kV according to patient size, or  iterative reconstruction technique.     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 Moderately extensive traction  bronchiectasis. Some ground-glass changes in the right upper lobe and  right lower lobe could indicate active inflammation. Cardiomegaly. No  pleural or pericardial effusion. There are extensive coronary vascular  calcifications and/or stents consistent with coronary artery disease.  Moderate hiatal hernia. No frankly destructive bony lesions. No acute  findings in the visualized upper abdomen.                                                                      IMPRESSION: Moderate to severe fibrosis with honeycombing in a usual  interstitial pneumonitis pattern.       MRN: [id number]  : 1940  Study Date: 2018 09:55 AM  Age: 77 yrs  Gender: Female  [place] [facility]  Reason For Study: Dyspnea on exertion  History: Dyspnea on exertion  E  Referring Physician: THEO HUIZAR  Performed By: Sofía Braden RDCS     BSA: 2.0 m2  Height: 62 in  Weight: 221 lb  BP: 112/74 mmHg  _____________________________________________________________________________  __        Procedure  Echocardiogram with two-dimensional, color and spectral Doppler performed.  _____________________________________________________________________________  __        Interpretation Summary  Global and regional left ventricular function is normal with an EF of 55-60%.  Global right ventricular function is normal.Mild right ventricular dilation is  present.  Mild to moderate TR. Right ventricular systolic pressure is 49mmHg above the  right atrial pressure. Moderate (pulmonary artery systolic pressure 50-75mmHg)  pulmonary hypertension is present.  The inferior vena cava was normal in  size with preserved respiratory  variability.Estimated mean right atrial pressure is 3 mmHg (normal).  No pericardial effusion is present.     This study was compared with the study from 10/16/17, no significant change .  _____________________________________________________________________________  __        Left Ventricle  Left ventricular wall thickness is normal. Left ventricular size is normal.  Global and regional left ventricular function is normal with an EF of 55-60%.  The Ejection Fraction was visually estimated. Diastolic function not assessed  due to significant mitral annular calcification. No regional wall motion  abnormalities are seen.     Right Ventricle  Global right ventricular function is normal. Mild right ventricular dilation  is present.     Atria  Severe left atrial enlargement is present. Mild right atrial enlargement is  present.     Mitral Valve  Moderate mitral annular calcification is present. Mild mitral insufficiency is  present.        Aortic Valve  Trileaflet aortic sclerosis without stenosis.     Tricuspid Valve  Mild to moderate tricuspid insufficiency is present. Right ventricular  systolic pressure is 49mmHg above the right atrial pressure. Moderate  (pulmonary artery systolic pressure 50-75mmHg) pulmonary hypertension is  present.     Pulmonic Valve  Trace pulmonic insufficiency is present.     Vessels  The inferior vena cava was normal in size with preserved respiratory  variability. Visualized portions of the aorta are normal in size. Estimated  mean right atrial pressure is 3 mmHg (normal).     Pericardium  No pericardial effusion is present.        Compared to Previous Study  This study was compared with the study from 10/16/17, no significant change .     Attestation  I have personally viewed the imaging and agree with the interpretation and  report as documented by the fellow, Nupur Luz, and/or edited by  me.  _____________________________________________________________________________  __     MMode/2D Measurements & Calculations  IVSd: 0.83 cm  LVIDd: 5.3 cm  LVIDs: 2.4 cm  LVPWd: 0.88 cm  FS: 54.8 %  LV mass(C)d: 162.3 grams  LV mass(C)dI: 81.4 grams/m2  Ao root diam: 2.6 cm  LA dimension: 5.7 cm  asc Aorta Diam: 3.5 cm  LA/Ao: 2.2  LVOT diam: 1.9 cm  LVOT area: 2.8 cm2  LA Volume (BP): 105.0 ml     LA Volume Index (BP): 52.8 ml/m2  RWT: 0.33        Doppler Measurements & Calculations  MV E max el: 88.3 cm/sec  MV A max el: 75.2 cm/sec  MV E/A: 1.2  MV dec time: 0.20 sec  Ao V2 max: 171.9 cm/sec  Ao max P.0 mmHg  Ao V2 mean: 134.9 cm/sec  Ao mean P.9 mmHg  Ao V2 VTI: 43.7 cm  JACOB(I,D): 1.9 cm2  JACOB(V,D): 1.9 cm2  LV V1 max P.6 mmHg  LV V1 max: 118.8 cm/sec  LV V1 VTI: 29.8 cm  SV(LVOT): 82.1 ml  SI(LVOT): 41.1 ml/m2  PA acc time: 0.05 sec  TR max el: 320.4 cm/sec  TR max P.4 mmHg  AV El Ratio (DI): 0.69  JACOB Index (cm2/m2): 0.94  E/E' av.0  Lateral E/e': 15.6     Medial E/e': 18.5     _____________________________________________________________________________  __Echo 2019   Name: GINA MCKEON  MRN: [id number]  : 1940  Study Date: 2019 02:57 PM  Age: 78 yrs  Gender: Female  Patient [facility]  Reason For Study: CHF  Ordering Physician: DIMITRI LEAL  Referring Physician: Carissa Burroughs  Performed By: Milton Guadalupe RDCS     BSA: 2.0 m2  Height: 62 in  Weight: 229 lb  HR: 100  BP: 115/71 mmHg  _____________________________________________________________________________  __        Procedure  Complete Portable Echo Adult. Optison (NDC #2537-4635) given intravenously.  _____________________________________________________________________________  __        Interpretation Summary     Small pericardial effusion  The visual ejection fraction is estimated at 50-55%.  Flattened septum is consistent with RV pressure/volume overload.  Pulmonary  hypertension  There is severe biatrial enlargement.  Very mild AS. MG is 9 mm Hg.  The right ventricle is moderately dilated. Mildly decreased right ventricular  systolic function  There is mod-severe to severe (3-4+) tricuspid regurgitation.  _____________________________________________________________________________  __        Left Ventricle  The left ventricle is normal in size. The visual ejection fraction is  estimated at 50-55%. Diastolic Doppler findings (E/E' ratio and/or other  parameters) suggest left ventricular filling pressures are indeterminate.  Flattened septum is consistent with RV pressure/volume overload.     Right Ventricle  The right ventricle is moderately dilated. Mildly decreased right ventricular  systolic function.     Atria  There is severe biatrial enlargement.     Mitral Valve  Thickened mitral valve posterior leaflet. There is moderate mitral annular  calcification. There is mild to moderate (1-2+) mitral regurgitation.        Tricuspid Valve  The right ventricular systolic pressure is elevated at 42.2 mmHg. Pulmonary  hypertension. There is mod-severe to severe (3-4+) tricuspid regurgitation.     Aortic Valve  There is severe trileaflet aortic sclerosis. No hemodynamically significant  valvular aortic stenosis.     Pulmonic Valve  The pulmonic valve is not well visualized.     Vessels  The aortic root is normal size. Dilation of the inferior vena cava is present  with abnormal respiratory variation in diameter.     Pericardium  Small pericardial effusion.     _____________________________________________________________________________  __  MMode/2D Measurements & Calculations  IVSd: 0.83 cm  LVIDd: 4.4 cm  LVIDs: 3.4 cm  LVPWd: 0.95 cm  FS: 22.2 %  LV mass(C)d: 125.7 grams  LV mass(C)dI: 62.1 grams/m2     Ao root diam: 2.8 cm  LA dimension: 4.5 cm  asc Aorta Diam: 3.4 cm  LA/Ao: 1.6  LVOT diam: 1.9 cm  LVOT area: 3.0 cm2  LA Volume (BP): 103.0 ml  LA Volume Index (BP): 51.0  ml/m2  RWT: 0.43        Doppler Measurements & Calculations  MV E max el: 79.7 cm/sec  MV max P.4 mmHg  MV mean PG: 3.1 mmHg  MV V2 VTI: 23.8 cm  MVA(VTI): 3.1 cm2     MV dec slope: 562.6 cm/sec2  Ao V2 max: 188.8 cm/sec  Ao max P.0 mmHg  Ao V2 mean: 136.1 cm/sec  Ao mean P.4 mmHg  Ao V2 VTI: 36.2 cm  JACOB(I,D): 2.1 cm2  JACOB(V,D): 2.1 cm2  LV V1 max P.2 mmHg  LV V1 max: 134.4 cm/sec  LV V1 VTI: 25.1 cm  SV(LVOT): 74.2 ml  SI(LVOT): 36.6 ml/m2  PA acc time: 0.08 sec  TR max el: 324.8 cm/sec  TR max P.2 mmHg  AV El Ratio (DI): 0.71  JACOB Index (cm2/m2): 1.0  E/E' av.5  Lateral E/e': 8.7  Medial E/e': 10.4        Results for GINA MCKEON (MRN [id number]) as of 2020 10:47   Ref. Range 2020 10:06   Sodium Latest Ref Range: 136 - 145 mmol/L 135 (L)   Potassium Latest Ref Range: 3.5 - 5.1 mmol/L 4.2   Chloride Latest Ref Range: 98 - 107 mmol/L 99   Carbon Dioxide Latest Ref Range: 23 - 29 mmol/L 27   Urea Nitrogen Latest Ref Range: 7 - 30 mg/dL 25   Creatinine Latest Ref Range: 0.70 - 1.30 mg/dL 1.14   GFR Estimate Latest Ref Range: >60 mL/min/1.73_m2 46 (L)   GFR Estimate If Black Latest Ref Range: >60 mL/min/1.73_m2 55 (L)   Calcium Latest Ref Range: 8.5 - 10.5 mg/dL 10.4   Anion Gap Latest Ref Range: 6 - 17 mmol/L 13.2   Glucose Latest Ref Range: 70 - 105 mg/dL 111 (H)         Discussion    1. CT demonstrates progression of her disease  2. It will be difficult for her to maintain herself at home.  I printed out the [employer] details.  I also call lymphedema clinic  - I am not a bad   3. Low sodium diet, daily weight and the like reviewed  4. See Dr. Munoz weekly  5. RTC me in one month             CC: Dr Manjeet Bustillos M.D.

## 2020-01-14 ENCOUNTER — TELEPHONE (OUTPATIENT)
Dept: FAMILY MEDICINE | Facility: CLINIC | Age: 80
End: 2020-01-14

## 2020-01-14 ENCOUNTER — OFFICE VISIT (OUTPATIENT)
Dept: CARDIOLOGY | Facility: CLINIC | Age: 80
End: 2020-01-14
Attending: INTERNAL MEDICINE
Payer: MEDICARE

## 2020-01-14 VITALS
SYSTOLIC BLOOD PRESSURE: 112 MMHG | HEART RATE: 96 BPM | HEIGHT: 62 IN | BODY MASS INDEX: 35.26 KG/M2 | OXYGEN SATURATION: 98 % | WEIGHT: 191.6 LBS | DIASTOLIC BLOOD PRESSURE: 76 MMHG

## 2020-01-14 DIAGNOSIS — I48.19 PERSISTENT ATRIAL FIBRILLATION (H): ICD-10-CM

## 2020-01-14 DIAGNOSIS — I10 BENIGN ESSENTIAL HYPERTENSION: ICD-10-CM

## 2020-01-14 DIAGNOSIS — I50.31 ACUTE DIASTOLIC CONGESTIVE HEART FAILURE (H): ICD-10-CM

## 2020-01-14 DIAGNOSIS — I27.20 PULMONARY HYPERTENSION (H): ICD-10-CM

## 2020-01-14 DIAGNOSIS — R06.09 DYSPNEA ON EXERTION: Primary | ICD-10-CM

## 2020-01-14 LAB
ANION GAP SERPL CALCULATED.3IONS-SCNC: 13.2 MMOL/L (ref 6–17)
BUN SERPL-MCNC: 25 MG/DL (ref 7–30)
CALCIUM SERPL-MCNC: 10.4 MG/DL (ref 8.5–10.5)
CHLORIDE SERPL-SCNC: 99 MMOL/L (ref 98–107)
CO2 SERPL-SCNC: 27 MMOL/L (ref 23–29)
CREAT SERPL-MCNC: 1.14 MG/DL (ref 0.7–1.3)
ERYTHROCYTE [DISTWIDTH] IN BLOOD BY AUTOMATED COUNT: 18 % (ref 10–15)
GFR SERPL CREATININE-BSD FRML MDRD: 46 ML/MIN/{1.73_M2}
GLUCOSE SERPL-MCNC: 111 MG/DL (ref 70–105)
HCT VFR BLD AUTO: 40.2 % (ref 35–47)
HGB BLD-MCNC: 12.8 G/DL (ref 11.7–15.7)
MCH RBC QN AUTO: 28.8 PG (ref 26.5–33)
MCHC RBC AUTO-ENTMCNC: 31.8 G/DL (ref 31.5–36.5)
MCV RBC AUTO: 90 FL (ref 78–100)
NT-PROBNP SERPL-MCNC: 4510 PG/ML (ref 0–450)
PLATELET # BLD AUTO: 157 10E9/L (ref 150–450)
POTASSIUM SERPL-SCNC: 4.2 MMOL/L (ref 3.5–5.1)
RBC # BLD AUTO: 4.45 10E12/L (ref 3.8–5.2)
SODIUM SERPL-SCNC: 135 MMOL/L (ref 136–145)
WBC # BLD AUTO: 4.9 10E9/L (ref 4–11)

## 2020-01-14 PROCEDURE — 83880 ASSAY OF NATRIURETIC PEPTIDE: CPT | Performed by: INTERNAL MEDICINE

## 2020-01-14 PROCEDURE — 36415 COLL VENOUS BLD VENIPUNCTURE: CPT | Performed by: INTERNAL MEDICINE

## 2020-01-14 PROCEDURE — 80048 BASIC METABOLIC PNL TOTAL CA: CPT | Performed by: INTERNAL MEDICINE

## 2020-01-14 PROCEDURE — 85027 COMPLETE CBC AUTOMATED: CPT | Performed by: INTERNAL MEDICINE

## 2020-01-14 PROCEDURE — 99215 OFFICE O/P EST HI 40 MIN: CPT | Performed by: INTERNAL MEDICINE

## 2020-01-14 ASSESSMENT — MIFFLIN-ST. JEOR: SCORE: 1292.34

## 2020-01-14 NOTE — PROGRESS NOTES
"Shay Marquez M.D.  Cardiovascular Medicine    I personally saw and examined Jaqueline Canales, discussed care with housestaff and other consultants, reviewed current laboratories and imaging studies, and conveyed impression and diagnostic/therapeutic plan to patient.    Problem List:    History:      Objective:    VITALS  /76   Pulse 96   Ht 1.575 m (5' 2\")   Wt 86.9 kg (191 lb 9.6 oz)   SpO2 98%   BMI 35.04 kg/m      WEIGHT  Wt Readings from Last 5 Encounters:   01/14/20 86.9 kg (191 lb 9.6 oz)   01/10/20 85.6 kg (188 lb 12.8 oz)   01/09/20 86.5 kg (190 lb 12.8 oz)   12/30/19 92.3 kg (203 lb 6.4 oz)   12/27/19 92.4 kg (203 lb 9.6 oz)       Meds  Current Outpatient Medications   Medication Sig Dispense Refill     acetaminophen (TYLENOL) 500 MG tablet Take 1,000 mg by mouth 2 times daily        albuterol (PROAIR HFA/PROVENTIL HFA/VENTOLIN HFA) 108 (90 Base) MCG/ACT inhaler Inhale 2 puffs into the lungs every 6 hours as needed for shortness of breath / dyspnea or wheezing       apixaban ANTICOAGULANT (ELIQUIS) 5 MG tablet Take 1 tablet (5 mg) by mouth 2 times daily 180 tablet 2     Bacillus Coagulans-Inulin (PROBIOTIC-PREBIOTIC PO) Take 1 chew tab by mouth 2 times daily        folic acid (FOLVITE) 1 MG tablet Take 1 tablet (1,000 mcg) by mouth daily 90 tablet 3     InFLIXimab (REMICADE IV) Inject 600 mg into the vein Every 8 weeks       levothyroxine (SYNTHROID/LEVOTHROID) 75 MCG tablet Take 1 tablet (75 mcg) by mouth daily 90 tablet 2     melatonin 5 MG tablet Take 5 mg by mouth nightly as needed for sleep       Methotrexate Sodium (METHOTREXATE PO) Take 2.5 mg by mouth 10 tablets weekly - Wednesdays       metoprolol succinate ER (TOPROL-XL) 50 MG 24 hr tablet Take 0.5 tablets (25 mg) by mouth daily 30 tablet 0     Multiple Vitamins-Minerals (ICAPS) CAPS Take 1 capsule by mouth 2 times daily        multivitamin (CENTRUM SILVER) tablet Take 1 tablet by mouth daily       NIFEdipine ER (ADALAT CC) 60 MG 24 " hr tablet Take 60 mg by mouth daily       omeprazole (PRILOSEC) 20 MG DR capsule Take 1 capsule (20 mg) by mouth 2 times daily Take 30-60 minutes prior to meals 180 capsule 2     potassium chloride ER (K-DUR/KLOR-CON M) 20 MEQ CR tablet Take 1 tablet (20 mEq) by mouth daily 30 tablet 0     Prednisolon-Gatiflox-Bromfenac 1-0.5-0.075 % SUSP Place 1 drop into the right eye daily       psyllium (METAMUCIL/KONSYL) capsule Take 1 capsule by mouth daily       sildenafil (REVATIO) 20 MG tablet Take 1 tablet (20 mg) by mouth 3 times daily 90 tablet 11     simvastatin (ZOCOR) 40 MG tablet Take 1 tablet (40 mg) by mouth At Bedtime 90 tablet 3     Skin Protectants, Misc. (EUCERIN) cream Apply topically 2 times daily Apply to areas dry skin topically two times a day for dry skin and Apply to areas of dry skin topically as needed for dry skin daily       spironolactone (ALDACTONE) 25 MG tablet Take 1 tablet (25 mg) by mouth daily 30 tablet 0     torsemide (DEMADEX) 20 MG tablet Take 2 tablets (40 mg) by mouth daily Take 40mg q am 60 tablet 0     triamcinolone (KENALOG) 0.1 % external cream Apply topically 2 times daily 30 g 3     Vitamin D, Cholecalciferol, 10 MCG (400 UNIT) TABS Take 1,600 Units by mouth daily         LABORATORY RESULTS    CBC Results  Lab Results   Component Value Date    WBC 4.9 01/14/2020    RBC 4.45 01/14/2020    HGB 12.8 01/14/2020    HCT 40.2 01/14/2020    MCV 90 01/14/2020    MCH 28.8 01/14/2020    MCHC 31.8 01/14/2020    RDW 18.0 (H) 01/14/2020     01/14/2020       CMP RESULTS:  Lab Results   Component Value Date     (L) 01/14/2020    POTASSIUM 4.2 01/14/2020    CHLORIDE 99 01/14/2020    CO2 27 01/14/2020    ANIONGAP 13.2 01/14/2020     (H) 01/14/2020    BUN 25 01/14/2020    CR 1.14 01/14/2020    GFRESTIMATED 46 (L) 01/14/2020    GFRESTBLACK 55 (L) 01/14/2020    TU 10.4 01/14/2020    BILITOTAL 1.2 11/27/2019    ALBUMIN 3.7 11/27/2019    ALKPHOS 135 11/27/2019    ALT 15 11/27/2019     AST 26 11/27/2019        INR RESULTS:  Lab Results   Component Value Date    INR 1.05 04/15/2019       MAGNESIUM RESULTS  Lab Results   Component Value Date    MAG 2.4 (H) 01/06/2020       BNP RESULTS  Lab Results   Component Value Date    NTBNP 4,510 (H) 01/14/2020       IMAGING    ASSESSMENT/PLAN      Sincerely yours,    Shay Marquez MD dictated but not signed send out immediately

## 2020-01-14 NOTE — TELEPHONE ENCOUNTER
Chief Complaint: Acute Diastolic Congestive Heart Failure (H),  MON 13-JAN-2020 0 / 1    [phone] ([place])

## 2020-01-14 NOTE — LETTER
1/14/2020    Carissa Burroughs MD  [address]  [place] [address]    RE: Jaqueline Canales       Dear Colleague,    I had the pleasure of seeing Jaqueline Canales in the [facility].        Natalie Bustillos M.D.  Arthritis and Rheumatolotgamaya Burroughs M.D      Acute Hypoxic Respiratory Failure    Diastolic CHF exacerbation  Chronic Bilateral LE edema / Lymphedema  Chronic A. Fib, on anticoagulation  Moderate/Severe pulmonary hypertension  Hyperlipidemia   Hypertension  Hypothyroidism  Rheumatoid arthritis  Scleroderma  CKD 3    The patient is just discharged from transitional care where she has been resident since .  She was discharge from [facility] yesterday.  She has not as yet received a call from the home services nurse.  It is not apparent from the chart that she was given follow-up in lymphedema clinic.  She lives with her elderly and mobility challenged .  She is not ambulatory enough to grocery shop!.  Her weight is down and legs are nominally mayte.  Her BNP is in her usual range of 4502-8357 with /   And heart rate 77 and irregularly irregular.    Examination:    Constitutional: alert, oriented, normal gait and station, normal mentation.  Oral: moist mucous membrans  Lymph: without pathologic adenopathy  Chest: clear to ausculation and percussion  Cor: No evidence of left or right ventricular activity.  Rhythm is regular.  [id number], S2 split physiologically. Murmurs are TR and MR present  Abdomen: without tenderness, rebound, guarding, masses, ascites  Extremities: lymphedema  Neuro: no focal defects, normal mentation  Skin: without open lesions  Psych: oriented, verbal, mental status in tact      Wt Readings from Last 24 Encounters:   01/10/20 85.6 kg (188 lb 12.8 oz)   01/09/20 86.5 kg (190 lb 12.8 oz)   12/30/19 92.3 kg (203 lb 6.4 oz)   12/27/19 92.4 kg (203 lb 9.6 oz)   12/24/19 93.4 kg (205 lb 12.8 oz)   12/23/19 93.4 kg (205 lb 12.8 oz)   12/19/19  92.4 kg (203 lb 9.6 oz)   12/17/19 91.7 kg (202 lb 3.2 oz)   12/13/19 91.6 kg (202 lb)   12/11/19 90.6 kg (199 lb 12.8 oz)   12/10/19 90.9 kg (200 lb 4.8 oz)   12/06/19 90.5 kg (199 lb 9.6 oz)   12/06/19 90.5 kg (199 lb 9.6 oz)   12/05/19 88.7 kg (195 lb 8 oz)   11/20/19 103.4 kg (228 lb)   11/14/19 102.5 kg (226 lb)   11/11/19 101.2 kg (223 lb)   10/28/19 100.8 kg (222 lb 4.8 oz)   08/28/19 98.9 kg (218 lb)   07/29/19 96.8 kg (213 lb 6.4 oz)   07/26/19 97.5 kg (215 lb)   07/16/19 96.2 kg (212 lb)   06/28/19 99.8 kg (220 lb)   06/24/19 99.7 kg (219 lb 11.2 oz)         Current Outpatient Medications   Medication     acetaminophen (TYLENOL) 500 MG tablet     albuterol (PROAIR HFA/PROVENTIL HFA/VENTOLIN HFA) 108 (90 Base) MCG/ACT inhaler     apixaban ANTICOAGULANT (ELIQUIS) 5 MG tablet     Bacillus Coagulans-Inulin (PROBIOTIC-PREBIOTIC PO)     folic acid (FOLVITE) 1 MG tablet     InFLIXimab (REMICADE IV)     levothyroxine (SYNTHROID/LEVOTHROID) 75 MCG tablet     melatonin 5 MG tablet     Methotrexate Sodium (METHOTREXATE PO)     metoprolol succinate ER (TOPROL-XL) 50 MG 24 hr tablet     Multiple Vitamins-Minerals (ICAPS) CAPS     multivitamin (CENTRUM SILVER) tablet     NIFEdipine ER (ADALAT CC) 60 MG 24 hr tablet     omeprazole (PRILOSEC) 20 MG DR capsule     potassium chloride ER (K-DUR/KLOR-CON M) 20 MEQ CR tablet     Prednisolon-Gatiflox-Bromfenac 1-0.5-0.075 % SUSP     psyllium (METAMUCIL/KONSYL) capsule     sildenafil (REVATIO) 20 MG tablet     simvastatin (ZOCOR) 40 MG tablet     Skin Protectants, Misc. (EUCERIN) cream     spironolactone (ALDACTONE) 25 MG tablet     torsemide (DEMADEX) 20 MG tablet     triamcinolone (KENALOG) 0.1 % external cream     Vitamin D, Cholecalciferol, 10 MCG (400 UNIT) TABS     No current facility-administered medications for this visit.           REVIEW of SYMPTOMS    Constitutional: without fever, chills, night sweats.  Weight is down post discharge  HEENT: without dry eyes, dry  mouth, sinusitis, corryza, visual changes  Endocrine: without polyuria, polydypsia, polyphagia, heat or cold intolerance, changing mental status  Cardiology: without chest pain, tightness, heaviness, pressure, paroxysmal dyspnea, orthopnea, palpitation, pre-syncope or syncope or device discharge (if present)  Pulmonary: without asthma, wheezing, cough, hemoptysis  GI: without nausea, emesis, jaundice, pain, hematemesis, melena  : without frequency, urgency, hematuria, stones, pain, abnormal bleeding, frequency, urgency  Neurologic: without TIA, CVA, trauma, seizure  Dermatologic: without lesions, abrasion rash,   Orthopedic/Rheum: without significant joint pain, impairment, limb, polyserositis, ulceration, Raynauds  Heme: without mass, bruising, frequent infection, anemia  Psychiatric: without substance abuse, hallucination, medication, depression          Conclusion     1) Elevated biventricular filling pressures   2) Moderate to severe mostly primary pulmonary hypertension  3) Improvement in hemodynamics with nitric oxide; however, with no improvement in mean PA pressure         CV Right/Left Heart Cath     Right Heart Pressures     Systemic blood pressure 140-150 throughout the case.    BASELINE:  1) Elevated right-sided filling pressures (mean RA 16 mmHg)  2) Moderate to severe pulmonary hypertension (mean PA 35 mmHg) secondary to intrinsic pulmonary vascular disease (PVRI 14 rod*m2) and elevated left-sided filling pressures (PCWP 20 mmHg).  3) O2 sats at baseline 93%  4) Mildly depressed CO/CI: 3.8/1.9    NO 80 PPM:  1) Pulmonary artery pressures did not change (mean PA 34 mmHg)  2) O2 sats improved to 100%  3) CO/CI improved 4.2/2.1 Right sided filling pressures are severely elevated.Left sided filling pressures are moderately elevated. Severely elevated pulmonary artery hypertension.Left ventricular filling pressures are moderately elevated .Normal cardiac output level.   Pressures      Time Systolic  Diastolic Mean A Wave V Wave EDP Max dp/dt HR   RA Pressures  9:01 AM   16 mmHg    14 mmHg    22 mmHg      87 bpm      RV Pressures  9:01 AM 52 mmHg    5 mmHg       15 mmHg     86 bpm      PA Pressures  9:02 AM 51 mmHg    26 mmHg    35 mmHg        76 bpm      PCW Pressures  9:02 AM   18 mmHg    23 mmHg    24 mmHg      72 bpm      AO Pressures  9:03  mmHg    87 mmHg    111 mmHg        82 bpm      Pressures Phase: Drug Study Level 1      Time Systolic Diastolic Mean A Wave V Wave EDP Max dp/dt HR   PA Pressures  9:11 AM 48 mmHg    26 mmHg    34 mmHg        77 bpm      Pressures Phase: Drug Study Level 2      Time Systolic Diastolic Mean A Wave V Wave EDP Max dp/dt HR   PA Pressures  9:14 AM 52 mmHg    33 mmHg    36 mmHg        84 bpm      PCW Pressures  9:14 AM   22 mmHg    26 mmHg    23 mmHg      79 bpm      AO Pressures  9:11  mmHg    67 mmHg    97 mmHg        80 bpm      Blood Flow Results Phase: Drug Study Level 2      Time Results Indexed Values   QP  9:11 AM 4.19 L/min    2.1 L/min/m2      QS  9:11 AM 4.19 L/min    2.1 L/min/m2      Blood Oximetry Phase: Drug Study Level 2      Time Hb SAT(%) PO2 Content PA Sat   Art  9:11 AM  100 %     18.22 mL/dL       Cardiac Output Phase: Drug Study Level 2      Time TDCO TDCI Josh C.O. Josh C.I. Josh HR   Cardiac Output Results  9:11 AM   4.19 L/min    2.1 L/min/m2       Resistance Results Phase: Drug Study Level 2      Time PVR SVR PVR-I SVR-I TPR TVR TPR-I TVR-I PVR/SVR TPR/TVR   Resistance Results (Metric)  9:11 .55 dsc-5     534.11 dsc-5/m2     687.98 dsc-5    1853.72 dsc-5    1373.43 dsc-5/m2    3700.63 dsc-5/m2     0.37      Resistance Results (Wood)  9:11 AM 3.35 ROQUE     6.68 ROQUE/m2     8.6 ROQUE    23.18 ROQUE    17.17                1/15/2019 1/24/2019   /77 112/63           CT CHEST WITHOUT CONTRAST December 18, 2019 12:03 PM      HISTORY: Chest pain or shortness of breath, pleurisy or effusion  suspected. Acute diastolic congestive heart failure  (H). Persistent  atrial fibrillation. Pulmonary hypertension (H). Benign essential  hypertension.     COMPARISON: None.     TECHNIQUE: Volumetric helical acquisition of CT images of the chest  from the clavicles to the kidneys were acquired without IV contrast.  Radiation dose for this scan was reduced using automated exposure  control, adjustment of the mA and/or kV according to patient size, or  iterative reconstruction technique.     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 Moderately extensive traction  bronchiectasis. Some ground-glass changes in the right upper lobe and  right lower lobe could indicate active inflammation. Cardiomegaly. No  pleural or pericardial effusion. There are extensive coronary vascular  calcifications and/or stents consistent with coronary artery disease.  Moderate hiatal hernia. No frankly destructive bony lesions. No acute  findings in the visualized upper abdomen.                                                                      IMPRESSION: Moderate to severe fibrosis with honeycombing in a usual  interstitial pneumonitis pattern.       MRN: [id number]  : 1940  Study Date: 2018 09:55 AM  Age: 77 yrs  Gender: Female  Patient Location: [facility] [place]  Reason For Study: Dyspnea on exertion  History: Dyspnea on exertion  E  Referring Physician: THEO HUIZAR  Performed By: Sofía Braden RDCS     BSA: 2.0 m2  Height: 62 in  Weight: 221 lb  BP: 112/74 mmHg  _____________________________________________________________________________  __        Procedure  Echocardiogram with two-dimensional, color and spectral Doppler performed.  _____________________________________________________________________________  __        Interpretation Summary  Global and regional left ventricular function is normal with an EF of 55-60%.  Global right ventricular function is normal.Mild right ventricular dilation is  present.  Mild  to moderate TR. Right ventricular systolic pressure is 49mmHg above the  right atrial pressure. Moderate (pulmonary artery systolic pressure 50-75mmHg)  pulmonary hypertension is present.  The inferior vena cava was normal in size with preserved respiratory  variability.Estimated mean right atrial pressure is 3 mmHg (normal).  No pericardial effusion is present.     This study was compared with the study from 10/16/17, no significant change .  _____________________________________________________________________________  __        Left Ventricle  Left ventricular wall thickness is normal. Left ventricular size is normal.  Global and regional left ventricular function is normal with an EF of 55-60%.  The Ejection Fraction was visually estimated. Diastolic function not assessed  due to significant mitral annular calcification. No regional wall motion  abnormalities are seen.     Right Ventricle  Global right ventricular function is normal. Mild right ventricular dilation  is present.     Atria  Severe left atrial enlargement is present. Mild right atrial enlargement is  present.     Mitral Valve  Moderate mitral annular calcification is present. Mild mitral insufficiency is  present.        Aortic Valve  Trileaflet aortic sclerosis without stenosis.     Tricuspid Valve  Mild to moderate tricuspid insufficiency is present. Right ventricular  systolic pressure is 49mmHg above the right atrial pressure. Moderate  (pulmonary artery systolic pressure 50-75mmHg) pulmonary hypertension is  present.     Pulmonic Valve  Trace pulmonic insufficiency is present.     Vessels  The inferior vena cava was normal in size with preserved respiratory  variability. Visualized portions of the aorta are normal in size. Estimated  mean right atrial pressure is 3 mmHg (normal).     Pericardium  No pericardial effusion is present.        Compared to Previous Study  This study was compared with the study from 10/16/17, no significant change  .     Attestation  I have personally viewed the imaging and agree with the interpretation and  report as documented by the fellow, Nupur Luz, and/or edited by me.  _____________________________________________________________________________  __     MMode/2D Measurements & Calculations  IVSd: 0.83 cm  LVIDd: 5.3 cm  LVIDs: 2.4 cm  LVPWd: 0.88 cm  FS: 54.8 %  LV mass(C)d: 162.3 grams  LV mass(C)dI: 81.4 grams/m2  Ao root diam: 2.6 cm  LA dimension: 5.7 cm  asc Aorta Diam: 3.5 cm  LA/Ao: 2.2  LVOT diam: 1.9 cm  LVOT area: 2.8 cm2  LA Volume (BP): 105.0 ml     LA Volume Index (BP): 52.8 ml/m2  RWT: 0.33        Doppler Measurements & Calculations  MV E max el: 88.3 cm/sec  MV A max el: 75.2 cm/sec  MV E/A: 1.2  MV dec time: 0.20 sec  Ao V2 max: 171.9 cm/sec  Ao max P.0 mmHg  Ao V2 mean: 134.9 cm/sec  Ao mean P.9 mmHg  Ao V2 VTI: 43.7 cm  JACOB(I,D): 1.9 cm2  JACOB(V,D): 1.9 cm2  LV V1 max P.6 mmHg  LV V1 max: 118.8 cm/sec  LV V1 VTI: 29.8 cm  SV(LVOT): 82.1 ml  SI(LVOT): 41.1 ml/m2  PA acc time: 0.05 sec  TR max el: 320.4 cm/sec  TR max P.4 mmHg  AV El Ratio (DI): 0.69  JACOB Index (cm2/m2): 0.94  E/E' av.0  Lateral E/e': 15.6     Medial E/e': 18.5     _____________________________________________________________________________  __Echo 2019   Name: GINA MCKEON  MRN: [id number]  : 1940  Study Date: 2019 02:57 PM  Age: 78 yrs  Gender: Female  Patient [facility]: [facility]  Reason For Study: CHF  Ordering Physician: DIMITRI LEAL  Referring Physician: Carissa Burroughs  Performed By: Milton Guadalupe RDCS     BSA: 2.0 m2  Height: 62 in  Weight: 229 lb  HR: 100  BP: 115/71 mmHg  _____________________________________________________________________________  __        Procedure  Complete Portable Echo Adult. Optison (NDC #2828-9707) given intravenously.  _____________________________________________________________________________  __        Interpretation Summary      Small pericardial effusion  The visual ejection fraction is estimated at 50-55%.  Flattened septum is consistent with RV pressure/volume overload.  Pulmonary hypertension  There is severe biatrial enlargement.  Very mild AS. MG is 9 mm Hg.  The right ventricle is moderately dilated. Mildly decreased right ventricular  systolic function  There is mod-severe to severe (3-4+) tricuspid regurgitation.  _____________________________________________________________________________  __        Left Ventricle  The left ventricle is normal in size. The visual ejection fraction is  estimated at 50-55%. Diastolic Doppler findings (E/E' ratio and/or other  parameters) suggest left ventricular filling pressures are indeterminate.  Flattened septum is consistent with RV pressure/volume overload.     Right Ventricle  The right ventricle is moderately dilated. Mildly decreased right ventricular  systolic function.     Atria  There is severe biatrial enlargement.     Mitral Valve  Thickened mitral valve posterior leaflet. There is moderate mitral annular  calcification. There is mild to moderate (1-2+) mitral regurgitation.        Tricuspid Valve  The right ventricular systolic pressure is elevated at 42.2 mmHg. Pulmonary  hypertension. There is mod-severe to severe (3-4+) tricuspid regurgitation.     Aortic Valve  There is severe trileaflet aortic sclerosis. No hemodynamically significant  valvular aortic stenosis.     Pulmonic Valve  The pulmonic valve is not well visualized.     Vessels  The aortic root is normal size. Dilation of the inferior vena cava is present  with abnormal respiratory variation in diameter.     Pericardium  Small pericardial effusion.     _____________________________________________________________________________  __  MMode/2D Measurements & Calculations  IVSd: 0.83 cm  LVIDd: 4.4 cm  LVIDs: 3.4 cm  LVPWd: 0.95 cm  FS: 22.2 %  LV mass(C)d: 125.7 grams  LV mass(C)dI: 62.1 grams/m2     Ao root diam: 2.8  cm  LA dimension: 4.5 cm  asc Aorta Diam: 3.4 cm  LA/Ao: 1.6  LVOT diam: 1.9 cm  LVOT area: 3.0 cm2  LA Volume (BP): 103.0 ml  LA Volume Index (BP): 51.0 ml/m2  RWT: 0.43        Doppler Measurements & Calculations  MV E max el: 79.7 cm/sec  MV max P.4 mmHg  MV mean PG: 3.1 mmHg  MV V2 VTI: 23.8 cm  MVA(VTI): 3.1 cm2     MV dec slope: 562.6 cm/sec2  Ao V2 max: 188.8 cm/sec  Ao max P.0 mmHg  Ao V2 mean: 136.1 cm/sec  Ao mean P.4 mmHg  Ao V2 VTI: 36.2 cm  JACOB(I,D): 2.1 cm2  JACOB(V,D): 2.1 cm2  LV V1 max P.2 mmHg  LV V1 max: 134.4 cm/sec  LV V1 VTI: 25.1 cm  SV(LVOT): 74.2 ml  SI(LVOT): 36.6 ml/m2  PA acc time: 0.08 sec  TR max el: 324.8 cm/sec  TR max P.2 mmHg  AV El Ratio (DI): 0.71  JACOB Index (cm2/m2): 1.0  E/E' av.5  Lateral E/e': 8.7  Medial E/e': 10.4        Results for GINA MCKEON (MRN [id number]) as of 2020 10:47   Ref. Range 2020 10:06   Sodium Latest Ref Range: 136 - 145 mmol/L 135 (L)   Potassium Latest Ref Range: 3.5 - 5.1 mmol/L 4.2   Chloride Latest Ref Range: 98 - 107 mmol/L 99   Carbon Dioxide Latest Ref Range: 23 - 29 mmol/L 27   Urea Nitrogen Latest Ref Range: 7 - 30 mg/dL 25   Creatinine Latest Ref Range: 0.70 - 1.30 mg/dL 1.14   GFR Estimate Latest Ref Range: >60 mL/min/1.73_m2 46 (L)   GFR Estimate If Black Latest Ref Range: >60 mL/min/1.73_m2 55 (L)   Calcium Latest Ref Range: 8.5 - 10.5 mg/dL 10.4   Anion Gap Latest Ref Range: 6 - 17 mmol/L 13.2   Glucose Latest Ref Range: 70 - 105 mg/dL 111 (H)         Discussion    1. CT demonstrates progression of her disease  2. It will be difficult for her to maintain herself at home.  I printed out the [employer] details.  I also call lymphedema clinic  - I am not a bad   3. Low sodium diet, daily weight and the like reviewed  4. See Dr. Munoz weekly  5. RTC me in one month             CC: Dr Manjeet Bustillos M.D.    Shay Marquez M.D.  Cardiovascular Medicine    I personally saw and  "examined Jaqueline Canales, discussed care with housestaff and other consultants, reviewed current laboratories and imaging studies, and conveyed impression and diagnostic/therapeutic plan to patient.    Problem List:    History:      Objective:    VITALS  /76   Pulse 96   Ht 1.575 m (5' 2\")   Wt 86.9 kg (191 lb 9.6 oz)   SpO2 98%   BMI 35.04 kg/m       WEIGHT  Wt Readings from Last 5 Encounters:   01/14/20 86.9 kg (191 lb 9.6 oz)   01/10/20 85.6 kg (188 lb 12.8 oz)   01/09/20 86.5 kg (190 lb 12.8 oz)   12/30/19 92.3 kg (203 lb 6.4 oz)   12/27/19 92.4 kg (203 lb 9.6 oz)       Meds  Current Outpatient Medications   Medication Sig Dispense Refill     acetaminophen (TYLENOL) 500 MG tablet Take 1,000 mg by mouth 2 times daily        albuterol (PROAIR HFA/PROVENTIL HFA/VENTOLIN HFA) 108 (90 Base) MCG/ACT inhaler Inhale 2 puffs into the lungs every 6 hours as needed for shortness of breath / dyspnea or wheezing       apixaban ANTICOAGULANT (ELIQUIS) 5 MG tablet Take 1 tablet (5 mg) by mouth 2 times daily 180 tablet 2     Bacillus Coagulans-Inulin (PROBIOTIC-PREBIOTIC PO) Take 1 chew tab by mouth 2 times daily        folic acid (FOLVITE) 1 MG tablet Take 1 tablet (1,000 mcg) by mouth daily 90 tablet 3     InFLIXimab (REMICADE IV) Inject 600 mg into the vein Every 8 weeks       levothyroxine (SYNTHROID/LEVOTHROID) 75 MCG tablet Take 1 tablet (75 mcg) by mouth daily 90 tablet 2     melatonin 5 MG tablet Take 5 mg by mouth nightly as needed for sleep       Methotrexate Sodium (METHOTREXATE PO) Take 2.5 mg by mouth 10 tablets weekly - Wednesdays       metoprolol succinate ER (TOPROL-XL) 50 MG 24 hr tablet Take 0.5 tablets (25 mg) by mouth daily 30 tablet 0     Multiple Vitamins-Minerals (ICAPS) CAPS Take 1 capsule by mouth 2 times daily        multivitamin (CENTRUM SILVER) tablet Take 1 tablet by mouth daily       NIFEdipine ER (ADALAT CC) 60 MG 24 hr tablet Take 60 mg by mouth daily       omeprazole (PRILOSEC) 20 " MG DR capsule Take 1 capsule (20 mg) by mouth 2 times daily Take 30-60 minutes prior to meals 180 capsule 2     potassium chloride ER (K-DUR/KLOR-CON M) 20 MEQ CR tablet Take 1 tablet (20 mEq) by mouth daily 30 tablet 0     Prednisolon-Gatiflox-Bromfenac 1-0.5-0.075 % SUSP Place 1 drop into the right eye daily       psyllium (METAMUCIL/KONSYL) capsule Take 1 capsule by mouth daily       sildenafil (REVATIO) 20 MG tablet Take 1 tablet (20 mg) by mouth 3 times daily 90 tablet 11     simvastatin (ZOCOR) 40 MG tablet Take 1 tablet (40 mg) by mouth At Bedtime 90 tablet 3     Skin Protectants, Misc. (EUCERIN) cream Apply topically 2 times daily Apply to areas dry skin topically two times a day for dry skin and Apply to areas of dry skin topically as needed for dry skin daily       spironolactone (ALDACTONE) 25 MG tablet Take 1 tablet (25 mg) by mouth daily 30 tablet 0     torsemide (DEMADEX) 20 MG tablet Take 2 tablets (40 mg) by mouth daily Take 40mg q am 60 tablet 0     triamcinolone (KENALOG) 0.1 % external cream Apply topically 2 times daily 30 g 3     Vitamin D, Cholecalciferol, 10 MCG (400 UNIT) TABS Take 1,600 Units by mouth daily         LABORATORY RESULTS    CBC Results  Lab Results   Component Value Date    WBC 4.9 01/14/2020    RBC 4.45 01/14/2020    HGB 12.8 01/14/2020    HCT 40.2 01/14/2020    MCV 90 01/14/2020    MCH 28.8 01/14/2020    MCHC 31.8 01/14/2020    RDW 18.0 (H) 01/14/2020     01/14/2020       CMP RESULTS:  Lab Results   Component Value Date     (L) 01/14/2020    POTASSIUM 4.2 01/14/2020    CHLORIDE 99 01/14/2020    CO2 27 01/14/2020    ANIONGAP 13.2 01/14/2020     (H) 01/14/2020    BUN 25 01/14/2020    CR 1.14 01/14/2020    GFRESTIMATED 46 (L) 01/14/2020    GFRESTBLACK 55 (L) 01/14/2020    TU 10.4 01/14/2020    BILITOTAL 1.2 11/27/2019    ALBUMIN 3.7 11/27/2019    ALKPHOS 135 11/27/2019    ALT 15 11/27/2019    AST 26 11/27/2019        INR RESULTS:  Lab Results   Component  Value Date    INR 1.05 04/15/2019       MAGNESIUM RESULTS  Lab Results   Component Value Date    MAG 2.4 (H) 01/06/2020       BNP RESULTS  Lab Results   Component Value Date    NTBNP 4,510 (H) 01/14/2020       IMAGING    ASSESSMENT/PLAN      Sincerely yours,    Shay Marquez MD dictated but not signed send out immediately    Thank you for allowing me to participate in the care of your patient.      Sincerely,     Shay Marquez MD     [facility]    cc:   Shay Marquez MD  [address] [place], [place] [address]  [place] [address]

## 2020-01-14 NOTE — TELEPHONE ENCOUNTER
"ED / Discharge Outreach Protocol    Patient Contact    Attempt # 1    Was call answered?  Yes.  \"May I please speak with <Jaqueline>\"  Is patient available?   No.  Spoke with Spouse; best time to call back is this afternoon    "

## 2020-01-14 NOTE — PATIENT INSTRUCTIONS
Medication Changes:   No medication changes at this time. Please continue current medication regiment.    Patient Instructions:  1. Continue staying active and eat a heart healthy diet.    2. Please keep current list of medications with you at all times.    3. Remember to weigh yourself daily after voiding and before you consume any food or beverages and log the numbers.  If you have gained 2 pounds overnight or 5 pounds in a week contact us immediately for medication adjustments or further instructions.    4. **Please call us immediately if you have any syncope (fainting or passing out), chest pain, edema (swelling or weight gain), or decline in your functional status (general decline in how you are feeling).    Follow up Appointment Information:  Follow up with Dr. Burroughs weekly    Lymphedema Clinic Referral    1 month follow up      We are [facility] the third [facility].  Our address is     [address] [place] [address] on [address] [place] [address]      Thank you for allowing us to be a part of your care here at the [facility]    If you have questions or concerns please contact us at:    Saritha Liriano RN, BSN    Vanita Westbrook (Schedule,Prior Auth)  Nurse Coordinator     Clinic   Pulmonary Hypertension   Pulmonary Hypertension  [facility]  (Phone)[phone]   (Phone) [phone]        (Fax)[phone]                ** Please note that you will NOT receive a reminder call regarding your scheduled testing, reminder calls are for provider appointments only.  If you are scheduled for testing within the Smart[employer] system you may receive a call regarding pre-registration for billing purposes only.**     Support Group:  [employer]pert[employer]  Https://www.phassociation.org/  **Look at the Events Tab** They even have  Support Groups that you can call into    [facility] Support Group  Second Saturday of the Month from 1-3 PM   Location: [address] [place] [address]  Leader: Eleni Galloway  Phone: [phone] or [phone]  Email: [email or web address]

## 2020-01-14 NOTE — NURSING NOTE
Med Reconcile: Reviewed and verified all current medications with the patient. The updated medication list was printed and given to the patient.  Diet: Patient instructed regarding a heart healthy diet, including discussion of reduced fat and sodium intake. Patient demonstrated understanding of this information and agreed to call with further questions or concerns.  Return Appointment: Patient given instructions regarding scheduling next clinic visit. Patient demonstrated understanding of this information and agreed to call with further questions or concerns.  Patient stated she understood all health information given and agreed to call with further questions or concerns.     Medication Changes:   No medication changes at this time. Please continue current medication regiment.    Patient Instructions:  1. Continue staying active and eat a heart healthy diet.    2. Please keep current list of medications with you at all times.    3. Remember to weigh yourself daily after voiding and before you consume any food or beverages and log the numbers.  If you have gained 2 pounds overnight or 5 pounds in a week contact us immediately for medication adjustments or further instructions.    4. **Please call us immediately if you have any syncope (fainting or passing out), chest pain, edema (swelling or weight gain), or decline in your functional status (general decline in how you are feeling).    Follow up Appointment Information:  Follow up with Dr. Burroughs weekly    Lymphedema Clinic Referral    1 month follow up

## 2020-01-14 NOTE — LETTER
1/14/2020    Carissa Burroughs MD  [address]  [place] [address]    RE: Jaqueline Canales       Dear Colleague,    I had the pleasure of seeing Jaqueline Canales in the [facility].        Natalie Bustillos M.D.  Arthritis and Rheumatolotgamaya Burroughs M.D      Acute Hypoxic Respiratory Failure    Diastolic CHF exacerbation  Chronic Bilateral LE edema / Lymphedema  Chronic A. Fib, on anticoagulation  Moderate/Severe pulmonary hypertension  Hyperlipidemia   Hypertension  Hypothyroidism  Rheumatoid arthritis  Scleroderma  CKD 3    The patient is just discharged from transitional care where she has been resident since .  She was discharge from [facility] yesterday.  She has not as yet received a call from the home services nurse.  It is not apparent from the chart that she was given follow-up in lymphedema clinic.  She lives with her elderly and mobility challenged .  She is not ambulatory enough to grocery shop!.  Her weight is down and legs are nominally mayte.  Her BNP is in her usual range of 2593-1751 with /   And heart rate 77 and irregularly irregular.    Examination:    Constitutional: alert, oriented, normal gait and station, normal mentation.  Oral: moist mucous membrans  Lymph: without pathologic adenopathy  Chest: clear to ausculation and percussion  Cor: No evidence of left or right ventricular activity.  Rhythm is regular.  [id number], S2 split physiologically. Murmurs are TR and MR present  Abdomen: without tenderness, rebound, guarding, masses, ascites  Extremities: lymphedema  Neuro: no focal defects, normal mentation  Skin: without open lesions  Psych: oriented, verbal, mental status in tact      Wt Readings from Last 24 Encounters:   01/10/20 85.6 kg (188 lb 12.8 oz)   01/09/20 86.5 kg (190 lb 12.8 oz)   12/30/19 92.3 kg (203 lb 6.4 oz)   12/27/19 92.4 kg (203 lb 9.6 oz)   12/24/19 93.4 kg (205 lb 12.8 oz)   12/23/19 93.4 kg (205 lb 12.8 oz)   12/19/19  92.4 kg (203 lb 9.6 oz)   12/17/19 91.7 kg (202 lb 3.2 oz)   12/13/19 91.6 kg (202 lb)   12/11/19 90.6 kg (199 lb 12.8 oz)   12/10/19 90.9 kg (200 lb 4.8 oz)   12/06/19 90.5 kg (199 lb 9.6 oz)   12/06/19 90.5 kg (199 lb 9.6 oz)   12/05/19 88.7 kg (195 lb 8 oz)   11/20/19 103.4 kg (228 lb)   11/14/19 102.5 kg (226 lb)   11/11/19 101.2 kg (223 lb)   10/28/19 100.8 kg (222 lb 4.8 oz)   08/28/19 98.9 kg (218 lb)   07/29/19 96.8 kg (213 lb 6.4 oz)   07/26/19 97.5 kg (215 lb)   07/16/19 96.2 kg (212 lb)   06/28/19 99.8 kg (220 lb)   06/24/19 99.7 kg (219 lb 11.2 oz)         Current Outpatient Medications   Medication     acetaminophen (TYLENOL) 500 MG tablet     albuterol (PROAIR HFA/PROVENTIL HFA/VENTOLIN HFA) 108 (90 Base) MCG/ACT inhaler     apixaban ANTICOAGULANT (ELIQUIS) 5 MG tablet     Bacillus Coagulans-Inulin (PROBIOTIC-PREBIOTIC PO)     folic acid (FOLVITE) 1 MG tablet     InFLIXimab (REMICADE IV)     levothyroxine (SYNTHROID/LEVOTHROID) 75 MCG tablet     melatonin 5 MG tablet     Methotrexate Sodium (METHOTREXATE PO)     metoprolol succinate ER (TOPROL-XL) 50 MG 24 hr tablet     Multiple Vitamins-Minerals (ICAPS) CAPS     multivitamin (CENTRUM SILVER) tablet     NIFEdipine ER (ADALAT CC) 60 MG 24 hr tablet     omeprazole (PRILOSEC) 20 MG DR capsule     potassium chloride ER (K-DUR/KLOR-CON M) 20 MEQ CR tablet     Prednisolon-Gatiflox-Bromfenac 1-0.5-0.075 % SUSP     psyllium (METAMUCIL/KONSYL) capsule     sildenafil (REVATIO) 20 MG tablet     simvastatin (ZOCOR) 40 MG tablet     Skin Protectants, Misc. (EUCERIN) cream     spironolactone (ALDACTONE) 25 MG tablet     torsemide (DEMADEX) 20 MG tablet     triamcinolone (KENALOG) 0.1 % external cream     Vitamin D, Cholecalciferol, 10 MCG (400 UNIT) TABS     No current facility-administered medications for this visit.           REVIEW of SYMPTOMS    Constitutional: without fever, chills, night sweats.  Weight is down post discharge  HEENT: without dry eyes, dry  mouth, sinusitis, corryza, visual changes  Endocrine: without polyuria, polydypsia, polyphagia, heat or cold intolerance, changing mental status  Cardiology: without chest pain, tightness, heaviness, pressure, paroxysmal dyspnea, orthopnea, palpitation, pre-syncope or syncope or device discharge (if present)  Pulmonary: without asthma, wheezing, cough, hemoptysis  GI: without nausea, emesis, jaundice, pain, hematemesis, melena  : without frequency, urgency, hematuria, stones, pain, abnormal bleeding, frequency, urgency  Neurologic: without TIA, CVA, trauma, seizure  Dermatologic: without lesions, abrasion rash,   Orthopedic/Rheum: without significant joint pain, impairment, limb, polyserositis, ulceration, Raynauds  Heme: without mass, bruising, frequent infection, anemia  Psychiatric: without substance abuse, hallucination, medication, depression          Conclusion     1) Elevated biventricular filling pressures   2) Moderate to severe mostly primary pulmonary hypertension  3) Improvement in hemodynamics with nitric oxide; however, with no improvement in mean PA pressure         CV Right/Left Heart Cath     Right Heart Pressures     Systemic blood pressure 140-150 throughout the case.    BASELINE:  1) Elevated right-sided filling pressures (mean RA 16 mmHg)  2) Moderate to severe pulmonary hypertension (mean PA 35 mmHg) secondary to intrinsic pulmonary vascular disease (PVRI 14 rod*m2) and elevated left-sided filling pressures (PCWP 20 mmHg).  3) O2 sats at baseline 93%  4) Mildly depressed CO/CI: 3.8/1.9    NO 80 PPM:  1) Pulmonary artery pressures did not change (mean PA 34 mmHg)  2) O2 sats improved to 100%  3) CO/CI improved 4.2/2.1 Right sided filling pressures are severely elevated.Left sided filling pressures are moderately elevated. Severely elevated pulmonary artery hypertension.Left ventricular filling pressures are moderately elevated .Normal cardiac output level.   Pressures      Time Systolic  Diastolic Mean A Wave V Wave EDP Max dp/dt HR   RA Pressures  9:01 AM   16 mmHg    14 mmHg    22 mmHg      87 bpm      RV Pressures  9:01 AM 52 mmHg    5 mmHg       15 mmHg     86 bpm      PA Pressures  9:02 AM 51 mmHg    26 mmHg    35 mmHg        76 bpm      PCW Pressures  9:02 AM   18 mmHg    23 mmHg    24 mmHg      72 bpm      AO Pressures  9:03  mmHg    87 mmHg    111 mmHg        82 bpm      Pressures Phase: Drug Study Level 1      Time Systolic Diastolic Mean A Wave V Wave EDP Max dp/dt HR   PA Pressures  9:11 AM 48 mmHg    26 mmHg    34 mmHg        77 bpm      Pressures Phase: Drug Study Level 2      Time Systolic Diastolic Mean A Wave V Wave EDP Max dp/dt HR   PA Pressures  9:14 AM 52 mmHg    33 mmHg    36 mmHg        84 bpm      PCW Pressures  9:14 AM   22 mmHg    26 mmHg    23 mmHg      79 bpm      AO Pressures  9:11  mmHg    67 mmHg    97 mmHg        80 bpm      Blood Flow Results Phase: Drug Study Level 2      Time Results Indexed Values   QP  9:11 AM 4.19 L/min    2.1 L/min/m2      QS  9:11 AM 4.19 L/min    2.1 L/min/m2      Blood Oximetry Phase: Drug Study Level 2      Time Hb SAT(%) PO2 Content PA Sat   Art  9:11 AM  100 %     18.22 mL/dL       Cardiac Output Phase: Drug Study Level 2      Time TDCO TDCI Josh C.O. Josh C.I. Josh HR   Cardiac Output Results  9:11 AM   4.19 L/min    2.1 L/min/m2       Resistance Results Phase: Drug Study Level 2      Time PVR SVR PVR-I SVR-I TPR TVR TPR-I TVR-I PVR/SVR TPR/TVR   Resistance Results (Metric)  9:11 .55 dsc-5     534.11 dsc-5/m2     687.98 dsc-5    1853.72 dsc-5    1373.43 dsc-5/m2    3700.63 dsc-5/m2     0.37      Resistance Results (Wood)  9:11 AM 3.35 ROQUE     6.68 ROQUE/m2     8.6 ROQUE    23.18 ROQUE    17.17                1/15/2019 1/24/2019   /77 112/63           CT CHEST WITHOUT CONTRAST December 18, 2019 12:03 PM      HISTORY: Chest pain or shortness of breath, pleurisy or effusion  suspected. Acute diastolic congestive heart failure  (H). Persistent  atrial fibrillation. Pulmonary hypertension (H). Benign essential  hypertension.     COMPARISON: None.     TECHNIQUE: Volumetric helical acquisition of CT images of the chest  from the clavicles to the kidneys were acquired without IV contrast.  Radiation dose for this scan was reduced using automated exposure  control, adjustment of the mA and/or kV according to patient size, or  iterative reconstruction technique.     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 Moderately extensive traction  bronchiectasis. Some ground-glass changes in the right upper lobe and  right lower lobe could indicate active inflammation. Cardiomegaly. No  pleural or pericardial effusion. There are extensive coronary vascular  calcifications and/or stents consistent with coronary artery disease.  Moderate hiatal hernia. No frankly destructive bony lesions. No acute  findings in the visualized upper abdomen.                                                                      IMPRESSION: Moderate to severe fibrosis with honeycombing in a usual  interstitial pneumonitis pattern.       MRN: [id number]  : 1940  Study Date: 2018 09:55 AM  Age: 77 yrs  Gender: Female  Patient Location: [facility]  Reason For Study: Dyspnea on exertion  History: Dyspnea on exertion  E  Referring Physician: THEO HUIZAR  Performed By: Sofía Braden RDCS     BSA: 2.0 m2  Height: 62 in  Weight: 221 lb  BP: 112/74 mmHg  _____________________________________________________________________________  __        Procedure  Echocardiogram with two-dimensional, color and spectral Doppler performed.  _____________________________________________________________________________  __        Interpretation Summary  Global and regional left ventricular function is normal with an EF of 55-60%.  Global right ventricular function is normal.Mild right ventricular dilation is  present.  Mild  to moderate TR. Right ventricular systolic pressure is 49mmHg above the  right atrial pressure. Moderate (pulmonary artery systolic pressure 50-75mmHg)  pulmonary hypertension is present.  The inferior vena cava was normal in size with preserved respiratory  variability.Estimated mean right atrial pressure is 3 mmHg (normal).  No pericardial effusion is present.     This study was compared with the study from 10/16/17, no significant change .  _____________________________________________________________________________  __        Left Ventricle  Left ventricular wall thickness is normal. Left ventricular size is normal.  Global and regional left ventricular function is normal with an EF of 55-60%.  The Ejection Fraction was visually estimated. Diastolic function not assessed  due to significant mitral annular calcification. No regional wall motion  abnormalities are seen.     Right Ventricle  Global right ventricular function is normal. Mild right ventricular dilation  is present.     Atria  Severe left atrial enlargement is present. Mild right atrial enlargement is  present.     Mitral Valve  Moderate mitral annular calcification is present. Mild mitral insufficiency is  present.        Aortic Valve  Trileaflet aortic sclerosis without stenosis.     Tricuspid Valve  Mild to moderate tricuspid insufficiency is present. Right ventricular  systolic pressure is 49mmHg above the right atrial pressure. Moderate  (pulmonary artery systolic pressure 50-75mmHg) pulmonary hypertension is  present.     Pulmonic Valve  Trace pulmonic insufficiency is present.     Vessels  The inferior vena cava was normal in size with preserved respiratory  variability. Visualized portions of the aorta are normal in size. Estimated  mean right atrial pressure is 3 mmHg (normal).     Pericardium  No pericardial effusion is present.        Compared to Previous Study  This study was compared with the study from 10/16/17, no significant change  .     Attestation  I have personally viewed the imaging and agree with the interpretation and  report as documented by the fellow, Nupur Luz, and/or edited by me.  _____________________________________________________________________________  __     MMode/2D Measurements & Calculations  IVSd: 0.83 cm  LVIDd: 5.3 cm  LVIDs: 2.4 cm  LVPWd: 0.88 cm  FS: 54.8 %  LV mass(C)d: 162.3 grams  LV mass(C)dI: 81.4 grams/m2  Ao root diam: 2.6 cm  LA dimension: 5.7 cm  asc Aorta Diam: 3.5 cm  LA/Ao: 2.2  LVOT diam: 1.9 cm  LVOT area: 2.8 cm2  LA Volume (BP): 105.0 ml     LA Volume Index (BP): 52.8 ml/m2  RWT: 0.33        Doppler Measurements & Calculations  MV E max el: 88.3 cm/sec  MV A max el: 75.2 cm/sec  MV E/A: 1.2  MV dec time: 0.20 sec  Ao V2 max: 171.9 cm/sec  Ao max P.0 mmHg  Ao V2 mean: 134.9 cm/sec  Ao mean P.9 mmHg  Ao V2 VTI: 43.7 cm  JACOB(I,D): 1.9 cm2  JACOB(V,D): 1.9 cm2  LV V1 max P.6 mmHg  LV V1 max: 118.8 cm/sec  LV V1 VTI: 29.8 cm  SV(LVOT): 82.1 ml  SI(LVOT): 41.1 ml/m2  PA acc time: 0.05 sec  TR max el: 320.4 cm/sec  TR max P.4 mmHg  AV El Ratio (DI): 0.69  JACOB Index (cm2/m2): 0.94  E/E' av.0  Lateral E/e': 15.6     Medial E/e': 18.5     _____________________________________________________________________________  __Echo 2019   Name: GINA MCKEON  MRN: [id number]  : 1940  Study Date: 2019 02:57 PM  Age: 78 yrs  Gender: Female  Patient Location: [facility]  Reason For Study: CHF  Ordering Physician: DIMITRI LEAL  Referring Physician: Carissa Burroughs  Performed By: Milton Guadalupe RDCS     BSA: 2.0 m2  Height: 62 in  Weight: 229 lb  HR: 100  BP: 115/71 mmHg  _____________________________________________________________________________  __        Procedure  Complete Portable Echo Adult. Optison (NDC #9020-4596) given intravenously.  _____________________________________________________________________________  __        Interpretation Summary      Small pericardial effusion  The visual ejection fraction is estimated at 50-55%.  Flattened septum is consistent with RV pressure/volume overload.  Pulmonary hypertension  There is severe biatrial enlargement.  Very mild AS. MG is 9 mm Hg.  The right ventricle is moderately dilated. Mildly decreased right ventricular  systolic function  There is mod-severe to severe (3-4+) tricuspid regurgitation.  _____________________________________________________________________________  __        Left Ventricle  The left ventricle is normal in size. The visual ejection fraction is  estimated at 50-55%. Diastolic Doppler findings (E/E' ratio and/or other  parameters) suggest left ventricular filling pressures are indeterminate.  Flattened septum is consistent with RV pressure/volume overload.     Right Ventricle  The right ventricle is moderately dilated. Mildly decreased right ventricular  systolic function.     Atria  There is severe biatrial enlargement.     Mitral Valve  Thickened mitral valve posterior leaflet. There is moderate mitral annular  calcification. There is mild to moderate (1-2+) mitral regurgitation.        Tricuspid Valve  The right ventricular systolic pressure is elevated at 42.2 mmHg. Pulmonary  hypertension. There is mod-severe to severe (3-4+) tricuspid regurgitation.     Aortic Valve  There is severe trileaflet aortic sclerosis. No hemodynamically significant  valvular aortic stenosis.     Pulmonic Valve  The pulmonic valve is not well visualized.     Vessels  The aortic root is normal size. Dilation of the inferior vena cava is present  with abnormal respiratory variation in diameter.     Pericardium  Small pericardial effusion.     _____________________________________________________________________________  __  MMode/2D Measurements & Calculations  IVSd: 0.83 cm  LVIDd: 4.4 cm  LVIDs: 3.4 cm  LVPWd: 0.95 cm  FS: 22.2 %  LV mass(C)d: 125.7 grams  LV mass(C)dI: 62.1 grams/m2     Ao root diam: 2.8  cm  LA dimension: 4.5 cm  asc Aorta Diam: 3.4 cm  LA/Ao: 1.6  LVOT diam: 1.9 cm  LVOT area: 3.0 cm2  LA Volume (BP): 103.0 ml  LA Volume Index (BP): 51.0 ml/m2  RWT: 0.43        Doppler Measurements & Calculations  MV E max el: 79.7 cm/sec  MV max P.4 mmHg  MV mean PG: 3.1 mmHg  MV V2 VTI: 23.8 cm  MVA(VTI): 3.1 cm2     MV dec slope: 562.6 cm/sec2  Ao V2 max: 188.8 cm/sec  Ao max P.0 mmHg  Ao V2 mean: 136.1 cm/sec  Ao mean P.4 mmHg  Ao V2 VTI: 36.2 cm  JACOB(I,D): 2.1 cm2  JACOB(V,D): 2.1 cm2  LV V1 max P.2 mmHg  LV V1 max: 134.4 cm/sec  LV V1 VTI: 25.1 cm  SV(LVOT): 74.2 ml  SI(LVOT): 36.6 ml/m2  PA acc time: 0.08 sec  TR max el: 324.8 cm/sec  TR max P.2 mmHg  AV El Ratio (DI): 0.71  JACOB Index (cm2/m2): 1.0  E/E' av.5  Lateral E/e': 8.7  Medial E/e': 10.4        Results for GINA MCKEON (MRN [id number]) as of 2020 10:47   Ref. Range 2020 10:06   Sodium Latest Ref Range: 136 - 145 mmol/L 135 (L)   Potassium Latest Ref Range: 3.5 - 5.1 mmol/L 4.2   Chloride Latest Ref Range: 98 - 107 mmol/L 99   Carbon Dioxide Latest Ref Range: 23 - 29 mmol/L 27   Urea Nitrogen Latest Ref Range: 7 - 30 mg/dL 25   Creatinine Latest Ref Range: 0.70 - 1.30 mg/dL 1.14   GFR Estimate Latest Ref Range: >60 mL/min/1.73_m2 46 (L)   GFR Estimate If Black Latest Ref Range: >60 mL/min/1.73_m2 55 (L)   Calcium Latest Ref Range: 8.5 - 10.5 mg/dL 10.4   Anion Gap Latest Ref Range: 6 - 17 mmol/L 13.2   Glucose Latest Ref Range: 70 - 105 mg/dL 111 (H)         Discussion    1. CT demonstrates progression of her disease  2. It will be difficult for her to maintain herself at home.  I printed out the [employer] details.  I also call lymphedema clinic  - I am not a bad   3. Low sodium diet, daily weight and the like reviewed  4. See Dr. Munoz weekly  5. RTC me in one month             CC: Dr Manjeet Bustillos M.D.    Shay Marquez M.D.  Cardiovascular Medicine    I personally saw and  "examined Jaqueline Canales, discussed care with housestaff and other consultants, reviewed current laboratories and imaging studies, and conveyed impression and diagnostic/therapeutic plan to patient.    Problem List:    History:      Objective:    VITALS  /76   Pulse 96   Ht 1.575 m (5' 2\")   Wt 86.9 kg (191 lb 9.6 oz)   SpO2 98%   BMI 35.04 kg/m       WEIGHT  Wt Readings from Last 5 Encounters:   01/14/20 86.9 kg (191 lb 9.6 oz)   01/10/20 85.6 kg (188 lb 12.8 oz)   01/09/20 86.5 kg (190 lb 12.8 oz)   12/30/19 92.3 kg (203 lb 6.4 oz)   12/27/19 92.4 kg (203 lb 9.6 oz)       Meds  Current Outpatient Medications   Medication Sig Dispense Refill     acetaminophen (TYLENOL) 500 MG tablet Take 1,000 mg by mouth 2 times daily        albuterol (PROAIR HFA/PROVENTIL HFA/VENTOLIN HFA) 108 (90 Base) MCG/ACT inhaler Inhale 2 puffs into the lungs every 6 hours as needed for shortness of breath / dyspnea or wheezing       apixaban ANTICOAGULANT (ELIQUIS) 5 MG tablet Take 1 tablet (5 mg) by mouth 2 times daily 180 tablet 2     Bacillus Coagulans-Inulin (PROBIOTIC-PREBIOTIC PO) Take 1 chew tab by mouth 2 times daily        folic acid (FOLVITE) 1 MG tablet Take 1 tablet (1,000 mcg) by mouth daily 90 tablet 3     InFLIXimab (REMICADE IV) Inject 600 mg into the vein Every 8 weeks       levothyroxine (SYNTHROID/LEVOTHROID) 75 MCG tablet Take 1 tablet (75 mcg) by mouth daily 90 tablet 2     melatonin 5 MG tablet Take 5 mg by mouth nightly as needed for sleep       Methotrexate Sodium (METHOTREXATE PO) Take 2.5 mg by mouth 10 tablets weekly - Wednesdays       metoprolol succinate ER (TOPROL-XL) 50 MG 24 hr tablet Take 0.5 tablets (25 mg) by mouth daily 30 tablet 0     Multiple Vitamins-Minerals (ICAPS) CAPS Take 1 capsule by mouth 2 times daily        multivitamin (CENTRUM SILVER) tablet Take 1 tablet by mouth daily       NIFEdipine ER (ADALAT CC) 60 MG 24 hr tablet Take 60 mg by mouth daily       omeprazole (PRILOSEC) 20 " MG DR capsule Take 1 capsule (20 mg) by mouth 2 times daily Take 30-60 minutes prior to meals 180 capsule 2     potassium chloride ER (K-DUR/KLOR-CON M) 20 MEQ CR tablet Take 1 tablet (20 mEq) by mouth daily 30 tablet 0     Prednisolon-Gatiflox-Bromfenac 1-0.5-0.075 % SUSP Place 1 drop into the right eye daily       psyllium (METAMUCIL/KONSYL) capsule Take 1 capsule by mouth daily       sildenafil (REVATIO) 20 MG tablet Take 1 tablet (20 mg) by mouth 3 times daily 90 tablet 11     simvastatin (ZOCOR) 40 MG tablet Take 1 tablet (40 mg) by mouth At Bedtime 90 tablet 3     Skin Protectants, Misc. (EUCERIN) cream Apply topically 2 times daily Apply to areas dry skin topically two times a day for dry skin and Apply to areas of dry skin topically as needed for dry skin daily       spironolactone (ALDACTONE) 25 MG tablet Take 1 tablet (25 mg) by mouth daily 30 tablet 0     torsemide (DEMADEX) 20 MG tablet Take 2 tablets (40 mg) by mouth daily Take 40mg q am 60 tablet 0     triamcinolone (KENALOG) 0.1 % external cream Apply topically 2 times daily 30 g 3     Vitamin D, Cholecalciferol, 10 MCG (400 UNIT) TABS Take 1,600 Units by mouth daily         LABORATORY RESULTS    CBC Results  Lab Results   Component Value Date    WBC 4.9 01/14/2020    RBC 4.45 01/14/2020    HGB 12.8 01/14/2020    HCT 40.2 01/14/2020    MCV 90 01/14/2020    MCH 28.8 01/14/2020    MCHC 31.8 01/14/2020    RDW 18.0 (H) 01/14/2020     01/14/2020       CMP RESULTS:  Lab Results   Component Value Date     (L) 01/14/2020    POTASSIUM 4.2 01/14/2020    CHLORIDE 99 01/14/2020    CO2 27 01/14/2020    ANIONGAP 13.2 01/14/2020     (H) 01/14/2020    BUN 25 01/14/2020    CR 1.14 01/14/2020    GFRESTIMATED 46 (L) 01/14/2020    GFRESTBLACK 55 (L) 01/14/2020    TU 10.4 01/14/2020    BILITOTAL 1.2 11/27/2019    ALBUMIN 3.7 11/27/2019    ALKPHOS 135 11/27/2019    ALT 15 11/27/2019    AST 26 11/27/2019        INR RESULTS:  Lab Results   Component  Value Date    INR 1.05 04/15/2019       MAGNESIUM RESULTS  Lab Results   Component Value Date    MAG 2.4 (H) 01/06/2020       BNP RESULTS  Lab Results   Component Value Date    NTBNP 4,510 (H) 01/14/2020       IMAGING    ASSESSMENT/PLAN      Sincerely yours,    Shay Marquez MD dictated but not signed send out immediately    Thank you for allowing me to participate in the care of your patient.    Sincerely,     Shay Marquez MD     [facility]

## 2020-01-15 NOTE — TELEPHONE ENCOUNTER
"Hospital/TCU/ED for chronic condition Discharge Protocol    \"Hi, my name is Soco Cheung RN, a registered nurse, and I am calling from [facility] ([facility]  I am calling to follow up and see how things are going for you after your recent emergency visit/hospital/TCU stay.\"    Tell me how you are doing now that you are home?\" Doing well now at home.  Haven't had time to have problems.       Discharge Instructions    \"Let's review your discharge instructions.  What is/are the follow-up recommendations?  Pt. Response: follow up with PCP, homecare, LT, low sodium diet.    \"Has an appointment with your primary care provider been scheduled?\"   Yes. (confirm)    \"When you see the provider, I would recommend that you bring your medications with you.\"    Medications    \"Tell me what changed about your medicines when you discharged?\"    Changes to chronic meds?    0-1    \"What questions do you have about your medications?\"    None     New diagnoses of heart failure, COPD, diabetes, or MI?    No              Post Discharge Medication Reconciliation Status: discharge medications reconciled, continue medications without change.    Was MTM referral placed (*Make sure to put transitions as reason for referral)?   No    Call Summary    \"What questions or concerns do you have about your recent visit and your follow-up care?\"     none    \"If you have questions or things don't continue to improve, we encourage you contact us through the main clinic number (give number).  Even if the clinic is not open, triage nurses are available 24/7 to help you.     We would like you to know that our clinic has extended hours (provide information).  We also have urgent care (provide details on closest location and hours/contact info)\"      \"Thank you for your time and take care!\"             "

## 2020-01-20 ENCOUNTER — OFFICE VISIT (OUTPATIENT)
Dept: FAMILY MEDICINE | Facility: CLINIC | Age: 80
End: 2020-01-20
Payer: MEDICARE

## 2020-01-20 ENCOUNTER — TELEPHONE (OUTPATIENT)
Dept: FAMILY MEDICINE | Facility: CLINIC | Age: 80
End: 2020-01-20

## 2020-01-20 ENCOUNTER — TRANSFERRED RECORDS (OUTPATIENT)
Dept: HEALTH INFORMATION MANAGEMENT | Facility: CLINIC | Age: 80
End: 2020-01-20

## 2020-01-20 VITALS
HEART RATE: 65 BPM | OXYGEN SATURATION: 95 % | BODY MASS INDEX: 34.06 KG/M2 | WEIGHT: 185.1 LBS | SYSTOLIC BLOOD PRESSURE: 120 MMHG | DIASTOLIC BLOOD PRESSURE: 85 MMHG | HEIGHT: 62 IN | TEMPERATURE: 96.6 F

## 2020-01-20 DIAGNOSIS — I50.33 ACUTE ON CHRONIC DIASTOLIC CONGESTIVE HEART FAILURE (H): Primary | ICD-10-CM

## 2020-01-20 DIAGNOSIS — R54 AGE-RELATED PHYSICAL DEBILITY: ICD-10-CM

## 2020-01-20 DIAGNOSIS — R60.0 BILATERAL LEG EDEMA: ICD-10-CM

## 2020-01-20 DIAGNOSIS — I89.0 LYMPHEDEMA OF BOTH LOWER EXTREMITIES: ICD-10-CM

## 2020-01-20 DIAGNOSIS — R09.02 HYPOXIA: ICD-10-CM

## 2020-01-20 DIAGNOSIS — I48.19 PERSISTENT ATRIAL FIBRILLATION (H): ICD-10-CM

## 2020-01-20 DIAGNOSIS — I27.20 PULMONARY HYPERTENSION (H): ICD-10-CM

## 2020-01-20 PROCEDURE — 99495 TRANSJ CARE MGMT MOD F2F 14D: CPT | Performed by: INTERNAL MEDICINE

## 2020-01-20 RX ORDER — SPIRONOLACTONE 25 MG/1
25 TABLET ORAL DAILY
Qty: 90 TABLET | Refills: 3 | Status: SHIPPED | OUTPATIENT
Start: 2020-01-20 | End: 2020-08-10

## 2020-01-20 RX ORDER — TORSEMIDE 20 MG/1
40 TABLET ORAL DAILY
Qty: 180 TABLET | Refills: 3 | Status: SHIPPED | OUTPATIENT
Start: 2020-01-20 | End: 2021-01-23

## 2020-01-20 RX ORDER — SPIRONOLACTONE 25 MG/1
25 TABLET ORAL DAILY
Qty: 90 TABLET | Refills: 3 | Status: SHIPPED | OUTPATIENT
Start: 2020-01-20 | End: 2020-01-20

## 2020-01-20 ASSESSMENT — MIFFLIN-ST. JEOR: SCORE: 1262.86

## 2020-01-20 NOTE — PROGRESS NOTES
Chief Complaint:       Jaqueline Canales is a 79 year old female who presents to clinic today for the following health issues:        Hospital Follow-up Visit:    Hospital/Nursing Home/ Rehab Facility: [facility]   Date of Admission: 12/05/19  Date of Discharge: 01/13/2020  Reason(s) for Admission:   (I50.31) Acute diastolic congestive heart failure (H)  (primary encounter diagnosis)  (I89.0) Lymphedema of both lower extremities  (R09.02) Hypoxia  (I27.20) Pulmonary hypertension (H)            Problems taking medications regularly:  None       Medication changes since discharge: None       Problems adhering to non-medication therapy:  None    Summary of hospitalization:  [facility] discharge summary reviewed  Diagnostic Tests/Treatments reviewed.  Follow up needed: cardiology  Other Healthcare Providers Involved in Patient s Care:         Specialist appointment - cardiology  Update since discharge: improved.     Post Discharge Medication Reconciliation: discharge medications reconciled and changed, per note/orders (see AVS).  Plan of care communicated with patient     Coding guidelines for this visit:  Type of Medical   Decision Making Face-to-Face Visit       within 7 Days of discharge Face-to-Face Visit        within 14 days of discharge   Moderate Complexity [id number]   High Complexity [id number]            HPI:   Patient Jaqueline Canales is a very pleasant 79 year old female with history of CHF, pulmonary hypertension who presents to Internal Medicine clinic today for Post hospital discharge follow up of recent hospitalization for [facility] on chronic CHF exacerbation symptms. Patient's weight has decreased by about 20 lbs with hospitalization treatment. Patient is compliant with her daily diuretic therapy with Torsemide and Aldactone. Hypoxia symptoms have resolved with diuresis treatment. no chest pains. No signs of current further acute CHF exacerbation symptoms at this time. Leg  lymphedema symptoms have been stable.  Patient is due for a refill of her current torsemide (DEMADEX) 20 MG tablet, spironolactone (ALDACTONE) 25 MG tablet diuretic medications at this time. Patient is scheduled for follow up with CHF clinic at the [facility] clinic in [place] going forward. Regarding her chronic age-related physical debility, patient needs a walker to prevent falls. No fever or chills.      Current Medications:     Current Outpatient Medications   Medication Sig Dispense Refill     acetaminophen (TYLENOL) 500 MG tablet Take 1,000 mg by mouth 2 times daily        albuterol (PROAIR HFA/PROVENTIL HFA/VENTOLIN HFA) 108 (90 Base) MCG/ACT inhaler Inhale 2 puffs into the lungs every 6 hours as needed for shortness of breath / dyspnea or wheezing       apixaban ANTICOAGULANT (ELIQUIS) 5 MG tablet Take 1 tablet (5 mg) by mouth 2 times daily 180 tablet 2     Bacillus Coagulans-Inulin (PROBIOTIC-PREBIOTIC PO) Take 1 chew tab by mouth 2 times daily        folic acid (FOLVITE) 1 MG tablet Take 1 tablet (1,000 mcg) by mouth daily 90 tablet 3     InFLIXimab (REMICADE IV) Inject 600 mg into the vein Every 8 weeks       levothyroxine (SYNTHROID/LEVOTHROID) 75 MCG tablet Take 1 tablet (75 mcg) by mouth daily 90 tablet 2     melatonin 5 MG tablet Take 5 mg by mouth nightly as needed for sleep       Methotrexate Sodium (METHOTREXATE PO) Take 2.5 mg by mouth 10 tablets weekly - Wednesdays       metoprolol succinate ER (TOPROL-XL) 50 MG 24 hr tablet Take 0.5 tablets (25 mg) by mouth daily 30 tablet 0     Multiple Vitamins-Minerals (ICAPS) CAPS Take 1 capsule by mouth 2 times daily        multivitamin (CENTRUM SILVER) tablet Take 1 tablet by mouth daily       NIFEdipine ER (ADALAT CC) 60 MG 24 hr tablet Take 60 mg by mouth daily       omeprazole (PRILOSEC) 20 MG DR capsule Take 1 capsule (20 mg) by mouth 2 times daily Take 30-60 minutes prior to meals 180 capsule 2     potassium chloride ER (K-DUR/KLOR-CON M) 20 MEQ CR  tablet Take 1 tablet (20 mEq) by mouth daily 30 tablet 0     Prednisolon-Gatiflox-Bromfenac 1-0.5-0.075 % SUSP Place 1 drop into the right eye daily       psyllium (METAMUCIL/KONSYL) capsule Take 1 capsule by mouth daily       sildenafil (REVATIO) 20 MG tablet Take 1 tablet (20 mg) by mouth 3 times daily 90 tablet 11     simvastatin (ZOCOR) 40 MG tablet Take 1 tablet (40 mg) by mouth At Bedtime 90 tablet 3     Skin Protectants, Misc. (EUCERIN) cream Apply topically 2 times daily Apply to areas dry skin topically two times a day for dry skin and Apply to areas of dry skin topically as needed for dry skin daily       spironolactone (ALDACTONE) 25 MG tablet Take 1 tablet (25 mg) by mouth daily 30 tablet 0     torsemide (DEMADEX) 20 MG tablet Take 2 tablets (40 mg) by mouth daily Take 40mg q am 60 tablet 0     triamcinolone (KENALOG) 0.1 % external cream Apply topically 2 times daily 30 g 3     Vitamin D, Cholecalciferol, 10 MCG (400 UNIT) TABS Take 1,600 Units by mouth daily           Allergies:      Allergies   Allergen Reactions     No Known Allergies             Past Medical History:     Past Medical History:   Diagnosis Date     Amaurosis fugax 5-11    Right     Carotid artery stenosis      Esophageal reflux 3/11/2004     HELICOBACTER PYLORI INFECTION 7/11/2006     hx of CA in situ RT breast-1990.mastectomy 4/17/2007     Hyperlipidemia LDL goal <70 6/20/2011     Lung cancer (H)     squamous cell lung ca left lower lobe     OBESITY NOS 3/11/2004     OSTEOPOROSIS NOS 3/11/2004     Other psoriasis 3/11/2004     Pulmonary hypertension (H) 04/15/2019    Right heart catheterization demonstrated moderate pulmonary arterial hypertension with a mean PA pressure of 34 mmHg, no reversibility with inhaled nitric oxide, elevated right and left-sided filling pressures, low normal cardiac index of 2.1.  Seen by Dr. Shay Marquez.  Started on sildenafil (Revatio) 20 mg 3 times daily.     RA (rheumatoid arthritis) (H) 2/8/2010      Raynaud's syndrome 4/10/2006     Scleroderma (H)      Sleep apnea     CPAP         Past Surgical History:     Past Surgical History:   Procedure Laterality Date     BREAST SURGERY       C NONSPECIFIC PROCEDURE      mastectomy RT breast     COLONOSCOPY  15     COLONOSCOPY N/A 2019    Procedure: Colonoscopy, With Polypectomy And Biopsy;  Surgeon: Neto Kaminski MD;  Location:  GI     CONIZATION       CV RIGHT HEART CATH N/A 4/15/2019    Procedure: Heart Cath Right Heart Cath;  Surgeon: Naresh Cyr MD;  Location:  HEART CARDIAC CATH LAB     ENDARTERECTOMY CAROTID       HC COLONOSCOPY THRU STOMA, DIAGNOSTIC  2001    diverticulosis     REPAIR HAMMER TOE       TONSILLECTOMY           Family Medical History:     Family History   Problem Relation Age of Onset     Cancer - colorectal Mother      Cerebrovascular Disease Father      Cancer Sister 31        ovarian     Heart Disease Sister      Gastrointestinal Disease Sister         crohns     Gastrointestinal Disease Sister         diverticulitis     Gastrointestinal Disease Brother         diverticulitis     Alzheimer Disease Sister      Cerebrovascular Disease Maternal Grandmother      Diabetes Maternal Grandmother      Mental Illness Maternal Grandmother      Diabetes Maternal Uncle      Cancer Nephew                  Social History:     Social History     Socioeconomic History     Marital status:      Spouse name: Not on file     Number of children: Not on file     Years of education: Not on file     Highest education level: Not on file   Occupational History     Not on file   Social Needs     Financial resource strain: Not on file     Food insecurity:     Worry: Not on file     Inability: Not on file     Transportation needs:     Medical: Not on file     Non-medical: Not on file   Tobacco Use     Smoking status: Former Smoker     Packs/day: 1.00     Years: 30.00     Pack years: 30.00     Types: Cigarettes      Start date:      Last attempt to quit: 3/11/1992     Years since quittin.8     Smokeless tobacco: Never Used   Substance and Sexual Activity     Alcohol use: Not Currently     Alcohol/week: 0.0 standard drinks     Frequency: Never     Drug use: No     Sexual activity: Not Currently     Partners: Male   Lifestyle     Physical activity:     Days per week: Not on file     Minutes per session: Not on file     Stress: Not on file   Relationships     Social connections:     Talks on phone: Not on file     Gets together: Not on file     Attends Zoroastrian service: Not on file     Active member of club or organization: Not on file     Attends meetings of clubs or organizations: Not on file     Relationship status: Not on file     Intimate partner violence:     Fear of current or ex partner: Not on file     Emotionally abused: Not on file     Physically abused: Not on file     Forced sexual activity: Not on file   Other Topics Concern     Parent/sibling w/ CABG, MI or angioplasty before 65F 55M? No   Social History Narrative     Not on file           Review of System:     Constitutional: Negative for fever or chills  Skin: Negative for rashes  Ears/Nose/Throat: Negative for nasal congestion, sore throat  Respiratory: No shortness of breath, dyspnea on exertion, cough, or hemoptysis  Cardiovascular: Negative for chest pain  Gastrointestinal: Negative for nausea, vomiting  Genitourinary: Negative for dysuria, hematuria  Musculoskeletal: Negative for myalgias  Neurologic: Negative for headaches  Psychiatric: Negative for depression, anxiety  Hematologic/Lymphatic/Immunologic: positive for improvement in chronic bilateral leg edema symptoms with recent diuresis treatment during hospitalization.  Endocrine: Negative  Behavioral: Negative for tobacco use       Physical Exam:   /85 (BP Location: Left arm, Patient Position: Sitting, Cuff Size: Adult Regular)   Pulse 65   Temp 96.6  F (35.9  C) (Oral)   Ht 1.575 m  "(5' 2\")   Wt 84 kg (185 lb 1.6 oz)   SpO2 95%   BMI 33.86 kg/m      GENERAL: chronically ill appearing elderly female, alert and no distress  EYES: eyes grossly normal to inspection, and conjunctivae and sclerae normal  HENT: Normocephalic atraumatic. Nose and mouth without ulcers or lesions  NECK: supple  RESP: lungs clear to auscultation   CV: regular rate and rhythm, normal S1 S2  LYMPH: improvement in chronic bilateral lower leg peripheral edema symptoms present when compared to prior baseline prior to recent hospitalization.  ABDOMEN: nondistended  MS: no gross musculoskeletal defects noted  SKIN: no suspicious lesions or rashes  NEURO: Alert & Oriented x 3.   PSYCH: mentation appears normal, affect normal        Diagnostic Test Results:     Last Comprehensive Metabolic Panel:  Sodium   Date Value Ref Range Status   01/14/2020 135 (L) 136 - 145 mmol/L Final     Potassium   Date Value Ref Range Status   01/14/2020 4.2 3.5 - 5.1 mmol/L Final     Chloride   Date Value Ref Range Status   01/14/2020 99 98 - 107 mmol/L Final     Carbon Dioxide   Date Value Ref Range Status   01/14/2020 27 23 - 29 mmol/L Final     Anion Gap   Date Value Ref Range Status   01/14/2020 13.2 6 - 17 mmol/L Final     Glucose   Date Value Ref Range Status   01/14/2020 111 (H) 70 - 105 mg/dL Final     Urea Nitrogen   Date Value Ref Range Status   01/14/2020 25 7 - 30 mg/dL Final     Creatinine   Date Value Ref Range Status   01/14/2020 1.14 0.70 - 1.30 mg/dL Final     GFR Estimate   Date Value Ref Range Status   01/14/2020 46 (L) >60 mL/min/[1.73_m2] Final     Calcium   Date Value Ref Range Status   01/14/2020 10.4 8.5 - 10.5 mg/dL Final         ASSESSMENT/PLAN:   (R09.02) Hypoxia  (I27.20) Pulmonary hypertension (H)  (I89.0) Lymphedema of both lower extremities  (I50.33) Acute on chronic diastolic congestive heart failure (H)  (primary encounter diagnosis)  Comment: Post hospital discharge follow up of recent hospitalization for actue on " chronic CHF exacerbation symptms. Patient's weight has decreased by about 20 lbs with hospitalization treatment. Patient is compliant with her daily diuretic therapy with Torsemide and Aldactone. Hypoxia symptoms have resolved with diuresis treatment. no chest pains. No signs of current further acute CHF exacerbation symptoms at this time. Leg lymphedema symptoms have been stable.  Plan: I have refilled the patient's current torsemide (DEMADEX) 20 MG tablet,         spironolactone (ALDACTONE) 25 MG tablet diuretic medications at this time. Patient is advised to continue to follow up with CHF clinic at the [facility] clinic in [place] going forward.      (R54) Age-related physical debility  Comment: patient needs a walker to prevent falls  Plan: order for DME for 4 wheeled walker with seat      Follow Up Plan:     Patient is instructed to return to Internal Medicine clinic for follow-up visit in 2 weeks.        Carissa Burroughs MD  Internal Medicine  [facility]

## 2020-01-20 NOTE — TELEPHONE ENCOUNTER
Reason for Call: Request for an order or referral:    Order or referral being requested: Continue Pt 1/wk for one week and 2/week for 2 week    Date needed: as soon as possible    Has the patient been seen by the PCP for this problem? YES    Additional comments:none    Phone number Patient can be reached at:  Other phone number:  [phone]    Best Time:  Any    Can we leave a detailed message on this number?  YES    Call taken on 1/20/2020 at 11:38 AM by Shana Call

## 2020-01-20 NOTE — TELEPHONE ENCOUNTER
Called Jeri with [facility] to provide verbal orders for PT as requested    Chidi GILBERT RN

## 2020-01-24 ENCOUNTER — DOCUMENTATION ONLY (OUTPATIENT)
Dept: CARE COORDINATION | Facility: CLINIC | Age: 80
End: 2020-01-24

## 2020-01-24 NOTE — PROGRESS NOTES
Completed homecare OT evaluation, request 1 additional visit for ADLs, safety  Thank you Bindu Burciaga OTRL

## 2020-01-27 ENCOUNTER — OFFICE VISIT (OUTPATIENT)
Dept: FAMILY MEDICINE | Facility: CLINIC | Age: 80
End: 2020-01-27
Payer: MEDICARE

## 2020-01-27 VITALS
BODY MASS INDEX: 34.12 KG/M2 | DIASTOLIC BLOOD PRESSURE: 70 MMHG | HEART RATE: 112 BPM | HEIGHT: 62 IN | TEMPERATURE: 96.6 F | SYSTOLIC BLOOD PRESSURE: 111 MMHG | WEIGHT: 185.4 LBS | OXYGEN SATURATION: 94 %

## 2020-01-27 DIAGNOSIS — K21.9 GASTROESOPHAGEAL REFLUX DISEASE, ESOPHAGITIS PRESENCE NOT SPECIFIED: ICD-10-CM

## 2020-01-27 DIAGNOSIS — I89.0 LYMPHEDEMA OF BOTH LOWER EXTREMITIES: ICD-10-CM

## 2020-01-27 DIAGNOSIS — I27.20 PULMONARY HYPERTENSION (H): ICD-10-CM

## 2020-01-27 DIAGNOSIS — E78.5 HYPERLIPIDEMIA LDL GOAL <100: ICD-10-CM

## 2020-01-27 DIAGNOSIS — I50.31 ACUTE DIASTOLIC CONGESTIVE HEART FAILURE (H): Primary | ICD-10-CM

## 2020-01-27 PROCEDURE — 99214 OFFICE O/P EST MOD 30 MIN: CPT | Performed by: INTERNAL MEDICINE

## 2020-01-27 ASSESSMENT — MIFFLIN-ST. JEOR: SCORE: 1264.22

## 2020-01-27 NOTE — PROGRESS NOTES
Chief Complaint:       Jaqueline Canales is a 79 year old female who presents to clinic today for the following health issues:         Follow up on multiple concerns includng CHF      HPI:   Patient Jaqueline Canales is a very pleasant 79 year old female with history of CHF, pulmonary hypertension who presents to Internal Medicine clinic today for Post hospital discharge 1 week  follow up of recent actue on chronic CHF exacerbation symptms. Patient's weight has decreased by about 20 lbs with hospitalization treatment. Patient is compliant with her daily diuretic therapy with Torsemide and Aldactone. Hypoxia symptoms have resolved with diuresis treatment. no chest pains. No signs of current further acute CHF exacerbation symptoms at this time. Leg lymphedema symptoms have been stable.  Patient is scheduled for follow up with CHF clinic at the [facility] Cardiology clinic in [address] going forward. No fever or chills.      Current Medications:     Current Outpatient Medications   Medication Sig Dispense Refill     acetaminophen (TYLENOL) 500 MG tablet Take 1,000 mg by mouth 2 times daily        albuterol (PROAIR HFA/PROVENTIL HFA/VENTOLIN HFA) 108 (90 Base) MCG/ACT inhaler Inhale 2 puffs into the lungs every 6 hours as needed for shortness of breath / dyspnea or wheezing       apixaban ANTICOAGULANT (ELIQUIS) 5 MG tablet Take 1 tablet (5 mg) by mouth 2 times daily 180 tablet 2     Bacillus Coagulans-Inulin (PROBIOTIC-PREBIOTIC PO) Take 1 chew tab by mouth 2 times daily        folic acid (FOLVITE) 1 MG tablet Take 1 tablet (1,000 mcg) by mouth daily 90 tablet 3     InFLIXimab (REMICADE IV) Inject 600 mg into the vein Every 8 weeks       levothyroxine (SYNTHROID/LEVOTHROID) 75 MCG tablet Take 1 tablet (75 mcg) by mouth daily 90 tablet 2     melatonin 5 MG tablet Take 5 mg by mouth nightly as needed for sleep       Methotrexate Sodium (METHOTREXATE PO) Take 2.5 mg by mouth 10 tablets weekly - Wednesdays       metoprolol  succinate ER (TOPROL-XL) 50 MG 24 hr tablet Take 0.5 tablets (25 mg) by mouth daily 30 tablet 0     Multiple Vitamins-Minerals (ICAPS) CAPS Take 1 capsule by mouth 2 times daily        multivitamin (CENTRUM SILVER) tablet Take 1 tablet by mouth daily       NIFEdipine ER (ADALAT CC) 60 MG 24 hr tablet Take 60 mg by mouth daily       omeprazole (PRILOSEC) 20 MG DR capsule Take 1 capsule (20 mg) by mouth 2 times daily Take 30-60 minutes prior to meals 180 capsule 2     order for DME Equipment being ordered: 4 wheeled walker with seat 1 each 3     potassium chloride ER (K-DUR/KLOR-CON M) 20 MEQ CR tablet Take 1 tablet (20 mEq) by mouth daily 30 tablet 0     Prednisolon-Gatiflox-Bromfenac 1-0.5-0.075 % SUSP Place 1 drop into the right eye daily       psyllium (METAMUCIL/KONSYL) capsule Take 1 capsule by mouth daily       sildenafil (REVATIO) 20 MG tablet Take 1 tablet (20 mg) by mouth 3 times daily 90 tablet 11     simvastatin (ZOCOR) 40 MG tablet Take 1 tablet (40 mg) by mouth At Bedtime 90 tablet 3     Skin Protectants, Misc. (EUCERIN) cream Apply topically 2 times daily Apply to areas dry skin topically two times a day for dry skin and Apply to areas of dry skin topically as needed for dry skin daily       spironolactone (ALDACTONE) 25 MG tablet Take 1 tablet (25 mg) by mouth daily 90 tablet 3     torsemide (DEMADEX) 20 MG tablet Take 2 tablets (40 mg) by mouth daily Take 40mg q am 180 tablet 3     triamcinolone (KENALOG) 0.1 % external cream Apply topically 2 times daily 30 g 3     Vitamin D, Cholecalciferol, 10 MCG (400 UNIT) TABS Take 1,600 Units by mouth daily           Allergies:      Allergies   Allergen Reactions     No Known Allergies             Past Medical History:     Past Medical History:   Diagnosis Date     Amaurosis fugax 5-11    Right     Carotid artery stenosis      Esophageal reflux 3/11/2004     HELICOBACTER PYLORI INFECTION 7/11/2006     hx of CA in situ RT breast-1990.mastectomy 4/17/2007      Hyperlipidemia LDL goal <70 2011     Lung cancer (H)     squamous cell lung ca left lower lobe     OBESITY NOS 3/11/2004     OSTEOPOROSIS NOS 3/11/2004     Other psoriasis 3/11/2004     Pulmonary hypertension (H) 04/15/2019    Right heart catheterization demonstrated moderate pulmonary arterial hypertension with a mean PA pressure of 34 mmHg, no reversibility with inhaled nitric oxide, elevated right and left-sided filling pressures, low normal cardiac index of 2.1.  Seen by Dr. Shay Marquez.  Started on sildenafil (Revatio) 20 mg 3 times daily.     RA (rheumatoid arthritis) (H) 2010     Raynaud's syndrome 4/10/2006     Scleroderma (H)      Sleep apnea     CPAP         Past Surgical History:     Past Surgical History:   Procedure Laterality Date     BREAST SURGERY       C NONSPECIFIC PROCEDURE      mastectomy RT breast     COLONOSCOPY  15     COLONOSCOPY N/A 2019    Procedure: Colonoscopy, With Polypectomy And Biopsy;  Surgeon: Neto Kaminski MD;  Location:  GI     CONIZATION       CV RIGHT HEART CATH N/A 4/15/2019    Procedure: Heart Cath Right Heart Cath;  Surgeon: Naresh Cyr MD;  Location:  HEART CARDIAC CATH LAB     ENDARTERECTOMY CAROTID       HC COLONOSCOPY THRU STOMA, DIAGNOSTIC  2001    diverticulosis     REPAIR HAMMER TOE       TONSILLECTOMY           Family Medical History:     Family History   Problem Relation Age of Onset     Cancer - colorectal Mother      Cerebrovascular Disease Father      Cancer Sister 31        ovarian     Heart Disease Sister      Gastrointestinal Disease Sister         crohns     Gastrointestinal Disease Sister         diverticulitis     Gastrointestinal Disease Brother         diverticulitis     Alzheimer Disease Sister      Cerebrovascular Disease Maternal Grandmother      Diabetes Maternal Grandmother      Mental Illness Maternal Grandmother      Diabetes Maternal Uncle      Cancer Nephew                  Social  History:     Social History     Socioeconomic History     Marital status:      Spouse name: Not on file     Number of children: Not on file     Years of education: Not on file     Highest education level: Not on file   Occupational History     Not on file   Social Needs     Financial resource strain: Not on file     Food insecurity:     Worry: Not on file     Inability: Not on file     Transportation needs:     Medical: Not on file     Non-medical: Not on file   Tobacco Use     Smoking status: Former Smoker     Packs/day: 1.00     Years: 30.00     Pack years: 30.00     Types: Cigarettes     Start date:      Last attempt to quit: 3/11/1992     Years since quittin.9     Smokeless tobacco: Never Used   Substance and Sexual Activity     Alcohol use: Not Currently     Alcohol/week: 0.0 standard drinks     Frequency: Never     Drug use: No     Sexual activity: Not Currently     Partners: Male   Lifestyle     Physical activity:     Days per week: Not on file     Minutes per session: Not on file     Stress: Not on file   Relationships     Social connections:     Talks on phone: Not on file     Gets together: Not on file     Attends Je[employer]'s witness service: Not on file     Active member of club or organization: Not on file     Attends meetings of clubs or organizations: Not on file     Relationship status: Not on file     Intimate partner violence:     Fear of current or ex partner: Not on file     Emotionally abused: Not on file     Physically abused: Not on file     Forced sexual activity: Not on file   Other Topics Concern     Parent/sibling w/ CABG, MI or angioplasty before 65F 55M? No   Social History Narrative     Not on file           Review of System:     Constitutional: Negative for fever or chills  Skin: Negative for rashes  Ears/Nose/Throat: Negative for nasal congestion, sore throat  Respiratory: No shortness of breath, dyspnea on exertion, cough, or hemoptysis  Cardiovascular: Negative for chest  "pain  Gastrointestinal: Negative for nausea, vomiting, positive for chronic GERD  Genitourinary: Negative for dysuria, hematuria  Musculoskeletal: Negative for myalgias  Neurologic: Negative for headaches  Psychiatric: Negative for depression, anxiety  Hematologic/Lymphatic/Immunologic: positive for improvement in chronic bilateral leg edema symptoms with recent diuresis treatment  Endocrine: Negative  Behavioral: Negative for tobacco use       Physical Exam:   /70 (BP Location: Left arm, Patient Position: Sitting, Cuff Size: Adult Regular)   Pulse 112   Temp 96.6  F (35.9  C) (Oral)   Ht 1.575 m (5' 2\")   Wt 84.1 kg (185 lb 6.4 oz)   SpO2 94%   BMI 33.91 kg/m      GENERAL: chronically ill appearing elderly female, alert and no distress  EYES: eyes grossly normal to inspection, and conjunctivae and sclerae normal  HENT: Normocephalic atraumatic. Nose and mouth without ulcers or lesions  NECK: supple  RESP: lungs clear to auscultation   CV: regular rate and rhythm, normal S1 S2  LYMPH: further mild improvement in chronic bilateral lower leg peripheral edema symptoms present when compared to prior baseline prior to recent hospitalization.  ABDOMEN: nondistended  MS: no gross musculoskeletal defects noted  SKIN: no suspicious lesions or rashes  NEURO: Alert & Oriented x 3.   PSYCH: mentation appears normal, affect normal        Diagnostic Test Results:     Last Comprehensive Metabolic Panel:  Sodium   Date Value Ref Range Status   01/14/2020 135 (L) 136 - 145 mmol/L Final     Potassium   Date Value Ref Range Status   01/14/2020 4.2 3.5 - 5.1 mmol/L Final     Chloride   Date Value Ref Range Status   01/14/2020 99 98 - 107 mmol/L Final     Carbon Dioxide   Date Value Ref Range Status   01/14/2020 27 23 - 29 mmol/L Final     Anion Gap   Date Value Ref Range Status   01/14/2020 13.2 6 - 17 mmol/L Final     Glucose   Date Value Ref Range Status   01/14/2020 111 (H) 70 - 105 mg/dL Final     Urea Nitrogen   Date " Value Ref Range Status   01/14/2020 25 7 - 30 mg/dL Final     Creatinine   Date Value Ref Range Status   01/14/2020 1.14 0.70 - 1.30 mg/dL Final     GFR Estimate   Date Value Ref Range Status   01/14/2020 46 (L) >60 mL/min/[1.73_m2] Final     Calcium   Date Value Ref Range Status   01/14/2020 10.4 8.5 - 10.5 mg/dL Final         ASSESSMENT/PLAN:   (I27.20) Pulmonary hypertension (H)  (I89.0) Lymphedema of both lower extremities  (I50.33) Acute on chronic diastolic congestive heart failure (H)  (primary encounter diagnosis)  Comment: 1 week follow up of recent hospitalization for actue on chronic CHF exacerbation symptms. Patient's weight has stayed stable with current diuretic therapy and low salt diet. Patient is compliant with her daily diuretic therapy with Torsemide and Aldactone. Previous hypoxia symptoms have resolved with diuresis treatment. no chest pains. No signs of current further acute CHF exacerbation symptoms at this time. Leg lymphedema symptoms have been stable.  Plan: continue patient's current torsemide (DEMADEX) 20 MG tablet,         spironolactone (ALDACTONE) 25 MG tablet diuretic medications at this time. continue to follow up with CHF clinic at the [facility] clinic in [place] going forward. NUTRITION REFERRAL for dietary counseling of low salt diet      (E78.5) Hyperlipidemia LDL goal <100  Comment: stable. Patient is due for a repeat Lipid panel  Plan: Lipid panel reflex to direct LDL Fasting      (K21.9) Gastroesophageal reflux disease, esophagitis presence not specified  Comment: stable  Plan: continue omeprazole        Follow Up Plan:     Patient is instructed to return to Internal Medicine clinic for follow-up visit in 1 month.        Carissa Burroughs MD  Internal Medicine  [facility]

## 2020-02-13 ENCOUNTER — DOCUMENTATION ONLY (OUTPATIENT)
Dept: FAMILY MEDICINE | Facility: CLINIC | Age: 80
End: 2020-02-13

## 2020-02-13 NOTE — PROGRESS NOTES
[facility] now requests orders and shares plan of care/discharge summaries for some patients through [email or web address].  Please REPLY TO THIS MESSAGE OR ROUTE BACK TO THE AUTHOR in order to give authorization for orders when needed.  This is considered a verbal order, you will still receive a faxed copy of orders for signature.  Thank you for your assistance in improving collaboration for our patients.    ORDER  Requesting orders to continue OT lymphedema therapy 1x/week x 1 week to address management of BLE compression garments and home edema management program.    PRICILA Bentley/KEELEY, AIDA [email or web address]  [phone]

## 2020-02-23 NOTE — PROGRESS NOTES
Shay Marquez M.D.  Cardiovascular Medicine    I personally saw and examined Jaqueline Canales, discussed care with housestaff and other consultants, reviewed current laboratories and imaging studies, and conveyed impression and diagnostic/therapeutic plan to patient.    Problem List:  Scleroderma  Pulmonary hypertension  SCC of the lung  CKD  Hypothyroidism  NWOAC  Severe tricuspid regurgitation  Atrial fibrillation      History:    The patient returns for follow-up of heart failure.  There is no interim history of chest pain, tightness, paroxysmal nocturnal dyspnea, orthopnea, peripheral edema, palpitation, pre-syncope, syncope, device discharge.  Exercise tolerance is stable.  The patient is attempting to exercise regularly and following a sodium restricted, calorically appropriate diet.  Medications are reviewed and the patient is taking medications as prescribed.  The patient is generally sleeping well.       Objective:  Constitutional: alert, oriented,ab normal gait and station, normal mentation.  Oral: moist mucous membrans  Lymph: without pathologic adenopathy  Chest: clear to ausculation and percussion  Cor: No evidence of left or right ventricular activity.  Rhythm is irregular.  S1 variable  normal, S2 split physiologically. Murmurs are not present  Abdomen: without tenderness, rebound, guarding, masses, ascites  Extremities: Edema not present  Neuro: no focal defects, normal mentation  Skin: without open lesions  Psych: oriented, verbal, mental status in tact    VITALS    WEIGHT  Wt Readings from Last 24 Encounters:   02/25/20 79.8 kg (176 lb)   01/27/20 84.1 kg (185 lb 6.4 oz)   01/20/20 84 kg (185 lb 1.6 oz)   01/14/20 86.9 kg (191 lb 9.6 oz)   01/10/20 85.6 kg (188 lb 12.8 oz)   01/09/20 86.5 kg (190 lb 12.8 oz)   12/30/19 92.3 kg (203 lb 6.4 oz)   12/27/19 92.4 kg (203 lb 9.6 oz)   12/24/19 93.4 kg (205 lb 12.8 oz)   12/23/19 93.4 kg (205 lb 12.8 oz)   12/19/19 92.4 kg (203 lb 9.6 oz)   12/17/19  91.7 kg (202 lb 3.2 oz)   12/13/19 91.6 kg (202 lb)   12/11/19 90.6 kg (199 lb 12.8 oz)   12/10/19 90.9 kg (200 lb 4.8 oz)   12/06/19 90.5 kg (199 lb 9.6 oz)   12/06/19 90.5 kg (199 lb 9.6 oz)   12/05/19 88.7 kg (195 lb 8 oz)   11/20/19 103.4 kg (228 lb)   11/14/19 102.5 kg (226 lb)   11/11/19 101.2 kg (223 lb)   10/28/19 100.8 kg (222 lb 4.8 oz)   08/28/19 98.9 kg (218 lb)   07/29/19 96.8 kg (213 lb 6.4 oz)       Meds  Current Outpatient Medications   Medication Sig Dispense Refill     acetaminophen (TYLENOL) 500 MG tablet Take 1,000 mg by mouth 2 times daily        albuterol (PROAIR HFA/PROVENTIL HFA/VENTOLIN HFA) 108 (90 Base) MCG/ACT inhaler Inhale 2 puffs into the lungs every 6 hours as needed for shortness of breath / dyspnea or wheezing       apixaban ANTICOAGULANT (ELIQUIS) 5 MG tablet Take 1 tablet (5 mg) by mouth 2 times daily 180 tablet 2     Bacillus Coagulans-Inulin (PROBIOTIC-PREBIOTIC PO) Take 1 chew tab by mouth 2 times daily        folic acid (FOLVITE) 1 MG tablet Take 1 tablet (1,000 mcg) by mouth daily 90 tablet 3     InFLIXimab (REMICADE IV) Inject 600 mg into the vein Every 8 weeks       levothyroxine (SYNTHROID/LEVOTHROID) 75 MCG tablet Take 1 tablet (75 mcg) by mouth daily 90 tablet 2     melatonin 5 MG tablet Take 5 mg by mouth nightly as needed for sleep       Methotrexate Sodium (METHOTREXATE PO) Take 2.5 mg by mouth 10 tablets weekly - Wednesdays       metoprolol succinate ER (TOPROL-XL) 50 MG 24 hr tablet Take 0.5 tablets (25 mg) by mouth daily 30 tablet 0     Multiple Vitamins-Minerals (ICAPS) CAPS Take 1 capsule by mouth 2 times daily        multivitamin (CENTRUM SILVER) tablet Take 1 tablet by mouth daily       NIFEdipine ER (ADALAT CC) 60 MG 24 hr tablet Take 60 mg by mouth daily       omeprazole (PRILOSEC) 20 MG DR capsule Take 1 capsule (20 mg) by mouth 2 times daily Take 30-60 minutes prior to meals 180 capsule 2     order for DME Equipment being ordered: 4 wheeled walker with seat  1 each 3     potassium chloride ER (K-DUR/KLOR-CON M) 20 MEQ CR tablet Take 1 tablet (20 mEq) by mouth daily 30 tablet 0     Prednisolon-Gatiflox-Bromfenac 1-0.5-0.075 % SUSP Place 1 drop into the right eye daily       psyllium (METAMUCIL/KONSYL) capsule Take 1 capsule by mouth daily       sildenafil (REVATIO) 20 MG tablet Take 1 tablet (20 mg) by mouth 3 times daily 90 tablet 11     simvastatin (ZOCOR) 40 MG tablet Take 1 tablet (40 mg) by mouth At Bedtime 90 tablet 3     Skin Protectants, Misc. (EUCERIN) cream Apply topically 2 times daily Apply to areas dry skin topically two times a day for dry skin and Apply to areas of dry skin topically as needed for dry skin daily       spironolactone (ALDACTONE) 25 MG tablet Take 1 tablet (25 mg) by mouth daily 90 tablet 3     torsemide (DEMADEX) 20 MG tablet Take 2 tablets (40 mg) by mouth daily Take 40mg q am 180 tablet 3     triamcinolone (KENALOG) 0.1 % external cream Apply topically 2 times daily 30 g 3     Vitamin D, Cholecalciferol, 10 MCG (400 UNIT) TABS Take 1,600 Units by mouth daily         LABORATORY RESULTS  Results for GINA MCKEON (MRN [id number]) as of 2/25/2020 10:15   Ref. Range 1/20/2020 00:00 2/25/2020 08:58   Sodium Latest Ref Range: 136 - 145 mmol/L  137   Potassium Latest Ref Range: 3.5 - 5.1 mmol/L  3.5   Chloride Latest Ref Range: 98 - 107 mmol/L  99   Carbon Dioxide Latest Ref Range: 23 - 29 mmol/L  30 (H)   Urea Nitrogen Latest Ref Range: 7 - 30 mg/dL  21   Creatinine Latest Ref Range: 0.70 - 1.30 mg/dL  0.92   GFR Estimate Latest Ref Range: >60 mL/min/1.73_m2  59 (L)   GFR Estimate If Black Latest Ref Range: >60 mL/min/1.73_m2  71   Calcium Latest Ref Range: 8.5 - 10.5 mg/dL  10.4   Anion Gap Latest Ref Range: 6 - 17 mmol/L  11.5   N-Terminal Pro Bnp Latest Ref Range: 0 - 450 pg/mL  3,103 (H)   Glucose Latest Ref Range: 70 - 105 mg/dL  100   WBC Latest Ref Range: 4.0 - 11.0 10e9/L  4.7   Hemoglobin Latest Ref Range: 11.7 - 15.7 g/dL  12.5    Hematocrit Latest Ref Range: 35.0 - 47.0 %  38.9   Platelet Count Latest Ref Range: 150 - 450 10e9/L  159   RBC Count Latest Ref Range: 3.8 - 5.2 10e12/L  4.32   MCV Latest Ref Range: 78 - 100 fl  90   MCH Latest Ref Range: 26.5 - 33.0 pg  28.9   MCHC Latest Ref Range: 31.5 - 36.5 g/dL  32.1   RDW Latest Ref Range: 10.0 - 15.0 %  19.7 (H)   LAB RESULT - HIM SCAN Unknown Attch      CBC Results  Lab Results   Component Value Date    WBC 4.9 2020    RBC 4.45 2020    HGB 12.8 2020    HCT 40.2 2020    MCV 90 2020    MCH 28.8 2020    MCHC 31.8 2020    RDW 18.0 (H) 2020     2020       CMP RESULTS:  Lab Results   Component Value Date     (L) 2020    POTASSIUM 4.2 2020    CHLORIDE 99 2020    CO2 27 2020    ANIONGAP 13.2 2020     (H) 2020    BUN 25 2020    CR 1.14 2020    GFRESTIMATED 46 (L) 2020    GFRESTBLACK 55 (L) 2020    TU 10.4 2020    BILITOTAL 1.2 2019    ALBUMIN 3.7 2019    ALKPHOS 135 2019    ALT 15 2019    AST 26 2019        INR RESULTS:  Lab Results   Component Value Date    INR 1.05 04/15/2019       MAGNESIUM RESULTS  Lab Results   Component Value Date    MAG 2.4 (H) 2020       BNP RESULTS  Lab Results   Component Value Date    NTBNP 4,510 (H) 2020       IMAGING  Name: GINA MCKEON  MRN: [id number]  : 1940  Study Date: 2019 02:57 PM  Age: 78 yrs  Gender: Female  Patient [facility]  Reason For Study: CHF  Ordering Physician: DIMITRI LEAL  Referring Physician: Carissa Burroughs  Performed By: Milton Guadalupe RDCS     BSA: 2.0 m2  Height: 62 in  Weight: 229 lb  HR: 100  BP: 115/71 mmHg  _____________________________________________________________________________  __        Procedure  Complete Portable Echo Adult. [employer] (NDC #8556-8837) given  intravenously.  _____________________________________________________________________________  __        Interpretation Summary     Small pericardial effusion  The visual ejection fraction is estimated at 50-55%.  Flattened septum is consistent with RV pressure/volume overload.  Pulmonary hypertension  There is severe biatrial enlargement.  Very mild AS. MG is 9 mm Hg.  The right ventricle is moderately dilated. Mildly decreased right ventricular  systolic function  There is mod-severe to severe (3-4+) tricuspid regurgitation.  _____________________________________________________________________________  __        Left Ventricle  The left ventricle is normal in size. The visual ejection fraction is  estimated at 50-55%. Diastolic Doppler findings (E/E' ratio and/or other  parameters) suggest left ventricular filling pressures are indeterminate.  Flattened septum is consistent with RV pressure/volume overload.     Right Ventricle  The right ventricle is moderately dilated. Mildly decreased right ventricular  systolic function.     Atria  There is severe biatrial enlargement.     Mitral Valve  Thickened mitral valve posterior leaflet. There is moderate mitral annular  calcification. There is mild to moderate (1-2+) mitral regurgitation.        Tricuspid Valve  The right ventricular systolic pressure is elevated at 42.2 mmHg. Pulmonary  hypertension. There is mod-severe to severe (3-4+) tricuspid regurgitation.     Aortic Valve  There is severe trileaflet aortic sclerosis. No hemodynamically significant  valvular aortic stenosis.     Pulmonic Valve  The pulmonic valve is not well visualized.     Vessels  The aortic root is normal size. Dilation of the inferior vena cava is present  with abnormal respiratory variation in diameter.     Pericardium  Small pericardial effusion.     _____________________________________________________________________________  __  MMode/2D Measurements & Calculations  IVSd: 0.83 cm  LVIDd:  4.4 cm  LVIDs: 3.4 cm  LVPWd: 0.95 cm  FS: 22.2 %  LV mass(C)d: 125.7 grams  LV mass(C)dI: 62.1 grams/m2     Ao root diam: 2.8 cm  LA dimension: 4.5 cm  asc Aorta Diam: 3.4 cm  LA/Ao: 1.6  LVOT diam: 1.9 cm  LVOT area: 3.0 cm2  LA Volume (BP): 103.0 ml  LA Volume Index (BP): 51.0 ml/m2  RWT: 0.43        Doppler Measurements & Calculations  MV E max el: 79.7 cm/sec  MV max P.4 mmHg  MV mean PG: 3.1 mmHg  MV V2 VTI: 23.8 cm  MVA(VTI): 3.1 cm2     MV dec slope: 562.6 cm/sec2  Ao V2 max: 188.8 cm/sec  Ao max P.0 mmHg  Ao V2 mean: 136.1 cm/sec  Ao mean P.4 mmHg  Ao V2 VTI: 36.2 cm  JACOB(I,D): 2.1 cm2  JACOB(V,D): 2.1 cm2  LV V1 max P.2 mmHg  LV V1 max: 134.4 cm/sec  LV V1 VTI: 25.1 cm  SV(LVOT): 74.2 ml  SI(LVOT): 36.6 ml/m2  PA acc time: 0.08 sec  TR max el: 324.8 cm/sec  TR max P.2 mmHg  AV El Ratio (DI): 0.71  JACOB Index (cm2/m2): 1.0  E/E' av.5  Lateral E/e': 8.7  Medial E/e': 10.4          ASSESSMENT/PLAN  1Markedly improved with weight loss, resolution of cellulitis and compression stocking.  2.Previous echocardiogram demonstrated significant TR. However today's physical examination does not suggest this therefore will repeat echocardiogram and if poor, consider clipping or percutaneous repair/replacement.  3. Walking  4. Daily weights.

## 2020-02-25 ENCOUNTER — OFFICE VISIT (OUTPATIENT)
Dept: CARDIOLOGY | Facility: CLINIC | Age: 80
End: 2020-02-25
Attending: INTERNAL MEDICINE
Payer: MEDICARE

## 2020-02-25 VITALS
HEIGHT: 62 IN | DIASTOLIC BLOOD PRESSURE: 71 MMHG | SYSTOLIC BLOOD PRESSURE: 104 MMHG | OXYGEN SATURATION: 99 % | WEIGHT: 176 LBS | HEART RATE: 73 BPM | BODY MASS INDEX: 32.39 KG/M2

## 2020-02-25 DIAGNOSIS — I27.20 PULMONARY HYPERTENSION (H): ICD-10-CM

## 2020-02-25 DIAGNOSIS — R06.09 DYSPNEA ON EXERTION: ICD-10-CM

## 2020-02-25 LAB
ANION GAP SERPL CALCULATED.3IONS-SCNC: 11.5 MMOL/L (ref 6–17)
BUN SERPL-MCNC: 21 MG/DL (ref 7–30)
CALCIUM SERPL-MCNC: 10.4 MG/DL (ref 8.5–10.5)
CHLORIDE SERPL-SCNC: 99 MMOL/L (ref 98–107)
CO2 SERPL-SCNC: 30 MMOL/L (ref 23–29)
CREAT SERPL-MCNC: 0.92 MG/DL (ref 0.7–1.3)
ERYTHROCYTE [DISTWIDTH] IN BLOOD BY AUTOMATED COUNT: 19.7 % (ref 10–15)
GFR SERPL CREATININE-BSD FRML MDRD: 59 ML/MIN/{1.73_M2}
GLUCOSE SERPL-MCNC: 100 MG/DL (ref 70–105)
HCT VFR BLD AUTO: 38.9 % (ref 35–47)
HGB BLD-MCNC: 12.5 G/DL (ref 11.7–15.7)
MCH RBC QN AUTO: 28.9 PG (ref 26.5–33)
MCHC RBC AUTO-ENTMCNC: 32.1 G/DL (ref 31.5–36.5)
MCV RBC AUTO: 90 FL (ref 78–100)
NT-PROBNP SERPL-MCNC: 3103 PG/ML (ref 0–450)
PLATELET # BLD AUTO: 159 10E9/L (ref 150–450)
POTASSIUM SERPL-SCNC: 3.5 MMOL/L (ref 3.5–5.1)
RBC # BLD AUTO: 4.32 10E12/L (ref 3.8–5.2)
SODIUM SERPL-SCNC: 137 MMOL/L (ref 136–145)
WBC # BLD AUTO: 4.7 10E9/L (ref 4–11)

## 2020-02-25 PROCEDURE — 83880 ASSAY OF NATRIURETIC PEPTIDE: CPT | Performed by: INTERNAL MEDICINE

## 2020-02-25 PROCEDURE — 85027 COMPLETE CBC AUTOMATED: CPT | Performed by: INTERNAL MEDICINE

## 2020-02-25 PROCEDURE — 80048 BASIC METABOLIC PNL TOTAL CA: CPT | Performed by: INTERNAL MEDICINE

## 2020-02-25 PROCEDURE — 99214 OFFICE O/P EST MOD 30 MIN: CPT | Performed by: INTERNAL MEDICINE

## 2020-02-25 PROCEDURE — 36415 COLL VENOUS BLD VENIPUNCTURE: CPT | Performed by: INTERNAL MEDICINE

## 2020-02-25 ASSESSMENT — MIFFLIN-ST. JEOR: SCORE: 1221.58

## 2020-02-25 NOTE — NURSING NOTE
Procedures and/or Testing: Patient given instructions regarding  Echocardiogram, . Discussed purpose, preparation, procedure and when to expect results reported back to the patient. Patient demonstrated understanding of this information and agreed to call with further questions or concerns.  Med Reconcile: Reviewed and verified all current medications with the patient. The updated medication list was printed and given to the patient.  Diet: Patient instructed regarding a heart healthy diet, including discussion of reduced fat and sodium intake. Patient demonstrated understanding of this information and agreed to call with further questions or concerns.  Return Appointment: Patient given instructions regarding scheduling next clinic visit. Patient demonstrated understanding of this information and agreed to call with further questions or concerns.  Patient stated she understood all health information given and agreed to call with further questions or concerns.     Medication Changes:   No medication changes at this time. Please continue current medication regiment.    Patient Instructions:  1. Continue staying active and eat a heart healthy diet.    2. Please keep current list of medications with you at all times.    3. Remember to weigh yourself daily after voiding and before you consume any food or beverages and log the numbers.  If you have gained 2 pounds overnight or 5 pounds in a week contact us immediately for medication adjustments or further instructions.    4. **Please call us immediately if you have any syncope (fainting or passing out), chest pain, edema (swelling or weight gain), or decline in your functional status (general decline in how you are feeling).    Follow up Appointment Information:  - Repeat echocardiogram. Call us when it is complete. We will review the results and decide if you need a KENYON (transesophegeal echocardiogram)    Check-In  Time Check-In Location Estimated Length Procedure   Name          60 minutes Echocardiogram (Echo)**   Procedure Preparations & Instructions     This is a non-invasive procedure and does NOT require any preparation         Results:  Component      Latest Ref Rng & Units 2/25/2020   Sodium      136 - 145 mmol/L 137   Potassium      3.5 - 5.1 mmol/L 3.5   Chloride      98 - 107 mmol/L 99   Carbon Dioxide      23 - 29 mmol/L 30 (H)   Anion Gap      6 - 17 mmol/L 11.5   Glucose      70 - 105 mg/dL 100   Urea Nitrogen      7 - 30 mg/dL 21   Creatinine      0.70 - 1.30 mg/dL 0.92   GFR Estimate      >60 mL/min/1.73:m2 59 (L)   GFR Estimate If Black      >60 mL/min/1.73:m2 71   Calcium      8.5 - 10.5 mg/dL 10.4   WBC      4.0 - 11.0 10e9/L 4.7   RBC Count      3.8 - 5.2 10e12/L 4.32   Hemoglobin      11.7 - 15.7 g/dL 12.5   Hematocrit      35.0 - 47.0 % 38.9   MCV      78 - 100 fl 90   MCH      26.5 - 33.0 pg 28.9   MCHC      31.5 - 36.5 g/dL 32.1   RDW      10.0 - 15.0 % 19.7 (H)   Platelet Count      150 - 450 10e9/L 159   N-Terminal Pro Bnp      0 - 450 pg/mL 3,103 (H)

## 2020-02-25 NOTE — LETTER
2/25/2020    Carissa Burroughs MD  [address] [facility] [address]  [facility] [address]    RE: Jaqueline Canales       Dear Colleague,    I had the pleasure of seeing Jaqueline Canales in the [facility].    Shay Marquez M.D.  Cardiovascular Medicine    I personally saw and examined Jaqueline Canales, discussed care with housestaff and other consultants, reviewed current laboratories and imaging studies, and conveyed impression and diagnostic/therapeutic plan to patient.    Problem List:  Scleroderma  Pulmonary hypertension  SCC of the lung  CKD  Hypothyroidism  NWOAC  Severe tricuspid regurgitation  Atrial fibrillation      History:    The patient returns for follow-up of heart failure.  There is no interim history of chest pain, tightness, paroxysmal nocturnal dyspnea, orthopnea, peripheral edema, palpitation, pre-syncope, syncope, device discharge.  Exercise tolerance is stable.  The patient is attempting to exercise regularly and following a sodium restricted, calorically appropriate diet.  Medications are reviewed and the patient is taking medications as prescribed.  The patient is generally sleeping well.       Objective:  Constitutional: alert, oriented,ab normal gait and station, normal mentation.  Oral: moist mucous membrans  Lymph: without pathologic adenopathy  Chest: clear to ausculation and percussion  Cor: No evidence of left or right ventricular activity.  Rhythm is irregular.  S1 variable  normal, S2 split physiologically. Murmurs are not present  Abdomen: without tenderness, rebound, guarding, masses, ascites  Extremities: Edema not present  Neuro: no focal defects, normal mentation  Skin: without open lesions  Psych: oriented, verbal, mental status in tact    VITALS    WEIGHT  Wt Readings from Last 24 Encounters:   02/25/20 79.8 kg (176 lb)   01/27/20 84.1 kg (185 lb 6.4 oz)   01/20/20 84 kg (185 lb 1.6 oz)   01/14/20 86.9 kg (191 lb 9.6 oz)   01/10/20 85.6 kg (188 lb 12.8 oz)    01/09/20 86.5 kg (190 lb 12.8 oz)   12/30/19 92.3 kg (203 lb 6.4 oz)   12/27/19 92.4 kg (203 lb 9.6 oz)   12/24/19 93.4 kg (205 lb 12.8 oz)   12/23/19 93.4 kg (205 lb 12.8 oz)   12/19/19 92.4 kg (203 lb 9.6 oz)   12/17/19 91.7 kg (202 lb 3.2 oz)   12/13/19 91.6 kg (202 lb)   12/11/19 90.6 kg (199 lb 12.8 oz)   12/10/19 90.9 kg (200 lb 4.8 oz)   12/06/19 90.5 kg (199 lb 9.6 oz)   12/06/19 90.5 kg (199 lb 9.6 oz)   12/05/19 88.7 kg (195 lb 8 oz)   11/20/19 103.4 kg (228 lb)   11/14/19 102.5 kg (226 lb)   11/11/19 101.2 kg (223 lb)   10/28/19 100.8 kg (222 lb 4.8 oz)   08/28/19 98.9 kg (218 lb)   07/29/19 96.8 kg (213 lb 6.4 oz)       Meds  Current Outpatient Medications   Medication Sig Dispense Refill     acetaminophen (TYLENOL) 500 MG tablet Take 1,000 mg by mouth 2 times daily        albuterol (PROAIR HFA/PROVENTIL HFA/VENTOLIN HFA) 108 (90 Base) MCG/ACT inhaler Inhale 2 puffs into the lungs every 6 hours as needed for shortness of breath / dyspnea or wheezing       apixaban ANTICOAGULANT (ELIQUIS) 5 MG tablet Take 1 tablet (5 mg) by mouth 2 times daily 180 tablet 2     Bacillus Coagulans-Inulin (PROBIOTIC-PREBIOTIC PO) Take 1 chew tab by mouth 2 times daily        folic acid (FOLVITE) 1 MG tablet Take 1 tablet (1,000 mcg) by mouth daily 90 tablet 3     InFLIXimab (REMICADE IV) Inject 600 mg into the vein Every 8 weeks       levothyroxine (SYNTHROID/LEVOTHROID) 75 MCG tablet Take 1 tablet (75 mcg) by mouth daily 90 tablet 2     melatonin 5 MG tablet Take 5 mg by mouth nightly as needed for sleep       Methotrexate Sodium (METHOTREXATE PO) Take 2.5 mg by mouth 10 tablets weekly - Wednesdays       metoprolol succinate ER (TOPROL-XL) 50 MG 24 hr tablet Take 0.5 tablets (25 mg) by mouth daily 30 tablet 0     Multiple Vitamins-Minerals (ICAPS) CAPS Take 1 capsule by mouth 2 times daily        multivitamin (CENTRUM SILVER) tablet Take 1 tablet by mouth daily       NIFEdipine ER (ADALAT CC) 60 MG 24 hr tablet Take 60 mg  by mouth daily       omeprazole (PRILOSEC) 20 MG DR capsule Take 1 capsule (20 mg) by mouth 2 times daily Take 30-60 minutes prior to meals 180 capsule 2     order for DME Equipment being ordered: 4 wheeled walker with seat 1 each 3     potassium chloride ER (K-DUR/KLOR-CON M) 20 MEQ CR tablet Take 1 tablet (20 mEq) by mouth daily 30 tablet 0     Prednisolon-Gatiflox-Bromfenac 1-0.5-0.075 % SUSP Place 1 drop into the right eye daily       psyllium (METAMUCIL/KONSYL) capsule Take 1 capsule by mouth daily       sildenafil (REVATIO) 20 MG tablet Take 1 tablet (20 mg) by mouth 3 times daily 90 tablet 11     simvastatin (ZOCOR) 40 MG tablet Take 1 tablet (40 mg) by mouth At Bedtime 90 tablet 3     Skin Protectants, Misc. (EUCERIN) cream Apply topically 2 times daily Apply to areas dry skin topically two times a day for dry skin and Apply to areas of dry skin topically as needed for dry skin daily       spironolactone (ALDACTONE) 25 MG tablet Take 1 tablet (25 mg) by mouth daily 90 tablet 3     torsemide (DEMADEX) 20 MG tablet Take 2 tablets (40 mg) by mouth daily Take 40mg q am 180 tablet 3     triamcinolone (KENALOG) 0.1 % external cream Apply topically 2 times daily 30 g 3     Vitamin D, Cholecalciferol, 10 MCG (400 UNIT) TABS Take 1,600 Units by mouth daily         LABORATORY RESULTS  Results for GINA MCKEON (MRN [id number]) as of 2/25/2020 10:15   Ref. Range 1/20/2020 00:00 2/25/2020 08:58   Sodium Latest Ref Range: 136 - 145 mmol/L  137   Potassium Latest Ref Range: 3.5 - 5.1 mmol/L  3.5   Chloride Latest Ref Range: 98 - 107 mmol/L  99   Carbon Dioxide Latest Ref Range: 23 - 29 mmol/L  30 (H)   Urea Nitrogen Latest Ref Range: 7 - 30 mg/dL  21   Creatinine Latest Ref Range: 0.70 - 1.30 mg/dL  0.92   GFR Estimate Latest Ref Range: >60 mL/min/1.73_m2  59 (L)   GFR Estimate If Black Latest Ref Range: >60 mL/min/1.73_m2  71   Calcium Latest Ref Range: 8.5 - 10.5 mg/dL  10.4   Anion Gap Latest Ref Range: 6 - 17  mmol/L  11.5   N-Terminal Pro Bnp Latest Ref Range: 0 - 450 pg/mL  3,103 (H)   Glucose Latest Ref Range: 70 - 105 mg/dL  100   WBC Latest Ref Range: 4.0 - 11.0 10e9/L  4.7   Hemoglobin Latest Ref Range: 11.7 - 15.7 g/dL  12.5   Hematocrit Latest Ref Range: 35.0 - 47.0 %  38.9   Platelet Count Latest Ref Range: 150 - 450 10e9/L  159   RBC Count Latest Ref Range: 3.8 - 5.2 10e12/L  4.32   MCV Latest Ref Range: 78 - 100 fl  90   MCH Latest Ref Range: 26.5 - 33.0 pg  28.9   MCHC Latest Ref Range: 31.5 - 36.5 g/dL  32.1   RDW Latest Ref Range: 10.0 - 15.0 %  19.7 (H)   LAB RESULT - HIM SCAN Unknown Attch      CBC Results  Lab Results   Component Value Date    WBC 4.9 2020    RBC 4.45 2020    HGB 12.8 2020    HCT 40.2 2020    MCV 90 2020    MCH 28.8 2020    MCHC 31.8 2020    RDW 18.0 (H) 2020     2020       CMP RESULTS:  Lab Results   Component Value Date     (L) 2020    POTASSIUM 4.2 2020    CHLORIDE 99 2020    CO2 27 2020    ANIONGAP 13.2 2020     (H) 2020    BUN 25 2020    CR 1.14 2020    GFRESTIMATED 46 (L) 2020    GFRESTBLACK 55 (L) 2020    TU 10.4 2020    BILITOTAL 1.2 2019    ALBUMIN 3.7 2019    ALKPHOS 135 2019    ALT 15 2019    AST 26 2019        INR RESULTS:  Lab Results   Component Value Date    INR 1.05 04/15/2019       MAGNESIUM RESULTS  Lab Results   Component Value Date    MAG 2.4 (H) 2020       BNP RESULTS  Lab Results   Component Value Date    NTBNP 4,510 (H) 2020       IMAGING  Name: GINA MCKEON  MRN: [id number]  : 1940  Study Date: 2019 02:57 PM  Age: 78 yrs  Gender: Female  Patient Location: [facility]  Reason For Study: CHF  Ordering Physician: DIMITRI LEAL  Referring Physician: Carissa Burroughs  Performed By: Milton Guadalupe RDCS     BSA: 2.0 m2  Height: 62 in  Weight: 229 lb  HR: 100  BP: 115/71  mmHg  _____________________________________________________________________________  __        Procedure  Complete Portable Echo Adult. Optison (NDC #5886-6314) given intravenously.  _____________________________________________________________________________  __        Interpretation Summary     Small pericardial effusion  The visual ejection fraction is estimated at 50-55%.  Flattened septum is consistent with RV pressure/volume overload.  Pulmonary hypertension  There is severe biatrial enlargement.  Very mild AS. MG is 9 mm Hg.  The right ventricle is moderately dilated. Mildly decreased right ventricular  systolic function  There is mod-severe to severe (3-4+) tricuspid regurgitation.  _____________________________________________________________________________  __        Left Ventricle  The left ventricle is normal in size. The visual ejection fraction is  estimated at 50-55%. Diastolic Doppler findings (E/E' ratio and/or other  parameters) suggest left ventricular filling pressures are indeterminate.  Flattened septum is consistent with RV pressure/volume overload.     Right Ventricle  The right ventricle is moderately dilated. Mildly decreased right ventricular  systolic function.     Atria  There is severe biatrial enlargement.     Mitral Valve  Thickened mitral valve posterior leaflet. There is moderate mitral annular  calcification. There is mild to moderate (1-2+) mitral regurgitation.        Tricuspid Valve  The right ventricular systolic pressure is elevated at 42.2 mmHg. Pulmonary  hypertension. There is mod-severe to severe (3-4+) tricuspid regurgitation.     Aortic Valve  There is severe trileaflet aortic sclerosis. No hemodynamically significant  valvular aortic stenosis.     Pulmonic Valve  The pulmonic valve is not well visualized.     Vessels  The aortic root is normal size. Dilation of the inferior vena cava is present  with abnormal respiratory variation in diameter.     Pericardium  Small  pericardial effusion.     _____________________________________________________________________________  __  MMode/2D Measurements & Calculations  IVSd: 0.83 cm  LVIDd: 4.4 cm  LVIDs: 3.4 cm  LVPWd: 0.95 cm  FS: 22.2 %  LV mass(C)d: 125.7 grams  LV mass(C)dI: 62.1 grams/m2     Ao root diam: 2.8 cm  LA dimension: 4.5 cm  asc Aorta Diam: 3.4 cm  LA/Ao: 1.6  LVOT diam: 1.9 cm  LVOT area: 3.0 cm2  LA Volume (BP): 103.0 ml  LA Volume Index (BP): 51.0 ml/m2  RWT: 0.43        Doppler Measurements & Calculations  MV E max el: 79.7 cm/sec  MV max P.4 mmHg  MV mean PG: 3.1 mmHg  MV V2 VTI: 23.8 cm  MVA(VTI): 3.1 cm2     MV dec slope: 562.6 cm/sec2  Ao V2 max: 188.8 cm/sec  Ao max P.0 mmHg  Ao V2 mean: 136.1 cm/sec  Ao mean P.4 mmHg  Ao V2 VTI: 36.2 cm  JACOB(I,D): 2.1 cm2  JACOB(V,D): 2.1 cm2  LV V1 max P.2 mmHg  LV V1 max: 134.4 cm/sec  LV V1 VTI: 25.1 cm  SV(LVOT): 74.2 ml  SI(LVOT): 36.6 ml/m2  PA acc time: 0.08 sec  TR max el: 324.8 cm/sec  TR max P.2 mmHg  AV El Ratio (DI): 0.71  JACOB Index (cm2/m2): 1.0  E/E' av.5  Lateral E/e': 8.7  Medial E/e': 10.4          ASSESSMENT/PLAN  1Markedly improved with weight loss, resolution of cellulitis and compression stocking.  2.Previous echocardiogram demonstrated significant TR. However today's physical examination does not suggest this therefore will repeat echocardiogram and if poor, consider clipping or percutaneous repair/replacement.  3. Walking  4. Daily weights.      Thank you for allowing me to participate in the care of your patient.  Sincerely,     Shay Marquez MD     [facility]

## 2020-02-25 NOTE — LETTER
2/25/2020    Carissa Burroughs MD  [address] [facility] [address]  [facility] [address]    RE: Jaqueline Canales       Dear Colleague,    I had the pleasure of seeing Jaqueline Canales in the [facility].    Shay Marquez M.D.  Cardiovascular Medicine    I personally saw and examined Jaqueline Canales, discussed care with housestaff and other consultants, reviewed current laboratories and imaging studies, and conveyed impression and diagnostic/therapeutic plan to patient.    Problem List:  Scleroderma  Pulmonary hypertension  SCC of the lung  CKD  Hypothyroidism  NWOAC  Severe tricuspid regurgitation  Atrial fibrillation      History:    The patient returns for follow-up of heart failure.  There is no interim history of chest pain, tightness, paroxysmal nocturnal dyspnea, orthopnea, peripheral edema, palpitation, pre-syncope, syncope, device discharge.  Exercise tolerance is stable.  The patient is attempting to exercise regularly and following a sodium restricted, calorically appropriate diet.  Medications are reviewed and the patient is taking medications as prescribed.  The patient is generally sleeping well.       Objective:  Constitutional: alert, oriented,ab normal gait and station, normal mentation.  Oral: moist mucous membrans  Lymph: without pathologic adenopathy  Chest: clear to ausculation and percussion  Cor: No evidence of left or right ventricular activity.  Rhythm is irregular.  S1 variable  normal, S2 split physiologically. Murmurs are not present  Abdomen: without tenderness, rebound, guarding, masses, ascites  Extremities: Edema not present  Neuro: no focal defects, normal mentation  Skin: without open lesions  Psych: oriented, verbal, mental status in tact    VITALS    WEIGHT  Wt Readings from Last 24 Encounters:   02/25/20 79.8 kg (176 lb)   01/27/20 84.1 kg (185 lb 6.4 oz)   01/20/20 84 kg (185 lb 1.6 oz)   01/14/20 86.9 kg (191 lb 9.6 oz)   01/10/20 85.6 kg (188 lb 12.8 oz)    01/09/20 86.5 kg (190 lb 12.8 oz)   12/30/19 92.3 kg (203 lb 6.4 oz)   12/27/19 92.4 kg (203 lb 9.6 oz)   12/24/19 93.4 kg (205 lb 12.8 oz)   12/23/19 93.4 kg (205 lb 12.8 oz)   12/19/19 92.4 kg (203 lb 9.6 oz)   12/17/19 91.7 kg (202 lb 3.2 oz)   12/13/19 91.6 kg (202 lb)   12/11/19 90.6 kg (199 lb 12.8 oz)   12/10/19 90.9 kg (200 lb 4.8 oz)   12/06/19 90.5 kg (199 lb 9.6 oz)   12/06/19 90.5 kg (199 lb 9.6 oz)   12/05/19 88.7 kg (195 lb 8 oz)   11/20/19 103.4 kg (228 lb)   11/14/19 102.5 kg (226 lb)   11/11/19 101.2 kg (223 lb)   10/28/19 100.8 kg (222 lb 4.8 oz)   08/28/19 98.9 kg (218 lb)   07/29/19 96.8 kg (213 lb 6.4 oz)       Meds  Current Outpatient Medications   Medication Sig Dispense Refill     acetaminophen (TYLENOL) 500 MG tablet Take 1,000 mg by mouth 2 times daily        albuterol (PROAIR HFA/PROVENTIL HFA/VENTOLIN HFA) 108 (90 Base) MCG/ACT inhaler Inhale 2 puffs into the lungs every 6 hours as needed for shortness of breath / dyspnea or wheezing       apixaban ANTICOAGULANT (ELIQUIS) 5 MG tablet Take 1 tablet (5 mg) by mouth 2 times daily 180 tablet 2     Bacillus Coagulans-Inulin (PROBIOTIC-PREBIOTIC PO) Take 1 chew tab by mouth 2 times daily        folic acid (FOLVITE) 1 MG tablet Take 1 tablet (1,000 mcg) by mouth daily 90 tablet 3     InFLIXimab (REMICADE IV) Inject 600 mg into the vein Every 8 weeks       levothyroxine (SYNTHROID/LEVOTHROID) 75 MCG tablet Take 1 tablet (75 mcg) by mouth daily 90 tablet 2     melatonin 5 MG tablet Take 5 mg by mouth nightly as needed for sleep       Methotrexate Sodium (METHOTREXATE PO) Take 2.5 mg by mouth 10 tablets weekly - Wednesdays       metoprolol succinate ER (TOPROL-XL) 50 MG 24 hr tablet Take 0.5 tablets (25 mg) by mouth daily 30 tablet 0     Multiple Vitamins-Minerals (ICAPS) CAPS Take 1 capsule by mouth 2 times daily        multivitamin (CENTRUM SILVER) tablet Take 1 tablet by mouth daily       NIFEdipine ER (ADALAT CC) 60 MG 24 hr tablet Take 60 mg  by mouth daily       omeprazole (PRILOSEC) 20 MG DR capsule Take 1 capsule (20 mg) by mouth 2 times daily Take 30-60 minutes prior to meals 180 capsule 2     order for DME Equipment being ordered: 4 wheeled walker with seat 1 each 3     potassium chloride ER (K-DUR/KLOR-CON M) 20 MEQ CR tablet Take 1 tablet (20 mEq) by mouth daily 30 tablet 0     Prednisolon-Gatiflox-Bromfenac 1-0.5-0.075 % SUSP Place 1 drop into the right eye daily       psyllium (METAMUCIL/KONSYL) capsule Take 1 capsule by mouth daily       sildenafil (REVATIO) 20 MG tablet Take 1 tablet (20 mg) by mouth 3 times daily 90 tablet 11     simvastatin (ZOCOR) 40 MG tablet Take 1 tablet (40 mg) by mouth At Bedtime 90 tablet 3     Skin Protectants, Misc. (EUCERIN) cream Apply topically 2 times daily Apply to areas dry skin topically two times a day for dry skin and Apply to areas of dry skin topically as needed for dry skin daily       spironolactone (ALDACTONE) 25 MG tablet Take 1 tablet (25 mg) by mouth daily 90 tablet 3     torsemide (DEMADEX) 20 MG tablet Take 2 tablets (40 mg) by mouth daily Take 40mg q am 180 tablet 3     triamcinolone (KENALOG) 0.1 % external cream Apply topically 2 times daily 30 g 3     Vitamin D, Cholecalciferol, 10 MCG (400 UNIT) TABS Take 1,600 Units by mouth daily         LABORATORY RESULTS  Results for GINA MCKEON (MRN [id number]) as of 2/25/2020 10:15   Ref. Range 1/20/2020 00:00 2/25/2020 08:58   Sodium Latest Ref Range: 136 - 145 mmol/L  137   Potassium Latest Ref Range: 3.5 - 5.1 mmol/L  3.5   Chloride Latest Ref Range: 98 - 107 mmol/L  99   Carbon Dioxide Latest Ref Range: 23 - 29 mmol/L  30 (H)   Urea Nitrogen Latest Ref Range: 7 - 30 mg/dL  21   Creatinine Latest Ref Range: 0.70 - 1.30 mg/dL  0.92   GFR Estimate Latest Ref Range: >60 mL/min/1.73_m2  59 (L)   GFR Estimate If Black Latest Ref Range: >60 mL/min/1.73_m2  71   Calcium Latest Ref Range: 8.5 - 10.5 mg/dL  10.4   Anion Gap Latest Ref Range: 6 - 17  mmol/L  11.5   N-Terminal Pro Bnp Latest Ref Range: 0 - 450 pg/mL  3,103 (H)   Glucose Latest Ref Range: 70 - 105 mg/dL  100   WBC Latest Ref Range: 4.0 - 11.0 10e9/L  4.7   Hemoglobin Latest Ref Range: 11.7 - 15.7 g/dL  12.5   Hematocrit Latest Ref Range: 35.0 - 47.0 %  38.9   Platelet Count Latest Ref Range: 150 - 450 10e9/L  159   RBC Count Latest Ref Range: 3.8 - 5.2 10e12/L  4.32   MCV Latest Ref Range: 78 - 100 fl  90   MCH Latest Ref Range: 26.5 - 33.0 pg  28.9   MCHC Latest Ref Range: 31.5 - 36.5 g/dL  32.1   RDW Latest Ref Range: 10.0 - 15.0 %  19.7 (H)   LAB RESULT - HIM SCAN Unknown Attch      CBC Results  Lab Results   Component Value Date    WBC 4.9 2020    RBC 4.45 2020    HGB 12.8 2020    HCT 40.2 2020    MCV 90 2020    MCH 28.8 2020    MCHC 31.8 2020    RDW 18.0 (H) 2020     2020       CMP RESULTS:  Lab Results   Component Value Date     (L) 2020    POTASSIUM 4.2 2020    CHLORIDE 99 2020    CO2 27 2020    ANIONGAP 13.2 2020     (H) 2020    BUN 25 2020    CR 1.14 2020    GFRESTIMATED 46 (L) 2020    GFRESTBLACK 55 (L) 2020    TU 10.4 2020    BILITOTAL 1.2 2019    ALBUMIN 3.7 2019    ALKPHOS 135 2019    ALT 15 2019    AST 26 2019        INR RESULTS:  Lab Results   Component Value Date    INR 1.05 04/15/2019       MAGNESIUM RESULTS  Lab Results   Component Value Date    MAG 2.4 (H) 2020       BNP RESULTS  Lab Results   Component Value Date    NTBNP 4,510 (H) 2020       IMAGING  Name: GINA MCKEON  MRN: [id number]  : 1940  Study Date: 2019 02:57 PM  Age: 78 yrs  Gender: Female  Patient Location: [facility]  Reason For Study: CHF  Ordering Physician: DIMITRI LEAL  Referring Physician: Carissa Burroughs  Performed By: Milton Guadalupe RDCS     BSA: 2.0 m2  Height: 62 in  Weight: 229 lb  HR: 100  BP: 115/71  mmHg  _____________________________________________________________________________  __        Procedure  Complete Portable Echo Adult. Optison (NDC #[id number]) given intravenously.  _____________________________________________________________________________  __        Interpretation Summary     Small pericardial effusion  The visual ejection fraction is estimated at 50-55%.  Flattened septum is consistent with RV pressure/volume overload.  Pulmonary hypertension  There is severe biatrial enlargement.  Very mild AS. MG is 9 mm Hg.  The right ventricle is moderately dilated. Mildly decreased right ventricular  systolic function  There is mod-severe to severe (3-4+) tricuspid regurgitation.  _____________________________________________________________________________  __        Left Ventricle  The left ventricle is normal in size. The visual ejection fraction is  estimated at 50-55%. Diastolic Doppler findings (E/E' ratio and/or other  parameters) suggest left ventricular filling pressures are indeterminate.  Flattened septum is consistent with RV pressure/volume overload.     Right Ventricle  The right ventricle is moderately dilated. Mildly decreased right ventricular  systolic function.     Atria  There is severe biatrial enlargement.     Mitral Valve  Thickened mitral valve posterior leaflet. There is moderate mitral annular  calcification. There is mild to moderate (1-2+) mitral regurgitation.        Tricuspid Valve  The right ventricular systolic pressure is elevated at 42.2 mmHg. Pulmonary  hypertension. There is mod-severe to severe (3-4+) tricuspid regurgitation.     Aortic Valve  There is severe trileaflet aortic sclerosis. No hemodynamically significant  valvular aortic stenosis.     Pulmonic Valve  The pulmonic valve is not well visualized.     Vessels  The aortic root is normal size. Dilation of the inferior vena cava is present  with abnormal respiratory variation in diameter.     Pericardium  Small  pericardial effusion.     _____________________________________________________________________________  __  MMode/2D Measurements & Calculations  IVSd: 0.83 cm  LVIDd: 4.4 cm  LVIDs: 3.4 cm  LVPWd: 0.95 cm  FS: 22.2 %  LV mass(C)d: 125.7 grams  LV mass(C)dI: 62.1 grams/m2     Ao root diam: 2.8 cm  LA dimension: 4.5 cm  asc Aorta Diam: 3.4 cm  LA/Ao: 1.6  LVOT diam: 1.9 cm  LVOT area: 3.0 cm2  LA Volume (BP): 103.0 ml  LA Volume Index (BP): 51.0 ml/m2  RWT: 0.43        Doppler Measurements & Calculations  MV E max el: 79.7 cm/sec  MV max P.4 mmHg  MV mean PG: 3.1 mmHg  MV V2 VTI: 23.8 cm  MVA(VTI): 3.1 cm2     MV dec slope: 562.6 cm/sec2  Ao V2 max: 188.8 cm/sec  Ao max P.0 mmHg  Ao V2 mean: 136.1 cm/sec  Ao mean P.4 mmHg  Ao V2 VTI: 36.2 cm  JACOB(I,D): 2.1 cm2  JACOB(V,D): 2.1 cm2  LV V1 max P.2 mmHg  LV V1 max: 134.4 cm/sec  LV V1 VTI: 25.1 cm  SV(LVOT): 74.2 ml  SI(LVOT): 36.6 ml/m2  PA acc time: 0.08 sec  TR max el: 324.8 cm/sec  TR max P.2 mmHg  AV El Ratio (DI): 0.71  JACOB Index (cm2/m2): 1.0  E/E' av.5  Lateral E/e': 8.7  Medial E/e': 10.4          ASSESSMENT/PLAN  1Markedly improved with weight loss, resolution of cellulitis and compression stocking.  2.Previous echocardiogram demonstrated significant TR. However today's physical examination does not suggest this therefore will repeat echocardiogram and if poor, consider clipping or percutaneous repair/replacement.  3. Walking  4. Daily weights.          Thank you for allowing me to participate in the care of your patient.      Sincerely,     Shay Marquez MD     [facility]    cc:   Shay Marquez MD  [address] [place], [place] [address]  [place] [address]

## 2020-02-25 NOTE — PATIENT INSTRUCTIONS
Medication Changes:   No medication changes at this time. Please continue current medication regiment.    Patient Instructions:  1. Continue staying active and eat a heart healthy diet.    2. Please keep current list of medications with you at all times.    3. Remember to weigh yourself daily after voiding and before you consume any food or beverages and log the numbers.  If you have gained 2 pounds overnight or 5 pounds in a week contact us immediately for medication adjustments or further instructions.    4. **Please call us immediately if you have any syncope (fainting or passing out), chest pain, edema (swelling or weight gain), or decline in your functional status (general decline in how you are feeling).    Follow up Appointment Information:  - Repeat echocardiogram. Call us when it is complete. We will review the results and decide if you need a KENYON (transesophegeal echocardiogram)    Check-In  Time Check-In Location Estimated Length Procedure   Name         60 minutes Echocardiogram (Echo)**   Procedure Preparations & Instructions     This is a non-invasive procedure and does NOT require any preparation         Results:  Component      Latest Ref Rng & Units 2/25/2020   Sodium      136 - 145 mmol/L 137   Potassium      3.5 - 5.1 mmol/L 3.5   Chloride      98 - 107 mmol/L 99   Carbon Dioxide      23 - 29 mmol/L 30 (H)   Anion Gap      6 - 17 mmol/L 11.5   Glucose      70 - 105 mg/dL 100   Urea Nitrogen      7 - 30 mg/dL 21   Creatinine      0.70 - 1.30 mg/dL 0.92   GFR Estimate      >60 mL/min/1.73:m2 59 (L)   GFR Estimate If Black      >60 mL/min/1.73:m2 71   Calcium      8.5 - 10.5 mg/dL 10.4   WBC      4.0 - 11.0 10e9/L 4.7   RBC Count      3.8 - 5.2 10e12/L 4.32   Hemoglobin      11.7 - 15.7 g/dL 12.5   Hematocrit      35.0 - 47.0 % 38.9   MCV      78 - 100 fl 90   MCH      26.5 - 33.0 pg 28.9   MCHC      31.5 - 36.5 g/dL 32.1   RDW      10.0 - 15.0 % 19.7 (H)   Platelet Count      150 - 450 10e9/L 159    N-Terminal Pro Bnp      0 - 450 pg/mL 3,103 (H)     For scheduling at [facility] please call [phone]  For scheduling at the [facility] please call [phone]  We are located on the second floor [address] at the [facility].  Our address is     [facility] [address],   [address]  [place]  [address]    Thank you for allowing us to be a part of your care here at the [facility]    If you have questions or concerns please contact us at:    Saritha Liriano, RN, BSN      Nurse Coordinator       Pulmonary Hypertension     [facility]   (Phone)[phone]  (Fax)[phone]    ** Please note that you will NOT receive a reminder call regarding your scheduled testing, reminder calls are for provider appointments only.  If you are scheduled for testing within the [facility] system you may receive a call regarding pre-registration for billing purposes only.**     Remember to weigh yourself daily after voiding and before you consume any food or beverages and log the numbers.  If you have gained/lost 2 pounds overnight or 5 pounds in a week contact us immediately for medication adjustments or further instructions.    Support Group:  Pulmonary Hypertension Association  Https://www.phassociation.org/  **Look at the Events Tab** They even have Support Groups that you can call into    [facility] PH Support Group  Second Saturday of the Month from 1-3 PM   Location: [address] [place] [id number]  Leader: Eleni Almodovar and Joyce Galloway  Phone: [phone] or [phone]  Email: [email or web address]

## 2020-02-26 ENCOUNTER — OFFICE VISIT (OUTPATIENT)
Dept: FAMILY MEDICINE | Facility: CLINIC | Age: 80
End: 2020-02-26
Payer: MEDICARE

## 2020-02-26 ENCOUNTER — HOSPITAL ENCOUNTER (OUTPATIENT)
Dept: NUTRITION | Facility: CLINIC | Age: 80
Discharge: HOME | End: 2020-02-26
Attending: INTERNAL MEDICINE | Admitting: INTERNAL MEDICINE
Payer: MEDICARE

## 2020-02-26 VITALS
TEMPERATURE: 96.7 F | DIASTOLIC BLOOD PRESSURE: 59 MMHG | SYSTOLIC BLOOD PRESSURE: 87 MMHG | OXYGEN SATURATION: 96 % | HEART RATE: 107 BPM | BODY MASS INDEX: 32.85 KG/M2 | HEIGHT: 62 IN | WEIGHT: 178.5 LBS

## 2020-02-26 DIAGNOSIS — I50.33 ACUTE ON CHRONIC DIASTOLIC HEART FAILURE (H): Primary | ICD-10-CM

## 2020-02-26 DIAGNOSIS — I89.0 LYMPHEDEMA: ICD-10-CM

## 2020-02-26 DIAGNOSIS — I27.20 PHT (PULMONARY HYPERTENSION) (H): ICD-10-CM

## 2020-02-26 DIAGNOSIS — E78.5 HYPERLIPIDEMIA, UNSPECIFIED HYPERLIPIDEMIA TYPE: ICD-10-CM

## 2020-02-26 PROCEDURE — 99214 OFFICE O/P EST MOD 30 MIN: CPT | Performed by: INTERNAL MEDICINE

## 2020-02-26 PROCEDURE — 97802 MEDICAL NUTRITION INDIV IN: CPT | Performed by: DIETITIAN, REGISTERED

## 2020-02-26 ASSESSMENT — MIFFLIN-ST. JEOR: SCORE: 1232.92

## 2020-02-26 NOTE — PROGRESS NOTES
Chief Complaint:       Jaqueline Canales is a 79 year old female who presents to clinic today for the following health issues:      1 month follow up CHF      HPI:   Patient Jaqueline Canales is a very pleasant 79 year old female with history of CHF, pulmonary hypertension who presents to Internal Medicine clinic today for 1 month follow up of recent actue on chronic CHF exacerbation symptms. Patient's weight has been stable. No recent significant weight gain since previous 20 lbs weight loss with hospitalization diuresis treatment. Patient is compliant with her daily diuretic therapy with Torsemide and Aldactone. Previous hypoxia symptoms have resolved with diuresis treatment. no chest pains. No signs of current further acute CHF exacerbation symptoms at this time. Leg lymphedema symptoms have been stable. Patient is scheduled for follow up with CHF clinic at the [facility] in [place] going forward. No fever or chills.      Current Medications:     Current Outpatient Medications   Medication Sig Dispense Refill     acetaminophen (TYLENOL) 500 MG tablet Take 1,000 mg by mouth 2 times daily        albuterol (PROAIR HFA/PROVENTIL HFA/VENTOLIN HFA) 108 (90 Base) MCG/ACT inhaler Inhale 2 puffs into the lungs every 6 hours as needed for shortness of breath / dyspnea or wheezing       apixaban ANTICOAGULANT (ELIQUIS) 5 MG tablet Take 1 tablet (5 mg) by mouth 2 times daily 180 tablet 2     Bacillus Coagulans-Inulin (PROBIOTIC-PREBIOTIC PO) Take 1 chew tab by mouth 2 times daily        folic acid (FOLVITE) 1 MG tablet Take 1 tablet (1,000 mcg) by mouth daily 90 tablet 3     InFLIXimab (REMICADE IV) Inject 400 mg into the vein Every 8 weeks       levothyroxine (SYNTHROID/LEVOTHROID) 75 MCG tablet Take 1 tablet (75 mcg) by mouth daily 90 tablet 2     melatonin 5 MG tablet Take 5 mg by mouth nightly as needed for sleep       Methotrexate Sodium (METHOTREXATE PO) Take 2.5 mg by mouth 10 tablets weekly - Wednesdays        metoprolol succinate ER (TOPROL-XL) 50 MG 24 hr tablet Take 0.5 tablets (25 mg) by mouth daily 30 tablet 0     Multiple Vitamins-Minerals (ICAPS) CAPS Take 1 capsule by mouth 2 times daily        multivitamin (CENTRUM SILVER) tablet Take 1 tablet by mouth daily       NIFEdipine ER (ADALAT CC) 60 MG 24 hr tablet Take 60 mg by mouth daily       omeprazole (PRILOSEC) 20 MG DR capsule Take 1 capsule (20 mg) by mouth 2 times daily Take 30-60 minutes prior to meals 180 capsule 2     order for DME Equipment being ordered: 4 wheeled walker with seat 1 each 3     potassium chloride ER (K-DUR/KLOR-CON M) 20 MEQ CR tablet Take 1 tablet (20 mEq) by mouth daily 30 tablet 0     Prednisolon-Gatiflox-Bromfenac 1-0.5-0.075 % SUSP Place 1 drop into the right eye daily       psyllium (METAMUCIL/KONSYL) capsule Take 1 capsule by mouth daily       sildenafil (REVATIO) 20 MG tablet Take 1 tablet (20 mg) by mouth 3 times daily 90 tablet 11     simvastatin (ZOCOR) 40 MG tablet Take 1 tablet (40 mg) by mouth At Bedtime 90 tablet 3     Skin Protectants, Misc. (EUCERIN) cream Apply topically 2 times daily Apply to areas dry skin topically two times a day for dry skin and Apply to areas of dry skin topically as needed for dry skin daily       spironolactone (ALDACTONE) 25 MG tablet Take 1 tablet (25 mg) by mouth daily 90 tablet 3     torsemide (DEMADEX) 20 MG tablet Take 2 tablets (40 mg) by mouth daily Take 40mg q am 180 tablet 3     triamcinolone (KENALOG) 0.1 % external cream Apply topically 2 times daily 30 g 3     Vitamin D, Cholecalciferol, 10 MCG (400 UNIT) TABS Take 1,600 Units by mouth daily           Allergies:      Allergies   Allergen Reactions     No Known Allergies             Past Medical History:     Past Medical History:   Diagnosis Date     Amaurosis fugax 5-11    Right     Carotid artery stenosis      Esophageal reflux 3/11/2004     HELICOBACTER PYLORI INFECTION 7/11/2006     hx of CA in situ RT breast-1990.mastectomy  2007     Hyperlipidemia LDL goal <70 2011     Lung cancer (H)     squamous cell lung ca left lower lobe     OBESITY NOS 3/11/2004     OSTEOPOROSIS NOS 3/11/2004     Other psoriasis 3/11/2004     Pulmonary hypertension (H) 04/15/2019    Right heart catheterization demonstrated moderate pulmonary arterial hypertension with a mean PA pressure of 34 mmHg, no reversibility with inhaled nitric oxide, elevated right and left-sided filling pressures, low normal cardiac index of 2.1.  Seen by Dr. Shay Marquez.  Started on sildenafil (Revatio) 20 mg 3 times daily.     RA (rheumatoid arthritis) (H) 2010     Raynaud's syndrome 4/10/2006     Scleroderma (H)      Sleep apnea     CPAP         Past Surgical History:     Past Surgical History:   Procedure Laterality Date     BREAST SURGERY       C NONSPECIFIC PROCEDURE      mastectomy RT breast     COLONOSCOPY  16     COLONOSCOPY N/A 2019    Procedure: Colonoscopy, With Polypectomy And Biopsy;  Surgeon: Neto Kaminski MD;  Location:  GI     CONIZATION       CV RIGHT HEART CATH N/A 4/15/2019    Procedure: Heart Cath Right Heart Cath;  Surgeon: Naresh Cyr MD;  Location:  HEART CARDIAC CATH LAB     ENDARTERECTOMY CAROTID       HC COLONOSCOPY THRU STOMA, DIAGNOSTIC  2001    diverticulosis     REPAIR HAMMER TOE       TONSILLECTOMY           Family Medical History:     Family History   Problem Relation Age of Onset     Cancer - colorectal Mother      Cerebrovascular Disease Father      Cancer Sister 31        ovarian     Heart Disease Sister      Gastrointestinal Disease Sister         crohns     Gastrointestinal Disease Sister         diverticulitis     Gastrointestinal Disease Brother         diverticulitis     Alzheimer Disease Sister      Cerebrovascular Disease Maternal Grandmother      Diabetes Maternal Grandmother      Mental Illness Maternal Grandmother      Diabetes Maternal Uncle      Cancer Nephew                   Social History:     Social History     Socioeconomic History     Marital status:      Spouse name: Not on file     Number of children: Not on file     Years of education: Not on file     Highest education level: Not on file   Occupational History     Not on file   Social Needs     Financial resource strain: Not on file     Food insecurity:     Worry: Not on file     Inability: Not on file     Transportation needs:     Medical: Not on file     Non-medical: Not on file   Tobacco Use     Smoking status: Former Smoker     Packs/day: 1.00     Years: 30.00     Pack years: 30.00     Types: Cigarettes     Start date:      Last attempt to quit: 3/11/1992     Years since quittin.9     Smokeless tobacco: Never Used   Substance and Sexual Activity     Alcohol use: Not Currently     Alcohol/week: 0.0 standard drinks     Frequency: Never     Drug use: No     Sexual activity: Not Currently     Partners: Male   Lifestyle     Physical activity:     Days per week: Not on file     Minutes per session: Not on file     Stress: Not on file   Relationships     Social connections:     Talks on phone: Not on file     Gets together: Not on file     Attends Rastafari service: Not on file     Active member of club or organization: Not on file     Attends meetings of clubs or organizations: Not on file     Relationship status: Not on file     Intimate partner violence:     Fear of current or ex partner: Not on file     Emotionally abused: Not on file     Physically abused: Not on file     Forced sexual activity: Not on file   Other Topics Concern     Parent/sibling w/ CABG, MI or angioplasty before 65F 55M? No   Social History Narrative     Not on file           Review of System:     Constitutional: Negative for fever or chills  Skin: Negative for rashes  Ears/Nose/Throat: Negative for nasal congestion, sore throat  Respiratory: No shortness of breath, dyspnea on exertion, cough, or hemoptysis  Cardiovascular: Negative  "for chest pain  Gastrointestinal: Negative for nausea, vomiting, positive for chronic GERD  Genitourinary: Negative for dysuria, hematuria  Musculoskeletal: Negative for myalgias  Neurologic: Negative for headaches  Psychiatric: Negative for depression, anxiety  Hematologic/Lymphatic/Immunologic: positive for improvement in chronic bilateral leg edema symptoms with recent diuresis treatment  Endocrine: Negative  Behavioral: Negative for tobacco use       Physical Exam:   BP (!) 87/59 (BP Location: Left arm, Cuff Size: Adult Large)   Pulse 107   Temp 96.7  F (35.9  C) (Oral)   Ht 1.575 m (5' 2\")   Wt 81 kg (178 lb 8 oz)   SpO2 96%   BMI 32.65 kg/m      GENERAL: chronically ill appearing elderly female, alert and no distress  EYES: eyes grossly normal to inspection, and conjunctivae and sclerae normal  HENT: Normocephalic atraumatic. Nose and mouth without ulcers or lesions  NECK: supple  RESP: lungs clear to auscultation   CV: regular rate and rhythm, normal S1 S2  LYMPH: further mild improvement in chronic bilateral lower leg peripheral edema symptoms present when compared to prior baseline prior to recent hospitalization.  ABDOMEN: nondistended  MS: no gross musculoskeletal defects noted  SKIN: no suspicious lesions or rashes  NEURO: Alert & Oriented x 3.   PSYCH: mentation appears normal, affect normal        Diagnostic Test Results:     Last Comprehensive Metabolic Panel:  Sodium   Date Value Ref Range Status   02/25/2020 137 136 - 145 mmol/L Final     Potassium   Date Value Ref Range Status   02/25/2020 3.5 3.5 - 5.1 mmol/L Final     Chloride   Date Value Ref Range Status   02/25/2020 99 98 - 107 mmol/L Final     Carbon Dioxide   Date Value Ref Range Status   02/25/2020 30 (H) 23 - 29 mmol/L Final     Anion Gap   Date Value Ref Range Status   02/25/2020 11.5 6 - 17 mmol/L Final     Glucose   Date Value Ref Range Status   02/25/2020 100 70 - 105 mg/dL Final     Urea Nitrogen   Date Value Ref Range Status "   02/25/2020 21 7 - 30 mg/dL Final     Creatinine   Date Value Ref Range Status   02/25/2020 0.92 0.70 - 1.30 mg/dL Final     GFR Estimate   Date Value Ref Range Status   02/25/2020 59 (L) >60 mL/min/[1.73_m2] Final     Calcium   Date Value Ref Range Status   02/25/2020 10.4 8.5 - 10.5 mg/dL Final         ASSESSMENT/PLAN:   (I27.20) Pulmonary hypertension (H)  (I89.0) Lymphedema of both lower extremities  (I50.33) Acute on chronic diastolic congestive heart failure (H)  (primary encounter diagnosis)  Comment: 1 week follow up of recent hospitalization for actue on chronic CHF exacerbation symptms. Patient's weight has stayed stable with current diuretic therapy and low salt diet. Patient is compliant with her daily diuretic therapy with Torsemide and Aldactone. Previous hypoxia symptoms have resolved with diuresis treatment. no chest pains. No signs of current further acute CHF exacerbation symptoms at this time. Leg lymphedema symptoms have been stable.  Plan: continue patient's current torsemide (DEMADEX) 20 MG tablet,         spironolactone (ALDACTONE) 25 MG tablet diuretic medications at this time. continue to follow up with CHF clinic at the [facility] clinic in [place] going forward. NUTRITION REFERRAL for dietary counseling of low salt diet      (E78.5) Hyperlipidemia LDL goal <100  Comment: stable. Patient is due for a repeat Lipid panel  Plan: Lipid panel reflex to direct LDL Fasting          Follow Up Plan:     Patient is instructed to return to Internal Medicine clinic for follow-up visit in 1 month.        Carissa Burroughs MD  Internal Medicine  [facility]

## 2020-02-26 NOTE — PROGRESS NOTES
"OUTPATIENT NUTRITION ASSESSMENT  REASON FOR ASSESSMENT  Jaqueilne Canales referred by Dr. Burroughs for MNT related to Acute diastolic congestive heart failure (H) [I50.31]  - Primary.    Patient accompanied by , Sin.      ASSESSMENT   Nutrition History:  - Information obtained from patient, patient's spouse.  - Patient is newly on a 2 gram sodium restricted diet at home  - Diet history: [place] and likes salty, buttery foods. Likes bananas, applesauce, watermelon, pineapple. Pt stated she did the grocery shopping for years prior to her recent hospitalization, but now her  has stepped in and almost always does the shopping. Pt also said she used to do all of the cooking, but she now cooks some of the time and her  cooks the rest of the time. Additionally, pt's  said that he has been working on reading food labels more and is motivated to work with pt on decreasing his sodium intake as well.    Diet Recall:  Breakfast: Two eggs, a piece of toast with salted butter  Lunch: Clam chowder soup and potato soup (16 oz each but she half half of both).   Dinner: Rotisserie chicken, mashed potatoes with gravy, sliced tomatoes  Snack: Does not snack.  Beverages: De Jesus's tomato juice, orange juice, V8  Dining out: Seldom.         Exercise: NA (pt uses walker for mobility)    NUTRITION FOCUSED PHYSICAL ASSESSMENT (NFPA) FOR DIAGNOSING MALNUTRITION  Yes     Observed:   Fluid retention (refer to documentation in Malnutrition section)  Obtained from Chart/Interdisciplinary Team:  Edema     LABS  Labs reviewed    MEDICATIONS  Medications reviewed    ANTHROPOMETRICS   Height: 5'2\"  Weight: 81 kg (178 lbs 8 oz)  BMI (kg/m2): 32.65 kg/m2  Weight Status:  Obesity Grade I BMI 30-34.9  %IBW: 162%  Adjusted body weight: 57.75 kg (127 lbs)  Weight History: Pt with 13% weight loss in two months; however, pt with lymphedema and CHF.  Wt Readings from Last 10 Encounters:   02/26/20 81 kg (178 lb 8 oz) "   02/25/20 79.8 kg (176 lb)   01/27/20 84.1 kg (185 lb 6.4 oz)   01/20/20 84 kg (185 lb 1.6 oz)   01/14/20 86.9 kg (191 lb 9.6 oz)   01/10/20 85.6 kg (188 lb 12.8 oz)   01/09/20 86.5 kg (190 lb 12.8 oz)   12/30/19 92.3 kg (203 lb 6.4 oz)   12/27/19 92.4 kg (203 lb 9.6 oz)   12/24/19 93.4 kg (205 lb 12.8 oz)     ASSESSED NUTRITION NEEDS  Estimated Energy Needs: 1113-7627 kcals/day (15-20 Kcal/Kg)  Justification:  (obese)  Estimated Protein Needs: 50-60 grams protein/day (1-1.2 g pro/Kg)  Justification:  (preservation of lean body mass)  Estimated Fluid Needs: 8825-5773 mL/day (25-30 mL/kg)    ASSESSED MALNUTRITION STATUS  % Weight Loss:  > 7.5% in 3 months (severe malnutrition)  % Intake:  <75% for > 7 days (non-severe malnutrition)  Subcutaneous Fat Loss:  None observed  Loss of Muscle Mass:  None observed  Fluid Retention: Mild: hands    Malnutrition Diagnosis:  Non-Severe malnutrition  In the Context of:  Chronic illness or disease    DIAGNOSIS   Nutrition Diagnosis:  Food and nutrition-related knowledge deficit related to acute illness requiring sodium restriction as evidenced by pt report of not knowing her sodium restriction.    INTERVENTIONS   Nutrition Prescription: recommend 2 g sodium diet due to CHF.      IMPLEMENTATION   Assessed learning needs and learning preference: Yes  Teaching Method(s) used: Booklet / Handout  Vitamin and Mineral Supplements: None  Diet Education:  Provided education on 2 gram sodium diet  Nutrition Education (Content):   a)  Discussed label reading. Encouraged pt to limit <140 mg sodium for low sodium foods and <500 mg for entrees. Also discussed ways to reduce total amount of sodium consumed/day and low-sodium food.   b)  Provided low-sodium recipes handout, Food and Drink Low Sodium Food handout, [employer] heart failure nutrition therapy handout.  Nutrition Education (Application):   a)  Discussed current eating plans and reviewed low-sodium soup recipes  since pt frequently eats soup.   b)  Patient verbalizes understanding of diet by stating she knows she needs to decrease total daily salt intake.  Anticipate fair compliance     GOALS  <2000 mg sodium/day  Read food labels for sodium content    FOLLOW UP/MONITORING   Progress towards goals will be monitored and evaluated per protocol and Practice Guidelines  Pt to call if desires follow up or if has questions  RD name and number provided    Time Spent with Patient  30 minutes    Stepan Mcconnell, RD, LD  [facility] Outpatient Dietitian  [phone] (office phone)

## 2020-03-04 ENCOUNTER — HOSPITAL ENCOUNTER (OUTPATIENT)
Dept: CARDIOLOGY | Facility: CLINIC | Age: 80
Discharge: HOME | End: 2020-03-04
Attending: INTERNAL MEDICINE | Admitting: INTERNAL MEDICINE
Payer: MEDICARE

## 2020-03-04 DIAGNOSIS — I27.20 PULMONARY HYPERTENSION (H): ICD-10-CM

## 2020-03-04 PROCEDURE — 93306 TTE W/DOPPLER COMPLETE: CPT | Mod: 26 | Performed by: INTERNAL MEDICINE

## 2020-03-04 PROCEDURE — 93306 TTE W/DOPPLER COMPLETE: CPT

## 2020-03-04 RX ORDER — SILDENAFIL CITRATE 20 MG/1
20 TABLET ORAL 3 TIMES DAILY
Qty: 90 TABLET | Refills: 11 | Status: SHIPPED | OUTPATIENT
Start: 2020-03-04 | End: 2020-04-02

## 2020-03-17 DIAGNOSIS — I48.19 PERSISTENT ATRIAL FIBRILLATION (H): ICD-10-CM

## 2020-03-17 DIAGNOSIS — K22.2 ESOPHAGEAL STRICTURE: ICD-10-CM

## 2020-03-17 NOTE — TELEPHONE ENCOUNTER
"apixaban ANTICOAGULANT (ELIQUIS) 5 MG tablet  180 tablet  2  4/25/2019      Last Written Prescription Date:  4/25/2019  Last Fill Quantity: 180,  # refills: 2     metoprolol succinate ER (TOPROL-XL) 50 MG 24 hr tablet  30 tablet  0  1/10/2020      Last Written Prescription Date:  1/10/2020  Last Fill Quantity: 30,  # refills: 0     omeprazole (PRILOSEC) 20 MG DR capsule  180 capsule  2  4/25/2019      Last Written Prescription Date:  4/25/2019  Last Fill Quantity: 180,  # refills: 2   Last office visit: 2/26/2020 with prescribing provider: YASMEEN Burroughs    Future Office Visit:   Next 5 appointments (look out 90 days)    Mar 26, 2020 10:00 AM CDT  [facility] Carissa [facility] [address] [facility] [address]  [phone]   May 21, 2020 10:40 AM CDT  Return Visit with Nathen Huffman MD  [facility] ([facility]) [address], [address]  [facility] [address]  [phone]         Requested Prescriptions   Pending Prescriptions Disp Refills     omeprazole (PRILOSEC) 20 MG DR capsule [Pharmacy Med Name: OMEPRAZOL DR CAP 20MG RX] 180 capsule 2     Sig: TAKE 1 CAPSULE TWICE DAILY,30-60 MINUTES PRIOR TO     MEALS       PPI Protocol Failed - 3/17/2020  3:06 AM        Failed - No diagnosis of osteoporosis on record        Passed - Not on Clopidogrel (unless Pantoprazole ordered)        Passed - Recent (12 mo) or future (30 days) visit within the authorizing provider's specialty     Patient has had an office visit with the authorizing provider or a provider within the authorizing providers department within the previous 12 mos or has a future within next 30 days. See \"Patient Info\" tab in inbasket, or \"Choose Columns\" in Meds & Orders section of the refill encounter.              Passed - Medication is active on med list        Passed - Patient is age 18 or older        Passed - No active pregnacy on " "record        Passed - No positive pregnancy test in past 12 months           metoprolol succinate ER (TOPROL-XL) 25 MG 24 hr tablet [Pharmacy Med Name: METOPROLO ER TAB SUC 25MG] 90 tablet 2     Sig: TAKE 1 TABLET DAILY       Beta-Blockers Protocol Passed - 3/17/2020  3:06 AM        Passed - Blood pressure under 140/90 in past 12 months     BP Readings from Last 3 Encounters:   02/26/20 (!) 87/59   02/25/20 104/71   01/27/20 111/70                 Passed - Patient is age 6 or older        Passed - Recent (12 mo) or future (30 days) visit within the authorizing provider's specialty     Patient has had an office visit with the authorizing provider or a provider within the authorizing providers department within the previous 12 mos or has a future within next 30 days. See \"Patient Info\" tab in inbasket, or \"Choose Columns\" in Meds & Orders section of the refill encounter.              Passed - Medication is active on med list           ELIQUIS ANTICOAGULANT 5 MG tablet [Pharmacy Med Name: ELIQUIS TAB 5MG] 180 tablet 2     Sig: TAKE 1 TABLET TWICE A DAY       Direct Oral Anticoagulant Agents Failed - 3/17/2020  3:06 AM        Failed - Patient is 18-79 years of age        Passed - Normal Platelets on file in past 12 months     Recent Labs   Lab Test 02/25/20  0858                  Passed - Medication is active on med list        Passed - Serum creatinine less than or equal to 1.4 on file in past 12 mos     Recent Labs   Lab Test 02/25/20  0858  10/16/17  1044   CR 0.92   < >  --    CREAT  --   --  0.7    < > = values in this interval not displayed.       Ok to refill medication if creatinine is low          Passed - Weight is greater than 60 kg for the past year     Wt Readings from Last 3 Encounters:   02/26/20 81 kg (178 lb 8 oz)   02/25/20 79.8 kg (176 lb)   01/27/20 84.1 kg (185 lb 6.4 oz)             Passed - No active pregnancy on record        Passed - No positive pregnancy test within past 12 months       "  Passed - Recent (6 mo) or future (30 days) visit within the authorizing provider's specialty

## 2020-03-18 RX ORDER — METOPROLOL SUCCINATE 25 MG/1
TABLET, EXTENDED RELEASE ORAL
Qty: 90 TABLET | Refills: 2 | Status: SHIPPED | OUTPATIENT
Start: 2020-03-18 | End: 2020-11-24

## 2020-03-18 RX ORDER — APIXABAN 5 MG/1
TABLET, FILM COATED ORAL
Qty: 180 TABLET | Refills: 2 | Status: SHIPPED | OUTPATIENT
Start: 2020-03-18 | End: 2020-11-24

## 2020-03-19 DIAGNOSIS — E87.6 HYPOKALEMIA: ICD-10-CM

## 2020-03-19 RX ORDER — POTASSIUM CHLORIDE 1500 MG/1
20 TABLET, EXTENDED RELEASE ORAL DAILY
Qty: 30 TABLET | Refills: 0 | Status: SHIPPED | OUTPATIENT
Start: 2020-03-19 | End: 2020-03-26

## 2020-03-19 NOTE — TELEPHONE ENCOUNTER
"potassium chloride ER (K-DUR/KLOR-CON M) 20 MEQ CR tablet  30 tablet  0  1/10/2020      Last Written Prescription Date:  1/10/2020  Last Fill Quantity: 30,  # refills: 0   Last office visit: 2/26/2020 with prescribing provider: YASMEEN Burroughs    Future Office Visit:   Next 5 appointments (look out 90 days)    Mar 26, 2020 10:00 AM CDT  Telephone Visit with Carissa [facility] MD  [facility] [address] [facility] [address] [phone]   May 21, 2020 10:40 AM CDT  Return Visit with Nathen Huffman MD  [facility] ([facility]) [address], [address]  [facility] [address] [phone]         Requested Prescriptions   Pending Prescriptions Disp Refills     potassium chloride ER (KLOR-CON M) 20 MEQ CR tablet 30 tablet 0     Sig: Take 1 tablet (20 mEq) by mouth daily       Potassium Supplements Protocol Passed - 3/19/2020  3:13 PM        Passed - Recent (12 mo) or future (30 days) visit within the authorizing provider's department     Patient has had an office visit with the authorizing provider or a provider within the authorizing providers department within the previous 12 mos or has a future within next 30 days. See \"Patient Info\" tab in inbasket, or \"Choose Columns\" in Meds & Orders section of the refill encounter.              Passed - Medication is active on med list        Passed - Patient is age 18 or older        Passed - Normal serum potassium in past 12 months     Recent Labs   Lab Test 02/25/20  0858   POTASSIUM 3.5                         "

## 2020-03-23 ENCOUNTER — TRANSFERRED RECORDS (OUTPATIENT)
Dept: HEALTH INFORMATION MANAGEMENT | Facility: CLINIC | Age: 80
End: 2020-03-23

## 2020-03-23 LAB
ALT SERPL-CCNC: 12 IU/L (ref 5–35)
AST SERPL-CCNC: 25 U/L (ref 5–34)
CREAT SERPL-MCNC: 0.82 MG/DL (ref 0.5–1.3)

## 2020-03-26 ENCOUNTER — VIRTUAL VISIT (OUTPATIENT)
Dept: FAMILY MEDICINE | Facility: CLINIC | Age: 80
End: 2020-03-26
Payer: MEDICARE

## 2020-03-26 DIAGNOSIS — E03.9 HYPOTHYROIDISM, UNSPECIFIED TYPE: ICD-10-CM

## 2020-03-26 DIAGNOSIS — E87.6 HYPOKALEMIA: ICD-10-CM

## 2020-03-26 DIAGNOSIS — I27.20 PULMONARY HYPERTENSION (H): ICD-10-CM

## 2020-03-26 DIAGNOSIS — I50.33 ACUTE ON CHRONIC DIASTOLIC HEART FAILURE (H): Primary | ICD-10-CM

## 2020-03-26 DIAGNOSIS — E78.5 HYPERLIPIDEMIA LDL GOAL <100: ICD-10-CM

## 2020-03-26 PROCEDURE — G2012 BRIEF CHECK IN BY MD/QHP: HCPCS | Performed by: INTERNAL MEDICINE

## 2020-03-26 RX ORDER — LEVOTHYROXINE SODIUM 75 UG/1
75 TABLET ORAL DAILY
Qty: 90 TABLET | Refills: 2 | Status: SHIPPED | OUTPATIENT
Start: 2020-03-26 | End: 2021-02-22

## 2020-03-26 RX ORDER — POTASSIUM CHLORIDE 1500 MG/1
20 TABLET, EXTENDED RELEASE ORAL DAILY
Qty: 90 TABLET | Refills: 3 | Status: SHIPPED | OUTPATIENT
Start: 2020-03-26 | End: 2021-04-01

## 2020-03-26 NOTE — PROGRESS NOTES
"Jaqueline Canales is a 79 year old female who is being evaluated via a telephone visit.      The patient has been notified of following (by TOMA Pérez MA      \"We have found that certain health care needs can be provided without the need for a physical exam.  This service lets us provide the care you need with a short phone conversation.  If a prescription is necessary we can send it directly to your pharmacy.  If lab work is needed we can place an order for that and you can then stop by our lab to have the test done at a later time.    This telephone visit will be conducted via 3 way call with the you (the patient) , the physician/provider, and a me all on the line at the same time.  This allows your physician/provider to have the phone conversation with you while I will be taking notes for your medical record.  We will have full access to your [facility] medical record during this entire phone call.    Since this is like an office visit,  will bill your insurance company for this service.  Please check with your medical insurance if this type of telephone/virtual is covered . You may be responsible for the cost of this service if insurance coverage is denied.  The typical cost is $30 (10min), $59(11-20min) and $85 (21-30min)     If during the course of the call the physician/provider feels a telephone visit is not appropriate, you will not be charged for this service\"    Consent has been obtained for this service by care team member: yes.  See the scanned image in the medical record.    S: The history as provided by the patient to the provider during this 3 way call include 1 month follow up CHF    Pertinent parts of the the patient's medical history reviewed and confirmed by the provider included : 1 month follow up CHF    Total time of call between patient and provider was 25 minutes       Carissa Burroughs (MD signature)  ===================================================    I have reviewed the note as " documented above.  This accurately captures the substance of my conversation with the patient,    Additional provider notes:      Chief Complaint:       1 month follow up CHF      HPI:   Patient Jaqueline Canales is a very pleasant 79 year old female with history of CHF, pulmonary hypertension today for telephone visit for 1 month follow up of recent actue on chronic CHF exacerbation symptms. Patient reports that her weight has been stable since her last clinic visit 1 month ago. No recent significant weight gain since previous 20 lbs weight loss with last hospitalization diuresis treatment. Patient is compliant with her daily diuretic therapy with Torsemide and Aldactone. She is due for a refill of her potassium chloride medication for CHF treatment today. no chest pains. No signs of current further acute CHF exacerbation symptoms at this time. No fever or chills.      Current Medications:     Current Outpatient Medications   Medication Sig Dispense Refill     acetaminophen (TYLENOL) 500 MG tablet Take 1,000 mg by mouth 2 times daily        albuterol (PROAIR HFA/PROVENTIL HFA/VENTOLIN HFA) 108 (90 Base) MCG/ACT inhaler Inhale 2 puffs into the lungs every 6 hours as needed for shortness of breath / dyspnea or wheezing       Bacillus Coagulans-Inulin (PROBIOTIC-PREBIOTIC PO) Take 1 chew tab by mouth 2 times daily        ELIQUIS ANTICOAGULANT 5 MG tablet TAKE 1 TABLET TWICE A  tablet 2     folic acid (FOLVITE) 1 MG tablet Take 1 tablet (1,000 mcg) by mouth daily 90 tablet 3     InFLIXimab (REMICADE IV) Inject 400 mg into the vein Every 8 weeks       levothyroxine (SYNTHROID/LEVOTHROID) 75 MCG tablet Take 1 tablet (75 mcg) by mouth daily 90 tablet 2     melatonin 5 MG tablet Take 5 mg by mouth nightly as needed for sleep       Methotrexate Sodium (METHOTREXATE PO) Take 2.5 mg by mouth 10 tablets weekly - Wednesdays       metoprolol succinate ER (TOPROL-XL) 25 MG 24 hr tablet TAKE 1 TABLET DAILY 90 tablet 2      metoprolol succinate ER (TOPROL-XL) 50 MG 24 hr tablet Take 0.5 tablets (25 mg) by mouth daily 30 tablet 0     Multiple Vitamins-Minerals (ICAPS) CAPS Take 1 capsule by mouth 2 times daily        multivitamin (CENTRUM SILVER) tablet Take 1 tablet by mouth daily       NIFEdipine ER (ADALAT CC) 60 MG 24 hr tablet Take 60 mg by mouth daily       omeprazole (PRILOSEC) 20 MG DR capsule TAKE 1 CAPSULE TWICE DAILY,30-60 MINUTES PRIOR TO     MEALS 180 capsule 2     order for DME Equipment being ordered: 4 wheeled walker with seat 1 each 3     potassium chloride ER (KLOR-CON M) 20 MEQ CR tablet Take 1 tablet (20 mEq) by mouth daily 30 tablet 0     Prednisolon-Gatiflox-Bromfenac 1-0.5-0.075 % SUSP Place 1 drop into the right eye daily       psyllium (METAMUCIL/KONSYL) capsule Take 1 capsule by mouth daily       sildenafil (REVATIO) 20 MG tablet Take 1 tablet (20 mg) by mouth 3 times daily 90 tablet 11     simvastatin (ZOCOR) 40 MG tablet Take 1 tablet (40 mg) by mouth At Bedtime 90 tablet 3     Skin Protectants, Misc. (EUCERIN) cream Apply topically 2 times daily Apply to areas dry skin topically two times a day for dry skin and Apply to areas of dry skin topically as needed for dry skin daily       spironolactone (ALDACTONE) 25 MG tablet Take 1 tablet (25 mg) by mouth daily 90 tablet 3     torsemide (DEMADEX) 20 MG tablet Take 2 tablets (40 mg) by mouth daily Take 40mg q am 180 tablet 3     triamcinolone (KENALOG) 0.1 % external cream Apply topically 2 times daily 30 g 3     Vitamin D, Cholecalciferol, 10 MCG (400 UNIT) TABS Take 1,600 Units by mouth daily           Allergies:      Allergies   Allergen Reactions     No Known Allergies             Past Medical History:     Past Medical History:   Diagnosis Date     Amaurosis fugax 5-11    Right     Carotid artery stenosis      Esophageal reflux 3/11/2004     HELICOBACTER PYLORI INFECTION 7/11/2006     hx of CA in situ RT breast-1990.mastectomy 4/17/2007     Hyperlipidemia LDL  goal <70 2011     Lung cancer (H)     squamous cell lung ca left lower lobe     OBESITY NOS 3/11/2004     OSTEOPOROSIS NOS 3/11/2004     Other psoriasis 3/11/2004     Pulmonary hypertension (H) 04/15/2019    Right heart catheterization demonstrated moderate pulmonary arterial hypertension with a mean PA pressure of 34 mmHg, no reversibility with inhaled nitric oxide, elevated right and left-sided filling pressures, low normal cardiac index of 2.1.  Seen by Dr. Shay Marquez.  Started on sildenafil (Revatio) 20 mg 3 times daily.     RA (rheumatoid arthritis) (H) 2010     Raynaud's syndrome 4/10/2006     Scleroderma (H)      Sleep apnea     CPAP         Past Surgical History:     Past Surgical History:   Procedure Laterality Date     BREAST SURGERY       C NONSPECIFIC PROCEDURE      mastectomy RT breast     COLONOSCOPY  16     COLONOSCOPY N/A 2019    Procedure: Colonoscopy, With Polypectomy And Biopsy;  Surgeon: Neto Kaminski MD;  Location:  GI     CONIZATION       CV RIGHT HEART CATH N/A 4/15/2019    Procedure: Heart Cath Right Heart Cath;  Surgeon: Naresh Cyr MD;  Location:  HEART CARDIAC CATH LAB     ENDARTERECTOMY CAROTID       HC COLONOSCOPY THRU STOMA, DIAGNOSTIC  2001    diverticulosis     REPAIR HAMMER TOE       TONSILLECTOMY           Family Medical History:     Family History   Problem Relation Age of Onset     Cancer - colorectal Mother      Cerebrovascular Disease Father      Cancer Sister 31        ovarian     Heart Disease Sister      Gastrointestinal Disease Sister         crohns     Gastrointestinal Disease Sister         diverticulitis     Gastrointestinal Disease Brother         diverticulitis     Alzheimer Disease Sister      Cerebrovascular Disease Maternal Grandmother      Diabetes Maternal Grandmother      Mental Illness Maternal Grandmother      Diabetes Maternal Uncle      Cancer Nephew                  Social History:     Social  History     Socioeconomic History     Marital status:      Spouse name: Not on file     Number of children: Not on file     Years of education: Not on file     Highest education level: Not on file   Occupational History     Not on file   Social Needs     Financial resource strain: Not on file     Food insecurity     Worry: Not on file     Inability: Not on file     Transportation needs     Medical: Not on file     Non-medical: Not on file   Tobacco Use     Smoking status: Former Smoker     Packs/day: 1.00     Years: 30.00     Pack years: 30.00     Types: Cigarettes     Start date:      Last attempt to quit: 3/11/1992     Years since quittin.0     Smokeless tobacco: Never Used   Substance and Sexual Activity     Alcohol use: Not Currently     Alcohol/week: 0.0 standard drinks     Frequency: Never     Drug use: No     Sexual activity: Not Currently     Partners: Male   Lifestyle     Physical activity     Days per week: Not on file     Minutes per session: Not on file     Stress: Not on file   Relationships     Social connections     Talks on phone: Not on file     Gets together: Not on file     Attends Islam service: Not on file     Active member of club or organization: Not on file     Attends meetings of clubs or organizations: Not on file     Relationship status: Not on file     Intimate partner violence     Fear of current or ex partner: Not on file     Emotionally abused: Not on file     Physically abused: Not on file     Forced sexual activity: Not on file   Other Topics Concern     Parent/sibling w/ CABG, MI or angioplasty before 65F 55M? No   Social History Narrative     Not on file           Review of System:     Constitutional: Negative for fever or chills  Skin: Negative for rashes  Ears/Nose/Throat: Negative for nasal congestion, sore throat  Respiratory: No shortness of breath, dyspnea on exertion, cough, or hemoptysis, positive for chronic pulmonary hypertension   Cardiovascular:  Negative for chest pain, positive for chronic CHF  Gastrointestinal: Negative for nausea, vomiting, positive for chronic GERD  Genitourinary: Negative for dysuria, hematuria  Musculoskeletal: Negative for myalgias  Neurologic: Negative for headaches  Psychiatric: Negative for depression, anxiety  Hematologic/Lymphatic/Immunologic: positive for improvement in chronic bilateral leg edema symptoms with recent diuresis treatment  Endocrine: Negative  Behavioral: Negative for tobacco use       Diagnostic Test Results:     Last Comprehensive Metabolic Panel:  Sodium   Date Value Ref Range Status   02/25/2020 137 136 - 145 mmol/L Final     Potassium   Date Value Ref Range Status   02/25/2020 3.5 3.5 - 5.1 mmol/L Final     Chloride   Date Value Ref Range Status   02/25/2020 99 98 - 107 mmol/L Final     Carbon Dioxide   Date Value Ref Range Status   02/25/2020 30 (H) 23 - 29 mmol/L Final     Anion Gap   Date Value Ref Range Status   02/25/2020 11.5 6 - 17 mmol/L Final     Glucose   Date Value Ref Range Status   02/25/2020 100 70 - 105 mg/dL Final     Urea Nitrogen   Date Value Ref Range Status   02/25/2020 21 7 - 30 mg/dL Final     Creatinine   Date Value Ref Range Status   02/25/2020 0.92 0.70 - 1.30 mg/dL Final     GFR Estimate   Date Value Ref Range Status   02/25/2020 59 (L) >60 mL/min/[1.73_m2] Final     Calcium   Date Value Ref Range Status   02/25/2020 10.4 8.5 - 10.5 mg/dL Final         ASSESSMENT/PLAN:   (I27.20) Pulmonary hypertension (H)  (E87.6) Hypokalemia   (I50.33) Acute on chronic diastolic congestive heart failure (H)  (primary encounter diagnosis)  Comment: 1 month follow up of chronic CHF symptms. Patient's weight has stayed stable with current diuretic therapy and low salt diet. Patient is compliant with her daily diuretic therapy with Torsemide and Aldactone.  No signs of current further acute CHF exacerbation symptoms at this time. Leg lymphedema symptoms have been stable. No recent significant weight  gain since previous 20 lbs weight loss with last hospitalization diuresis treatment. She is due for a refill of her potassium chloride medication for CHF treatment today. no chest pains. No signs of current further acute CHF exacerbation symptoms at this time.   Plan: continue patient's current torsemide (DEMADEX) 20 MG tablet,         spironolactone (ALDACTONE) 25 MG tablet diuretic medications at this time.   continue to follow up with CHF clinic at the [facility] in [place] going forward.   I have refilled the patient's potassium chloride ER (KLOR-CON M) 20 MEQ CR         Tablet medication today    (E78.5) Hyperlipidemia LDL goal <100  Comment: stable.   Plan: continue current therapy.    (E03.9) Hypothyroidism, unspecified type  Comment: patient is due for a refill of her levothyroxine thyroid medication at this time.  Plan: levothyroxine (SYNTHROID/LEVOTHROID) 75 MCG         tablet    Follow Up Plan:     Patient is instructed to return to Internal Medicine clinic for follow-up visit in 1 month.        Carissa Burroughs MD  Internal Medicine  [facility]

## 2020-04-02 ENCOUNTER — VIRTUAL VISIT (OUTPATIENT)
Dept: FAMILY MEDICINE | Facility: CLINIC | Age: 80
End: 2020-04-02
Payer: MEDICARE

## 2020-04-02 DIAGNOSIS — E78.5 HYPERLIPIDEMIA LDL GOAL <100: ICD-10-CM

## 2020-04-02 DIAGNOSIS — I50.32 CHRONIC DIASTOLIC CONGESTIVE HEART FAILURE (H): ICD-10-CM

## 2020-04-02 DIAGNOSIS — I27.20 PULMONARY HYPERTENSION (H): Primary | ICD-10-CM

## 2020-04-02 PROCEDURE — 99443 ZZC PHYSICIAN TELEPHONE EVALUATION 21-30 MIN: CPT | Performed by: INTERNAL MEDICINE

## 2020-04-02 RX ORDER — SILDENAFIL CITRATE 20 MG/1
20 TABLET ORAL 3 TIMES DAILY
Qty: 90 TABLET | Refills: 11 | Status: SHIPPED | OUTPATIENT
Start: 2020-04-02 | End: 2021-02-11

## 2020-04-02 NOTE — PROGRESS NOTES
"Jaqueline Canales is a 79 year old female who is being evaluated via a telephone visit.      The patient has been notified of following (by TOMA MINA CMA     \"We have found that certain health care needs can be provided without the need for a physical exam.  This service lets us provide the care you need with a short phone conversation.  If a prescription is necessary we can send it directly to your pharmacy.  If lab work is needed we can place an order for that and you can then stop by our lab to have the test done at a later time.    This telephone visit will be conducted via 3 way call with the you (the patient) , the physician/provider, and a me all on the line at the same time.  This allows your physician/provider to have the phone conversation with you while I will be taking notes for your medical record.  We will have full access to your [place] medical record during this entire phone call.    Since this is like an office visit,  will bill your insurance company for this service.  Please check with your medical insurance if this type of telephone/virtual is covered . You may be responsible for the cost of this service if insurance coverage is denied.  The typical cost is $30 (10min), $59(11-20min) and $85 (21-30min)     If during the course of the call the physician/provider feels a telephone visit is not appropriate, you will not be charged for this service\"    Consent has been obtained for this service by care team member: yes.  See the scanned image in the medical record.    S: The history as provided by the patient to the provider during this 3 way call include Follow up on multiple concerns including pulmonary hypertension, hyperlipidemia, CHF    Pertinent parts of the the patient's medical history reviewed and confirmed by the provider included : Follow up on multiple concerns including pulmonary hypertension, hyperlipidemia, CHF     Total time of call between patient and provider was 25 minutes "     Carissa Burroughs (MD signature)  ===================================================    I have reviewed the note as documented above.  This accurately captures the substance of my conversation with the patient,    Additional provider notes:    Chief Complaint:     Follow up on multiple concerns including pulmonary hypertension, hyperlipidemia, CHF     HPI:   Patient Jaqueline Canales is a very pleasant 79 year old female with history of pulmonary hypertension, CHF today for telephone visit for follow up on multiple concerns including pulmonary hypertension, hyperlipidemia, CHF. Regarding the patient's pulmonary hypertension, the patient reports that her symptoms are stable. No shortness of breath. Regarding the patient's CHF, the patient denies any chest pain. She is complaint with her diuretic therapy with torsemide. Weight is stable. No fever or chills.         Current Medications:     Current Outpatient Medications   Medication Sig Dispense Refill     acetaminophen (TYLENOL) 500 MG tablet Take 1,000 mg by mouth 2 times daily        albuterol (PROAIR HFA/PROVENTIL HFA/VENTOLIN HFA) 108 (90 Base) MCG/ACT inhaler Inhale 2 puffs into the lungs every 6 hours as needed for shortness of breath / dyspnea or wheezing       Bacillus Coagulans-Inulin (PROBIOTIC-PREBIOTIC PO) Take 1 chew tab by mouth 2 times daily        ELIQUIS ANTICOAGULANT 5 MG tablet TAKE 1 TABLET TWICE A  tablet 2     folic acid (FOLVITE) 1 MG tablet Take 1 tablet (1,000 mcg) by mouth daily 90 tablet 3     InFLIXimab (REMICADE IV) Inject 400 mg into the vein Every 8 weeks       levothyroxine (SYNTHROID/LEVOTHROID) 75 MCG tablet Take 1 tablet (75 mcg) by mouth daily 90 tablet 2     melatonin 5 MG tablet Take 5 mg by mouth nightly as needed for sleep       Methotrexate Sodium (METHOTREXATE PO) Take 2.5 mg by mouth 10 tablets weekly - Wednesdays       metoprolol succinate ER (TOPROL-XL) 25 MG 24 hr tablet TAKE 1 TABLET DAILY 90 tablet 2     metoprolol  succinate ER (TOPROL-XL) 50 MG 24 hr tablet Take 0.5 tablets (25 mg) by mouth daily 30 tablet 0     Multiple Vitamins-Minerals (ICAPS) CAPS Take 1 capsule by mouth 2 times daily        multivitamin (CENTRUM SILVER) tablet Take 1 tablet by mouth daily       NIFEdipine ER (ADALAT CC) 60 MG 24 hr tablet Take 60 mg by mouth daily       omeprazole (PRILOSEC) 20 MG DR capsule TAKE 1 CAPSULE TWICE DAILY,30-60 MINUTES PRIOR TO     MEALS 180 capsule 2     order for DME Equipment being ordered: 4 wheeled walker with seat 1 each 3     potassium chloride ER (KLOR-CON M) 20 MEQ CR tablet Take 1 tablet (20 mEq) by mouth daily 90 tablet 3     Prednisolon-Gatiflox-Bromfenac 1-0.5-0.075 % SUSP Place 1 drop into the right eye daily       psyllium (METAMUCIL/KONSYL) capsule Take 1 capsule by mouth daily       sildenafil (REVATIO) 20 MG tablet Take 1 tablet (20 mg) by mouth 3 times daily 90 tablet 11     simvastatin (ZOCOR) 40 MG tablet Take 1 tablet (40 mg) by mouth At Bedtime 90 tablet 3     Skin Protectants, Misc. (EUCERIN) cream Apply topically 2 times daily Apply to areas dry skin topically two times a day for dry skin and Apply to areas of dry skin topically as needed for dry skin daily       spironolactone (ALDACTONE) 25 MG tablet Take 1 tablet (25 mg) by mouth daily 90 tablet 3     torsemide (DEMADEX) 20 MG tablet Take 2 tablets (40 mg) by mouth daily Take 40mg q am 180 tablet 3     triamcinolone (KENALOG) 0.1 % external cream Apply topically 2 times daily 30 g 3     Vitamin D, Cholecalciferol, 10 MCG (400 UNIT) TABS Take 1,600 Units by mouth daily           Allergies:      Allergies   Allergen Reactions     No Known Allergies             Past Medical History:     Past Medical History:   Diagnosis Date     Amaurosis fugax 5-11    Right     Carotid artery stenosis      Esophageal reflux 3/11/2004     HELICOBACTER PYLORI INFECTION 7/11/2006     hx of CA in situ RT breast-1990.mastectomy 4/17/2007     Hyperlipidemia LDL goal <70  2011     Lung cancer (H)     squamous cell lung ca left lower lobe     OBESITY NOS 3/11/2004     OSTEOPOROSIS NOS 3/11/2004     Other psoriasis 3/11/2004     Pulmonary hypertension (H) 04/15/2019    Right heart catheterization demonstrated moderate pulmonary arterial hypertension with a mean PA pressure of 34 mmHg, no reversibility with inhaled nitric oxide, elevated right and left-sided filling pressures, low normal cardiac index of 2.1.  Seen by Dr. Shay Marquez.  Started on sildenafil (Revatio) 20 mg 3 times daily.     RA (rheumatoid arthritis) (H) 2010     Raynaud's syndrome 4/10/2006     Scleroderma (H)      Sleep apnea     CPAP         Past Surgical History:     Past Surgical History:   Procedure Laterality Date     BREAST SURGERY       C NONSPECIFIC PROCEDURE      mastectomy RT breast     COLONOSCOPY  16     COLONOSCOPY N/A 2019    Procedure: Colonoscopy, With Polypectomy And Biopsy;  Surgeon: Neto Kaminski MD;  Location:  GI     CONIZATION       CV RIGHT HEART CATH N/A 4/15/2019    Procedure: Heart Cath Right Heart Cath;  Surgeon: Naresh Cyr MD;  Location:  HEART CARDIAC CATH LAB     ENDARTERECTOMY CAROTID       HC COLONOSCOPY THRU STOMA, DIAGNOSTIC  2001    diverticulosis     REPAIR HAMMER TOE       TONSILLECTOMY           Family Medical History:     Family History   Problem Relation Age of Onset     Cancer - colorectal Mother      Cerebrovascular Disease Father      Cancer Sister 31        ovarian     Heart Disease Sister      Gastrointestinal Disease Sister         crohns     Gastrointestinal Disease Sister         diverticulitis     Gastrointestinal Disease Brother         diverticulitis     Alzheimer Disease Sister      Cerebrovascular Disease Maternal Grandmother      Diabetes Maternal Grandmother      Mental Illness Maternal Grandmother      Diabetes Maternal Uncle      Cancer Nephew                  Social History:     Social History      Socioeconomic History     Marital status:      Spouse name: Not on file     Number of children: Not on file     Years of education: Not on file     Highest education level: Not on file   Occupational History     Not on file   Social Needs     Financial resource strain: Not on file     Food insecurity     Worry: Not on file     Inability: Not on file     Transportation needs     Medical: Not on file     Non-medical: Not on file   Tobacco Use     Smoking status: Former Smoker     Packs/day: 1.00     Years: 30.00     Pack years: 30.00     Types: Cigarettes     Start date:      Last attempt to quit: 3/11/1992     Years since quittin.0     Smokeless tobacco: Never Used   Substance and Sexual Activity     Alcohol use: Not Currently     Alcohol/week: 0.0 standard drinks     Frequency: Never     Drug use: No     Sexual activity: Not Currently     Partners: Male   Lifestyle     Physical activity     Days per week: Not on file     Minutes per session: Not on file     Stress: Not on file   Relationships     Social connections     Talks on phone: Not on file     Gets together: Not on file     Attends Gnosticism service: Not on file     Active member of club or organization: Not on file     Attends meetings of clubs or organizations: Not on file     Relationship status: Not on file     Intimate partner violence     Fear of current or ex partner: Not on file     Emotionally abused: Not on file     Physically abused: Not on file     Forced sexual activity: Not on file   Other Topics Concern     Parent/sibling w/ CABG, MI or angioplasty before 65F 55M? No   Social History Narrative     Not on file           Review of System:     Constitutional: Negative for fever or chills  Skin: Negative for rashes  Ears/Nose/Throat: Negative for nasal congestion, sore throat  Respiratory: No shortness of breath, dyspnea on exertion, cough, or hemoptysis, positive for chronic pulmonary hypertension   Cardiovascular: Negative for  chest pain, positive for CHF  Gastrointestinal: Negative for nausea, vomiting  Genitourinary: Negative for dysuria, hematuria  Musculoskeletal: Negative for myalgias  Neurologic: Negative for headaches  Psychiatric: Negative for depression, anxiety  Hematologic/Lymphatic/Immunologic: Negative  Endocrine: Negative  Behavioral: Negative for tobacco use           Diagnostic Test Results:         ASSESSMENT/PLAN:         (I27.20) Pulmonary hypertension (H)  Comment: stable. Patient is requesting a refill of sildenafil medication for treatment of pulmonary hypertension (primary encounter diagnosis)  Plan: sildenafil (REVATIO) 20 MG tablet       (E78.5) Hyperlipidemia LDL goal <100    Comment: stable  Plan: continue current simvastatin medication therapy      (I50.32) Chronic diastolic congestive heart failure (H)  Comment: Regarding the patient's CHF, the patient denies any chest pain. She is complaint with her diuretic therapy with torsemide. Weight is stable.  Plan: continue current diuretic therapy with torsemide      Follow Up Plan:     Patient is instructed to return to Internal Medicine clinic for follow-up visit in 1 month.        Carissa Burroughs MD  Internal Medicine  [facility]

## 2020-04-08 DIAGNOSIS — E87.6 HYPOKALEMIA: ICD-10-CM

## 2020-04-09 RX ORDER — POTASSIUM CHLORIDE 1500 MG/1
TABLET, EXTENDED RELEASE ORAL
Qty: 30 TABLET | Refills: 0 | OUTPATIENT
Start: 2020-04-09

## 2020-04-09 NOTE — TELEPHONE ENCOUNTER
"Requested Prescriptions   Pending Prescriptions Disp Refills     KLOR-CON 20 MEQ CR tablet [Pharmacy Med Name: KLOR-CON M20 TAB 20MEQ ER]  Last Written Prescription Date:  3/26/2020  Last Fill Quantity: 90,  # refills: 3   Last office visit: 4/2/2020 with prescribing provider:  Manjeet   Future Office Visit:   Next 5 appointments (look out 90 days)    May 21, 2020 10:40 AM CDT  Return Visit with Nathen Huffman MD  [facility] ([facility]) [address], [address]  [facility]  [place] [address]  [phone]          30 tablet 0     Sig: TAKE 1 TABLET DAILY       Potassium Supplements Protocol Passed - 4/8/2020  7:09 PM        Passed - Recent (12 mo) or future (30 days) visit within the authorizing provider's department     Patient has had an office visit with the authorizing provider or a provider within the authorizing providers department within the previous 12 mos or has a future within next 30 days. See \"Patient Info\" tab in inbasket, or \"Choose Columns\" in Meds & Orders section of the refill encounter.              Passed - Medication is active on med list        Passed - Patient is age 18 or older        Passed - Normal serum potassium in past 12 months     Recent Labs   Lab Test 02/25/20  0858   POTASSIUM 3.5                         "

## 2020-05-04 DIAGNOSIS — I50.31 ACUTE DIASTOLIC CONGESTIVE HEART FAILURE (H): Primary | ICD-10-CM

## 2020-05-04 DIAGNOSIS — I27.20 PULMONARY HYPERTENSION (H): ICD-10-CM

## 2020-05-19 ENCOUNTER — HOSPITAL ENCOUNTER (OUTPATIENT)
Dept: CT IMAGING | Facility: CLINIC | Age: 80
Discharge: HOME | End: 2020-05-19
Attending: INTERNAL MEDICINE | Admitting: INTERNAL MEDICINE
Payer: MEDICARE

## 2020-05-19 DIAGNOSIS — C34.32 MALIGNANT NEOPLASM OF LOWER LOBE OF LEFT LUNG (H): ICD-10-CM

## 2020-05-19 PROCEDURE — 71250 CT THORAX DX C-: CPT

## 2020-05-21 ENCOUNTER — VIRTUAL VISIT (OUTPATIENT)
Dept: ONCOLOGY | Facility: CLINIC | Age: 80
End: 2020-05-21
Attending: INTERNAL MEDICINE
Payer: MEDICARE

## 2020-05-21 DIAGNOSIS — C34.32 MALIGNANT NEOPLASM OF LOWER LOBE OF LEFT LUNG (H): Primary | ICD-10-CM

## 2020-05-21 PROCEDURE — 40001009 ZZH VIDEO/TELEPHONE VISIT; NO CHARGE

## 2020-05-21 PROCEDURE — 99441 ZZC PHYSICIAN TELEPHONE EVALUATION 5-10 MIN GOVT: CPT | Performed by: INTERNAL MEDICINE

## 2020-05-21 ASSESSMENT — PAIN SCALES - GENERAL: PAINLEVEL: NO PAIN (0)

## 2020-05-21 NOTE — PROGRESS NOTES
"Jaqueline Canales is a 79 year old female who is being evaluated via a billable telephone visit.      The patient has been notified of following:     \"This telephone visit will be conducted via a call between you and your physician/provider. We have found that certain health care needs can be provided without the need for a physical exam.  This service lets us provide the care you need with a short phone conversation.  If a prescription is necessary we can send it directly to your pharmacy.  If lab work is needed we can place an order for that and you can then stop by our lab to have the test done at a later time.    Telephone visits are billed at different rates depending on your insurance coverage. During this emergency period, for some insurers they may be billed the same as an in-person visit.  Please reach out to your insurance provider with any questions.    If during the course of the call the physician/provider feels a telephone visit is not appropriate, you will not be charged for this service.\"    Patient has given verbal consent for Telephone visit?  Yes    What phone number would you like to be contacted at? [phone]    How would you like to obtain your AVS? [facility]    Phone call duration: 5 minutes    Leila Weber MA    "

## 2020-05-21 NOTE — PROGRESS NOTES
Visit Date:   05/21/2020      ONCOLOGY HISTORY:  Ms. Canales is a female with squamous cell carcinoma of left lung. Clinical stage 1 (T1a N0 M0).  1.  CT chest on 11/20/2015 revealed 1.2 cm left lower lobe lung nodule.   2.  CT-guided biopsy of left lung nodule on 12/04/2015 revealed poorly differentiated squamous cell carcinoma.   3.  PET scan on 12/14/2015 revealed hypermetabolic nodular lesion in left lower lobe.  No evidence of metastatic disease.   4. Brain MRI on 12/22/2015 did not reveal any brain metastasis.   5. Patient was treated with SBRT. 5000 cGy in 5 fractions between 01/19/2016 and 02/02/2016.      SUBJECTIVE:  Ms. Canales is an 79-year-old female with multiple medical problems including rheumatoid arthritis and pulmonary fibrosis.  She was diagnosed with clinical stage I left upper lobe squamous cell carcinoma in 12/2015.  She was treated with SBRT.  Since then, she has done well without any recurrence.      CT chest on 05/19/2020 does not reveal any evidence of recurrent malignancy.  There is mild stable mediastinal lymphadenopathy.  There is interstitial lung disease.      Overall, she is doing good.  No headache.  No dizziness.  No chest pain.  No worsening shortness of breath.  No nausea or vomiting.  Appetite is fairly good.  No fever or chills.      PHYSICAL EXAMINATION:   GENERAL:  She is alert, oriented x 3.    This is a virtual visit.      ASSESSMENT:   1.  An 79-year-old female with stage I left lung squamous cell carcinoma status post SBRT in 2016.  No evidence of recurrence.   2.  Other medical problems, including pulmonary fibrosis and rheumatoid arthritis.      PLAN:   1.  CT scan was reviewed with the patient.  She was happy to know that there is no evidence of recurrence of malignancy.      For followup, we will get a CT chest without contrast in 6 months.  She is agreeable for it.  That will complete 5 years of surveillance.  After that, we will not plan on doing CT scan until  unless clinically indicated.  She is agreeable for this plan.      2.  I will see her in 6 months' time.  Advised her to call us with any questions or concerns.  Advised her to see a physician if she has chest pain, coughing up blood, worsening shortness of breath, weight loss or any other concerns.         SOFÍA BEAN MD             D: 2020   T: 2020   MT: NACHO      Name:     GINA MCKEON   MRN:      [id number]        Account:      [id number]   :      1940           Visit Date:   2020      Document: [id number]      This is a telephone visit for 6 minutes.

## 2020-05-21 NOTE — LETTER
"    5/21/2020         RE: Jaqueline Canales  [address] [facility] [address]        Dear Colleague,    Thank you for referring your patient, Jaqueline Canales, to the [facility]. Please see a copy of my visit note below.    Jaqueline Canales is a 79 year old female who is being evaluated via a billable telephone visit.      The patient has been notified of following:     \"This telephone visit will be conducted via a call between you and your physician/provider. We have found that certain health care needs can be provided without the need for a physical exam.  This service lets us provide the care you need with a short phone conversation.  If a prescription is necessary we can send it directly to your pharmacy.  If lab work is needed we can place an order for that and you can then stop by our lab to have the test done at a later time.    Telephone visits are billed at different rates depending on your insurance coverage. During this emergency period, for some insurers they may be billed the same as an in-person visit.  Please reach out to your insurance provider with any questions.    If during the course of the call the physician/provider feels a telephone visit is not appropriate, you will not be charged for this service.\"    Patient has given verbal consent for Telephone visit?  Yes    What phone number would you like to be contacted at? [phone]    How would you like to obtain your AVS? [employer]    Phone call duration: 5 minutes    Leila Weber MA      Visit Date:   05/21/2020      ONCOLOGY HISTORY:  Ms. Canales is a female with squamous cell carcinoma of left lung. Clinical stage 1 (T1a N0 M0).  1.  CT chest on 11/20/2015 revealed 1.2 cm left lower lobe lung nodule.   2.  CT-guided biopsy of left lung nodule on 12/04/2015 revealed poorly differentiated squamous cell carcinoma.   3.  PET scan on 12/14/2015 revealed hypermetabolic nodular lesion in left lower lobe.  No evidence of " metastatic disease.   4. Brain MRI on 12/22/2015 did not reveal any brain metastasis.   5. Patient was treated with SBRT. 5000 cGy in 5 fractions between 01/19/2016 and 02/02/2016.      SUBJECTIVE:  Ms. Canales is an 79-year-old female with multiple medical problems including rheumatoid arthritis and pulmonary fibrosis.  She was diagnosed with clinical stage I left upper lobe squamous cell carcinoma in 12/2015.  She was treated with SBRT.  Since then, she has done well without any recurrence.      CT chest on 05/19/2020 does not reveal any evidence of recurrent malignancy.  There is mild stable mediastinal lymphadenopathy.  There is interstitial lung disease.      Overall, she is doing good.  No headache.  No dizziness.  No chest pain.  No worsening shortness of breath.  No nausea or vomiting.  Appetite is fairly good.  No fever or chills.      PHYSICAL EXAMINATION:   GENERAL:  She is alert, oriented x 3.    This is a virtual visit.      ASSESSMENT:   1.  An 79-year-old female with stage I left lung squamous cell carcinoma status post SBRT in 2016.  No evidence of recurrence.   2.  Other medical problems, including pulmonary fibrosis and rheumatoid arthritis.      PLAN:   1.  CT scan was reviewed with the patient.  She was happy to know that there is no evidence of recurrence of malignancy.      For followup, we will get a CT chest without contrast in 6 months.  She is agreeable for it.  That will complete 5 years of surveillance.  After that, we will not plan on doing CT scan until unless clinically indicated.  She is agreeable for this plan.      2.  I will see her in 6 months' time.  Advised her to call us with any questions or concerns.  Advised her to see a physician if she has chest pain, coughing up blood, worsening shortness of breath, weight loss or any other concerns.         SOFÍA BEAN MD             D: 05/21/2020   T: 05/21/2020   MT: NACHO      Name:     GINA CANALES   MRN:      [id number]         Account:      [id number]   :      1940           Visit Date:   2020      Document: [id number]      This is a telephone visit for 6 minutes.    Visit Date:   2020      ONCOLOGY HISTORY:  Ms. Canales is a female with squamous cell carcinoma of left lung. Clinical stage 1 (T1a N0 M0).  1.  CT chest on 2015 revealed 1.2 cm left lower lobe lung nodule.   2.  CT-guided biopsy of left lung nodule on 2015 revealed poorly differentiated squamous cell carcinoma.   3.  PET scan on 2015 revealed hypermetabolic nodular lesion in left lower lobe.  No evidence of metastatic disease.   4. Brain MRI on 2015 did not reveal any brain metastasis.   5. Patient was treated with SBRT. 5000 cGy in 5 fractions between 2016 and 2016.      SUBJECTIVE:  Ms. Canales is an 79-year-old female with multiple medical problems including rheumatoid arthritis and pulmonary fibrosis.  She was diagnosed with clinical stage I left upper lobe squamous cell carcinoma in 2015.  She was treated with SBRT.  Since then, she has done well without any recurrence.      CT chest on 2020 does not reveal any evidence of recurrent malignancy.  There is mild stable mediastinal lymphadenopathy.  There is interstitial lung disease.      Overall, she is doing good.  No headache.  No dizziness.  No chest pain.  No worsening shortness of breath.  No nausea or vomiting.  Appetite is fairly good.  No fever or chills.      PHYSICAL EXAMINATION:   GENERAL:  She is alert, oriented x 3.    This is a virtual visit.      ASSESSMENT:   1.  An 79-year-old female with stage I left lung squamous cell carcinoma status post SBRT in .  No evidence of recurrence.   2.  Other medical problems, including pulmonary fibrosis and rheumatoid arthritis.      PLAN:   1.  CT scan was reviewed with the patient.  She was happy to know that there is no evidence of recurrence of malignancy.      For followup, we will get a CT chest  without contrast in 6 months.  She is agreeable for it.  That will complete 5 years of surveillance.  After that, we will not plan on doing CT scan until unless clinically indicated.  She is agreeable for this plan.      2.  I will see her in 6 months' time.  Advised her to call us with any questions or concerns.  Advised her to see a physician if she has chest pain, coughing up blood, worsening shortness of breath, weight loss or any other concerns.         SOFÍA BEAN MD             D: 2020   T: 2020   MT: NACHO      Name:     GINA MCKEON   MRN:      [id number]        Account:      [id number]   :      1940           Visit Date:   2020      Document: [id number]      This is a telephone visit for 6 minutes.        Again, thank you for allowing me to participate in the care of your patient.        Sincerely,        Sofía Bean MD

## 2020-05-21 NOTE — PROGRESS NOTES
Visit Date:   05/21/2020      ONCOLOGY HISTORY:  Ms. Canales is a female with squamous cell carcinoma of left lung. Clinical stage 1 (T1a N0 M0).  1.  CT chest on 11/20/2015 revealed 1.2 cm left lower lobe lung nodule.   2.  CT-guided biopsy of left lung nodule on 12/04/2015 revealed poorly differentiated squamous cell carcinoma.   3.  PET scan on 12/14/2015 revealed hypermetabolic nodular lesion in left lower lobe.  No evidence of metastatic disease.   4. Brain MRI on 12/22/2015 did not reveal any brain metastasis.   5. Patient was treated with SBRT. 5000 cGy in 5 fractions between 01/19/2016 and 02/02/2016.      SUBJECTIVE:  Ms. Canales is an 79-year-old female with multiple medical problems including rheumatoid arthritis and pulmonary fibrosis.  She was diagnosed with clinical stage I left upper lobe squamous cell carcinoma in 12/2015.  She was treated with SBRT.  Since then, she has done well without any recurrence.      CT chest on 05/19/2020 does not reveal any evidence of recurrent malignancy.  There is mild stable mediastinal lymphadenopathy.  There is interstitial lung disease.      Overall, she is doing good.  No headache.  No dizziness.  No chest pain.  No worsening shortness of breath.  No nausea or vomiting.  Appetite is fairly good.  No fever or chills.      PHYSICAL EXAMINATION:   GENERAL:  She is alert, oriented x 3.    This is a virtual visit.      ASSESSMENT:   1.  An 79-year-old female with stage I left lung squamous cell carcinoma status post SBRT in 2016.  No evidence of recurrence.   2.  Other medical problems, including pulmonary fibrosis and rheumatoid arthritis.      PLAN:   1.  CT scan was reviewed with the patient.  She was happy to know that there is no evidence of recurrence of malignancy.      For followup, we will get a CT chest without contrast in 6 months.  She is agreeable for it.  That will complete 5 years of surveillance.  After that, we will not plan on doing CT scan until  unless clinically indicated.  She is agreeable for this plan.      2.  I will see her in 6 months' time.  Advised her to call us with any questions or concerns.  Advised her to see a physician if she has chest pain, coughing up blood, worsening shortness of breath, weight loss or any other concerns.         SOFÍA BEAN MD             D: 2020   T: 2020   MT: NACHO      Name:     GINA MCKEON   MRN:      [id number]        Account:      [id number]   :      1940           Visit Date:   2020      Document: [id number]      This is a telephone visit for 6 minutes.

## 2020-05-21 NOTE — LETTER
"    5/21/2020         RE: Jaqueline Canales  [address] [facility] [address]        Dear Colleague,    Thank you for referring your patient, Jaqueline Canales, to the [facility]. Please see a copy of my visit note below.    Jaqueline Canales is a 79 year old female who is being evaluated via a billable telephone visit.      The patient has been notified of following:     \"This telephone visit will be conducted via a call between you and your physician/provider. We have found that certain health care needs can be provided without the need for a physical exam.  This service lets us provide the care you need with a short phone conversation.  If a prescription is necessary we can send it directly to your pharmacy.  If lab work is needed we can place an order for that and you can then stop by our lab to have the test done at a later time.    Telephone visits are billed at different rates depending on your insurance coverage. During this emergency period, for some insurers they may be billed the same as an in-person visit.  Please reach out to your insurance provider with any questions.    If during the course of the call the physician/provider feels a telephone visit is not appropriate, you will not be charged for this service.\"    Patient has given verbal consent for Telephone visit?  Yes    What phone number would you like to be contacted at? [phone]    How would you like to obtain your AVS? [employer]    Phone call duration: 5 minutes    Leila Weber MA      Visit Date:   05/21/2020      ONCOLOGY HISTORY:  Ms. Canales is a female with squamous cell carcinoma of left lung. Clinical stage 1 (T1a N0 M0).  1.  CT chest on 11/20/2015 revealed 1.2 cm left lower lobe lung nodule.   2.  CT-guided biopsy of left lung nodule on 12/04/2015 revealed poorly differentiated squamous cell carcinoma.   3.  PET scan on 12/14/2015 revealed hypermetabolic nodular lesion in left lower lobe.  No evidence of " metastatic disease.   4. Brain MRI on 12/22/2015 did not reveal any brain metastasis.   5. Patient was treated with SBRT. 5000 cGy in 5 fractions between 01/19/2016 and 02/02/2016.      SUBJECTIVE:  Ms. Canales is an 79-year-old female with multiple medical problems including rheumatoid arthritis and pulmonary fibrosis.  She was diagnosed with clinical stage I left upper lobe squamous cell carcinoma in 12/2015.  She was treated with SBRT.  Since then, she has done well without any recurrence.      CT chest on 05/19/2020 does not reveal any evidence of recurrent malignancy.  There is mild stable mediastinal lymphadenopathy.  There is interstitial lung disease.      Overall, she is doing good.  No headache.  No dizziness.  No chest pain.  No worsening shortness of breath.  No nausea or vomiting.  Appetite is fairly good.  No fever or chills.      PHYSICAL EXAMINATION:   GENERAL:  She is alert, oriented x 3.    This is a virtual visit.      ASSESSMENT:   1.  An 79-year-old female with stage I left lung squamous cell carcinoma status post SBRT in 2016.  No evidence of recurrence.   2.  Other medical problems, including pulmonary fibrosis and rheumatoid arthritis.      PLAN:   1.  CT scan was reviewed with the patient.  She was happy to know that there is no evidence of recurrence of malignancy.      For followup, we will get a CT chest without contrast in 6 months.  She is agreeable for it.  That will complete 5 years of surveillance.  After that, we will not plan on doing CT scan until unless clinically indicated.  She is agreeable for this plan.      2.  I will see her in 6 months' time.  Advised her to call us with any questions or concerns.  Advised her to see a physician if she has chest pain, coughing up blood, worsening shortness of breath, weight loss or any other concerns.         SOFÍA BEAN MD             D: 05/21/2020   T: 05/21/2020   MT: NACHO      Name:     GINA CANALES   MRN:      [id number]         Account:      [id number]   :      1940           Visit Date:   2020      Document: [id number]      This is a telephone visit for 6 minutes.    Visit Date:   2020      ONCOLOGY HISTORY:  Ms. Canales is a female with squamous cell carcinoma of left lung. Clinical stage 1 (T1a N0 M0).  1.  CT chest on 2015 revealed 1.2 cm left lower lobe lung nodule.   2.  CT-guided biopsy of left lung nodule on 2015 revealed poorly differentiated squamous cell carcinoma.   3.  PET scan on 2015 revealed hypermetabolic nodular lesion in left lower lobe.  No evidence of metastatic disease.   4. Brain MRI on 2015 did not reveal any brain metastasis.   5. Patient was treated with SBRT. 5000 cGy in 5 fractions between 2016 and 2016.      SUBJECTIVE:  Ms. Canales is an 79-year-old female with multiple medical problems including rheumatoid arthritis and pulmonary fibrosis.  She was diagnosed with clinical stage I left upper lobe squamous cell carcinoma in 2015.  She was treated with SBRT.  Since then, she has done well without any recurrence.      CT chest on 2020 does not reveal any evidence of recurrent malignancy.  There is mild stable mediastinal lymphadenopathy.  There is interstitial lung disease.      Overall, she is doing good.  No headache.  No dizziness.  No chest pain.  No worsening shortness of breath.  No nausea or vomiting.  Appetite is fairly good.  No fever or chills.      PHYSICAL EXAMINATION:   GENERAL:  She is alert, oriented x 3.    This is a virtual visit.      ASSESSMENT:   1.  An 79-year-old female with stage I left lung squamous cell carcinoma status post SBRT in .  No evidence of recurrence.   2.  Other medical problems, including pulmonary fibrosis and rheumatoid arthritis.      PLAN:   1.  CT scan was reviewed with the patient.  She was happy to know that there is no evidence of recurrence of malignancy.      For followup, we will get a CT chest  without contrast in 6 months.  She is agreeable for it.  That will complete 5 years of surveillance.  After that, we will not plan on doing CT scan until unless clinically indicated.  She is agreeable for this plan.      2.  I will see her in 6 months' time.  Advised her to call us with any questions or concerns.  Advised her to see a physician if she has chest pain, coughing up blood, worsening shortness of breath, weight loss or any other concerns.         SOFÍA BEAN MD             D: 2020   T: 2020   MT: NACHO      Name:     GINA MCKEON   MRN:      [id number]        Account:      [id number]   :      1940           Visit Date:   2020      Document: [id number]      This is a telephone visit for 6 minutes.        Again, thank you for allowing me to participate in the care of your patient.        Sincerely,        Sofía Bean MD

## 2020-05-27 ENCOUNTER — TRANSFERRED RECORDS (OUTPATIENT)
Dept: HEALTH INFORMATION MANAGEMENT | Facility: CLINIC | Age: 80
End: 2020-05-27

## 2020-05-27 LAB
ALT SERPL-CCNC: 14 IU/L (ref 5–35)
AST SERPL-CCNC: 26 U/L (ref 5–34)
CREAT SERPL-MCNC: 0.73 MG/DL (ref 0.5–1.3)

## 2020-05-29 ENCOUNTER — VIRTUAL VISIT (OUTPATIENT)
Dept: PULMONOLOGY | Facility: CLINIC | Age: 80
End: 2020-05-29
Attending: INTERNAL MEDICINE
Payer: MEDICARE

## 2020-05-29 DIAGNOSIS — J84.9 ILD (INTERSTITIAL LUNG DISEASE) (H): Primary | ICD-10-CM

## 2020-05-29 NOTE — PROGRESS NOTES
"Jaqueline Canales is a 79 year old female who is being evaluated via a billable telephone visit.      The patient has been notified of following:     \"This telephone visit will be conducted via a call between you and your physician/provider. We have found that certain health care needs can be provided without the need for a physical exam.  This service lets us provide the care you need with a short phone conversation.  If a prescription is necessary we can send it directly to your pharmacy.  If lab work is needed we can place an order for that and you can then stop by our lab to have the test done at a later time.    Telephone visits are billed at different rates depending on your insurance coverage. During this emergency period, for some insurers they may be billed the same as an in-person visit.  Please reach out to your insurance provider with any questions.    If during the course of the call the physician/provider feels a telephone visit is not appropriate, you will not be charged for this service.\"    Patient has given verbal consent for Telephone visit?  Yes    What phone number would you like to be contacted at? [phone]    How would you like to obtain your AVS? 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  She has rheumatoid arthritis/systemic sclerosis overlap syndrome.  She has had very significant pulmonary fibrosis but relatively stable disease over the past 4 years with some minimal progression.  She has been on Remicade and methotrexate per her rheumatologist.  Her last dose of Remicade was 2 weeks ago.  Overall she reports she is doing relatively well she feels her breathing is stable compared to previous. Not seen the patient for about 1 year.  She feels her " rheumatoid arthritis is relatively well controlled.      Current Outpatient Medications   Medication     acetaminophen (TYLENOL) 500 MG tablet     albuterol (PROAIR HFA/PROVENTIL HFA/VENTOLIN HFA) 108 (90 Base) MCG/ACT inhaler     Bacillus Coagulans-Inulin (PROBIOTIC-PREBIOTIC PO)     ELIQUIS ANTICOAGULANT 5 MG tablet     folic acid (FOLVITE) 1 MG tablet     InFLIXimab (REMICADE IV)     levothyroxine (SYNTHROID/LEVOTHROID) 75 MCG tablet     melatonin 5 MG tablet     Methotrexate Sodium (METHOTREXATE PO)     metoprolol succinate ER (TOPROL-XL) 25 MG 24 hr tablet     metoprolol succinate ER (TOPROL-XL) 50 MG 24 hr tablet     Multiple Vitamins-Minerals (ICAPS) CAPS     multivitamin (CENTRUM SILVER) tablet     NIFEdipine ER (ADALAT CC) 60 MG 24 hr tablet     omeprazole (PRILOSEC) 20 MG DR capsule     order for DME     potassium chloride ER (KLOR-CON M) 20 MEQ CR tablet     Prednisolon-Gatiflox-Bromfenac 1-0.5-0.075 % SUSP     psyllium (METAMUCIL/KONSYL) capsule     sildenafil (REVATIO) 20 MG tablet     simvastatin (ZOCOR) 40 MG tablet     Skin Protectants, Misc. (EUCERIN) cream     spironolactone (ALDACTONE) 25 MG tablet     torsemide (DEMADEX) 20 MG tablet     triamcinolone (KENALOG) 0.1 % external cream     Vitamin D, Cholecalciferol, 10 MCG (400 UNIT) TABS     No current facility-administered medications for this visit.      Allergies   Allergen Reactions     No Known Allergies      Past Medical History:   Diagnosis Date     Amaurosis fugax 5-11    Right     Carotid artery stenosis      Esophageal reflux 3/11/2004     HELICOBACTER PYLORI INFECTION 7/11/2006     hx of CA in situ RT breast-1990.mastectomy 4/17/2007     Hyperlipidemia LDL goal <70 6/20/2011     Lung cancer (H)     squamous cell lung ca left lower lobe     OBESITY NOS 3/11/2004     OSTEOPOROSIS NOS 3/11/2004     Other psoriasis 3/11/2004     Pulmonary hypertension (H) 04/15/2019    Right heart catheterization demonstrated moderate pulmonary arterial  hypertension with a mean PA pressure of 34 mmHg, no reversibility with inhaled nitric oxide, elevated right and left-sided filling pressures, low normal cardiac index of 2.1.  Seen by Dr. Shay Marquze.  Started on sildenafil (Revatio) 20 mg 3 times daily.     RA (rheumatoid arthritis) (H) 2010     Raynaud's syndrome 4/10/2006     Scleroderma (H)      Sleep apnea     CPAP       Past Surgical History:   Procedure Laterality Date     BREAST SURGERY       C NONSPECIFIC PROCEDURE      mastectomy RT breast     COLONOSCOPY  16     COLONOSCOPY N/A 2019    Procedure: Colonoscopy, With Polypectomy And Biopsy;  Surgeon: Neto Kaminski MD;  Location:  GI     CONIZATION       CV RIGHT HEART CATH N/A 4/15/2019    Procedure: Heart Cath Right Heart Cath;  Surgeon: Naresh Cyr MD;  Location:  HEART CARDIAC CATH LAB     ENDARTERECTOMY CAROTID       HC COLONOSCOPY THRU STOMA, DIAGNOSTIC  2001    diverticulosis     REPAIR HAMMER TOE       TONSILLECTOMY         Social History     Socioeconomic History     Marital status:      Spouse name: Not on file     Number of children: Not on file     Years of education: Not on file     Highest education level: Not on file   Occupational History     Not on file   Social Needs     Financial resource strain: Not on file     Food insecurity     Worry: Not on file     Inability: Not on file     Transportation needs     Medical: Not on file     Non-medical: Not on file   Tobacco Use     Smoking status: Former Smoker     Packs/day: 1.00     Years: 30.00     Pack years: 30.00     Types: Cigarettes     Start date:      Last attempt to quit: 3/11/1992     Years since quittin.2     Smokeless tobacco: Never Used   Substance and Sexual Activity     Alcohol use: Not Currently     Alcohol/week: 0.0 standard drinks     Frequency: Never     Drug use: No     Sexual activity: Not Currently     Partners: Male   Lifestyle     Physical activity     Days  per week: Not on file     Minutes per session: Not on file     Stress: Not on file   Relationships     Social connections     Talks on phone: Not on file     Gets together: Not on file     Attends Shinto service: Not on file     Active member of club or organization: Not on file     Attends meetings of clubs or organizations: Not on file     Relationship status: Not on file     Intimate partner violence     Fear of current or ex partner: Not on file     Emotionally abused: Not on file     Physically abused: Not on file     Forced sexual activity: Not on file   Other Topics Concern     Parent/sibling w/ CABG, MI or angioplasty before 65F 55M? No   Social History Narrative     Not on file       Family History   Problem Relation Age of Onset     Cancer - colorectal Mother      Cerebrovascular Disease Father      Cancer Sister 31        ovarian     Heart Disease Sister      Gastrointestinal Disease Sister         crohns     Gastrointestinal Disease Sister         diverticulitis     Gastrointestinal Disease Brother         diverticulitis     Alzheimer Disease Sister      Cerebrovascular Disease Maternal Grandmother      Diabetes Maternal Grandmother      Mental Illness Maternal Grandmother      Diabetes Maternal Uncle      Cancer Nephew                ROS Pulmonary    A complete ROS was otherwise negative except as noted in the HPI.    Results: I have reviewed all imaging, PFTs and other relavent tests, please see below for details, PFT and imaging results were reviewed with the patient.  CT scan of the chest dated 2020 (reviewed by me): Scan was done for her lower lobe presumed lung cancer which is been treated with radiation.  Overall it appears to me that the pulmonary fibrosis is stable.    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  Overall stable.  Certainly would like to be following her closely with pulmonary function tests unfortunately  not see her for 1 year she did not take her 6-month follow-up with me due to other medical issues.  However given she is stable from a symptomatic standpoint I think we can wait until the public health crisis has improved and get her back in the clinic hopefully in 3 to 4 months with pulmonary function test.  I have instructed her to call sooner with any changes in her breathing and we will evaluate her at that time.  We also discussed the continued importance of physical distancing wearing a mask when in public and good hand hygiene.      CBC   Recent Labs   Lab Test 02/25/20  0858 01/14/20  1006   RBC 4.32 4.45   HGB 12.5 12.8   HCT 38.9 40.2    157       Basic Metabolic Panel  Recent Labs   Lab Test 02/25/20  0858 01/14/20  1006    135*   POTASSIUM 3.5 4.2   CHLORIDE 99 99   CO2 30* 27   BUN 21 25    111*   TU 10.4 10.4       INR  Recent Labs   Lab Test 04/15/19  0700 12/04/15  0745   INR 1.05 0.95       PFT  PFT Latest Ref Rng & Units 5/24/2019   FVC L 1.35   FEV1 L 1.21   FVC% % 56   FEV1% % 65           CC:        Phone call duration: 10 minutes    David Perlman, M.D.    Pulmonary, Allergy and Critical Care Medicine  [facility]

## 2020-05-29 NOTE — LETTER
"    5/29/2020         RE: Jaqueline Canales  [address] [facility] [address]        Dear Colleague,    Thank you for referring your patient, Jaqueline Canales, to the [facility]. Please see a copy of my visit note below.    Jaqueline Canales is a 79 year old female who is being evaluated via a billable telephone visit.      The patient has been notified of following:     \"This telephone visit will be conducted via a call between you and your physician/provider. We have found that certain health care needs can be provided without the need for a physical exam.  This service lets us provide the care you need with a short phone conversation.  If a prescription is necessary we can send it directly to your pharmacy.  If lab work is needed we can place an order for that and you can then stop by our lab to have the test done at a later time.    Telephone visits are billed at different rates depending on your insurance coverage. During this emergency period, for some insurers they may be billed the same as an in-person visit.  Please reach out to your insurance provider with any questions.    If during the course of the call the physician/provider feels a telephone visit is not appropriate, you will not be charged for this service.\"    Patient has given verbal consent for Telephone visit?  Yes    What phone number would you like to be contacted at? [phone]    How would you like to obtain your AVS? 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  She has rheumatoid arthritis/systemic sclerosis overlap syndrome.  She has had very significant pulmonary fibrosis but relatively stable disease over the past 4 years with some minimal progression.  She " has been on Remicade and methotrexate per her rheumatologist.  Her last dose of Remicade was 2 weeks ago.  Overall she reports she is doing relatively well she feels her breathing is stable compared to previous. Not seen the patient for about 1 year.  She feels her rheumatoid arthritis is relatively well controlled.      Current Outpatient Medications   Medication     acetaminophen (TYLENOL) 500 MG tablet     albuterol (PROAIR HFA/PROVENTIL HFA/VENTOLIN HFA) 108 (90 Base) MCG/ACT inhaler     Bacillus Coagulans-Inulin (PROBIOTIC-PREBIOTIC PO)     ELIQUIS ANTICOAGULANT 5 MG tablet     folic acid (FOLVITE) 1 MG tablet     InFLIXimab (REMICADE IV)     levothyroxine (SYNTHROID/LEVOTHROID) 75 MCG tablet     melatonin 5 MG tablet     Methotrexate Sodium (METHOTREXATE PO)     metoprolol succinate ER (TOPROL-XL) 25 MG 24 hr tablet     metoprolol succinate ER (TOPROL-XL) 50 MG 24 hr tablet     Multiple Vitamins-Minerals (ICAPS) CAPS     multivitamin (CENTRUM SILVER) tablet     NIFEdipine ER (ADALAT CC) 60 MG 24 hr tablet     omeprazole (PRILOSEC) 20 MG DR capsule     order for DME     potassium chloride ER (KLOR-CON M) 20 MEQ CR tablet     Prednisolon-Gatiflox-Bromfenac 1-0.5-0.075 % SUSP     psyllium (METAMUCIL/KONSYL) capsule     sildenafil (REVATIO) 20 MG tablet     simvastatin (ZOCOR) 40 MG tablet     Skin Protectants, Misc. (EUCERIN) cream     spironolactone (ALDACTONE) 25 MG tablet     torsemide (DEMADEX) 20 MG tablet     triamcinolone (KENALOG) 0.1 % external cream     Vitamin D, Cholecalciferol, 10 MCG (400 UNIT) TABS     No current facility-administered medications for this visit.      Allergies   Allergen Reactions     No Known Allergies      Past Medical History:   Diagnosis Date     Amaurosis fugax 5-11    Right     Carotid artery stenosis      Esophageal reflux 3/11/2004     HELICOBACTER PYLORI INFECTION 7/11/2006     hx of CA in situ RT breast-1990.mastectomy 4/17/2007     Hyperlipidemia LDL goal <70 6/20/2011      Lung cancer (H)     squamous cell lung ca left lower lobe     OBESITY NOS 3/11/2004     OSTEOPOROSIS NOS 3/11/2004     Other psoriasis 3/11/2004     Pulmonary hypertension (H) 04/15/2019    Right heart catheterization demonstrated moderate pulmonary arterial hypertension with a mean PA pressure of 34 mmHg, no reversibility with inhaled nitric oxide, elevated right and left-sided filling pressures, low normal cardiac index of 2.1.  Seen by Dr. Shay Marquez.  Started on sildenafil (Revatio) 20 mg 3 times daily.     RA (rheumatoid arthritis) (H) 2010     Raynaud's syndrome 4/10/2006     Scleroderma (H)      Sleep apnea     CPAP       Past Surgical History:   Procedure Laterality Date     BREAST SURGERY       C NONSPECIFIC PROCEDURE      mastectomy RT breast     COLONOSCOPY  16     COLONOSCOPY N/A 2019    Procedure: Colonoscopy, With Polypectomy And Biopsy;  Surgeon: Neto Kaminski MD;  Location:  GI     CONIZATION       CV RIGHT HEART CATH N/A 4/15/2019    Procedure: Heart Cath Right Heart Cath;  Surgeon: Naresh Cyr MD;  Location:  HEART CARDIAC CATH LAB     ENDARTERECTOMY CAROTID       HC COLONOSCOPY THRU STOMA, DIAGNOSTIC  2001    diverticulosis     REPAIR HAMMER TOE       TONSILLECTOMY         Social History     Socioeconomic History     Marital status:      Spouse name: Not on file     Number of children: Not on file     Years of education: Not on file     Highest education level: Not on file   Occupational History     Not on file   Social Needs     Financial resource strain: Not on file     Food insecurity     Worry: Not on file     Inability: Not on file     Transportation needs     Medical: Not on file     Non-medical: Not on file   Tobacco Use     Smoking status: Former Smoker     Packs/day: 1.00     Years: 30.00     Pack years: 30.00     Types: Cigarettes     Start date:      Last attempt to quit: 3/11/1992     Years since quittin.2      Smokeless tobacco: Never Used   Substance and Sexual Activity     Alcohol use: Not Currently     Alcohol/week: 0.0 standard drinks     Frequency: Never     Drug use: No     Sexual activity: Not Currently     Partners: Male   Lifestyle     Physical activity     Days per week: Not on file     Minutes per session: Not on file     Stress: Not on file   Relationships     Social connections     Talks on phone: Not on file     Gets together: Not on file     Attends Holiness service: Not on file     Active member of club or organization: Not on file     Attends meetings of clubs or organizations: Not on file     Relationship status: Not on file     Intimate partner violence     Fear of current or ex partner: Not on file     Emotionally abused: Not on file     Physically abused: Not on file     Forced sexual activity: Not on file   Other Topics Concern     Parent/sibling w/ CABG, MI or angioplasty before 65F 55M? No   Social History Narrative     Not on file       Family History   Problem Relation Age of Onset     Cancer - colorectal Mother      Cerebrovascular Disease Father      Cancer Sister 31        ovarian     Heart Disease Sister      Gastrointestinal Disease Sister         crohns     Gastrointestinal Disease Sister         diverticulitis     Gastrointestinal Disease Brother         diverticulitis     Alzheimer Disease Sister      Cerebrovascular Disease Maternal Grandmother      Diabetes Maternal Grandmother      Mental Illness Maternal Grandmother      Diabetes Maternal Uncle      Cancer Nephew                ROS Pulmonary    A complete ROS was otherwise negative except as noted in the HPI.    Results: I have reviewed all imaging, PFTs and other relavent tests, please see below for details, PFT and imaging results were reviewed with the patient.  CT scan of the chest dated 2020 (reviewed by me): Scan was done for her lower lobe presumed lung cancer which is been treated with radiation.  Overall it  appears to me that the pulmonary fibrosis is stable.    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  Overall stable.  Certainly would like to be following her closely with pulmonary function tests unfortunately not see her for 1 year she did not take her 6-month follow-up with me due to other medical issues.  However given she is stable from a symptomatic standpoint I think we can wait until the public health crisis has improved and get her back in the clinic hopefully in 3 to 4 months with pulmonary function test.  I have instructed her to call sooner with any changes in her breathing and we will evaluate her at that time.  We also discussed the continued importance of physical distancing wearing a mask when in public and good hand hygiene.      CBC   Recent Labs   Lab Test 02/25/20  0858 01/14/20  1006   RBC 4.32 4.45   HGB 12.5 12.8   HCT 38.9 40.2    157       Basic Metabolic Panel  Recent Labs   Lab Test 02/25/20  0858 01/14/20  1006    135*   POTASSIUM 3.5 4.2   CHLORIDE 99 99   CO2 30* 27   BUN 21 25    111*   TU 10.4 10.4       INR  Recent Labs   Lab Test 04/15/19  0700 12/04/15  0745   INR 1.05 0.95       PFT  PFT Latest Ref Rng & Units 5/24/2019   FVC L 1.35   FEV1 L 1.21   FVC% % 56   FEV1% % 65     Phone call duration: 10 minutes    David Perlman, M.D.    Pulmonary, Allergy and Critical Care Medicine  [facility]

## 2020-06-11 ENCOUNTER — TELEPHONE (OUTPATIENT)
Dept: CARDIOLOGY | Facility: CLINIC | Age: 80
End: 2020-06-11

## 2020-06-11 NOTE — TELEPHONE ENCOUNTER
Health Call Center    Phone Message    May a detailed message be left on voicemail: yes     Reason for Call: Medication Question or concern regarding medication   Prescription Clarification  Name of Medication: Lasix pills  Prescribing Provider: Dr Marquez   [facility] [place], [place] - [address] AT PORTAL TO [facility]    What on the order needs clarification? [facility] just informed pt that they are shipping out some Lasix pills.  She is not sure if that is a medication she needs though??  Please call back to confirm.             Action Taken: Message routed to:  [facility] ([facility] Cardiology    Travel Screening: Not Applicable

## 2020-06-11 NOTE — TELEPHONE ENCOUNTER
I called the pt and let her know that she should be taking Torsemide and not lasix as they changed that last years.  She is going to call [facility] and let them know of the change.  Saritha Liriano RN on 6/11/2020 at 12:08 PM

## 2020-06-15 ENCOUNTER — VIRTUAL VISIT (OUTPATIENT)
Dept: FAMILY MEDICINE | Facility: CLINIC | Age: 80
End: 2020-06-15
Payer: MEDICARE

## 2020-06-15 DIAGNOSIS — E78.5 HYPERLIPIDEMIA LDL GOAL <100: ICD-10-CM

## 2020-06-15 DIAGNOSIS — H25.9 AGE-RELATED CATARACT OF BOTH EYES, UNSPECIFIED AGE-RELATED CATARACT TYPE: Primary | ICD-10-CM

## 2020-06-15 DIAGNOSIS — I27.20 PULMONARY HTN (H): ICD-10-CM

## 2020-06-15 DIAGNOSIS — I50.32 CHRONIC DIASTOLIC HEART FAILURE (H): ICD-10-CM

## 2020-06-15 PROCEDURE — 99443 ZZC PHYSICIAN TELEPHONE EVALUATION 21-30 MIN: CPT | Performed by: INTERNAL MEDICINE

## 2020-06-15 NOTE — PROGRESS NOTES
"Jaqueline Canales is a 79 year old female who is being evaluated via a billable telephone visit.      The patient has been notified of following:     \"This telephone visit will be conducted via a call between you and your physician/provider. We have found that certain health care needs can be provided without the need for a physical exam.  This service lets us provide the care you need with a short phone conversation.  If a prescription is necessary we can send it directly to your pharmacy.  If lab work is needed we can place an order for that and you can then stop by our lab to have the test done at a later time.    Telephone visits are billed at different rates depending on your insurance coverage. During this emergency period, for some insurers they may be billed the same as an in-person visit.  Please reach out to your insurance provider with any questions.    If during the course of the call the physician/provider feels a telephone visit is not appropriate, you will not be charged for this service.\"    Patient has given verbal consent for Telephone visit?  Yes    What phone number would you like to be contacted at? [phone]    How would you like to obtain your AVS? Mail a copy      Chief Complaint:     Follow up on multiple concerns including cataracts, pulmonary hypertension, hyperlipidemia, CHF     HPI:   Patient Jaqueline Canales is a very pleasant 79 year old female with history of cataracts, pulmonary hypertension, CHF today for telephone visit for follow up on multiple concerns including pulmonary hypertension, hyperlipidemia, CHF. Regarding the patient's pulmonary hypertension, the patient reports that her symptoms are stable. No shortness of breath. Regarding the patient's CHF, the patient denies any chest pain. She is complaint with her diuretic therapy with torsemide. Weight is stable. No fever or chills. Regarding her chronic bilateral cataracts, the patient is followed by a local ophthalmology " clinic and is in the process of scheduling cataract surgery in the near future.        Current Medications:     Current Outpatient Medications   Medication Sig Dispense Refill     acetaminophen (TYLENOL) 500 MG tablet Take 1,000 mg by mouth 2 times daily        albuterol (PROAIR HFA/PROVENTIL HFA/VENTOLIN HFA) 108 (90 Base) MCG/ACT inhaler Inhale 2 puffs into the lungs every 6 hours as needed for shortness of breath / dyspnea or wheezing       Bacillus Coagulans-Inulin (PROBIOTIC-PREBIOTIC PO) Take 1 chew tab by mouth 2 times daily        ELIQUIS ANTICOAGULANT 5 MG tablet TAKE 1 TABLET TWICE A  tablet 2     folic acid (FOLVITE) 1 MG tablet Take 1 tablet (1,000 mcg) by mouth daily 90 tablet 3     InFLIXimab (REMICADE IV) Inject 400 mg into the vein Every 8 weeks       levothyroxine (SYNTHROID/LEVOTHROID) 75 MCG tablet Take 1 tablet (75 mcg) by mouth daily 90 tablet 2     Methotrexate Sodium (METHOTREXATE PO) Take 2.5 mg by mouth 10 tablets weekly - Wednesdays       metoprolol succinate ER (TOPROL-XL) 25 MG 24 hr tablet TAKE 1 TABLET DAILY 90 tablet 2     Multiple Vitamins-Minerals (ICAPS) CAPS Take 1 capsule by mouth 2 times daily        multivitamin (CENTRUM SILVER) tablet Take 1 tablet by mouth daily       NIFEdipine ER (ADALAT CC) 60 MG 24 hr tablet Take 60 mg by mouth daily       omeprazole (PRILOSEC) 20 MG DR capsule TAKE 1 CAPSULE TWICE DAILY,30-60 MINUTES PRIOR TO     MEALS 180 capsule 2     potassium chloride ER (KLOR-CON M) 20 MEQ CR tablet Take 1 tablet (20 mEq) by mouth daily 90 tablet 3     Prednisolon-Gatiflox-Bromfenac 1-0.5-0.075 % SUSP Place 1 drop into the right eye daily       psyllium (METAMUCIL/KONSYL) capsule Take 1 capsule by mouth daily       sildenafil (REVATIO) 20 MG tablet Take 1 tablet (20 mg) by mouth 3 times daily 90 tablet 11     simvastatin (ZOCOR) 40 MG tablet Take 1 tablet (40 mg) by mouth At Bedtime 90 tablet 3     Skin Protectants, Misc. (EUCERIN) cream Apply topically 2  times daily Apply to areas dry skin topically two times a day for dry skin and Apply to areas of dry skin topically as needed for dry skin daily       spironolactone (ALDACTONE) 25 MG tablet Take 1 tablet (25 mg) by mouth daily 90 tablet 3     torsemide (DEMADEX) 20 MG tablet Take 2 tablets (40 mg) by mouth daily Take 40mg q am 180 tablet 3     Vitamin D, Cholecalciferol, 10 MCG (400 UNIT) TABS Take 1,600 Units by mouth daily       melatonin 5 MG tablet Take 5 mg by mouth nightly as needed for sleep       metoprolol succinate ER (TOPROL-XL) 50 MG 24 hr tablet Take 0.5 tablets (25 mg) by mouth daily 30 tablet 0     order for DME Equipment being ordered: 4 wheeled walker with seat 1 each 3     triamcinolone (KENALOG) 0.1 % external cream Apply topically 2 times daily (Patient not taking: Reported on 6/15/2020) 30 g 3         Allergies:      Allergies   Allergen Reactions     No Known Allergies             Past Medical History:     Past Medical History:   Diagnosis Date     Amaurosis fugax 5-11    Right     Carotid artery stenosis      Esophageal reflux 3/11/2004     HELICOBACTER PYLORI INFECTION 7/11/2006     hx of CA in situ RT breast-1990.mastectomy 4/17/2007     Hyperlipidemia LDL goal <70 6/20/2011     Lung cancer (H)     squamous cell lung ca left lower lobe     OBESITY NOS 3/11/2004     OSTEOPOROSIS NOS 3/11/2004     Other psoriasis 3/11/2004     Pulmonary hypertension (H) 04/15/2019    Right heart catheterization demonstrated moderate pulmonary arterial hypertension with a mean PA pressure of 34 mmHg, no reversibility with inhaled nitric oxide, elevated right and left-sided filling pressures, low normal cardiac index of 2.1.  Seen by Dr. Shay Marquez.  Started on sildenafil (Revatio) 20 mg 3 times daily.     RA (rheumatoid arthritis) (H) 2/8/2010     Raynaud's syndrome 4/10/2006     Scleroderma (H)      Sleep apnea     CPAP         Past Surgical History:     Past Surgical History:   Procedure Laterality Date      BREAST SURGERY       C NONSPECIFIC PROCEDURE      mastectomy RT breast     COLONOSCOPY  15     COLONOSCOPY N/A 2019    Procedure: Colonoscopy, With Polypectomy And Biopsy;  Surgeon: Neto Kaminski MD;  Location:  GI     CONIZATION       CV RIGHT HEART CATH N/A 4/15/2019    Procedure: Heart Cath Right Heart Cath;  Surgeon: Naresh Cyr MD;  Location:  HEART CARDIAC CATH LAB     ENDARTERECTOMY CAROTID       HC COLONOSCOPY THRU STOMA, DIAGNOSTIC  2001    diverticulosis     REPAIR HAMMER TOE       TONSILLECTOMY           Family Medical History:     Family History   Problem Relation Age of Onset     Cancer - colorectal Mother      Cerebrovascular Disease Father      Cancer Sister 31        ovarian     Heart Disease Sister      Gastrointestinal Disease Sister         crohns     Gastrointestinal Disease Sister         diverticulitis     Gastrointestinal Disease Brother         diverticulitis     Alzheimer Disease Sister      Cerebrovascular Disease Maternal Grandmother      Diabetes Maternal Grandmother      Mental Illness Maternal Grandmother      Diabetes Maternal Uncle      Cancer Nephew                  Social History:     Social History     Socioeconomic History     Marital status:      Spouse name: Not on file     Number of children: Not on file     Years of education: Not on file     Highest education level: Not on file   Occupational History     Not on file   Social Needs     Financial resource strain: Not on file     Food insecurity     Worry: Not on file     Inability: Not on file     Transportation needs     Medical: Not on file     Non-medical: Not on file   Tobacco Use     Smoking status: Former Smoker     Packs/day: 1.00     Years: 30.00     Pack years: 30.00     Types: Cigarettes     Start date:      Last attempt to quit: 3/11/1992     Years since quittin.2     Smokeless tobacco: Never Used   Substance and Sexual Activity     Alcohol use:  Not Currently     Alcohol/week: 0.0 standard drinks     Frequency: Never     Drug use: No     Sexual activity: Not Currently     Partners: Male   Lifestyle     Physical activity     Days per week: Not on file     Minutes per session: Not on file     Stress: Not on file   Relationships     Social connections     Talks on phone: Not on file     Gets together: Not on file     Attends Church service: Not on file     Active member of club or organization: Not on file     Attends meetings of clubs or organizations: Not on file     Relationship status: Not on file     Intimate partner violence     Fear of current or ex partner: Not on file     Emotionally abused: Not on file     Physically abused: Not on file     Forced sexual activity: Not on file   Other Topics Concern     Parent/sibling w/ CABG, MI or angioplasty before 65F 55M? No   Social History Narrative     Not on file           Review of System:     Constitutional: Negative for fever or chills  Skin: Negative for rashes  Eyes: positive for cataracts in both eyes  Ears/Nose/Throat: Negative for nasal congestion, sore throat  Respiratory: No shortness of breath, dyspnea on exertion, cough, or hemoptysis, positive for chronic pulmonary hypertension   Cardiovascular: Negative for chest pain, positive for CHF  Gastrointestinal: Negative for nausea, vomiting  Genitourinary: Negative for dysuria, hematuria  Musculoskeletal: Negative for myalgias  Neurologic: Negative for headaches  Psychiatric: Negative for depression, anxiety  Hematologic/Lymphatic/Immunologic: Negative  Endocrine: Negative  Behavioral: Negative for tobacco use         Physical Exam:   There were no vitals taken for this visit.        Diagnostic Test Results:     Lab Results   Component Value Date    WBC 4.7 02/25/2020     Lab Results   Component Value Date    RBC 4.32 02/25/2020     Lab Results   Component Value Date    HGB 12.5 02/25/2020     Lab Results   Component Value Date    HCT 38.9  02/25/2020     Lab Results   Component Value Date    MCV 90 02/25/2020     Lab Results   Component Value Date    MCH 28.9 02/25/2020     Lab Results   Component Value Date    MCHC 32.1 02/25/2020     Lab Results   Component Value Date    RDW 19.7 02/25/2020     Lab Results   Component Value Date     02/25/2020     Last Comprehensive Metabolic Panel:  Sodium   Date Value Ref Range Status   02/25/2020 137 136 - 145 mmol/L Final     Potassium   Date Value Ref Range Status   02/25/2020 3.5 3.5 - 5.1 mmol/L Final     Chloride   Date Value Ref Range Status   02/25/2020 99 98 - 107 mmol/L Final     Carbon Dioxide   Date Value Ref Range Status   02/25/2020 30 (H) 23 - 29 mmol/L Final     Anion Gap   Date Value Ref Range Status   02/25/2020 11.5 6 - 17 mmol/L Final     Glucose   Date Value Ref Range Status   02/25/2020 100 70 - 105 mg/dL Final     Urea Nitrogen   Date Value Ref Range Status   02/25/2020 21 7 - 30 mg/dL Final     Creatinine   Date Value Ref Range Status   03/23/2020 0.820 0.500 - 1.300 mg/dL Final     GFR Estimate   Date Value Ref Range Status   02/25/2020 59 (L) >60 mL/min/[1.73_m2] Final     Calcium   Date Value Ref Range Status   02/25/2020 10.4 8.5 - 10.5 mg/dL Final       ASSESSMENT/PLAN:     (H25.9) Age-related cataract of both eyes, unspecified age-related cataract type  (primary encounter diagnosis)  Comment: Regarding her chronic bilateral cataracts, the patient is followed by a local ophthalmology clinic and is in the process of scheduling cataract surgery in the near future.  Plan: continue current outpatient ophthalmology clinic follow up going forward.      (I27.20) Pulmonary hypertension (H)  Comment: stable. Patient is requesting a refill of sildenafil medication for treatment of pulmonary hypertension (primary encounter diagnosis)  Plan: sildenafil (REVATIO) 20 MG tablet       (E78.5) Hyperlipidemia LDL goal <100    Comment: stable  Plan: continue current simvastatin medication  therapy      (I50.32) Chronic diastolic congestive heart failure (H)  Comment: Regarding the patient's CHF, the patient denies any chest pain. She is complaint with her diuretic therapy with torsemide. Weight is stable.  Plan: continue current diuretic therapy with torsemide      Follow Up Plan:     Patient is instructed to return to Internal Medicine clinic for follow-up visit in 1 month.        Phone call duration:  25 minutes    Carissa Burroughs MD

## 2020-06-22 ENCOUNTER — HOSPITAL ENCOUNTER (OUTPATIENT)
Dept: CARDIOLOGY | Facility: CLINIC | Age: 80
Discharge: HOME | End: 2020-06-22
Attending: INTERNAL MEDICINE | Admitting: INTERNAL MEDICINE
Payer: MEDICARE

## 2020-06-22 DIAGNOSIS — I50.31 ACUTE DIASTOLIC CONGESTIVE HEART FAILURE (H): ICD-10-CM

## 2020-06-22 DIAGNOSIS — I27.20 PULMONARY HYPERTENSION (H): ICD-10-CM

## 2020-06-22 PROCEDURE — 93306 TTE W/DOPPLER COMPLETE: CPT

## 2020-06-22 PROCEDURE — 93306 TTE W/DOPPLER COMPLETE: CPT | Mod: 26 | Performed by: INTERNAL MEDICINE

## 2020-06-23 ENCOUNTER — TELEPHONE (OUTPATIENT)
Dept: CARDIOLOGY | Facility: CLINIC | Age: 80
End: 2020-06-23

## 2020-06-23 NOTE — TELEPHONE ENCOUNTER
"I received a call from the pt stating she was confused regarding which \"water pill\" she should be taking.  She was unsure of if she should be taking furosemide or torsemide.  She stated that she has not been taking t he furosemide but has received a refill.  I called the pt back and let her know that she should only be taking the Torsemide with a T and that she should get rid of the Furosemide with an F.  Saritha Liriano RN on 6/23/2020 at 10:04 AM  "

## 2020-06-24 ENCOUNTER — VIRTUAL VISIT (OUTPATIENT)
Dept: CARDIOLOGY | Facility: CLINIC | Age: 80
End: 2020-06-24
Attending: INTERNAL MEDICINE
Payer: MEDICARE

## 2020-06-24 DIAGNOSIS — E61.1 IRON DEFICIENCY: ICD-10-CM

## 2020-06-24 DIAGNOSIS — R06.09 DOE (DYSPNEA ON EXERTION): ICD-10-CM

## 2020-06-24 DIAGNOSIS — I27.20 PULMONARY HYPERTENSION (H): Primary | ICD-10-CM

## 2020-06-24 PROCEDURE — 99443 ZZC PHYSICIAN TELEPHONE EVALUATION 21-30 MIN: CPT | Mod: ZP | Performed by: INTERNAL MEDICINE

## 2020-06-24 ASSESSMENT — PAIN SCALES - GENERAL: PAINLEVEL: NO PAIN (0)

## 2020-06-24 NOTE — PROGRESS NOTES
Service Date: 2020      Carissa Burroughs MD   [place] [id number]      RE: Jaqueline Canales    MRN: [id number]   : 1940       IMPRESSION:   1.  Pulmonary artery hypertension.   2.  Scleroderma.   3.  Severe tricuspid regurgitation.   4.  A 65 pound weight loss.   5.  Cataracts.      Dear Dr. Burroughs:      I had the pleasure of performing a telephone visit with your patient's permission.  The duration of the visit was 25 minutes.  During that visit, we talked about how she is doing and options for treatment of her pulmonary hypertension/tricuspid regurgitation.  She is currently functional class II.  She is not walking; however, this is not because she is short of breath.  She has lost 65 pounds, which has made her much more functional.  She still has some dependent edema, which is probably a manifestation of chronic venous insufficiency.  She has no paroxysmal nocturnal dyspnea, orthopnea, abdominal swelling, exertional syncope or presyncope.      The method of this appointment precluded examination.      We talked about the need for regular walking.  We talked about the need for her to make a determination as to whether her current quality of life is acceptable or whether she would be willing to consider percutaneous mitral valve interventions, such as clipping of her tricuspid valve.  We recommended that she have a repeat right heart catheterization in September.  We have asked her to begin walking 4-5 days per week.  We have scheduled her to come in for the following laboratories:  CMP, BNP, CBC, iron, CRP inflammation.  I have scheduled another visit with her in 6 weeks.      Medications were reviewed, and she is taking them appropriately.  The pharmacy is still sending her furosemide, although she is now on Demadex.      Her weight is down 65 pounds as noted.      The remainder of a 13 system review is negative.      The patient is taking COVID  precautions.      Sincerely yours,      MD YONNY Brown MD             D: 2020   T: 2020   MT: gin      Name:     GINA MCKEON   MRN:      [id number]        Account:      [id number]   :      1940           Service Date: 2020      Document: [id number]

## 2020-06-24 NOTE — PROGRESS NOTES
Shay Marquez M.D.  Cardiovascular Medicine    I personally saw and examined Jaqueline Canales, discussed care with housestaff and other consultants, reviewed current laboratories and imaging studies, and conveyed impression and diagnostic/therapeutic plan to patient.    Problem List:  Scleroderma  Pulmonary hypertension  SCC of the lung  CKD  Hypothyroidism  NWOAC  Severe tricuspid regurgitation  Atrial fibrillation      History:    The patient returns for follow-up of right heart disease/tricuspid regurgitation and pulmonary hypertension in the context of scleroderma.  There is no interim history of chest pain, tightness, paroxysmal nocturnal dyspnea, orthopnea, peripheral edema, palpitation, pre-syncope, syncope, device discharge.  Exercise tolerance is stable.  The patient is attempting to exercise regularly and following a sodium restricted, calorically appropriate diet.  Medications are reviewed and the patient is taking medications as prescribed.  The patient is generally sleeping well.           VITALS    WEIGHT  Wt Readings from Last 24 Encounters:   02/26/20 81 kg (178 lb 8 oz)   02/25/20 79.8 kg (176 lb)   01/27/20 84.1 kg (185 lb 6.4 oz)   01/20/20 84 kg (185 lb 1.6 oz)   01/14/20 86.9 kg (191 lb 9.6 oz)   01/10/20 85.6 kg (188 lb 12.8 oz)   01/09/20 86.5 kg (190 lb 12.8 oz)   12/30/19 92.3 kg (203 lb 6.4 oz)   12/27/19 92.4 kg (203 lb 9.6 oz)   12/24/19 93.4 kg (205 lb 12.8 oz)   12/23/19 93.4 kg (205 lb 12.8 oz)   12/19/19 92.4 kg (203 lb 9.6 oz)   12/17/19 91.7 kg (202 lb 3.2 oz)   12/13/19 91.6 kg (202 lb)   12/11/19 90.6 kg (199 lb 12.8 oz)   12/10/19 90.9 kg (200 lb 4.8 oz)   12/06/19 90.5 kg (199 lb 9.6 oz)   12/06/19 90.5 kg (199 lb 9.6 oz)   12/05/19 88.7 kg (195 lb 8 oz)   11/20/19 103.4 kg (228 lb)   11/14/19 102.5 kg (226 lb)   11/11/19 101.2 kg (223 lb)   10/28/19 100.8 kg (222 lb 4.8 oz)   08/28/19 98.9 kg (218 lb)       Meds  Current Outpatient Medications   Medication Sig Dispense Refill      acetaminophen (TYLENOL) 500 MG tablet Take 1,000 mg by mouth 2 times daily        albuterol (PROAIR HFA/PROVENTIL HFA/VENTOLIN HFA) 108 (90 Base) MCG/ACT inhaler Inhale 2 puffs into the lungs every 6 hours as needed for shortness of breath / dyspnea or wheezing       Bacillus Coagulans-Inulin (PROBIOTIC-PREBIOTIC PO) Take 1 chew tab by mouth 2 times daily        ELIQUIS ANTICOAGULANT 5 MG tablet TAKE 1 TABLET TWICE A  tablet 2     folic acid (FOLVITE) 1 MG tablet Take 1 tablet (1,000 mcg) by mouth daily 90 tablet 3     InFLIXimab (REMICADE IV) Inject 400 mg into the vein Every 8 weeks       levothyroxine (SYNTHROID/LEVOTHROID) 75 MCG tablet Take 1 tablet (75 mcg) by mouth daily 90 tablet 2     melatonin 5 MG tablet Take 5 mg by mouth nightly as needed for sleep       Methotrexate Sodium (METHOTREXATE PO) Take 2.5 mg by mouth 10 tablets weekly - Wednesdays       metoprolol succinate ER (TOPROL-XL) 25 MG 24 hr tablet TAKE 1 TABLET DAILY 90 tablet 2     metoprolol succinate ER (TOPROL-XL) 50 MG 24 hr tablet Take 0.5 tablets (25 mg) by mouth daily 30 tablet 0     Multiple Vitamins-Minerals (ICAPS) CAPS Take 1 capsule by mouth 2 times daily        multivitamin (CENTRUM SILVER) tablet Take 1 tablet by mouth daily       NIFEdipine ER (ADALAT CC) 60 MG 24 hr tablet Take 60 mg by mouth daily       omeprazole (PRILOSEC) 20 MG DR capsule TAKE 1 CAPSULE TWICE DAILY,30-60 MINUTES PRIOR TO     MEALS 180 capsule 2     order for DME Equipment being ordered: 4 wheeled walker with seat 1 each 3     potassium chloride ER (KLOR-CON M) 20 MEQ CR tablet Take 1 tablet (20 mEq) by mouth daily 90 tablet 3     Prednisolon-Gatiflox-Bromfenac 1-0.5-0.075 % SUSP Place 1 drop into the right eye daily       psyllium (METAMUCIL/KONSYL) capsule Take 1 capsule by mouth daily       sildenafil (REVATIO) 20 MG tablet Take 1 tablet (20 mg) by mouth 3 times daily 90 tablet 11     simvastatin (ZOCOR) 40 MG tablet Take 1 tablet (40 mg) by mouth  At Bedtime 90 tablet 3     Skin Protectants, Misc. (EUCERIN) cream Apply topically 2 times daily Apply to areas dry skin topically two times a day for dry skin and Apply to areas of dry skin topically as needed for dry skin daily       spironolactone (ALDACTONE) 25 MG tablet Take 1 tablet (25 mg) by mouth daily 90 tablet 3     Vitamin D, Cholecalciferol, 10 MCG (400 UNIT) TABS Take 1,600 Units by mouth daily       torsemide (DEMADEX) 20 MG tablet Take 2 tablets (40 mg) by mouth daily Take 40mg q am (Patient not taking: Reported on 6/24/2020) 180 tablet 3     triamcinolone (KENALOG) 0.1 % external cream Apply topically 2 times daily (Patient not taking: Reported on 6/15/2020) 30 g 3       LABORATORY RESULTS  Results for GINA MCKEON (MRN [id number]) as of 2/25/2020 10:15   Ref. Range 1/20/2020 00:00 2/25/2020 08:58   Sodium Latest Ref Range: 136 - 145 mmol/L  137   Potassium Latest Ref Range: 3.5 - 5.1 mmol/L  3.5   Chloride Latest Ref Range: 98 - 107 mmol/L  99   Carbon Dioxide Latest Ref Range: 23 - 29 mmol/L  30 (H)   Urea Nitrogen Latest Ref Range: 7 - 30 mg/dL  21   Creatinine Latest Ref Range: 0.70 - 1.30 mg/dL  0.92   GFR Estimate Latest Ref Range: >60 mL/min/1.73_m2  59 (L)   GFR Estimate If Black Latest Ref Range: >60 mL/min/1.73_m2  71   Calcium Latest Ref Range: 8.5 - 10.5 mg/dL  10.4   Anion Gap Latest Ref Range: 6 - 17 mmol/L  11.5   N-Terminal Pro Bnp Latest Ref Range: 0 - 450 pg/mL  3,103 (H)   Glucose Latest Ref Range: 70 - 105 mg/dL  100   WBC Latest Ref Range: 4.0 - 11.0 10e9/L  4.7   Hemoglobin Latest Ref Range: 11.7 - 15.7 g/dL  12.5   Hematocrit Latest Ref Range: 35.0 - 47.0 %  38.9   Platelet Count Latest Ref Range: 150 - 450 10e9/L  159   RBC Count Latest Ref Range: 3.8 - 5.2 10e12/L  4.32   MCV Latest Ref Range: 78 - 100 fl  90   MCH Latest Ref Range: 26.5 - 33.0 pg  28.9   MCHC Latest Ref Range: 31.5 - 36.5 g/dL  32.1   RDW Latest Ref Range: 10.0 - 15.0 %  19.7 (H)   LAB RESULT - HIM  SCAN Unknown Attch      CBC Results  Lab Results   Component Value Date    WBC 4.7 2020    RBC 4.32 2020    HGB 12.5 2020    HCT 38.9 2020    MCV 90 2020    MCH 28.9 2020    MCHC 32.1 2020    RDW 19.7 (H) 2020     2020       CMP RESULTS:  Lab Results   Component Value Date     2020    POTASSIUM 3.5 2020    CHLORIDE 99 2020    CO2 30 (H) 2020    ANIONGAP 11.5 2020     2020    BUN 21 2020    CR 0.820 2020    GFRESTIMATED 59 (L) 2020    GFRESTBLACK 71 2020    TU 10.4 2020    BILITOTAL 1.2 2019    ALBUMIN 3.7 2019    ALKPHOS 135 2019    ALT 12 2020    AST 25 2020        INR RESULTS:  Lab Results   Component Value Date    INR 1.05 04/15/2019       MAGNESIUM RESULTS  Lab Results   Component Value Date    MAG 2.4 (H) 2020       BNP RESULTS  Lab Results   Component Value Date    NTBNP 3,103 (H) 2020       IMAGING    Name: GINA MCKEON  MRN: [id number]  : 1940  Study Date: 2020 10:01 AM  Age: 79 yrs  Gender: Female  Patient [facility]: [facility]  Reason For Study: Acute diastolic congestive heart failure  Ordering Physician: YONNY LINDSAY  Referring Physician: YONNY LINDSAY  Performed By: DEMOND Crooks     BSA: 1.8 m2  Height: 62 in  Weight: 178 lb  HR: 87  BP: 100/67 mmHg  _____________________________________________________________________________  __     Procedure  Complete Echo Adult.     _____________________________________________________________________________  __        Interpretation Summary     1. Normal biventricular size and function. Left ventricular ejection fraction  of 55-60%. No segmental wall motion abnormalities noted.  2. There is severe biatrial enlargement.  3. There is moderate -severe tricuspid regurgitation.  4. Mild valvular aortic stenosis with mean AoV pressure gradient of 12.5  mmHg  and calculated aortic valve area of 1.5 cm^2.  5. Pulmonary artery systolic pressure of 50-55 mmHg using a RA pressure of >15  mmHg.  Compared to the previous echocardiogram on 3/4/2020, there are no significant  changes.  T____________________________________________________  __        Left Ventricle  The left ventricle is normal in size. There is concentric remodeling present.  Left ventricular systolic function is normal. The visual ejection fraction is  estimated at 55-60%. Diastolic Doppler findings (E/E' ratio and/or other  parameters) suggest left ventricular filling pressures are increased.  Diastolic function not assessed due to atrial fibrillation. No regional wall  motion abnormalities noted.     Right Ventricle  The right ventricle is mildly dilated. Mildly decreased right ventricular  systolic function.     Atria  There is severe biatrial enlargement.     Mitral Valve  The mitral valve leaflets appear thickened, but open well. There is moderate  mitral annular calcification. There is mild (1+) mitral regurgitation.     Tricuspid Valve  Normal tricuspid valve. There is mod-severe to severe (3-4+) tricuspid  regurgitation. The right ventricular systolic pressure is approximated at 41.5  mmHg plus the right atrial pressure. Right ventricular systolic pressure is  elevated, consistent with moderate pulmonary hypertension. There is systolic  flow reversal in the hepatic veins.        Aortic Valve  There is moderate trileaflet aortic sclerosis. There is trace aortic  regurgitation. Mild valvular aortic stenosis. The peak AoV pressure gradient  is 19.0 mmHg. The mean AoV pressure gradient is 12.5 mmHg. The calculated  aortic valve are is 1.5 cm^2.     Pulmonic Valve  The pulmonic valve is not well visualized.     Vessels  Normal size aorta. Normal size ascending aorta. IVC diameter >2.1 cm  collapsing <50% with sniff suggests a high RA pressure estimated at 15 mmHg or  greater.     Pericardium  There is  no pericardial effusion.     Rhythm  The rhythm was rapid atrial fibrillation.     _____________________________________________________________________________  __  MMode/2D Measurements & Calculations  IVSd: 1.2 cm  LVIDd: 4.5 cm  LVIDs: 2.9 cm  LVPWd: 1.2 cm  FS: 34.2 %  LV mass(C)d: 194.6 grams  LV mass(C)dI: 107.0 grams/m2  Ao root diam: 2.6 cm  LA dimension: 5.5 cm     asc Aorta Diam: 3.4 cm  LA/Ao: 2.2  LVOT diam: 1.9 cm  LVOT area: 2.7 cm2  LA Volume (BP): 108.0 ml  LA Volume Indexed (AL/bp): 51.8 ml/m2  RWT: 0.52        Doppler Measurements & Calculations  MV E max el: 120.0 cm/sec  MV max P.3 mmHg  MV mean PG: 3.0 mmHg  MV V2 VTI: 27.4 cm  MVA(VTI): 2.5 cm2  MV dec time: 0.19 sec     Ao V2 max: 218.2 cm/sec  Ao max P.0 mmHg  Ao V2 mean: 167.5 cm/sec  Ao mean P.5 mmHg  Ao V2 VTI: 46.6 cm  JACOB(I,D): 1.5 cm2  JACOB(V,D): 1.7 cm2  LV V1 max P.9 mmHg  LV V1 max: 131.0 cm/sec  LV V1 VTI: 25.0 cm  SV(LVOT): 68.6 ml  SI(LVOT): 37.7 ml/m2  PA acc time: 0.09 sec  TR max el: 335.3 cm/sec  TR max P.5 mmHg  AV El Ratio (DI): 0.60  JACOB Index (cm2/m2): 0.81  E/E' av.5  Lateral E/e': 13.5  Medial E/e': 19.4                   Name: GINA MCKEON  MRN: [id number]  : 1940  Study Date: 2019 02:57 PM  Age: 78 yrs  Gender: Female  Patient Location: [facility]  Reason For Study: CHF  Ordering Physician: DIMITRI LEAL  Referring Physician: Carissa Burroughs  Performed By: Milton Guadalupe RDCS     BSA: 2.0 m2  Height: 62 in  Weight: 229 lb  HR: 100  BP: 115/71 mmHg  _____________________________________________________________________________  __        Procedure  Complete Portable Echo Adult. Optison (NDC #4835-9765) given intravenously.  _____________________________________________________________________________  __        Interpretation Summary     Small pericardial effusion  The visual ejection fraction is estimated at 50-55%.  Flattened septum is consistent with RV pressure/volume  overload.  Pulmonary hypertension  There is severe biatrial enlargement.  Very mild AS. MG is 9 mm Hg.  The right ventricle is moderately dilated. Mildly decreased right ventricular  systolic function  There is mod-severe to severe (3-4+) tricuspid regurgitation.  _____________________________________________________________________________  __        Left Ventricle  The left ventricle is normal in size. The visual ejection fraction is  estimated at 50-55%. Diastolic Doppler findings (E/E' ratio and/or other  parameters) suggest left ventricular filling pressures are indeterminate.  Flattened septum is consistent with RV pressure/volume overload.     Right Ventricle  The right ventricle is moderately dilated. Mildly decreased right ventricular  systolic function.     Atria  There is severe biatrial enlargement.     Mitral Valve  Thickened mitral valve posterior leaflet. There is moderate mitral annular  calcification. There is mild to moderate (1-2+) mitral regurgitation.        Tricuspid Valve  The right ventricular systolic pressure is elevated at 42.2 mmHg. Pulmonary  hypertension. There is mod-severe to severe (3-4+) tricuspid regurgitation.     Aortic Valve  There is severe trileaflet aortic sclerosis. No hemodynamically significant  valvular aortic stenosis.     Pulmonic Valve  The pulmonic valve is not well visualized.     Vessels  The aortic root is normal size. Dilation of the inferior vena cava is present  with abnormal respiratory variation in diameter.     Pericardium  Small pericardial effusion.     _____________________________________________________________________________  __  MMode/2D Measurements & Calculations  IVSd: 0.83 cm  LVIDd: 4.4 cm  LVIDs: 3.4 cm  LVPWd: 0.95 cm  FS: 22.2 %  LV mass(C)d: 125.7 grams  LV mass(C)dI: 62.1 grams/m2     Ao root diam: 2.8 cm  LA dimension: 4.5 cm  asc Aorta Diam: 3.4 cm  LA/Ao: 1.6  LVOT diam: 1.9 cm  LVOT area: 3.0 cm2  LA Volume (BP): 103.0 ml  LA Volume  Index (BP): 51.0 ml/m2  RWT: 0.43        Doppler Measurements & Calculations  MV E max el: 79.7 cm/sec  MV max P.4 mmHg  MV mean PG: 3.1 mmHg  MV V2 VTI: 23.8 cm  MVA(VTI): 3.1 cm2     MV dec slope: 562.6 cm/sec2  Ao V2 max: 188.8 cm/sec  Ao max P.0 mmHg  Ao V2 mean: 136.1 cm/sec  Ao mean P.4 mmHg  Ao V2 VTI: 36.2 cm  JACOB(I,D): 2.1 cm2  JACOB(V,D): 2.1 cm2  LV V1 max P.2 mmHg  LV V1 max: 134.4 cm/sec  LV V1 VTI: 25.1 cm  SV(LVOT): 74.2 ml  SI(LVOT): 36.6 ml/m2  PA acc time: 0.08 sec  TR max el: 324.8 cm/sec  TR max P.2 mmHg  AV El Ratio (DI): 0.71  JACOB Index (cm2/m2): 1.0  E/E' av.5  Lateral E/e': 8.7  Medial E/e': 10.4          ASSESSMENT/PLAN  1Markedly improved with weight loss, resolution of cellulitis and compression stocking.  2.Previous echocardiogram demonstrated significant TR. However today's physical examination does not suggest this therefore will repeat echocardiogram and if poor, consider clipping or percutaneous repair/replacement.  3. Walking  4. Daily weights.

## 2020-06-24 NOTE — LETTER
6/24/2020      RE: Jaqueline Canales  [address]  [facility] [address]       Dear Colleague,    Thank you for the opportunity to participate in the care of your patient, Jaqueline Canales, at the [facility] at [facility]. Please see a copy of my visit note below.      Shay Marquez M.D.  Cardiovascular Medicine    I personally saw and examined Jaqueline Canales, discussed care with housestaff and other consultants, reviewed current laboratories and imaging studies, and conveyed impression and diagnostic/therapeutic plan to patient.    Problem List:  Scleroderma  Pulmonary hypertension  SCC of the lung  CKD  Hypothyroidism  NWOAC  Severe tricuspid regurgitation  Atrial fibrillation      History:    The patient returns for follow-up of right heart disease/tricuspid regurgitation and pulmonary hypertension in the context of scleroderma.  There is no interim history of chest pain, tightness, paroxysmal nocturnal dyspnea, orthopnea, peripheral edema, palpitation, pre-syncope, syncope, device discharge.  Exercise tolerance is stable.  The patient is attempting to exercise regularly and following a sodium restricted, calorically appropriate diet.  Medications are reviewed and the patient is taking medications as prescribed.  The patient is generally sleeping well.           VITALS    WEIGHT  Wt Readings from Last 24 Encounters:   02/26/20 81 kg (178 lb 8 oz)   02/25/20 79.8 kg (176 lb)   01/27/20 84.1 kg (185 lb 6.4 oz)   01/20/20 84 kg (185 lb 1.6 oz)   01/14/20 86.9 kg (191 lb 9.6 oz)   01/10/20 85.6 kg (188 lb 12.8 oz)   01/09/20 86.5 kg (190 lb 12.8 oz)   12/30/19 92.3 kg (203 lb 6.4 oz)   12/27/19 92.4 kg (203 lb 9.6 oz)   12/24/19 93.4 kg (205 lb 12.8 oz)   12/23/19 93.4 kg (205 lb 12.8 oz)   12/19/19 92.4 kg (203 lb 9.6 oz)   12/17/19 91.7 kg (202 lb 3.2 oz)   12/13/19 91.6 kg (202 lb)   12/11/19 90.6 kg (199 lb 12.8 oz)   12/10/19 90.9 kg (200 lb 4.8 oz)    12/06/19 90.5 kg (199 lb 9.6 oz)   12/06/19 90.5 kg (199 lb 9.6 oz)   12/05/19 88.7 kg (195 lb 8 oz)   11/20/19 103.4 kg (228 lb)   11/14/19 102.5 kg (226 lb)   11/11/19 101.2 kg (223 lb)   10/28/19 100.8 kg (222 lb 4.8 oz)   08/28/19 98.9 kg (218 lb)       Meds  Current Outpatient Medications   Medication Sig Dispense Refill     acetaminophen (TYLENOL) 500 MG tablet Take 1,000 mg by mouth 2 times daily        albuterol (PROAIR HFA/PROVENTIL HFA/VENTOLIN HFA) 108 (90 Base) MCG/ACT inhaler Inhale 2 puffs into the lungs every 6 hours as needed for shortness of breath / dyspnea or wheezing       Bacillus Coagulans-Inulin (PROBIOTIC-PREBIOTIC PO) Take 1 chew tab by mouth 2 times daily        ELIQUIS ANTICOAGULANT 5 MG tablet TAKE 1 TABLET TWICE A  tablet 2     folic acid (FOLVITE) 1 MG tablet Take 1 tablet (1,000 mcg) by mouth daily 90 tablet 3     InFLIXimab (REMICADE IV) Inject 400 mg into the vein Every 8 weeks       levothyroxine (SYNTHROID/LEVOTHROID) 75 MCG tablet Take 1 tablet (75 mcg) by mouth daily 90 tablet 2     melatonin 5 MG tablet Take 5 mg by mouth nightly as needed for sleep       Methotrexate Sodium (METHOTREXATE PO) Take 2.5 mg by mouth 10 tablets weekly - Wednesdays       metoprolol succinate ER (TOPROL-XL) 25 MG 24 hr tablet TAKE 1 TABLET DAILY 90 tablet 2     metoprolol succinate ER (TOPROL-XL) 50 MG 24 hr tablet Take 0.5 tablets (25 mg) by mouth daily 30 tablet 0     Multiple Vitamins-Minerals (ICAPS) CAPS Take 1 capsule by mouth 2 times daily        multivitamin (CENTRUM SILVER) tablet Take 1 tablet by mouth daily       NIFEdipine ER (ADALAT CC) 60 MG 24 hr tablet Take 60 mg by mouth daily       omeprazole (PRILOSEC) 20 MG DR capsule TAKE 1 CAPSULE TWICE DAILY,30-60 MINUTES PRIOR TO     MEALS 180 capsule 2     order for DME Equipment being ordered: 4 wheeled walker with seat 1 each 3     potassium chloride ER (KLOR-CON M) 20 MEQ CR tablet Take 1 tablet (20 mEq) by mouth daily 90 tablet 3      Prednisolon-Gatiflox-Bromfenac 1-0.5-0.075 % SUSP Place 1 drop into the right eye daily       psyllium (METAMUCIL/KONSYL) capsule Take 1 capsule by mouth daily       sildenafil (REVATIO) 20 MG tablet Take 1 tablet (20 mg) by mouth 3 times daily 90 tablet 11     simvastatin (ZOCOR) 40 MG tablet Take 1 tablet (40 mg) by mouth At Bedtime 90 tablet 3     Skin Protectants, Misc. (EUCERIN) cream Apply topically 2 times daily Apply to areas dry skin topically two times a day for dry skin and Apply to areas of dry skin topically as needed for dry skin daily       spironolactone (ALDACTONE) 25 MG tablet Take 1 tablet (25 mg) by mouth daily 90 tablet 3     Vitamin D, Cholecalciferol, 10 MCG (400 UNIT) TABS Take 1,600 Units by mouth daily       torsemide (DEMADEX) 20 MG tablet Take 2 tablets (40 mg) by mouth daily Take 40mg q am (Patient not taking: Reported on 6/24/2020) 180 tablet 3     triamcinolone (KENALOG) 0.1 % external cream Apply topically 2 times daily (Patient not taking: Reported on 6/15/2020) 30 g 3       LABORATORY RESULTS  Results for GINA MCKEON (MRN [id number]) as of 2/25/2020 10:15   Ref. Range 1/20/2020 00:00 2/25/2020 08:58   Sodium Latest Ref Range: 136 - 145 mmol/L  137   Potassium Latest Ref Range: 3.5 - 5.1 mmol/L  3.5   Chloride Latest Ref Range: 98 - 107 mmol/L  99   Carbon Dioxide Latest Ref Range: 23 - 29 mmol/L  30 (H)   Urea Nitrogen Latest Ref Range: 7 - 30 mg/dL  21   Creatinine Latest Ref Range: 0.70 - 1.30 mg/dL  0.92   GFR Estimate Latest Ref Range: >60 mL/min/1.73_m2  59 (L)   GFR Estimate If Black Latest Ref Range: >60 mL/min/1.73_m2  71   Calcium Latest Ref Range: 8.5 - 10.5 mg/dL  10.4   Anion Gap Latest Ref Range: 6 - 17 mmol/L  11.5   N-Terminal Pro Bnp Latest Ref Range: 0 - 450 pg/mL  3,103 (H)   Glucose Latest Ref Range: 70 - 105 mg/dL  100   WBC Latest Ref Range: 4.0 - 11.0 10e9/L  4.7   Hemoglobin Latest Ref Range: 11.7 - 15.7 g/dL  12.5   Hematocrit Latest Ref Range:  35.0 - 47.0 %  38.9   Platelet Count Latest Ref Range: 150 - 450 10e9/L  159   RBC Count Latest Ref Range: 3.8 - 5.2 10e12/L  4.32   MCV Latest Ref Range: 78 - 100 fl  90   MCH Latest Ref Range: 26.5 - 33.0 pg  28.9   MCHC Latest Ref Range: 31.5 - 36.5 g/dL  32.1   RDW Latest Ref Range: 10.0 - 15.0 %  19.7 (H)   LAB RESULT - HIM SCAN Unknown Attch      CBC Results  Lab Results   Component Value Date    WBC 4.7 2020    RBC 4.32 2020    HGB 12.5 2020    HCT 38.9 2020    MCV 90 2020    MCH 28.9 2020    MCHC 32.1 2020    RDW 19.7 (H) 2020     2020       CMP RESULTS:  Lab Results   Component Value Date     2020    POTASSIUM 3.5 2020    CHLORIDE 99 2020    CO2 30 (H) 2020    ANIONGAP 11.5 2020     2020    BUN 21 2020    CR 0.820 2020    GFRESTIMATED 59 (L) 2020    GFRESTBLACK 71 2020    TU 10.4 2020    BILITOTAL 1.2 2019    ALBUMIN 3.7 2019    ALKPHOS 135 2019    ALT 12 2020    AST 25 2020        INR RESULTS:  Lab Results   Component Value Date    INR 1.05 04/15/2019       MAGNESIUM RESULTS  Lab Results   Component Value Date    MAG 2.4 (H) 2020       BNP RESULTS  Lab Results   Component Value Date    NTBNP 3,103 (H) 2020       IMAGING    Name: GINA MCKEON  MRN: [id number]  : 1940  Study Date: 2020 10:01 AM  Age: 79 yrs  Gender: Female  Patient Location: [facility]  Reason For Study: Acute diastolic congestive heart failure  Ordering Physician: YONNY LINDSAY  Referring Physician: YONNY LINDSAY  Performed By: CS Jojo Crooks     BSA: 1.8 m2  Height: 62 in  Weight: 178 lb  HR: 87  BP: 100/67 mmHg  _____________________________________________________________________________  __     Procedure  Complete Echo Adult.     _____________________________________________________________________________  __         Interpretation Summary     1. Normal biventricular size and function. Left ventricular ejection fraction  of 55-60%. No segmental wall motion abnormalities noted.  2. There is severe biatrial enlargement.  3. There is moderate -severe tricuspid regurgitation.  4. Mild valvular aortic stenosis with mean AoV pressure gradient of 12.5 mmHg  and calculated aortic valve area of 1.5 cm^2.  5. Pulmonary artery systolic pressure of 50-55 mmHg using a RA pressure of >15  mmHg.  Compared to the previous echocardiogram on 3/4/2020, there are no significant  changes.  T____________________________________________________  __        Left Ventricle  The left ventricle is normal in size. There is concentric remodeling present.  Left ventricular systolic function is normal. The visual ejection fraction is  estimated at 55-60%. Diastolic Doppler findings (E/E' ratio and/or other  parameters) suggest left ventricular filling pressures are increased.  Diastolic function not assessed due to atrial fibrillation. No regional wall  motion abnormalities noted.     Right Ventricle  The right ventricle is mildly dilated. Mildly decreased right ventricular  systolic function.     Atria  There is severe biatrial enlargement.     Mitral Valve  The mitral valve leaflets appear thickened, but open well. There is moderate  mitral annular calcification. There is mild (1+) mitral regurgitation.     Tricuspid Valve  Normal tricuspid valve. There is mod-severe to severe (3-4+) tricuspid  regurgitation. The right ventricular systolic pressure is approximated at 41.5  mmHg plus the right atrial pressure. Right ventricular systolic pressure is  elevated, consistent with moderate pulmonary hypertension. There is systolic  flow reversal in the hepatic veins.        Aortic Valve  There is moderate trileaflet aortic sclerosis. There is trace aortic  regurgitation. Mild valvular aortic stenosis. The peak AoV pressure gradient  is 19.0 mmHg. The mean AoV  pressure gradient is 12.5 mmHg. The calculated  aortic valve are is 1.5 cm^2.     Pulmonic Valve  The pulmonic valve is not well visualized.     Vessels  Normal size aorta. Normal size ascending aorta. IVC diameter >2.1 cm  collapsing <50% with sniff suggests a high RA pressure estimated at 15 mmHg or  greater.     Pericardium  There is no pericardial effusion.     Rhythm  The rhythm was rapid atrial fibrillation.     _____________________________________________________________________________  __  MMode/2D Measurements & Calculations  IVSd: 1.2 cm  LVIDd: 4.5 cm  LVIDs: 2.9 cm  LVPWd: 1.2 cm  FS: 34.2 %  LV mass(C)d: 194.6 grams  LV mass(C)dI: 107.0 grams/m2  Ao root diam: 2.6 cm  LA dimension: 5.5 cm     asc Aorta Diam: 3.4 cm  LA/Ao: 2.2  LVOT diam: 1.9 cm  LVOT area: 2.7 cm2  LA Volume (BP): 108.0 ml  LA Volume Indexed (AL/bp): 51.8 ml/m2  RWT: 0.52        Doppler Measurements & Calculations  MV E max el: 120.0 cm/sec  MV max P.3 mmHg  MV mean PG: 3.0 mmHg  MV V2 VTI: 27.4 cm  MVA(VTI): 2.5 cm2  MV dec time: 0.19 sec     Ao V2 max: 218.2 cm/sec  Ao max P.0 mmHg  Ao V2 mean: 167.5 cm/sec  Ao mean P.5 mmHg  Ao V2 VTI: 46.6 cm  JACOB(I,D): 1.5 cm2  JACOB(V,D): 1.7 cm2  LV V1 max P.9 mmHg  LV V1 max: 131.0 cm/sec  LV V1 VTI: 25.0 cm  SV(LVOT): 68.6 ml  SI(LVOT): 37.7 ml/m2  PA acc time: 0.09 sec  TR max el: 335.3 cm/sec  TR max P.5 mmHg  AV El Ratio (DI): 0.60  JACOB Index (cm2/m2): 0.81  E/E' av.5  Lateral E/e': 13.5  Medial E/e': 19.4                   Name: GINA MCKEON  MRN: [id number]  : 1940  Study Date: 2019 02:57 PM  Age: 78 yrs  Gender: Female  Patient Location: [facility]  Reason For Study: CHF  Ordering Physician: DIMITRI LEAL  Referring Physician: Carissa Burroughs  Performed By: Milton Guadalupe RDCS     BSA: 2.0 m2  Height: 62 in  Weight: 229 lb  HR: 100  BP: 115/71 mmHg  _____________________________________________________________________________  __         Procedure  Complete Portable Echo Adult. Optison (NDC #2399-6133) given intravenously.  _____________________________________________________________________________  __        Interpretation Summary     Small pericardial effusion  The visual ejection fraction is estimated at 50-55%.  Flattened septum is consistent with RV pressure/volume overload.  Pulmonary hypertension  There is severe biatrial enlargement.  Very mild AS. MG is 9 mm Hg.  The right ventricle is moderately dilated. Mildly decreased right ventricular  systolic function  There is mod-severe to severe (3-4+) tricuspid regurgitation.  _____________________________________________________________________________  __        Left Ventricle  The left ventricle is normal in size. The visual ejection fraction is  estimated at 50-55%. Diastolic Doppler findings (E/E' ratio and/or other  parameters) suggest left ventricular filling pressures are indeterminate.  Flattened septum is consistent with RV pressure/volume overload.     Right Ventricle  The right ventricle is moderately dilated. Mildly decreased right ventricular  systolic function.     Atria  There is severe biatrial enlargement.     Mitral Valve  Thickened mitral valve posterior leaflet. There is moderate mitral annular  calcification. There is mild to moderate (1-2+) mitral regurgitation.        Tricuspid Valve  The right ventricular systolic pressure is elevated at 42.2 mmHg. Pulmonary  hypertension. There is mod-severe to severe (3-4+) tricuspid regurgitation.     Aortic Valve  There is severe trileaflet aortic sclerosis. No hemodynamically significant  valvular aortic stenosis.     Pulmonic Valve  The pulmonic valve is not well visualized.     Vessels  The aortic root is normal size. Dilation of the inferior vena cava is present  with abnormal respiratory variation in diameter.     Pericardium  Small pericardial effusion.      _____________________________________________________________________________  __  MMode/2D Measurements & Calculations  IVSd: 0.83 cm  LVIDd: 4.4 cm  LVIDs: 3.4 cm  LVPWd: 0.95 cm  FS: 22.2 %  LV mass(C)d: 125.7 grams  LV mass(C)dI: 62.1 grams/m2     Ao root diam: 2.8 cm  LA dimension: 4.5 cm  asc Aorta Diam: 3.4 cm  LA/Ao: 1.6  LVOT diam: 1.9 cm  LVOT area: 3.0 cm2  LA Volume (BP): 103.0 ml  LA Volume Index (BP): 51.0 ml/m2  RWT: 0.43        Doppler Measurements & Calculations  MV E max el: 79.7 cm/sec  MV max P.4 mmHg  MV mean PG: 3.1 mmHg  MV V2 VTI: 23.8 cm  MVA(VTI): 3.1 cm2     MV dec slope: 562.6 cm/sec2  Ao V2 max: 188.8 cm/sec  Ao max P.0 mmHg  Ao V2 mean: 136.1 cm/sec  Ao mean P.4 mmHg  Ao V2 VTI: 36.2 cm  JACOB(I,D): 2.1 cm2  JACOB(V,D): 2.1 cm2  LV V1 max P.2 mmHg  LV V1 max: 134.4 cm/sec  LV V1 VTI: 25.1 cm  SV(LVOT): 74.2 ml  SI(LVOT): 36.6 ml/m2  PA acc time: 0.08 sec  TR max el: 324.8 cm/sec  TR max P.2 mmHg  AV El Ratio (DI): 0.71  JACOB Index (cm2/m2): 1.0  E/E' av.5  Lateral E/e': 8.7  Medial E/e': 10.4          ASSESSMENT/PLAN  1Markedly improved with weight loss, resolution of cellulitis and compression stocking.  2.Previous echocardiogram demonstrated significant TR. However today's physical examination does not suggest this therefore will repeat echocardiogram and if poor, consider clipping or percutaneous repair/replacement.  3. Walking  4. Daily weights.      Service Date: 2020      Carissa Burroughs MD   [address] [address]      RE: Jaqueline Canales    MRN: [id number]   : 1940       IMPRESSION:   1.  Pulmonary artery hypertension.   2.  Scleroderma.   3.  Severe tricuspid regurgitation.   4.  A 65 pound weight loss.   5.  Cataracts.      Dear Dr. Burroughs:      I had the pleasure of performing a telephone visit with your patient's permission.  The duration of the visit was 25 minutes.  During that visit, we  talked about how she is doing and options for treatment of her pulmonary hypertension/tricuspid regurgitation.  She is currently functional class II.  She is not walking; however, this is not because she is short of breath.  She has lost 65 pounds, which has made her much more functional.  She still has some dependent edema, which is probably a manifestation of chronic venous insufficiency.  She has no paroxysmal nocturnal dyspnea, orthopnea, abdominal swelling, exertional syncope or presyncope.      The method of this appointment precluded examination.      We talked about the need for regular walking.  We talked about the need for her to make a determination as to whether her current quality of life is acceptable or whether she would be willing to consider percutaneous mitral valve interventions, such as clipping of her tricuspid valve.  We recommended that she have a repeat right heart catheterization in September.  We have asked her to begin walking 4-5 days per week.  We have scheduled her to come in for the following laboratories:  CMP, BNP, CBC, iron, CRP inflammation.  I have scheduled another visit with her in 6 weeks.      Medications were reviewed, and she is taking them appropriately.  The pharmacy is still sending her furosemide, although she is now on Demadex.      Her weight is down 65 pounds as noted.      The remainder of a 13 system review is negative.      The patient is taking COVID precautions.     Please do not hesitate to contact me if you have any questions/concerns.     Sincerely,     Shay Marquez MD

## 2020-06-25 NOTE — NURSING NOTE
Pts Plan of care per Dr. Marquez:    We talked about the need for regular walking.    We talked about the need for her to make a determination as to whether her current quality of life is acceptable or whether she would be willing to consider percutaneous mitral valve interventions, such as clipping of her tricuspid valve.    We recommended that she have a repeat right heart catheterization in September.    We have asked her to begin walking 4-5 days per week.    We have scheduled her to come in for the following laboratories:  CMP, BNP, CBC, iron, CRP inflammation.   I have scheduled another visit with her in 6 weeks.        Orders have been placed and message sent to the [facility] schedulers.  Saritha Liriano RN on 6/25/2020 at 1:39 PM

## 2020-06-26 DIAGNOSIS — I27.20 PULMONARY HYPERTENSION (H): ICD-10-CM

## 2020-06-26 DIAGNOSIS — E61.1 IRON DEFICIENCY: ICD-10-CM

## 2020-06-26 DIAGNOSIS — R06.09 DOE (DYSPNEA ON EXERTION): ICD-10-CM

## 2020-06-26 LAB
ALBUMIN SERPL-MCNC: 3.5 G/DL (ref 3.4–5)
ALP SERPL-CCNC: 81 U/L (ref 40–150)
ALT SERPL W P-5'-P-CCNC: 23 U/L (ref 0–50)
ANION GAP SERPL CALCULATED.3IONS-SCNC: 6 MMOL/L (ref 3–14)
AST SERPL W P-5'-P-CCNC: 23 U/L (ref 0–45)
BILIRUB SERPL-MCNC: 0.7 MG/DL (ref 0.2–1.3)
BUN SERPL-MCNC: 26 MG/DL (ref 7–30)
CALCIUM SERPL-MCNC: 10.3 MG/DL (ref 8.5–10.1)
CHLORIDE SERPL-SCNC: 100 MMOL/L (ref 94–109)
CO2 SERPL-SCNC: 29 MMOL/L (ref 20–32)
CREAT SERPL-MCNC: 0.89 MG/DL (ref 0.52–1.04)
CRP SERPL-MCNC: 5.1 MG/L (ref 0–8)
ERYTHROCYTE [DISTWIDTH] IN BLOOD BY AUTOMATED COUNT: 15.4 % (ref 10–15)
GFR SERPL CREATININE-BSD FRML MDRD: 61 ML/MIN/{1.73_M2}
GLUCOSE SERPL-MCNC: 96 MG/DL (ref 70–99)
HCT VFR BLD AUTO: 36.7 % (ref 35–47)
HGB BLD-MCNC: 11.7 G/DL (ref 11.7–15.7)
IRON SATN MFR SERPL: 23 % (ref 15–46)
IRON SERPL-MCNC: 76 UG/DL (ref 35–180)
MCH RBC QN AUTO: 31.1 PG (ref 26.5–33)
MCHC RBC AUTO-ENTMCNC: 31.9 G/DL (ref 31.5–36.5)
MCV RBC AUTO: 98 FL (ref 78–100)
NT-PROBNP SERPL-MCNC: 3045 PG/ML (ref 0–450)
PLATELET # BLD AUTO: 111 10E9/L (ref 150–450)
POTASSIUM SERPL-SCNC: 4 MMOL/L (ref 3.4–5.3)
PROT SERPL-MCNC: 7.6 G/DL (ref 6.8–8.8)
RBC # BLD AUTO: 3.76 10E12/L (ref 3.8–5.2)
SODIUM SERPL-SCNC: 135 MMOL/L (ref 133–144)
TIBC SERPL-MCNC: 325 UG/DL (ref 240–430)
WBC # BLD AUTO: 6.3 10E9/L (ref 4–11)

## 2020-06-26 PROCEDURE — 85027 COMPLETE CBC AUTOMATED: CPT | Performed by: INTERNAL MEDICINE

## 2020-06-26 PROCEDURE — 83880 ASSAY OF NATRIURETIC PEPTIDE: CPT | Performed by: INTERNAL MEDICINE

## 2020-06-26 PROCEDURE — 83550 IRON BINDING TEST: CPT | Performed by: INTERNAL MEDICINE

## 2020-06-26 PROCEDURE — 80053 COMPREHEN METABOLIC PANEL: CPT | Performed by: INTERNAL MEDICINE

## 2020-06-26 PROCEDURE — 83540 ASSAY OF IRON: CPT | Performed by: INTERNAL MEDICINE

## 2020-06-26 PROCEDURE — 36415 COLL VENOUS BLD VENIPUNCTURE: CPT | Performed by: INTERNAL MEDICINE

## 2020-06-26 PROCEDURE — 86140 C-REACTIVE PROTEIN: CPT | Performed by: INTERNAL MEDICINE

## 2020-06-30 ENCOUNTER — TELEPHONE (OUTPATIENT)
Dept: CARDIOLOGY | Facility: CLINIC | Age: 80
End: 2020-06-30

## 2020-06-30 NOTE — TELEPHONE ENCOUNTER
Health Call Center    Phone Message    May a detailed message be left on voicemail: no     Reason for Call: Requesting Results   Name/type of test: Labs  Date of test: 6/26/20  Was test done at a location other than [facility] (Please fill in the location if not [facility])?: No  Comments: Pt wants the results mailed out to her at her address; she asks for it today. Pt wants a call back to discuss what the results mean.      Action Taken: Message routed to:  [facility] ([facility]    Travel Screening: Not Applicable

## 2020-06-30 NOTE — TELEPHONE ENCOUNTER
I called the pt and reviewed the labs with her and also let her know that they are all within her normal limits.  A letter with the results have been sent to the pt.  Saritha Liriano RN on 6/30/2020 at 10:46 AM

## 2020-07-13 ENCOUNTER — TRANSFERRED RECORDS (OUTPATIENT)
Dept: HEALTH INFORMATION MANAGEMENT | Facility: CLINIC | Age: 80
End: 2020-07-13

## 2020-07-13 LAB
ALT SERPL-CCNC: 17 IU/L (ref 5–35)
AST SERPL-CCNC: 29 U/L (ref 5–34)
CREAT SERPL-MCNC: 0.78 MG/DL (ref 0.5–1.3)

## 2020-07-24 DIAGNOSIS — J84.9 ILD (INTERSTITIAL LUNG DISEASE) (H): Primary | ICD-10-CM

## 2020-07-27 RX ORDER — ALBUTEROL SULFATE 90 UG/1
AEROSOL, METERED RESPIRATORY (INHALATION)
Qty: 18 G | Refills: 11 | Status: SHIPPED | OUTPATIENT
Start: 2020-07-27 | End: 2021-01-08

## 2020-08-20 ENCOUNTER — OFFICE VISIT (OUTPATIENT)
Dept: FAMILY MEDICINE | Facility: CLINIC | Age: 80
End: 2020-08-20
Payer: MEDICARE

## 2020-08-20 VITALS
DIASTOLIC BLOOD PRESSURE: 49 MMHG | WEIGHT: 157.3 LBS | HEIGHT: 62 IN | BODY MASS INDEX: 28.95 KG/M2 | HEART RATE: 92 BPM | TEMPERATURE: 97.5 F | SYSTOLIC BLOOD PRESSURE: 75 MMHG | OXYGEN SATURATION: 96 %

## 2020-08-20 DIAGNOSIS — Z01.818 PRE-OP EXAM: Primary | ICD-10-CM

## 2020-08-20 DIAGNOSIS — H25.9 SENILE CATARACT OF LEFT EYE, UNSPECIFIED AGE-RELATED CATARACT TYPE: ICD-10-CM

## 2020-08-20 PROCEDURE — 99214 OFFICE O/P EST MOD 30 MIN: CPT | Performed by: INTERNAL MEDICINE

## 2020-08-20 ASSESSMENT — MIFFLIN-ST. JEOR: SCORE: 1136.76

## 2020-08-20 NOTE — PROGRESS NOTES
[address] [address]  [phone]  Dept: [phone]    PRE-OP EVALUATION:  Today's date: 10/28/2019    Jaqueline Canales (: 1940) presents for pre-operative evaluation assessment as requested by Dr. Mejia.  She requires evaluation and anesthesia risk assessment prior to undergoing surgery/procedure for treatment of cataracts.    Proposed Surgery/ Procedure: left eye cataract surgery  Date of Surgery/ Procedure: TBD  Time of Surgery/ Procedure: TBD  Hospital/Surgical Facility: [facility]  Fax number for surgical facility: [phone]    Primary Physician: Carissa Burroughs  Type of Anesthesia Anticipated: Local with MAC    Patient has a Health Care Directive or Living Will:  YES    1.NO - DO YOU HAVE A HISTORY OF HEART ATTACK, STROKE, STENT, BYPASS OR SURGERY ON AN ARTERY IN THE HEAD, NECK, HEART OR LEG?   2. NO - Do you ever have any pain or discomfort in your chest?  3. NO - DO YOU HAVE A HISTORY OF HEART FAILURE   4. NO - ARE YOUR TROUBLED BY SHORTNESS OF BREATH WHEN WALKING ON THE LEVEL, UP A SLIGHT HILL OR AT NIGHT?  5. NO - Do you currently have a cold, bronchitis or other respiratory infection?  6. NO - DO YOU HAVE A COUGH, SHORTNESS OF BREATH OR WHEEZING?   7. NO - Do you sometimes get pains in the calves of your legs when you walk?  8. NO - Do you or anyone in your family have previous history of blood clots?  9. NO - Do you or does anyone in your family have a serious bleeding problem such as prolonged bleeding following surgeries or cuts?  10. NO - Have you ever had problems with anemia or been told to take iron pills?  11. NO - Have you had any abnormal blood loss such as black, tarry or bloody stools, or abnormal vaginal bleeding?  12. NO - Have you ever had a blood transfusion?  13. NO - Have you or any of your relatives ever had problems with anesthesia?  14. NO - DO YOU HAVE SLEEP APNEA, EXCESSIVE SNORING OR DAYTIME  DROWSINESS?   15. NO - Do you have any prosthetic heart valves?  16. NO - Do you have prosthetic joints?  17. NO - Is there any chance that you may be pregnant?      HPI:     HPI related to upcoming procedure: patient Jaqueline Canales is a very pleasant 78 year old female with bilateral cataracts who presents to internal medicine clinic for a pre op cardiac evaluation for upcoming left eye cataract surgery for treatment of chronic left cataract. Patient denies any chest pain, headaches, fever or chills. Patient denies any known allergies to anesthesia agents. Patient is currently on Eliquis long term anticoagulation therapy medication for atrial fibrillation.      MEDICAL HISTORY:     Patient Active Problem List    Diagnosis Date Noted     Chronic diastolic heart failure (H) 06/15/2020     Priority: Medium     Lymphedema of both lower extremities 12/06/2019     Priority: Medium     Hypoxia 12/06/2019     Priority: Medium     Physical deconditioning 12/06/2019     Priority: Medium     Acute CHF (congestive heart failure) (H) 11/25/2019     Priority: Medium     Benign essential hypertension 06/24/2019     Priority: Medium     Adenomatous polyp 06/24/2019     Priority: Medium     Status post coronary angiogram 04/15/2019     Priority: Medium     Pulmonary hypertension (H) 04/02/2019     Priority: Medium     Added automatically from request for surgery [id number]       Morbid obesity (H) 10/08/2018     Priority: Medium     Chest pain 10/22/2016     Priority: Medium     Persistent atrial fibrillation (H) 08/25/2016     Priority: Medium     Malignant neoplasm of lower lobe of left lung (H) 02/02/2016     Priority: Medium     Lung nodule 11/23/2015     Priority: Medium     Low back pain 07/01/2015     Priority: Medium     Diagnosis updated by automated process. Provider to review and confirm.       IVCD (intraventricular conduction defect) 06/03/2013     Priority: Medium     Esophageal stricture 10/25/2012     Priority: Medium      Hiatal hernia 10/25/2012     Priority: Medium     Hypothyroidism 09/18/2012     Priority: Medium     S/P carotid endarterectomy 02/06/2012     Priority: Medium     Right 2011       Pulmonary fibrosis (H) 02/06/2012     Priority: Medium     secodary to RA, Dr Lennox [place] LUNG       SRINIVAS (obstructive sleep apnea) 02/06/2012     Priority: Medium     On cpap, KAROL[facility] Tsang [facility]       Constipation 02/06/2012     Priority: Medium     Hyperlipidemia LDL goal <100 11/06/2011     Priority: Medium     Diverticulosis 10/19/2011     Priority: Medium     Advance Care Planning 10/17/2011     Priority: Medium     Advance Care Planning 7/13/2016: Receipt of ACP document:  Received: Health Care Directive which was witnessed or notarized on 6-30-16.  Document previously scanned on 7-8-16.  Validation form completed and sent to be scanned.  Code Status reflects choices in most recent ACP document.  Confirmed/documented designated decision maker(s).  Added by Coral Stover RN, System Director ACP-Honoring Choices   Advance Directive Initial Visit 10/17/11 Jaqueline Canales presents in person for initial session regarding completion of advanced directive form. She was referred to the facilitator by provider.  She currently has no advance directive.Plan:  Advanced directive form, healthcare agent information card, advanced care planning information booklet provided to patient. Follow up meeting: pt will call when ready to schedule a follow up. Patient instructed to bring healthcare agent to this meeting. 1st visit. DESI Jacob LPN           [facility] [place] 06/27/2011     Priority: Medium     Care Coordination services declined 6/27/2011.    EMERGENCY CARE PLAN  Presenting Problem Signs and Symptoms Treatment Plan    Questions or conerns during clinic hours    I will call the clinic directly     Questions or conerns outside clinic hours    I will call the 24 hour nurse line at [phone]    Patient needs to schedule an appointment     I will call the 24 hour scheduling team at [phone] or clinic directly    Same day treatment     I will call the clinic first, nurse line if after hours, urgent care and express care if needed                        Esthela Marshall, RN, BSN  Care Coordinator  DX V65.8 REPLACED WITH [id number] (04/08/2013)       Amaurosis fugax of right eye 05/26/2011     Priority: Medium     Scleroderma (H) 10/05/2010     Priority: Medium     RA (rheumatoid arthritis) (H) 02/08/2010     Priority: Medium     Dr Natalie Garcia Rheumatology       Malignant neoplasm of female breast (H) 04/17/2007     Priority: Medium     Problem list name updated by automated process. Provider to review       Helicobacter pylori infection 07/11/2006     Priority: Medium     Problem list name updated by automated process. Provider to review       Raynaud's syndrome 04/10/2006     Priority: Medium     Lumbago 12/15/2005     Priority: Medium     Other psoriasis 03/11/2004     Priority: Medium     Obesity 03/11/2004     Priority: Medium     Problem list name updated by automated process. Provider to review       Osteoporosis 03/11/2004     Priority: Medium     Reclast annually  Problem list name updated by automated process. Provider to review       Esophageal reflux 03/11/2004     Priority: Medium      Past Medical History:   Diagnosis Date     Amaurosis fugax 5-11    Right     Carotid artery stenosis      Esophageal reflux 3/11/2004     HELICOBACTER PYLORI INFECTION 7/11/2006     hx of CA in situ RT breast-1990.mastectomy 4/17/2007     Hyperlipidemia LDL goal <70 6/20/2011     Lung cancer (H)     squamous cell lung ca left lower lobe     OBESITY NOS 3/11/2004     OSTEOPOROSIS NOS 3/11/2004     Other psoriasis 3/11/2004     Pulmonary hypertension (H) 04/15/2019    Right heart catheterization demonstrated moderate pulmonary arterial hypertension with a mean PA pressure of 34 mmHg, no reversibility with inhaled nitric oxide, elevated  right and left-sided filling pressures, low normal cardiac index of 2.1.  Seen by Dr. Shay Marquez.  Started on sildenafil (Revatio) 20 mg 3 times daily.     RA (rheumatoid arthritis) (H) 2/8/2010     Raynaud's syndrome 4/10/2006     Scleroderma (H)      Sleep apnea     CPAP     Past Surgical History:   Procedure Laterality Date     BREAST SURGERY       C NONSPECIFIC PROCEDURE  1990    mastectomy RT breast     COLONOSCOPY  5/24/16     COLONOSCOPY N/A 6/28/2019    Procedure: Colonoscopy, With Polypectomy And Biopsy;  Surgeon: Neto Kaminski MD;  Location:  GI     CONIZATION       CV RIGHT HEART CATH N/A 4/15/2019    Procedure: Heart Cath Right Heart Cath;  Surgeon: Naresh Cyr MD;  Location:  HEART CARDIAC CATH LAB     ENDARTERECTOMY CAROTID  2011     HC COLONOSCOPY THRU STOMA, DIAGNOSTIC  2001 2011    diverticulosis     REPAIR HAMMER TOE       TONSILLECTOMY       Current Outpatient Medications   Medication Sig Dispense Refill     acetaminophen (TYLENOL) 500 MG tablet Take 1,000 mg by mouth 2 times daily        albuterol (PROAIR HFA/PROVENTIL HFA/VENTOLIN HFA) 108 (90 Base) MCG/ACT inhaler USE 2 INHALATIONS ORALLY   INTO THE LUNGS EVERY 6     HOURS 18 g 11     Bacillus Coagulans-Inulin (PROBIOTIC-PREBIOTIC PO) Take 1 chew tab by mouth 2 times daily        ELIQUIS ANTICOAGULANT 5 MG tablet TAKE 1 TABLET TWICE A  tablet 2     folic acid (FOLVITE) 1 MG tablet Take 1 tablet (1,000 mcg) by mouth daily 90 tablet 3     InFLIXimab (REMICADE IV) Inject 400 mg into the vein Every 8 weeks       levothyroxine (SYNTHROID/LEVOTHROID) 75 MCG tablet Take 1 tablet (75 mcg) by mouth daily 90 tablet 2     melatonin 5 MG tablet Take 5 mg by mouth nightly as needed for sleep       Methotrexate Sodium (METHOTREXATE PO) Take 2.5 mg by mouth 10 tablets weekly - Wednesdays       metoprolol succinate ER (TOPROL-XL) 25 MG 24 hr tablet TAKE 1 TABLET DAILY 90 tablet 2     Multiple Vitamins-Minerals (ICAPS) CAPS Take  1 capsule by mouth 2 times daily        multivitamin (CENTRUM SILVER) tablet Take 1 tablet by mouth daily       omeprazole (PRILOSEC) 20 MG DR capsule TAKE 1 CAPSULE TWICE DAILY,30-60 MINUTES PRIOR TO     MEALS 180 capsule 2     order for DME Equipment being ordered: 4 wheeled walker with seat 1 each 3     potassium chloride ER (KLOR-CON M) 20 MEQ CR tablet Take 1 tablet (20 mEq) by mouth daily 90 tablet 3     psyllium (METAMUCIL/KONSYL) capsule Take 1 capsule by mouth daily       sildenafil (REVATIO) 20 MG tablet Take 1 tablet (20 mg) by mouth 3 times daily 90 tablet 11     simvastatin (ZOCOR) 40 MG tablet Take 1 tablet (40 mg) by mouth At Bedtime 90 tablet 3     Skin Protectants, Misc. (EUCERIN) cream Apply topically 2 times daily Apply to areas dry skin topically two times a day for dry skin and Apply to areas of dry skin topically as needed for dry skin daily       spironolactone (ALDACTONE) 25 MG tablet Take 1 tablet (25 mg) by mouth daily 90 tablet 1     torsemide (DEMADEX) 20 MG tablet Take 2 tablets (40 mg) by mouth daily Take 40mg q am 180 tablet 3     triamcinolone (KENALOG) 0.1 % external cream Apply topically 2 times daily 30 g 3     Vitamin D, Cholecalciferol, 10 MCG (400 UNIT) TABS Take 1,600 Units by mouth daily       Prednisolon-Gatiflox-Bromfenac 1-0.5-0.075 % SUSP Place 1 drop into the right eye daily       OTC products: None, except as noted above    Allergies   Allergen Reactions     No Known Allergies       Latex Allergy: NO    Social History     Tobacco Use     Smoking status: Former Smoker     Packs/day: 1.00     Years: 30.00     Pack years: 30.00     Types: Cigarettes     Start date:      Last attempt to quit: 3/11/1992     Years since quittin.4     Smokeless tobacco: Never Used   Substance Use Topics     Alcohol use: Not Currently     Alcohol/week: 0.0 standard drinks     Frequency: Never     History   Drug Use No       REVIEW OF SYSTEMS:   Constitutional, neuro, ENT, endocrine,  "pulmonary, cardiac, gastrointestinal, genitourinary, musculoskeletal, integument and psychiatric systems are negative, except as otherwise noted.    EXAM:   BP (!) 75/49 (BP Location: Left arm, Patient Position: Sitting, Cuff Size: Adult Regular)   Pulse 92   Temp 97.5  F (36.4  C) (Temporal)   Ht 1.575 m (5' 2\")   Wt 71.4 kg (157 lb 4.8 oz)   SpO2 96%   BMI 28.77 kg/m      GENERAL APPEARANCE: alert and no distress     EYES: EOMI, PERRL, left eye cataract present     HENT: ear canals and TM's normal and nose and mouth without ulcers or lesions     NECK: no adenopathy, no asymmetry, masses, or scars and thyroid normal to palpation     RESP: lungs clear to auscultation - no rales, rhonchi or wheezes     CV: regular rates and rhythm, normal S1 S2, no S3 or S4 and no murmur, click or rub     ABDOMEN:  soft, nontender, no HSM or masses and bowel sounds normal     MS: extremities normal- no gross deformities noted, no evidence of inflammation in joints, FROM in all extremities.     SKIN: no suspicious lesions or rashes     NEURO: Normal strength and tone, sensory exam grossly normal, mentation intact and speech normal     PSYCH: mentation appears normal. and affect normal/bright     LYMPHATICS: No cervical adenopathy    DIAGNOSTICS:   No labs or EKG required for low risk surgery (cataract, skin procedure, breast biopsy, etc)    Recent Labs     Lab Results   Component Value Date    WBC 6.3 06/26/2020     Lab Results   Component Value Date    RBC 3.76 06/26/2020     Lab Results   Component Value Date    HGB 11.7 06/26/2020     Lab Results   Component Value Date    HCT 36.7 06/26/2020     Lab Results   Component Value Date    MCV 98 06/26/2020     Lab Results   Component Value Date    MCH 31.1 06/26/2020     Lab Results   Component Value Date    MCHC 31.9 06/26/2020     Lab Results   Component Value Date    RDW 15.4 06/26/2020     Lab Results   Component Value Date     06/26/2020     Last Comprehensive Metabolic " Panel:  Sodium   Date Value Ref Range Status   06/26/2020 135 133 - 144 mmol/L Final     Potassium   Date Value Ref Range Status   06/26/2020 4.0 3.4 - 5.3 mmol/L Final     Chloride   Date Value Ref Range Status   06/26/2020 100 94 - 109 mmol/L Final     Carbon Dioxide   Date Value Ref Range Status   06/26/2020 29 20 - 32 mmol/L Final     Anion Gap   Date Value Ref Range Status   06/26/2020 6 3 - 14 mmol/L Final     Glucose   Date Value Ref Range Status   06/26/2020 96 70 - 99 mg/dL Final     Urea Nitrogen   Date Value Ref Range Status   06/26/2020 26 7 - 30 mg/dL Final     Creatinine   Date Value Ref Range Status   07/13/2020 0.780 0.500 - 1.300 mg/dL Final     GFR Estimate   Date Value Ref Range Status   06/26/2020 61 >60 mL/min/[1.73_m2] Final     Comment:     Non  GFR Calc  Starting 12/18/2018, serum creatinine based estimated GFR (eGFR) will be   calculated using the Chronic Kidney Disease Epidemiology Collaboration   (CKD-EPI) equation.       Calcium   Date Value Ref Range Status   06/26/2020 10.3 (H) 8.5 - 10.1 mg/dL Final       IMPRESSION:   Reason for surgery/procedure: chronic left eye cataract  Diagnosis/reason for consult: pre op cardiac evaluation for upcoming left cataract surgery for treatment of chronic left eye cataract.    The proposed surgical procedure is considered LOW risk.    REVISED CARDIAC RISK INDEX  The patient has the following serious cardiovascular risks for perioperative complications such as (MI, PE, VFib and 3  AV Block):  No serious cardiac risks  INTERPRETATION: 0 risks: Class I (very low risk - 0.4% complication rate)    The patient has the following additional risks for perioperative complications:  No identified additional risks      ICD-10-CM    1. Pre-op exam  Z01.818    2. Senile cataract of left eye, unspecified age-related cataract type  H25.9        RECOMMENDATIONS:     --Patient is to take all scheduled medications on the day of surgery EXCEPT for  modifications listed below.    Anticoagulant or Antiplatelet Medication Use  ELIQUIS: Bleeding risk is low for this procedure and apixaban (Eliquis) should be stopped at least 24 hours prior to procedure based on a creatinine clearance of  greater than or equal to 30.        APPROVAL GIVEN to proceed with proposed procedure, without further diagnostic evaluation       Signed Electronically by: Carissa Burroughs MD    Copy of this evaluation report is provided to requesting physician.    [place] Preop Guidelines    Revised Cardiac Risk Index

## 2020-08-27 ENCOUNTER — OFFICE VISIT (OUTPATIENT)
Dept: FAMILY MEDICINE | Facility: CLINIC | Age: 80
End: 2020-08-27
Payer: MEDICARE

## 2020-08-27 VITALS
HEART RATE: 58 BPM | SYSTOLIC BLOOD PRESSURE: 106 MMHG | OXYGEN SATURATION: 93 % | TEMPERATURE: 97.3 F | DIASTOLIC BLOOD PRESSURE: 62 MMHG | WEIGHT: 155.6 LBS | BODY MASS INDEX: 28.63 KG/M2 | HEIGHT: 62 IN

## 2020-08-27 DIAGNOSIS — E78.5 HYPERLIPIDEMIA LDL GOAL <100: ICD-10-CM

## 2020-08-27 DIAGNOSIS — I50.33 ACUTE ON CHRONIC DIASTOLIC HEART FAILURE (H): Primary | ICD-10-CM

## 2020-08-27 DIAGNOSIS — I27.20 PULMONARY HTN (H): ICD-10-CM

## 2020-08-27 DIAGNOSIS — I89.0 LYMPHEDEMA: ICD-10-CM

## 2020-08-27 PROCEDURE — 99214 OFFICE O/P EST MOD 30 MIN: CPT | Performed by: INTERNAL MEDICINE

## 2020-08-27 ASSESSMENT — MIFFLIN-ST. JEOR: SCORE: 1129.05

## 2020-08-27 NOTE — PROGRESS NOTES
Chief Complaint:       Jaqueline Canales is a 79 year old female who presents to clinic today for the following health issues:      follow up CHF, hyperlipidemia, pulmonary hypertension       HPI:   Patient Jaqueline Canales is a very pleasant 79 year old female with history of CHF, pulmonary hypertension who presents to Internal Medicine clinic today for 1 month follow up of recent actue on chronic CHF exacerbation symptms. Patient's weight has been stable. No recent significant weight gain since previous 20 lbs weight loss with hospitalization diuresis treatment. Patient is compliant with her daily diuretic therapy with Torsemide and Aldactone. Previous hypoxia symptoms have resolved with diuresis treatment. no chest pains. No signs of current further acute CHF exacerbation symptoms at this time. Leg lymphedema symptoms have been stable. Patient is scheduled for follow up with CHF clinic at the [facility] Cardiology clinic in [place] going forward. No fever or chills.      Current Medications:     Current Outpatient Medications   Medication Sig Dispense Refill     acetaminophen (TYLENOL) 500 MG tablet Take 1,000 mg by mouth 2 times daily        albuterol (PROAIR HFA/PROVENTIL HFA/VENTOLIN HFA) 108 (90 Base) MCG/ACT inhaler USE 2 INHALATIONS ORALLY   INTO THE LUNGS EVERY 6     HOURS 18 g 11     Bacillus Coagulans-Inulin (PROBIOTIC-PREBIOTIC PO) Take 1 chew tab by mouth 2 times daily        ELIQUIS ANTICOAGULANT 5 MG tablet TAKE 1 TABLET TWICE A  tablet 2     folic acid (FOLVITE) 1 MG tablet Take 1 tablet (1,000 mcg) by mouth daily 90 tablet 3     InFLIXimab (REMICADE IV) Inject 400 mg into the vein Every 8 weeks       levothyroxine (SYNTHROID/LEVOTHROID) 75 MCG tablet Take 1 tablet (75 mcg) by mouth daily 90 tablet 2     melatonin 5 MG tablet Take 5 mg by mouth nightly as needed for sleep       Methotrexate Sodium (METHOTREXATE PO) Take 2.5 mg by mouth 10 tablets weekly - Wednesdays       metoprolol succinate ER  (TOPROL-XL) 25 MG 24 hr tablet TAKE 1 TABLET DAILY 90 tablet 2     Multiple Vitamins-Minerals (ICAPS) CAPS Take 1 capsule by mouth 2 times daily        multivitamin (CENTRUM SILVER) tablet Take 1 tablet by mouth daily       omeprazole (PRILOSEC) 20 MG DR capsule TAKE 1 CAPSULE TWICE DAILY,30-60 MINUTES PRIOR TO     MEALS 180 capsule 2     order for DME Equipment being ordered: 4 wheeled walker with seat 1 each 3     potassium chloride ER (KLOR-CON M) 20 MEQ CR tablet Take 1 tablet (20 mEq) by mouth daily 90 tablet 3     Prednisolon-Gatiflox-Bromfenac 1-0.5-0.075 % SUSP Place 1 drop into the right eye daily       psyllium (METAMUCIL/KONSYL) capsule Take 1 capsule by mouth daily       sildenafil (REVATIO) 20 MG tablet Take 1 tablet (20 mg) by mouth 3 times daily 90 tablet 11     simvastatin (ZOCOR) 40 MG tablet Take 1 tablet (40 mg) by mouth At Bedtime 90 tablet 3     Skin Protectants, Misc. (EUCERIN) cream Apply topically 2 times daily Apply to areas dry skin topically two times a day for dry skin and Apply to areas of dry skin topically as needed for dry skin daily       spironolactone (ALDACTONE) 25 MG tablet Take 1 tablet (25 mg) by mouth daily 90 tablet 1     torsemide (DEMADEX) 20 MG tablet Take 2 tablets (40 mg) by mouth daily Take 40mg q am 180 tablet 3     triamcinolone (KENALOG) 0.1 % external cream Apply topically 2 times daily 30 g 3     Vitamin D, Cholecalciferol, 10 MCG (400 UNIT) TABS Take 1,600 Units by mouth daily           Allergies:      Allergies   Allergen Reactions     No Known Allergies             Past Medical History:     Past Medical History:   Diagnosis Date     Amaurosis fugax 5-11    Right     Carotid artery stenosis      Esophageal reflux 3/11/2004     HELICOBACTER PYLORI INFECTION 7/11/2006     hx of CA in situ RT breast-1990.mastectomy 4/17/2007     Hyperlipidemia LDL goal <70 6/20/2011     Lung cancer (H)     squamous cell lung ca left lower lobe     OBESITY NOS 3/11/2004      OSTEOPOROSIS NOS 3/11/2004     Other psoriasis 3/11/2004     Pulmonary hypertension (H) 04/15/2019    Right heart catheterization demonstrated moderate pulmonary arterial hypertension with a mean PA pressure of 34 mmHg, no reversibility with inhaled nitric oxide, elevated right and left-sided filling pressures, low normal cardiac index of 2.1.  Seen by Dr. Shay Marquez.  Started on sildenafil (Revatio) 20 mg 3 times daily.     RA (rheumatoid arthritis) (H) 2010     Raynaud's syndrome 4/10/2006     Scleroderma (H)      Sleep apnea     CPAP         Past Surgical History:     Past Surgical History:   Procedure Laterality Date     BREAST SURGERY       C NONSPECIFIC PROCEDURE      mastectomy RT breast     COLONOSCOPY  16     COLONOSCOPY N/A 2019    Procedure: Colonoscopy, With Polypectomy And Biopsy;  Surgeon: Neto Kaminski MD;  Location:  GI     CONIZATION       CV RIGHT HEART CATH N/A 4/15/2019    Procedure: Heart Cath Right Heart Cath;  Surgeon: Naresh Cyr MD;  Location:  HEART CARDIAC CATH LAB     ENDARTERECTOMY CAROTID       HC COLONOSCOPY THRU STOMA, DIAGNOSTIC  2001    diverticulosis     REPAIR HAMMER TOE       TONSILLECTOMY           Family Medical History:     Family History   Problem Relation Age of Onset     Cancer - colorectal Mother      Cerebrovascular Disease Father      Cancer Sister 31        ovarian     Heart Disease Sister      Gastrointestinal Disease Sister         crohns     Gastrointestinal Disease Sister         diverticulitis     Gastrointestinal Disease Brother         diverticulitis     Alzheimer Disease Sister      Cerebrovascular Disease Maternal Grandmother      Diabetes Maternal Grandmother      Mental Illness Maternal Grandmother      Diabetes Maternal Uncle      Cancer Nephew                  Social History:     Social History     Socioeconomic History     Marital status:      Spouse name: Not on file     Number of  children: Not on file     Years of education: Not on file     Highest education level: Not on file   Occupational History     Not on file   Social Needs     Financial resource strain: Not on file     Food insecurity     Worry: Not on file     Inability: Not on file     Transportation needs     Medical: Not on file     Non-medical: Not on file   Tobacco Use     Smoking status: Former Smoker     Packs/day: 1.00     Years: 30.00     Pack years: 30.00     Types: Cigarettes     Start date:      Last attempt to quit: 3/11/1992     Years since quittin.4     Smokeless tobacco: Never Used   Substance and Sexual Activity     Alcohol use: Not Currently     Alcohol/week: 0.0 standard drinks     Frequency: Never     Drug use: No     Sexual activity: Not Currently     Partners: Male   Lifestyle     Physical activity     Days per week: Not on file     Minutes per session: Not on file     Stress: Not on file   Relationships     Social connections     Talks on phone: Not on file     Gets together: Not on file     Attends Jew service: Not on file     Active member of club or organization: Not on file     Attends meetings of clubs or organizations: Not on file     Relationship status: Not on file     Intimate partner violence     Fear of current or ex partner: Not on file     Emotionally abused: Not on file     Physically abused: Not on file     Forced sexual activity: Not on file   Other Topics Concern     Parent/sibling w/ CABG, MI or angioplasty before 65F 55M? No   Social History Narrative     Not on file           Review of System:     Constitutional: Negative for fever or chills  Skin: Negative for rashes  Ears/Nose/Throat: Negative for nasal congestion, sore throat  Respiratory: No shortness of breath, dyspnea on exertion, cough, or hemoptysis  Cardiovascular: Negative for chest pain  Gastrointestinal: Negative for nausea, vomiting, positive for chronic GERD  Genitourinary: Negative for dysuria,  "hematuria  Musculoskeletal: Negative for myalgias  Neurologic: Negative for headaches  Psychiatric: Negative for depression, anxiety  Hematologic/Lymphatic/Immunologic: positive for improvement in chronic bilateral leg edema symptoms with recent diuresis treatment  Endocrine: Negative  Behavioral: Negative for tobacco use       Physical Exam:   /62 (BP Location: Left arm, Patient Position: Sitting, Cuff Size: Adult Regular)   Pulse 58   Temp 97.3  F (36.3  C) (Temporal)   Ht 1.575 m (5' 2\")   Wt 70.6 kg (155 lb 9.6 oz)   SpO2 93%   BMI 28.46 kg/m      GENERAL: chronically ill appearing elderly female, alert and no distress  EYES: eyes grossly normal to inspection, and conjunctivae and sclerae normal  HENT: Normocephalic atraumatic. Nose and mouth without ulcers or lesions  NECK: supple  RESP: lungs clear to auscultation   CV: regular rate and rhythm, normal S1 S2  LYMPH: further mild improvement in chronic bilateral lower leg peripheral edema symptoms present when compared to prior baseline  ABDOMEN: nondistended  MS: no gross musculoskeletal defects noted  SKIN: no suspicious lesions or rashes  NEURO: Alert & Oriented x 3.   PSYCH: mentation appears normal, affect normal        Diagnostic Test Results:     Last Comprehensive Metabolic Panel:  Sodium   Date Value Ref Range Status   06/26/2020 135 133 - 144 mmol/L Final     Potassium   Date Value Ref Range Status   06/26/2020 4.0 3.4 - 5.3 mmol/L Final     Chloride   Date Value Ref Range Status   06/26/2020 100 94 - 109 mmol/L Final     Carbon Dioxide   Date Value Ref Range Status   06/26/2020 29 20 - 32 mmol/L Final     Anion Gap   Date Value Ref Range Status   06/26/2020 6 3 - 14 mmol/L Final     Glucose   Date Value Ref Range Status   06/26/2020 96 70 - 99 mg/dL Final     Urea Nitrogen   Date Value Ref Range Status   06/26/2020 26 7 - 30 mg/dL Final     Creatinine   Date Value Ref Range Status   07/13/2020 0.780 0.500 - 1.300 mg/dL Final     GFR " Estimate   Date Value Ref Range Status   06/26/2020 61 >60 mL/min/[1.73_m2] Final     Comment:     Non  GFR Calc  Starting 12/18/2018, serum creatinine based estimated GFR (eGFR) will be   calculated using the Chronic Kidney Disease Epidemiology Collaboration   (CKD-EPI) equation.       Calcium   Date Value Ref Range Status   06/26/2020 10.3 (H) 8.5 - 10.1 mg/dL Final         ASSESSMENT/PLAN:   (I27.20) Pulmonary hypertension (H)  (I89.0) Lymphedema of both lower extremities  (I50.33) Acute on chronic diastolic congestive heart failure (H)  (primary encounter diagnosis)  Comment: routine follow up of recent actue on chronic CHF exacerbation symptms. Patient's weight has stayed stable with current diuretic therapy and low salt diet. Patient is compliant with her daily diuretic therapy with Torsemide and Aldactone. Previous hypoxia symptoms have resolved with diuresis treatment. no chest pains. No signs of current further acute CHF exacerbation symptoms at this time. Leg lymphedema symptoms have been stable.  Plan: continue patient's current torsemide (DEMADEX) 20 MG tablet,         spironolactone (ALDACTONE) 25 MG tablet diuretic medications at this time. continue to follow up with CHF clinic at the [email or web address]iology clinic in [place] going forward. NUTRITION REFERRAL for dietary counseling of low salt diet      (E78.5) Hyperlipidemia LDL goal <100  Comment: stable.  Plan: continue current therapy          Follow Up Plan:     Patient is instructed to return to Internal Medicine clinic for follow-up visit in 1 month.        Carissa Burroughs MD  Internal Medicine  [facility]

## 2020-09-04 ENCOUNTER — OFFICE VISIT (OUTPATIENT)
Dept: PULMONOLOGY | Facility: CLINIC | Age: 80
End: 2020-09-04
Attending: INTERNAL MEDICINE
Payer: MEDICARE

## 2020-09-04 VITALS
BODY MASS INDEX: 28.52 KG/M2 | WEIGHT: 155 LBS | HEIGHT: 62 IN | DIASTOLIC BLOOD PRESSURE: 70 MMHG | HEART RATE: 83 BPM | OXYGEN SATURATION: 99 % | RESPIRATION RATE: 17 BRPM | SYSTOLIC BLOOD PRESSURE: 105 MMHG

## 2020-09-04 DIAGNOSIS — J84.9 ILD (INTERSTITIAL LUNG DISEASE) (H): Primary | ICD-10-CM

## 2020-09-04 DIAGNOSIS — J84.9 ILD (INTERSTITIAL LUNG DISEASE) (H): ICD-10-CM

## 2020-09-04 LAB
6 MIN WALK (FT): 650 FT
6 MIN WALK (M): 198 M
DLCOUNC-%PRED-PRE: 45 %
DLCOUNC-PRE: 7.91 ML/MIN/MMHG
DLCOUNC-PRED: 17.54 ML/MIN/MMHG
ERV-%PRED-PRE: 113 %
ERV-PRE: 0.45 L
ERV-PRED: 0.39 L
EXPTIME-PRE: 4.09 SEC
FEF2575-%PRED-PRE: 161 %
FEF2575-PRE: 2.4 L/SEC
FEF2575-PRED: 1.49 L/SEC
FEFMAX-%PRED-PRE: 104 %
FEFMAX-PRE: 4.68 L/SEC
FEFMAX-PRED: 4.49 L/SEC
FEV1-%PRED-PRE: 77 %
FEV1-PRE: 1.4 L
FEV1FEV6-PRE: 92 %
FEV1FEV6-PRED: 78 %
FEV1FVC-PRE: 92 %
FEV1FVC-PRED: 77 %
FEV1SVC-PRE: 90 %
FEV1SVC-PRED: 70 %
FIFMAX-PRE: 4.01 L/SEC
FVC-%PRED-PRE: 64 %
FVC-PRE: 1.52 L
FVC-PRED: 2.37 L
IC-%PRED-PRE: 50 %
IC-PRE: 1.11 L
IC-PRED: 2.2 L
VA-%PRED-PRE: 61 %
VA-PRE: 2.59 L
VC-%PRED-PRE: 60 %
VC-PRE: 1.56 L
VC-PRED: 2.6 L

## 2020-09-04 PROCEDURE — G0463 HOSPITAL OUTPT CLINIC VISIT: HCPCS | Mod: 25,ZF

## 2020-09-04 ASSESSMENT — PAIN SCALES - GENERAL: PAINLEVEL: NO PAIN (0)

## 2020-09-04 ASSESSMENT — MIFFLIN-ST. JEOR: SCORE: 1126.33

## 2020-09-04 NOTE — LETTER
9/4/2020     RE: Jaqueline Canales  [address] [facility] [address]    Dear Colleague,    Thank you for referring your patient, Jaqueline Canales, to the [facility] FOR LUNG SCIENCE AND [facility]. Please see a copy of my visit note below.    Reason for Visit  Jaqueline Canales is a 79 year old year old female who is being seen for RECHECK (CTD/ILD )    ILD HPI  Jaqueline Canales is an 79-year-old female with a history of rheumatoid arthritis associated interstitial lung disease who is seen today for follow-up.  I have not seen the patient in person in over a year.  She has history of rheumatoid arthritis on 25 mg of methotrexate weekly and Remicade infusions.  We have been following her interstitial lung disease on this regimen and it has been relatively stable.  She also has some cardiac issues including moderate to severe tricuspid regurgitation and pulmonary hypertension.  A right heart cath in 2019 showed a mean PA pressure of 35 although her wedge pressure was 20 but she had significant pulmonary vascular resistance as well.  She returns clinic today overall stating that overall she feels relatively well from a respiratory standpoint.  She has obtained a walker and she feels that this has significantly helped her mobility.  She is able to move around with a walker and denies significant shortness of breath or dyspnea on exertion.  She feels her joint symptoms are under relatively good control with the current regimen although she does still have some stiffness and occasional joint pain in her hands.  Overall feels that she is doing well and certainly has acceptable quality of life on her current regimen.      Current Outpatient Medications   Medication     acetaminophen (TYLENOL) 500 MG tablet     albuterol (PROAIR HFA/PROVENTIL HFA/VENTOLIN HFA) 108 (90 Base) MCG/ACT inhaler     Bacillus Coagulans-Inulin (PROBIOTIC-PREBIOTIC PO)     ELIQUIS ANTICOAGULANT 5 MG tablet     folic acid (FOLVITE) 1 MG  tablet     InFLIXimab (REMICADE IV)     levothyroxine (SYNTHROID/LEVOTHROID) 75 MCG tablet     melatonin 5 MG tablet     Methotrexate Sodium (METHOTREXATE PO)     metoprolol succinate ER (TOPROL-XL) 25 MG 24 hr tablet     Multiple Vitamins-Minerals (ICAPS) CAPS     multivitamin (CENTRUM SILVER) tablet     omeprazole (PRILOSEC) 20 MG DR capsule     order for DME     potassium chloride ER (KLOR-CON M) 20 MEQ CR tablet     Prednisolon-Gatiflox-Bromfenac 1-0.5-0.075 % SUSP     psyllium (METAMUCIL/KONSYL) capsule     sildenafil (REVATIO) 20 MG tablet     simvastatin (ZOCOR) 40 MG tablet     Skin Protectants, Misc. (EUCERIN) cream     torsemide (DEMADEX) 20 MG tablet     triamcinolone (KENALOG) 0.1 % external cream     Vitamin D, Cholecalciferol, 10 MCG (400 UNIT) TABS     spironolactone (ALDACTONE) 25 MG tablet     No current facility-administered medications for this visit.      Allergies   Allergen Reactions     No Known Allergies      Past Medical History:   Diagnosis Date     Amaurosis fugax 5-11    Right     Carotid artery stenosis      Esophageal reflux 3/11/2004     HELICOBACTER PYLORI INFECTION 7/11/2006     hx of CA in situ RT breast-1990.mastectomy 4/17/2007     Hyperlipidemia LDL goal <70 6/20/2011     Lung cancer (H)     squamous cell lung ca left lower lobe     OBESITY NOS 3/11/2004     OSTEOPOROSIS NOS 3/11/2004     Other psoriasis 3/11/2004     Pulmonary hypertension (H) 04/15/2019    Right heart catheterization demonstrated moderate pulmonary arterial hypertension with a mean PA pressure of 34 mmHg, no reversibility with inhaled nitric oxide, elevated right and left-sided filling pressures, low normal cardiac index of 2.1.  Seen by Dr. Shay Marquez.  Started on sildenafil (Revatio) 20 mg 3 times daily.     RA (rheumatoid arthritis) (H) 2/8/2010     Raynaud's syndrome 4/10/2006     Scleroderma (H)      Sleep apnea     CPAP       Past Surgical History:   Procedure Laterality Date     BREAST SURGERY       C  NONSPECIFIC PROCEDURE  1990    mastectomy RT breast     COLONOSCOPY  16     COLONOSCOPY N/A 2019    Procedure: Colonoscopy, With Polypectomy And Biopsy;  Surgeon: Neto Kaminski MD;  Location:  GI     CONIZATION       CV RIGHT HEART CATH N/A 4/15/2019    Procedure: Heart Cath Right Heart Cath;  Surgeon: Naresh Cyr MD;  Location:  HEART CARDIAC CATH LAB     ENDARTERECTOMY CAROTID       HC COLONOSCOPY THRU STOMA, DIAGNOSTIC  2001    diverticulosis     REPAIR HAMMER TOE       TONSILLECTOMY         Social History     Socioeconomic History     Marital status:      Spouse name: Not on file     Number of children: Not on file     Years of education: Not on file     Highest education level: Not on file   Occupational History     Not on file   Social Needs     Financial resource strain: Not on file     Food insecurity     Worry: Not on file     Inability: Not on file     Transportation needs     Medical: Not on file     Non-medical: Not on file   Tobacco Use     Smoking status: Former Smoker     Packs/day: 1.00     Years: 30.00     Pack years: 30.00     Types: Cigarettes     Start date:      Last attempt to quit: 3/11/1992     Years since quittin.5     Smokeless tobacco: Never Used   Substance and Sexual Activity     Alcohol use: Not Currently     Alcohol/week: 0.0 standard drinks     Frequency: Never     Drug use: No     Sexual activity: Not Currently     Partners: Male   Lifestyle     Physical activity     Days per week: Not on file     Minutes per session: Not on file     Stress: Not on file   Relationships     Social connections     Talks on phone: Not on file     Gets together: Not on file     Attends Anglican service: Not on file     Active member of club or organization: Not on file     Attends meetings of clubs or organizations: Not on file     Relationship status: Not on file     Intimate partner violence     Fear of current or ex partner: Not on file      "Emotionally abused: Not on file     Physically abused: Not on file     Forced sexual activity: Not on file   Other Topics Concern     Parent/sibling w/ CABG, MI or angioplasty before 65F 55M? No   Social History Narrative     Not on file       Family History   Problem Relation Age of Onset     Cancer - colorectal Mother      Cerebrovascular Disease Father      Cancer Sister 31        ovarian     Heart Disease Sister      Gastrointestinal Disease Sister         crohns     Gastrointestinal Disease Sister         diverticulitis     Gastrointestinal Disease Brother         diverticulitis     Alzheimer Disease Sister      Cerebrovascular Disease Maternal Grandmother      Diabetes Maternal Grandmother      Mental Illness Maternal Grandmother      Diabetes Maternal Uncle      Cancer Nephew                ROS Pulmonary  A complete ROS was otherwise negative except as noted in the HPI.  Vitals:    20 1037   BP: 105/70   Pulse: 83   Resp: 17   SpO2: 99%   Weight: 70.3 kg (155 lb)   Height: 1.575 m (5' 2\")     Exam:   GENERAL APPEARANCE: Well developed, well nourished, alert, and in no apparent distress.  NECK: supple, no masses, no thyromegaly.  LYMPHATICS: No significant axillary, cervical, or supraclavicular nodes.  RESP: good air flow throughout, - bibasilar crackles R>L, some focal insp and exp wheezes in HERMAN  CV: Normal S1, S2, regular rhythm, normal rate, no rub, no murmur,  no gallop, no LE edema.   ABDOMEN:  Bowel sounds normal, soft, nontender, no HSM or masses.   MS: extremities normal- no clubbing, no cyanosis.  PSYCH: mentation appears normal. and affect normal/bright  Results: I have reviewed all imaging, PFTs and other relavent tests, please see below for details, PFT and imaging results were reviewed with the patient.  PFTs: Mild restriction with moderate reduction in diffusing capacity.  FVC is actually improved from previous although the DLCO has decreased some.    Assessment and " plan:  79-year-old female with interstitial lung disease in the setting of rheumatoid arthritis and pulmonary hypertension.    Problem #1: Interstitial lung disease: Overall her ILD seems to be somewhat stable although it is a complicated situation.  Her FVC has improved from previous.  I also reviewed her most recent high-resolution CT scan of the chest that was done in May 2019.  That showed no progression and may be some improvement in the interstitial infiltrates although she clearly has significant fibrosis.  Therefore she seems to be doing relatively well on her current regimen of methotrexate and infliximab and I do not think we need to change this.  We certainly need to continue to follow her closely and I would plan to see her back in 4 months with pulmonary function test.  We also did a 6-minute walk in clinic today and she actually did quite well walking 650 feet (lower limit of normal 991) with her lowest oxygen saturation during the walk of 97%.    Problem #2: Pulmonary hypertension: Previous right heart cath in 2019 documenting elevated pulmonary artery pressures with increased pulmonary vascular resistance and increased left-sided pressures as well.  Echocardiogram in May 2020 showed moderate to severe tricuspid regurgitation with elevated pulmonary artery systolic pressures and a mild aortic stenosis.  This was felt to be unchanged compared to March.  The LVEF was 55 to 60%.  She saw Dr. Marquez in June who suggested the possibility of a follow-up right heart cath this does not appear to have been scheduled in the she does not have a follow-up with him scheduled at this time.  I will discuss the case with him and make sure that she does follow-up with him.  Given that she is feeling relatively well without any significant desaturations on her 6-minute walk I think it may be reasonable just to observe her though we will certainly defer to him as to whether a right heart cath would be  indicated.    Problem #3: Rheumatoid arthritis: Followed by arthritis and rheumatology consultants.  They are managing her immune suppression.  Would not make any changes at this time she will follow-up with them as scheduled.    I will see the patient back in 4 months with pulmonary function test.  I have instructed her to call sooner with any changes in her breathing and specifically any worsening of her dyspnea.  We did also discussed the importance of ongoing mask wearing social distancing and hand hygiene during the COVID pandemic.    CBC   Recent Labs   Lab Test 06/26/20  0935 02/25/20  0858   RBC 3.76* 4.32   HGB 11.7 12.5   HCT 36.7 38.9   * 159     Basic Metabolic Panel  Recent Labs   Lab Test 06/26/20  0935 02/25/20  0858    137   POTASSIUM 4.0 3.5   CHLORIDE 100 99   CO2 29 30*   BUN 26 21   GLC 96 100   TU 10.3* 10.4     INR  Recent Labs   Lab Test 04/15/19  0700 12/04/15  0745   INR 1.05 0.95     PFT  PFT Latest Ref Rng & Units 9/4/2020   FVC L 1.52   FEV1 L 1.40   FVC% % 64   FEV1% % 77   Again, thank you for allowing me to participate in the care of your patient.      Sincerely,  David Morris Perlman, MD

## 2020-09-04 NOTE — NURSING NOTE
Chief Complaint   Patient presents with     RECHECK     CTD/ILD     Medications reviewed and vital signs taken.   Stacie Stoddard, CMA

## 2020-09-04 NOTE — PROGRESS NOTES
Reason for Visit  Jaqueline Canales is a 79 year old year old female who is being seen for RECHECK (CTD/ILD )    ILD HPI    Jaqueline Canales is an 79-year-old female with a history of rheumatoid arthritis associated interstitial lung disease who is seen today for follow-up.  I have not seen the patient in person in over a year.  She has history of rheumatoid arthritis on 25 mg of methotrexate weekly and Remicade infusions.  We have been following her interstitial lung disease on this regimen and it has been relatively stable.  She also has some cardiac issues including moderate to severe tricuspid regurgitation and pulmonary hypertension.  A right heart cath in 2019 showed a mean PA pressure of 35 although her wedge pressure was 20 but she had significant pulmonary vascular resistance as well.  She returns clinic today overall stating that overall she feels relatively well from a respiratory standpoint.  She has obtained a walker and she feels that this has significantly helped her mobility.  She is able to move around with a walker and denies significant shortness of breath or dyspnea on exertion.  She feels her joint symptoms are under relatively good control with the current regimen although she does still have some stiffness and occasional joint pain in her hands.  Overall feels that she is doing well and certainly has acceptable quality of life on her current regimen.      Current Outpatient Medications   Medication     acetaminophen (TYLENOL) 500 MG tablet     albuterol (PROAIR HFA/PROVENTIL HFA/VENTOLIN HFA) 108 (90 Base) MCG/ACT inhaler     Bacillus Coagulans-Inulin (PROBIOTIC-PREBIOTIC PO)     ELIQUIS ANTICOAGULANT 5 MG tablet     folic acid (FOLVITE) 1 MG tablet     InFLIXimab (REMICADE IV)     levothyroxine (SYNTHROID/LEVOTHROID) 75 MCG tablet     melatonin 5 MG tablet     Methotrexate Sodium (METHOTREXATE PO)     metoprolol succinate ER (TOPROL-XL) 25 MG 24 hr tablet     Multiple Vitamins-Minerals  (ICAPS) CAPS     multivitamin (CENTRUM SILVER) tablet     omeprazole (PRILOSEC) 20 MG DR capsule     order for DME     potassium chloride ER (KLOR-CON M) 20 MEQ CR tablet     Prednisolon-Gatiflox-Bromfenac 1-0.5-0.075 % SUSP     psyllium (METAMUCIL/KONSYL) capsule     sildenafil (REVATIO) 20 MG tablet     simvastatin (ZOCOR) 40 MG tablet     Skin Protectants, Misc. (EUCERIN) cream     torsemide (DEMADEX) 20 MG tablet     triamcinolone (KENALOG) 0.1 % external cream     Vitamin D, Cholecalciferol, 10 MCG (400 UNIT) TABS     spironolactone (ALDACTONE) 25 MG tablet     No current facility-administered medications for this visit.      Allergies   Allergen Reactions     No Known Allergies      Past Medical History:   Diagnosis Date     Amaurosis fugax 5-11    Right     Carotid artery stenosis      Esophageal reflux 3/11/2004     HELICOBACTER PYLORI INFECTION 7/11/2006     hx of CA in situ RT breast-1990.mastectomy 4/17/2007     Hyperlipidemia LDL goal <70 6/20/2011     Lung cancer (H)     squamous cell lung ca left lower lobe     OBESITY NOS 3/11/2004     OSTEOPOROSIS NOS 3/11/2004     Other psoriasis 3/11/2004     Pulmonary hypertension (H) 04/15/2019    Right heart catheterization demonstrated moderate pulmonary arterial hypertension with a mean PA pressure of 34 mmHg, no reversibility with inhaled nitric oxide, elevated right and left-sided filling pressures, low normal cardiac index of 2.1.  Seen by Dr. Shay Marquez.  Started on sildenafil (Revatio) 20 mg 3 times daily.     RA (rheumatoid arthritis) (H) 2/8/2010     Raynaud's syndrome 4/10/2006     Scleroderma (H)      Sleep apnea     CPAP       Past Surgical History:   Procedure Laterality Date     BREAST SURGERY       C NONSPECIFIC PROCEDURE  1990    mastectomy RT breast     COLONOSCOPY  5/24/16     COLONOSCOPY N/A 6/28/2019    Procedure: Colonoscopy, With Polypectomy And Biopsy;  Surgeon: Neto Kaminski MD;  Location: [facility]     CONIZATION       CV RIGHT  HEART CATH N/A 4/15/2019    Procedure: Heart Cath Right Heart Cath;  Surgeon: Naresh Cyr MD;  Location:  HEART CARDIAC CATH LAB     ENDARTERECTOMY CAROTID       HC COLONOSCOPY THRU STOMA, DIAGNOSTIC  2001    diverticulosis     REPAIR HAMMER TOE       TONSILLECTOMY         Social History     Socioeconomic History     Marital status:      Spouse name: Not on file     Number of children: Not on file     Years of education: Not on file     Highest education level: Not on file   Occupational History     Not on file   Social Needs     Financial resource strain: Not on file     Food insecurity     Worry: Not on file     Inability: Not on file     Transportation needs     Medical: Not on file     Non-medical: Not on file   Tobacco Use     Smoking status: Former Smoker     Packs/day: 1.00     Years: 30.00     Pack years: 30.00     Types: Cigarettes     Start date:      Last attempt to quit: 3/11/1992     Years since quittin.5     Smokeless tobacco: Never Used   Substance and Sexual Activity     Alcohol use: Not Currently     Alcohol/week: 0.0 standard drinks     Frequency: Never     Drug use: No     Sexual activity: Not Currently     Partners: Male   Lifestyle     Physical activity     Days per week: Not on file     Minutes per session: Not on file     Stress: Not on file   Relationships     Social connections     Talks on phone: Not on file     Gets together: Not on file     Attends Alevism service: Not on file     Active member of club or organization: Not on file     Attends meetings of clubs or organizations: Not on file     Relationship status: Not on file     Intimate partner violence     Fear of current or ex partner: Not on file     Emotionally abused: Not on file     Physically abused: Not on file     Forced sexual activity: Not on file   Other Topics Concern     Parent/sibling w/ CABG, MI or angioplasty before 65F 55M? No   Social History Narrative     Not on file  "      Family History   Problem Relation Age of Onset     Cancer - colorectal Mother      Cerebrovascular Disease Father      Cancer Sister 31        ovarian     Heart Disease Sister      Gastrointestinal Disease Sister         crohns     Gastrointestinal Disease Sister         diverticulitis     Gastrointestinal Disease Brother         diverticulitis     Alzheimer Disease Sister      Cerebrovascular Disease Maternal Grandmother      Diabetes Maternal Grandmother      Mental Illness Maternal Grandmother      Diabetes Maternal Uncle      Cancer Nephew                ROS Pulmonary    A complete ROS was otherwise negative except as noted in the HPI.  Vitals:    20 1037   BP: 105/70   Pulse: 83   Resp: 17   SpO2: 99%   Weight: 70.3 kg (155 lb)   Height: 1.575 m (5' 2\")     Exam:   GENERAL APPEARANCE: Well developed, well nourished, alert, and in no apparent distress.  NECK: supple, no masses, no thyromegaly.  LYMPHATICS: No significant axillary, cervical, or supraclavicular nodes.  RESP: good air flow throughout, - bibasilar crackles R>L, some focal insp and exp wheezes in HERMAN  CV: Normal S1, S2, regular rhythm, normal rate, no rub, no murmur,  no gallop, no LE edema.   ABDOMEN:  Bowel sounds normal, soft, nontender, no HSM or masses.   MS: extremities normal- no clubbing, no cyanosis.  PSYCH: mentation appears normal. and affect normal/bright  Results: I have reviewed all imaging, PFTs and other relavent tests, please see below for details, PFT and imaging results were reviewed with the patient.  PFTs: Mild restriction with moderate reduction in diffusing capacity.  FVC is actually improved from previous although the DLCO has decreased some.    Assessment and plan:        79-year-old female with interstitial lung disease in the setting of rheumatoid arthritis and pulmonary hypertension.    Problem #1: Interstitial lung disease: Overall her ILD seems to be somewhat stable although it is a complicated " situation.  Her FVC has improved from previous.  I also reviewed her most recent high-resolution CT scan of the chest that was done in May 2019.  That showed no progression and may be some improvement in the interstitial infiltrates although she clearly has significant fibrosis.  Therefore she seems to be doing relatively well on her current regimen of methotrexate and infliximab and I do not think we need to change this.  We certainly need to continue to follow her closely and I would plan to see her back in 4 months with pulmonary function test.  We also did a 6-minute walk in clinic today and she actually did quite well walking 650 feet (lower limit of normal 991) with her lowest oxygen saturation during the walk of 97%.    Problem #2: Pulmonary hypertension: Previous right heart cath in 2019 documenting elevated pulmonary artery pressures with increased pulmonary vascular resistance and increased left-sided pressures as well.  Echocardiogram in May 2020 showed moderate to severe tricuspid regurgitation with elevated pulmonary artery systolic pressures and a mild aortic stenosis.  This was felt to be unchanged compared to March.  The LVEF was 55 to 60%.  She saw Dr. Marquez in June who suggested the possibility of a follow-up right heart cath this does not appear to have been scheduled in the she does not have a follow-up with him scheduled at this time.  I will discuss the case with him and make sure that she does follow-up with him.  Given that she is feeling relatively well without any significant desaturations on her 6-minute walk I think it may be reasonable just to observe her though we will certainly defer to him as to whether a right heart cath would be indicated.    Problem #3: Rheumatoid arthritis: Followed by arthritis and rheumatology consultants.  They are managing her immune suppression.  Would not make any changes at this time she will follow-up with them as scheduled.    I will see the patient  back in 4 months with pulmonary function test.  I have instructed her to call sooner with any changes in her breathing and specifically any worsening of her dyspnea.  We did also discussed the importance of ongoing mask wearing social distancing and hand hygiene during the COVID pandemic.      CBC   Recent Labs   Lab Test 06/26/20  0935 02/25/20  0858   RBC 3.76* 4.32   HGB 11.7 12.5   HCT 36.7 38.9   * 159       Basic Metabolic Panel  Recent Labs   Lab Test 06/26/20  0935 02/25/20  0858    137   POTASSIUM 4.0 3.5   CHLORIDE 100 99   CO2 29 30*   BUN 26 21   GLC 96 100   TU 10.3* 10.4       INR  Recent Labs   Lab Test 04/15/19  0700 12/04/15  0745   INR 1.05 0.95       PFT  PFT Latest Ref Rng & Units 9/4/2020   FVC L 1.52   FEV1 L 1.40   FVC% % 64   FEV1% % 77           CC:

## 2020-09-14 ENCOUNTER — OFFICE VISIT (OUTPATIENT)
Dept: FAMILY MEDICINE | Facility: CLINIC | Age: 80
End: 2020-09-14
Payer: MEDICARE

## 2020-09-14 VITALS
OXYGEN SATURATION: 97 % | BODY MASS INDEX: 29.33 KG/M2 | HEIGHT: 62 IN | WEIGHT: 159.4 LBS | DIASTOLIC BLOOD PRESSURE: 56 MMHG | TEMPERATURE: 97.1 F | HEART RATE: 75 BPM | SYSTOLIC BLOOD PRESSURE: 96 MMHG

## 2020-09-14 DIAGNOSIS — H25.9 SENILE CATARACT OF LEFT EYE, UNSPECIFIED AGE-RELATED CATARACT TYPE: ICD-10-CM

## 2020-09-14 DIAGNOSIS — Z01.818 PRE-OP EXAM: Primary | ICD-10-CM

## 2020-09-14 PROCEDURE — 99214 OFFICE O/P EST MOD 30 MIN: CPT | Performed by: INTERNAL MEDICINE

## 2020-09-14 ASSESSMENT — MIFFLIN-ST. JEOR: SCORE: 1146.28

## 2020-09-14 NOTE — PROGRESS NOTES
[address] [address]  [phone]  Dept: [phone]    PRE-OP EVALUATION:  Today's date: 10/28/2019    Jaqueline Canales (: 1940) presents for pre-operative evaluation assessment as requested by Dr. Mejia.  She requires evaluation and anesthesia risk assessment prior to undergoing surgery/procedure for treatment of cataracts.    Proposed Surgery/ Procedure: left eye cataract surgery  Date of Surgery/ Procedure: TBD  Time of Surgery/ Procedure: TBD  Hospital/Surgical Facility: [facility]  Fax number for surgical facility: [phone]    Primary Physician: Carissa Burroughs  Type of Anesthesia Anticipated: Local with MAC    Patient has a Health Care Directive or Living Will:  YES    1.NO - DO YOU HAVE A HISTORY OF HEART ATTACK, STROKE, STENT, BYPASS OR SURGERY ON AN ARTERY IN THE HEAD, NECK, HEART OR LEG?   2. NO - Do you ever have any pain or discomfort in your chest?  3. NO - DO YOU HAVE A HISTORY OF HEART FAILURE   4. NO - ARE YOUR TROUBLED BY SHORTNESS OF BREATH WHEN WALKING ON THE LEVEL, UP A SLIGHT HILL OR AT NIGHT?  5. NO - Do you currently have a cold, bronchitis or other respiratory infection?  6. NO - DO YOU HAVE A COUGH, SHORTNESS OF BREATH OR WHEEZING?   7. NO - Do you sometimes get pains in the calves of your legs when you walk?  8. NO - Do you or anyone in your family have previous history of blood clots?  9. NO - Do you or does anyone in your family have a serious bleeding problem such as prolonged bleeding following surgeries or cuts?  10. NO - Have you ever had problems with anemia or been told to take iron pills?  11. NO - Have you had any abnormal blood loss such as black, tarry or bloody stools, or abnormal vaginal bleeding?  12. NO - Have you ever had a blood transfusion?  13. NO - Have you or any of your relatives ever had problems with anesthesia?  14. NO - DO YOU HAVE SLEEP APNEA, EXCESSIVE SNORING OR DAYTIME  DROWSINESS?   15. NO - Do you have any prosthetic heart valves?  16. NO - Do you have prosthetic joints?  17. NO - Is there any chance that you may be pregnant?      HPI:     HPI related to upcoming procedure: patient Jaqueline Canales is a very pleasant 78 year old female with bilateral cataracts who presents to internal medicine clinic for a pre op cardiac evaluation for upcoming left eye cataract surgery for treatment of chronic left cataract. Patient denies any chest pain, headaches, fever or chills. Patient denies any known allergies to anesthesia agents. Patient is currently on Eliquis long term anticoagulation therapy medication for atrial fibrillation.      MEDICAL HISTORY:     Patient Active Problem List    Diagnosis Date Noted     Chronic diastolic heart failure (H) 06/15/2020     Priority: Medium     Lymphedema of both lower extremities 12/06/2019     Priority: Medium     Hypoxia 12/06/2019     Priority: Medium     Physical deconditioning 12/06/2019     Priority: Medium     Acute CHF (congestive heart failure) (H) 11/25/2019     Priority: Medium     Benign essential hypertension 06/24/2019     Priority: Medium     Adenomatous polyp 06/24/2019     Priority: Medium     Status post coronary angiogram 04/15/2019     Priority: Medium     Pulmonary hypertension (H) 04/02/2019     Priority: Medium     Added automatically from request for surgery [id number]       Morbid obesity (H) 10/08/2018     Priority: Medium     Chest pain 10/22/2016     Priority: Medium     Persistent atrial fibrillation (H) 08/25/2016     Priority: Medium     Malignant neoplasm of lower lobe of left lung (H) 02/02/2016     Priority: Medium     Lung nodule 11/23/2015     Priority: Medium     Low back pain 07/01/2015     Priority: Medium     Diagnosis updated by automated process. Provider to review and confirm.       IVCD (intraventricular conduction defect) 06/03/2013     Priority: Medium     Esophageal stricture 10/25/2012     Priority: Medium      Hiatal hernia 10/25/2012     Priority: Medium     Hypothyroidism 09/18/2012     Priority: Medium     S/P carotid endarterectomy 02/06/2012     Priority: Medium     Right 2011       Pulmonary fibrosis (H) 02/06/2012     Priority: Medium     secodary to RA, Dr Lennox [place] LUNG       SRINIVAS (obstructive sleep apnea) 02/06/2012     Priority: Medium     On cpap, KAROLINA Tsang Lung       Constipation 02/06/2012     Priority: Medium     Hyperlipidemia LDL goal <100 11/06/2011     Priority: Medium     Diverticulosis 10/19/2011     Priority: Medium     Advance Care Planning 10/17/2011     Priority: Medium     Advance Care Planning 7/13/2016: Receipt of ACP document:  Received: Health Care Directive which was witnessed or notarized on 6-30-16.  Document previously scanned on 7-8-16.  Validation form completed and sent to be scanned.  Code Status reflects choices in most recent ACP document.  Confirmed/documented designated decision maker(s).  Added by Coral Stover RN, System Director ACP-Honoring Choices   Advance Directive Initial Visit 10/17/11 Jaqueline Canales presents in person for initial session regarding completion of advanced directive form. She was referred to the facilitator by provider.  She currently has no advance directive.Plan:  Advanced directive form, healthcare agent information card, advanced care planning information booklet provided to patient. Follow up meeting: pt will call when ready to schedule a follow up. Patient instructed to bring healthcare agent to this meeting. 1st visit. DESI Jacob LPN           [facility] 06/27/2011     Priority: Medium     Care Coordination services declined 6/27/2011.    EMERGENCY CARE PLAN  Presenting Problem Signs and Symptoms Treatment Plan    Questions or conerns during clinic hours    I will call the clinic directly     Questions or conerns outside clinic hours    I will call the 24 hour nurse line at [phone]    Patient needs to schedule an appointment     I will call the 24 hour scheduling team at [phone] or clinic directly    Same day treatment     I will call the clinic first, nurse line if after hours, urgent care and express care if needed                        Esthela Marshall, RN, BSN  Care Coordinator  DX V65.8 REPLACED WITH [id number] [facility] (04/08/2013)       Amaurosis fugax of right eye 05/26/2011     Priority: Medium     Scleroderma (H) 10/05/2010     Priority: Medium     RA (rheumatoid arthritis) (H) 02/08/2010     Priority: Medium     Dr Natalie Garcia Rheumatology       Malignant neoplasm of female breast (H) 04/17/2007     Priority: Medium     Problem list name updated by automated process. Provider to review       Helicobacter pylori infection 07/11/2006     Priority: Medium     Problem list name updated by automated process. Provider to review       Raynaud's syndrome 04/10/2006     Priority: Medium     Lumbago 12/15/2005     Priority: Medium     Other psoriasis 03/11/2004     Priority: Medium     Obesity 03/11/2004     Priority: Medium     Problem list name updated by automated process. Provider to review       Osteoporosis 03/11/2004     Priority: Medium     Reclast annually  Problem list name updated by automated process. Provider to review       Esophageal reflux 03/11/2004     Priority: Medium      Past Medical History:   Diagnosis Date     Amaurosis fugax 5-11    Right     Carotid artery stenosis      Esophageal reflux 3/11/2004     HELICOBACTER PYLORI INFECTION 7/11/2006     hx of CA in situ RT breast-1990.mastectomy 4/17/2007     Hyperlipidemia LDL goal <70 6/20/2011     Lung cancer (H)     squamous cell lung ca left lower lobe     OBESITY NOS 3/11/2004     OSTEOPOROSIS NOS 3/11/2004     Other psoriasis 3/11/2004     Pulmonary hypertension (H) 04/15/2019    Right heart catheterization demonstrated moderate pulmonary arterial hypertension with a mean PA pressure of 34 mmHg, no reversibility with inhaled nitric oxide, elevated  right and left-sided filling pressures, low normal cardiac index of 2.1.  Seen by Dr. Shay Marquez.  Started on sildenafil (Revatio) 20 mg 3 times daily.     RA (rheumatoid arthritis) (H) 2/8/2010     Raynaud's syndrome 4/10/2006     Scleroderma (H)      Sleep apnea     CPAP     Past Surgical History:   Procedure Laterality Date     BREAST SURGERY       C NONSPECIFIC PROCEDURE  1990    mastectomy RT breast     COLONOSCOPY  5/24/16     COLONOSCOPY N/A 6/28/2019    Procedure: Colonoscopy, With Polypectomy And Biopsy;  Surgeon: Neto Kaminski MD;  Location:  GI     CONIZATION       CV RIGHT HEART CATH N/A 4/15/2019    Procedure: Heart Cath Right Heart Cath;  Surgeon: Naresh Cyr MD;  Location:  HEART CARDIAC CATH LAB     ENDARTERECTOMY CAROTID  2011     HC COLONOSCOPY THRU STOMA, DIAGNOSTIC  2001 2011    diverticulosis     REPAIR HAMMER TOE       TONSILLECTOMY       Current Outpatient Medications   Medication Sig Dispense Refill     acetaminophen (TYLENOL) 500 MG tablet Take 1,000 mg by mouth 2 times daily        albuterol (PROAIR HFA/PROVENTIL HFA/VENTOLIN HFA) 108 (90 Base) MCG/ACT inhaler USE 2 INHALATIONS ORALLY   INTO THE LUNGS EVERY 6     HOURS 18 g 11     Bacillus Coagulans-Inulin (PROBIOTIC-PREBIOTIC PO) Take 1 chew tab by mouth 2 times daily        ELIQUIS ANTICOAGULANT 5 MG tablet TAKE 1 TABLET TWICE A  tablet 2     folic acid (FOLVITE) 1 MG tablet Take 1 tablet (1,000 mcg) by mouth daily 90 tablet 3     InFLIXimab (REMICADE IV) Inject 400 mg into the vein Every 8 weeks       levothyroxine (SYNTHROID/LEVOTHROID) 75 MCG tablet Take 1 tablet (75 mcg) by mouth daily 90 tablet 2     melatonin 5 MG tablet Take 5 mg by mouth nightly as needed for sleep       Methotrexate Sodium (METHOTREXATE PO) Take 2.5 mg by mouth 10 tablets weekly - Wednesdays       Multiple Vitamins-Minerals (ICAPS) CAPS Take 1 capsule by mouth 2 times daily        multivitamin (CENTRUM SILVER) tablet Take 1 tablet  by mouth daily       omeprazole (PRILOSEC) 20 MG DR capsule TAKE 1 CAPSULE TWICE DAILY,30-60 MINUTES PRIOR TO     MEALS 180 capsule 2     order for DME Equipment being ordered: 4 wheeled walker with seat 1 each 3     potassium chloride ER (KLOR-CON M) 20 MEQ CR tablet Take 1 tablet (20 mEq) by mouth daily 90 tablet 3     Prednisolon-Gatiflox-Bromfenac 1-0.5-0.075 % SUSP Place 1 drop into the right eye daily       psyllium (METAMUCIL/KONSYL) capsule Take 1 capsule by mouth daily       sildenafil (REVATIO) 20 MG tablet Take 1 tablet (20 mg) by mouth 3 times daily 90 tablet 11     simvastatin (ZOCOR) 40 MG tablet Take 1 tablet (40 mg) by mouth At Bedtime 90 tablet 3     Skin Protectants, Misc. (EUCERIN) cream Apply topically 2 times daily Apply to areas dry skin topically two times a day for dry skin and Apply to areas of dry skin topically as needed for dry skin daily       torsemide (DEMADEX) 20 MG tablet Take 2 tablets (40 mg) by mouth daily Take 40mg q am 180 tablet 3     triamcinolone (KENALOG) 0.1 % external cream Apply topically 2 times daily 30 g 3     Vitamin D, Cholecalciferol, 10 MCG (400 UNIT) TABS Take 1,600 Units by mouth daily       metoprolol succinate ER (TOPROL-XL) 25 MG 24 hr tablet TAKE 1 TABLET DAILY (Patient not taking: Reported on 2020) 90 tablet 2     spironolactone (ALDACTONE) 25 MG tablet Take 1 tablet (25 mg) by mouth daily (Patient not taking: Reported on 2020) 90 tablet 1     OTC products: None, except as noted above    Allergies   Allergen Reactions     No Known Allergies       Latex Allergy: NO    Social History     Tobacco Use     Smoking status: Former Smoker     Packs/day: 1.00     Years: 30.00     Pack years: 30.00     Types: Cigarettes     Start date:      Last attempt to quit: 3/11/1992     Years since quittin.5     Smokeless tobacco: Never Used   Substance Use Topics     Alcohol use: Not Currently     Alcohol/week: 0.0 standard drinks     Frequency: Never  "    History   Drug Use No       REVIEW OF SYSTEMS:   Constitutional, neuro, ENT, endocrine, pulmonary, cardiac, gastrointestinal, genitourinary, musculoskeletal, integument and psychiatric systems are negative, except as otherwise noted.    EXAM:   BP 96/56 (BP Location: Left arm, Patient Position: Sitting, Cuff Size: Adult Regular)   Pulse 75   Temp 97.1  F (36.2  C) (Tympanic)   Ht 1.575 m (5' 2\")   Wt 72.3 kg (159 lb 6.4 oz)   SpO2 97%   BMI 29.15 kg/m      GENERAL APPEARANCE: alert and no distress     EYES: EOMI, PERRL, left eye cataract present     HENT: ear canals and TM's normal and nose and mouth without ulcers or lesions     NECK: no adenopathy, no asymmetry, masses, or scars and thyroid normal to palpation     RESP: lungs clear to auscultation - no rales, rhonchi or wheezes     CV: regular rates and rhythm, normal S1 S2, no S3 or S4 and no murmur, click or rub     ABDOMEN:  soft, nontender, no HSM or masses and bowel sounds normal     MS: extremities normal- no gross deformities noted, no evidence of inflammation in joints, FROM in all extremities.     SKIN: no suspicious lesions or rashes     NEURO: Normal strength and tone, sensory exam grossly normal, mentation intact and speech normal     PSYCH: mentation appears normal. and affect normal/bright     LYMPHATICS: No cervical adenopathy    DIAGNOSTICS:   No labs or EKG required for low risk surgery (cataract, skin procedure, breast biopsy, etc)    Recent Labs     Lab Results   Component Value Date    WBC 6.3 06/26/2020     Lab Results   Component Value Date    RBC 3.76 06/26/2020     Lab Results   Component Value Date    HGB 11.7 06/26/2020     Lab Results   Component Value Date    HCT 36.7 06/26/2020     Lab Results   Component Value Date    MCV 98 06/26/2020     Lab Results   Component Value Date    MCH 31.1 06/26/2020     Lab Results   Component Value Date    MCHC 31.9 06/26/2020     Lab Results   Component Value Date    RDW 15.4 06/26/2020 "     Lab Results   Component Value Date     06/26/2020     Last Comprehensive Metabolic Panel:  Sodium   Date Value Ref Range Status   06/26/2020 135 133 - 144 mmol/L Final     Potassium   Date Value Ref Range Status   06/26/2020 4.0 3.4 - 5.3 mmol/L Final     Chloride   Date Value Ref Range Status   06/26/2020 100 94 - 109 mmol/L Final     Carbon Dioxide   Date Value Ref Range Status   06/26/2020 29 20 - 32 mmol/L Final     Anion Gap   Date Value Ref Range Status   06/26/2020 6 3 - 14 mmol/L Final     Glucose   Date Value Ref Range Status   06/26/2020 96 70 - 99 mg/dL Final     Urea Nitrogen   Date Value Ref Range Status   06/26/2020 26 7 - 30 mg/dL Final     Creatinine   Date Value Ref Range Status   07/13/2020 0.780 0.500 - 1.300 mg/dL Final     GFR Estimate   Date Value Ref Range Status   06/26/2020 61 >60 mL/min/[1.73_m2] Final     Comment:     Non  GFR Calc  Starting 12/18/2018, serum creatinine based estimated GFR (eGFR) will be   calculated using the Chronic Kidney Disease Epidemiology Collaboration   (CKD-EPI) equation.       Calcium   Date Value Ref Range Status   06/26/2020 10.3 (H) 8.5 - 10.1 mg/dL Final       IMPRESSION:   Reason for surgery/procedure: chronic left eye cataract  Diagnosis/reason for consult: pre op cardiac evaluation for upcoming left cataract surgery for treatment of chronic left eye cataract.    The proposed surgical procedure is considered LOW risk.    REVISED CARDIAC RISK INDEX  The patient has the following serious cardiovascular risks for perioperative complications such as (MI, PE, VFib and 3  AV Block):  No serious cardiac risks  INTERPRETATION: 0 risks: Class I (very low risk - 0.4% complication rate)    The patient has the following additional risks for perioperative complications:  No identified additional risks      ICD-10-CM    1. Pre-op exam  Z01.818    2. Senile cataract of left eye, unspecified age-related cataract type  H25.9         RECOMMENDATIONS:     --Patient is to take all scheduled medications on the day of surgery EXCEPT for modifications listed below.    Anticoagulant or Antiplatelet Medication Use  ELIQUIS: Bleeding risk is low for this procedure and apixaban (Eliquis) should be stopped at least 72 hours prior to procedure based on a creatinine clearance of  greater than or equal to 30.        APPROVAL GIVEN to proceed with proposed procedure, without further diagnostic evaluation       Signed Electronically by: Carissa Burroughs MD    Copy of this evaluation report is provided to requesting physician.    [place] Preop Guidelines    Revised Cardiac Risk Index

## 2020-09-21 ENCOUNTER — EXTERNAL ORDER RESULTS (OUTPATIENT)
Dept: PULMONOLOGY | Facility: CLINIC | Age: 80
End: 2020-09-21

## 2020-09-21 ENCOUNTER — TRANSFERRED RECORDS (OUTPATIENT)
Dept: HEALTH INFORMATION MANAGEMENT | Facility: CLINIC | Age: 80
End: 2020-09-21

## 2020-09-21 LAB
ALBUMIN SERPL-MCNC: 40 G/DL
ALT SERPL-CCNC: 21 IU/L (ref 5–35)
ALT SERPL-CCNC: 21 U/L
AST SERPL-CCNC: 30 U/L
AST SERPL-CCNC: 30 U/L (ref 5–34)
CREAT SERPL-MCNC: 0.67 MG/DL
CREAT SERPL-MCNC: 0.67 MG/DL (ref 0.5–1.3)
ERYTHROCYTE [DISTWIDTH] IN BLOOD BY AUTOMATED COUNT: 12.6 %
GFR SERPL CREATININE-BSD FRML MDRD: 90 ML/MIN/1.73M2
GFR SERPL CREATININE-BSD FRML MDRD: 90 ML/MIN/1.73M2
HCT VFR BLD AUTO: 33.9 %
HEMOGLOBIN: 11.1 G/DL (ref 11.7–15.7)
MCH RBC QN AUTO: 32.1 PG
MCHC RBC AUTO-ENTMCNC: 32.7 G/DL
MCV RBC AUTO: 98 FL
PLATELET # BLD AUTO: 252 10^9/L
RBC # BLD AUTO: 3.45 M/UL
WBC # BLD AUTO: 5.9 10^9/L

## 2020-09-22 ENCOUNTER — HOSPITAL ENCOUNTER (OUTPATIENT)
Dept: MAMMOGRAPHY | Facility: CLINIC | Age: 80
Discharge: HOME | End: 2020-09-22
Attending: INTERNAL MEDICINE | Admitting: INTERNAL MEDICINE
Payer: MEDICARE

## 2020-09-22 DIAGNOSIS — Z12.31 VISIT FOR SCREENING MAMMOGRAM: ICD-10-CM

## 2020-09-22 PROCEDURE — 77067 SCR MAMMO BI INCL CAD: CPT | Mod: 52

## 2020-11-12 ENCOUNTER — VIRTUAL VISIT (OUTPATIENT)
Dept: CARDIOLOGY | Facility: CLINIC | Age: 80
End: 2020-11-12
Attending: INTERNAL MEDICINE
Payer: MEDICARE

## 2020-11-12 DIAGNOSIS — R06.09 DOE (DYSPNEA ON EXERTION): Primary | ICD-10-CM

## 2020-11-12 DIAGNOSIS — I27.20 PULMONARY HYPERTENSION (H): ICD-10-CM

## 2020-11-12 PROCEDURE — 99443 PR PHYSICIAN TELEPHONE EVALUATION 21-30 MIN: CPT | Mod: 95 | Performed by: INTERNAL MEDICINE

## 2020-11-12 NOTE — LETTER
"2020      RE: Jaqueline Canales  [address] [facility] [address]       Dear Colleague,    Thank you for the opportunity to participate in the care of your patient, Jaqueline Canales, at the [facility] HEART [facility] at [facility]. Please see a copy of my visit note below.    Jaqueline Canales is a 79 year old female who is being evaluated via a billable telephone visit.      The patient has been notified of following:     \"This telephone visit will be conducted via a call between you and your physician/provider. We have found that certain health care needs can be provided without the need for a physical exam.  This service lets us provide the care you need with a short phone conversation.  If a prescription is necessary we can send it directly to your pharmacy.  If lab work is needed we can place an order for that and you can then stop by our lab to have the test done at a later time.    Telephone visits are billed at different rates depending on your insurance coverage. During this emergency period, for some insurers they may be billed the same as an in-person visit.  Please reach out to your insurance provider with any questions.    If during the course of the call the physician/provider feels a telephone visit is not appropriate, you will not be charged for this service.\"    Telephone visit 26 minutes with patient permission    Visit in 4 months with echocardiogram CMP, BNP, CBC, iron     Craissa Burroughs MD   [address] [address]      RE: Jaqueline Canales    MRN: [id number]   : 1940       IMPRESSION:   1.  Pulmonary artery hypertension.   2.  Scleroderma.   3.  Severe tricuspid regurgitation.   4.  A 65 pound weight loss.   5.  Cataracts.      Dear Dr. Burroughs:      I had the pleasure of performing a telephone visit with your patient's permission.  The duration of the visit was 25 minutes.  During " that visit, we talked about how she is doing and options for treatment of her pulmonary hypertension/tricuspid regurgitation.  She is currently functional class II.  She is not walking; however, this is not because she is short of breath.  She has lost 65 pounds, which has made her much more functional.  She still has some dependent edema, which is probably a manifestation of chronic venous insufficiency.  She has no paroxysmal nocturnal dyspnea, orthopnea, abdominal swelling, exertional syncope or presyncope.           We talked about the need for regular walking.  We talked about the need for her to make a determination as to whether her current quality of life is acceptable or whether she would be willing to consider percutaneous mitral valve interventions, such as clipping of her tricuspid valve.  We recommended that she have a repeat right heart catheterization in September.  We have asked her to begin walking 4-5 days per week.  We have scheduled her to come in for the following laboratories:  CMP, BNP, CBC, iron, CRP inflammation.  I have scheduled another visit with her in 6 weeks. She is walking utilizing her walker  .  There is no interim history of chest pain, tightness, paroxysmal nocturnal dyspnea, orthopnea, peripheral edema, palpitation, pre-syncope, syncope, device discharge.  Exercise tolerance is stable.  The patient is attempting to exercise regularly and following a sodium restricted, calorically appropriate diet.  Medications are reviewed and the patient is taking medications as prescribed.  The patient is generally sleeping well.     She has flair of her arthritis and is being treated with biologics without adverse effects.    Her functional status is stable and would not wish to consider percutaneous valve replacement at this time     Medications were reviewed, and she is taking them appropriately.  The pharmacy is still sending her furosemide, although she is now on Demadex.     Current  Outpatient Medications   Medication     acetaminophen (TYLENOL) 500 MG tablet     albuterol (PROAIR HFA/PROVENTIL HFA/VENTOLIN HFA) 108 (90 Base) MCG/ACT inhaler     Bacillus Coagulans-Inulin (PROBIOTIC-PREBIOTIC PO)     ELIQUIS ANTICOAGULANT 5 MG tablet     folic acid (FOLVITE) 1 MG tablet     InFLIXimab (REMICADE IV)     levothyroxine (SYNTHROID/LEVOTHROID) 75 MCG tablet     melatonin 5 MG tablet     Methotrexate Sodium (METHOTREXATE PO)     metoprolol succinate ER (TOPROL-XL) 25 MG 24 hr tablet     multivitamin (CENTRUM SILVER) tablet     order for DME     potassium chloride ER (KLOR-CON M) 20 MEQ CR tablet     Prednisolon-Gatiflox-Bromfenac 1-0.5-0.075 % SUSP     simvastatin (ZOCOR) 40 MG tablet     Skin Protectants, Misc. (EUCERIN) cream     triamcinolone (KENALOG) 0.1 % external cream     Vitamin D, Cholecalciferol, 10 MCG (400 UNIT) TABS     Multiple Vitamins-Minerals (ICAPS) CAPS     omeprazole (PRILOSEC) 20 MG DR capsule     psyllium (METAMUCIL/KONSYL) capsule     sildenafil (REVATIO) 20 MG tablet     torsemide (DEMADEX) 20 MG tablet     No current facility-administered medications for this visit.         Her weight is down 65 pounds as noted.      The remainder of a 13 system review is negative.      The patient is taking COVID precautions.      Laboratories:    Results for GINA MCKEON (MRN [id number]) as of 11/12/2020 11:46   Ref. Range 9/21/2020 00:00 9/21/2020 00:00 9/21/2020 00:00   Creatinine Unknown 0.670 0.670    GFR Estimate Latest Units: ml/min/1.73m2 90.0 90.0    Albumin Latest Units: g/dL  40    ALT Latest Units: U/L  21    AST Latest Units: U/L  30    WBC Latest Units: 10^9/L  5.9    Hemoglobin Latest Ref Range: 11.7 - 15.7 g/dL  11.1 (L)    Hematocrit Latest Units: %  33.9 (L)    Platelet Count Latest Units: 10^9/L  252    RBC Latest Units: M/uL  3.45    MCV Latest Units: fl  98    MCH Latest Units: pg  32.1 (H)    MCHC Latest Units: g/dL  32.7    RDW Latest Units: %  12.6 (L)    LAB  RESULT - HIM SCAN Unknown   Attch       Please do not hesitate to contact me if you have any questions/concerns.     Sincerely,     Shay Marquez MD

## 2020-11-12 NOTE — NURSING NOTE
Pts plan of care per Shay Marquez MD:     Visit in 4 months with echocardiogram CMP, BNP, CBC, iron    Orders placed and message sent to schedulers to assist.  Saritha Liriano RN on 11/12/2020 at 1:49 PM

## 2020-11-12 NOTE — PROGRESS NOTES
"Jaqueline Canales is a 79 year old female who is being evaluated via a billable telephone visit.      The patient has been notified of following:     \"This telephone visit will be conducted via a call between you and your physician/provider. We have found that certain health care needs can be provided without the need for a physical exam.  This service lets us provide the care you need with a short phone conversation.  If a prescription is necessary we can send it directly to your pharmacy.  If lab work is needed we can place an order for that and you can then stop by our lab to have the test done at a later time.    Telephone visits are billed at different rates depending on your insurance coverage. During this emergency period, for some insurers they may be billed the same as an in-person visit.  Please reach out to your insurance provider with any questions.    If during the course of the call the physician/provider feels a telephone visit is not appropriate, you will not be charged for this service.\"    Telephone visit 26 minutes with patient permission    Visit in 4 months with echocardiogram CMP, BNP, CBC, iron     Carissa [place] [address]      RE: Jaqueline Canales    MRN: [id number]   : 1940       IMPRESSION:   1.  Pulmonary artery hypertension.   2.  Scleroderma.   3.  Severe tricuspid regurgitation.   4.  A 65 pound weight loss.   5.  Cataracts.      Dear Dr. Burroughs:      I had the pleasure of performing a telephone visit with your patient's permission.  The duration of the visit was 25 minutes.  During that visit, we talked about how she is doing and options for treatment of her pulmonary hypertension/tricuspid regurgitation.  She is currently functional class II.  She is not walking; however, this is not because she is short of breath.  She has lost 65 pounds, which has made her much more functional.  She still has some dependent " edema, which is probably a manifestation of chronic venous insufficiency.  She has no paroxysmal nocturnal dyspnea, orthopnea, abdominal swelling, exertional syncope or presyncope.           We talked about the need for regular walking.  We talked about the need for her to make a determination as to whether her current quality of life is acceptable or whether she would be willing to consider percutaneous mitral valve interventions, such as clipping of her tricuspid valve.  We recommended that she have a repeat right heart catheterization in September.  We have asked her to begin walking 4-5 days per week.  We have scheduled her to come in for the following laboratories:  CMP, BNP, CBC, iron, CRP inflammation.  I have scheduled another visit with her in 6 weeks. She is walking utilizing her walker  .  There is no interim history of chest pain, tightness, paroxysmal nocturnal dyspnea, orthopnea, peripheral edema, palpitation, pre-syncope, syncope, device discharge.  Exercise tolerance is stable.  The patient is attempting to exercise regularly and following a sodium restricted, calorically appropriate diet.  Medications are reviewed and the patient is taking medications as prescribed.  The patient is generally sleeping well.     She has flair of her arthritis and is being treated with biologics without adverse effects.    Her functional status is stable and would not wish to consider percutaneous valve replacement at this time     Medications were reviewed, and she is taking them appropriately.  The pharmacy is still sending her furosemide, although she is now on Demadex.     Current Outpatient Medications   Medication     acetaminophen (TYLENOL) 500 MG tablet     albuterol (PROAIR HFA/PROVENTIL HFA/VENTOLIN HFA) 108 (90 Base) MCG/ACT inhaler     Bacillus Coagulans-Inulin (PROBIOTIC-PREBIOTIC PO)     ELIQUIS ANTICOAGULANT 5 MG tablet     folic acid (FOLVITE) 1 MG tablet     InFLIXimab (REMICADE IV)     levothyroxine  (SYNTHROID/LEVOTHROID) 75 MCG tablet     melatonin 5 MG tablet     Methotrexate Sodium (METHOTREXATE PO)     metoprolol succinate ER (TOPROL-XL) 25 MG 24 hr tablet     multivitamin (CENTRUM SILVER) tablet     order for DME     potassium chloride ER (KLOR-CON M) 20 MEQ CR tablet     Prednisolon-Gatiflox-Bromfenac 1-0.5-0.075 % SUSP     simvastatin (ZOCOR) 40 MG tablet     Skin Protectants, Misc. (EUCERIN) cream     triamcinolone (KENALOG) 0.1 % external cream     Vitamin D, Cholecalciferol, 10 MCG (400 UNIT) TABS     Multiple Vitamins-Minerals (ICAPS) CAPS     omeprazole (PRILOSEC) 20 MG DR capsule     psyllium (METAMUCIL/KONSYL) capsule     sildenafil (REVATIO) 20 MG tablet     torsemide (DEMADEX) 20 MG tablet     No current facility-administered medications for this visit.         Her weight is down 65 pounds as noted.      The remainder of a 13 system review is negative.      The patient is taking COVID precautions.      Laboratories:    Results for GINA MCKEON (MRN [id number]) as of 11/12/2020 11:46   Ref. Range 9/21/2020 00:00 9/21/2020 00:00 9/21/2020 00:00   Creatinine Unknown 0.670 0.670    GFR Estimate Latest Units: ml/min/1.73m2 90.0 90.0    Albumin Latest Units: g/dL  40    ALT Latest Units: U/L  21    AST Latest Units: U/L  30    WBC Latest Units: 10^9/L  5.9    Hemoglobin Latest Ref Range: 11.7 - 15.7 g/dL  11.1 (L)    Hematocrit Latest Units: %  33.9 (L)    Platelet Count Latest Units: 10^9/L  252    RBC Latest Units: M/uL  3.45    MCV Latest Units: fl  98    MCH Latest Units: pg  32.1 (H)    MCHC Latest Units: g/dL  32.7    RDW Latest Units: %  12.6 (L)    LAB RESULT - HIM SCAN Unknown   Attch

## 2020-11-17 ENCOUNTER — HOSPITAL ENCOUNTER (OUTPATIENT)
Dept: CT IMAGING | Facility: CLINIC | Age: 80
Discharge: HOME | End: 2020-11-17
Attending: INTERNAL MEDICINE | Admitting: INTERNAL MEDICINE
Payer: MEDICARE

## 2020-11-17 DIAGNOSIS — C34.32 MALIGNANT NEOPLASM OF LOWER LOBE OF LEFT LUNG (H): ICD-10-CM

## 2020-11-17 PROCEDURE — 71250 CT THORAX DX C-: CPT

## 2020-11-19 ENCOUNTER — VIRTUAL VISIT (OUTPATIENT)
Dept: ONCOLOGY | Facility: CLINIC | Age: 80
End: 2020-11-19
Attending: INTERNAL MEDICINE
Payer: MEDICARE

## 2020-11-19 DIAGNOSIS — C34.32 MALIGNANT NEOPLASM OF LOWER LOBE OF LEFT LUNG (H): Primary | ICD-10-CM

## 2020-11-19 PROCEDURE — 999N001193 HC VIDEO/TELEPHONE VISIT; NO CHARGE

## 2020-11-19 PROCEDURE — 99441 PR PHYSICIAN TELEPHONE EVALUATION 5-10 MIN: CPT | Mod: 95 | Performed by: INTERNAL MEDICINE

## 2020-11-19 NOTE — LETTER
"    11/19/2020         RE: Jaqueline Canales  [address]  [facility] [address]      Jaqueline Canales is a 79 year old female who is being evaluated via a billable telephone visit.      The patient has been notified of following:     \"This telephone visit will be conducted via a call between you and your physician/provider. We have found that certain health care needs can be provided without the need for a physical exam.  This service lets us provide the care you need with a short phone conversation.  If a prescription is necessary we can send it directly to your pharmacy.  If lab work is needed we can place an order for that and you can then stop by our lab to have the test done at a later time.    Telephone visits are billed at different rates depending on your insurance coverage. During this emergency period, for some insurers they may be billed the same as an in-person visit.  Please reach out to your insurance provider with any questions.    If during the course of the call the physician/provider feels a telephone visit is not appropriate, you will not be charged for this service.\"    Patient has given verbal consent for Telephone visit?  Yes    What phone number would you like to be contacted at? [phone]    How would you like to obtain your AVS? Mail a copy    Phone call duration: 5 minutes    NO REFILLS, ARTHRITIS PAIN (WHICH IS CHRONIC)    Nu Patterson CMA        Visit Date:   11/19/2020     ONCOLOGY HISTORY:  Ms. Canales is a female with squamous cell carcinoma of left lung. Clinical stage 1 (T1a N0 M0).  1.  CT chest on 11/20/2015 revealed 1.2 cm left lower lobe lung nodule.   2.  CT-guided biopsy of left lung nodule on 12/04/2015 revealed poorly differentiated squamous cell carcinoma.   3.  PET scan on 12/14/2015 revealed hypermetabolic nodular lesion in left lower lobe.  No evidence of metastatic disease.   4. Brain MRI on 12/22/2015 did not reveal any brain metastasis.   5. Patient was " treated with SBRT. 5000 cGy in 5 fractions between 2016 and 2016.      SUBJECTIVE:  Ms. Canales is an 79-year-old female with clinical stage I squamous cell carcinoma of left lung, status post SBRT in 2016.  The patient has been doing well since then.      The patient has interstitial lung disease and follows up with pulmonologist.  The patient has some baseline shortness because of that.      No headache.  No dizziness.  No chest pain.  Occasional cough.  No nausea or vomiting.  Appetite good.  No weight loss.  No fever or chills.      CT chest was done on 2020.  No evidence of any malignancy.  There is pulmonary fibrosis.      PHYSICAL EXAMINATION:   GENERAL:  She is alert, oriented x 3.   This is a telephone visit.      ASSESSMENT:   1.  Stage I left lung squamous cell carcinoma diagnosed in .  No evidence of recurrence.   2.  Pulmonary fibrosis and rheumatoid arthritis.      PLAN:   1.  CT scan was reviewed.  She was happy to know that there is no evidence of malignancy.      We discussed regarding followup.  She wants to be monitored as she is worried about cancer coming back. We will get CT chest in 1 year and see her after that.     2.  She will continue to follow up with her pulmonologist and rheumatologist.     3.  Advised her to see a physician sooner if she has worsening cough, coughing of blood, weight loss, lump or any other concerns.         SOFÍA BEAN MD             D: 2020   T: 2020   MT: JACOBY      Name:     GINA CANALES   MRN:      [id number]        Account:      [id number]   :      1940           Visit Date:   2020      Document: [id number]      Telephone visit for 5 minutes.    This office note has been dictated.            Sofía Bean MD

## 2020-11-19 NOTE — PROGRESS NOTES
Visit Date:   11/19/2020     ONCOLOGY HISTORY:  Ms. Canales is a female with squamous cell carcinoma of left lung. Clinical stage 1 (T1a N0 M0).  1.  CT chest on 11/20/2015 revealed 1.2 cm left lower lobe lung nodule.   2.  CT-guided biopsy of left lung nodule on 12/04/2015 revealed poorly differentiated squamous cell carcinoma.   3.  PET scan on 12/14/2015 revealed hypermetabolic nodular lesion in left lower lobe.  No evidence of metastatic disease.   4. Brain MRI on 12/22/2015 did not reveal any brain metastasis.   5. Patient was treated with SBRT. 5000 cGy in 5 fractions between 01/19/2016 and 02/02/2016.      SUBJECTIVE:  Ms. Canales is an 79-year-old female with clinical stage I squamous cell carcinoma of left lung, status post SBRT in 2016.  The patient has been doing well since then.      The patient has interstitial lung disease and follows up with pulmonologist.  The patient has some baseline shortness because of that.      No headache.  No dizziness.  No chest pain.  Occasional cough.  No nausea or vomiting.  Appetite good.  No weight loss.  No fever or chills.      CT chest was done on 11/17/2020.  No evidence of any malignancy.  There is pulmonary fibrosis.      PHYSICAL EXAMINATION:   GENERAL:  She is alert, oriented x 3.   This is a telephone visit.      ASSESSMENT:   1.  Stage I left lung squamous cell carcinoma diagnosed in 2015.  No evidence of recurrence.   2.  Pulmonary fibrosis and rheumatoid arthritis.      PLAN:   1.  CT scan was reviewed.  She was happy to know that there is no evidence of malignancy.      We discussed regarding followup.  She wants to be monitored as she is worried about cancer coming back. We will get CT chest in 1 year and see her after that.     2.  She will continue to follow up with her pulmonologist and rheumatologist.     3.  Advised her to see a physician sooner if she has worsening cough, coughing of blood, weight loss, lump or any other concerns.         SOFÍA  MD GENEVA             D: 2020   T: 2020   MT: JACOBY      Name:     GINA MCKEON   MRN:      [id number]        Account:      [id number]   :      1940           Visit Date:   2020      Document: [id number]      Telephone visit for 5 minutes.

## 2020-11-19 NOTE — PROGRESS NOTES
"Jaqueline Canales is a 79 year old female who is being evaluated via a billable telephone visit.      The patient has been notified of following:     \"This telephone visit will be conducted via a call between you and your physician/provider. We have found that certain health care needs can be provided without the need for a physical exam.  This service lets us provide the care you need with a short phone conversation.  If a prescription is necessary we can send it directly to your pharmacy.  If lab work is needed we can place an order for that and you can then stop by our lab to have the test done at a later time.    Telephone visits are billed at different rates depending on your insurance coverage. During this emergency period, for some insurers they may be billed the same as an in-person visit.  Please reach out to your insurance provider with any questions.    If during the course of the call the physician/provider feels a telephone visit is not appropriate, you will not be charged for this service.\"    Patient has given verbal consent for Telephone visit?  Yes    What phone number would you like to be contacted at? [phone]    How would you like to obtain your AVS? Mail a copy    Phone call duration: 5 minutes    NO REFILLS, ARTHRITIS PAIN (WHICH IS CHRONIC)    Nu Patterson CMA      "

## 2020-11-19 NOTE — PATIENT INSTRUCTIONS
1. CT scan in 1 year.  2. Follow-up in 1 year.      Please call to schedule    Patient would like to be called closer to the one year, around September 2021

## 2020-11-19 NOTE — LETTER
"    11/19/2020         RE: Jaqueline Canales  [address]  [facility] [address]        Dear Colleague,    Thank you for referring your patient, Jaqueline Canales, to the [facility]. Please see a copy of my visit note below.    Jaqueline Canales is a 79 year old female who is being evaluated via a billable telephone visit.      The patient has been notified of following:     \"This telephone visit will be conducted via a call between you and your physician/provider. We have found that certain health care needs can be provided without the need for a physical exam.  This service lets us provide the care you need with a short phone conversation.  If a prescription is necessary we can send it directly to your pharmacy.  If lab work is needed we can place an order for that and you can then stop by our lab to have the test done at a later time.    Telephone visits are billed at different rates depending on your insurance coverage. During this emergency period, for some insurers they may be billed the same as an in-person visit.  Please reach out to your insurance provider with any questions.    If during the course of the call the physician/provider feels a telephone visit is not appropriate, you will not be charged for this service.\"    Patient has given verbal consent for Telephone visit?  Yes    What phone number would you like to be contacted at? [phone]    How would you like to obtain your AVS? Mail a copy    Phone call duration: 5 minutes    NO REFILLS, ARTHRITIS PAIN (WHICH IS CHRONIC)    Nu Patterson CMA        Visit Date:   11/19/2020     ONCOLOGY HISTORY:  Ms. Canales is a female with squamous cell carcinoma of left lung. Clinical stage 1 (T1a N0 M0).  1.  CT chest on 11/20/2015 revealed 1.2 cm left lower lobe lung nodule.   2.  CT-guided biopsy of left lung nodule on 12/04/2015 revealed poorly differentiated squamous cell carcinoma.   3.  PET scan on 12/14/2015 revealed " hypermetabolic nodular lesion in left lower lobe.  No evidence of metastatic disease.   4. Brain MRI on 2015 did not reveal any brain metastasis.   5. Patient was treated with SBRT. 5000 cGy in 5 fractions between 2016 and 2016.      SUBJECTIVE:  Ms. Canales is an 79-year-old female with clinical stage I squamous cell carcinoma of left lung, status post SBRT in 2016.  The patient has been doing well since then.      The patient has interstitial lung disease and follows up with pulmonologist.  The patient has some baseline shortness because of that.      No headache.  No dizziness.  No chest pain.  Occasional cough.  No nausea or vomiting.  Appetite good.  No weight loss.  No fever or chills.      CT chest was done on 2020.  No evidence of any malignancy.  There is pulmonary fibrosis.      PHYSICAL EXAMINATION:   GENERAL:  She is alert, oriented x 3.   This is a telephone visit.      ASSESSMENT:   1.  Stage I left lung squamous cell carcinoma diagnosed in .  No evidence of recurrence.   2.  Pulmonary fibrosis and rheumatoid arthritis.      PLAN:   1.  CT scan was reviewed.  She was happy to know that there is no evidence of malignancy.      We discussed regarding followup.  She wants to be monitored as she is worried about cancer coming back. We will get CT chest in 1 year and see her after that.     2.  She will continue to follow up with her pulmonologist and rheumatologist.     3.  Advised her to see a physician sooner if she has worsening cough, coughing of blood, weight loss, lump or any other concerns.         SOFÍA BEAN MD             D: 2020   T: 2020   MT: JACOBY      Name:     GINA CANALES   MRN:      [id number]        Account:      [id number]   :      1940           Visit Date:   2020      Document: [id number]      Telephone visit for 5 minutes.    This office note has been dictated.          Again, thank you for allowing me to participate in  the care of your patient.        Sincerely,        Nathen Huffman MD

## 2020-11-22 DIAGNOSIS — I48.19 PERSISTENT ATRIAL FIBRILLATION (H): ICD-10-CM

## 2020-11-22 DIAGNOSIS — K22.2 ESOPHAGEAL STRICTURE: ICD-10-CM

## 2020-11-24 RX ORDER — APIXABAN 5 MG/1
TABLET, FILM COATED ORAL
Qty: 180 TABLET | Refills: 2 | Status: SHIPPED | OUTPATIENT
Start: 2020-11-24 | End: 2021-02-22

## 2020-11-24 RX ORDER — METOPROLOL SUCCINATE 25 MG/1
TABLET, EXTENDED RELEASE ORAL
Qty: 90 TABLET | Refills: 2 | Status: SHIPPED | OUTPATIENT
Start: 2020-11-24 | End: 2021-02-22

## 2020-12-11 DIAGNOSIS — E78.5 HYPERLIPIDEMIA LDL GOAL <100: ICD-10-CM

## 2020-12-16 RX ORDER — SIMVASTATIN 40 MG
TABLET ORAL
Qty: 90 TABLET | Refills: 3 | Status: SHIPPED | OUTPATIENT
Start: 2020-12-16 | End: 2021-02-22

## 2020-12-21 ENCOUNTER — TRANSFERRED RECORDS (OUTPATIENT)
Dept: HEALTH INFORMATION MANAGEMENT | Facility: CLINIC | Age: 80
End: 2020-12-21

## 2020-12-21 LAB
ALT SERPL-CCNC: 12 IU/L (ref 5–35)
AST SERPL-CCNC: 28 U/L (ref 5–34)
CREAT SERPL-MCNC: 0.71 MG/DL (ref 0.5–1.3)
GFR SERPL CREATININE-BSD FRML MDRD: 84.2 ML/MIN/1.73M2

## 2021-01-08 ENCOUNTER — VIRTUAL VISIT (OUTPATIENT)
Dept: PULMONOLOGY | Facility: CLINIC | Age: 81
End: 2021-01-08
Attending: INTERNAL MEDICINE
Payer: MEDICARE

## 2021-01-08 DIAGNOSIS — J84.9 ILD (INTERSTITIAL LUNG DISEASE) (H): ICD-10-CM

## 2021-01-08 PROCEDURE — 99441 PR PHYSICIAN TELEPHONE EVALUATION 5-10 MIN: CPT | Mod: 95 | Performed by: INTERNAL MEDICINE

## 2021-01-08 RX ORDER — ALBUTEROL SULFATE 90 UG/1
AEROSOL, METERED RESPIRATORY (INHALATION)
Qty: 18 G | Refills: 11 | Status: SHIPPED | OUTPATIENT
Start: 2021-01-08 | End: 2021-09-13

## 2021-01-08 NOTE — PROGRESS NOTES
Jaqueline is a 80 year old who is being evaluated via a billable telephone visit.      What phone number would you like to be contacted at? [phone]  How would you like to obtain your AVS? 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  Patient reports overall she is doing well she denies any significant changes in her respiratory symptoms.  Also feels that her rheumatoid arthritis is under relatively good control.  She continues on methotrexate and infliximab infusions.  Otherwise doing well with no other new complaints today.      Current Outpatient Medications   Medication     acetaminophen (TYLENOL) 500 MG tablet     albuterol (PROAIR HFA/PROVENTIL HFA/VENTOLIN HFA) 108 (90 Base) MCG/ACT inhaler     Bacillus Coagulans-Inulin (PROBIOTIC-PREBIOTIC PO)     ELIQUIS ANTICOAGULANT 5 MG tablet     folic acid (FOLVITE) 1 MG tablet     InFLIXimab (REMICADE IV)     levothyroxine (SYNTHROID/LEVOTHROID) 75 MCG tablet     Methotrexate Sodium (METHOTREXATE PO)     metoprolol succinate ER (TOPROL-XL) 25 MG 24 hr tablet     Multiple Vitamins-Minerals (ICAPS) CAPS     multivitamin (CENTRUM SILVER) tablet     omeprazole (PRILOSEC) 20 MG DR capsule     order for DME     potassium chloride ER (KLOR-CON M) 20 MEQ CR tablet     psyllium (METAMUCIL/KONSYL) capsule     sildenafil (REVATIO) 20 MG tablet     simvastatin (ZOCOR) 40 MG tablet     torsemide (DEMADEX) 20 MG tablet     Vitamin D, Cholecalciferol, 10 MCG (400 UNIT) TABS     melatonin 5 MG tablet     Prednisolon-Gatiflox-Bromfenac 1-0.5-0.075 % SUSP     Skin Protectants, Misc. (EUCERIN) cream     triamcinolone (KENALOG) 0.1 % external cream     No current facility-administered medications for this visit.       Allergies   Allergen Reactions     No Known Allergies      Past Medical History:   Diagnosis Date     Amaurosis fugax 5-11    Right     Carotid artery stenosis      Esophageal reflux 3/11/2004     HELICOBACTER PYLORI INFECTION 7/11/2006     hx of CA in situ RT breast-1990.mastectomy 4/17/2007     Hyperlipidemia LDL goal <70 6/20/2011     Lung cancer (H)     squamous cell lung ca left lower lobe     OBESITY NOS 3/11/2004     OSTEOPOROSIS NOS 3/11/2004     Other psoriasis 3/11/2004     Pulmonary hypertension (H) 04/15/2019    Right heart catheterization demonstrated moderate pulmonary arterial hypertension with a mean PA pressure of 34 mmHg, no reversibility with inhaled nitric oxide, elevated right and left-sided filling pressures, low normal cardiac index of 2.1.  Seen by Dr. Shay Marquez.  Started on sildenafil (Revatio) 20 mg 3 times daily.     RA (rheumatoid arthritis) (H) 2/8/2010     Raynaud's syndrome 4/10/2006     Scleroderma (H)      Sleep apnea     CPAP       Past Surgical History:   Procedure Laterality Date     BREAST SURGERY       COLONOSCOPY  5/24/16     COLONOSCOPY N/A 6/28/2019    Procedure: Colonoscopy, With Polypectomy And Biopsy;  Surgeon: Neto Kaminski MD;  Location:  GI     [facility]       CV RIGHT HEART CATH N/A 4/15/2019    Procedure: Heart Cath Right Heart Cath;  Surgeon: Naresh Cyr MD;  Location:  HEART CARDIAC CATH LAB     ENDARTERECTOMY CAROTID  2011     HC COLONOSCOPY THRU STOMA, DIAGNOSTIC  2001 2011    diverticulosis     REPAIR HAMMER TOE       TONSILLECTOMY       ZZC NONSPECIFIC PROCEDURE  1990    mastectomy RT breast       Social History     Socioeconomic History     Marital status:      Spouse name: Not on file     Number of children: Not on file     Years of education: Not on file     Highest education level: Not on file   Occupational History     Not on file   Social Needs     Financial resource strain: Not on file     Food insecurity     Worry:  Not on file     Inability: Not on file     Transportation needs     Medical: Not on file     Non-medical: Not on file   Tobacco Use     Smoking status: Former Smoker     Packs/day: 1.00     Years: 30.00     Pack years: 30.00     Types: Cigarettes     Start date:      Quit date: 3/11/1992     Years since quittin.8     Smokeless tobacco: Never Used   Substance and Sexual Activity     Alcohol use: Not Currently     Alcohol/week: 0.0 standard drinks     Frequency: Never     Drug use: No     Sexual activity: Not Currently     Partners: Male   Lifestyle     Physical activity     Days per week: Not on file     Minutes per session: Not on file     Stress: Not on file   Relationships     Social connections     Talks on phone: Not on file     Gets together: Not on file     Attends Mosque service: Not on file     Active member of club or organization: Not on file     Attends meetings of clubs or organizations: Not on file     Relationship status: Not on file     Intimate partner violence     Fear of current or ex partner: Not on file     Emotionally abused: Not on file     Physically abused: Not on file     Forced sexual activity: Not on file   Other Topics Concern     Parent/sibling w/ CABG, MI or angioplasty before 65F 55M? No   Social History Narrative     Not on file       Family History   Problem Relation Age of Onset     Cancer - colorectal Mother      Cerebrovascular Disease Father      Cancer Sister 31        ovarian     Heart Disease Sister      Gastrointestinal Disease Sister         crohns     Gastrointestinal Disease Sister         diverticulitis     Gastrointestinal Disease Brother         diverticulitis     Alzheimer Disease Sister      Cerebrovascular Disease Maternal Grandmother      Diabetes Maternal Grandmother      Mental Illness Maternal Grandmother      Diabetes Maternal Uncle      Cancer Nephew                ROS Pulmonary    A complete ROS was otherwise negative except as noted in the  HPI.    Results: I have reviewed all imaging, PFTs and other relavent tests, please see below for details, PFT and imaging results were reviewed with the patient.    Assessment and plan:      80-year-old female with RA ILD overall stable from an objective standpoint with really like to get her back in clinic and get pulmonary function test continue to monitor.  We will do this over the next 3 to 4 months.  Otherwise I have instructed the patient to call sooner if she does notice any changes in her breathing.  We also discussed the importance of ongoing hand hygiene mask wearing and social distancing during the Covid pandemic.    CBC   Recent Labs   Lab Test 09/21/20 06/26/20  0935 02/25/20  0858   RBC  --  3.76* 4.32   HGB 11.1* 11.7 12.5   HCT 33.9* 36.7 38.9    111* 159       Basic Metabolic Panel  Recent Labs   Lab Test 06/26/20  0935 02/25/20  0858    137   POTASSIUM 4.0 3.5   CHLORIDE 100 99   CO2 29 30*   BUN 26 21   GLC 96 100   TU 10.3* 10.4       INR  Recent Labs   Lab Test 04/15/19  0700 12/04/15  0745   INR 1.05 0.95       PFT  PFT Latest Ref Rng & Units 9/4/2020   FVC L 1.52   FEV1 L 1.40   FVC% % 64   FEV1% % 77           CC:            Phone call duration: 7 minutes

## 2021-01-08 NOTE — LETTER
1/8/2021         RE: Jaqueline Canales  [address]  [facility] [address]        Dear Colleague,    Thank you for referring your patient, Jaqueline Canales, to the [facility] [place]. Please see a copy of my visit note below.    Jaqueline is a 80 year old who is being evaluated via a billable telephone visit.      What phone number would you like to be contacted at? [phone]  How would you like to obtain your AVS? 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  Patient reports overall she is doing well she denies any significant changes in her respiratory symptoms.  Also feels that her rheumatoid arthritis is under relatively good control.  She continues on methotrexate and infliximab infusions.  Otherwise doing well with no other new complaints today.      Current Outpatient Medications   Medication     acetaminophen (TYLENOL) 500 MG tablet     albuterol (PROAIR HFA/PROVENTIL HFA/VENTOLIN HFA) 108 (90 Base) MCG/ACT inhaler     Bacillus Coagulans-Inulin (PROBIOTIC-PREBIOTIC PO)     ELIQUIS ANTICOAGULANT 5 MG tablet     folic acid (FOLVITE) 1 MG tablet     InFLIXimab (REMICADE IV)     levothyroxine (SYNTHROID/LEVOTHROID) 75 MCG tablet     Methotrexate Sodium (METHOTREXATE PO)     metoprolol succinate ER (TOPROL-XL) 25 MG 24 hr tablet     Multiple Vitamins-Minerals (ICAPS) CAPS     multivitamin (CENTRUM SILVER) tablet     omeprazole (PRILOSEC) 20 MG DR capsule     order for DME     potassium chloride ER (KLOR-CON M) 20 MEQ CR tablet     psyllium (METAMUCIL/KONSYL) capsule     sildenafil (REVATIO) 20 MG tablet     simvastatin (ZOCOR) 40 MG tablet     torsemide (DEMADEX) 20 MG tablet      Vitamin D, Cholecalciferol, 10 MCG (400 UNIT) TABS     melatonin 5 MG tablet     Prednisolon-Gatiflox-Bromfenac 1-0.5-0.075 % SUSP     Skin Protectants, Misc. (EUCERIN) cream     triamcinolone (KENALOG) 0.1 % external cream     No current facility-administered medications for this visit.      Allergies   Allergen Reactions     No Known Allergies      Past Medical History:   Diagnosis Date     Amaurosis fugax 5-11    Right     Carotid artery stenosis      Esophageal reflux 3/11/2004     HELICOBACTER PYLORI INFECTION 7/11/2006     hx of CA in situ RT breast-1990.mastectomy 4/17/2007     Hyperlipidemia LDL goal <70 6/20/2011     Lung cancer (H)     squamous cell lung ca left lower lobe     OBESITY NOS 3/11/2004     OSTEOPOROSIS NOS 3/11/2004     Other psoriasis 3/11/2004     Pulmonary hypertension (H) 04/15/2019    Right heart catheterization demonstrated moderate pulmonary arterial hypertension with a mean PA pressure of 34 mmHg, no reversibility with inhaled nitric oxide, elevated right and left-sided filling pressures, low normal cardiac index of 2.1.  Seen by Dr. Shay Marquez.  Started on sildenafil (Revatio) 20 mg 3 times daily.     RA (rheumatoid arthritis) (H) 2/8/2010     Raynaud's syndrome 4/10/2006     Scleroderma (H)      Sleep apnea     CPAP       Past Surgical History:   Procedure Laterality Date     BREAST SURGERY       COLONOSCOPY  5/24/16     COLONOSCOPY N/A 6/28/2019    Procedure: Colonoscopy, With Polypectomy And Biopsy;  Surgeon: Neto Kaminski MD;  Location:  GI     [facility]       CV RIGHT HEART CATH N/A 4/15/2019    Procedure: Heart Cath Right Heart Cath;  Surgeon: Naresh Cyr MD;  Location:  HEART CARDIAC CATH LAB     ENDARTERECTOMY CAROTID  2011     HC COLONOSCOPY THRU STOMA, DIAGNOSTIC  2001 2011    diverticulosis     REPAIR HAMMER TOE       TONSILLECTOMY       ZZC NONSPECIFIC PROCEDURE  1990    mastectomy RT breast       Social History     Socioeconomic History      Marital status:      Spouse name: Not on file     Number of children: Not on file     Years of education: Not on file     Highest education level: Not on file   Occupational History     Not on file   Social Needs     Financial resource strain: Not on file     Food insecurity     Worry: Not on file     Inability: Not on file     Transportation needs     Medical: Not on file     Non-medical: Not on file   Tobacco Use     Smoking status: Former Smoker     Packs/day: 1.00     Years: 30.00     Pack years: 30.00     Types: Cigarettes     Start date:      Quit date: 3/11/1992     Years since quittin.8     Smokeless tobacco: Never Used   Substance and Sexual Activity     Alcohol use: Not Currently     Alcohol/week: 0.0 standard drinks     Frequency: Never     Drug use: No     Sexual activity: Not Currently     Partners: Male   Lifestyle     Physical activity     Days per week: Not on file     Minutes per session: Not on file     Stress: Not on file   Relationships     Social connections     Talks on phone: Not on file     Gets together: Not on file     Attends Tenriism service: Not on file     Active member of club or organization: Not on file     Attends meetings of clubs or organizations: Not on file     Relationship status: Not on file     Intimate partner violence     Fear of current or ex partner: Not on file     Emotionally abused: Not on file     Physically abused: Not on file     Forced sexual activity: Not on file   Other Topics Concern     Parent/sibling w/ CABG, MI or angioplasty before 65F 55M? No   Social History Narrative     Not on file       Family History   Problem Relation Age of Onset     Cancer - colorectal Mother      Cerebrovascular Disease Father      Cancer Sister 31        ovarian     Heart Disease Sister      Gastrointestinal Disease Sister         crohns     Gastrointestinal Disease Sister         diverticulitis     Gastrointestinal Disease Brother         diverticulitis      Alzheimer Disease Sister      Cerebrovascular Disease Maternal Grandmother      Diabetes Maternal Grandmother      Mental Illness Maternal Grandmother      Diabetes Maternal Uncle      Cancer Nephew                ROS Pulmonary    A complete ROS was otherwise negative except as noted in the HPI.    Results: I have reviewed all imaging, PFTs and other relavent tests, please see below for details, PFT and imaging results were reviewed with the patient.    Assessment and plan:      80-year-old female with RA ILD overall stable from an objective standpoint with really like to get her back in clinic and get pulmonary function test continue to monitor.  We will do this over the next 3 to 4 months.  Otherwise I have instructed the patient to call sooner if she does notice any changes in her breathing.  We also discussed the importance of ongoing hand hygiene mask wearing and social distancing during the Covid pandemic.    CBC   Recent Labs   Lab Test 20  0935 20  0858   RBC  --  3.76* 4.32   HGB 11.1* 11.7 12.5   HCT 33.9* 36.7 38.9    111* 159       Basic Metabolic Panel  Recent Labs   Lab Test 20  0935 20  0858    137   POTASSIUM 4.0 3.5   CHLORIDE 100 99   CO2 29 30*   BUN 26 21   GLC 96 100   TU 10.3* 10.4       INR  Recent Labs   Lab Test 04/15/19  0700 12/04/15  0745   INR 1.05 0.95       PFT  PFT Latest Ref Rng & Units 2020   FVC L 1.52   FEV1 L 1.40   FVC% % 64   FEV1% % 77           Phone call duration: 7 minutes        Again, thank you for allowing me to participate in the care of your patient.        Sincerely,        David Morris Perlman, MD

## 2021-01-25 ENCOUNTER — VIRTUAL VISIT (OUTPATIENT)
Dept: FAMILY MEDICINE | Facility: CLINIC | Age: 81
End: 2021-01-25
Payer: MEDICARE

## 2021-01-25 DIAGNOSIS — E03.9 ACQUIRED HYPOTHYROIDISM: ICD-10-CM

## 2021-01-25 DIAGNOSIS — I27.20 PULMONARY HYPERTENSION (H): ICD-10-CM

## 2021-01-25 DIAGNOSIS — R60.0 BILATERAL LEG EDEMA: ICD-10-CM

## 2021-01-25 DIAGNOSIS — Z76.0 ENCOUNTER FOR MEDICATION REFILL: Primary | ICD-10-CM

## 2021-01-25 DIAGNOSIS — K21.9 GASTROESOPHAGEAL REFLUX DISEASE, UNSPECIFIED WHETHER ESOPHAGITIS PRESENT: ICD-10-CM

## 2021-01-25 PROCEDURE — 99443 PR PHYSICIAN TELEPHONE EVALUATION 21-30 MIN: CPT | Mod: 95 | Performed by: INTERNAL MEDICINE

## 2021-01-25 NOTE — PROGRESS NOTES
Jaqueline is a 80 year old who is being evaluated via a billable telephone visit.      What phone number would you like to be contacted at? [phone]  How would you like to obtain your AVS? Mail a copy      Chief Complaint:     follow up hyperlipidemia, pulmonary hypertension       HPI:   Patient Jaqueline Canales is a very pleasant 80 year old female with history of CHF, pulmonary hypertension today for telephone visit for follow up of recent actue on chronic CHF exacerbation symptms. Patient's weight has been stable. No recent significant weight gain since previous 20 lbs weight loss with hospitalization diuresis treatment. Patient is compliant with her daily diuretic therapy with Torsemide and Aldactone. Previous hypoxia symptoms have resolved with diuresis treatment. no chest pains. No signs of current further acute CHF exacerbation symptoms at this time. Leg lymphedema symptoms have been stable. Patient is scheduled for follow up with CHF clinic at the [facility] clinic in [place] going forward. No fever or chills.      Current Medications:     Current Outpatient Medications   Medication Sig Dispense Refill     acetaminophen (TYLENOL) 500 MG tablet Take 1,000 mg by mouth 2 times daily        albuterol (PROAIR HFA/PROVENTIL HFA/VENTOLIN HFA) 108 (90 Base) MCG/ACT inhaler USE 2 INHALATIONS ORALLY   INTO THE LUNGS EVERY 6  HOURS as needed 18 g 11     Bacillus Coagulans-Inulin (PROBIOTIC-PREBIOTIC PO) Take 1 chew tab by mouth 2 times daily        ELIQUIS ANTICOAGULANT 5 MG tablet TAKE 1 TABLET TWICE A  tablet 2     folic acid (FOLVITE) 1 MG tablet Take 1 tablet (1,000 mcg) by mouth daily 90 tablet 3     InFLIXimab (REMICADE IV) Inject 400 mg into the vein Every 8 weeks       levothyroxine (SYNTHROID/LEVOTHROID) 75 MCG tablet Take 1 tablet (75 mcg) by mouth daily 90 tablet 2     melatonin 5 MG tablet Take 5 mg by mouth nightly as needed for sleep       Methotrexate Sodium (METHOTREXATE PO) Take 2.5 mg by  mouth 10 tablets weekly - Wednesdays       metoprolol succinate ER (TOPROL-XL) 25 MG 24 hr tablet TAKE 1 TABLET DAILY 90 tablet 2     Multiple Vitamins-Minerals (ICAPS) CAPS Take 1 capsule by mouth 2 times daily        multivitamin (CENTRUM SILVER) tablet Take 1 tablet by mouth daily       omeprazole (PRILOSEC) 20 MG DR capsule TAKE 1 CAPSULE TWICE DAILY,30-60 MINUTES PRIOR TO     MEALS 180 capsule 2     order for DME Equipment being ordered: 4 wheeled walker with seat 1 each 3     potassium chloride ER (KLOR-CON M) 20 MEQ CR tablet Take 1 tablet (20 mEq) by mouth daily 90 tablet 3     Prednisolon-Gatiflox-Bromfenac 1-0.5-0.075 % SUSP Place 1 drop into the right eye daily       psyllium (METAMUCIL/KONSYL) capsule Take 1 capsule by mouth daily       sildenafil (REVATIO) 20 MG tablet Take 1 tablet (20 mg) by mouth 3 times daily 90 tablet 11     simvastatin (ZOCOR) 40 MG tablet TAKE 1 TABLET AT BEDTIME 90 tablet 3     Skin Protectants, Misc. (EUCERIN) cream Apply topically 2 times daily Apply to areas dry skin topically two times a day for dry skin and Apply to areas of dry skin topically as needed for dry skin daily       torsemide (DEMADEX) 20 MG tablet Take 2 tablets (40 mg) by mouth daily Take 40mg q am 180 tablet 3     triamcinolone (KENALOG) 0.1 % external cream Apply topically 2 times daily 30 g 3     Vitamin D, Cholecalciferol, 10 MCG (400 UNIT) TABS Take 1,600 Units by mouth daily       torsemide (DEMADEX) 20 MG tablet TAKE 2 TABLETS (40MG) DAILYIN THE MORNING 180 tablet 3         Allergies:      Allergies   Allergen Reactions     No Known Allergies             Past Medical History:     Past Medical History:   Diagnosis Date     Amaurosis fugax 5-11    Right     Carotid artery stenosis      Esophageal reflux 3/11/2004     HELICOBACTER PYLORI INFECTION 7/11/2006     hx of CA in situ RT breast-1990.mastectomy 4/17/2007     Hyperlipidemia LDL goal <70 6/20/2011     Lung cancer (H)     squamous cell lung ca left  lower lobe     OBESITY NOS 3/11/2004     OSTEOPOROSIS NOS 3/11/2004     Other psoriasis 3/11/2004     Pulmonary hypertension (H) 04/15/2019    Right heart catheterization demonstrated moderate pulmonary arterial hypertension with a mean PA pressure of 34 mmHg, no reversibility with inhaled nitric oxide, elevated right and left-sided filling pressures, low normal cardiac index of 2.1.  Seen by Dr. Shay Marquez.  Started on sildenafil (Revatio) 20 mg 3 times daily.     RA (rheumatoid arthritis) (H) 2010     Raynaud's syndrome 4/10/2006     Scleroderma (H)      Sleep apnea     CPAP         Past Surgical History:     Past Surgical History:   Procedure Laterality Date     BREAST SURGERY       COLONOSCOPY  15     COLONOSCOPY N/A 2019    Procedure: Colonoscopy, With Polypectomy And Biopsy;  Surgeon: Neto Kaminski MD;  Location:  GI     CONIZATION       CV RIGHT HEART CATH MEASUREMENTS RECORDED N/A 4/15/2019    Procedure: Heart Cath Right Heart Cath;  Surgeon: Naresh Cyr MD;  Location:  HEART CARDIAC CATH LAB     ENDARTERECTOMY CAROTID       HC COLONOSCOPY THRU STOMA, DIAGNOSTIC  2001    diverticulosis     REPAIR HAMMER TOE       TONSILLECTOMY       ZZC NONSPECIFIC PROCEDURE      mastectomy RT breast         Family Medical History:     Family History   Problem Relation Age of Onset     Cancer - colorectal Mother      Cerebrovascular Disease Father      Cancer Sister 31        ovarian     Heart Disease Sister      Gastrointestinal Disease Sister         crohns     Gastrointestinal Disease Sister         diverticulitis     Gastrointestinal Disease Brother         diverticulitis     Alzheimer Disease Sister      Cerebrovascular Disease Maternal Grandmother      Diabetes Maternal Grandmother      Mental Illness Maternal Grandmother      Diabetes Maternal Uncle      Cancer Nephew                  Social History:     Social History     Socioeconomic History     Marital  status:      Spouse name: Not on file     Number of children: Not on file     Years of education: Not on file     Highest education level: Not on file   Occupational History     Not on file   Social Needs     Financial resource strain: Not on file     Food insecurity     Worry: Not on file     Inability: Not on file     Transportation needs     Medical: Not on file     Non-medical: Not on file   Tobacco Use     Smoking status: Former Smoker     Packs/day: 1.00     Years: 30.00     Pack years: 30.00     Types: Cigarettes     Start date:      Quit date: 3/11/1992     Years since quittin.8     Smokeless tobacco: Never Used   Substance and Sexual Activity     Alcohol use: Not Currently     Alcohol/week: 0.0 standard drinks     Frequency: Never     Drug use: No     Sexual activity: Not Currently     Partners: Male   Lifestyle     Physical activity     Days per week: Not on file     Minutes per session: Not on file     Stress: Not on file   Relationships     Social connections     Talks on phone: Not on file     Gets together: Not on file     Attends Yarsanism service: Not on file     Active member of club or organization: Not on file     Attends meetings of clubs or organizations: Not on file     Relationship status: Not on file     Intimate partner violence     Fear of current or ex partner: Not on file     Emotionally abused: Not on file     Physically abused: Not on file     Forced sexual activity: Not on file   Other Topics Concern     Parent/sibling w/ CABG, MI or angioplasty before 65F 55M? No   Social History Narrative     Not on file           Review of System:     Constitutional: Negative for fever or chills  Skin: Negative for rashes  Ears/Nose/Throat: Negative for nasal congestion, sore throat  Respiratory: No shortness of breath, dyspnea on exertion, cough, or hemoptysis  Cardiovascular: Negative for chest pain  Gastrointestinal: Negative for nausea, vomiting, positive for chronic  GERD  Genitourinary: Negative for dysuria, hematuria  Musculoskeletal: Negative for myalgias  Neurologic: Negative for headaches  Psychiatric: Negative for depression, anxiety  Hematologic/Lymphatic/Immunologic: positive for improvement in chronic bilateral leg edema symptoms with recent diuresis treatment  Endocrine: Negative  Behavioral: Negative for tobacco use       Physical Exam:   There were no vitals taken for this visit.    RESP: no cough over the phone   NEURO: Alert & Oriented x 3 over the phone.   PSYCH: mentation appears normal, affect normal over the phone        Diagnostic Test Results:     Last Comprehensive Metabolic Panel:  Sodium   Date Value Ref Range Status   06/26/2020 135 133 - 144 mmol/L Final     Potassium   Date Value Ref Range Status   06/26/2020 4.0 3.4 - 5.3 mmol/L Final     Chloride   Date Value Ref Range Status   06/26/2020 100 94 - 109 mmol/L Final     Carbon Dioxide   Date Value Ref Range Status   06/26/2020 29 20 - 32 mmol/L Final     Anion Gap   Date Value Ref Range Status   06/26/2020 6 3 - 14 mmol/L Final     Glucose   Date Value Ref Range Status   06/26/2020 96 70 - 99 mg/dL Final     Urea Nitrogen   Date Value Ref Range Status   06/26/2020 26 7 - 30 mg/dL Final     Creatinine   Date Value Ref Range Status   12/21/2020 0.710 0.500 - 1.300 mg/dL Final     GFR Estimate   Date Value Ref Range Status   12/21/2020 84.2 ml/min/1.73m2 Final     Calcium   Date Value Ref Range Status   06/26/2020 10.3 (H) 8.5 - 10.1 mg/dL Final         ASSESSMENT/PLAN:     (Z76.0) Encounter for medication refill (primary encounter diagnosis)  (I27.20) Pulmonary hypertension (H)  (R60.0) Bilateral leg edema   Comment: routine follow up of recent actue on chronic CHF exacerbation symptms. Patient's weight has stayed stable with current diuretic therapy and low salt diet. Patient is compliant with her daily diuretic therapy with Torsemide and Aldactone. Previous hypoxia symptoms have resolved with diuresis  treatment. no chest pains. No signs of current further acute CHF exacerbation symptoms at this time. Leg lymphedema symptoms have been stable.  Plan: I have refilled patient's current torsemide (DEMADEX) 20 MG tablet,      continue spironolactone (ALDACTONE) 25 MG tablet diuretic medications at this time. continue to follow up with CHF clinic at the [facility] clinic in [place] going forward.      (E78.5) Hyperlipidemia LDL goal <100  Comment: stable.  Plan: continue current therapy      (K21.9) Gastroesophageal reflux disease, unspecified whether esophagitis present  Comment: stable.  Plan: continue current therapy    (E03.9) Acquired hypothyroidism  Comment: stable.  Plan: continue current therapy        Follow Up Plan:     Patient is instructed to return to Internal Medicine clinic for follow-up visit in 1 month.      Phone call duration: 25 minutes    Carissa Burroughs MD  Internal Medicine  [facility]

## 2021-01-26 RX ORDER — TORSEMIDE 20 MG/1
40 TABLET ORAL DAILY
Qty: 180 TABLET | Refills: 3 | Status: SHIPPED | OUTPATIENT
Start: 2021-01-26 | End: 2021-06-30

## 2021-02-09 ENCOUNTER — TELEPHONE (OUTPATIENT)
Dept: FAMILY MEDICINE | Facility: CLINIC | Age: 81
End: 2021-02-09

## 2021-02-09 DIAGNOSIS — I27.20 PULMONARY HYPERTENSION (H): ICD-10-CM

## 2021-02-09 NOTE — TELEPHONE ENCOUNTER
Reason for Call:  Medication or medication refill:    Do you use a [facility]?  Name of the pharmacy and phone number for the current request:     [facility] - [place], [place] - [address]     Name of the medication requested: sildenafil (REVATIO) 20 MG tablet     Other request: Pharma [email or web address] department is calling stating Pt changed her Medicare Rx benefits portion and will now need a new PA for Rx. They have submitted it and will need us to enter the key ([id number]) and enter our clinical  information    Can we leave a detailed message on this number? YES    Phone number Jayda- [facility] - benefits verification department can be reached at: [phone]    Best Time: any    Call taken on 2/9/2021 at 4:13 PM by Vivian Cuello

## 2021-02-11 RX ORDER — SILDENAFIL CITRATE 20 MG/1
20 TABLET ORAL 3 TIMES DAILY
Qty: 90 TABLET | Refills: 0 | Status: SHIPPED | OUTPATIENT
Start: 2021-02-11 | End: 2021-02-22

## 2021-02-11 NOTE — TELEPHONE ENCOUNTER
Prior Authorization Retail Medication Request    Medication/Dose: Sildenafil (REVATIO) 20 mg  ICD code (if different than what is on RX):  I27.20  Previously Tried and Failed:  None  Rationale:  Patient stable on medication for pulmonary hypertension with adequate response in blood pressure    Insurance Name:  [employer] [facility]  Insurance ID:  [id number]      Pharmacy Information (if different than what is on RX)  Name:  [facility]  Phone:  [phone]

## 2021-02-11 NOTE — TELEPHONE ENCOUNTER
Reason for Call:  Other prescription    Detailed comments: Pt's  calling and is requesting for her PCP to send in a 30 day supply  To [facility] in [place]  On [address]  While she is awaiting her speciality pharmacy to send in the Rx by mail.  He would like a call back as soon as possible to confirm Rx has been sent.    Phone Number  can be reached at: Other phone number:  [phone]    Best Time: anytime    Can we leave a detailed message on this number? YES    Call taken on 2/11/2021 at 10:14 AM by Kiet Murillo

## 2021-02-11 NOTE — TELEPHONE ENCOUNTER
PA Initiation    Medication: Sildenafil (REVATIO) 20 mg  Insurance Company: [employer] - Phone [phone] Fax [phone]  Pharmacy Filling the Rx: [id number] [place] [id number]  Filling Pharmacy Phone: [phone]  Filling Pharmacy Fax: [phone]  Start Date: 2/11/2021

## 2021-02-11 NOTE — TELEPHONE ENCOUNTER
Prior Authorization Approval    Authorization Effective Date: 2/9/2021  Authorization Expiration Date:  Until further notice  Medication: Sildenafil (REVATIO) 20 mg  Approved Dose/Quantity:   Reference #: [id number]   Insurance Company: [employer] - Phone [phone] Fax [phone]  Expected CoPay:       CoPay Card Available:      Foundation Assistance Needed:    Which Pharmacy is filling the prescription (Not needed for infusion/clinic administered): [id number] - [place], [place] - [address]  Pharmacy Notified: Yes  Patient Notified: Yes

## 2021-02-12 ENCOUNTER — IMMUNIZATION (OUTPATIENT)
Dept: NURSING | Facility: CLINIC | Age: 81
End: 2021-02-12
Payer: MEDICARE

## 2021-02-12 PROCEDURE — 91300 PR COVID VAC PFIZER DIL RECON 30 MCG/0.3 ML IM: CPT

## 2021-02-12 PROCEDURE — 0001A PR COVID VAC PFIZER DIL RECON 30 MCG/0.3 ML IM: CPT

## 2021-02-17 ENCOUNTER — TELEPHONE (OUTPATIENT)
Dept: FAMILY MEDICINE | Facility: CLINIC | Age: 81
End: 2021-02-17

## 2021-02-17 NOTE — TELEPHONE ENCOUNTER
Reason for call:  Other   Patient called regarding (reason for call): call back  Additional comments: Called to give info to where Rx's need to be faxed to for mail order. Which is [facility] mail order # [phone] Fax# [phone]. Stated all current Rx's need to be faxed there so patient can receive in the mail    Phone number to reach patient:  Other phone number:  [phone]    Best Time:  Anytime    Can we leave a detailed message on this number?  YES    Travel screening: Not Applicable

## 2021-02-17 NOTE — TELEPHONE ENCOUNTER
Patient called wanting all her prescriptions transferred to Dr. Burroughs. Tried multiple times to clarify this but she does not know the name of pharmacy she wants them sent to, just says she has new insurance. She said she does not need refills right now. Advised she call when needs refills but she says she wants them all now with refills on file. Also maybe has meds that are not prescribed by him that she wants prescribed by Dr. Burroughs. Maybe has one that she takes TID that will be out of soon. Hard to follow. Unsure what patient is needing. Requesting primary clinic follow up. Patient was wishing to speak to [facility].

## 2021-02-19 NOTE — TELEPHONE ENCOUNTER
Left message asking patient to call back     To clarify which meds needed and to which pharmacy     Carmen PORTER RN

## 2021-02-22 ENCOUNTER — VIRTUAL VISIT (OUTPATIENT)
Dept: FAMILY MEDICINE | Facility: CLINIC | Age: 81
End: 2021-02-22
Payer: MEDICARE

## 2021-02-22 DIAGNOSIS — E78.5 HYPERLIPIDEMIA LDL GOAL <100: ICD-10-CM

## 2021-02-22 DIAGNOSIS — E03.9 HYPOTHYROIDISM, UNSPECIFIED TYPE: ICD-10-CM

## 2021-02-22 DIAGNOSIS — I48.19 PERSISTENT ATRIAL FIBRILLATION (H): ICD-10-CM

## 2021-02-22 DIAGNOSIS — Z76.0 ENCOUNTER FOR MEDICATION REFILL: Primary | ICD-10-CM

## 2021-02-22 DIAGNOSIS — I27.20 PULMONARY HYPERTENSION (H): ICD-10-CM

## 2021-02-22 PROCEDURE — 99443 PR PHYSICIAN TELEPHONE EVALUATION 21-30 MIN: CPT | Mod: 95 | Performed by: INTERNAL MEDICINE

## 2021-02-22 RX ORDER — SIMVASTATIN 40 MG
40 TABLET ORAL AT BEDTIME
Qty: 90 TABLET | Refills: 3 | Status: SHIPPED | OUTPATIENT
Start: 2021-02-22 | End: 2021-02-22

## 2021-02-22 RX ORDER — SILDENAFIL CITRATE 20 MG/1
20 TABLET ORAL 3 TIMES DAILY
Qty: 90 TABLET | Refills: 5 | Status: SHIPPED | OUTPATIENT
Start: 2021-02-22 | End: 2021-03-08

## 2021-02-22 RX ORDER — METOPROLOL SUCCINATE 25 MG/1
25 TABLET, EXTENDED RELEASE ORAL DAILY
Qty: 90 TABLET | Refills: 2 | Status: SHIPPED | OUTPATIENT
Start: 2021-02-22 | End: 2021-10-04

## 2021-02-22 RX ORDER — LEVOTHYROXINE SODIUM 75 UG/1
75 TABLET ORAL DAILY
Qty: 90 TABLET | Refills: 2 | Status: SHIPPED | OUTPATIENT
Start: 2021-02-22 | End: 2021-11-02

## 2021-02-22 RX ORDER — SIMVASTATIN 40 MG
40 TABLET ORAL AT BEDTIME
Qty: 90 TABLET | Refills: 3 | Status: SHIPPED | OUTPATIENT
Start: 2021-02-22 | End: 2022-02-21

## 2021-02-22 NOTE — PROGRESS NOTES
Jaqueline is a 80 year old who is being evaluated via a billable telephone visit.      What phone number would you like to be contacted at? [phone]  How would you like to obtain your AVS? Mail a copy      Chief Complaint:   Medication refills  follow up hyperlipidemia, pulmonary hypertension       HPI:   Patient Jaqueline Canales is a very pleasant 80 year old female with history of CHF, pulmonary hypertension today for telephone visit for follow up of multiple concerns including medication refills. Patient's weight has been stable. No recent significant weight gain since previous 20 lbs weight loss with hospitalization diuresis treatment. Patient is compliant with her daily diuretic therapy with Torsemide and Aldactone. Previous hypoxia symptoms have resolved with diuresis treatment. no chest pains. No signs of current further acute CHF exacerbation symptoms at this time. Leg lymphedema symptoms have been stable. Patient is scheduled for follow up with CHF clinic at the [facility] clinic in [place] going forward. No fever or chills.      Current Medications:     Current Outpatient Medications   Medication Sig Dispense Refill     acetaminophen (TYLENOL) 500 MG tablet Take 1,000 mg by mouth 2 times daily        albuterol (PROAIR HFA/PROVENTIL HFA/VENTOLIN HFA) 108 (90 Base) MCG/ACT inhaler USE 2 INHALATIONS ORALLY   INTO THE LUNGS EVERY 6  HOURS as needed 18 g 11     apixaban ANTICOAGULANT (ELIQUIS ANTICOAGULANT) 5 MG tablet Take 1 tablet (5 mg) by mouth 2 times daily 180 tablet 2     Bacillus Coagulans-Inulin (PROBIOTIC-PREBIOTIC PO) Take 1 chew tab by mouth 2 times daily        folic acid (FOLVITE) 1 MG tablet Take 1 tablet (1,000 mcg) by mouth daily 90 tablet 3     InFLIXimab (REMICADE IV) Inject 400 mg into the vein Every 8 weeks       levothyroxine (SYNTHROID/LEVOTHROID) 75 MCG tablet Take 1 tablet (75 mcg) by mouth daily 90 tablet 2     melatonin 5 MG tablet Take 5 mg by mouth nightly as needed for sleep        Methotrexate Sodium (METHOTREXATE PO) Take 2.5 mg by mouth 10 tablets weekly - Wednesdays       metoprolol succinate ER (TOPROL-XL) 25 MG 24 hr tablet Take 1 tablet (25 mg) by mouth daily 90 tablet 2     Multiple Vitamins-Minerals (ICAPS) CAPS Take 1 capsule by mouth 2 times daily        multivitamin (CENTRUM SILVER) tablet Take 1 tablet by mouth daily       omeprazole (PRILOSEC) 20 MG DR capsule TAKE 1 CAPSULE TWICE DAILY,30-60 MINUTES PRIOR TO     MEALS 180 capsule 2     order for DME Equipment being ordered: 4 wheeled walker with seat 1 each 3     potassium chloride ER (KLOR-CON M) 20 MEQ CR tablet Take 1 tablet (20 mEq) by mouth daily 90 tablet 3     Prednisolon-Gatiflox-Bromfenac 1-0.5-0.075 % SUSP Place 1 drop into the right eye daily       psyllium (METAMUCIL/KONSYL) capsule Take 1 capsule by mouth daily       sildenafil (REVATIO) 20 MG tablet Take 1 tablet (20 mg) by mouth 3 times daily 90 tablet 5     simvastatin (ZOCOR) 40 MG tablet Take 1 tablet (40 mg) by mouth At Bedtime 90 tablet 3     Skin Protectants, Misc. (EUCERIN) cream Apply topically 2 times daily Apply to areas dry skin topically two times a day for dry skin and Apply to areas of dry skin topically as needed for dry skin daily       torsemide (DEMADEX) 20 MG tablet Take 2 tablets (40 mg) by mouth daily Take 40mg q am 180 tablet 3     torsemide (DEMADEX) 20 MG tablet TAKE 2 TABLETS (40MG) DAILYIN THE MORNING 180 tablet 3     triamcinolone (KENALOG) 0.1 % external cream Apply topically 2 times daily 30 g 3     Vitamin D, Cholecalciferol, 10 MCG (400 UNIT) TABS Take 1,600 Units by mouth daily           Allergies:      Allergies   Allergen Reactions     No Known Allergies             Past Medical History:     Past Medical History:   Diagnosis Date     Amaurosis fugax 5-11    Right     Carotid artery stenosis      Esophageal reflux 3/11/2004     HELICOBACTER PYLORI INFECTION 7/11/2006     hx of CA in situ RT breast-1990.mastectomy 4/17/2007      Hyperlipidemia LDL goal <70 6/20/2011     Lung cancer (H)     squamous cell lung ca left lower lobe     OBESITY NOS 3/11/2004     OSTEOPOROSIS NOS 3/11/2004     Other psoriasis 3/11/2004     Pulmonary hypertension (H) 04/15/2019    Right heart catheterization demonstrated moderate pulmonary arterial hypertension with a mean PA pressure of 34 mmHg, no reversibility with inhaled nitric oxide, elevated right and left-sided filling pressures, low normal cardiac index of 2.1.  Seen by Dr. Shay Marquez.  Started on sildenafil (Revatio) 20 mg 3 times daily.     RA (rheumatoid arthritis) (H) 2/8/2010     Raynaud's syndrome 4/10/2006     Scleroderma (H)      Sleep apnea     CPAP         Past Surgical History:     Past Surgical History:   Procedure Laterality Date     BREAST SURGERY       COLONOSCOPY  5/24/16     COLONOSCOPY N/A 6/28/2019    Procedure: Colonoscopy, With Polypectomy And Biopsy;  Surgeon: Neto Kaminski MD;  Location:  GI     CONIZATION       CV RIGHT HEART CATH MEASUREMENTS RECORDED N/A 4/15/2019    Procedure: Heart Cath Right Heart Cath;  Surgeon: Naresh Cyr MD;  Location:  HEART CARDIAC CATH LAB     ENDARTERECTOMY CAROTID  2011     HC COLONOSCOPY THRU STOMA, DIAGNOSTIC  2001 2011    diverticulosis     REPAIR HAMMER TOE       TONSILLECTOMY       ZZC NONSPECIFIC PROCEDURE  1990    mastectomy RT breast         Family Medical History:     Family History   Problem Relation Age of Onset     Cancer - colorectal Mother      Cerebrovascular Disease Father      Cancer Sister 31        ovarian     Heart Disease Sister      Gastrointestinal Disease Sister         crohns     Gastrointestinal Disease Sister         diverticulitis     Gastrointestinal Disease Brother         diverticulitis     Alzheimer Disease Sister      Cerebrovascular Disease Maternal Grandmother      Diabetes Maternal Grandmother      Mental Illness Maternal Grandmother      Diabetes Maternal Uncle      Cancer Nephew                   Social History:     Social History     Socioeconomic History     Marital status:      Spouse name: Not on file     Number of children: Not on file     Years of education: Not on file     Highest education level: Not on file   Occupational History     Not on file   Social Needs     Financial resource strain: Not on file     Food insecurity     Worry: Not on file     Inability: Not on file     Transportation needs     Medical: Not on file     Non-medical: Not on file   Tobacco Use     Smoking status: Former Smoker     Packs/day: 1.00     Years: 30.00     Pack years: 30.00     Types: Cigarettes     Start date:      Quit date: 3/11/1992     Years since quittin.9     Smokeless tobacco: Never Used   Substance and Sexual Activity     Alcohol use: Not Currently     Alcohol/week: 0.0 standard drinks     Frequency: Never     Drug use: No     Sexual activity: Not Currently     Partners: Male   Lifestyle     Physical activity     Days per week: Not on file     Minutes per session: Not on file     Stress: Not on file   Relationships     Social connections     Talks on phone: Not on file     Gets together: Not on file     Attends [employer] service: Not on file     Active member of club or organization: Not on file     Attends meetings of clubs or organizations: Not on file     Relationship status: Not on file     Intimate partner violence     Fear of current or ex partner: Not on file     Emotionally abused: Not on file     Physically abused: Not on file     Forced sexual activity: Not on file   Other Topics Concern     Parent/sibling w/ CABG, MI or angioplasty before 65F 55M? No   Social History Narrative     Not on file           Review of System:     Constitutional: Negative for fever or chills  Skin: Negative for rashes  Ears/Nose/Throat: Negative for nasal congestion, sore throat  Respiratory: No shortness of breath, dyspnea on exertion, cough, or hemoptysis  Cardiovascular: Negative for  chest pain  Gastrointestinal: Negative for nausea, vomiting, positive for chronic GERD  Genitourinary: Negative for dysuria, hematuria  Musculoskeletal: Negative for myalgias  Neurologic: Negative for headaches  Psychiatric: Negative for depression, anxiety  Hematologic/Lymphatic/Immunologic: positive for improvement in chronic bilateral leg edema symptoms with recent diuresis treatment  Endocrine: Negative  Behavioral: Negative for tobacco use       Physical Exam:   There were no vitals taken for this visit.    RESP: no cough over the phone   NEURO: Alert & Oriented x 3 over the phone.   PSYCH: mentation appears normal, affect normal over the phone        Diagnostic Test Results:     Last Comprehensive Metabolic Panel:  Sodium   Date Value Ref Range Status   06/26/2020 135 133 - 144 mmol/L Final     Potassium   Date Value Ref Range Status   06/26/2020 4.0 3.4 - 5.3 mmol/L Final     Chloride   Date Value Ref Range Status   06/26/2020 100 94 - 109 mmol/L Final     Carbon Dioxide   Date Value Ref Range Status   06/26/2020 29 20 - 32 mmol/L Final     Anion Gap   Date Value Ref Range Status   06/26/2020 6 3 - 14 mmol/L Final     Glucose   Date Value Ref Range Status   06/26/2020 96 70 - 99 mg/dL Final     Urea Nitrogen   Date Value Ref Range Status   06/26/2020 26 7 - 30 mg/dL Final     Creatinine   Date Value Ref Range Status   12/21/2020 0.710 0.500 - 1.300 mg/dL Final     GFR Estimate   Date Value Ref Range Status   12/21/2020 84.2 ml/min/1.73m2 Final     Calcium   Date Value Ref Range Status   06/26/2020 10.3 (H) 8.5 - 10.1 mg/dL Final         ASSESSMENT/PLAN:     (Z76.0) Encounter for medication refill (primary encounter diagnosis)  (E78.5) Hyperlipidemia LDL goal <100  Comment: stable. Patient is due for a refill of simvastatin  Plan: simvastatin (ZOCOR) 40 MG tablet      (K21.9) Gastroesophageal reflux disease, unspecified whether esophagitis present  Comment: stable.  Plan: continue current therapy    (E03.9)  Acquired hypothyroidism  Comment: stable. Patient is due for a refill of Levothyroxine.   Plan: levothyroxine (SYNTHROID/LEVOTHROID) 75 MCG         tablet      (I48.19) Persistent atrial fibrillation (H)  Comment: stable. No chest pains.   Plan: metoprolol succinate ER (TOPROL-XL) 25 MG 24 hr        tablet, apixaban ANTICOAGULANT (ELIQUIS         ANTICOAGULANT) 5 MG tablet         (I27.20) Pulmonary hypertension (H)  Comment: stable. Patient is due for a refill of Sildenafil medication.  Plan: sildenafil (REVATIO) 20 MG tablet    Follow Up Plan:     Patient is instructed to return to Internal Medicine clinic for follow-up visit in 1 month.    Phone call duration: 30 minutes    Carissa Burroughs MD  Internal Medicine  [facility]

## 2021-03-05 ENCOUNTER — IMMUNIZATION (OUTPATIENT)
Dept: NURSING | Facility: CLINIC | Age: 81
End: 2021-03-05
Attending: INTERNAL MEDICINE
Payer: MEDICARE

## 2021-03-05 PROCEDURE — 91300 PR COVID VAC PFIZER DIL RECON 30 MCG/0.3 ML IM: CPT

## 2021-03-05 PROCEDURE — 0002A PR COVID VAC PFIZER DIL RECON 30 MCG/0.3 ML IM: CPT

## 2021-03-06 DIAGNOSIS — I27.20 PULMONARY HYPERTENSION (H): ICD-10-CM

## 2021-03-08 RX ORDER — SILDENAFIL CITRATE 20 MG/1
20 TABLET ORAL 3 TIMES DAILY
Qty: 90 TABLET | Refills: 5 | Status: SHIPPED | OUTPATIENT
Start: 2021-03-08 | End: 2021-07-19

## 2021-03-16 NOTE — TELEPHONE ENCOUNTER
levothyroxine (SYNTHROID/LEVOTHROID) 75 MCG tablet 90 tablet 2 2/22/2021  No   Sig - Route: Take 1 tablet (75 mcg) by mouth daily - Oral   Sent to pharmacy as: Levothyroxine Sodium 75 MCG Oral Tablet (SYNTHROID/LEVOTHROID)   Class: E-Prescribe   Order: [id number]   E-Prescribing Status: Receipt confirmed by pharmacy (2/22/2021  1:40 PM CST)   Printout Tracking    External Result Report   Pharmacy    [facility] - [place], [place] - [facility] [address] AT PORTAL TO REGISTERED [facility] SITES     Fax from pharmacy seeking refill of levothyroxine    Too soon to renew, Erx sent on 2- good for 9 months.    Fax sent back to pharmacy - check records for refills available.    RT Crissy (R)

## 2021-03-22 ENCOUNTER — TRANSFERRED RECORDS (OUTPATIENT)
Dept: HEALTH INFORMATION MANAGEMENT | Facility: CLINIC | Age: 81
End: 2021-03-22

## 2021-03-22 ENCOUNTER — HOSPITAL ENCOUNTER (OUTPATIENT)
Dept: CARDIOLOGY | Facility: CLINIC | Age: 81
Discharge: HOME | End: 2021-03-22
Attending: INTERNAL MEDICINE | Admitting: INTERNAL MEDICINE
Payer: MEDICARE

## 2021-03-22 DIAGNOSIS — R06.09 DOE (DYSPNEA ON EXERTION): ICD-10-CM

## 2021-03-22 DIAGNOSIS — I27.20 PULMONARY HYPERTENSION (H): ICD-10-CM

## 2021-03-22 LAB
ALBUMIN SERPL-MCNC: 3.4 G/DL (ref 3.4–5)
ALP SERPL-CCNC: 97 U/L (ref 40–150)
ALT SERPL W P-5'-P-CCNC: 27 U/L (ref 0–50)
ALT SERPL-CCNC: 17 IU/L (ref 5–35)
ANION GAP SERPL CALCULATED.3IONS-SCNC: 9 MMOL/L (ref 3–14)
AST SERPL W P-5'-P-CCNC: 41 U/L (ref 0–45)
AST SERPL-CCNC: 37 U/L (ref 5–34)
BILIRUB SERPL-MCNC: 0.5 MG/DL (ref 0.2–1.3)
BUN SERPL-MCNC: 25 MG/DL (ref 7–30)
CALCIUM SERPL-MCNC: 9.5 MG/DL (ref 8.5–10.1)
CHLORIDE SERPL-SCNC: 100 MMOL/L (ref 94–109)
CO2 SERPL-SCNC: 28 MMOL/L (ref 20–32)
CREAT SERPL-MCNC: 0.78 MG/DL (ref 0.5–1.3)
CREAT SERPL-MCNC: 0.85 MG/DL (ref 0.52–1.04)
ERYTHROCYTE [DISTWIDTH] IN BLOOD BY AUTOMATED COUNT: 21 % (ref 10–15)
GFR SERPL CREATININE-BSD FRML MDRD: 65 ML/MIN/{1.73_M2}
GFR SERPL CREATININE-BSD FRML MDRD: 75.3 ML/MIN/1.73M2
GLUCOSE SERPL-MCNC: 96 MG/DL (ref 70–99)
HCT VFR BLD AUTO: 30.4 % (ref 35–47)
HGB BLD-MCNC: 9.3 G/DL (ref 11.7–15.7)
IRON SATN MFR SERPL: 3 % (ref 15–46)
IRON SERPL-MCNC: 13 UG/DL (ref 35–180)
MCH RBC QN AUTO: 26.4 PG (ref 26.5–33)
MCHC RBC AUTO-ENTMCNC: 30.6 G/DL (ref 31.5–36.5)
MCV RBC AUTO: 86 FL (ref 78–100)
NT-PROBNP SERPL-MCNC: 4485 PG/ML (ref 0–450)
PLATELET # BLD AUTO: 169 10E9/L (ref 150–450)
POTASSIUM SERPL-SCNC: 3.6 MMOL/L (ref 3.4–5.3)
PROT SERPL-MCNC: 7.1 G/DL (ref 6.8–8.8)
RBC # BLD AUTO: 3.52 10E12/L (ref 3.8–5.2)
SODIUM SERPL-SCNC: 137 MMOL/L (ref 133–144)
TIBC SERPL-MCNC: 441 UG/DL (ref 240–430)
WBC # BLD AUTO: 6.1 10E9/L (ref 4–11)

## 2021-03-22 PROCEDURE — 93306 TTE W/DOPPLER COMPLETE: CPT

## 2021-03-22 PROCEDURE — 83880 ASSAY OF NATRIURETIC PEPTIDE: CPT | Performed by: INTERNAL MEDICINE

## 2021-03-22 PROCEDURE — 85027 COMPLETE CBC AUTOMATED: CPT | Performed by: INTERNAL MEDICINE

## 2021-03-22 PROCEDURE — 80053 COMPREHEN METABOLIC PANEL: CPT | Performed by: INTERNAL MEDICINE

## 2021-03-22 PROCEDURE — 83540 ASSAY OF IRON: CPT | Performed by: INTERNAL MEDICINE

## 2021-03-22 PROCEDURE — 83550 IRON BINDING TEST: CPT | Performed by: INTERNAL MEDICINE

## 2021-03-22 PROCEDURE — 36415 COLL VENOUS BLD VENIPUNCTURE: CPT | Performed by: INTERNAL MEDICINE

## 2021-03-22 PROCEDURE — 93306 TTE W/DOPPLER COMPLETE: CPT | Mod: 26 | Performed by: INTERNAL MEDICINE

## 2021-03-23 ENCOUNTER — EXTERNAL ORDER RESULTS (OUTPATIENT)
Dept: PULMONOLOGY | Facility: CLINIC | Age: 81
End: 2021-03-23

## 2021-03-23 LAB
ALBUMIN SERPL-MCNC: 3.9 G/DL
ALT SERPL-CCNC: 17 U/L
AST SERPL-CCNC: 37 U/L
CK TOTAL: 19 U/L
CREAT SERPL-MCNC: 0.78 MG/DL
ERYTHROCYTE [DISTWIDTH] IN BLOOD BY AUTOMATED COUNT: 15.5 %
GFR SERPL CREATININE-BSD FRML MDRD: 75.3 ML/MIN/1.7
HCT VFR BLD AUTO: 30.7 %
HEMOGLOBIN: 9.8 G/DL (ref 11.7–15.7)
MCH RBC QN AUTO: 27.4 PG
MCHC RBC AUTO-ENTMCNC: 31.9 G/DL
MCV RBC AUTO: 86 FL
PLATELET # BLD AUTO: 271 10^9/L
RBC # BLD AUTO: 3.57 M/UL
WBC # BLD AUTO: 4.8 10^9/L

## 2021-03-31 NOTE — PROGRESS NOTES
Carissa Burroughs MD   [address] [address]      RE: Jaqueline Canales    MRN: [id number]   : 1940       IMPRESSION:   1.  Pulmonary artery hypertension.   2.  Scleroderma.   3.  Severe tricuspid regurgitation.   4.  A 65 pound weight loss.   5.  Cataracts.  6.  Profound iron deficiency in need of evaluation by primary care     Plan:  1. Parenteral iron replacement as outpatient with Injectafer  2. Patient to see Dr. Burroughs for evaluation of iron deficiency  3, Manjeet Macario to reassess iron in end of May or early  and replace as necessary  4, Consider diuretic augmentation  5. Discussed TR and elevated BNP       Dear Dr. Burroughs:      I had the pleasure of performing a  visit with your patient's permission.   During that visit, we talked about how she is doing and options for treatment of her pulmonary hypertension/tricuspid regurgitation.  She is currently functional class II.  She is not walking; however, this is not because she is short of breath.  She has lost 65 pounds, which has made her much more functional.  She still has some dependent edema, which is probably a manifestation of chronic venous insufficiency.  She has no paroxysmal nocturnal dyspnea, orthopnea, abdominal swelling, exertional syncope or presyncope.      We talked about the need for regular walking.  We talked about the need for her to make a determination as to whether her current quality of life is acceptable or whether she would be willing to consider percutaneous mitral valve interventions, such as clipping of her tricuspid valve.   She is walking utilizing her walker  .  There is no interim history of chest pain, tightness, paroxysmal nocturnal dyspnea, orthopnea, peripheral edema, palpitation, pre-syncope, syncope, device discharge.  Exercise tolerance is stable.  The patient is attempting to exercise regularly and following a sodium restricted, calorically appropriate diet.  Medications  are reviewed and the patient is taking medications as prescribed.  The patient is generally sleeping well.     She has flair of her arthritis and is being treated with biologics without adverse effects.       Medications were reviewed, and she is taking them appropriately.  The pharmacy is still sending her furosemide, although she is now on Demadex.     Current Outpatient Medications   Medication     acetaminophen (TYLENOL) 500 MG tablet     albuterol (PROAIR HFA/PROVENTIL HFA/VENTOLIN HFA) 108 (90 Base) MCG/ACT inhaler     apixaban ANTICOAGULANT (ELIQUIS ANTICOAGULANT) 5 MG tablet     Bacillus Coagulans-Inulin (PROBIOTIC-PREBIOTIC PO)     folic acid (FOLVITE) 1 MG tablet     InFLIXimab (REMICADE IV)     levothyroxine (SYNTHROID/LEVOTHROID) 75 MCG tablet     melatonin 5 MG tablet     Methotrexate Sodium (METHOTREXATE PO)     metoprolol succinate ER (TOPROL-XL) 25 MG 24 hr tablet     Multiple Vitamins-Minerals (ICAPS) CAPS     multivitamin (CENTRUM SILVER) tablet     omeprazole (PRILOSEC) 20 MG DR capsule     order for DME     potassium chloride ER (KLOR-CON M) 20 MEQ CR tablet     Prednisolon-Gatiflox-Bromfenac 1-0.5-0.075 % SUSP     psyllium (METAMUCIL/KONSYL) capsule     sildenafil (REVATIO) 20 MG tablet     simvastatin (ZOCOR) 40 MG tablet     Skin Protectants, Misc. (EUCERIN) cream     torsemide (DEMADEX) 20 MG tablet     torsemide (DEMADEX) 20 MG tablet     triamcinolone (KENALOG) 0.1 % external cream     Vitamin D, Cholecalciferol, 10 MCG (400 UNIT) TABS     No current facility-administered medications for this visit.         Wt Readings from Last 24 Encounters:   04/01/21 71.5 kg (157 lb 9.6 oz)   09/14/20 72.3 kg (159 lb 6.4 oz)   09/04/20 70.3 kg (155 lb)   08/27/20 70.6 kg (155 lb 9.6 oz)   08/20/20 71.4 kg (157 lb 4.8 oz)   02/26/20 81 kg (178 lb 8 oz)   02/25/20 79.8 kg (176 lb)   01/27/20 84.1 kg (185 lb 6.4 oz)   01/20/20 84 kg (185 lb 1.6 oz)   01/14/20 86.9 kg (191 lb 9.6 oz)   01/10/20 85.6 kg (188  lb 12.8 oz)   01/09/20 86.5 kg (190 lb 12.8 oz)   12/30/19 92.3 kg (203 lb 6.4 oz)   12/27/19 92.4 kg (203 lb 9.6 oz)   12/24/19 93.4 kg (205 lb 12.8 oz)   12/23/19 93.4 kg (205 lb 12.8 oz)   12/19/19 92.4 kg (203 lb 9.6 oz)   12/17/19 91.7 kg (202 lb 3.2 oz)   12/13/19 91.6 kg (202 lb)   12/11/19 90.6 kg (199 lb 12.8 oz)   12/10/19 90.9 kg (200 lb 4.8 oz)   12/06/19 90.5 kg (199 lb 9.6 oz)   12/06/19 90.5 kg (199 lb 9.6 oz)   12/05/19 88.7 kg (195 lb 8 oz)         The patient is taking COVID precautions.      Laboratories:  Results for GINA MCKEON (MRN [id number]) as of 3/31/2021 14:19   Ref. Range 6/26/2020 09:35 3/22/2021 14:25   N-Terminal Pro Bnp Latest Ref Range: 0 - 450 pg/mL 3,045 (H) 4,485 (H)     Results for GINA MCKEON (MRN [id number]) as of 3/31/2021 14:19   Ref. Range 3/22/2021 14:25 3/22/2021 14:26   Sodium Latest Ref Range: 133 - 144 mmol/L 137    Potassium Latest Ref Range: 3.4 - 5.3 mmol/L 3.6    Chloride Latest Ref Range: 94 - 109 mmol/L 100    Carbon Dioxide Latest Ref Range: 20 - 32 mmol/L 28    Urea Nitrogen Latest Ref Range: 7 - 30 mg/dL 25    Creatinine Latest Ref Range: 0.52 - 1.04 mg/dL 0.85    GFR Estimate Latest Ref Range: >60 mL/min/1.73_m2 65    GFR Estimate If Black Latest Ref Range: >60 mL/min/1.73_m2 75    Calcium Latest Ref Range: 8.5 - 10.1 mg/dL 9.5    Anion Gap Latest Ref Range: 3 - 14 mmol/L 9    Albumin Latest Ref Range: 3.4 - 5.0 g/dL 3.4    Protein Total Latest Ref Range: 6.8 - 8.8 g/dL 7.1    Bilirubin Total Latest Ref Range: 0.2 - 1.3 mg/dL 0.5    Alkaline Phosphatase Latest Ref Range: 40 - 150 U/L 97    ALT Latest Ref Range: 0 - 50 U/L 27    AST Latest Ref Range: 0 - 45 U/L 41    Iron Latest Ref Range: 35 - 180 ug/dL 13 (L)    Iron Binding Cap Latest Ref Range: 240 - 430 ug/dL 441 (H)    Iron Saturation Index Latest Ref Range: 15 - 46 % 3 (L)    N-Terminal Pro Bnp Latest Ref Range: 0 - 450 pg/mL 4,485 (H)    Glucose Latest Ref Range: 70 - 99 mg/dL 96    WBC  Latest Ref Range: 4.0 - 11.0 10e9/L 6.1    Hemoglobin Latest Ref Range: 11.7 - 15.7 g/dL 9.3 (L)    Hematocrit Latest Ref Range: 35.0 - 47.0 % 30.4 (L)    Platelet Count Latest Ref Range: 150 - 450 10e9/L 169    RBC Count Latest Ref Range: 3.8 - 5.2 10e12/L 3.52 (L)    MCV Latest Ref Range: 78 - 100 fl 86    MCH Latest Ref Range: 26.5 - 33.0 pg 26.4 (L)    MCHC Latest Ref Range: 31.5 - 36.5 g/dL 30.6 (L)    RDW Latest Ref Range: 10.0 - 15.0 % 21.0 (H)    ECHOCARDIOGRAM COMPLETE Unknown  Rpt           Name: GINA MCKEON  MRN: [id number]  : 1940  Study Date: 2021 01:47 PM  Age: 80 yrs  Gender: Female  Patient Location: [facility]  Reason For Study: Pulmonary hypertension (H), HANEY (dyspnea on exertion)  Ordering Physician: YONNY LINDSAY  Referring Physician: YONNY LINDSAY  Performed By: DEMOND Crooks     BSA: 1.7 m2  Height: 62 in  Weight: 159 lb  HR: 75  BP: 96/56 mmHg  ______________________________________________________________________________  Procedure  Complete Echo Adult.     ______________________________________________________________________________  Interpretation Summary     1. Left ventricular systolic function is normal. The visual ejection fraction  is estimated at 60-65%.  2. There is moderate concentric left ventricular hypertrophy.  3. No regional wall motion abnormalities noted.  4. The right ventricle is mildly dilated. Mildly decreased right ventricular  systolic function.  5. There is severe biatrial dilatation.  6. There is moderately severe (3+) mitral regurgitation.  7. Moderate annular calcification. There is mild mitral stenosis.  8. Moderate aortic stenosis; mean gradient 13 mmHg, calculated valve area 1.3  cm2, DI 0.32.  9. There is moderately severe (3+) tricuspid regurgitation.  10. The right ventricular systolic pressure is approximated at 48mmHg plus the  right atrial pressure. IVC diameter >2.1 cm collapsing <50% with sniff  suggests a high RA  pressure estimated at 15 mmHg or greater

## 2021-04-01 ENCOUNTER — OFFICE VISIT (OUTPATIENT)
Dept: CARDIOLOGY | Facility: CLINIC | Age: 81
End: 2021-04-01
Attending: INTERNAL MEDICINE
Payer: MEDICARE

## 2021-04-01 VITALS
HEIGHT: 62 IN | DIASTOLIC BLOOD PRESSURE: 70 MMHG | SYSTOLIC BLOOD PRESSURE: 110 MMHG | HEART RATE: 96 BPM | WEIGHT: 157.6 LBS | BODY MASS INDEX: 29 KG/M2 | OXYGEN SATURATION: 97 %

## 2021-04-01 DIAGNOSIS — R06.09 DOE (DYSPNEA ON EXERTION): ICD-10-CM

## 2021-04-01 DIAGNOSIS — I27.20 PULMONARY HYPERTENSION (H): ICD-10-CM

## 2021-04-01 DIAGNOSIS — Z11.59 SPECIAL SCREENING EXAMINATION FOR VIRAL DISEASE: Primary | ICD-10-CM

## 2021-04-01 DIAGNOSIS — E87.6 HYPOKALEMIA: ICD-10-CM

## 2021-04-01 DIAGNOSIS — D50.8 IRON DEFICIENCY ANEMIA SECONDARY TO INADEQUATE DIETARY IRON INTAKE: ICD-10-CM

## 2021-04-01 PROBLEM — D64.9 ANEMIA: Status: ACTIVE | Noted: 2021-04-01

## 2021-04-01 PROCEDURE — 99214 OFFICE O/P EST MOD 30 MIN: CPT | Performed by: INTERNAL MEDICINE

## 2021-04-01 RX ORDER — HEPARIN SODIUM,PORCINE 10 UNIT/ML
5 VIAL (ML) INTRAVENOUS
Status: CANCELLED | OUTPATIENT
Start: 2021-04-01

## 2021-04-01 RX ORDER — HEPARIN SODIUM (PORCINE) LOCK FLUSH IV SOLN 100 UNIT/ML 100 UNIT/ML
5 SOLUTION INTRAVENOUS
Status: CANCELLED | OUTPATIENT
Start: 2021-04-01

## 2021-04-01 RX ORDER — POTASSIUM CHLORIDE 1500 MG/1
20 TABLET, EXTENDED RELEASE ORAL DAILY
Qty: 90 TABLET | Refills: 2 | Status: SHIPPED | OUTPATIENT
Start: 2021-04-01 | End: 2021-06-30

## 2021-04-01 ASSESSMENT — MIFFLIN-ST. JEOR: SCORE: 1133.12

## 2021-04-01 NOTE — LETTER
2021    Carissa Burroughs MD  [address] [place] [address]  [facility] [address]    RE: Jaqueline S Parker       Dear Colleague,    I had the pleasure of seeing Jaqueline RIOJAS Parker in the [facility].      Carissa Burroughs MD   [facility]    [address] [place] [address]      RE: Jaqueline Canales    MRN: [id number]   : 1940       IMPRESSION:   1.  Pulmonary artery hypertension.   2.  Scleroderma.   3.  Severe tricuspid regurgitation.   4.  A 65 pound weight loss.   5.  Cataracts.  6.  Profound iron deficiency in need of evaluation by primary care     Plan:  1. Parenteral iron replacement as outpatient with Injectafer  2. Patient to see Dr. Burroughs for evaluation of iron deficiency  3, Manjeet Macario to reassess iron in end of May or early  and replace as necessary  4, Consider diuretic augmentation  5. Discussed TR and elevated BNP       Dear Dr. Burroughs:      I had the pleasure of performing a  visit with your patient's permission.   During that visit, we talked about how she is doing and options for treatment of her pulmonary hypertension/tricuspid regurgitation.  She is currently functional class II.  She is not walking; however, this is not because she is short of breath.  She has lost 65 pounds, which has made her much more functional.  She still has some dependent edema, which is probably a manifestation of chronic venous insufficiency.  She has no paroxysmal nocturnal dyspnea, orthopnea, abdominal swelling, exertional syncope or presyncope.      We talked about the need for regular walking.  We talked about the need for her to make a determination as to whether her current quality of life is acceptable or whether she would be willing to consider percutaneous mitral valve interventions, such as clipping of her tricuspid valve.   She is walking utilizing her walker  .  There is no interim history of chest pain, tightness, paroxysmal nocturnal  dyspnea, orthopnea, peripheral edema, palpitation, pre-syncope, syncope, device discharge.  Exercise tolerance is stable.  The patient is attempting to exercise regularly and following a sodium restricted, calorically appropriate diet.  Medications are reviewed and the patient is taking medications as prescribed.  The patient is generally sleeping well.     She has flair of her arthritis and is being treated with biologics without adverse effects.       Medications were reviewed, and she is taking them appropriately.  The pharmacy is still sending her furosemide, although she is now on Demadex.     Current Outpatient Medications   Medication     acetaminophen (TYLENOL) 500 MG tablet     albuterol (PROAIR HFA/PROVENTIL HFA/VENTOLIN HFA) 108 (90 Base) MCG/ACT inhaler     apixaban ANTICOAGULANT (ELIQUIS ANTICOAGULANT) 5 MG tablet     Bacillus Coagulans-Inulin (PROBIOTIC-PREBIOTIC PO)     folic acid (FOLVITE) 1 MG tablet     InFLIXimab (REMICADE IV)     levothyroxine (SYNTHROID/LEVOTHROID) 75 MCG tablet     melatonin 5 MG tablet     Methotrexate Sodium (METHOTREXATE PO)     metoprolol succinate ER (TOPROL-XL) 25 MG 24 hr tablet     Multiple Vitamins-Minerals (ICAPS) CAPS     multivitamin (CENTRUM SILVER) tablet     omeprazole (PRILOSEC) 20 MG DR capsule     order for DME     potassium chloride ER (KLOR-CON M) 20 MEQ CR tablet     Prednisolon-Gatiflox-Bromfenac 1-0.5-0.075 % SUSP     psyllium (METAMUCIL/KONSYL) capsule     sildenafil (REVATIO) 20 MG tablet     simvastatin (ZOCOR) 40 MG tablet     Skin Protectants, Misc. (EUCERIN) cream     torsemide (DEMADEX) 20 MG tablet     torsemide (DEMADEX) 20 MG tablet     triamcinolone (KENALOG) 0.1 % external cream     Vitamin D, Cholecalciferol, 10 MCG (400 UNIT) TABS     No current facility-administered medications for this visit.         Wt Readings from Last 24 Encounters:   04/01/21 71.5 kg (157 lb 9.6 oz)   09/14/20 72.3 kg (159 lb 6.4 oz)   09/04/20 70.3 kg (155 lb)    08/27/20 70.6 kg (155 lb 9.6 oz)   08/20/20 71.4 kg (157 lb 4.8 oz)   02/26/20 81 kg (178 lb 8 oz)   02/25/20 79.8 kg (176 lb)   01/27/20 84.1 kg (185 lb 6.4 oz)   01/20/20 84 kg (185 lb 1.6 oz)   01/14/20 86.9 kg (191 lb 9.6 oz)   01/10/20 85.6 kg (188 lb 12.8 oz)   01/09/20 86.5 kg (190 lb 12.8 oz)   12/30/19 92.3 kg (203 lb 6.4 oz)   12/27/19 92.4 kg (203 lb 9.6 oz)   12/24/19 93.4 kg (205 lb 12.8 oz)   12/23/19 93.4 kg (205 lb 12.8 oz)   12/19/19 92.4 kg (203 lb 9.6 oz)   12/17/19 91.7 kg (202 lb 3.2 oz)   12/13/19 91.6 kg (202 lb)   12/11/19 90.6 kg (199 lb 12.8 oz)   12/10/19 90.9 kg (200 lb 4.8 oz)   12/06/19 90.5 kg (199 lb 9.6 oz)   12/06/19 90.5 kg (199 lb 9.6 oz)   12/05/19 88.7 kg (195 lb 8 oz)         The patient is taking COVID precautions.      Laboratories:  Results for GINA MCKEON (MRN [id number]) as of 3/31/2021 14:19   Ref. Range 6/26/2020 09:35 3/22/2021 14:25   N-Terminal Pro Bnp Latest Ref Range: 0 - 450 pg/mL 3,045 (H) 4,485 (H)     Results for GINA MCKEON (MRN [id number]) as of 3/31/2021 14:19   Ref. Range 3/22/2021 14:25 3/22/2021 14:26   Sodium Latest Ref Range: 133 - 144 mmol/L 137    Potassium Latest Ref Range: 3.4 - 5.3 mmol/L 3.6    Chloride Latest Ref Range: 94 - 109 mmol/L 100    Carbon Dioxide Latest Ref Range: 20 - 32 mmol/L 28    Urea Nitrogen Latest Ref Range: 7 - 30 mg/dL 25    Creatinine Latest Ref Range: 0.52 - 1.04 mg/dL 0.85    GFR Estimate Latest Ref Range: >60 mL/min/1.73_m2 65    GFR Estimate If Black Latest Ref Range: >60 mL/min/1.73_m2 75    Calcium Latest Ref Range: 8.5 - 10.1 mg/dL 9.5    Anion Gap Latest Ref Range: 3 - 14 mmol/L 9    Albumin Latest Ref Range: 3.4 - 5.0 g/dL 3.4    Protein Total Latest Ref Range: 6.8 - 8.8 g/dL 7.1    Bilirubin Total Latest Ref Range: 0.2 - 1.3 mg/dL 0.5    Alkaline Phosphatase Latest Ref Range: 40 - 150 U/L 97    ALT Latest Ref Range: 0 - 50 U/L 27    AST Latest Ref Range: 0 - 45 U/L 41    Iron Latest Ref Range: 35 -  180 ug/dL 13 (L)    Iron Binding Cap Latest Ref Range: 240 - 430 ug/dL 441 (H)    Iron Saturation Index Latest Ref Range: 15 - 46 % 3 (L)    N-Terminal Pro Bnp Latest Ref Range: 0 - 450 pg/mL 4,485 (H)    Glucose Latest Ref Range: 70 - 99 mg/dL 96    WBC Latest Ref Range: 4.0 - 11.0 10e9/L 6.1    Hemoglobin Latest Ref Range: 11.7 - 15.7 g/dL 9.3 (L)    Hematocrit Latest Ref Range: 35.0 - 47.0 % 30.4 (L)    Platelet Count Latest Ref Range: 150 - 450 10e9/L 169    RBC Count Latest Ref Range: 3.8 - 5.2 10e12/L 3.52 (L)    MCV Latest Ref Range: 78 - 100 fl 86    MCH Latest Ref Range: 26.5 - 33.0 pg 26.4 (L)    MCHC Latest Ref Range: 31.5 - 36.5 g/dL 30.6 (L)    RDW Latest Ref Range: 10.0 - 15.0 % 21.0 (H)    ECHOCARDIOGRAM COMPLETE Unknown  Rpt           Name: GINA MCKEON  MRN: [id number]  : 1940  Study Date: 2021 01:47 PM  Age: 80 yrs  Gender: Female  Patient Location: [facility]  Reason For Study: Pulmonary hypertension (H), HANEY (dyspnea on exertion)  Ordering Physician: YONNY LINDSAY  Referring Physician: YONNY LINDSAY  Performed By: DEMOND Crooks     BSA: 1.7 m2  Height: 62 in  Weight: 159 lb  HR: 75  BP: 96/56 mmHg  ______________________________________________________________________________  Procedure  Complete Echo Adult.     ______________________________________________________________________________  Interpretation Summary     1. Left ventricular systolic function is normal. The visual ejection fraction  is estimated at 60-65%.  2. There is moderate concentric left ventricular hypertrophy.  3. No regional wall motion abnormalities noted.  4. The right ventricle is mildly dilated. Mildly decreased right ventricular  systolic function.  5. There is severe biatrial dilatation.  6. There is moderately severe (3+) mitral regurgitation.  7. Moderate annular calcification. There is mild mitral stenosis.  8. Moderate aortic stenosis; mean gradient 13 mmHg, calculated valve  area 1.3  cm2, DI 0.32.  9. There is moderately severe (3+) tricuspid regurgitation.  10. The right ventricular systolic pressure is approximated at 48mmHg plus the  right atrial pressure. IVC diameter >2.1 cm collapsing <50% with sniff  suggests a high RA pressure estimated at 15 mmHg or greater    Thank you for allowing me to participate in the care of your patient.      Sincerely,     Shay Marquez MD     [facility]    cc:   Shay Marquez MD  [address] [place], [place] [address]  [place] [address]

## 2021-04-01 NOTE — TELEPHONE ENCOUNTER
"    Requested Prescriptions   Pending Prescriptions Disp Refills     potassium chloride ER (KLOR-CON M) 20 MEQ CR tablet 90 tablet 3     Sig: Take 1 tablet (20 mEq) by mouth daily       Potassium Supplements Protocol Passed - 4/1/2021  2:36 PM        Passed - Recent (12 mo) or future (30 days) visit within the authorizing provider's department     Patient has had an office visit with the authorizing provider or a provider within the authorizing providers department within the previous 12 mos or has a future within next 30 days. See \"Patient Info\" tab in inbasket, or \"Choose Columns\" in Meds & Orders section of the refill encounter.              Passed - Medication is active on med list        Passed - Patient is age 18 or older        Passed - Normal serum potassium in past 12 months     Recent Labs   Lab Test 03/22/21  1425   POTASSIUM 3.6                         "

## 2021-04-01 NOTE — PATIENT INSTRUCTIONS
Medication Changes:  Injectafer 2 doses for Low Iron    Patient Instructions:  1. Continue staying active and eat a heart healthy diet.    2. Please keep current list of medications with you at all times.    3. Remember to weigh yourself daily after voiding and before you consume any food or beverages and log the numbers.  If you have gained 2 pounds overnight or 5 pounds in a week contact us immediately for medication adjustments or further instructions.    4. **Please call us immediately if you have any syncope (fainting or passing out), chest pain, edema (swelling or weight gain), or decline in your functional status (general decline in how you are feeling).    Follow up Appointment Information:  Follow up with your Primary Care Provider for Low Iron evaluation    Follow up with us in 2 months      For scheduling at [facility] please call [phone]  For scheduling at the [facility] please call [phone]  We are located on the second floor [address] at the [facility].  Our address is     [address] [place] [address]   [address]  [place] [address]    Thank you for allowing us to be a part of your care here at the [facility]    If you have questions or concerns please contact us at:    Tomer Garcia RN, BSN      Nurse Coordinator       Pulmonary Hypertension     [facility]   (Phone)[phone]  (Fax)[phone]    ** Please note that you will NOT receive a reminder call regarding your scheduled testing, reminder calls are for provider appointments only.  If you are scheduled for testing within the [facility] system you may receive a call regarding pre-registration for billing purposes only.**     Remember to weigh yourself daily after voiding and before you consume any food or beverages and log the numbers.  If you have gained/lost 2 pounds overnight or 5 pounds in a week contact us immediately for medication adjustments or further instructions.    Support  Group:  Pulmonary Hypertension Association  Https://www.phassociation.org/  **Look at the Events Tab** They even have Support Groups that you can call into    [facility] PH Support Group  Second Saturday of the Month from 1-3 PM   [facility] [address]  Leader: Eleni Galloway  Phone: [phone] or [phone]  Email: [email or web address]

## 2021-04-02 DIAGNOSIS — Z11.59 SPECIAL SCREENING EXAMINATION FOR VIRAL DISEASE: ICD-10-CM

## 2021-04-02 LAB
SARS-COV-2 RNA RESP QL NAA+PROBE: NORMAL
SPECIMEN SOURCE: NORMAL

## 2021-04-02 PROCEDURE — U0003 INFECTIOUS AGENT DETECTION BY NUCLEIC ACID (DNA OR RNA); SEVERE ACUTE RESPIRATORY SYNDROME CORONAVIRUS 2 (SARS-COV-2) (CORONAVIRUS DISEASE [COVID-19]), AMPLIFIED PROBE TECHNIQUE, MAKING USE OF HIGH THROUGHPUT TECHNOLOGIES AS DESCRIBED BY CMS-2020-01-R: HCPCS | Performed by: INTERNAL MEDICINE

## 2021-04-02 PROCEDURE — U0005 INFEC AGEN DETEC AMPLI PROBE: HCPCS | Performed by: INTERNAL MEDICINE

## 2021-04-03 LAB
LABORATORY COMMENT REPORT: NORMAL
SARS-COV-2 RNA RESP QL NAA+PROBE: NEGATIVE
SPECIMEN SOURCE: NORMAL

## 2021-04-06 ENCOUNTER — INFUSION THERAPY VISIT (OUTPATIENT)
Dept: INFUSION THERAPY | Facility: CLINIC | Age: 81
End: 2021-04-06
Attending: INTERNAL MEDICINE
Payer: MEDICARE

## 2021-04-06 VITALS
DIASTOLIC BLOOD PRESSURE: 69 MMHG | SYSTOLIC BLOOD PRESSURE: 101 MMHG | TEMPERATURE: 97.5 F | HEART RATE: 86 BPM | OXYGEN SATURATION: 97 % | RESPIRATION RATE: 18 BRPM

## 2021-04-06 DIAGNOSIS — D50.8 IRON DEFICIENCY ANEMIA SECONDARY TO INADEQUATE DIETARY IRON INTAKE: Primary | ICD-10-CM

## 2021-04-06 PROCEDURE — 250N000011 HC RX IP 250 OP 636: Performed by: INTERNAL MEDICINE

## 2021-04-06 PROCEDURE — 258N000003 HC RX IP 258 OP 636: Performed by: INTERNAL MEDICINE

## 2021-04-06 PROCEDURE — 96365 THER/PROPH/DIAG IV INF INIT: CPT

## 2021-04-06 RX ORDER — HEPARIN SODIUM,PORCINE 10 UNIT/ML
5 VIAL (ML) INTRAVENOUS
Status: CANCELLED | OUTPATIENT
Start: 2021-04-13

## 2021-04-06 RX ORDER — HEPARIN SODIUM (PORCINE) LOCK FLUSH IV SOLN 100 UNIT/ML 100 UNIT/ML
5 SOLUTION INTRAVENOUS
Status: CANCELLED | OUTPATIENT
Start: 2021-04-13

## 2021-04-06 RX ADMIN — SODIUM CHLORIDE 250 ML: 9 INJECTION, SOLUTION INTRAVENOUS at 12:54

## 2021-04-06 RX ADMIN — FERRIC CARBOXYMALTOSE INJECTION 750 MG: 50 INJECTION, SOLUTION INTRAVENOUS at 13:42

## 2021-04-06 ASSESSMENT — PAIN SCALES - GENERAL: PAINLEVEL: NO PAIN (0)

## 2021-04-06 NOTE — PROGRESS NOTES
Infusion Nursing Note:  Jaqueline Canales presents today for injectafer.    Patient seen by provider today: No   present during visit today: Not Applicable.    Note: Patient's first time receiving Injectafer.  Oriented to infusion center. Chemotherapy teaching, side effects, and schedule reviewed with patient. Pt stated understanding of plan. Patient tolerated infusion without issue.    Intravenous Access:  Peripheral IV placed.    Treatment Conditions:  Not Applicable.    Post Infusion Assessment:  Patient tolerated infusion without incident.   Patient observed for 30 minutes post Injectafer per protocol.  Blood return noted pre and poswithout incident.t infusion.  Site patent and intact, free from redness, edema or discomfort.  No evidence of extravasations.  Access discontinued per protocol.       Discharge Plan:   Patient declined prescription refills.  Discharge instructions reviewed with: Patient.  Patient and/or family verbalized understanding of discharge instructions and all questions answered.  Copy of AVS reviewed with patient and/or family.  Patient will return 4/13/21 for next appointment.  Patient discharged in stable condition accompanied by: self.  Departure Mode: Ambulatory with walker.    ANGELINA Johnson RN

## 2021-04-13 ENCOUNTER — INFUSION THERAPY VISIT (OUTPATIENT)
Dept: INFUSION THERAPY | Facility: CLINIC | Age: 81
End: 2021-04-13
Attending: INTERNAL MEDICINE
Payer: MEDICARE

## 2021-04-13 VITALS
TEMPERATURE: 97.5 F | HEART RATE: 69 BPM | RESPIRATION RATE: 16 BRPM | SYSTOLIC BLOOD PRESSURE: 96 MMHG | DIASTOLIC BLOOD PRESSURE: 58 MMHG

## 2021-04-13 DIAGNOSIS — D50.8 IRON DEFICIENCY ANEMIA SECONDARY TO INADEQUATE DIETARY IRON INTAKE: Primary | ICD-10-CM

## 2021-04-13 PROCEDURE — 96365 THER/PROPH/DIAG IV INF INIT: CPT

## 2021-04-13 PROCEDURE — 258N000003 HC RX IP 258 OP 636: Performed by: INTERNAL MEDICINE

## 2021-04-13 PROCEDURE — 250N000011 HC RX IP 250 OP 636: Performed by: INTERNAL MEDICINE

## 2021-04-13 RX ORDER — HEPARIN SODIUM,PORCINE 10 UNIT/ML
5 VIAL (ML) INTRAVENOUS
Status: CANCELLED | OUTPATIENT
Start: 2021-04-13

## 2021-04-13 RX ORDER — HEPARIN SODIUM (PORCINE) LOCK FLUSH IV SOLN 100 UNIT/ML 100 UNIT/ML
5 SOLUTION INTRAVENOUS
Status: CANCELLED | OUTPATIENT
Start: 2021-04-13

## 2021-04-13 RX ADMIN — SODIUM CHLORIDE 250 ML: 9 INJECTION, SOLUTION INTRAVENOUS at 12:42

## 2021-04-13 RX ADMIN — FERRIC CARBOXYMALTOSE INJECTION 750 MG: 50 INJECTION, SOLUTION INTRAVENOUS at 12:42

## 2021-04-13 ASSESSMENT — PAIN SCALES - GENERAL: PAINLEVEL: NO PAIN (0)

## 2021-04-13 NOTE — PROGRESS NOTES
Infusion Nursing Note:  Jaqueline Canales presents today for Injectafer.    Patient seen by provider today: No   present during visit today: Not Applicable.    Note: N/A.  Patient did meet criteria for an asymptomatic covid-19 PCR test in infusion today. Patient declined the covid-19 test.    Intravenous Access:  Peripheral IV placed.    Treatment Conditions:  Not Applicable.      Post Infusion Assessment:  Patient tolerated infusion without incident.  Patient observed for 30 minutes post Injectafer per protocol.  Blood return noted pre and post infusion.  Site patent and intact, free from redness, edema or discomfort.  No evidence of extravasations.  Access discontinued per protocol.       Discharge Plan:   Patient declined prescription refills.  Discharge instructions reviewed with: Patient.  Patient verbalized understanding of discharge instructions and all questions answered.  Copy of AVS reviewed with patient.  Patient will return as scheduled for next appointment.  Patient discharged in stable condition accompanied by: self.  Departure Mode: Ambulatory.    Anushka Yoder RN

## 2021-04-15 ENCOUNTER — TELEPHONE (OUTPATIENT)
Dept: CARDIOLOGY | Facility: CLINIC | Age: 81
End: 2021-04-15

## 2021-04-15 DIAGNOSIS — I27.20 PULMONARY HYPERTENSION (H): Primary | ICD-10-CM

## 2021-04-15 DIAGNOSIS — R06.02 SOB (SHORTNESS OF BREATH): ICD-10-CM

## 2021-04-15 DIAGNOSIS — I27.20 PULMONARY HYPERTENSION (H): ICD-10-CM

## 2021-04-15 LAB
ANION GAP SERPL CALCULATED.3IONS-SCNC: 6 MMOL/L (ref 3–14)
BUN SERPL-MCNC: 25 MG/DL (ref 7–30)
CALCIUM SERPL-MCNC: 9.4 MG/DL (ref 8.5–10.1)
CHLORIDE SERPL-SCNC: 102 MMOL/L (ref 94–109)
CO2 SERPL-SCNC: 30 MMOL/L (ref 20–32)
CREAT SERPL-MCNC: 0.83 MG/DL (ref 0.52–1.04)
GFR SERPL CREATININE-BSD FRML MDRD: 66 ML/MIN/{1.73_M2}
GLUCOSE SERPL-MCNC: 101 MG/DL (ref 70–99)
NT-PROBNP SERPL-MCNC: 3524 PG/ML (ref 0–450)
POTASSIUM SERPL-SCNC: 3.5 MMOL/L (ref 3.4–5.3)
SODIUM SERPL-SCNC: 138 MMOL/L (ref 133–144)

## 2021-04-15 PROCEDURE — 83880 ASSAY OF NATRIURETIC PEPTIDE: CPT | Performed by: INTERNAL MEDICINE

## 2021-04-15 PROCEDURE — 36415 COLL VENOUS BLD VENIPUNCTURE: CPT | Performed by: INTERNAL MEDICINE

## 2021-04-15 PROCEDURE — 80048 BASIC METABOLIC PNL TOTAL CA: CPT | Performed by: INTERNAL MEDICINE

## 2021-04-15 NOTE — TELEPHONE ENCOUNTER
Orders placed for BMP and BNP. Called Opal to confirm. Mita Garcia RN on 4/15/2021 at 1:08 PM      [facility] Call Center    Phone Message    May a detailed message be left on voicemail: yes     Reason for Call: Order(s): Other:   Reason for requested: Opal form the [facility] calling to request orders be placed as soon as possible for Jaqueline.  She states that the Pt is currently there to be drawn but there are no orders in her chart.  Please call Opal back once the orders are placed  Date needed: 4/15/2021  Provider name: Dr. Marquez      Action Taken: Message routed to:  [facility]  [facility]    Travel Screening: Not Applicable

## 2021-04-28 ENCOUNTER — OFFICE VISIT (OUTPATIENT)
Dept: FAMILY MEDICINE | Facility: CLINIC | Age: 81
End: 2021-04-28
Payer: MEDICARE

## 2021-04-28 VITALS
BODY MASS INDEX: 26.87 KG/M2 | OXYGEN SATURATION: 91 % | HEART RATE: 82 BPM | WEIGHT: 146 LBS | HEIGHT: 62 IN | SYSTOLIC BLOOD PRESSURE: 116 MMHG | TEMPERATURE: 97.2 F | DIASTOLIC BLOOD PRESSURE: 77 MMHG

## 2021-04-28 DIAGNOSIS — I27.20 PULMONARY HYPERTENSION (H): ICD-10-CM

## 2021-04-28 DIAGNOSIS — I50.33 ACUTE ON CHRONIC DIASTOLIC HEART FAILURE (H): Primary | ICD-10-CM

## 2021-04-28 DIAGNOSIS — R60.0 BILATERAL LEG EDEMA: ICD-10-CM

## 2021-04-28 DIAGNOSIS — E78.5 HYPERLIPIDEMIA LDL GOAL <100: ICD-10-CM

## 2021-04-28 DIAGNOSIS — D50.0 IRON DEFICIENCY ANEMIA DUE TO CHRONIC BLOOD LOSS: ICD-10-CM

## 2021-04-28 DIAGNOSIS — I48.19 PERSISTENT ATRIAL FIBRILLATION (H): ICD-10-CM

## 2021-04-28 LAB
HCT VFR BLD AUTO: 38.8 % (ref 35–47)
HGB BLD-MCNC: 12.6 G/DL (ref 11.7–15.7)

## 2021-04-28 PROCEDURE — 99214 OFFICE O/P EST MOD 30 MIN: CPT | Performed by: INTERNAL MEDICINE

## 2021-04-28 PROCEDURE — 36415 COLL VENOUS BLD VENIPUNCTURE: CPT | Performed by: INTERNAL MEDICINE

## 2021-04-28 PROCEDURE — 85018 HEMOGLOBIN: CPT | Performed by: INTERNAL MEDICINE

## 2021-04-28 PROCEDURE — 85014 HEMATOCRIT: CPT | Performed by: INTERNAL MEDICINE

## 2021-04-28 ASSESSMENT — MIFFLIN-ST. JEOR: SCORE: 1080.5

## 2021-04-28 NOTE — PROGRESS NOTES
Subjective   Jaqueline is a 80 year old who presents for the following health issues     HPI       Chief Complaint:     follow up iron deficiency anemia, CHF, hyperlipidemia, pulmonary hypertension       HPI:   Patient Jaqueline Canales is a very pleasant 80 year old female with history of CHF, pulmonary hypertension, iron deficiency anemia who presents to Internal Medicine clinic today for follow up of recent actue on chronic CHF exacerbation symptms. Patient is compliant with her daily diuretic therapy with Torsemide and Aldactone. Previous hypoxia symptoms have resolved with diuresis treatment. no chest pains. No signs of current further acute CHF exacerbation symptoms at this time. Leg lymphedema symptoms have been stable. Patient is scheduled for follow up with CHF clinic at the [facility] clinic in [place] going forward. No fever or chills. Patient is due for a repeat Hgb, hematocrit labs for monitoring of iron deficiency anemia.      Current Medications:     Current Outpatient Medications   Medication Sig Dispense Refill     acetaminophen (TYLENOL) 500 MG tablet Take 1,000 mg by mouth 2 times daily        albuterol (PROAIR HFA/PROVENTIL HFA/VENTOLIN HFA) 108 (90 Base) MCG/ACT inhaler USE 2 INHALATIONS ORALLY   INTO THE LUNGS EVERY 6  HOURS as needed 18 g 11     apixaban ANTICOAGULANT (ELIQUIS ANTICOAGULANT) 5 MG tablet Take 1 tablet (5 mg) by mouth 2 times daily 180 tablet 2     Bacillus Coagulans-Inulin (PROBIOTIC-PREBIOTIC PO) Take 1 chew tab by mouth 2 times daily        folic acid (FOLVITE) 1 MG tablet Take 1 tablet (1,000 mcg) by mouth daily 90 tablet 3     InFLIXimab (REMICADE IV) Inject 400 mg into the vein Every 8 weeks       levothyroxine (SYNTHROID/LEVOTHROID) 75 MCG tablet Take 1 tablet (75 mcg) by mouth daily 90 tablet 2     melatonin 5 MG tablet Take 5 mg by mouth nightly as needed for sleep       Methotrexate Sodium (METHOTREXATE PO) Take 2.5 mg by mouth 10 tablets weekly - Wednesdays        metoprolol succinate ER (TOPROL-XL) 25 MG 24 hr tablet Take 1 tablet (25 mg) by mouth daily 90 tablet 2     Multiple Vitamins-Minerals (ICAPS) CAPS Take 1 capsule by mouth 2 times daily        multivitamin (CENTRUM SILVER) tablet Take 1 tablet by mouth daily       omeprazole (PRILOSEC) 20 MG DR capsule TAKE 1 CAPSULE TWICE DAILY,30-60 MINUTES PRIOR TO     MEALS 180 capsule 2     order for DME Equipment being ordered: 4 wheeled walker with seat 1 each 3     potassium chloride ER (KLOR-CON M) 20 MEQ CR tablet Take 1 tablet (20 mEq) by mouth daily 90 tablet 2     Prednisolon-Gatiflox-Bromfenac 1-0.5-0.075 % SUSP Place 1 drop into the right eye daily       psyllium (METAMUCIL/KONSYL) capsule Take 1 capsule by mouth daily       sildenafil (REVATIO) 20 MG tablet TAKE 1 TABLET (20 MG) BY MOUTH 3 TIMES DAILY 90 tablet 5     simvastatin (ZOCOR) 40 MG tablet Take 1 tablet (40 mg) by mouth At Bedtime 90 tablet 3     Skin Protectants, Misc. (EUCERIN) cream Apply topically 2 times daily Apply to areas dry skin topically two times a day for dry skin and Apply to areas of dry skin topically as needed for dry skin daily       torsemide (DEMADEX) 20 MG tablet Take 2 tablets (40 mg) by mouth daily Take 40mg q am 180 tablet 3     triamcinolone (KENALOG) 0.1 % external cream Apply topically 2 times daily 30 g 3     Vitamin D, Cholecalciferol, 10 MCG (400 UNIT) TABS Take 1,600 Units by mouth daily           Allergies:      Allergies   Allergen Reactions     No Known Allergies             Past Medical History:     Past Medical History:   Diagnosis Date     Amaurosis fugax 5-11    Right     Carotid artery stenosis      Esophageal reflux 3/11/2004     HELICOBACTER PYLORI INFECTION 7/11/2006     hx of CA in situ RT breast-1990.mastectomy 4/17/2007     Hyperlipidemia LDL goal <70 6/20/2011     Lung cancer (H)     squamous cell lung ca left lower lobe     OBESITY NOS 3/11/2004     OSTEOPOROSIS NOS 3/11/2004     Other psoriasis 3/11/2004      Pulmonary hypertension (H) 04/15/2019    Right heart catheterization demonstrated moderate pulmonary arterial hypertension with a mean PA pressure of 34 mmHg, no reversibility with inhaled nitric oxide, elevated right and left-sided filling pressures, low normal cardiac index of 2.1.  Seen by Dr. Shay Marquez.  Started on sildenafil (Revatio) 20 mg 3 times daily.     RA (rheumatoid arthritis) (H) 2010     Raynaud's syndrome 4/10/2006     Scleroderma (H)      Sleep apnea     CPAP         Past Surgical History:     Past Surgical History:   Procedure Laterality Date     BREAST SURGERY       COLONOSCOPY  16     COLONOSCOPY N/A 2019    Procedure: Colonoscopy, With Polypectomy And Biopsy;  Surgeon: Neto Kaminski MD;  Location:  GI     CONIZATION       CV RIGHT HEART CATH MEASUREMENTS RECORDED N/A 4/15/2019    Procedure: Heart Cath Right Heart Cath;  Surgeon: Naresh Cyr MD;  Location:  HEART CARDIAC CATH LAB     ENDARTERECTOMY CAROTID       HC COLONOSCOPY THRU STOMA, DIAGNOSTIC  2001    diverticulosis     REPAIR HAMMER TOE       TONSILLECTOMY       ZZC NONSPECIFIC PROCEDURE      mastectomy RT breast         Family Medical History:     Family History   Problem Relation Age of Onset     Cancer - colorectal Mother      Cerebrovascular Disease Father      Cancer Sister 31        ovarian     Heart Disease Sister      Gastrointestinal Disease Sister         crohns     Gastrointestinal Disease Sister         diverticulitis     Gastrointestinal Disease Brother         diverticulitis     Alzheimer Disease Sister      Cerebrovascular Disease Maternal Grandmother      Diabetes Maternal Grandmother      Mental Illness Maternal Grandmother      Diabetes Maternal Uncle      Cancer Nephew                  Social History:     Social History     Socioeconomic History     Marital status:      Spouse name: Not on file     Number of children: Not on file     Years of education:  Not on file     Highest education level: Not on file   Occupational History     Not on file   Social Needs     Financial resource strain: Not on file     Food insecurity     Worry: Not on file     Inability: Not on file     Transportation needs     Medical: Not on file     Non-medical: Not on file   Tobacco Use     Smoking status: Former Smoker     Packs/day: 1.00     Years: 30.00     Pack years: 30.00     Types: Cigarettes     Start date:      Quit date: 3/11/1992     Years since quittin.1     Smokeless tobacco: Never Used   Substance and Sexual Activity     Alcohol use: Not Currently     Alcohol/week: 0.0 standard drinks     Frequency: Never     Drug use: No     Sexual activity: Not Currently     Partners: Male   Lifestyle     Physical activity     Days per week: Not on file     Minutes per session: Not on file     Stress: Not on file   Relationships     Social connections     Talks on phone: Not on file     Gets together: Not on file     Attends Hindu service: Not on file     Active member of club or organization: Not on file     Attends meetings of clubs or organizations: Not on file     Relationship status: Not on file     Intimate partner violence     Fear of current or ex partner: Not on file     Emotionally abused: Not on file     Physically abused: Not on file     Forced sexual activity: Not on file   Other Topics Concern     Parent/sibling w/ CABG, MI or angioplasty before 65F 55M? No   Social History Narrative     Not on file           Review of System:     Constitutional: Negative for fever or chills  Skin: Negative for rashes  Ears/Nose/Throat: Negative for nasal congestion, sore throat  Respiratory: No shortness of breath, dyspnea on exertion, cough, or hemoptysis  Cardiovascular: Negative for chest pain  Gastrointestinal: Negative for nausea, vomiting, positive for chronic GERD  Genitourinary: Negative for dysuria, hematuria  Musculoskeletal: Negative for myalgias  Neurologic: Negative  "for headaches  Psychiatric: Negative for depression, anxiety  Hematologic/Lymphatic/Immunologic: positive for improvement in chronic bilateral leg edema symptoms with recent diuresis treatment  Endocrine: Negative  Behavioral: Negative for tobacco use       Physical Exam:   /77 (BP Location: Left arm, Patient Position: Sitting, Cuff Size: Adult Regular)   Pulse 82   Temp 97.2  F (36.2  C) (Temporal)   Ht 1.575 m (5' 2\")   Wt 66.2 kg (146 lb)   SpO2 91%   BMI 26.70 kg/m      GENERAL: chronically ill appearing elderly female, alert and no distress  EYES: eyes grossly normal to inspection, and conjunctivae and sclerae normal  HENT: Normocephalic atraumatic. Nose and mouth without ulcers or lesions  NECK: supple  RESP: lungs clear to auscultation   CV: regular rate and rhythm, normal S1 S2  LYMPH: further mild improvement in chronic bilateral lower leg peripheral edema symptoms present when compared to prior baseline  ABDOMEN: nondistended  MS: no gross musculoskeletal defects noted  SKIN: no suspicious lesions or rashes  NEURO: Alert & Oriented x 3.   PSYCH: mentation appears normal, affect normal        Diagnostic Test Results:     Last Comprehensive Metabolic Panel:  Sodium   Date Value Ref Range Status   04/15/2021 138 133 - 144 mmol/L Final     Potassium   Date Value Ref Range Status   04/15/2021 3.5 3.4 - 5.3 mmol/L Final     Chloride   Date Value Ref Range Status   04/15/2021 102 94 - 109 mmol/L Final     Carbon Dioxide   Date Value Ref Range Status   04/15/2021 30 20 - 32 mmol/L Final     Anion Gap   Date Value Ref Range Status   04/15/2021 6 3 - 14 mmol/L Final     Glucose   Date Value Ref Range Status   04/15/2021 101 (H) 70 - 99 mg/dL Final     Urea Nitrogen   Date Value Ref Range Status   04/15/2021 25 7 - 30 mg/dL Final     Creatinine   Date Value Ref Range Status   04/15/2021 0.83 0.52 - 1.04 mg/dL Final     GFR Estimate   Date Value Ref Range Status   04/15/2021 66 >60 mL/min/[1.73_m2] Final "     Comment:     Non  GFR Calc  Starting 12/18/2018, serum creatinine based estimated GFR (eGFR) will be   calculated using the Chronic Kidney Disease Epidemiology Collaboration   (CKD-EPI) equation.       Calcium   Date Value Ref Range Status   04/15/2021 9.4 8.5 - 10.1 mg/dL Final     Hemoglobin   Date Value Ref Range Status   04/28/2021 12.6 11.7 - 15.7 g/dL Final   03/22/2021 9.3 (L) 11.7 - 15.7 g/dL Final         ASSESSMENT/PLAN:   (I27.20) Pulmonary hypertension (H)  (I89.0) Lymphedema of both lower extremities  (I50.33) Acute on chronic diastolic congestive heart failure (H)  (primary encounter diagnosis)  Comment: routine follow up of previousactue on chronic CHF exacerbation symptms. Patient is compliant with low salt diet. Patient is compliant with her daily diuretic therapy. Previous hypoxia symptoms have resolved with diuresis treatment. no chest pains. No signs of current further acute CHF exacerbation symptoms at this time. Leg lymphedema symptoms have been stable.  Plan: continue patient's current diuretic medications at this time. continue to follow up with CHF clinic at the [facility] clinic in [place] going forward.       (D50.0) Iron deficiency anemia due to chronic blood loss   Comment: follow up on chronic iron deficiency anemia  Plan: Hemoglobin, Hematocrit labs are significantly improved from prior baseline      (E78.5) Hyperlipidemia LDL goal <100  Comment: stable  Plan: continue current therapy    (I48.19) Persistent atrial fibrillation (H)  Comment: stable  Plan: continue current therapy      Follow Up Plan:     Patient is instructed to return to Internal Medicine clinic for follow-up visit in 1 month.        Carissa Burroughs MD  Internal Medicine  [facility]

## 2021-05-01 ENCOUNTER — NURSE TRIAGE (OUTPATIENT)
Dept: NURSING | Facility: CLINIC | Age: 81
End: 2021-05-01

## 2021-05-01 NOTE — TELEPHONE ENCOUNTER
She just doesn't feel good. She uses oral lidocaine that she swishes and spits it out. It is making her dizzy and not feeling good since she started it a few days ago.  Her dentist prescribed it for her. I instructed her to call the dentist's office and see what you should do. I told her to stop taking her oral lidocaine, until she talks with her dentist.   She sounded tired and is really dizzy, and that would make her a fall risk, so that is why writer told the patient to stop the oral lidocaine .  The dentist is the person who ordered the oral lidocaine swish.  Care advice is home care for now,  If she has any changes, or if she is unable to get a hold of her dentist call back so we can get an appointment scheduled.    Patient verbalized understanding and agrees with plan.    Natalie Reid Nurse Triage Advisor 10:56 AM 5/1/2021    Additional Information    Pharmacy calling with prescription question and triager answers question    Protocols used: MEDICATION QUESTION CALL-A-

## 2021-05-02 ENCOUNTER — APPOINTMENT (OUTPATIENT)
Dept: CT IMAGING | Facility: CLINIC | Age: 81
DRG: 073 | End: 2021-05-02
Attending: EMERGENCY MEDICINE
Payer: MEDICARE

## 2021-05-02 ENCOUNTER — HOSPITAL ENCOUNTER (INPATIENT)
Facility: CLINIC | Age: 81
LOS: 3 days | Discharge: NURSING, REHAB OR LONG-TERM CARE | DRG: 073 | End: 2021-05-05
Attending: EMERGENCY MEDICINE | Admitting: INTERNAL MEDICINE
Payer: MEDICARE

## 2021-05-02 DIAGNOSIS — N39.0 URINARY TRACT INFECTION WITHOUT HEMATURIA, SITE UNSPECIFIED: ICD-10-CM

## 2021-05-02 DIAGNOSIS — R29.810 FACIAL DROOP: ICD-10-CM

## 2021-05-02 DIAGNOSIS — N39.0 URINARY TRACT INFECTION WITH HEMATURIA, SITE UNSPECIFIED: ICD-10-CM

## 2021-05-02 DIAGNOSIS — N30.00 ACUTE CYSTITIS WITHOUT HEMATURIA: ICD-10-CM

## 2021-05-02 DIAGNOSIS — R31.9 URINARY TRACT INFECTION WITH HEMATURIA, SITE UNSPECIFIED: ICD-10-CM

## 2021-05-02 DIAGNOSIS — G51.0 BELL'S PALSY: Primary | ICD-10-CM

## 2021-05-02 DIAGNOSIS — R13.10 DYSPHAGIA, UNSPECIFIED TYPE: ICD-10-CM

## 2021-05-02 DIAGNOSIS — I48.91 ATRIAL FIBRILLATION WITH RAPID VENTRICULAR RESPONSE (H): ICD-10-CM

## 2021-05-02 LAB
ALBUMIN SERPL-MCNC: 4 G/DL (ref 3.4–5)
ALBUMIN UR-MCNC: 100 MG/DL
ALP SERPL-CCNC: 78 U/L (ref 40–150)
ALT SERPL W P-5'-P-CCNC: 18 U/L (ref 0–50)
ANION GAP SERPL CALCULATED.3IONS-SCNC: 7 MMOL/L (ref 3–14)
APPEARANCE UR: ABNORMAL
APTT PPP: 39 SEC (ref 22–37)
AST SERPL W P-5'-P-CCNC: 25 U/L (ref 0–45)
BACTERIA #/AREA URNS HPF: ABNORMAL /HPF
BASOPHILS # BLD AUTO: 0.1 10E9/L (ref 0–0.2)
BASOPHILS NFR BLD AUTO: 0.9 %
BILIRUB SERPL-MCNC: 1.6 MG/DL (ref 0.2–1.3)
BILIRUB UR QL STRIP: NEGATIVE
BUN SERPL-MCNC: 25 MG/DL (ref 7–30)
CALCIUM SERPL-MCNC: 10.3 MG/DL (ref 8.5–10.1)
CHLORIDE SERPL-SCNC: 95 MMOL/L (ref 94–109)
CO2 SERPL-SCNC: 34 MMOL/L (ref 20–32)
COLOR UR AUTO: ABNORMAL
CREAT SERPL-MCNC: 0.82 MG/DL (ref 0.52–1.04)
DIFFERENTIAL METHOD BLD: ABNORMAL
EOSINOPHIL # BLD AUTO: 0.1 10E9/L (ref 0–0.7)
EOSINOPHIL NFR BLD AUTO: 0.6 %
ERYTHROCYTE [DISTWIDTH] IN BLOOD BY AUTOMATED COUNT: 26.5 % (ref 10–15)
GFR SERPL CREATININE-BSD FRML MDRD: 67 ML/MIN/{1.73_M2}
GLUCOSE SERPL-MCNC: 84 MG/DL (ref 70–99)
GLUCOSE UR STRIP-MCNC: NEGATIVE MG/DL
HCT VFR BLD AUTO: 46.9 % (ref 35–47)
HGB BLD-MCNC: 14.5 G/DL (ref 11.7–15.7)
HGB UR QL STRIP: ABNORMAL
IMM GRANULOCYTES # BLD: 0.2 10E9/L (ref 0–0.4)
IMM GRANULOCYTES NFR BLD: 1.5 %
INR PPP: 1.07 (ref 0.86–1.14)
INTERPRETATION ECG - MUSE: NORMAL
KETONES UR STRIP-MCNC: 10 MG/DL
LABORATORY COMMENT REPORT: NORMAL
LACTATE BLD-SCNC: 1.8 MMOL/L (ref 0.7–2)
LEUKOCYTE ESTERASE UR QL STRIP: ABNORMAL
LYMPHOCYTES # BLD AUTO: 2.3 10E9/L (ref 0.8–5.3)
LYMPHOCYTES NFR BLD AUTO: 22.9 %
MCH RBC QN AUTO: 26.4 PG (ref 26.5–33)
MCHC RBC AUTO-ENTMCNC: 30.9 G/DL (ref 31.5–36.5)
MCV RBC AUTO: 85 FL (ref 78–100)
MONOCYTES # BLD AUTO: 0.9 10E9/L (ref 0–1.3)
MONOCYTES NFR BLD AUTO: 8.8 %
NEUTROPHILS # BLD AUTO: 6.7 10E9/L (ref 1.6–8.3)
NEUTROPHILS NFR BLD AUTO: 65.3 %
NITRATE UR QL: NEGATIVE
NRBC # BLD AUTO: 0 10*3/UL
NRBC BLD AUTO-RTO: 0 /100
PH UR STRIP: 6 PH (ref 5–7)
PLATELET # BLD AUTO: 233 10E9/L (ref 150–450)
POTASSIUM SERPL-SCNC: 3.3 MMOL/L (ref 3.4–5.3)
PROT SERPL-MCNC: 8.6 G/DL (ref 6.8–8.8)
RBC # BLD AUTO: 5.49 10E12/L (ref 3.8–5.2)
RBC #/AREA URNS AUTO: 2 /HPF (ref 0–2)
SARS-COV-2 RNA RESP QL NAA+PROBE: NEGATIVE
SODIUM SERPL-SCNC: 136 MMOL/L (ref 133–144)
SOURCE: ABNORMAL
SP GR UR STRIP: 1.01 (ref 1–1.03)
SPECIMEN SOURCE: NORMAL
SQUAMOUS #/AREA URNS AUTO: 0 /HPF (ref 0–1)
TROPONIN I SERPL-MCNC: <0.015 UG/L (ref 0–0.04)
UROBILINOGEN UR STRIP-MCNC: 0 MG/DL (ref 0–2)
WBC # BLD AUTO: 10.2 10E9/L (ref 4–11)
WBC #/AREA URNS AUTO: >182 /HPF (ref 0–5)
WBC CLUMPS #/AREA URNS HPF: PRESENT /HPF

## 2021-05-02 PROCEDURE — 70450 CT HEAD/BRAIN W/O DYE: CPT | Mod: MG

## 2021-05-02 PROCEDURE — 87088 URINE BACTERIA CULTURE: CPT | Performed by: EMERGENCY MEDICINE

## 2021-05-02 PROCEDURE — 70496 CT ANGIOGRAPHY HEAD: CPT | Mod: MG

## 2021-05-02 PROCEDURE — 81001 URINALYSIS AUTO W/SCOPE: CPT | Performed by: EMERGENCY MEDICINE

## 2021-05-02 PROCEDURE — 83036 HEMOGLOBIN GLYCOSYLATED A1C: CPT | Performed by: EMERGENCY MEDICINE

## 2021-05-02 PROCEDURE — 96361 HYDRATE IV INFUSION ADD-ON: CPT

## 2021-05-02 PROCEDURE — 250N000013 HC RX MED GY IP 250 OP 250 PS 637: Performed by: EMERGENCY MEDICINE

## 2021-05-02 PROCEDURE — 85025 COMPLETE CBC W/AUTO DIFF WBC: CPT | Performed by: EMERGENCY MEDICINE

## 2021-05-02 PROCEDURE — 0042T CT HEAD PERFUSION WITH CONTRAST: CPT | Mod: MG

## 2021-05-02 PROCEDURE — 84484 ASSAY OF TROPONIN QUANT: CPT | Performed by: EMERGENCY MEDICINE

## 2021-05-02 PROCEDURE — 80053 COMPREHEN METABOLIC PANEL: CPT | Performed by: EMERGENCY MEDICINE

## 2021-05-02 PROCEDURE — 99223 1ST HOSP IP/OBS HIGH 75: CPT | Mod: AI | Performed by: INTERNAL MEDICINE

## 2021-05-02 PROCEDURE — 99285 EMERGENCY DEPT VISIT HI MDM: CPT | Mod: 25

## 2021-05-02 PROCEDURE — 93005 ELECTROCARDIOGRAM TRACING: CPT

## 2021-05-02 PROCEDURE — 250N000011 HC RX IP 250 OP 636: Performed by: EMERGENCY MEDICINE

## 2021-05-02 PROCEDURE — 210N000002 HC R&B HEART CARE

## 2021-05-02 PROCEDURE — C9803 HOPD COVID-19 SPEC COLLECT: HCPCS

## 2021-05-02 PROCEDURE — 83605 ASSAY OF LACTIC ACID: CPT | Performed by: EMERGENCY MEDICINE

## 2021-05-02 PROCEDURE — 250N000009 HC RX 250: Performed by: EMERGENCY MEDICINE

## 2021-05-02 PROCEDURE — 87635 SARS-COV-2 COVID-19 AMP PRB: CPT | Performed by: EMERGENCY MEDICINE

## 2021-05-02 PROCEDURE — 85730 THROMBOPLASTIN TIME PARTIAL: CPT | Performed by: EMERGENCY MEDICINE

## 2021-05-02 PROCEDURE — 87186 SC STD MICRODIL/AGAR DIL: CPT | Performed by: EMERGENCY MEDICINE

## 2021-05-02 PROCEDURE — 258N000003 HC RX IP 258 OP 636: Performed by: EMERGENCY MEDICINE

## 2021-05-02 PROCEDURE — 87086 URINE CULTURE/COLONY COUNT: CPT | Performed by: EMERGENCY MEDICINE

## 2021-05-02 PROCEDURE — 85610 PROTHROMBIN TIME: CPT | Performed by: EMERGENCY MEDICINE

## 2021-05-02 RX ORDER — IOPAMIDOL 755 MG/ML
120 INJECTION, SOLUTION INTRAVASCULAR ONCE
Status: COMPLETED | OUTPATIENT
Start: 2021-05-02 | End: 2021-05-02

## 2021-05-02 RX ORDER — SODIUM CHLORIDE 9 MG/ML
INJECTION, SOLUTION INTRAVENOUS CONTINUOUS
Status: DISCONTINUED | OUTPATIENT
Start: 2021-05-02 | End: 2021-05-03

## 2021-05-02 RX ORDER — ASPIRIN 300 MG/1
300 SUPPOSITORY RECTAL ONCE
Status: COMPLETED | OUTPATIENT
Start: 2021-05-02 | End: 2021-05-02

## 2021-05-02 RX ADMIN — IOPAMIDOL 120 ML: 755 INJECTION, SOLUTION INTRAVENOUS at 21:48

## 2021-05-02 RX ADMIN — SODIUM CHLORIDE 100 ML: 9 INJECTION, SOLUTION INTRAVENOUS at 21:49

## 2021-05-02 RX ADMIN — SODIUM CHLORIDE 1000 ML: 9 INJECTION, SOLUTION INTRAVENOUS at 21:45

## 2021-05-02 RX ADMIN — ASPIRIN 300 MG: 300 SUPPOSITORY RECTAL at 23:08

## 2021-05-02 ASSESSMENT — ENCOUNTER SYMPTOMS
FACIAL ASYMMETRY: 1
HEADACHES: 0
WEAKNESS: 0
SHORTNESS OF BREATH: 0
FATIGUE: 1
TROUBLE SWALLOWING: 1
NUMBNESS: 0

## 2021-05-02 ASSESSMENT — MIFFLIN-ST. JEOR: SCORE: 1103.18

## 2021-05-03 ENCOUNTER — APPOINTMENT (OUTPATIENT)
Dept: SPEECH THERAPY | Facility: CLINIC | Age: 81
DRG: 073 | End: 2021-05-03
Attending: INTERNAL MEDICINE
Payer: MEDICARE

## 2021-05-03 ENCOUNTER — APPOINTMENT (OUTPATIENT)
Dept: MRI IMAGING | Facility: CLINIC | Age: 81
DRG: 073 | End: 2021-05-03
Attending: INTERNAL MEDICINE
Payer: MEDICARE

## 2021-05-03 LAB
GLUCOSE BLDC GLUCOMTR-MCNC: 86 MG/DL (ref 70–99)
HBA1C MFR BLD: 5.4 % (ref 0–5.6)
POTASSIUM SERPL-SCNC: 3.4 MMOL/L (ref 3.4–5.3)
POTASSIUM SERPL-SCNC: 3.6 MMOL/L (ref 3.4–5.3)

## 2021-05-03 PROCEDURE — 250N000011 HC RX IP 250 OP 636: Performed by: EMERGENCY MEDICINE

## 2021-05-03 PROCEDURE — 255N000002 HC RX 255 OP 636

## 2021-05-03 PROCEDURE — 250N000009 HC RX 250: Performed by: PHYSICIAN ASSISTANT

## 2021-05-03 PROCEDURE — 250N000011 HC RX IP 250 OP 636: Performed by: INTERNAL MEDICINE

## 2021-05-03 PROCEDURE — 36415 COLL VENOUS BLD VENIPUNCTURE: CPT | Performed by: INTERNAL MEDICINE

## 2021-05-03 PROCEDURE — 99233 SBSQ HOSP IP/OBS HIGH 50: CPT | Performed by: INTERNAL MEDICINE

## 2021-05-03 PROCEDURE — 250N000013 HC RX MED GY IP 250 OP 250 PS 637: Performed by: INTERNAL MEDICINE

## 2021-05-03 PROCEDURE — 99222 1ST HOSP IP/OBS MODERATE 55: CPT | Performed by: PSYCHIATRY & NEUROLOGY

## 2021-05-03 PROCEDURE — 92526 ORAL FUNCTION THERAPY: CPT | Mod: GN | Performed by: SPEECH-LANGUAGE PATHOLOGIST

## 2021-05-03 PROCEDURE — 999N001017 HC STATISTIC GLUCOSE BY METER IP

## 2021-05-03 PROCEDURE — 258N000003 HC RX IP 258 OP 636: Performed by: INTERNAL MEDICINE

## 2021-05-03 PROCEDURE — 70553 MRI BRAIN STEM W/O & W/DYE: CPT | Mod: ME

## 2021-05-03 PROCEDURE — 96365 THER/PROPH/DIAG IV INF INIT: CPT | Mod: 59

## 2021-05-03 PROCEDURE — A9585 GADOBUTROL INJECTION: HCPCS

## 2021-05-03 PROCEDURE — 92610 EVALUATE SWALLOWING FUNCTION: CPT | Mod: GN | Performed by: SPEECH-LANGUAGE PATHOLOGIST

## 2021-05-03 PROCEDURE — 84132 ASSAY OF SERUM POTASSIUM: CPT | Performed by: INTERNAL MEDICINE

## 2021-05-03 PROCEDURE — 210N000002 HC R&B HEART CARE

## 2021-05-03 PROCEDURE — 250N000013 HC RX MED GY IP 250 OP 250 PS 637: Performed by: PHYSICIAN ASSISTANT

## 2021-05-03 RX ORDER — GADOBUTROL 604.72 MG/ML
6 INJECTION INTRAVENOUS ONCE
Status: COMPLETED | OUTPATIENT
Start: 2021-05-03 | End: 2021-05-03

## 2021-05-03 RX ORDER — LIDOCAINE 40 MG/G
CREAM TOPICAL
Status: DISCONTINUED | OUTPATIENT
Start: 2021-05-03 | End: 2021-05-05 | Stop reason: HOSPADM

## 2021-05-03 RX ORDER — METOPROLOL SUCCINATE 25 MG/1
25 TABLET, EXTENDED RELEASE ORAL DAILY
Status: DISCONTINUED | OUTPATIENT
Start: 2021-05-03 | End: 2021-05-05 | Stop reason: HOSPADM

## 2021-05-03 RX ORDER — ONDANSETRON 2 MG/ML
4 INJECTION INTRAMUSCULAR; INTRAVENOUS EVERY 6 HOURS PRN
Status: DISCONTINUED | OUTPATIENT
Start: 2021-05-03 | End: 2021-05-05 | Stop reason: HOSPADM

## 2021-05-03 RX ORDER — PREDNISOLONE ACETATE-GATIFLOXACIN-BROMFENAC .75; 5; 1 MG/ML; MG/ML; MG/ML
1 SUSPENSION/ DROPS OPHTHALMIC DAILY
Status: DISCONTINUED | OUTPATIENT
Start: 2021-05-03 | End: 2021-05-03 | Stop reason: RX

## 2021-05-03 RX ORDER — POTASSIUM CHLORIDE 1.5 G/1.58G
20 POWDER, FOR SOLUTION ORAL ONCE
Status: COMPLETED | OUTPATIENT
Start: 2021-05-03 | End: 2021-05-03

## 2021-05-03 RX ORDER — CEFTRIAXONE 1 G/1
1 INJECTION, POWDER, FOR SOLUTION INTRAMUSCULAR; INTRAVENOUS EVERY 24 HOURS
Status: DISCONTINUED | OUTPATIENT
Start: 2021-05-04 | End: 2021-05-05 | Stop reason: HOSPADM

## 2021-05-03 RX ORDER — AMOXICILLIN 250 MG
2 CAPSULE ORAL 2 TIMES DAILY PRN
Status: DISCONTINUED | OUTPATIENT
Start: 2021-05-03 | End: 2021-05-05 | Stop reason: HOSPADM

## 2021-05-03 RX ORDER — ACETAMINOPHEN 650 MG/1
650 SUPPOSITORY RECTAL EVERY 4 HOURS PRN
Status: DISCONTINUED | OUTPATIENT
Start: 2021-05-03 | End: 2021-05-05 | Stop reason: HOSPADM

## 2021-05-03 RX ORDER — CARBOXYMETHYLCELLULOSE SODIUM 5 MG/ML
1 SOLUTION/ DROPS OPHTHALMIC 4 TIMES DAILY
Status: DISCONTINUED | OUTPATIENT
Start: 2021-05-03 | End: 2021-05-05 | Stop reason: HOSPADM

## 2021-05-03 RX ORDER — METOPROLOL TARTRATE 1 MG/ML
2.5-5 INJECTION, SOLUTION INTRAVENOUS EVERY 4 HOURS PRN
Status: DISCONTINUED | OUTPATIENT
Start: 2021-05-03 | End: 2021-05-05 | Stop reason: HOSPADM

## 2021-05-03 RX ORDER — LIDOCAINE 40 MG/G
CREAM TOPICAL
Status: DISCONTINUED | OUTPATIENT
Start: 2021-05-03 | End: 2021-05-03

## 2021-05-03 RX ORDER — LEVOTHYROXINE SODIUM 75 UG/1
75 TABLET ORAL DAILY
Status: DISCONTINUED | OUTPATIENT
Start: 2021-05-03 | End: 2021-05-05 | Stop reason: HOSPADM

## 2021-05-03 RX ORDER — SODIUM CHLORIDE 9 MG/ML
INJECTION, SOLUTION INTRAVENOUS CONTINUOUS
Status: DISCONTINUED | OUTPATIENT
Start: 2021-05-03 | End: 2021-05-03

## 2021-05-03 RX ORDER — CEFTRIAXONE 1 G/1
1 INJECTION, POWDER, FOR SOLUTION INTRAMUSCULAR; INTRAVENOUS ONCE
Status: COMPLETED | OUTPATIENT
Start: 2021-05-03 | End: 2021-05-03

## 2021-05-03 RX ORDER — SIMVASTATIN 40 MG
40 TABLET ORAL AT BEDTIME
Status: DISCONTINUED | OUTPATIENT
Start: 2021-05-03 | End: 2021-05-05 | Stop reason: HOSPADM

## 2021-05-03 RX ORDER — BISACODYL 10 MG
10 SUPPOSITORY, RECTAL RECTAL DAILY PRN
Status: DISCONTINUED | OUTPATIENT
Start: 2021-05-03 | End: 2021-05-05 | Stop reason: HOSPADM

## 2021-05-03 RX ORDER — AMOXICILLIN 250 MG
1 CAPSULE ORAL 2 TIMES DAILY PRN
Status: DISCONTINUED | OUTPATIENT
Start: 2021-05-03 | End: 2021-05-05 | Stop reason: HOSPADM

## 2021-05-03 RX ORDER — CARBOXYMETHYLCELLULOSE SODIUM 5 MG/ML
1 SOLUTION/ DROPS OPHTHALMIC
Status: DISCONTINUED | OUTPATIENT
Start: 2021-05-03 | End: 2021-05-05 | Stop reason: HOSPADM

## 2021-05-03 RX ORDER — ONDANSETRON 4 MG/1
4 TABLET, ORALLY DISINTEGRATING ORAL EVERY 6 HOURS PRN
Status: DISCONTINUED | OUTPATIENT
Start: 2021-05-03 | End: 2021-05-05 | Stop reason: HOSPADM

## 2021-05-03 RX ORDER — POTASSIUM CHLORIDE 7.45 MG/ML
10 INJECTION INTRAVENOUS
Status: COMPLETED | OUTPATIENT
Start: 2021-05-03 | End: 2021-05-03

## 2021-05-03 RX ADMIN — GADOBUTROL 6 ML: 604.72 INJECTION INTRAVENOUS at 13:42

## 2021-05-03 RX ADMIN — APIXABAN 5 MG: 5 TABLET, FILM COATED ORAL at 21:10

## 2021-05-03 RX ADMIN — POTASSIUM CHLORIDE 10 MEQ: 7.46 INJECTION, SOLUTION INTRAVENOUS at 10:29

## 2021-05-03 RX ADMIN — Medication 1 DROP: at 21:10

## 2021-05-03 RX ADMIN — Medication 1 DROP: at 18:24

## 2021-05-03 RX ADMIN — SIMVASTATIN 40 MG: 40 TABLET, FILM COATED ORAL at 21:10

## 2021-05-03 RX ADMIN — LEVOTHYROXINE SODIUM 75 MCG: 75 TABLET ORAL at 18:25

## 2021-05-03 RX ADMIN — MINERAL OIL AND PETROLATUM: 150; 830 OINTMENT OPHTHALMIC at 21:10

## 2021-05-03 RX ADMIN — CHOLECALCIFEROL TAB 10 MCG (400 UNIT) 40 MCG: 10 TAB at 18:24

## 2021-05-03 RX ADMIN — OMEPRAZOLE 20 MG: 20 CAPSULE, DELAYED RELEASE ORAL at 10:34

## 2021-05-03 RX ADMIN — POTASSIUM CHLORIDE 20 MEQ: 1.5 POWDER, FOR SOLUTION ORAL at 15:43

## 2021-05-03 RX ADMIN — CEFTRIAXONE SODIUM 1 G: 1 INJECTION, POWDER, FOR SOLUTION INTRAMUSCULAR; INTRAVENOUS at 00:18

## 2021-05-03 RX ADMIN — OMEPRAZOLE 20 MG: 20 CAPSULE, DELAYED RELEASE ORAL at 15:43

## 2021-05-03 RX ADMIN — POTASSIUM CHLORIDE 10 MEQ: 7.46 INJECTION, SOLUTION INTRAVENOUS at 08:45

## 2021-05-03 RX ADMIN — SODIUM CHLORIDE, PRESERVATIVE FREE: 5 INJECTION INTRAVENOUS at 01:51

## 2021-05-03 RX ADMIN — CEFTRIAXONE SODIUM 1 G: 1 INJECTION, POWDER, FOR SOLUTION INTRAMUSCULAR; INTRAVENOUS at 23:39

## 2021-05-03 RX ADMIN — METOPROLOL SUCCINATE 25 MG: 25 TABLET, EXTENDED RELEASE ORAL at 10:34

## 2021-05-03 RX ADMIN — Medication 1 DROP: at 15:43

## 2021-05-03 ASSESSMENT — ACTIVITIES OF DAILY LIVING (ADL)
ADLS_ACUITY_SCORE: 21

## 2021-05-03 ASSESSMENT — MIFFLIN-ST. JEOR: SCORE: 1056.01

## 2021-05-03 NOTE — PROGRESS NOTES
05/03/21 0951   General Information   Onset of Illness/Injury or Date of Surgery 05/02/21   Referring Physician Dr. Crockett   Patient/Family Therapy Goal Statement (SLP) Patient is thirsty.    Pertinent History of Current Problem I was called by Johanna Garner on 05/02/21 regarding patient Jaqueline Canales, who is an 80 year old woman with known history of atrial fibrillation (on apixaban), amaurosis fugax, hyperlipidemia, sleep apnea, and lung cancer presenting to the ED for evaluation of lethargy, dysphagia, and facial droop. She reported onset of lethargy a few days ago with development of dysphagia and facial droop upon waking this AM. According to her ED physician, the facial droop involves the periorbital muscles and forehead muscles on the left. She did not report any additional neurological symptoms.   General Observations Pleasant language intact.    Past History of Dysphagia No documented oral/pharyngeal dysphagia does have esophageal strictures with dilation in the past.    Type of Evaluation   Type of Evaluation Swallow Evaluation   Oral Motor   Oral Musculature anomalies present   Structural Abnormalities none present   Mucosal Quality dry   Dentition (Oral Motor)   Dentition (Oral Motor) natural dentition   Facial Symmetry (Oral Motor)   Facial Symmetry (Oral Motor) left side impairment   Left Side Facial Asymmetry moderate impairment   Comment, Facial Symmetry (Oral Motor) Left facial droop, unable to lift left eyebrow.    Lip Function (Oral Motor)   Lip Range of Motion (Oral Motor) protrusion impairment;retraction impairment   Protrusion, Lip Range of Motion right side;moderate impairment   Retraction, Lip Range of Motion left side;moderate impairment   Lip Strength (Oral Motor) moderate impairment   Lip Coordination (Oral Motor) moderate impairment   Comment, Lip Function (Oral Motor) Left sided weakness.    Tongue Function (Oral Motor)   Tongue ROM (Oral Motor) elevation is impaired    Elevation, Tongue ROM Impairment (Oral Motor) minimal impairment;left side   Comment, Tongue Function (Oral Motor) Minimal lingual impairment for elevation.    Jaw Function (Oral Motor)   Jaw Function (Oral Motor) range of motion impairment;strength impairment (bite/clench)   Jaw Range of Motion Impairment left side;moderate impairment   Cough/Swallow/Gag Reflex (Oral Motor)   Volitional Throat Clear/Cough (Oral Motor) impaired;reduced strength   Volitional Swallow (Oral Motor) mildly delayed   Vocal Quality/Secretion Management (Oral Motor)   Vocal Quality (Oral Motor) monopitch   Secretion Management (Oral Motor) WNL   General Swallowing Observations   Current Diet/Method of Nutritional Intake (General Swallowing Observations, NIS) NPO   Respiratory Support (General Swallowing Observations) nasal cannula;supplemental oxygen   Swallowing Evaluation Clinical swallow evaluation   Clinical Swallow Evaluation   Feeding Assistance set up only required   Clinical Swallow Evaluation Textures Trialed Thin Liquids;Nectar-Thick Liquids;Puree Textures   Clinical Swallow Eval: Thin Liquid Texture Trial   Mode of Presentation, Thin Liquids straw;spoon;cup   Volume of Liquid or Food Presented 4 oz of water   Oral Phase of Swallow Premature pharyngeal entry;Poor AP movement   Oral Residue left lip drooling   Pharyngeal Phase of Swallow impaired;reduction in laryngeal movement;repeated swallows   Diagnostic Statement Poor oral containment on left side and mild delay no overt Sx of aspiration but can not rule out penetration. Unable to suck through a straw.    Clinical Swallow Evaluation: Nectar-Thick Liquid Texture Trial   Mode of Presentation, Nectar cup;spoon;fed by clinician   Volume of Nectar Presented 2 oz    Oral Phase, Nectar Premature pharyngeal entry   Pharyngeal Phase, Nectar impaired;reduction in laryngeal movement;repeated swallows   Diagnostic Statement Improved oral containment with timely swallow and no overt Sx  of aspiration.    Clinical Swallow Evaluation: Puree Solid Texture Trial   Mode of Presentation, Puree spoon;fed by clinician   Volume of Puree Presented 5 teaspoons   Oral Phase, Puree Poor AP movement;Premature pharyngeal entry   Pharyngeal Phase, Puree impaired;reduction in laryngeal movement;repeated swallows   Diagnostic Statement Reduced AP movement and slight oral delay no overt Sx of aspiration.    Esophageal Phase of Swallow   Patient reports or presents with symptoms of esophageal dysphagia Yes   Esophageal comments EGD completed 2013 with espohageal strictures with dilation.    Swallowing Recommendations   Diet Consistency Recommendations dysphagia level 1 (pureed);nectar-thick liquids   Supervision Level for Intake distant supervision needed   Mode of Delivery Recommendations bolus size, small;food moistened;place food on right side of mouth;slow rate of intake   Postural Recommendations none   Swallowing Maneuver Recommendations alternate food and liquid intake;extra swallow   Monitoring/Assistance Required (Eating/Swallowing) check mouth frequently for oral residue/pocketing;monitor for cough or change in vocal quality with intake   Recommended Feeding/Eating Techniques (Swallow Eval) maintain upright posture during/after eating for 30 minutes;minimize distractions during oral intake;set-up and prepare tray   Medication Administration Recommendations, Swallowing (SLP) Crush medications if able if not can try whole in apple sauce or pudding.    Instrumental Assessment Recommendations VFSS (videofluroscopic swallowing study)   Comment, Swallowing Recommendations If increased difficulty with diet advancement will complete as indicated.    General Therapy Interventions   Planned Therapy Interventions Dysphagia Treatment   Dysphagia treatment Instruction of safe swallow strategies;Modified diet education   SLP Therapy Assessment/Plan   Criteria for Skilled Therapeutic Interventions Met (SLP Eval) yes   SLP  Diagnosis Mild to moderate dysphagia   Rehab Potential (SLP Eval) good, to achieve stated therapy goals   Therapy Frequency (SLP Eval) 5 times/wk   Predicted Duration of Therapy Intervention (SLP Eval) 1 week   Comment, Therapy Assessment/Plan (SLP) Patient presents with mild to moderate oral and pharyngeal dysphagia at bedside. Deficits include; moderately impaired facial droop, labial ROM/strength, and eyebrow lift on the left. Lingual function only mildly impaired for lingual elevation. She demonstrated reduced bolus containment with thin liquids with moderate amount of oral spillage from the left side. She was unable to suck through a straw sufficiently. No overt Sx of aspiration, but can not rule out penetration. She was better able to orally control nectar thick liquids without Sx of aspiration. Decreased bolus manipulation and control of a pureed texture through the oral cavity, and slight delay, without vocal changes, throat clearing or coughing. Recommend: 1. DDL 1 with nectar thick liquids. 2. Upright, small bites/sips, slow rate, place food on the right, and alternate liquids solids.  May have small amounts of water between meals for comfort with RN present. Hold if Sx of aspiration present.    Therapy Plan Review/Discharge Plan (SLP)   Therapy Plan Review (SLP) evaluation/treatment results reviewed;care plan/treatment goals reviewed;risks/benefits reviewed;current/potential barriers reviewed;participants voiced agreement with care plan;participants included;patient   SLP Discharge Planning    SLP Discharge Recommendation (DC Rec) home with home care speech therapy   SLP Rationale for DC Rec Patient is below her baseline diet.    SLP Brief overview of current status  Patient with mild to moderate oral and pharyngeal dysphagia. Recommend: 1. DDL 1 with nectar thick liquids. 2. Upright, small bites/sips, slow rate, place food on the right, and alternate liquids solids.  May have small amounts of water  between meals for comfort with RN present. Hold if Sx of aspiration present.     Total Evaluation Time   Total Evaluation Time (Minutes) 20

## 2021-05-03 NOTE — PHARMACY-ADMISSION MEDICATION HISTORY
Pharmacy Medication History  Admission medication history interview status for the 5/2/2021  admission is complete. See EPIC admission navigator for prior to admission medications     Location of Interview: Patient room  Medication history sources: Patient who brought a med list in - AVS from last doctor visit within [facility]    Significant changes made to the medication list:  n/a    In the past week, patient estimated taking medication this percent of the time: less than 50% due to Unable to swallow - states she hasn't had her meds in 2 weeks and is out of her eye drops    Additional medication history information:   n/a    Medication reconciliation completed by provider prior to medication history? No    Time spent in this activity: 20 min    Prior to Admission medications    Medication Sig Last Dose Taking? Auth Provider   acetaminophen (TYLENOL) 500 MG tablet Take 1,000 mg by mouth 2 times daily  ~2 weeks at Unknown time Yes Reported, Patient   albuterol (PROAIR HFA/PROVENTIL HFA/VENTOLIN HFA) 108 (90 Base) MCG/ACT inhaler USE 2 INHALATIONS ORALLY   INTO THE LUNGS EVERY 6  HOURS as needed ~2 weeks at Unknown time Yes Perlman, David Morris, MD   apixaban ANTICOAGULANT (ELIQUIS ANTICOAGULANT) 5 MG tablet Take 1 tablet (5 mg) by mouth 2 times daily ~2 weeks  Carissa Burroughs MD   Bacillus Coagulans-Inulin (PROBIOTIC-PREBIOTIC PO) Take 1 chew tab by mouth 2 times daily  ~2 weeks  Reported, Patient   folic acid (FOLVITE) 1 MG tablet Take 1 tablet (1,000 mcg) by mouth daily ~2 weeks  Carissa Burroughs MD   InFLIXimab (REMICADE IV) Inject 400 mg into the vein Every 8 weeks   Reported, Patient   levothyroxine (SYNTHROID/LEVOTHROID) 75 MCG tablet Take 1 tablet (75 mcg) by mouth daily ~2 weeks  Carissa Burroughs MD   melatonin 5 MG tablet Take 5 mg by mouth nightly as needed for sleep prn  Unknown, Entered By History   Methotrexate Sodium (METHOTREXATE PO) Take 2.5 mg by mouth 10 tablets weekly - Wednesdays ~2  weeks  Reported, Patient   metoprolol succinate ER (TOPROL-XL) 25 MG 24 hr tablet Take 1 tablet (25 mg) by mouth daily ~2 weeks  Carissa Burroughs MD   Multiple Vitamins-Minerals (ICAPS) CAPS Take 1 capsule by mouth 2 times daily  ~2 weeks  Reported, Patient   multivitamin (CENTRUM SILVER) tablet Take 1 tablet by mouth daily ~2 weeks  Unknown, Entered By History   omeprazole (PRILOSEC) 20 MG DR capsule TAKE 1 CAPSULE TWICE DAILY,30-60 MINUTES PRIOR TO     MEALS ~2 weeks  Carissa Burroughs MD   order for DME Equipment being ordered: 4 wheeled walker with seat   Carissa Burroughs MD   potassium chloride ER (KLOR-CON M) 20 MEQ CR tablet Take 1 tablet (20 mEq) by mouth daily ~2 weeks  Carissa Burroughs MD   Prednisolon-Gatiflox-Bromfenac 1-0.5-0.075 % SUSP Place 1 drop into the right eye daily ~2 weeks  Unknown, Entered By History   psyllium (METAMUCIL/KONSYL) capsule Take 1 capsule by mouth daily ~2 weeks  Unknown, Entered By History   sildenafil (REVATIO) 20 MG tablet TAKE 1 TABLET (20 MG) BY MOUTH 3 TIMES DAILY ~2 weeks  Carissa Burroughs MD   simvastatin (ZOCOR) 40 MG tablet Take 1 tablet (40 mg) by mouth At Bedtime ~2 weeks  Carissa Burroughs MD   Skin Protectants, Misc. (EUCERIN) cream Apply topically 2 times daily Apply to areas dry skin topically two times a day for dry skin and Apply to areas of dry skin topically as needed for dry skin daily prn  Reported, Patient   torsemide (DEMADEX) 20 MG tablet Take 2 tablets (40 mg) by mouth daily Take 40mg q am ~2 weeks  Carissa Burroughs MD   triamcinolone (KENALOG) 0.1 % external cream Apply topically 2 times daily ~2 weeks  Carissa Burroughs MD   Vitamin D, Cholecalciferol, 10 MCG (400 UNIT) TABS Take 1,600 Units by mouth daily ~2 weeks  Unknown, Entered By History     Saritha Peters, PharmD     The information provided in this note is only as accurate as the sources available at the time of update(s)

## 2021-05-03 NOTE — PLAN OF CARE
Summary: L facial droop  Date/Time: 4:01 AM May 3, 2021   Cognitive Concerns/Orientation: A&Ox4, green on stoplight tool  ABNL VS/O2: VSS on 2L O2  Cardiac:?afib CVR  Resp:?LS clear  GI:?WDL, BS+  Mobility: bedrest so far  Pain Management: denies pain  Diet: NPO waiting for speech  Bowel/Bladder: purewick in place for bedrest but generally continent  ABNL Labs/BG:     Recent Labs   Lab 05/02/21  2132   GLC 84     Drains/Devices:?PIV NS @ 75ml/hr, purewick  Telemetry Rhythm:  afib CVR  Skin: intact, BLE edema +2  CMS: intact  Neuro: intact ex L face hemiparesis, cannot lift eyebrow or close eye, cannot smile or grit teeth on L side. R tongue deviation.   Consults: Neuro, CARDS  Test Procedures: Head CT negative for ischemic or hemorrhagic stroke  Discharge day/Goals/Place:   Other Important Info:   History:?  Past Medical History:   Diagnosis Date     Amaurosis fugax 5-11    Right     Carotid artery stenosis      Esophageal reflux 3/11/2004     HELICOBACTER PYLORI INFECTION 7/11/2006     hx of CA in situ RT breast-1990.mastectomy 4/17/2007     Hyperlipidemia LDL goal <70 6/20/2011     Lung cancer (H)     squamous cell lung ca left lower lobe     OBESITY NOS 3/11/2004     OSTEOPOROSIS NOS 3/11/2004     Other psoriasis 3/11/2004     Pulmonary hypertension (H) 04/15/2019    Right heart catheterization demonstrated moderate pulmonary arterial hypertension with a mean PA pressure of 34 mmHg, no reversibility with inhaled nitric oxide, elevated right and left-sided filling pressures, low normal cardiac index of 2.1.  Seen by Dr. Shay Marquez.  Started on sildenafil (Revatio) 20 mg 3 times daily.     RA (rheumatoid arthritis) (H) 2/8/2010     Raynaud's syndrome 4/10/2006     Scleroderma (H)      Sleep apnea     CPAP       Past Surgical History:   Procedure Laterality Date     BREAST SURGERY       COLONOSCOPY  5/24/16     COLONOSCOPY N/A 6/28/2019    Procedure: Colonoscopy, With Polypectomy And Biopsy;  Surgeon: Yamilex  Neto Nino MD;  Location:  GI     CONIZATION       CV RIGHT HEART CATH MEASUREMENTS RECORDED N/A 4/15/2019    Procedure: Heart Cath Right Heart Cath;  Surgeon: Naresh Cyr MD;  Location:  HEART CARDIAC CATH LAB     ENDARTERECTOMY CAROTID  2011     HC COLONOSCOPY THRU STOMA, DIAGNOSTIC  2001 2011    diverticulosis     REPAIR HAMMER TOE       TONSILLECTOMY       ZZC NONSPECIFIC PROCEDURE  1990    mastectomy RT breast

## 2021-05-03 NOTE — UTILIZATION REVIEW
Admission Status; Secondary Review Determination     Admission Date: 5/2/2021  9:21 PM       Under the authority of the Utilization Management Committee, the utilization review process indicated a secondary review on the above patient.  The review outcome is based on review of the medical records, discussions with staff, and applying clinical experience noted on the date of the review.        (x)      Inpatient Status Appropriate - This patient's medical care is consistent with medical management for inpatient care and reasonable inpatient medical practice.       RATIONALE FOR DETERMINATION      Brief clinical presentation, information copied from the chart, abbreviated and edited for relevant content:     Jaqueline Canales is a 80 year old female who presented with lethargy, facial droop.  On admission UA abnormal. Admitted inpatient for CVA vs possible Bell's palsy with dysphagia.  noted facial droop earlier in day. Has been having issues with dysphagia as well in last several days.Noted also to be In afib with RVR. Troponin negative. CT imaging in ED with normal perfusion, no acute abnormality, mention possible component of normal pressure hydrocephalus. Plan today for MRI brain with think cuts through cranial nerves, ongoing q4 neuro checks,  neurology consult, and  PT/OT/SLP evaluation. For the presumed UTI, started on ceftriaxone 1g IV q24 hours and will await urine cultures.  In addition, she was in Atrial fibrillation with RVR. Initial heart rates in ED in 130's, started on diltiazem gtt. EKG in ED with afib with RVR.    Plan to continue with  telemetry,  metoprolol 2.5-5 mg IV q4 hours prn. More plans based on results today of MRI.          At the time of admission with the information available to the attending physician, more than 2 nights hospital complex care was anticipated. Also, there was a risk of adverse outcome if patient was treated outpatient or observation. High intensity of services  anticipated. Inpatient admission appropriate based on Medicare guidelines.       The information on this document is developed by the utilization review team in order for the business office to ensure compliance.  This only denotes the appropriateness of proper admission status and does not reflect the quality of care rendered.         The definitions of Inpatient Status and Observation Status used in making the determination above are those provided in the CMS Coverage Manual, Chapter 1 and Chapter 6, section 70.4.      Sincerely,      Allegra Baez MD   Utilization Review/ Case Management  [facility].

## 2021-05-03 NOTE — PROGRESS NOTES
[facility]    Internal Medicine Hospitalist Progress Note  05/03/2021  I evaluated patient on the above date.    Johnie Corey Jr., MD  [phone] (p)  Text Page        Assessment & Plan New actions/orders today (05/03/2021) are underlined.    Jaqueline Canales is a 80 year old female with history including RA, scleroderma, AF on apixaban, CHF, \cardiac valve disease (AS, MR and TR), pHTN and SRINIVAS, who presented with lethargy and facial droop.    Acute neurologic changes - question CVA vs possible Bell's palsy.  * Pt had been having issues with dysphagia in the last several days PTA.  noted facial droop on 5/2. On initial evaluation 5/2, code stroke called. ECG 5/2 showed afib with RVR. Troponin negative. +UA as below. Head CT 5/2 negative. CTA head and neck 5/2 showed significant stenosis. CT head perfusion 5/2 negative.  - Brain MRI pending.  - Resume PTA apixaban if MRI OK (no bleed and no large stroke).   - Continue neuro checks.  - Discontinue IVF's.  - Continue PT, OT.  - Treat UTI as below.  - Dysphagia management as below.  - Neurology consulted, appreciate help.    UTI.  UA 5/2 showed >182 WBC, lg LE, small blood. Started on ceftriaxone on admit 5/2. UC 5/2 pending.  - Continue ceftriaxone (started 5/2).  - Follow-up urine cultures.    Atrial fibrillation with RVR.  * PTA on apixaban and metoprolol XL.  * On initial evaluation 5/2, HR was in the 130's; EKG showed afib with RVR.   - Continue telemetry monitoring.  - Resume PTA apixaban if MRI OK (no bleed and no large stroke).   - Continue metoprolol XL 25 mg daily.  - Continue PRN IV metoprolol 2.5-5 mg q4h.    Dysphagia, question related to CVA or Scleroderma.  Pt had been having issues with dysphagia in the last several days PTA. SLP consulted and placed on dysphagia diet.  - Continue dysphagia 1 diet with nectar thickened liquids.  - Advance diet as able per SLP.    Hypokalemia.  Potassium low on admit. Treated with  replacement.  Recent Labs   Lab 05/03/21  0136 05/02/21  2132   POTASSIUM 3.4 3.3*   - Continue PRN K replacement.    Chronic CHF (LV diastolic and RV systolic), question related to pulmonary disease.  Pulmonary hypertension, question related to pulmonary disease or left heart or valve disease.  Valve disease (moderate AS, severe TR, moderate-severe MR).  [PTA BP meds/diuretics: metoprolol XL 25 mg daily; sildenafil 20 mg TID; torsemide 40 mg daily.]  * Follows with Dr. Marquez with Cardiology as an outpatient. Echo 3/2021 showed LVEF 60-65%, mod LVH; RV mildly dilated with decreased RV systolic function; 3+ MR; 3+ TR; moderate AS; pulmonary pressure 48 +RA.  - Continue metoprolol XL.  - Resume sildenafil if MRI negative.  - Hold torsemide for now.  - Monitor i/o's, daily wts.    Rheumatoid arthritis.  Scleroderma.  Raynaud's phenomena.  PTA on infliximab and methotrexate.  - Hold methotrexate for now (takes every Wednesday).  - Resume infliximab outpatient.  - Keep hands warm as able.    SRINIVAS  Non-compliant with CPAP    Hypothyroidism  Reportedly levothyroxine 75 mcg daily    GERD  Reportedly omeprazole 20 mg BID, continue      COVID-19 testing.  COVID-19 PCR Results    COVID-19 PCR Results 4/2/21 4/2/21 5/2/21    1146 1146    COVID-19 Virus PCR to [facility] - Result Test received-See reflex to IDDL test SARS CoV2 (COVID-19) Virus RT-PCR     COVID-19 Virus PCR to [facility] - Source Nasopharyngeal     SARS-CoV-2 Virus Specimen Source  Nasopharyngeal Nasopharyngeal   SARS-CoV-2 PCR Result  NEGATIVE NEGATIVE      Comments are available for some flowsheets but are not being displayed.         COVID-19 Antibody Results, Testing for Immunity    COVID-19 Antibody Results, Testing for Immunity   No data to display.             Diet: Combination Diet Dysphagia Diet Level 1: Pureed; Nectar Thickened Liquids (pre-thickened or use instant food thickener)    Prophylaxis: PCD's, ambulation.   Luther Catheter: not present  Code  "Status: No CPR- Do NOT Intubate    Disposition Plan   Expected discharge: 1-2d, recommended to prior living arrangement pending above.  Entered: Johnie Corey MD 05/03/2021, 1:28 PM       I spent approximately 35 minutes bedside and on the inpatient unit today managing the care of patient. Over 50% of my time on the unit was spent in extensive chart review, counseling the patient/family and/or coordinating care regarding services listed in this note.      Interval History   Pt doing a bit better.  Still with left mouth droop.  No chest pain or dyspnea.    -Data reviewed today: I reviewed all new labs and imaging over the last 24 hours. I personally reviewed no images or EKG's today.    Physical Exam    , Blood pressure 113/63, pulse 79, temperature 97.6  F (36.4  C), temperature source Oral, resp. rate 18, height 1.575 m (5' 2\"), weight 63.8 kg (140 lb 9.6 oz), SpO2 98 %, not currently breastfeeding.  Vitals:    05/02/21 2130 05/03/21 0122   Weight: 68.5 kg (151 lb) 63.8 kg (140 lb 9.6 oz)     Vital Signs with Ranges  Temp:  [97.6  F (36.4  C)-98.2  F (36.8  C)] 97.6  F (36.4  C)  Pulse:  [] 79  Resp:  [14-32] 18  BP: (107-132)/() 113/63  SpO2:  [88 %-99 %] 98 %  Patient Vitals for the past 24 hrs:   BP Temp Temp src Pulse Resp SpO2 Height Weight   05/03/21 0800 113/63 97.6  F (36.4  C) Oral 79 18 98 % -- --   05/03/21 0400 107/65 97.6  F (36.4  C) Oral 84 18 98 % -- --   05/03/21 0122 -- -- -- -- -- -- -- 63.8 kg (140 lb 9.6 oz)   05/03/21 0052 112/78 97.7  F (36.5  C) Oral 95 16 99 % -- --   05/03/21 0000 111/72 -- -- 92 16 98 % -- --   05/02/21 2300 (!) 125/91 -- -- 93 21 95 % -- --   05/02/21 2245 -- -- -- 133 (!) 32 (!) 88 % -- --   05/02/21 2230 (!) 120/100 -- -- 105 21 90 % -- --   05/02/21 2220 114/84 -- -- 120 22 -- -- --   05/02/21 2205 132/79 -- -- 130 -- -- -- --   05/02/21 2140 -- -- -- 131 -- -- -- --   05/02/21 2135 -- -- -- 128 -- -- -- --   05/02/21 2130 (!) 132/99 98.2  F (36.8 " " C) Oral 131 14 98 % 1.575 m (5' 2\") 68.5 kg (151 lb)     I/O's Last 24 hours  I/O last 3 completed shifts:  In: 333.75 [I.V.:333.75]  Out: 400 [Urine:400]    Constitutional: Awake, alert.  HEENT: Left mouth droop.  Respiratory: Diminished in bases. Fine velcro/sandpaper like crackles in bases, no wheezes.  Cardiovascular: Irregular, rate normal, +I-II/IV systolic m, no g/r.  GI: Soft, nt, nd, +BS.  Skin/Integumen: Trace edema in legs. Mild bluish/purplish discoloration in fingertips.  Other:  Neuro - left mouth droop.      Data   Recent Labs   Lab 05/03/21  0136 05/02/21 2132 04/28/21  0952   WBC  --  10.2  --    HGB  --  14.5 12.6   MCV  --  85  --    PLT  --  233  --    INR  --  1.07  --    NA  --  136  --    POTASSIUM 3.4 3.3*  --    CHLORIDE  --  95  --    CO2  --  34*  --    BUN  --  25  --    CR  --  0.82  --    ANIONGAP  --  7  --    TU  --  10.3*  --    GLC  --  84  --    ALBUMIN  --  4.0  --    PROTTOTAL  --  8.6  --    BILITOTAL  --  1.6*  --    ALKPHOS  --  78  --    ALT  --  18  --    AST  --  25  --    TROPI  --  <0.015  --      Recent Labs   Lab Test 05/02/21  2132 04/15/21  1304 03/22/21  1425 06/26/20  0935 02/25/20  0858 10/31/16  0836 10/31/16  0836 12/14/15  0934 12/14/15  0934   GLC 84 101* 96 96 100   < >  --    < >  --    BGM  --   --   --   --   --   --  93  --  103*    < > = values in this interval not displayed.     No results for input(s): CRP, DD, LDH, FIBR, LUANA, IL6B in the last 168 hours.      Recent Results (from the past 24 hour(s))   CT Head w/o Contrast    Narrative    CT OF THE HEAD WITHOUT CONTRAST 5/2/2021 9:51 PM     COMPARISON: Brain MR 12/22/2015    HISTORY: Left facial droop. Dysphagia.    TECHNIQUE: 5 mm thick axial CT images of the head were acquired  without IV contrast material.    FINDINGS:  There is moderate diffuse cerebral volume loss. There are  subtle patchy areas of decreased density in the cerebral white matter  bilaterally that are consistent with sequela of " chronic small vessel  ischemic disease.     The ventricles and basal cisterns are within normal limits in  configuration given the degree of cerebral volume loss.  There is no  midline shift. There are no extra-axial fluid collections.     No intracranial hemorrhage, mass or recent infarct.    The visualized paranasal sinuses are well-aerated. There is no  mastoiditis. There are no fractures of the visualized bones.       Impression    IMPRESSION:  Diffuse cerebral volume loss and cerebral white matter  changes consistent with chronic small vessel ischemic disease. No  evidence for acute intracranial pathology.      Radiation dose for this scan was reduced using automated exposure  control, adjustment of the mA and/or kV according to patient size, or  iterative reconstruction technique.    TIM MONDRAGON MD   CTA Head Neck with Contrast    Narrative    EXAM: CTA  HEAD NECK WITH CONTRAST, CT HEAD PERFUSION WITH CONTRAST  LOCATION: [facility]  DATE/TIME: 5/2/2021 9:42 PM    INDICATION: Left-sided sided facial droop and dysphagia.  COMPARISON: None.  TECHNIQUE: Head and neck CT angiogram with IV contrast. Noncontrast head CT followed by axial helical CT images of the head and neck vessels obtained during the arterial phase of intravenous contrast administration. Axial 2D reconstructed images and   multiplanar 3D MIP reconstructed images of the head and neck vessels were performed by the technologist. Additional CT cerebral perfusion was performed utilizing a second contrast bolus. Perfusion data were post processed with generation of standard   perfusion maps and estimation of ischemic/infarcted volumes utilizing standard threshold values. Dose reduction techniques were used. All stenosis measurements made according to NASCET criteria unless otherwise specified.  CONTRAST: 70mL Isovue-370 (accession [id number]), 50mL Isovue-370 (accession [id number])  COMPARISON: None.    FINDINGS:   NONCONTRAST HEAD CT:    INTRACRANIAL CONTENTS: No intracranial hemorrhage, extraaxial collection, or mass effect.  No CT evidence of acute infarct. There is scattered low-attenuation within the periventricular and subcortical white matter consistent with mild diffuse small   vessel ischemic disease. The ventricular system, basal cisterns and the cortical sulci are consistent with mild diffuse volume loss. There may be a component of normal pressure hydrocephalus with the lateral ventricles are out of portion to the sulci.     VISUALIZED ORBITS/SINUSES/MASTOIDS: No intraorbital abnormality. No paranasal sinus mucosal disease. No middle ear or mastoid effusion.    BONES/SOFT TISSUES: No acute abnormality.    HEAD CTA:  ANTERIOR CIRCULATION: No stenosis/occlusion, aneurysm, or high flow vascular malformation. There is a patent anterior communicating artery and bilateral fetal origins of the posterior cerebral arteries.    POSTERIOR CIRCULATION: No stenosis/occlusion, aneurysm, or high flow vascular malformation. The basilar vertebral system is small throughout associated with bilateral fetal origins of the posterior cerebral arteries.     DURAL VENOUS SINUSES: Expected enhancement of the major dural venous sinuses.    NECK CTA:  RIGHT CAROTID: No measurable stenosis or dissection.    LEFT CAROTID: Approximately 30% stenosis of the origin of the left internal carotid artery by NASCET criteria.    VERTEBRAL ARTERIES: No focal stenosis or dissection. Balanced vertebral arteries.    AORTIC ARCH: Classic aortic arch anatomy with no significant stenosis at the origin of the great vessels.    NONVASCULAR STRUCTURES: Mild degenerative changes of the cervical spine. The lungs visualized on this study are clear.    CT PERFUSION:  PERFUSION MAPS: Symmetrical cerebral perfusion. No focal deficits in cerebral blood flow or volume to suggest ischemia/oligemia.    RAPID ANALYSIS:  CBF<30%: 0 mL  Tmax>6sec: 0 ml  Mismatch volume: 0 mL  Mismatch ratio:  None.      Impression    IMPRESSION:   HEAD CT:  1.  No CT finding of a mass, hemorrhage or focal area suggestive of acute infarct.  2.  Diffuse age related changes and a possible component of normal pressure hydrocephalus.    HEAD CTA:   1.  No discrete vessel occlusion, significant stenosis, aneurysm or high flow vascular malformation involving the arteries of the [place].    NECK CTA:  1.  Normal configuration of the great vessels off the aortic arch with no significant stenosis of their origins.  2.  No significant stenosis or irregularity involving the right internal carotid artery by NASCET criteria.  3.  Approximately 30% stenosis of the origin of the left internal carotid artery by NASCET criteria.  4.  No radiographic evidence of dissection.    CT PERFUSION:  1.  Fairly symmetric relative cerebral perfusion bilaterally.    Head CT findings were communicated to Dr. Irving at 9:58 PM. CTA findings were communicated to Dr. Irving at 10:17 PM on 05/02/2021.   CT Head Perfusion w Contrast    Narrative    EXAM: CTA  HEAD NECK WITH CONTRAST, CT HEAD PERFUSION WITH CONTRAST  LOCATION: [facility]  DATE/TIME: 5/2/2021 9:42 PM    INDICATION: Left-sided sided facial droop and dysphagia.  COMPARISON: None.  TECHNIQUE: Head and neck CT angiogram with IV contrast. Noncontrast head CT followed by axial helical CT images of the head and neck vessels obtained during the arterial phase of intravenous contrast administration. Axial 2D reconstructed images and   multiplanar 3D MIP reconstructed images of the head and neck vessels were performed by the technologist. Additional CT cerebral perfusion was performed utilizing a second contrast bolus. Perfusion data were post processed with generation of standard   perfusion maps and estimation of ischemic/infarcted volumes utilizing standard threshold values. Dose reduction techniques were used. All stenosis measurements made according to NASCET criteria  unless otherwise specified.  CONTRAST: 70mL Isovue-370 (accession [id number]), 50mL Isovue-370 (accession [id number])  COMPARISON: None.    FINDINGS:   NONCONTRAST HEAD CT:   INTRACRANIAL CONTENTS: No intracranial hemorrhage, extraaxial collection, or mass effect.  No CT evidence of acute infarct. There is scattered low-attenuation within the periventricular and subcortical white matter consistent with mild diffuse small   vessel ischemic disease. The ventricular system, basal cisterns and the cortical sulci are consistent with mild diffuse volume loss. There may be a component of normal pressure hydrocephalus with the lateral ventricles are out of portion to the sulci.     VISUALIZED ORBITS/SINUSES/MASTOIDS: No intraorbital abnormality. No paranasal sinus mucosal disease. No middle ear or mastoid effusion.    BONES/SOFT TISSUES: No acute abnormality.    HEAD CTA:  ANTERIOR CIRCULATION: No stenosis/occlusion, aneurysm, or high flow vascular malformation. There is a patent anterior communicating artery and bilateral fetal origins of the posterior cerebral arteries.    POSTERIOR CIRCULATION: No stenosis/occlusion, aneurysm, or high flow vascular malformation. The basilar vertebral system is small throughout associated with bilateral fetal origins of the posterior cerebral arteries.     DURAL VENOUS SINUSES: Expected enhancement of the major dural venous sinuses.    NECK CTA:  RIGHT CAROTID: No measurable stenosis or dissection.    LEFT CAROTID: Approximately 30% stenosis of the origin of the left internal carotid artery by NASCET criteria.    VERTEBRAL ARTERIES: No focal stenosis or dissection. Balanced vertebral arteries.    AORTIC ARCH: Classic aortic arch anatomy with no significant stenosis at the origin of the great vessels.    NONVASCULAR STRUCTURES: Mild degenerative changes of the cervical spine. The lungs visualized on this study are clear.    CT PERFUSION:  PERFUSION MAPS: Symmetrical cerebral perfusion. No  focal deficits in cerebral blood flow or volume to suggest ischemia/oligemia.    RAPID ANALYSIS:  CBF<30%: 0 mL  Tmax>6sec: 0 ml  Mismatch volume: 0 mL  Mismatch ratio: None.      Impression    IMPRESSION:   HEAD CT:  1.  No CT finding of a mass, hemorrhage or focal area suggestive of acute infarct.  2.  Diffuse age related changes and a possible component of normal pressure hydrocephalus.    HEAD CTA:   1.  No discrete vessel occlusion, significant stenosis, aneurysm or high flow vascular malformation involving the arteries of the [place].    NECK CTA:  1.  Normal configuration of the great vessels off the aortic arch with no significant stenosis of their origins.  2.  No significant stenosis or irregularity involving the right internal carotid artery by NASCET criteria.  3.  Approximately 30% stenosis of the origin of the left internal carotid artery by NASCET criteria.  4.  No radiographic evidence of dissection.    CT PERFUSION:  1.  Fairly symmetric relative cerebral perfusion bilaterally.    Head CT findings were communicated to Dr. Irving at 9:58 PM. CTA findings were communicated to Dr. Irving at 10:17 PM on 05/02/2021.       Medications   All medications were reviewed.    - MEDICATION INSTRUCTIONS -       sodium chloride 75 mL/hr at 05/03/21 0151       artificial tears   Left Eye At Bedtime     artificial tears ophthalmic solution  1 drop Left Eye 4x Daily     [START ON 5/4/2021] cefTRIAXone  1 g Intravenous Q24H     metoprolol succinate ER  25 mg Oral Daily     omeprazole  20 mg Oral BID AC     sodium chloride (PF)  3 mL Intracatheter Q8H     acetaminophen, bisacodyl, artificial tears ophthalmic solution, lidocaine 4%, lidocaine (buffered or not buffered), magnesium hydroxide, - MEDICATION INSTRUCTIONS -, melatonin, metoprolol, ondansetron **OR** ondansetron, senna-docusate **OR** senna-docusate, sodium chloride (PF)

## 2021-05-03 NOTE — ED NOTES
[facility]  ED Nurse Handoff Report    ED Chief complaint: Neurologic Problem      ED Diagnosis:   Final diagnoses:   None       Code Status: Full Code    Allergies:   Allergies   Allergen Reactions     No Known Allergies        Patient Story: Slow onset of L sided facial droop and dysphagia. Code Stroke in ED  Focused Assessment:  Alert and oriented x4. In room air. Continues to exhibit L sided facial droop and dysphagia. Ruling out Bell's Palsy. 18G in L arm. Cath for UA. A-fib with RVR.    Labs Ordered and Resulted from Time of ED Arrival Up to the Time of Departure from the ED   CBC WITH PLATELETS DIFFERENTIAL - Abnormal; Notable for the following components:       Result Value    RBC Count 5.49 (*)     MCH 26.4 (*)     MCHC 30.9 (*)     RDW 26.5 (*)     All other components within normal limits   COMPREHENSIVE METABOLIC PANEL - Abnormal; Notable for the following components:    Potassium 3.3 (*)     Carbon Dioxide 34 (*)     Calcium 10.3 (*)     Bilirubin Total 1.6 (*)     All other components within normal limits   PARTIAL THROMBOPLASTIN TIME - Abnormal; Notable for the following components:    PTT 39 (*)     All other components within normal limits   INR   LACTIC ACID WHOLE BLOOD   TROPONIN I   ROUTINE UA WITH MICROSCOPIC   SARS-COV-2 (COVID-19) VIRUS RT-PCR   UA MACROSCOPIC WITH REFLEX TO MICRO AND CULTURE     CT Head Perfusion w Contrast   Final Result   IMPRESSION:    HEAD CT:   1.  No CT finding of a mass, hemorrhage or focal area suggestive of acute infarct.   2.  Diffuse age related changes and a possible component of normal pressure hydrocephalus.      HEAD CTA:    1.  No discrete vessel occlusion, significant stenosis, aneurysm or high flow vascular malformation involving the arteries of the [place].      NECK CTA:   1.  Normal configuration of the great vessels off the aortic arch with no significant stenosis of their origins.   2.  No significant stenosis or irregularity  involving the right internal carotid artery by NASCET criteria.   3.  Approximately 30% stenosis of the origin of the left internal carotid artery by NASCET criteria.   4.  No radiographic evidence of dissection.      CT PERFUSION:   1.  Fairly symmetric relative cerebral perfusion bilaterally.      Head CT findings were communicated to Dr. Irving at 9:58 PM. CTA findings were communicated to Dr. Irving at 10:17 PM on 05/02/2021.      CTA Head Neck with Contrast   Final Result   IMPRESSION:    HEAD CT:   1.  No CT finding of a mass, hemorrhage or focal area suggestive of acute infarct.   2.  Diffuse age related changes and a possible component of normal pressure hydrocephalus.      HEAD CTA:    1.  No discrete vessel occlusion, significant stenosis, aneurysm or high flow vascular malformation involving the arteries of the [place].      NECK CTA:   1.  Normal configuration of the great vessels off the aortic arch with no significant stenosis of their origins.   2.  No significant stenosis or irregularity involving the right internal carotid artery by NASCET criteria.   3.  Approximately 30% stenosis of the origin of the left internal carotid artery by NASCET criteria.   4.  No radiographic evidence of dissection.      CT PERFUSION:   1.  Fairly symmetric relative cerebral perfusion bilaterally.      Head CT findings were communicated to Dr. Irving at 9:58 PM. CTA findings were communicated to Dr. Irving at 10:17 PM on 05/02/2021.      CT Head w/o Contrast    (Results Pending)         Treatments and/or interventions provided: NS  Patient's response to treatments and/or interventions: Tolerated well    To be done/followed up on inpatient unit:  Continue with plan of care per admitting MD.    Does this patient have any cognitive concerns?: N/A    Activity level - Baseline/Home:  Unknown  Activity Level - Current:   Total Care    Patient's Preferred language: English   Needed?: No    Isolation:  None  Infection: Not Applicable  Patient tested for COVID 19 prior to admission: YES  Bariatric?: No    Vital Signs:   Vitals:    05/02/21 2135 05/02/21 2140 05/02/21 2205 05/02/21 2220   BP:   132/79 114/84   Pulse: 128 131 130 120   Resp:    22   Temp:       TempSrc:       SpO2:       Weight:       Height:           Cardiac Rhythm:     Was the PSS-3 completed:   Yes  What interventions are required if any?               Family Comments:  at bedside  OBS brochure/video discussed/provided to patient/family: N/A               For the majority of the shift this patient's behavior was Green.     ED NURSE PHONE NUMBER: [phone]

## 2021-05-03 NOTE — ED PROVIDER NOTES
"  History   Chief Complaint:  Facial Droop    The history is provided by the patient and the spouse.      Jaqueline Canales is a wheelchair-bound 80 year old female on Eliquis with a history of chronic DHF, CHF, atrial fibrillation, breast cancer, and lung cancer who presents to the ED with  for an evaluation of a left-sided facial droop. Per  the patient has been lethargic for the past 2 days. He first noticed the facial droop this morning when she woke up. Subsequently, she has been exhibiting decreased blinking of the left eye. Per patient, she endorses occasional numbness/weakness of her arms/legs but not currently. She mentions that she has not been able to take her Eliquis for the past week due to trouble swallowing. She also has been \"feeling awful\" for the past several days. She denies a previous history of symptoms. She denies headache, dyspnea, shortness of breath, or chest pain. She last ate yesterday.    Review of Systems   Unable to perform ROS: Mental status change   Constitutional: Positive for fatigue.   HENT: Positive for trouble swallowing.    Respiratory: Negative for shortness of breath.    Cardiovascular: Negative for chest pain.   Neurological: Positive for facial asymmetry (left-sided droop). Negative for weakness, numbness and headaches.     Allergies:  The patient has no known allergies.    Medications:    Albuterol inhaler  Eliquis  Folvite  Levothyroxine  Methotrexate  Toprol-XL  Prilosec  Klor-Con  Metamucil/Konsyl  Revatio  Zocor  Demadex    Past Medical History:    Amaurosis fugax  Carotid artery stenosis  Esophageal reflux  Hyperlipidemia  H. pylori infection  Pulmonary hypertension  Lung cancer  Osteoporosis  Rheumatoid arthritis  Raynaud's syndrome  Scleroderma  SRINIVAS  Psoriasis  Anemia  Age-related cataract of left eye  Chronic diastolic heart failure  BLE lymphedema  Hypertension  Atrial fibrillation  Lung cancer  Breast cancer  Hiatal hernia  Pulmonary " "fibrosis  Esophageal stricture  Diverticulosis  Lumbago    Past Surgical History:    Colonoscopy x3  Conization  Right heart catheterization  Carotid endarterectomy  Hammer toe repair  Tonsillectomy  Right breast mastectomy    Family History:    Colorectal cancer  CVD   Ovarian cancer  Heart disease  Crohn's disease  Diverticulitis  Alzheimer's disease    Social History:  The patient is wheel-chair bound.  The patient presented with .    Physical Exam     Patient Vitals for the past 24 hrs:   BP Temp Temp src Pulse Resp SpO2 Height Weight   05/02/21 2300 (!) 125/91 -- -- 93 21 95 % -- --   05/02/21 2245 -- -- -- 133 (!) 32 (!) 88 % -- --   05/02/21 2230 (!) 120/100 -- -- 105 21 90 % -- --   05/02/21 2220 114/84 -- -- 120 22 -- -- --   05/02/21 2205 132/79 -- -- 130 -- -- -- --   05/02/21 2140 -- -- -- 131 -- -- -- --   05/02/21 2135 -- -- -- 128 -- -- -- --   05/02/21 2130 (!) 132/99 98.2  F (36.8  C) Oral 131 14 98 % 1.575 m (5' 2\") 68.5 kg (151 lb)       Physical Exam  Nursing note and vitals reviewed.  Constitutional:  Appears well-developed and well-nourished.   HENT:   Head:    Atraumatic.   Mouth/Throat:   Oropharynx is clear. Dry mucous membrane. No oropharyngeal exudate.   Eyes:    Pupils are equal, round, and reactive to light.   Neck:    Normal range of motion. Neck supple.      No tracheal deviation present. No thyromegaly present.   Cardiovascular:  Normal rate, regular rhythm, no murmur   Pulmonary/Chest: Breath sounds are clear and equal without wheezes or crackles.  Abdominal:   Soft. Bowel sounds are normal. Exhibits no distension and      no mass. There is no tenderness.      There is no rebound and no guarding.   Musculoskeletal:  Exhibits no edema.   Lymphadenopathy:  No cervical adenopathy.   Neurological:   Awake, alert and oriented to person, place, and time. She answers questions but slowly. She has a left-sided facial droop with decreased blinking of the left eye and paralysis of the " left forehead muscles.  intact and equal. LE strength intact and equal.   Skin:    Skin is warm and dry. No rash noted. No pallor.       Emergency Department Course   ECG  ECG taken at 21:36:16, ECG read at 2140  Atrial fibrillation with rapid ventricular response. Left anterior fascicular block. Possible anterior infarct, age undetermined. Abnormal ECG.    Rate 130 bpm. IL interval * ms. QRS duration 92 ms. QT/QTc 344/506 ms. P-R-T axes * -61 77.     Imaging:  CT Head without contrast:  1.  No CT finding of a mass, hemorrhage or focal area suggestive of acute infarct.   2.  Diffuse age related changes and a possible component of normal pressure hydrocephalus.     Imaging independently reviewed and agree with radiologist interpretation.     CTA Head Neck with contrast:    HEAD CTA:   1.  No discrete vessel occlusion, significant stenosis, aneurysm or high flow vascular malformation involving the arteries of the [place].     NECK CTA:   1.  Normal configuration of the great vessels off the aortic arch with no significant stenosis of their origins.   2.  No significant stenosis or irregularity involving the right internal carotid artery by NASCET criteria.   3.  Approximately 30% stenosis of the origin of the left internal carotid artery by NASCET criteria.   4.  No radiographic evidence of dissection.     Imaging independently reviewed and agree with radiologist interpretation.      CT Head Perfusion with contrast:  1.  Fairly symmetric relative cerebral perfusion bilaterally.     Imaging independently reviewed and agree with radiologist interpretation.      Laboratory:  CBC: WBC 10.2, HGB 14.5,      CMP: K 3.3 (L), CO2 34 (H), Ca 10.3 (H), bilirubin 1.6 (H), o/w WNL (Creatinine 0.82)     Lactic acid (result time 2149) 1.8     INR: 1.07    PTT: 39 (H)    Troponin (Collected 2132): <0.015    UA with microscopic: ketone 10 (A), blood small (A), protein albumin urine 100 (A), leukocyte esterase  urine large (A), WBC/HPF >182 (H), WBC clumps present (A), bacteria many (A), o/w WNL     UA reflex to Microscopic and Culture: pending    Urine Culture Aerobic Bacterial: Pending    Asymptomatic COVID19 Virus PCR by nasopharyngeal swab: negative      Emergency Department Course:    Reviewed:  2121 I reviewed nursing notes, vitals and past history.    Assessments:  2126 I obtained history and examined the patient as noted above.   2130 Tier 2 Code stroke paged.  0002 I rechecked the patient and discussed results/plan of care.     Consults:   2136 I spoke with Dr. Vargas of the stroke neurology service regarding the patient's presentation, findings, and plan of care.  2158 I spoke with Dr. Padilla of the radiolog service regarding the patient's presentation, findings, and plan of care.  2220 I spoke with Dr. Crockett of the hospitalist service regarding the patient's presentation, findings, and plan of care.    Interventions:  2145: NS 1L IV Bolus   2308: Aspirin 300 mg rectal  0018: Rocephin 1 g in 100 mL IV infusion    Disposition:  The patient was admitted to the hospital under the care of Dr. Crockett.    Impression & Plan        Medical Decision Making:  Code stroke was called upon her arrival to the ED because of her facial droop and difficulty swallowing and the fact that she is on Eliquis. There was no sign of intracranial bleed or acute stroke on her head CT's. I was also concerned about the possibility of Bell's palsy in the differential diagnosis based on her facial droop with process of the forehead musculature and decreased movement of the eyelids. However, her difficulty swallowing is also concerning for brainstem stroke. Therefore, she was given aspirin rectal suppository and admitted to the hospital. She was found to be in rapid atrial fibrillation here so she was placed on a diltiazem drip and then moved to the [facility]. She was also found to have a UTI so she was given ceftriaxone IV here and admitted  to the care of the hospitalist.     Critical Care time:  was 40 minutes for this patient excluding procedures.    Covid-19  Jaqueline Canales was evaluated during a global COVID-19 pandemic, which necessitated consideration that the patient might be at risk for infection with the SARS-CoV-2 virus that causes COVID-19.   Applicable protocols for evaluation were followed during the patient's care.   COVID-19 was considered as part of the patient's evaluation. The plan for testing is:  a test was obtained during this visit.    Diagnosis:    ICD-10-CM    1. Atrial fibrillation with rapid ventricular response (H)  I48.91 UA with Microscopic     Asymptomatic SARS-CoV-2 COVID-19 Virus (Coronavirus) by PCR   2. Dysphagia, unspecified type  R13.10    3. Facial droop  R29.810    4. Urinary tract infection without hematuria, site unspecified  N39.0        Scribe Disclosure:  I, Caty Corey, am serving as a scribe at 9:25 PM on 5/2/2021 to document services personally performed by Johanna Garner MD based on my observations and the provider's statements to me.      Johanna Garner MD  05/03/21 0139

## 2021-05-03 NOTE — PLAN OF CARE
Patient refused to get up in the chair, eating very small amounts, do not like pureed diet and nectar thick liquids.  was at bedside for 1 hour. Continues having left side  face droop cannot close L eye lid and raise L eyebrow. Continue to monitor.

## 2021-05-03 NOTE — ED TRIAGE NOTES
LATE ENTRY    Patient arrives through triage via private car, wheelchair with her .   Weakness yesterday.  Today left eye will not close.  Left facial droop last night.     states she has been lethargic for 2 days.     Patient states she has not been taking her Elliquis as she cannot swallow pills. Trouble swallowing for 1 week.     Tier 2 Code stroke paged at 2059.

## 2021-05-03 NOTE — CONSULTS
"  [facility]    Stroke Consult Note    Reason for Consult:  \"L facial droop, dysphagia\"    Chief Complaint: Neurologic Problem     HPI  Jaqueline Canales is a 80 year old right handed woman who presented to the hospital with a severe left facial droop that came on gradually over the past couple of days. She reports that she also has generally been feeling unwell. She has had dysphagia but this is more subacute going on for more than a couple weeks. Her left ear has not been painful.  She is being treated for a urinary tract infection.    She has a past medical history of atrial fibrillation on eliquis, chronic HPfEF, severe TR, hyperlipidemia, scleroderma, rheumatoid arthritis, SRINIVAS, GERD, hypothyroidism.     Impression  1. L side peripheral 7th nerve palsy, suspect Barger's given the gradual onset over two days but will check brain MRI to rule out brainstem infarct   - Await brain MRI  - Check A1c  - Corneal abrasion precautions for L eye: eye drops 4 times daily and hourly PRN, ointment and taping eye shut qhs        Patient Follow-up    - final recommendation pending work-up    Thank you for this consult. We will continue to follow.     Jeanna Bonner PA-C  Neurology  05/03/2021 10:49 AM  To page me or covering stroke neurology team member, click here: AMCOM   Choose \"On Call\" tab at top, then search dropdown box for \"Neurology Adult\", select location, press Enter, then look for stroke/neuro ICU/telestroke.    _____________________________________________________    Past Medical History   Past Medical History:   Diagnosis Date     Amaurosis fugax 5-11    Right     Carotid artery stenosis      Esophageal reflux 3/11/2004     HELICOBACTER PYLORI INFECTION 7/11/2006     hx of CA in situ RT breast-1990.mastectomy 4/17/2007     Hyperlipidemia LDL goal <70 6/20/2011     Lung cancer (H)     squamous cell lung ca left lower lobe     OBESITY NOS 3/11/2004     OSTEOPOROSIS NOS 3/11/2004     Other " psoriasis 3/11/2004     Pulmonary hypertension (H) 04/15/2019    Right heart catheterization demonstrated moderate pulmonary arterial hypertension with a mean PA pressure of 34 mmHg, no reversibility with inhaled nitric oxide, elevated right and left-sided filling pressures, low normal cardiac index of 2.1.  Seen by Dr. Shay Marquez.  Started on sildenafil (Revatio) 20 mg 3 times daily.     RA (rheumatoid arthritis) (H) 2/8/2010     Raynaud's syndrome 4/10/2006     Scleroderma (H)      Sleep apnea     CPAP     Past Surgical History   Past Surgical History:   Procedure Laterality Date     BREAST SURGERY       COLONOSCOPY  5/24/16     COLONOSCOPY N/A 6/28/2019    Procedure: Colonoscopy, With Polypectomy And Biopsy;  Surgeon: Neto Kaminski MD;  Location:  GI     CONIZATION       CV RIGHT HEART CATH MEASUREMENTS RECORDED N/A 4/15/2019    Procedure: Heart Cath Right Heart Cath;  Surgeon: Naresh Cyr MD;  Location:  HEART CARDIAC CATH LAB     ENDARTERECTOMY CAROTID  2011     HC COLONOSCOPY THRU STOMA, DIAGNOSTIC  2001 2011    diverticulosis     REPAIR HAMMER TOE       TONSILLECTOMY       ZZC NONSPECIFIC PROCEDURE  1990    mastectomy RT breast     Medications   Home Meds  Prior to Admission medications    Medication Sig Start Date End Date Taking? Authorizing Provider   acetaminophen (TYLENOL) 500 MG tablet Take 1,000 mg by mouth 2 times daily    Yes Reported, Patient   albuterol (PROAIR HFA/PROVENTIL HFA/VENTOLIN HFA) 108 (90 Base) MCG/ACT inhaler USE 2 INHALATIONS ORALLY   INTO THE LUNGS EVERY 6  HOURS as needed 1/8/21  Yes Perlman, David Morris, MD   apixaban ANTICOAGULANT (ELIQUIS ANTICOAGULANT) 5 MG tablet Take 1 tablet (5 mg) by mouth 2 times daily 2/22/21   Carissa Burroughs MD   Bacillus Coagulans-Inulin (PROBIOTIC-PREBIOTIC PO) Take 1 chew tab by mouth 2 times daily     Reported, Patient   folic acid (FOLVITE) 1 MG tablet Take 1 tablet (1,000 mcg) by mouth daily 1/24/19   Carissa Burroughs  MD Yadiel   InFLIXimab (REMICADE IV) Inject 400 mg into the vein Every 8 weeks    Reported, Patient   levothyroxine (SYNTHROID/LEVOTHROID) 75 MCG tablet Take 1 tablet (75 mcg) by mouth daily 2/22/21   Carissa Burroughs MD   melatonin 5 MG tablet Take 5 mg by mouth nightly as needed for sleep    Unknown, Entered By History   Methotrexate Sodium (METHOTREXATE PO) Take 2.5 mg by mouth 10 tablets weekly - Wednesdays    Reported, Patient   metoprolol succinate ER (TOPROL-XL) 25 MG 24 hr tablet Take 1 tablet (25 mg) by mouth daily 2/22/21   Carissa Burroughs MD   Multiple Vitamins-Minerals (ICAPS) CAPS Take 1 capsule by mouth 2 times daily     Reported, Patient   multivitamin (CENTRUM SILVER) tablet Take 1 tablet by mouth daily    Unknown, Entered By History   omeprazole (PRILOSEC) 20 MG DR capsule TAKE 1 CAPSULE TWICE DAILY,30-60 MINUTES PRIOR TO     MEALS 11/24/20   Carissa Burroughs MD   order for DME Equipment being ordered: 4 wheeled walker with seat 1/20/20   Carissa Burroughs MD   potassium chloride ER (KLOR-CON M) 20 MEQ CR tablet Take 1 tablet (20 mEq) by mouth daily 4/1/21   Carissa Burroughs MD   Prednisolon-Gatiflox-Bromfenac 1-0.5-0.075 % SUSP Place 1 drop into the right eye daily    Unknown, Entered By History   psyllium (METAMUCIL/KONSYL) capsule Take 1 capsule by mouth daily    Unknown, Entered By History   sildenafil (REVATIO) 20 MG tablet TAKE 1 TABLET (20 MG) BY MOUTH 3 TIMES DAILY 3/8/21   Carissa Burroughs MD   simvastatin (ZOCOR) 40 MG tablet Take 1 tablet (40 mg) by mouth At Bedtime 2/22/21   Carissa Burroughs MD   Skin Protectants, Misc. (EUCERIN) cream Apply topically 2 times daily Apply to areas dry skin topically two times a day for dry skin and Apply to areas of dry skin topically as needed for dry skin daily    Reported, Patient   torsemide (DEMADEX) 20 MG tablet Take 2 tablets (40 mg) by mouth daily Take 40mg q am 1/26/21   Carissa Burroughs MD   triamcinolone (KENALOG) 0.1 %  external cream Apply topically 2 times daily 19   Carissa Burroughs MD   Vitamin D, Cholecalciferol, 10 MCG (400 UNIT) TABS Take 1,600 Units by mouth daily    Unknown, Entered By History       Scheduled Meds    [START ON 2021] cefTRIAXone  1 g Intravenous Q24H     metoprolol succinate ER  25 mg Oral Daily     omeprazole  20 mg Oral BID AC     potassium chloride  10 mEq Intravenous Q1H     sodium chloride (PF)  3 mL Intracatheter Q8H       Infusion Meds    - MEDICATION INSTRUCTIONS -       sodium chloride 75 mL/hr at 21 0151       PRN Meds  acetaminophen, bisacodyl, lidocaine 4%, lidocaine (buffered or not buffered), magnesium hydroxide, - MEDICATION INSTRUCTIONS -, melatonin, metoprolol, ondansetron **OR** ondansetron, senna-docusate **OR** senna-docusate, sodium chloride (PF)    Allergies   Allergies   Allergen Reactions     No Known Allergies      Family History   Family History   Problem Relation Age of Onset     Cancer - colorectal Mother      Cerebrovascular Disease Father      Cancer Sister 31        ovarian     Heart Disease Sister      Gastrointestinal Disease Sister         crohns     Gastrointestinal Disease Sister         diverticulitis     Gastrointestinal Disease Brother         diverticulitis     Alzheimer Disease Sister      Cerebrovascular Disease Maternal Grandmother      Diabetes Maternal Grandmother      Mental Illness Maternal Grandmother      Diabetes Maternal Uncle      Cancer Nephew              Social History   Social History     Tobacco Use     Smoking status: Former Smoker     Packs/day: 1.00     Years: 30.00     Pack years: 30.00     Types: Cigarettes     Start date:      Quit date: 3/11/1992     Years since quittin.1     Smokeless tobacco: Never Used   Substance Use Topics     Alcohol use: Not Currently     Alcohol/week: 0.0 standard drinks     Frequency: Never     Drug use: No       Review of Systems   The 5 point Review of Systems is negative other  than noted in the HPI or here.        PHYSICAL EXAMINATION   Temp:  [97.6  F (36.4  C)-98.2  F (36.8  C)] 97.6  F (36.4  C)  Pulse:  [] 79  Resp:  [14-32] 18  BP: (107-132)/() 113/63  SpO2:  [88 %-99 %] 98 %    General:  patient lying in bed without any acute distress    HEENT:  normocephalic/atraumatic, L auditory canal erythematous but normal  Pulmonary:  no respiratory distress      Neurologic  Mental Status:  alert, oriented x 3, follows commands, speech clear and fluent, naming and repetition normal  Cranial Nerves:  visual fields intact, PERRL, EOMI with normal smooth pursuit, facial sensation intact and symmetric, hearing not formally tested but intact to conversation, palate elevation symmetric and uvula midline, shoulder shrug strong bilaterally, tongue protrusion midline, severe L peripheral 7th nerve palsy  Motor:  normal muscle tone and bulk, no abnormal movements, able to move all limbs spontaneously, strength 5/5 throughout upper and lower extremities, no pronator drift  Reflexes:  toes down-going  Sensory:  light touch sensation intact and symmetric throughout upper and lower extremities, no extinction on double simultaneous stimulation   Coordination:  normal finger-to-nose and heel-to-shin bilaterally without dysmetria, rapid alternating movements symmetric  Station/Gait:  deferred    Dysphagia Screen  Per Nursing    Stroke Scales    NIHSS  Interval baseline (05/03/21 0122)   Interval Comments     1a. Level of Consciousness 0-->Alert, keenly responsive   1b. LOC Questions 0-->Answers both questions correctly   1c. LOC Commands 0-->Performs both tasks correctly   2.   Best Gaze 0-->Normal   3.   Visual 0-->No visual loss   4.   Facial Palsy 3-->Complete paralysis of one or both sides (absence of facial movement in the upper and lower face)   5a. Motor Arm, Left 0-->No drift, limb holds 90 (or 45) degrees for full 10 secs   5b. Motor Arm, Right 0-->No drift, limb holds 90 (or 45) degrees  for full 10 secs   6a. Motor Leg, Left 0-->No drift, leg holds 30 degree position for full 5 secs   6b. Motor Leg, right 0-->No drift, leg holds 30 degree position for full 5 secs   7.   Limb Ataxia 0-->Absent   8.   Sensory 0-->Normal, no sensory loss   9.   Best Language 0-->No aphasia, normal   10. Dysarthria 1-->Mild-to-moderate dysarthria, patient slurs at least some words and, at worst, can be understood with some difficulty   11. Extinction and Inattention  0-->No abnormality   Total 4 (05/03/21 0122)       Imaging  I personally reviewed all imaging; relevant findings per HPI.    Labs Data   CBC  Recent Labs   Lab 05/02/21 2132 04/28/21  0952   WBC 10.2  --    RBC 5.49*  --    HGB 14.5 12.6   HCT 46.9 38.8     --      Basic Metabolic Panel   Recent Labs   Lab 05/03/21  0136 05/02/21 2132   NA  --  136   POTASSIUM 3.4 3.3*   CHLORIDE  --  95   CO2  --  34*   BUN  --  25   CR  --  0.82   GLC  --  84   TU  --  10.3*     Liver Panel  Recent Labs   Lab 05/02/21 2132   PROTTOTAL 8.6   ALBUMIN 4.0   BILITOTAL 1.6*   ALKPHOS 78   AST 25   ALT 18     INR    Recent Labs   Lab Test 05/02/21  2132 04/15/19  0700 12/04/15  0745   INR 1.07 1.05 0.95      Lipid Profile    Recent Labs   Lab Test 10/25/18  0759 07/01/16  0805 06/01/15  0824 06/04/14  0943 09/09/13  0841   CHOL 139 160 170 189 168   HDL 58 54 66 64 58   LDL 56 75 82 95 83   TRIG 125 156* 111 151* 137   CHOLHDLRATIO  --   --  2.6 3.0 2.9     A1C    Recent Labs   Lab Test 10/25/18  0759   A1C 5.1     Troponin I    Recent Labs   Lab 05/02/21 2132   TROPI <0.015          Stroke Code / Stroke Consult Data Data This was a non-emergent, non-tele stroke consult.

## 2021-05-03 NOTE — H&P
[facility]    History and Physical  Hospitalist       Date of Admission:  5/2/2021  Date of Service (when I saw the patient): 05/02/21    Assessment & Plan   Jaqueline Canales is a 80 year old female who presents with lethargy, facial droop    CVA vs possible Bell's palsy  Dysphagia   noted facial droop earlier in day. Has been having issues with dysphagia as well in last several days. In afib with RVR. Troponin negative. CT imaging in ED with normal perfusion, no acute abnormality, mention possible component of normal pressure hydrocephalus.   - MRI brain with think cuts through cranial nerves  - q4 neuro checks  - neurology consult  - if MRI, treat as bells palsy/ start steroids  - PT/OT/SLP evaluation  - ? If dysphagia related to Scleroderma    UTI  UA grossly infected in the ED. ? If some sx 2/2 this  - ceftriaxone 1g IV q24 hours  - await urine cultures      Atrial fibrillation with RVR  [reportedly apixaban 5 mg BID, metoprolol 25 mg daily]  Initial heart rates in ED in 130's, started on diltiazem gtt. EKG in ED with afib with RVR.   - telemetry  - metoprolol 2.5-5 mg IV q4 hours prn  - resume home metoprolol   - resume Eliquis pending CVA workup    Chronic HFpEF  Pulmonary hypertension  Severe TR   HLD  [reportedly torsemide 40 mg daily, sildenafil 20 mg TID]  Follows with Dr. Marquez with cardiology as an outpatient. Echo 3/2021 with EF 60-65%, mod LVH, pulmonary pressure 48 +RA, severe TR, mod-severe MR.   - resume meds with med rec    Scleroderma  Rheumatoid arthritis  [reportedly on remicaide, methotrexate]  - hold for now    Hypokalemia  Replace per protocol    SRINIVAS  Non-compliant with CPAP    Hypothyroidism  Reportedly levothyroxine 75 mcg daily    GERD  Reportedly omeprazole 20 mg BID, continue    COVID-19 negative on admission, low suspicion    DVT Prophylaxis: Pneumatic Compression Devices  Code Status: DNR / DNI    Disposition: Expected discharge in 2-3 days    Farhad  TOSHIA Crockett MD  [phone] (P)  Text Page     Primary Care Physician   Carissa Burroughs    Chief Complaint   Facial droop, dysphagia    History is obtained from the patient and medical records    History of Present Illness   Jaqueline Canales is a 80 year old female who presents with a facial droop.  She is somewhat of a vague historian.  Reports that for days she was not feeling well.  She describes this as feeling dizzy and that she could not focus.  She also states she is having poor sleep.  She also had complaints of dysphagia.  She states that whenever she swallows she chokes.  She states this is going on longer than for a couple of weeks.  Is a little unclear but it sounds like the  first noticed the facial droop today although it may have been a couple of days.  She also noted left eye problems where she cannot blink.  She states that she has had weakness but is generalized in her upper and lower arms.  She states she is unstable.  She denies any chest pain states her breathing is okay.  She has no nausea.  She has no urinary discomfort.  She denies history of UTIs.    Past Medical History    I have reviewed this patient's medical history and updated it with pertinent information if needed.   Past Medical History:   Diagnosis Date     Amaurosis fugax 5-11    Right     Carotid artery stenosis      Esophageal reflux 3/11/2004     HELICOBACTER PYLORI INFECTION 7/11/2006     hx of CA in situ RT breast-1990.mastectomy 4/17/2007     Hyperlipidemia LDL goal <70 6/20/2011     Lung cancer (H)     squamous cell lung ca left lower lobe     OBESITY NOS 3/11/2004     OSTEOPOROSIS NOS 3/11/2004     Other psoriasis 3/11/2004     Pulmonary hypertension (H) 04/15/2019    Right heart catheterization demonstrated moderate pulmonary arterial hypertension with a mean PA pressure of 34 mmHg, no reversibility with inhaled nitric oxide, elevated right and left-sided filling pressures, low normal cardiac index of 2.1.  Seen  by Dr. Shay Marquez.  Started on sildenafil (Revatio) 20 mg 3 times daily.     RA (rheumatoid arthritis) (H) 2/8/2010     Raynaud's syndrome 4/10/2006     Scleroderma (H)      Sleep apnea     CPAP       Past Surgical History   I have reviewed this patient's surgical history and updated it with pertinent information if needed.  Past Surgical History:   Procedure Laterality Date     BREAST SURGERY       COLONOSCOPY  5/24/16     COLONOSCOPY N/A 6/28/2019    Procedure: Colonoscopy, With Polypectomy And Biopsy;  Surgeon: Neto Kaminski MD;  Location:  GI     CONIZATION       CV RIGHT HEART CATH MEASUREMENTS RECORDED N/A 4/15/2019    Procedure: Heart Cath Right Heart Cath;  Surgeon: Naresh Cyr MD;  Location:  HEART CARDIAC CATH LAB     ENDARTERECTOMY CAROTID  2011     HC COLONOSCOPY THRU STOMA, DIAGNOSTIC  2001 2011    diverticulosis     REPAIR HAMMER TOE       TONSILLECTOMY       ZZC NONSPECIFIC PROCEDURE  1990    mastectomy RT breast       Prior to Admission Medications   Prior to Admission Medications   Prescriptions Last Dose Informant Patient Reported? Taking?   Bacillus Coagulans-Inulin (PROBIOTIC-PREBIOTIC PO)   Yes No   Sig: Take 1 chew tab by mouth 2 times daily    InFLIXimab (REMICADE IV)  Self Yes No   Sig: Inject 400 mg into the vein Every 8 weeks   Methotrexate Sodium (METHOTREXATE PO)  Self Yes No   Sig: Take 2.5 mg by mouth 10 tablets weekly - Wednesdays   Multiple Vitamins-Minerals (ICAPS) CAPS  Self Yes No   Sig: Take 1 capsule by mouth 2 times daily    Prednisolon-Gatiflox-Bromfenac 1-0.5-0.075 % SUSP   Yes No   Sig: Place 1 drop into the right eye daily   Skin Protectants, Misc. (EUCERIN) cream   Yes No   Sig: Apply topically 2 times daily Apply to areas dry skin topically two times a day for dry skin and Apply to areas of dry skin topically as needed for dry skin daily   Vitamin D, Cholecalciferol, 10 MCG (400 UNIT) TABS   Yes No   Sig: Take 1,600 Units by mouth daily    acetaminophen (TYLENOL) 500 MG tablet   Yes No   Sig: Take 1,000 mg by mouth 2 times daily    albuterol (PROAIR HFA/PROVENTIL HFA/VENTOLIN HFA) 108 (90 Base) MCG/ACT inhaler   No No   Sig: USE 2 INHALATIONS ORALLY   INTO THE LUNGS EVERY 6  HOURS as needed   apixaban ANTICOAGULANT (ELIQUIS ANTICOAGULANT) 5 MG tablet   No No   Sig: Take 1 tablet (5 mg) by mouth 2 times daily   folic acid (FOLVITE) 1 MG tablet   No No   Sig: Take 1 tablet (1,000 mcg) by mouth daily   levothyroxine (SYNTHROID/LEVOTHROID) 75 MCG tablet   No No   Sig: Take 1 tablet (75 mcg) by mouth daily   melatonin 5 MG tablet   Yes No   Sig: Take 5 mg by mouth nightly as needed for sleep   metoprolol succinate ER (TOPROL-XL) 25 MG 24 hr tablet   No No   Sig: Take 1 tablet (25 mg) by mouth daily   multivitamin (CENTRUM SILVER) tablet   Yes No   Sig: Take 1 tablet by mouth daily   omeprazole (PRILOSEC) 20 MG DR capsule   No No   Sig: TAKE 1 CAPSULE TWICE DAILY,30-60 MINUTES PRIOR TO     MEALS   order for DME   No No   Sig: Equipment being ordered: 4 wheeled walker with seat   potassium chloride ER (KLOR-CON M) 20 MEQ CR tablet   No No   Sig: Take 1 tablet (20 mEq) by mouth daily   psyllium (METAMUCIL/KONSYL) capsule   Yes No   Sig: Take 1 capsule by mouth daily   sildenafil (REVATIO) 20 MG tablet   No No   Sig: TAKE 1 TABLET (20 MG) BY MOUTH 3 TIMES DAILY   simvastatin (ZOCOR) 40 MG tablet   No No   Sig: Take 1 tablet (40 mg) by mouth At Bedtime   torsemide (DEMADEX) 20 MG tablet   No No   Sig: Take 2 tablets (40 mg) by mouth daily Take 40mg q am   triamcinolone (KENALOG) 0.1 % external cream   No No   Sig: Apply topically 2 times daily      Facility-Administered Medications: None     Allergies   Allergies   Allergen Reactions     No Known Allergies        Social History   I have reviewed this patient's social history and updated it with pertinent information if needed. Jaqueline Canales  reports that she quit smoking about 29 years ago. Her smoking  use included cigarettes. She started smoking about 59 years ago. She has a 30.00 pack-year smoking history. She has never used smokeless tobacco. She reports previous alcohol use. She reports that she does not use drugs.    Family History   I have reviewed this patient's family history and updated it with pertinent information if needed.   Family History   Problem Relation Age of Onset     Cancer - colorectal Mother      Cerebrovascular Disease Father      Cancer Sister 31        ovarian     Heart Disease Sister      Gastrointestinal Disease Sister         crohns     Gastrointestinal Disease Sister         diverticulitis     Gastrointestinal Disease Brother         diverticulitis     Alzheimer Disease Sister      Cerebrovascular Disease Maternal Grandmother      Diabetes Maternal Grandmother      Mental Illness Maternal Grandmother      Diabetes Maternal Uncle      Cancer Nephew                Review of Systems   The 10 point Review of Systems is negative other than noted in the HPI or here.     Physical Exam   Temp: 98.2  F (36.8  C) Temp src: Oral BP: 114/84 Pulse: 120   Resp: 22 SpO2: 98 % O2 Device: None (Room air)    Vital Signs with Ranges  151 lbs 0 oz    Constitutional: alert, oriented and in no acute distress  Eyes: EOMI, PERRL. L eyelid paresis  HEENT: OP clear. Pronounced L facial droop  Respiratory: CTA B without w/c  Cardiovascular: tachy, irregular. 1+ symmetric edema  GI: soft, nontender, nondistended, BS present  Lymph/Hematologic: no cervical LAD  Genitourinary: deferred  Skin: no rashes or lesions grossly  Musculoskeletal: no deformities or arthritis  Neurologic: L facial droop, L eyelid paresis. Upper/ lower extremity strength appears intact  Psychiatric: mood and affect wnl    Data   Data reviewed today:  I personally reviewed the EKG tracing showing afib with rVR and the head CT image(s) showing no acute process.  Recent Labs   Lab 21  2132 21  0952   WBC 10.2  --    HGB 14.5  12.6   MCV 85  --      --    INR 1.07  --      --    POTASSIUM 3.3*  --    CHLORIDE 95  --    CO2 34*  --    BUN 25  --    CR 0.82  --    ANIONGAP 7  --    TU 10.3*  --    GLC 84  --    ALBUMIN 4.0  --    PROTTOTAL 8.6  --    BILITOTAL 1.6*  --    ALKPHOS 78  --    ALT 18  --    AST 25  --    TROPI <0.015  --        Recent Results (from the past 24 hour(s))   CTA Head Neck with Contrast    Narrative    EXAM: CTA  HEAD NECK WITH CONTRAST, CT HEAD PERFUSION WITH CONTRAST  LOCATION: [facility]  DATE/TIME: 5/2/2021 9:42 PM    INDICATION: Left-sided sided facial droop and dysphagia.  COMPARISON: None.  TECHNIQUE: Head and neck CT angiogram with IV contrast. Noncontrast head CT followed by axial helical CT images of the head and neck vessels obtained during the arterial phase of intravenous contrast administration. Axial 2D reconstructed images and   multiplanar 3D MIP reconstructed images of the head and neck vessels were performed by the technologist. Additional CT cerebral perfusion was performed utilizing a second contrast bolus. Perfusion data were post processed with generation of standard   perfusion maps and estimation of ischemic/infarcted volumes utilizing standard threshold values. Dose reduction techniques were used. All stenosis measurements made according to NASCET criteria unless otherwise specified.  CONTRAST: 70mL Isovue-370 (accession [id number]), 50mL Isovue-370 (accession [id number])  COMPARISON: None.    FINDINGS:   NONCONTRAST HEAD CT:   INTRACRANIAL CONTENTS: No intracranial hemorrhage, extraaxial collection, or mass effect.  No CT evidence of acute infarct. There is scattered low-attenuation within the periventricular and subcortical white matter consistent with mild diffuse small   vessel ischemic disease. The ventricular system, basal cisterns and the cortical sulci are consistent with mild diffuse volume loss. There may be a component of normal pressure hydrocephalus  with the lateral ventricles are out of portion to the sulci.     VISUALIZED ORBITS/SINUSES/MASTOIDS: No intraorbital abnormality. No paranasal sinus mucosal disease. No middle ear or mastoid effusion.    BONES/SOFT TISSUES: No acute abnormality.    HEAD CTA:  ANTERIOR CIRCULATION: No stenosis/occlusion, aneurysm, or high flow vascular malformation. There is a patent anterior communicating artery and bilateral fetal origins of the posterior cerebral arteries.    POSTERIOR CIRCULATION: No stenosis/occlusion, aneurysm, or high flow vascular malformation. The basilar vertebral system is small throughout associated with bilateral fetal origins of the posterior cerebral arteries.     DURAL VENOUS SINUSES: Expected enhancement of the major dural venous sinuses.    NECK CTA:  RIGHT CAROTID: No measurable stenosis or dissection.    LEFT CAROTID: Approximately 30% stenosis of the origin of the left internal carotid artery by NASCET criteria.    VERTEBRAL ARTERIES: No focal stenosis or dissection. Balanced vertebral arteries.    AORTIC ARCH: Classic aortic arch anatomy with no significant stenosis at the origin of the great vessels.    NONVASCULAR STRUCTURES: Mild degenerative changes of the cervical spine. The lungs visualized on this study are clear.    CT PERFUSION:  PERFUSION MAPS: Symmetrical cerebral perfusion. No focal deficits in cerebral blood flow or volume to suggest ischemia/oligemia.    RAPID ANALYSIS:  CBF<30%: 0 mL  Tmax>6sec: 0 ml  Mismatch volume: 0 mL  Mismatch ratio: None.      Impression    IMPRESSION:   HEAD CT:  1.  No CT finding of a mass, hemorrhage or focal area suggestive of acute infarct.  2.  Diffuse age related changes and a possible component of normal pressure hydrocephalus.    HEAD CTA:   1.  No discrete vessel occlusion, significant stenosis, aneurysm or high flow vascular malformation involving the arteries of the [place].    NECK CTA:  1.  Normal configuration of the great vessels  off the aortic arch with no significant stenosis of their origins.  2.  No significant stenosis or irregularity involving the right internal carotid artery by NASCET criteria.  3.  Approximately 30% stenosis of the origin of the left internal carotid artery by NASCET criteria.  4.  No radiographic evidence of dissection.    CT PERFUSION:  1.  Fairly symmetric relative cerebral perfusion bilaterally.    Head CT findings were communicated to Dr. Irving at 9:58 PM. CTA findings were communicated to Dr. Irving at 10:17 PM on 05/02/2021.   CT Head Perfusion w Contrast    Narrative    EXAM: CTA  HEAD NECK WITH CONTRAST, CT HEAD PERFUSION WITH CONTRAST  LOCATION: [facility]  DATE/TIME: 5/2/2021 9:42 PM    INDICATION: Left-sided sided facial droop and dysphagia.  COMPARISON: None.  TECHNIQUE: Head and neck CT angiogram with IV contrast. Noncontrast head CT followed by axial helical CT images of the head and neck vessels obtained during the arterial phase of intravenous contrast administration. Axial 2D reconstructed images and   multiplanar 3D MIP reconstructed images of the head and neck vessels were performed by the technologist. Additional CT cerebral perfusion was performed utilizing a second contrast bolus. Perfusion data were post processed with generation of standard   perfusion maps and estimation of ischemic/infarcted volumes utilizing standard threshold values. Dose reduction techniques were used. All stenosis measurements made according to NASCET criteria unless otherwise specified.  CONTRAST: 70mL Isovue-370 (accession [id number]), 50mL Isovue-370 (accession [id number])  COMPARISON: None.    FINDINGS:   NONCONTRAST HEAD CT:   INTRACRANIAL CONTENTS: No intracranial hemorrhage, extraaxial collection, or mass effect.  No CT evidence of acute infarct. There is scattered low-attenuation within the periventricular and subcortical white matter consistent with mild diffuse small   vessel ischemic disease.  The ventricular system, basal cisterns and the cortical sulci are consistent with mild diffuse volume loss. There may be a component of normal pressure hydrocephalus with the lateral ventricles are out of portion to the sulci.     VISUALIZED ORBITS/SINUSES/MASTOIDS: No intraorbital abnormality. No paranasal sinus mucosal disease. No middle ear or mastoid effusion.    BONES/SOFT TISSUES: No acute abnormality.    HEAD CTA:  ANTERIOR CIRCULATION: No stenosis/occlusion, aneurysm, or high flow vascular malformation. There is a patent anterior communicating artery and bilateral fetal origins of the posterior cerebral arteries.    POSTERIOR CIRCULATION: No stenosis/occlusion, aneurysm, or high flow vascular malformation. The basilar vertebral system is small throughout associated with bilateral fetal origins of the posterior cerebral arteries.     DURAL VENOUS SINUSES: Expected enhancement of the major dural venous sinuses.    NECK CTA:  RIGHT CAROTID: No measurable stenosis or dissection.    LEFT CAROTID: Approximately 30% stenosis of the origin of the left internal carotid artery by NASCET criteria.    VERTEBRAL ARTERIES: No focal stenosis or dissection. Balanced vertebral arteries.    AORTIC ARCH: Classic aortic arch anatomy with no significant stenosis at the origin of the great vessels.    NONVASCULAR STRUCTURES: Mild degenerative changes of the cervical spine. The lungs visualized on this study are clear.    CT PERFUSION:  PERFUSION MAPS: Symmetrical cerebral perfusion. No focal deficits in cerebral blood flow or volume to suggest ischemia/oligemia.    RAPID ANALYSIS:  CBF<30%: 0 mL  Tmax>6sec: 0 ml  Mismatch volume: 0 mL  Mismatch ratio: None.      Impression    IMPRESSION:   HEAD CT:  1.  No CT finding of a mass, hemorrhage or focal area suggestive of acute infarct.  2.  Diffuse age related changes and a possible component of normal pressure hydrocephalus.    HEAD CTA:   1.  No discrete vessel occlusion,  significant stenosis, aneurysm or high flow vascular malformation involving the arteries of the [place].    NECK CTA:  1.  Normal configuration of the great vessels off the aortic arch with no significant stenosis of their origins.  2.  No significant stenosis or irregularity involving the right internal carotid artery by NASCET criteria.  3.  Approximately 30% stenosis of the origin of the left internal carotid artery by NASCET criteria.  4.  No radiographic evidence of dissection.    CT PERFUSION:  1.  Fairly symmetric relative cerebral perfusion bilaterally.    Head CT findings were communicated to Dr. Irving at 9:58 PM. CTA findings were communicated to Dr. Irving at 10:17 PM on 05/02/2021.

## 2021-05-03 NOTE — PROGRESS NOTES
RECEIVING UNIT ED HANDOFF REVIEW    ED Nurse Handoff Report was reviewed by: Jeanna Schrader RN on May 2, 2021 at 10:56 PM

## 2021-05-03 NOTE — PROGRESS NOTES
[facility]    Stroke Telephone Note    I was called by Johanna aGrner on 05/02/21 regarding patient Jaqueline Canales, who is an 80 year old woman with known history of atrial fibrillation (on apixaban), amaurosis fugax, hyperlipidemia, sleep apnea, and lung cancer presenting to the ED for evaluation of lethargy, dysphagia, and facial droop. She reported onset of lethargy a few days ago with development of dysphagia and facial droop upon waking this AM. According to her ED physician, the facial droop involves the periorbital muscles and forehead muscles on the left. She did not report any additional neurological symptoms.    Stroke Code Data (for stroke code without tele)  Stroke code activated 05/02/21 2132   Stroke provider first response           05/02/21 2135   Last known normal 04/30/21        Unknown   Time of discovery   (or onset of symptoms) 04/30/21       Head CT read by Stroke Neuro Dr/Provider 05/02/21 2152   Was stroke code de-escalated? Yes 05/02/21 2206  patient is outside emergent treatment time parameters       Thrombolytic Treatment   Not given due to DOAC dose within 48 hours or INR > 1.7 and unclear or unfavorable risk-benefit profile for extended window thrombolysis beyond the conventional 4.5 hour time window.    Endovascular Treatment  Not initiated due to absence of proximal vessel occlusion    Impression  #left facial weakness   Ms Canales presents with unilateral facial weakness and dysphagia. Etiology is unclear. 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 She will need speech language and/or PT/OT evaluation given her deficits.    Recommendations   - MRI brain with and without contrast, please request thin cuts through cranial nerves in comment; if  negative for stroke, treat as [place] Palsy  - SLP evaluation  - admit for clinical observation  - neurochecks q4h    My recommendations are based on the information provided over the phone by Jaqueline Canlaes's in-person providers. They are not intended to replace the clinical judgment of her in-person providers. I was not requested to personally see or examine the patient at this time.    The Stroke/Neurocritical Care Staff is Dr. Kenney.    Martin Vargas DO  Neurocritical Care Fellow

## 2021-05-04 ENCOUNTER — APPOINTMENT (OUTPATIENT)
Dept: OCCUPATIONAL THERAPY | Facility: CLINIC | Age: 81
DRG: 073 | End: 2021-05-04
Attending: INTERNAL MEDICINE
Payer: MEDICARE

## 2021-05-04 ENCOUNTER — APPOINTMENT (OUTPATIENT)
Dept: SPEECH THERAPY | Facility: CLINIC | Age: 81
DRG: 073 | End: 2021-05-04
Payer: MEDICARE

## 2021-05-04 ENCOUNTER — APPOINTMENT (OUTPATIENT)
Dept: PHYSICAL THERAPY | Facility: CLINIC | Age: 81
DRG: 073 | End: 2021-05-04
Attending: INTERNAL MEDICINE
Payer: MEDICARE

## 2021-05-04 LAB
ANION GAP SERPL CALCULATED.3IONS-SCNC: 5 MMOL/L (ref 3–14)
BASOPHILS # BLD AUTO: 0.1 10E9/L (ref 0–0.2)
BASOPHILS NFR BLD AUTO: 0.8 %
BUN SERPL-MCNC: 13 MG/DL (ref 7–30)
CALCIUM SERPL-MCNC: 9.3 MG/DL (ref 8.5–10.1)
CHLORIDE SERPL-SCNC: 104 MMOL/L (ref 94–109)
CO2 SERPL-SCNC: 31 MMOL/L (ref 20–32)
CREAT SERPL-MCNC: 0.6 MG/DL (ref 0.52–1.04)
DIFFERENTIAL METHOD BLD: ABNORMAL
EOSINOPHIL # BLD AUTO: 0.1 10E9/L (ref 0–0.7)
EOSINOPHIL NFR BLD AUTO: 1.2 %
ERYTHROCYTE [DISTWIDTH] IN BLOOD BY AUTOMATED COUNT: 27.1 % (ref 10–15)
GFR SERPL CREATININE-BSD FRML MDRD: 85 ML/MIN/{1.73_M2}
GLUCOSE BLDC GLUCOMTR-MCNC: 125 MG/DL (ref 70–99)
GLUCOSE BLDC GLUCOMTR-MCNC: 88 MG/DL (ref 70–99)
GLUCOSE SERPL-MCNC: 89 MG/DL (ref 70–99)
HCT VFR BLD AUTO: 45.6 % (ref 35–47)
HGB BLD-MCNC: 14.1 G/DL (ref 11.7–15.7)
IMM GRANULOCYTES # BLD: 0.1 10E9/L (ref 0–0.4)
IMM GRANULOCYTES NFR BLD: 1.3 %
LYMPHOCYTES # BLD AUTO: 1.6 10E9/L (ref 0.8–5.3)
LYMPHOCYTES NFR BLD AUTO: 16.1 %
MCH RBC QN AUTO: 26.7 PG (ref 26.5–33)
MCHC RBC AUTO-ENTMCNC: 30.9 G/DL (ref 31.5–36.5)
MCV RBC AUTO: 86 FL (ref 78–100)
MONOCYTES # BLD AUTO: 0.7 10E9/L (ref 0–1.3)
MONOCYTES NFR BLD AUTO: 7.5 %
NEUTROPHILS # BLD AUTO: 7.1 10E9/L (ref 1.6–8.3)
NEUTROPHILS NFR BLD AUTO: 73.1 %
NRBC # BLD AUTO: 0 10*3/UL
NRBC BLD AUTO-RTO: 0 /100
PLATELET # BLD AUTO: 159 10E9/L (ref 150–450)
POTASSIUM SERPL-SCNC: 3.6 MMOL/L (ref 3.4–5.3)
POTASSIUM SERPL-SCNC: 4.5 MMOL/L (ref 3.4–5.3)
RBC # BLD AUTO: 5.29 10E12/L (ref 3.8–5.2)
SODIUM SERPL-SCNC: 140 MMOL/L (ref 133–144)
WBC # BLD AUTO: 9.7 10E9/L (ref 4–11)

## 2021-05-04 PROCEDURE — 250N000013 HC RX MED GY IP 250 OP 250 PS 637: Performed by: INTERNAL MEDICINE

## 2021-05-04 PROCEDURE — 97161 PT EVAL LOW COMPLEX 20 MIN: CPT | Mod: GP | Performed by: PHYSICAL THERAPIST

## 2021-05-04 PROCEDURE — 99232 SBSQ HOSP IP/OBS MODERATE 35: CPT | Performed by: INTERNAL MEDICINE

## 2021-05-04 PROCEDURE — 210N000002 HC R&B HEART CARE

## 2021-05-04 PROCEDURE — 80048 BASIC METABOLIC PNL TOTAL CA: CPT | Performed by: INTERNAL MEDICINE

## 2021-05-04 PROCEDURE — 36415 COLL VENOUS BLD VENIPUNCTURE: CPT | Performed by: INTERNAL MEDICINE

## 2021-05-04 PROCEDURE — 97166 OT EVAL MOD COMPLEX 45 MIN: CPT | Mod: GO | Performed by: OCCUPATIONAL THERAPIST

## 2021-05-04 PROCEDURE — 97535 SELF CARE MNGMENT TRAINING: CPT | Mod: GO | Performed by: OCCUPATIONAL THERAPIST

## 2021-05-04 PROCEDURE — 92526 ORAL FUNCTION THERAPY: CPT | Mod: GN | Performed by: SPEECH-LANGUAGE PATHOLOGIST

## 2021-05-04 PROCEDURE — 85025 COMPLETE CBC W/AUTO DIFF WBC: CPT | Performed by: INTERNAL MEDICINE

## 2021-05-04 PROCEDURE — 97530 THERAPEUTIC ACTIVITIES: CPT | Mod: GO | Performed by: OCCUPATIONAL THERAPIST

## 2021-05-04 PROCEDURE — 84132 ASSAY OF SERUM POTASSIUM: CPT | Performed by: INTERNAL MEDICINE

## 2021-05-04 PROCEDURE — 250N000012 HC RX MED GY IP 250 OP 636 PS 637: Performed by: INTERNAL MEDICINE

## 2021-05-04 PROCEDURE — 250N000011 HC RX IP 250 OP 636: Performed by: INTERNAL MEDICINE

## 2021-05-04 PROCEDURE — 97530 THERAPEUTIC ACTIVITIES: CPT | Mod: GP | Performed by: PHYSICAL THERAPIST

## 2021-05-04 PROCEDURE — 250N000013 HC RX MED GY IP 250 OP 250 PS 637: Performed by: PHYSICIAN ASSISTANT

## 2021-05-04 PROCEDURE — 999N001017 HC STATISTIC GLUCOSE BY METER IP

## 2021-05-04 RX ORDER — ACYCLOVIR 200 MG/1
200 CAPSULE ORAL
Status: DISCONTINUED | OUTPATIENT
Start: 2021-05-04 | End: 2021-05-04

## 2021-05-04 RX ORDER — FOLIC ACID 1 MG/1
1000 TABLET ORAL DAILY
Status: DISCONTINUED | OUTPATIENT
Start: 2021-05-04 | End: 2021-05-05 | Stop reason: HOSPADM

## 2021-05-04 RX ORDER — ACYCLOVIR 200 MG/1
400 CAPSULE ORAL
Status: DISCONTINUED | OUTPATIENT
Start: 2021-05-04 | End: 2021-05-05 | Stop reason: HOSPADM

## 2021-05-04 RX ORDER — PREDNISONE 20 MG/1
60 TABLET ORAL DAILY
Status: DISCONTINUED | OUTPATIENT
Start: 2021-05-04 | End: 2021-05-05 | Stop reason: HOSPADM

## 2021-05-04 RX ORDER — SILDENAFIL CITRATE 20 MG/1
20 TABLET ORAL 3 TIMES DAILY
Status: DISCONTINUED | OUTPATIENT
Start: 2021-05-04 | End: 2021-05-05 | Stop reason: HOSPADM

## 2021-05-04 RX ADMIN — METOPROLOL SUCCINATE 25 MG: 25 TABLET, EXTENDED RELEASE ORAL at 08:37

## 2021-05-04 RX ADMIN — APIXABAN 5 MG: 5 TABLET, FILM COATED ORAL at 20:15

## 2021-05-04 RX ADMIN — ACYCLOVIR 400 MG: 200 CAPSULE ORAL at 14:02

## 2021-05-04 RX ADMIN — Medication 1 DROP: at 12:58

## 2021-05-04 RX ADMIN — SIMVASTATIN 40 MG: 40 TABLET, FILM COATED ORAL at 21:42

## 2021-05-04 RX ADMIN — MINERAL OIL AND PETROLATUM: 150; 830 OINTMENT OPHTHALMIC at 22:16

## 2021-05-04 RX ADMIN — CEFTRIAXONE SODIUM 1 G: 1 INJECTION, POWDER, FOR SOLUTION INTRAMUSCULAR; INTRAVENOUS at 23:53

## 2021-05-04 RX ADMIN — CHOLECALCIFEROL TAB 10 MCG (400 UNIT) 40 MCG: 10 TAB at 08:37

## 2021-05-04 RX ADMIN — Medication 1 DROP: at 08:37

## 2021-05-04 RX ADMIN — Medication 1 DROP: at 20:14

## 2021-05-04 RX ADMIN — ACYCLOVIR 400 MG: 200 CAPSULE ORAL at 20:15

## 2021-05-04 RX ADMIN — OMEPRAZOLE 20 MG: 20 CAPSULE, DELAYED RELEASE ORAL at 16:23

## 2021-05-04 RX ADMIN — PREDNISONE 60 MG: 20 TABLET ORAL at 09:21

## 2021-05-04 RX ADMIN — FOLIC ACID 1000 MCG: 1 TABLET ORAL at 09:22

## 2021-05-04 RX ADMIN — OMEPRAZOLE 20 MG: 20 CAPSULE, DELAYED RELEASE ORAL at 08:37

## 2021-05-04 RX ADMIN — APIXABAN 5 MG: 5 TABLET, FILM COATED ORAL at 08:37

## 2021-05-04 RX ADMIN — SILDENAFIL 20 MG: 20 TABLET, FILM COATED ORAL at 13:00

## 2021-05-04 RX ADMIN — LEVOTHYROXINE SODIUM 75 MCG: 75 TABLET ORAL at 08:37

## 2021-05-04 RX ADMIN — SILDENAFIL 20 MG: 20 TABLET, FILM COATED ORAL at 21:42

## 2021-05-04 RX ADMIN — ACYCLOVIR 400 MG: 200 CAPSULE ORAL at 10:20

## 2021-05-04 RX ADMIN — SILDENAFIL 20 MG: 20 TABLET, FILM COATED ORAL at 16:23

## 2021-05-04 RX ADMIN — Medication 1 DROP: at 21:43

## 2021-05-04 RX ADMIN — ACYCLOVIR 400 MG: 200 CAPSULE ORAL at 21:42

## 2021-05-04 ASSESSMENT — ACTIVITIES OF DAILY LIVING (ADL)
ADLS_ACUITY_SCORE: 25
ADLS_ACUITY_SCORE: 21
ADLS_ACUITY_SCORE: 24
ADLS_ACUITY_SCORE: 21
ADLS_ACUITY_SCORE: 21
PREVIOUS_RESPONSIBILITIES: MEAL PREP;HOUSEKEEPING;LAUNDRY;MEDICATION MANAGEMENT;FINANCES;DRIVING
ADLS_ACUITY_SCORE: 24

## 2021-05-04 ASSESSMENT — MIFFLIN-ST. JEOR: SCORE: 1057.82

## 2021-05-04 NOTE — PROGRESS NOTES
Care Management Follow Up    Length of Stay (days): 2    Expected Discharge Date: 05/05/21(TCU)     Concerns to be Addressed: discharge planning     Patient plan of care discussed at interdisciplinary rounds: Yes    Anticipated Discharge Disposition: Transitional Care at [facility]     Anticipated Discharge Services: inpatient rehab  Anticipated Discharge DME:      Patient/family educated on Medicare website which has current facility and service quality ratings: yes  Education Provided on the Discharge Plan:  BEN spoke with pt and  regarding the semi private room that is available at [facility] per Dina.  They are both in agreement with this placement and anticipate discharge tomorrow to [facility].  BEN left message with Dina at [facility] that they are accepting the bed for pt .    Patient/Family in Agreement with the Plan: yes    Referrals Placed by CM/SW: Senior Linkage Line, Post Acute Facilities  Private pay costs discussed: Not applicable    Additional Information:    [facility]  Critical access hospital  [phone]

## 2021-05-04 NOTE — CONSULTS
"CLINICAL NUTRITION SERVICES  -  ASSESSMENT NOTE    Recommendations Ordered by Registered Dietitian (RD):   - Trial Ensure Enlive BID between meals (each provides 350 kcal, 20 g pro)    Malnutrition:   % Weight Loss:  > 5% in 1 month (severe malnutrition)  % Intake:  </= 75% for >/= 1 month (severe malnutrition)  Subcutaneous Fat Loss:  Orbital region moderate depletion and Upper arm region moderate depletion  Muscle Loss:  Temporal region moderate depletion and Clavicle bone region moderate depletion  Fluid Retention:  Trace 1+    Malnutrition Diagnosis: Severe malnutrition  In Context of:  Acute illness or injury       REASON FOR ASSESSMENT  Jaqueline Canales is a 80 year old female seen by Registered Dietitian for Nutrition Admission Risk Screen Received -   Have you recently lost weight without trying in the last 6 months ? - \"yes lost 2-13 pounds\"  Have you been eating poorly due to a decreased appetite ?- \"yes\"    NUTRITION HISTORY  - Information obtained from patient, EMR.   - Marie states that she has been eating less for \"a couple weeks\". She did not provide barriers other than a reduced appetite.   - She states that she typically eats in the morning and later in the afternoon/evening.   - Lives with her , their niece does the grocery shopping.   - Has been feeling poorly for several days PTA  - H/o CHF, afib, breast cancer, lung cancer.     CURRENT NUTRITION ORDERS  Diet Order:     Dysphagia Diet Level 2 + thin liquids      Current Intake/Tolerance:  Consumed about 50% of her breakfast this morning - magic cup and tomato soup. Wanted to eat some applesauce during visit this afternoon. Agreed to try a protein supplement later today, but she suspected she might not like it.     NUTRITION FOCUSED PHYSICAL ASSESSMENT FOR DIAGNOSING MALNUTRITION)  Yes             Observed:    Muscle wasting (refer to documentation in Malnutrition section) and Subcutaneous fat loss (refer to documentation in Malnutrition " "section)   Left-sided facial droop    Obtained from Chart/Interdisciplinary Team:  Trace edema 1+  Georgi - Nutrition 3; Total 18  Last BM not noted     ANTHROPOMETRICS  Height: 5' 2\"  Weight: 141 lbs 0 oz (64 kg)   Body mass index is 25.79 kg/m .  Weight Status:  Overweight BMI 25-29.9  IBW: 50 kg   % IBW: 128%  Weight History: Progressive wt loss indicated over the past year or so. Pt unable to provide specifics, but history shows up to 16# in just 1 month (10%) ..   Wt Readings from Last 10 Encounters:   05/04/21 64 kg (141 lb)   04/28/21 66.2 kg (146 lb)   04/01/21 71.5 kg (157 lb 9.6 oz)   09/14/20 72.3 kg (159 lb 6.4 oz)   09/04/20 70.3 kg (155 lb)   08/27/20 70.6 kg (155 lb 9.6 oz)   08/20/20 71.4 kg (157 lb 4.8 oz)   02/26/20 81 kg (178 lb 8 oz)   02/25/20 79.8 kg (176 lb)   01/27/20 84.1 kg (185 lb 6.4 oz)       LABS  Labs reviewed    MEDICATIONS  Medications reviewed  Vitamin D3, Folic Acid 1000 mcg daily.     ASSESSED NUTRITION NEEDS PER APPROVED PRACTICE GUIDELINES:  Dosing Weight 53.5 kg - adjusted   Estimated Energy Needs: 9448-3322 kcals (25-30 Kcal/Kg)  Justification: maintenance and repletion  Estimated Protein Needs: 64-80 grams protein (1.2-1.5 g pro/Kg)  Justification: preservation of lean body mass  Estimated Fluid Needs: 1 mL/kcal   Justification: maintenance    MALNUTRITION:  % Weight Loss:  > 5% in 1 month (severe malnutrition)  % Intake:  </= 75% for >/= 1 month (severe malnutrition)  Subcutaneous Fat Loss:  Orbital region moderate depletion and Upper arm region moderate depletion  Muscle Loss:  Temporal region moderate depletion and Clavicle bone region moderate depletion  Fluid Retention:  Trace 1+    Malnutrition Diagnosis: Severe malnutrition  In Context of:  Acute illness or injury      NUTRITION DIAGNOSIS:  Inadequate oral intake related to reduced appetite as evidenced by weight loss of up to 10% in just one month, pt reported eating significantly less for at least the past 2 weeks, " and evidence of fat and muscle wasting on exam.       NUTRITION INTERVENTIONS  Recommendations / Nutrition Prescription  Diet per SLP  Add Ensure Enlive BID between meals (each provides 350 kcal, 20 g protein)      Implementation  Nutrition education: Provided education on supplements   Medical Food Supplement: as above      Nutrition Goals  Intake of at least 50% meals TID + 1 supplement daily.       MONITORING AND EVALUATION:  Progress towards goals will be monitored and evaluated per protocol and Practice Guidelines    Rizwana Ruiz RD, LD  [facility] [place], 65, 54, MH   Pager: [phone]  Weekend Pager: [phone]

## 2021-05-04 NOTE — PROGRESS NOTES
05/04/21 0923   Quick Adds   Type of Visit Initial Occupational Therapy Evaluation   Living Environment   People in home spouse   Current Living Arrangements house   Home Accessibility stairs to enter home;stairs within home   Number of Stairs, Main Entrance 2   Number of Stairs, Within Home, Primary   (15 to the basement)   Transportation Anticipated car, drives self;family or friend will provide   Self-Care   Equipment Currently Used at Home cane, straight;walker, rolling;tub bench;grab bar, toilet;grab bar, tub/shower   Disability/Function   Wear Glasses or Blind yes   General Information   Onset of Illness/Injury or Date of Surgery 05/02/21   Referring Physician Farhad Crockett MD   Patient/Family Therapy Goal Statement (OT) home   Additional Occupational Profile Info/Pertinent History of Current Problem Jaqueline Canales is a 80 year old female with history including RA, scleroderma, AF on apixaban, CHF, \cardiac valve disease (AS, MR and TR), pHTN and SRINIVAS, who presented with lethargy and facial droop. Acute neurologic changes - question CVA vs possible Bell's palsy.   Existing Precautions/Restrictions fall   Cognitive Status Examination   Orientation Status orientation to person, place and time   Affect/Mental Status (Cognitive) WFL   Follows Commands WFL   Cognitive Status Comments Reports she didn't take her meds for 2 weeks as she wasn't feeling good.    Visual Perception   Visual Impairment/Limitations corrective lenses full-time   Impact of Vision Impairment on Function (Vision) L eye droopy and some decreased vision in L upper quadrant likely due to the droopy L eye lid   Sensory   Sensory Quick Adds No deficits were identified   Pain Assessment   Patient Currently in Pain No   Range of Motion Comprehensive   Comment, General Range of Motion B UE AROM WFL   Strength Comprehensive (MMT)   Comment, General Manual Muscle Testing (MMT) Assessment B UE strength symmetrical 4/5   Muscle Tone  Assessment   Muscle Tone Quick Adds No deficits were identified   Coordination   Upper Extremity Coordination No deficits were identified   Transfer Skill: Bed to Chair/Chair to Bed   Bed-Chair Daggett (Transfers) contact guard   Assistive Device (Bed-Chair Transfers) rolling walker   Sit-Stand Transfer   Sit-Stand Daggett (Transfers) contact guard   Assistive Device (Sit-Stand Transfers) walker, front-wheeled   Toilet Transfer   Daggett Level (Toilet Transfer) contact guard   Assistive Device (Toilet Transfer) grab bars/safety frame;walker, front-wheeled   Instrumental Activities of Daily Living (IADL)   Previous Responsibilities meal prep;housekeeping;laundry;medication management;finances;driving  (pt's niece does the shopping. pt and her  share laund)   Clinical Impression   Criteria for Skilled Therapeutic Interventions Met (OT) yes   OT Diagnosis impaired ADL's and functional mobility.    OT Problem List-Impairments impacting ADL problems related to;balance;strength;activity tolerance impaired;cognition   Assessment of Occupational Performance 3-5 Performance Deficits   Identified Performance Deficits impaired I with dressing, toilet and tub transfer, cooking, cleaning and laundry, etc   Planned Therapy Interventions (OT) ADL retraining;cognition;transfer training;home program guidelines;progressive activity/exercise;visual perception   Clinical Decision Making Complexity (OT) moderate complexity   Therapy Frequency (OT) Daily   Predicted Duration of Therapy 3 days   Risk & Benefits of therapy have been explained evaluation/treatment results reviewed;care plan/treatment goals reviewed;risks/benefits reviewed;current/potential barriers reviewed;participants voiced agreement with care plan;participants included;patient   OT Discharge Planning    OT Discharge Recommendation (DC Rec) Transitional Care Facility   OT Rationale for DC Rec pt limited due to impaired overall strength, balance and  activity tolerance and at this time recommend TCU to increase ADL and functional mobility independence and saftety to PLOF. however, pending progress, pt may be able to return home with A with IADL's ie cleaning, laundry, shower etc and home RN , OT and PT.   Total Evaluation Time (Minutes)   Total Evaluation Time (Minutes) 15

## 2021-05-04 NOTE — CONSULTS
Care Management Initial Consult    General Information  Assessment completed with: Patient, Family,    Type of CM/SW Visit: Initial Assessment    Primary Care Provider verified and updated as needed:     Readmission within the last 30 days: no previous admission in last 30 days      Reason for Consult: discharge planning  Advance Care Planning: Advance Care Planning Reviewed: present on chart          Communication Assessment  Patient's communication style: spoken language (English or Bilingual)    Hearing Difficulty or Deaf: no   Wear Glasses or Blind: yes    Cognitive  Cognitive/Neuro/Behavioral: .WDL except  Level of Consciousness: alert  Arousal Level: opens eyes spontaneously  Orientation: oriented x 4  Mood/Behavior: calm, cooperative  Best Language: 1 - Mild to moderate  Speech: slurred    Living Environment:   People in home: spouse     Current living Arrangements: house      Able to return to prior arrangements: other (see comments)(OT recommends TCU; waiting on PT assessment recommendation)       Family/Social Support:  Care provided by: self  Provides care for: no one  Marital Status:   , Other (specify)(Shruthi)          Description of Support System: Supportive, Involved    Support Assessment: Adequate family and caregiver support, Adequate social supports    Current Resources:   Patient receiving home care services:       Community Resources:    Equipment currently used at home: cane, straight, walker, rolling, tub bench, grab bar, toilet, grab bar, tub/shower  Supplies currently used at home:      Employment/Financial:  Employment Status: retired        Financial Concerns: No concerns identified           Lifestyle & Psychosocial Needs:        Socioeconomic History     Marital status:      Spouse name: Not on file     Number of children: Not on file     Years of education: Not on file     Highest education level: Not on file     Tobacco Use     Smoking status: Former Smoker      Packs/day: 1.00     Years: 30.00     Pack years: 30.00     Types: Cigarettes     Start date:      Quit date: 3/11/1992     Years since quittin.1     Smokeless tobacco: Never Used   Substance and Sexual Activity     Alcohol use: Not Currently     Alcohol/week: 0.0 standard drinks     Frequency: Never     Drug use: No     Sexual activity: Not Currently     Partners: Male       Functional Status:  Prior to admission patient needed assistance:              Mental Health Status:          Chemical Dependency Status:                Values/Beliefs:  Spiritual, Cultural Beliefs, Anabaptist Practices, Values that affect care:                 Additional Information:    Pt was admitted on 21 for facial droop, atrial fibrillation with rapid ventricular response.  Pt has a tentative discharge date for tomorrow, 21 per care team rounds.  OT recommends TCU; PT has yet to assess pt and provide recommendations.  Pro met with pt and family to discuss discharge planning.  Pt has previously gone to [facility] and stated she had a good experience.  Sw went over basic insurance Medicare coverage questions with family and confirmed that TCU will verify coverage prior to admission.  Pt/spouse asked that a referral be sent to [facility].  Sw referenced Medicare website on reviewing reviews.  Sw to f/u with spouse in AM regarding referral status and transport options.  Referral sent.    DEANA Langley

## 2021-05-04 NOTE — PROGRESS NOTES
[facility]    Internal Medicine Hospitalist Progress Note  05/04/2021  I evaluated patient on the above date.    Johnie Corey Jr., MD  [phone] (p)  Text Page        Assessment & Plan New actions/orders today (05/04/2021) are underlined.    Jaqueline Canales is a 80 year old female with history including RA, scleroderma, AF on apixaban, CHF, cardiac valve disease (AS, MR and TR), pHTN and SRINIVAS, who presented with lethargy and facial droop.    Left facial palsy, suspect Bell's palsy.  * Pt had been having issues with dysphagia in the last several days PTA.  noted facial droop on 5/2. On initial evaluation 5/2, code stroke called. ECG 5/2 showed afib with RVR. Troponin negative. +UA as below. Head CT 5/2 negative. CTA head and neck 5/2 showed significant stenosis. CT head perfusion 5/2 negative. Brain MRI 5/3 negative. Evaluated by Neurology and felt that this was a left facial palsy, possibly Bell's palsy.  - Start prednisone 60 mg/d and oral acyclovir 400 mg 5 times a day for 1 week.  - Continue PT, OT.  - Dysphagia management as below.  - Appreciate Neurology help.    Left eye corneal abrasion, suspect due to left facial palsy.  - Continue carboxymethylcellulose eye drops QID and PRN q1h.  - Continue artificial tears ointment at bedtime.  - Tape left eye shut at bedtime.    Dysphagia, suspect related to left facial palsy.  Pt had been having issues with dysphagia in the last several days PTA. Neuro workup as above. SLP consulted and placed on dysphagia diet.   - Treat left facial palsy as above.  - Continue dysphagia 1 diet with nectar thickened liquids.  - Advance diet as able per SLP.    UTI.  UA 5/2 showed >182 WBC, lg LE, small blood. Started on ceftriaxone on admit 5/2. UC 5/2 pending.  - Continue ceftriaxone (started 5/2).  - Follow-up urine cultures.    Atrial fibrillation with RVR.  * PTA on apixaban and metoprolol XL.  * On initial evaluation 5/2, HR was in the 130's; EKG  showed afib with RVR.  - Continue telemetry monitoring.  - Continue apixaban.  - Continue metoprolol XL 25 mg daily.  - Continue PRN IV metoprolol 2.5-5 mg q4h.    Hypokalemia.  Potassium low on admit. Treated with replacement.  Recent Labs   Lab 05/04/21  0600 05/03/21  1449 05/03/21  0136 05/02/21  2132   POTASSIUM 3.6 3.6 3.4 3.3*   - Continue PRN K replacement.    Chronic CHF (LV diastolic and RV systolic), question related to pulmonary disease.  Pulmonary hypertension, question related to pulmonary disease or left heart or valve disease.  Valve disease (moderate AS, severe TR, moderate-severe MR).  [PTA BP meds/diuretics: metoprolol XL 25 mg daily; sildenafil 20 mg TID; torsemide 40 mg daily.]  * Follows with Dr. Marquez with Cardiology as an outpatient. Echo 3/2021 showed LVEF 60-65%, mod LVH; RV mildly dilated with decreased RV systolic function; 3+ MR; 3+ TR; moderate AS; pulmonary pressure 48 +RA.  - Continue metoprolol XL.  - Resume sildenafil.  - Continue to hold torsemide for now as not eating at baseline yet.  - Monitor i/o's, daily wts.    Rheumatoid arthritis.  Scleroderma.  Raynaud's phenomena.  PTA on infliximab and methotrexate.  - Hold methotrexate for now (takes every Wednesday).  - Resume infliximab outpatient.  - Keep hands warm as able.    SRINIVAS.   Noted non-compliant with CPAP.  - Follow-up outpatient.    Hypothyroidism.  Chronic and stable.  - Continue levothyroxine.    GERD.  Chronic and stable.  -Continue omeprazole.    Weakness and deconditioning due to multiple acute and chronic medical issues.  - Continue PT and OT.  - May need TCU.    COVID-19 testing.  COVID-19 PCR Results    COVID-19 PCR Results 4/2/21 4/2/21 5/2/21    1146 1146    COVID-19 Virus PCR to [facility] - Result Test received-See reflex to IDDL test SARS CoV2 (COVID-19) Virus RT-PCR     COVID-19 Virus PCR to [facility] - Source Nasopharyngeal     SARS-CoV-2 Virus Specimen Source  Nasopharyngeal Nasopharyngeal   SARS-CoV-2 PCR  "Result  NEGATIVE NEGATIVE      Comments are available for some flowsheets but are not being displayed.         COVID-19 Antibody Results, Testing for Immunity    COVID-19 Antibody Results, Testing for Immunity   No data to display.             Diet: Combination Diet Dysphagia Diet Level 1: Pureed; Nectar Thickened Liquids (pre-thickened or use instant food thickener)    Prophylaxis: PCD's, ambulation.   Luther Catheter: not present  Code Status: No CPR- Do NOT Intubate    Disposition Plan   Expected discharge: Tomorrow, recommended to prior living arrangement if continues to improve.  Entered: Johnie Corey MD 05/04/2021, 8:28 AM     Communication.  - I d/w pt's  and niece 5/4.      Interval History   Doing OK.  Left facial droop the same.  Still quite weak.    -Data reviewed today: I reviewed all new labs and imaging over the last 24 hours. I personally reviewed no images or EKG's today.    Physical Exam    , Blood pressure 128/89, pulse 82, temperature 97.9  F (36.6  C), temperature source Oral, resp. rate 18, height 1.575 m (5' 2\"), weight 64 kg (141 lb), SpO2 94 %, not currently breastfeeding.  Vitals:    05/02/21 2130 05/03/21 0122 05/04/21 0500   Weight: 68.5 kg (151 lb) 63.8 kg (140 lb 9.6 oz) 64 kg (141 lb)     Vital Signs with Ranges  Temp:  [97.6  F (36.4  C)-98.6  F (37  C)] 97.9  F (36.6  C)  Pulse:  [76-92] 82  Resp:  [16-18] 18  BP: (103-128)/(68-89) 128/89  SpO2:  [92 %-98 %] 94 %  Patient Vitals for the past 24 hrs:   BP Temp Temp src Pulse Resp SpO2 Weight   05/04/21 0500 -- -- -- -- -- -- 64 kg (141 lb)   05/04/21 0424 128/89 97.9  F (36.6  C) Oral 82 18 94 % --   05/03/21 2342 121/73 98.6  F (37  C) Oral 83 16 96 % --   05/03/21 2013 107/72 97.6  F (36.4  C) Oral 92 18 94 % --   05/03/21 1523 121/83 97.7  F (36.5  C) Oral 76 18 92 % --   05/03/21 1206 103/68 -- -- 89 18 98 % --     I/O's Last 24 hours  I/O last 3 completed shifts:  In: 765.5 [P.O.:360; I.V.:405.5]  Out: 525 " [Urine:525]    Constitutional: Awake, alert, pleasant.  HEENT: Left mouth droop.  Respiratory: Diminished in bases. Fine velcro/sandpaper like crackles in bases, no wheezes.  Cardiovascular: Irregular, rate normal, +I-II/IV systolic m, no g/r.  GI: Soft, nt, nd, +BS.  Skin/Integumen:   Other:  Neuro - left mouth droop.      Data   Recent Labs   Lab 05/04/21  0600 05/03/21  1449 05/03/21  0136 05/02/21 2132 04/28/21 0952 04/28/21 0952   WBC 9.7  --   --  10.2  --   --    HGB 14.1  --   --  14.5  --  12.6   MCV 86  --   --  85  --   --      --   --  233  --   --    INR  --   --   --  1.07  --   --      --   --  136  --   --    POTASSIUM 3.6 3.6 3.4 3.3*   < >  --    CHLORIDE 104  --   --  95  --   --    CO2 31  --   --  34*  --   --    BUN 13  --   --  25  --   --    CR 0.60  --   --  0.82  --   --    ANIONGAP 5  --   --  7  --   --    TU 9.3  --   --  10.3*  --   --    GLC 89  --   --  84  --   --    ALBUMIN  --   --   --  4.0  --   --    PROTTOTAL  --   --   --  8.6  --   --    BILITOTAL  --   --   --  1.6*  --   --    ALKPHOS  --   --   --  78  --   --    ALT  --   --   --  18  --   --    AST  --   --   --  25  --   --    TROPI  --   --   --  <0.015  --   --     < > = values in this interval not displayed.     Recent Labs   Lab Test 05/04/21  0600 05/04/21  0232 05/03/21  2123 05/02/21  2132 04/15/21  1304 03/22/21  1425 06/26/20  0935 10/31/16  0836 10/31/16  0836 12/14/15  0934 12/14/15  0934   GLC 89  --   --  84 101* 96 96   < >  --    < >  --    BGM  --  88 86  --   --   --   --   --  93  --  103*    < > = values in this interval not displayed.     No results for input(s): CRP, DD, LDH, FIBR, LUANA, IL6B in the last 168 hours.      Recent Results (from the past 24 hour(s))   MR Brain w/o & w Contrast    Narrative    MRI BRAIN WITHOUT AND WITH CONTRAST  5/3/2021 1:58 PM    HISTORY:  Neuro deficit, acute, stroke suspected. Acute ischemic  stroke.    TECHNIQUE:  Multiplanar, multisequence MRI of  the brain without and  with 6mL Gadavist.    COMPARISON: Head CT/CTA 5/2/2021, head MRI 12/22/2015    FINDINGS:  No evidence of acute ischemia or hemorrhage. Hyperintense  signal on diffusion-weighted imaging within the anterior eda is  likely artifactual. Background of moderate volume loss is present with  a few scattered white matter T2 hyperintensities which likely  represent mild chronic small-vessel ischemic change. Slight asymmetric  hyperenhancement of left facial nerve from the geniculate segment  through the mastoid segment. Otherwise, the internal auditory canals  and membranous labyrinthine structures are unremarkable. Major  intracranial flow voids are maintained.    Opacification of the left mastoid cavity, likely  infectious/inflammatory. Left temporomandibular joint degenerative  change. Marrow signal is within normal limits. Trace paranasal sinus  mucosal thickening.    Slight asymmetric crowding and heterogeneous signal at the left  oropharynx, presumably incidental. Direct visualization could be  performed.      Impression    IMPRESSION:  1. No evidence of acute ischemia or hemorrhage.  2. Hyperintense signal on diffusion-weighted imaging within the eda  is likely artifactual.  3. Nonspecific slight asymmetric enhancement of the left facial nerve.  [place] palsy cannot be excluded.  4. Opacification left mastoid cavity, likely infectious/inflammatory.    MACK GOLDMAN MD       Medications   All medications were reviewed.    - MEDICATION INSTRUCTIONS -         apixaban ANTICOAGULANT  5 mg Oral BID     artificial tears   Left Eye At Bedtime     artificial tears ophthalmic solution  1 drop Left Eye 4x Daily     cefTRIAXone  1 g Intravenous Q24H     cholecalciferol  40 mcg Oral Daily     levothyroxine  75 mcg Oral Daily     metoprolol succinate ER  25 mg Oral Daily     omeprazole  20 mg Oral BID AC     simvastatin  40 mg Oral At Bedtime     sodium chloride (PF)  3 mL Intracatheter Q8H      acetaminophen, bisacodyl, artificial tears ophthalmic solution, lidocaine 4%, lidocaine (buffered or not buffered), magnesium hydroxide, - MEDICATION INSTRUCTIONS -, melatonin, metoprolol, ondansetron **OR** ondansetron, senna-docusate **OR** senna-docusate, sodium chloride (PF)

## 2021-05-04 NOTE — PROGRESS NOTES
"   05/04/21 1539   Quick Adds   Type of Visit Initial PT Evaluation   Living Environment   People in home spouse   Current Living Arrangements house   Home Accessibility stairs to enter home;stairs within home   Number of Stairs, Main Entrance 2   Stair Railings, Main Entrance railing on left side (ascending)   Number of Stairs, Within Home, Primary   (Flight to the basement. )   Stair Railings, Within Home, Primary railing on right side (ascending)  (Has to use to do laundry. )   Transportation Anticipated   (Typically she and  both drive. )   Self-Care   Usual Activity Tolerance good   Current Activity Tolerance moderate   Equipment Currently Used at Home cane, straight   Activity/Exercise/Self-Care Comment When asked if she was active she stated \"I was.\"   Disability/Function   Hearing Difficulty or Deaf no   Wear Glasses or Blind yes   Vision Management glasses.    Concentrating, Remembering or Making Decisions Difficulty yes   Difficulty Communicating no   Walking or Climbing Stairs Difficulty yes   Walking or Climbing Stairs ambulation difficulty, requires equipment  (Uses a cane mostly just outside the home. )   Dressing/Bathing Difficulty no   Toileting issues no   Doing Errands Independently Difficulty (such as shopping) no   Fall history within last six months no   General Information   Onset of Illness/Injury or Date of Surgery 05/02/21   Referring Physician Carissa Burroughs MD   Patient/Family Therapy Goals Statement (PT) Agreeable with going to TCU   Pertinent History of Current Problem (include personal factors and/or comorbidities that impact the POC) Jaqueline Canales is a 80 year old female with history including RA, scleroderma, AF on apixaban, CHF, cardiac valve disease (AS, MR and TR), pHTN and SRINIVAS, who presented with lethargy and facial droop.   Existing Precautions/Restrictions fall   Cognition   Orientation Status (Cognition) oriented to;person;place;situation   Affect/Mental Status " (Cognition) other (see comments)  (forgetful)   Follows Commands (Cognition) WNL   Behavioral Issues   (Flat affect)   Cognitive Status Comments Patient forgetful at times.    Posture    Posture Comments Needs cues for posture during amb.    Range of Motion (ROM)   ROM Quick Adds ROM WNL   Strength   Strength Comments Bilateral hip flex 3+/5, quads 5/5 and DF 5/5 in supine. UE strength 5/5. Generalized weakess throughout.    Bed Mobility   Comment (Bed Mobility) Sup to sit with supervison. Sit to sup with supervision but with difficulty and with multiple cues needed for where to sit and to get repositioned.    Transfers   Transfer Safety Comments Supervision for sit to/from stand.    Gait/Stairs (Locomotion)   Comment (Gait/Stairs) Amb 10 feet in room with ww with supervision. Slow speed.    Balance   Balance Comments Good sitting balance. Static standing good without AD. Dynamic standing impaired without AD.    Clinical Impression   Criteria for Skilled Therapeutic Intervention yes, treatment indicated   PT Diagnosis (PT) Impaired functional independence   Influenced by the following impairments Generalized weakness, decreased balance, forgetful   Functional limitations due to impairments Needs a walker for amb(nromally uses a cane), assist up and down steps, difficulty with sit to sup.    Clinical Presentation Stable/Uncomplicated   Clinical Presentation Rationale <3 factors affecting mobility   Clinical Decision Making (Complexity) low complexity   Therapy Frequency (PT) Daily   Predicted Duration of Therapy Intervention (days/wks) 3 days   Planned Therapy Interventions (PT) balance training;bed mobility training;gait training;strengthening;transfer training   Anticipated Equipment Needs at Discharge (PT)   (Has 2 walkers and a cane already. )   Risk & Benefits of therapy have been explained evaluation/treatment results reviewed;care plan/treatment goals reviewed;risks/benefits reviewed;participants voiced  agreement with care plan;participants included;patient   PT Discharge Planning    PT Discharge Recommendation (DC Rec) Transitional Care Facility   PT Rationale for DC Rec Patient below baseline with functional independence and endurance. She would benefit from continued skilled PT to progress her independence with all functional mobility as well as improve her balance and endurance.    PT Brief overview of current status  Supervision for bed mobility, SBA for transfers, gait with ww with supervision.    Total Evaluation Time   Total Evaluation Time (Minutes) 10

## 2021-05-04 NOTE — PLAN OF CARE
A&OX4, RA, left facial droop and left weakness. Tele- AFib CVR-RVR, up with assist of I with walker and belt. DDL1 nectar thick diet,  BG 86/88 orange juice given. Purewick in place.

## 2021-05-05 VITALS
SYSTOLIC BLOOD PRESSURE: 100 MMHG | OXYGEN SATURATION: 96 % | TEMPERATURE: 97.3 F | DIASTOLIC BLOOD PRESSURE: 70 MMHG | RESPIRATION RATE: 18 BRPM | BODY MASS INDEX: 25.79 KG/M2 | WEIGHT: 141 LBS | HEART RATE: 98 BPM

## 2021-05-05 VITALS
HEART RATE: 91 BPM | BODY MASS INDEX: 25.98 KG/M2 | RESPIRATION RATE: 16 BRPM | WEIGHT: 141.2 LBS | OXYGEN SATURATION: 95 % | SYSTOLIC BLOOD PRESSURE: 111 MMHG | TEMPERATURE: 97.4 F | DIASTOLIC BLOOD PRESSURE: 83 MMHG | HEIGHT: 62 IN

## 2021-05-05 LAB — POTASSIUM SERPL-SCNC: 4.2 MMOL/L (ref 3.4–5.3)

## 2021-05-05 PROCEDURE — 250N000013 HC RX MED GY IP 250 OP 250 PS 637: Performed by: INTERNAL MEDICINE

## 2021-05-05 PROCEDURE — 99239 HOSP IP/OBS DSCHRG MGMT >30: CPT | Performed by: INTERNAL MEDICINE

## 2021-05-05 PROCEDURE — 36415 COLL VENOUS BLD VENIPUNCTURE: CPT | Performed by: INTERNAL MEDICINE

## 2021-05-05 PROCEDURE — 250N000013 HC RX MED GY IP 250 OP 250 PS 637: Performed by: PHYSICIAN ASSISTANT

## 2021-05-05 PROCEDURE — 84132 ASSAY OF SERUM POTASSIUM: CPT | Performed by: INTERNAL MEDICINE

## 2021-05-05 PROCEDURE — 250N000012 HC RX MED GY IP 250 OP 636 PS 637: Performed by: INTERNAL MEDICINE

## 2021-05-05 RX ORDER — ACYCLOVIR 200 MG/1
400 CAPSULE ORAL
DISCHARGE
Start: 2021-05-05 | End: 2021-05-18

## 2021-05-05 RX ORDER — CARBOXYMETHYLCELLULOSE SODIUM 5 MG/ML
1 SOLUTION/ DROPS OPHTHALMIC
DISCHARGE
Start: 2021-05-05 | End: 2021-05-26 | Stop reason: ALTCHOICE

## 2021-05-05 RX ORDER — PREDNISONE 20 MG/1
60 TABLET ORAL DAILY
DISCHARGE
Start: 2021-05-05 | End: 2021-05-11

## 2021-05-05 RX ORDER — CARBOXYMETHYLCELLULOSE SODIUM 5 MG/ML
1 SOLUTION/ DROPS OPHTHALMIC 4 TIMES DAILY
DISCHARGE
Start: 2021-05-05 | End: 2021-05-18

## 2021-05-05 RX ORDER — CEFUROXIME AXETIL 500 MG/1
500 TABLET ORAL 2 TIMES DAILY
Qty: 6 TABLET | Refills: 0 | DISCHARGE
Start: 2021-05-05 | End: 2021-05-08

## 2021-05-05 RX ADMIN — ACYCLOVIR 400 MG: 200 CAPSULE ORAL at 11:16

## 2021-05-05 RX ADMIN — PREDNISONE 60 MG: 20 TABLET ORAL at 09:07

## 2021-05-05 RX ADMIN — LEVOTHYROXINE SODIUM 75 MCG: 75 TABLET ORAL at 09:07

## 2021-05-05 RX ADMIN — METOPROLOL SUCCINATE 25 MG: 25 TABLET, EXTENDED RELEASE ORAL at 09:06

## 2021-05-05 RX ADMIN — CHOLECALCIFEROL TAB 10 MCG (400 UNIT) 40 MCG: 10 TAB at 09:07

## 2021-05-05 RX ADMIN — SILDENAFIL 20 MG: 20 TABLET, FILM COATED ORAL at 09:07

## 2021-05-05 RX ADMIN — METHOTREXATE SODIUM 25 MG: 2.5 TABLET ORAL at 11:18

## 2021-05-05 RX ADMIN — OMEPRAZOLE 20 MG: 20 CAPSULE, DELAYED RELEASE ORAL at 09:07

## 2021-05-05 RX ADMIN — Medication 1 DROP: at 09:07

## 2021-05-05 RX ADMIN — APIXABAN 5 MG: 5 TABLET, FILM COATED ORAL at 09:06

## 2021-05-05 RX ADMIN — Medication 1 DROP: at 12:19

## 2021-05-05 RX ADMIN — FOLIC ACID 1000 MCG: 1 TABLET ORAL at 09:06

## 2021-05-05 RX ADMIN — ACYCLOVIR 400 MG: 200 CAPSULE ORAL at 09:07

## 2021-05-05 ASSESSMENT — MIFFLIN-ST. JEOR
SCORE: 1058.73
SCORE: 1063.72

## 2021-05-05 ASSESSMENT — ACTIVITIES OF DAILY LIVING (ADL)
ADLS_ACUITY_SCORE: 21
ADLS_ACUITY_SCORE: 17

## 2021-05-05 NOTE — PLAN OF CARE
Pt is A&Ox4, L side facial drop with L side weakness,  L eye taped for the night, VSS and on tele A-Fib /flutter CVR/RVR with activity, on RA and LD diminished, up with 1 assist RW and GB, on DDII diet with thin liquid and pills with apple souse,  at HS. Plan for possible discharge in AM to [facility].

## 2021-05-05 NOTE — PROGRESS NOTES
Care Management Discharge Note    Discharge Date: 05/05/21(TCU)       Discharge Disposition: Transitional Care    Discharge Services:      Discharge DME:      Discharge Transportation: (Typically she and  both drive. )    Private pay costs discussed: Not applicable    PAS Confirmation Code: [id number]  Patient/family educated on Medicare website which has current facility and service quality ratings: yes    Education Provided on the Discharge Plan:    Persons Notified of Discharge Plans: care team/spouse  Patient/Family in Agreement with the Plan: yes    Handoff Referral Completed: Yes    Additional Information:    Pt is ready for discharge and [facility] has accepted pt.  Per admissions, they can  pt via w/c today at 1300.  Pro faxed discharge orders and updated spouse on discharge plans.    PAS-RR    D: Per DHS regulation, PRO completed and submitted PAS-RR to [place] [employer] via the [employer].  PAS-RR confirmation # is : [id number]    I: PRO spoke with pt and they are aware a PAS-RR has been submitted.  PRO reviewed with pt that they may be contacted for a follow up appointment within 10 days of hospital discharge if their SNF stay is < 30 days.  Contact information for [employer] was also provided.    A: pt verbalized understanding.    P: Further questions may be directed to [employer] at #[phone], option #4 for PAS-RR staff.          DEANA Langley

## 2021-05-05 NOTE — PLAN OF CARE
PT: per chart review, noted patient discharging to TCU today.  Physical Therapy Discharge Summary    Reason for therapy discharge:    Discharged to transitional care facility.    Progress towards therapy goal(s). See goals on Care Plan in [facility] electronic health record for goal details.  Goals not met.  Barriers to achieving goals:   discharge from facility.    Therapy recommendation(s):    Continued therapy is recommended.  Rationale/Recommendations:  TCU to maximize return to PLOF.

## 2021-05-05 NOTE — PLAN OF CARE
Discharge to TCU with transport. Tolerated food, up with stand by assist, denies pain. VSS, facial droop on the left.

## 2021-05-05 NOTE — PLAN OF CARE
Occupational Therapy Discharge Summary    Reason for therapy discharge:    Discharged to transitional care facility.    Progress towards therapy goal(s). See goals on Care Plan in [facility] electronic health record for goal details.  Goals partially met.  Barriers to achieving goals:   discharge from facility.    Therapy recommendation(s):    Continued therapy is recommended.  Rationale/Recommendations:  Recommend continued OT at TCU to increase endurance and independence in ADLS.

## 2021-05-05 NOTE — PROGRESS NOTES
Speech Language Therapy Discharge Summary    Reason for therapy discharge:    Discharged to transitional care facility.    Progress towards therapy goal(s). See goals on Care Plan in [facility] electronic health record for goal details.  Goals partially met.  Barriers to achieving goals:   discharge from facility.    Therapy recommendation(s):    Continued therapy is recommended.  Rationale/Recommendations:  see below.    Per Last SLP Tx session: Pt requesting a diet/liquid upgrade due to dislike of po choices.  Rec: cautious upgrade to DDL2 and thin liquids with reminders to use safe swallow strategies.  Downgrade back to a DDL1 with thin liquids if coughing noted.    Continue Tx to safely advance to pt's baseline diet.

## 2021-05-05 NOTE — DISCHARGE SUMMARY
[facility]  Discharge Summary        Jaqueline Canales MRN# [id number]   YOB: 1940 Age: 80 year old     Date of Admission: 5/2/2021  Date of Discharge: 5/5/2021  Admitting Physician: Farhad Crockett MD  Discharge Physician: Johnie Corey MD     Primary Provider: Carissa Burroughs  Primary Care Physician Phone Number: [phone]         Discharge Diagnoses:   1. Left facial palsy, suspect Bell's palsy.  2. UTI.  3. Atrial fibrillation with RVR.  4. Left eye corneal abrasion, suspect due to left facial palsy.  5. Dysphagia, suspect related to left facial palsy.  6. Hypokalemia.  7. Severe malnutrition   8. Weakness and deconditioning due to multiple acute and chronic medical issues.        Other Chronic Medical Problems:      1. Rheumatoid arthritis.  2. Scleroderma.  3. Chronic CHF (LV diastolic and RV systolic), question related to pulmonary disease.  4. Pulmonary hypertension, question related to pulmonary disease or left heart or valve disease.  5. Valve disease (moderate AS, severe TR, moderate-severe MR).  6. Raynaud's phenomena.  7. SRINIVAS.  8. Hypothyroidism.  9. GERD.       Allergies:         Allergies   Allergen Reactions     No Known Allergies            Discharge Medications:        Current Discharge Medication List      START taking these medications    Details   acyclovir (ZOVIRAX) 200 MG capsule Take 2 capsules (400 mg) by mouth 5 times daily for 6 days    Associated Diagnoses: Bell's palsy      artificial tears OINT ophthalmic ointment Apply to left eye at bedtime.    Associated Diagnoses: Bell's palsy      !! carboxymethylcellulose PF (REFRESH PLUS) 0.5 % ophthalmic solution Place 1 drop Into the left eye every hour as needed for dry eyes  Qty:      Associated Diagnoses: Bell's palsy      !! carboxymethylcellulose PF (REFRESH PLUS) 0.5 % ophthalmic solution Place 1 drop Into the left eye 4 times daily  Qty:      Associated Diagnoses: Bell's  palsy      cefuroxime (CEFTIN) 500 MG tablet Take 1 tablet (500 mg) by mouth 2 times daily for 3 days  Qty: 6 tablet, Refills: 0    Associated Diagnoses: Acute cystitis without hematuria      predniSONE (DELTASONE) 20 MG tablet Take 3 tablets (60 mg) by mouth daily for 6 days    Associated Diagnoses: Bell's palsy       !! - Potential duplicate medications found. Please discuss with provider.      CONTINUE these medications which have NOT CHANGED    Details   acetaminophen (TYLENOL) 500 MG tablet Take 1,000 mg by mouth 2 times daily       albuterol (PROAIR HFA/PROVENTIL HFA/VENTOLIN HFA) 108 (90 Base) MCG/ACT inhaler USE 2 INHALATIONS ORALLY   INTO THE LUNGS EVERY 6  HOURS as needed  Qty: 18 g, Refills: 11    Comments: Pharmacy may dispense brand covered by insurance (Proair, or proventil or ventolin or generic albuterol inhaler)  Associated Diagnoses: ILD (interstitial lung disease) (H)      apixaban ANTICOAGULANT (ELIQUIS ANTICOAGULANT) 5 MG tablet Take 1 tablet (5 mg) by mouth 2 times daily  Qty: 180 tablet, Refills: 2    Associated Diagnoses: Persistent atrial fibrillation (H)      Bacillus Coagulans-Inulin (PROBIOTIC-PREBIOTIC PO) Take 1 chew tab by mouth 2 times daily       folic acid (FOLVITE) 1 MG tablet Take 1 tablet (1,000 mcg) by mouth daily  Qty: 90 tablet, Refills: 3    Associated Diagnoses: Mixed hyperlipidemia      InFLIXimab (REMICADE IV) Inject 400 mg into the vein Every 8 weeks      levothyroxine (SYNTHROID/LEVOTHROID) 75 MCG tablet Take 1 tablet (75 mcg) by mouth daily  Qty: 90 tablet, Refills: 2    Associated Diagnoses: Hypothyroidism, unspecified type      melatonin 5 MG tablet Take 5 mg by mouth nightly as needed for sleep      Methotrexate Sodium (METHOTREXATE PO) Take 2.5 mg by mouth 10 tablets weekly - Wednesdays      metoprolol succinate ER (TOPROL-XL) 25 MG 24 hr tablet Take 1 tablet (25 mg) by mouth daily  Qty: 90 tablet, Refills: 2    Associated Diagnoses: Persistent atrial fibrillation  (H)      Multiple Vitamins-Minerals (ICAPS) CAPS Take 1 capsule by mouth 2 times daily       multivitamin (CENTRUM SILVER) tablet Take 1 tablet by mouth daily      omeprazole (PRILOSEC) 20 MG DR capsule TAKE 1 CAPSULE TWICE DAILY,30-60 MINUTES PRIOR TO     MEALS  Qty: 180 capsule, Refills: 2    Associated Diagnoses: Esophageal stricture      order for DME Equipment being ordered: 4 wheeled walker with seat  Qty: 1 each, Refills: 3    Associated Diagnoses: Acute on chronic diastolic congestive heart failure (H)      potassium chloride ER (KLOR-CON M) 20 MEQ CR tablet Take 1 tablet (20 mEq) by mouth daily  Qty: 90 tablet, Refills: 2    Associated Diagnoses: Hypokalemia      Prednisolon-Gatiflox-Bromfenac 1-0.5-0.075 % SUSP Place 1 drop into the right eye daily      psyllium (METAMUCIL/KONSYL) capsule Take 1 capsule by mouth daily      sildenafil (REVATIO) 20 MG tablet TAKE 1 TABLET (20 MG) BY MOUTH 3 TIMES DAILY  Qty: 90 tablet, Refills: 5    Associated Diagnoses: Pulmonary hypertension (H)      simvastatin (ZOCOR) 40 MG tablet Take 1 tablet (40 mg) by mouth At Bedtime  Qty: 90 tablet, Refills: 3    Associated Diagnoses: Hyperlipidemia LDL goal <100      Skin Protectants, Misc. (EUCERIN) cream Apply topically 2 times daily Apply to areas dry skin topically two times a day for dry skin and Apply to areas of dry skin topically as needed for dry skin daily      torsemide (DEMADEX) 20 MG tablet Take 2 tablets (40 mg) by mouth daily Take 40mg q am  Qty: 180 tablet, Refills: 3    Associated Diagnoses: Bilateral leg edema      triamcinolone (KENALOG) 0.1 % external cream Apply topically 2 times daily  Qty: 30 g, Refills: 3    Associated Diagnoses: Itching      Vitamin D, Cholecalciferol, 10 MCG (400 UNIT) TABS Take 1,600 Units by mouth daily                 Discharge Instructions and Follow-Up:      Discharge Orders      General info for SNF    Length of Stay Estimate: Short Term Care: Estimated # of Days <30  Condition at  Discharge: Improving  Level of care:skilled   Rehabilitation Potential: Good  Admission H&P remains valid and up-to-date: Yes  Recent Chemotherapy: N/A  Use Nursing Home Standing Orders: Yes     Mantoux instructions    Give two-step Mantoux (PPD) Per Facility Policy Yes     Follow Up and recommended labs and tests    Follow-up with Nursing home physician.  The following labs/tests are recommended: CBC, BMP.  Follow-up with primary care provider after TCU discharge.     Activity - Up with nursing assistance     Discharge Instructions    Tape left eye closed at bedtime and when/if sleeping during the day.     Reason for your hospital stay    Left facial palsy, suspect Bell's palsy.  UTI.  Atrial fibrillation with RVR.  Weakness and deconditioning.  Left eye corneal abrasion.     Physical Therapy Adult Consult    Evaluate and treat as clinically indicated.    Reason:  weakness/deconditioning     Occupational Therapy Adult Consult    Evaluate and treat as clinically indicated.    Reason:  Weakness/deconditioning     Speech Language Path Adult Consult    Evaluate and treat as clinically indicated.    Reason:  Dysphagia     Advance Diet as Tolerated    Follow this diet upon discharge: Orders Placed This Encounter      Snacks/Supplements Adult: Ensure Enlive; Between Meals      Combination Diet Dysphagia Diet Level 2: Mechan Altered; Thin Liquids (water, ice chips, juice, milk gelatin, ice cream, etc); No Straws             Consultations This Hospital Stay:      Neurology.        Admission History:      Please see the H&P by Farhad Crockett MD on 5/2/2021 for complete details. Briefly, Jaqueline Canales is a 80 year old female with history including RA, scleroderma, AF on apixaban, CHF, cardiac valve disease (AS, MR and TR), pHTN and SRINIVAS, who presented with lethargy and facial droop.        Problem Oriented Hospital Course:        Left facial palsy, suspect Bell's palsy.  * Pt had been having issues with dysphagia  in the last several days PTA.  noted facial droop on 5/2. On initial evaluation 5/2, code stroke called. ECG 5/2 showed afib with RVR. Troponin negative. +UA as below. Head CT 5/2 negative. CTA head and neck 5/2 showed significant stenosis. CT head perfusion 5/2 negative. Brain MRI 5/3 negative. Evaluated by Neurology and felt that this was a left facial palsy, possibly Bell's palsy. Started prednisone and acyclovir 5/4.  - Continue prednisone 60 mg/d and oral acyclovir 400 mg 5 times a day for 1 week (stop 5/11).  - Continue PT, OT.  - Dysphagia management as below.    Left eye corneal abrasion, suspect due to left facial palsy.  - Continue carboxymethylcellulose eye drops QID and PRN q1h.  - Continue artificial tears ointment at bedtime.  - Tape left eye shut at bedtime.    Dysphagia, suspect related to left facial palsy.  Pt had been having issues with dysphagia in the last several days PTA. Neuro workup as above. SLP consulted and placed on dysphagia diet.   - Treat left facial palsy as above.  - Continue dysphagia 1 diet with nectar thickened liquids.  - Advance diet as able per SLP.    UTI.  UA 5/2 showed >182 WBC, lg LE, small blood. Started on ceftriaxone on admit 5/2. UC 5/2 growing >100K GNR. Ceftriaxone changed to cefuroxime 5/5.  - Continue cefuroxime (started 5/5) to stop after 5/6.  - Follow-up urine cultures.    Atrial fibrillation with RVR.  * PTA on apixaban and metoprolol XL.  * On initial evaluation 5/2, HR was in the 130's; EKG showed afib with RVR. HR's subsequently improved.  - Continue apixaban.  - Continue metoprolol XL 25 mg daily.    Hypokalemia.  Potassium low on admit. Treated with replacement.    Chronic CHF (LV diastolic and RV systolic), question related to pulmonary disease.  Pulmonary hypertension, question related to pulmonary disease or left heart or valve disease.  Valve disease (moderate AS, severe TR, moderate-severe MR).  [PTA BP meds/diuretics: metoprolol XL 25 mg  daily; sildenafil 20 mg TID; torsemide 40 mg daily.]  * Follows with Dr. Marquez with Cardiology as an outpatient. Echo 3/2021 showed LVEF 60-65%, mod LVH; RV mildly dilated with decreased RV systolic function; 3+ MR; 3+ TR; moderate AS; pulmonary pressure 48 +RA.  - Continue metoprolol XL, sildenafil.  - Resume torsemide 40 mg daily at discharge.  - Monitor daily wts.    Severe malnutrition related to acute and chronic medical issues.  Noted by Nutrition; see their documentation.  - Continue diet as tolerated.  - Continue supplements.    Rheumatoid arthritis.  Scleroderma.  Raynaud's phenomena.  PTA on infliximab and methotrexate.  - Hold methotrexate for now (takes every Wednesday).  - Resume infliximab outpatient.  - Keep hands warm as able.    SRINIVAS.   Noted non-compliant with CPAP.  - Follow-up outpatient.    Hypothyroidism.  Chronic and stable.  - Continue levothyroxine.    GERD.  Chronic and stable.  -Continue omeprazole.    Weakness and deconditioning due to multiple acute and chronic medical issues.  Seen by PT who recommended TCU.  - Continue PT and OT.  - Plan TCU.    COVID-19 testing.  COVID-19 PCR Results    COVID-19 PCR Results 4/2/21 4/2/21 5/2/21    [id number]    COVID-19 Virus PCR to [facility] - Result Test received-See reflex to IDDL test SARS CoV2 (COVID-19) Virus RT-PCR     COVID-19 Virus PCR to [facility] - Source Nasopharyngeal     SARS-CoV-2 Virus Specimen Source  Nasopharyngeal Nasopharyngeal   SARS-CoV-2 PCR Result  NEGATIVE NEGATIVE      Comments are available for some flowsheets but are not being displayed.         COVID-19 Antibody Results, Testing for Immunity    COVID-19 Antibody Results, Testing for Immunity   No data to display.                   Pending Results:        Unresulted Labs Ordered in the Past 30 Days of this Admission     Date and Time Order Name Status Description    5/3/2021 0006 Urine Culture Preliminary                 Discharge Disposition:      Discharged to TCU.         Discharge Time:      Approximately 35 minutes.        Key Imaging Studies, Lab Findings and Procedures/Surgeries:        Results for orders placed or performed during the hospital encounter of 05/02/21   CT Head w/o Contrast    Narrative    CT OF THE HEAD WITHOUT CONTRAST 5/2/2021 9:51 PM     COMPARISON: Brain MR 12/22/2015    HISTORY: Left facial droop. Dysphagia.    TECHNIQUE: 5 mm thick axial CT images of the head were acquired  without IV contrast material.    FINDINGS:  There is moderate diffuse cerebral volume loss. There are  subtle patchy areas of decreased density in the cerebral white matter  bilaterally that are consistent with sequela of chronic small vessel  ischemic disease.     The ventricles and basal cisterns are within normal limits in  configuration given the degree of cerebral volume loss.  There is no  midline shift. There are no extra-axial fluid collections.     No intracranial hemorrhage, mass or recent infarct.    The visualized paranasal sinuses are well-aerated. There is no  mastoiditis. There are no fractures of the visualized bones.       Impression    IMPRESSION:  Diffuse cerebral volume loss and cerebral white matter  changes consistent with chronic small vessel ischemic disease. No  evidence for acute intracranial pathology.      Radiation dose for this scan was reduced using automated exposure  control, adjustment of the mA and/or kV according to patient size, or  iterative reconstruction technique.    TIM MONDRAGON MD   CTA Head Neck with Contrast    Narrative    EXAM: CTA  HEAD NECK WITH CONTRAST, CT HEAD PERFUSION WITH CONTRAST  LOCATION: [facility]  DATE/TIME: 5/2/2021 9:42 PM    INDICATION: Left-sided sided facial droop and dysphagia.  COMPARISON: None.  TECHNIQUE: Head and neck CT angiogram with IV contrast. Noncontrast head CT followed by axial helical CT images of the head and neck vessels obtained during the arterial phase of intravenous contrast  administration. Axial 2D reconstructed images and   multiplanar 3D MIP reconstructed images of the head and neck vessels were performed by the technologist. Additional CT cerebral perfusion was performed utilizing a second contrast bolus. Perfusion data were post processed with generation of standard   perfusion maps and estimation of ischemic/infarcted volumes utilizing standard threshold values. Dose reduction techniques were used. All stenosis measurements made according to NASCET criteria unless otherwise specified.  CONTRAST: 70mL Isovue-370 (accession [id number]), 50mL Isovue-370 (accession [id number])  COMPARISON: None.    FINDINGS:   NONCONTRAST HEAD CT:   INTRACRANIAL CONTENTS: No intracranial hemorrhage, extraaxial collection, or mass effect.  No CT evidence of acute infarct. There is scattered low-attenuation within the periventricular and subcortical white matter consistent with mild diffuse small   vessel ischemic disease. The ventricular system, basal cisterns and the cortical sulci are consistent with mild diffuse volume loss. There may be a component of normal pressure hydrocephalus with the lateral ventricles are out of portion to the sulci.     VISUALIZED ORBITS/SINUSES/MASTOIDS: No intraorbital abnormality. No paranasal sinus mucosal disease. No middle ear or mastoid effusion.    BONES/SOFT TISSUES: No acute abnormality.    HEAD CTA:  ANTERIOR CIRCULATION: No stenosis/occlusion, aneurysm, or high flow vascular malformation. There is a patent anterior communicating artery and bilateral fetal origins of the posterior cerebral arteries.    POSTERIOR CIRCULATION: No stenosis/occlusion, aneurysm, or high flow vascular malformation. The basilar vertebral system is small throughout associated with bilateral fetal origins of the posterior cerebral arteries.     DURAL VENOUS SINUSES: Expected enhancement of the major dural venous sinuses.    NECK CTA:  RIGHT CAROTID: No measurable stenosis or  dissection.    LEFT CAROTID: Approximately 30% stenosis of the origin of the left internal carotid artery by NASCET criteria.    VERTEBRAL ARTERIES: No focal stenosis or dissection. Balanced vertebral arteries.    AORTIC ARCH: Classic aortic arch anatomy with no significant stenosis at the origin of the great vessels.    NONVASCULAR STRUCTURES: Mild degenerative changes of the cervical spine. The lungs visualized on this study are clear.    CT PERFUSION:  PERFUSION MAPS: Symmetrical cerebral perfusion. No focal deficits in cerebral blood flow or volume to suggest ischemia/oligemia.    RAPID ANALYSIS:  CBF<30%: 0 mL  Tmax>6sec: 0 ml  Mismatch volume: 0 mL  Mismatch ratio: None.      Impression    IMPRESSION:   HEAD CT:  1.  No CT finding of a mass, hemorrhage or focal area suggestive of acute infarct.  2.  Diffuse age related changes and a possible component of normal pressure hydrocephalus.    HEAD CTA:   1.  No discrete vessel occlusion, significant stenosis, aneurysm or high flow vascular malformation involving the arteries of the [place].    NECK CTA:  1.  Normal configuration of the great vessels off the aortic arch with no significant stenosis of their origins.  2.  No significant stenosis or irregularity involving the right internal carotid artery by NASCET criteria.  3.  Approximately 30% stenosis of the origin of the left internal carotid artery by NASCET criteria.  4.  No radiographic evidence of dissection.    CT PERFUSION:  1.  Fairly symmetric relative cerebral perfusion bilaterally.    Head CT findings were communicated to Dr. Irving at 9:58 PM. CTA findings were communicated to Dr. Irving at 10:17 PM on 05/02/2021.   CT Head Perfusion w Contrast    Narrative    EXAM: CTA  HEAD NECK WITH CONTRAST, CT HEAD PERFUSION WITH CONTRAST  LOCATION: [facility]  DATE/TIME: 5/2/2021 9:42 PM    INDICATION: Left-sided sided facial droop and dysphagia.  COMPARISON: None.  TECHNIQUE: Head and  neck CT angiogram with IV contrast. Noncontrast head CT followed by axial helical CT images of the head and neck vessels obtained during the arterial phase of intravenous contrast administration. Axial 2D reconstructed images and   multiplanar 3D MIP reconstructed images of the head and neck vessels were performed by the technologist. Additional CT cerebral perfusion was performed utilizing a second contrast bolus. Perfusion data were post processed with generation of standard   perfusion maps and estimation of ischemic/infarcted volumes utilizing standard threshold values. Dose reduction techniques were used. All stenosis measurements made according to NASCET criteria unless otherwise specified.  CONTRAST: 70mL Isovue-370 (accession [id number]), 50mL Isovue-370 (accession [id number])  COMPARISON: None.    FINDINGS:   NONCONTRAST HEAD CT:   INTRACRANIAL CONTENTS: No intracranial hemorrhage, extraaxial collection, or mass effect.  No CT evidence of acute infarct. There is scattered low-attenuation within the periventricular and subcortical white matter consistent with mild diffuse small   vessel ischemic disease. The ventricular system, basal cisterns and the cortical sulci are consistent with mild diffuse volume loss. There may be a component of normal pressure hydrocephalus with the lateral ventricles are out of portion to the sulci.     VISUALIZED ORBITS/SINUSES/MASTOIDS: No intraorbital abnormality. No paranasal sinus mucosal disease. No middle ear or mastoid effusion.    BONES/SOFT TISSUES: No acute abnormality.    HEAD CTA:  ANTERIOR CIRCULATION: No stenosis/occlusion, aneurysm, or high flow vascular malformation. There is a patent anterior communicating artery and bilateral fetal origins of the posterior cerebral arteries.    POSTERIOR CIRCULATION: No stenosis/occlusion, aneurysm, or high flow vascular malformation. The basilar vertebral system is small throughout associated with bilateral fetal origins of the  posterior cerebral arteries.     DURAL VENOUS SINUSES: Expected enhancement of the major dural venous sinuses.    NECK CTA:  RIGHT CAROTID: No measurable stenosis or dissection.    LEFT CAROTID: Approximately 30% stenosis of the origin of the left internal carotid artery by NASCET criteria.    VERTEBRAL ARTERIES: No focal stenosis or dissection. Balanced vertebral arteries.    AORTIC ARCH: Classic aortic arch anatomy with no significant stenosis at the origin of the great vessels.    NONVASCULAR STRUCTURES: Mild degenerative changes of the cervical spine. The lungs visualized on this study are clear.    CT PERFUSION:  PERFUSION MAPS: Symmetrical cerebral perfusion. No focal deficits in cerebral blood flow or volume to suggest ischemia/oligemia.    RAPID ANALYSIS:  CBF<30%: 0 mL  Tmax>6sec: 0 ml  Mismatch volume: 0 mL  Mismatch ratio: None.      Impression    IMPRESSION:   HEAD CT:  1.  No CT finding of a mass, hemorrhage or focal area suggestive of acute infarct.  2.  Diffuse age related changes and a possible component of normal pressure hydrocephalus.    HEAD CTA:   1.  No discrete vessel occlusion, significant stenosis, aneurysm or high flow vascular malformation involving the arteries of the [place].    NECK CTA:  1.  Normal configuration of the great vessels off the aortic arch with no significant stenosis of their origins.  2.  No significant stenosis or irregularity involving the right internal carotid artery by NASCET criteria.  3.  Approximately 30% stenosis of the origin of the left internal carotid artery by NASCET criteria.  4.  No radiographic evidence of dissection.    CT PERFUSION:  1.  Fairly symmetric relative cerebral perfusion bilaterally.    Head CT findings were communicated to Dr. Irving at 9:58 PM. CTA findings were communicated to Dr. Irving at 10:17 PM on 05/02/2021.   MR Brain w/o & w Contrast    Narrative    MRI BRAIN WITHOUT AND WITH CONTRAST  5/3/2021 1:58 PM    HISTORY:   Neuro deficit, acute, stroke suspected. Acute ischemic  stroke.    TECHNIQUE:  Multiplanar, multisequence MRI of the brain without and  with 6mL Gadavist.    COMPARISON: Head CT/CTA 5/2/2021, head MRI 12/22/2015    FINDINGS:  No evidence of acute ischemia or hemorrhage. Hyperintense  signal on diffusion-weighted imaging within the anterior eda is  likely artifactual. Background of moderate volume loss is present with  a few scattered white matter T2 hyperintensities which likely  represent mild chronic small-vessel ischemic change. Slight asymmetric  hyperenhancement of left facial nerve from the geniculate segment  through the mastoid segment. Otherwise, the internal auditory canals  and membranous labyrinthine structures are unremarkable. Major  intracranial flow voids are maintained.    Opacification of the left mastoid cavity, likely  infectious/inflammatory. Left temporomandibular joint degenerative  change. Marrow signal is within normal limits. Trace paranasal sinus  mucosal thickening.    Slight asymmetric crowding and heterogeneous signal at the left  oropharynx, presumably incidental. Direct visualization could be  performed.      Impression    IMPRESSION:  1. No evidence of acute ischemia or hemorrhage.  2. Hyperintense signal on diffusion-weighted imaging within the eda  is likely artifactual.  3. Nonspecific slight asymmetric enhancement of the left facial nerve.  Mcdonald palsy cannot be excluded.  4. Opacification left mastoid cavity, likely infectious/inflammatory.    MACK GOLDMAN MD

## 2021-05-05 NOTE — PROGRESS NOTES
[facility]    Internal Medicine Hospitalist Progress Note  05/05/2021  I evaluated patient on the above date.    Johnie Corey Jr., MD  [phone] (p)  Text Page        Assessment & Plan New actions/orders today (05/05/2021) are underlined.    Jaqueline Canales is a 80 year old female with history including RA, scleroderma, AF on apixaban, CHF, cardiac valve disease (AS, MR and TR), pHTN and SRINIVAS, who presented with lethargy and facial droop.    Left facial palsy, suspect Bell's palsy.  * Pt had been having issues with dysphagia in the last several days PTA.  noted facial droop on 5/2. On initial evaluation 5/2, code stroke called. ECG 5/2 showed afib with RVR. Troponin negative. +UA as below. Head CT 5/2 negative. CTA head and neck 5/2 showed significant stenosis. CT head perfusion 5/2 negative. Brain MRI 5/3 negative. Evaluated by Neurology and felt that this was a left facial palsy, possibly Bell's palsy. Started prednisone and acyclovir 5/4.  - Continue prednisone 60 mg/d and oral acyclovir 400 mg 5 times a day for 1 week (stop 5/11).  - Continue PT, OT.  - Dysphagia management as below.  - Appreciate Neurology help.    Left eye corneal abrasion, suspect due to left facial palsy.  - Continue carboxymethylcellulose eye drops QID and PRN q1h.  - Continue artificial tears ointment at bedtime.  - Tape left eye shut at bedtime.    Dysphagia, suspect related to left facial palsy.  Pt had been having issues with dysphagia in the last several days PTA. Neuro workup as above. SLP consulted and placed on dysphagia diet.   - Treat left facial palsy as above.  - Continue dysphagia 1 diet with nectar thickened liquids.  - Advance diet as able per SLP.    UTI.  UA 5/2 showed >182 WBC, lg LE, small blood. Started on ceftriaxone on admit 5/2. UC 5/2 growing >100K GNR.  - Discontinue ceftriaxone (started 5/2) and change to cefuroxime to stop after 5/6.  - Follow-up urine cultures.    Atrial  fibrillation with RVR.  * PTA on apixaban and metoprolol XL.  * On initial evaluation 5/2, HR was in the 130's; EKG showed afib with RVR.  - Continue telemetry monitoring.  - Continue apixaban.  - Continue metoprolol XL 25 mg daily.  - Continue PRN IV metoprolol 2.5-5 mg q4h.    Hypokalemia.  Potassium low on admit. Treated with replacement.  Recent Labs   Lab 05/05/21  0539 05/04/21  1958 05/04/21  0600 05/03/21  1449 05/03/21  0136 05/02/21  2132   POTASSIUM 4.2 4.5 3.6 3.6 3.4 3.3*   - Continue PRN K replacement.    Chronic CHF (LV diastolic and RV systolic), question related to pulmonary disease.  Pulmonary hypertension, question related to pulmonary disease or left heart or valve disease.  Valve disease (moderate AS, severe TR, moderate-severe MR).  [PTA BP meds/diuretics: metoprolol XL 25 mg daily; sildenafil 20 mg TID; torsemide 40 mg daily.]  * Follows with Dr. Marquez with Cardiology as an outpatient. Echo 3/2021 showed LVEF 60-65%, mod LVH; RV mildly dilated with decreased RV systolic function; 3+ MR; 3+ TR; moderate AS; pulmonary pressure 48 +RA.  - Continue metoprolol XL, sildenafil.  - Resume torsemide 40 mg daily at discharge.  - Monitor i/o's, daily wts.    Severe malnutrition related to acute and chronic medical issues.  Noted by Nutrition; see their documentation.  - Continue diet as tolerated.  - Continue supplements.    Rheumatoid arthritis.  Scleroderma.  Raynaud's phenomena.  PTA on infliximab and methotrexate.  - Hold methotrexate for now (takes every Wednesday).  - Resume infliximab outpatient.  - Keep hands warm as able.    SRINIVAS.   Noted non-compliant with CPAP.  - Follow-up outpatient.    Hypothyroidism.  Chronic and stable.  - Continue levothyroxine.    GERD.  Chronic and stable.  -Continue omeprazole.    Weakness and deconditioning due to multiple acute and chronic medical issues.  Seen by PT who recommended TCU.  - Continue PT and OT.  - Plan TCU.    COVID-19 testing.  COVID-19 PCR  "Results    COVID-19 PCR Results 4/2/21 4/2/21 5/2/21    1146 1146    COVID-19 Virus PCR to [facility] - Result Test received-See reflex to IDDL test SARS CoV2 (COVID-19) Virus RT-PCR     COVID-19 Virus PCR to [facility] - Source Nasopharyngeal     SARS-CoV-2 Virus Specimen Source  Nasopharyngeal Nasopharyngeal   SARS-CoV-2 PCR Result  NEGATIVE NEGATIVE      Comments are available for some flowsheets but are not being displayed.         COVID-19 Antibody Results, Testing for Immunity    COVID-19 Antibody Results, Testing for Immunity   No data to display.             Diet: Combination Diet Dysphagia Diet Level 2: Mechan Altered; Thin Liquids (water, ice chips, juice, milk gelatin, ice cream, etc); No Straws  Snacks/Supplements Adult: Ensure Enlive; Between Meals    Prophylaxis: PCD's, ambulation.   Luther Catheter: not present  Code Status: No CPR- Do NOT Intubate    Disposition Plan   Expected discharge: Today, recommended to transitional care unit once bed available.  Entered: Johnie Corey MD 05/05/2021, 8:27 AM     Communication.  - I d/w pt's  and niece 5/4.      Interval History   Doing OK (just woke up).  Agreeable to TCU which has been recommended.    -Data reviewed today: I reviewed all new labs and imaging over the last 24 hours. I personally reviewed no images or EKG's today.    Physical Exam    , Blood pressure 111/83, pulse 91, temperature 97.4  F (36.3  C), temperature source Axillary, resp. rate 16, height 1.575 m (5' 2\"), weight 64 kg (141 lb 3.2 oz), SpO2 95 %, not currently breastfeeding.  Vitals:    05/04/21 0500 05/05/21 0300 05/05/21 0500   Weight: 64 kg (141 lb) 64.5 kg (142 lb 4.8 oz) 64 kg (141 lb 3.2 oz)     Vital Signs with Ranges  Temp:  [97.4  F (36.3  C)-98.1  F (36.7  C)] 97.4  F (36.3  C)  Pulse:  [] 91  Resp:  [16] 16  BP: ()/(66-85) 111/83  SpO2:  [94 %-96 %] 95 %  Patient Vitals for the past 24 hrs:   BP Temp Temp src Pulse Resp SpO2 Weight   05/05/21 0744 111/83 " 97.4  F (36.3  C) Axillary 91 16 95 % --   05/05/21 0505 -- -- -- 81 -- -- --   05/05/21 0500 103/66 97.9  F (36.6  C) Oral 93 -- 94 % 64 kg (141 lb 3.2 oz)   05/05/21 0300 -- -- -- -- -- -- 64.5 kg (142 lb 4.8 oz)   05/04/21 2142 110/77 -- -- 106 -- -- --   05/04/21 2008 90/68 98.1  F (36.7  C) Oral 90 16 95 % --   05/04/21 1600 115/75 97.9  F (36.6  C) Oral 105 16 95 % --   05/04/21 1310 103/69 -- -- 82 16 96 % --   05/04/21 1000 106/80 -- -- 124 -- 95 % --   05/04/21 0840 118/85 -- -- 94 -- 96 % --   05/04/21 0837 118/85 -- -- 98 -- -- --     I/O's Last 24 hours  I/O last 3 completed shifts:  In: 793 [P.O.:790; I.V.:3]  Out: 1050 [Urine:1050]    Constitutional: Awake, alert, pleasant.  HEENT: Left mouth droop, stable.  Respiratory: Diminished in bases.   Cardiovascular: Irregular, rate normal, +I-II/IV systolic m, no g/r.  GI:   Skin/Integumen:   Other:  Neuro - left mouth droop, stable.      Data   Recent Labs   Lab 05/05/21  0539 05/04/21 1958 05/04/21  0600 05/02/21 2132 05/02/21 2132 04/28/21  0952   WBC  --   --  9.7  --  10.2  --    HGB  --   --  14.1  --  14.5 12.6   MCV  --   --  86  --  85  --    PLT  --   --  159  --  233  --    INR  --   --   --   --  1.07  --    NA  --   --  140  --  136  --    POTASSIUM 4.2 4.5 3.6   < > 3.3*  --    CHLORIDE  --   --  104  --  95  --    CO2  --   --  31  --  34*  --    BUN  --   --  13  --  25  --    CR  --   --  0.60  --  0.82  --    ANIONGAP  --   --  5  --  7  --    TU  --   --  9.3  --  10.3*  --    GLC  --   --  89  --  84  --    ALBUMIN  --   --   --   --  4.0  --    PROTTOTAL  --   --   --   --  8.6  --    BILITOTAL  --   --   --   --  1.6*  --    ALKPHOS  --   --   --   --  78  --    ALT  --   --   --   --  18  --    AST  --   --   --   --  25  --    TROPI  --   --   --   --  <0.015  --     < > = values in this interval not displayed.     Recent Labs   Lab Test 05/04/21 2143 05/04/21  0600 05/04/21  0232 05/03/21  2123 05/02/21  2132 04/15/21  9866  03/22/21  1425 06/26/20  0935 10/31/16  0836 10/31/16  0836 12/14/15  0934 12/14/15  0934   GLC  --  89  --   --  84 101* 96 96   < >  --    < >  --    *  --  88 86  --   --   --   --   --  93  --  103*    < > = values in this interval not displayed.     No results for input(s): CRP, DD, LDH, FIBR, LUANA, IL6B in the last 168 hours.      No results found for this or any previous visit (from the past 24 hour(s)).    Medications   All medications were reviewed.    - MEDICATION INSTRUCTIONS -         acyclovir  400 mg Oral 5x Daily     apixaban ANTICOAGULANT  5 mg Oral BID     artificial tears   Left Eye At Bedtime     artificial tears ophthalmic solution  1 drop Left Eye 4x Daily     cefTRIAXone  1 g Intravenous Q24H     cholecalciferol  40 mcg Oral Daily     folic acid  1,000 mcg Oral Daily     levothyroxine  75 mcg Oral Daily     methotrexate  25 mg Oral Q7 Days     metoprolol succinate ER  25 mg Oral Daily     omeprazole  20 mg Oral BID AC     predniSONE  60 mg Oral Daily     sildenafil  20 mg Oral TID     simvastatin  40 mg Oral At Bedtime     sodium chloride (PF)  3 mL Intracatheter Q8H     acetaminophen, bisacodyl, artificial tears ophthalmic solution, lidocaine 4%, lidocaine (buffered or not buffered), magnesium hydroxide, - MEDICATION INSTRUCTIONS -, melatonin, metoprolol, ondansetron **OR** ondansetron, senna-docusate **OR** senna-docusate, sodium chloride (PF)

## 2021-05-05 NOTE — PLAN OF CARE
Neuro: AxO 4, left facial droop and left weakness, slightly garbled  C/V: A-Fib CVR w/RVR   Resp: RA  Activity: Assist x 1  with gait belt and walker  Pain: Denies  Gtt: None  Vsc: PIV L FA  Skin:  Buttocks, red blanchable, Small excoriation on coccyx, Mepilex applied  GI/: DDL1 nectar thick diet, take pills with apple souse.  Plan: discharge to TCU [facility] on 5/5.

## 2021-05-06 ENCOUNTER — HOSPITAL LABORATORY (OUTPATIENT)
Dept: OTHER | Facility: CLINIC | Age: 81
End: 2021-05-06

## 2021-05-06 ENCOUNTER — NURSING HOME VISIT (OUTPATIENT)
Dept: GERIATRICS | Facility: CLINIC | Age: 81
End: 2021-05-06
Payer: MEDICARE

## 2021-05-06 DIAGNOSIS — E43 SEVERE MALNUTRITION (H): ICD-10-CM

## 2021-05-06 DIAGNOSIS — I89.0 LYMPHEDEMA OF BOTH LOWER EXTREMITIES: ICD-10-CM

## 2021-05-06 DIAGNOSIS — R53.81 PHYSICAL DECONDITIONING: ICD-10-CM

## 2021-05-06 DIAGNOSIS — M05.9 RHEUMATOID ARTHRITIS WITH POSITIVE RHEUMATOID FACTOR, INVOLVING UNSPECIFIED SITE (H): ICD-10-CM

## 2021-05-06 DIAGNOSIS — M62.81 GENERALIZED MUSCLE WEAKNESS: ICD-10-CM

## 2021-05-06 DIAGNOSIS — E87.6 HYPOKALEMIA: ICD-10-CM

## 2021-05-06 DIAGNOSIS — Z71.89 ADVANCED DIRECTIVES, COUNSELING/DISCUSSION: ICD-10-CM

## 2021-05-06 DIAGNOSIS — G51.0 BELL'S PALSY: Primary | ICD-10-CM

## 2021-05-06 DIAGNOSIS — S05.00XD: ICD-10-CM

## 2021-05-06 DIAGNOSIS — I10 BENIGN ESSENTIAL HYPERTENSION: ICD-10-CM

## 2021-05-06 DIAGNOSIS — R13.10 DYSPHAGIA, UNSPECIFIED TYPE: ICD-10-CM

## 2021-05-06 DIAGNOSIS — I27.20 PULMONARY HYPERTENSION (H): ICD-10-CM

## 2021-05-06 DIAGNOSIS — I48.91 ATRIAL FIBRILLATION WITH RVR (H): ICD-10-CM

## 2021-05-06 DIAGNOSIS — M34.9 SCLERODERMA (H): ICD-10-CM

## 2021-05-06 DIAGNOSIS — N39.0 URINARY TRACT INFECTION WITHOUT HEMATURIA, SITE UNSPECIFIED: ICD-10-CM

## 2021-05-06 PROCEDURE — 99310 SBSQ NF CARE HIGH MDM 45: CPT | Performed by: NURSE PRACTITIONER

## 2021-05-06 NOTE — PROGRESS NOTES
[facility]  PRIMARY CARE PROVIDER AND CLINIC:  Carissa Burroughs MD, [address] / [place] [address]  Chief Complaint   Patient presents with     Hospital F/U      [place] Medical Record Number:  [id number]  Place of Service where encounter took place:  [facility] [[id number]]    Jaqueline Canales  is a 80 year old  (1940), admitted to the above facility from  [facility]. Hospital stay 5/2/21 through 5/5/21.     HPI:    80 year old female PMHx  RA, scleroderma, AF on apixaban, CHF, cardiac valve disease (AS, MR and TR), pHTN and SRINIVAS hospitalized with lethargy and facial droop. Head CT and MRI negative. Diagnosed with Bell's palsy by neuro, on prednisone and acyclovir x 1 week. Eye drops for left eye corneal abrasion, ?due to left facial palsy. Due to dysphagia on dysphagia 1 diet with nectar thickened liquids, ST eval and treat. Completed cefuroxime for UTI. Afib managed with metoprolol and apixaban. K replaced. On torsemide for CHF, sees cards Dr Marquez. RA, scleroderma, Raynaud's: holding methotrexate for now, resume infliximab outpatient. SRINIVAS but noncompliant with CPAP. Hypothyroid replaced with synthroid. GERD managed with PPI. To TCU for therapies.    Today reports doing well, feels droop and swallowing gradually improving. No headaches, dizziness, chest pain, dyspnea, bowel or bladder concerns. PTA lives in own home with , uses a walker at times. Discussed POLST: requests DNR status. Per EMR BP range 100-108/62-70 and sats 96% on room air.        CODE STATUS/ADVANCE DIRECTIVES DISCUSSION:  No CPR- Pre-arrest intubation OK  DNR  ALLERGIES:   Allergies   Allergen Reactions     No Known Allergies       PAST MEDICAL HISTORY:   Past Medical History:   Diagnosis Date     Amaurosis fugax 5-11    Right     Carotid artery stenosis      Esophageal reflux 3/11/2004     HELICOBACTER PYLORI INFECTION 7/11/2006     hx of CA in situ RT  breast-1990.mastectomy 4/17/2007     Hyperlipidemia LDL goal <70 6/20/2011     Lung cancer (H)     squamous cell lung ca left lower lobe     OBESITY NOS 3/11/2004     OSTEOPOROSIS NOS 3/11/2004     Other psoriasis 3/11/2004     Pulmonary hypertension (H) 04/15/2019    Right heart catheterization demonstrated moderate pulmonary arterial hypertension with a mean PA pressure of 34 mmHg, no reversibility with inhaled nitric oxide, elevated right and left-sided filling pressures, low normal cardiac index of 2.1.  Seen by Dr. Shay Marquez.  Started on sildenafil (Revatio) 20 mg 3 times daily.     RA (rheumatoid arthritis) (H) 2/8/2010     Raynaud's syndrome 4/10/2006     Scleroderma (H)      Sleep apnea     CPAP      PAST SURGICAL HISTORY:   has a past surgical history that includes NONSPECIFIC PROCEDURE (1990); Tonsillectomy; Repair hammer toe; Conization; Endarterectomy carotid (2011); COLONOSCOPY THRU STOMA, DIAGNOSTIC (2001 2011); colonoscopy (5/24/16); Breast surgery; Right Heart Cath (N/A, 4/15/2019); and Colonoscopy (N/A, 6/28/2019).  FAMILY HISTORY: family history includes Alzheimer Disease in her sister; Cancer in her nephew; Cancer (age of onset: 31) in her sister; Cancer - colorectal in her mother; Cerebrovascular Disease in her father and maternal grandmother; Diabetes in her maternal grandmother and maternal uncle; Gastrointestinal Disease in her brother, sister, and sister; Heart Disease in her sister; Mental Illness in her maternal grandmother.  SOCIAL HISTORY:   reports that she quit smoking about 29 years ago. Her smoking use included cigarettes. She started smoking about 59 years ago. She has a 30.00 pack-year smoking history. She has never used smokeless tobacco. She reports previous alcohol use. She reports that she does not use drugs.  Patient's living condition: lives with spouse    Post Discharge Medication Reconciliation Status: discharge medications reconciled and changed, per  note/orders  Current Outpatient Medications   Medication Sig     acetaminophen (TYLENOL) 500 MG tablet Take 1,000 mg by mouth 2 times daily      acyclovir (ZOVIRAX) 200 MG capsule Take 2 capsules (400 mg) by mouth 5 times daily for 6 days     albuterol (PROAIR HFA/PROVENTIL HFA/VENTOLIN HFA) 108 (90 Base) MCG/ACT inhaler USE 2 INHALATIONS ORALLY   INTO THE LUNGS EVERY 6  HOURS as needed     apixaban ANTICOAGULANT (ELIQUIS ANTICOAGULANT) 5 MG tablet Take 1 tablet (5 mg) by mouth 2 times daily     artificial tears OINT ophthalmic ointment Apply to left eye at bedtime. (Patient taking differently: At Bedtime Apply 1 cm ribbon to left eye at bedtime.)     carboxymethylcellulose PF (REFRESH PLUS) 0.5 % ophthalmic solution Place 1 drop Into the left eye every hour as needed for dry eyes     carboxymethylcellulose PF (REFRESH PLUS) 0.5 % ophthalmic solution Place 1 drop Into the left eye 4 times daily     cefuroxime (CEFTIN) 500 MG tablet Take 1 tablet (500 mg) by mouth 2 times daily for 3 days     folic acid (FOLVITE) 1 MG tablet Take 1 tablet (1,000 mcg) by mouth daily     levothyroxine (SYNTHROID/LEVOTHROID) 75 MCG tablet Take 1 tablet (75 mcg) by mouth daily     melatonin 5 MG tablet Take 5 mg by mouth nightly as needed for sleep     Methotrexate Sodium (METHOTREXATE PO) Take 2.5 mg by mouth 10 tablets weekly - Wednesdays     metoprolol succinate ER (TOPROL-XL) 25 MG 24 hr tablet Take 1 tablet (25 mg) by mouth daily     Multiple Vitamins-Minerals (ICAPS) CAPS Take 1 capsule by mouth 2 times daily      multivitamin (CENTRUM SILVER) tablet Take 1 tablet by mouth daily     omeprazole (PRILOSEC) 20 MG DR capsule TAKE 1 CAPSULE TWICE DAILY,30-60 MINUTES PRIOR TO     MEALS     potassium chloride ER (KLOR-CON M) 20 MEQ CR tablet Take 1 tablet (20 mEq) by mouth daily     Prednisolon-Gatiflox-Bromfenac 1-0.5-0.075 % SUSP Place 1 drop into the right eye daily     predniSONE (DELTASONE) 20 MG tablet Take 3 tablets (60 mg) by  mouth daily for 6 days     sildenafil (REVATIO) 20 MG tablet TAKE 1 TABLET (20 MG) BY MOUTH 3 TIMES DAILY     simvastatin (ZOCOR) 40 MG tablet Take 1 tablet (40 mg) by mouth At Bedtime     Skin Protectants, Misc. (EUCERIN) cream Apply topically 2 times daily Apply to areas dry skin topically two times a day for dry skin and Apply to areas of dry skin topically as needed for dry skin daily     torsemide (DEMADEX) 20 MG tablet Take 2 tablets (40 mg) by mouth daily Take 40mg q am     triamcinolone (KENALOG) 0.1 % external cream Apply topically 2 times daily     Vitamin D, Cholecalciferol, 10 MCG (400 UNIT) TABS Take 1,600 Units by mouth daily     InFLIXimab (REMICADE IV) Inject 400 mg into the vein Every 8 weeks     order for DME Equipment being ordered: 4 wheeled walker with seat (Patient not taking: Reported on 5/6/2021)     No current facility-administered medications for this visit.        ROS:  4 point ROS including Respiratory, CV, GI and , other than that noted in the HPI,  is negative    Vitals:  /70   Pulse 98   Temp 97.3  F (36.3  C)   Resp 18   Wt 64 kg (141 lb)   SpO2 96%   BMI 25.79 kg/m    Exam:  GENERAL APPEARANCE:  Alert, in no distress, pleasant, cooperative, oriented x 3  EYES:  EOM, lids, pupils and irises normal, sclera clear and conjunctiva normal, no discharge or mattering on lids or lashes noted  ENT:  Mouth left side droop, moist mucous membranes, nose normal without drainage or crusting, external ears without lesions, hearing acuity intact  NECK: supple, symmetrical, trachea midline  RESP:  respiratory effort normal, no chest wall tenderness, no respiratory distress, Lung sounds clear, patient is on room air  CV:  Auscultation of heart done, rate and rhythm controlled and regular, no murmur, no rub or gallop. Edema trace bilateral lower extremities  ABDOMEN:  normal bowel sounds, soft, nontender, no palpable masses.  M/S:   Gait and station not assessed, no tenderness or swelling  "of the joints  NEURO: no speech deficits and able to follow directions, moves all extremities symmetrically, left facial droop  PSYCH:  insight and judgement and memory appear at baseline, affect and mood normal     Lab/Diagnostic data:  Most Recent 3 CBC's:  Recent Labs   Lab Test 05/04/21 0600 05/02/21 2132 04/28/21  0952 03/22/21  1425   WBC 9.7 10.2  --  6.1   HGB 14.1 14.5 12.6 9.3*   MCV 86 85  --  86    233  --  169     Most Recent 3 BMP's:  Recent Labs   Lab Test 05/05/21  0539 05/04/21  1958 05/04/21  0600 05/02/21  2132 05/02/21  2132 04/15/21  1304   NA  --   --  140  --  136 138   POTASSIUM 4.2 4.5 3.6   < > 3.3* 3.5   CHLORIDE  --   --  104  --  95 102   CO2  --   --  31  --  34* 30   BUN  --   --  13  --  25 25   CR  --   --  0.60  --  0.82 0.83   ANIONGAP  --   --  5  --  7 6   TU  --   --  9.3  --  10.3* 9.4   GLC  --   --  89  --  84 101*    < > = values in this interval not displayed.     ASSESSMENT/PLAN:  Bell's palsy  Injury of conjunctiva and corneal abrasion of eye without foreign body, unspecified laterality, subsequent encounter  Acute onset. Continue acyclovir course, prednisone course as ordered. Artificial tears, refresh plus, prednisolone-gatiflox-bromfenac drops. Monitor progress, f/u with symptoms next visit.     Urinary tract infection without hematuria, site unspecified  Acute -complete Ceftin course, monitor.     Atrial fibrillation with RVR (H)  Pulmonary hypertension  HTN  Lymphedema  Chronic, appears compensated. Continue PTA Eliquis 5 mg BID, metoprolol 25 mg daily, Demadex 40 mg daily, Zocor 40 mg daily, revatio 20 mg TID. Check BMP next week. Monitor vs and wt and f/u with ranges next visit.     Rheumatoid arthritis  Scleroderma  Chronic. Per discharge notes \"holding methotrexate for now, resume infliximab outpatient\" but methotrexate listed as resumed on discharge med orders. Will hold infliximab while at TCU.     Dysphagia, unspecified type  Severe malnutrition " (H)  Acute on chronic, worsened with left facial droop. ST eval and treat. Dietary consult for nutritional supplements.     Hypokalemia  Chronic, continue KCL 20 mew daily. BMP check next week.     Generalized muscle weakness  Acute on chronic - therapies as ordered, f/u with progress next visit.     Advanced directives, counseling/discussion  Reviewed POLST- DNR status desired at this time.      Orders:  1. DNR code status  1. CBC, BMP on 5/10 diagnosis weakness  3. Change Icaps to I caps-MV tab 1 PO daily. discontinue MVI  4. Change combo eye drop Prednisolon-Gatiflox-Bromfenac to individually administered solutions same dose/frequency  5. discontinue probiotic, psyllium  6. Hold remicaide while at TCU.      Total unit/floor time of 36 minutes consisted of the following: Examination of patient, reviewing the record including pertinent labs and imaging, placing orders, and completing documentation.  More than 50% of this time (19 minutes) (>50%) was spent in coordination of care with the patient, nursing staff and other healthcare providers.   This time was spent on discussing the care plan including hospital course, hospital discharge recommendations, recent TCU course and concerns, medications, discharge/safe placement, specialty follow up need.  Time was also spent on discussing POLST and code status.       Time with patient included: Discussion of diagnostic and imaging test results, diagnosis and prognosis, overall goal and disposition plan.      -Medical decision making and discussions included:  Multiple clarifications of discharge medications made by pharmacy and requires over 10 minutes to clarify during our visit today  PT/OT restrictions to include up with assist  HTN, CHF management plan to include meds, vs and wt monitoring and BMP next week.     -Rx management plan discussion included:  Follow up needed with PCP after discharge  Disposition and discharge plan to include likely need for home care after  discharge  Medication changes to include: multiple clarifications as noted above  Patient education concerning POLST    -Time on communication of care included:  Updated RN, RN manager, HUC on above orders and POC    Electronically signed by:  AUBRIE Smith CNP

## 2021-05-06 NOTE — PROGRESS NOTES
[facility]  INITIAL VISIT NOTE  May 7, 2021    PRIMARY CARE PROVIDER AND CLINIC:  Carissa Burrough[address]ua [address] / [place] [address]    CHIEF COMPLAINT:  Hospital follow-up/Initial visit    HPI:    Jaqueline Canales is a 80 year old  (1940) female who was seen at [facility] on May 7, 2021 for an initial visit.     Medical history is notable for chronic HFpEF, permanent atrial fibrillation, chronic lower extremity lymphedema, pulmonary hypertension, aortic stenosis, MR/TR, hypertension, dyslipidemia, hypothyroidism, breast and lung cancer, rheumatoid arthritis, scleroderma, interstitial lung disease, GERD, obesity, and SRINIVAS.     Summary of hospital course:  Patient was hospitalized at [facility] from May 2 through May 5, 2021 after presenting with left facial droop and dysphagia.  She was diagnosed with left facial palsy (suspected Bell's palsy) as well as E. coli UTI.  Test for COVID-19 was negative.  EKG showed atrial fibrillation with RVR.  Brain imaging revealed no evidence for acute ischemia or hemorrhage.  There was nonspecific slight asymmetric enhancement of the left facial nerve as well as opacification of left mastoid activity.  CT angiography of head and neck demonstrated approximately 30% stenosis of the origin of the left internal carotid artery.  CBC and BMP were unremarkable.     Patient was seen by neurology and started on prednisone and oral acyclovir.  She was placed on dysphagia diet level 1 with nectar thickened liquids.  She was treated with IV ceftriaxone for UTI which was transitioned to oral cefuroxime on May 5.  Heart rate ultimately improved and she continued on PTA metoprolol XL 25 mg daily.     TCU was recommended by therapies.    Patient is admitted to this facility for medical management, nursing care, and rehab.     Of note, history was obtained from patient, facility RN, and extensive review of the chart necessitated by complex  hospitalization.    Today's visit:  Patient was seen in her room, while lying in bed.  She appears frail but comfortable.  She continues to have left facial droop and is on a modified diet for dysphagia.  She tapes her left eye at night.  She denies fever, chills, chest pain, palpitation, dyspnea, nausea, vomiting, abdominal pain, or urinary symptoms.  She reports that she had a bowel movement yesterday.    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, s/p radiation therapy  Right breast cancer, s/p mastectomy in 1990  Permanent atrial fibrillation, on apixaban  Chronic HFpEF, LV ejection fraction 60-65% by echo on March 22, 2021  Mildly decreased RV systolic function  Severe pulmonary hypertension  Moderate aortic stenosis  Moderately severe tricuspid and mitral regurgitation  Hypertension  Dyslipidemia  High-grade right internal carotid artery stenosis, s/p endarterectomy in July 2011  Hypothyroidism  Rheumatoid arthritis  Scleroderma  Interstitial lung disease  Raynaud syndrome  Chronic bilateral lower extremity lymphedema  GERD  SRINIVAS, noncompliant with CPAP  Psoriasis  Helicobacter pylori infection  Osteoporosis  UTI  Left facial nerve palsy in May 2021    Past Medical History:   Diagnosis Date     Amaurosis fugax 5-11    Right     Carotid artery stenosis      Esophageal reflux 3/11/2004     HELICOBACTER PYLORI INFECTION 7/11/2006     hx of CA in situ RT breast-1990.mastectomy 4/17/2007     Hyperlipidemia LDL goal <70 6/20/2011     Lung cancer (H)     squamous cell lung ca left lower lobe     OBESITY NOS 3/11/2004     OSTEOPOROSIS NOS 3/11/2004     Other psoriasis 3/11/2004     Pulmonary hypertension (H) 04/15/2019    Right heart catheterization demonstrated moderate pulmonary arterial hypertension with a mean PA pressure of 34 mmHg, no reversibility with inhaled nitric oxide, elevated right and left-sided filling pressures, low normal cardiac index of 2.1.  Seen  by Dr. Shay Marquez.  Started on sildenafil (Revatio) 20 mg 3 times daily.     RA (rheumatoid arthritis) (H) 2010     Raynaud's syndrome 4/10/2006     Scleroderma (H)      Sleep apnea     CPAP       PAST SURGICAL HISTORY:   Past Surgical History:   Procedure Laterality Date     BREAST SURGERY       COLONOSCOPY  15     COLONOSCOPY N/A 2019    Procedure: Colonoscopy, With Polypectomy And Biopsy;  Surgeon: Neto Kaminski MD;  Location:  GI     CONIZATION       CV RIGHT HEART CATH MEASUREMENTS RECORDED N/A 4/15/2019    Procedure: Heart Cath Right Heart Cath;  Surgeon: Naresh Cyr MD;  Location:  HEART CARDIAC CATH LAB     ENDARTERECTOMY CAROTID       HC COLONOSCOPY THRU STOMA, DIAGNOSTIC  2001    diverticulosis     REPAIR HAMMER TOE       TONSILLECTOMY       ZZC NONSPECIFIC PROCEDURE      mastectomy RT breast       FAMILY HISTORY:   Family History   Problem Relation Age of Onset     Cancer - colorectal Mother      Cerebrovascular Disease Father      Cancer Sister 31        ovarian     Heart Disease Sister      Gastrointestinal Disease Sister         crohns     Gastrointestinal Disease Sister         diverticulitis     Gastrointestinal Disease Brother         diverticulitis     Alzheimer Disease Sister      Cerebrovascular Disease Maternal Grandmother      Diabetes Maternal Grandmother      Mental Illness Maternal Grandmother      Diabetes Maternal Uncle      Cancer Nephew                SOCIAL HISTORY:  Patient is  and lives in a house.  She uses either a cane or walker for ambulation.    Social History     Tobacco Use     Smoking status: Former Smoker     Packs/day: 1.00     Years: 30.00     Pack years: 30.00     Types: Cigarettes     Start date:      Quit date: 3/11/1992     Years since quittin.1     Smokeless tobacco: Never Used   Substance Use Topics     Alcohol use: Not Currently     Alcohol/week: 0.0 standard drinks     Frequency: Never        MEDICATIONS:  Current Outpatient Medications   Medication Sig Dispense Refill     acetaminophen (TYLENOL) 500 MG tablet Take 1,000 mg by mouth 2 times daily        acyclovir (ZOVIRAX) 200 MG capsule Take 2 capsules (400 mg) by mouth 5 times daily for 6 days       albuterol (PROAIR HFA/PROVENTIL HFA/VENTOLIN HFA) 108 (90 Base) MCG/ACT inhaler USE 2 INHALATIONS ORALLY   INTO THE LUNGS EVERY 6  HOURS as needed 18 g 11     apixaban ANTICOAGULANT (ELIQUIS ANTICOAGULANT) 5 MG tablet Take 1 tablet (5 mg) by mouth 2 times daily 180 tablet 2     artificial tears OINT ophthalmic ointment Apply to left eye at bedtime. (Patient taking differently: At Bedtime Apply 1 cm ribbon to left eye at bedtime.)       carboxymethylcellulose PF (REFRESH PLUS) 0.5 % ophthalmic solution Place 1 drop Into the left eye every hour as needed for dry eyes       carboxymethylcellulose PF (REFRESH PLUS) 0.5 % ophthalmic solution Place 1 drop Into the left eye 4 times daily       cefuroxime (CEFTIN) 500 MG tablet Take 1 tablet (500 mg) by mouth 2 times daily for 3 days 6 tablet 0     folic acid (FOLVITE) 1 MG tablet Take 1 tablet (1,000 mcg) by mouth daily 90 tablet 3     InFLIXimab (REMICADE IV) Inject 400 mg into the vein Every 8 weeks       levothyroxine (SYNTHROID/LEVOTHROID) 75 MCG tablet Take 1 tablet (75 mcg) by mouth daily 90 tablet 2     melatonin 5 MG tablet Take 5 mg by mouth nightly as needed for sleep       Methotrexate Sodium (METHOTREXATE PO) Take 2.5 mg by mouth 10 tablets weekly - Wednesdays       metoprolol succinate ER (TOPROL-XL) 25 MG 24 hr tablet Take 1 tablet (25 mg) by mouth daily 90 tablet 2     Multiple Vitamins-Minerals (ICAPS) CAPS Take 1 capsule by mouth 2 times daily        multivitamin (CENTRUM SILVER) tablet Take 1 tablet by mouth daily       omeprazole (PRILOSEC) 20 MG DR capsule TAKE 1 CAPSULE TWICE DAILY,30-60 MINUTES PRIOR TO     MEALS 180 capsule 2     order for DME Equipment being ordered: 4 wheeled walker  with seat (Patient not taking: Reported on 5/6/2021) 1 each 3     potassium chloride ER (KLOR-CON M) 20 MEQ CR tablet Take 1 tablet (20 mEq) by mouth daily 90 tablet 2     Prednisolon-Gatiflox-Bromfenac 1-0.5-0.075 % SUSP Place 1 drop into the right eye daily       predniSONE (DELTASONE) 20 MG tablet Take 3 tablets (60 mg) by mouth daily for 6 days       sildenafil (REVATIO) 20 MG tablet TAKE 1 TABLET (20 MG) BY MOUTH 3 TIMES DAILY 90 tablet 5     simvastatin (ZOCOR) 40 MG tablet Take 1 tablet (40 mg) by mouth At Bedtime 90 tablet 3     Skin Protectants, Misc. (EUCERIN) cream Apply topically 2 times daily Apply to areas dry skin topically two times a day for dry skin and Apply to areas of dry skin topically as needed for dry skin daily       torsemide (DEMADEX) 20 MG tablet Take 2 tablets (40 mg) by mouth daily Take 40mg q am 180 tablet 3     triamcinolone (KENALOG) 0.1 % external cream Apply topically 2 times daily 30 g 3     Vitamin D, Cholecalciferol, 10 MCG (400 UNIT) TABS Take 1,600 Units by mouth daily         ALLERGIES:  Allergies   Allergen Reactions     No Known Allergies        ROS:  10 point ROS were negative other than the symptoms noted above in the HPI.    PHYSICAL EXAM:  Vital signs were reviewed in the chart.  Vital Signs: Blood pressure 107/68, heart rate 91, respiratory rate 18, temperature 97.4  F, saturation 97%, weight 141 LBS  General: Frail appearing but comfortable and in no acute distress  HEENT: Normocephalic; oropharynx clear  Cardiovascular: Normal S1, S2, irregularly irregular rhythm  Respiratory: Lungs clear to auscultation bilaterally  GI: Abdomen soft, non-tender, non-distended, +BS  Extremities: 1-2+ bilateral LE edema  Neuro: Left facial droop noted, she is unable to close her left eye' her speech is normal, there is no focal weakness in her extremities on examination  Psych: Alert and oriented x3; normal affect  Skin: No acute rash    LABORATORY/IMAGING DATA:    Most Recent 3  CBC's:  Recent Labs   Lab Test 05/04/21  0600 05/02/21 2132 04/28/21  0952 03/22/21  1425   WBC 9.7 10.2  --  6.1   HGB 14.1 14.5 12.6 9.3*   MCV 86 85  --  86    233  --  169     Most Recent 3 BMP's:  Recent Labs   Lab Test 05/05/21  0539 05/04/21 1958 05/04/21  0600 05/02/21  2132 05/02/21  2132 04/15/21  1304   NA  --   --  140  --  136 138   POTASSIUM 4.2 4.5 3.6   < > 3.3* 3.5   CHLORIDE  --   --  104  --  95 102   CO2  --   --  31  --  34* 30   BUN  --   --  13  --  25 25   CR  --   --  0.60  --  0.82 0.83   ANIONGAP  --   --  5  --  7 6   TU  --   --  9.3  --  10.3* 9.4   GLC  --   --  89  --  84 101*    < > = values in this interval not displayed.     Most Recent 2 LFT's:  Recent Labs   Lab Test 05/02/21 2132 03/22/21  1425   AST 25 41   ALT 18 27   ALKPHOS 78 97   BILITOTAL 1.6* 0.5     Most Recent 3 Troponin's:  Recent Labs   Lab Test 05/02/21 2132 11/25/19 1654 10/23/16  0533   TROPI <0.015 <0.015 <0.015  The 99th percentile for upper reference range is 0.045 ug/L.  Troponin values in   the range of 0.045 - 0.120 ug/L may be associated with risks of adverse   clinical events.       Most Recent 3 BNP's:  Recent Labs   Lab Test 04/15/21  1304 03/22/21  1425 06/26/20  0935 12/03/19  0624 12/03/19  0624 11/25/19  1654 10/22/16  1626 10/22/16  1626   NTBNPI  --   --   --   --  3,098* 3,936*  --  1,007   NTBNP 3,524* 4,485* 3,045*   < >  --   --    < >  --     < > = values in this interval not displayed.     Most Recent Cholesterol Panel:  Recent Labs   Lab Test 10/25/18  0759   CHOL 139   LDL 56   HDL 58   TRIG 125     Most Recent 6 Bacteria Isolates From Any Culture (See EPIC Reports for Culture Details):  Recent Labs   Lab Test 05/02/21  2217   CULT >100,000 colonies/mL  Escherichia coli  *  10,000 to 50,000 colonies/mL  Strain 2  Escherichia coli  Susceptibility testing in progress  *     Most Recent TSH and T4:  Recent Labs   Lab Test 07/26/19  0934 08/17/15  0909 08/17/15  0909   TSH 3.10    < > 4.53*   T4  --   --  0.96    < > = values in this interval not displayed.     Most Recent Hemoglobin A1c:  Recent Labs   Lab Test 05/02/21 2132   A1C 5.4     Most Recent 6 glucoses:  Recent Labs   Lab Test 05/04/21  0600 05/02/21  2132 04/15/21  1304 03/22/21  1425 06/26/20  0935 02/25/20  0858   GLC 89 84 101* 96 96 100     Most Recent Urinalysis:  Recent Labs   Lab Test 05/02/21 2217   COLOR Orange   APPEARANCE Cloudy   URINEGLC Negative   URINEBILI Negative   URINEKETONE 10*   SG 1.015   UBLD Small*   URINEPH 6.0   PROTEIN 100*   NITRITE Negative   LEUKEST Large*   RBCU 2   WBCU >182*       ASSESSMENT/PLAN:  Left facial nerve palsy, suspect Bell's palsy,  Patient is unable to close her left eye.  Plan:  Continue prednisone 60 mg p.o. daily through May 12, 2021  Continue acyclovir 400 mg p.o. 5 times a day through May 11, 2021   Continue to tape her left eye at night  Continue PT/OT evaluation and therapy    Dysphagia.  Suspect related to left facial palsy.  Plan:  Continue dysphagia diet level 1 with regular consistency  Continue SLP evaluation and therapy    E. coli UTI.  Patient was treated with IV ceftriaxone with subsequent transition to oral cefuroxime on May 5.  Plan:  Continue cefuroxime 500 mg p.o. twice daily through May 8, 2021 as per orders    Severe malnutrition.  Related to acute and chronic medical issues.  Plan:  Facility dietitian to evaluate the patient  Continue dietary supplement    Chronic HFpEF,  Chronic right-sided heart failure,  Severe pulmonary hypertension,  Moderate aortic stenosis,  Moderately severe mitral regurgitation,  Moderately severe tricuspid regurgitation,  Chronic bilateral lower extremity edema  Patient has chronic appearing 1-2+ bilateral lower extremity edema.  Plan:  Continue PTA metoprolol XL 50 mg p.o. daily and torsemide 40 mg p.o. daily  Continue PTA Revatio 20 mg p.o. 3 times daily   Continue albuterol inhaler, 2 puffs every 6 hours PRN  Monitor daily weight,  volume status, and lower extremity edema  Follow-up with Dr. Marquez as scheduled    Permanent atrial fibrillation with RVR.  Rate is currently controlled.  Plan:  Continue chronic anticoagulation therapy with apixaban 5 mg p.o. twice daily  Continue PTA metoprolol XL 25 mg p.o. daily  Continue to monitor heart rate     Benign essential hypertension.  Blood pressure is well controlled.  Plan:  Continue PTA metoprolol XL 25 mg p.o. daily, torsemide 40 mg p.o. daily  Monitor blood pressure    Dyslipidemia.  Plan:  Continue PTA simvastatin 40 mg p.o. at bedtime     Hypothyroidism.  Last TSH was 3.1 in July 2019.  Plan:  Continue PTA levothyroxine 75 mcg p.o. daily     Rheumatoid arthritis,  Scleroderma,  Pulmonary fibrosis.  She follows with Dr. Bustillos of rheumatology.  Plan:  Continue PTA methotrexate 25 mg p.o. every Wednesday  Resume Remicade 400 mg IV every 8 weeks after discharge from TCU  Follow up with Dr. Bustillos as scheduled     GERD.  Chronic.  Plan:  Continue PTA omeprazole 20 mg p.o. twice daily     Obstructive sleep apnea.  She is noncompliant and has not used her CPAP at least for the past 2 months.     Physical deconditioning.  Plan:  Continue PT/OT evaluation and therapy      Orders written by provider at facility:  None.          Electronically signed by:  Rosalva Lara MD

## 2021-05-07 ENCOUNTER — NURSING HOME VISIT (OUTPATIENT)
Dept: GERIATRICS | Facility: CLINIC | Age: 81
End: 2021-05-07
Payer: MEDICARE

## 2021-05-07 VITALS
WEIGHT: 144 LBS | SYSTOLIC BLOOD PRESSURE: 111 MMHG | RESPIRATION RATE: 18 BRPM | OXYGEN SATURATION: 97 % | HEART RATE: 87 BPM | BODY MASS INDEX: 26.34 KG/M2 | TEMPERATURE: 97.5 F | DIASTOLIC BLOOD PRESSURE: 65 MMHG

## 2021-05-07 DIAGNOSIS — I48.21 PERMANENT ATRIAL FIBRILLATION (H): ICD-10-CM

## 2021-05-07 DIAGNOSIS — R53.81 PHYSICAL DECONDITIONING: ICD-10-CM

## 2021-05-07 DIAGNOSIS — I10 ESSENTIAL HYPERTENSION: ICD-10-CM

## 2021-05-07 DIAGNOSIS — E43 SEVERE MALNUTRITION (H): ICD-10-CM

## 2021-05-07 DIAGNOSIS — G47.33 OSA (OBSTRUCTIVE SLEEP APNEA): ICD-10-CM

## 2021-05-07 DIAGNOSIS — E03.9 HYPOTHYROIDISM, UNSPECIFIED TYPE: ICD-10-CM

## 2021-05-07 DIAGNOSIS — E78.5 DYSLIPIDEMIA: ICD-10-CM

## 2021-05-07 DIAGNOSIS — R13.10 DYSPHAGIA, UNSPECIFIED TYPE: ICD-10-CM

## 2021-05-07 DIAGNOSIS — I27.20 PULMONARY HYPERTENSION (H): ICD-10-CM

## 2021-05-07 DIAGNOSIS — I50.32 CHRONIC HEART FAILURE WITH PRESERVED EJECTION FRACTION (H): ICD-10-CM

## 2021-05-07 DIAGNOSIS — G51.0 FACIAL NERVE PALSY: Primary | ICD-10-CM

## 2021-05-07 DIAGNOSIS — J84.10 PULMONARY FIBROSIS (H): ICD-10-CM

## 2021-05-07 DIAGNOSIS — M06.9 RHEUMATOID ARTHRITIS, INVOLVING UNSPECIFIED SITE, UNSPECIFIED WHETHER RHEUMATOID FACTOR PRESENT (H): ICD-10-CM

## 2021-05-07 LAB
BACTERIA SPEC CULT: ABNORMAL
BACTERIA SPEC CULT: ABNORMAL
GAMMA INTERFERON BACKGROUND BLD IA-ACNC: 0 IU/ML
Lab: ABNORMAL
M TB IFN-G CD4+ BCKGRND COR BLD-ACNC: 1.52 IU/ML
M TB TUBERC IFN-G BLD QL: NEGATIVE
MITOGEN IGNF BCKGRD COR BLD-ACNC: 0 IU/ML
MITOGEN IGNF BCKGRD COR BLD-ACNC: 0 IU/ML
SPECIMEN SOURCE: ABNORMAL

## 2021-05-07 PROCEDURE — 99305 1ST NF CARE MODERATE MDM 35: CPT | Mod: AI | Performed by: INTERNAL MEDICINE

## 2021-05-10 ENCOUNTER — HOSPITAL LABORATORY (OUTPATIENT)
Dept: OTHER | Facility: CLINIC | Age: 81
End: 2021-05-10

## 2021-05-10 LAB
ANION GAP SERPL CALCULATED.3IONS-SCNC: <1 MMOL/L (ref 3–14)
BUN SERPL-MCNC: 17 MG/DL (ref 7–30)
CALCIUM SERPL-MCNC: 9.1 MG/DL (ref 8.5–10.1)
CHLORIDE SERPL-SCNC: 95 MMOL/L (ref 94–109)
CO2 SERPL-SCNC: 39 MMOL/L (ref 20–32)
CREAT SERPL-MCNC: 0.65 MG/DL (ref 0.52–1.04)
ERYTHROCYTE [DISTWIDTH] IN BLOOD BY AUTOMATED COUNT: 26.9 % (ref 10–15)
GFR SERPL CREATININE-BSD FRML MDRD: 83 ML/MIN/{1.73_M2}
GLUCOSE SERPL-MCNC: 72 MG/DL (ref 70–99)
HCT VFR BLD AUTO: 39.6 % (ref 35–47)
HGB BLD-MCNC: 12.6 G/DL (ref 11.7–15.7)
MCH RBC QN AUTO: 27.3 PG (ref 26.5–33)
MCHC RBC AUTO-ENTMCNC: 31.8 G/DL (ref 31.5–36.5)
MCV RBC AUTO: 86 FL (ref 78–100)
PLATELET # BLD AUTO: 171 10E9/L (ref 150–450)
POTASSIUM SERPL-SCNC: 4.2 MMOL/L (ref 3.4–5.3)
RBC # BLD AUTO: 4.62 10E12/L (ref 3.8–5.2)
SODIUM SERPL-SCNC: 133 MMOL/L (ref 133–144)
WBC # BLD AUTO: 10.5 10E9/L (ref 4–11)

## 2021-05-11 ENCOUNTER — HOSPITAL LABORATORY (OUTPATIENT)
Dept: OTHER | Facility: CLINIC | Age: 81
End: 2021-05-11

## 2021-05-11 VITALS
DIASTOLIC BLOOD PRESSURE: 75 MMHG | WEIGHT: 142 LBS | OXYGEN SATURATION: 95 % | SYSTOLIC BLOOD PRESSURE: 124 MMHG | HEART RATE: 65 BPM | TEMPERATURE: 97.2 F | BODY MASS INDEX: 25.97 KG/M2 | RESPIRATION RATE: 18 BRPM

## 2021-05-11 LAB
SARS-COV-2 RNA RESP QL NAA+PROBE: NORMAL
SPECIMEN SOURCE: NORMAL

## 2021-05-12 ENCOUNTER — NURSING HOME VISIT (OUTPATIENT)
Dept: GERIATRICS | Facility: CLINIC | Age: 81
End: 2021-05-12
Payer: MEDICARE

## 2021-05-12 DIAGNOSIS — I50.810 RIGHT-SIDED HEART FAILURE, UNSPECIFIED HF CHRONICITY (H): ICD-10-CM

## 2021-05-12 DIAGNOSIS — R53.81 PHYSICAL DECONDITIONING: ICD-10-CM

## 2021-05-12 DIAGNOSIS — I34.0 SEVERE MITRAL REGURGITATION: ICD-10-CM

## 2021-05-12 DIAGNOSIS — I50.32 CHRONIC HEART FAILURE WITH PRESERVED EJECTION FRACTION (H): ICD-10-CM

## 2021-05-12 DIAGNOSIS — K21.00 GASTROESOPHAGEAL REFLUX DISEASE WITH ESOPHAGITIS WITHOUT HEMORRHAGE: ICD-10-CM

## 2021-05-12 DIAGNOSIS — I27.20 PULMONARY HYPERTENSION (H): ICD-10-CM

## 2021-05-12 DIAGNOSIS — R13.10 DYSPHAGIA, UNSPECIFIED TYPE: ICD-10-CM

## 2021-05-12 DIAGNOSIS — M06.9 RHEUMATOID ARTHRITIS, INVOLVING UNSPECIFIED SITE, UNSPECIFIED WHETHER RHEUMATOID FACTOR PRESENT (H): ICD-10-CM

## 2021-05-12 DIAGNOSIS — J84.10 PULMONARY FIBROSIS (H): ICD-10-CM

## 2021-05-12 DIAGNOSIS — B96.20 E. COLI UTI: ICD-10-CM

## 2021-05-12 DIAGNOSIS — G47.33 OSA (OBSTRUCTIVE SLEEP APNEA): ICD-10-CM

## 2021-05-12 DIAGNOSIS — I10 BENIGN ESSENTIAL HYPERTENSION: ICD-10-CM

## 2021-05-12 DIAGNOSIS — E78.5 DYSLIPIDEMIA: ICD-10-CM

## 2021-05-12 DIAGNOSIS — I48.21 PERMANENT ATRIAL FIBRILLATION (H): ICD-10-CM

## 2021-05-12 DIAGNOSIS — G51.0 FACIAL NERVE PALSY: Primary | ICD-10-CM

## 2021-05-12 DIAGNOSIS — I07.1 SEVERE TRICUSPID REGURGITATION: ICD-10-CM

## 2021-05-12 DIAGNOSIS — E03.9 HYPOTHYROIDISM, UNSPECIFIED TYPE: ICD-10-CM

## 2021-05-12 DIAGNOSIS — M34.9 SCLERODERMA (H): ICD-10-CM

## 2021-05-12 DIAGNOSIS — I35.0 MODERATE AORTIC STENOSIS: ICD-10-CM

## 2021-05-12 DIAGNOSIS — N39.0 E. COLI UTI: ICD-10-CM

## 2021-05-12 DIAGNOSIS — R60.0 BILATERAL LOWER EXTREMITY EDEMA: ICD-10-CM

## 2021-05-12 DIAGNOSIS — E43 SEVERE MALNUTRITION (H): ICD-10-CM

## 2021-05-12 PROCEDURE — 99309 SBSQ NF CARE MODERATE MDM 30: CPT | Performed by: NURSE PRACTITIONER

## 2021-05-12 NOTE — PROGRESS NOTES
[facility]    Chief Complaint   Patient presents with     RECHECK     HPI:  Jaqueline Canales is a 80 year old  (1940), who is being seen today for an episodic care visit at: [facility] DEO ([facility]) [[id number]].     Per recent TCU provider progress notes:  80 year old female PMHx  RA, scleroderma, AF on apixaban, CHF, cardiac valve disease (AS, MR and TR), pHTN and SRINIVAS hospitalized with lethargy and facial droop. Head CT and MRI negative. Diagnosed with Bell's palsy by neuro, on prednisone and acyclovir x 1 week. Eye drops for left eye corneal abrasion, ?due to left facial palsy. Due to dysphagia on dysphagia 1 diet with nectar thickened liquids, ST eval and treat. Completed cefuroxime for UTI. Afib managed with metoprolol and apixaban. K replaced. On torsemide for CHF, sees cards Dr Marquez. RA, scleroderma, Raynaud's: holding methotrexate for now, resume infliximab outpatient. SRINIVAS but noncompliant with CPAP. Hypothyroid replaced with synthroid. GERD managed with PPI. To TCU for therapies.    Today's concern is:  Today reports doing well, feels droop and swallowing gradually improving. No headaches, dizziness, chest pain, dyspnea, bowel or bladder concerns. Per EMR BP range /63-77 and sats 95% on room air. Therapies report walks 150 ft with 2WW and CGA and SLUMS 16/30.     Allergies, and PMH/PSH reviewed in EPIC today.  REVIEW OF SYSTEMS:  4 point ROS including Respiratory, CV, GI and , other than that noted in the HPI,  is negative    Objective:   /75   Pulse 65   Temp 97.2  F (36.2  C)   Resp 18   Wt 64.4 kg (142 lb)   SpO2 95%   BMI 25.97 kg/m    Exam is limited due to COVID-19 precautions  GENERAL APPEARANCE:  Alert, in no distress, cooperative  ENT:  Mouth normal, moist mucous membranes, normal hearing acuity  EYES:  Conjunctiva and lids normal  RESP:  no respiratory distress, on room air  CV: no LE edema  NEURO:   Left facial droop, speech clear  PSYCH:   oriented X 3, affect and mood normal       Most Recent 3 CBC's:  Recent Labs   Lab Test 05/10/21  0752 05/04/21  0600 05/02/21 2132   WBC 10.5 9.7 10.2   HGB 12.6 14.1 14.5   MCV 86 86 85    159 233     Most Recent 3 BMP's:  Recent Labs   Lab Test 05/10/21  0752 05/05/21  0539 05/04/21 1958 05/04/21 0600 05/02/21 2132 05/02/21 2132     --   --  140  --  136   POTASSIUM 4.2 4.2 4.5 3.6   < > 3.3*   CHLORIDE 95  --   --  104  --  95   CO2 39*  --   --  31  --  34*   BUN 17  --   --  13  --  25   CR 0.65  --   --  0.60  --  0.82   ANIONGAP <1*  --   --  5  --  7   TU 9.1  --   --  9.3  --  10.3*   GLC 72  --   --  89  --  84    < > = values in this interval not displayed.       Assessment/Plan:  Left facial nerve palsy, suspect Bell's palsy  Physical deconditioning   Acute onset, improving. Completing prednisone course today. Acyclovir course finished. Monitor. Therapies as ordered.     Dysphagia  Ongoing, continue ST eval and treat and dysphagia diet level 1 with regular consistency    E. coli UTI  Resolved, no further symptoms.     Severe malnutrition  Related to acute and chronic medical issues. Weight stable. Dietary input appreciated.     Chronic HFpEF  Chronic right-sided heart failure  Severe pulmonary hypertension  Moderate aortic stenosis  Moderately severe mitral regurgitation  Moderately severe tricuspid regurgitation  Chronic bilateral lower extremity edema  Chronic and stable. Continue PTA metoprolol XL 50 mg p.o. daily and torsemide 40 mg p.o. daily, PTA Revatio 20 mg p.o. 3 times daily, albuterol inhaler 2 puffs every 6 hours PRN. Monitor vs and wt and review ranges next visit.     Permanent atrial fibrillation with RVR  Stable, continue apixaban 5 mg p.o. twice daily and PTA metoprolol XL 25 mg p.o. daily     Benign essential hypertension  Chronic, well managed with PTA metoprolol XL 25 mg p.o. daily, torsemide 40 mg p.o. daily. Review vs ranges next visit.     Dyslipidemia  Continue  PTA simvastatin 40 mg p.o. at bedtime     Hypothyroidism  Continue PTA levothyroxine 75 mcg p.o. daily     Rheumatoid arthritis  Scleroderma  Pulmonary fibrosis  Continue PTA methotrexate 25 mg p.o. every Wednesday and Remicade 400 mg IV every 8 weeks after discharge from TCU     GERD  Chronic. Continue PTA omeprazole 20 mg p.o. twice daily     Obstructive sleep apnea  Noncompliant with CPAP.     Orders:  No new orders    Electronically signed by: AUBRIE Smith CNP

## 2021-05-18 ENCOUNTER — DISCHARGE SUMMARY NURSING HOME (OUTPATIENT)
Dept: GERIATRICS | Facility: CLINIC | Age: 81
End: 2021-05-18
Payer: MEDICARE

## 2021-05-18 VITALS
RESPIRATION RATE: 20 BRPM | TEMPERATURE: 97.5 F | HEART RATE: 73 BPM | SYSTOLIC BLOOD PRESSURE: 96 MMHG | WEIGHT: 141.2 LBS | BODY MASS INDEX: 25.83 KG/M2 | DIASTOLIC BLOOD PRESSURE: 65 MMHG | OXYGEN SATURATION: 94 %

## 2021-05-18 DIAGNOSIS — G47.33 OSA (OBSTRUCTIVE SLEEP APNEA): ICD-10-CM

## 2021-05-18 DIAGNOSIS — R53.81 PHYSICAL DECONDITIONING: ICD-10-CM

## 2021-05-18 DIAGNOSIS — I27.20 PULMONARY HYPERTENSION (H): ICD-10-CM

## 2021-05-18 DIAGNOSIS — E43 SEVERE MALNUTRITION (H): ICD-10-CM

## 2021-05-18 DIAGNOSIS — E03.9 HYPOTHYROIDISM, UNSPECIFIED TYPE: ICD-10-CM

## 2021-05-18 DIAGNOSIS — K21.00 GASTROESOPHAGEAL REFLUX DISEASE WITH ESOPHAGITIS WITHOUT HEMORRHAGE: ICD-10-CM

## 2021-05-18 DIAGNOSIS — G51.0 BELL'S PALSY: ICD-10-CM

## 2021-05-18 DIAGNOSIS — I07.1 SEVERE TRICUSPID REGURGITATION: ICD-10-CM

## 2021-05-18 DIAGNOSIS — R13.10 DYSPHAGIA, UNSPECIFIED TYPE: ICD-10-CM

## 2021-05-18 DIAGNOSIS — M34.9 SCLERODERMA (H): ICD-10-CM

## 2021-05-18 DIAGNOSIS — I10 BENIGN ESSENTIAL HYPERTENSION: ICD-10-CM

## 2021-05-18 DIAGNOSIS — N39.0 E. COLI UTI: ICD-10-CM

## 2021-05-18 DIAGNOSIS — I50.32 CHRONIC HEART FAILURE WITH PRESERVED EJECTION FRACTION (H): ICD-10-CM

## 2021-05-18 DIAGNOSIS — M06.9 RHEUMATOID ARTHRITIS, INVOLVING UNSPECIFIED SITE, UNSPECIFIED WHETHER RHEUMATOID FACTOR PRESENT (H): ICD-10-CM

## 2021-05-18 DIAGNOSIS — G51.0 FACIAL NERVE PALSY: Primary | ICD-10-CM

## 2021-05-18 DIAGNOSIS — I50.810 RIGHT-SIDED HEART FAILURE, UNSPECIFIED HF CHRONICITY (H): ICD-10-CM

## 2021-05-18 DIAGNOSIS — I48.21 PERMANENT ATRIAL FIBRILLATION (H): ICD-10-CM

## 2021-05-18 DIAGNOSIS — G51.0 BELL'S PALSY: Primary | ICD-10-CM

## 2021-05-18 DIAGNOSIS — J84.10 PULMONARY FIBROSIS (H): ICD-10-CM

## 2021-05-18 DIAGNOSIS — I34.0 SEVERE MITRAL REGURGITATION: ICD-10-CM

## 2021-05-18 DIAGNOSIS — E78.5 DYSLIPIDEMIA: ICD-10-CM

## 2021-05-18 DIAGNOSIS — I35.0 MODERATE AORTIC STENOSIS: ICD-10-CM

## 2021-05-18 DIAGNOSIS — B96.20 E. COLI UTI: ICD-10-CM

## 2021-05-18 DIAGNOSIS — R60.0 BILATERAL LOWER EXTREMITY EDEMA: ICD-10-CM

## 2021-05-18 PROCEDURE — 99316 NF DSCHRG MGMT 30 MIN+: CPT | Performed by: NURSE PRACTITIONER

## 2021-05-18 RX ORDER — CARBOXYMETHYLCELLULOSE SODIUM 5 MG/ML
1 SOLUTION/ DROPS OPHTHALMIC 4 TIMES DAILY
Qty: 70 EACH | Refills: 0 | Status: SHIPPED | OUTPATIENT
Start: 2021-05-18 | End: 2021-05-26 | Stop reason: ALTCHOICE

## 2021-05-18 RX ORDER — PREDNISOLONE ACETATE 10 MG/ML
1 SUSPENSION/ DROPS OPHTHALMIC DAILY
COMMUNITY
End: 2021-05-18

## 2021-05-18 RX ORDER — GATIFLOXACIN 5 MG/ML
1 SOLUTION/ DROPS OPHTHALMIC DAILY
COMMUNITY
End: 2021-05-18

## 2021-05-18 RX ORDER — PREDNISOLONE ACETATE 10 MG/ML
1 SUSPENSION/ DROPS OPHTHALMIC DAILY
Qty: 5 ML | Refills: 0 | Status: SHIPPED | OUTPATIENT
Start: 2021-05-18 | End: 2021-05-26 | Stop reason: ALTCHOICE

## 2021-05-18 RX ORDER — BROMFENAC SODIUM 0.7 MG/ML
1 SOLUTION/ DROPS OPHTHALMIC DAILY
Status: ON HOLD | COMMUNITY
End: 2022-11-10

## 2021-05-18 RX ORDER — BROMFENAC 0.76 MG/ML
1 SOLUTION/ DROPS OPHTHALMIC DAILY
Qty: 5 ML | Refills: 0 | Status: SHIPPED | OUTPATIENT
Start: 2021-05-18 | End: 2021-05-26

## 2021-05-18 RX ORDER — GATIFLOXACIN 5 MG/ML
1 SOLUTION/ DROPS OPHTHALMIC DAILY
Qty: 2.5 ML | Refills: 0 | Status: SHIPPED | OUTPATIENT
Start: 2021-05-18 | End: 2021-05-26

## 2021-05-18 NOTE — PROGRESS NOTES
[facility] GERIATRIC SERVICES DISCHARGE SUMMARY  PATIENT'S NAME: Jaqueline Canales  YOB: 1940  MEDICAL RECORD NUMBER:  [id number]  Place of Service where encounter took place:  [facility] [[id number]]    PRIMARY CARE PROVIDER AND CLINIC RESPONSIBLE AFTER TRANSFER:   Carissa Burroughs MD, [address] / [place] [address]    [facility] Provider     Transferring providers: AUBRIE Smith CNP, Dr. Jane MD  Recent Hospitalization/ED:  [facility] stay 5/2/21 to 5/5/21.  Date of SNF Admission: 5/5/21  Date of SNF (anticipated) Discharge: May 18, 2021  Discharged to: previous independent home  Cognitive Scores: SLUMS: 16/30  Physical Function: Ambulating 200 ft with 2ww  DME: No new DME needed    CODE STATUS/ADVANCE DIRECTIVES DISCUSSION:  No CPR- Pre-arrest intubation OK   ALLERGIES: No known allergies    NURSING FACILITY COURSE   Medication Changes/Rationale:     none    Summary of nursing facility stay:   80 year old female PMHx  RA, scleroderma, AF on apixaban, CHF, cardiac valve disease (AS, MR and TR), pHTN and SRINIVAS hospitalized with lethargy and facial droop. Head CT and MRI negative. Diagnosed with Bell's palsy by neuro, on prednisone and acyclovir x 1 week. Eye drops for left eye corneal abrasion, ?due to left facial palsy. Due to dysphagia on dysphagia 1 diet with nectar thickened liquids, ST eval and treat. Completed cefuroxime for UTI. Afib managed with metoprolol and apixaban. K replaced. On torsemide for CHF, sees cards Dr Marquez. RA, scleroderma, Raynaud's: holding methotrexate for now, resume infliximab outpatient. SRINIVAS but noncompliant with CPAP. Hypothyroid replaced with synthroid. GERD managed with PPI. To TCU for therapies.    Today reports doing well, feels droop and swallowing gradually improving. No headaches, dizziness, chest pain, dyspnea, bowel or bladder concerns. Per EMR BP range /65-77 and sats 92% on room air. Ready  to discharge home today with HC as ordered below.     Left facial nerve palsy, suspect Bell's palsy  Physical deconditioning   Acute onset, improving. Completed prednisone. Acyclovir course finished. Monitor. F/U as outpatient PRN ongoing symptoms. Home with homecare as ordered below.     Dysphagia  Ongoing, continue ST eval and treat and dysphagia diet level 1 with regular consistency    E. coli UTI  Resolved, no further symptoms.     Severe malnutrition  Related to acute and chronic medical issues. Weight stable. Monitor and encourage intake    Chronic HFpEF  Chronic right-sided heart failure  Severe pulmonary hypertension  Moderate aortic stenosis  Moderately severe mitral regurgitation  Moderately severe tricuspid regurgitation  Chronic bilateral lower extremity edema  Chronic and stable. Continue PTA metoprolol XL 50 mg p.o. daily and torsemide 40 mg p.o. daily, PTA Revatio 20 mg p.o. 3 times daily, albuterol inhaler 2 puffs every 6 hours PRN. Card f/u per home routine.    Permanent atrial fibrillation with RVR  Stable, continue apixaban 5 mg p.o. twice daily and PTA metoprolol XL 25 mg p.o. daily     Benign essential hypertension  Chronic, well managed with PTA metoprolol XL 25 mg p.o. daily, torsemide 40 mg p.o. daily.     Dyslipidemia  Continue PTA simvastatin 40 mg p.o. at bedtime     Hypothyroidism  Continue PTA levothyroxine 75 mcg p.o. daily     Rheumatoid arthritis  Scleroderma  Pulmonary fibrosis  Continue PTA methotrexate 25 mg p.o. every Wednesday and Remicade 400 mg IV every 8 weeks after discharge from TCU     GERD  Chronic. Continue PTA omeprazole 20 mg p.o. twice daily     Obstructive sleep apnea  Noncompliant with CPAP.     Discharge Medications:  Current Outpatient Medications   Medication Sig Dispense Refill     acetaminophen (TYLENOL) 500 MG tablet Take 1,000 mg by mouth 2 times daily        acyclovir (ZOVIRAX) 200 MG capsule Take 2 capsules (400 mg) by mouth 5 times daily for 6 days        albuterol (PROAIR HFA/PROVENTIL HFA/VENTOLIN HFA) 108 (90 Base) MCG/ACT inhaler USE 2 INHALATIONS ORALLY   INTO THE LUNGS EVERY 6  HOURS as needed 18 g 11     apixaban ANTICOAGULANT (ELIQUIS ANTICOAGULANT) 5 MG tablet Take 1 tablet (5 mg) by mouth 2 times daily 180 tablet 2     artificial tears OINT ophthalmic ointment Apply to left eye at bedtime. (Patient taking differently: At Bedtime Apply 1 cm ribbon to left eye at bedtime.)       carboxymethylcellulose PF (REFRESH PLUS) 0.5 % ophthalmic solution Place 1 drop Into the left eye every hour as needed for dry eyes       carboxymethylcellulose PF (REFRESH PLUS) 0.5 % ophthalmic solution Place 1 drop Into the left eye 4 times daily       folic acid (FOLVITE) 1 MG tablet Take 1 tablet (1,000 mcg) by mouth daily 90 tablet 3     InFLIXimab (REMICADE IV) Inject 400 mg into the vein Every 8 weeks       levothyroxine (SYNTHROID/LEVOTHROID) 75 MCG tablet Take 1 tablet (75 mcg) by mouth daily 90 tablet 2     melatonin 5 MG tablet Take 5 mg by mouth nightly as needed for sleep       Methotrexate Sodium (METHOTREXATE PO) Take 2.5 mg by mouth 10 tablets weekly - Wednesdays       metoprolol succinate ER (TOPROL-XL) 25 MG 24 hr tablet Take 1 tablet (25 mg) by mouth daily 90 tablet 2     Multiple Vitamins-Minerals (ICAPS) CAPS Take 1 capsule by mouth 2 times daily        multivitamin (CENTRUM SILVER) tablet Take 1 tablet by mouth daily       omeprazole (PRILOSEC) 20 MG DR capsule TAKE 1 CAPSULE TWICE DAILY,30-60 MINUTES PRIOR TO     MEALS 180 capsule 2     order for DME Equipment being ordered: 4 wheeled walker with seat (Patient not taking: Reported on 5/6/2021) 1 each 3     potassium chloride ER (KLOR-CON M) 20 MEQ CR tablet Take 1 tablet (20 mEq) by mouth daily 90 tablet 2     Prednisolon-Gatiflox-Bromfenac 1-0.5-0.075 % SUSP Place 1 drop into the right eye daily       sildenafil (REVATIO) 20 MG tablet TAKE 1 TABLET (20 MG) BY MOUTH 3 TIMES DAILY 90 tablet 5     simvastatin  (ZOCOR) 40 MG tablet Take 1 tablet (40 mg) by mouth At Bedtime 90 tablet 3     Skin Protectants, Misc. (EUCERIN) cream Apply topically 2 times daily Apply to areas dry skin topically two times a day for dry skin and Apply to areas of dry skin topically as needed for dry skin daily       torsemide (DEMADEX) 20 MG tablet Take 2 tablets (40 mg) by mouth daily Take 40mg q am 180 tablet 3     triamcinolone (KENALOG) 0.1 % external cream Apply topically 2 times daily 30 g 3     Vitamin D, Cholecalciferol, 10 MCG (400 UNIT) TABS Take 1,600 Units by mouth daily         Controlled medications:   not applicable/none     Past Medical History:   Past Medical History:   Diagnosis Date     Amaurosis fugax 5-11    Right     Carotid artery stenosis      Esophageal reflux 3/11/2004     HELICOBACTER PYLORI INFECTION 7/11/2006     hx of CA in situ RT breast-1990.mastectomy 4/17/2007     Hyperlipidemia LDL goal <70 6/20/2011     Lung cancer (H)     squamous cell lung ca left lower lobe     OBESITY NOS 3/11/2004     OSTEOPOROSIS NOS 3/11/2004     Other psoriasis 3/11/2004     Pulmonary hypertension (H) 04/15/2019    Right heart catheterization demonstrated moderate pulmonary arterial hypertension with a mean PA pressure of 34 mmHg, no reversibility with inhaled nitric oxide, elevated right and left-sided filling pressures, low normal cardiac index of 2.1.  Seen by Dr. Shay Marquez.  Started on sildenafil (Revatio) 20 mg 3 times daily.     RA (rheumatoid arthritis) (H) 2/8/2010     Raynaud's syndrome 4/10/2006     Scleroderma (H)      Sleep apnea     CPAP     Physical Exam:   Vitals: BP 96/65   Pulse 73   Temp 97.5  F (36.4  C)   Resp 20   Wt 64 kg (141 lb 3.2 oz)   SpO2 94%   BMI 25.83 kg/m    BMI= Body mass index is 25.83 kg/m .  Exam is limited due to COVID-19 precautions  GENERAL APPEARANCE:  Alert, in no distress, cooperative  ENT:  Mouth normal, moist mucous membranes, normal hearing acuity  EYES:  Conjunctiva and lids  normal  RESP:  no respiratory distress, on room air and LSC  CV: no LE edema, HRR  NEURO:   Left facial droop, speech clear  PSYCH:  oriented X 3, affect and mood normal     SNF labs:   Most Recent 3 CBC's:  Recent Labs   Lab Test 05/10/21  0752 05/04/21  0600 05/02/21  2132   WBC 10.5 9.7 10.2   HGB 12.6 14.1 14.5   MCV 86 86 85    159 233     Most Recent 3 BMP's:  Recent Labs   Lab Test 05/10/21  0752 05/05/21  0539 05/04/21 1958 05/04/21 0600 05/02/21 2132 05/02/21 2132     --   --  140  --  136   POTASSIUM 4.2 4.2 4.5 3.6   < > 3.3*   CHLORIDE 95  --   --  104  --  95   CO2 39*  --   --  31  --  34*   BUN 17  --   --  13  --  25   CR 0.65  --   --  0.60  --  0.82   ANIONGAP <1*  --   --  5  --  7   TU 9.1  --   --  9.3  --  10.3*   GLC 72  --   --  89  --  84    < > = values in this interval not displayed.     Most Recent TSH and T4:  Recent Labs   Lab Test 07/26/19  0934 08/17/15  0909 08/17/15  0909   TSH 3.10   < > 4.53*   T4  --   --  0.96    < > = values in this interval not displayed.       DISCHARGE PLAN:    Follow up labs: No labs orders/due    Medical Follow Up:      Follow up with primary care provider in 2-3 weeks    Current [place] scheduled appointments:     Future Appointments   Date Time Provider Department Center   6/3/2021 11:00 AM Shay Marquez MD [facility]          Discharge Services: Home Care:  Occupational Therapy, Physical Therapy, Registered Nurse, Home Health Aide,  and From:  [facility]    Discharge Instructions Verbalized to Patient at Discharge:     None    TOTAL DISCHARGE TIME:   Greater than 30 minutes  Electronically signed by:  AUBRIE Smith CNP     Home care Face to Face documentation done in [facility] attached to Home care orders for [facility].

## 2021-05-19 ENCOUNTER — PATIENT OUTREACH (OUTPATIENT)
Dept: CARE COORDINATION | Facility: CLINIC | Age: 81
End: 2021-05-19

## 2021-05-19 DIAGNOSIS — Z71.89 OTHER SPECIFIED COUNSELING: ICD-10-CM

## 2021-05-19 NOTE — PROGRESS NOTES
Clinic Care Coordination Contact  [facility] Received order today for CC upon chart review patient is still in the TCU at this time. Patient was discharged from [facility] on 5/5 and was transferred to TCU. [facility] CHW scheduled TCU follow up with CC RN

## 2021-05-20 ENCOUNTER — TELEPHONE (OUTPATIENT)
Dept: FAMILY MEDICINE | Facility: CLINIC | Age: 81
End: 2021-05-20

## 2021-05-20 NOTE — TELEPHONE ENCOUNTER
[facility] nurse called for verbal OK for HC orders;     OK'd requested orders.  Orders will be faxed to PCP for review and signature.   Angie Masterson RN

## 2021-05-24 ENCOUNTER — TELEPHONE (OUTPATIENT)
Dept: FAMILY MEDICINE | Facility: CLINIC | Age: 81
End: 2021-05-24

## 2021-05-24 NOTE — TELEPHONE ENCOUNTER
ST completed evaluation of the oral mechanism and swallow function. The client is demonstrating mild oral phase dysphagia secondary to her reduced dentition. The client was able to swallow all consistencies of solids and liquids with no s/sx of aspiration or penetration. ST upgraded diet to regular solids and thin liquids.    The client does demonstrate some concerns for weight loss. Recommend a referral to a dietician for education re increasing calories via clinic encounter if possible.    Rodger Underwood MS, CCC-SLP

## 2021-05-26 ENCOUNTER — OFFICE VISIT (OUTPATIENT)
Dept: FAMILY MEDICINE | Facility: CLINIC | Age: 81
End: 2021-05-26
Payer: MEDICARE

## 2021-05-26 VITALS
HEIGHT: 62 IN | SYSTOLIC BLOOD PRESSURE: 107 MMHG | TEMPERATURE: 96.8 F | BODY MASS INDEX: 25.76 KG/M2 | WEIGHT: 140 LBS | DIASTOLIC BLOOD PRESSURE: 72 MMHG | OXYGEN SATURATION: 94 % | HEART RATE: 64 BPM

## 2021-05-26 DIAGNOSIS — G51.0 FACIAL NERVE PALSY: ICD-10-CM

## 2021-05-26 DIAGNOSIS — I48.19 PERSISTENT ATRIAL FIBRILLATION (H): ICD-10-CM

## 2021-05-26 DIAGNOSIS — I50.32 CHRONIC DIASTOLIC HEART FAILURE (H): ICD-10-CM

## 2021-05-26 DIAGNOSIS — R60.0 BILATERAL LEG EDEMA: ICD-10-CM

## 2021-05-26 DIAGNOSIS — I27.20 PULMONARY HYPERTENSION (H): ICD-10-CM

## 2021-05-26 DIAGNOSIS — M79.2 NERVE PAIN: Primary | ICD-10-CM

## 2021-05-26 DIAGNOSIS — E78.5 HYPERLIPIDEMIA LDL GOAL <100: ICD-10-CM

## 2021-05-26 PROCEDURE — 99495 TRANSJ CARE MGMT MOD F2F 14D: CPT | Performed by: INTERNAL MEDICINE

## 2021-05-26 RX ORDER — GABAPENTIN 100 MG/1
100 CAPSULE ORAL 3 TIMES DAILY
Qty: 90 CAPSULE | Refills: 11 | Status: SHIPPED | OUTPATIENT
Start: 2021-05-26 | End: 2022-03-08

## 2021-05-26 RX ORDER — GABAPENTIN 100 MG/1
100 CAPSULE ORAL 3 TIMES DAILY
Qty: 90 CAPSULE | Refills: 11 | Status: SHIPPED | OUTPATIENT
Start: 2021-05-26 | End: 2021-05-26

## 2021-05-26 ASSESSMENT — MIFFLIN-ST. JEOR: SCORE: 1053.29

## 2021-05-26 NOTE — PROGRESS NOTES
Donavon Parsons is a 80 year old who presents for the following health issues     HPI       Hospital Follow-up Visit:    Hospital/Nursing Home/IP Rehab Facility:  Assisted Living   Date of Admission: May 05 2021  Date of Discharge: May 18 2021  Reason(s) for Admission:      Facial nerve palsy  Dysphagia, unspecified type  E. coli UTI  Severe malnutrition (H)  Chronic heart failure with preserved ejection fraction (H)  Right-sided heart failure, unspecified HF chronicity (H)  Pulmonary hypertension (H)  Moderate aortic stenosis  Severe mitral regurgitation  Severe tricuspid regurgitation  Bilateral lower extremity edema  Permanent atrial fibrillation (H)  Benign essential hypertension  Dyslipidemia  Hypothyroidism, unspecified type  Rheumatoid arthritis, involving unspecified site, unspecified whether rheumatoid factor present (H)  Scleroderma (H)  Pulmonary fibrosis (H)  Gastroesophageal reflux disease with esophagitis without hemorrhage  SRINIVAS (obstructive sleep apnea)  Physical deconditioning          Was your hospitalization related to COVID-19? No   Problems taking medications regularly:  None  Medication changes since discharge: None  Problems adhering to non-medication therapy:  None    Summary of hospitalization:  [place] hospital discharge summary reviewed  Diagnostic Tests/Treatments reviewed.  Follow up needed: cardiology  Other Healthcare Providers Involved in Patient s Care:         Homecare  Update since discharge: improved.  Post Discharge Medication Reconciliation: discharge medications reconciled and changed, per note/orders.  Plan of care communicated with patient and family            Donavon Parsons is a 80 year old who presents for the following health issues     HPI       Chief Complaint:     Post hospital discharge follow of recent neuropathic pain symptoms due to recent facial nerve palsy      HPI:   Patient Jaqueline Canales is a very pleasant 80 year old female  with history of CHF, pulmonary hypertension, iron deficiency anemia who presents to Internal Medicine clinic today for post hospital, rehab facility discharge follow up of recent facial nerve palsy neuropathic pain symptoms. Facial nerve palsy symptoms have improved.   although neuropathic pain symptoms are not well controlled on tylenol pain medication. Regarding the patient's chronic diastolic CHF due to pulmonary hypertension, the patient is compliant with her daily diuretic therapy with Torsemide. no chest pains. No signs of current further acute CHF exacerbation symptoms at this time. Leg lymphedema symptoms have been stable. Patient is scheduled for follow up with CHF clinic at the [facility] clinic in [place] going forward. No fever or chills. Patient is due for a repeat Hgb, hematocrit labs for monitoring of iron deficiency anemia.      Current Medications:     Current Outpatient Medications   Medication Sig Dispense Refill     acetaminophen (TYLENOL) 500 MG tablet Take 1,000 mg by mouth 2 times daily        albuterol (PROAIR HFA/PROVENTIL HFA/VENTOLIN HFA) 108 (90 Base) MCG/ACT inhaler USE 2 INHALATIONS ORALLY   INTO THE LUNGS EVERY 6  HOURS as needed 18 g 11     apixaban ANTICOAGULANT (ELIQUIS ANTICOAGULANT) 5 MG tablet Take 1 tablet (5 mg) by mouth 2 times daily 180 tablet 2     bromfenac (PROLENSA) 0.07 % ophthalmic solution Place 1 drop into the right eye daily       folic acid (FOLVITE) 1 MG tablet Take 1 tablet (1,000 mcg) by mouth daily 90 tablet 3     gabapentin (NEURONTIN) 100 MG capsule Take 1 capsule (100 mg) by mouth 3 times daily 90 capsule 11     InFLIXimab (REMICADE IV) Inject 400 mg into the vein Every 8 weeks       levothyroxine (SYNTHROID/LEVOTHROID) 75 MCG tablet Take 1 tablet (75 mcg) by mouth daily 90 tablet 2     melatonin 5 MG tablet Take 5 mg by mouth nightly as needed for sleep       Methotrexate Sodium (METHOTREXATE PO) Take 2.5 mg by mouth 10 tablets weekly - Wednesdays        metoprolol succinate ER (TOPROL-XL) 25 MG 24 hr tablet Take 1 tablet (25 mg) by mouth daily 90 tablet 2     Multiple Vitamins-Minerals (ICAPS) CAPS Take 1 capsule by mouth 2 times daily        multivitamin (CENTRUM SILVER) tablet Take 1 tablet by mouth daily       omeprazole (PRILOSEC) 20 MG DR capsule TAKE 1 CAPSULE TWICE DAILY,30-60 MINUTES PRIOR TO     MEALS 180 capsule 2     order for DME Equipment being ordered: 4 wheeled walker with seat 1 each 3     potassium chloride ER (KLOR-CON M) 20 MEQ CR tablet Take 1 tablet (20 mEq) by mouth daily 90 tablet 2     sildenafil (REVATIO) 20 MG tablet TAKE 1 TABLET (20 MG) BY MOUTH 3 TIMES DAILY 90 tablet 5     simvastatin (ZOCOR) 40 MG tablet Take 1 tablet (40 mg) by mouth At Bedtime 90 tablet 3     Skin Protectants, Misc. (EUCERIN) cream Apply topically 2 times daily Apply to areas dry skin topically two times a day for dry skin and Apply to areas of dry skin topically as needed for dry skin daily       torsemide (DEMADEX) 20 MG tablet Take 2 tablets (40 mg) by mouth daily Take 40mg q am 180 tablet 3     triamcinolone (KENALOG) 0.1 % external cream Apply topically 2 times daily 30 g 3     Vitamin D, Cholecalciferol, 10 MCG (400 UNIT) TABS Take 1,600 Units by mouth daily           Allergies:      Allergies   Allergen Reactions     No Known Allergies             Past Medical History:     Past Medical History:   Diagnosis Date     Amaurosis fugax 5-11    Right     Carotid artery stenosis      Esophageal reflux 3/11/2004     HELICOBACTER PYLORI INFECTION 7/11/2006     hx of CA in situ RT breast-1990.mastectomy 4/17/2007     Hyperlipidemia LDL goal <70 6/20/2011     Lung cancer (H)     squamous cell lung ca left lower lobe     OBESITY NOS 3/11/2004     OSTEOPOROSIS NOS 3/11/2004     Other psoriasis 3/11/2004     Pulmonary hypertension (H) 04/15/2019    Right heart catheterization demonstrated moderate pulmonary arterial hypertension with a mean PA pressure of 34 mmHg, no  reversibility with inhaled nitric oxide, elevated right and left-sided filling pressures, low normal cardiac index of 2.1.  Seen by Dr. Shay Marquez.  Started on sildenafil (Revatio) 20 mg 3 times daily.     RA (rheumatoid arthritis) (H) 2010     Raynaud's syndrome 4/10/2006     Scleroderma (H)      Sleep apnea     CPAP         Past Surgical History:     Past Surgical History:   Procedure Laterality Date     BREAST SURGERY       COLONOSCOPY  15     COLONOSCOPY N/A 2019    Procedure: Colonoscopy, With Polypectomy And Biopsy;  Surgeon: Neto Kaminski MD;  Location:  GI     CONIZATION       CV RIGHT HEART CATH MEASUREMENTS RECORDED N/A 4/15/2019    Procedure: Heart Cath Right Heart Cath;  Surgeon: Naresh Cyr MD;  Location:  HEART CARDIAC CATH LAB     ENDARTERECTOMY CAROTID       HC COLONOSCOPY THRU STOMA, DIAGNOSTIC  2001    diverticulosis     REPAIR HAMMER TOE       TONSILLECTOMY       ZZC NONSPECIFIC PROCEDURE      mastectomy RT breast         Family Medical History:     Family History   Problem Relation Age of Onset     Cancer - colorectal Mother      Cerebrovascular Disease Father      Cancer Sister 31        ovarian     Heart Disease Sister      Gastrointestinal Disease Sister         crohns     Gastrointestinal Disease Sister         diverticulitis     Gastrointestinal Disease Brother         diverticulitis     Alzheimer Disease Sister      Cerebrovascular Disease Maternal Grandmother      Diabetes Maternal Grandmother      Mental Illness Maternal Grandmother      Diabetes Maternal Uncle      Cancer Nephew                  Social History:     Social History     Socioeconomic History     Marital status:      Spouse name: Not on file     Number of children: Not on file     Years of education: Not on file     Highest education level: Not on file   Occupational History     Not on file   Social Needs     Financial resource strain: Not hard at all      Food insecurity     Worry: Not on file     Inability: Not on file     Transportation needs     Medical: No     Non-medical: No   Tobacco Use     Smoking status: Former Smoker     Packs/day: 1.00     Years: 30.00     Pack years: 30.00     Types: Cigarettes     Start date:      Quit date: 3/11/1992     Years since quittin.2     Smokeless tobacco: Never Used   Substance and Sexual Activity     Alcohol use: Not Currently     Alcohol/week: 0.0 standard drinks     Frequency: Never     Drug use: No     Sexual activity: Not Currently     Partners: Male   Lifestyle     Physical activity     Days per week: Not on file     Minutes per session: Not on file     Stress: Not on file   Relationships     Social connections     Talks on phone: Not on file     Gets together: More than three times a week     Attends Mosque service: Not on file     Active member of club or organization: Not on file     Attends meetings of clubs or organizations: Not on file     Relationship status:      Intimate partner violence     Fear of current or ex partner: Not on file     Emotionally abused: Not on file     Physically abused: Not on file     Forced sexual activity: Not on file   Other Topics Concern     Parent/sibling w/ CABG, MI or angioplasty before 65F 55M? No   Social History Narrative     Not on file           Review of System:     Constitutional: Negative for fever or chills  Skin: Negative for rashes  Ears/Nose/Throat: Negative for nasal congestion, sore throat  Respiratory: No shortness of breath, dyspnea on exertion, cough, or hemoptysis  Cardiovascular: Negative for chest pain  Gastrointestinal: Negative for nausea, vomiting, positive for chronic GERD  Genitourinary: Negative for dysuria, hematuria  Musculoskeletal: Negative for myalgias  Neurologic: Negative for headaches, positive for recent neuropathic pain symptoms still present due to recent left sided facial nerve palsy  Psychiatric: Negative for depression,  "anxiety  Hematologic/Lymphatic/Immunologic: positive for improvement in chronic bilateral leg edema symptoms with recent diuresis treatment  Endocrine: Negative  Behavioral: Negative for tobacco use       Physical Exam:   /72 (BP Location: Left arm, Patient Position: Sitting, Cuff Size: Adult Regular)   Pulse 64   Temp 96.8  F (36  C) (Temporal)   Ht 1.575 m (5' 2\")   Wt 63.5 kg (140 lb)   SpO2 94%   BMI 25.61 kg/m      GENERAL: chronically ill appearing elderly female, alert and no distress  EYES: eyes grossly normal to inspection, and conjunctivae and sclerae normal  HENT: Normocephalic atraumatic. Nose and mouth without ulcers or lesions  NECK: supple  RESP: lungs clear to auscultation   CV: regular rate and rhythm, normal S1 S2  LYMPH: further mild improvement in chronic bilateral lower leg peripheral edema symptoms present when compared to prior baseline  ABDOMEN: nondistended  MS: no gross musculoskeletal defects noted  SKIN: no suspicious lesions or rashes  NEURO: Alert & Oriented x 3. Neuropathic pain symptoms still present due to recent facial nerve palsy  PSYCH: mentation appears normal, affect normal        Diagnostic Test Results:     Last Comprehensive Metabolic Panel:  Sodium   Date Value Ref Range Status   05/10/2021 133 133 - 144 mmol/L Final     Potassium   Date Value Ref Range Status   05/10/2021 4.2 3.4 - 5.3 mmol/L Final     Chloride   Date Value Ref Range Status   05/10/2021 95 94 - 109 mmol/L Final     Carbon Dioxide   Date Value Ref Range Status   05/10/2021 39 (H) 20 - 32 mmol/L Final     Anion Gap   Date Value Ref Range Status   05/10/2021 <1 (L) 3 - 14 mmol/L Final     Glucose   Date Value Ref Range Status   05/10/2021 72 70 - 99 mg/dL Final     Urea Nitrogen   Date Value Ref Range Status   05/10/2021 17 7 - 30 mg/dL Final     Creatinine   Date Value Ref Range Status   05/10/2021 0.65 0.52 - 1.04 mg/dL Final     GFR Estimate   Date Value Ref Range Status   05/10/2021 83 >60 " mL/min/[1.73_m2] Final     Comment:     Non  GFR Calc  Starting 12/18/2018, serum creatinine based estimated GFR (eGFR) will be   calculated using the Chronic Kidney Disease Epidemiology Collaboration   (CKD-EPI) equation.       Calcium   Date Value Ref Range Status   05/10/2021 9.1 8.5 - 10.1 mg/dL Final     Hemoglobin   Date Value Ref Range Status   05/10/2021 12.6 11.7 - 15.7 g/dL Final   05/04/2021 14.1 11.7 - 15.7 g/dL Final         ASSESSMENT/PLAN:   (M79.2) Nerve pain   (primary encounter diagnosis)  (G51.0) Facial nerve palsy  Comment: post hospital, rehab facility discharge follow up of recent facial nerve palsy neuropathic pain symptoms. Facial nerve palsy symptoms have improved.   although neuropathic pain symptoms are not well controlled on tylenol pain medication  Plan: gabapentin (NEURONTIN) 100 MG capsule,        (I27.20) Pulmonary hypertension (H)  (I50.32) Chronic diastolic heart failure (H)  (R60.0) Bilateral leg edema  Comment: stable. Patient is compliant with low salt diet. Patient is compliant with her daily diuretic therapy. no chest pains. No signs of current further acute CHF exacerbation symptoms at this time. Leg lymphedema symptoms have been stable. Patient is home bound due to CHF and requires home healthcare.  Plan: continue patient's current diuretic medications at this time. continue to follow up with CHF clinic at the [facility] clinic in [place] going forward.   Continue home healthcare.     (E78.5) Hyperlipidemia LDL goal <100  Comment: stable  Plan: continue current therapy    (I48.19) Persistent atrial fibrillation (H)  Comment: stable  Plan: continue current therapy      Follow Up Plan:     Patient is instructed to return to Internal Medicine clinic for follow-up visit in 1 month.        Carissa Burroughs MD  Internal Medicine  [facility]

## 2021-06-01 ENCOUNTER — TELEPHONE (OUTPATIENT)
Dept: FAMILY MEDICINE | Facility: CLINIC | Age: 81
End: 2021-06-01

## 2021-06-01 NOTE — TELEPHONE ENCOUNTER
Guadalupe from [facility] home care     requesting OT orders - did eval     Requesting ongoing visits - 1x/6 weeks, home safety, ADLs, cognition, and functional mobility     Verbal given     Carmen PORTER RN

## 2021-06-02 ENCOUNTER — PATIENT OUTREACH (OUTPATIENT)
Dept: NURSING | Facility: CLINIC | Age: 81
End: 2021-06-02
Payer: MEDICARE

## 2021-06-02 NOTE — PROGRESS NOTES
Clinic Care Coordination Contact  Care Team Conversations    DYLON OCNTRERAS received a call from Guadalupe Fernandez, RN with Home Care. Guadalupe shared some concerns for pt's cognitive functioning. She stated that she is unsure is this is her baseline or if there with be improvement with Home Care. At this time, pt is still [facility] Guadalupe observed that pt's  is experiencing medical concerns himself. Pt is showing some difficulty with her medications. She is using a pill box but she will take from the incorrect days. So far, she has been taking 2x/day as she is prescribed just from incorrect days. Guadalupe is uncertain if there are safety concerns in the home at this time. OT will be working with pt for continued support in this area and will be doing a kitchen evaluation because pt stated some cooking at home.    Guadalupe shared that typically they will refer pts to [facility] for a driving assessment but typically this is suggested by PCP rather than Home Care.    Pt has shared that she and  have lived in home for 40 years and they have helpful neighbors. Guadalupe believes they have no children but some nieces/nephews.    Plan: DYLON CONTRERAS will outreach to pt to discuss overall wellbeing. DYLON CONTRERAS will offer to support with making a PCP appointment. DYLON CONTRERAS requested OT to continue providing progress updates.    Yumiko Tejeda, [facility]  Clinic Care Coordinator  [facility] Duyen Crittenden  [facility]  [phone]  [email or web address]

## 2021-06-02 NOTE — PROGRESS NOTES
Clinic Care Coordination Contact    Follow Up Progress Note      Assessment: DYLON CONTRERAS spoke with pt regarding her overall wellbeing. She shared that she continues to work with Home Care therapies but she feels like it is too much time. She explained the nearly everyday someone is coming to the home. She can get in and out of the tub on her own due to the [facility] setting up grab bars. She doesn't feel like the HHA is really necessary. DYLON CONTRERAS encouraged her to speak with Home Care to discuss her concerns about frequency of visits, she was agreeable to this.    Pt shared that both she has  are driving. At this time, she drives to  to [facility] but she waits in the car.    DYLON CONTRERAS inquired if she would like support in scheduling follow up appointment with PCP per his recommendation. DYLON CONTRERAS connected pt to scheduling and she made appointment for 6/30.    Plan: At this time, pt denies outstanding need for connection or referral to resources or assistance navigating recommended follow up care. No further outreaches will be made at this time unless a new referral is made or a change in the pt's status occurs. Patient was provided with DYLON CONTRERAS contact information and encouraged to call with any questions or concerns.    Yumiko Tejeda [facility]  Clinic Care Coordinator  [facility] Duyen Towner  [facility]  [phone]  [email or web address]

## 2021-06-03 ENCOUNTER — VIRTUAL VISIT (OUTPATIENT)
Dept: CARDIOLOGY | Facility: CLINIC | Age: 81
End: 2021-06-03
Attending: INTERNAL MEDICINE
Payer: MEDICARE

## 2021-06-03 ENCOUNTER — TELEPHONE (OUTPATIENT)
Dept: FAMILY MEDICINE | Facility: CLINIC | Age: 81
End: 2021-06-03

## 2021-06-03 DIAGNOSIS — I27.20 PULMONARY HYPERTENSION (H): ICD-10-CM

## 2021-06-03 DIAGNOSIS — R06.09 DOE (DYSPNEA ON EXERTION): ICD-10-CM

## 2021-06-03 PROCEDURE — 99213 OFFICE O/P EST LOW 20 MIN: CPT | Mod: 95 | Performed by: INTERNAL MEDICINE

## 2021-06-03 NOTE — NURSING NOTE
Med Reconcile: Reviewed and verified all current medications with the patient. The updated medication list was printed and given to the patient.  Diet: Patient instructed regarding a heart healthy diet, including discussion of reduced fat and sodium intake. Patient demonstrated understanding of this information and agreed to call with further questions or concerns.  Return Appointment: Patient given instructions regarding scheduling next clinic visit. Patient demonstrated understanding of this information and agreed to call with further questions or concerns.  Patient stated she understood all health information given and agreed to call with further questions or concerns.     Pts plan of care per Shay Marquez MD:    3 month follow up.  Message sent to Sebastian and orders placed in the chart. Saritha Liriano RN on 6/3/2021 at 2:51 PM

## 2021-06-03 NOTE — TELEPHONE ENCOUNTER
"Carmen nurse with [facility] called clinic.  Reports patient complaining of ongoing jaw pain (facial nerve palsy pain).   Asking for Rx pain med.  States Dr Burroughs \"mentioned\" a medication at last OV and patient is now requesting that med.     5/26/21 OV notes:   M79.2) Nerve pain   (primary encounter diagnosis)  (G51.0) Facial nerve palsy  Comment: post hospital, rehab facility discharge follow up of recent facial nerve palsy neuropathic pain symptoms. Facial nerve palsy symptoms have improved.   although neuropathic pain symptoms are not well controlled on tylenol pain medication  Plan: gabapentin (NEURONTIN) 100 MG capsule    Confirmed Rx Gabapentin 100mg cap sent/recieved at [facility] mail order on 5/26/21.     HC nurse was not aware of that Rx.  And not sure if patient has received the med and if she is or isn't taking the med?    HC nurse will check with patient and return call with update to triage.     Angie BRUNER, RN      Nancy 3, 2021  11:31 AM      "

## 2021-06-03 NOTE — PROGRESS NOTES
Service Date: 2021    Carissa Burroughs MD  [address], [address]  [place] [address]    RE:      Jaqueline Canales  MRN:  [id number]  :   1940    Dear Dr. Burroughs:      I had the pleasure of seeing your patient, Jaqueline Canales, for followup.    We reviewed her recent history that shows an admission for semi-acute left facial weakness which was presumed to be secondary to Bell palsy.  Her interim history is dominated by her continuing pain and discomfort with this.  She takes Tylenol which temporarily relieves the pain but it returns.    She is now walking with a walker because of poor balance.    She had previous iron deficiency anemia which was replaced by Injectafer in a parenteral form.  She felt better after having received this.  She had been previously instructed to see you for evaluation of why she was losing iron.    She has no interim history of paroxysmal nocturnal dyspnea.  She has stable dependent edema.  She has no interim history of chest pain, tightness, heaviness, paroxysmal nocturnal dyspnea or orthopnea.    MEDICATIONS:  Reviewed and include:  1.  Albuterol.    2.  Apixaban for atrial fibrillation.    3.  Folic acid.    4.  Neurontin 100 mg 3 times per day.    5.  Remicade.    6.  Levothyroxine.    7.  Melatonin.    8.  Methotrexate.    9.  Metoprolol.    10.  Multivitamins.    From a cardiovascular point of view, she appears to be stable.    I did suggest to her that she talk with you about medication alternatives to better control her pain if possible.  Increasing gabapentin might be an option, although  in view of its fatigue and perhaps further interference with her balance problems.  I suggested that either you or a neurologist would be able to better advise her regarding this.    Thank you very much for allowing me to see her.    Sincerely,    Shay Marquez MD        D: 2021   T: 2021   MT: kelsie    Name:     JAQUELINE CANALES  MRN:       [id number]        Account:      [id number]   :      1940           Service Date: 2021       Document: [id number]

## 2021-06-03 NOTE — LETTER
6/3/2021    Carissa Burroughs MD  [address]  [facility] [address]    RE: Jaqueline Canales       Dear Colleague,    I had the pleasure of seeing Jaqueline Canales in the [facility].      See dictated note x 25 minutes with patient permission.              Service Date: 2021    Carissa Burroughs MD  [facility] - [address], [address]  [place] [address]    RE:      Jaqueline Canales  MRN:  [id number]  :   1940    Dear Dr. Burroughs:      I had the pleasure of seeing your patient, Jaqueline Canales, for followup.    We reviewed her recent history that shows an admission for semi-acute left facial weakness which was presumed to be secondary to Bell palsy.  Her interim history is dominated by her continuing pain and discomfort with this.  She takes Tylenol which temporarily relieves the pain but it returns.    She is now walking with a walker because of poor balance.    She had previous iron deficiency anemia which was replaced by Injectafer in a parenteral form.  She felt better after having received this.  She had been previously instructed to see you for evaluation of why she was losing iron.    She has no interim history of paroxysmal nocturnal dyspnea.  She has stable dependent edema.  She has no interim history of chest pain, tightness, heaviness, paroxysmal nocturnal dyspnea or orthopnea.    MEDICATIONS:  Reviewed and include:  1.  Albuterol.    2.  Apixaban for atrial fibrillation.    3.  Folic acid.    4.  Neurontin 100 mg 3 times per day.    5.  Remicade.    6.  Levothyroxine.    7.  Melatonin.    8.  Methotrexate.    9.  Metoprolol.    10.  Multivitamins.    From a cardiovascular point of view, she appears to be stable.    I did suggest to her that she talk with you about medication alternatives to better control her pain if possible.  Increasing gabapentin might be an option, although *** in view of its fatigue and perhaps  further interference with her balance problems.  I suggested that either you or a neurologist would be able to better advise her regarding this.    Thank you very much for allowing me to see her.    Sincerely,    Shay Marquez MD        D: 2021   T: 2021   MT: kelsie    Name:     GINA MCKEON  MRN:      [id number]        Account:      [id number]   :      1940           Service Date: 2021       Document: [id number]    Thank you for allowing me to participate in the care of your patient.      Sincerely,     Shay Marquez MD     [facility]    cc:   Shay Marquez MD  [address] [facility]  [place] [address]

## 2021-06-03 NOTE — PATIENT INSTRUCTIONS
Medication Changes:  No medication changes at this time. Please continue current medication regiment.    Patient Instructions:  1. Continue staying active and eat a heart healthy diet.    2. Please keep current list of medications with you at all times.    3. Remember to weigh yourself daily after voiding and before you consume any food or beverages and log the numbers.  If you have gained 2 pounds overnight or 5 pounds in a week contact us immediately for medication adjustments or further instructions.    4. **Please call us immediately if you have any syncope (fainting or passing out), chest pain, edema (swelling or weight gain), or decline in your functional status (general decline in how you are feeling).    Follow up Appointment Information:  3 month follow up      For scheduling at [facility] please call [phone]  For scheduling at the [facility] please call [phone]  We are located on the second floor [address] at the [facility].  Our address is     [address] [place] [address]   [address]  [place] [address]    Thank you for allowing us to be a part of your care here at the [facility]    If you have questions or concerns please contact us at:    Tomer Garcia, RN, BSN      Nurse Coordinator       Pulmonary Hypertension     [facility]   (Phone)[phone]  (Fax)[phone]    ** Please note that you will NOT receive a reminder call regarding your scheduled testing, reminder calls are for provider appointments only.  If you are scheduled for testing within the [facility] system you may receive a call regarding pre-registration for billing purposes only.**     Remember to weigh yourself daily after voiding and before you consume any food or beverages and log the numbers.  If you have gained/lost 2 pounds overnight or 5 pounds in a week contact us immediately for medication adjustments or further instructions.    Support Group:  Pulmonary Hypertension  Association  Https://www.phassociation.org/  **Look at the Events Tab** They even have Support Groups that you can call into    [facility] Support Group  Second Saturday of the Month from 1-3 PM   [facility] [address]  Leader: Eleni Galloway  Phone: [phone] or [phone]  Email: [email or web address]

## 2021-06-03 NOTE — LETTER
6/3/2021       RE: Jaqueline Canales  [address] [facility] [address]     Dear Colleague,    Thank you for referring your patient, Jaqueline Canales, to the [facility] [place] at [facility]. Please see a copy of my visit note below.      See dictated note x 25 minutes with patient permission.              Service Date: 2021    Carissa Burroughs MD  [address], [address]  [place] [address]    RE:      Jaqueline Canales  MRN:  [id number]  :   1940    Dear Dr. Burroughs:      I had the pleasure of seeing your patient, Jaqueline Canales, for followup.    We reviewed her recent history that shows an admission for semi-acute left facial weakness which was presumed to be secondary to Bell palsy.  Her interim history is dominated by her continuing pain and discomfort with this.  She takes Tylenol which temporarily relieves the pain but it returns.    She is now walking with a walker because of poor balance.    She had previous iron deficiency anemia which was replaced by Injectafer in a parenteral form.  She felt better after having received this.  She had been previously instructed to see you for evaluation of why she was losing iron.    She has no interim history of paroxysmal nocturnal dyspnea.  She has stable dependent edema.  She has no interim history of chest pain, tightness, heaviness, paroxysmal nocturnal dyspnea or orthopnea.    MEDICATIONS:  Reviewed and include:  1.  Albuterol.    2.  Apixaban for atrial fibrillation.    3.  Folic acid.    4.  Neurontin 100 mg 3 times per day.    5.  Remicade.    6.  Levothyroxine.    7.  Melatonin.    8.  Methotrexate.    9.  Metoprolol.    10.  Multivitamins.    From a cardiovascular point of view, she appears to be stable.    I did suggest to her that she talk with you about medication alternatives to better control her pain if possible.  Increasing gabapentin might  be an option, although  in view of its fatigue and perhaps further interference with her balance problems.  I suggested that either you or a neurologist would be able to better advise her regarding this.    Thank you very much for allowing me to see her.    Sincerely,    Shay Marquez MD        D: 2021   T: 2021   MT: kelsie    Name:     GINA MCKEON  MRN:      [id number]        Account:      [id number]   :      1940           Service Date: 2021       Document: [id number]        Again, thank you for allowing me to participate in the care of your patient.      Sincerely,    Shay Marquez MD

## 2021-06-04 ENCOUNTER — TELEPHONE (OUTPATIENT)
Dept: FAMILY MEDICINE | Facility: CLINIC | Age: 81
End: 2021-06-04

## 2021-06-04 NOTE — TELEPHONE ENCOUNTER
Reason for Call:  Home Health Care    Carmen with [facility] called regarding (reason for call):     Orders are needed for this patient.     Skilled Nursing: 1x4 weeks; pain assessment and teaching and starting new meds      Phone Number Homecare Nurse can be reached at: [phone]    Can we leave a detailed message on this number? YES    Phone number patient can be reached at: [phone]    Best Time: Any      Call taken on 6/4/2021 at 1:37 PM by Crista Leon

## 2021-06-04 NOTE — TELEPHONE ENCOUNTER
Patient Contact    Attempt # 1 - called patient     Was call answered?  No.  Left message on voicemail with information to call back.    Carmen PORTER RN

## 2021-06-04 NOTE — TELEPHONE ENCOUNTER
Returned call. OK'd requested orders.  Orders will be faxed to PCP for review and signature.   Angie Masterson RN

## 2021-06-09 NOTE — TELEPHONE ENCOUNTER
HC nurse called on 6/4/21 for orders Skilled Nursing: 1x4 weeks; pain assessment and teaching and starting new meds.      Lisset Ingram RN  [facility]

## 2021-06-15 ENCOUNTER — TELEPHONE (OUTPATIENT)
Dept: FAMILY MEDICINE | Facility: CLINIC | Age: 81
End: 2021-06-15

## 2021-06-15 NOTE — TELEPHONE ENCOUNTER
Reason for Call:  Verbal Order Request for 1 additional P/T visit    Detailed comments: Would like a verbal order for one additional P/T visit for reassessment for week of 6/20 secondary to cancelled P/T visit on 6/16 per patient request.    Phone Number to be reached at: [phone]    Best Time: anytime    Can we leave a detailed message on this number? YES    Call taken on 6/15/2021 at 4:16 PM by Shelby Harper

## 2021-06-29 ENCOUNTER — NURSE TRIAGE (OUTPATIENT)
Dept: FAMILY MEDICINE | Facility: CLINIC | Age: 81
End: 2021-06-29

## 2021-06-29 NOTE — TELEPHONE ENCOUNTER
PRIYANK- Pt has OV scheduled 06/30/2021.    Jeri with [facility] called to report respiration rate of 28 during visit today. Weight was up 143.9 lbs today and last week 140.9. Pt did report one episode of vomiting prior to visit with with OT. /62 unclear of O2 sats since she has soliz.      Pt was called. Admits swelling of her legs left side is worse, denies dizziness, chest pain or breathing problems during call.  Pt cancelled appt with HC nurse today since she is not clear if all homecare staff comes in for the same thing. No nausea or vomiting after OT visit. Triage advised she seek care at ED if any breathing problems, chest pain , vomiting, weakness today. Pt has OV tomorrow she may keep appt as scheduled if no worsening symptoms. Pt verbalized agreement with plan.      Reason for Disposition    Patient wants to be seen    Additional Information    Negative: Sounds like a life-threatening emergency to the triager    Negative: Chest pain    Negative: Small area of swelling and followed an insect bite to the area    Negative: Followed a knee injury    Negative: Ankle or foot injury    Negative: Pregnant with leg swelling or edema    Negative: Difficulty breathing at rest    Negative: Entire foot is cool or blue in comparison to other side    Negative: SEVERE swelling (e.g., swelling extends above knee, entire leg is swollen, weeping fluid)    Negative: Thigh or calf pain and only 1 side and present > 1 hour    Negative: Thigh, calf, or ankle swelling in only one leg    Negative: Thigh, calf, or ankle swelling in both legs, but one side is definitely more swollen (Exception: longstanding difference between legs)    Negative: Cast on leg or ankle and has increasing pain    Negative: Can't walk or can barely stand (new onset)    Negative: Fever and red area (or area very tender to touch)    Negative: Patient sounds very sick or weak to the triager    Negative: Swelling of face, arm or hands  (Exception: slight puffiness of fingers during hot weather)    Negative: Pregnant > 20 weeks and sudden weight gain (i.e., > 2 lbs, 1 kg in one week)    Negative: MODERATE swelling of both ankles (e.g., swelling extends up to the knees) AND new onset or worsening    Negative: Difficulty breathing with exertion AND worsening or new onset    Negative: Looks like a boil, infected sore, deep ulcer, or other infected rash (spreading redness, pus)    Protocols used: LEG SWELLING AND EDEMA-A-OH

## 2021-06-29 NOTE — TELEPHONE ENCOUNTER
Mare WEEKS From [facility] - [phone]    Calling to notify PCP that patient is refusing Home Health Aide for showering help.    Verbal Order Given.  PCP:  Let triage RN know if this is not acceptable.    Taty Quiles, Triage RN  [facility]    .

## 2021-06-30 ENCOUNTER — OFFICE VISIT (OUTPATIENT)
Dept: FAMILY MEDICINE | Facility: CLINIC | Age: 81
End: 2021-06-30
Payer: MEDICARE

## 2021-06-30 VITALS
TEMPERATURE: 96.9 F | WEIGHT: 146 LBS | OXYGEN SATURATION: 95 % | BODY MASS INDEX: 26.87 KG/M2 | SYSTOLIC BLOOD PRESSURE: 93 MMHG | HEART RATE: 96 BPM | HEIGHT: 62 IN | DIASTOLIC BLOOD PRESSURE: 67 MMHG

## 2021-06-30 DIAGNOSIS — E87.6 HYPOKALEMIA: ICD-10-CM

## 2021-06-30 DIAGNOSIS — I50.32 CHRONIC DIASTOLIC HEART FAILURE (H): Primary | ICD-10-CM

## 2021-06-30 DIAGNOSIS — R60.0 BILATERAL LEG EDEMA: ICD-10-CM

## 2021-06-30 DIAGNOSIS — E78.5 HYPERLIPIDEMIA LDL GOAL <100: ICD-10-CM

## 2021-06-30 DIAGNOSIS — I48.19 PERSISTENT ATRIAL FIBRILLATION (H): ICD-10-CM

## 2021-06-30 DIAGNOSIS — I27.20 PULMONARY HYPERTENSION (H): ICD-10-CM

## 2021-06-30 PROCEDURE — 99214 OFFICE O/P EST MOD 30 MIN: CPT | Performed by: INTERNAL MEDICINE

## 2021-06-30 RX ORDER — TORSEMIDE 20 MG/1
20 TABLET ORAL 3 TIMES DAILY
Qty: 240 TABLET | Refills: 3 | Status: SHIPPED | OUTPATIENT
Start: 2021-06-30 | End: 2021-07-09

## 2021-06-30 RX ORDER — POTASSIUM CHLORIDE 1500 MG/1
20 TABLET, EXTENDED RELEASE ORAL 2 TIMES DAILY
Qty: 90 TABLET | Refills: 2 | Status: SHIPPED | OUTPATIENT
Start: 2021-06-30 | End: 2021-12-21

## 2021-06-30 RX ORDER — TORSEMIDE 20 MG/1
20 TABLET ORAL 3 TIMES DAILY
Qty: 240 TABLET | Refills: 3
Start: 2021-06-30 | End: 2021-06-30

## 2021-06-30 ASSESSMENT — MIFFLIN-ST. JEOR: SCORE: 1080.5

## 2021-06-30 NOTE — PROGRESS NOTES
Subjective   Jaqueline is a 80 year old who presents for the following health issues     HPI     Chief Complaint   Patient presents with     Edema     swelling on both legs, left side is worse     follow up CHF, hyperlipidemia, pulmonary hypertension       HPI:   Patient Jaqeuline Canales is a very pleasant 80 year old female with history of CHF, pulmonary hypertension who presents to Internal Medicine clinic today for routine follow up of recent actue on chronic CHF exacerbation symptms. Patient's weight has been increasing again with dietary noncompliance with low salt diet. No chest pains at this time. Leg lymphedema symptoms have worsened from prior baseline. Patient is scheduled for follow up with CHF clinic at the [facility] in [place] going forward. No fever or chills.      Current Medications:     Current Outpatient Medications   Medication Sig Dispense Refill     acetaminophen (TYLENOL) 500 MG tablet Take 1,000 mg by mouth 2 times daily        albuterol (PROAIR HFA/PROVENTIL HFA/VENTOLIN HFA) 108 (90 Base) MCG/ACT inhaler USE 2 INHALATIONS ORALLY   INTO THE LUNGS EVERY 6  HOURS as needed 18 g 11     apixaban ANTICOAGULANT (ELIQUIS ANTICOAGULANT) 5 MG tablet Take 1 tablet (5 mg) by mouth 2 times daily 180 tablet 2     bromfenac (PROLENSA) 0.07 % ophthalmic solution Place 1 drop into the right eye daily       folic acid (FOLVITE) 1 MG tablet Take 1 tablet (1,000 mcg) by mouth daily 90 tablet 3     gabapentin (NEURONTIN) 100 MG capsule Take 1 capsule (100 mg) by mouth 3 times daily 90 capsule 11     InFLIXimab (REMICADE IV) Inject 400 mg into the vein Every 8 weeks       levothyroxine (SYNTHROID/LEVOTHROID) 75 MCG tablet Take 1 tablet (75 mcg) by mouth daily 90 tablet 2     melatonin 5 MG tablet Take 5 mg by mouth nightly as needed for sleep       Methotrexate Sodium (METHOTREXATE PO) Take 2.5 mg by mouth 10 tablets weekly - Wednesdays       metoprolol succinate ER (TOPROL-XL) 25 MG 24 hr tablet Take  1 tablet (25 mg) by mouth daily 90 tablet 2     Multiple Vitamins-Minerals (ICAPS) CAPS Take 1 capsule by mouth 2 times daily        multivitamin (CENTRUM SILVER) tablet Take 1 tablet by mouth daily       omeprazole (PRILOSEC) 20 MG DR capsule TAKE 1 CAPSULE TWICE DAILY,30-60 MINUTES PRIOR TO     MEALS 180 capsule 2     order for DME Equipment being ordered: 4 wheeled walker with seat 1 each 3     potassium chloride ER (KLOR-CON M) 20 MEQ CR tablet Take 1 tablet (20 mEq) by mouth 2 times daily 90 tablet 2     sildenafil (REVATIO) 20 MG tablet TAKE 1 TABLET (20 MG) BY MOUTH 3 TIMES DAILY 90 tablet 5     simvastatin (ZOCOR) 40 MG tablet Take 1 tablet (40 mg) by mouth At Bedtime 90 tablet 3     Skin Protectants, Misc. (EUCERIN) cream Apply topically 2 times daily Apply to areas dry skin topically two times a day for dry skin and Apply to areas of dry skin topically as needed for dry skin daily       torsemide (DEMADEX) 20 MG tablet Take 1 tablet (20 mg) by mouth 3 times daily 240 tablet 3     triamcinolone (KENALOG) 0.1 % external cream Apply topically 2 times daily 30 g 3     Vitamin D, Cholecalciferol, 10 MCG (400 UNIT) TABS Take 1,600 Units by mouth daily           Allergies:      Allergies   Allergen Reactions     No Known Allergies             Past Medical History:     Past Medical History:   Diagnosis Date     Amaurosis fugax 5-11    Right     Carotid artery stenosis      Esophageal reflux 3/11/2004     HELICOBACTER PYLORI INFECTION 7/11/2006     hx of CA in situ RT breast-1990.mastectomy 4/17/2007     Hyperlipidemia LDL goal <70 6/20/2011     Lung cancer (H)     squamous cell lung ca left lower lobe     OBESITY NOS 3/11/2004     OSTEOPOROSIS NOS 3/11/2004     Other psoriasis 3/11/2004     Pulmonary hypertension (H) 04/15/2019    Right heart catheterization demonstrated moderate pulmonary arterial hypertension with a mean PA pressure of 34 mmHg, no reversibility with inhaled nitric oxide, elevated right and  left-sided filling pressures, low normal cardiac index of 2.1.  Seen by Dr. Shay Marquez.  Started on sildenafil (Revatio) 20 mg 3 times daily.     RA (rheumatoid arthritis) (H) 2010     Raynaud's syndrome 4/10/2006     Scleroderma (H)      Sleep apnea     CPAP         Past Surgical History:     Past Surgical History:   Procedure Laterality Date     BREAST SURGERY       COLONOSCOPY  15     COLONOSCOPY N/A 2019    Procedure: Colonoscopy, With Polypectomy And Biopsy;  Surgeon: Neto Kaminski MD;  Location:  GI     CONIZATION       CV RIGHT HEART CATH MEASUREMENTS RECORDED N/A 4/15/2019    Procedure: Heart Cath Right Heart Cath;  Surgeon: Naresh Cyr MD;  Location:  HEART CARDIAC CATH LAB     ENDARTERECTOMY CAROTID       HC COLONOSCOPY THRU STOMA, DIAGNOSTIC  2001    diverticulosis     REPAIR HAMMER TOE       TONSILLECTOMY       ZZC NONSPECIFIC PROCEDURE      mastectomy RT breast         Family Medical History:     Family History   Problem Relation Age of Onset     Cancer - colorectal Mother      Cerebrovascular Disease Father      Cancer Sister 31        ovarian     Heart Disease Sister      Gastrointestinal Disease Sister         crohns     Gastrointestinal Disease Sister         diverticulitis     Gastrointestinal Disease Brother         diverticulitis     Alzheimer Disease Sister      Cerebrovascular Disease Maternal Grandmother      Diabetes Maternal Grandmother      Mental Illness Maternal Grandmother      Diabetes Maternal Uncle      Cancer Nephew                  Social History:     Social History     Socioeconomic History     Marital status:      Spouse name: Not on file     Number of children: Not on file     Years of education: Not on file     Highest education level: Not on file   Occupational History     Not on file   Social Needs     Financial resource strain: Not hard at all     Food insecurity     Worry: Not on file     Inability: Not on  file     Transportation needs     Medical: No     Non-medical: No   Tobacco Use     Smoking status: Former Smoker     Packs/day: 1.00     Years: 30.00     Pack years: 30.00     Types: Cigarettes     Start date:      Quit date: 3/11/1992     Years since quittin.3     Smokeless tobacco: Never Used   Substance and Sexual Activity     Alcohol use: Not Currently     Alcohol/week: 0.0 standard drinks     Frequency: Never     Drug use: No     Sexual activity: Not Currently     Partners: Male   Lifestyle     Physical activity     Days per week: Not on file     Minutes per session: Not on file     Stress: Not on file   Relationships     Social connections     Talks on phone: Not on file     Gets together: More than three times a week     Attends Rastafarian service: Not on file     Active member of club or organization: Not on file     Attends meetings of clubs or organizations: Not on file     Relationship status:      Intimate partner violence     Fear of current or ex partner: Not on file     Emotionally abused: Not on file     Physically abused: Not on file     Forced sexual activity: Not on file   Other Topics Concern     Parent/sibling w/ CABG, MI or angioplasty before 65F 55M? No   Social History Narrative     Not on file           Review of System:     Constitutional: Negative for fever or chills  Skin: Negative for rashes  Ears/Nose/Throat: Negative for nasal congestion, sore throat  Respiratory: No shortness of breath, dyspnea on exertion, cough, or hemoptysis  Cardiovascular: Negative for chest pain  Gastrointestinal: Negative for nausea, vomiting, positive for chronic GERD  Genitourinary: Negative for dysuria, hematuria  Musculoskeletal: Negative for myalgias  Neurologic: Negative for headaches  Psychiatric: Negative for depression, anxiety  Hematologic/Lymphatic/Immunologic: positive for worsening of chronic bilateral leg edema symptoms  Endocrine: Negative  Behavioral: Negative for tobacco  "use       Physical Exam:   BP 93/67 (BP Location: Left arm, Patient Position: Sitting, Cuff Size: Adult Regular)   Pulse 96   Temp 96.9  F (36.1  C) (Temporal)   Ht 1.575 m (5' 2\")   Wt 66.2 kg (146 lb)   SpO2 95%   BMI 26.70 kg/m      GENERAL: chronically ill appearing elderly female, alert and no distress  EYES: eyes grossly normal to inspection, and conjunctivae and sclerae normal  HENT: Normocephalic atraumatic. Nose and mouth without ulcers or lesions  NECK: supple  RESP: lungs clear to auscultation   CV: atrial fibrillation present  LYMPH: worsening of chronic bilateral lower leg peripheral edema symptoms present when compared to prior baseline  ABDOMEN: nondistended  MS: no gross musculoskeletal defects noted  SKIN: no suspicious lesions or rashes  NEURO: Alert & Oriented x 3.   PSYCH: mentation appears normal, affect normal        Diagnostic Test Results:     Last Comprehensive Metabolic Panel:  Sodium   Date Value Ref Range Status   05/10/2021 133 133 - 144 mmol/L Final     Potassium   Date Value Ref Range Status   05/10/2021 4.2 3.4 - 5.3 mmol/L Final     Chloride   Date Value Ref Range Status   05/10/2021 95 94 - 109 mmol/L Final     Carbon Dioxide   Date Value Ref Range Status   05/10/2021 39 (H) 20 - 32 mmol/L Final     Anion Gap   Date Value Ref Range Status   05/10/2021 <1 (L) 3 - 14 mmol/L Final     Glucose   Date Value Ref Range Status   05/10/2021 72 70 - 99 mg/dL Final     Urea Nitrogen   Date Value Ref Range Status   05/10/2021 17 7 - 30 mg/dL Final     Creatinine   Date Value Ref Range Status   05/10/2021 0.65 0.52 - 1.04 mg/dL Final     GFR Estimate   Date Value Ref Range Status   05/10/2021 83 >60 mL/min/[1.73_m2] Final     Comment:     Non  GFR Calc  Starting 12/18/2018, serum creatinine based estimated GFR (eGFR) will be   calculated using the Chronic Kidney Disease Epidemiology Collaboration   (CKD-EPI) equation.       Calcium   Date Value Ref Range Status   05/10/2021 " 9.1 8.5 - 10.1 mg/dL Final         ASSESSMENT/PLAN:   (I27.20) Pulmonary hypertension (H)  (R60.0) Bilateral leg edema  (E87.6) Hypokalemia  (I48.19) Persistent atrial fibrillation (H)  (I50.32) Chronic diastolic heart failure (H)  (primary encounter diagnosis)  Comment: routine follow up of recent actue on chronic CHF exacerbation symptms. Patient's weight has increased with current low dose diuretic therapy and some noncompliance with low salt diet.  no chest pains. Leg lymphedema symptoms have worsened since prior baseline  Plan: I have increased patient's current torsemide (DEMADEX) 20 MG tablet medication from 1xDay to 2xDay, potassium chloride ER (KLOR-CON M) 20 MEQ CR         Tablet from 1xDay to 2xDay        continue to follow up with CHF clinic at the [facility] clinic in [place] going forward. continue low salt diet  Continue Eliquis for atrial fibrillation    (E78.5) Hyperlipidemia LDL goal <100  Comment: stable.  Plan: continue current therapy          Follow Up Plan:     Patient is instructed to return to Internal Medicine clinic for follow-up visit in 1 week.        Carissa Burroughs MD  Internal Medicine  [facility]

## 2021-07-06 ENCOUNTER — TELEPHONE (OUTPATIENT)
Dept: FAMILY MEDICINE | Facility: CLINIC | Age: 81
End: 2021-07-06

## 2021-07-06 NOTE — TELEPHONE ENCOUNTER
OT is with pt and just called.     Pt saw Dr. Burroughs on 6/30/21.  Pt said she was told to double torsemide dosing.  Was previously taking 20mg, and somehow is now taking 80mg daily as she reports she doubled what was on paperwork.      Does think she already took all this torsemide today.   BP 66/48, pulse 80's Denies dizziness, lightheadedness.  Fluid in legs down somewhat, but not a great amount.  Weight 140.9 lbs.   Did only take the prescribed amount of potassium 20meq bid.     Advised HOLD TORSEMIDE for rest of day, and continue taking potassium.  Push fluids.     Pt has upcoming PCP appt on Friday.  Also has a homecare nurse coming out Thurs for discharge.  Due to med error, would like to advise Nurse to change visit to tomorrow and plan on at least 1 more visit next week/not discharge for follow up.    Do you want any labs drawn tomorrow/Thursday or can this wait until in clinic Friday?  Please advise how much torsemide and potassium to take for rest of week.    We should leave detailed message for pt back on [phone] and also call  Yaneli, with update at  Yaneli - [phone]    Soco Cheung, ANGELINA  [facility] RN Triage Team      Caller on this call but does not need call back-  Yeny Zamudio - [phone]

## 2021-07-08 ENCOUNTER — TELEPHONE (OUTPATIENT)
Dept: FAMILY MEDICINE | Facility: CLINIC | Age: 81
End: 2021-07-08

## 2021-07-08 NOTE — TELEPHONE ENCOUNTER
PCP was called. Have pt hold torsemide and PCP will discuss dose tomorrow at visit. Triage called pt with plan. Reports she has been taking Torsemide (DEMADEX) 20 MG tablet two pills daily. She took two pills today. Agreed to discuss medication at OV.       ANGELINA Kong is requesting a call back 07/09/2021 with directions for:    Torsemide (DEMADEX) 20 MG tablet   potassium chloride ER (KLOR-CON M) 20 MEQ CR tablet.    Ok to leave a message at [phone].    Dilan stressed that pt get AVS after appt since that is what pt follows.

## 2021-07-08 NOTE — TELEPHONE ENCOUNTER
Please confirm the directions for torsemide 20 mg tablet.  Dilan RN reports that the patient was taking torsemide 40 mg BID.  LOV note states to increase torsemide to twice a day.  Torsemide prescription was ordered as torsemide 20 mg TID.    Please clarify. Triage to call home care back.  RN is with patient setting up medications. Patient does have appt tomorrow with DR. Burroughs.   Taty Nguyễn RN

## 2021-07-08 NOTE — TELEPHONE ENCOUNTER
RN Dilan called to clarify Klor Con dose and directions.    Per chart review, 06/30/2021 OV notes read;    Plan: I have increased patient's current torsemide (DEMADEX) 20 MG tablet medication from 1xDay to 2xDay, potassium chloride ER (KLOR-CON M) 20 MEQ CR         Tablet from 1xDay to 2xDay    Med list has :      torsemide (DEMADEX) 20 MG tablet 240 tablet 3 6/30/2021  No   Sig - Route: Take 1 tablet (20 mg) by mouth 3 times daily - Oral     Please advice on correct dose and frequency pt is to continue. Nurse is requesting call back today.    RN Dilan is also requesting approval of SN visit 1 time a week x 1wk and 1 PRN visit. Verbal approval given.

## 2021-07-09 ENCOUNTER — OFFICE VISIT (OUTPATIENT)
Dept: FAMILY MEDICINE | Facility: CLINIC | Age: 81
End: 2021-07-09
Payer: MEDICARE

## 2021-07-09 VITALS
HEART RATE: 67 BPM | BODY MASS INDEX: 26.67 KG/M2 | OXYGEN SATURATION: 98 % | DIASTOLIC BLOOD PRESSURE: 71 MMHG | SYSTOLIC BLOOD PRESSURE: 103 MMHG | WEIGHT: 145.8 LBS | TEMPERATURE: 96.8 F

## 2021-07-09 DIAGNOSIS — I27.20 PULMONARY HYPERTENSION (H): ICD-10-CM

## 2021-07-09 DIAGNOSIS — E78.2 MIXED HYPERLIPIDEMIA: ICD-10-CM

## 2021-07-09 DIAGNOSIS — I50.32 CHRONIC DIASTOLIC HEART FAILURE (H): ICD-10-CM

## 2021-07-09 DIAGNOSIS — R60.0 BILATERAL LEG EDEMA: Primary | ICD-10-CM

## 2021-07-09 DIAGNOSIS — E87.6 HYPOKALEMIA: ICD-10-CM

## 2021-07-09 DIAGNOSIS — E78.5 HYPERLIPIDEMIA LDL GOAL <100: ICD-10-CM

## 2021-07-09 DIAGNOSIS — I48.19 PERSISTENT ATRIAL FIBRILLATION (H): ICD-10-CM

## 2021-07-09 PROCEDURE — 99214 OFFICE O/P EST MOD 30 MIN: CPT | Performed by: INTERNAL MEDICINE

## 2021-07-09 RX ORDER — FOLIC ACID 1 MG/1
1000 TABLET ORAL DAILY
Qty: 90 TABLET | Refills: 3 | Status: SHIPPED | OUTPATIENT
Start: 2021-07-09 | End: 2021-07-12

## 2021-07-09 RX ORDER — TORSEMIDE 20 MG/1
20 TABLET ORAL 2 TIMES DAILY
Qty: 180 TABLET | Refills: 3 | Status: SHIPPED | OUTPATIENT
Start: 2021-07-09 | End: 2021-07-09

## 2021-07-09 RX ORDER — TORSEMIDE 20 MG/1
20 TABLET ORAL 2 TIMES DAILY
Qty: 180 TABLET | Refills: 3
Start: 2021-07-09 | End: 2022-02-21

## 2021-07-09 NOTE — PROGRESS NOTES
Subjective   Jaqueline is a 80 year old who presents for the following health issues     HPI     Chief Complaint   Patient presents with     Follow Up     Edema      1 week follow up CHF, hyperlipidemia, pulmonary hypertension       HPI:   Patient Jaqueline Canales is a very pleasant 80 year old female with history of CHF, pulmonary hypertension who presents to Internal Medicine clinic today for routine follow up of recent actue on chronic CHF exacerbation symptms. Patient's weight has been increasing again with dietary noncompliance with low salt diet. No chest pains at this time. Leg lymphedema symptoms have worsened from prior baseline. Patient is scheduled for follow up with CHF clinic at the [facility] Cardiology clinic in [place] going forward. No fever or chills.      Current Medications:     Current Outpatient Medications   Medication Sig Dispense Refill     acetaminophen (TYLENOL) 500 MG tablet Take 1,000 mg by mouth 2 times daily        albuterol (PROAIR HFA/PROVENTIL HFA/VENTOLIN HFA) 108 (90 Base) MCG/ACT inhaler USE 2 INHALATIONS ORALLY   INTO THE LUNGS EVERY 6  HOURS as needed 18 g 11     apixaban ANTICOAGULANT (ELIQUIS ANTICOAGULANT) 5 MG tablet Take 1 tablet (5 mg) by mouth 2 times daily 180 tablet 2     bromfenac (PROLENSA) 0.07 % ophthalmic solution Place 1 drop into the right eye daily       folic acid (FOLVITE) 1 MG tablet Take 1 tablet (1,000 mcg) by mouth daily 90 tablet 3     gabapentin (NEURONTIN) 100 MG capsule Take 1 capsule (100 mg) by mouth 3 times daily 90 capsule 11     InFLIXimab (REMICADE IV) Inject 400 mg into the vein Every 8 weeks       levothyroxine (SYNTHROID/LEVOTHROID) 75 MCG tablet Take 1 tablet (75 mcg) by mouth daily 90 tablet 2     melatonin 5 MG tablet Take 5 mg by mouth nightly as needed for sleep       Methotrexate Sodium (METHOTREXATE PO) Take 2.5 mg by mouth 10 tablets weekly - Wednesdays       metoprolol succinate ER (TOPROL-XL) 25 MG 24 hr tablet Take 1 tablet (25 mg) by  mouth daily 90 tablet 2     Multiple Vitamins-Minerals (ICAPS) CAPS Take 1 capsule by mouth 2 times daily        multivitamin (CENTRUM SILVER) tablet Take 1 tablet by mouth daily       omeprazole (PRILOSEC) 20 MG DR capsule TAKE 1 CAPSULE TWICE DAILY,30-60 MINUTES PRIOR TO     MEALS 180 capsule 2     order for DME Equipment being ordered: 4 wheeled walker with seat 1 each 3     potassium chloride ER (KLOR-CON M) 20 MEQ CR tablet Take 1 tablet (20 mEq) by mouth 2 times daily 90 tablet 2     sildenafil (REVATIO) 20 MG tablet TAKE 1 TABLET (20 MG) BY MOUTH 3 TIMES DAILY 90 tablet 5     simvastatin (ZOCOR) 40 MG tablet Take 1 tablet (40 mg) by mouth At Bedtime 90 tablet 3     Skin Protectants, Misc. (EUCERIN) cream Apply topically 2 times daily Apply to areas dry skin topically two times a day for dry skin and Apply to areas of dry skin topically as needed for dry skin daily       torsemide (DEMADEX) 20 MG tablet Take 1 tablet (20 mg) by mouth 2 times daily 180 tablet 3     triamcinolone (KENALOG) 0.1 % external cream Apply topically 2 times daily 30 g 3     Vitamin D, Cholecalciferol, 10 MCG (400 UNIT) TABS Take 1,600 Units by mouth daily           Allergies:      Allergies   Allergen Reactions     No Known Allergies             Past Medical History:     Past Medical History:   Diagnosis Date     Amaurosis fugax 5-11    Right     Carotid artery stenosis      Esophageal reflux 3/11/2004     HELICOBACTER PYLORI INFECTION 7/11/2006     hx of CA in situ RT breast-1990.mastectomy 4/17/2007     Hyperlipidemia LDL goal <70 6/20/2011     Lung cancer (H)     squamous cell lung ca left lower lobe     OBESITY NOS 3/11/2004     OSTEOPOROSIS NOS 3/11/2004     Other psoriasis 3/11/2004     Pulmonary hypertension (H) 04/15/2019    Right heart catheterization demonstrated moderate pulmonary arterial hypertension with a mean PA pressure of 34 mmHg, no reversibility with inhaled nitric oxide, elevated right and left-sided filling  pressures, low normal cardiac index of 2.1.  Seen by Dr. Shay Marquez.  Started on sildenafil (Revatio) 20 mg 3 times daily.     RA (rheumatoid arthritis) (H) 2010     Raynaud's syndrome 4/10/2006     Scleroderma (H)      Sleep apnea     CPAP         Past Surgical History:     Past Surgical History:   Procedure Laterality Date     BREAST SURGERY       COLONOSCOPY  16     COLONOSCOPY N/A 2019    Procedure: Colonoscopy, With Polypectomy And Biopsy;  Surgeon: Neto Kaminski MD;  Location:  GI     CONIZATION       CV RIGHT HEART CATH MEASUREMENTS RECORDED N/A 4/15/2019    Procedure: Heart Cath Right Heart Cath;  Surgeon: Naresh Cyr MD;  Location:  HEART CARDIAC CATH LAB     ENDARTERECTOMY CAROTID       HC COLONOSCOPY THRU STOMA, DIAGNOSTIC  2001    diverticulosis     REPAIR HAMMER TOE       TONSILLECTOMY       ZZC NONSPECIFIC PROCEDURE      mastectomy RT breast         Family Medical History:     Family History   Problem Relation Age of Onset     Cancer - colorectal Mother      Cerebrovascular Disease Father      Cancer Sister 31        ovarian     Heart Disease Sister      Gastrointestinal Disease Sister         crohns     Gastrointestinal Disease Sister         diverticulitis     Gastrointestinal Disease Brother         diverticulitis     Alzheimer Disease Sister      Cerebrovascular Disease Maternal Grandmother      Diabetes Maternal Grandmother      Mental Illness Maternal Grandmother      Diabetes Maternal Uncle      Cancer Nephew                  Social History:     Social History     Socioeconomic History     Marital status:      Spouse name: Not on file     Number of children: Not on file     Years of education: Not on file     Highest education level: Not on file   Occupational History     Not on file   Social Needs     Financial resource strain: Not hard at all     Food insecurity     Worry: Not on file     Inability: Not on file      Transportation needs     Medical: No     Non-medical: No   Tobacco Use     Smoking status: Former Smoker     Packs/day: 1.00     Years: 30.00     Pack years: 30.00     Types: Cigarettes     Start date:      Quit date: 3/11/1992     Years since quittin.3     Smokeless tobacco: Never Used   Substance and Sexual Activity     Alcohol use: Not Currently     Alcohol/week: 0.0 standard drinks     Frequency: Never     Drug use: No     Sexual activity: Not Currently     Partners: Male   Lifestyle     Physical activity     Days per week: Not on file     Minutes per session: Not on file     Stress: Not on file   Relationships     Social connections     Talks on phone: Not on file     Gets together: More than three times a week     Attends Baptism service: Not on file     Active member of club or organization: Not on file     Attends meetings of clubs or organizations: Not on file     Relationship status:      Intimate partner violence     Fear of current or ex partner: Not on file     Emotionally abused: Not on file     Physically abused: Not on file     Forced sexual activity: Not on file   Other Topics Concern     Parent/sibling w/ CABG, MI or angioplasty before 65F 55M? No   Social History Narrative     Not on file           Review of System:     Constitutional: Negative for fever or chills  Skin: Negative for rashes  Ears/Nose/Throat: Negative for nasal congestion, sore throat  Respiratory: No shortness of breath, dyspnea on exertion, cough, or hemoptysis  Cardiovascular: Negative for chest pain  Gastrointestinal: Negative for nausea, vomiting, positive for chronic GERD  Genitourinary: Negative for dysuria, hematuria  Musculoskeletal: Negative for myalgias  Neurologic: Negative for headaches  Psychiatric: Negative for depression, anxiety  Hematologic/Lymphatic/Immunologic: positive for worsening of chronic bilateral leg edema symptoms  Endocrine: Negative  Behavioral: Negative for tobacco  use       Physical Exam:   /71 (BP Location: Left arm, Patient Position: Sitting, Cuff Size: Adult Regular)   Pulse 67   Temp 96.8  F (36  C) (Temporal)   Wt 66.1 kg (145 lb 12.8 oz)   SpO2 98%   Breastfeeding No   BMI 26.67 kg/m      GENERAL: chronically ill appearing elderly female, alert and no distress  EYES: eyes grossly normal to inspection, and conjunctivae and sclerae normal  HENT: Normocephalic atraumatic. Nose and mouth without ulcers or lesions  NECK: supple  RESP: lungs clear to auscultation   CV: atrial fibrillation present  LYMPH: chronic bilateral lower leg peripheral edema symptoms stable when compared to recent baseline  ABDOMEN: nondistended  MS: no gross musculoskeletal defects noted  SKIN: no suspicious lesions or rashes  NEURO: Alert & Oriented x 3.   PSYCH: mentation appears normal, affect normal        Diagnostic Test Results:     Last Comprehensive Metabolic Panel:  Sodium   Date Value Ref Range Status   05/10/2021 133 133 - 144 mmol/L Final     Potassium   Date Value Ref Range Status   05/10/2021 4.2 3.4 - 5.3 mmol/L Final     Chloride   Date Value Ref Range Status   05/10/2021 95 94 - 109 mmol/L Final     Carbon Dioxide   Date Value Ref Range Status   05/10/2021 39 (H) 20 - 32 mmol/L Final     Anion Gap   Date Value Ref Range Status   05/10/2021 <1 (L) 3 - 14 mmol/L Final     Glucose   Date Value Ref Range Status   05/10/2021 72 70 - 99 mg/dL Final     Urea Nitrogen   Date Value Ref Range Status   05/10/2021 17 7 - 30 mg/dL Final     Creatinine   Date Value Ref Range Status   05/10/2021 0.65 0.52 - 1.04 mg/dL Final     GFR Estimate   Date Value Ref Range Status   05/10/2021 83 >60 mL/min/[1.73_m2] Final     Comment:     Non  GFR Calc  Starting 12/18/2018, serum creatinine based estimated GFR (eGFR) will be   calculated using the Chronic Kidney Disease Epidemiology Collaboration   (CKD-EPI) equation.       Calcium   Date Value Ref Range Status   05/10/2021 9.1 8.5  - 10.1 mg/dL Final         ASSESSMENT/PLAN:   (I27.20) Pulmonary hypertension (H)  (R60.0) Bilateral leg edema  (E87.6) Hypokalemia  (I48.19) Persistent atrial fibrillation (H)  (I50.32) Chronic diastolic heart failure (H)  (primary encounter diagnosis)  Comment: 1 week routine follow up of recent actue on chronic CHF exacerbation symptms. Patient's weight had increased with current low dose diuretic therapy and some noncompliance with low salt diet.  no chest pains. Leg lymphedema symptoms have stablized with the increased Torsemide medication.  Plan: continue patient's current torsemide (DEMADEX) 20 MG tablet medication 2xDay, potassium chloride ER (KLOR-CON M) 20 MEQ CR         Tablet 2xDay        continue to follow up with CHF clinic at the [facility]iology clinic in [place] going forward. continue low salt diet  Continue Eliquis for atrial fibrillation      (E78.5) Hyperlipidemia LDL goal <100  Comment: stable.  Plan: continue current therapy          Follow Up Plan:     Patient is instructed to return to Internal Medicine clinic for follow-up visit in 1 week.        Carissa Burroughs MD  Internal Medicine  [facility]

## 2021-07-10 DIAGNOSIS — E78.2 MIXED HYPERLIPIDEMIA: ICD-10-CM

## 2021-07-12 ENCOUNTER — OFFICE VISIT (OUTPATIENT)
Dept: FAMILY MEDICINE | Facility: CLINIC | Age: 81
End: 2021-07-12
Payer: MEDICARE

## 2021-07-12 VITALS
HEART RATE: 75 BPM | OXYGEN SATURATION: 94 % | TEMPERATURE: 97.7 F | WEIGHT: 144.3 LBS | SYSTOLIC BLOOD PRESSURE: 103 MMHG | DIASTOLIC BLOOD PRESSURE: 66 MMHG | BODY MASS INDEX: 26.39 KG/M2

## 2021-07-12 DIAGNOSIS — I27.20 PULMONARY HYPERTENSION (H): ICD-10-CM

## 2021-07-12 DIAGNOSIS — E87.6 HYPOKALEMIA: ICD-10-CM

## 2021-07-12 DIAGNOSIS — I50.32 CHRONIC DIASTOLIC HEART FAILURE (H): Primary | ICD-10-CM

## 2021-07-12 DIAGNOSIS — I48.19 PERSISTENT ATRIAL FIBRILLATION (H): ICD-10-CM

## 2021-07-12 DIAGNOSIS — E78.5 HYPERLIPIDEMIA LDL GOAL <100: ICD-10-CM

## 2021-07-12 DIAGNOSIS — R60.0 BILATERAL LEG EDEMA: ICD-10-CM

## 2021-07-12 PROCEDURE — 99214 OFFICE O/P EST MOD 30 MIN: CPT | Performed by: INTERNAL MEDICINE

## 2021-07-12 NOTE — TELEPHONE ENCOUNTER
Note on request from pharmacy:     Pharmacy comment: In some situations Folic acid supplementation is used to decrease adverse reactions to MTX, however, it may compete with MTX and decrease its effectiveness. Should pt remain on both? Please respond with appropriate changes or comment to Pharmacy.   In some situations Folic acid supplementation is used to decrease adverse reactions to MTX, however, it may compete with MTX and decrease its effectiveness. Should pt remain on both? Please respond with appropr.    Please advise     Carmen PORTER RN

## 2021-07-12 NOTE — PROGRESS NOTES
Donavon Parsons is a 80 year old who presents for the following health issues     HPI     Follow up       Donavon Parsons is a 80 year old who presents for the following health issues     HPI     Chief Complaint   Patient presents with     Follow Up     follow up on multiple concerns including diastolic CHF, hyperlipidemia, pulmonary hypertension       HPI:   Patient Jaqueline Canales is a very pleasant 80 year old female with history of CHF, pulmonary hypertension who presents to Internal Medicine clinic today for routine 1 week follow up of recent actue on chronic CHF exacerbation symptms. Patient's weight has been increasing again with dietary noncompliance with low salt diet. No chest pains at this time. Leg lymphedema symptoms have worsened from prior baseline. Patient is scheduled for follow up with CHF clinic at the [facility] Cardiology clinic in [place] going forward. No fever or chills.      Current Medications:     Current Outpatient Medications   Medication Sig Dispense Refill     acetaminophen (TYLENOL) 500 MG tablet Take 1,000 mg by mouth 2 times daily        albuterol (PROAIR HFA/PROVENTIL HFA/VENTOLIN HFA) 108 (90 Base) MCG/ACT inhaler USE 2 INHALATIONS ORALLY   INTO THE LUNGS EVERY 6  HOURS as needed 18 g 11     apixaban ANTICOAGULANT (ELIQUIS ANTICOAGULANT) 5 MG tablet Take 1 tablet (5 mg) by mouth 2 times daily 180 tablet 2     bromfenac (PROLENSA) 0.07 % ophthalmic solution Place 1 drop into the right eye daily       folic acid (FOLVITE) 1 MG tablet Take 1 tablet (1,000 mcg) by mouth daily 90 tablet 3     gabapentin (NEURONTIN) 100 MG capsule Take 1 capsule (100 mg) by mouth 3 times daily 90 capsule 11     InFLIXimab (REMICADE IV) Inject 400 mg into the vein Every 8 weeks       levothyroxine (SYNTHROID/LEVOTHROID) 75 MCG tablet Take 1 tablet (75 mcg) by mouth daily 90 tablet 2     melatonin 5 MG tablet Take 5 mg by mouth nightly as needed for sleep       Methotrexate Sodium (METHOTREXATE PO)  Take 2.5 mg by mouth 10 tablets weekly - Wednesdays       metoprolol succinate ER (TOPROL-XL) 25 MG 24 hr tablet Take 1 tablet (25 mg) by mouth daily 90 tablet 2     Multiple Vitamins-Minerals (ICAPS) CAPS Take 1 capsule by mouth 2 times daily        multivitamin (CENTRUM SILVER) tablet Take 1 tablet by mouth daily       omeprazole (PRILOSEC) 20 MG DR capsule TAKE 1 CAPSULE TWICE DAILY,30-60 MINUTES PRIOR TO     MEALS 180 capsule 2     order for DME Equipment being ordered: 4 wheeled walker with seat 1 each 3     potassium chloride ER (KLOR-CON M) 20 MEQ CR tablet Take 1 tablet (20 mEq) by mouth 2 times daily 90 tablet 2     sildenafil (REVATIO) 20 MG tablet TAKE 1 TABLET (20 MG) BY MOUTH 3 TIMES DAILY 90 tablet 5     simvastatin (ZOCOR) 40 MG tablet Take 1 tablet (40 mg) by mouth At Bedtime 90 tablet 3     Skin Protectants, Misc. (EUCERIN) cream Apply topically 2 times daily Apply to areas dry skin topically two times a day for dry skin and Apply to areas of dry skin topically as needed for dry skin daily       torsemide (DEMADEX) 20 MG tablet Take 1 tablet (20 mg) by mouth 2 times daily 180 tablet 3     triamcinolone (KENALOG) 0.1 % external cream Apply topically 2 times daily 30 g 3     Vitamin D, Cholecalciferol, 10 MCG (400 UNIT) TABS Take 1,600 Units by mouth daily           Allergies:      Allergies   Allergen Reactions     No Known Allergies             Past Medical History:     Past Medical History:   Diagnosis Date     Amaurosis fugax 5-11    Right     Carotid artery stenosis      Esophageal reflux 3/11/2004     HELICOBACTER PYLORI INFECTION 7/11/2006     hx of CA in situ RT breast-1990.mastectomy 4/17/2007     Hyperlipidemia LDL goal <70 6/20/2011     Lung cancer (H)     squamous cell lung ca left lower lobe     OBESITY NOS 3/11/2004     OSTEOPOROSIS NOS 3/11/2004     Other psoriasis 3/11/2004     Pulmonary hypertension (H) 04/15/2019    Right heart catheterization demonstrated moderate pulmonary arterial  hypertension with a mean PA pressure of 34 mmHg, no reversibility with inhaled nitric oxide, elevated right and left-sided filling pressures, low normal cardiac index of 2.1.  Seen by Dr. Shay Marquez.  Started on sildenafil (Revatio) 20 mg 3 times daily.     RA (rheumatoid arthritis) (H) 2010     Raynaud's syndrome 4/10/2006     Scleroderma (H)      Sleep apnea     CPAP         Past Surgical History:     Past Surgical History:   Procedure Laterality Date     BREAST SURGERY       COLONOSCOPY  16     COLONOSCOPY N/A 2019    Procedure: Colonoscopy, With Polypectomy And Biopsy;  Surgeon: Neto Kaminski MD;  Location:  GI     CONIZATION       CV RIGHT HEART CATH MEASUREMENTS RECORDED N/A 4/15/2019    Procedure: Heart Cath Right Heart Cath;  Surgeon: Naresh Cyr MD;  Location:  HEART CARDIAC CATH LAB     ENDARTERECTOMY CAROTID       REPAIR HAMMER TOE       TONSILLECTOMY       ZZC NONSPECIFIC PROCEDURE      mastectomy RT breast     ZZHC COLONOSCOPY THRU STOMA, DIAGNOSTIC  2001    diverticulosis         Family Medical History:     Family History   Problem Relation Age of Onset     Cancer - colorectal Mother      Cerebrovascular Disease Father      Cancer Sister 31        ovarian     Heart Disease Sister      Gastrointestinal Disease Sister         crohns     Gastrointestinal Disease Sister         diverticulitis     Gastrointestinal Disease Brother         diverticulitis     Alzheimer Disease Sister      Cerebrovascular Disease Maternal Grandmother      Diabetes Maternal Grandmother      Mental Illness Maternal Grandmother      Diabetes Maternal Uncle      Cancer Nephew                  Social History:     Social History     Socioeconomic History     Marital status:      Spouse name: Not on file     Number of children: Not on file     Years of education: Not on file     Highest education level: Not on file   Occupational History     Not on file   Tobacco Use      Smoking status: Former Smoker     Packs/day: 1.00     Years: 30.00     Pack years: 30.00     Types: Cigarettes     Start date:      Quit date: 3/11/1992     Years since quittin.3     Smokeless tobacco: Never Used   Substance and Sexual Activity     Alcohol use: Not Currently     Alcohol/week: 0.0 standard drinks     Drug use: No     Sexual activity: Not Currently     Partners: Male   Other Topics Concern     Parent/sibling w/ CABG, MI or angioplasty before 65F 55M? No   Social History Narrative     Not on file     Social Determinants of Health     Financial Resource Strain: Low Risk      Difficulty of Paying Living Expenses: Not hard at all   Food Insecurity:      Worried About Running Out of Food in the Last Year:      Ran Out of Food in the Last Year:    Transportation Needs: No Transportation Needs     Lack of Transportation (Medical): No     Lack of Transportation (Non-Medical): No   Physical Activity:      Days of Exercise per Week:      Minutes of Exercise per Session:    Stress:      Feeling of Stress :    Social Connections: Unknown     Frequency of Communication with Friends and Family: Not on file     Frequency of Social Gatherings with Friends and Family: More than three times a week     Attends Restoration Services: Not on file     Active Member of Clubs or Organizations: Not on file     Attends Club or Organization Meetings: Not on file     Marital Status:    Intimate Partner Violence:      Fear of Current or Ex-Partner:      Emotionally Abused:      Physically Abused:      Sexually Abused:            Review of System:     Constitutional: Negative for fever or chills  Skin: Negative for rashes  Ears/Nose/Throat: Negative for nasal congestion, sore throat  Respiratory: No shortness of breath, dyspnea on exertion, cough, or hemoptysis  Cardiovascular: Negative for chest pain  Gastrointestinal: Negative for nausea, vomiting, positive for chronic GERD  Genitourinary: Negative for dysuria,  hematuria  Musculoskeletal: Negative for myalgias  Neurologic: Negative for headaches  Psychiatric: Negative for depression, anxiety  Hematologic/Lymphatic/Immunologic: positive for worsening of chronic bilateral leg edema symptoms  Endocrine: Negative  Behavioral: Negative for tobacco use       Physical Exam:   /66 (BP Location: Left arm, Patient Position: Sitting, Cuff Size: Adult Regular)   Pulse 75   Temp 97.7  F (36.5  C) (Temporal)   Wt 65.5 kg (144 lb 4.8 oz)   SpO2 94%   Breastfeeding No   BMI 26.39 kg/m      GENERAL: chronically ill appearing elderly female, alert and no distress  EYES: eyes grossly normal to inspection, and conjunctivae and sclerae normal  HENT: Normocephalic atraumatic. Nose and mouth without ulcers or lesions  NECK: supple  RESP: lungs clear to auscultation   CV: atrial fibrillation present  LYMPH: chronic bilateral lower leg peripheral edema symptoms mildly improved when compared to recent baseline  ABDOMEN: nondistended  MS: no gross musculoskeletal defects noted  SKIN: no suspicious lesions or rashes  NEURO: Alert & Oriented x 3.   PSYCH: mentation appears normal, affect normal        Diagnostic Test Results:     Last Comprehensive Metabolic Panel:  Sodium   Date Value Ref Range Status   05/10/2021 133 133 - 144 mmol/L Final     Potassium   Date Value Ref Range Status   05/10/2021 4.2 3.4 - 5.3 mmol/L Final     Chloride   Date Value Ref Range Status   05/10/2021 95 94 - 109 mmol/L Final     Carbon Dioxide   Date Value Ref Range Status   05/10/2021 39 (H) 20 - 32 mmol/L Final     Anion Gap   Date Value Ref Range Status   05/10/2021 <1 (L) 3 - 14 mmol/L Final     Glucose   Date Value Ref Range Status   05/10/2021 72 70 - 99 mg/dL Final     Urea Nitrogen   Date Value Ref Range Status   05/10/2021 17 7 - 30 mg/dL Final     Creatinine   Date Value Ref Range Status   05/10/2021 0.65 0.52 - 1.04 mg/dL Final     GFR Estimate   Date Value Ref Range Status   05/10/2021 83 >60  mL/min/[1.73_m2] Final     Comment:     Non  GFR Calc  Starting 12/18/2018, serum creatinine based estimated GFR (eGFR) will be   calculated using the Chronic Kidney Disease Epidemiology Collaboration   (CKD-EPI) equation.       Calcium   Date Value Ref Range Status   05/10/2021 9.1 8.5 - 10.1 mg/dL Final         ASSESSMENT/PLAN:   (I27.20) Pulmonary hypertension (H)  (R60.0) Bilateral leg edema  (E87.6) Hypokalemia  (I48.19) Persistent atrial fibrillation (H)  (I50.32) Chronic diastolic heart failure (H)  (primary encounter diagnosis)  Comment: 1 week routine follow up of recent actue on chronic CHF exacerbation symptms. Patient's weight had increased with current low dose diuretic therapy and some noncompliance with low salt diet.  no chest pains. Leg lymphedema symptoms have stablized with the increased Torsemide medication.  Plan: continue patient's current torsemide (DEMADEX) 20 MG tablet medication 2xDay, potassium chloride ER (KLOR-CON M) 20 MEQ CR         Tablet 2xDay        continue to follow up with CHF clinic at the [facility] Cardiology clinic in [place] going forward. continue low salt diet  Continue Eliquis for atrial fibrillation      (E78.5) Hyperlipidemia LDL goal <100  Comment: stable.  Plan: continue current therapy    Follow Up Plan:     Patient is instructed to return to Internal Medicine clinic for follow-up visit in 2 weeks.        Carissa Burroughs MD  Internal Medicine  [facility]

## 2021-07-13 RX ORDER — FOLIC ACID 1 MG/1
TABLET ORAL
Qty: 90 TABLET | Refills: 3 | Status: SHIPPED | OUTPATIENT
Start: 2021-07-13 | End: 2022-06-24

## 2021-07-14 ENCOUNTER — OFFICE VISIT (OUTPATIENT)
Dept: CARDIOLOGY | Facility: CLINIC | Age: 81
End: 2021-07-14
Payer: MEDICARE

## 2021-07-14 VITALS
HEIGHT: 62 IN | SYSTOLIC BLOOD PRESSURE: 92 MMHG | BODY MASS INDEX: 26.61 KG/M2 | OXYGEN SATURATION: 97 % | DIASTOLIC BLOOD PRESSURE: 67 MMHG | HEART RATE: 65 BPM | WEIGHT: 144.6 LBS

## 2021-07-14 DIAGNOSIS — I27.20 PULMONARY HYPERTENSION (H): Primary | ICD-10-CM

## 2021-07-14 PROCEDURE — 99214 OFFICE O/P EST MOD 30 MIN: CPT | Performed by: PHYSICIAN ASSISTANT

## 2021-07-14 ASSESSMENT — MIFFLIN-ST. JEOR: SCORE: 1074.15

## 2021-07-14 NOTE — LETTER
7/14/2021    Carissa Burroughs MD  [address]  [facility], [place] [address]    RE: Jaqueline Canales       Dear Colleague,    I had the pleasure of seeing Jaqueline Canales in the [facility].      Cardiology Progress Note    Patient seen today in follow up of: CORE enrollment  Primary cardiologist: Dr. aMrquez    HPI:  Jaqueline Canales is a very pleasant 80 year old female with a history of:  1.  Pulmonary hypertension following with Dr. Marquez.  Maintained on Revatio.  2.  Raynaud's syndrome.  3.  Chronic atrial fibrillation.  4.  Hyperlipidemia.  5.  Carotid artery disease status post right carotid endarterectomy in 2011.  6.  Bell's palsy.  7.  Iron deficiency anemia.    Jaqueline follows with Dr. Marquez on a regular basis.  She has had several phone visits with him over the last year.  She last had a visit with him on 6/3/2021.  This was a telephone visit.  She seemed to be stable at that time.  She has some chronic stable edema which Dr. Marquez has felt may be related to some venous insufficiency.    She recently had a visit with Dr. Burroughs her primary care physician on 6/30/2021.  It appears her home Care nurse had called the clinic as her weight was up a few pounds from 149 to 143 pounds.  She had some mild worsening of her chronic edema.  Some dietary indiscretion was felt to possibly be playing a role.  She was instructed to increase her torsemide to 20 mg twice a day from 20 mg daily.  It appears there was some confusion and she ended up taking 40 mg of torsemide twice daily rather than the recommended 20 mg twice daily.  Her torsemide was held for a few days.  She had a follow-up on 7/9 and per the notes her symptoms were better.  She was instructed to continue with torsemide 20 mg twice daily and she was referred to the core clinic.    Jaqueline presents today with her , Chava.  Overall, she seems to be stable.  She tells me her weight has  been stable in the low 140s.  She confirms she is been taking torsemide 20 mg twice daily.  She continues to have chronic leg edema.  This is worse on the left than the right which is her baseline.  She does use compression stockings.  She tells me her a peripheral swelling is not really that bothersome to her.  She does have some chronic shortness of breath at times if she walks for distance.  Again, this is stable and unchanged.  She denies orthopnea or PND.    ASSESSMENT/PLAN:  Jaqueline Canales is a very pleasant 80-year-old female with a history of pulmonary hypertension maintained on Revatio, Raynaud's syndrome, chronic atrial fibrillation who was referred to the [facility] clinic recently by her primary care physician Dr. Burroughs.  It seems she has had some mild worsening recently of her chronic lower extremity edema for which her torsemide dose was increased from a total of 20 mg daily to 40 mg daily.  To be noted, she did accidentally take 80 mg and did not notice a significant improvement with her symptoms at that time.  She some chronic exertional dyspnea which seems to be unchanged and her weights at home otherwise have been stable in the low 140s.      At this point, she tells me her peripheral edema is not too bothersome to her.  Dr. Marquez had noted previously that her edema may be related to some venous insufficiency.  We talked about potentially doing venous competency studies to see if she has any significant reflux but at this point as she is not too bothered by things we elected to defer that for now.  This could certainly be considered in the future.  She will continue to use compression stockings.      It does not appear that she has had labs done with her recent diuretic adjustments.  I would suggest she have a basic metabolic panel prior to her scheduled appointment with Dr. Burroughs on 7/28 to recheck her electrolytes and renal function.  If her renal function has worsened, then her torsemide dose may  need to be decreased.    I will have her return to see Dr. Marquez this fall as he had recommended.  In the meantime, I encouraged her to elevate her legs and continue with a low-sodium diet.  If she has any questions or concerns, asked her to contact us.    Orders this Visit:  Orders Placed This Encounter   Procedures     Follow-Up with Pulmonary Hypertension Clinic     No orders of the defined types were placed in this encounter.    There are no discontinued medications.    CURRENT MEDICATIONS:  Current Outpatient Medications   Medication Sig Dispense Refill     acetaminophen (TYLENOL) 500 MG tablet Take 1,000 mg by mouth 2 times daily        albuterol (PROAIR HFA/PROVENTIL HFA/VENTOLIN HFA) 108 (90 Base) MCG/ACT inhaler USE 2 INHALATIONS ORALLY   INTO THE LUNGS EVERY 6  HOURS as needed 18 g 11     apixaban ANTICOAGULANT (ELIQUIS ANTICOAGULANT) 5 MG tablet Take 1 tablet (5 mg) by mouth 2 times daily 180 tablet 2     bromfenac (PROLENSA) 0.07 % ophthalmic solution Place 1 drop into the right eye daily       folic acid (FOLVITE) 1 MG tablet TAKE 1 TABLET DAILY. 90 tablet 3     gabapentin (NEURONTIN) 100 MG capsule Take 1 capsule (100 mg) by mouth 3 times daily 90 capsule 11     InFLIXimab (REMICADE IV) Inject 400 mg into the vein Every 8 weeks       levothyroxine (SYNTHROID/LEVOTHROID) 75 MCG tablet Take 1 tablet (75 mcg) by mouth daily 90 tablet 2     melatonin 5 MG tablet Take 5 mg by mouth nightly as needed for sleep       Methotrexate Sodium (METHOTREXATE PO) Take 2.5 mg by mouth 10 tablets weekly - Wednesdays       metoprolol succinate ER (TOPROL-XL) 25 MG 24 hr tablet Take 1 tablet (25 mg) by mouth daily 90 tablet 2     Multiple Vitamins-Minerals (ICAPS) CAPS Take 1 capsule by mouth 2 times daily        multivitamin (CENTRUM SILVER) tablet Take 1 tablet by mouth daily       omeprazole (PRILOSEC) 20 MG DR capsule TAKE 1 CAPSULE TWICE DAILY,30-60 MINUTES PRIOR TO     MEALS 180 capsule 2     order for DME  Equipment being ordered: 4 wheeled walker with seat 1 each 3     potassium chloride ER (KLOR-CON M) 20 MEQ CR tablet Take 1 tablet (20 mEq) by mouth 2 times daily 90 tablet 2     sildenafil (REVATIO) 20 MG tablet TAKE 1 TABLET (20 MG) BY MOUTH 3 TIMES DAILY 90 tablet 5     simvastatin (ZOCOR) 40 MG tablet Take 1 tablet (40 mg) by mouth At Bedtime 90 tablet 3     Skin Protectants, Misc. (EUCERIN) cream Apply topically 2 times daily Apply to areas dry skin topically two times a day for dry skin and Apply to areas of dry skin topically as needed for dry skin daily       torsemide (DEMADEX) 20 MG tablet Take 1 tablet (20 mg) by mouth 2 times daily 180 tablet 3     triamcinolone (KENALOG) 0.1 % external cream Apply topically 2 times daily 30 g 3     Vitamin D, Cholecalciferol, 10 MCG (400 UNIT) TABS Take 1,600 Units by mouth daily       ALLERGIES  Allergies   Allergen Reactions     No Known Allergies      PAST MEDICAL HISTORY:  Past Medical History:   Diagnosis Date     Amaurosis fugax 5-11    Right     Carotid artery stenosis      Esophageal reflux 3/11/2004     HELICOBACTER PYLORI INFECTION 7/11/2006     hx of CA in situ RT breast-1990.mastectomy 4/17/2007     Hyperlipidemia LDL goal <70 6/20/2011     Lung cancer (H)     squamous cell lung ca left lower lobe     OBESITY NOS 3/11/2004     OSTEOPOROSIS NOS 3/11/2004     Other psoriasis 3/11/2004     Pulmonary hypertension (H) 04/15/2019    Right heart catheterization demonstrated moderate pulmonary arterial hypertension with a mean PA pressure of 34 mmHg, no reversibility with inhaled nitric oxide, elevated right and left-sided filling pressures, low normal cardiac index of 2.1.  Seen by Dr. Shay Marquez.  Started on sildenafil (Revatio) 20 mg 3 times daily.     RA (rheumatoid arthritis) (H) 2/8/2010     Raynaud's syndrome 4/10/2006     Scleroderma (H)      Sleep apnea     CPAP     PAST SURGICAL HISTORY:  Past Surgical History:   Procedure Laterality Date     BREAST  SURGERY       COLONOSCOPY  15     COLONOSCOPY N/A 2019    Procedure: Colonoscopy, With Polypectomy And Biopsy;  Surgeon: Neto Kaminski MD;  Location:  GI     CONIZATION       CV RIGHT HEART CATH MEASUREMENTS RECORDED N/A 4/15/2019    Procedure: Heart Cath Right Heart Cath;  Surgeon: Naresh Cyr MD;  Location:  HEART CARDIAC CATH LAB     ENDARTERECTOMY CAROTID       REPAIR HAMMER TOE       TONSILLECTOMY       ZZC NONSPECIFIC PROCEDURE      mastectomy RT breast     ZZHC COLONOSCOPY THRU STOMA, DIAGNOSTIC  2001    diverticulosis     FAMILY HISTORY:  Family History   Problem Relation Age of Onset     Cancer - colorectal Mother      Cerebrovascular Disease Father      Cancer Sister 31        ovarian     Heart Disease Sister      Gastrointestinal Disease Sister         crohns     Gastrointestinal Disease Sister         diverticulitis     Gastrointestinal Disease Brother         diverticulitis     Alzheimer Disease Sister      Cerebrovascular Disease Maternal Grandmother      Diabetes Maternal Grandmother      Mental Illness Maternal Grandmother      Diabetes Maternal Uncle      Cancer Nephew              SOCIAL HISTORY:  Social History     Socioeconomic History     Marital status:      Spouse name: None     Number of children: None     Years of education: None     Highest education level: None   Occupational History     None   Tobacco Use     Smoking status: Former Smoker     Packs/day: 1.00     Years: 30.00     Pack years: 30.00     Types: Cigarettes     Start date:      Quit date: 3/11/1992     Years since quittin.3     Smokeless tobacco: Never Used   Substance and Sexual Activity     Alcohol use: Not Currently     Alcohol/week: 0.0 standard drinks     Drug use: No     Sexual activity: Not Currently     Partners: Male   Other Topics Concern     Parent/sibling w/ CABG, MI or angioplasty before 65F 55M? No   Social History Narrative     None     Social  "Determinants of Health     Financial Resource Strain: Low Risk      Difficulty of Paying Living Expenses: Not hard at all   Food Insecurity:      Worried About Running Out of Food in the Last Year:      Ran Out of Food in the Last Year:    Transportation Needs: No Transportation Needs     Lack of Transportation (Medical): No     Lack of Transportation (Non-Medical): No   Physical Activity:      Days of Exercise per Week:      Minutes of Exercise per Session:    Stress:      Feeling of Stress :    Social Connections: Unknown     Frequency of Communication with Friends and Family: Not on file     Frequency of Social Gatherings with Friends and Family: More than three times a week     Attends Tenriism Services: Not on file     Active Member of Clubs or Organizations: Not on file     Attends Club or Organization Meetings: Not on file     Marital Status:    Intimate Partner Violence:      Fear of Current or Ex-Partner:      Emotionally Abused:      Physically Abused:      Sexually Abused:      Review of Systems:  Skin:  Negative     Eyes:  Positive for glasses  ENT:  Negative    Respiratory:  Positive for dyspnea on exertion  Cardiovascular:  palpitations;chest pain;Negative;cyanosis Positive for;edema;dizziness  Gastroenterology: Positive for heartburn  Genitourinary:  Negative urinary frequency;nocturia  Musculoskeletal:  Positive for arthritis  Neurologic:  Negative numbness or tingling of hands;numbness or tingling of feet  Psychiatric:  Negative sleep disturbances  Heme/Lymph/Imm:  Negative    Endocrine:  Positive for thyroid disorder     Physical Exam:  Vitals: BP 92/67   Pulse 65   Ht 1.575 m (5' 2\")   Wt 65.6 kg (144 lb 9.6 oz)   SpO2 97%   BMI 26.45 kg/m     Wt Readings from Last 4 Encounters:   07/14/21 65.6 kg (144 lb 9.6 oz)   07/12/21 65.5 kg (144 lb 4.8 oz)   07/09/21 66.1 kg (145 lb 12.8 oz)   06/30/21 66.2 kg (146 lb)     GEN: well nourished, in no acute distress.  HEENT:  Pupils equal, " round. Sclerae nonicteric.   C/V:  IRR  RESP: Respirations are unlabored. Clear to auscultation bilaterally without wheezing, rales, or rhonchi.  GI: Abdomen soft, nontender.  EXTREM: She has mostly nonpitting edema bilaterally, worse on the L compared to the right.  NEURO: Alert and oriented, cooperative.  SKIN: Warm and dry.     Recent Lab Results:  LIPID RESULTS:  Lab Results   Component Value Date    CHOL 139 10/25/2018    HDL 58 10/25/2018    LDL 56 10/25/2018    TRIG 125 10/25/2018    CHOLHDLRATIO 2.6 06/01/2015     LIVER ENZYME RESULTS:  Lab Results   Component Value Date    AST 25 05/02/2021    ALT 18 05/02/2021     CBC RESULTS:  Lab Results   Component Value Date    WBC 10.5 05/10/2021    RBC 4.62 05/10/2021    HGB 12.6 05/10/2021    HCT 39.6 05/10/2021    MCV 86 05/10/2021    MCH 27.3 05/10/2021    MCHC 31.8 05/10/2021    RDW 26.9 (H) 05/10/2021     05/10/2021     BMP RESULTS:  Lab Results   Component Value Date     05/10/2021    POTASSIUM 4.2 05/10/2021    CHLORIDE 95 05/10/2021    CO2 39 (H) 05/10/2021    ANIONGAP <1 (L) 05/10/2021    GLC 72 05/10/2021    BUN 17 05/10/2021    CR 0.65 05/10/2021    GFRESTIMATED 83 05/10/2021    GFRESTBLACK >90 05/10/2021    TU 9.1 05/10/2021      A1C RESULTS:  Lab Results   Component Value Date    A1C 5.4 05/02/2021     INR RESULTS:  Lab Results   Component Value Date    INR 1.07 05/02/2021    INR 1.05 04/15/2019       Trish Sutton PA-C  [facility]      Thank you for allowing me to participate in the care of your patient.      Sincerely,     Trish Sutton PA-C     [facility]  cc:   No referring provider defined for this encounter.

## 2021-07-14 NOTE — PROGRESS NOTES
Cardiology Progress Note    Patient seen today in follow up of: [facility] enrollment  Primary cardiologist: Dr. Marquez    HPI:  Jaqueline Canales is a very pleasant 80 year old female with a history of:  1.  Pulmonary hypertension following with Dr. Marquez.  Maintained on Revatio.  2.  Raynaud's syndrome.  3.  Chronic atrial fibrillation.  4.  Hyperlipidemia.  5.  Carotid artery disease status post right carotid endarterectomy in 2011.  6.  Bell's palsy.  7.  Iron deficiency anemia.    Jaqueline follows with Dr. Marquez on a regular basis.  She has had several phone visits with him over the last year.  She last had a visit with him on 6/3/2021.  This was a telephone visit.  She seemed to be stable at that time.  She has some chronic stable edema which Dr. Marquez has felt may be related to some venous insufficiency.    She recently had a visit with Dr. Burroughs her primary care physician on 6/30/2021.  It appears her home Care nurse had called the clinic as her weight was up a few pounds from 149 to 143 pounds.  She had some mild worsening of her chronic edema.  Some dietary indiscretion was felt to possibly be playing a role.  She was instructed to increase her torsemide to 20 mg twice a day from 20 mg daily.  It appears there was some confusion and she ended up taking 40 mg of torsemide twice daily rather than the recommended 20 mg twice daily.  Her torsemide was held for a few days.  She had a follow-up on 7/9 and per the notes her symptoms were better.  She was instructed to continue with torsemide 20 mg twice daily and she was referred to the core clinic.    Jaqueline presents today with her , Chava.  Overall, she seems to be stable.  She tells me her weight has been stable in the low 140s.  She confirms she is been taking torsemide 20 mg twice daily.  She continues to have chronic leg edema.  This is worse on the left than the right which is her baseline.  She does use compression stockings.  She tells me  her a peripheral swelling is not really that bothersome to her.  She does have some chronic shortness of breath at times if she walks for distance.  Again, this is stable and unchanged.  She denies orthopnea or PND.    ASSESSMENT/PLAN:  Jaqueline Canales is a very pleasant 80-year-old female with a history of pulmonary hypertension maintained on Revatio, Raynaud's syndrome, chronic atrial fibrillation who was referred to the [facility] clinic recently by her primary care physician Dr. Burroughs.  It seems she has had some mild worsening recently of her chronic lower extremity edema for which her torsemide dose was increased from a total of 20 mg daily to 40 mg daily.  To be noted, she did accidentally take 80 mg and did not notice a significant improvement with her symptoms at that time.  She some chronic exertional dyspnea which seems to be unchanged and her weights at home otherwise have been stable in the low 140s.      At this point, she tells me her peripheral edema is not too bothersome to her.  Dr. Marquez had noted previously that her edema may be related to some venous insufficiency.  We talked about potentially doing venous competency studies to see if she has any significant reflux but at this point as she is not too bothered by things we elected to defer that for now.  This could certainly be considered in the future.  She will continue to use compression stockings.      It does not appear that she has had labs done with her recent diuretic adjustments.  I would suggest she have a basic metabolic panel prior to her scheduled appointment with Dr. Burroughs on 7/28 to recheck her electrolytes and renal function.  If her renal function has worsened, then her torsemide dose may need to be decreased.    I will have her return to see Dr. Marquez this fall as he had recommended.  In the meantime, I encouraged her to elevate her legs and continue with a low-sodium diet.  If she has any questions or concerns, asked her to  contact us.    Orders this Visit:  Orders Placed This Encounter   Procedures     Follow-Up with Pulmonary Hypertension Clinic     No orders of the defined types were placed in this encounter.    There are no discontinued medications.    CURRENT MEDICATIONS:  Current Outpatient Medications   Medication Sig Dispense Refill     acetaminophen (TYLENOL) 500 MG tablet Take 1,000 mg by mouth 2 times daily        albuterol (PROAIR HFA/PROVENTIL HFA/VENTOLIN HFA) 108 (90 Base) MCG/ACT inhaler USE 2 INHALATIONS ORALLY   INTO THE LUNGS EVERY 6  HOURS as needed 18 g 11     apixaban ANTICOAGULANT (ELIQUIS ANTICOAGULANT) 5 MG tablet Take 1 tablet (5 mg) by mouth 2 times daily 180 tablet 2     bromfenac (PROLENSA) 0.07 % ophthalmic solution Place 1 drop into the right eye daily       folic acid (FOLVITE) 1 MG tablet TAKE 1 TABLET DAILY. 90 tablet 3     gabapentin (NEURONTIN) 100 MG capsule Take 1 capsule (100 mg) by mouth 3 times daily 90 capsule 11     InFLIXimab (REMICADE IV) Inject 400 mg into the vein Every 8 weeks       levothyroxine (SYNTHROID/LEVOTHROID) 75 MCG tablet Take 1 tablet (75 mcg) by mouth daily 90 tablet 2     melatonin 5 MG tablet Take 5 mg by mouth nightly as needed for sleep       Methotrexate Sodium (METHOTREXATE PO) Take 2.5 mg by mouth 10 tablets weekly - Wednesdays       metoprolol succinate ER (TOPROL-XL) 25 MG 24 hr tablet Take 1 tablet (25 mg) by mouth daily 90 tablet 2     Multiple Vitamins-Minerals (ICAPS) CAPS Take 1 capsule by mouth 2 times daily        multivitamin (CENTRUM SILVER) tablet Take 1 tablet by mouth daily       omeprazole (PRILOSEC) 20 MG DR capsule TAKE 1 CAPSULE TWICE DAILY,30-60 MINUTES PRIOR TO     MEALS 180 capsule 2     order for DME Equipment being ordered: 4 wheeled walker with seat 1 each 3     potassium chloride ER (KLOR-CON M) 20 MEQ CR tablet Take 1 tablet (20 mEq) by mouth 2 times daily 90 tablet 2     sildenafil (REVATIO) 20 MG tablet TAKE 1 TABLET (20 MG) BY MOUTH 3 TIMES  DAILY 90 tablet 5     simvastatin (ZOCOR) 40 MG tablet Take 1 tablet (40 mg) by mouth At Bedtime 90 tablet 3     Skin Protectants, Misc. (EUCERIN) cream Apply topically 2 times daily Apply to areas dry skin topically two times a day for dry skin and Apply to areas of dry skin topically as needed for dry skin daily       torsemide (DEMADEX) 20 MG tablet Take 1 tablet (20 mg) by mouth 2 times daily 180 tablet 3     triamcinolone (KENALOG) 0.1 % external cream Apply topically 2 times daily 30 g 3     Vitamin D, Cholecalciferol, 10 MCG (400 UNIT) TABS Take 1,600 Units by mouth daily       ALLERGIES  Allergies   Allergen Reactions     No Known Allergies      PAST MEDICAL HISTORY:  Past Medical History:   Diagnosis Date     Amaurosis fugax 5-11    Right     Carotid artery stenosis      Esophageal reflux 3/11/2004     HELICOBACTER PYLORI INFECTION 7/11/2006     hx of CA in situ RT breast-1990.mastectomy 4/17/2007     Hyperlipidemia LDL goal <70 6/20/2011     Lung cancer (H)     squamous cell lung ca left lower lobe     OBESITY NOS 3/11/2004     OSTEOPOROSIS NOS 3/11/2004     Other psoriasis 3/11/2004     Pulmonary hypertension (H) 04/15/2019    Right heart catheterization demonstrated moderate pulmonary arterial hypertension with a mean PA pressure of 34 mmHg, no reversibility with inhaled nitric oxide, elevated right and left-sided filling pressures, low normal cardiac index of 2.1.  Seen by Dr. Shay Marquez.  Started on sildenafil (Revatio) 20 mg 3 times daily.     RA (rheumatoid arthritis) (H) 2/8/2010     Raynaud's syndrome 4/10/2006     Scleroderma (H)      Sleep apnea     CPAP     PAST SURGICAL HISTORY:  Past Surgical History:   Procedure Laterality Date     BREAST SURGERY       COLONOSCOPY  5/24/16     COLONOSCOPY N/A 6/28/2019    Procedure: Colonoscopy, With Polypectomy And Biopsy;  Surgeon: Neto Kaminski MD;  Location:  GI     CONIZATION       CV RIGHT HEART CATH MEASUREMENTS RECORDED N/A 4/15/2019     Procedure: Heart Cath Right Heart Cath;  Surgeon: Naresh Cyr MD;  Location:  HEART CARDIAC CATH LAB     ENDARTERECTOMY CAROTID  2011     REPAIR HAMMER TOE       TONSILLECTOMY       ZZC NONSPECIFIC PROCEDURE      mastectomy RT breast     ZZHC COLONOSCOPY THRU STOMA, DIAGNOSTIC  2001    diverticulosis     FAMILY HISTORY:  Family History   Problem Relation Age of Onset     Cancer - colorectal Mother      Cerebrovascular Disease Father      Cancer Sister 31        ovarian     Heart Disease Sister      Gastrointestinal Disease Sister         crohns     Gastrointestinal Disease Sister         diverticulitis     Gastrointestinal Disease Brother         diverticulitis     Alzheimer Disease Sister      Cerebrovascular Disease Maternal Grandmother      Diabetes Maternal Grandmother      Mental Illness Maternal Grandmother      Diabetes Maternal Uncle      Cancer Nephew              SOCIAL HISTORY:  Social History     Socioeconomic History     Marital status:      Spouse name: None     Number of children: None     Years of education: None     Highest education level: None   Occupational History     None   Tobacco Use     Smoking status: Former Smoker     Packs/day: 1.00     Years: 30.00     Pack years: 30.00     Types: Cigarettes     Start date:      Quit date: 3/11/1992     Years since quittin.3     Smokeless tobacco: Never Used   Substance and Sexual Activity     Alcohol use: Not Currently     Alcohol/week: 0.0 standard drinks     Drug use: No     Sexual activity: Not Currently     Partners: Male   Other Topics Concern     Parent/sibling w/ CABG, MI or angioplasty before 65F 55M? No   Social History Narrative     None     Social Determinants of Health     Financial Resource Strain: Low Risk      Difficulty of Paying Living Expenses: Not hard at all   Food Insecurity:      Worried About Running Out of Food in the Last Year:      Ran Out of Food in the Last Year:    Transportation  "Needs: No Transportation Needs     Lack of Transportation (Medical): No     Lack of Transportation (Non-Medical): No   Physical Activity:      Days of Exercise per Week:      Minutes of Exercise per Session:    Stress:      Feeling of Stress :    Social Connections: Unknown     Frequency of Communication with Friends and Family: Not on file     Frequency of Social Gatherings with Friends and Family: More than three times a week     Attends Restoration Services: Not on file     Active Member of Clubs or Organizations: Not on file     Attends Club or Organization Meetings: Not on file     Marital Status:    Intimate Partner Violence:      Fear of Current or Ex-Partner:      Emotionally Abused:      Physically Abused:      Sexually Abused:      Review of Systems:  Skin:  Negative     Eyes:  Positive for glasses  ENT:  Negative    Respiratory:  Positive for dyspnea on exertion  Cardiovascular:  palpitations;chest pain;Negative;cyanosis Positive for;edema;dizziness  Gastroenterology: Positive for heartburn  Genitourinary:  Negative urinary frequency;nocturia  Musculoskeletal:  Positive for arthritis  Neurologic:  Negative numbness or tingling of hands;numbness or tingling of feet  Psychiatric:  Negative sleep disturbances  Heme/Lymph/Imm:  Negative    Endocrine:  Positive for thyroid disorder     Physical Exam:  Vitals: BP 92/67   Pulse 65   Ht 1.575 m (5' 2\")   Wt 65.6 kg (144 lb 9.6 oz)   SpO2 97%   BMI 26.45 kg/m     Wt Readings from Last 4 Encounters:   07/14/21 65.6 kg (144 lb 9.6 oz)   07/12/21 65.5 kg (144 lb 4.8 oz)   07/09/21 66.1 kg (145 lb 12.8 oz)   06/30/21 66.2 kg (146 lb)     GEN: well nourished, in no acute distress.  HEENT:  Pupils equal, round. Sclerae nonicteric.   C/V:  IRR  RESP: Respirations are unlabored. Clear to auscultation bilaterally without wheezing, rales, or rhonchi.  GI: Abdomen soft, nontender.  EXTREM: She has mostly nonpitting edema bilaterally, worse on the L compared to the " right.  NEURO: Alert and oriented, cooperative.  SKIN: Warm and dry.     Recent Lab Results:  LIPID RESULTS:  Lab Results   Component Value Date    CHOL 139 10/25/2018    HDL 58 10/25/2018    LDL 56 10/25/2018    TRIG 125 10/25/2018    CHOLHDLRATIO 2.6 06/01/2015     LIVER ENZYME RESULTS:  Lab Results   Component Value Date    AST 25 05/02/2021    ALT 18 05/02/2021     CBC RESULTS:  Lab Results   Component Value Date    WBC 10.5 05/10/2021    RBC 4.62 05/10/2021    HGB 12.6 05/10/2021    HCT 39.6 05/10/2021    MCV 86 05/10/2021    MCH 27.3 05/10/2021    MCHC 31.8 05/10/2021    RDW 26.9 (H) 05/10/2021     05/10/2021     BMP RESULTS:  Lab Results   Component Value Date     05/10/2021    POTASSIUM 4.2 05/10/2021    CHLORIDE 95 05/10/2021    CO2 39 (H) 05/10/2021    ANIONGAP <1 (L) 05/10/2021    GLC 72 05/10/2021    BUN 17 05/10/2021    CR 0.65 05/10/2021    GFRESTIMATED 83 05/10/2021    GFRESTBLACK >90 05/10/2021    TU 9.1 05/10/2021      A1C RESULTS:  Lab Results   Component Value Date    A1C 5.4 05/02/2021     INR RESULTS:  Lab Results   Component Value Date    INR 1.07 05/02/2021    INR 1.05 04/15/2019       Trish Sutton PA-C  [facility]

## 2021-07-14 NOTE — PATIENT INSTRUCTIONS
Call [facility] nurse for any questions or concerns Mon-Fri 8am-4pm:                                                 #[phone]                                       For concerns after hours:                                               #[phone], option 2     1: Medication changes: no medication changes today. Continue Torsemide 20 mg in the morning and 20 mg in the afternoon.  2: Plan from today: watch your sodium intake at home.  - elevate your legs as able   -  We could consider an ultrasound of your legs to see if you're veins are leaking if your swelling or issues get worse  -  See Dr. Marquez in two months.

## 2021-07-15 ENCOUNTER — TELEPHONE (OUTPATIENT)
Dept: FAMILY MEDICINE | Facility: CLINIC | Age: 81
End: 2021-07-15

## 2021-07-15 NOTE — TELEPHONE ENCOUNTER
Reason for Call:  Home Health Care    Dilan with [facility] called regarding (reason for call):     Orders are needed for this patient.     Skilled Nursing: Home Care Recertification; 1x8 weeks, 3 PRN    Phone Number Homecare Nurse can be reached at: [phone]    Can we leave a detailed message on this number? YES    Phone number patient can be reached at: [phone]    Best Time: Any     Call taken on 7/15/2021 at 4:18 PM by Crista Leon

## 2021-07-19 DIAGNOSIS — I27.20 PULMONARY HYPERTENSION (H): ICD-10-CM

## 2021-07-19 RX ORDER — SILDENAFIL CITRATE 20 MG/1
20 TABLET ORAL 3 TIMES DAILY
Qty: 90 TABLET | Refills: 5 | Status: SHIPPED | OUTPATIENT
Start: 2021-07-19 | End: 2021-12-17

## 2021-07-19 NOTE — TELEPHONE ENCOUNTER
Reason for Call:  Medication or medication refill:    Do you use a [facility]?  Name of the pharmacy and phone number for the current request:       [facility] - [place], [place] - [address] AT PORTAL TO REGISTERED [facility] SITES    Name of the medication requested: sildenafil (REVATIO) 20 MG tablet [91187]     Other request:     Can we leave a detailed message on this number? YES    Phone number patient can be reached at: [phone]    Best Time: Any    Call taken on 7/19/2021 at 10:54 AM by Crista Leon

## 2021-07-21 NOTE — TELEPHONE ENCOUNTER
Patient called to check on the status of this  Notified her Rx was refilled     Carmen PORTER RN

## 2021-07-22 ENCOUNTER — TRANSFERRED RECORDS (OUTPATIENT)
Dept: HEALTH INFORMATION MANAGEMENT | Facility: CLINIC | Age: 81
End: 2021-07-22

## 2021-07-22 ENCOUNTER — TELEPHONE (OUTPATIENT)
Dept: FAMILY MEDICINE | Facility: CLINIC | Age: 81
End: 2021-07-22

## 2021-07-28 ENCOUNTER — OFFICE VISIT (OUTPATIENT)
Dept: FAMILY MEDICINE | Facility: CLINIC | Age: 81
End: 2021-07-28
Payer: MEDICARE

## 2021-07-28 ENCOUNTER — EXTERNAL ORDER RESULTS (OUTPATIENT)
Dept: PULMONOLOGY | Facility: CLINIC | Age: 81
End: 2021-07-28

## 2021-07-28 VITALS
TEMPERATURE: 97.1 F | BODY MASS INDEX: 27.05 KG/M2 | OXYGEN SATURATION: 91 % | SYSTOLIC BLOOD PRESSURE: 86 MMHG | HEIGHT: 62 IN | HEART RATE: 76 BPM | DIASTOLIC BLOOD PRESSURE: 57 MMHG | WEIGHT: 147 LBS

## 2021-07-28 DIAGNOSIS — I50.32 CHRONIC DIASTOLIC HEART FAILURE (H): Primary | ICD-10-CM

## 2021-07-28 DIAGNOSIS — E87.6 HYPOKALEMIA: ICD-10-CM

## 2021-07-28 DIAGNOSIS — E78.5 HYPERLIPIDEMIA LDL GOAL <100: ICD-10-CM

## 2021-07-28 DIAGNOSIS — I27.20 PULMONARY HYPERTENSION (H): ICD-10-CM

## 2021-07-28 DIAGNOSIS — R60.0 BILATERAL LEG EDEMA: ICD-10-CM

## 2021-07-28 DIAGNOSIS — I48.19 PERSISTENT ATRIAL FIBRILLATION (H): ICD-10-CM

## 2021-07-28 DIAGNOSIS — R41.3 MEMORY LOSS: ICD-10-CM

## 2021-07-28 LAB
ALBUMIN SERPL-MCNC: 4.3 G/DL
ALT SERPL-CCNC: 12 U/L
AST SERPL-CCNC: 28 U/L
CREAT SERPL-MCNC: 0.56 MG/DL
CRP SERPL-MCNC: 0.08 MG/DL (ref 0–0.8)
ERYTHROCYTE [DISTWIDTH] IN BLOOD BY AUTOMATED COUNT: 15.2 %
ERYTHROCYTE [SEDIMENTATION RATE] IN BLOOD: 22 MM/HR
GFR SERPL CREATININE-BSD FRML MDRD: 110.4 ML/MIN/1.7
HCT VFR BLD AUTO: 35.3 %
MCH RBC QN AUTO: 31.9 PG
MCHC RBC AUTO-ENTMCNC: 33 G/DL
MCV RBC AUTO: 97 FL
PLATELET # BLD AUTO: 209 10^9/L
PMV BLD: 7.4 FL
RBC # BLD AUTO: 3.66 M/UL
WBC # BLD AUTO: 7.5 10^9/L

## 2021-07-28 PROCEDURE — 99214 OFFICE O/P EST MOD 30 MIN: CPT | Performed by: INTERNAL MEDICINE

## 2021-07-28 RX ORDER — DONEPEZIL HYDROCHLORIDE 5 MG/1
5 TABLET, FILM COATED ORAL AT BEDTIME
Qty: 90 TABLET | Refills: 3 | Status: SHIPPED | OUTPATIENT
Start: 2021-07-28 | End: 2022-06-24

## 2021-07-28 ASSESSMENT — MIFFLIN-ST. JEOR: SCORE: 1085.04

## 2021-07-28 NOTE — PROGRESS NOTES
Subjective   Jaqueline is a 80 year old who presents for the following health issues     HPI     Chief Complaint   Patient presents with     Follow Up     Chronic diastolic heart failure      2 weeks follow up on multiple concerns including diastolic CHF, hyperlipidemia, pulmonary hypertension       HPI:   Patient Jaquelien Canales is a very pleasant 80 year old female with history of CHF, pulmonary hypertension who presents to Internal Medicine clinic today for routine 2 weeks follow up of recent actue on chronic CHF exacerbation symptms. Patient's weight has been increasing again with dietary noncompliance with low salt diet. No chest pains at this time. Leg lymphedema symptoms have worsened from prior baseline. Patient is scheduled for follow up with CHF clinic at the [facility] clinic in [place] going forward. No fever or chills.      Current Medications:     Current Outpatient Medications   Medication Sig Dispense Refill     acetaminophen (TYLENOL) 500 MG tablet Take 1,000 mg by mouth 2 times daily        albuterol (PROAIR HFA/PROVENTIL HFA/VENTOLIN HFA) 108 (90 Base) MCG/ACT inhaler USE 2 INHALATIONS ORALLY   INTO THE LUNGS EVERY 6  HOURS as needed 18 g 11     apixaban ANTICOAGULANT (ELIQUIS ANTICOAGULANT) 5 MG tablet Take 1 tablet (5 mg) by mouth 2 times daily 180 tablet 2     bromfenac (PROLENSA) 0.07 % ophthalmic solution Place 1 drop into the right eye daily       donepezil (ARICEPT) 5 MG tablet Take 1 tablet (5 mg) by mouth At Bedtime 90 tablet 3     folic acid (FOLVITE) 1 MG tablet TAKE 1 TABLET DAILY. 90 tablet 3     gabapentin (NEURONTIN) 100 MG capsule Take 1 capsule (100 mg) by mouth 3 times daily 90 capsule 11     InFLIXimab (REMICADE IV) Inject 400 mg into the vein Every 8 weeks       levothyroxine (SYNTHROID/LEVOTHROID) 75 MCG tablet Take 1 tablet (75 mcg) by mouth daily 90 tablet 2     melatonin 5 MG tablet Take 5 mg by mouth nightly as needed for sleep       Methotrexate Sodium (METHOTREXATE  PO) Take 2.5 mg by mouth 10 tablets weekly - Wednesdays       metoprolol succinate ER (TOPROL-XL) 25 MG 24 hr tablet Take 1 tablet (25 mg) by mouth daily 90 tablet 2     Multiple Vitamins-Minerals (ICAPS) CAPS Take 1 capsule by mouth 2 times daily        multivitamin (CENTRUM SILVER) tablet Take 1 tablet by mouth daily       omeprazole (PRILOSEC) 20 MG DR capsule TAKE 1 CAPSULE TWICE DAILY,30-60 MINUTES PRIOR TO     MEALS 180 capsule 2     order for DME Equipment being ordered: 4 wheeled walker with seat 1 each 3     potassium chloride ER (KLOR-CON M) 20 MEQ CR tablet Take 1 tablet (20 mEq) by mouth 2 times daily 90 tablet 2     sildenafil (REVATIO) 20 MG tablet Take 1 tablet (20 mg) by mouth 3 times daily 90 tablet 5     simvastatin (ZOCOR) 40 MG tablet Take 1 tablet (40 mg) by mouth At Bedtime 90 tablet 3     Skin Protectants, Misc. (EUCERIN) cream Apply topically 2 times daily Apply to areas dry skin topically two times a day for dry skin and Apply to areas of dry skin topically as needed for dry skin daily       torsemide (DEMADEX) 20 MG tablet Take 1 tablet (20 mg) by mouth 2 times daily 180 tablet 3     triamcinolone (KENALOG) 0.1 % external cream Apply topically 2 times daily 30 g 3     Vitamin D, Cholecalciferol, 10 MCG (400 UNIT) TABS Take 1,600 Units by mouth daily           Allergies:      Allergies   Allergen Reactions     No Known Allergies             Past Medical History:     Past Medical History:   Diagnosis Date     Amaurosis fugax 5-11    Right     Carotid artery stenosis      Esophageal reflux 3/11/2004     HELICOBACTER PYLORI INFECTION 7/11/2006     hx of CA in situ RT breast-1990.mastectomy 4/17/2007     Hyperlipidemia LDL goal <70 6/20/2011     Lung cancer (H)     squamous cell lung ca left lower lobe     OBESITY NOS 3/11/2004     OSTEOPOROSIS NOS 3/11/2004     Other psoriasis 3/11/2004     Pulmonary hypertension (H) 04/15/2019    Right heart catheterization demonstrated moderate pulmonary  arterial hypertension with a mean PA pressure of 34 mmHg, no reversibility with inhaled nitric oxide, elevated right and left-sided filling pressures, low normal cardiac index of 2.1.  Seen by Dr. Shay Marquez.  Started on sildenafil (Revatio) 20 mg 3 times daily.     RA (rheumatoid arthritis) (H) 2010     Raynaud's syndrome 4/10/2006     Scleroderma (H)      Sleep apnea     CPAP         Past Surgical History:     Past Surgical History:   Procedure Laterality Date     BREAST SURGERY       COLONOSCOPY  16     COLONOSCOPY N/A 2019    Procedure: Colonoscopy, With Polypectomy And Biopsy;  Surgeon: Neto Kaminski MD;  Location:  GI     CONIZATION       CV RIGHT HEART CATH MEASUREMENTS RECORDED N/A 4/15/2019    Procedure: Heart Cath Right Heart Cath;  Surgeon: Naresh Cyr MD;  Location:  HEART CARDIAC CATH LAB     ENDARTERECTOMY CAROTID       REPAIR HAMMER TOE       TONSILLECTOMY       ZZC NONSPECIFIC PROCEDURE      mastectomy RT breast     ZZHC COLONOSCOPY THRU STOMA, DIAGNOSTIC  2001    diverticulosis         Family Medical History:     Family History   Problem Relation Age of Onset     Cancer - colorectal Mother      Cerebrovascular Disease Father      Cancer Sister 31        ovarian     Heart Disease Sister      Gastrointestinal Disease Sister         crohns     Gastrointestinal Disease Sister         diverticulitis     Gastrointestinal Disease Brother         diverticulitis     Alzheimer Disease Sister      Cerebrovascular Disease Maternal Grandmother      Diabetes Maternal Grandmother      Mental Illness Maternal Grandmother      Diabetes Maternal Uncle      Cancer Nephew                  Social History:     Social History     Socioeconomic History     Marital status:      Spouse name: Not on file     Number of children: Not on file     Years of education: Not on file     Highest education level: Not on file   Occupational History     Not on file    Tobacco Use     Smoking status: Former Smoker     Packs/day: 1.00     Years: 30.00     Pack years: 30.00     Types: Cigarettes     Start date:      Quit date: 3/11/1992     Years since quittin.4     Smokeless tobacco: Never Used   Substance and Sexual Activity     Alcohol use: Not Currently     Alcohol/week: 0.0 standard drinks     Drug use: No     Sexual activity: Not Currently     Partners: Male   Other Topics Concern     Parent/sibling w/ CABG, MI or angioplasty before 65F 55M? No   Social History Narrative     Not on file     Social Determinants of Health     Financial Resource Strain: Low Risk      Difficulty of Paying Living Expenses: Not hard at all   Food Insecurity:      Worried About Running Out of Food in the Last Year:      Ran Out of Food in the Last Year:    Transportation Needs: No Transportation Needs     Lack of Transportation (Medical): No     Lack of Transportation (Non-Medical): No   Physical Activity:      Days of Exercise per Week:      Minutes of Exercise per Session:    Stress:      Feeling of Stress :    Social Connections: Unknown     Frequency of Communication with Friends and Family: Not on file     Frequency of Social Gatherings with Friends and Family: More than three times a week     Attends Mandaen Services: Not on file     Active Member of Clubs or Organizations: Not on file     Attends Club or Organization Meetings: Not on file     Marital Status:    Intimate Partner Violence:      Fear of Current or Ex-Partner:      Emotionally Abused:      Physically Abused:      Sexually Abused:            Review of System:     Constitutional: Negative for fever or chills  Skin: Negative for rashes  Ears/Nose/Throat: Negative for nasal congestion, sore throat  Respiratory: No shortness of breath, dyspnea on exertion, cough, or hemoptysis  Cardiovascular: Negative for chest pain  Gastrointestinal: Negative for nausea, vomiting, positive for chronic GERD  Genitourinary: Negative  "for dysuria, hematuria  Musculoskeletal: Negative for myalgias  Neurologic: Negative for headaches, positive for worsening short term memory loss symptoms  Psychiatric: Negative for depression, anxiety  Hematologic/Lymphatic/Immunologic: positive for worsening of chronic bilateral leg edema symptoms  Endocrine: Negative  Behavioral: Negative for tobacco use       Physical Exam:   BP (!) 86/57 (BP Location: Left arm, Patient Position: Sitting, Cuff Size: Adult Regular)   Pulse 76   Temp 97.1  F (36.2  C) (Temporal)   Ht 1.575 m (5' 2\")   Wt 66.7 kg (147 lb)   SpO2 91%   BMI 26.89 kg/m      GENERAL: chronically ill appearing elderly female, alert and no distress  EYES: eyes grossly normal to inspection, and conjunctivae and sclerae normal  HENT: Normocephalic atraumatic. Nose and mouth without ulcers or lesions  NECK: supple  RESP: lungs clear to auscultation   CV: atrial fibrillation present  LYMPH: chronic bilateral lower leg peripheral edema symptoms mildly improved when compared to recent baseline  ABDOMEN: nondistended  MS: no gross musculoskeletal defects noted  SKIN: no suspicious lesions or rashes  NEURO: worsening short term memory loss symptoms present  PSYCH: mentation appears normal, affect normal        Diagnostic Test Results:     Last Comprehensive Metabolic Panel:  Sodium   Date Value Ref Range Status   05/10/2021 133 133 - 144 mmol/L Final     Potassium   Date Value Ref Range Status   05/10/2021 4.2 3.4 - 5.3 mmol/L Final     Chloride   Date Value Ref Range Status   05/10/2021 95 94 - 109 mmol/L Final     Carbon Dioxide   Date Value Ref Range Status   05/10/2021 39 (H) 20 - 32 mmol/L Final     Anion Gap   Date Value Ref Range Status   05/10/2021 <1 (L) 3 - 14 mmol/L Final     Glucose   Date Value Ref Range Status   05/10/2021 72 70 - 99 mg/dL Final     Urea Nitrogen   Date Value Ref Range Status   05/10/2021 17 7 - 30 mg/dL Final     Creatinine   Date Value Ref Range Status   07/22/2021 0.560 " mg/dL Final     GFR Estimate   Date Value Ref Range Status   05/10/2021 83 >60 mL/min/[1.73_m2] Final     Comment:     Non  GFR Calc  Starting 12/18/2018, serum creatinine based estimated GFR (eGFR) will be   calculated using the Chronic Kidney Disease Epidemiology Collaboration   (CKD-EPI) equation.       Calcium   Date Value Ref Range Status   05/10/2021 9.1 8.5 - 10.1 mg/dL Final         ASSESSMENT/PLAN:   (I27.20) Pulmonary hypertension (H)  (R60.0) Bilateral leg edema  (E87.6) Hypokalemia  (I48.19) Persistent atrial fibrillation (H)  (I50.32) Chronic diastolic heart failure (H)  (primary encounter diagnosis)  Comment: 2 weeks routine follow up of recent actue on chronic CHF exacerbation symptms. Patient's weight had increased with current low dose diuretic therapy and some noncompliance with low salt diet.  no chest pains. Leg lymphedema symptoms have stablized with the increased Torsemide medication.  Plan: continue patient's current torsemide (DEMADEX) 20 MG tablet medication 2xDay, potassium chloride ER (KLOR-CON M) 20 MEQ CR         Tablet 2xDay        continue to follow up with CHF clinic at the [facility] clinic in [place] going forward. continue low salt diet  Continue Eliquis for atrial fibrillation      (E78.5) Hyperlipidemia LDL goal <100  Comment: stable.  Plan: continue current therapy      (R41.3) Memory loss  Comment: worsening short term memory loss symptoms  Plan: donepezil (ARICEPT) 5 MG tablet    Follow Up Plan:     Patient is instructed to return to Internal Medicine clinic for follow-up visit in 2 weeks.        Carissa Burroughs MD  Internal Medicine  [facility]

## 2021-08-11 ENCOUNTER — OFFICE VISIT (OUTPATIENT)
Dept: FAMILY MEDICINE | Facility: CLINIC | Age: 81
End: 2021-08-11
Payer: MEDICARE

## 2021-08-11 VITALS
DIASTOLIC BLOOD PRESSURE: 66 MMHG | WEIGHT: 142 LBS | HEIGHT: 62 IN | SYSTOLIC BLOOD PRESSURE: 110 MMHG | RESPIRATION RATE: 12 BRPM | OXYGEN SATURATION: 94 % | TEMPERATURE: 97.1 F | HEART RATE: 69 BPM | BODY MASS INDEX: 26.13 KG/M2

## 2021-08-11 DIAGNOSIS — E78.5 HYPERLIPIDEMIA LDL GOAL <100: ICD-10-CM

## 2021-08-11 DIAGNOSIS — R60.0 BILATERAL LEG EDEMA: ICD-10-CM

## 2021-08-11 DIAGNOSIS — I50.32 CHRONIC DIASTOLIC HEART FAILURE (H): Primary | ICD-10-CM

## 2021-08-11 DIAGNOSIS — R41.3 MEMORY LOSS: ICD-10-CM

## 2021-08-11 DIAGNOSIS — I48.19 PERSISTENT ATRIAL FIBRILLATION (H): ICD-10-CM

## 2021-08-11 DIAGNOSIS — E87.6 HYPOKALEMIA: ICD-10-CM

## 2021-08-11 DIAGNOSIS — I27.20 PULMONARY HYPERTENSION (H): ICD-10-CM

## 2021-08-11 PROCEDURE — 99214 OFFICE O/P EST MOD 30 MIN: CPT | Performed by: INTERNAL MEDICINE

## 2021-08-11 ASSESSMENT — MIFFLIN-ST. JEOR: SCORE: 1062.36

## 2021-08-11 NOTE — PROGRESS NOTES
Subjective   Jaqueline is a 80 year old who presents for the following health issues     HPI       Chief Complaint   Patient presents with     Follow Up       routine follow up on multiple concerns including diastolic CHF, hyperlipidemia, pulmonary hypertension       HPI:   Patient Jaqueline Canales is a very pleasant 80 year old female with history of CHF, pulmonary hypertension who presents to Internal Medicine clinic today for routine follow up of recent actue on chronic CHF exacerbation symptms. Patient's weight has been decreasing again with diuretic therapy. No chest pains at this time. Leg lymphedema symptoms have improved from prior baseline. Patient is scheduled for follow up with CHF clinic at the [facility] clinic in [place] going forward. No fever or chills.      Current Medications:     Current Outpatient Medications   Medication Sig Dispense Refill     acetaminophen (TYLENOL) 500 MG tablet Take 1,000 mg by mouth 2 times daily        albuterol (PROAIR HFA/PROVENTIL HFA/VENTOLIN HFA) 108 (90 Base) MCG/ACT inhaler USE 2 INHALATIONS ORALLY   INTO THE LUNGS EVERY 6  HOURS as needed 18 g 11     apixaban ANTICOAGULANT (ELIQUIS ANTICOAGULANT) 5 MG tablet Take 1 tablet (5 mg) by mouth 2 times daily 180 tablet 2     bromfenac (PROLENSA) 0.07 % ophthalmic solution Place 1 drop into the right eye daily       donepezil (ARICEPT) 5 MG tablet Take 1 tablet (5 mg) by mouth At Bedtime 90 tablet 3     folic acid (FOLVITE) 1 MG tablet TAKE 1 TABLET DAILY. 90 tablet 3     gabapentin (NEURONTIN) 100 MG capsule Take 1 capsule (100 mg) by mouth 3 times daily 90 capsule 11     InFLIXimab (REMICADE IV) Inject 400 mg into the vein Every 8 weeks       levothyroxine (SYNTHROID/LEVOTHROID) 75 MCG tablet Take 1 tablet (75 mcg) by mouth daily 90 tablet 2     melatonin 5 MG tablet Take 5 mg by mouth nightly as needed for sleep       Methotrexate Sodium (METHOTREXATE PO) Take 2.5 mg by mouth 10 tablets weekly - Wednesdays        metoprolol succinate ER (TOPROL-XL) 25 MG 24 hr tablet Take 1 tablet (25 mg) by mouth daily 90 tablet 2     Multiple Vitamins-Minerals (ICAPS) CAPS Take 1 capsule by mouth 2 times daily        multivitamin (CENTRUM SILVER) tablet Take 1 tablet by mouth daily       omeprazole (PRILOSEC) 20 MG DR capsule TAKE 1 CAPSULE TWICE DAILY,30-60 MINUTES PRIOR TO     MEALS 180 capsule 2     order for DME Equipment being ordered: 4 wheeled walker with seat 1 each 3     potassium chloride ER (KLOR-CON M) 20 MEQ CR tablet Take 1 tablet (20 mEq) by mouth 2 times daily 90 tablet 2     sildenafil (REVATIO) 20 MG tablet Take 1 tablet (20 mg) by mouth 3 times daily 90 tablet 5     simvastatin (ZOCOR) 40 MG tablet Take 1 tablet (40 mg) by mouth At Bedtime 90 tablet 3     Skin Protectants, Misc. (EUCERIN) cream Apply topically 2 times daily Apply to areas dry skin topically two times a day for dry skin and Apply to areas of dry skin topically as needed for dry skin daily       torsemide (DEMADEX) 20 MG tablet Take 1 tablet (20 mg) by mouth 2 times daily 180 tablet 3     triamcinolone (KENALOG) 0.1 % external cream Apply topically 2 times daily 30 g 3     Vitamin D, Cholecalciferol, 10 MCG (400 UNIT) TABS Take 1,600 Units by mouth daily           Allergies:      Allergies   Allergen Reactions     No Known Allergies             Past Medical History:     Past Medical History:   Diagnosis Date     Amaurosis fugax 5-11    Right     Carotid artery stenosis      Esophageal reflux 3/11/2004     HELICOBACTER PYLORI INFECTION 7/11/2006     hx of CA in situ RT breast-1990.mastectomy 4/17/2007     Hyperlipidemia LDL goal <70 6/20/2011     Lung cancer (H)     squamous cell lung ca left lower lobe     OBESITY NOS 3/11/2004     OSTEOPOROSIS NOS 3/11/2004     Other psoriasis 3/11/2004     Pulmonary hypertension (H) 04/15/2019    Right heart catheterization demonstrated moderate pulmonary arterial hypertension with a mean PA pressure of 34 mmHg, no  reversibility with inhaled nitric oxide, elevated right and left-sided filling pressures, low normal cardiac index of 2.1.  Seen by Dr. Shay Marquez.  Started on sildenafil (Revatio) 20 mg 3 times daily.     RA (rheumatoid arthritis) (H) 2010     Raynaud's syndrome 4/10/2006     Scleroderma (H)      Sleep apnea     CPAP         Past Surgical History:     Past Surgical History:   Procedure Laterality Date     BREAST SURGERY       COLONOSCOPY  16     COLONOSCOPY N/A 2019    Procedure: Colonoscopy, With Polypectomy And Biopsy;  Surgeon: Neto Kaminski MD;  Location:  GI     CONIZATION       CV RIGHT HEART CATH MEASUREMENTS RECORDED N/A 4/15/2019    Procedure: Heart Cath Right Heart Cath;  Surgeon: Naresh Cyr MD;  Location:  HEART CARDIAC CATH LAB     ENDARTERECTOMY CAROTID       REPAIR HAMMER TOE       TONSILLECTOMY       ZZC NONSPECIFIC PROCEDURE      mastectomy RT breast     ZZHC COLONOSCOPY THRU STOMA, DIAGNOSTIC  2001    diverticulosis         Family Medical History:     Family History   Problem Relation Age of Onset     Cancer - colorectal Mother      Cerebrovascular Disease Father      Cancer Sister 31        ovarian     Heart Disease Sister      Gastrointestinal Disease Sister         crohns     Gastrointestinal Disease Sister         diverticulitis     Gastrointestinal Disease Brother         diverticulitis     Alzheimer Disease Sister      Cerebrovascular Disease Maternal Grandmother      Diabetes Maternal Grandmother      Mental Illness Maternal Grandmother      Diabetes Maternal Uncle      Cancer Nephew                  Social History:     Social History     Socioeconomic History     Marital status:      Spouse name: Not on file     Number of children: Not on file     Years of education: Not on file     Highest education level: Not on file   Occupational History     Not on file   Tobacco Use     Smoking status: Former Smoker     Packs/day: 1.00      Years: 30.00     Pack years: 30.00     Types: Cigarettes     Start date:      Quit date: 3/11/1992     Years since quittin.4     Smokeless tobacco: Never Used   Substance and Sexual Activity     Alcohol use: Not Currently     Alcohol/week: 0.0 standard drinks     Drug use: No     Sexual activity: Not Currently     Partners: Male   Other Topics Concern     Parent/sibling w/ CABG, MI or angioplasty before 65F 55M? No   Social History Narrative     Not on file     Social Determinants of Health     Financial Resource Strain: Low Risk      Difficulty of Paying Living Expenses: Not hard at all   Food Insecurity:      Worried About Running Out of Food in the Last Year:      Ran Out of Food in the Last Year:    Transportation Needs: No Transportation Needs     Lack of Transportation (Medical): No     Lack of Transportation (Non-Medical): No   Physical Activity:      Days of Exercise per Week:      Minutes of Exercise per Session:    Stress:      Feeling of Stress :    Social Connections: Unknown     Frequency of Communication with Friends and Family: Not on file     Frequency of Social Gatherings with Friends and Family: More than three times a week     Attends Islam Services: Not on file     Active Member of Clubs or Organizations: Not on file     Attends Club or Organization Meetings: Not on file     Marital Status:    Intimate Partner Violence:      Fear of Current or Ex-Partner:      Emotionally Abused:      Physically Abused:      Sexually Abused:            Review of System:     Constitutional: Negative for fever or chills  Skin: Negative for rashes  Ears/Nose/Throat: Negative for nasal congestion, sore throat  Respiratory: No shortness of breath, dyspnea on exertion, cough, or hemoptysis  Cardiovascular: Negative for chest pain  Gastrointestinal: Negative for nausea, vomiting, positive for chronic GERD  Genitourinary: Negative for dysuria, hematuria  Musculoskeletal: Negative for  "myalgias  Neurologic: Negative for headaches, positive for worsening short term memory loss symptoms  Psychiatric: Negative for depression, anxiety  Hematologic/Lymphatic/Immunologic: positive for worsening of chronic bilateral leg edema symptoms  Endocrine: Negative  Behavioral: Negative for tobacco use       Physical Exam:   /66 (BP Location: Right arm, Patient Position: Sitting, Cuff Size: Adult Regular)   Pulse 69   Temp 97.1  F (36.2  C) (Temporal)   Resp 12   Ht 1.575 m (5' 2\")   Wt 64.4 kg (142 lb)   SpO2 94%   BMI 25.97 kg/m      GENERAL: chronically ill appearing elderly female, alert and no distress  EYES: eyes grossly normal to inspection, and conjunctivae and sclerae normal  HENT: Normocephalic atraumatic. Nose and mouth without ulcers or lesions  NECK: supple  RESP: lungs clear to auscultation   CV: atrial fibrillation present  LYMPH: chronic bilateral lower leg peripheral edema symptoms mildly improved when compared to recent baseline  ABDOMEN: nondistended  MS: no gross musculoskeletal defects noted  SKIN: no suspicious lesions or rashes  NEURO: worsening short term memory loss symptoms present  PSYCH: mentation appears normal, affect normal        Diagnostic Test Results:     Last Comprehensive Metabolic Panel:  Sodium   Date Value Ref Range Status   05/10/2021 133 133 - 144 mmol/L Final     Potassium   Date Value Ref Range Status   05/10/2021 4.2 3.4 - 5.3 mmol/L Final     Chloride   Date Value Ref Range Status   05/10/2021 95 94 - 109 mmol/L Final     Carbon Dioxide   Date Value Ref Range Status   05/10/2021 39 (H) 20 - 32 mmol/L Final     Anion Gap   Date Value Ref Range Status   05/10/2021 <1 (L) 3 - 14 mmol/L Final     Glucose   Date Value Ref Range Status   05/10/2021 72 70 - 99 mg/dL Final     Urea Nitrogen   Date Value Ref Range Status   05/10/2021 17 7 - 30 mg/dL Final     Creatinine   Date Value Ref Range Status   07/22/2021 0.560 mg/dL Final     GFR Estimate   Date Value Ref " Range Status   05/10/2021 83 >60 mL/min/[1.73_m2] Final     Comment:     Non  GFR Calc  Starting 12/18/2018, serum creatinine based estimated GFR (eGFR) will be   calculated using the Chronic Kidney Disease Epidemiology Collaboration   (CKD-EPI) equation.       Calcium   Date Value Ref Range Status   05/10/2021 9.1 8.5 - 10.1 mg/dL Final         ASSESSMENT/PLAN:   (I27.20) Pulmonary hypertension (H)  (R60.0) Bilateral leg edema  (E87.6) Hypokalemia  (I48.19) Persistent atrial fibrillation (H)  (I50.32) Chronic diastolic heart failure (H)  (primary encounter diagnosis)  Comment: routine follow up of recent actue on chronic CHF exacerbation symptms. Patient's weight had decreased with current low dose diuretic therapy.  no chest pains. Leg lymphedema symptoms have stablized with the increased Torsemide medication.  Plan: continue patient's current torsemide (DEMADEX) 20 MG tablet medication, potassium chloride ER (KLOR-CON M) 20 MEQ CR         Tablet        continue to follow up with CHF clinic at the [facility]iology clinic in [place] going forward. continue low salt diet  I have refilled the patient's Eliquis medication for atrial fibrillation      (E78.5) Hyperlipidemia LDL goal <100  Comment: stable.  Plan: continue current therapy      (R41.3) Memory loss  Comment: worsening short term memory loss symptoms  Plan: donepezil (ARICEPT) 5 MG tablet    Follow Up Plan:     Patient is instructed to return to Internal Medicine clinic for follow-up visit in 1 month.        Carissa Burroughs MD  Internal Medicine  [facility]

## 2021-08-12 NOTE — TELEPHONE ENCOUNTER
Labs ordered.   Return to the emergency department for any concerns. Take Motrin Tylenol as needed for symptom control.

## 2021-09-03 DIAGNOSIS — I27.20 PULMONARY HYPERTENSION (H): Primary | ICD-10-CM

## 2021-09-03 DIAGNOSIS — R06.02 SOB (SHORTNESS OF BREATH): ICD-10-CM

## 2021-09-13 ENCOUNTER — OFFICE VISIT (OUTPATIENT)
Dept: FAMILY MEDICINE | Facility: CLINIC | Age: 81
End: 2021-09-13
Payer: MEDICARE

## 2021-09-13 VITALS
BODY MASS INDEX: 25.58 KG/M2 | HEART RATE: 84 BPM | HEIGHT: 62 IN | SYSTOLIC BLOOD PRESSURE: 105 MMHG | OXYGEN SATURATION: 97 % | TEMPERATURE: 96.9 F | DIASTOLIC BLOOD PRESSURE: 69 MMHG | WEIGHT: 139 LBS

## 2021-09-13 DIAGNOSIS — E87.6 HYPOKALEMIA: ICD-10-CM

## 2021-09-13 DIAGNOSIS — I50.32 CHRONIC DIASTOLIC HEART FAILURE (H): Primary | ICD-10-CM

## 2021-09-13 DIAGNOSIS — I48.19 PERSISTENT ATRIAL FIBRILLATION (H): ICD-10-CM

## 2021-09-13 DIAGNOSIS — J84.9 ILD (INTERSTITIAL LUNG DISEASE) (H): ICD-10-CM

## 2021-09-13 DIAGNOSIS — I27.20 PULMONARY HYPERTENSION (H): ICD-10-CM

## 2021-09-13 DIAGNOSIS — R60.0 BILATERAL LEG EDEMA: ICD-10-CM

## 2021-09-13 DIAGNOSIS — E78.5 HYPERLIPIDEMIA LDL GOAL <100: ICD-10-CM

## 2021-09-13 PROCEDURE — 99214 OFFICE O/P EST MOD 30 MIN: CPT | Performed by: INTERNAL MEDICINE

## 2021-09-13 RX ORDER — ALBUTEROL SULFATE 90 UG/1
AEROSOL, METERED RESPIRATORY (INHALATION)
Qty: 18 G | Refills: 11 | Status: SHIPPED | OUTPATIENT
Start: 2021-09-13 | End: 2022-01-11

## 2021-09-13 ASSESSMENT — MIFFLIN-ST. JEOR: SCORE: 1048.75

## 2021-09-13 NOTE — PROGRESS NOTES
Subjective   Jaqueline is a 80 year old who presents for the following health issues     HPI       Chief Complaint   Patient presents with     Follow Up       follow up on multiple concerns including diastolic CHF, hyperlipidemia, pulmonary hypertension       HPI:   Patient Jaqueline Canales is a very pleasant 80 year old female with history of CHF, pulmonary hypertension who presents to Internal Medicine clinic today for routine follow up of recent actue on chronic CHF exacerbation symptms. Patient's weight has been decreasing again with diuretic therapy. No chest pains at this time. Leg lymphedema symptoms have improved from prior baseline. Patient is scheduled for follow up with CHF clinic at the [facility] clinic in [place] going forward. No fever or chills.      Current Medications:     Current Outpatient Medications   Medication Sig Dispense Refill     acetaminophen (TYLENOL) 500 MG tablet Take 1,000 mg by mouth 2 times daily        albuterol (PROAIR HFA/PROVENTIL HFA/VENTOLIN HFA) 108 (90 Base) MCG/ACT inhaler USE 2 INHALATIONS ORALLY   INTO THE LUNGS EVERY 6  HOURS as needed 18 g 11     apixaban ANTICOAGULANT (ELIQUIS ANTICOAGULANT) 5 MG tablet Take 1 tablet (5 mg) by mouth 2 times daily 180 tablet 2     bromfenac (PROLENSA) 0.07 % ophthalmic solution Place 1 drop into the right eye daily       donepezil (ARICEPT) 5 MG tablet Take 1 tablet (5 mg) by mouth At Bedtime 90 tablet 3     folic acid (FOLVITE) 1 MG tablet TAKE 1 TABLET DAILY. 90 tablet 3     gabapentin (NEURONTIN) 100 MG capsule Take 1 capsule (100 mg) by mouth 3 times daily 90 capsule 11     InFLIXimab (REMICADE IV) Inject 400 mg into the vein Every 8 weeks       levothyroxine (SYNTHROID/LEVOTHROID) 75 MCG tablet Take 1 tablet (75 mcg) by mouth daily 90 tablet 2     melatonin 5 MG tablet Take 5 mg by mouth nightly as needed for sleep       Methotrexate Sodium (METHOTREXATE PO) Take 2.5 mg by mouth 10 tablets weekly - Wednesdays       metoprolol  succinate ER (TOPROL-XL) 25 MG 24 hr tablet Take 1 tablet (25 mg) by mouth daily 90 tablet 2     Multiple Vitamins-Minerals (ICAPS) CAPS Take 1 capsule by mouth 2 times daily        multivitamin (CENTRUM SILVER) tablet Take 1 tablet by mouth daily       omeprazole (PRILOSEC) 20 MG DR capsule TAKE 1 CAPSULE TWICE DAILY,30-60 MINUTES PRIOR TO     MEALS 180 capsule 2     order for DME Equipment being ordered: 4 wheeled walker with seat 1 each 3     potassium chloride ER (KLOR-CON M) 20 MEQ CR tablet Take 1 tablet (20 mEq) by mouth 2 times daily 90 tablet 2     sildenafil (REVATIO) 20 MG tablet Take 1 tablet (20 mg) by mouth 3 times daily 90 tablet 5     simvastatin (ZOCOR) 40 MG tablet Take 1 tablet (40 mg) by mouth At Bedtime 90 tablet 3     Skin Protectants, Misc. (EUCERIN) cream Apply topically 2 times daily Apply to areas dry skin topically two times a day for dry skin and Apply to areas of dry skin topically as needed for dry skin daily       torsemide (DEMADEX) 20 MG tablet Take 1 tablet (20 mg) by mouth 2 times daily 180 tablet 3     triamcinolone (KENALOG) 0.1 % external cream Apply topically 2 times daily 30 g 3     Vitamin D, Cholecalciferol, 10 MCG (400 UNIT) TABS Take 1,600 Units by mouth daily           Allergies:      Allergies   Allergen Reactions     No Known Allergies             Past Medical History:     Past Medical History:   Diagnosis Date     Amaurosis fugax 5-11    Right     Carotid artery stenosis      Esophageal reflux 3/11/2004     HELICOBACTER PYLORI INFECTION 7/11/2006     hx of CA in situ RT breast-1990.mastectomy 4/17/2007     Hyperlipidemia LDL goal <70 6/20/2011     Lung cancer (H)     squamous cell lung ca left lower lobe     OBESITY NOS 3/11/2004     OSTEOPOROSIS NOS 3/11/2004     Other psoriasis 3/11/2004     Pulmonary hypertension (H) 04/15/2019    Right heart catheterization demonstrated moderate pulmonary arterial hypertension with a mean PA pressure of 34 mmHg, no reversibility  with inhaled nitric oxide, elevated right and left-sided filling pressures, low normal cardiac index of 2.1.  Seen by Dr. Shay Marquez.  Started on sildenafil (Revatio) 20 mg 3 times daily.     RA (rheumatoid arthritis) (H) 2010     Raynaud's syndrome 4/10/2006     Scleroderma (H)      Sleep apnea     CPAP         Past Surgical History:     Past Surgical History:   Procedure Laterality Date     BREAST SURGERY       COLONOSCOPY  16     COLONOSCOPY N/A 2019    Procedure: Colonoscopy, With Polypectomy And Biopsy;  Surgeon: Neto Kaminski MD;  Location:  GI     CONIZATION       CV RIGHT HEART CATH MEASUREMENTS RECORDED N/A 4/15/2019    Procedure: Heart Cath Right Heart Cath;  Surgeon: Naresh Cyr MD;  Location:  HEART CARDIAC CATH LAB     ENDARTERECTOMY CAROTID       REPAIR HAMMER TOE       TONSILLECTOMY       ZZC NONSPECIFIC PROCEDURE      mastectomy RT breast     ZZHC COLONOSCOPY THRU STOMA, DIAGNOSTIC  2001    diverticulosis         Family Medical History:     Family History   Problem Relation Age of Onset     Cancer - colorectal Mother      Cerebrovascular Disease Father      Cancer Sister 31        ovarian     Heart Disease Sister      Gastrointestinal Disease Sister         crohns     Gastrointestinal Disease Sister         diverticulitis     Gastrointestinal Disease Brother         diverticulitis     Alzheimer Disease Sister      Cerebrovascular Disease Maternal Grandmother      Diabetes Maternal Grandmother      Mental Illness Maternal Grandmother      Diabetes Maternal Uncle      Cancer Nephew                  Social History:     Social History     Socioeconomic History     Marital status:      Spouse name: Not on file     Number of children: Not on file     Years of education: Not on file     Highest education level: Not on file   Occupational History     Not on file   Tobacco Use     Smoking status: Former Smoker     Packs/day: 1.00     Years:  30.00     Pack years: 30.00     Types: Cigarettes     Start date:      Quit date: 3/11/1992     Years since quittin.5     Smokeless tobacco: Never Used   Substance and Sexual Activity     Alcohol use: Not Currently     Alcohol/week: 0.0 standard drinks     Drug use: No     Sexual activity: Not Currently     Partners: Male   Other Topics Concern     Parent/sibling w/ CABG, MI or angioplasty before 65F 55M? No   Social History Narrative     Not on file     Social Determinants of Health     Financial Resource Strain: Low Risk      Difficulty of Paying Living Expenses: Not hard at all   Food Insecurity:      Worried About Running Out of Food in the Last Year:      Ran Out of Food in the Last Year:    Transportation Needs: No Transportation Needs     Lack of Transportation (Medical): No     Lack of Transportation (Non-Medical): No   Physical Activity:      Days of Exercise per Week:      Minutes of Exercise per Session:    Stress:      Feeling of Stress :    Social Connections: Unknown     Frequency of Communication with Friends and Family: Not on file     Frequency of Social Gatherings with Friends and Family: More than three times a week     Attends Yazidi Services: Not on file     Active Member of Clubs or Organizations: Not on file     Attends Club or Organization Meetings: Not on file     Marital Status:    Intimate Partner Violence:      Fear of Current or Ex-Partner:      Emotionally Abused:      Physically Abused:      Sexually Abused:            Review of System:     Constitutional: Negative for fever or chills  Skin: Negative for rashes  Ears/Nose/Throat: Negative for nasal congestion, sore throat  Respiratory: No shortness of breath, dyspnea on exertion, cough, or hemoptysis  Cardiovascular: Negative for chest pain  Gastrointestinal: Negative for nausea, vomiting, positive for chronic GERD  Genitourinary: Negative for dysuria, hematuria  Musculoskeletal: Negative for myalgias  Neurologic:  "Negative for headaches, positive for worsening short term memory loss symptoms  Psychiatric: Negative for depression, anxiety  Hematologic/Lymphatic/Immunologic: positive for worsening of chronic bilateral leg edema symptoms  Endocrine: Negative  Behavioral: Negative for tobacco use       Physical Exam:   /69 (BP Location: Left arm, Patient Position: Sitting, Cuff Size: Adult Regular)   Pulse 84   Temp 96.9  F (36.1  C) (Temporal)   Ht 1.575 m (5' 2\")   Wt 63 kg (139 lb)   SpO2 97%   BMI 25.42 kg/m      GENERAL: chronically ill appearing elderly female, alert and no distress  EYES: eyes grossly normal to inspection, and conjunctivae and sclerae normal  HENT: Normocephalic atraumatic. Nose and mouth without ulcers or lesions  NECK: supple  RESP: lungs clear to auscultation   CV: atrial fibrillation present  LYMPH: chronic bilateral lower leg peripheral edema symptoms mildly improved when compared to recent baseline  ABDOMEN: nondistended  MS: no gross musculoskeletal defects noted  SKIN: no suspicious lesions or rashes  NEURO: worsening short term memory loss symptoms present  PSYCH: mentation appears normal, affect normal        Diagnostic Test Results:     Last Comprehensive Metabolic Panel:  Sodium   Date Value Ref Range Status   05/10/2021 133 133 - 144 mmol/L Final     Potassium   Date Value Ref Range Status   05/10/2021 4.2 3.4 - 5.3 mmol/L Final     Chloride   Date Value Ref Range Status   05/10/2021 95 94 - 109 mmol/L Final     Carbon Dioxide   Date Value Ref Range Status   05/10/2021 39 (H) 20 - 32 mmol/L Final     Anion Gap   Date Value Ref Range Status   05/10/2021 <1 (L) 3 - 14 mmol/L Final     Glucose   Date Value Ref Range Status   05/10/2021 72 70 - 99 mg/dL Final     Urea Nitrogen   Date Value Ref Range Status   05/10/2021 17 7 - 30 mg/dL Final     Creatinine   Date Value Ref Range Status   07/22/2021 0.560 mg/dL Final     GFR Estimate   Date Value Ref Range Status   05/10/2021 83 >60 " mL/min/[1.73_m2] Final     Comment:     Non  GFR Calc  Starting 12/18/2018, serum creatinine based estimated GFR (eGFR) will be   calculated using the Chronic Kidney Disease Epidemiology Collaboration   (CKD-EPI) equation.       Calcium   Date Value Ref Range Status   05/10/2021 9.1 8.5 - 10.1 mg/dL Final         ASSESSMENT/PLAN:   (I27.20) Pulmonary hypertension (H)  (R60.0) Bilateral leg edema  (E87.6) Hypokalemia  (I48.19) Persistent atrial fibrillation (H)  (I50.32) Chronic diastolic heart failure (H)  (primary encounter diagnosis)  Comment: routine follow up of recent actue on chronic CHF exacerbation symptms. Patient's weight had decreased with current low dose diuretic therapy.  no chest pains. Leg lymphedema symptoms have stablized with the increased Torsemide medication.  Plan: continue patient's current torsemide (DEMADEX) 20 MG tablet medication, potassium chloride ER (KLOR-CON M) 20 MEQ CR         Tablet        continue to follow up with CHF clinic at the [facility] clinic in [place] going forward. continue low salt diet  continue Eliquis medication for atrial fibrillation      (E78.5) Hyperlipidemia LDL goal <100  Comment: stable.  Plan: continue current therapy      (J84.9) ILD (interstitial lung disease) (H)  Comment: stable. Patient is due for a refill of albuterol.  Plan: albuterol (PROAIR HFA/PROVENTIL HFA/VENTOLIN         HFA) 108 (90 Base) MCG/ACT inhaler      Follow Up Plan:     Patient is instructed to return to Internal Medicine clinic for follow-up visit in 1 month.        Carissa Burroughs MD  Internal Medicine  [facility]

## 2021-09-22 ENCOUNTER — TELEPHONE (OUTPATIENT)
Dept: FAMILY MEDICINE | Facility: CLINIC | Age: 81
End: 2021-09-22

## 2021-09-22 NOTE — TELEPHONE ENCOUNTER
Clarification on medication given to  [facility].     Day Covarrubias RN  [facility]

## 2021-09-23 ENCOUNTER — TRANSFERRED RECORDS (OUTPATIENT)
Dept: HEALTH INFORMATION MANAGEMENT | Facility: CLINIC | Age: 81
End: 2021-09-23

## 2021-09-24 ENCOUNTER — HOSPITAL ENCOUNTER (OUTPATIENT)
Dept: MAMMOGRAPHY | Facility: CLINIC | Age: 81
Discharge: HOME | End: 2021-09-24
Attending: INTERNAL MEDICINE | Admitting: INTERNAL MEDICINE
Payer: MEDICARE

## 2021-09-24 ENCOUNTER — EXTERNAL ORDER RESULTS (OUTPATIENT)
Dept: PULMONOLOGY | Facility: CLINIC | Age: 81
End: 2021-09-24

## 2021-09-24 DIAGNOSIS — Z12.31 VISIT FOR SCREENING MAMMOGRAM: ICD-10-CM

## 2021-09-24 LAB
ALBUMIN SERPL-MCNC: 4 G/DL
ALT SERPL-CCNC: 13 U/L
AST SERPL-CCNC: 29 U/L
CREAT SERPL-MCNC: 0.62 MG/DL
ERYTHROCYTE [DISTWIDTH] IN BLOOD BY AUTOMATED COUNT: 13.1 %
GFR SERPL CREATININE-BSD FRML MDRD: 98.2 ML/MIN/1.7
HCT VFR BLD AUTO: 37.6 %
HEMOGLOBIN: 12.6 G/DL
MCH RBC QN AUTO: 33.4 PG
MCHC RBC AUTO-ENTMCNC: 33.5 G/DL
MCV RBC AUTO: 100 FL
PLATELET # BLD AUTO: 203 10^9/L
PMV BLD: 7 FL
RBC # BLD AUTO: 3.77 M/UL
WBC # BLD AUTO: 6.6 10^9/L

## 2021-09-24 PROCEDURE — 77063 BREAST TOMOSYNTHESIS BI: CPT | Mod: 52

## 2021-10-02 DIAGNOSIS — K22.2 ESOPHAGEAL STRICTURE: ICD-10-CM

## 2021-10-02 DIAGNOSIS — I48.19 PERSISTENT ATRIAL FIBRILLATION (H): ICD-10-CM

## 2021-10-04 RX ORDER — METOPROLOL SUCCINATE 25 MG/1
TABLET, EXTENDED RELEASE ORAL
Qty: 90 TABLET | Refills: 2 | Status: ON HOLD | OUTPATIENT
Start: 2021-10-04 | End: 2022-03-24

## 2021-10-04 NOTE — TELEPHONE ENCOUNTER
Failed protocol.  please route to  team if patient needs an appointment     Vanita WEEKSRN BSN  [facility]  [phone]

## 2021-10-13 ENCOUNTER — OFFICE VISIT (OUTPATIENT)
Dept: FAMILY MEDICINE | Facility: CLINIC | Age: 81
End: 2021-10-13
Payer: MEDICARE

## 2021-10-13 VITALS
WEIGHT: 132 LBS | HEIGHT: 62 IN | RESPIRATION RATE: 16 BRPM | BODY MASS INDEX: 24.29 KG/M2 | HEART RATE: 86 BPM | OXYGEN SATURATION: 96 % | TEMPERATURE: 97.1 F | DIASTOLIC BLOOD PRESSURE: 66 MMHG | SYSTOLIC BLOOD PRESSURE: 98 MMHG

## 2021-10-13 DIAGNOSIS — I50.32 CHRONIC DIASTOLIC HEART FAILURE (H): Primary | ICD-10-CM

## 2021-10-13 DIAGNOSIS — R60.0 BILATERAL LEG EDEMA: ICD-10-CM

## 2021-10-13 DIAGNOSIS — I27.20 PULMONARY HYPERTENSION (H): ICD-10-CM

## 2021-10-13 DIAGNOSIS — C34.32 MALIGNANT NEOPLASM OF LOWER LOBE OF LEFT LUNG (H): ICD-10-CM

## 2021-10-13 DIAGNOSIS — I48.19 PERSISTENT ATRIAL FIBRILLATION (H): ICD-10-CM

## 2021-10-13 DIAGNOSIS — J84.9 ILD (INTERSTITIAL LUNG DISEASE) (H): ICD-10-CM

## 2021-10-13 DIAGNOSIS — E78.5 HYPERLIPIDEMIA LDL GOAL <100: ICD-10-CM

## 2021-10-13 PROCEDURE — 99214 OFFICE O/P EST MOD 30 MIN: CPT | Performed by: INTERNAL MEDICINE

## 2021-10-13 ASSESSMENT — MIFFLIN-ST. JEOR: SCORE: 1017

## 2021-10-13 NOTE — PROGRESS NOTES
Subjective   Jaqueline is a 80 year old who presents for the following health issues     HPI       Chief Complaint   Patient presents with     Follow Up       1 month follow up on multiple concerns including diastolic CHF, hyperlipidemia, pulmonary hypertension       HPI:   Patient Jaqueline Canales is a very pleasant 80 year old female with history of CHF, pulmonary hypertension who presents to Internal Medicine clinic today for routine 1 month follow up of recent actue on chronic CHF exacerbation symptms. Patient's weight has been decreasing again with diuretic therapy. No chest pains at this time. Leg lymphedema symptoms have improved from prior baseline. Patient is scheduled for follow up with CHF clinic at the [facility] clinic in [place] going forward. No fever or chills.      Current Medications:     Current Outpatient Medications   Medication Sig Dispense Refill     acetaminophen (TYLENOL) 500 MG tablet Take 1,000 mg by mouth 2 times daily        albuterol (PROAIR HFA/PROVENTIL HFA/VENTOLIN HFA) 108 (90 Base) MCG/ACT inhaler USE 2 INHALATIONS ORALLY   INTO THE LUNGS EVERY 6  HOURS as needed 18 g 11     apixaban ANTICOAGULANT (ELIQUIS ANTICOAGULANT) 5 MG tablet Take 1 tablet (5 mg) by mouth 2 times daily 180 tablet 2     bromfenac (PROLENSA) 0.07 % ophthalmic solution Place 1 drop into the right eye daily       donepezil (ARICEPT) 5 MG tablet Take 1 tablet (5 mg) by mouth At Bedtime 90 tablet 3     folic acid (FOLVITE) 1 MG tablet TAKE 1 TABLET DAILY. 90 tablet 3     gabapentin (NEURONTIN) 100 MG capsule Take 1 capsule (100 mg) by mouth 3 times daily 90 capsule 11     levothyroxine (SYNTHROID/LEVOTHROID) 75 MCG tablet Take 1 tablet (75 mcg) by mouth daily 90 tablet 2     melatonin 5 MG tablet Take 5 mg by mouth nightly as needed for sleep       Methotrexate Sodium (METHOTREXATE PO) Take 2.5 mg by mouth 10 tablets weekly - Wednesdays       metoprolol succinate ER (TOPROL-XL) 25 MG 24 hr tablet TAKE 1 TABLET  DAILY 90 tablet 2     Multiple Vitamins-Minerals (ICAPS) CAPS Take 1 capsule by mouth 2 times daily        multivitamin (CENTRUM SILVER) tablet Take 1 tablet by mouth daily       omeprazole (PRILOSEC) 20 MG DR capsule TAKE 1 CAPSULE TWICE DAILY,30-60 MINUTES PRIOR TO     MEALS 180 capsule 2     potassium chloride ER (KLOR-CON M) 20 MEQ CR tablet Take 1 tablet (20 mEq) by mouth 2 times daily 90 tablet 2     sildenafil (REVATIO) 20 MG tablet Take 1 tablet (20 mg) by mouth 3 times daily 90 tablet 5     simvastatin (ZOCOR) 40 MG tablet Take 1 tablet (40 mg) by mouth At Bedtime 90 tablet 3     Skin Protectants, Misc. (EUCERIN) cream Apply topically 2 times daily Apply to areas dry skin topically two times a day for dry skin and Apply to areas of dry skin topically as needed for dry skin daily       torsemide (DEMADEX) 20 MG tablet Take 1 tablet (20 mg) by mouth 2 times daily 180 tablet 3     triamcinolone (KENALOG) 0.1 % external cream Apply topically 2 times daily 30 g 3     Vitamin D, Cholecalciferol, 10 MCG (400 UNIT) TABS Take 1,600 Units by mouth daily           Allergies:      Allergies   Allergen Reactions     No Known Allergies             Past Medical History:     Past Medical History:   Diagnosis Date     Amaurosis fugax 5-11    Right     Carotid artery stenosis      Esophageal reflux 3/11/2004     HELICOBACTER PYLORI INFECTION 7/11/2006     hx of CA in situ RT breast-1990.mastectomy 4/17/2007     Hyperlipidemia LDL goal <70 6/20/2011     Lung cancer (H)     squamous cell lung ca left lower lobe     OBESITY NOS 3/11/2004     OSTEOPOROSIS NOS 3/11/2004     Other psoriasis 3/11/2004     Pulmonary hypertension (H) 04/15/2019    Right heart catheterization demonstrated moderate pulmonary arterial hypertension with a mean PA pressure of 34 mmHg, no reversibility with inhaled nitric oxide, elevated right and left-sided filling pressures, low normal cardiac index of 2.1.  Seen by Dr. Shay Marquez.  Started on  sildenafil (Revatio) 20 mg 3 times daily.     RA (rheumatoid arthritis) (H) 2010     Raynaud's syndrome 4/10/2006     Scleroderma (H)      Sleep apnea     CPAP         Past Surgical History:     Past Surgical History:   Procedure Laterality Date     BREAST SURGERY       COLONOSCOPY  16     COLONOSCOPY N/A 2019    Procedure: Colonoscopy, With Polypectomy And Biopsy;  Surgeon: Neto Kaminski MD;  Location:  GI     CONIZATION       CV RIGHT HEART CATH MEASUREMENTS RECORDED N/A 4/15/2019    Procedure: Heart Cath Right Heart Cath;  Surgeon: Naresh Cyr MD;  Location:  HEART CARDIAC CATH LAB     ENDARTERECTOMY CAROTID       REPAIR HAMMER TOE       TONSILLECTOMY       ZZC NONSPECIFIC PROCEDURE      mastectomy RT breast     ZZHC COLONOSCOPY THRU STOMA, DIAGNOSTIC  2001    diverticulosis         Family Medical History:     Family History   Problem Relation Age of Onset     Cancer - colorectal Mother      Cerebrovascular Disease Father      Cancer Sister 31        ovarian     Heart Disease Sister      Gastrointestinal Disease Sister         crohns     Gastrointestinal Disease Sister         diverticulitis     Gastrointestinal Disease Brother         diverticulitis     Alzheimer Disease Sister      Cerebrovascular Disease Maternal Grandmother      Diabetes Maternal Grandmother      Mental Illness Maternal Grandmother      Diabetes Maternal Uncle      Cancer Nephew                  Social History:     Social History     Socioeconomic History     Marital status:      Spouse name: Not on file     Number of children: Not on file     Years of education: Not on file     Highest education level: Not on file   Occupational History     Not on file   Tobacco Use     Smoking status: Former Smoker     Packs/day: 1.00     Years: 30.00     Pack years: 30.00     Types: Cigarettes     Start date:      Quit date: 3/11/1992     Years since quittin.6     Smokeless tobacco:  Never Used   Substance and Sexual Activity     Alcohol use: Not Currently     Alcohol/week: 0.0 standard drinks     Drug use: No     Sexual activity: Not Currently     Partners: Male   Other Topics Concern     Parent/sibling w/ CABG, MI or angioplasty before 65F 55M? No   Social History Narrative     Not on file     Social Determinants of Health     Financial Resource Strain: Low Risk      Difficulty of Paying Living Expenses: Not hard at all   Food Insecurity:      Worried About Running Out of Food in the Last Year:      Ran Out of Food in the Last Year:    Transportation Needs: No Transportation Needs     Lack of Transportation (Medical): No     Lack of Transportation (Non-Medical): No   Physical Activity:      Days of Exercise per Week:      Minutes of Exercise per Session:    Stress:      Feeling of Stress :    Social Connections: Unknown     Frequency of Communication with Friends and Family: Not on file     Frequency of Social Gatherings with Friends and Family: More than three times a week     Attends Quaker Services: Not on file     Active Member of Clubs or Organizations: Not on file     Attends Club or Organization Meetings: Not on file     Marital Status:    Intimate Partner Violence:      Fear of Current or Ex-Partner:      Emotionally Abused:      Physically Abused:      Sexually Abused:            Review of System:     Constitutional: Negative for fever or chills  Skin: Negative for rashes  Ears/Nose/Throat: Negative for nasal congestion, sore throat  Respiratory: No shortness of breath, dyspnea on exertion, cough, or hemoptysis  Cardiovascular: Negative for chest pain  Gastrointestinal: Negative for nausea, vomiting, positive for chronic GERD  Genitourinary: Negative for dysuria, hematuria  Musculoskeletal: Negative for myalgias  Neurologic: Negative for headaches, positive for worsening short term memory loss symptoms  Psychiatric: Negative for depression,  "anxiety  Hematologic/Lymphatic/Immunologic: positive for worsening of chronic bilateral leg edema symptoms  Endocrine: Negative  Behavioral: Negative for tobacco use       Physical Exam:   BP 98/66 (BP Location: Right arm, Patient Position: Sitting, Cuff Size: Adult Regular)   Pulse 86   Temp 97.1  F (36.2  C) (Temporal)   Resp 16   Ht 1.575 m (5' 2\")   Wt 59.9 kg (132 lb)   SpO2 96%   BMI 24.14 kg/m      GENERAL: chronically ill appearing elderly female, alert and no distress  EYES: eyes grossly normal to inspection, and conjunctivae and sclerae normal  HENT: Normocephalic atraumatic. Nose and mouth without ulcers or lesions  NECK: supple  RESP: lungs clear to auscultation   CV: atrial fibrillation present  LYMPH: chronic bilateral lower leg peripheral edema symptoms mildly improved when compared to recent baseline  ABDOMEN: nondistended  MS: no gross musculoskeletal defects noted  SKIN: no suspicious lesions or rashes  NEURO: worsening short term memory loss symptoms present  PSYCH: mentation appears normal, affect normal        Diagnostic Test Results:     Last Comprehensive Metabolic Panel:  Sodium   Date Value Ref Range Status   05/10/2021 133 133 - 144 mmol/L Final     Potassium   Date Value Ref Range Status   05/10/2021 4.2 3.4 - 5.3 mmol/L Final     Chloride   Date Value Ref Range Status   05/10/2021 95 94 - 109 mmol/L Final     Carbon Dioxide   Date Value Ref Range Status   05/10/2021 39 (H) 20 - 32 mmol/L Final     Anion Gap   Date Value Ref Range Status   05/10/2021 <1 (L) 3 - 14 mmol/L Final     Glucose   Date Value Ref Range Status   05/10/2021 72 70 - 99 mg/dL Final     Urea Nitrogen   Date Value Ref Range Status   05/10/2021 17 7 - 30 mg/dL Final     Creatinine   Date Value Ref Range Status   09/23/2021 0.620 mg/dL Final     GFR Estimate   Date Value Ref Range Status   05/10/2021 83 >60 mL/min/[1.73_m2] Final     Comment:     Non  GFR Calc  Starting 12/18/2018, serum creatinine " based estimated GFR (eGFR) will be   calculated using the Chronic Kidney Disease Epidemiology Collaboration   (CKD-EPI) equation.       Calcium   Date Value Ref Range Status   05/10/2021 9.1 8.5 - 10.1 mg/dL Final         ASSESSMENT/PLAN:   (I27.20) Pulmonary hypertension (H)  (R60.0) Bilateral leg edema  (I48.19) Persistent atrial fibrillation (H)  (I50.32) Chronic diastolic heart failure (H)  (primary encounter diagnosis)  Comment: routine 1 month follow up of recent actue on chronic CHF exacerbation symptms. Patient's weight had decreased with current low dose diuretic therapy.  no chest pains. Leg lymphedema symptoms have stablized with the increased Torsemide medication.  Plan: continue patient's current torsemide (DEMADEX) 20 MG tablet medication, potassium chloride ER (KLOR-CON M) 20 MEQ CR         Tablet        continue to follow up with CHF clinic at the [facility] Cardiology clinic in [place] going forward. continue low salt diet  continue Eliquis medication for atrial fibrillation      (E78.5) Hyperlipidemia LDL goal <100  Comment: stable.  Plan: continue current therapy      (C34.32) Malignant neoplasm of lower lobe of left lung (H)  (J84.9) ILD (interstitial lung disease) (H)  Comment: stable. Patient is followed by pulmonary medicine clinic  Plan: albuterol (PROAIR HFA/PROVENTIL HFA/VENTOLIN         HFA) 108 (90 Base) MCG/ACT inhaler, continue pulmonology clinic follow up going forward.       Follow Up Plan:     Patient is instructed to return to Internal Medicine clinic for follow-up visit in 1 month.        Carissa Burroughs MD  Internal Medicine  [facility]

## 2021-10-20 DIAGNOSIS — Z53.9 DIAGNOSIS NOT YET DEFINED: Primary | ICD-10-CM

## 2021-10-20 PROCEDURE — G0180 MD CERTIFICATION HHA PATIENT: HCPCS | Performed by: INTERNAL MEDICINE

## 2021-10-31 DIAGNOSIS — E03.9 HYPOTHYROIDISM, UNSPECIFIED TYPE: ICD-10-CM

## 2021-11-02 RX ORDER — LEVOTHYROXINE SODIUM 75 UG/1
TABLET ORAL
Qty: 90 TABLET | Refills: 2 | Status: SHIPPED | OUTPATIENT
Start: 2021-11-02 | End: 2022-07-29

## 2021-11-04 ENCOUNTER — LAB (OUTPATIENT)
Dept: LAB | Facility: CLINIC | Age: 81
End: 2021-11-04
Payer: MEDICARE

## 2021-11-04 ENCOUNTER — OFFICE VISIT (OUTPATIENT)
Dept: CARDIOLOGY | Facility: CLINIC | Age: 81
End: 2021-11-04
Payer: MEDICARE

## 2021-11-04 VITALS
HEART RATE: 66 BPM | DIASTOLIC BLOOD PRESSURE: 58 MMHG | SYSTOLIC BLOOD PRESSURE: 92 MMHG | WEIGHT: 134 LBS | OXYGEN SATURATION: 82 % | BODY MASS INDEX: 24.66 KG/M2 | HEIGHT: 62 IN

## 2021-11-04 DIAGNOSIS — R06.02 SOB (SHORTNESS OF BREATH): ICD-10-CM

## 2021-11-04 DIAGNOSIS — I27.20 PULMONARY HYPERTENSION (H): ICD-10-CM

## 2021-11-04 DIAGNOSIS — G51.0 BELL'S PALSY: ICD-10-CM

## 2021-11-04 DIAGNOSIS — G51.0 BELL'S PALSY: Primary | ICD-10-CM

## 2021-11-04 LAB
ALBUMIN SERPL-MCNC: 3.5 G/DL (ref 3.4–5)
ALP SERPL-CCNC: 88 U/L (ref 40–150)
ALT SERPL W P-5'-P-CCNC: 22 U/L (ref 0–50)
ANION GAP SERPL CALCULATED.3IONS-SCNC: <1 MMOL/L (ref 3–14)
AST SERPL W P-5'-P-CCNC: 28 U/L (ref 0–45)
BILIRUB SERPL-MCNC: 1 MG/DL (ref 0.2–1.3)
BUN SERPL-MCNC: 24 MG/DL (ref 7–30)
CALCIUM SERPL-MCNC: 9.7 MG/DL (ref 8.5–10.1)
CHLORIDE BLD-SCNC: 103 MMOL/L (ref 94–109)
CO2 SERPL-SCNC: 37 MMOL/L (ref 20–32)
CREAT SERPL-MCNC: 0.67 MG/DL (ref 0.52–1.04)
CRP SERPL HS-MCNC: 2.2 MG/L
ERYTHROCYTE [DISTWIDTH] IN BLOOD BY AUTOMATED COUNT: 16.7 % (ref 10–15)
GFR SERPL CREATININE-BSD FRML MDRD: 83 ML/MIN/1.73M2
GLUCOSE BLD-MCNC: 100 MG/DL (ref 70–99)
HCT VFR BLD AUTO: 40.2 % (ref 35–47)
HGB BLD-MCNC: 12.9 G/DL (ref 11.7–15.7)
MCH RBC QN AUTO: 31.5 PG (ref 26.5–33)
MCHC RBC AUTO-ENTMCNC: 32.1 G/DL (ref 31.5–36.5)
MCV RBC AUTO: 98 FL (ref 78–100)
PLAT MORPH BLD: ABNORMAL
PLATELET # BLD AUTO: ABNORMAL 10*3/UL
POTASSIUM BLD-SCNC: 4.6 MMOL/L (ref 3.4–5.3)
PREALB SERPL IA-MCNC: 22 MG/DL (ref 15–45)
PROT SERPL-MCNC: 7.4 G/DL (ref 6.8–8.8)
RBC # BLD AUTO: 4.09 10E6/UL (ref 3.8–5.2)
RBC MORPH BLD: ABNORMAL
SODIUM SERPL-SCNC: 138 MMOL/L (ref 133–144)
WBC # BLD AUTO: 6.9 10E3/UL (ref 4–11)

## 2021-11-04 PROCEDURE — 85014 HEMATOCRIT: CPT | Performed by: INTERNAL MEDICINE

## 2021-11-04 PROCEDURE — 85018 HEMOGLOBIN: CPT | Performed by: INTERNAL MEDICINE

## 2021-11-04 PROCEDURE — 80053 COMPREHEN METABOLIC PANEL: CPT | Performed by: INTERNAL MEDICINE

## 2021-11-04 PROCEDURE — 86141 C-REACTIVE PROTEIN HS: CPT | Performed by: INTERNAL MEDICINE

## 2021-11-04 PROCEDURE — 85041 AUTOMATED RBC COUNT: CPT | Performed by: INTERNAL MEDICINE

## 2021-11-04 PROCEDURE — 99214 OFFICE O/P EST MOD 30 MIN: CPT | Performed by: INTERNAL MEDICINE

## 2021-11-04 PROCEDURE — 85048 AUTOMATED LEUKOCYTE COUNT: CPT | Performed by: INTERNAL MEDICINE

## 2021-11-04 PROCEDURE — 84134 ASSAY OF PREALBUMIN: CPT | Performed by: INTERNAL MEDICINE

## 2021-11-04 PROCEDURE — 36415 COLL VENOUS BLD VENIPUNCTURE: CPT | Performed by: INTERNAL MEDICINE

## 2021-11-04 ASSESSMENT — MIFFLIN-ST. JEOR: SCORE: 1026.07

## 2021-11-04 NOTE — NURSING NOTE
Procedures and/or Testing: Patient given instructions regarding  Echocardiogram, . Discussed purpose, preparation, procedure and when to expect results reported back to the patient. Patient demonstrated understanding of this information and agreed to call with further questions or concerns.  Med Reconcile: Reviewed and verified all current medications with the patient. The updated medication list was printed and given to the patient.  Patient was instructed to return for the next laboratory testing today.   Diet: Patient instructed regarding a heart healthy diet, including discussion of reduced fat and sodium intake. Patient demonstrated understanding of this information and agreed to call with further questions or concerns.  Return Appointment: Patient given instructions regarding scheduling next clinic visit. Patient demonstrated understanding of this information and agreed to call with further questions or concerns.  Patient stated she understood all health information given and agreed to call with further questions or concerns.      Medication Changes:  -None    Patient Instructions:  1. Continue staying active and eat a heart healthy diet.    2. Please keep current list of medications with you at all times.    3. Remember to weigh yourself daily after voiding and before you consume any food or beverages and log the numbers.  If you have gained 2 pounds overnight or 5 pounds in a week contact us immediately for medication adjustments or further instructions.    4. **Please call us immediately if you have any syncope (fainting or passing out), chest pain, edema (swelling or weight gain), or decline in your functional status (general decline in how you are feeling).    5. Patients on Remodulin (treprostinil) or Veletri (epoprostenol): Please make sure that you have your backup pump and supplies with you at all times, your mixing instructions, and contact information for your specialty pharmacy.    Follow up  Appointment Information:  -Labs today  -follow up in April with Martha Gant PA-C with [facility] prior      Reviewed plan of care & escorted pt to coordinators for scheduling.  Kenisha Walton RN on 11/4/2021 at 1:14 PM

## 2021-11-04 NOTE — LETTER
2021    Carissa Burroughs MD  [address]  [facility] [address]    RE: Jaqueline Canales       Dear Colleague,    I had the pleasure of seeing Jaqueline Canales in the [facility].      Carissa Burroughs MD  [facility] [place]  [address]  [place] [address]    Arthritis and Rheumatology  [facility]    RE:      Jaqueline Canales  MRN:  [id number]  :   1940      IMPRESSION:   1.  Pulmonary artery hypertension.   2.  Scleroderma.   3.  Severe tricuspid regurgitation.   4.  A 65 pound weight loss.   5.  Cataracts.  6. History of iron deficiency    Plan :    CBC, CMP, pre-albumin, CRP inflammation  Echocardiogram in April,.  RTC in April, sooner if not doing well    Dear Dr. Burroughs:      I had the pleasure of seeing your patient, Jaqueline Canales, for followup.    We reviewed her recent history that shows an admission for semi-acute left facial weakness which was presumed to be secondary to Bell palsy.  Her interim history is dominated by her continuing pain and discomfort with this.  She takes Tylenol which temporarily relieves the pain but it returns.    She is now walking with a walker because of poor balance.    She had previous iron deficiency anemia which was replaced by Injectafer in a parenteral form.  She felt better after having received this.  She had been previously instructed to see you for evaluation of why she was losing iron.    She has no interim history of paroxysmal nocturnal dyspnea.  She has stable dependent edema.  She has no interim history of chest pain, tightness, heaviness, paroxysmal nocturnal dyspnea or orthopnea.    Current Outpatient Medications   Medication     acetaminophen (TYLENOL) 500 MG tablet     albuterol (PROAIR HFA/PROVENTIL HFA/VENTOLIN HFA) 108 (90 Base) MCG/ACT inhaler     apixaban ANTICOAGULANT (ELIQUIS ANTICOAGULANT) 5 MG tablet     bromfenac (PROLENSA) 0.07 % ophthalmic solution      donepezil (ARICEPT) 5 MG tablet     folic acid (FOLVITE) 1 MG tablet     gabapentin (NEURONTIN) 100 MG capsule     levothyroxine (SYNTHROID/LEVOTHROID) 75 MCG tablet     melatonin 5 MG tablet     Methotrexate Sodium (METHOTREXATE PO)     metoprolol succinate ER (TOPROL-XL) 25 MG 24 hr tablet     Multiple Vitamins-Minerals (ICAPS) CAPS     multivitamin (CENTRUM SILVER) tablet     omeprazole (PRILOSEC) 20 MG DR capsule     potassium chloride ER (KLOR-CON M) 20 MEQ CR tablet     sildenafil (REVATIO) 20 MG tablet     simvastatin (ZOCOR) 40 MG tablet     Skin Protectants, Misc. (EUCERIN) cream     torsemide (DEMADEX) 20 MG tablet     triamcinolone (KENALOG) 0.1 % external cream     Vitamin D, Cholecalciferol, 10 MCG (400 UNIT) TABS     No current facility-administered medications for this visit.       Wt Readings from Last 24 Encounters:   11/04/21 60.8 kg (134 lb)   10/13/21 59.9 kg (132 lb)   09/13/21 63 kg (139 lb)   08/11/21 64.4 kg (142 lb)   07/28/21 66.7 kg (147 lb)   07/14/21 65.6 kg (144 lb 9.6 oz)   07/12/21 65.5 kg (144 lb 4.8 oz)   07/09/21 66.1 kg (145 lb 12.8 oz)   06/30/21 66.2 kg (146 lb)   05/26/21 63.5 kg (140 lb)   05/18/21 64 kg (141 lb 3.2 oz)   05/11/21 64.4 kg (142 lb)   05/07/21 65.3 kg (144 lb)   05/05/21 64 kg (141 lb)   05/05/21 64 kg (141 lb 3.2 oz)   04/28/21 66.2 kg (146 lb)   04/01/21 71.5 kg (157 lb 9.6 oz)   09/14/20 72.3 kg (159 lb 6.4 oz)   09/04/20 70.3 kg (155 lb)   08/27/20 70.6 kg (155 lb 9.6 oz)   08/20/20 71.4 kg (157 lb 4.8 oz)   02/26/20 81 kg (178 lb 8 oz)   02/25/20 79.8 kg (176 lb)   01/27/20 84.1 kg (185 lb 6.4 oz)           Shay Marquez MD   [facility]

## 2021-11-04 NOTE — PATIENT INSTRUCTIONS
Medication Changes:  -None    Patient Instructions:  1. Continue staying active and eat a heart healthy diet.    2. Please keep current list of medications with you at all times.    3. Remember to weigh yourself daily after voiding and before you consume any food or beverages and log the numbers.  If you have gained 2 pounds overnight or 5 pounds in a week contact us immediately for medication adjustments or further instructions.    4. **Please call us immediately if you have any syncope (fainting or passing out), chest pain, edema (swelling or weight gain), or decline in your functional status (general decline in how you are feeling).    5. Patients on Remodulin (treprostinil) or Veletri (epoprostenol): Please make sure that you have your backup pump and supplies with you at all times, your mixing instructions, and contact information for your specialty pharmacy.    Follow up Appointment Information:  -Labs today  -follow up in April with Martha Gant PA-C with [facility] prior      For scheduling at [facility] please call [phone]  For scheduling at the [facility] please call [phone]  We are located on the second floor [address] at the [facility].  Our address is     [address] [place] [address]   [address]  [place] [address]    Thank you for allowing us to be a part of your care here at the [facility]    If you have questions or concerns please contact us at:    Tomer Garcia, RN, BSN      Nurse Coordinator       Pulmonary Hypertension     [facility]   (Phone)[phone]  (Fax)[phone]    ** Please note that you will NOT receive a reminder call regarding your scheduled testing, reminder calls are for provider appointments only.  If you are scheduled for testing within the [facility] system you may receive a call regarding pre-registration for billing purposes only.**     Remember to weigh yourself daily after voiding and before you consume any  food or beverages and log the numbers.  If you have gained/lost 2 pounds overnight or 5 pounds in a week contact us immediately for medication adjustments or further instructions.    Support Group:  Pulmonary Hypertension Association  Https://www.phassociation.org/  **Look at the Events Tab** They even have Support Groups that you can call into    [facility] PH Support Group  Second Saturday of the Month from 1-3 PM   Location: [address] [place] [address]  Leader: Eleni Galloway  Phone: [phone] or [phone]  Email: [email or web address]

## 2021-11-11 ENCOUNTER — TELEPHONE (OUTPATIENT)
Dept: FAMILY MEDICINE | Facility: CLINIC | Age: 81
End: 2021-11-11
Payer: MEDICARE

## 2021-11-11 NOTE — TELEPHONE ENCOUNTER
[facility] Dilan Grullon calling for home nursing visits.    Verbal OK given, per standing orders.  Form will be faxed to PCP to review, sign, and complete.

## 2021-11-12 ENCOUNTER — OFFICE VISIT (OUTPATIENT)
Dept: FAMILY MEDICINE | Facility: CLINIC | Age: 81
End: 2021-11-12
Payer: MEDICARE

## 2021-11-12 VITALS
BODY MASS INDEX: 24.07 KG/M2 | HEIGHT: 62 IN | TEMPERATURE: 97.6 F | SYSTOLIC BLOOD PRESSURE: 117 MMHG | RESPIRATION RATE: 16 BRPM | OXYGEN SATURATION: 98 % | DIASTOLIC BLOOD PRESSURE: 79 MMHG | WEIGHT: 130.8 LBS | HEART RATE: 70 BPM

## 2021-11-12 DIAGNOSIS — E03.9 HYPOTHYROIDISM, UNSPECIFIED TYPE: ICD-10-CM

## 2021-11-12 DIAGNOSIS — I50.32 CHRONIC DIASTOLIC HEART FAILURE (H): Primary | ICD-10-CM

## 2021-11-12 DIAGNOSIS — R60.0 BILATERAL LEG EDEMA: ICD-10-CM

## 2021-11-12 DIAGNOSIS — C34.32 MALIGNANT NEOPLASM OF LOWER LOBE OF LEFT LUNG (H): ICD-10-CM

## 2021-11-12 DIAGNOSIS — I48.19 PERSISTENT ATRIAL FIBRILLATION (H): ICD-10-CM

## 2021-11-12 DIAGNOSIS — J84.9 ILD (INTERSTITIAL LUNG DISEASE) (H): ICD-10-CM

## 2021-11-12 DIAGNOSIS — I27.20 PULMONARY HYPERTENSION (H): ICD-10-CM

## 2021-11-12 DIAGNOSIS — E78.5 HYPERLIPIDEMIA LDL GOAL <100: ICD-10-CM

## 2021-11-12 PROCEDURE — 99214 OFFICE O/P EST MOD 30 MIN: CPT | Performed by: INTERNAL MEDICINE

## 2021-11-12 ASSESSMENT — MIFFLIN-ST. JEOR: SCORE: 1011.55

## 2021-11-12 NOTE — PROGRESS NOTES
Subjective   Jaqueline is a 80 year old who presents for the following health issues     HPI       Chief Complaint:     follow up on multiple concerns including diastolic CHF, hyperlipidemia, pulmonary hypertension       HPI:   Patient Jaqueline Canales is a very pleasant 80 year old female with history of CHF, pulmonary hypertension who presents to Internal Medicine clinic today for routine 1 month follow up of recent actue on chronic CHF exacerbation symptms. Patient's weight has been decreasing again with diuretic therapy. No chest pains at this time. Leg lymphedema symptoms have improved from prior baseline. Patient is scheduled for follow up with CHF clinic at the [facility] clinic in [place] going forward. No fever or chills.      Current Medications:     Current Outpatient Medications   Medication Sig Dispense Refill     acetaminophen (TYLENOL) 500 MG tablet Take 1,000 mg by mouth 2 times daily        albuterol (PROAIR HFA/PROVENTIL HFA/VENTOLIN HFA) 108 (90 Base) MCG/ACT inhaler USE 2 INHALATIONS ORALLY   INTO THE LUNGS EVERY 6  HOURS as needed 18 g 11     apixaban ANTICOAGULANT (ELIQUIS ANTICOAGULANT) 5 MG tablet Take 1 tablet (5 mg) by mouth 2 times daily 180 tablet 2     bromfenac (PROLENSA) 0.07 % ophthalmic solution Place 1 drop into the right eye daily       donepezil (ARICEPT) 5 MG tablet Take 1 tablet (5 mg) by mouth At Bedtime 90 tablet 3     folic acid (FOLVITE) 1 MG tablet TAKE 1 TABLET DAILY. 90 tablet 3     gabapentin (NEURONTIN) 100 MG capsule Take 1 capsule (100 mg) by mouth 3 times daily 90 capsule 11     levothyroxine (SYNTHROID/LEVOTHROID) 75 MCG tablet TAKE 1 TABLET DAILY 90 tablet 2     melatonin 5 MG tablet Take 5 mg by mouth nightly as needed for sleep       Methotrexate Sodium (METHOTREXATE PO) Take 2.5 mg by mouth 10 tablets weekly - Wednesdays       metoprolol succinate ER (TOPROL-XL) 25 MG 24 hr tablet TAKE 1 TABLET DAILY 90 tablet 2     Multiple Vitamins-Minerals (ICAPS) CAPS  Take 1 capsule by mouth 2 times daily        multivitamin (CENTRUM SILVER) tablet Take 1 tablet by mouth daily       omeprazole (PRILOSEC) 20 MG DR capsule TAKE 1 CAPSULE TWICE DAILY,30-60 MINUTES PRIOR TO     MEALS 180 capsule 2     potassium chloride ER (KLOR-CON M) 20 MEQ CR tablet Take 1 tablet (20 mEq) by mouth 2 times daily 90 tablet 2     sildenafil (REVATIO) 20 MG tablet Take 1 tablet (20 mg) by mouth 3 times daily 90 tablet 5     simvastatin (ZOCOR) 40 MG tablet Take 1 tablet (40 mg) by mouth At Bedtime 90 tablet 3     Skin Protectants, Misc. (EUCERIN) cream Apply topically 2 times daily Apply to areas dry skin topically two times a day for dry skin and Apply to areas of dry skin topically as needed for dry skin daily       torsemide (DEMADEX) 20 MG tablet Take 1 tablet (20 mg) by mouth 2 times daily 180 tablet 3     triamcinolone (KENALOG) 0.1 % external cream Apply topically 2 times daily 30 g 3     Vitamin D, Cholecalciferol, 10 MCG (400 UNIT) TABS Take 1,600 Units by mouth daily           Allergies:      Allergies   Allergen Reactions     No Known Allergies             Past Medical History:     Past Medical History:   Diagnosis Date     Amaurosis fugax 5-11    Right     Carotid artery stenosis      Esophageal reflux 3/11/2004     HELICOBACTER PYLORI INFECTION 7/11/2006     hx of CA in situ RT breast-1990.mastectomy 4/17/2007     Hyperlipidemia LDL goal <70 6/20/2011     Lung cancer (H)     squamous cell lung ca left lower lobe     OBESITY NOS 3/11/2004     OSTEOPOROSIS NOS 3/11/2004     Other psoriasis 3/11/2004     Pulmonary hypertension (H) 04/15/2019    Right heart catheterization demonstrated moderate pulmonary arterial hypertension with a mean PA pressure of 34 mmHg, no reversibility with inhaled nitric oxide, elevated right and left-sided filling pressures, low normal cardiac index of 2.1.  Seen by Dr. Shay Marquez.  Started on sildenafil (Revatio) 20 mg 3 times daily.     RA (rheumatoid  arthritis) (H) 2010     Raynaud's syndrome 4/10/2006     Scleroderma (H)      Sleep apnea     CPAP         Past Surgical History:     Past Surgical History:   Procedure Laterality Date     BREAST SURGERY       COLONOSCOPY  15     COLONOSCOPY N/A 2019    Procedure: Colonoscopy, With Polypectomy And Biopsy;  Surgeon: Neto Kaminski MD;  Location:  GI     CONIZATION       CV RIGHT HEART CATH MEASUREMENTS RECORDED N/A 4/15/2019    Procedure: Heart Cath Right Heart Cath;  Surgeon: Naresh Cyr MD;  Location:  HEART CARDIAC CATH LAB     ENDARTERECTOMY CAROTID       REPAIR HAMMER TOE       TONSILLECTOMY       ZZC NONSPECIFIC PROCEDURE      mastectomy RT breast     ZZHC COLONOSCOPY THRU STOMA, DIAGNOSTIC  2001    diverticulosis         Family Medical History:     Family History   Problem Relation Age of Onset     Cancer - colorectal Mother      Cerebrovascular Disease Father      Cancer Sister 31        ovarian     Heart Disease Sister      Gastrointestinal Disease Sister         crohns     Gastrointestinal Disease Sister         diverticulitis     Gastrointestinal Disease Brother         diverticulitis     Alzheimer Disease Sister      Cerebrovascular Disease Maternal Grandmother      Diabetes Maternal Grandmother      Mental Illness Maternal Grandmother      Diabetes Maternal Uncle      Cancer Nephew                  Social History:     Social History     Socioeconomic History     Marital status:      Spouse name: Not on file     Number of children: Not on file     Years of education: Not on file     Highest education level: Not on file   Occupational History     Not on file   Tobacco Use     Smoking status: Former Smoker     Packs/day: 1.00     Years: 30.00     Pack years: 30.00     Types: Cigarettes     Start date:      Quit date: 3/11/1992     Years since quittin.6     Smokeless tobacco: Never Used   Substance and Sexual Activity     Alcohol use:  Not Currently     Alcohol/week: 0.0 standard drinks     Drug use: No     Sexual activity: Not Currently     Partners: Male   Other Topics Concern     Parent/sibling w/ CABG, MI or angioplasty before 65F 55M? No   Social History Narrative     Not on file     Social Determinants of Health     Financial Resource Strain: Low Risk      Difficulty of Paying Living Expenses: Not hard at all   Food Insecurity: Not on file   Transportation Needs: No Transportation Needs     Lack of Transportation (Medical): No     Lack of Transportation (Non-Medical): No   Physical Activity: Not on file   Stress: Not on file   Social Connections: Unknown     Frequency of Communication with Friends and Family: Not on file     Frequency of Social Gatherings with Friends and Family: More than three times a week     Attends Orthodox Services: Not on file     Active Member of Clubs or Organizations: Not on file     Attends Club or Organization Meetings: Not on file     Marital Status:    Intimate Partner Violence: Not on file   Housing Stability: Not on file           Review of System:     Constitutional: Negative for fever or chills  Skin: Negative for rashes  Ears/Nose/Throat: Negative for nasal congestion, sore throat  Respiratory: No shortness of breath, dyspnea on exertion, cough, or hemoptysis  Cardiovascular: Negative for chest pain  Gastrointestinal: Negative for nausea, vomiting, positive for chronic GERD  Genitourinary: Negative for dysuria, hematuria  Musculoskeletal: Negative for myalgias  Neurologic: Negative for headaches, positive for worsening short term memory loss symptoms  Psychiatric: Negative for depression, anxiety  Hematologic/Lymphatic/Immunologic: positive for worsening of chronic bilateral leg edema symptoms  Endocrine: positive for hypothyroidism  Behavioral: Negative for tobacco use       Physical Exam:   /79 (BP Location: Left arm, Patient Position: Sitting, Cuff Size: Adult Regular)   Pulse 70   Temp  "97.6  F (36.4  C) (Temporal)   Resp 16   Ht 1.575 m (5' 2\")   Wt 59.3 kg (130 lb 12.8 oz)   SpO2 98%   BMI 23.92 kg/m      GENERAL: chronically ill appearing elderly female, alert and no distress  EYES: eyes grossly normal to inspection, and conjunctivae and sclerae normal  HENT: Normocephalic atraumatic. Nose and mouth without ulcers or lesions  NECK: supple  RESP: lungs clear to auscultation   CV: atrial fibrillation present  LYMPH: chronic bilateral lower leg peripheral edema symptoms mildly improved when compared to recent baseline  ABDOMEN: nondistended  MS: no gross musculoskeletal defects noted  SKIN: no suspicious lesions or rashes  NEURO: worsening short term memory loss symptoms present  PSYCH: mentation appears normal, affect normal        Diagnostic Test Results:     Last Comprehensive Metabolic Panel:  Sodium   Date Value Ref Range Status   11/04/2021 138 133 - 144 mmol/L Final   05/10/2021 133 133 - 144 mmol/L Final     Potassium   Date Value Ref Range Status   11/04/2021 4.6 3.4 - 5.3 mmol/L Final   05/10/2021 4.2 3.4 - 5.3 mmol/L Final     Chloride   Date Value Ref Range Status   11/04/2021 103 94 - 109 mmol/L Final   05/10/2021 95 94 - 109 mmol/L Final     Carbon Dioxide   Date Value Ref Range Status   05/10/2021 39 (H) 20 - 32 mmol/L Final     Carbon Dioxide (CO2)   Date Value Ref Range Status   11/04/2021 37 (H) 20 - 32 mmol/L Final     Anion Gap   Date Value Ref Range Status   11/04/2021 <1 (L) 3 - 14 mmol/L Final   05/10/2021 <1 (L) 3 - 14 mmol/L Final     Glucose   Date Value Ref Range Status   11/04/2021 100 (H) 70 - 99 mg/dL Final   05/10/2021 72 70 - 99 mg/dL Final     Urea Nitrogen   Date Value Ref Range Status   11/04/2021 24 7 - 30 mg/dL Final   05/10/2021 17 7 - 30 mg/dL Final     Creatinine   Date Value Ref Range Status   11/04/2021 0.67 0.52 - 1.04 mg/dL Final   09/23/2021 0.620 mg/dL Final     GFR Estimate   Date Value Ref Range Status   11/04/2021 83 >60 mL/min/1.73m2 Final     " Comment:     As of July 11, 2021, eGFR is calculated by the CKD-EPI creatinine equation, without race adjustment. eGFR can be influenced by muscle mass, exercise, and diet. The reported eGFR is an estimation only and is only applicable if the renal function is stable.   05/10/2021 83 >60 mL/min/[1.73_m2] Final     Comment:     Non  GFR Calc  Starting 12/18/2018, serum creatinine based estimated GFR (eGFR) will be   calculated using the Chronic Kidney Disease Epidemiology Collaboration   (CKD-EPI) equation.       Calcium   Date Value Ref Range Status   11/04/2021 9.7 8.5 - 10.1 mg/dL Final   05/10/2021 9.1 8.5 - 10.1 mg/dL Final         ASSESSMENT/PLAN:   (I27.20) Pulmonary hypertension (H)  (R60.0) Bilateral leg edema  (I48.19) Persistent atrial fibrillation (H)  (I50.32) Chronic diastolic heart failure (H)  (primary encounter diagnosis)  Comment: routine 1 month follow up of recent actue on chronic CHF exacerbation symptms. Patient's weight had decreased with current low dose diuretic therapy.  no chest pains. Leg lymphedema symptoms have stablized with the increased Torsemide medication.  Plan: continue patient's current torsemide (DEMADEX) 20 MG tablet medication, potassium chloride ER (KLOR-CON M) 20 MEQ CR         Tablet        continue to follow up with CHF clinic at the [facility] in [place] going forward. continue low salt diet  continue Eliquis medication for atrial fibrillation      (E78.5) Hyperlipidemia LDL goal <100  Comment: stable.  Plan: continue current therapy      (C34.32) Malignant neoplasm of lower lobe of left lung (H)  (J84.9) ILD (interstitial lung disease) (H)  Comment: stable. Patient is followed by pulmonary medicine clinic  Plan: albuterol (PROAIR HFA/PROVENTIL HFA/VENTOLIN         HFA) 108 (90 Base) MCG/ACT inhaler, continue pulmonology clinic follow up going forward.       (E03.9) Hypothyroidism, unspecified type  Comment: stable.  Plan: continue current  therapy      Follow Up Plan:     Patient is instructed to return to Internal Medicine clinic for follow-up visit in 1 month.        Carissa Burroughs MD  Internal Medicine  [facility]

## 2021-11-15 ENCOUNTER — HOSPITAL ENCOUNTER (OUTPATIENT)
Dept: CT IMAGING | Facility: CLINIC | Age: 81
Discharge: HOME | End: 2021-11-15
Attending: INTERNAL MEDICINE | Admitting: INTERNAL MEDICINE
Payer: MEDICARE

## 2021-11-15 DIAGNOSIS — C34.32 MALIGNANT NEOPLASM OF LOWER LOBE OF LEFT LUNG (H): ICD-10-CM

## 2021-11-15 PROCEDURE — 71250 CT THORAX DX C-: CPT | Mod: MG

## 2021-11-22 ENCOUNTER — ONCOLOGY VISIT (OUTPATIENT)
Dept: ONCOLOGY | Facility: CLINIC | Age: 81
End: 2021-11-22
Attending: INTERNAL MEDICINE
Payer: MEDICARE

## 2021-11-22 VITALS
DIASTOLIC BLOOD PRESSURE: 66 MMHG | RESPIRATION RATE: 16 BRPM | HEART RATE: 72 BPM | SYSTOLIC BLOOD PRESSURE: 95 MMHG | BODY MASS INDEX: 24.69 KG/M2 | OXYGEN SATURATION: 85 % | TEMPERATURE: 97.9 F | WEIGHT: 135 LBS

## 2021-11-22 DIAGNOSIS — C34.32 MALIGNANT NEOPLASM OF LOWER LOBE OF LEFT LUNG (H): Primary | ICD-10-CM

## 2021-11-22 PROCEDURE — 99213 OFFICE O/P EST LOW 20 MIN: CPT | Performed by: INTERNAL MEDICINE

## 2021-11-22 ASSESSMENT — PAIN SCALES - GENERAL: PAINLEVEL: NO PAIN (0)

## 2021-11-22 NOTE — PROGRESS NOTES
"Oncology Rooming Note    November 22, 2021 10:05 AM   Jaqueline Canales is a 80 year old female who presents for:    Chief Complaint   Patient presents with     Oncology Clinic Visit     Initial Vitals: BP 95/66   Pulse 72   Temp 97.9  F (36.6  C) (Oral)   Resp 16   Wt 61.2 kg (135 lb)   BMI 24.69 kg/m   Estimated body mass index is 24.69 kg/m  as calculated from the following:    Height as of 11/12/21: 1.575 m (5' 2\").    Weight as of this encounter: 61.2 kg (135 lb). Body surface area is 1.64 meters squared.  No Pain (0) Comment: Data Unavailable   No LMP recorded. Patient is postmenopausal.  Allergies reviewed: Yes  Medications reviewed: Yes    Medications: Medication refills not needed today.  Pharmacy name entered into [employer]:    [id number] - [place] - [address]/[facility]ACY #[id number] - [place] - [address] [facility] [address] - [place] [address]  [facility] - [place], [place] - [address]  [facility] - [place], [place] - [address] AT PORTAL TO REGISTERED [facility] SITES  [id number] - [place] - [address] [facility]  [id number] - [place], [place] - [address] [place] [address]    Clinical concerns: no      Carolyn Stafford            "

## 2021-11-22 NOTE — LETTER
"    11/22/2021         RE: Jaqueline Canales  [address] [facility] [address]        Dear Colleague,    Thank you for referring your patient, Jaqueline Canales, to the [facility]. Please see a copy of my visit note below.    Oncology Rooming Note    November 22, 2021 10:05 AM   Jaqueline Canales is a 80 year old female who presents for:    Chief Complaint   Patient presents with     Oncology Clinic Visit     Initial Vitals: BP 95/66   Pulse 72   Temp 97.9  F (36.6  C) (Oral)   Resp 16   Wt 61.2 kg (135 lb)   BMI 24.69 kg/m   Estimated body mass index is 24.69 kg/m  as calculated from the following:    Height as of 11/12/21: 1.575 m (5' 2\").    Weight as of this encounter: 61.2 kg (135 lb). Body surface area is 1.64 meters squared.  No Pain (0) Comment: Data Unavailable   No LMP recorded. Patient is postmenopausal.  Allergies reviewed: Yes  Medications reviewed: Yes    Medications: Medication refills not needed today.  Pharmacy name entered into [employer]:    [facility]"[employer] [facility] #[id number] - [place], [place] - [address]/PHARMACY #[id number] - [place], [place] - [address] [facility] - [facility] - [place], [place] - [address] [facility] [facility] [place] - [place], [place] - [address] AT PORTAL TO REGISTERED [facility] SITES  [facility]/PHARMACY #[id number] - [place], [place] - [address] [facility]  [address] [place], [place] - [address] [place] [address]    Clinical concerns: no      Carolyn Stafford              ONCOLOGY HISTORY:  Ms. Canales is a female with squamous cell carcinoma of left lung. Clinical stage 1 (T1a N0 M0).  -Right breast cancer in the 1990s.  She had mastectomy done.  1.  CT chest on 11/20/2015 revealed 1.2 cm left lower lobe lung nodule.   2.  CT-guided biopsy of left lung nodule on 12/04/2015 revealed poorly differentiated squamous cell carcinoma.   3.  PET scan on 12/14/2015 revealed " hypermetabolic nodular lesion in left lower lobe.  No evidence of metastatic disease.   4. Brain MRI on 12/22/2015 did not reveal any brain metastasis.   5. Patient was treated with SBRT. 5000 cGy in 5 fractions between 01/19/2016 and 02/02/2016.     SUBJECTIVE:  Ms. Canales is an 80-year-old female with clinical stage I squamous cell carcinoma of left lung treated with SBRT in 01/2016.  She has done well.  CT chest done on 11/15/2021 does not reveal any evidence of malignancy.     The patient had right breast cancer in the 1990s.  She had mastectomy done.  She has done well since then.     Overall, she is doing well for her age.  No chest pain.  No shortness of breath.  No abdominal pain, nausea, or vomiting.  Appetite is good.     All other review of systems negative.     PHYSICAL EXAMINATION:    GENERAL:  She is alert, oriented x 3.  Not in distress.   VITAL SIGNS:  Reviewed.    Rest of systems not examined.     LABORATORY:  CBC and CMP were reviewed.     ASSESSMENT:    1.  An 80-year-old female with left lung squamous cell carcinoma diagnosed in 12/2015.  No evidence of recurrence.  2.  Right breast cancer in 1990s.     PLAN:    1.  CT scan was personally reviewed by me.  I explained to her there is no evidence of malignancy.  She was happy to know that.  2.  It has been more than 5 years since her lung cancer.  It has been 30 years since the breast cancer.  I explained to her that risk of recurrence is very low.  3. I discussed regarding followup.  She will see her primary care physician.  No return appointment made for Hematology/Oncology clinic.    This office note has been dictated.          Again, thank you for allowing me to participate in the care of your patient.        Sincerely,        Nathen Huffman MD

## 2021-11-24 DIAGNOSIS — Z53.9 DIAGNOSIS NOT YET DEFINED: Primary | ICD-10-CM

## 2021-11-24 PROCEDURE — G0179 MD RECERTIFICATION HHA PT: HCPCS | Performed by: INTERNAL MEDICINE

## 2021-11-27 NOTE — PROGRESS NOTES
ONCOLOGY HISTORY:  Ms. Canales is a female with squamous cell carcinoma of left lung. Clinical stage 1 (T1a N0 M0).  -Right breast cancer in the 1990s.  She had mastectomy done.  1.  CT chest on 11/20/2015 revealed 1.2 cm left lower lobe lung nodule.   2.  CT-guided biopsy of left lung nodule on 12/04/2015 revealed poorly differentiated squamous cell carcinoma.   3.  PET scan on 12/14/2015 revealed hypermetabolic nodular lesion in left lower lobe.  No evidence of metastatic disease.   4. Brain MRI on 12/22/2015 did not reveal any brain metastasis.   5. Patient was treated with SBRT. 5000 cGy in 5 fractions between 01/19/2016 and 02/02/2016.     SUBJECTIVE:  Ms. Canales is an 80-year-old female with clinical stage I squamous cell carcinoma of left lung treated with SBRT in 01/2016.  She has done well.  CT chest done on 11/15/2021 does not reveal any evidence of malignancy.     The patient had right breast cancer in the 1990s.  She had mastectomy done.  She has done well since then.     Overall, she is doing well for her age.  No chest pain.  No shortness of breath.  No abdominal pain, nausea, or vomiting.  Appetite is good.     All other review of systems negative.     PHYSICAL EXAMINATION:    GENERAL:  She is alert, oriented x 3.  Not in distress.   VITAL SIGNS:  Reviewed.    Rest of systems not examined.     LABORATORY:  CBC and CMP were reviewed.     ASSESSMENT:    1.  An 80-year-old female with left lung squamous cell carcinoma diagnosed in 12/2015.  No evidence of recurrence.  2.  Right breast cancer in 1990s.     PLAN:    1.  CT scan was personally reviewed by me.  I explained to her there is no evidence of malignancy.  She was happy to know that.  2.  It has been more than 5 years since her lung cancer.  It has been 30 years since the breast cancer.  I explained to her that risk of recurrence is very low.  3. I discussed regarding followup.  She will see her primary care physician.  No return appointment made  for Hematology/Oncology clinic.

## 2021-12-20 DIAGNOSIS — E87.6 HYPOKALEMIA: ICD-10-CM

## 2021-12-21 NOTE — TELEPHONE ENCOUNTER
potassium chloride ER (KLOR-CON M) 20 MEQ CR tablet 90 tablet 2 6/30/2021  No   Sig - Route: Take 1 tablet (20 mEq) by mouth 2 times daily - Oral   Sent to pharmacy as: Potassium Chloride [place] ER 20 MEQ Oral Tablet Extended Release (KLOR-CON M)   Class: E-Prescribe   Order: [id number]   E-Prescribing Status: Receipt confirmed by pharmacy (6/30/2021 11:55 AM CDT)     Called patient to verify if taking 1 or 2 tablets daily, pharmacy is requesting Rx for 1 tab daily     Confirmed she takes 1 tablet daily (20mEq) - pended    Carmen PORTER Triage RN  [facility]

## 2021-12-22 RX ORDER — POTASSIUM CHLORIDE 1500 MG/1
TABLET, EXTENDED RELEASE ORAL
Qty: 90 TABLET | Refills: 3 | Status: SHIPPED | OUTPATIENT
Start: 2021-12-22 | End: 2022-04-20

## 2021-12-23 ENCOUNTER — OFFICE VISIT (OUTPATIENT)
Dept: FAMILY MEDICINE | Facility: CLINIC | Age: 81
End: 2021-12-23
Payer: MEDICARE

## 2021-12-23 VITALS
HEART RATE: 87 BPM | HEIGHT: 62 IN | RESPIRATION RATE: 18 BRPM | BODY MASS INDEX: 25.03 KG/M2 | DIASTOLIC BLOOD PRESSURE: 67 MMHG | WEIGHT: 136 LBS | OXYGEN SATURATION: 96 % | SYSTOLIC BLOOD PRESSURE: 105 MMHG | TEMPERATURE: 96.8 F

## 2021-12-23 DIAGNOSIS — C34.32 MALIGNANT NEOPLASM OF LOWER LOBE OF LEFT LUNG (H): ICD-10-CM

## 2021-12-23 DIAGNOSIS — I48.19 PERSISTENT ATRIAL FIBRILLATION (H): ICD-10-CM

## 2021-12-23 DIAGNOSIS — I27.20 PULMONARY HYPERTENSION (H): Primary | ICD-10-CM

## 2021-12-23 DIAGNOSIS — E78.5 HYPERLIPIDEMIA LDL GOAL <100: ICD-10-CM

## 2021-12-23 DIAGNOSIS — R60.0 BILATERAL LEG EDEMA: ICD-10-CM

## 2021-12-23 DIAGNOSIS — I50.32 CHRONIC DIASTOLIC HEART FAILURE (H): ICD-10-CM

## 2021-12-23 DIAGNOSIS — J84.9 ILD (INTERSTITIAL LUNG DISEASE) (H): ICD-10-CM

## 2021-12-23 DIAGNOSIS — Z23 NEED FOR VACCINATION: ICD-10-CM

## 2021-12-23 DIAGNOSIS — E03.9 HYPOTHYROIDISM, UNSPECIFIED TYPE: ICD-10-CM

## 2021-12-23 PROCEDURE — 99214 OFFICE O/P EST MOD 30 MIN: CPT | Mod: 25 | Performed by: INTERNAL MEDICINE

## 2021-12-23 PROCEDURE — 90750 HZV VACC RECOMBINANT IM: CPT | Performed by: INTERNAL MEDICINE

## 2021-12-23 PROCEDURE — 90471 IMMUNIZATION ADMIN: CPT | Performed by: INTERNAL MEDICINE

## 2021-12-23 ASSESSMENT — PAIN SCALES - GENERAL: PAINLEVEL: NO PAIN (0)

## 2021-12-23 ASSESSMENT — MIFFLIN-ST. JEOR: SCORE: 1035.14

## 2021-12-23 NOTE — PROGRESS NOTES
Prior to immunization administration, verified patients identity using patient s name and date of birth. Please see Immunization Activity for additional information.     Screening Questionnaire for Adult Immunization    Are you sick today?   No   Do you have allergies to medications, food, a vaccine component or latex?   No   Have you ever had a serious reaction after receiving a vaccination?   No   Do you have a long-term health problem with heart, lung, kidney, or metabolic disease (e.g., diabetes), asthma, a blood disorder, no spleen, complement component deficiency, a cochlear implant, or a spinal fluid leak?  Are you on long-term aspirin therapy?   No   Do you have cancer, leukemia, HIV/AIDS, or any other immune system problem?   No   Do you have a parent, brother, or sister with an immune system problem?   No   In the past 3 months, have you taken medications that affect  your immune system, such as prednisone, other steroids, or anticancer drugs; drugs for the treatment of rheumatoid arthritis, Crohn s disease, or psoriasis; or have you had radiation treatments?   No   Have you had a seizure, or a brain or other nervous system problem?   No   During the past year, have you received a transfusion of blood or blood    products, or been given immune (gamma) globulin or antiviral drug?   No   For women: Are you pregnant or is there a chance you could become       pregnant during the next month?   No   Have you received any vaccinations in the past 4 weeks?   No     Immunization questionnaire answers were all negative.        Per orders of Dr. Burroughs, injection of Shingrix given by Jersey Tracey CMA. Patient instructed to remain in clinic for 15 minutes afterwards, and to report any adverse reaction to me immediately.       Screening performed by Jersey Tracey CMA on 12/23/2021 at 1:26 PM.

## 2021-12-23 NOTE — TELEPHONE ENCOUNTER
Attempted to contact pt. Spoke w/ pt's  who stated that the pt is currently at [facility]. Stated to pt's  that a follow-up will be tried again.    Vanita Westbrook  Clinic   Pulmonary Hypertension  [facility]  (P) [phone]     steady

## 2021-12-23 NOTE — PROGRESS NOTES
Subjective   Jaqueline is a 80 year old who presents for the following health issues     HPI       Chief Complaint:     Routine 1 month follow up on multiple concerns including diastolic CHF, hyperlipidemia, pulmonary hypertension       HPI:   Patient Jaqueline Canales is a very pleasant 80 year old female with history of CHF, pulmonary hypertension who presents to Internal Medicine clinic today for routine 1 month follow up of recent actue on chronic CHF exacerbation symptms. Patient's weight has been decreasing again with diuretic therapy. No chest pains at this time. Leg lymphedema symptoms have improved from prior baseline. Patient is scheduled for follow up with CHF clinic at the [facility] in [place] going forward. No fever or chills.      Current Medications:     Current Outpatient Medications   Medication Sig Dispense Refill     acetaminophen (TYLENOL) 500 MG tablet Take 1,000 mg by mouth 2 times daily        albuterol (PROAIR HFA/PROVENTIL HFA/VENTOLIN HFA) 108 (90 Base) MCG/ACT inhaler USE 2 INHALATIONS ORALLY   INTO THE LUNGS EVERY 6  HOURS as needed 18 g 11     apixaban ANTICOAGULANT (ELIQUIS ANTICOAGULANT) 5 MG tablet Take 1 tablet (5 mg) by mouth 2 times daily 180 tablet 2     bromfenac (PROLENSA) 0.07 % ophthalmic solution Place 1 drop into the right eye daily       donepezil (ARICEPT) 5 MG tablet Take 1 tablet (5 mg) by mouth At Bedtime 90 tablet 3     folic acid (FOLVITE) 1 MG tablet TAKE 1 TABLET DAILY. 90 tablet 3     gabapentin (NEURONTIN) 100 MG capsule Take 1 capsule (100 mg) by mouth 3 times daily 90 capsule 11     KLOR-CON 20 MEQ CR tablet TAKE 1 TABLET DAILY 90 tablet 3     levothyroxine (SYNTHROID/LEVOTHROID) 75 MCG tablet TAKE 1 TABLET DAILY 90 tablet 2     melatonin 5 MG tablet Take 5 mg by mouth nightly as needed for sleep       Methotrexate Sodium (METHOTREXATE PO) Take 2.5 mg by mouth 10 tablets weekly - Wednesdays       metoprolol succinate ER (TOPROL-XL) 25 MG 24 hr tablet  TAKE 1 TABLET DAILY 90 tablet 2     Multiple Vitamins-Minerals (ICAPS) CAPS Take 1 capsule by mouth 2 times daily        multivitamin (CENTRUM SILVER) tablet Take 1 tablet by mouth daily       omeprazole (PRILOSEC) 20 MG DR capsule TAKE 1 CAPSULE TWICE DAILY,30-60 MINUTES PRIOR TO     MEALS 180 capsule 2     sildenafil (REVATIO) 20 MG tablet TAKE 1 TABLET BY MOUTH THREE TIMES DAILY. GENERIC FOR REVATIO. CALL [phone] TO REFILL 90 tablet 5     simvastatin (ZOCOR) 40 MG tablet Take 1 tablet (40 mg) by mouth At Bedtime 90 tablet 3     Skin Protectants, Misc. (EUCERIN) cream Apply topically 2 times daily Apply to areas dry skin topically two times a day for dry skin and Apply to areas of dry skin topically as needed for dry skin daily       torsemide (DEMADEX) 20 MG tablet Take 1 tablet (20 mg) by mouth 2 times daily 180 tablet 3     triamcinolone (KENALOG) 0.1 % external cream Apply topically 2 times daily 30 g 3     Vitamin D, Cholecalciferol, 10 MCG (400 UNIT) TABS Take 1,600 Units by mouth daily           Allergies:      Allergies   Allergen Reactions     No Known Allergies             Past Medical History:     Past Medical History:   Diagnosis Date     Amaurosis fugax 5-11    Right     Carotid artery stenosis      Esophageal reflux 3/11/2004     HELICOBACTER PYLORI INFECTION 7/11/2006     hx of CA in situ RT breast-1990.mastectomy 4/17/2007     Hyperlipidemia LDL goal <70 6/20/2011     Lung cancer (H)     squamous cell lung ca left lower lobe     OBESITY NOS 3/11/2004     OSTEOPOROSIS NOS 3/11/2004     Other psoriasis 3/11/2004     Pulmonary hypertension (H) 04/15/2019    Right heart catheterization demonstrated moderate pulmonary arterial hypertension with a mean PA pressure of 34 mmHg, no reversibility with inhaled nitric oxide, elevated right and left-sided filling pressures, low normal cardiac index of 2.1.  Seen by Dr. Shay Marquez.  Started on sildenafil (Revatio) 20 mg 3 times daily.     RA (rheumatoid  arthritis) (H) 2010     Raynaud's syndrome 4/10/2006     Scleroderma (H)      Sleep apnea     CPAP         Past Surgical History:     Past Surgical History:   Procedure Laterality Date     BREAST SURGERY       COLONOSCOPY  15     COLONOSCOPY N/A 2019    Procedure: Colonoscopy, With Polypectomy And Biopsy;  Surgeon: Neto Kaminski MD;  Location:  GI     CONIZATION       CV RIGHT HEART CATH MEASUREMENTS RECORDED N/A 4/15/2019    Procedure: Heart Cath Right Heart Cath;  Surgeon: Naresh Cyr MD;  Location:  HEART CARDIAC CATH LAB     ENDARTERECTOMY CAROTID       REPAIR HAMMER TOE       TONSILLECTOMY       ZZC NONSPECIFIC PROCEDURE      mastectomy RT breast     ZZHC COLONOSCOPY THRU STOMA, DIAGNOSTIC  2001    diverticulosis         Family Medical History:     Family History   Problem Relation Age of Onset     Cancer - colorectal Mother      Cerebrovascular Disease Father      Cancer Sister 31        ovarian     Heart Disease Sister      Gastrointestinal Disease Sister         crohns     Gastrointestinal Disease Sister         diverticulitis     Gastrointestinal Disease Brother         diverticulitis     Alzheimer Disease Sister      Cerebrovascular Disease Maternal Grandmother      Diabetes Maternal Grandmother      Mental Illness Maternal Grandmother      Diabetes Maternal Uncle      Cancer Nephew                  Social History:     Social History     Socioeconomic History     Marital status:      Spouse name: Not on file     Number of children: Not on file     Years of education: Not on file     Highest education level: Not on file   Occupational History     Not on file   Tobacco Use     Smoking status: Former Smoker     Packs/day: 1.00     Years: 30.00     Pack years: 30.00     Types: Cigarettes     Start date:      Quit date: 3/11/1992     Years since quittin.8     Smokeless tobacco: Never Used   Substance and Sexual Activity     Alcohol use:  Not Currently     Alcohol/week: 0.0 standard drinks     Drug use: No     Sexual activity: Not Currently     Partners: Male   Other Topics Concern     Parent/sibling w/ CABG, MI or angioplasty before 65F 55M? No   Social History Narrative     Not on file     Social Determinants of Health     Financial Resource Strain: Low Risk      Difficulty of Paying Living Expenses: Not hard at all   Food Insecurity: Not on file   Transportation Needs: No Transportation Needs     Lack of Transportation (Medical): No     Lack of Transportation (Non-Medical): No   Physical Activity: Not on file   Stress: Not on file   Social Connections: Unknown     Frequency of Communication with Friends and Family: Not on file     Frequency of Social Gatherings with Friends and Family: More than three times a week     Attends Buddhist Services: Not on file     Active Member of Clubs or Organizations: Not on file     Attends Club or Organization Meetings: Not on file     Marital Status:    Intimate Partner Violence: Not on file   Housing Stability: Not on file           Review of System:     Constitutional: Negative for fever or chills  Skin: Negative for rashes  Ears/Nose/Throat: Negative for nasal congestion, sore throat  Respiratory: No shortness of breath, dyspnea on exertion, cough, or hemoptysis  Cardiovascular: Negative for chest pain  Gastrointestinal: Negative for nausea, vomiting, positive for chronic GERD  Genitourinary: Negative for dysuria, hematuria  Musculoskeletal: Negative for myalgias  Neurologic: Negative for headaches, positive for worsening short term memory loss symptoms  Psychiatric: Negative for depression, anxiety  Hematologic/Lymphatic/Immunologic: positive for worsening of chronic bilateral leg edema symptoms  Endocrine: positive for hypothyroidism  Behavioral: Negative for tobacco use       Physical Exam:   /67 (BP Location: Left arm, Patient Position: Sitting, Cuff Size: Adult Regular)   Pulse 87   Temp  "96.8  F (36  C) (Temporal)   Resp 18   Ht 1.575 m (5' 2\")   Wt 61.7 kg (136 lb)   SpO2 96%   BMI 24.87 kg/m      GENERAL: chronically ill appearing elderly female, alert and no distress  EYES: eyes grossly normal to inspection, and conjunctivae and sclerae normal  HENT: Normocephalic atraumatic. Nose and mouth without ulcers or lesions  NECK: supple  RESP: lungs clear to auscultation   CV: atrial fibrillation present  LYMPH: chronic bilateral lower leg peripheral edema symptoms mildly improved when compared to recent baseline  ABDOMEN: nondistended  MS: no gross musculoskeletal defects noted  SKIN: no suspicious lesions or rashes  NEURO: worsening short term memory loss symptoms present  PSYCH: mentation appears normal, affect normal        Diagnostic Test Results:     Last Comprehensive Metabolic Panel:  Sodium   Date Value Ref Range Status   11/04/2021 138 133 - 144 mmol/L Final   05/10/2021 133 133 - 144 mmol/L Final     Potassium   Date Value Ref Range Status   11/04/2021 4.6 3.4 - 5.3 mmol/L Final   05/10/2021 4.2 3.4 - 5.3 mmol/L Final     Chloride   Date Value Ref Range Status   11/04/2021 103 94 - 109 mmol/L Final   05/10/2021 95 94 - 109 mmol/L Final     Carbon Dioxide   Date Value Ref Range Status   05/10/2021 39 (H) 20 - 32 mmol/L Final     Carbon Dioxide (CO2)   Date Value Ref Range Status   11/04/2021 37 (H) 20 - 32 mmol/L Final     Anion Gap   Date Value Ref Range Status   11/04/2021 <1 (L) 3 - 14 mmol/L Final   05/10/2021 <1 (L) 3 - 14 mmol/L Final     Glucose   Date Value Ref Range Status   11/04/2021 100 (H) 70 - 99 mg/dL Final   05/10/2021 72 70 - 99 mg/dL Final     Urea Nitrogen   Date Value Ref Range Status   11/04/2021 24 7 - 30 mg/dL Final   05/10/2021 17 7 - 30 mg/dL Final     Creatinine   Date Value Ref Range Status   11/04/2021 0.67 0.52 - 1.04 mg/dL Final   09/23/2021 0.620 mg/dL Final     GFR Estimate   Date Value Ref Range Status   11/04/2021 83 >60 mL/min/1.73m2 Final     Comment: "     As of July 11, 2021, eGFR is calculated by the CKD-EPI creatinine equation, without race adjustment. eGFR can be influenced by muscle mass, exercise, and diet. The reported eGFR is an estimation only and is only applicable if the renal function is stable.   05/10/2021 83 >60 mL/min/[1.73_m2] Final     Comment:     Non  GFR Calc  Starting 12/18/2018, serum creatinine based estimated GFR (eGFR) will be   calculated using the Chronic Kidney Disease Epidemiology Collaboration   (CKD-EPI) equation.       Calcium   Date Value Ref Range Status   11/04/2021 9.7 8.5 - 10.1 mg/dL Final   05/10/2021 9.1 8.5 - 10.1 mg/dL Final         ASSESSMENT/PLAN:   (I27.20) Pulmonary hypertension (H)  (R60.0) Bilateral leg edema  (I48.19) Persistent atrial fibrillation (H)  (I50.32) Chronic diastolic heart failure (H)  (primary encounter diagnosis)  Comment: routine 1 month follow up of recent actue on chronic CHF exacerbation symptms. Patient's weight had decreased with current low dose diuretic therapy.  no chest pains. Leg lymphedema symptoms have stablized with the increased Torsemide medication.  Plan: continue patient's current torsemide (DEMADEX) 20 MG tablet medication, potassium chloride ER (KLOR-CON M) 20 MEQ CR         Tablet        continue to follow up with CHF clinic at the [facility] in [place] going forward. continue low salt diet  continue Eliquis medication for atrial fibrillation      (E78.5) Hyperlipidemia LDL goal <100  Comment: stable.  Plan: continue current therapy      (C34.32) Malignant neoplasm of lower lobe of left lung (H)  (J84.9) ILD (interstitial lung disease) (H)  Comment: stable. Patient is followed by pulmonary medicine clinic  Plan: albuterol (PROAIR HFA/PROVENTIL HFA/VENTOLIN         HFA) 108 (90 Base) MCG/ACT inhaler, continue pulmonology clinic follow up going forward.       (E03.9) Hypothyroidism, unspecified type  Comment: stable.  Plan: continue current  therapy      Follow Up Plan:     Patient is instructed to return to Internal Medicine clinic for follow-up visit in 1 month.        Carissa Burroughs MD  Internal Medicine  [facility]

## 2022-01-03 ENCOUNTER — TELEPHONE (OUTPATIENT)
Dept: FAMILY MEDICINE | Facility: CLINIC | Age: 82
End: 2022-01-03
Payer: MEDICARE

## 2022-01-03 NOTE — TELEPHONE ENCOUNTER
Pt calling,   [facility]    only [facility] left, would like to extend home nurse visits,   Would like to have homecare nurse continue to come out to see her.  She checks BP & helps manage pt's meds.     Routing to PCP.      Do you want to approve extending homecare visits?    Appointments in Next Year    Jan 24, 2022  2:00 PM  (Arrive by 1:40 PM)  Provider Visit with Carissa Burroughs MD  [facility] ) [phone]

## 2022-01-03 NOTE — TELEPHONE ENCOUNTER
Dr. Burroughs    Spoke to [facility] nurse Dilan, [phone].    For homecare to be able to continue seeing pt for home visits,   --she will need a new homecare nursing referral placed.

## 2022-01-07 ENCOUNTER — OFFICE VISIT (OUTPATIENT)
Dept: FAMILY MEDICINE | Facility: CLINIC | Age: 82
End: 2022-01-07
Payer: MEDICARE

## 2022-01-07 VITALS
HEIGHT: 62 IN | BODY MASS INDEX: 24.66 KG/M2 | RESPIRATION RATE: 16 BRPM | WEIGHT: 134 LBS | DIASTOLIC BLOOD PRESSURE: 69 MMHG | HEART RATE: 88 BPM | TEMPERATURE: 97.1 F | SYSTOLIC BLOOD PRESSURE: 102 MMHG

## 2022-01-07 DIAGNOSIS — Z91.81 AT HIGH RISK FOR FALLS: ICD-10-CM

## 2022-01-07 DIAGNOSIS — R41.81 AGE-RELATED COGNITIVE DECLINE: ICD-10-CM

## 2022-01-07 DIAGNOSIS — I27.20 PULMONARY HYPERTENSION (H): Primary | ICD-10-CM

## 2022-01-07 DIAGNOSIS — I48.19 PERSISTENT ATRIAL FIBRILLATION (H): ICD-10-CM

## 2022-01-07 DIAGNOSIS — R60.0 BILATERAL LEG EDEMA: ICD-10-CM

## 2022-01-07 DIAGNOSIS — I50.32 CHRONIC DIASTOLIC HEART FAILURE (H): ICD-10-CM

## 2022-01-07 DIAGNOSIS — M62.81 GENERALIZED MUSCLE WEAKNESS: ICD-10-CM

## 2022-01-07 DIAGNOSIS — C34.32 MALIGNANT NEOPLASM OF LOWER LOBE OF LEFT LUNG (H): ICD-10-CM

## 2022-01-07 PROCEDURE — 99214 OFFICE O/P EST MOD 30 MIN: CPT | Performed by: INTERNAL MEDICINE

## 2022-01-07 ASSESSMENT — MIFFLIN-ST. JEOR: SCORE: 1021.07

## 2022-01-07 NOTE — PROGRESS NOTES
Donavon Parsons is a 81 year old who presents for the following health issues     HPI     Home Healthcare certification      Donavon Parsons is a 80 year old who presents for the following health issues     HPI       Chief Complaint:     Routine 1 month follow up on multiple concerns including diastolic CHF, hyperlipidemia, pulmonary hypertension   Home healthcare referral    HPI:   Patient Jaqueline Canales is a very pleasant 80 year old female with history of CHF, pulmonary hypertension who presents to Internal Medicine clinic today for routine 1 month follow up of recent actue on chronic CHF exacerbation symptms. Patient's weight has been decreasing again with diuretic therapy. No chest pains at this time. Leg lymphedema symptoms have improved from prior baseline. Patient is scheduled for follow up with CHF clinic at the [facility] clinic in [place] going forward. No fever or chills. Patient is homebound and requires home healthcare RN visits for medication management, BP checks.      Current Medications:     Current Outpatient Medications   Medication Sig Dispense Refill     acetaminophen (TYLENOL) 500 MG tablet Take 1,000 mg by mouth 2 times daily        albuterol (PROAIR HFA/PROVENTIL HFA/VENTOLIN HFA) 108 (90 Base) MCG/ACT inhaler USE 2 INHALATIONS ORALLY   INTO THE LUNGS EVERY 6  HOURS as needed 18 g 11     apixaban ANTICOAGULANT (ELIQUIS ANTICOAGULANT) 5 MG tablet Take 1 tablet (5 mg) by mouth 2 times daily 180 tablet 2     bromfenac (PROLENSA) 0.07 % ophthalmic solution Place 1 drop into the right eye daily       donepezil (ARICEPT) 5 MG tablet Take 1 tablet (5 mg) by mouth At Bedtime 90 tablet 3     folic acid (FOLVITE) 1 MG tablet TAKE 1 TABLET DAILY. 90 tablet 3     gabapentin (NEURONTIN) 100 MG capsule Take 1 capsule (100 mg) by mouth 3 times daily 90 capsule 11     KLOR-CON 20 MEQ CR tablet TAKE 1 TABLET DAILY 90 tablet 3     levothyroxine (SYNTHROID/LEVOTHROID) 75 MCG tablet TAKE 1  TABLET DAILY 90 tablet 2     melatonin 5 MG tablet Take 5 mg by mouth nightly as needed for sleep       Methotrexate Sodium (METHOTREXATE PO) Take 2.5 mg by mouth 10 tablets weekly - Wednesdays       metoprolol succinate ER (TOPROL-XL) 25 MG 24 hr tablet TAKE 1 TABLET DAILY 90 tablet 2     Multiple Vitamins-Minerals (ICAPS) CAPS Take 1 capsule by mouth 2 times daily        multivitamin (CENTRUM SILVER) tablet Take 1 tablet by mouth daily       omeprazole (PRILOSEC) 20 MG DR capsule TAKE 1 CAPSULE TWICE DAILY,30-60 MINUTES PRIOR TO     MEALS 180 capsule 2     sildenafil (REVATIO) 20 MG tablet TAKE 1 TABLET BY MOUTH THREE TIMES DAILY. GENERIC FOR REVATIO. CALL [phone] TO REFILL 90 tablet 5     simvastatin (ZOCOR) 40 MG tablet Take 1 tablet (40 mg) by mouth At Bedtime 90 tablet 3     Skin Protectants, Misc. (EUCERIN) cream Apply topically 2 times daily Apply to areas dry skin topically two times a day for dry skin and Apply to areas of dry skin topically as needed for dry skin daily       torsemide (DEMADEX) 20 MG tablet Take 1 tablet (20 mg) by mouth 2 times daily 180 tablet 3     triamcinolone (KENALOG) 0.1 % external cream Apply topically 2 times daily 30 g 3     Vitamin D, Cholecalciferol, 10 MCG (400 UNIT) TABS Take 1,600 Units by mouth daily           Allergies:      Allergies   Allergen Reactions     No Known Allergies             Past Medical History:     Past Medical History:   Diagnosis Date     Amaurosis fugax 5-11    Right     Carotid artery stenosis      Esophageal reflux 3/11/2004     HELICOBACTER PYLORI INFECTION 7/11/2006     hx of CA in situ RT breast-1990.mastectomy 4/17/2007     Hyperlipidemia LDL goal <70 6/20/2011     Lung cancer (H)     squamous cell lung ca left lower lobe     OBESITY NOS 3/11/2004     OSTEOPOROSIS NOS 3/11/2004     Other psoriasis 3/11/2004     Pulmonary hypertension (H) 04/15/2019    Right heart catheterization demonstrated moderate pulmonary arterial hypertension with a  mean PA pressure of 34 mmHg, no reversibility with inhaled nitric oxide, elevated right and left-sided filling pressures, low normal cardiac index of 2.1.  Seen by Dr. Shay Marquez.  Started on sildenafil (Revatio) 20 mg 3 times daily.     RA (rheumatoid arthritis) (H) 2010     Raynaud's syndrome 4/10/2006     Scleroderma (H)      Sleep apnea     CPAP         Past Surgical History:     Past Surgical History:   Procedure Laterality Date     BREAST SURGERY       COLONOSCOPY  16     COLONOSCOPY N/A 2019    Procedure: Colonoscopy, With Polypectomy And Biopsy;  Surgeon: Neto Kaminski MD;  Location:  GI     CONIZATION       CV RIGHT HEART CATH MEASUREMENTS RECORDED N/A 4/15/2019    Procedure: Heart Cath Right Heart Cath;  Surgeon: Naresh Cyr MD;  Location:  HEART CARDIAC CATH LAB     ENDARTERECTOMY CAROTID       REPAIR HAMMER TOE       TONSILLECTOMY       ZZC NONSPECIFIC PROCEDURE      mastectomy RT breast     ZZHC COLONOSCOPY THRU STOMA, DIAGNOSTIC  2001    diverticulosis         Family Medical History:     Family History   Problem Relation Age of Onset     Cancer - colorectal Mother      Cerebrovascular Disease Father      Cancer Sister 31        ovarian     Heart Disease Sister      Gastrointestinal Disease Sister         crohns     Gastrointestinal Disease Sister         diverticulitis     Gastrointestinal Disease Brother         diverticulitis     Alzheimer Disease Sister      Cerebrovascular Disease Maternal Grandmother      Diabetes Maternal Grandmother      Mental Illness Maternal Grandmother      Diabetes Maternal Uncle      Cancer Nephew                  Social History:     Social History     Socioeconomic History     Marital status:      Spouse name: Not on file     Number of children: Not on file     Years of education: Not on file     Highest education level: Not on file   Occupational History     Not on file   Tobacco Use     Smoking status:  Former Smoker     Packs/day: 1.00     Years: 30.00     Pack years: 30.00     Types: Cigarettes     Start date:      Quit date: 3/11/1992     Years since quittin.8     Smokeless tobacco: Never Used   Substance and Sexual Activity     Alcohol use: Not Currently     Alcohol/week: 0.0 standard drinks     Drug use: No     Sexual activity: Not Currently     Partners: Male   Other Topics Concern     Parent/sibling w/ CABG, MI or angioplasty before 65F 55M? No   Social History Narrative     Not on file     Social Determinants of Health     Financial Resource Strain: Low Risk      Difficulty of Paying Living Expenses: Not hard at all   Food Insecurity: Not on file   Transportation Needs: No Transportation Needs     Lack of Transportation (Medical): No     Lack of Transportation (Non-Medical): No   Physical Activity: Not on file   Stress: Not on file   Social Connections: Unknown     Frequency of Communication with Friends and Family: Not on file     Frequency of Social Gatherings with Friends and Family: More than three times a week     Attends [employer] Services: Not on file     Active Member of Clubs or Organizations: Not on file     Attends Club or Organization Meetings: Not on file     Marital Status:    Intimate Partner Violence: Not on file   Housing Stability: Not on file           Review of System:     Constitutional: Negative for fever or chills  Skin: Negative for rashes  Ears/Nose/Throat: Negative for nasal congestion, sore throat  Respiratory: No shortness of breath, dyspnea on exertion, cough, or hemoptysis  Cardiovascular: Negative for chest pain  Gastrointestinal: Negative for nausea, vomiting, positive for chronic GERD  Genitourinary: Negative for dysuria, hematuria  Musculoskeletal: Negative for myalgias  Neurologic: Negative for headaches, positive for worsening short term memory loss symptoms  Psychiatric: Negative for depression, anxiety  Hematologic/Lymphatic/Immunologic: positive for  "worsening of chronic bilateral leg edema symptoms  Endocrine: positive for hypothyroidism  Behavioral: Negative for tobacco use       Physical Exam:   /69 (BP Location: Left arm, Patient Position: Sitting, Cuff Size: Adult Regular)   Pulse 88   Temp 97.1  F (36.2  C) (Temporal)   Resp 16   Ht 1.575 m (5' 2\")   Wt 60.8 kg (134 lb)   BMI 24.51 kg/m      GENERAL: chronically ill appearing elderly female, alert and no distress  EYES: eyes grossly normal to inspection, and conjunctivae and sclerae normal  HENT: Normocephalic atraumatic. Nose and mouth without ulcers or lesions  NECK: supple  RESP: lungs clear to auscultation   CV: atrial fibrillation present  LYMPH: chronic bilateral lower leg peripheral edema symptoms mildly improved when compared to recent baseline  ABDOMEN: nondistended  MS: no gross musculoskeletal defects noted  SKIN: no suspicious lesions or rashes  NEURO: worsening short term memory loss symptoms present  PSYCH: mentation appears normal, affect normal        Diagnostic Test Results:     Last Comprehensive Metabolic Panel:  Sodium   Date Value Ref Range Status   11/04/2021 138 133 - 144 mmol/L Final   05/10/2021 133 133 - 144 mmol/L Final     Potassium   Date Value Ref Range Status   11/04/2021 4.6 3.4 - 5.3 mmol/L Final   05/10/2021 4.2 3.4 - 5.3 mmol/L Final     Chloride   Date Value Ref Range Status   11/04/2021 103 94 - 109 mmol/L Final   05/10/2021 95 94 - 109 mmol/L Final     Carbon Dioxide   Date Value Ref Range Status   05/10/2021 39 (H) 20 - 32 mmol/L Final     Carbon Dioxide (CO2)   Date Value Ref Range Status   11/04/2021 37 (H) 20 - 32 mmol/L Final     Anion Gap   Date Value Ref Range Status   11/04/2021 <1 (L) 3 - 14 mmol/L Final   05/10/2021 <1 (L) 3 - 14 mmol/L Final     Glucose   Date Value Ref Range Status   11/04/2021 100 (H) 70 - 99 mg/dL Final   05/10/2021 72 70 - 99 mg/dL Final     Urea Nitrogen   Date Value Ref Range Status   11/04/2021 24 7 - 30 mg/dL Final "   05/10/2021 17 7 - 30 mg/dL Final     Creatinine   Date Value Ref Range Status   11/04/2021 0.67 0.52 - 1.04 mg/dL Final   09/23/2021 0.620 mg/dL Final     GFR Estimate   Date Value Ref Range Status   11/04/2021 83 >60 mL/min/1.73m2 Final     Comment:     As of July 11, 2021, eGFR is calculated by the CKD-EPI creatinine equation, without race adjustment. eGFR can be influenced by muscle mass, exercise, and diet. The reported eGFR is an estimation only and is only applicable if the renal function is stable.   05/10/2021 83 >60 mL/min/[1.73_m2] Final     Comment:     Non  GFR Calc  Starting 12/18/2018, serum creatinine based estimated GFR (eGFR) will be   calculated using the Chronic Kidney Disease Epidemiology Collaboration   (CKD-EPI) equation.       Calcium   Date Value Ref Range Status   11/04/2021 9.7 8.5 - 10.1 mg/dL Final   05/10/2021 9.1 8.5 - 10.1 mg/dL Final         ASSESSMENT/PLAN:     (I27.20) Pulmonary hypertension (H)  (R60.0) Bilateral leg edema  (I48.19) Persistent atrial fibrillation (H)  (I50.32) Chronic diastolic heart failure (H)  (primary encounter diagnosis)  Comment: routine 1 month follow up of recent actue on chronic CHF exacerbation symptms. Patient's weight had decreased with current low dose diuretic therapy.  no chest pains. Leg lymphedema symptoms have stablized with the increased Torsemide medication.  Plan: continue patient's current torsemide (DEMADEX) 20 MG tablet medication, potassium chloride ER (KLOR-CON M) 20 MEQ CR         Tablet        continue to follow up with CHF clinic at the [facility] in [place] going forward. continue low salt diet  continue Eliquis medication for atrial fibrillation          (Z91.81) At high risk for falls  (M62.81) Generalized muscle weakness  (R41.81) Age-related cognitive decline  Home Care Nursing Referral  Patient is homebound and requires home healthcare RN visits for medication management, BP checks.        (E78.5)  Hyperlipidemia LDL goal <100  Comment: stable.  Plan: continue current therapy      (C34.32) Malignant neoplasm of lower lobe of left lung (H)  (J84.9) ILD (interstitial lung disease) (H)  Comment: stable. Patient is followed by pulmonary medicine clinic  Plan: albuterol (PROAIR HFA/PROVENTIL HFA/VENTOLIN         HFA) 108 (90 Base) MCG/ACT inhaler, continue pulmonology clinic follow up going forward.       (E03.9) Hypothyroidism, unspecified type  Comment: stable.  Plan: continue current therapy      Follow Up Plan:     Patient is instructed to return to Internal Medicine clinic for follow-up visit in 1 month.        Carissa Burroughs MD  Internal Medicine  [facility]

## 2022-01-11 ENCOUNTER — TELEPHONE (OUTPATIENT)
Dept: FAMILY MEDICINE | Facility: CLINIC | Age: 82
End: 2022-01-11
Payer: MEDICARE

## 2022-01-11 NOTE — TELEPHONE ENCOUNTER
Verbal approval given for the homecare request below. Homecare/Hospice agency to fax orders for provider signature.    Skilled nursing  1 visit every other week for 8 weeks and 2 PRN visit.  For medication management.    Marion Jensen RN  [facility]

## 2022-01-13 ENCOUNTER — TRANSFERRED RECORDS (OUTPATIENT)
Dept: HEALTH INFORMATION MANAGEMENT | Facility: CLINIC | Age: 82
End: 2022-01-13
Payer: MEDICARE

## 2022-01-14 ENCOUNTER — MEDICAL CORRESPONDENCE (OUTPATIENT)
Dept: HEALTH INFORMATION MANAGEMENT | Facility: CLINIC | Age: 82
End: 2022-01-14
Payer: MEDICARE

## 2022-01-14 ENCOUNTER — EXTERNAL ORDER RESULTS (OUTPATIENT)
Dept: PULMONOLOGY | Facility: CLINIC | Age: 82
End: 2022-01-14
Payer: MEDICARE

## 2022-01-14 LAB
ALBUMIN SERPL-MCNC: 3.7 G/DL
ALT SERPL-CCNC: 31 U/L
AST SERPL-CCNC: 49 U/L
CREAT SERPL-MCNC: 0.68 MG/DL
ERYTHROCYTE [DISTWIDTH] IN BLOOD BY AUTOMATED COUNT: 13.6 %
GFR SERPL CREATININE-BSD FRML MDRD: 88 ML/MIN/1.7
HCT VFR BLD AUTO: 38.2 %
HEMOGLOBIN: 12.6 G/DL
MCH RBC QN AUTO: 33.6 PG
MCHC RBC AUTO-ENTMCNC: 32.8 G/DL
MCV RBC AUTO: 102 FL
PLATELET # BLD AUTO: 173 10^9/L
PMV BLD: 7.2 FL
RBC # BLD AUTO: 3.74 M/UL

## 2022-01-17 DIAGNOSIS — Z53.9 DIAGNOSIS NOT YET DEFINED: Primary | ICD-10-CM

## 2022-01-17 PROCEDURE — G0179 MD RECERTIFICATION HHA PT: HCPCS | Performed by: INTERNAL MEDICINE

## 2022-01-19 DIAGNOSIS — Z53.9 DIAGNOSIS NOT YET DEFINED: Primary | ICD-10-CM

## 2022-01-19 PROCEDURE — 99207 PR MD RECERTIFICATION HHA PT: CPT | Performed by: INTERNAL MEDICINE

## 2022-01-24 ENCOUNTER — VIRTUAL VISIT (OUTPATIENT)
Dept: FAMILY MEDICINE | Facility: CLINIC | Age: 82
End: 2022-01-24
Payer: MEDICARE

## 2022-01-24 DIAGNOSIS — C34.32 MALIGNANT NEOPLASM OF LOWER LOBE OF LEFT LUNG (H): ICD-10-CM

## 2022-01-24 DIAGNOSIS — E78.5 HYPERLIPIDEMIA LDL GOAL <100: ICD-10-CM

## 2022-01-24 DIAGNOSIS — R60.0 BILATERAL LEG EDEMA: ICD-10-CM

## 2022-01-24 DIAGNOSIS — J84.9 ILD (INTERSTITIAL LUNG DISEASE) (H): ICD-10-CM

## 2022-01-24 DIAGNOSIS — I27.20 PULMONARY HYPERTENSION (H): ICD-10-CM

## 2022-01-24 DIAGNOSIS — I48.19 PERSISTENT ATRIAL FIBRILLATION (H): ICD-10-CM

## 2022-01-24 DIAGNOSIS — I50.32 CHRONIC DIASTOLIC HEART FAILURE (H): Primary | ICD-10-CM

## 2022-01-24 DIAGNOSIS — E03.9 HYPOTHYROIDISM, UNSPECIFIED TYPE: ICD-10-CM

## 2022-01-24 PROCEDURE — 99214 OFFICE O/P EST MOD 30 MIN: CPT | Mod: 95 | Performed by: INTERNAL MEDICINE

## 2022-01-24 NOTE — PROGRESS NOTES
Jaqueline is a 81 year old who is being evaluated via a billable telephone visit.      What phone number would you like to be contacted at? [phone]  How would you like to obtain your AVS? Mail a copy    Subjective   Jaqueline is a 81 year old who presents for the following health issues     HPI       Chief Complaint:     Routine follow up on multiple concerns including diastolic CHF, hyperlipidemia, pulmonary hypertension       HPI:   Patient Jaqueline Canales is a very pleasant 81 year old female with history of CHF, pulmonary hypertension today for routine telephone visit for follow up of recent actue on chronic CHF exacerbation symptms. Patient's weight has been decreasing again with diuretic therapy. No chest pains at this time. Leg lymphedema symptoms have improved from prior baseline. Patient is scheduled for follow up with CHF clinic at the [facility] Cardiology clinic in [place] going forward. No fever or chills.      Current Medications:     Current Outpatient Medications   Medication Sig Dispense Refill     acetaminophen (TYLENOL) 500 MG tablet Take 1,000 mg by mouth 2 times daily        albuterol (PROAIR HFA/PROVENTIL HFA/VENTOLIN HFA) 108 (90 Base) MCG/ACT inhaler USE 2 INHALATIONS ORALLY   INTO THE LUNGS EVERY 6  HOURS as needed 18 g 0     apixaban ANTICOAGULANT (ELIQUIS ANTICOAGULANT) 5 MG tablet Take 1 tablet (5 mg) by mouth 2 times daily 180 tablet 2     bromfenac (PROLENSA) 0.07 % ophthalmic solution Place 1 drop into the right eye daily       donepezil (ARICEPT) 5 MG tablet Take 1 tablet (5 mg) by mouth At Bedtime 90 tablet 3     folic acid (FOLVITE) 1 MG tablet TAKE 1 TABLET DAILY. 90 tablet 3     gabapentin (NEURONTIN) 100 MG capsule Take 1 capsule (100 mg) by mouth 3 times daily 90 capsule 11     KLOR-CON 20 MEQ CR tablet TAKE 1 TABLET DAILY 90 tablet 3     levothyroxine (SYNTHROID/LEVOTHROID) 75 MCG tablet TAKE 1 TABLET DAILY 90 tablet 2     melatonin 5 MG tablet Take 5 mg by mouth nightly as needed  for sleep       Methotrexate Sodium (METHOTREXATE PO) Take 2.5 mg by mouth 10 tablets weekly - Wednesdays       metoprolol succinate ER (TOPROL-XL) 25 MG 24 hr tablet TAKE 1 TABLET DAILY 90 tablet 2     Multiple Vitamins-Minerals (ICAPS) CAPS Take 1 capsule by mouth 2 times daily        multivitamin (CENTRUM SILVER) tablet Take 1 tablet by mouth daily       omeprazole (PRILOSEC) 20 MG DR capsule TAKE 1 CAPSULE TWICE DAILY,30-60 MINUTES PRIOR TO     MEALS 180 capsule 2     sildenafil (REVATIO) 20 MG tablet TAKE 1 TABLET BY MOUTH THREE TIMES DAILY. GENERIC FOR REVATIO. CALL [phone] TO REFILL 90 tablet 5     simvastatin (ZOCOR) 40 MG tablet Take 1 tablet (40 mg) by mouth At Bedtime 90 tablet 3     Skin Protectants, Misc. (EUCERIN) cream Apply topically 2 times daily Apply to areas dry skin topically two times a day for dry skin and Apply to areas of dry skin topically as needed for dry skin daily       torsemide (DEMADEX) 20 MG tablet Take 1 tablet (20 mg) by mouth 2 times daily 180 tablet 3     triamcinolone (KENALOG) 0.1 % external cream Apply topically 2 times daily 30 g 3     Vitamin D, Cholecalciferol, 10 MCG (400 UNIT) TABS Take 1,600 Units by mouth daily           Allergies:      Allergies   Allergen Reactions     No Known Allergies             Past Medical History:     Past Medical History:   Diagnosis Date     Amaurosis fugax 5-11    Right     Carotid artery stenosis      Esophageal reflux 3/11/2004     HELICOBACTER PYLORI INFECTION 7/11/2006     hx of CA in situ RT breast-1990.mastectomy 4/17/2007     Hyperlipidemia LDL goal <70 6/20/2011     Lung cancer (H)     squamous cell lung ca left lower lobe     OBESITY NOS 3/11/2004     OSTEOPOROSIS NOS 3/11/2004     Other psoriasis 3/11/2004     Pulmonary hypertension (H) 04/15/2019    Right heart catheterization demonstrated moderate pulmonary arterial hypertension with a mean PA pressure of 34 mmHg, no reversibility with inhaled nitric oxide, elevated right  and left-sided filling pressures, low normal cardiac index of 2.1.  Seen by Dr. Shay Marquez.  Started on sildenafil (Revatio) 20 mg 3 times daily.     RA (rheumatoid arthritis) (H) 2010     Raynaud's syndrome 4/10/2006     Scleroderma (H)      Sleep apnea     CPAP         Past Surgical History:     Past Surgical History:   Procedure Laterality Date     BREAST SURGERY       COLONOSCOPY  16     COLONOSCOPY N/A 2019    Procedure: Colonoscopy, With Polypectomy And Biopsy;  Surgeon: Neto Kaminski MD;  Location:  GI     CONIZATION       CV RIGHT HEART CATH MEASUREMENTS RECORDED N/A 4/15/2019    Procedure: Heart Cath Right Heart Cath;  Surgeon: Naresh Cyr MD;  Location:  HEART CARDIAC CATH LAB     ENDARTERECTOMY CAROTID       REPAIR HAMMER TOE       TONSILLECTOMY       ZZC NONSPECIFIC PROCEDURE      mastectomy RT breast     ZZHC COLONOSCOPY THRU STOMA, DIAGNOSTIC  2001    diverticulosis         Family Medical History:     Family History   Problem Relation Age of Onset     Cancer - colorectal Mother      Cerebrovascular Disease Father      Cancer Sister 31        ovarian     Heart Disease Sister      Gastrointestinal Disease Sister         crohns     Gastrointestinal Disease Sister         diverticulitis     Gastrointestinal Disease Brother         diverticulitis     Alzheimer Disease Sister      Cerebrovascular Disease Maternal Grandmother      Diabetes Maternal Grandmother      Mental Illness Maternal Grandmother      Diabetes Maternal Uncle      Cancer Nephew                  Social History:     Social History     Socioeconomic History     Marital status:      Spouse name: Not on file     Number of children: Not on file     Years of education: Not on file     Highest education level: Not on file   Occupational History     Not on file   Tobacco Use     Smoking status: Former Smoker     Packs/day: 1.00     Years: 30.00     Pack years: 30.00     Types:  Cigarettes     Start date:      Quit date: 3/11/1992     Years since quittin.8     Smokeless tobacco: Never Used   Substance and Sexual Activity     Alcohol use: Not Currently     Alcohol/week: 0.0 standard drinks     Drug use: No     Sexual activity: Not Currently     Partners: Male   Other Topics Concern     Parent/sibling w/ CABG, MI or angioplasty before 65F 55M? No   Social History Narrative     Not on file     Social Determinants of Health     Financial Resource Strain: Low Risk      Difficulty of Paying Living Expenses: Not hard at all   Food Insecurity: Not on file   Transportation Needs: No Transportation Needs     Lack of Transportation (Medical): No     Lack of Transportation (Non-Medical): No   Physical Activity: Not on file   Stress: Not on file   Social Connections: Unknown     Frequency of Communication with Friends and Family: Not on file     Frequency of Social Gatherings with Friends and Family: More than three times a week     Attends Zoroastrian Services: Not on file     Active Member of Clubs or Organizations: Not on file     Attends Club or Organization Meetings: Not on file     Marital Status:    Intimate Partner Violence: Not on file   Housing Stability: Not on file           Review of System:     Constitutional: Negative for fever or chills  Skin: Negative for rashes  Ears/Nose/Throat: Negative for nasal congestion, sore throat  Respiratory: No shortness of breath, dyspnea on exertion, cough, or hemoptysis  Cardiovascular: Negative for chest pain  Gastrointestinal: Negative for nausea, vomiting, positive for chronic GERD  Genitourinary: Negative for dysuria, hematuria  Musculoskeletal: Negative for myalgias  Neurologic: Negative for headaches, positive for worsening short term memory loss symptoms  Psychiatric: Negative for depression, anxiety  Hematologic/Lymphatic/Immunologic: positive for worsening of chronic bilateral leg edema symptoms  Endocrine: positive for  hypothyroidism  Behavioral: Negative for tobacco use       Physical Exam:   There were no vitals taken for this visit.    RESP: no cough over the phone   NEURO: worsening short term memory loss symptoms present  PSYCH: mentation appears normal, affect normal        Diagnostic Test Results:     Last Comprehensive Metabolic Panel:  Sodium   Date Value Ref Range Status   11/04/2021 138 133 - 144 mmol/L Final   05/10/2021 133 133 - 144 mmol/L Final     Potassium   Date Value Ref Range Status   11/04/2021 4.6 3.4 - 5.3 mmol/L Final   05/10/2021 4.2 3.4 - 5.3 mmol/L Final     Chloride   Date Value Ref Range Status   11/04/2021 103 94 - 109 mmol/L Final   05/10/2021 95 94 - 109 mmol/L Final     Carbon Dioxide   Date Value Ref Range Status   05/10/2021 39 (H) 20 - 32 mmol/L Final     Carbon Dioxide (CO2)   Date Value Ref Range Status   11/04/2021 37 (H) 20 - 32 mmol/L Final     Anion Gap   Date Value Ref Range Status   11/04/2021 <1 (L) 3 - 14 mmol/L Final   05/10/2021 <1 (L) 3 - 14 mmol/L Final     Glucose   Date Value Ref Range Status   11/04/2021 100 (H) 70 - 99 mg/dL Final   05/10/2021 72 70 - 99 mg/dL Final     Urea Nitrogen   Date Value Ref Range Status   11/04/2021 24 7 - 30 mg/dL Final   05/10/2021 17 7 - 30 mg/dL Final     Creatinine   Date Value Ref Range Status   01/13/2022 0.680 mg/dL Final     GFR Estimate   Date Value Ref Range Status   11/04/2021 83 >60 mL/min/1.73m2 Final     Comment:     As of July 11, 2021, eGFR is calculated by the CKD-EPI creatinine equation, without race adjustment. eGFR can be influenced by muscle mass, exercise, and diet. The reported eGFR is an estimation only and is only applicable if the renal function is stable.   05/10/2021 83 >60 mL/min/[1.73_m2] Final     Comment:     Non  GFR Calc  Starting 12/18/2018, serum creatinine based estimated GFR (eGFR) will be   calculated using the Chronic Kidney Disease Epidemiology Collaboration   (CKD-EPI) equation.       Calcium    Date Value Ref Range Status   11/04/2021 9.7 8.5 - 10.1 mg/dL Final   05/10/2021 9.1 8.5 - 10.1 mg/dL Final         ASSESSMENT/PLAN:     (I27.20) Pulmonary hypertension (H)  (R60.0) Bilateral leg edema  (I48.19) Persistent atrial fibrillation (H)  (I50.32) Chronic diastolic heart failure (H)  (primary encounter diagnosis)  Comment: routine follow up of recent actue on chronic CHF exacerbation symptms. Patient's weight had decreased with current low dose diuretic therapy.  no chest pains. Leg lymphedema symptoms have stablized with the increased Torsemide medication.  Plan: continue patient's current torsemide (DEMADEX) 20 MG tablet medication, potassium chloride ER (KLOR-CON M) 20 MEQ CR         Tablet        continue to follow up with CHF clinic at the [facility] clinic in [place] going forward. continue low salt diet  continue Eliquis medication for atrial fibrillation      (E78.5) Hyperlipidemia LDL goal <100  Comment: stable.  Plan: continue current therapy      (C34.32) Malignant neoplasm of lower lobe of left lung (H)  (J84.9) ILD (interstitial lung disease) (H)  Comment: stable. Patient is followed by pulmonary medicine clinic  Plan: albuterol (PROAIR HFA/PROVENTIL HFA/VENTOLIN         HFA) 108 (90 Base) MCG/ACT inhaler, continue pulmonology clinic follow up going forward.       (E03.9) Hypothyroidism, unspecified type  Comment: stable.  Plan: continue current therapy      Follow Up Plan:     Patient is instructed to return to Internal Medicine clinic for follow-up visit in 1 month.      Phone call duration: 30 minutes    Carissa Burroughs MD  Internal Medicine  [facility]

## 2022-01-28 ENCOUNTER — MEDICAL CORRESPONDENCE (OUTPATIENT)
Dept: HEALTH INFORMATION MANAGEMENT | Facility: CLINIC | Age: 82
End: 2022-01-28
Payer: MEDICARE

## 2022-01-31 ENCOUNTER — VIRTUAL VISIT (OUTPATIENT)
Dept: FAMILY MEDICINE | Facility: CLINIC | Age: 82
End: 2022-01-31
Payer: MEDICARE

## 2022-01-31 ENCOUNTER — MEDICAL CORRESPONDENCE (OUTPATIENT)
Dept: HEALTH INFORMATION MANAGEMENT | Facility: CLINIC | Age: 82
End: 2022-01-31

## 2022-01-31 DIAGNOSIS — I48.19 PERSISTENT ATRIAL FIBRILLATION (H): ICD-10-CM

## 2022-01-31 DIAGNOSIS — I27.20 PULMONARY HYPERTENSION (H): ICD-10-CM

## 2022-01-31 DIAGNOSIS — E03.9 HYPOTHYROIDISM, UNSPECIFIED TYPE: ICD-10-CM

## 2022-01-31 DIAGNOSIS — C34.32 MALIGNANT NEOPLASM OF LOWER LOBE OF LEFT LUNG (H): ICD-10-CM

## 2022-01-31 DIAGNOSIS — J84.9 ILD (INTERSTITIAL LUNG DISEASE) (H): ICD-10-CM

## 2022-01-31 DIAGNOSIS — R60.0 BILATERAL LEG EDEMA: ICD-10-CM

## 2022-01-31 DIAGNOSIS — I50.32 CHRONIC DIASTOLIC HEART FAILURE (H): Primary | ICD-10-CM

## 2022-01-31 DIAGNOSIS — E78.5 HYPERLIPIDEMIA LDL GOAL <100: ICD-10-CM

## 2022-01-31 PROCEDURE — 99214 OFFICE O/P EST MOD 30 MIN: CPT | Mod: 95 | Performed by: INTERNAL MEDICINE

## 2022-01-31 NOTE — PROGRESS NOTES
Jaqueline is a 81 year old who is being evaluated via a billable telephone visit.      What phone number would you like to be contacted at? [phone]  How would you like to obtain your AVS? Mail a copy      Subjective   Jaqueline is a 81 year old who presents for the following health issues     HPI       Chief Complaint   Patient presents with     Follow Up       Routine follow up on multiple concerns including diastolic CHF, hyperlipidemia, pulmonary hypertension       HPI:   Patient Jaqueline Canales is a very pleasant 81 year old female with history of CHF, pulmonary hypertension today for routine telephone visit for follow up of recent actue on chronic CHF exacerbation symptms. Patient's weight has been decreasing again with diuretic therapy. No chest pains at this time. Leg lymphedema symptoms have improved from prior baseline. Patient is scheduled for follow up with CHF clinic at the [facility] Cardiology clinic in [place] going forward. No fever or chills.      Current Medications:     Current Outpatient Medications   Medication Sig Dispense Refill     acetaminophen (TYLENOL) 500 MG tablet Take 1,000 mg by mouth 2 times daily        albuterol (PROAIR HFA/PROVENTIL HFA/VENTOLIN HFA) 108 (90 Base) MCG/ACT inhaler USE 2 INHALATIONS ORALLY   INTO THE LUNGS EVERY 6  HOURS as needed 18 g 0     apixaban ANTICOAGULANT (ELIQUIS ANTICOAGULANT) 5 MG tablet Take 1 tablet (5 mg) by mouth 2 times daily 180 tablet 2     bromfenac (PROLENSA) 0.07 % ophthalmic solution Place 1 drop into the right eye daily       donepezil (ARICEPT) 5 MG tablet Take 1 tablet (5 mg) by mouth At Bedtime 90 tablet 3     folic acid (FOLVITE) 1 MG tablet TAKE 1 TABLET DAILY. 90 tablet 3     gabapentin (NEURONTIN) 100 MG capsule Take 1 capsule (100 mg) by mouth 3 times daily 90 capsule 11     KLOR-CON 20 MEQ CR tablet TAKE 1 TABLET DAILY 90 tablet 3     levothyroxine (SYNTHROID/LEVOTHROID) 75 MCG tablet TAKE 1 TABLET DAILY 90 tablet 2     melatonin 5 MG  tablet Take 5 mg by mouth nightly as needed for sleep       Methotrexate Sodium (METHOTREXATE PO) Take 2.5 mg by mouth 10 tablets weekly - Wednesdays       metoprolol succinate ER (TOPROL-XL) 25 MG 24 hr tablet TAKE 1 TABLET DAILY 90 tablet 2     Multiple Vitamins-Minerals (ICAPS) CAPS Take 1 capsule by mouth 2 times daily        multivitamin (CENTRUM SILVER) tablet Take 1 tablet by mouth daily       omeprazole (PRILOSEC) 20 MG DR capsule TAKE 1 CAPSULE TWICE DAILY,30-60 MINUTES PRIOR TO     MEALS 180 capsule 2     sildenafil (REVATIO) 20 MG tablet TAKE 1 TABLET BY MOUTH THREE TIMES DAILY. GENERIC FOR REVATIO. CALL [phone] TO REFILL 90 tablet 5     simvastatin (ZOCOR) 40 MG tablet Take 1 tablet (40 mg) by mouth At Bedtime 90 tablet 3     Skin Protectants, Misc. (EUCERIN) cream Apply topically 2 times daily Apply to areas dry skin topically two times a day for dry skin and Apply to areas of dry skin topically as needed for dry skin daily       torsemide (DEMADEX) 20 MG tablet Take 1 tablet (20 mg) by mouth 2 times daily 180 tablet 3     triamcinolone (KENALOG) 0.1 % external cream Apply topically 2 times daily 30 g 3     Vitamin D, Cholecalciferol, 10 MCG (400 UNIT) TABS Take 1,600 Units by mouth daily           Allergies:      Allergies   Allergen Reactions     No Known Allergies             Past Medical History:     Past Medical History:   Diagnosis Date     Amaurosis fugax 5-11    Right     Carotid artery stenosis      Esophageal reflux 3/11/2004     HELICOBACTER PYLORI INFECTION 7/11/2006     hx of CA in situ RT breast-1990.mastectomy 4/17/2007     Hyperlipidemia LDL goal <70 6/20/2011     Lung cancer (H)     squamous cell lung ca left lower lobe     OBESITY NOS 3/11/2004     OSTEOPOROSIS NOS 3/11/2004     Other psoriasis 3/11/2004     Pulmonary hypertension (H) 04/15/2019    Right heart catheterization demonstrated moderate pulmonary arterial hypertension with a mean PA pressure of 34 mmHg, no reversibility  with inhaled nitric oxide, elevated right and left-sided filling pressures, low normal cardiac index of 2.1.  Seen by Dr. Shay Marquez.  Started on sildenafil (Revatio) 20 mg 3 times daily.     RA (rheumatoid arthritis) (H) 2010     Raynaud's syndrome 4/10/2006     Scleroderma (H)      Sleep apnea     CPAP         Past Surgical History:     Past Surgical History:   Procedure Laterality Date     BREAST SURGERY       COLONOSCOPY  16     COLONOSCOPY N/A 2019    Procedure: Colonoscopy, With Polypectomy And Biopsy;  Surgeon: Neto Kaminski MD;  Location:  GI     CONIZATION       CV RIGHT HEART CATH MEASUREMENTS RECORDED N/A 4/15/2019    Procedure: Heart Cath Right Heart Cath;  Surgeon: Naresh Cyr MD;  Location:  HEART CARDIAC CATH LAB     ENDARTERECTOMY CAROTID       REPAIR HAMMER TOE       TONSILLECTOMY       ZZC NONSPECIFIC PROCEDURE      mastectomy RT breast     ZZHC COLONOSCOPY THRU STOMA, DIAGNOSTIC  2001    diverticulosis         Family Medical History:     Family History   Problem Relation Age of Onset     Cancer - colorectal Mother      Cerebrovascular Disease Father      Cancer Sister 31        ovarian     Heart Disease Sister      Gastrointestinal Disease Sister         crohns     Gastrointestinal Disease Sister         diverticulitis     Gastrointestinal Disease Brother         diverticulitis     Alzheimer Disease Sister      Cerebrovascular Disease Maternal Grandmother      Diabetes Maternal Grandmother      Mental Illness Maternal Grandmother      Diabetes Maternal Uncle      Cancer Nephew                  Social History:     Social History     Socioeconomic History     Marital status:      Spouse name: Not on file     Number of children: Not on file     Years of education: Not on file     Highest education level: Not on file   Occupational History     Not on file   Tobacco Use     Smoking status: Former Smoker     Packs/day: 1.00     Years:  30.00     Pack years: 30.00     Types: Cigarettes     Start date:      Quit date: 3/11/1992     Years since quittin.9     Smokeless tobacco: Never Used   Substance and Sexual Activity     Alcohol use: Not Currently     Alcohol/week: 0.0 standard drinks     Drug use: No     Sexual activity: Not Currently     Partners: Male   Other Topics Concern     Parent/sibling w/ CABG, MI or angioplasty before 65F 55M? No   Social History Narrative     Not on file     Social Determinants of Health     Financial Resource Strain: Low Risk      Difficulty of Paying Living Expenses: Not hard at all   Food Insecurity: Not on file   Transportation Needs: No Transportation Needs     Lack of Transportation (Medical): No     Lack of Transportation (Non-Medical): No   Physical Activity: Not on file   Stress: Not on file   Social Connections: Unknown     Frequency of Communication with Friends and Family: Not on file     Frequency of Social Gatherings with Friends and Family: More than three times a week     Attends Buddhism Services: Not on file     Active Member of Clubs or Organizations: Not on file     Attends Club or Organization Meetings: Not on file     Marital Status:    Intimate Partner Violence: Not on file   Housing Stability: Not on file           Review of System:     Constitutional: Negative for fever or chills  Skin: Negative for rashes  Ears/Nose/Throat: Negative for nasal congestion, sore throat  Respiratory: No shortness of breath, dyspnea on exertion, cough, or hemoptysis  Cardiovascular: Negative for chest pain  Gastrointestinal: Negative for nausea, vomiting, positive for chronic GERD  Genitourinary: Negative for dysuria, hematuria  Musculoskeletal: Negative for myalgias  Neurologic: Negative for headaches, positive for worsening short term memory loss symptoms  Psychiatric: Negative for depression, anxiety  Hematologic/Lymphatic/Immunologic: positive for worsening of chronic bilateral leg edema  symptoms  Endocrine: positive for hypothyroidism  Behavioral: Negative for tobacco use       Physical Exam:   There were no vitals taken for this visit.    RESP: no cough over the phone   NEURO: worsening short term memory loss symptoms present  PSYCH: mentation appears normal, affect normal        Diagnostic Test Results:     Last Comprehensive Metabolic Panel:  Sodium   Date Value Ref Range Status   11/04/2021 138 133 - 144 mmol/L Final   05/10/2021 133 133 - 144 mmol/L Final     Potassium   Date Value Ref Range Status   11/04/2021 4.6 3.4 - 5.3 mmol/L Final   05/10/2021 4.2 3.4 - 5.3 mmol/L Final     Chloride   Date Value Ref Range Status   11/04/2021 103 94 - 109 mmol/L Final   05/10/2021 95 94 - 109 mmol/L Final     Carbon Dioxide   Date Value Ref Range Status   05/10/2021 39 (H) 20 - 32 mmol/L Final     Carbon Dioxide (CO2)   Date Value Ref Range Status   11/04/2021 37 (H) 20 - 32 mmol/L Final     Anion Gap   Date Value Ref Range Status   11/04/2021 <1 (L) 3 - 14 mmol/L Final   05/10/2021 <1 (L) 3 - 14 mmol/L Final     Glucose   Date Value Ref Range Status   11/04/2021 100 (H) 70 - 99 mg/dL Final   05/10/2021 72 70 - 99 mg/dL Final     Urea Nitrogen   Date Value Ref Range Status   11/04/2021 24 7 - 30 mg/dL Final   05/10/2021 17 7 - 30 mg/dL Final     Creatinine   Date Value Ref Range Status   01/13/2022 0.680 mg/dL Final     GFR Estimate   Date Value Ref Range Status   11/04/2021 83 >60 mL/min/1.73m2 Final     Comment:     As of July 11, 2021, eGFR is calculated by the CKD-EPI creatinine equation, without race adjustment. eGFR can be influenced by muscle mass, exercise, and diet. The reported eGFR is an estimation only and is only applicable if the renal function is stable.   05/10/2021 83 >60 mL/min/[1.73_m2] Final     Comment:     Non  GFR Calc  Starting 12/18/2018, serum creatinine based estimated GFR (eGFR) will be   calculated using the Chronic Kidney Disease Epidemiology Collaboration    (CKD-EPI) equation.       Calcium   Date Value Ref Range Status   11/04/2021 9.7 8.5 - 10.1 mg/dL Final   05/10/2021 9.1 8.5 - 10.1 mg/dL Final         ASSESSMENT/PLAN:     (I27.20) Pulmonary hypertension (H)  (R60.0) Bilateral leg edema  (I48.19) Persistent atrial fibrillation (H)  (I50.32) Chronic diastolic heart failure (H)  (primary encounter diagnosis)  Comment: 1 week routine follow up of recent actue on chronic CHF exacerbation symptms. Patient's weight had decreased with current low dose diuretic therapy. no chest pains. Leg lymphedema symptoms have stablized with the increased Torsemide medication.  Plan: continue patient's current torsemide (DEMADEX) 20 MG tablet medication, potassium chloride ER (KLOR-CON M) 20 MEQ CR         Tablet        continue to follow up with CHF clinic at the [facility] Cardiology clinic in [place] going forward. continue low salt diet  continue Eliquis medication for atrial fibrillation      (E78.5) Hyperlipidemia LDL goal <100  Comment: stable.  Plan: continue current therapy      (C34.32) Malignant neoplasm of lower lobe of left lung (H)  (J84.9) ILD (interstitial lung disease) (H)  Comment: stable. Patient is followed by pulmonary medicine clinic  Plan: albuterol (PROAIR HFA/PROVENTIL HFA/VENTOLIN         HFA) 108 (90 Base) MCG/ACT inhaler, continue pulmonology clinic follow up going forward.       (E03.9) Hypothyroidism, unspecified type  Comment: stable.  Plan: continue current therapy      Follow Up Plan:     Patient is instructed to return to Internal Medicine clinic for follow-up visit in 1 month.      Phone call duration: 30 minutes    Carissa Burroughs MD  Internal Medicine  [facility]

## 2022-02-07 ENCOUNTER — VIRTUAL VISIT (OUTPATIENT)
Dept: FAMILY MEDICINE | Facility: CLINIC | Age: 82
End: 2022-02-07
Payer: MEDICARE

## 2022-02-07 DIAGNOSIS — E78.5 HYPERLIPIDEMIA LDL GOAL <100: ICD-10-CM

## 2022-02-07 DIAGNOSIS — I50.32 CHRONIC DIASTOLIC HEART FAILURE (H): ICD-10-CM

## 2022-02-07 DIAGNOSIS — I27.20 PULMONARY HYPERTENSION (H): ICD-10-CM

## 2022-02-07 DIAGNOSIS — I48.19 PERSISTENT ATRIAL FIBRILLATION (H): ICD-10-CM

## 2022-02-07 DIAGNOSIS — J84.9 ILD (INTERSTITIAL LUNG DISEASE) (H): ICD-10-CM

## 2022-02-07 DIAGNOSIS — C34.32 MALIGNANT NEOPLASM OF LOWER LOBE OF LEFT LUNG (H): ICD-10-CM

## 2022-02-07 DIAGNOSIS — R60.0 BILATERAL LEG EDEMA: Primary | ICD-10-CM

## 2022-02-07 PROCEDURE — 99214 OFFICE O/P EST MOD 30 MIN: CPT | Mod: 95 | Performed by: INTERNAL MEDICINE

## 2022-02-07 NOTE — PROGRESS NOTES
Jaqueline is a 81 year old who is being evaluated via a billable telephone visit.      What phone number would you like to be contacted at? [phone]  How would you like to obtain your AVS? Mail a copy      Subjective   Jaqueline is a 81 year old who presents for the following health issues     HPI       Chief Complaint   Patient presents with     Follow Up       Routine follow up on multiple concerns including diastolic CHF, hyperlipidemia, pulmonary hypertension       HPI:   Patient Jaqueline Canales is a very pleasant 81 year old female with history of CHF, pulmonary hypertension today for routine telephone visit for follow up of recent actue on chronic CHF exacerbation symptms. Patient's weight has been decreasing again with diuretic therapy. No chest pains at this time. Leg lymphedema symptoms have improved from prior baseline. Patient is scheduled for follow up with CHF clinic at the [facility] clinic in [place] going forward. No fever or chills.      Current Medications:     Current Outpatient Medications   Medication Sig Dispense Refill     acetaminophen (TYLENOL) 500 MG tablet Take 1,000 mg by mouth 2 times daily        albuterol (PROAIR HFA/PROVENTIL HFA/VENTOLIN HFA) 108 (90 Base) MCG/ACT inhaler USE 2 INHALATIONS ORALLY   INTO THE LUNGS EVERY 6  HOURS as needed 18 g 0     apixaban ANTICOAGULANT (ELIQUIS ANTICOAGULANT) 5 MG tablet Take 1 tablet (5 mg) by mouth 2 times daily 180 tablet 2     bromfenac (PROLENSA) 0.07 % ophthalmic solution Place 1 drop into the right eye daily       donepezil (ARICEPT) 5 MG tablet Take 1 tablet (5 mg) by mouth At Bedtime 90 tablet 3     folic acid (FOLVITE) 1 MG tablet TAKE 1 TABLET DAILY. 90 tablet 3     gabapentin (NEURONTIN) 100 MG capsule Take 1 capsule (100 mg) by mouth 3 times daily 90 capsule 11     KLOR-CON 20 MEQ CR tablet TAKE 1 TABLET DAILY 90 tablet 3     levothyroxine (SYNTHROID/LEVOTHROID) 75 MCG tablet TAKE 1 TABLET DAILY 90 tablet 2     melatonin 5 MG  tablet Take 5 mg by mouth nightly as needed for sleep       Methotrexate Sodium (METHOTREXATE PO) Take 2.5 mg by mouth 10 tablets weekly - Wednesdays       metoprolol succinate ER (TOPROL-XL) 25 MG 24 hr tablet TAKE 1 TABLET DAILY 90 tablet 2     Multiple Vitamins-Minerals (ICAPS) CAPS Take 1 capsule by mouth 2 times daily        multivitamin (CENTRUM SILVER) tablet Take 1 tablet by mouth daily       omeprazole (PRILOSEC) 20 MG DR capsule TAKE 1 CAPSULE TWICE DAILY,30-60 MINUTES PRIOR TO     MEALS 180 capsule 2     sildenafil (REVATIO) 20 MG tablet TAKE 1 TABLET BY MOUTH THREE TIMES DAILY. GENERIC FOR REVATIO. CALL [phone] TO REFILL 90 tablet 5     simvastatin (ZOCOR) 40 MG tablet Take 1 tablet (40 mg) by mouth At Bedtime 90 tablet 3     Skin Protectants, Misc. (EUCERIN) cream Apply topically 2 times daily Apply to areas dry skin topically two times a day for dry skin and Apply to areas of dry skin topically as needed for dry skin daily       torsemide (DEMADEX) 20 MG tablet Take 1 tablet (20 mg) by mouth 2 times daily 180 tablet 3     triamcinolone (KENALOG) 0.1 % external cream Apply topically 2 times daily 30 g 3     Vitamin D, Cholecalciferol, 10 MCG (400 UNIT) TABS Take 1,600 Units by mouth daily           Allergies:      Allergies   Allergen Reactions     No Known Allergies             Past Medical History:     Past Medical History:   Diagnosis Date     Amaurosis fugax 5-11    Right     Carotid artery stenosis      Esophageal reflux 3/11/2004     HELICOBACTER PYLORI INFECTION 7/11/2006     hx of CA in situ RT breast-1990.mastectomy 4/17/2007     Hyperlipidemia LDL goal <70 6/20/2011     Lung cancer (H)     squamous cell lung ca left lower lobe     OBESITY NOS 3/11/2004     OSTEOPOROSIS NOS 3/11/2004     Other psoriasis 3/11/2004     Pulmonary hypertension (H) 04/15/2019    Right heart catheterization demonstrated moderate pulmonary arterial hypertension with a mean PA pressure of 34 mmHg, no reversibility  with inhaled nitric oxide, elevated right and left-sided filling pressures, low normal cardiac index of 2.1.  Seen by Dr. Shay Marquez.  Started on sildenafil (Revatio) 20 mg 3 times daily.     RA (rheumatoid arthritis) (H) 2010     Raynaud's syndrome 4/10/2006     Scleroderma (H)      Sleep apnea     CPAP         Past Surgical History:     Past Surgical History:   Procedure Laterality Date     BREAST SURGERY       COLONOSCOPY  16     COLONOSCOPY N/A 2019    Procedure: Colonoscopy, With Polypectomy And Biopsy;  Surgeon: Neto Kaminski MD;  Location:  GI     CONIZATION       CV RIGHT HEART CATH MEASUREMENTS RECORDED N/A 4/15/2019    Procedure: Heart Cath Right Heart Cath;  Surgeon: Naresh Cyr MD;  Location:  HEART CARDIAC CATH LAB     ENDARTERECTOMY CAROTID       REPAIR HAMMER TOE       TONSILLECTOMY       ZZC NONSPECIFIC PROCEDURE      mastectomy RT breast     ZZHC COLONOSCOPY THRU STOMA, DIAGNOSTIC  2001    diverticulosis         Family Medical History:     Family History   Problem Relation Age of Onset     Cancer - colorectal Mother      Cerebrovascular Disease Father      Cancer Sister 31        ovarian     Heart Disease Sister      Gastrointestinal Disease Sister         crohns     Gastrointestinal Disease Sister         diverticulitis     Gastrointestinal Disease Brother         diverticulitis     Alzheimer Disease Sister      Cerebrovascular Disease Maternal Grandmother      Diabetes Maternal Grandmother      Mental Illness Maternal Grandmother      Diabetes Maternal Uncle      Cancer Nephew                  Social History:     Social History     Socioeconomic History     Marital status:      Spouse name: Not on file     Number of children: Not on file     Years of education: Not on file     Highest education level: Not on file   Occupational History     Not on file   Tobacco Use     Smoking status: Former Smoker     Packs/day: 1.00     Years:  30.00     Pack years: 30.00     Types: Cigarettes     Start date:      Quit date: 3/11/1992     Years since quittin.9     Smokeless tobacco: Never Used   Substance and Sexual Activity     Alcohol use: Not Currently     Alcohol/week: 0.0 standard drinks     Drug use: No     Sexual activity: Not Currently     Partners: Male   Other Topics Concern     Parent/sibling w/ CABG, MI or angioplasty before 65F 55M? No   Social History Narrative     Not on file     Social Determinants of Health     Financial Resource Strain: Low Risk      Difficulty of Paying Living Expenses: Not hard at all   Food Insecurity: Not on file   Transportation Needs: No Transportation Needs     Lack of Transportation (Medical): No     Lack of Transportation (Non-Medical): No   Physical Activity: Not on file   Stress: Not on file   Social Connections: Unknown     Frequency of Communication with Friends and Family: Not on file     Frequency of Social Gatherings with Friends and Family: More than three times a week     Attends Rastafarian Services: Not on file     Active Member of Clubs or Organizations: Not on file     Attends Club or Organization Meetings: Not on file     Marital Status:    Intimate Partner Violence: Not on file   Housing Stability: Not on file           Review of System:     Constitutional: Negative for fever or chills  Skin: Negative for rashes  Ears/Nose/Throat: Negative for nasal congestion, sore throat  Respiratory: No shortness of breath, dyspnea on exertion, cough, or hemoptysis  Cardiovascular: Negative for chest pain  Gastrointestinal: Negative for nausea, vomiting, positive for chronic GERD  Genitourinary: Negative for dysuria, hematuria  Musculoskeletal: Negative for myalgias  Neurologic: Negative for headaches, positive for worsening short term memory loss symptoms  Psychiatric: Negative for depression, anxiety  Hematologic/Lymphatic/Immunologic: positive for worsening of chronic bilateral leg edema  symptoms  Endocrine: positive for hypothyroidism  Behavioral: Negative for tobacco use       Physical Exam:   There were no vitals taken for this visit.    RESP: no cough over the phone   NEURO: worsening short term memory loss symptoms present  PSYCH: mentation appears normal, affect normal        Diagnostic Test Results:     Last Comprehensive Metabolic Panel:  Sodium   Date Value Ref Range Status   11/04/2021 138 133 - 144 mmol/L Final   05/10/2021 133 133 - 144 mmol/L Final     Potassium   Date Value Ref Range Status   11/04/2021 4.6 3.4 - 5.3 mmol/L Final   05/10/2021 4.2 3.4 - 5.3 mmol/L Final     Chloride   Date Value Ref Range Status   11/04/2021 103 94 - 109 mmol/L Final   05/10/2021 95 94 - 109 mmol/L Final     Carbon Dioxide   Date Value Ref Range Status   05/10/2021 39 (H) 20 - 32 mmol/L Final     Carbon Dioxide (CO2)   Date Value Ref Range Status   11/04/2021 37 (H) 20 - 32 mmol/L Final     Anion Gap   Date Value Ref Range Status   11/04/2021 <1 (L) 3 - 14 mmol/L Final   05/10/2021 <1 (L) 3 - 14 mmol/L Final     Glucose   Date Value Ref Range Status   11/04/2021 100 (H) 70 - 99 mg/dL Final   05/10/2021 72 70 - 99 mg/dL Final     Urea Nitrogen   Date Value Ref Range Status   11/04/2021 24 7 - 30 mg/dL Final   05/10/2021 17 7 - 30 mg/dL Final     Creatinine   Date Value Ref Range Status   01/13/2022 0.680 mg/dL Final     GFR Estimate   Date Value Ref Range Status   11/04/2021 83 >60 mL/min/1.73m2 Final     Comment:     As of July 11, 2021, eGFR is calculated by the CKD-EPI creatinine equation, without race adjustment. eGFR can be influenced by muscle mass, exercise, and diet. The reported eGFR is an estimation only and is only applicable if the renal function is stable.   05/10/2021 83 >60 mL/min/[1.73_m2] Final     Comment:     Non  GFR Calc  Starting 12/18/2018, serum creatinine based estimated GFR (eGFR) will be   calculated using the Chronic Kidney Disease Epidemiology Collaboration    (CKD-EPI) equation.       Calcium   Date Value Ref Range Status   11/04/2021 9.7 8.5 - 10.1 mg/dL Final   05/10/2021 9.1 8.5 - 10.1 mg/dL Final         ASSESSMENT/PLAN:     (I27.20) Pulmonary hypertension (H)  (R60.0) Bilateral leg edema  (I48.19) Persistent atrial fibrillation (H)  (I50.32) Chronic diastolic heart failure (H)  (primary encounter diagnosis)  Comment: 2 weeks routine follow up of recent actue on chronic CHF exacerbation symptms. Patient's weight had decreased with current low dose diuretic therapy. no chest pains. Leg lymphedema symptoms have stablized with the increased Torsemide medication.  Plan: continue patient's current torsemide (DEMADEX) 20 MG tablet medication, potassium chloride ER (KLOR-CON M) 20 MEQ CR         Tablet        continue to follow up with CHF clinic at the [facility] clinic in [place] going forward. continue low salt diet  continue Eliquis medication for atrial fibrillation      (E78.5) Hyperlipidemia LDL goal <100  Comment: stable.  Plan: continue current therapy      (C34.32) Malignant neoplasm of lower lobe of left lung (H)  (J84.9) ILD (interstitial lung disease) (H)  Comment: stable. Patient is followed by pulmonary medicine clinic  Plan: albuterol (PROAIR HFA/PROVENTIL HFA/VENTOLIN         HFA) 108 (90 Base) MCG/ACT inhaler, continue pulmonology clinic follow up going forward.         Follow Up Plan:     Patient is instructed to return to Internal Medicine clinic for follow-up visit in 1 month.      Phone call duration: 30 minutes    Carissa Burroughs MD  Internal Medicine  [facility]

## 2022-02-14 ENCOUNTER — VIRTUAL VISIT (OUTPATIENT)
Dept: FAMILY MEDICINE | Facility: CLINIC | Age: 82
End: 2022-02-14
Payer: MEDICARE

## 2022-02-14 DIAGNOSIS — I50.32 CHRONIC DIASTOLIC HEART FAILURE (H): Primary | ICD-10-CM

## 2022-02-14 DIAGNOSIS — E78.5 HYPERLIPIDEMIA LDL GOAL <100: ICD-10-CM

## 2022-02-14 DIAGNOSIS — I48.19 PERSISTENT ATRIAL FIBRILLATION (H): ICD-10-CM

## 2022-02-14 DIAGNOSIS — R60.0 BILATERAL LEG EDEMA: ICD-10-CM

## 2022-02-14 DIAGNOSIS — J84.9 ILD (INTERSTITIAL LUNG DISEASE) (H): ICD-10-CM

## 2022-02-14 DIAGNOSIS — C34.32 MALIGNANT NEOPLASM OF LOWER LOBE OF LEFT LUNG (H): ICD-10-CM

## 2022-02-14 DIAGNOSIS — I27.20 PULMONARY HYPERTENSION (H): ICD-10-CM

## 2022-02-14 PROCEDURE — 99214 OFFICE O/P EST MOD 30 MIN: CPT | Mod: 95 | Performed by: INTERNAL MEDICINE

## 2022-02-14 NOTE — PROGRESS NOTES
Jaqueline is a 81 year old who is being evaluated via a billable telephone visit.      What phone number would you like to be contacted at? [phone]  How would you like to obtain your AVS? Mail a copy      Subjective   Jaqueline is a 81 year old who presents for the following health issues     HPI       Chief Complaint   Patient presents with     Follow Up     Bilateral leg edema        Routine 1 week follow up on multiple concerns including diastolic CHF, hyperlipidemia, pulmonary hypertension       HPI:   Patient Jaqueline Canales is a very pleasant 81 year old female with history of CHF, pulmonary hypertension today for routine telephone visit for routine 1 week follow up of recent actue on chronic CHF exacerbation symptms. Patient's weight has been decreasing again with diuretic therapy. No chest pains at this time. Leg lymphedema symptoms have improved from prior baseline. Patient is scheduled for follow up with CHF clinic at the [facility] clinic in [place] going forward. No fever or chills.      Current Medications:     Current Outpatient Medications   Medication Sig Dispense Refill     acetaminophen (TYLENOL) 500 MG tablet Take 1,000 mg by mouth 2 times daily        albuterol (PROAIR HFA/PROVENTIL HFA/VENTOLIN HFA) 108 (90 Base) MCG/ACT inhaler USE 2 INHALATIONS ORALLY   INTO THE LUNGS EVERY 6  HOURS as needed 18 g 0     apixaban ANTICOAGULANT (ELIQUIS ANTICOAGULANT) 5 MG tablet Take 1 tablet (5 mg) by mouth 2 times daily 180 tablet 2     bromfenac (PROLENSA) 0.07 % ophthalmic solution Place 1 drop into the right eye daily       donepezil (ARICEPT) 5 MG tablet Take 1 tablet (5 mg) by mouth At Bedtime 90 tablet 3     folic acid (FOLVITE) 1 MG tablet TAKE 1 TABLET DAILY. 90 tablet 3     gabapentin (NEURONTIN) 100 MG capsule Take 1 capsule (100 mg) by mouth 3 times daily 90 capsule 11     KLOR-CON 20 MEQ CR tablet TAKE 1 TABLET DAILY 90 tablet 3     levothyroxine (SYNTHROID/LEVOTHROID) 75 MCG tablet TAKE 1  TABLET DAILY 90 tablet 2     melatonin 5 MG tablet Take 5 mg by mouth nightly as needed for sleep       Methotrexate Sodium (METHOTREXATE PO) Take 2.5 mg by mouth 10 tablets weekly - Wednesdays       metoprolol succinate ER (TOPROL-XL) 25 MG 24 hr tablet TAKE 1 TABLET DAILY 90 tablet 2     Multiple Vitamins-Minerals (ICAPS) CAPS Take 1 capsule by mouth 2 times daily        multivitamin (CENTRUM SILVER) tablet Take 1 tablet by mouth daily       omeprazole (PRILOSEC) 20 MG DR capsule TAKE 1 CAPSULE TWICE DAILY,30-60 MINUTES PRIOR TO     MEALS 180 capsule 2     sildenafil (REVATIO) 20 MG tablet TAKE 1 TABLET BY MOUTH THREE TIMES DAILY. GENERIC FOR REVATIO. CALL [phone] TO REFILL 90 tablet 5     simvastatin (ZOCOR) 40 MG tablet Take 1 tablet (40 mg) by mouth At Bedtime 90 tablet 3     Skin Protectants, Misc. (EUCERIN) cream Apply topically 2 times daily Apply to areas dry skin topically two times a day for dry skin and Apply to areas of dry skin topically as needed for dry skin daily       torsemide (DEMADEX) 20 MG tablet Take 1 tablet (20 mg) by mouth 2 times daily 180 tablet 3     triamcinolone (KENALOG) 0.1 % external cream Apply topically 2 times daily 30 g 3     Vitamin D, Cholecalciferol, 10 MCG (400 UNIT) TABS Take 1,600 Units by mouth daily           Allergies:      Allergies   Allergen Reactions     No Known Allergies             Past Medical History:     Past Medical History:   Diagnosis Date     Amaurosis fugax 5-11    Right     Carotid artery stenosis      Esophageal reflux 3/11/2004     HELICOBACTER PYLORI INFECTION 7/11/2006     hx of CA in situ RT breast-1990.mastectomy 4/17/2007     Hyperlipidemia LDL goal <70 6/20/2011     Lung cancer (H)     squamous cell lung ca left lower lobe     OBESITY NOS 3/11/2004     OSTEOPOROSIS NOS 3/11/2004     Other psoriasis 3/11/2004     Pulmonary hypertension (H) 04/15/2019    Right heart catheterization demonstrated moderate pulmonary arterial hypertension with a  mean PA pressure of 34 mmHg, no reversibility with inhaled nitric oxide, elevated right and left-sided filling pressures, low normal cardiac index of 2.1.  Seen by Dr. Shay Marquez.  Started on sildenafil (Revatio) 20 mg 3 times daily.     RA (rheumatoid arthritis) (H) 2010     Raynaud's syndrome 4/10/2006     Scleroderma (H)      Sleep apnea     CPAP         Past Surgical History:     Past Surgical History:   Procedure Laterality Date     BREAST SURGERY       COLONOSCOPY  16     COLONOSCOPY N/A 2019    Procedure: Colonoscopy, With Polypectomy And Biopsy;  Surgeon: Neto Kaminski MD;  Location:  GI     CONIZATION       CV RIGHT HEART CATH MEASUREMENTS RECORDED N/A 4/15/2019    Procedure: Heart Cath Right Heart Cath;  Surgeon: Naresh Cyr MD;  Location:  HEART CARDIAC CATH LAB     ENDARTERECTOMY CAROTID       REPAIR HAMMER TOE       TONSILLECTOMY       ZZC NONSPECIFIC PROCEDURE      mastectomy RT breast     ZZHC COLONOSCOPY THRU STOMA, DIAGNOSTIC  2001    diverticulosis         Family Medical History:     Family History   Problem Relation Age of Onset     Cancer - colorectal Mother      Cerebrovascular Disease Father      Cancer Sister 31        ovarian     Heart Disease Sister      Gastrointestinal Disease Sister         crohns     Gastrointestinal Disease Sister         diverticulitis     Gastrointestinal Disease Brother         diverticulitis     Alzheimer Disease Sister      Cerebrovascular Disease Maternal Grandmother      Diabetes Maternal Grandmother      Mental Illness Maternal Grandmother      Diabetes Maternal Uncle      Cancer Nephew                  Social History:     Social History     Socioeconomic History     Marital status:      Spouse name: Not on file     Number of children: Not on file     Years of education: Not on file     Highest education level: Not on file   Occupational History     Not on file   Tobacco Use     Smoking status:  Former Smoker     Packs/day: 1.00     Years: 30.00     Pack years: 30.00     Types: Cigarettes     Start date:      Quit date: 3/11/1992     Years since quittin.9     Smokeless tobacco: Never Used   Substance and Sexual Activity     Alcohol use: Not Currently     Alcohol/week: 0.0 standard drinks     Drug use: No     Sexual activity: Not Currently     Partners: Male   Other Topics Concern     Parent/sibling w/ CABG, MI or angioplasty before 65F 55M? No   Social History Narrative     Not on file     Social Determinants of Health     Financial Resource Strain: Low Risk      Difficulty of Paying Living Expenses: Not hard at all   Food Insecurity: Not on file   Transportation Needs: No Transportation Needs     Lack of Transportation (Medical): No     Lack of Transportation (Non-Medical): No   Physical Activity: Not on file   Stress: Not on file   Social Connections: Unknown     Frequency of Communication with Friends and Family: Not on file     Frequency of Social Gatherings with Friends and Family: More than three times a week     Attends Jew Services: Not on file     Active Member of Clubs or Organizations: Not on file     Attends Club or Organization Meetings: Not on file     Marital Status:    Intimate Partner Violence: Not on file   Housing Stability: Not on file           Review of System:     Constitutional: Negative for fever or chills  Skin: Negative for rashes  Ears/Nose/Throat: Negative for nasal congestion, sore throat  Respiratory: No shortness of breath, dyspnea on exertion, cough, or hemoptysis  Cardiovascular: Negative for chest pain  Gastrointestinal: Negative for nausea, vomiting, positive for chronic GERD  Genitourinary: Negative for dysuria, hematuria  Musculoskeletal: Negative for myalgias  Neurologic: Negative for headaches, positive for worsening short term memory loss symptoms  Psychiatric: Negative for depression, anxiety  Hematologic/Lymphatic/Immunologic: positive for  worsening of chronic bilateral leg edema symptoms  Endocrine: positive for hypothyroidism  Behavioral: Negative for tobacco use       Physical Exam:   There were no vitals taken for this visit.    RESP: no cough over the phone   NEURO: worsening short term memory loss symptoms present  PSYCH: mentation appears normal, affect normal        Diagnostic Test Results:     Last Comprehensive Metabolic Panel:  Sodium   Date Value Ref Range Status   11/04/2021 138 133 - 144 mmol/L Final   05/10/2021 133 133 - 144 mmol/L Final     Potassium   Date Value Ref Range Status   11/04/2021 4.6 3.4 - 5.3 mmol/L Final   05/10/2021 4.2 3.4 - 5.3 mmol/L Final     Chloride   Date Value Ref Range Status   11/04/2021 103 94 - 109 mmol/L Final   05/10/2021 95 94 - 109 mmol/L Final     Carbon Dioxide   Date Value Ref Range Status   05/10/2021 39 (H) 20 - 32 mmol/L Final     Carbon Dioxide (CO2)   Date Value Ref Range Status   11/04/2021 37 (H) 20 - 32 mmol/L Final     Anion Gap   Date Value Ref Range Status   11/04/2021 <1 (L) 3 - 14 mmol/L Final   05/10/2021 <1 (L) 3 - 14 mmol/L Final     Glucose   Date Value Ref Range Status   11/04/2021 100 (H) 70 - 99 mg/dL Final   05/10/2021 72 70 - 99 mg/dL Final     Urea Nitrogen   Date Value Ref Range Status   11/04/2021 24 7 - 30 mg/dL Final   05/10/2021 17 7 - 30 mg/dL Final     Creatinine   Date Value Ref Range Status   01/13/2022 0.680 mg/dL Final     GFR Estimate   Date Value Ref Range Status   11/04/2021 83 >60 mL/min/1.73m2 Final     Comment:     As of July 11, 2021, eGFR is calculated by the CKD-EPI creatinine equation, without race adjustment. eGFR can be influenced by muscle mass, exercise, and diet. The reported eGFR is an estimation only and is only applicable if the renal function is stable.   05/10/2021 83 >60 mL/min/[1.73_m2] Final     Comment:     Non  GFR Calc  Starting 12/18/2018, serum creatinine based estimated GFR (eGFR) will be   calculated using the Chronic  Kidney Disease Epidemiology Collaboration   (CKD-EPI) equation.       Calcium   Date Value Ref Range Status   11/04/2021 9.7 8.5 - 10.1 mg/dL Final   05/10/2021 9.1 8.5 - 10.1 mg/dL Final         ASSESSMENT/PLAN:     (I27.20) Pulmonary hypertension (H)  (R60.0) Bilateral leg edema  (I48.19) Persistent atrial fibrillation (H)  (I50.32) Chronic diastolic heart failure (H)  (primary encounter diagnosis)  Comment: 2 weeks routine follow up of recent actue on chronic CHF exacerbation symptms. Patient's weight had decreased with current low dose diuretic therapy. no chest pains. Leg lymphedema symptoms have stablized with the increased Torsemide medication.  Plan: continue patient's current torsemide (DEMADEX) 20 MG tablet medication, potassium chloride ER (KLOR-CON M) 20 MEQ CR         Tablet        continue to follow up with CHF clinic at the [facility] clinic in [place] going forward. continue low salt diet  continue Eliquis medication for atrial fibrillation      (E78.5) Hyperlipidemia LDL goal <100  Comment: stable.  Plan: continue current therapy      (C34.32) Malignant neoplasm of lower lobe of left lung (H)  (J84.9) ILD (interstitial lung disease) (H)  Comment: stable. Patient is followed by pulmonary medicine clinic  Plan: albuterol (PROAIR HFA/PROVENTIL HFA/VENTOLIN         HFA) 108 (90 Base) MCG/ACT inhaler, continue pulmonology clinic follow up going forward.         Follow Up Plan:     Patient is instructed to return to Internal Medicine clinic for follow-up visit in 1 month.      Phone call duration: 30 minutes    Carissa Burroughs MD  Internal Medicine  [facility]

## 2022-02-21 ENCOUNTER — VIRTUAL VISIT (OUTPATIENT)
Dept: FAMILY MEDICINE | Facility: CLINIC | Age: 82
End: 2022-02-21
Payer: MEDICARE

## 2022-02-21 DIAGNOSIS — E78.5 HYPERLIPIDEMIA LDL GOAL <100: ICD-10-CM

## 2022-02-21 DIAGNOSIS — I48.19 PERSISTENT ATRIAL FIBRILLATION (H): ICD-10-CM

## 2022-02-21 DIAGNOSIS — J84.9 ILD (INTERSTITIAL LUNG DISEASE) (H): ICD-10-CM

## 2022-02-21 DIAGNOSIS — I50.32 CHRONIC DIASTOLIC HEART FAILURE (H): Primary | ICD-10-CM

## 2022-02-21 DIAGNOSIS — C34.32 MALIGNANT NEOPLASM OF LOWER LOBE OF LEFT LUNG (H): ICD-10-CM

## 2022-02-21 DIAGNOSIS — R60.0 BILATERAL LEG EDEMA: ICD-10-CM

## 2022-02-21 DIAGNOSIS — I27.20 PULMONARY HYPERTENSION (H): ICD-10-CM

## 2022-02-21 PROBLEM — E66.01 MORBID OBESITY (H): Status: RESOLVED | Noted: 2018-10-08 | Resolved: 2022-02-21

## 2022-02-21 PROCEDURE — 99214 OFFICE O/P EST MOD 30 MIN: CPT | Mod: 95 | Performed by: INTERNAL MEDICINE

## 2022-02-21 RX ORDER — SIMVASTATIN 40 MG
40 TABLET ORAL AT BEDTIME
Qty: 90 TABLET | Refills: 3 | Status: SHIPPED | OUTPATIENT
Start: 2022-02-21 | End: 2022-04-20

## 2022-02-21 RX ORDER — TORSEMIDE 20 MG/1
20 TABLET ORAL 2 TIMES DAILY
Qty: 180 TABLET | Refills: 3 | Status: SHIPPED | OUTPATIENT
Start: 2022-02-21 | End: 2022-04-20

## 2022-02-21 NOTE — PROGRESS NOTES
Jaqueline is a 81 year old who is being evaluated via a billable telephone visit.      What phone number would you like to be contacted at? [phone]  How would you like to obtain your AVS? Buck Parsons is a 81 year old who presents for the following health issues    HPI     Chief Complaint   Patient presents with     Follow Up       Routine follow up on multiple concerns including diastolic CHF, hyperlipidemia, pulmonary hypertension       HPI:   Patient Jaqueline Canales is a very pleasant 81 year old female with history of CHF, pulmonary hypertension today for routine telephone visit for routine 1 week follow up of recent actue on chronic CHF exacerbation symptms. Patient's weight has been decreasing again with diuretic therapy. No chest pains at this time. Leg lymphedema symptoms have improved from prior baseline. Patient is scheduled for follow up with CHF clinic at the [facility] Cardiology clinic in [place] going forward. No fever or chills.      Current Medications:     Current Outpatient Medications   Medication Sig Dispense Refill     acetaminophen (TYLENOL) 500 MG tablet Take 1,000 mg by mouth 2 times daily        albuterol (PROAIR HFA/PROVENTIL HFA/VENTOLIN HFA) 108 (90 Base) MCG/ACT inhaler USE 2 INHALATIONS ORALLY   INTO THE LUNGS EVERY 6  HOURS as needed 18 g 0     apixaban ANTICOAGULANT (ELIQUIS ANTICOAGULANT) 5 MG tablet Take 1 tablet (5 mg) by mouth 2 times daily 180 tablet 2     bromfenac (PROLENSA) 0.07 % ophthalmic solution Place 1 drop into the right eye daily       donepezil (ARICEPT) 5 MG tablet Take 1 tablet (5 mg) by mouth At Bedtime 90 tablet 3     folic acid (FOLVITE) 1 MG tablet TAKE 1 TABLET DAILY. 90 tablet 3     gabapentin (NEURONTIN) 100 MG capsule Take 1 capsule (100 mg) by mouth 3 times daily 90 capsule 11     KLOR-CON 20 MEQ CR tablet TAKE 1 TABLET DAILY 90 tablet 3     levothyroxine (SYNTHROID/LEVOTHROID) 75 MCG tablet TAKE 1 TABLET DAILY 90 tablet 2     melatonin 5  MG tablet Take 5 mg by mouth nightly as needed for sleep       Methotrexate Sodium (METHOTREXATE PO) Take 2.5 mg by mouth 10 tablets weekly - Wednesdays       metoprolol succinate ER (TOPROL-XL) 25 MG 24 hr tablet TAKE 1 TABLET DAILY 90 tablet 2     Multiple Vitamins-Minerals (ICAPS) CAPS Take 1 capsule by mouth 2 times daily        multivitamin (CENTRUM SILVER) tablet Take 1 tablet by mouth daily       omeprazole (PRILOSEC) 20 MG DR capsule TAKE 1 CAPSULE TWICE DAILY,30-60 MINUTES PRIOR TO     MEALS 180 capsule 2     sildenafil (REVATIO) 20 MG tablet TAKE 1 TABLET BY MOUTH THREE TIMES DAILY. GENERIC FOR REVATIO. CALL [phone] TO REFILL 90 tablet 5     simvastatin (ZOCOR) 40 MG tablet Take 1 tablet (40 mg) by mouth At Bedtime 90 tablet 3     Skin Protectants, Misc. (EUCERIN) cream Apply topically 2 times daily Apply to areas dry skin topically two times a day for dry skin and Apply to areas of dry skin topically as needed for dry skin daily       torsemide (DEMADEX) 20 MG tablet Take 1 tablet (20 mg) by mouth 2 times daily 180 tablet 3     triamcinolone (KENALOG) 0.1 % external cream Apply topically 2 times daily 30 g 3     Vitamin D, Cholecalciferol, 10 MCG (400 UNIT) TABS Take 1,600 Units by mouth daily           Allergies:      Allergies   Allergen Reactions     No Known Allergies             Past Medical History:     Past Medical History:   Diagnosis Date     Amaurosis fugax 5-11    Right     Carotid artery stenosis      Esophageal reflux 3/11/2004     HELICOBACTER PYLORI INFECTION 7/11/2006     hx of CA in situ RT breast-1990.mastectomy 4/17/2007     Hyperlipidemia LDL goal <70 6/20/2011     Lung cancer (H)     squamous cell lung ca left lower lobe     OBESITY NOS 3/11/2004     OSTEOPOROSIS NOS 3/11/2004     Other psoriasis 3/11/2004     Pulmonary hypertension (H) 04/15/2019    Right heart catheterization demonstrated moderate pulmonary arterial hypertension with a mean PA pressure of 34 mmHg, no  reversibility with inhaled nitric oxide, elevated right and left-sided filling pressures, low normal cardiac index of 2.1.  Seen by Dr. Shay Marquez.  Started on sildenafil (Revatio) 20 mg 3 times daily.     RA (rheumatoid arthritis) (H) 2010     Raynaud's syndrome 4/10/2006     Scleroderma (H)      Sleep apnea     CPAP         Past Surgical History:     Past Surgical History:   Procedure Laterality Date     BREAST SURGERY       COLONOSCOPY  16     COLONOSCOPY N/A 2019    Procedure: Colonoscopy, With Polypectomy And Biopsy;  Surgeon: Neto Kaminski MD;  Location:  GI     CONIZATION       CV RIGHT HEART CATH MEASUREMENTS RECORDED N/A 4/15/2019    Procedure: Heart Cath Right Heart Cath;  Surgeon: Naresh Cyr MD;  Location:  HEART CARDIAC CATH LAB     ENDARTERECTOMY CAROTID       REPAIR HAMMER TOE       TONSILLECTOMY       ZZC NONSPECIFIC PROCEDURE      mastectomy RT breast     ZZHC COLONOSCOPY THRU STOMA, DIAGNOSTIC  2001    diverticulosis         Family Medical History:     Family History   Problem Relation Age of Onset     Cancer - colorectal Mother      Cerebrovascular Disease Father      Cancer Sister 31        ovarian     Heart Disease Sister      Gastrointestinal Disease Sister         crohns     Gastrointestinal Disease Sister         diverticulitis     Gastrointestinal Disease Brother         diverticulitis     Alzheimer Disease Sister      Cerebrovascular Disease Maternal Grandmother      Diabetes Maternal Grandmother      Mental Illness Maternal Grandmother      Diabetes Maternal Uncle      Cancer Nephew                  Social History:     Social History     Socioeconomic History     Marital status:      Spouse name: Not on file     Number of children: Not on file     Years of education: Not on file     Highest education level: Not on file   Occupational History     Not on file   Tobacco Use     Smoking status: Former Smoker     Packs/day: 1.00      Years: 30.00     Pack years: 30.00     Types: Cigarettes     Start date:      Quit date: 3/11/1992     Years since quittin.9     Smokeless tobacco: Never Used   Substance and Sexual Activity     Alcohol use: Not Currently     Alcohol/week: 0.0 standard drinks     Drug use: No     Sexual activity: Not Currently     Partners: Male   Other Topics Concern     Parent/sibling w/ CABG, MI or angioplasty before 65F 55M? No   Social History Narrative     Not on file     Social Determinants of Health     Financial Resource Strain: Low Risk      Difficulty of Paying Living Expenses: Not hard at all   Food Insecurity: Not on file   Transportation Needs: No Transportation Needs     Lack of Transportation (Medical): No     Lack of Transportation (Non-Medical): No   Physical Activity: Not on file   Stress: Not on file   Social Connections: Unknown     Frequency of Communication with Friends and Family: Not on file     Frequency of Social Gatherings with Friends and Family: More than three times a week     Attends Alevism Services: Not on file     Active Member of Clubs or Organizations: Not on file     Attends Club or Organization Meetings: Not on file     Marital Status:    Intimate Partner Violence: Not on file   Housing Stability: Not on file           Review of System:     Constitutional: Negative for fever or chills  Skin: Negative for rashes  Ears/Nose/Throat: Negative for nasal congestion, sore throat  Respiratory: No shortness of breath, dyspnea on exertion, cough, or hemoptysis  Cardiovascular: Negative for chest pain  Gastrointestinal: Negative for nausea, vomiting, positive for chronic GERD  Genitourinary: Negative for dysuria, hematuria  Musculoskeletal: Negative for myalgias  Neurologic: Negative for headaches, positive for worsening short term memory loss symptoms  Psychiatric: Negative for depression, anxiety  Hematologic/Lymphatic/Immunologic: positive for worsening of chronic bilateral leg  edema symptoms  Endocrine: positive for hypothyroidism  Behavioral: Negative for tobacco use       Physical Exam:   There were no vitals taken for this visit.    RESP: no cough over the phone   NEURO: worsening short term memory loss symptoms present  PSYCH: mentation appears normal, affect normal        Diagnostic Test Results:     Last Comprehensive Metabolic Panel:  Sodium   Date Value Ref Range Status   11/04/2021 138 133 - 144 mmol/L Final   05/10/2021 133 133 - 144 mmol/L Final     Potassium   Date Value Ref Range Status   11/04/2021 4.6 3.4 - 5.3 mmol/L Final   05/10/2021 4.2 3.4 - 5.3 mmol/L Final     Chloride   Date Value Ref Range Status   11/04/2021 103 94 - 109 mmol/L Final   05/10/2021 95 94 - 109 mmol/L Final     Carbon Dioxide   Date Value Ref Range Status   05/10/2021 39 (H) 20 - 32 mmol/L Final     Carbon Dioxide (CO2)   Date Value Ref Range Status   11/04/2021 37 (H) 20 - 32 mmol/L Final     Anion Gap   Date Value Ref Range Status   11/04/2021 <1 (L) 3 - 14 mmol/L Final   05/10/2021 <1 (L) 3 - 14 mmol/L Final     Glucose   Date Value Ref Range Status   11/04/2021 100 (H) 70 - 99 mg/dL Final   05/10/2021 72 70 - 99 mg/dL Final     Urea Nitrogen   Date Value Ref Range Status   11/04/2021 24 7 - 30 mg/dL Final   05/10/2021 17 7 - 30 mg/dL Final     Creatinine   Date Value Ref Range Status   01/13/2022 0.680 mg/dL Final     GFR Estimate   Date Value Ref Range Status   11/04/2021 83 >60 mL/min/1.73m2 Final     Comment:     As of July 11, 2021, eGFR is calculated by the CKD-EPI creatinine equation, without race adjustment. eGFR can be influenced by muscle mass, exercise, and diet. The reported eGFR is an estimation only and is only applicable if the renal function is stable.   05/10/2021 83 >60 mL/min/[1.73_m2] Final     Comment:     Non  GFR Calc  Starting 12/18/2018, serum creatinine based estimated GFR (eGFR) will be   calculated using the Chronic Kidney Disease Epidemiology  Collaboration   (CKD-EPI) equation.       Calcium   Date Value Ref Range Status   11/04/2021 9.7 8.5 - 10.1 mg/dL Final   05/10/2021 9.1 8.5 - 10.1 mg/dL Final         ASSESSMENT/PLAN:     (I27.20) Pulmonary hypertension (H)  (R60.0) Bilateral leg edema  (I48.19) Persistent atrial fibrillation (H)  (I50.32) Chronic diastolic heart failure (H)  (primary encounter diagnosis)  Comment: routine 1 week follow up of recent actue on chronic CHF exacerbation symptms. Patient's weight had decreased with current low dose diuretic therapy. no chest pains. Leg lymphedema symptoms have stablized with the increased Torsemide medication.  Plan: continue patient's current torsemide (DEMADEX) 20 MG tablet medication 2xDay, potassium chloride ER (KLOR-CON M) 20 MEQ CR         Tablet        continue to follow up with CHF clinic at the [facility] Cardiology clinic in [place] going forward. continue low salt diet  continue Eliquis medication for atrial fibrillation      (E78.5) Hyperlipidemia LDL goal <100  Comment: stable.  Plan: continue current therapy      (C34.32) Malignant neoplasm of lower lobe of left lung (H)  (J84.9) ILD (interstitial lung disease) (H)  Comment: stable. Patient is followed by pulmonary medicine clinic  Plan: albuterol (PROAIR HFA/PROVENTIL HFA/VENTOLIN         HFA) 108 (90 Base) MCG/ACT inhaler, continue pulmonology clinic follow up going forward.         Follow Up Plan:     Patient is instructed to return to Internal Medicine clinic for follow-up visit in 1 month.      Phone call duration: 30 minutes    Carissa Burroughs MD  Internal Medicine  [facility]

## 2022-02-28 ENCOUNTER — VIRTUAL VISIT (OUTPATIENT)
Dept: FAMILY MEDICINE | Facility: CLINIC | Age: 82
End: 2022-02-28
Payer: MEDICARE

## 2022-02-28 DIAGNOSIS — E78.5 HYPERLIPIDEMIA LDL GOAL <100: ICD-10-CM

## 2022-02-28 DIAGNOSIS — R60.0 BILATERAL LEG EDEMA: ICD-10-CM

## 2022-02-28 DIAGNOSIS — I48.19 PERSISTENT ATRIAL FIBRILLATION (H): ICD-10-CM

## 2022-02-28 DIAGNOSIS — I27.20 PULMONARY HYPERTENSION (H): ICD-10-CM

## 2022-02-28 DIAGNOSIS — C34.32 MALIGNANT NEOPLASM OF LOWER LOBE OF LEFT LUNG (H): ICD-10-CM

## 2022-02-28 DIAGNOSIS — I50.32 CHRONIC DIASTOLIC HEART FAILURE (H): Primary | ICD-10-CM

## 2022-02-28 DIAGNOSIS — J84.9 ILD (INTERSTITIAL LUNG DISEASE) (H): ICD-10-CM

## 2022-02-28 PROCEDURE — 99214 OFFICE O/P EST MOD 30 MIN: CPT | Mod: 95 | Performed by: INTERNAL MEDICINE

## 2022-02-28 NOTE — PROGRESS NOTES
Jaqueline is a 81 year old who is being evaluated via a billable telephone visit.      What phone number would you like to be contacted at? [phone]  How would you like to obtain your AVS? Buck Parsons is a 81 year old who presents for the following health issues    HPI     Chief Complaint   Patient presents with     Diarrhea     since yesterday       Routine 1 week follow up on multiple concerns including diastolic CHF, hyperlipidemia, pulmonary hypertension       HPI:   Patient Jaqueline Canales is a very pleasant 81 year old female with history of CHF, pulmonary hypertension today for routine telephone visit for routine 1 week follow up of recent actue on chronic CHF exacerbation symptms. Patient's weight has been decreasing again with diuretic therapy. No chest pains at this time. Leg lymphedema symptoms have improved from prior baseline. Patient is scheduled for follow up with CHF clinic at the [facility] Cardiology clinic in [place] going forward. No fever or chills.      Current Medications:     Current Outpatient Medications   Medication Sig Dispense Refill     acetaminophen (TYLENOL) 500 MG tablet Take 1,000 mg by mouth 2 times daily        albuterol (PROAIR HFA/PROVENTIL HFA/VENTOLIN HFA) 108 (90 Base) MCG/ACT inhaler USE 2 INHALATIONS ORALLY   INTO THE LUNGS EVERY 6  HOURS as needed 18 g 0     apixaban ANTICOAGULANT (ELIQUIS ANTICOAGULANT) 5 MG tablet Take 1 tablet (5 mg) by mouth 2 times daily 180 tablet 2     bromfenac (PROLENSA) 0.07 % ophthalmic solution Place 1 drop into the right eye daily       donepezil (ARICEPT) 5 MG tablet Take 1 tablet (5 mg) by mouth At Bedtime 90 tablet 3     folic acid (FOLVITE) 1 MG tablet TAKE 1 TABLET DAILY. 90 tablet 3     gabapentin (NEURONTIN) 100 MG capsule Take 1 capsule (100 mg) by mouth 3 times daily 90 capsule 11     KLOR-CON 20 MEQ CR tablet TAKE 1 TABLET DAILY 90 tablet 3     levothyroxine (SYNTHROID/LEVOTHROID) 75 MCG tablet TAKE 1 TABLET DAILY 90  tablet 2     melatonin 5 MG tablet Take 5 mg by mouth nightly as needed for sleep       Methotrexate Sodium (METHOTREXATE PO) Take 2.5 mg by mouth 10 tablets weekly - Wednesdays       metoprolol succinate ER (TOPROL-XL) 25 MG 24 hr tablet TAKE 1 TABLET DAILY 90 tablet 2     Multiple Vitamins-Minerals (ICAPS) CAPS Take 1 capsule by mouth 2 times daily        multivitamin (CENTRUM SILVER) tablet Take 1 tablet by mouth daily       omeprazole (PRILOSEC) 20 MG DR capsule TAKE 1 CAPSULE TWICE DAILY,30-60 MINUTES PRIOR TO     MEALS 180 capsule 2     sildenafil (REVATIO) 20 MG tablet TAKE 1 TABLET BY MOUTH THREE TIMES DAILY. GENERIC FOR REVATIO. CALL [phone] TO REFILL 90 tablet 5     simvastatin (ZOCOR) 40 MG tablet Take 1 tablet (40 mg) by mouth At Bedtime 90 tablet 3     Skin Protectants, Misc. (EUCERIN) cream Apply topically 2 times daily Apply to areas dry skin topically two times a day for dry skin and Apply to areas of dry skin topically as needed for dry skin daily       torsemide (DEMADEX) 20 MG tablet Take 1 tablet (20 mg) by mouth 2 times daily 180 tablet 3     triamcinolone (KENALOG) 0.1 % external cream Apply topically 2 times daily 30 g 3     Vitamin D, Cholecalciferol, 10 MCG (400 UNIT) TABS Take 1,600 Units by mouth daily           Allergies:      Allergies   Allergen Reactions     No Known Allergies             Past Medical History:     Past Medical History:   Diagnosis Date     Amaurosis fugax 5-11    Right     Carotid artery stenosis      Esophageal reflux 3/11/2004     HELICOBACTER PYLORI INFECTION 7/11/2006     hx of CA in situ RT breast-1990.mastectomy 4/17/2007     Hyperlipidemia LDL goal <70 6/20/2011     Lung cancer (H)     squamous cell lung ca left lower lobe     OBESITY NOS 3/11/2004     OSTEOPOROSIS NOS 3/11/2004     Other psoriasis 3/11/2004     Pulmonary hypertension (H) 04/15/2019    Right heart catheterization demonstrated moderate pulmonary arterial hypertension with a mean PA pressure  of 34 mmHg, no reversibility with inhaled nitric oxide, elevated right and left-sided filling pressures, low normal cardiac index of 2.1.  Seen by Dr. Shay Marquez.  Started on sildenafil (Revatio) 20 mg 3 times daily.     RA (rheumatoid arthritis) (H) 2010     Raynaud's syndrome 4/10/2006     Scleroderma (H)      Sleep apnea     CPAP         Past Surgical History:     Past Surgical History:   Procedure Laterality Date     BREAST SURGERY       COLONOSCOPY  16     COLONOSCOPY N/A 2019    Procedure: Colonoscopy, With Polypectomy And Biopsy;  Surgeon: Neto Kaminski MD;  Location:  GI     CONIZATION       CV RIGHT HEART CATH MEASUREMENTS RECORDED N/A 4/15/2019    Procedure: Heart Cath Right Heart Cath;  Surgeon: Naresh Cyr MD;  Location:  HEART CARDIAC CATH LAB     ENDARTERECTOMY CAROTID       REPAIR HAMMER TOE       TONSILLECTOMY       ZZC NONSPECIFIC PROCEDURE      mastectomy RT breast     ZZHC COLONOSCOPY THRU STOMA, DIAGNOSTIC  2001    diverticulosis         Family Medical History:     Family History   Problem Relation Age of Onset     Cancer - colorectal Mother      Cerebrovascular Disease Father      Cancer Sister 31        ovarian     Heart Disease Sister      Gastrointestinal Disease Sister         crohns     Gastrointestinal Disease Sister         diverticulitis     Gastrointestinal Disease Brother         diverticulitis     Alzheimer Disease Sister      Cerebrovascular Disease Maternal Grandmother      Diabetes Maternal Grandmother      Mental Illness Maternal Grandmother      Diabetes Maternal Uncle      Cancer Nephew                  Social History:     Social History     Socioeconomic History     Marital status:      Spouse name: Not on file     Number of children: Not on file     Years of education: Not on file     Highest education level: Not on file   Occupational History     Not on file   Tobacco Use     Smoking status: Former Smoker      Packs/day: 1.00     Years: 30.00     Pack years: 30.00     Types: Cigarettes     Start date:      Quit date: 3/11/1992     Years since quittin.9     Smokeless tobacco: Never Used   Substance and Sexual Activity     Alcohol use: Not Currently     Alcohol/week: 0.0 standard drinks     Drug use: No     Sexual activity: Not Currently     Partners: Male   Other Topics Concern     Parent/sibling w/ CABG, MI or angioplasty before 65F 55M? No   Social History Narrative     Not on file     Social Determinants of Health     Financial Resource Strain: Low Risk      Difficulty of Paying Living Expenses: Not hard at all   Food Insecurity: Not on file   Transportation Needs: No Transportation Needs     Lack of Transportation (Medical): No     Lack of Transportation (Non-Medical): No   Physical Activity: Not on file   Stress: Not on file   Social Connections: Unknown     Frequency of Communication with Friends and Family: Not on file     Frequency of Social Gatherings with Friends and Family: More than three times a week     Attends Am[employer] Services: Not on file     Active Member of Clubs or Organizations: Not on file     Attends Club or Organization Meetings: Not on file     Marital Status:    Intimate Partner Violence: Not on file   Housing Stability: Not on file           Review of System:     Constitutional: Negative for fever or chills  Skin: Negative for rashes  Ears/Nose/Throat: Negative for nasal congestion, sore throat  Respiratory: No shortness of breath, dyspnea on exertion, cough, or hemoptysis  Cardiovascular: Negative for chest pain  Gastrointestinal: Negative for nausea, vomiting, positive for chronic GERD  Genitourinary: Negative for dysuria, hematuria  Musculoskeletal: Negative for myalgias  Neurologic: Negative for headaches, positive for worsening short term memory loss symptoms  Psychiatric: Negative for depression, anxiety  Hematologic/Lymphatic/Immunologic: positive for worsening of chronic  bilateral leg edema symptoms  Endocrine: positive for hypothyroidism  Behavioral: Negative for tobacco use       Physical Exam:   There were no vitals taken for this visit.    RESP: no cough over the phone   NEURO: worsening short term memory loss symptoms present  PSYCH: mentation appears normal, affect normal        Diagnostic Test Results:     Last Comprehensive Metabolic Panel:  Sodium   Date Value Ref Range Status   11/04/2021 138 133 - 144 mmol/L Final   05/10/2021 133 133 - 144 mmol/L Final     Potassium   Date Value Ref Range Status   11/04/2021 4.6 3.4 - 5.3 mmol/L Final   05/10/2021 4.2 3.4 - 5.3 mmol/L Final     Chloride   Date Value Ref Range Status   11/04/2021 103 94 - 109 mmol/L Final   05/10/2021 95 94 - 109 mmol/L Final     Carbon Dioxide   Date Value Ref Range Status   05/10/2021 39 (H) 20 - 32 mmol/L Final     Carbon Dioxide (CO2)   Date Value Ref Range Status   11/04/2021 37 (H) 20 - 32 mmol/L Final     Anion Gap   Date Value Ref Range Status   11/04/2021 <1 (L) 3 - 14 mmol/L Final   05/10/2021 <1 (L) 3 - 14 mmol/L Final     Glucose   Date Value Ref Range Status   11/04/2021 100 (H) 70 - 99 mg/dL Final   05/10/2021 72 70 - 99 mg/dL Final     Urea Nitrogen   Date Value Ref Range Status   11/04/2021 24 7 - 30 mg/dL Final   05/10/2021 17 7 - 30 mg/dL Final     Creatinine   Date Value Ref Range Status   01/13/2022 0.680 mg/dL Final     GFR Estimate   Date Value Ref Range Status   11/04/2021 83 >60 mL/min/1.73m2 Final     Comment:     As of July 11, 2021, eGFR is calculated by the CKD-EPI creatinine equation, without race adjustment. eGFR can be influenced by muscle mass, exercise, and diet. The reported eGFR is an estimation only and is only applicable if the renal function is stable.   05/10/2021 83 >60 mL/min/[1.73_m2] Final     Comment:     Non  GFR Calc  Starting 12/18/2018, serum creatinine based estimated GFR (eGFR) will be   calculated using the Chronic Kidney Disease  Epidemiology Collaboration   (CKD-EPI) equation.       Calcium   Date Value Ref Range Status   11/04/2021 9.7 8.5 - 10.1 mg/dL Final   05/10/2021 9.1 8.5 - 10.1 mg/dL Final         ASSESSMENT/PLAN:     (I27.20) Pulmonary hypertension (H)  (R60.0) Bilateral leg edema  (I48.19) Persistent atrial fibrillation (H)  (I50.32) Chronic diastolic heart failure (H)  (primary encounter diagnosis)  Comment: routine 1 week follow up of recent actue on chronic CHF exacerbation symptms. Patient's weight had decreased with current low dose diuretic therapy. no chest pains. Leg lymphedema symptoms have stablized with the increased Torsemide medication.  Plan: continue patient's current torsemide (DEMADEX) 20 MG tablet medication 2xDay, potassium chloride ER (KLOR-CON M) 20 MEQ CR         Tablet        continue to follow up with CHF clinic at the [facility] clinic in [place] going forward. continue low salt diet  continue Eliquis medication for atrial fibrillation      (E78.5) Hyperlipidemia LDL goal <100  Comment: stable.  Plan: continue current therapy      (C34.32) Malignant neoplasm of lower lobe of left lung (H)  (J84.9) ILD (interstitial lung disease) (H)  Comment: stable. Patient is followed by pulmonary medicine clinic  Plan: albuterol (PROAIR HFA/PROVENTIL HFA/VENTOLIN         HFA) 108 (90 Base) MCG/ACT inhaler, continue pulmonology clinic follow up going forward.         Follow Up Plan:     Patient is instructed to return to Internal Medicine clinic for follow-up visit in 1 month.      Phone call duration: 30 minutes    Carissa Burroughs MD  Internal Medicine  [facility]

## 2022-03-07 ENCOUNTER — VIRTUAL VISIT (OUTPATIENT)
Dept: FAMILY MEDICINE | Facility: CLINIC | Age: 82
End: 2022-03-07
Payer: MEDICARE

## 2022-03-07 DIAGNOSIS — I50.32 CHRONIC DIASTOLIC HEART FAILURE (H): ICD-10-CM

## 2022-03-07 DIAGNOSIS — I27.20 PULMONARY HYPERTENSION (H): ICD-10-CM

## 2022-03-07 DIAGNOSIS — M79.2 NERVE PAIN: ICD-10-CM

## 2022-03-07 DIAGNOSIS — R60.0 BILATERAL LEG EDEMA: ICD-10-CM

## 2022-03-07 DIAGNOSIS — I48.19 PERSISTENT ATRIAL FIBRILLATION (H): ICD-10-CM

## 2022-03-07 DIAGNOSIS — E78.5 HYPERLIPIDEMIA LDL GOAL <100: ICD-10-CM

## 2022-03-07 DIAGNOSIS — J84.9 ILD (INTERSTITIAL LUNG DISEASE) (H): ICD-10-CM

## 2022-03-07 DIAGNOSIS — R19.7 DIARRHEA OF PRESUMED INFECTIOUS ORIGIN: Primary | ICD-10-CM

## 2022-03-07 DIAGNOSIS — C34.32 MALIGNANT NEOPLASM OF LOWER LOBE OF LEFT LUNG (H): ICD-10-CM

## 2022-03-07 PROCEDURE — 99214 OFFICE O/P EST MOD 30 MIN: CPT | Mod: 95 | Performed by: INTERNAL MEDICINE

## 2022-03-07 NOTE — PROGRESS NOTES
Jaqueline is a 81 year old who is being evaluated via a billable telephone visit.      What phone number would you like to be contacted at? [phone]  How would you like to obtain your AVS? Buck Parsons is a 81 year old who presents for the following health issues    HPI     Chief Complaint   Patient presents with     Follow Up       Routine 1 week follow up on multiple concerns including diastolic CHF, hyperlipidemia, pulmonary hypertension       HPI:   Patient Jaqueline Canales is a very pleasant 81 year old female with history of CHF, pulmonary hypertension today for routine telephone visit for routine 1 week follow up of recent actue on chronic CHF exacerbation symptms. Patient's weight has been decreasing again with diuretic therapy. No chest pains at this time. Leg lymphedema symptoms have improved from prior baseline. Patient is scheduled for follow up with CHF clinic at the [facility] in [place] going forward. No fever or chills.      Current Medications:     Current Outpatient Medications   Medication Sig Dispense Refill     acetaminophen (TYLENOL) 500 MG tablet Take 1,000 mg by mouth 2 times daily        albuterol (PROAIR HFA/PROVENTIL HFA/VENTOLIN HFA) 108 (90 Base) MCG/ACT inhaler USE 2 INHALATIONS ORALLY   INTO THE LUNGS EVERY 6  HOURS as needed 18 g 0     apixaban ANTICOAGULANT (ELIQUIS ANTICOAGULANT) 5 MG tablet Take 1 tablet (5 mg) by mouth 2 times daily 180 tablet 2     bromfenac (PROLENSA) 0.07 % ophthalmic solution Place 1 drop into the right eye daily       donepezil (ARICEPT) 5 MG tablet Take 1 tablet (5 mg) by mouth At Bedtime 90 tablet 3     folic acid (FOLVITE) 1 MG tablet TAKE 1 TABLET DAILY. 90 tablet 3     gabapentin (NEURONTIN) 100 MG capsule Take 1 capsule (100 mg) by mouth 3 times daily 90 capsule 11     KLOR-CON 20 MEQ CR tablet TAKE 1 TABLET DAILY 90 tablet 3     levothyroxine (SYNTHROID/LEVOTHROID) 75 MCG tablet TAKE 1 TABLET DAILY 90 tablet 2      melatonin 5 MG tablet Take 5 mg by mouth nightly as needed for sleep       Methotrexate Sodium (METHOTREXATE PO) Take 2.5 mg by mouth 10 tablets weekly - Wednesdays       metoprolol succinate ER (TOPROL-XL) 25 MG 24 hr tablet TAKE 1 TABLET DAILY 90 tablet 2     Multiple Vitamins-Minerals (ICAPS) CAPS Take 1 capsule by mouth 2 times daily        multivitamin (CENTRUM SILVER) tablet Take 1 tablet by mouth daily       omeprazole (PRILOSEC) 20 MG DR capsule TAKE 1 CAPSULE TWICE DAILY,30-60 MINUTES PRIOR TO     MEALS 180 capsule 2     sildenafil (REVATIO) 20 MG tablet TAKE 1 TABLET BY MOUTH THREE TIMES DAILY. GENERIC FOR REVATIO. CALL [phone] TO REFILL 90 tablet 5     simvastatin (ZOCOR) 40 MG tablet Take 1 tablet (40 mg) by mouth At Bedtime 90 tablet 3     Skin Protectants, Misc. (EUCERIN) cream Apply topically 2 times daily Apply to areas dry skin topically two times a day for dry skin and Apply to areas of dry skin topically as needed for dry skin daily       torsemide (DEMADEX) 20 MG tablet Take 1 tablet (20 mg) by mouth 2 times daily 180 tablet 3     triamcinolone (KENALOG) 0.1 % external cream Apply topically 2 times daily 30 g 3     Vitamin D, Cholecalciferol, 10 MCG (400 UNIT) TABS Take 1,600 Units by mouth daily           Allergies:      Allergies   Allergen Reactions     No Known Allergies             Past Medical History:     Past Medical History:   Diagnosis Date     Amaurosis fugax 5-11    Right     Carotid artery stenosis      Esophageal reflux 3/11/2004     HELICOBACTER PYLORI INFECTION 7/11/2006     hx of CA in situ RT breast-1990.mastectomy 4/17/2007     Hyperlipidemia LDL goal <70 6/20/2011     Lung cancer (H)     squamous cell lung ca left lower lobe     OBESITY NOS 3/11/2004     OSTEOPOROSIS NOS 3/11/2004     Other psoriasis 3/11/2004     Pulmonary hypertension (H) 04/15/2019    Right heart catheterization demonstrated moderate pulmonary arterial hypertension with a mean PA pressure of 34 mmHg,  no reversibility with inhaled nitric oxide, elevated right and left-sided filling pressures, low normal cardiac index of 2.1.  Seen by Dr. Shay Marquez.  Started on sildenafil (Revatio) 20 mg 3 times daily.     RA (rheumatoid arthritis) (H) 2010     Raynaud's syndrome 4/10/2006     Scleroderma (H)      Sleep apnea     CPAP         Past Surgical History:     Past Surgical History:   Procedure Laterality Date     BREAST SURGERY       COLONOSCOPY  16     COLONOSCOPY N/A 2019    Procedure: Colonoscopy, With Polypectomy And Biopsy;  Surgeon: Neto Kaminski MD;  Location:  GI     [facility]       CV RIGHT HEART CATH MEASUREMENTS RECORDED N/A 4/15/2019    Procedure: Heart Cath Right Heart Cath;  Surgeon: Naresh Cyr MD;  Location:  HEART CARDIAC CATH LAB     ENDARTERECTOMY CAROTID       REPAIR HAMMER TOE       TONSILLECTOMY       ZZC NONSPECIFIC PROCEDURE      mastectomy RT breast     ZZHC COLONOSCOPY THRU STOMA, DIAGNOSTIC  2001    diverticulosis         Family Medical History:     Family History   Problem Relation Age of Onset     Cancer - colorectal Mother      Cerebrovascular Disease Father      Cancer Sister 31        ovarian     Heart Disease Sister      Gastrointestinal Disease Sister         crohns     Gastrointestinal Disease Sister         diverticulitis     Gastrointestinal Disease Brother         diverticulitis     Alzheimer Disease Sister      Cerebrovascular Disease Maternal Grandmother      Diabetes Maternal Grandmother      Mental Illness Maternal Grandmother      Diabetes Maternal Uncle      Cancer Nephew                  Social History:     Social History     Socioeconomic History     Marital status:      Spouse name: Not on file     Number of children: Not on file     Years of education: Not on file     Highest education level: Not on file   Occupational History     Not on file   Tobacco Use     Smoking status: Former Smoker     Packs/day:  1.00     Years: 30.00     Pack years: 30.00     Types: Cigarettes     Start date:      Quit date: 3/11/1992     Years since quittin.0     Smokeless tobacco: Never Used   Substance and Sexual Activity     Alcohol use: Not Currently     Alcohol/week: 0.0 standard drinks     Drug use: No     Sexual activity: Not Currently     Partners: Male   Other Topics Concern     Parent/sibling w/ CABG, MI or angioplasty before 65F 55M? No   Social History Narrative     Not on file     Social Determinants of Health     Financial Resource Strain: Low Risk      Difficulty of Paying Living Expenses: Not hard at all   Food Insecurity: Not on file   Transportation Needs: No Transportation Needs     Lack of Transportation (Medical): No     Lack of Transportation (Non-Medical): No   Physical Activity: Not on file   Stress: Not on file   Social Connections: Unknown     Frequency of Communication with Friends and Family: Not on file     Frequency of Social Gatherings with Friends and Family: More than three times a week     Attends Anabaptism Services: Not on file     Active Member of Clubs or Organizations: Not on file     Attends Club or Organization Meetings: Not on file     Marital Status:    Intimate Partner Violence: Not on file   Housing Stability: Not on file           Review of System:     Constitutional: Negative for fever or chills  Skin: Negative for rashes  Ears/Nose/Throat: Negative for nasal congestion, sore throat  Respiratory: No shortness of breath, dyspnea on exertion, cough, or hemoptysis  Cardiovascular: Negative for chest pain  Gastrointestinal: Negative for nausea, vomiting, positive for chronic GERD  Genitourinary: Negative for dysuria, hematuria  Musculoskeletal: Negative for myalgias  Neurologic: Negative for headaches, positive for worsening short term memory loss symptoms  Psychiatric: Negative for depression, anxiety  Hematologic/Lymphatic/Immunologic: positive for worsening of chronic bilateral  leg edema symptoms  Endocrine: positive for hypothyroidism  Behavioral: Negative for tobacco use       Physical Exam:   There were no vitals taken for this visit.    RESP: no cough over the phone   NEURO: worsening short term memory loss symptoms present  PSYCH: mentation appears normal, affect normal        Diagnostic Test Results:     Last Comprehensive Metabolic Panel:  Sodium   Date Value Ref Range Status   11/04/2021 138 133 - 144 mmol/L Final   05/10/2021 133 133 - 144 mmol/L Final     Potassium   Date Value Ref Range Status   11/04/2021 4.6 3.4 - 5.3 mmol/L Final   05/10/2021 4.2 3.4 - 5.3 mmol/L Final     Chloride   Date Value Ref Range Status   11/04/2021 103 94 - 109 mmol/L Final   05/10/2021 95 94 - 109 mmol/L Final     Carbon Dioxide   Date Value Ref Range Status   05/10/2021 39 (H) 20 - 32 mmol/L Final     Carbon Dioxide (CO2)   Date Value Ref Range Status   11/04/2021 37 (H) 20 - 32 mmol/L Final     Anion Gap   Date Value Ref Range Status   11/04/2021 <1 (L) 3 - 14 mmol/L Final   05/10/2021 <1 (L) 3 - 14 mmol/L Final     Glucose   Date Value Ref Range Status   11/04/2021 100 (H) 70 - 99 mg/dL Final   05/10/2021 72 70 - 99 mg/dL Final     Urea Nitrogen   Date Value Ref Range Status   11/04/2021 24 7 - 30 mg/dL Final   05/10/2021 17 7 - 30 mg/dL Final     Creatinine   Date Value Ref Range Status   01/13/2022 0.680 mg/dL Final     GFR Estimate   Date Value Ref Range Status   11/04/2021 83 >60 mL/min/1.73m2 Final     Comment:     As of July 11, 2021, eGFR is calculated by the CKD-EPI creatinine equation, without race adjustment. eGFR can be influenced by muscle mass, exercise, and diet. The reported eGFR is an estimation only and is only applicable if the renal function is stable.   05/10/2021 83 >60 mL/min/[1.73_m2] Final     Comment:     Non  GFR Calc  Starting 12/18/2018, serum creatinine based estimated GFR (eGFR) will be   calculated using the Chronic Kidney Disease Epidemiology  Collaboration   (CKD-EPI) equation.       Calcium   Date Value Ref Range Status   11/04/2021 9.7 8.5 - 10.1 mg/dL Final   05/10/2021 9.1 8.5 - 10.1 mg/dL Final         ASSESSMENT/PLAN:       (R19.7) Diarrhea of presumed infectious origin  (primary encounter diagnosis)  Comment: recent diarrhea symptoms have spontaneously resolved  Plan: continue observations      (I27.20) Pulmonary hypertension (H)  (R60.0) Bilateral leg edema  (I48.19) Persistent atrial fibrillation (H)  (I50.32) Chronic diastolic heart failure (H)    Comment: routine 1 week follow up of recent actue on chronic CHF exacerbation symptms. Patient's weight had decreased with current low dose diuretic therapy. no chest pains. Leg lymphedema symptoms have stablized with the increased Torsemide medication.  Plan: continue patient's current torsemide (DEMADEX) 20 MG tablet medication 2xDay, potassium chloride ER (KLOR-CON M) 20 MEQ CR         Tablet        continue to follow up with CHF clinic at the [facility] in [place] going forward. continue low salt diet  continue Eliquis medication for atrial fibrillation      (E78.5) Hyperlipidemia LDL goal <100  Comment: stable.  Plan: continue current therapy      (C34.32) Malignant neoplasm of lower lobe of left lung (H)  (J84.9) ILD (interstitial lung disease) (H)  Comment: stable. Patient is followed by pulmonary medicine clinic  Plan: albuterol (PROAIR HFA/PROVENTIL HFA/VENTOLIN         HFA) 108 (90 Base) MCG/ACT inhaler, continue pulmonology clinic follow up going forward.         Follow Up Plan:     Patient is instructed to return to Internal Medicine clinic for follow-up visit in 1 week.      Phone call duration: 30 minutes    Carissa Burroughs MD  Internal Medicine  [facility]

## 2022-03-08 RX ORDER — GABAPENTIN 100 MG/1
100 CAPSULE ORAL 3 TIMES DAILY
Qty: 90 CAPSULE | Refills: 11 | Status: SHIPPED | OUTPATIENT
Start: 2022-03-08

## 2022-03-14 ENCOUNTER — VIRTUAL VISIT (OUTPATIENT)
Dept: FAMILY MEDICINE | Facility: CLINIC | Age: 82
End: 2022-03-14
Payer: MEDICARE

## 2022-03-14 ENCOUNTER — TELEPHONE (OUTPATIENT)
Dept: FAMILY MEDICINE | Facility: CLINIC | Age: 82
End: 2022-03-14

## 2022-03-14 DIAGNOSIS — I50.32 CHRONIC DIASTOLIC HEART FAILURE (H): Primary | ICD-10-CM

## 2022-03-14 DIAGNOSIS — E78.5 HYPERLIPIDEMIA LDL GOAL <100: ICD-10-CM

## 2022-03-14 DIAGNOSIS — C34.32 MALIGNANT NEOPLASM OF LOWER LOBE OF LEFT LUNG (H): ICD-10-CM

## 2022-03-14 DIAGNOSIS — I27.20 PULMONARY HYPERTENSION (H): ICD-10-CM

## 2022-03-14 DIAGNOSIS — J84.9 ILD (INTERSTITIAL LUNG DISEASE) (H): ICD-10-CM

## 2022-03-14 DIAGNOSIS — I48.19 PERSISTENT ATRIAL FIBRILLATION (H): ICD-10-CM

## 2022-03-14 DIAGNOSIS — R60.0 BILATERAL LEG EDEMA: ICD-10-CM

## 2022-03-14 PROCEDURE — 99214 OFFICE O/P EST MOD 30 MIN: CPT | Mod: 95 | Performed by: INTERNAL MEDICINE

## 2022-03-14 NOTE — PROGRESS NOTES
Jaqueline is a 81 year old who is being evaluated via a billable telephone visit.      What phone number would you like to be contacted at? [phone]  How would you like to obtain your AVS? Guillermojazminecordell Parsons is a 81 year old who presents for the following health issues    HPI     Chief Complaint   Patient presents with     Follow Up       1 week follow up on multiple concerns including diastolic CHF, hyperlipidemia, pulmonary hypertension       HPI:   Patient Jaqueline Canales is a very pleasant 81 year old female with history of CHF, pulmonary hypertension today for routine telephone visit for routine 1 week follow up of recent actue on chronic CHF exacerbation symptms. Patient's weight has been decreasing again with diuretic therapy. No chest pains at this time. Leg lymphedema symptoms have improved from prior baseline. Patient is scheduled for follow up with CHF clinic at the [facility] in [place] going forward. No fever or chills.      Current Medications:     Current Outpatient Medications   Medication Sig Dispense Refill     acetaminophen (TYLENOL) 500 MG tablet Take 1,000 mg by mouth 2 times daily        albuterol (PROAIR HFA/PROVENTIL HFA/VENTOLIN HFA) 108 (90 Base) MCG/ACT inhaler USE 2 INHALATIONS ORALLY   INTO THE LUNGS EVERY 6  HOURS as needed 18 g 0     apixaban ANTICOAGULANT (ELIQUIS ANTICOAGULANT) 5 MG tablet Take 1 tablet (5 mg) by mouth 2 times daily 180 tablet 2     bromfenac (PROLENSA) 0.07 % ophthalmic solution Place 1 drop into the right eye daily       donepezil (ARICEPT) 5 MG tablet Take 1 tablet (5 mg) by mouth At Bedtime 90 tablet 3     folic acid (FOLVITE) 1 MG tablet TAKE 1 TABLET DAILY. 90 tablet 3     gabapentin (NEURONTIN) 100 MG capsule Take 1 capsule (100 mg) by mouth 3 times daily 90 capsule 11     KLOR-CON 20 MEQ CR tablet TAKE 1 TABLET DAILY 90 tablet 3     levothyroxine (SYNTHROID/LEVOTHROID) 75 MCG tablet TAKE 1 TABLET DAILY 90 tablet 2     melatonin 5  MG tablet Take 5 mg by mouth nightly as needed for sleep       Methotrexate Sodium (METHOTREXATE PO) Take 2.5 mg by mouth 10 tablets weekly - Wednesdays       metoprolol succinate ER (TOPROL-XL) 25 MG 24 hr tablet TAKE 1 TABLET DAILY 90 tablet 2     Multiple Vitamins-Minerals (ICAPS) CAPS Take 1 capsule by mouth 2 times daily        multivitamin (CENTRUM SILVER) tablet Take 1 tablet by mouth daily       omeprazole (PRILOSEC) 20 MG DR capsule TAKE 1 CAPSULE TWICE DAILY,30-60 MINUTES PRIOR TO     MEALS 180 capsule 2     sildenafil (REVATIO) 20 MG tablet TAKE 1 TABLET BY MOUTH THREE TIMES DAILY. GENERIC FOR REVATIO. CALL [phone] TO REFILL 90 tablet 5     simvastatin (ZOCOR) 40 MG tablet Take 1 tablet (40 mg) by mouth At Bedtime 90 tablet 3     Skin Protectants, Misc. (EUCERIN) cream Apply topically 2 times daily Apply to areas dry skin topically two times a day for dry skin and Apply to areas of dry skin topically as needed for dry skin daily       torsemide (DEMADEX) 20 MG tablet Take 1 tablet (20 mg) by mouth 2 times daily 180 tablet 3     triamcinolone (KENALOG) 0.1 % external cream Apply topically 2 times daily 30 g 3     Vitamin D, Cholecalciferol, 10 MCG (400 UNIT) TABS Take 1,600 Units by mouth daily           Allergies:      Allergies   Allergen Reactions     No Known Allergies             Past Medical History:     Past Medical History:   Diagnosis Date     Amaurosis fugax 5-11    Right     Carotid artery stenosis      Esophageal reflux 3/11/2004     HELICOBACTER PYLORI INFECTION 7/11/2006     hx of CA in situ RT breast-1990.mastectomy 4/17/2007     Hyperlipidemia LDL goal <70 6/20/2011     Lung cancer (H)     squamous cell lung ca left lower lobe     OBESITY NOS 3/11/2004     OSTEOPOROSIS NOS 3/11/2004     Other psoriasis 3/11/2004     Pulmonary hypertension (H) 04/15/2019    Right heart catheterization demonstrated moderate pulmonary arterial hypertension with a mean PA pressure of 34 mmHg, no  reversibility with inhaled nitric oxide, elevated right and left-sided filling pressures, low normal cardiac index of 2.1.  Seen by Dr. Shay Marquez.  Started on sildenafil (Revatio) 20 mg 3 times daily.     RA (rheumatoid arthritis) (H) 2010     Raynaud's syndrome 4/10/2006     Scleroderma (H)      Sleep apnea     CPAP         Past Surgical History:     Past Surgical History:   Procedure Laterality Date     BREAST SURGERY       COLONOSCOPY  16     COLONOSCOPY N/A 2019    Procedure: Colonoscopy, With Polypectomy And Biopsy;  Surgeon: Neto Kaminski MD;  Location:  GI     [facility]ATION       CV RIGHT HEART CATH MEASUREMENTS RECORDED N/A 4/15/2019    Procedure: Heart Cath Right Heart Cath;  Surgeon: Naresh Cyr MD;  Location:  HEART CARDIAC CATH LAB     ENDARTERECTOMY CAROTID       REPAIR HAMMER TOE       TONSILLECTOMY       ZZC NONSPECIFIC PROCEDURE      mastectomy RT breast     ZZHC COLONOSCOPY THRU STOMA, DIAGNOSTIC  2001    diverticulosis         Family Medical History:     Family History   Problem Relation Age of Onset     Cancer - colorectal Mother      Cerebrovascular Disease Father      Cancer Sister 31        ovarian     Heart Disease Sister      Gastrointestinal Disease Sister         crohns     Gastrointestinal Disease Sister         diverticulitis     Gastrointestinal Disease Brother         diverticulitis     Alzheimer Disease Sister      Cerebrovascular Disease Maternal Grandmother      Diabetes Maternal Grandmother      Mental Illness Maternal Grandmother      Diabetes Maternal Uncle      Cancer Nephew                  Social History:     Social History     Socioeconomic History     Marital status:      Spouse name: Not on file     Number of children: Not on file     Years of education: Not on file     Highest education level: Not on file   Occupational History     Not on file   Tobacco Use     Smoking status: Former Smoker     Packs/day: 1.00      Years: 30.00     Pack years: 30.00     Types: Cigarettes     Start date:      Quit date: 3/11/1992     Years since quittin.0     Smokeless tobacco: Never Used   Substance and Sexual Activity     Alcohol use: Not Currently     Alcohol/week: 0.0 standard drinks     Drug use: No     Sexual activity: Not Currently     Partners: Male   Other Topics Concern     Parent/sibling w/ CABG, MI or angioplasty before 65F 55M? No   Social History Narrative     Not on file     Social Determinants of Health     Financial Resource Strain: Low Risk      Difficulty of Paying Living Expenses: Not hard at all   Food Insecurity: Not on file   Transportation Needs: No Transportation Needs     Lack of Transportation (Medical): No     Lack of Transportation (Non-Medical): No   Physical Activity: Not on file   Stress: Not on file   Social Connections: Unknown     Frequency of Communication with Friends and Family: Not on file     Frequency of Social Gatherings with Friends and Family: More than three times a week     Attends Sikhism Services: Not on file     Active Member of Clubs or Organizations: Not on file     Attends Club or Organization Meetings: Not on file     Marital Status:    Intimate Partner Violence: Not on file   Housing Stability: Not on file           Review of System:     Constitutional: Negative for fever or chills  Skin: Negative for rashes  Ears/Nose/Throat: Negative for nasal congestion, sore throat  Respiratory: No shortness of breath, dyspnea on exertion, cough, or hemoptysis  Cardiovascular: Negative for chest pain  Gastrointestinal: Negative for nausea, vomiting, positive for chronic GERD  Genitourinary: Negative for dysuria, hematuria  Musculoskeletal: Negative for myalgias  Neurologic: Negative for headaches, positive for worsening short term memory loss symptoms  Psychiatric: Negative for depression, anxiety  Hematologic/Lymphatic/Immunologic: positive for worsening of chronic bilateral leg  edema symptoms  Endocrine: positive for hypothyroidism  Behavioral: Negative for tobacco use       Physical Exam:   There were no vitals taken for this visit.    RESP: no cough over the phone   NEURO: worsening short term memory loss symptoms present  PSYCH: mentation appears normal, affect normal        Diagnostic Test Results:     Last Comprehensive Metabolic Panel:  Sodium   Date Value Ref Range Status   11/04/2021 138 133 - 144 mmol/L Final   05/10/2021 133 133 - 144 mmol/L Final     Potassium   Date Value Ref Range Status   11/04/2021 4.6 3.4 - 5.3 mmol/L Final   05/10/2021 4.2 3.4 - 5.3 mmol/L Final     Chloride   Date Value Ref Range Status   11/04/2021 103 94 - 109 mmol/L Final   05/10/2021 95 94 - 109 mmol/L Final     Carbon Dioxide   Date Value Ref Range Status   05/10/2021 39 (H) 20 - 32 mmol/L Final     Carbon Dioxide (CO2)   Date Value Ref Range Status   11/04/2021 37 (H) 20 - 32 mmol/L Final     Anion Gap   Date Value Ref Range Status   11/04/2021 <1 (L) 3 - 14 mmol/L Final   05/10/2021 <1 (L) 3 - 14 mmol/L Final     Glucose   Date Value Ref Range Status   11/04/2021 100 (H) 70 - 99 mg/dL Final   05/10/2021 72 70 - 99 mg/dL Final     Urea Nitrogen   Date Value Ref Range Status   11/04/2021 24 7 - 30 mg/dL Final   05/10/2021 17 7 - 30 mg/dL Final     Creatinine   Date Value Ref Range Status   01/13/2022 0.680 mg/dL Final     GFR Estimate   Date Value Ref Range Status   11/04/2021 83 >60 mL/min/1.73m2 Final     Comment:     As of July 11, 2021, eGFR is calculated by the CKD-EPI creatinine equation, without race adjustment. eGFR can be influenced by muscle mass, exercise, and diet. The reported eGFR is an estimation only and is only applicable if the renal function is stable.   05/10/2021 83 >60 mL/min/[1.73_m2] Final     Comment:     Non  GFR Calc  Starting 12/18/2018, serum creatinine based estimated GFR (eGFR) will be   calculated using the Chronic Kidney Disease Epidemiology  Collaboration   (CKD-EPI) equation.       Calcium   Date Value Ref Range Status   11/04/2021 9.7 8.5 - 10.1 mg/dL Final   05/10/2021 9.1 8.5 - 10.1 mg/dL Final         ASSESSMENT/PLAN:     (I27.20) Pulmonary hypertension (H)  (R60.0) Bilateral leg edema  (I48.19) Persistent atrial fibrillation (H)  (I50.32) Chronic diastolic heart failure (H)    Comment: routine 1 week follow up of recent actue on chronic CHF exacerbation symptms. Patient's weight had decreased with current low dose diuretic therapy. no chest pains. Leg lymphedema symptoms have stablized with the increased Torsemide medication.  Plan: continue patient's current torsemide (DEMADEX) 20 MG tablet medication 3xDay, potassium chloride ER (KLOR-CON M) 20 MEQ CR         Tablet        continue to follow up with CHF clinic at the [facility] in [place] going forward. continue low salt diet  continue Eliquis medication for atrial fibrillation      (E78.5) Hyperlipidemia LDL goal <100  Comment: stable.  Plan: continue current therapy      (C34.32) Malignant neoplasm of lower lobe of left lung (H)  (J84.9) ILD (interstitial lung disease) (H)  Comment: stable. Patient is followed by pulmonary medicine clinic  Plan: albuterol (PROAIR HFA/PROVENTIL HFA/VENTOLIN         HFA) 108 (90 Base) MCG/ACT inhaler, continue pulmonology clinic follow up going forward.         Follow Up Plan:     Patient is instructed to return to Internal Medicine clinic for follow-up visit in 1 week.      Phone call duration: 30 minutes    Carissa Burroughs MD  Internal Medicine  [facility]

## 2022-03-14 NOTE — TELEPHONE ENCOUNTER
Dr. Burroughs, what were the changes to her medication?  Today's note is not complete.     Please advise.     Day Covarrubias RN  [facility]

## 2022-03-15 ENCOUNTER — TELEPHONE (OUTPATIENT)
Dept: FAMILY MEDICINE | Facility: CLINIC | Age: 82
End: 2022-03-15
Payer: MEDICARE

## 2022-03-15 NOTE — TELEPHONE ENCOUNTER
Patient called clinic for medication clarification.  Relayed note to patient .   from visit on 3/14/2022 state 'Leg lymphedema symptoms have stablized with the increased Torsemide medication.  Plan: continue patient's current torsemide (DEMADEX) 20 MG tablet medication 3xDay'    Marion Jensen RN

## 2022-03-15 NOTE — TELEPHONE ENCOUNTER
Pt's son, Mark ([facility] on file) called concerned about the pt. He states the pt fell out of her chair two weekends ago and later complained of shoulder pain. He is concerned about their living situation and doesn't know how long it will be safe for them to stay there. He has tried to ask them to move out to [place] as that is where he is but they did not want to do that. Pt wants to know if Dr. Burroughs could discuss with with pt again or what he thinks is best for next steps?     Did attempt to call the pt to triage the fall/shoulder pain. Left a non-detailed vm to call the clinic back to discuss this. Pt did just have Virtual visit yesterday with Dr. Burroughs but it doesn't look like this was discussed.     Mark call back number: [phone]   OK to leave detailed vm.     Anisha AVALOS RN  [facility]

## 2022-03-16 ENCOUNTER — TELEPHONE (OUTPATIENT)
Dept: CARDIOLOGY | Facility: CLINIC | Age: 82
End: 2022-03-16
Payer: MEDICARE

## 2022-03-16 NOTE — TELEPHONE ENCOUNTER
Unable to reach patient; LM with number for [facility] and asked she call to schedule labs, echo and F/U with Martha in April. Mita Garcia RN on 3/16/2022 at 8:19 AM    ----- Message from Saritha Liriano RN sent at 12/24/2021  8:34 AM CST -----  Pt due for follow up with Echocardiogram and Labs in April

## 2022-03-18 ENCOUNTER — NURSE TRIAGE (OUTPATIENT)
Dept: FAMILY MEDICINE | Facility: CLINIC | Age: 82
End: 2022-03-18
Payer: MEDICARE

## 2022-03-18 ENCOUNTER — APPOINTMENT (OUTPATIENT)
Dept: GENERAL RADIOLOGY | Facility: CLINIC | Age: 82
DRG: 291 | End: 2022-03-18
Attending: PHYSICIAN ASSISTANT
Payer: MEDICARE

## 2022-03-18 ENCOUNTER — MEDICAL CORRESPONDENCE (OUTPATIENT)
Dept: HEALTH INFORMATION MANAGEMENT | Facility: CLINIC | Age: 82
End: 2022-03-18

## 2022-03-18 ENCOUNTER — HOSPITAL ENCOUNTER (INPATIENT)
Facility: CLINIC | Age: 82
LOS: 6 days | Discharge: NURSING, REHAB OR LONG-TERM CARE | DRG: 291 | End: 2022-03-24
Attending: PHYSICIAN ASSISTANT | Admitting: HOSPITALIST
Payer: MEDICARE

## 2022-03-18 ENCOUNTER — APPOINTMENT (OUTPATIENT)
Dept: ULTRASOUND IMAGING | Facility: CLINIC | Age: 82
DRG: 291 | End: 2022-03-18
Attending: PHYSICIAN ASSISTANT
Payer: MEDICARE

## 2022-03-18 DIAGNOSIS — K57.90 DIVERTICULOSIS: Chronic | ICD-10-CM

## 2022-03-18 DIAGNOSIS — K44.9 HIATAL HERNIA: ICD-10-CM

## 2022-03-18 DIAGNOSIS — A04.8 HELICOBACTER PYLORI INFECTION: ICD-10-CM

## 2022-03-18 DIAGNOSIS — G47.33 OSA (OBSTRUCTIVE SLEEP APNEA): ICD-10-CM

## 2022-03-18 DIAGNOSIS — I50.31 ACUTE DIASTOLIC CONGESTIVE HEART FAILURE (H): ICD-10-CM

## 2022-03-18 DIAGNOSIS — C34.32 MALIGNANT NEOPLASM OF LOWER LOBE OF LEFT LUNG (H): ICD-10-CM

## 2022-03-18 DIAGNOSIS — R09.02 HYPOXIA: ICD-10-CM

## 2022-03-18 DIAGNOSIS — R53.81 PHYSICAL DECONDITIONING: ICD-10-CM

## 2022-03-18 DIAGNOSIS — R91.1 LUNG NODULE: ICD-10-CM

## 2022-03-18 DIAGNOSIS — B07.8 OTHER VIRAL WARTS: ICD-10-CM

## 2022-03-18 DIAGNOSIS — I89.0 LYMPHEDEMA OF BOTH LOWER EXTREMITIES: ICD-10-CM

## 2022-03-18 DIAGNOSIS — Z98.890 S/P CAROTID ENDARTERECTOMY: ICD-10-CM

## 2022-03-18 DIAGNOSIS — I27.20 PULMONARY HYPERTENSION (H): ICD-10-CM

## 2022-03-18 DIAGNOSIS — I50.9 ACUTE ON CHRONIC HEART FAILURE, UNSPECIFIED HEART FAILURE TYPE (H): ICD-10-CM

## 2022-03-18 DIAGNOSIS — K22.2 ESOPHAGEAL STRICTURE: ICD-10-CM

## 2022-03-18 DIAGNOSIS — I50.32 CHRONIC DIASTOLIC HEART FAILURE (H): Primary | ICD-10-CM

## 2022-03-18 DIAGNOSIS — Z98.890 STATUS POST CORONARY ANGIOGRAM: ICD-10-CM

## 2022-03-18 DIAGNOSIS — K59.00 CONSTIPATION, UNSPECIFIED CONSTIPATION TYPE: ICD-10-CM

## 2022-03-18 DIAGNOSIS — D36.9 ADENOMATOUS POLYP: ICD-10-CM

## 2022-03-18 DIAGNOSIS — G45.3 AMAUROSIS FUGAX OF RIGHT EYE: ICD-10-CM

## 2022-03-18 DIAGNOSIS — Z71.89 ADVANCED DIRECTIVES, COUNSELING/DISCUSSION: Chronic | ICD-10-CM

## 2022-03-18 DIAGNOSIS — I10 BENIGN ESSENTIAL HYPERTENSION: ICD-10-CM

## 2022-03-18 DIAGNOSIS — Z76.89 HEALTH CARE HOME: ICD-10-CM

## 2022-03-18 DIAGNOSIS — L40.8 OTHER PSORIASIS: ICD-10-CM

## 2022-03-18 DIAGNOSIS — M79.89 LEG SWELLING: ICD-10-CM

## 2022-03-18 DIAGNOSIS — E78.5 HYPERLIPIDEMIA LDL GOAL <100: ICD-10-CM

## 2022-03-18 DIAGNOSIS — J84.10 PULMONARY FIBROSIS (H): ICD-10-CM

## 2022-03-18 DIAGNOSIS — I48.19 PERSISTENT ATRIAL FIBRILLATION (H): ICD-10-CM

## 2022-03-18 DIAGNOSIS — I45.4 IVCD (INTRAVENTRICULAR CONDUCTION DEFECT): ICD-10-CM

## 2022-03-18 LAB
ANION GAP SERPL CALCULATED.3IONS-SCNC: 3 MMOL/L (ref 3–14)
ATRIAL RATE - MUSE: 62 BPM
BASOPHILS # BLD AUTO: 0 10E3/UL (ref 0–0.2)
BASOPHILS NFR BLD AUTO: 0 %
BUN SERPL-MCNC: 22 MG/DL (ref 7–30)
CALCIUM SERPL-MCNC: 10.2 MG/DL (ref 8.5–10.1)
CHLORIDE BLD-SCNC: 96 MMOL/L (ref 94–109)
CO2 SERPL-SCNC: 36 MMOL/L (ref 20–32)
CREAT SERPL-MCNC: 0.8 MG/DL (ref 0.52–1.04)
DIASTOLIC BLOOD PRESSURE - MUSE: NORMAL MMHG
EOSINOPHIL # BLD AUTO: 0.1 10E3/UL (ref 0–0.7)
EOSINOPHIL NFR BLD AUTO: 1 %
ERYTHROCYTE [DISTWIDTH] IN BLOOD BY AUTOMATED COUNT: 17.2 % (ref 10–15)
GFR SERPL CREATININE-BSD FRML MDRD: 73 ML/MIN/1.73M2
GLUCOSE BLD-MCNC: 104 MG/DL (ref 70–99)
HCT VFR BLD AUTO: 38.6 % (ref 35–47)
HGB BLD-MCNC: 12.7 G/DL (ref 11.7–15.7)
HOLD SPECIMEN: NORMAL
IMM GRANULOCYTES # BLD: 0.1 10E3/UL
IMM GRANULOCYTES NFR BLD: 1 %
INTERPRETATION ECG - MUSE: NORMAL
LYMPHOCYTES # BLD AUTO: 0.4 10E3/UL (ref 0.8–5.3)
LYMPHOCYTES NFR BLD AUTO: 5 %
MCH RBC QN AUTO: 32 PG (ref 26.5–33)
MCHC RBC AUTO-ENTMCNC: 32.9 G/DL (ref 31.5–36.5)
MCV RBC AUTO: 97 FL (ref 78–100)
MONOCYTES # BLD AUTO: 0.3 10E3/UL (ref 0–1.3)
MONOCYTES NFR BLD AUTO: 4 %
NEUTROPHILS # BLD AUTO: 6.5 10E3/UL (ref 1.6–8.3)
NEUTROPHILS NFR BLD AUTO: 89 %
NRBC # BLD AUTO: 0 10E3/UL
NRBC BLD AUTO-RTO: 0 /100
NT-PROBNP SERPL-MCNC: 5818 PG/ML (ref 0–1800)
P AXIS - MUSE: -7 DEGREES
PLATELET # BLD AUTO: 197 10E3/UL (ref 150–450)
POTASSIUM BLD-SCNC: 3.5 MMOL/L (ref 3.4–5.3)
POTASSIUM BLD-SCNC: 3.8 MMOL/L (ref 3.4–5.3)
PR INTERVAL - MUSE: NORMAL MS
QRS DURATION - MUSE: 90 MS
QT - MUSE: 492 MS
QTC - MUSE: 605 MS
R AXIS - MUSE: -63 DEGREES
RBC # BLD AUTO: 3.97 10E6/UL (ref 3.8–5.2)
SARS-COV-2 RNA RESP QL NAA+PROBE: NEGATIVE
SODIUM SERPL-SCNC: 135 MMOL/L (ref 133–144)
SYSTOLIC BLOOD PRESSURE - MUSE: NORMAL MMHG
T AXIS - MUSE: 5 DEGREES
VENTRICULAR RATE- MUSE: 91 BPM
WBC # BLD AUTO: 7.4 10E3/UL (ref 4–11)

## 2022-03-18 PROCEDURE — C9803 HOPD COVID-19 SPEC COLLECT: HCPCS

## 2022-03-18 PROCEDURE — 82310 ASSAY OF CALCIUM: CPT | Performed by: PHYSICIAN ASSISTANT

## 2022-03-18 PROCEDURE — 250N000011 HC RX IP 250 OP 636: Performed by: PHYSICIAN ASSISTANT

## 2022-03-18 PROCEDURE — 71046 X-RAY EXAM CHEST 2 VIEWS: CPT

## 2022-03-18 PROCEDURE — 83880 ASSAY OF NATRIURETIC PEPTIDE: CPT | Performed by: PHYSICIAN ASSISTANT

## 2022-03-18 PROCEDURE — 250N000013 HC RX MED GY IP 250 OP 250 PS 637: Performed by: HOSPITALIST

## 2022-03-18 PROCEDURE — 99285 EMERGENCY DEPT VISIT HI MDM: CPT | Mod: 25

## 2022-03-18 PROCEDURE — 85025 COMPLETE CBC W/AUTO DIFF WBC: CPT | Performed by: PHYSICIAN ASSISTANT

## 2022-03-18 PROCEDURE — 93005 ELECTROCARDIOGRAM TRACING: CPT

## 2022-03-18 PROCEDURE — 99223 1ST HOSP IP/OBS HIGH 75: CPT | Mod: AI | Performed by: HOSPITALIST

## 2022-03-18 PROCEDURE — 120N000001 HC R&B MED SURG/OB

## 2022-03-18 PROCEDURE — 84132 ASSAY OF SERUM POTASSIUM: CPT | Performed by: HOSPITALIST

## 2022-03-18 PROCEDURE — 96374 THER/PROPH/DIAG INJ IV PUSH: CPT

## 2022-03-18 PROCEDURE — U0005 INFEC AGEN DETEC AMPLI PROBE: HCPCS | Performed by: PHYSICIAN ASSISTANT

## 2022-03-18 PROCEDURE — 36415 COLL VENOUS BLD VENIPUNCTURE: CPT | Performed by: EMERGENCY MEDICINE

## 2022-03-18 PROCEDURE — 93970 EXTREMITY STUDY: CPT

## 2022-03-18 PROCEDURE — 36415 COLL VENOUS BLD VENIPUNCTURE: CPT | Performed by: HOSPITALIST

## 2022-03-18 RX ORDER — HYDROXYCHLOROQUINE SULFATE 200 MG/1
200 TABLET, FILM COATED ORAL DAILY
Status: DISCONTINUED | OUTPATIENT
Start: 2022-03-18 | End: 2022-03-24 | Stop reason: HOSPADM

## 2022-03-18 RX ORDER — FOLIC ACID 1 MG/1
1000 TABLET ORAL DAILY
Status: DISCONTINUED | OUTPATIENT
Start: 2022-03-19 | End: 2022-03-24 | Stop reason: HOSPADM

## 2022-03-18 RX ORDER — AMOXICILLIN 250 MG
2 CAPSULE ORAL 2 TIMES DAILY PRN
Status: DISCONTINUED | OUTPATIENT
Start: 2022-03-18 | End: 2022-03-24 | Stop reason: HOSPADM

## 2022-03-18 RX ORDER — ONDANSETRON 2 MG/ML
4 INJECTION INTRAMUSCULAR; INTRAVENOUS EVERY 6 HOURS PRN
Status: DISCONTINUED | OUTPATIENT
Start: 2022-03-18 | End: 2022-03-24 | Stop reason: HOSPADM

## 2022-03-18 RX ORDER — FUROSEMIDE 10 MG/ML
60 INJECTION INTRAMUSCULAR; INTRAVENOUS EVERY 8 HOURS
Status: DISCONTINUED | OUTPATIENT
Start: 2022-03-18 | End: 2022-03-18

## 2022-03-18 RX ORDER — PANTOPRAZOLE SODIUM 40 MG/1
40 TABLET, DELAYED RELEASE ORAL
Status: DISCONTINUED | OUTPATIENT
Start: 2022-03-19 | End: 2022-03-24 | Stop reason: HOSPADM

## 2022-03-18 RX ORDER — AMOXICILLIN 250 MG
1 CAPSULE ORAL 2 TIMES DAILY PRN
Status: DISCONTINUED | OUTPATIENT
Start: 2022-03-18 | End: 2022-03-24 | Stop reason: HOSPADM

## 2022-03-18 RX ORDER — FUROSEMIDE 10 MG/ML
60 INJECTION INTRAMUSCULAR; INTRAVENOUS EVERY 8 HOURS
Status: DISCONTINUED | OUTPATIENT
Start: 2022-03-19 | End: 2022-03-21

## 2022-03-18 RX ORDER — LANOLIN ALCOHOL/MO/W.PET/CERES
3 CREAM (GRAM) TOPICAL
Status: DISCONTINUED | OUTPATIENT
Start: 2022-03-18 | End: 2022-03-24 | Stop reason: HOSPADM

## 2022-03-18 RX ORDER — ONDANSETRON 4 MG/1
4 TABLET, ORALLY DISINTEGRATING ORAL EVERY 6 HOURS PRN
Status: DISCONTINUED | OUTPATIENT
Start: 2022-03-18 | End: 2022-03-24 | Stop reason: HOSPADM

## 2022-03-18 RX ORDER — GABAPENTIN 100 MG/1
100 CAPSULE ORAL 3 TIMES DAILY
Status: DISCONTINUED | OUTPATIENT
Start: 2022-03-18 | End: 2022-03-24 | Stop reason: HOSPADM

## 2022-03-18 RX ORDER — LIDOCAINE 40 MG/G
CREAM TOPICAL
Status: DISCONTINUED | OUTPATIENT
Start: 2022-03-18 | End: 2022-03-24 | Stop reason: HOSPADM

## 2022-03-18 RX ORDER — DONEPEZIL HYDROCHLORIDE 5 MG/1
5 TABLET, FILM COATED ORAL AT BEDTIME
Status: DISCONTINUED | OUTPATIENT
Start: 2022-03-18 | End: 2022-03-24 | Stop reason: HOSPADM

## 2022-03-18 RX ORDER — FUROSEMIDE 10 MG/ML
40 INJECTION INTRAMUSCULAR; INTRAVENOUS ONCE
Status: COMPLETED | OUTPATIENT
Start: 2022-03-18 | End: 2022-03-18

## 2022-03-18 RX ORDER — METOPROLOL SUCCINATE 25 MG/1
25 TABLET, EXTENDED RELEASE ORAL DAILY
Status: DISCONTINUED | OUTPATIENT
Start: 2022-03-19 | End: 2022-03-24 | Stop reason: HOSPADM

## 2022-03-18 RX ORDER — SILDENAFIL CITRATE 20 MG/1
20 TABLET ORAL 3 TIMES DAILY
Status: DISCONTINUED | OUTPATIENT
Start: 2022-03-18 | End: 2022-03-24 | Stop reason: HOSPADM

## 2022-03-18 RX ORDER — POLYETHYLENE GLYCOL 3350 17 G/17G
17 POWDER, FOR SOLUTION ORAL DAILY PRN
Status: DISCONTINUED | OUTPATIENT
Start: 2022-03-18 | End: 2022-03-24 | Stop reason: HOSPADM

## 2022-03-18 RX ORDER — HYDROXYCHLOROQUINE SULFATE 200 MG/1
200 TABLET, FILM COATED ORAL DAILY
COMMUNITY

## 2022-03-18 RX ORDER — PROCHLORPERAZINE 25 MG
12.5 SUPPOSITORY, RECTAL RECTAL EVERY 12 HOURS PRN
Status: DISCONTINUED | OUTPATIENT
Start: 2022-03-18 | End: 2022-03-24 | Stop reason: HOSPADM

## 2022-03-18 RX ORDER — BROMFENAC SODIUM 0.7 MG/ML
1 SOLUTION/ DROPS OPHTHALMIC DAILY
Status: DISCONTINUED | OUTPATIENT
Start: 2022-03-18 | End: 2022-03-18

## 2022-03-18 RX ORDER — ACETAMINOPHEN 650 MG/1
650 SUPPOSITORY RECTAL EVERY 6 HOURS PRN
Status: DISCONTINUED | OUTPATIENT
Start: 2022-03-18 | End: 2022-03-24 | Stop reason: HOSPADM

## 2022-03-18 RX ORDER — ACETAMINOPHEN 325 MG/1
650 TABLET ORAL EVERY 6 HOURS PRN
Status: DISCONTINUED | OUTPATIENT
Start: 2022-03-18 | End: 2022-03-24 | Stop reason: HOSPADM

## 2022-03-18 RX ORDER — NIFEDIPINE 30 MG/1
60 TABLET, EXTENDED RELEASE ORAL DAILY
Status: DISCONTINUED | OUTPATIENT
Start: 2022-03-19 | End: 2022-03-24 | Stop reason: HOSPADM

## 2022-03-18 RX ORDER — SIMVASTATIN 40 MG
40 TABLET ORAL AT BEDTIME
Status: DISCONTINUED | OUTPATIENT
Start: 2022-03-18 | End: 2022-03-24 | Stop reason: HOSPADM

## 2022-03-18 RX ORDER — PROCHLORPERAZINE MALEATE 5 MG
5 TABLET ORAL EVERY 6 HOURS PRN
Status: DISCONTINUED | OUTPATIENT
Start: 2022-03-18 | End: 2022-03-24 | Stop reason: HOSPADM

## 2022-03-18 RX ADMIN — GABAPENTIN 100 MG: 100 CAPSULE ORAL at 22:14

## 2022-03-18 RX ADMIN — FUROSEMIDE 40 MG: 10 INJECTION, SOLUTION INTRAVENOUS at 19:50

## 2022-03-18 RX ADMIN — DONEPEZIL HYDROCHLORIDE 5 MG: 5 TABLET, FILM COATED ORAL at 22:15

## 2022-03-18 RX ADMIN — APIXABAN 5 MG: 5 TABLET, FILM COATED ORAL at 22:15

## 2022-03-18 RX ADMIN — SIMVASTATIN 40 MG: 40 TABLET, FILM COATED ORAL at 22:15

## 2022-03-18 RX ADMIN — HYDROXYCHLOROQUINE SULFATE 200 MG: 200 TABLET, FILM COATED ORAL at 22:15

## 2022-03-18 ASSESSMENT — ACTIVITIES OF DAILY LIVING (ADL)
VISION_MANAGEMENT: GLASSES
FALL_HISTORY_WITHIN_LAST_SIX_MONTHS: NO
ADLS_ACUITY_SCORE: 5
ADLS_ACUITY_SCORE: 5
CONCENTRATING,_REMEMBERING_OR_MAKING_DECISIONS_DIFFICULTY: NO
DRESSING/BATHING_DIFFICULTY: NO
WALKING_OR_CLIMBING_STAIRS: AMBULATION DIFFICULTY, REQUIRES EQUIPMENT
TRANSFERRING: 1-->ASSISTANCE (EQUIPMENT/PERSON) NEEDED
DIFFICULTY_EATING/SWALLOWING: NO
TOILETING_ISSUES: NO
CHANGE_IN_FUNCTIONAL_STATUS_SINCE_ONSET_OF_CURRENT_ILLNESS/INJURY: NO
WALKING_OR_CLIMBING_STAIRS_DIFFICULTY: YES
EQUIPMENT_CURRENTLY_USED_AT_HOME: WALKER, STANDARD
WEAR_GLASSES_OR_BLIND: YES
ADLS_ACUITY_SCORE: 12
DOING_ERRANDS_INDEPENDENTLY_DIFFICULTY: NO
TRANSFERRING: 0-->ASSISTANCE NEEDED (DEVELOPMETNALLY APPROPRIATE)
ADLS_ACUITY_SCORE: 5

## 2022-03-18 ASSESSMENT — ENCOUNTER SYMPTOMS: SHORTNESS OF BREATH: 1

## 2022-03-18 NOTE — ED TRIAGE NOTES
Pt having increased bilateral leg swelling. L>R. States has been getting much worse recently. Does take a diuretic.    DISPLAY PLAN FREE TEXT

## 2022-03-18 NOTE — TELEPHONE ENCOUNTER
"Pt complains of bilateral leg edema and redness states is ongoing but has worsened. Pt denies chest pain, breathing problems at rest or any missed medication doses and reports she is taking medications \"all of them\". She wants PCP to advice what she can do. Pt has future VV scheduled 03/23/2022 with PCP but pt wants to talk to provider today. Would PCP approve overbook virtual or OV today? Or would provider advice pt to see UC today?         Additional Information    Negative: Sounds like a life-threatening emergency to the triager    Negative: Chest pain    Negative: Small area of swelling and followed an insect bite to the area    Negative: Followed a knee injury    Negative: Ankle or foot injury    Negative: Pregnant with leg swelling or edema    Negative: Difficulty breathing at rest    Negative: Entire foot is cool or blue in comparison to other side    Negative: SEVERE swelling (e.g., swelling extends above knee, entire leg is swollen, weeping fluid)    Negative: Thigh or calf pain and only 1 side and present > 1 hour    Negative: Thigh, calf, or ankle swelling in only one leg    Negative: Thigh, calf, or ankle swelling in both legs, but one side is definitely more swollen (Exception: longstanding difference between legs)    Negative: Cast on leg or ankle and has increasing pain    Protocols used: LEG SWELLING AND EDEMA-A-OH      "

## 2022-03-18 NOTE — ED PROVIDER NOTES
History   Chief Complaint:  Leg Swelling       HPI   Jaqueline Canales is a 81 year old female on Eliquis with history of atrial fibrillation, breast cancer, carotid artery stenosis, CHF, among others, who presents with bilateral leg swelling. She notes that she has intermittent swelling of her left leg but her right leg swelling is new. She reports that she is taking her diuretic, Torsemide 20 mg BID) as prescribed. The patient reports at home she uses a walker to help with dyspnea with exertion but denies any new shortness of breath. Denies chest pain. No other concerns at this time.     The patient adds that she used to have a home care nurse come out to the house to fill her medications, though this just ended and she wonders if she may have missed a few doses of her medications.      Review of Systems   Respiratory: Positive for shortness of breath (not new).    Cardiovascular: Positive for leg swelling. Negative for chest pain.   All other systems reviewed and are negative.    Allergies:  The patient has no known allergies.     Medications:  Albuterol  Eliquis  Aricept  Neurontin  Synthroid  Toprol  Prilosec  Revatio  Zocor  Demadex  Methotrexate    Past Medical History:     Amaurosis fugax  Carotid artery stenosis  Esophageal reflux  H pylori infection  Hyperlipidemia  Lung cancer  Psoriasis  Pulmonary hypertension  RA  Raynaud's syndrome  Scleroderma  Osteoporosis  Breast cancer  Diverticulosis  Pulmonary fibrosis  Hiatal hernia  IVCD  Lung nodule  Persistent atrial fibrillation  Acute CHF    Past Surgical History:    Breast cancer  Colonoscopy x3  Conization  Right heart cath  Endarterectomy  Repair hammer toe  Tonsillectomy  Mastectomy    Family History:    Mother: colorectal cancer  Father: Stroke  Sister: Ovarian cancer, crohn's disease, diverticulitis, Alzheimer's disease  Brother: Diverticulitis    Social History:  The patient presents to the ED with     Physical Exam     Patient Vitals for  "the past 24 hrs:   BP Temp Temp src Pulse Resp SpO2 Height Weight   03/18/22 1838 97/66 -- -- 96 -- -- -- --   03/18/22 1452 95/49 97.1  F (36.2  C) Temporal 62 16 100 % 1.575 m (5' 2\") 60.8 kg (134 lb)       Physical Exam  General: Alert, cooperative   Head:  Scalp is atraumatic.  Eyes:  The pupils are equal, round, and reactive to light. Normal conjunctiva.   ENT:                                      Ears:  The external ears are normal.  Nose:  The external nose is normal.  Throat:  The oropharynx is normal. Mucus membranes are moist.                 Neck:  Normal range of motion.   CV:  Irregularly, irregular. 2/6 heart murmur. 2+ radial pulses  Resp:  Breath sounds are clear bilaterally. Non-labored, no retractions or accessory muscle use.  GI:  Abdomen is soft, no distension, no tenderness.   MS:  Normal range of motion. No acute deformities. 3+ bilateral lower leg peripheral edema.   Skin:  Warm and dry. No rash.   Neuro:  Alert. Strength and sensation grossly intact.   Psych: Awake. Alert.  Appropriate interactions.          Emergency Department Course   ECG  ECG obtained at 1655, ECG read at 1701  Likely atrial fibrillation, PVCs. Artifact present.    Rate 91 bpm. LA interval * ms. QRS duration 90 ms. QT/QTc 492/605 ms. P-R-T axes -7 -63 5.     Imaging:  US Lower Extremity Venous Duplex Bilateral   Final Result   IMPRESSION:   1.  No deep venous thrombosis in the bilateral lower extremities.      Chest XR,  PA & LAT    (Results Pending)       Laboratory:  Labs Ordered and Resulted from Time of ED Arrival to Time of ED Departure   BASIC METABOLIC PANEL - Abnormal       Result Value    Sodium 135      Potassium 3.8      Chloride 96      Carbon Dioxide (CO2) 36 (*)     Anion Gap 3      Urea Nitrogen 22      Creatinine 0.80      Calcium 10.2 (*)     Glucose 104 (*)     GFR Estimate 73     CBC WITH PLATELETS AND DIFFERENTIAL - Abnormal    WBC Count 7.4      RBC Count 3.97      Hemoglobin 12.7      Hematocrit 38.6 "      MCV 97      MCH 32.0      MCHC 32.9      RDW 17.2 (*)     Platelet Count 197      % Neutrophils 89      % Lymphocytes 5      % Monocytes 4      % Eosinophils 1      % Basophils 0      % Immature Granulocytes 1      NRBCs per 100 WBC 0      Absolute Neutrophils 6.5      Absolute Lymphocytes 0.4 (*)     Absolute Monocytes 0.3      Absolute Eosinophils 0.1      Absolute Basophils 0.0      Absolute Immature Granulocytes 0.1      Absolute NRBCs 0.0     NT PROBNP INPATIENT - Abnormal    N terminal Pro BNP Inpatient 5,818 (*)    COVID-19 VIRUS (CORONAVIRUS) BY PCR      Emergency Department Course:         Reviewed:  I reviewed nursing notes, vitals and past medical history    Assessments:  1640 I obtained history and examined the patient as noted above.     The patient in my department and O2 sat dropped 80% she reported feeling dizzy.   I rechecked the patient and discussed plan for admission.     Disposition:  Patient will be admitted under the care of Dr. Travis.    Impression & Plan     Medical Decision Making:  Jaqueline Canales is a 81 year old female with complex medical history including atrial fibrillation on Eliquis, CHF on torsemide, presents emergency department with increased lower leg swelling.  On exam, there is significant lower leg peripheral edema.  She denies new shortness of breath, though with ambulation her O2 sats did drop to 88%.  BNP elevated above 5000.  Chest x-ray shows significant cardiomegaly, unchanged from previous.  The patient reports to staff the home care nurse come out to the house to fill her medications, though this just ended and she wonders if she may have missed a few doses of medications.  I am concerned that she has not been taking her diuretic as prescribed contributing to the worsening edema.  Given the hypoxia with ambulation and increased leg swelling, I believe the patient would benefit from admission for further diuresis, echocardiogram, and social work consult to help  set up home care.  Patient and  agree with this plan all questions and concerns addressed prior to admission.  Discussed plan with Dr. Travis of the hospitalist service.    Diagnosis:    ICD-10-CM    1. Acute on chronic heart failure, unspecified heart failure type (H)  I50.9    2. Leg swelling  M79.89        Discharge Medications:  Current Discharge Medication List          Scribe Disclosure:  CONOR, Abhinav Heard, am serving as a scribe at 4:32 PM on 3/18/2022 to document services personally performed by Analy Luke PA-C based on my observations and the provider's statements to me.          Analy Luke PA-C  03/18/22 2051

## 2022-03-18 NOTE — PHARMACY-ADMISSION MEDICATION HISTORY
Pharmacy Medication History  Admission medication history interview status for the 3/18/2022  admission is complete. See EPIC admission navigator for prior to admission medications     Location of Interview: Patient room  Medication history sources: Patient and Surescripts    Significant changes made to the medication list:  1. Removed: triamcinolone from 2019  2. Added: hydroxychloroquine and nifedipine (both filled Am[facility] 2022 x90 days)    In the past week, patient estimated taking medication this percent of the time: 50-90% due to other    Additional medication history information:   1. Patient not familiar with medications. States a nurse used to help with medications but now is managing on her own- exact reason unknown.   2. PTA list updated per recent fill history.   3. Methotrexate last filled November x84 days.    Medication reconciliation completed by provider prior to medication history? No    Time spent in this activity: 15 minutes    Prior to Admission medications    Medication Sig Last Dose Taking? Auth Provider   acetaminophen (TYLENOL) 500 MG tablet Take 1,000 mg by mouth 2 times daily as needed for mild pain   Yes Reported, Patient   albuterol (PROAIR HFA/PROVENTIL HFA/VENTOLIN HFA) 108 (90 Base) MCG/ACT inhaler USE 2 INHALATIONS ORALLY   INTO THE LUNGS EVERY 6  HOURS as needed  Yes Carissa Burroughs MD   apixaban ANTICOAGULANT (ELIQUIS ANTICOAGULANT) 5 MG tablet Take 1 tablet (5 mg) by mouth 2 times daily 3/18/2022 at am Yes Carissa Burroughs MD   donepezil (ARICEPT) 5 MG tablet Take 1 tablet (5 mg) by mouth At Bedtime  Yes Carissa Burroughs MD   folic acid (FOLVITE) 1 MG tablet TAKE 1 TABLET DAILY.  Yes Carissa Burroughs MD   gabapentin (NEURONTIN) 100 MG capsule Take 1 capsule (100 mg) by mouth 3 times daily  Yes Carissa Burroughs MD   hydroxychloroquine (PLAQUENIL) 200 MG tablet Take 200 mg by mouth daily  Yes Unknown, Entered By History   KLOR-CON 20 MEQ CR tablet TAKE 1 TABLET DAILY  Yes  Carissa Burroughs MD   levothyroxine (SYNTHROID/LEVOTHROID) 75 MCG tablet TAKE 1 TABLET DAILY  Yes Carissa Burroughs MD   melatonin 5 MG tablet Take 5 mg by mouth nightly as needed for sleep  Yes Unknown, Entered By History   metoprolol succinate ER (TOPROL-XL) 25 MG 24 hr tablet TAKE 1 TABLET DAILY  Yes Carissa Burroughs MD   NIFEdipine ER (ADALAT CC) 60 MG 24 hr tablet Take 60 mg by mouth daily  Yes Unknown, Entered By History   omeprazole (PRILOSEC) 20 MG DR capsule TAKE 1 CAPSULE TWICE DAILY,30-60 MINUTES PRIOR TO     MEALS  Yes Carissa Burroughs MD   sildenafil (REVATIO) 20 MG tablet TAKE 1 TABLET BY MOUTH THREE TIMES DAILY. GENERIC FOR REVATIO. CALL [phone] TO REFILL  Yes Carissa Burroughs MD   simvastatin (ZOCOR) 40 MG tablet Take 1 tablet (40 mg) by mouth At Bedtime  Yes Carissa Burroughs MD   torsemide (DEMADEX) 20 MG tablet Take 1 tablet (20 mg) by mouth 2 times daily 3/18/2022 at am Yes Carissa Burroughs MD   bromfenac (PROLENSA) 0.07 % ophthalmic solution Place 1 drop into the right eye daily   Reported, Patient   Methotrexate Sodium (METHOTREXATE PO) Take 2.5 mg by mouth 10 tablets weekly - Wednesdays   Reported, Patient   multivitamin (CENTRUM SILVER) tablet Take 1 tablet by mouth daily   Unknown, Entered By History   Skin Protectants, Misc. (EUCERIN) cream Apply topically 2 times daily Apply to areas dry skin topically two times a day for dry skin and Apply to areas of dry skin topically as needed for dry skin daily   Reported, Patient   Vitamin D, Cholecalciferol, 10 MCG (400 UNIT) TABS Take 1,600 Units by mouth daily   Unknown, Entered By History       The information provided in this note is only as accurate as the sources available at the time of update(s)

## 2022-03-18 NOTE — ED NOTES
"[facility]  ED Nurse Handoff Report    ED Chief complaint: Leg Swelling      ED Diagnosis:   Final diagnoses:   None       Code Status: MD to discuss with patient and family.     Allergies:   Allergies   Allergen Reactions     No Known Allergies        Patient Story: Pt having increased bilateral leg swelling. L>R. States has been getting much worse recently. Does take a diuretic.   Focused Assessment:  Patient has pedal edema bilaterally. No c/o SOB or chest pain.     Treatments and/or interventions provided: US, labs, CXR  Patient's response to treatments and/or interventions: tolerated well.     To be done/followed up on inpatient unit:  monitor, diurees     Does this patient have any cognitive concerns?: none    Activity level - Baseline/Home:  Independent and Walker  Activity Level - Current:   Independent and Walker    Patient's Preferred language: English   Needed?: No    Isolation: None  Infection: Not Applicable  Patient tested for COVID 19 prior to admission: YES  Bariatric?: No    Vital Signs:   Vitals:    03/18/22 1452   BP: 95/49   Pulse: 62   Resp: 16   Temp: 97.1  F (36.2  C)   TempSrc: Temporal   SpO2: 100%   Weight: 60.8 kg (134 lb)   Height: 1.575 m (5' 2\")       Cardiac Rhythm:     Was the PSS-3 completed:   Yes  What interventions are required if any?               Family Comments:  at bedside   OBS brochure/video discussed/provided to patient/family: N/A              Name of person given brochure if not patient:               Relationship to patient:     For the majority of the shift this patient's behavior was Green.   Behavioral interventions performed were .    ED NURSE PHONE NUMBER: *[phone]         "

## 2022-03-18 NOTE — TELEPHONE ENCOUNTER
Nurse Triage SBAR    Is this a 2nd Level Triage? YES, LICENSED PRACTITIONER REVIEW IS REQUIRED    Situation: Pt complains of bilateral leg swelling and redness that has worsened today.     Background: Has hx of CHF but notes swelling is worst.     Assessment: No chest pain or breathing problems Pt is requesting appt with PCP.     Protocol Recommended Disposition: See UC today.     Recommendation: Pt needs to be seen today. Would provider approve VV or OV?     Routed to provider    Does the patient meet one of the following criteria for ADS visit consideration? 16+ years old, with an [facility] PCP     TIP  Providers, please consider if this condition is appropriate for management at one of our Acute and Diagnostic Services sites.     If patient is a good candidate, please use dotphrase <dot>triageresponse and select Refer to ADS to document.

## 2022-03-19 ENCOUNTER — APPOINTMENT (OUTPATIENT)
Dept: PHYSICAL THERAPY | Facility: CLINIC | Age: 82
DRG: 291 | End: 2022-03-19
Attending: HOSPITALIST
Payer: MEDICARE

## 2022-03-19 ENCOUNTER — APPOINTMENT (OUTPATIENT)
Dept: CARDIOLOGY | Facility: CLINIC | Age: 82
DRG: 291 | End: 2022-03-19
Attending: HOSPITALIST
Payer: MEDICARE

## 2022-03-19 ENCOUNTER — APPOINTMENT (OUTPATIENT)
Dept: OCCUPATIONAL THERAPY | Facility: CLINIC | Age: 82
DRG: 291 | End: 2022-03-19
Attending: HOSPITALIST
Payer: MEDICARE

## 2022-03-19 LAB
ANION GAP SERPL CALCULATED.3IONS-SCNC: 7 MMOL/L (ref 3–14)
BUN SERPL-MCNC: 16 MG/DL (ref 7–30)
CALCIUM SERPL-MCNC: 9.2 MG/DL (ref 8.5–10.1)
CHLORIDE BLD-SCNC: 96 MMOL/L (ref 94–109)
CO2 SERPL-SCNC: 34 MMOL/L (ref 20–32)
CREAT SERPL-MCNC: 0.58 MG/DL (ref 0.52–1.04)
ERYTHROCYTE [DISTWIDTH] IN BLOOD BY AUTOMATED COUNT: 17 % (ref 10–15)
GFR SERPL CREATININE-BSD FRML MDRD: 90 ML/MIN/1.73M2
GLUCOSE BLD-MCNC: 82 MG/DL (ref 70–99)
HCT VFR BLD AUTO: 32.4 % (ref 35–47)
HGB BLD-MCNC: 10.9 G/DL (ref 11.7–15.7)
IRON SATN MFR SERPL: 11 % (ref 15–46)
IRON SERPL-MCNC: 29 UG/DL (ref 35–180)
LVEF ECHO: NORMAL
MAGNESIUM SERPL-MCNC: 1.6 MG/DL (ref 1.6–2.3)
MCH RBC QN AUTO: 32.3 PG (ref 26.5–33)
MCHC RBC AUTO-ENTMCNC: 33.6 G/DL (ref 31.5–36.5)
MCV RBC AUTO: 96 FL (ref 78–100)
PHOSPHATE SERPL-MCNC: 2.8 MG/DL (ref 2.5–4.5)
PLATELET # BLD AUTO: 170 10E3/UL (ref 150–450)
POTASSIUM BLD-SCNC: 2.9 MMOL/L (ref 3.4–5.3)
POTASSIUM BLD-SCNC: 3.7 MMOL/L (ref 3.4–5.3)
RBC # BLD AUTO: 3.37 10E6/UL (ref 3.8–5.2)
SODIUM SERPL-SCNC: 137 MMOL/L (ref 133–144)
TIBC SERPL-MCNC: 265 UG/DL (ref 240–430)
TRANSFERRIN SERPL-MCNC: 211 MG/DL (ref 210–360)
WBC # BLD AUTO: 5.2 10E3/UL (ref 4–11)

## 2022-03-19 PROCEDURE — 83550 IRON BINDING TEST: CPT | Performed by: INTERNAL MEDICINE

## 2022-03-19 PROCEDURE — 99233 SBSQ HOSP IP/OBS HIGH 50: CPT | Performed by: INTERNAL MEDICINE

## 2022-03-19 PROCEDURE — 83735 ASSAY OF MAGNESIUM: CPT | Performed by: HOSPITALIST

## 2022-03-19 PROCEDURE — 97161 PT EVAL LOW COMPLEX 20 MIN: CPT | Mod: GP | Performed by: PHYSICAL THERAPIST

## 2022-03-19 PROCEDURE — 84132 ASSAY OF SERUM POTASSIUM: CPT | Performed by: INTERNAL MEDICINE

## 2022-03-19 PROCEDURE — 84466 ASSAY OF TRANSFERRIN: CPT | Performed by: INTERNAL MEDICINE

## 2022-03-19 PROCEDURE — 97530 THERAPEUTIC ACTIVITIES: CPT | Mod: GP | Performed by: PHYSICAL THERAPIST

## 2022-03-19 PROCEDURE — 120N000001 HC R&B MED SURG/OB

## 2022-03-19 PROCEDURE — 93306 TTE W/DOPPLER COMPLETE: CPT

## 2022-03-19 PROCEDURE — 85041 AUTOMATED RBC COUNT: CPT | Performed by: HOSPITALIST

## 2022-03-19 PROCEDURE — 97165 OT EVAL LOW COMPLEX 30 MIN: CPT | Mod: GO

## 2022-03-19 PROCEDURE — 97535 SELF CARE MNGMENT TRAINING: CPT | Mod: GO

## 2022-03-19 PROCEDURE — 84100 ASSAY OF PHOSPHORUS: CPT | Performed by: HOSPITALIST

## 2022-03-19 PROCEDURE — 250N000013 HC RX MED GY IP 250 OP 250 PS 637: Performed by: HOSPITALIST

## 2022-03-19 PROCEDURE — 85027 COMPLETE CBC AUTOMATED: CPT | Performed by: HOSPITALIST

## 2022-03-19 PROCEDURE — 97530 THERAPEUTIC ACTIVITIES: CPT | Mod: GO

## 2022-03-19 PROCEDURE — 250N000013 HC RX MED GY IP 250 OP 250 PS 637: Performed by: INTERNAL MEDICINE

## 2022-03-19 PROCEDURE — 82310 ASSAY OF CALCIUM: CPT | Performed by: HOSPITALIST

## 2022-03-19 PROCEDURE — 99221 1ST HOSP IP/OBS SF/LOW 40: CPT | Mod: 25 | Performed by: INTERNAL MEDICINE

## 2022-03-19 PROCEDURE — 36415 COLL VENOUS BLD VENIPUNCTURE: CPT | Performed by: HOSPITALIST

## 2022-03-19 PROCEDURE — 93306 TTE W/DOPPLER COMPLETE: CPT | Mod: 26 | Performed by: INTERNAL MEDICINE

## 2022-03-19 PROCEDURE — 36415 COLL VENOUS BLD VENIPUNCTURE: CPT | Performed by: INTERNAL MEDICINE

## 2022-03-19 RX ORDER — POTASSIUM CHLORIDE 1500 MG/1
40 TABLET, EXTENDED RELEASE ORAL ONCE
Status: COMPLETED | OUTPATIENT
Start: 2022-03-19 | End: 2022-03-19

## 2022-03-19 RX ORDER — POTASSIUM CHLORIDE 750 MG/1
10 TABLET, EXTENDED RELEASE ORAL ONCE
Status: COMPLETED | OUTPATIENT
Start: 2022-03-19 | End: 2022-03-19

## 2022-03-19 RX ADMIN — SIMVASTATIN 40 MG: 40 TABLET, FILM COATED ORAL at 20:06

## 2022-03-19 RX ADMIN — GABAPENTIN 100 MG: 100 CAPSULE ORAL at 08:23

## 2022-03-19 RX ADMIN — ACETAMINOPHEN 650 MG: 325 TABLET, FILM COATED ORAL at 17:18

## 2022-03-19 RX ADMIN — SILDENAFIL 20 MG: 20 TABLET, FILM COATED ORAL at 20:06

## 2022-03-19 RX ADMIN — GABAPENTIN 100 MG: 100 CAPSULE ORAL at 20:06

## 2022-03-19 RX ADMIN — APIXABAN 5 MG: 5 TABLET, FILM COATED ORAL at 08:23

## 2022-03-19 RX ADMIN — PANTOPRAZOLE SODIUM 40 MG: 40 TABLET, DELAYED RELEASE ORAL at 06:32

## 2022-03-19 RX ADMIN — POTASSIUM CHLORIDE 40 MEQ: 1500 TABLET, EXTENDED RELEASE ORAL at 09:52

## 2022-03-19 RX ADMIN — PANTOPRAZOLE SODIUM 40 MG: 40 TABLET, DELAYED RELEASE ORAL at 17:18

## 2022-03-19 RX ADMIN — DONEPEZIL HYDROCHLORIDE 5 MG: 5 TABLET, FILM COATED ORAL at 20:07

## 2022-03-19 RX ADMIN — FOLIC ACID 1000 MCG: 1 TABLET ORAL at 08:23

## 2022-03-19 RX ADMIN — SILDENAFIL 20 MG: 20 TABLET, FILM COATED ORAL at 17:18

## 2022-03-19 RX ADMIN — LEVOTHYROXINE SODIUM 75 MCG: 50 TABLET ORAL at 06:31

## 2022-03-19 RX ADMIN — HYDROXYCHLOROQUINE SULFATE 200 MG: 200 TABLET, FILM COATED ORAL at 08:23

## 2022-03-19 RX ADMIN — APIXABAN 5 MG: 5 TABLET, FILM COATED ORAL at 20:06

## 2022-03-19 RX ADMIN — GABAPENTIN 100 MG: 100 CAPSULE ORAL at 17:18

## 2022-03-19 RX ADMIN — POTASSIUM CHLORIDE 10 MEQ: 750 TABLET, EXTENDED RELEASE ORAL at 14:37

## 2022-03-19 ASSESSMENT — ACTIVITIES OF DAILY LIVING (ADL)
ADLS_ACUITY_SCORE: 7
ADLS_ACUITY_SCORE: 10
ADLS_ACUITY_SCORE: 10
ADLS_ACUITY_SCORE: 8
PREVIOUS_RESPONSIBILITIES: MEAL PREP;HOUSEKEEPING;LAUNDRY;MEDICATION MANAGEMENT;DRIVING;FINANCES
ADLS_ACUITY_SCORE: 10
ADLS_ACUITY_SCORE: 10
ADLS_ACUITY_SCORE: 7
ADLS_ACUITY_SCORE: 7
ADLS_ACUITY_SCORE: 10
ADLS_ACUITY_SCORE: 7
ADLS_ACUITY_SCORE: 10
ADLS_ACUITY_SCORE: 7
ADLS_ACUITY_SCORE: 5
ADLS_ACUITY_SCORE: 10
ADLS_ACUITY_SCORE: 7
ADLS_ACUITY_SCORE: 10

## 2022-03-19 NOTE — CONSULTS
[facility]    Cardiology Consultation     Date of Admission:  3/18/2022    Assessment & Plan   Jaqueline Canales is a 81 year old female who was admitted on 3/18/2022. I was asked to see the patient for concern of diastolic heart failure.    1. Acute on chronic diastolic heart failure  2. Pulmonary hypertension (likely mixed etiology)  3. Moderate severe tricuspid regurgitation  4. Moderate severe mitral valve regurgitation  5. Moderate aortic valve stenosis  6. Atrial fibrillation (on Eliquis twice daily)  7. Iron deficiency anemia  8. Hyperlipidemia  9. Scleroderma  10. Rheumatoid arthritis    Patient with predominantly right-sided heart failure symptoms on examination.  However, she has mixed pulmonary hypertension that is out of proportion with left-sided heart disease.  Some improvement in lower extremity edema following diuresis per patient.  Diuresis now held due to hypotension.    Recommendations:    Agree with holding diuretics today    Strict intake and output, daily weights, 2 L fluid restriction    Replace electrolytes, keep potassium above 4, magnesium above 2.  (High intensity electrolyte replacement protocol ordered)    Lower extremity wraps    We will reassess hemodynamics tomorrow, if blood pressures are improved can restart IV Lasix.    If hemodynamics continue to be an issue we can then consider right heart catheterization on Monday Okay to hold metoprolol and nifedipine    Continue sildenafil 20 mg 3 times daily, with hold parameters in place      Dionte Broussard MD    Code Status    No CPR- Do NOT Intubate    Reason for Consult   Reason for consult: Heart failure    Primary Care Physician   Carissa Burroughs    Chief Complaint   Lower extremity edema    History is obtained from the patient    History of Present Illness   Jaqueline Canales is a 81 year old female who presents with known history of pulmonary hypertension in the setting of likely mixed etiology.  He is  followed by Dr. Marquez in the outpatient clinic.  He notes living independently and at baseline is minimally active and uses the assistance of a walker.  She now presents to the hospital due to progressively worsening lower extremity edema.  Her primary care physician apparently had adjusting her torsemide dosing.  Her baseline weights are in the 130s.  Her medications are put out for her as part of the home home care nursing.  She lives with her  independently.    Unclear what prompted the hypervolemia at this time, but the home care nursing team reportedly was unable to come in last week.  She does not recall if she has been taking all of her medications.    Past Medical History   I have reviewed this patient's medical history and updated it with pertinent information if needed.   Past Medical History:   Diagnosis Date     Amaurosis fugax 5-11    Right     Carotid artery stenosis      Esophageal reflux 3/11/2004     HELICOBACTER PYLORI INFECTION 7/11/2006     hx of CA in situ RT breast-1990.mastectomy 4/17/2007     Hyperlipidemia LDL goal <70 6/20/2011     Lung cancer (H)     squamous cell lung ca left lower lobe     OBESITY NOS 3/11/2004     OSTEOPOROSIS NOS 3/11/2004     Other psoriasis 3/11/2004     Pulmonary hypertension (H) 04/15/2019    Right heart catheterization demonstrated moderate pulmonary arterial hypertension with a mean PA pressure of 34 mmHg, no reversibility with inhaled nitric oxide, elevated right and left-sided filling pressures, low normal cardiac index of 2.1.  Seen by Dr. Shay Marquez.  Started on sildenafil (Revatio) 20 mg 3 times daily.     RA (rheumatoid arthritis) (H) 2/8/2010     Raynaud's syndrome 4/10/2006     Scleroderma (H)      Sleep apnea     CPAP       Past Surgical History   I have reviewed this patient's surgical history and updated it with pertinent information if needed.  Past Surgical History:   Procedure Laterality Date     BREAST SURGERY       COLONOSCOPY   5/24/16     COLONOSCOPY N/A 6/28/2019    Procedure: Colonoscopy, With Polypectomy And Biopsy;  Surgeon: Neto Kaminski MD;  Location:  GI     CONIZATION       CV RIGHT HEART CATH MEASUREMENTS RECORDED N/A 4/15/2019    Procedure: Heart Cath Right Heart Cath;  Surgeon: Naresh Cyr MD;  Location:  HEART CARDIAC CATH LAB     ENDARTERECTOMY CAROTID  2011     REPAIR HAMMER TOE       TONSILLECTOMY       ZZC NONSPECIFIC PROCEDURE  1990    mastectomy RT breast     [facility]HC COLONOSCOPY THRU STOMA, DIAGNOSTIC  2001 2011    diverticulosis       Prior to Admission Medications   Prior to Admission Medications   Prescriptions Last Dose Informant Patient Reported? Taking?   KLOR-CON 20 MEQ CR tablet  Other No Yes   Sig: TAKE 1 TABLET DAILY   Methotrexate Sodium (METHOTREXATE PO)  Other Yes Yes   Sig: Take 2.5 mg by mouth 10 tablets weekly - Wednesdays. Splits the dose as 5 tablets in AM & 5 tablets in PM   NIFEdipine ER (ADALAT CC) 60 MG 24 hr tablet  Other Yes Yes   Sig: Take 60 mg by mouth daily   Skin Protectants, Misc. (EUCERIN) cream  Other Yes No   Sig: Apply topically 2 times daily Apply to areas dry skin topically two times a day for dry skin and Apply to areas of dry skin topically as needed for dry skin daily   Vitamin D, Cholecalciferol, 10 MCG (400 UNIT) TABS  Other Yes No   Sig: Take 1,600 Units by mouth daily   acetaminophen (TYLENOL) 500 MG tablet  Other Yes Yes   Sig: Take 1,000 mg by mouth 2 times daily as needed for mild pain    albuterol (PROAIR HFA/PROVENTIL HFA/VENTOLIN HFA) 108 (90 Base) MCG/ACT inhaler  Other No Yes   Sig: USE 2 INHALATIONS ORALLY   INTO THE LUNGS EVERY 6  HOURS as needed   apixaban ANTICOAGULANT (ELIQUIS ANTICOAGULANT) 5 MG tablet 3/18/2022 at am Other No Yes   Sig: Take 1 tablet (5 mg) by mouth 2 times daily   bromfenac (PROLENSA) 0.07 % ophthalmic solution Not Taking at Unknown time Other Yes No   Sig: Place 1 drop into the right eye daily   Patient not taking: Reported on  3/18/2022   donepezil (ARICEPT) 5 MG tablet  Other No Yes   Sig: Take 1 tablet (5 mg) by mouth At Bedtime   folic acid (FOLVITE) 1 MG tablet  Other No Yes   Sig: TAKE 1 TABLET DAILY.   gabapentin (NEURONTIN) 100 MG capsule  Other No Yes   Sig: Take 1 capsule (100 mg) by mouth 3 times daily   hydroxychloroquine (PLAQUENIL) 200 MG tablet  Other Yes Yes   Sig: Take 200 mg by mouth daily   levothyroxine (SYNTHROID/LEVOTHROID) 75 MCG tablet  Other No Yes   Sig: TAKE 1 TABLET DAILY   melatonin 5 MG tablet  Other Yes Yes   Sig: Take 5 mg by mouth nightly as needed for sleep   metoprolol succinate ER (TOPROL-XL) 25 MG 24 hr tablet  Other No Yes   Sig: TAKE 1 TABLET DAILY   multivitamin (CENTRUM SILVER) tablet  Other Yes No   Sig: Take 1 tablet by mouth daily   omeprazole (PRILOSEC) 20 MG DR capsule  Other No Yes   Sig: TAKE 1 CAPSULE TWICE DAILY,30-60 MINUTES PRIOR TO     MEALS   sildenafil (REVATIO) 20 MG tablet  Other No Yes   Sig: TAKE 1 TABLET BY MOUTH THREE TIMES DAILY. GENERIC FOR REVATIO. CALL [phone] TO REFILL   simvastatin (ZOCOR) 40 MG tablet  Other No Yes   Sig: Take 1 tablet (40 mg) by mouth At Bedtime   torsemide (DEMADEX) 20 MG tablet 3/18/2022 at am Other No Yes   Sig: Take 1 tablet (20 mg) by mouth 2 times daily      Facility-Administered Medications: None     Allergies   Allergies   Allergen Reactions     No Known Allergies        Social History   I have reviewed this patient's social history and updated it with pertinent information if needed. Jaqueline Canales  reports that she quit smoking about 30 years ago. Her smoking use included cigarettes. She started smoking about 60 years ago. She has a 30.00 pack-year smoking history. She has never used smokeless tobacco. She reports previous alcohol use. She reports that she does not use drugs.    Family History   I have reviewed this patient's family history and updated it with pertinent information if needed.   Family History   Problem Relation Age of  Onset     Cancer - colorectal Mother      Cerebrovascular Disease Father      Cancer Sister 31        ovarian     Heart Disease Sister      Gastrointestinal Disease Sister         crohns     Gastrointestinal Disease Sister         diverticulitis     Gastrointestinal Disease Brother         diverticulitis     Alzheimer Disease Sister      Cerebrovascular Disease Maternal Grandmother      Diabetes Maternal Grandmother      Mental Illness Maternal Grandmother      Diabetes Maternal Uncle      Cancer Nephew                Review of Systems   The 10 point Review of Systems is negative other than noted in the HPI or here.     Physical Exam   Temp: 97.6  F (36.4  C) Temp src: Oral BP: 96/66 Pulse: 119   Resp: 18 SpO2: 92 % O2 Device: Nasal cannula Oxygen Delivery: 1 LPM  Vital Signs with Ranges  Temp:  [97.1  F (36.2  C)-97.6  F (36.4  C)] 97.6  F (36.4  C)  Pulse:  [] 119  Resp:  [16-18] 18  BP: (86-97)/(49-66) 96/66  SpO2:  [89 %-100 %] 92 %  127 lbs 3.29 oz    Alert oriented x3, resting comfortably in the hospital bed and in no acute distress  Elevated JVP with prominent V wave up to the earlobe  Irregularly irregular, normal rate.  Systolic murmurs over the right upper sternal border, left lower sternal border and apex.  Right ventricular heave  Abdomen is soft and nontender  Lower extremities are warm and well perfused with 2+ lower extremity edema      Data      ECG: Atrial fibrillation, PVCs    Most Recent 3 CBC's:Recent Labs   Lab Test 22  0824 22  1541 22  0000 21  1319   WBC 5.2 7.4  --  6.9   HGB 10.9* 12.7 12.6 12.9   MCV 96 97 102 98    197 173  --      Most Recent 3 BMP's:Recent Labs   Lab Test 22  1409 22  0824 22  2139 22  1541 22  0000 21  1319   NA  --  137  --  135  --  138   POTASSIUM 3.7 2.9* 3.5 3.8  --  4.6   CHLORIDE  --  96  --  96  --  103   CO2  --  34*  --  36*  --  37*   BUN  --  16  --  22  --  24   CR  --  0.58   --  0.80 0.680 0.67   ANIONGAP  --  7  --  3  --  <1*   TU  --  9.2  --  10.2*  --  9.7   GLC  --  82  --  104*  --  100*     Most Recent 2 LFT's:Recent Labs   Lab Test 01/13/22  0000 11/04/21  1319 07/22/21  0000 05/02/21  2132   AST 49 28   < > 25   ALT 31 22   < > 18   ALKPHOS  --  88  --  78   BILITOTAL  --  1.0  --  1.6*    < > = values in this interval not displayed.     Most Recent 3 INR's:Recent Labs   Lab Test 05/02/21  2132 04/15/19  0700 12/04/15  0745   INR 1.07 1.05 0.95     Most Recent 3 Hemoglobins:Recent Labs   Lab Test 03/19/22  0824 03/18/22  1541 01/13/22  0000   HGB 10.9* 12.7 12.6     Most Recent 3 Troponin's:Recent Labs   Lab Test 05/02/21  2132 11/25/19  1654 10/23/16  0533   TROPI <0.015 <0.015 <0.015  The 99th percentile for upper reference range is 0.045 ug/L.  Troponin values in   the range of 0.045 - 0.120 ug/L may be associated with risks of adverse   clinical events.       Most Recent 3 BNP's:Recent Labs   Lab Test 03/18/22  1541 04/15/21  1304 03/22/21  1425 06/26/20  0935 12/17/19  1245 12/03/19  0624 11/25/19  1654   NTBNPI 5,818*  --   --   --   --  3,098* 3,936*   NTBNP  --  3,524* 4,485* 3,045*   < >  --   --     < > = values in this interval not displayed.     Most Recent D-dimer:No lab results found.  Most Recent Cholesterol Panel:Recent Labs   Lab Test 10/25/18  0759   CHOL 139   LDL 56   HDL 58   TRIG 125     Most Recent TSH and T4:Recent Labs   Lab Test 07/26/19  0934 10/27/15  0925 08/17/15  0909   TSH 3.10   < > 4.53*   T4  --   --  0.96    < > = values in this interval not displayed.     Most Recent Anemia Panel:Recent Labs   Lab Test 03/19/22  1409 03/19/22  0824   WBC  --  5.2   HGB  --  10.9*   HCT  --  32.4*   MCV  --  96   PLT  --  170   IRON 29*  --    IRONSAT 11*  --      --

## 2022-03-19 NOTE — H&P
[facility]    History and Physical  Hospitalist       Date of Admission:  3/18/2022    Assessment & Plan   Jaqueline Canales is a 81 year old female who presents with worsening leg swelling, found to have acute exacerbation of diastolic CHF    Acute on chronic diastolic CHF  Mild acute hypoxic respiratory failure  Atrial Fib  Pulmonary hypertension  Worsening lower extremity edema over past few weeks, PCP has been adjusting her torsemide. O2 sat 88% with ambulation while in ED (but also reported as 80% in note). home care nurse stopped coming who did her meds about a week ago--patient isn't sure if she missed any medications, but denies taking any meds in afternoon (torsemide had been recommended TID at last PCP visit). BNP 5818. She reports her baseline weight is in 130s, doesn't seem to have changed much with worsening swelling. CXR with cardiomegaly, mild pulmonary vascular congestion  - monitor I/Os, daily weights.  - care coordinator consult for resumption home care  - lymphedema wraps  - lasix 60mg IV TID, monitor output/hemodynamics (received 40mg IV in ED), baseline SBP 100s  - echocardiogram  - cardiology consult (there were plans for her to follow up April to establish care)  - continue PTA eliquis BID (consider dose reduction to 2.5mg BID if Cr worsens or weight drops below 60kg)  - continue PTA nifedipine, metoprolol succinate, sildenafil with hold parameters  - 2g sodium diet with 2000ml fluid restriction (she does not watch sodium or fluid intake at home)  - monitor O2 sat, supplemental O2 as needed  - PT/care  Coordinator curtis given she lives with her --there are concerns about them living independently by their somewhat adopted son who lives in [place] and she will need home care on discharge    Right ring finger warts  Present for months  - Start salicylic acid gel daily to digit. (needs to be ordered, likely will arrive 3/21)  - monitor    Hyperlipidemia  PTA  statin continued    Scleroderma  Rheumatoid arthritis  Psoriasis  Continue PTA folic acid, methotrexate (wednesdays), plaquenil    GERD  Continue omeprazole    Mild Cognitive impairment  OT eval given she is managing her own meds  - resume aricept    Clinically Significant Risk Factors Present on Admission          # Hypercalcemia: Ca = 10.2 mg/dL (Ref range: 8.5 - 10.1 mg/dL) and/or iCa = N/A on admission, will monitor as appropriate     # Coagulation Defect: home medication list includes an anticoagulant medication          DVT Prophylaxis: DOAC  Code Status: DNR / DNI  Expected Discharge:  1-3 days  Anticipated discharge location:  Awaiting care coordination huddle  Delays:     await euvolemia, home care set-up      Dahiana Travis DO    Primary Care Physician   Carissa Burroughs    Chief Complaint   Increased swelling    History is obtained from the patient, prior record review    History of Present Illness   Jaqueline Canales is a 81 year old female who presents with worsening lower extremity edema over past few weeks, PCP has been adjusting her torsemide over past few weeks and been doing tele-visits. She states that her swelling really hasn't changed and has gotten worse, now involving both legs. Home care nurse stopped coming who did her meds about a week ago--patient isn't sure if she missed any medications, but denies taking any meds in afternoon (torsemide had been recommended TID at last PCP visit). She reports her baseline weight is in 130s, doesn't seem to have changed much with worsening swelling. She does not monitor her salt or fluid intake on a daily basis and was not aware that this could contribute to lower extremity swelling. She cannot tell me details about her medications, only that she has followed a printed out list. She has compression stockings, but cannot wear them due to the size of her legs. There are some concerns about her and her  living independently by a relative (boy they took  in when he was 14yo and raised from then on--but patient does not consider her him her son) which was brought up to the PCP, but there are currently no plans to move  She denies chest pain, shortness of breath, nausea, vomiting, fevers, chills.    Past Medical History    I have reviewed this patient's medical history and updated it with pertinent information if needed.   Past Medical History:   Diagnosis Date     Amaurosis fugax 5-11    Right     Carotid artery stenosis      Esophageal reflux 3/11/2004     HELICOBACTER PYLORI INFECTION 7/11/2006     hx of CA in situ RT breast-1990.mastectomy 4/17/2007     Hyperlipidemia LDL goal <70 6/20/2011     Lung cancer (H)     squamous cell lung ca left lower lobe     OBESITY NOS 3/11/2004     OSTEOPOROSIS NOS 3/11/2004     Other psoriasis 3/11/2004     Pulmonary hypertension (H) 04/15/2019    Right heart catheterization demonstrated moderate pulmonary arterial hypertension with a mean PA pressure of 34 mmHg, no reversibility with inhaled nitric oxide, elevated right and left-sided filling pressures, low normal cardiac index of 2.1.  Seen by Dr. Shay Marquez.  Started on sildenafil (Revatio) 20 mg 3 times daily.     RA (rheumatoid arthritis) (H) 2/8/2010     Raynaud's syndrome 4/10/2006     Scleroderma (H)      Sleep apnea     CPAP       Past Surgical History   I have reviewed this patient's surgical history and updated it with pertinent information if needed.  Past Surgical History:   Procedure Laterality Date     BREAST SURGERY       COLONOSCOPY  5/24/16     COLONOSCOPY N/A 6/28/2019    Procedure: Colonoscopy, With Polypectomy And Biopsy;  Surgeon: Neto Kaminski MD;  Location:  GI     CONIZATION       CV RIGHT HEART CATH MEASUREMENTS RECORDED N/A 4/15/2019    Procedure: Heart Cath Right Heart Cath;  Surgeon: Naresh Cyr MD;  Location:  HEART CARDIAC CATH LAB     ENDARTERECTOMY CAROTID  2011     REPAIR HAMMER TOE       TONSILLECTOMY       [facility]  NONSPECIFIC PROCEDURE  1990    mastectomy RT breast     [facility] COLONOSCOPY THRU STOMA, DIAGNOSTIC  2001 2011    diverticulosis       Prior to Admission Medications   Prior to Admission Medications   Prescriptions Last Dose Informant Patient Reported? Taking?   KLOR-CON 20 MEQ CR tablet  Other No Yes   Sig: TAKE 1 TABLET DAILY   Methotrexate Sodium (METHOTREXATE PO)  Other Yes No   Sig: Take 2.5 mg by mouth 10 tablets weekly - Wednesdays   NIFEdipine ER (ADALAT CC) 60 MG 24 hr tablet  Other Yes Yes   Sig: Take 60 mg by mouth daily   Skin Protectants, Misc. (EUCERIN) cream  Other Yes No   Sig: Apply topically 2 times daily Apply to areas dry skin topically two times a day for dry skin and Apply to areas of dry skin topically as needed for dry skin daily   Vitamin D, Cholecalciferol, 10 MCG (400 UNIT) TABS  Other Yes No   Sig: Take 1,600 Units by mouth daily   acetaminophen (TYLENOL) 500 MG tablet  Other Yes Yes   Sig: Take 1,000 mg by mouth 2 times daily as needed for mild pain    albuterol (PROAIR HFA/PROVENTIL HFA/VENTOLIN HFA) 108 (90 Base) MCG/ACT inhaler  Other No Yes   Sig: USE 2 INHALATIONS ORALLY   INTO THE LUNGS EVERY 6  HOURS as needed   apixaban ANTICOAGULANT (ELIQUIS ANTICOAGULANT) 5 MG tablet 3/18/2022 at am Other No Yes   Sig: Take 1 tablet (5 mg) by mouth 2 times daily   bromfenac (PROLENSA) 0.07 % ophthalmic solution  Other Yes No   Sig: Place 1 drop into the right eye daily   donepezil (ARICEPT) 5 MG tablet  Other No Yes   Sig: Take 1 tablet (5 mg) by mouth At Bedtime   folic acid (FOLVITE) 1 MG tablet  Other No Yes   Sig: TAKE 1 TABLET DAILY.   gabapentin (NEURONTIN) 100 MG capsule  Other No Yes   Sig: Take 1 capsule (100 mg) by mouth 3 times daily   hydroxychloroquine (PLAQUENIL) 200 MG tablet  Other Yes Yes   Sig: Take 200 mg by mouth daily   levothyroxine (SYNTHROID/LEVOTHROID) 75 MCG tablet  Other No Yes   Sig: TAKE 1 TABLET DAILY   melatonin 5 MG tablet  Other Yes Yes   Sig: Take 5 mg by mouth  nightly as needed for sleep   metoprolol succinate ER (TOPROL-XL) 25 MG 24 hr tablet  Other No Yes   Sig: TAKE 1 TABLET DAILY   multivitamin (CENTRUM SILVER) tablet  Other Yes No   Sig: Take 1 tablet by mouth daily   omeprazole (PRILOSEC) 20 MG DR capsule  Other No Yes   Sig: TAKE 1 CAPSULE TWICE DAILY,30-60 MINUTES PRIOR TO     MEALS   sildenafil (REVATIO) 20 MG tablet  Other No Yes   Sig: TAKE 1 TABLET BY MOUTH THREE TIMES DAILY. GENERIC FOR REVATIO. CALL [phone] TO REFILL   simvastatin (ZOCOR) 40 MG tablet  Other No Yes   Sig: Take 1 tablet (40 mg) by mouth At Bedtime   torsemide (DEMADEX) 20 MG tablet 3/18/2022 at am Other No Yes   Sig: Take 1 tablet (20 mg) by mouth 2 times daily      Facility-Administered Medications: None     Allergies   Allergies   Allergen Reactions     No Known Allergies        Social History   I have reviewed this patient's social history and updated it with pertinent information if needed. Jaqueline Canales  reports that she quit smoking about 30 years ago. Her smoking use included cigarettes. She started smoking about 60 years ago. She has a 30.00 pack-year smoking history. She has never used smokeless tobacco. She reports previous alcohol use. She reports that she does not use drugs.    Family History   I have reviewed this patient's family history and updated it with pertinent information if needed.   Family History   Problem Relation Age of Onset     Cancer - colorectal Mother      Cerebrovascular Disease Father      Cancer Sister 31        ovarian     Heart Disease Sister      Gastrointestinal Disease Sister         crohns     Gastrointestinal Disease Sister         diverticulitis     Gastrointestinal Disease Brother         diverticulitis     Alzheimer Disease Sister      Cerebrovascular Disease Maternal Grandmother      Diabetes Maternal Grandmother      Mental Illness Maternal Grandmother      Diabetes Maternal Uncle      Cancer Nephew                Review of  Systems   The 10 point Review of Systems is negative other than noted in the HPI    Physical Exam   Temp: 97.5  F (36.4  C) Temp src: Oral BP: (!) 89/55 Pulse: 68   Resp: 16 SpO2: 94 %      Vital Signs with Ranges  Temp:  [97.1  F (36.2  C)-97.5  F (36.4  C)] 97.5  F (36.4  C)  Pulse:  [62-96] 68  Resp:  [16] 16  BP: (89-97)/(49-66) 89/55  SpO2:  [94 %-100 %] 94 %  134 lbs 0 oz    Constitutional: Awake, alert, cooperative, no apparent distress.  Eyes: Conjunctiva and pupils examined and normal.  HEENT: Moist mucous membranes, normal dentition.  Respiratory: Clear to auscultation bilaterally, no crackles or wheezing.  Cardiovascular: irregularly irregular, normal S1 and S2, and +systolic murmur  GI: Soft, non-distended, non-tender, normal bowel sounds.  Lymph/Hematologic: No anterior cervical or supraclavicular adenopathy.  Skin: No rashes, no cyanosis, +3 lower extremity edema.  Musculoskeletal: No joint swelling, erythema or tenderness.  Neurologic: Cranial nerves 2-12 intact, normal strength and sensation.  Psychiatric: Alert, oriented to person, place and time, no obvious anxiety or depression.    Data   Data reviewed today:  I personally reviewed US bilat LE: no DVT. CXR: cardiomegaly, mild pulmonary vascular congestion  Recent Labs   Lab 03/18/22  1541   WBC 7.4   HGB 12.7   MCV 97         POTASSIUM 3.8   CHLORIDE 96   CO2 36*   BUN 22   CR 0.80   ANIONGAP 3   TU 10.2*   *       Recent Results (from the past 24 hour(s))   US Lower Extremity Venous Duplex Bilateral    Narrative    EXAM: US LOWER EXTREMITY VENOUS DUPLEX BILATERAL  LOCATION: [facility]  DATE/TIME: 3/18/2022 5:22 PM    INDICATION: bilateral lower leg swelling  COMPARISON: None.  TECHNIQUE: Venous Duplex ultrasound of bilateral lower extremities with and without compression, augmentation and duplex. Color flow and spectral Doppler with waveform analysis performed.    FINDINGS: Exam includes the common  femoral, femoral, popliteal veins as well as segmentally visualized deep calf veins and greater saphenous vein.     RIGHT: No deep vein thrombosis. No superficial thrombophlebitis. No popliteal cyst.    LEFT: No deep vein thrombosis. No superficial thrombophlebitis. No popliteal cyst.      Impression    IMPRESSION:  1.  No deep venous thrombosis in the bilateral lower extremities.

## 2022-03-19 NOTE — PLAN OF CARE
Goal Outcome Evaluation:                Summary:     Primary Diagnosis: CHF exacerbation  Orientation: A&O 4  Aggression Stop Light: Green  Mobility: assist of 1, GBW  Pain Management:   Diet: 2gr Na+  Bowel/Bladder: continent  Abnormal Lab/Assessments:   Drain/Device/Wound:   Consults:   D/C Day/Goals/Place:    Shift Note:     A&O x4, up to bedside commode with assist of one. Strict I&O. MD updated at 0000 per orders r/t output of only 200 ml since dose of Lasix in ER. Due to BP's being soft MD instructed to continue to monitor. 4+ BLE edema, no weeping, red warm to touch. Painful with ambulation.

## 2022-03-19 NOTE — ED NOTES
Dr Avitia notified that pt Son Indra Longoria wanted him to call  [phone].  Pt son states pt fell one week ago and complained of shoulder pain, she refused to let sn call an uber for her because she said she was too weak to get into it.  Pt  is confused per son. Pt son lives in [place] and called an uber for him to get home. Per son, no other family members live here and he believes pt and her  are not truly safe at home.  He has spoken to Pt PMD 2 days ago about convincing them to move to [place] with him so he can help them.      Writer happened to walk out to triage where pt  was asking for help to get a ride home and son was on the phone with registration.  Writer spoke to son and he explained the above.

## 2022-03-19 NOTE — PROGRESS NOTES
RECEIVING UNIT ED HANDOFF REVIEW    ED Nurse Handoff Report was reviewed by: Luisa Coffey RN on March 18, 2022 at 7:49 PM

## 2022-03-19 NOTE — PROGRESS NOTES
"   03/19/22 1401   Quick Adds   Type of Visit Initial Occupational Therapy Evaluation   Living Environment   People in Home spouse   Current Living Arrangements house   Home Accessibility stairs within home;stairs to enter home   Number of Stairs, Main Entrance 2   Stair Railings, Main Entrance railings safe and in good condition   Number of Stairs, Within Home, Primary   (one flight to basement)   Living Environment Comments All needs met on main floor. Laundry in basement. \"My  is in rough shape too\"   Self-Care   Usual Activity Tolerance good   Current Activity Tolerance fair   Equipment Currently Used at Home raised toilet seat;grab bar, toilet;shower chair;grab bar, tub/shower  (FWW)   Fall history within last six months no   Activity/Exercise/Self-Care Comment (I) in ADLs   Instrumental Activities of Daily Living (IADL)   Previous Responsibilities meal prep;housekeeping;laundry;medication management;driving;finances   IADL Comments Filippo A with delivering groceries. Used to have a RN come into the home for med mngt, but no longer. Hire for yardwork.    General Information   Onset of Illness/Injury or Date of Surgery 03/18/22   Referring Physician Dahiana Travis, DO   Patient/Family Therapy Goal Statement (OT) Okay with home or TCU if TCU is necessary   Additional Occupational Profile Info/Pertinent History of Current Problem Jaqueline Canales is a 81 year old female who was admitted on 3/18/2022 with worsening leg swelling, found to have acute exacerbation of diastolic CHF   Existing Precautions/Restrictions fall   Cognitive Status Examination   Orientation Status orientation to person, place and time   Cognitive Status Comments 20/30 SLUMS, see daily flowsheet for details   Visual Perception   Visual Impairment/Limitations corrective lenses for reading   Integumentary/Edema   Integumentary/Edema Comments BLE edemitis    Range of Motion Comprehensive   Comment, General Range of Motion R " shoulder limited, LUE WFL   Strength Comprehensive (MMT)   Comment, General Manual Muscle Testing (MMT) Assessment BUE WF   Bed Mobility   Bed Mobility sit-supine;supine-sit   Supine-Sit [place] (Bed Mobility) supervision   Sit-Supine [place] (Bed Mobility) supervision   Transfers   Transfers toilet transfer;sit-stand transfer   Sit-Stand Transfer   Sit-Stand [place] (Transfers) minimum assist (75% patient effort)   Assistive Device (Sit-Stand Transfers) walker, front-wheeled   Toilet Transfer   Type (Toilet Transfer) stand pivot/stand step   [place] Level (Toilet Transfer) minimum assist (75% patient effort)   Assistive Device (Toilet Transfer) commode chair;walker, front-wheeled   Activities of Daily Living   BADL Assessment/Intervention toileting;lower body dressing   Lower Body Dressing Assessment/Training   [place] Level (Lower Body Dressing) moderate assist (50% patient effort)   Toileting   [place] Level (Toileting) supervision   Assistive Devices (Toileting) commode chair   Clinical Impression   Criteria for Skilled Therapeutic Interventions Met (OT) Yes, treatment indicated   OT Diagnosis Impaired (I) in I/ADLs   OT Problem List-Impairments impacting ADL problems related to;activity tolerance impaired;balance;cognition;strength   Assessment of Occupational Performance 5 or more Performance Deficits   Identified Performance Deficits Impaired (I) in dressing, bathing, toileting, meal prep, cleaning, etc   Planned Therapy Interventions (OT) ADL retraining;IADL retraining;cognition;progressive activity/exercise;risk factor education;transfer training   Clinical Decision Making Complexity (OT) low complexity   Risk & Benefits of therapy have been explained evaluation/treatment results reviewed;care plan/treatment goals reviewed;risks/benefits reviewed;current/potential barriers reviewed;participants included;participants voiced agreement with care plan;patient   OT Discharge  "Planning   OT Discharge Recommendation (DC Rec) Transitional Care Facility;home with home care occupational therapy;home with assist   OT Rationale for DC Rec Pt functioning below baseline due to weakness, activity tolerance, and cognition. Pt scored 20/30 on SLUMS indicating moderate to severe deficits in executive functioning, memory, sequencing. Recommend TCU for continued IP OT services for cognition and to improve (I) in I/ADLs and functinoal mobility to PLOF prior to returning home. Per pt, \" is in rough shape too\" and cannot provide level of A needed (at least Min A for functional mobility and ADLs). If home, recommend 24/7 supervision and Min A+ for all functional mobility and I/ADLs from A. Recommend home OT for cognition, home safety and adaptive equipment.    OT Brief overview of current status bed mobility SBA, sit <>stand bed and commode Min A and FWW, pivot transfer bed to commode Min A w/ FWW   Total Evaluation Time (Minutes)   Total Evaluation Time (Minutes) 10   OT Goals   Therapy Frequency (OT) Daily   OT Predicated Duration/Target Date for Goal Attainment 03/24/22   OT Goals Hygiene/Grooming;Upper Body Dressing;Lower Body Dressing;Toilet Transfer/Toileting;Cognition   OT: Hygiene/Grooming modified independent;while standing   OT: Upper Body Dressing Modified independent;including set-up/clothing retrieval   OT: Lower Body Dressing Modified independent;including set-up/clothing retrieval   OT: Toilet Transfer/Toileting Modified independent;toilet transfer;cleaning and garment management;using adaptive equipment   OT: Cognitive Goal Met     "

## 2022-03-19 NOTE — CONSULTS
REASON FOR ASSESSMENT:  CHF Consult for 2 gm NA Diet Education    NUTRITION HISTORY:    Information obtained from:  Patient at bedside this afternoon    Living situation:   Lives in a home with her spouse    Grocery shopping:  They grocery shop online with delivery service     Meal preparation:  Limited, most meals are canned or frozen     Breakfast:  Eggs + toast every day    Lunch:  Sometimes skips otherwise will have some canned soup    Dinner:   Canned soup or something from the freezer (TV dinner meals)    Beverages:  Water, 1% milk and sometimes a coke     Previous diet instructions:  Saw an outpatient dietitian in 2020 for CHF education, patient states she doesn't remember this       CURRENT DIET:  2 gram Sodium Diet + 2000 mL Fluid Restriction    NUTRITION DIAGNOSIS:  Food- and nutrition-related knowledge deficit R/t no memory of previous diet instructions as evidenced by patient report need for review at bedside this afternoon       INTERVENTIONS:    Nutrition Prescription:  2 gram Na Diet + Fluid Restriction per MD     Implementation:    Assessed learning needs, learning preferences, and willingness to learn    Nutrition Education (Content):  a) Provided handout:  1) Heart Failure Nutrition Therapy   b) Discussed rational for limiting Na for CHF and stressed importance of following 2 gm Na guidelines   c) Encouraged patient to keep a daily food record    Nutrition Education (Application):  a) Discussed current eating habits and recommended alternative food choices    Anticipated good compliance    Diet Education - refer to Education Flowsheet    Goals:    Patient verbalizes understanding of diet by stating 3 interventions to make current eating plan lower in sodium     All of the above goals met during the education session    Follow Up:    Provided RD contact information for future questions.    Recommend Out-Patient Nutrition Referral, if further diet instructions are needed.    Kalpana Quiroga RD,  LD

## 2022-03-19 NOTE — PROGRESS NOTES
Shift Note: 03/19/2022 [id number]  A/O x4. Up x1 to BSC. Not on O2 at baseline, but desat to 80s on RA. Worked with PT/OT today, desat to low 80s during activity. Tele Afib, CVR. On K+ high replacement protocol, recheck ordered for 0600. Soft Bps, holding BP except Revatio. BLE edema, severe. PIV SL. Tolerating low sodium, 2000mL fluid restriction diet. Discharge TBD.

## 2022-03-19 NOTE — PROGRESS NOTES
"   03/19/22 1547   Quick Adds   Type of Visit Initial PT Evaluation   Living Environment   People in Home spouse   Current Living Arrangements house   Home Accessibility stairs to enter home;stairs within home   Number of Stairs, Main Entrance 2   Stair Railings, Main Entrance railings safe and in good condition   Number of Stairs, Within Home, Primary   (flight to basement)   Stair Railings, Within Home, Primary railings safe and in good condition   Living Environment Comments All needs met on main floor. Laundry in basement. \"My  is in rough shape too\"   Self-Care   Usual Activity Tolerance moderate   Current Activity Tolerance fair   Equipment Currently Used at Home walker, rolling   Fall history within last six months no   Activity/Exercise/Self-Care Comment Modified independent with a WW, grandson gets groceries, pt. drives but \"I didn't drive here\".  does not drive.   General Information   Onset of Illness/Injury or Date of Surgery 03/18/22   Referring Physician Dahiana Travis   Patient/Family Therapy Goals Statement (PT) Is unsure if she can manage at home currently or not.   Pertinent History of Current Problem (include personal factors and/or comorbidities that impact the POC) Jaqueline Canales is a 81 year old female who was admitted on 3/18/2022 with worsening leg swelling, found to have acute exacerbation of diastolic CHF   Existing Precautions/Restrictions fall   General Observations Lying in bed.  On 1L of O2.  Agreeable to participate though tired.   Cognition   Affect/Mental Status (Cognition) low arousal/lethargic   Orientation Status (Cognition) oriented x 3   Pain Assessment   Patient Currently in Pain Yes, see Vital Sign flowsheet  (mildly in L/E's with weight bearing)   Integumentary/Edema   Integumentary/Edema other (describe)   Integumentary/Edema Comments Bilateral lower leg edema and notable redness   Posture    Posture Forward head position   Range of Motion (ROM)   ROM " Comment Francis.shoulder flex to approx. 100 degrees actively   Strength (Manual Muscle Testing)   Strength Comments U/E's generally 3+/5, L/E's generally 4/5   Bed Mobility   Comment, (Bed Mobility) Sit to and from supine with Min A   Transfers   Comment, (Transfers) Sit to and from stand with initially Min A, later CGA   Gait/Stairs (Locomotion)   Comment, (Gait/Stairs) Pt. ambulated 3 steps for eval with WW and CGA.  O2 sats on 1L registering in low to mid 80's during acitivity; briefly up to 90's, then back nicole.  High 80's at end of session; nursing notified.    Balance   Balance Comments CGA with WW   Sensory Examination   Sensory Perception patient reports no sensory changes   Coordination   Coordination no deficits were identified   Muscle Tone   Muscle Tone no deficits were identified   Clinical Impression   Criteria for Skilled Therapeutic Intervention Yes, treatment indicated   PT Diagnosis (PT) Impaired functional mobility   Influenced by the following impairments generalized deconditioning and weakness, L/E swelling   Functional limitations due to impairments decreased independence and endurance in functional mobility   Clinical Presentation (PT Evaluation Complexity) Evolving/Changing   Clinical Presentation Rationale per clinical judgment   Clinical Decision Making (Complexity) low complexity   Planned Therapy Interventions (PT) bed mobility training;gait training;home exercise program;patient/family education;stair training;strengthening;transfer training   Anticipated Equipment Needs at Discharge (PT)   (has 4WW)   Risk & Benefits of therapy have been explained evaluation/treatment results reviewed;care plan/treatment goals reviewed;risks/benefits reviewed;current/potential barriers reviewed;participants voiced agreement with care plan;participants included;patient   PT Discharge Planning   PT Discharge Recommendation (DC Rec) Transitional Care Facility;home with assist;home with home care physical  therapy   PT Rationale for DC Rec Patient appears to be functioning below baseline, requiring Min A for transfers and CGA for ambulating short distances with WW.  O2 sats registering in mid to high 80's on 1L; question about level of perfusion.  Nursing aware and following.  Anticipate patient would benefit from TCU to increase strength and functional mobility.   Total Evaluation Time   Total Evaluation Time (Minutes) 10   Physical Therapy Goals   PT Frequency Daily   PT Predicated Duration/Target Date for Goal Attainment 03/25/22   PT: Bed Mobility Modified independent;Supine to/from sit   PT: Transfers Modified independent;Sit to/from stand   PT: Gait Modified independent;Rolling walker;100 feet   PT: Stairs Supervision/stand-by assist;Assistive device;2 stairs;Rail on right

## 2022-03-19 NOTE — PLAN OF CARE
Goal Outcome Evaluation:    Patient is A&Ox4. VSS ex hypotensive, held all antihypertensives. Telemetry was Afib CVR. Denies pain and nausea. Potassium was 2.9, replaced orally, and will be rechecked at 1400 as 3.7, replaced again and will recheck in AM. BLE edema +3 with lisa and warm noted. Echo completed, cardiology saw patient and recommended diuresis patient. L PIV SL. Up with assist of 1 and walker/gait belt to commode. On 2000 mL fluid restriction. Calls appropriately.

## 2022-03-19 NOTE — PLAN OF CARE
Goal Outcome Evaluation:           A&O x4 VSS stable on RA, BP soft. Bilateral lower extremity edema +4. Fine crackles in all lobes. Parameters on BP meds due to soft BP. On mag and k+protocols, salicylic acid will come Monday for skin condition on hand. 2g Na+ diet with 2000ml restriction. Discharge pending improvement.

## 2022-03-19 NOTE — PROGRESS NOTES
[facility]    HOSPITALIST PROGRESS NOTE :   --------------------------------------------------    Date of Admission:  3/18/2022    Cumulative Summary: Jaqueline Canales is a 81 year old female who was admitted on 3/18/2022 with worsening leg swelling, found to have acute exacerbation of diastolic CHF    Assessment & Plan     Acute on chronic diastolic CHF  Mild acute hypoxic respiratory failure  Atrial Fibrillation   Pulmonary hypertension  Worsening lower extremity edema over past few weeks, PCP has been adjusting her torsemide. O2 sat 88% with ambulation while in ED (but also reported as 80% in note). home care nurse stopped coming who did her meds about a week ago--patient isn't sure if she missed any medications, but denies taking any meds in afternoon (torsemide had been recommended TID at last PCP visit). BNP 5818. She reports her baseline weight is in 130s, doesn't seem to have changed much with worsening swelling. CXR with cardiomegaly, mild pulmonary vascular congestion    -- Patient care was assumed this morning , seen and examined , using 1 liter of Oxygen , seems comfortable this mornin   -- monitor I/Os, daily weights.  -- 2g sodium diet with 2000ml fluid restriction (she does not watch sodium or fluid intake at home)  -- monitor O2 sat, supplemental O2 as needed  -- care coordinator consult for resumption home care  -- lymphedema wraps  -- lasix 60mg IV TID, monitor output/hemodynamics (received 40mg IV in ED), baseline 's, her PTS diuretic is Demadex 20 mg po BID   -- As patient is receiving IV Diuresis and Blood pressure is towards the low soft diet , will hold Procardia XL 60 mg at this time   =-- Discussed with bedside nursing , added holding parameters to all her FERDINAND meds , will not give patient any fluids   -- As patient is not able to receive IV diuretic for last 2 dose due to soft BP , will recommend decreasing lasix to 40 mg IV TID   -- Echocardiogram is ordered  , will follow up on the results   -- cardiology consult (there were plans for her to follow up April to establish care)  -- continue PTA Eliquis BID (consider dose reduction to 2.5mg BID if Cr worsens or weight drops below 60kg)  -- continue PTA metoprolol succinate, sildenafil with holding parameters  -- PT/ Care Coordinator curtis given she lives with her  ,there are concerns about them living independently by their somewhat adopted son who lives in [place] and she will need home care on discharge     Right ring finger warts  Present for months  -- Start salicylic acid gel daily to digit. (needs to be ordered, likely will arrive 3/21)  -- monitor    Anemia :  -- Will check iron profile with Transferrin      Hyperlipidemia  --PTA statin continued     Scleroderma  Rheumatoid arthritis  Psoriasis  --Continue PTA folic acid, methotrexate (wednesdays), plaquenil  -- Patient is requesting not to check any blood pressure or do blood work on her RUE due to H/O Mastectomy      GERD  --Continue omeprazole     Mild Cognitive impairment  OT eval given she is managing her own meds  -- resumed Aricept    Clinically Significant Risk Factors Present on Admission          # Hypercalcemia: Ca = 10.2 mg/dL (Ref range: 8.5 - 10.1 mg/dL) and/or iCa = N/A on admission, will monitor as appropriate     # Coagulation Defect: home medication list includes an anticoagulant medication        Diet: 2 Gram Sodium Diet Other - please comment    Luther Catheter: Not present  DVT Prophylaxis: DOAC  Code Status: No CPR- Do NOT Intubate    The patient's care was discussed with the Bedside Nurse and Patient.    Disposition Plan   Expected Discharge: 03/21/2022     Anticipated discharge location:  Awaiting care coordination huddle   Will follow up on therapies recommendations for safe disposition planning , expecting patient to stay here over the weekend   35 min were spent in direct patient care today including the floor time , more than 50%  of the time was spent in patient care coordination and counseling.    Mariposa Bowers MD, [facility]  Text Page (7am - 6pm)    ----------------------------------------------------------------------------------------------------------------------      Interval History   Patient care was assumed this morning, patient was seen and examined, lying in bed, looks comfortable, dating nasal cannula, she is not complaining of any dyspnea at rest, told me that she has not gotten out of the bed yet.  Discussed with bedside nursing, concerned about soft blood pressures, added holding parameters to the blood pressure medications also held Procardia at this point while patient is undergoing diuresis.  Patient is denying any chest pain or palpitations.  Patient is denying any nausea or vomiting.  Patient currently lives at home with her .    -Data reviewed today: I reviewed all new labs and imaging results over the last 24 hours.    I personally reviewed no images or EKG's today.    Physical Exam   Temp: 97.3  F (36.3  C) Temp src: Oral BP: 90/57 Pulse: 72   Resp: 16 SpO2: 98 % O2 Device: None (Room air)    Vitals:    03/18/22 1452   Weight: 60.8 kg (134 lb)     Vital Signs with Ranges  Temp:  [97.1  F (36.2  C)-97.5  F (36.4  C)] 97.3  F (36.3  C)  Pulse:  [62-96] 72  Resp:  [16] 16  BP: (87-97)/(49-66) 90/57  SpO2:  [94 %-100 %] 98 %  I/O last 3 completed shifts:  In: -   Out: 600 [Urine:600]    GENERAL: Alert , awake and oriented. NAD. Conversational, appropriate. Wearing nasal cannula   HEENT: Normocephalic. EOMI. No icterus or injection. Nares normal.   LUNGS: Clear to auscultation. No dyspnea at rest. Few rhonchi in bases   HEART: Regular rate. Extremities perfused.   ABDOMEN: Soft, nontender, and nondistended. Positive bowel sounds.   EXTREMITIES: No LE edema noted.   NEUROLOGIC: Moves extremities x4 on command. No acute focal neurologic abnormalities noted.     Medications     - MEDICATION INSTRUCTIONS -       - MEDICATION  INSTRUCTIONS -       ACE/ARB/ARNI NOT PRESCRIBED         apixaban ANTICOAGULANT  5 mg Oral BID     donepezil  5 mg Oral At Bedtime     folic acid  1,000 mcg Oral Daily     furosemide  60 mg Intravenous Q8H     gabapentin  100 mg Oral TID     hydroxychloroquine  200 mg Oral Daily     levothyroxine  75 mcg Oral Daily     [Held by provider] methotrexate  12.5 mg Oral 2 times per day on Wed     metoprolol succinate ER  25 mg Oral Daily     NIFEdipine ER  60 mg Oral Daily     pantoprazole  40 mg Oral BID AC     [START ON 3/21/2022] salicylic acid   Topical Daily     sildenafil  20 mg Oral TID     simvastatin  40 mg Oral At Bedtime     sodium chloride (PF)  3 mL Intracatheter Q8H       Data   Recent Labs   Lab 03/18/22 2139 03/18/22  1541   WBC  --  7.4   HGB  --  12.7   MCV  --  97   PLT  --  197   NA  --  135   POTASSIUM 3.5 3.8   CHLORIDE  --  96   CO2  --  36*   BUN  --  22   CR  --  0.80   ANIONGAP  --  3   TU  --  10.2*   GLC  --  104*       Imaging:   Recent Results (from the past 24 hour(s))   US Lower Extremity Venous Duplex Bilateral    Narrative    EXAM: US LOWER EXTREMITY VENOUS DUPLEX BILATERAL  LOCATION: [facility]  DATE/TIME: 3/18/2022 5:22 PM    INDICATION: bilateral lower leg swelling  COMPARISON: None.  TECHNIQUE: Venous Duplex ultrasound of bilateral lower extremities with and without compression, augmentation and duplex. Color flow and spectral Doppler with waveform analysis performed.    FINDINGS: Exam includes the common femoral, femoral, popliteal veins as well as segmentally visualized deep calf veins and greater saphenous vein.     RIGHT: No deep vein thrombosis. No superficial thrombophlebitis. No popliteal cyst.    LEFT: No deep vein thrombosis. No superficial thrombophlebitis. No popliteal cyst.      Impression    IMPRESSION:  1.  No deep venous thrombosis in the bilateral lower extremities.

## 2022-03-20 ENCOUNTER — APPOINTMENT (OUTPATIENT)
Dept: OCCUPATIONAL THERAPY | Facility: CLINIC | Age: 82
DRG: 291 | End: 2022-03-20
Payer: MEDICARE

## 2022-03-20 LAB
ANION GAP SERPL CALCULATED.3IONS-SCNC: 5 MMOL/L (ref 3–14)
BUN SERPL-MCNC: 14 MG/DL (ref 7–30)
CALCIUM SERPL-MCNC: 9.4 MG/DL (ref 8.5–10.1)
CHLORIDE BLD-SCNC: 97 MMOL/L (ref 94–109)
CO2 SERPL-SCNC: 33 MMOL/L (ref 20–32)
CREAT SERPL-MCNC: 0.62 MG/DL (ref 0.52–1.04)
GFR SERPL CREATININE-BSD FRML MDRD: 88 ML/MIN/1.73M2
GLUCOSE BLD-MCNC: 81 MG/DL (ref 70–99)
GLUCOSE BLDC GLUCOMTR-MCNC: 80 MG/DL (ref 70–99)
HGB BLD-MCNC: 11.5 G/DL (ref 11.7–15.7)
MAGNESIUM SERPL-MCNC: 1.6 MG/DL (ref 1.6–2.3)
POTASSIUM BLD-SCNC: 3.6 MMOL/L (ref 3.4–5.3)
SODIUM SERPL-SCNC: 135 MMOL/L (ref 133–144)

## 2022-03-20 PROCEDURE — 99233 SBSQ HOSP IP/OBS HIGH 50: CPT | Performed by: INTERNAL MEDICINE

## 2022-03-20 PROCEDURE — 250N000011 HC RX IP 250 OP 636: Performed by: INTERNAL MEDICINE

## 2022-03-20 PROCEDURE — 85018 HEMOGLOBIN: CPT | Performed by: INTERNAL MEDICINE

## 2022-03-20 PROCEDURE — 250N000013 HC RX MED GY IP 250 OP 250 PS 637: Performed by: INTERNAL MEDICINE

## 2022-03-20 PROCEDURE — 258N000003 HC RX IP 258 OP 636: Performed by: INTERNAL MEDICINE

## 2022-03-20 PROCEDURE — 36415 COLL VENOUS BLD VENIPUNCTURE: CPT | Performed by: INTERNAL MEDICINE

## 2022-03-20 PROCEDURE — 250N000013 HC RX MED GY IP 250 OP 250 PS 637: Performed by: HOSPITALIST

## 2022-03-20 PROCEDURE — 83735 ASSAY OF MAGNESIUM: CPT | Performed by: INTERNAL MEDICINE

## 2022-03-20 PROCEDURE — 80048 BASIC METABOLIC PNL TOTAL CA: CPT | Performed by: INTERNAL MEDICINE

## 2022-03-20 PROCEDURE — 120N000001 HC R&B MED SURG/OB

## 2022-03-20 PROCEDURE — 97530 THERAPEUTIC ACTIVITIES: CPT | Mod: GO

## 2022-03-20 RX ORDER — FUROSEMIDE 10 MG/ML
20 INJECTION INTRAMUSCULAR; INTRAVENOUS ONCE
Status: COMPLETED | OUTPATIENT
Start: 2022-03-20 | End: 2022-03-20

## 2022-03-20 RX ORDER — METHYLPREDNISOLONE SODIUM SUCCINATE 125 MG/2ML
125 INJECTION, POWDER, LYOPHILIZED, FOR SOLUTION INTRAMUSCULAR; INTRAVENOUS
Status: DISCONTINUED | OUTPATIENT
Start: 2022-03-20 | End: 2022-03-24 | Stop reason: HOSPADM

## 2022-03-20 RX ORDER — FUROSEMIDE 10 MG/ML
40 INJECTION INTRAMUSCULAR; INTRAVENOUS 2 TIMES DAILY
Status: DISCONTINUED | OUTPATIENT
Start: 2022-03-20 | End: 2022-03-21

## 2022-03-20 RX ORDER — POTASSIUM CHLORIDE 750 MG/1
10 TABLET, EXTENDED RELEASE ORAL ONCE
Status: COMPLETED | OUTPATIENT
Start: 2022-03-20 | End: 2022-03-20

## 2022-03-20 RX ORDER — DIPHENHYDRAMINE HYDROCHLORIDE 50 MG/ML
50 INJECTION INTRAMUSCULAR; INTRAVENOUS
Status: DISCONTINUED | OUTPATIENT
Start: 2022-03-20 | End: 2022-03-24 | Stop reason: HOSPADM

## 2022-03-20 RX ADMIN — APIXABAN 5 MG: 5 TABLET, FILM COATED ORAL at 21:27

## 2022-03-20 RX ADMIN — FOLIC ACID 1000 MCG: 1 TABLET ORAL at 09:40

## 2022-03-20 RX ADMIN — GABAPENTIN 100 MG: 100 CAPSULE ORAL at 09:40

## 2022-03-20 RX ADMIN — POTASSIUM CHLORIDE 10 MEQ: 750 TABLET, EXTENDED RELEASE ORAL at 09:40

## 2022-03-20 RX ADMIN — DONEPEZIL HYDROCHLORIDE 5 MG: 5 TABLET, FILM COATED ORAL at 21:27

## 2022-03-20 RX ADMIN — SIMVASTATIN 40 MG: 40 TABLET, FILM COATED ORAL at 21:27

## 2022-03-20 RX ADMIN — GABAPENTIN 100 MG: 100 CAPSULE ORAL at 16:10

## 2022-03-20 RX ADMIN — SILDENAFIL 20 MG: 20 TABLET, FILM COATED ORAL at 09:40

## 2022-03-20 RX ADMIN — GABAPENTIN 100 MG: 100 CAPSULE ORAL at 21:27

## 2022-03-20 RX ADMIN — LEVOTHYROXINE SODIUM 75 MCG: 50 TABLET ORAL at 07:47

## 2022-03-20 RX ADMIN — PANTOPRAZOLE SODIUM 40 MG: 40 TABLET, DELAYED RELEASE ORAL at 07:47

## 2022-03-20 RX ADMIN — IRON SUCROSE 300 MG: 20 INJECTION, SOLUTION INTRAVENOUS at 17:21

## 2022-03-20 RX ADMIN — SILDENAFIL 20 MG: 20 TABLET, FILM COATED ORAL at 16:11

## 2022-03-20 RX ADMIN — HYDROXYCHLOROQUINE SULFATE 200 MG: 200 TABLET, FILM COATED ORAL at 09:40

## 2022-03-20 RX ADMIN — APIXABAN 5 MG: 5 TABLET, FILM COATED ORAL at 09:40

## 2022-03-20 RX ADMIN — FUROSEMIDE 40 MG: 10 INJECTION, SOLUTION INTRAVENOUS at 16:11

## 2022-03-20 RX ADMIN — PANTOPRAZOLE SODIUM 40 MG: 40 TABLET, DELAYED RELEASE ORAL at 16:11

## 2022-03-20 RX ADMIN — FUROSEMIDE 20 MG: 10 INJECTION, SOLUTION INTRAVENOUS at 09:39

## 2022-03-20 ASSESSMENT — ACTIVITIES OF DAILY LIVING (ADL)
ADLS_ACUITY_SCORE: 12
ADLS_ACUITY_SCORE: 10
ADLS_ACUITY_SCORE: 12
ADLS_ACUITY_SCORE: 14
ADLS_ACUITY_SCORE: 12
ADLS_ACUITY_SCORE: 12
ADLS_ACUITY_SCORE: 10
ADLS_ACUITY_SCORE: 10
ADLS_ACUITY_SCORE: 12
ADLS_ACUITY_SCORE: 10
ADLS_ACUITY_SCORE: 12
ADLS_ACUITY_SCORE: 10
ADLS_ACUITY_SCORE: 10
ADLS_ACUITY_SCORE: 14
ADLS_ACUITY_SCORE: 12
ADLS_ACUITY_SCORE: 10
ADLS_ACUITY_SCORE: 12
ADLS_ACUITY_SCORE: 10

## 2022-03-20 NOTE — PROGRESS NOTES
Hospitalist Cross Cover   3/20/2022  8138    Pt received 100/300 mg IV iron sucrose when her BP dropped from 124/80s-->104/70s--93/70s. Pt is asymptomatic, no respiratory symptoms, distress, increased oxygen requirements. She remains hemodynamically stable, denies any subjective complaints. Per nursing, she does not have a urticarial rash, stridor, wheezing, or new onset pain.   --ok to stop infusion and discontinue (Hgb 11.5)  --monitor closely for further evidence of allergic reaction, epi/benadryl/famotidine available in eMAR      AUBRIE [facility]  Hospitalist Service  House Officer  Pager: [phone] (7a - 6p)

## 2022-03-20 NOTE — PROGRESS NOTES
[facility]    HOSPITALIST PROGRESS NOTE :   --------------------------------------------------    Date of Admission:  3/18/2022    Cumulative Summary: Jaqueline Canales is a 81 year old female who was admitted on 3/18/2022 with worsening leg swelling, found to have acute exacerbation of diastolic CHF    Assessment & Plan     Acute on chronic diastolic CHF  Mild acute hypoxic respiratory failure  Atrial Fibrillation   Pulmonary hypertension  Worsening lower extremity edema over past few weeks, PCP has been adjusting her torsemide. O2 sat 88% with ambulation while in ED (but also reported as 80% in note). home care nurse stopped coming who did her meds about a week ago--patient isn't sure if she missed any medications, but denies taking any meds in afternoon (torsemide had been recommended TID at last PCP visit). BNP 5818. She reports her baseline weight is in 130s, doesn't seem to have changed much with worsening swelling. CXR with cardiomegaly, mild pulmonary vascular congestion    -- Patient care was assumed this morning , seen and examined , using 1 liter of Oxygen , seems comfortable this mornin   -- Will hold Metoprolol this morning   -- Will give one dose of Lasix 20 mg IV times 1 , currently will continue to hold Lasix 60 mg IV TID   -- Patient has been evaluated by cardiology and is recommended to receive lasix 40 mg IV BID,  PTA diuretic is Demadex 20 mg po BID   -- Continue to hold Procardia XL   -- monitor I/Os, daily weights.  -- 2g sodium diet with 2000ml fluid restriction   -- monitor O2 sat, supplemental O2 as needed  -- lymphedema wraps  --Echocardiogram results are reviewed showing rhythm of atrial fibrillation, LV systolic function is normal with an EF of 60 to 65%, mildly decreased RV systolic function, moderately dilated RV.  Severe biatrial enlargement was seen with moderate severe to severe 3-4+ tricuspid regurgitation.  Right ventricular systolic pressure is elevated  consistent with moderate to severe pulmonary hypertension there is moderate- severe MR there is moderate valvular aortic stenosis  --Cardiology is consulted, appreciate their help, as above recommending Lasix 40 mg IV twice daily, holding metoprolol and nifedipine, continuing sildenafil 20 mg 3 times daily, will follow up on their recommendations regarding the valvular disease  --Continue PTA Eliquis twice daily, currently her creatinine has been is stable she is at the borderline where dose reduction to 0.5 mg twice daily can be considered.  -- PT/ Care Coordinator curtis given she lives with her  ,there are concerns about them living independently by their somewhat adopted son who lives in [place] and she will need home care on discharge     Right ring finger warts  Present for months  -- Start salicylic acid gel daily to digit. (needs to be ordered, likely will arrive 3/21)  -- monitor    Anemia :  --Iron saturation and iron levels came back low, transferrin and iron binding capacity is in the normal range, will give patient Venofer 300 mg IV x1  --Further anemia work-up to be done in an outpatient setting     Hyperlipidemia  --PTA statin continued     Scleroderma  Rheumatoid arthritis  Psoriasis  --Continue PTA folic acid, methotrexate (wednesdays), plaquenil  -- Patient is requesting not to check any blood pressure or do blood work on her RUE due to H/O Mastectomy      GERD  --Continue omeprazole     Mild Cognitive impairment  OT eval given she is managing her own meds  -- resumed Aricept    Clinically Significant Risk Factors Present on Admission                  Diet: 2 Gram Sodium Diet Other - please comment  Room Service    Luther Catheter: Not present  DVT Prophylaxis: DOAC  Code Status: No CPR- Do NOT Intubate    The patient's care was discussed with the Bedside Nurse and Patient.    Disposition Plan   Expected Discharge: 03/21/2022     Anticipated discharge location:  Awaiting care coordination  huddle   Patient has been evaluated by therapies, currently she is recommended to be discharged to TCU versus home with assist versus home with home therapies. 35 min were spent in direct patient care today including the floor time , more than 50% of the time was spent in patient care coordination and counseling.    Mariposa Bowers MD, [facility]  Text Page (7am - 6pm)    ----------------------------------------------------------------------------------------------------------------------      Interval History    Patient was seen and examined, lying in bed, looks comfortable, wearing nasal cannula, no dyspnea on rest, discussed with her regarding adjusting her diuretics further depending upon her blood pressure which is slightly improved as compared to yesterday.  Patient is denying any chest pain or palpitations, currently lives at home with her .  Does not remember most of the conversation that we had yesterday.    -Data reviewed today: I reviewed all new labs and imaging results over the last 24 hours.    I personally reviewed no images or EKG's today.    Physical Exam   Temp: 97.3  F (36.3  C) Temp src: Oral BP: 108/68 Pulse: 77   Resp: 16 SpO2: 93 % O2 Device: Nasal cannula Oxygen Delivery: 2.5 LPM  Vitals:    03/18/22 1452 03/19/22 0700 03/20/22 0717   Weight: 60.8 kg (134 lb) 57.7 kg (127 lb 3.3 oz) 61.9 kg (136 lb 7.4 oz)     Vital Signs with Ranges  Temp:  [97.3  F (36.3  C)-98.3  F (36.8  C)] 97.3  F (36.3  C)  Pulse:  [] 77  Resp:  [15-20] 16  BP: ()/(55-68) 108/68  SpO2:  [87 %-93 %] 93 %  I/O last 3 completed shifts:  In: 600 [P.O.:600]  Out: 475 [Urine:475]    GENERAL: Alert , awake and oriented. NAD. Conversational, appropriate. Wearing nasal cannula   HEENT: Normocephalic. EOMI. No icterus or injection. Nares normal.   LUNGS: Clear to auscultation. No dyspnea at rest. Few rhonchi in bases   HEART: Regular rate. Extremities perfused.   ABDOMEN: Soft, nontender, and nondistended. Positive  bowel sounds.   EXTREMITIES: Trace bilateral lower extremity edema, chronic venous insufficiency changes in bilateral lower extremities  NEUROLOGIC: Moves extremities x4 on command. No acute focal neurologic abnormalities noted.     Medications     - MEDICATION INSTRUCTIONS -       - MEDICATION INSTRUCTIONS -       ACE/ARB/ARNI NOT PRESCRIBED         apixaban ANTICOAGULANT  5 mg Oral BID     donepezil  5 mg Oral At Bedtime     folic acid  1,000 mcg Oral Daily     furosemide  20 mg Intravenous Once     [Held by provider] furosemide  60 mg Intravenous Q8H     gabapentin  100 mg Oral TID     hydroxychloroquine  200 mg Oral Daily     levothyroxine  75 mcg Oral Daily     [Held by provider] methotrexate  12.5 mg Oral 2 times per day on Wed     [Held by provider] metoprolol succinate ER  25 mg Oral Daily     [Held by provider] NIFEdipine ER  60 mg Oral Daily     pantoprazole  40 mg Oral BID AC     [START ON 3/21/2022] salicylic acid   Topical Daily     sildenafil  20 mg Oral TID     simvastatin  40 mg Oral At Bedtime     sodium chloride (PF)  3 mL Intracatheter Q8H       Data   Recent Labs   Lab 03/20/22  0807 03/19/22  1409 03/19/22  0824 03/18/22  2139 03/18/22  1541   WBC  --   --  5.2  --  7.4   HGB 11.5*  --  10.9*  --  12.7   MCV  --   --  96  --  97   PLT  --   --  170  --  197     --  137  --  135   POTASSIUM 3.6 3.7 2.9*   < > 3.8   CHLORIDE 97  --  96  --  96   CO2  --   --  34*  --  36*   BUN  --   --  16  --  22   CR  --   --  0.58  --  0.80   ANIONGAP  --   --  7  --  3   TU  --   --  9.2  --  10.2*   GLC  --   --  82  --  104*    < > = values in this interval not displayed.       Imaging:   Recent Results (from the past 24 hour(s))   Echocardiogram Complete   Result Value    LVEF  60-65%    Narrative    [id number]  [id number]  [id number]  [id number]^MIGUEL ANGEL^CHANNING^KUNAL     [facility]  Echocardiography Laboratory  [address] [place] [address]     Name: LINDAGINA RODRIGUEZ  MRN:  [id number]  : 1940  Study Date: 2022 11:17 AM  Age: 81 yrs  Gender: Female  Patient Location: [facility]  Reason For Study: Heart Failure  Ordering Physician: CHANNING MICHELLE  Referring Physician: Carissa Burroughs  Performed By: Abdi Cervantes     BSA: 1.6 m2  Height: 62 in  Weight: 134 lb  HR: 82  BP: 97/66 mmHg  ______________________________________________________________________________  Procedure  Complete Portable Echo Adult.  ______________________________________________________________________________  Interpretation Summary     The rhythm was atrial fibrillation.  Left ventricular systolic function is normal.  The visual ejection fraction is 60-65%.  Mildly decreased right ventricular systolic function  The right ventricle is moderately dilated.  The left ventricle is normal in size.  There is severe biatrial enlargement.  There is mod-severe to severe (3-4+) tricuspid regurgitation.  Right ventricular systolic pressure is elevated, consistent with moderate to  severe pulmonary hypertension.  There is moderate to mod-severe (2-3+) mitral regurgitation.ERO 0.26 cm2/ RV  38 ml)  Moderate valvular aortic stenosis JACOB 1.1cm2/MSG 20 mmHg/DI 0.34     As compared with the last sudy 3/22/2021 there has been a slight increase in  the severity of AS ( JACOB was 1.3, now 1.1cm2) and estimated PA systolcic  pressus ( was 48 mmHg + RAP, now 53 mmHg = RAP). There has been no significant  change in LV/RV systolic performance or chamber size.  ______________________________________________________________________________  Left Ventricle  The left ventricle is normal in size. There is normal left ventricular wall  thickness. Left ventricular systolic function is normal. The visual ejection  fraction is 60-65%. Left ventricular diastolic function is indeterminate. No  regional wall motion abnormalities noted. There is no thrombus seen in the  left ventricle.     Right Ventricle  The right ventricle is  moderately dilated. There is normal right ventricular  wall thickness. Mildly decreased right ventricular systolic function. There is  no mass or thrombus in the right ventricle.     Atria  There is severe biatrial enlargement. There is no atrial shunt seen. The left  atrial appendage is not well visualized.     Mitral Valve  The mitral valve leaflets are moderately thickened. There is moderate to mod-  severe (2-3+) mitral regurgitation. The mean mitral valve gradient is 5.4  mmHg. There is mild mitral stenosis.     Tricuspid Valve  Normal tricuspid valve. There is mod-severe to severe (3-4+) tricuspid  regurgitation. The right ventricular systolic pressure is elevated at 53.3  mmHg. Right ventricular systolic pressure is elevated, consistent with  moderate to severe pulmonary hypertension.     Aortic Valve  There is moderate trileaflet aortic sclerosis. No aortic regurgitation is  present. Moderate valvular aortic stenosis.     Pulmonic Valve  The pulmonic valve is not well visualized.     Vessels  The aortic root is normal size. Normal size ascending aorta. Dilation of the  inferior vena cava is present with normal respiratory variation in diameter.  The pulmonary artery is normal size.     Pericardium  The pericardium appears normal. There is no pleural effusion.     Rhythm  The rhythm was atrial fibrillation.  ______________________________________________________________________________  MMode/2D Measurements & Calculations  IVSd: 0.95 cm     LVIDd: 4.0 cm  LVIDs: 3.4 cm  LVPWd: 1.4 cm  FS: 13.3 %  LV mass(C)d: 157.3 grams  LV mass(C)dI: 97.6 grams/m2  Ao root diam: 2.5 cm  LA dimension: 4.6 cm  asc Aorta Diam: 3.2 cm  LA/Ao: 1.8  LVOT diam: 2.0 cm  LVOT area: 3.1 cm2  LA Volume (BP): 143.0 ml  LA Volume Index (BP): 88.8 ml/m2  RWT: 0.70     Doppler Measurements & Calculations  MV E max carrington: 120.0 cm/sec  MV max P.4 mmHg  MV mean P.4 mmHg  MV V2 VTI: 23.2 cm  MVA(VTI): 2.7 cm2  MV dec slope: 1133  cm/sec2  MV dec time: 0.11 sec  Ao V2 max: 306.5 cm/sec  Ao max P.0 mmHg  Ao V2 mean: 206.6 cm/sec  Ao mean P.1 mmHg  Ao V2 VTI: 58.2 cm  JACOB(I,D): 1.1 cm2  JACOB(V,D): 1.0 cm2  LV V1 max P.4 mmHg  LV V1 max: 104.3 cm/sec  LV V1 VTI: 20.2 cm  SV(LVOT): 61.8 ml  SI(LVOT): 38.3 ml/m2  TR max el: 365.1 cm/sec  TR max P.3 mmHg  AV El Ratio (DI): 0.34  JACOB Index (cm2/m2): 0.66  E/E' av.3  Lateral E/e': 12.8  Medial E/e': 19.7     ______________________________________________________________________________  Report approved by: Dr. Aman Hutchinson 2022 01:41 PM

## 2022-03-20 NOTE — PROGRESS NOTES
[facility]    Cardiology Progress Note     Assessment & Plan   Jaqueline Canales is a 81 year old female who was admitted on 3/18/2022.     1. Acute on chronic diastolic heart failure  2. Primary complaint of lower extremity edema.  3. Pulmonary hypertension (likely mixed etiology)  4. Moderate severe tricuspid regurgitation  5. Moderate severe mitral valve regurgitation  6. Moderate aortic valve stenosis  7. Atrial fibrillation (on Eliquis twice daily)  8. Iron deficiency anemia  9. Hyperlipidemia  10. Scleroderma  11. Rheumatoid arthritis    Patient complaint of predominantly right-sided heart failure symptoms.  Respiratory status appears to be at her baseline.  Diuresis held yesterday due to borderline low blood pressures.  She continues to have lower extremity edema.  Suggest lower extremity wrapping along with more diuresis today.    Plan:    Lasix 40 mg IV twice daily (home dose of torsemide is 20 mg twice daily).  Daily weights, strict intake and output.    Keep potassium above 4, magnesium above 2.    Lower extremity wraps    We will plan on transitioning her hopefully to her oral diuretic torsemide in the next 48 hours.    Continue therapeutic anticoagulation with apixaban.    Continue sildenafil 20 mg 3 times daily    Continue to hold metoprolol and nifedipine given borderline blood pressures.      Dionte Broussard MD    Interval History   No acute events reported overnight.  Remains on 2 L nasal cannula.  Low normal blood pressures.    Physical Exam   Temp: 97.3  F (36.3  C) Temp src: Oral BP: 108/68 Pulse: 77   Resp: 16 SpO2: 93 % O2 Device: Nasal cannula Oxygen Delivery: 2.5 LPM  Vitals:    03/18/22 1452 03/19/22 0700 03/20/22 0717   Weight: 60.8 kg (134 lb) 57.7 kg (127 lb 3.3 oz) 61.9 kg (136 lb 7.4 oz)     Vital Signs with Ranges  Temp:  [97.3  F (36.3  C)-98.3  F (36.8  C)] 97.3  F (36.3  C)  Pulse:  [] 77  Resp:  [15-20] 16  BP: ()/(55-68) 108/68  SpO2:  [87 %-93  %] 93 %  I/O last 3 completed shifts:  In: 600 [P.O.:600]  Out: 475 [Urine:475]  Patient Active Problem List   Diagnosis     Other psoriasis     Obesity     Osteoporosis     Esophageal reflux     Lumbago     Raynaud's syndrome     Helicobacter pylori infection     Malignant neoplasm of female breast (H)     Amaurosis fugax of right eye     Health Care Home     Advance Care Planning     Diverticulosis     Hyperlipidemia LDL goal <100     S/P carotid endarterectomy     Pulmonary fibrosis (H)     SRINIVAS (obstructive sleep apnea)     Constipation     Hypothyroidism     Esophageal stricture     Hiatal hernia     IVCD (intraventricular conduction defect)     Low back pain     Lung nodule     Malignant neoplasm of lower lobe of left lung (H)     Persistent atrial fibrillation (H)     Chest pain     Pulmonary hypertension (H)     Status post coronary angiogram     Benign essential hypertension     Adenomatous polyp     Acute CHF (congestive heart failure) (H)     Lymphedema of both lower extremities     Hypoxia     Physical deconditioning     Chronic diastolic heart failure (H)     Age-related cataract of left eye     Anemia     Leg swelling     Acute on chronic heart failure, unspecified heart failure type (H)     Alert awake and oriented x3, resting comfortably in the hospital bed and in no acute distress  Elevated JVP with prominent V waves up to the angle of the jaw.  Irregularly irregular, normal rate, systolic murmurs over the right upper sternal border, left lower sternal border and apex, right ventricular heave.  Abdomen is soft and nontender  Lower extremities are warm and well perfused with 2+ bilateral edema.      Medications     - MEDICATION INSTRUCTIONS -       - MEDICATION INSTRUCTIONS -       ACE/ARB/ARNI NOT PRESCRIBED         apixaban ANTICOAGULANT  5 mg Oral BID     donepezil  5 mg Oral At Bedtime     folic acid  1,000 mcg Oral Daily     furosemide  40 mg Intravenous BID     [Held by provider] furosemide  60  mg Intravenous Q8H     gabapentin  100 mg Oral TID     hydroxychloroquine  200 mg Oral Daily     levothyroxine  75 mcg Oral Daily     [Held by provider] methotrexate  12.5 mg Oral 2 times per day on Wed     [Held by provider] metoprolol succinate ER  25 mg Oral Daily     [Held by provider] NIFEdipine ER  60 mg Oral Daily     pantoprazole  40 mg Oral BID AC     [START ON 3/21/2022] salicylic acid   Topical Daily     sildenafil  20 mg Oral TID     simvastatin  40 mg Oral At Bedtime     sodium chloride (PF)  3 mL Intracatheter Q8H       Data   Recent Labs   Lab 03/20/22  1235 03/20/22  0807 03/19/22  1409 03/19/22  0824 03/18/22  2139 03/18/22  1541   WBC  --   --   --  5.2  --  7.4   HGB  --  11.5*  --  10.9*  --  12.7   MCV  --   --   --  96  --  97   PLT  --   --   --  170  --  197   NA  --  135  --  137  --  135   POTASSIUM  --  3.6 3.7 2.9*   < > 3.8   CHLORIDE  --  97  --  96  --  96   CO2  --  33*  --  34*  --  36*   BUN  --  14  --  16  --  22   CR  --  0.62  --  0.58  --  0.80   ANIONGAP  --  5  --  7  --  3   TU  --  9.4  --  9.2  --  10.2*   GLC 80 81  --  82  --  104*    < > = values in this interval not displayed.

## 2022-03-20 NOTE — PLAN OF CARE
Goal Outcome Evaluation:      3/19/22 [id number]  Pt A/O x4. VSS on 2.5 L via NC x soft BP. On tele - . PIV SL. +3 BLE edema. Up A1 w/ GB + W to BSC. Mepilex to coccyx. 2000 mL fluid restriction, strict I/O, tolerating 2gm NA diet. Discharge pending.

## 2022-03-20 NOTE — PLAN OF CARE
"Goal Outcome Evaluation:                    Summary:     Primary Diagnosis: CHF exacerbation  Orientation: A&O 4  Aggression Stop Light: Green  Mobility: assist of 1, GBW  Pain Management:   Diet: 2gr Na+  Bowel/Bladder: continent  Abnormal Lab/Assessments:   Drain/Device/Wound:   Consults:   D/C Day/Goals/Place:    Shift Note:     Patient is A&Ox4. VSS ex hypotensive, Needing to increase O2 to 2.5 LMP at 2000 to maintain >90%. Denies SOB. Strict I&O maintained. 3+ BLE edema, red warm to touch. Red blanchable coccyx, c/o of \"tail bone discomfort.\" place meplix   "

## 2022-03-20 NOTE — CONSULTS
Care Management Initial Consult    General Information  Assessment completed with: Patient,    Type of CM/SW Visit: Initial Assessment    Primary Care Provider verified and updated as needed: Yes   Readmission within the last 30 days: no previous admission in last 30 days      Reason for Consult: discharge planning  Advance Care Planning: Advance Care Planning Reviewed: no concerns identified        Communication Assessment  Patient's communication style: spoken language (English or Bilingual)    Hearing Difficulty or Deaf: no   Wear Glasses or Blind: yes    Cognitive  Cognitive/Neuro/Behavioral: WDL                      Living Environment:   People in home: significant other   (Chava)  Current living Arrangements: house      Able to return to prior arrangements: other (see comments) (TCU being recommended. )     Family/Social Support:  Care provided by: self, spouse/significant other  Provides care for: no one  Marital Status:   , Children  Chava       Description of Support System: Involved, Supportive    Support Assessment: Adequate family and caregiver support, Adequate social supports    Current Resources:   Patient receiving home care services:       Community Resources:    Equipment currently used at home: walker, rolling  Supplies currently used at home:      Employment/Financial:  Employment Status: retired     Financial Concerns:     Lifestyle & Psychosocial Needs:  Social Determinants of Health     Tobacco Use: Medium Risk     Smoking Tobacco Use: Former Smoker     Smokeless Tobacco Use: Never Used   Alcohol Use: Not on file   Financial Resource Strain: Low Risk      Difficulty of Paying Living Expenses: Not hard at all   Food Insecurity: Not on file   Transportation Needs: No Transportation Needs     Lack of Transportation (Medical): No     Lack of Transportation (Non-Medical): No   Physical Activity: Not on file   Stress: Not on file   Social Connections: Unknown     Frequency of  Communication with Friends and Family: Not on file     Frequency of Social Gatherings with Friends and Family: More than three times a week     Attends Latter day Services: Not on file     Active Member of Clubs or Organizations: Not on file     Attends Club or Organization Meetings: Not on file     Marital Status:    Intimate Partner Violence: Not on file   Depression: Not at risk     PHQ-2 Score: 0   Housing Stability: Not on file     Functional Status:  Prior to admission patient needed assistance:   Mental Health Status:  Chemical Dependency Status:  Values/Beliefs:  Spiritual, Cultural Beliefs, Latter day Practices, Values that affect care:              Additional Information:   Per care management/ social work consult, SW met with patient. Patient was admitted to inpatient on 3/18 for acute exacerbation of diastolic CHF and has an expected discharge of 3/21. SW met with patient and discussed therapy's recommendation of TCU. Patient is open to going to go TCU and would like [facility] to be [facility]. Patient prefers a semi-private room. Transportation needs to be discussed.     Addendum: [facility] was accepted [facility] for private room on 3/21, patient does not want to pay private room fee. SW will wait for [facility] to review.     Kaycee Dawn, MSW, LGSW

## 2022-03-20 NOTE — PLAN OF CARE
Goal Outcome Evaluation:    Patient is A&Ox4. VSS ex soft BPs and tachy. Pt requesting to rest, refused orthostatics on day shift. Denies pain and nausea. Foam dressing to coccyx, turned and repositioned as allows. +3 BLE edema. L PIV SL. On 2gm Na+ diet with 2000 ml fluid restriction. Up with assist of 1 and walker/gait belt to commode. Potassium 3.7, replaced and recheck in AM. Discharge to [facility] vs [facility] 3/21.

## 2022-03-21 LAB
ANION GAP SERPL CALCULATED.3IONS-SCNC: 3 MMOL/L (ref 3–14)
BUN SERPL-MCNC: 10 MG/DL (ref 7–30)
CALCIUM SERPL-MCNC: 8.6 MG/DL (ref 8.5–10.1)
CHLORIDE BLD-SCNC: 96 MMOL/L (ref 94–109)
CO2 SERPL-SCNC: 36 MMOL/L (ref 20–32)
CREAT SERPL-MCNC: 0.55 MG/DL (ref 0.52–1.04)
GFR SERPL CREATININE-BSD FRML MDRD: >90 ML/MIN/1.73M2
GLUCOSE BLD-MCNC: 82 MG/DL (ref 70–99)
MAGNESIUM SERPL-MCNC: 1.5 MG/DL (ref 1.6–2.3)
NT-PROBNP SERPL-MCNC: 5482 PG/ML (ref 0–1800)
POTASSIUM BLD-SCNC: 3.1 MMOL/L (ref 3.4–5.3)
POTASSIUM BLD-SCNC: 3.5 MMOL/L (ref 3.4–5.3)
SODIUM SERPL-SCNC: 135 MMOL/L (ref 133–144)

## 2022-03-21 PROCEDURE — 84132 ASSAY OF SERUM POTASSIUM: CPT | Performed by: HOSPITALIST

## 2022-03-21 PROCEDURE — 80048 BASIC METABOLIC PNL TOTAL CA: CPT | Performed by: INTERNAL MEDICINE

## 2022-03-21 PROCEDURE — 250N000013 HC RX MED GY IP 250 OP 250 PS 637: Performed by: NURSE PRACTITIONER

## 2022-03-21 PROCEDURE — 36415 COLL VENOUS BLD VENIPUNCTURE: CPT | Performed by: INTERNAL MEDICINE

## 2022-03-21 PROCEDURE — 250N000011 HC RX IP 250 OP 636: Performed by: INTERNAL MEDICINE

## 2022-03-21 PROCEDURE — 250N000013 HC RX MED GY IP 250 OP 250 PS 637: Performed by: HOSPITALIST

## 2022-03-21 PROCEDURE — 99232 SBSQ HOSP IP/OBS MODERATE 35: CPT | Performed by: HOSPITALIST

## 2022-03-21 PROCEDURE — 83735 ASSAY OF MAGNESIUM: CPT | Performed by: INTERNAL MEDICINE

## 2022-03-21 PROCEDURE — 120N000001 HC R&B MED SURG/OB

## 2022-03-21 PROCEDURE — 99233 SBSQ HOSP IP/OBS HIGH 50: CPT | Mod: FS | Performed by: INTERNAL MEDICINE

## 2022-03-21 PROCEDURE — 250N000011 HC RX IP 250 OP 636: Performed by: HOSPITALIST

## 2022-03-21 PROCEDURE — 83880 ASSAY OF NATRIURETIC PEPTIDE: CPT | Performed by: INTERNAL MEDICINE

## 2022-03-21 PROCEDURE — 36415 COLL VENOUS BLD VENIPUNCTURE: CPT | Performed by: HOSPITALIST

## 2022-03-21 RX ORDER — POTASSIUM CHLORIDE 1500 MG/1
20 TABLET, EXTENDED RELEASE ORAL DAILY
Status: DISCONTINUED | OUTPATIENT
Start: 2022-03-21 | End: 2022-03-24 | Stop reason: HOSPADM

## 2022-03-21 RX ORDER — MAGNESIUM SULFATE HEPTAHYDRATE 40 MG/ML
2 INJECTION, SOLUTION INTRAVENOUS ONCE
Status: COMPLETED | OUTPATIENT
Start: 2022-03-21 | End: 2022-03-21

## 2022-03-21 RX ORDER — POTASSIUM CHLORIDE 1500 MG/1
20 TABLET, EXTENDED RELEASE ORAL ONCE
Status: COMPLETED | OUTPATIENT
Start: 2022-03-21 | End: 2022-03-21

## 2022-03-21 RX ORDER — TORSEMIDE 20 MG/1
20 TABLET ORAL
Status: DISCONTINUED | OUTPATIENT
Start: 2022-03-21 | End: 2022-03-24 | Stop reason: HOSPADM

## 2022-03-21 RX ADMIN — APIXABAN 2.5 MG: 2.5 TABLET, FILM COATED ORAL at 21:29

## 2022-03-21 RX ADMIN — FOLIC ACID 1000 MCG: 1 TABLET ORAL at 08:45

## 2022-03-21 RX ADMIN — DONEPEZIL HYDROCHLORIDE 5 MG: 5 TABLET, FILM COATED ORAL at 21:29

## 2022-03-21 RX ADMIN — HYDROXYCHLOROQUINE SULFATE 200 MG: 200 TABLET, FILM COATED ORAL at 08:45

## 2022-03-21 RX ADMIN — POTASSIUM CHLORIDE 20 MEQ: 1500 TABLET, EXTENDED RELEASE ORAL at 08:45

## 2022-03-21 RX ADMIN — MAGNESIUM SULFATE HEPTAHYDRATE 2 G: 40 INJECTION, SOLUTION INTRAVENOUS at 11:44

## 2022-03-21 RX ADMIN — TORSEMIDE 20 MG: 20 TABLET ORAL at 15:45

## 2022-03-21 RX ADMIN — LEVOTHYROXINE SODIUM 75 MCG: 50 TABLET ORAL at 06:35

## 2022-03-21 RX ADMIN — GABAPENTIN 100 MG: 100 CAPSULE ORAL at 08:45

## 2022-03-21 RX ADMIN — APIXABAN 2.5 MG: 2.5 TABLET, FILM COATED ORAL at 08:55

## 2022-03-21 RX ADMIN — Medication: at 15:50

## 2022-03-21 RX ADMIN — SIMVASTATIN 40 MG: 40 TABLET, FILM COATED ORAL at 21:29

## 2022-03-21 RX ADMIN — SILDENAFIL 20 MG: 20 TABLET, FILM COATED ORAL at 08:45

## 2022-03-21 RX ADMIN — FUROSEMIDE 40 MG: 10 INJECTION, SOLUTION INTRAVENOUS at 08:45

## 2022-03-21 RX ADMIN — POTASSIUM CHLORIDE 20 MEQ: 1500 TABLET, EXTENDED RELEASE ORAL at 15:45

## 2022-03-21 RX ADMIN — PANTOPRAZOLE SODIUM 40 MG: 40 TABLET, DELAYED RELEASE ORAL at 15:45

## 2022-03-21 RX ADMIN — GABAPENTIN 100 MG: 100 CAPSULE ORAL at 15:45

## 2022-03-21 RX ADMIN — SILDENAFIL 20 MG: 20 TABLET, FILM COATED ORAL at 15:45

## 2022-03-21 RX ADMIN — POTASSIUM CHLORIDE 20 MEQ: 1500 TABLET, EXTENDED RELEASE ORAL at 12:45

## 2022-03-21 RX ADMIN — GABAPENTIN 100 MG: 100 CAPSULE ORAL at 21:29

## 2022-03-21 RX ADMIN — PANTOPRAZOLE SODIUM 40 MG: 40 TABLET, DELAYED RELEASE ORAL at 06:35

## 2022-03-21 RX ADMIN — SILDENAFIL 20 MG: 20 TABLET, FILM COATED ORAL at 21:28

## 2022-03-21 ASSESSMENT — ACTIVITIES OF DAILY LIVING (ADL)
ADLS_ACUITY_SCORE: 14
ADLS_ACUITY_SCORE: 10
ADLS_ACUITY_SCORE: 10
ADLS_ACUITY_SCORE: 14
ADLS_ACUITY_SCORE: 10
ADLS_ACUITY_SCORE: 14
ADLS_ACUITY_SCORE: 10
ADLS_ACUITY_SCORE: 14
ADLS_ACUITY_SCORE: 14
ADLS_ACUITY_SCORE: 10
ADLS_ACUITY_SCORE: 14
ADLS_ACUITY_SCORE: 14
ADLS_ACUITY_SCORE: 10
ADLS_ACUITY_SCORE: 14
ADLS_ACUITY_SCORE: 10
ADLS_ACUITY_SCORE: 10
ADLS_ACUITY_SCORE: 14
ADLS_ACUITY_SCORE: 14

## 2022-03-21 NOTE — PROGRESS NOTES
"/68 (BP Location: Left arm)   Pulse 81   Temp 98.7  F (37.1  C) (Oral)   Resp 18   Ht 1.575 m (5' 2\")   Wt 58.7 kg (129 lb 8 oz)   SpO2 95%   BMI 23.69 kg/m    Body mass index is 23.69 kg/m .    Primary Diagnosis: Acute chronic heart failure  VS: VSS  BG: n/a  Labs: K and Mg replacement protocol. See MAR. K redraw @ 1800. Mg redraw tomorrow AM.  Pain/Nausea/PRN: tristian any pain or nausea  Diet: 2 g sodium restriction. 2000 mL fluid restriction. 1200 mL consumed over duration of shift.  LDA: L PIV SL CDI. on 2 L O2 via nasal cannula or oxymask  GI: Last BM 3/20/22  : Incontinent of urine with urgency. Purewick in place  Skin: blanchable redness on coccyx, Meplex applied. lisa skin on calves and shins, dry peeling skin on feet and legs  Mobility: Ax1 w/ GB/W; pt ambulated in [place] with NA  Tele: Afib CVR w/PVCs  Plan: continue to monitor K and Mg, awaiting K redraw. Promote movement and activity. Will likely discharge to TCU w/in a few days       Marie was in a good mood for the duration of the shift. Denied any pain. Complained of stiffness in joints of fingers. Repositioned Q 2hrs off coccyx and Meplex applied. Vitals stable. Ambulated in hallway with NA, Ax1 w/walker and gait belt. Transitioned to oral diuretics. Awaiting TCU placement and K lab stabilization for discharge.  "

## 2022-03-21 NOTE — PROGRESS NOTES
[facility]    Hospitalist Progress Note   ---------------------------------------    Date of Admission:  3/18/2022       Jaqueline Canales is a 81 year old female with PMH significant for A fib, P HTN, CHF with preserved EF, GERD, scleroderma, RA, Psoriasis, HL,  who presented with worsening leg swelling, found to have acute exacerbation of diastolic CHF and was admitted 3/18/22 for further management.     Assessment & Plan     Acute on chronic diastolic CHF  Mild acute hypoxic respiratory failure  Atrial Fibrillation   Pulmonary hypertension  Worsening lower extremity edema over past few weeks, PCP has been adjusting her torsemide. O2 sat 88% with ambulation while in ED (but also reported as 80% in note). home care nurse stopped coming who did her meds about a week ago--patient isn't sure if she missed any medications, but denies taking any meds in afternoon (torsemide had been recommended TID at last PCP visit). BNP 5818. She reports her baseline weight is in 130s, doesn't seem to have changed much with worsening swelling. CXR with cardiomegaly, mild pulmonary vascular congestion  - managed with Lasix IV earlier this stay, net Negative outout and weight is slightly down.  - cardiology consulted and following, changed to PTA torsemide  - continue to follow I&O, daily weight. Fluid restriction.   - daily BMP  - wean off O2 as able, not on home O2  - BB/ACEI on hold given soft BP  - Lymphedema wraps  - TTE showed Normal LV EF of 60 to 65%, mildly decreased RV systolic function, moderately dilated RV.  Severe biatrial enlargement was seen with moderate severe to severe 3-4+ tricuspid regurgitation.  Right ventricular systolic pressure is elevated consistent with moderate to severe pulmonary hypertension there is moderate- severe MR there is moderate valvular aortic stenosis  - PTA apixaban dose adjusted, decreased to 2.5 mg BID   -- PT/ Care Coordinator curtis given she lives with her husbad ,  there are concerns about them living independently by their somewhat adopted son who lives in [place] and she will need home care on discharge     Hypokalemia  Replace as needed and adding scheduled potassium as well      Right ring finger warts  Present for months  -- Start salicylic acid gel daily to digit. (needs to be ordered, likely will arrive 3/21)  -- monitor    Anemia :  --Iron saturation and iron levels came back low, transferrin and iron binding capacity is in the normal range  --IV iron was given but not completed as BP was running soft, no concern for allergic reaction.   --start PO iron  --Further anemia work-up to be done in an outpatient setting     Hyperlipidemia  --PTA statin continued     Scleroderma  Rheumatoid arthritis  Psoriasis  --Continue PTA folic acid, methotrexate (wednesdays), plaquenil  -- Patient is requesting not to check any blood pressure or do blood work on her RUE due to H/O Mastectomy      GERD  --Continue omeprazole     Mild Cognitive impairment  OT eval given she is managing her own meds  -- resumed Aricept    Clinically Significant Risk Factors Present on Admission                  Diet: 2 Gram Sodium Diet Other - please comment  Room Service    Luther Catheter: Not present  DVT Prophylaxis: DOAC  Code Status: No CPR- Do NOT Intubate    The patient's care was discussed with the Bedside Nurse and Patient.    Disposition Plan   Expected Discharge: 03/23/2022   TCU vs home with home care therapies  Anticipated discharge location:  Awaiting care coordination abelino Vang MD  Hospitalist    ----------------------------------------------------------------------------------------------------------------------      Interval History      Patient was evaluated this afternoon.   Event last evening was noted. Was getting iron infusion, reportedly BP was trending down. It was noted that her BP was soft before even while she was not in iron infusion. There were  no other  symptoms of allergic reaction.   Patient reports her breathing is about the same from yesterday although overall has improved since admission.   Denies chest pain, nausea, palpitation    -Data reviewed today: I reviewed all new labs  results over the last 24 hours. I reviewed all active medications      Physical Exam   Temp: 97.4  F (36.3  C) Temp src: Oral BP: 106/68 Pulse: (!) 134   Resp: 20 SpO2: 94 % O2 Device: Nasal cannula Oxygen Delivery: 2.5 LPM  Vitals:    03/20/22 0717 03/21/22 0556 03/21/22 0827   Weight: 61.9 kg (136 lb 7.4 oz) 57.7 kg (127 lb 3.2 oz) 58.7 kg (129 lb 8 oz)     Vital Signs with Ranges  Temp:  [96.8  F (36  C)-98.7  F (37.1  C)] 97.4  F (36.3  C)  Pulse:  [] 134  Resp:  [16-20] 20  BP: ()/(55-68) 106/68  SpO2:  [84 %-95 %] 94 %  I/O last 3 completed shifts:  In: 940 [P.O.:940]  Out: 2200 [Urine:2200]    GENERAL: Alert , awake forgetful but oriented. NAD. Conversational, appropriate. Wearing nasal cannula   HEENT: Normocephalic. EOMI. No icterus or injection. Nares normal.   LUNGS: diminished, no overt crackles, or wheezing. No dyspnea at rest.   HEART: s1s2 regular, no tachycardia or murmur  ABDOMEN: Soft, nontender, and nondistended. Positive bowel sounds.   EXTREMITIES: Chronic venous insufficiency changes in bilateral lower extremities and bilateral leg edema (+)  NEUROLOGIC: Moves extremities x4 on command. No acute focal neurologic abnormalities noted.     Medications     - MEDICATION INSTRUCTIONS -       - MEDICATION INSTRUCTIONS -       ACE/ARB/ARNI NOT PRESCRIBED         apixaban ANTICOAGULANT  2.5 mg Oral BID     donepezil  5 mg Oral At Bedtime     folic acid  1,000 mcg Oral Daily     gabapentin  100 mg Oral TID     hydroxychloroquine  200 mg Oral Daily     levothyroxine  75 mcg Oral Daily     [Held by provider] methotrexate  12.5 mg Oral 2 times per day on Wed     [Held by provider] metoprolol succinate ER  25 mg Oral Daily     [Held by provider] NIFEdipine ER  60 mg  Oral Daily     pantoprazole  40 mg Oral BID AC     potassium chloride  20 mEq Oral Daily     salicylic acid   Topical Daily     sildenafil  20 mg Oral TID     simvastatin  40 mg Oral At Bedtime     sodium chloride (PF)  3 mL Intracatheter Q8H     torsemide  20 mg Oral BID       Data   Recent Labs   Lab 03/21/22  0647 03/20/22  1235 03/20/22  0807 03/19/22  1409 03/19/22  0824 03/18/22  2139 03/18/22  1541   WBC  --   --   --   --  5.2  --  7.4   HGB  --   --  11.5*  --  10.9*  --  12.7   MCV  --   --   --   --  96  --  97   PLT  --   --   --   --  170  --  197     --  135  --  137  --  135   POTASSIUM 3.1*  --  3.6 3.7 2.9*   < > 3.8   CHLORIDE 96  --  97  --  96  --  96   CO2 36*  --  33*  --  34*  --  36*   BUN 10  --  14  --  16  --  22   CR 0.55  --  0.62  --  0.58  --  0.80   ANIONGAP 3  --  5  --  7  --  3   TU 8.6  --  9.4  --  9.2  --  10.2*   GLC 82 80 81  --  82  --  104*    < > = values in this interval not displayed.       Imaging:   No results found for this or any previous visit (from the past 24 hour(s)).

## 2022-03-21 NOTE — CONSULTS
[facility]    Received [facility] Consult from Dr. Travis.     Reviewed Ms. Canales's chart and note they are admitted for lower extremity edema. Echocardiogram shows LVEF 60-65%. This is their first admission in the past year for concerns of heart failure.     Ms. Canales had one visit with [facility] last summer, and has primarily been followed by P[facility] Team, with Dr. Marquez. I see notes from P[facility] Nursing Team stating they tried to reach her to arrange follow-up with [facility] due for 4/2022.    Given above information, recommend follow-up with [facility] post discharge, who can determine if future CORE visits are appropriate vs ongoing management with P[facility] team. I've arranged this visit for next week.    Future Appointments   Date Time Provider Department Center   3/23/2022  4:30 PM Carissa Burroughs MD [facility] CS   3/28/2022  3:00 PM [facility]   3/28/2022  4:00 PM Martha Gant PA [facility] CLIN   4/4/2022 11:30 AM CS LAB [facility] CS   7/19/2022 10:00 AM [id number]   7/19/2022 11:30 AM Shay Marquez MD [facility]          Please call with questions.          Mandi Benavides RN BSN  [facility] RN Care Coordinator   [facility]   [phone]   [facility]: Cardiomyopathy, Optimization, Rehabilitation, Education

## 2022-03-21 NOTE — PROGRESS NOTES
Care Management Follow Up    Length of Stay (days): 3    Expected Discharge Date: 03/23/2022     Concerns to be Addressed:       Patient plan of care discussed at interdisciplinary rounds: Yes    Anticipated Discharge Disposition: Skilled Nursing Facilty     Anticipated Discharge Services:    Anticipated Discharge DME:      Patient/family educated on Medicare website which has current facility and service quality ratings:    Education Provided on the Discharge Plan:    Patient/Family in Agreement with the Plan: yes    Referrals Placed by CM/SW:    Private pay costs discussed: Not applicable    Additional Information:  SW spoke with Admissions with [facility] and they are able to accept patient but only have a private room available. Patient does not want to pay for private room and the fee would not be able to be waived. Patient prefers additional referrals be sent out. SW sent referrals out to facilities in the [place] area for review. Left VM to follow up on [facility] referral.       MACIE Cifuentes

## 2022-03-21 NOTE — PLAN OF CARE
Goal Outcome Evaluation:    Shift Note: 03/20/2022 4579-0417  A/O x4. Orthostatic BPs good. At start of shift, IV Iron Sucrose due, delayed in order to stabilize VS first (tachycardic, hypoxic). 83% on RA, 87% on 3L NC. For mouth-breathing, put on oxymask- pt at 95% on 2L. BP dropped during infusion, SBP low 90s. On-call provider notified, infusion stopped. Continued to monitor BP q15 min x1 hr, then q30 min. At lowest, 88/58. Held Revatio this evening. Of note, pt received scheduled Lasix dose 1hr prior to infusion starting, voided twice in interim.  Up x1 w/ GB, walker, pivot to BSC. Due to decrease in BP, advised pt stay in bed d/t some unsteadiness. Pt somewhat lethargic as well. Placed purewick, good UOP in suction cannister. 1 small BM this shift. Tolerating low Na diet, 2000mL fluid restriction diet (at 740 for daily PO intake), low appetite. Tele Afib, CVR. New PIV placed in LUE, SL. Mag, K+ replacement protocols, rechecks ordered for 0600. Discharge to TCU ([facility]?) on 3/21.

## 2022-03-21 NOTE — PROGRESS NOTES
[facility]  Cardiology Progress Note  Date of Service: 03/21/2022      Assessment & Plan    Jaqueline Canales is a 81 year old female admitted on 3/18/2022 with acute diastolic HF exacerbation.     Assessment:  1.  Acute on chronic heart failure with preserved ejection fraction - LVEF 60-65% with mildly decreased RV systolic function, no significant change in NTproBNP from 5,818 on admission -> 5,482 today.    - PTA diuretics torsemide 20mg BID  2. Bilateral LE edema - improving  3. Pulmonary hypertension - likely mixed etiology   4. Moderate to severe tricuspid regurgitation  5. Moderate to severe mitral valve regurgitation  6. Moderate aortic stenosis  7. Chronic atrial fibrillation - stable, rate controlled    - On eliquis for thromboembolic prophylaxis  8. Iron deficiency anemia  9. Hyperlipidemia  10. Scleroderma  11. Rheumatoid arthritis    Patient seen at bedside. She feels breathing back to baseline. She appears close to euvolemic on exam with trace bilateral LE edema. 2.1L out yesterday, unclear on accuracy of hospital weight, appears yesterday morning was an outlier at 136#. 2 weights this morning 127# and 129#. Unclear goal weight, she has had a steady decline in her weights since 1/2020 at which time her weight was 185#. BMP stable. No significant change in NTproBNP from 5,818 on admission -> 5,482 today. Recommend changing her IV diuretics to PO torsemide 20mg BID. Blood pressures remain soft, asymptomatic, okay to remain off metoprolol XL and nifedipine.    She is scheduled for close follow up with [facility] on 3/28. Cardiology signing off, please contact with any additional questions.    Plan:   1. Continue apixaban 2.5mg BID  2. STOP furosemide 40mg IV BID  3. START torsemide 20mg BID  4. Continue sildenafil 20mg TID   5. Metoprolol XL 25mg and nifedipine ER 60mg on hold due to hypotension. Okay to remain off at discharge as blood pressures remain soft. This can be further  evaluated outpatient if they need to be added back on.   6. Daily weight, strict I&O, low sodium diet and 2L fluid restriction  7. Follow up with Martha Gant PA-C with [facility] 3/28/22  8. Cardiology signing off. Please contact with any additional questions.     AUBRIE Short [facility]  Pager: [phone]    This assessment and plan formulated under the guidance of Dr. Mc.     I spent > 20 minutes face-to-face and/or coordinating care. Over 50% of our time on the unit was spent counseling the patient and/or coordinating care.      Interval History   No acute events overnight. Feels breathing is back to baseline. Lower extremity edema improving. Denies chest pain.     Physical Exam   Temp: 98.7  F (37.1  C) Temp src: Oral BP: 108/68 Pulse: 81   Resp: 18 SpO2: 95 % O2 Device: Oxymask Oxygen Delivery: 2.5 LPM  Vitals:    03/20/22 0717 03/21/22 0556 03/21/22 0827   Weight: 61.9 kg (136 lb 7.4 oz) 57.7 kg (127 lb 3.2 oz) 58.7 kg (129 lb 8 oz)   GEN: well nourished, in no acute distress.  HEENT:  Pupils equal, round. Sclerae nonicteric.   NECK: Supple, no masses appreciated. JVP appears normal  C/V:  Irregularly irregular rate and rhythm  RESP: Respirations are unlabored. Clear to auscultation bilaterally without wheezing, rales, or rhonchi.  GI: Abdomen soft, nontender.  EXTREM: Trace bilateral LE edema.  NEURO: Alert and oriented, cooperative.  SKIN: Warm and dry    Medications     - MEDICATION INSTRUCTIONS -       - MEDICATION INSTRUCTIONS -       ACE/ARB/ARNI NOT PRESCRIBED         apixaban ANTICOAGULANT  2.5 mg Oral BID     donepezil  5 mg Oral At Bedtime     folic acid  1,000 mcg Oral Daily     furosemide  40 mg Intravenous BID     gabapentin  100 mg Oral TID     hydroxychloroquine  200 mg Oral Daily     levothyroxine  75 mcg Oral Daily     [Held by provider] methotrexate  12.5 mg Oral 2 times per day on Wed     [Held by provider] metoprolol succinate ER  25 mg Oral Daily     [Held by  provider] NIFEdipine ER  60 mg Oral Daily     pantoprazole  40 mg Oral BID AC     potassium chloride  20 mEq Oral Daily     salicylic acid   Topical Daily     sildenafil  20 mg Oral TID     simvastatin  40 mg Oral At Bedtime     sodium chloride (PF)  3 mL Intracatheter Q8H       Data   Last 24 hours labs reviewed     Imaging: Lower extremity US 3/18/22  No deep venous thrombosis in the bilateral lower extremities.    Tele: atrial fibrillation, borderline elevated     Echo: 3/19/22  The rhythm was atrial fibrillation.  Left ventricular systolic function is normal.  The visual ejection fraction is 60-65%.  Mildly decreased right ventricular systolic function  The right ventricle is moderately dilated.  The left ventricle is normal in size.  There is severe biatrial enlargement.  There is mod-severe to severe (3-4+) tricuspid regurgitation.  Right ventricular systolic pressure is elevated, consistent with moderate to  severe pulmonary hypertension.  There is moderate to mod-severe (2-3+) mitral regurgitation.ERO 0.26 cm2/ RV  38 ml)  Moderate valvular aortic stenosis JACOB 1.1cm2/MSG 20 mmHg/DI 0.34

## 2022-03-21 NOTE — PLAN OF CARE
Goal Outcome Evaluation:        3/20/22 [id number]  Pt A/O x4. VSS ex soft BP. On 2L via NC. On tele - Afib w/ CVR. PIV SL. Purewick in place with adequate UOP. Orthostatic BP checks. Strict I/O, 2gm NA diet, 2000 mL fluid restriction. Up A1 w/ GB + W. Mepi on coccyx. +3 edema in BLE. Discharge to TCU 3/21.

## 2022-03-22 ENCOUNTER — APPOINTMENT (OUTPATIENT)
Dept: PHYSICAL THERAPY | Facility: CLINIC | Age: 82
DRG: 291 | End: 2022-03-22
Payer: MEDICARE

## 2022-03-22 LAB
CREAT SERPL-MCNC: 0.62 MG/DL (ref 0.52–1.04)
GFR SERPL CREATININE-BSD FRML MDRD: 88 ML/MIN/1.73M2
MAGNESIUM SERPL-MCNC: 2 MG/DL (ref 1.6–2.3)
POTASSIUM BLD-SCNC: 3.7 MMOL/L (ref 3.4–5.3)

## 2022-03-22 PROCEDURE — 93005 ELECTROCARDIOGRAM TRACING: CPT

## 2022-03-22 PROCEDURE — 99232 SBSQ HOSP IP/OBS MODERATE 35: CPT | Performed by: HOSPITALIST

## 2022-03-22 PROCEDURE — 82565 ASSAY OF CREATININE: CPT | Performed by: HOSPITALIST

## 2022-03-22 PROCEDURE — 250N000013 HC RX MED GY IP 250 OP 250 PS 637: Performed by: NURSE PRACTITIONER

## 2022-03-22 PROCEDURE — 250N000011 HC RX IP 250 OP 636: Performed by: NURSE PRACTITIONER

## 2022-03-22 PROCEDURE — 250N000013 HC RX MED GY IP 250 OP 250 PS 637: Performed by: HOSPITALIST

## 2022-03-22 PROCEDURE — 83735 ASSAY OF MAGNESIUM: CPT | Performed by: HOSPITALIST

## 2022-03-22 PROCEDURE — 84132 ASSAY OF SERUM POTASSIUM: CPT | Performed by: HOSPITALIST

## 2022-03-22 PROCEDURE — 99232 SBSQ HOSP IP/OBS MODERATE 35: CPT | Mod: FS | Performed by: NURSE PRACTITIONER

## 2022-03-22 PROCEDURE — 36415 COLL VENOUS BLD VENIPUNCTURE: CPT | Performed by: HOSPITALIST

## 2022-03-22 PROCEDURE — 120N000001 HC R&B MED SURG/OB

## 2022-03-22 PROCEDURE — 97530 THERAPEUTIC ACTIVITIES: CPT | Mod: GP

## 2022-03-22 RX ORDER — DIGOXIN 0.25 MG/ML
250 INJECTION INTRAMUSCULAR; INTRAVENOUS ONCE
Status: COMPLETED | OUTPATIENT
Start: 2022-03-22 | End: 2022-03-22

## 2022-03-22 RX ORDER — DIGOXIN 125 MCG
125 TABLET ORAL DAILY
Status: DISCONTINUED | OUTPATIENT
Start: 2022-03-23 | End: 2022-03-24 | Stop reason: HOSPADM

## 2022-03-22 RX ORDER — POTASSIUM CHLORIDE 750 MG/1
10 TABLET, EXTENDED RELEASE ORAL ONCE
Status: COMPLETED | OUTPATIENT
Start: 2022-03-22 | End: 2022-03-22

## 2022-03-22 RX ADMIN — TORSEMIDE 20 MG: 20 TABLET ORAL at 16:46

## 2022-03-22 RX ADMIN — PANTOPRAZOLE SODIUM 40 MG: 40 TABLET, DELAYED RELEASE ORAL at 08:08

## 2022-03-22 RX ADMIN — HYDROXYCHLOROQUINE SULFATE 200 MG: 200 TABLET, FILM COATED ORAL at 10:06

## 2022-03-22 RX ADMIN — DIGOXIN 250 MCG: 0.25 INJECTION INTRAMUSCULAR; INTRAVENOUS at 11:13

## 2022-03-22 RX ADMIN — LEVOTHYROXINE SODIUM 75 MCG: 50 TABLET ORAL at 08:08

## 2022-03-22 RX ADMIN — ACETAMINOPHEN 650 MG: 325 TABLET, FILM COATED ORAL at 08:08

## 2022-03-22 RX ADMIN — GABAPENTIN 100 MG: 100 CAPSULE ORAL at 16:46

## 2022-03-22 RX ADMIN — GABAPENTIN 100 MG: 100 CAPSULE ORAL at 10:06

## 2022-03-22 RX ADMIN — POTASSIUM CHLORIDE 10 MEQ: 750 TABLET, EXTENDED RELEASE ORAL at 10:06

## 2022-03-22 RX ADMIN — POTASSIUM CHLORIDE 20 MEQ: 1500 TABLET, EXTENDED RELEASE ORAL at 10:08

## 2022-03-22 RX ADMIN — APIXABAN 2.5 MG: 2.5 TABLET, FILM COATED ORAL at 21:38

## 2022-03-22 RX ADMIN — Medication: at 10:20

## 2022-03-22 RX ADMIN — TORSEMIDE 20 MG: 20 TABLET ORAL at 08:08

## 2022-03-22 RX ADMIN — FOLIC ACID 1000 MCG: 1 TABLET ORAL at 10:06

## 2022-03-22 RX ADMIN — PANTOPRAZOLE SODIUM 40 MG: 40 TABLET, DELAYED RELEASE ORAL at 16:47

## 2022-03-22 RX ADMIN — APIXABAN 2.5 MG: 2.5 TABLET, FILM COATED ORAL at 10:06

## 2022-03-22 RX ADMIN — POTASSIUM CHLORIDE 10 MEQ: 750 TABLET, EXTENDED RELEASE ORAL at 02:03

## 2022-03-22 RX ADMIN — DONEPEZIL HYDROCHLORIDE 5 MG: 5 TABLET, FILM COATED ORAL at 21:38

## 2022-03-22 RX ADMIN — GABAPENTIN 100 MG: 100 CAPSULE ORAL at 21:38

## 2022-03-22 RX ADMIN — SIMVASTATIN 40 MG: 40 TABLET, FILM COATED ORAL at 21:38

## 2022-03-22 ASSESSMENT — ACTIVITIES OF DAILY LIVING (ADL)
ADLS_ACUITY_SCORE: 14
ADLS_ACUITY_SCORE: 15
ADLS_ACUITY_SCORE: 15
ADLS_ACUITY_SCORE: 14
ADLS_ACUITY_SCORE: 17
ADLS_ACUITY_SCORE: 14
ADLS_ACUITY_SCORE: 17
ADLS_ACUITY_SCORE: 14

## 2022-03-22 NOTE — PLAN OF CARE
Goal Outcome Evaluation:        Pt A/Ox4. VSS ex soft BP and tachy, positive for orthostatic BP (no BP on R arm d/t mastectomy). On 2L via NS. Per precepting nurse, evening tele appeared to be A-flutter. EKG done, showed AFib w/ PVCs. AM Tele Afib RVR w/ BBB. Purewick in place, good UOP. Strict I/O, 2gm NA diet, 2L fluid restriction. Up A1 + GB/W. Mepilex in place on sacrum. 2+ edema in BLE. Discharge pending TCU referrals - possibly 3/23.

## 2022-03-22 NOTE — PROGRESS NOTES
Care Management Follow Up    Length of Stay (days): 4    Expected Discharge Date: 03/25/2022     Concerns to be Addressed:       Patient plan of care discussed at interdisciplinary rounds: Yes    Anticipated Discharge Disposition: Skilled Nursing Facilty     Anticipated Discharge Services:    Anticipated Discharge DME:      Patient/family educated on Medicare website which has current facility and service quality ratings:    Education Provided on the Discharge Plan:    Patient/Family in Agreement with the Plan: yes    Referrals Placed by CM/BEN:    Private pay costs discussed: Not applicable    Additional Information:  BEN received call from [facility] with [facility] who confirmed that they are able to accept patient to their facility.  BEN stated that patient wants a shared room.     MACIE Cifuentes

## 2022-03-22 NOTE — PROGRESS NOTES
[facility]  Hospitalist Progress Note     Date of Admission:  3/18/2022  When I evaluated patient: 03/22/2022      Jaqueline Canales is a 81 year old female with PMH significant for A fib, P HTN, CHF with preserved EF, GERD, scleroderma, RA, Psoriasis, HL,  who presented with worsening leg swelling, found to have acute exacerbation of diastolic CHF and was admitted 3/18/22 for further management.     Assessment & Plan     Acute on chronic diastolic CHF  Mild acute hypoxic respiratory failure  Atrial Fibrillation   Pulmonary hypertension  Worsening lower extremity edema over past few weeks, PCP has been adjusting her torsemide. O2 sat 88% with ambulation while in ED (but also reported as 80% in note). home care nurse stopped coming who did her meds about a week ago--patient isn't sure if she missed any medications, but denies taking any meds in afternoon (torsemide had been recommended TID at last PCP visit). BNP 5818. She reports her baseline weight is in 130s, doesn't seem to have changed much with worsening swelling. CXR with cardiomegaly, mild pulmonary vascular congestion    - managed with Lasix IV earlier this stay, net Negative outout and weight is slightly down. Now on PTA Torsemide.   - continue to follow I&O, daily weight. Fluid restriction. Daily BMP  - wean off O2 as able, not on home O2, currently on 2LPM  - BB/ACEI on hold given soft BP. Remains tachycardic/on RVR and with soft BP. Discussed with cardiology, started on digoxin. Continue to monitor on tele.   - Lymphedema wraps  - TTE showed Normal LV EF of 60 to 65%, mildly decreased RV systolic function, moderately dilated RV.  Severe biatrial enlargement was seen with moderate severe to severe 3-4+ tricuspid regurgitation.  Right ventricular systolic pressure is elevated consistent with moderate to severe pulmonary hypertension there is moderate- severe MR there is moderate valvular aortic stenosis  - PTA apixaban dose  adjusted, decreased to 2.5 mg BID     - PT eval noted, SW following for discharge to TCU.      Hypokalemia  Replace as needed and adding scheduled potassium as well    Right ring finger warts  Present for months  -- salicylic acid gel daily to digit.   -- monitor    Anemia :  --Iron saturation and iron levels came back low, transferrin and iron binding capacity is in the normal range  --IV iron was given but not completed as BP was running soft, no concern for allergic reaction.   --started on PO iron  --Further anemia work-up to be done in an outpatient setting     Hyperlipidemia  --PTA statin continued     Scleroderma  Rheumatoid arthritis  Psoriasis  --Continue PTA folic acid, methotrexate (wednesdays), plaquenil  -- Patient is requesting not to check any blood pressure or do blood work on her RUE due to H/O Mastectomy      GERD  --Continue omeprazole     Mild Cognitive impairment  OT eval given she is managing her own meds  -- resumed Aricept    Clinically Significant Risk Factors Present on Admission                  Diet: 2 Gram Sodium Diet Other - please comment  Room Service    Luther Catheter: Not present  DVT Prophylaxis: DOAC  Code Status: No CPR- Do NOT Intubate    The patient's care was discussed with the Bedside Nurse and Patient. Discussed with her spouse Chava over the phone 3/22    Disposition Plan   Expected Discharge: 03/25/2022   TCU   Anticipated discharge location:  Awaiting care coordination abelino Vang MD  Hospitalist    ----------------------------------------------------------------------------------------------------------------------      Interval History      Patient was evaluated this afternoon. Noted soft BPs. Remains mildly tachycardic, discussed with cardiology service and digoxin was started today.   - patient was up in the chair for over an hour and reported fatigue. Denies chest pain. Denies dizziness or palpitation. Dyspnea has overall improved and stable.      -Data reviewed today: I reviewed all new labs  results over the last 24 hours. I reviewed all active medications      Physical Exam   Temp: 98.2  F (36.8  C) Temp src: Oral BP: 99/67 Pulse: 106   Resp: 16 SpO2: 94 % O2 Device: Nasal cannula Oxygen Delivery: 2 LPM  Vitals:    03/20/22 0717 03/21/22 0556 03/21/22 0827   Weight: 61.9 kg (136 lb 7.4 oz) 57.7 kg (127 lb 3.2 oz) 58.7 kg (129 lb 8 oz)     Vital Signs with Ranges  Temp:  [97.4  F (36.3  C)-98.2  F (36.8  C)] 98.2  F (36.8  C)  Pulse:  [] 106  Resp:  [16-20] 16  BP: ()/(53-68) 99/67  SpO2:  [88 %-97 %] 94 %  I/O last 3 completed shifts:  In: 1120 [P.O.:1120]  Out: 2300 [Urine:2300]    GENERAL: Alert , awake forgetful but oriented. Fatigued. Conversational, appropriate. Wearing nasal cannula   HEENT: PERRLA EOMI  LUNGS: diminished, bibasilar crackles, no wheezing. No dyspnea at rest.   HEART: s1s2 irregular. Mildly tachycardic.   ABDOMEN: Soft, nontender, and nondistended. Positive bowel sounds.   EXTREMITIES: Chronic venous insufficiency changes in bilateral lower extremities and bilateral leg edema (+)  NEUROLOGIC: Moves extremities x4 on command. No acute focal neurologic abnormalities noted.     Medications     - MEDICATION INSTRUCTIONS -       - MEDICATION INSTRUCTIONS -       ACE/ARB/ARNI NOT PRESCRIBED         apixaban ANTICOAGULANT  2.5 mg Oral BID     [START ON 3/23/2022] digoxin  125 mcg Oral Daily     donepezil  5 mg Oral At Bedtime     folic acid  1,000 mcg Oral Daily     gabapentin  100 mg Oral TID     hydroxychloroquine  200 mg Oral Daily     levothyroxine  75 mcg Oral Daily     [Held by provider] methotrexate  12.5 mg Oral 2 times per day on Wed     [Held by provider] metoprolol succinate ER  25 mg Oral Daily     [Held by provider] NIFEdipine ER  60 mg Oral Daily     pantoprazole  40 mg Oral BID AC     potassium chloride  20 mEq Oral Daily     salicylic acid   Topical Daily     sildenafil  20 mg Oral TID     simvastatin  40 mg  Oral At Bedtime     sodium chloride (PF)  3 mL Intracatheter Q8H     torsemide  20 mg Oral BID       Data   Recent Labs   Lab 03/22/22  0649 03/21/22  1904 03/21/22  0647 03/20/22  1235 03/20/22  0807 03/19/22  1409 03/19/22  0824 03/18/22  2139 03/18/22  1541   WBC  --   --   --   --   --   --  5.2  --  7.4   HGB  --   --   --   --  11.5*  --  10.9*  --  12.7   MCV  --   --   --   --   --   --  96  --  97   PLT  --   --   --   --   --   --  170  --  197   NA  --   --  135  --  135  --  137  --  135   POTASSIUM 3.7 3.5 3.1*  --  3.6   < > 2.9*   < > 3.8   CHLORIDE  --   --  96  --  97  --  96  --  96   CO2  --   --  36*  --  33*  --  34*  --  36*   BUN  --   --  10  --  14  --  16  --  22   CR 0.62  --  0.55  --  0.62  --  0.58  --  0.80   ANIONGAP  --   --  3  --  5  --  7  --  3   TU  --   --  8.6  --  9.4  --  9.2  --  10.2*   GLC  --   --  82 80 81  --  82  --  104*    < > = values in this interval not displayed.       Imaging:   No results found for this or any previous visit (from the past 24 hour(s)).

## 2022-03-22 NOTE — PLAN OF CARE
Goal Outcome Evaluation:        Pt A/Ox4. VSS ex soft BP and tachy. On 2.5L via oxymask at night. Tele - Afib RVR w/ BBB. Purewick in place, good UOP. Strict I/O, 2gm NA diet, 2L fluid restriction. Up A1 + GB/W. Mepilex in place on sacrum. 2+ edema in BLE. Discharge pending TCU referrals - possibly 3/23.

## 2022-03-22 NOTE — PROVIDER NOTIFICATION
MD Notification    Notified Person: MD    Notified Person Name:  Gatodarryn    Notification Date/Time: 3/22/22   1730    Notification Interaction: Vocera page    Purpose of Notification: Positive orthostatic Bps: 101/62 lying, 81/53 standing. Pt on tele appears to be in A-flutter 4:1. Can we have EKG?    Orders Received: Will monitor orthostatic Bps since pt not symptomatic. Yes, EKG.    Comments:

## 2022-03-23 ENCOUNTER — APPOINTMENT (OUTPATIENT)
Dept: PHYSICAL THERAPY | Facility: CLINIC | Age: 82
DRG: 291 | End: 2022-03-23
Payer: MEDICARE

## 2022-03-23 LAB
ALBUMIN SERPL-MCNC: 2.7 G/DL (ref 3.4–5)
ALP SERPL-CCNC: 108 U/L (ref 40–150)
ALT SERPL W P-5'-P-CCNC: 15 U/L (ref 0–50)
ANION GAP SERPL CALCULATED.3IONS-SCNC: 3 MMOL/L (ref 3–14)
AST SERPL W P-5'-P-CCNC: 25 U/L (ref 0–45)
ATRIAL RATE - MUSE: 110 BPM
BILIRUB DIRECT SERPL-MCNC: 0.4 MG/DL (ref 0–0.2)
BILIRUB SERPL-MCNC: 0.9 MG/DL (ref 0.2–1.3)
BUN SERPL-MCNC: 9 MG/DL (ref 7–30)
CALCIUM SERPL-MCNC: 9.3 MG/DL (ref 8.5–10.1)
CHLORIDE BLD-SCNC: 95 MMOL/L (ref 94–109)
CO2 SERPL-SCNC: 39 MMOL/L (ref 20–32)
CREAT SERPL-MCNC: 0.63 MG/DL (ref 0.52–1.04)
DIASTOLIC BLOOD PRESSURE - MUSE: NORMAL MMHG
GFR SERPL CREATININE-BSD FRML MDRD: 88 ML/MIN/1.73M2
GLUCOSE BLD-MCNC: 86 MG/DL (ref 70–99)
INTERPRETATION ECG - MUSE: NORMAL
MAGNESIUM SERPL-MCNC: 1.8 MG/DL (ref 1.6–2.3)
P AXIS - MUSE: NORMAL DEGREES
POTASSIUM BLD-SCNC: 3.6 MMOL/L (ref 3.4–5.3)
PR INTERVAL - MUSE: NORMAL MS
PROT SERPL-MCNC: 5.8 G/DL (ref 6.8–8.8)
QRS DURATION - MUSE: 108 MS
QT - MUSE: 372 MS
QTC - MUSE: 465 MS
R AXIS - MUSE: -59 DEGREES
SODIUM SERPL-SCNC: 137 MMOL/L (ref 133–144)
SYSTOLIC BLOOD PRESSURE - MUSE: NORMAL MMHG
T AXIS - MUSE: 69 DEGREES
VENTRICULAR RATE- MUSE: 94 BPM

## 2022-03-23 PROCEDURE — 97530 THERAPEUTIC ACTIVITIES: CPT | Mod: GP

## 2022-03-23 PROCEDURE — 250N000013 HC RX MED GY IP 250 OP 250 PS 637: Performed by: NURSE PRACTITIONER

## 2022-03-23 PROCEDURE — 250N000013 HC RX MED GY IP 250 OP 250 PS 637: Performed by: HOSPITALIST

## 2022-03-23 PROCEDURE — 97116 GAIT TRAINING THERAPY: CPT | Mod: GP

## 2022-03-23 PROCEDURE — 82248 BILIRUBIN DIRECT: CPT

## 2022-03-23 PROCEDURE — 99232 SBSQ HOSP IP/OBS MODERATE 35: CPT | Performed by: HOSPITALIST

## 2022-03-23 PROCEDURE — 120N000001 HC R&B MED SURG/OB

## 2022-03-23 PROCEDURE — 82310 ASSAY OF CALCIUM: CPT | Performed by: HOSPITALIST

## 2022-03-23 PROCEDURE — 250N000012 HC RX MED GY IP 250 OP 636 PS 637: Performed by: HOSPITALIST

## 2022-03-23 PROCEDURE — 36415 COLL VENOUS BLD VENIPUNCTURE: CPT | Performed by: HOSPITALIST

## 2022-03-23 PROCEDURE — 83735 ASSAY OF MAGNESIUM: CPT | Performed by: HOSPITALIST

## 2022-03-23 PROCEDURE — 99232 SBSQ HOSP IP/OBS MODERATE 35: CPT | Mod: FS | Performed by: INTERNAL MEDICINE

## 2022-03-23 RX ORDER — POTASSIUM CHLORIDE 750 MG/1
10 TABLET, EXTENDED RELEASE ORAL ONCE
Status: COMPLETED | OUTPATIENT
Start: 2022-03-23 | End: 2022-03-23

## 2022-03-23 RX ADMIN — APIXABAN 2.5 MG: 2.5 TABLET, FILM COATED ORAL at 08:23

## 2022-03-23 RX ADMIN — TORSEMIDE 20 MG: 20 TABLET ORAL at 17:13

## 2022-03-23 RX ADMIN — PANTOPRAZOLE SODIUM 40 MG: 40 TABLET, DELAYED RELEASE ORAL at 17:13

## 2022-03-23 RX ADMIN — HYDROXYCHLOROQUINE SULFATE 200 MG: 200 TABLET, FILM COATED ORAL at 08:24

## 2022-03-23 RX ADMIN — POTASSIUM CHLORIDE 10 MEQ: 750 TABLET, EXTENDED RELEASE ORAL at 08:43

## 2022-03-23 RX ADMIN — SIMVASTATIN 40 MG: 40 TABLET, FILM COATED ORAL at 21:24

## 2022-03-23 RX ADMIN — FOLIC ACID 1000 MCG: 1 TABLET ORAL at 08:39

## 2022-03-23 RX ADMIN — GABAPENTIN 100 MG: 100 CAPSULE ORAL at 17:13

## 2022-03-23 RX ADMIN — LEVOTHYROXINE SODIUM 75 MCG: 50 TABLET ORAL at 08:22

## 2022-03-23 RX ADMIN — GABAPENTIN 100 MG: 100 CAPSULE ORAL at 21:24

## 2022-03-23 RX ADMIN — GABAPENTIN 100 MG: 100 CAPSULE ORAL at 08:23

## 2022-03-23 RX ADMIN — DIGOXIN 125 MCG: 125 TABLET ORAL at 08:24

## 2022-03-23 RX ADMIN — POTASSIUM CHLORIDE 20 MEQ: 1500 TABLET, EXTENDED RELEASE ORAL at 08:23

## 2022-03-23 RX ADMIN — METHOTREXATE SODIUM 12.5 MG: 2.5 TABLET ORAL at 18:18

## 2022-03-23 RX ADMIN — PANTOPRAZOLE SODIUM 40 MG: 40 TABLET, DELAYED RELEASE ORAL at 08:24

## 2022-03-23 RX ADMIN — METHOTREXATE SODIUM 12.5 MG: 2.5 TABLET ORAL at 15:19

## 2022-03-23 RX ADMIN — SILDENAFIL 20 MG: 20 TABLET, FILM COATED ORAL at 08:24

## 2022-03-23 RX ADMIN — SILDENAFIL 20 MG: 20 TABLET, FILM COATED ORAL at 21:24

## 2022-03-23 RX ADMIN — TORSEMIDE 20 MG: 20 TABLET ORAL at 08:24

## 2022-03-23 RX ADMIN — SILDENAFIL 20 MG: 20 TABLET, FILM COATED ORAL at 17:17

## 2022-03-23 RX ADMIN — DONEPEZIL HYDROCHLORIDE 5 MG: 5 TABLET, FILM COATED ORAL at 21:24

## 2022-03-23 RX ADMIN — APIXABAN 2.5 MG: 2.5 TABLET, FILM COATED ORAL at 21:24

## 2022-03-23 RX ADMIN — Medication: at 08:33

## 2022-03-23 ASSESSMENT — ACTIVITIES OF DAILY LIVING (ADL)
ADLS_ACUITY_SCORE: 17
ADLS_ACUITY_SCORE: 17
ADLS_ACUITY_SCORE: 19
ADLS_ACUITY_SCORE: 19
ADLS_ACUITY_SCORE: 17
ADLS_ACUITY_SCORE: 19
ADLS_ACUITY_SCORE: 17
ADLS_ACUITY_SCORE: 19
ADLS_ACUITY_SCORE: 19
ADLS_ACUITY_SCORE: 17
ADLS_ACUITY_SCORE: 17
ADLS_ACUITY_SCORE: 19
ADLS_ACUITY_SCORE: 19
ADLS_ACUITY_SCORE: 17
ADLS_ACUITY_SCORE: 19
ADLS_ACUITY_SCORE: 19
ADLS_ACUITY_SCORE: 17
ADLS_ACUITY_SCORE: 19
ADLS_ACUITY_SCORE: 17

## 2022-03-23 NOTE — PROGRESS NOTES
Care Management Follow Up    Length of Stay (days): 5    Expected Discharge Date: 03/25/2022     Concerns to be Addressed:       Patient plan of care discussed at interdisciplinary rounds: Yes    Anticipated Discharge Disposition: Skilled Nursing Facilty     Anticipated Discharge Services:    Anticipated Discharge DME:      Patient/family educated on Medicare website which has current facility and service quality ratings:    Education Provided on the Discharge Plan:    Patient/Family in Agreement with the Plan: yes    Referrals Placed by CM/SW:    Private pay costs discussed: Not applicable    Additional Information:  BEN spoke with Tena at [facility], Tena requests update on date of discharge.  Tena asked about purewick, and BEN informed that that would be discontinued before discharge.   BEN left voicemail for Tena at [facility] that pt will likely be ready for discharge on 3/24.  BEN requests update on whether pt needs a PAS, and whether a transport aide is available.       Ashley Irving, [email or web address]

## 2022-03-23 NOTE — PROGRESS NOTES
[facility]  Hospitalist Progress Note     Date of Admission:  3/18/2022  When I evaluated patient: 03/23/2022      Jaqueline Canales is a 81 year old female with PMH significant for A fib, P HTN, CHF with preserved EF, GERD, scleroderma, RA, Psoriasis, HL,  who presented with worsening leg swelling, found to have acute exacerbation of diastolic CHF and was admitted 3/18/22 for further management.     Assessment & Plan     Acute on chronic diastolic CHF  Mild acute hypoxic respiratory failure  Atrial Fibrillation   Pulmonary hypertension  Worsening lower extremity edema over past few weeks, PCP has been adjusting her torsemide. O2 sat 88% with ambulation while in ED (but also reported as 80% in note). home care nurse stopped coming who did her meds about a week ago--patient isn't sure if she missed any medications, but denies taking any meds in afternoon (torsemide had been recommended TID at last PCP visit). BNP 5818. She reports her baseline weight is in 130s, doesn't seem to have changed much with worsening swelling. CXR with cardiomegaly, mild pulmonary vascular congestion    - managed with Lasix IV earlier this stay, net Negative outout and weight trending down. Now on PTA Torsemide.   - continue to follow I&O, daily weight. Fluid restriction. Daily BMP  - wean off O2 as able, not on home O2, currently on 2LPM. Likely needs supplemental O2 at discharge.   - BB/ACEI on hold given soft BP. Remains tachycardic/on RVR and with soft BP. Discussed with cardiology, started on digoxin 3/22 and rate better controlled also better, ?resume small dose BB now that BP better, defer to cardiology. Continue to monitor on tele.   - Lymphedema wraps  - TTE showed Normal LV EF of 60 to 65%, mildly decreased RV systolic function, moderately dilated RV.  Severe biatrial enlargement was seen with moderate severe to severe 3-4+ tricuspid regurgitation.  Right ventricular systolic pressure is elevated  consistent with moderate to severe pulmonary hypertension there is moderate- severe MR there is moderate valvular aortic stenosis  - PTA apixaban dose adjusted, decreased to 2.5 mg BID     - PT eval noted, SW following for discharge to TCU.      Hypokalemia  Replace as needed and adding scheduled potassium as well    Right ring finger warts  Present for months  -- salicylic acid gel daily to digit.   -- monitor    Anemia :  --Iron saturation and iron levels came back low, transferrin and iron binding capacity is in the normal range  --IV iron was given but not completed as BP was running soft, no concern for allergic reaction.   --started on PO iron  --Further anemia work-up to be done in an outpatient setting     Hyperlipidemia  --PTA statin continued     Scleroderma  Rheumatoid arthritis  Psoriasis  --Continue PTA folic acid, methotrexate (wednesdays), plaquenil  -- Patient is requesting not to check any blood pressure or do blood work on her RUE due to H/O Mastectomy      GERD  --Continue omeprazole     Mild Cognitive impairment  OT eval given she is managing her own meds  -- resumed Aricept    Clinically Significant Risk Factors Present on Admission                    Diet: 2 Gram Sodium Diet Other - please comment  Room Service    Luther Catheter: Not present  DVT Prophylaxis: DOAC  Code Status: No CPR- Do NOT Intubate    The patient's care was discussed with the Bedside Nurse and Patient. Discussed with her spouse Chava over the phone 3/22    Disposition Plan   Expected Discharge: 03/25/2022  Anticipate 1-2 days, to TCU   Anticipated discharge location:  Awaiting care coordination abelino Vang MD  Hospitalist    ----------------------------------------------------------------------------------------------------------------------      Interval History    Patient was orthostatic yesterday but no symptoms.   BP improved, not hypotensive any longer.  Remains in A fib but rate now controlled  mostly.   Up in chair, feels tired otherwise did not voice any concern. Denies chest pain or palpitation or dizziness. Dyspneic with activity.     -Data reviewed today: I reviewed all new labs  results over the last 24 hours. I reviewed all active medications      Physical Exam   Temp: 98.1  F (36.7  C) Temp src: Oral BP: 109/59 Pulse: 72   Resp: 18 SpO2: 98 % O2 Device: Nasal cannula Oxygen Delivery: 2 LPM  Vitals:    03/21/22 0556 03/21/22 0827 03/23/22 0700   Weight: 57.7 kg (127 lb 3.2 oz) 58.7 kg (129 lb 8 oz) 56.1 kg (123 lb 11.2 oz)     Vital Signs with Ranges  Temp:  [98.1  F (36.7  C)-98.2  F (36.8  C)] 98.1  F (36.7  C)  Pulse:  [] 72  Resp:  [16-18] 18  BP: ()/(55-62) 109/59  SpO2:  [94 %-98 %] 98 %  I/O last 3 completed shifts:  In: 1140 [P.O.:1140]  Out: 1750 [Urine:1750]    GENERAL: Alert , awake. Fatigued/. Conversational, appropriate. Wearing nasal cannula   HEENT: PERRLA EOMI  LUNGS: diminished, bibasilar crackles, no wheezing. No dyspnea at rest.   HEART: s1s2 irregular. No tachycardia.   ABDOMEN: Soft, nontender, and nondistended. Positive bowel sounds.   EXTREMITIES: Chronic venous insufficiency changes in bilateral lower extremities and bilateral leg edema (+)  NEUROLOGIC: Moves extremities x4 on command. No acute focal neurologic abnormalities noted.     Medications     - MEDICATION INSTRUCTIONS -       - MEDICATION INSTRUCTIONS -       ACE/ARB/ARNI NOT PRESCRIBED         apixaban ANTICOAGULANT  2.5 mg Oral BID     digoxin  125 mcg Oral Daily     donepezil  5 mg Oral At Bedtime     folic acid  1,000 mcg Oral Daily     gabapentin  100 mg Oral TID     hydroxychloroquine  200 mg Oral Daily     levothyroxine  75 mcg Oral Daily     methotrexate  12.5 mg Oral 2 times per day on Wed     [Held by provider] metoprolol succinate ER  25 mg Oral Daily     [Held by provider] NIFEdipine ER  60 mg Oral Daily     pantoprazole  40 mg Oral BID AC     potassium chloride  20 mEq Oral Daily      salicylic acid   Topical Daily     sildenafil  20 mg Oral TID     simvastatin  40 mg Oral At Bedtime     sodium chloride (PF)  3 mL Intracatheter Q8H     torsemide  20 mg Oral BID       Data   Recent Labs   Lab 03/23/22  0627 03/22/22  0649 03/21/22  1904 03/21/22  0647 03/20/22  1235 03/20/22  0807 03/19/22  1409 03/19/22  0824 03/18/22  2139 03/18/22  1541   WBC  --   --   --   --   --   --   --  5.2  --  7.4   HGB  --   --   --   --   --  11.5*  --  10.9*  --  12.7   MCV  --   --   --   --   --   --   --  96  --  97   PLT  --   --   --   --   --   --   --  170  --  197     --   --  135  --  135  --  137  --  135   POTASSIUM 3.6 3.7 3.5 3.1*  --  3.6   < > 2.9*   < > 3.8   CHLORIDE 95  --   --  96  --  97  --  96  --  96   CO2 39*  --   --  36*  --  33*  --  34*  --  36*   BUN 9  --   --  10  --  14  --  16  --  22   CR 0.63 0.62  --  0.55  --  0.62  --  0.58  --  0.80   ANIONGAP 3  --   --  3  --  5  --  7  --  3   TU 9.3  --   --  8.6  --  9.4  --  9.2  --  10.2*   GLC 86  --   --  82 80 81  --  82  --  104*   ALBUMIN 2.7*  --   --   --   --   --   --   --   --   --    PROTTOTAL 5.8*  --   --   --   --   --   --   --   --   --    BILITOTAL 0.9  --   --   --   --   --   --   --   --   --    ALKPHOS 108  --   --   --   --   --   --   --   --   --    ALT 15  --   --   --   --   --   --   --   --   --    AST 25  --   --   --   --   --   --   --   --   --     < > = values in this interval not displayed.       Imaging:   No results found for this or any previous visit (from the past 24 hour(s)).

## 2022-03-23 NOTE — PLAN OF CARE
Goal Outcome Evaluation:        A/Ox4, VSS ex soft BP and tachy at times, negative for orthostatic BP this shift (no BP on R arm d/t mastectomy). Denies pain. On 2L via NC,sat mid to upper 90's, decreased O2 to 1 LPM . Tele - Afib CVR with BBB. Purewick in place, good UOP. Strict I/O, 2gm NA diet, 2L fluid restriction. Up A1 + GB/W to bedside commode. New mepilex in place on sacrum. 2+ edema in BLE, elevated on pillows x2 when in bed. Discharge to [facility] planned for 3/24.

## 2022-03-23 NOTE — PROGRESS NOTES
[facility]    Cardiology Progress Note     Assessment & Plan     1.  Acute on chronic heart failure with preserved ejection fraction - LVEF 60-65% with mildly decreased RV systolic function, roughly euvolemic, NYHA III  2. Bilateral LE edema -nearly resolved  3. Pulmonary hypertension - likely mixed etiology   4. Moderate to severe tricuspid regurgitation  5. Moderate to severe mitral valve regurgitation  6. Moderate aortic stenosis  7. Chronic atrial fibrillation - rates better controlled since starting digoxin              - On eliquis for thromboembolic prophylaxis  8. Iron deficiency anemia  9. Hyperlipidemia  10. Scleroderma  11. Rheumatoid arthritis          Plan:   1. Continue digoxin 125mcg PO daily   2. Continue apixaban 2.5mg BID  3. Continue torsemide 20mg BID  4. Continue sildenafil 20mg TID   5. Metoprolol XL 25mg and nifedipine ER 60mg on hold due to hypotension. Okay to remain off at discharge as blood pressures remain soft. This can be further evaluated outpatient if they need to be added back on.   6. Daily weight, strict I&O, low sodium diet and 2L fluid restriction  7. Follow up with Martha Gant PA-C with [facility] 3/28/22      Thank you for the interesting consult.  Cardiology will sign off at this time.  Please feel free to call back at anytime with future questions or concerns.        Kiet Mc MD    Interval History   Feeling reasonably well today.  No acute events overnight.  Was started on digoxin and rates are better controlled today.  Breathing is at baseline.  Feels swelling is well controlled.    Physical Exam   Temp: 98.1  F (36.7  C) Temp src: Oral BP: 109/59 Pulse: 72   Resp: 18 SpO2: 98 % O2 Device: Nasal cannula Oxygen Delivery: 2 LPM  Vitals:    03/21/22 0556 03/21/22 0827 03/23/22 0700   Weight: 57.7 kg (127 lb 3.2 oz) 58.7 kg (129 lb 8 oz) 56.1 kg (123 lb 11.2 oz)     Vital Signs with Ranges  Temp:  [98.1  F (36.7  C)-98.2  F (36.8  C)] 98.1   F (36.7  C)  Pulse:  [] 72  Resp:  [16-18] 18  BP: ()/(55-62) 109/59  SpO2:  [94 %-98 %] 98 %  I/O last 3 completed shifts:  In: 1140 [P.O.:1140]  Out: 1750 [Urine:1750]  Patient Active Problem List   Diagnosis     Other psoriasis     Obesity     Osteoporosis     Esophageal reflux     Lumbago     Raynaud's syndrome     Helicobacter pylori infection     Malignant neoplasm of female breast (H)     Amaurosis fugax of right eye     Health Care Home     Advance Care Planning     Diverticulosis     Hyperlipidemia LDL goal <100     S/P carotid endarterectomy     Pulmonary fibrosis (H)     SRINIVAS (obstructive sleep apnea)     Constipation     Hypothyroidism     Esophageal stricture     Hiatal hernia     IVCD (intraventricular conduction defect)     Low back pain     Lung nodule     Malignant neoplasm of lower lobe of left lung (H)     Persistent atrial fibrillation (H)     Chest pain     Pulmonary hypertension (H)     Status post coronary angiogram     Benign essential hypertension     Adenomatous polyp     Acute CHF (congestive heart failure) (H)     Lymphedema of both lower extremities     Hypoxia     Physical deconditioning     Chronic diastolic heart failure (H)     Age-related cataract of left eye     Anemia     Leg swelling     Acute on chronic heart failure, unspecified heart failure type (H)       Constitutional:  Awake, alert, no acute distress  Eyes: EOMI, sclera non-icteric  ENT: Trachea midline  Respiratory: Mildly increased respiratory effort, diffuse crackles  Cardiovascular: Irregularly irregular, normal rate, 2/6 systolic murmur midsternal.  JVP minimally elevated, trace bilateral lower extremity edema  GI:  Nondistended, nontender.  Psychiatric: Appropriate affect    Medications     - MEDICATION INSTRUCTIONS -       - MEDICATION INSTRUCTIONS -       ACE/ARB/ARNI NOT PRESCRIBED         apixaban ANTICOAGULANT  2.5 mg Oral BID     digoxin  125 mcg Oral Daily     donepezil  5 mg Oral At Bedtime      folic acid  1,000 mcg Oral Daily     gabapentin  100 mg Oral TID     hydroxychloroquine  200 mg Oral Daily     levothyroxine  75 mcg Oral Daily     methotrexate  12.5 mg Oral 2 times per day on Wed     [Held by provider] metoprolol succinate ER  25 mg Oral Daily     [Held by provider] NIFEdipine ER  60 mg Oral Daily     pantoprazole  40 mg Oral BID AC     potassium chloride  20 mEq Oral Daily     salicylic acid   Topical Daily     sildenafil  20 mg Oral TID     simvastatin  40 mg Oral At Bedtime     sodium chloride (PF)  3 mL Intracatheter Q8H     torsemide  20 mg Oral BID       Data   Labs  I have personally reviewed the pertinent cardiac labs.    Most Recent 3 CBC's:  Recent Labs   Lab Test 03/20/22  0807 03/19/22  0824 03/18/22  1541 01/13/22  0000 11/04/21  1319   WBC  --  5.2 7.4  --  6.9   HGB 11.5* 10.9* 12.7 12.6 12.9   MCV  --  96 97 102 98   PLT  --  170 197 173  --        Most Recent 3 BMP's:  Recent Labs   Lab Test 03/23/22  0627 03/22/22  0649 03/21/22  1904 03/21/22  0647 03/20/22  1235 03/20/22  0807     --   --  135  --  135   POTASSIUM 3.6 3.7 3.5 3.1*  --  3.6   CHLORIDE 95  --   --  96  --  97   CO2 39*  --   --  36*  --  33*   BUN 9  --   --  10  --  14   CR 0.63 0.62  --  0.55  --  0.62   ANIONGAP 3  --   --  3  --  5   TU 9.3  --   --  8.6  --  9.4   GLC 86  --   --  82 80 81       Most Recent 2 LFT's:  Recent Labs   Lab Test 03/23/22  0627 01/13/22  0000 11/04/21  1319   AST 25 49 28   ALT 15 31 22   ALKPHOS 108  --  88   BILITOTAL 0.9  --  1.0       Most Recent 3 INR's:  Recent Labs   Lab Test 05/02/21  2132 04/15/19  0700 12/04/15  0745   INR 1.07 1.05 0.95       Most Recent 3 Troponin's:  Recent Labs   Lab Test 05/02/21 2132 11/25/19  1654 10/23/16  0533   TROPI <0.015 <0.015 <0.015  The 99th percentile for upper reference range is 0.045 ug/L.  Troponin values in   the range of 0.045 - 0.120 ug/L may be associated with risks of adverse   clinical events.         Most Recent  Cholesterol Panel:  Recent Labs   Lab Test 10/25/18  0759   CHOL 139   LDL 56   HDL 58   TRIG 125       Most Recent Hemoglobin A1c:  Recent Labs   Lab Test 05/02/21  2132   A1C 5.4           Kiet Mc MD  Interventional Cardiology  March 23, 2022

## 2022-03-23 NOTE — PLAN OF CARE
Goal Outcome Evaluation:        A/Ox4, VSS ex soft BP and tachy at times, negative for sitting to standing orthostatic BP this shift (no BP on R arm d/t mastectomy). On 2L via oxymask. Tele - Afib CVR with BBB. Purewick in place, good UOP. Strict I/O, 2gm NA diet, 2L fluid restriction. Up A1 + GB/W to bedside commode. Mepilex in place on sacrum. Heat packs applied for added comfort. 2+ edema in BLE, elevated on pillows x2. Discharge to [facility] planned for 3/25.

## 2022-03-24 ENCOUNTER — PATIENT OUTREACH (OUTPATIENT)
Dept: CARE COORDINATION | Facility: CLINIC | Age: 82
End: 2022-03-24
Payer: MEDICARE

## 2022-03-24 ENCOUNTER — LAB REQUISITION (OUTPATIENT)
Dept: LAB | Facility: CLINIC | Age: 82
End: 2022-03-24
Payer: MEDICARE

## 2022-03-24 VITALS
OXYGEN SATURATION: 96 % | SYSTOLIC BLOOD PRESSURE: 95 MMHG | BODY MASS INDEX: 25.9 KG/M2 | RESPIRATION RATE: 20 BRPM | TEMPERATURE: 98 F | WEIGHT: 141.6 LBS | HEART RATE: 83 BPM | DIASTOLIC BLOOD PRESSURE: 60 MMHG

## 2022-03-24 VITALS
OXYGEN SATURATION: 97 % | BODY MASS INDEX: 22.67 KG/M2 | RESPIRATION RATE: 16 BRPM | SYSTOLIC BLOOD PRESSURE: 98 MMHG | WEIGHT: 123.2 LBS | HEART RATE: 83 BPM | TEMPERATURE: 97.7 F | HEIGHT: 62 IN | DIASTOLIC BLOOD PRESSURE: 59 MMHG

## 2022-03-24 DIAGNOSIS — Z00.01 ENCOUNTER FOR GENERAL ADULT MEDICAL EXAMINATION WITH ABNORMAL FINDINGS: ICD-10-CM

## 2022-03-24 LAB
ANION GAP SERPL CALCULATED.3IONS-SCNC: 1 MMOL/L (ref 3–14)
BUN SERPL-MCNC: 10 MG/DL (ref 7–30)
CALCIUM SERPL-MCNC: 9.3 MG/DL (ref 8.5–10.1)
CHLORIDE BLD-SCNC: 95 MMOL/L (ref 94–109)
CO2 SERPL-SCNC: 40 MMOL/L (ref 20–32)
CREAT SERPL-MCNC: 0.63 MG/DL (ref 0.52–1.04)
GFR SERPL CREATININE-BSD FRML MDRD: 88 ML/MIN/1.73M2
GLUCOSE BLD-MCNC: 93 MG/DL (ref 70–99)
MAGNESIUM SERPL-MCNC: 1.7 MG/DL (ref 1.6–2.3)
POTASSIUM BLD-SCNC: 3.3 MMOL/L (ref 3.4–5.3)
SODIUM SERPL-SCNC: 136 MMOL/L (ref 133–144)

## 2022-03-24 PROCEDURE — 36415 COLL VENOUS BLD VENIPUNCTURE: CPT | Performed by: HOSPITALIST

## 2022-03-24 PROCEDURE — 250N000013 HC RX MED GY IP 250 OP 250 PS 637: Performed by: HOSPITALIST

## 2022-03-24 PROCEDURE — 82310 ASSAY OF CALCIUM: CPT | Performed by: HOSPITALIST

## 2022-03-24 PROCEDURE — U0005 INFEC AGEN DETEC AMPLI PROBE: HCPCS | Performed by: NURSE PRACTITIONER

## 2022-03-24 PROCEDURE — 250N000013 HC RX MED GY IP 250 OP 250 PS 637: Performed by: NURSE PRACTITIONER

## 2022-03-24 PROCEDURE — 99239 HOSP IP/OBS DSCHRG MGMT >30: CPT | Performed by: HOSPITALIST

## 2022-03-24 PROCEDURE — 83735 ASSAY OF MAGNESIUM: CPT | Performed by: HOSPITALIST

## 2022-03-24 RX ORDER — AMOXICILLIN 250 MG
1 CAPSULE ORAL 2 TIMES DAILY PRN
DISCHARGE
Start: 2022-03-24 | End: 2022-03-25

## 2022-03-24 RX ORDER — DIGOXIN 125 MCG
125 TABLET ORAL DAILY
DISCHARGE
Start: 2022-03-25 | End: 2022-04-20

## 2022-03-24 RX ADMIN — SILDENAFIL 20 MG: 20 TABLET, FILM COATED ORAL at 08:30

## 2022-03-24 RX ADMIN — Medication: at 09:21

## 2022-03-24 RX ADMIN — GABAPENTIN 100 MG: 100 CAPSULE ORAL at 08:30

## 2022-03-24 RX ADMIN — DIGOXIN 125 MCG: 125 TABLET ORAL at 08:30

## 2022-03-24 RX ADMIN — ACETAMINOPHEN 650 MG: 325 TABLET, FILM COATED ORAL at 03:49

## 2022-03-24 RX ADMIN — HYDROXYCHLOROQUINE SULFATE 200 MG: 200 TABLET, FILM COATED ORAL at 08:30

## 2022-03-24 RX ADMIN — PANTOPRAZOLE SODIUM 40 MG: 40 TABLET, DELAYED RELEASE ORAL at 08:30

## 2022-03-24 RX ADMIN — TORSEMIDE 20 MG: 20 TABLET ORAL at 08:30

## 2022-03-24 RX ADMIN — LEVOTHYROXINE SODIUM 75 MCG: 50 TABLET ORAL at 08:30

## 2022-03-24 RX ADMIN — POTASSIUM CHLORIDE 20 MEQ: 1500 TABLET, EXTENDED RELEASE ORAL at 08:30

## 2022-03-24 RX ADMIN — FOLIC ACID 1000 MCG: 1 TABLET ORAL at 08:31

## 2022-03-24 RX ADMIN — APIXABAN 2.5 MG: 2.5 TABLET, FILM COATED ORAL at 08:31

## 2022-03-24 ASSESSMENT — ACTIVITIES OF DAILY LIVING (ADL)
ADLS_ACUITY_SCORE: 19
ADLS_ACUITY_SCORE: 18
ADLS_ACUITY_SCORE: 18
ADLS_ACUITY_SCORE: 19
ADLS_ACUITY_SCORE: 18
ADLS_ACUITY_SCORE: 19
ADLS_ACUITY_SCORE: 19

## 2022-03-24 NOTE — PLAN OF CARE
Pt on chemo precautions. A&Ox4, forgetful at times. VSS on 1.5 L O2 steady at 96%. except hypotensive (98/59). Held HTN medication. Pt denies pain except discomfort in buttocks. We turned & repo q2h & HOB under 30. Currently has mepilex for preventative measure. Total of 440 mL, strict I&O. BM early AM. Ax1/G/W. Trace edema BLE, mild pitting edema in ankles - BLE elevated with pillows. L PIV removed. Pt ready to discharge and will be going to [facility] at around 11 am. All belongings are with pt.

## 2022-03-24 NOTE — PROGRESS NOTES
Care Management Discharge Note    Discharge Date: 03/24/2022       Discharge Disposition: Skilled Nursing Facilty    Discharge Services:      Discharge DME:      Discharge Transportation:      Private pay costs discussed: Not applicable    PAS Confirmation Code:  [id number]  Patient/family educated on Medicare website which has current facility and service quality ratings:      Education Provided on the Discharge Plan:  yes  Persons Notified of Discharge Plans: patient  Patient/Family in Agreement with the Plan: yes    Handoff Referral Completed: Yes    Additional Information:  MD said patient was ready to discharge. Called [facility] and they said they could accept patient at 1230 today. [facility] will transport patient over there. Completed PAS, put in chart. Received discharge orders. Faxed orders to [facility].    PAS-RR    D: Per DHS regulation, SW completed and submitted PAS-RR to [place] [employer] Direct Connect via the Senior [employer] Line.  PAS-RR confirmation # is : [id number]    I: SW spoke with patient and they are aware a PAS-RR has been submitted.  SW reviewed with patient that they may be contacted for a follow up appointment within 10 days of hospital discharge if their SNF stay is < 30 days.  Contact information for [employer] was also provided.    A: Patient verbalized understanding.    P: Further questions may be directed to [employer] Line at #[phone], option #4 for PAS-RR staff.    MACIE Perry

## 2022-03-24 NOTE — PROGRESS NOTES
Clinic Care Coordination Contact  Care Team Conversations    Situation: RN Clinical Product Navigator following patient through TCU care progression.     Background: Patient was inpatient at 3/18/22-3/24/22 at [facility] due to acute on chronic diastolic CHF, mild acute hypoxic respiratory failure, atrial fibrillation, pulmonary hypertension, hypokalemia, iron deficiency anemia.  Patient was discharged to [facility] for rehabilitation.    Assessment: Prior to hospital admission patient was living at home with her spouse, patient 2 stairs to enter her home.  All needs are able to be met on main floor with the exception of the laundry which is in the basement.  Patient has a raised toilet seat, grab bar for toilet, shower chair, and grab bar in shower, FWW.  Filippo assists with delivering groceries; hires someone for yardwork.  Patient's home care nurse had stopped coming and patient was setting up her own medications for approximately 1 week prior to admit; patient uncertain if she missed any medications per hospital discharge notes.  Post-hospital recommendations: BMP and digoxin level in 1 week.  Patient was not on home oxygen, wean down as able.  Daily weights, call if weight gain of >2 lbs in 1 day or >5 lbs in 1 week.  Further anemia work-up to be done in outpatient setting.    Appointments in Next Year    Mar 28, 2022  3:00 PM  LAB with [facility]  [facility] ([facility] ) [phone]   Mar 28, 2022  4:00 PM  RETURN PRIMARY PULMONARY with JIMMY Castorena  [facility] ([facility] ) [phone]   Apr 04, 2022 11:30 AM  LAB with CS [facility]  [facility] ([facility] ) [phone]   Jul 19, 2022 10:00 AM  ECHO COMPLETE with [id number]  [facility] ([facility] ) [phone]    Jul 19, 2022 11:30 AM  (Arrive by 11:15 AM)  RETURN PRIMARY PULMONARY with Shay Marquez MD  [facility] ([facility] ) [phone]           Plan/Recommendations: RN Clinical Product Navigator will send TCU Care Team Care Management hand in communication and request involvement in discharge planning and coordination of care.      Melissa Behl BSN, RN, PHN, CCM  RN Clinical Product Navigator  [phone]

## 2022-03-24 NOTE — PLAN OF CARE
Occupational Therapy Discharge Summary    Reason for therapy discharge:    Discharged to transitional care facility.    Progress towards therapy goal(s). See goals on Care Plan in [facility] electronic health record for goal details.  Goals partially met.  Barriers to achieving goals:   discharge from facility.    Therapy recommendation(s):    Continued therapy is recommended.  Rationale/Recommendations:  Pt functioning below baseline due to weakness, activity tolerance, and cognition. Pt scored 20/30 on SLUMS indicating moderate to severe deficits in executive functioning, memory, sequencing. Recommend TCU for continued IP OT services for cognition and to improve (I) in I/ADLs and functinoal mobility to PLOF prior to returning home.

## 2022-03-24 NOTE — PLAN OF CARE
"Physical Therapy Discharge Summary    Reason for therapy discharge:    Discharged to transitional care facility.    Progress towards therapy goal(s). See goals on Care Plan in [facility] electronic health record for goal details.  Goals not met.  Barriers to achieving goals:   discharge from facility.    Therapy recommendation(s):    Per recommendations from previous treating therapist, \"Patient appears to be functioning below baseline, requiring CGA-min assist for transfers and ambulating short distances with WW.  O2 sats registering around 90's on 1L; question about level of perfusion.  Nursing aware and following.  Anticipate patient would benefit from TCU to increase strength and functional mobility.\"        "

## 2022-03-24 NOTE — PROGRESS NOTES
Pt is on chemo precautions. A&O x4, sometimes forgetful. VSS ex hypotension. Orthos BP negative. Attempted to wean to 1/2 L but desats to upper 80's, kept on 1 L NC. C/o leg pain, tylenol given. Tele on, A fib w BBB, CVR. Purewick in place with adequate UOP. Strict I&O, 2L fluid restriction, 2 gram sodium diet. Denies N/V. BS normal, 1 bm this shift. Up A1 & GB/W. Mepilex on sacrum. Edema in BLE, elevation with pillows. PIV SL. K 3.6 Mag 1.8. possible discharge to [facility] today.

## 2022-03-24 NOTE — DISCHARGE SUMMARY
[facility]  Hospitalist Discharge Summary      Date of Admission:  3/18/2022  Date of Discharge:  3/24/2022  Discharging Provider: Hernan Vang MD  Discharge Service: Hospitalist Service    Discharge Diagnoses     Acute on chronic diastolic CHF    Mild acute hypoxic respiratory failure    Atrial Fibrillation     Pulmonary hypertension    Hypokalemia    Iron deficiency anemia    Rt ring finger warts    See below for additional chronic medical conditions      Follow-ups Needed After Discharge   Follow-up Appointments     Follow Up and recommended labs and tests      Follow up with long-term physician.  The following labs/tests are   recommended: BMP and digoxin level in 1 week.               Unresulted Labs Ordered in the Past 30 Days of this Admission     No orders found from 2/16/2022 to 3/19/2022.      These results will be followed up by none    Discharge Disposition   Discharged to [facility]- [facility]  Condition at discharge: Stable      Hospital Course   Acute on chronic diastolic CHF  Mild acute hypoxic respiratory failure  Atrial Fibrillation   Pulmonary hypertension  Worsening lower extremity edema over past few weeks, PCP has been adjusting her torsemide. O2 sat 88% with ambulation while in ED (but also reported as 80% in note). home care nurse stopped coming who did her meds about a week ago--patient isn't sure if she missed any medications, but denies taking any meds in afternoon (torsemide had been recommended TID at last PCP visit). BNP 5818. She reports her baseline weight is in 130s, doesn't seem to have changed much with worsening swelling. CXR with cardiomegaly, mild pulmonary vascular congestion    - managed with Lasix IV earlier this stay, net Negative outout and weight trended down, PTA Torsemide resumed and continues to have good response with PO as well. Although she was orthostatic, did not have symptoms.     - needed supplemental O2, and weaned down to 1.5-2LPM.Not on  home O2, wean as able.   - BB/ACEI on hold given soft BP. Remained tachycardic/on RVR and with soft BP. Discussed with cardiology, started on digoxin 3/22 and rate better controlled and BP also improved. No plan to resume BB or CCB in hospital. Patient will follow up with cardiology for this.   - Lymphedema wraps  - TTE showed Normal LV EF of 60 to 65%, mildly decreased RV systolic function, moderately dilated RV.  Severe biatrial enlargement was seen with moderate severe to severe 3-4+ tricuspid regurgitation.  Right ventricular systolic pressure is elevated consistent with moderate to severe pulmonary hypertension there is moderate- severe MR there is moderate valvular aortic stenosis  - PTA apixaban dose adjusted, decreased to 2.5 mg BID     - PT evaluated, discharged to TCU.      Hypokalemia  Replaced as needed     Right ring finger warts  Present for months  -- salicylic acid gel daily to digit.     Anemia :  --Iron saturation and iron levels came back low, transferrin and iron binding capacity is in the normal range  --IV iron was given but not completed as BP was running soft, no concern for allergic reaction.     --Further anemia work-up to be done in an outpatient setting     Hyperlipidemia  --PTA statin continued     Scleroderma  Rheumatoid arthritis  Psoriasis  --Continue PTA folic acid, methotrexate (wednesdays), plaquenil  -- Patient is requesting not to check any blood pressure or do blood work on her RUE due to H/O Mastectomy      GERD  --Continue omeprazole     Mild Cognitive impairment  OT eval given she is managing her own meds  -- resumed Aricept      Consultations This Hospital Stay   CORE CLINIC EVALUATION IP CONSULT  OCCUPATIONAL THERAPY ADULT IP CONSULT  NUTRITION SERVICES ADULT IP CONSULT  CARE MANAGEMENT / SOCIAL WORK IP CONSULT  CARDIOLOGY IP CONSULT  CARE MANAGEMENT / SOCIAL WORK IP CONSULT  PHYSICAL THERAPY ADULT IP CONSULT  OCCUPATIONAL THERAPY ADULT IP CONSULT  PHYSICAL THERAPY ADULT IP  CONSULT  OCCUPATIONAL THERAPY ADULT IP CONSULT    Code Status   No CPR- Do NOT Intubate    Time Spent on this Encounter   I, Hernan Vang MD, personally saw the patient today and spent greater than 30 minutes discharging this patient.       Hernan Vang MD  [facility] [id number] [facility]  [address]  [place] [address]  Phone: [phone]  ______________________________________________________________________    Physical Exam   Vital Signs: Temp: 97.7  F (36.5  C) Temp src: Oral BP: 98/59 Pulse: 83   Resp: 16 SpO2: 97 % O2 Device: Nasal cannula Oxygen Delivery: 1 LPM  Weight: 123 lbs 3.2 oz    GENERAL: Alert , awake. Fatigued/. Conversational, appropriate. Wearing nasal cannula   HEENT: PERRLA EOMI  LUNGS: diminished, bibasilar crackles, no wheezing. No dyspnea at rest.   HEART: s1s2 irregular. No tachycardia.   ABDOMEN: Soft, nontender, and nondistended. Positive bowel sounds.   EXTREMITIES: Chronic venous insufficiency changes in bilateral lower extremities and bilateral leg edema (+)  NEUROLOGIC: Moves extremities x4 on command. No acute focal neurologic abnormalities noted.        Primary Care Physician   Carissa Burroughs    Discharge Orders      Medication Therapy Management Referral      Primary Care - Care Coordination Referral      General info for SNF    Length of Stay Estimate: Short Term Care: Estimated # of Days <30  Condition at Discharge: Improving  Level of care:skilled   Rehabilitation Potential: Good  Admission H&P remains valid and up-to-date: Yes  Recent Chemotherapy: N/A  Use Nursing Home Standing Orders: Yes     Mantoux instructions    Give two-step Mantoux (PPD) Per Facility Policy Yes     Follow Up and recommended labs and tests    Follow up with shelter physician.  The following labs/tests are recommended: BMP and digoxin level in 1 week.     Reason for your hospital stay    Acute CHF exacerbation, a fib with RVR     Daily weights    Call Provider for  weight gain of more than 2 pounds per day or 5 pounds per week.     Activity - Up with assistive device     Activity - Up with nursing assistance     No CPR- Do NOT Intubate     Physical Therapy Adult Consult    Evaluate and treat as clinically indicated.    Reason:  weakness and deconditioning     Occupational Therapy Adult Consult    Evaluate and treat as clinically indicated.    Reason:  weakness and deconditioning     Fall precautions     Oxygen Adult/Peds     Diet    Follow this diet upon discharge: Orders Placed This Encounter      Room Service      2 Gram Sodium Diet Other - please comment       Significant Results and Procedures   Most Recent 3 CBC's:Recent Labs   Lab Test 03/20/22  0807 03/19/22  0824 03/18/22  1541 01/13/22  0000 11/04/21  1319   WBC  --  5.2 7.4  --  6.9   HGB 11.5* 10.9* 12.7 12.6 12.9   MCV  --  96 97 102 98   PLT  --  170 197 173  --      Most Recent 3 BMP's:Recent Labs   Lab Test 03/24/22  0759 03/23/22  0627 03/22/22  0649 03/21/22  1904 03/21/22  0647    137  --   --  135   POTASSIUM 3.3* 3.6 3.7   < > 3.1*   CHLORIDE 95 95  --   --  96   CO2 40* 39*  --   --  36*   BUN 10 9  --   --  10   CR 0.63 0.63 0.62  --  0.55   ANIONGAP 1* 3  --   --  3   TU 9.3 9.3  --   --  8.6   GLC 93 86  --   --  82    < > = values in this interval not displayed.     Most Recent 2 LFT's:Recent Labs   Lab Test 03/23/22  0627 01/13/22  0000 11/04/21  1319   AST 25 49 28   ALT 15 31 22   ALKPHOS 108  --  88   BILITOTAL 0.9  --  1.0     Most Recent 3 Troponin's:Recent Labs   Lab Test 05/02/21  2132 11/25/19  1654 10/23/16  0533   TROPI <0.015 <0.015 <0.015  The 99th percentile for upper reference range is 0.045 ug/L.  Troponin values in   the range of 0.045 - 0.120 ug/L may be associated with risks of adverse   clinical events.       Most Recent 3 BNP's:Recent Labs   Lab Test 03/21/22  0647 03/18/22  1541 04/15/21  1304 03/22/21  1425 06/26/20  0935 12/17/19  1245 12/03/19  0624   NTBNPI 5,482*  5,818*  --   --   --   --  3,098*   NTBNP  --   --  3,524* 4,485* 3,045*   < >  --     < > = values in this interval not displayed.     Most Recent 6 Bacteria Isolates From Any Culture (See EPIC Reports for Culture Details):Recent Labs   Lab Test 05/02/21 2217   CULT >100,000 colonies/mL  Escherichia coli  *  10,000 to 50,000 colonies/mL  Strain 2  Escherichia coli  *     Most Recent TSH and T4:Recent Labs   Lab Test 07/26/19  0934 10/27/15  0925 08/17/15  0909   TSH 3.10   < > 4.53*   T4  --   --  0.96    < > = values in this interval not displayed.     Most Recent 6 glucoses:Recent Labs   Lab Test 03/24/22  0759 03/23/22  0627 03/21/22  0647 03/20/22  1235 03/20/22  0807 03/19/22  0824   GLC 93 86 82 80 81 82     Most Recent Urinalysis:Recent Labs   Lab Test 05/02/21 2217   COLOR Orange   APPEARANCE Cloudy   URINEGLC Negative   URINEBILI Negative   URINEKETONE 10*   SG 1.015   UBLD Small*   URINEPH 6.0   PROTEIN 100*   NITRITE Negative   LEUKEST Large*   RBCU 2   WBCU >182*   ,   Results for orders placed or performed during the hospital encounter of 03/18/22   US Lower Extremity Venous Duplex Bilateral    Narrative    EXAM: US LOWER EXTREMITY VENOUS DUPLEX BILATERAL  LOCATION: [facility]  DATE/TIME: 3/18/2022 5:22 PM    INDICATION: bilateral lower leg swelling  COMPARISON: None.  TECHNIQUE: Venous Duplex ultrasound of bilateral lower extremities with and without compression, augmentation and duplex. Color flow and spectral Doppler with waveform analysis performed.    FINDINGS: Exam includes the common femoral, femoral, popliteal veins as well as segmentally visualized deep calf veins and greater saphenous vein.     RIGHT: No deep vein thrombosis. No superficial thrombophlebitis. No popliteal cyst.    LEFT: No deep vein thrombosis. No superficial thrombophlebitis. No popliteal cyst.      Impression    IMPRESSION:  1.  No deep venous thrombosis in the bilateral lower extremities.    Chest XR,  PA & LAT    Narrative    EXAM: XR CHEST 2 VW  LOCATION: [facility]  DATE/TIME: 3/18/2022 4:57 PM    INDICATION: Congestive heart failure  COMPARISON: CT of the chest 11/15/2021      Impression    IMPRESSION:     Unchanged severe enlargement of the cardiac silhouette particularly the left heart chambers. Moderate to severe mitral annular calcifications are present. Mild atheromatous calcification of the aortic arch.    Bilateral and fairly symmetric course interstitial lung opacities are present consistent with parenchymal fibrosis. A more focal oblong opacity is present at the level of the left pulmonary artery on the frontal view and abutting the posterior pleura on   the lateral unchanged from the prior CT consistent with focal cicatrization atelectasis. No new airspace opacities. No peribronchial fluid cuffs to suggest perihilar interstitial lung edema.    Diaphragmatic curvature is preserved. No pleural effusion is present.    Mid and lower thoracic spine degenerative osteophytes   Echocardiogram Complete     Value    LVEF  60-65%    Narrative    [id number]  [id number]  [id number]  [id number]^MIGUEL ANGEL^CHANNING^KUNAL     [facility]  Echocardiography Laboratory  [address] [place] [address]     Name: GINA MCKEON  MRN: [id number]  : 1940  Study Date: 2022 11:17 AM  Age: 81 yrs  Gender: Female  Patient Location: [facility]  Reason For Study: Heart Failure  Ordering Physician: CHANNING MICHELLE  Referring Physician: Carissa Burroughs  Performed By: Abdi Cervantes     BSA: 1.6 m2  Height: 62 in  Weight: 134 lb  HR: 82  BP: 97/66 mmHg  ______________________________________________________________________________  Procedure  Complete Portable Echo Adult.  ______________________________________________________________________________  Interpretation Summary     The rhythm was atrial fibrillation.  Left ventricular systolic function is  normal.  The visual ejection fraction is 60-65%.  Mildly decreased right ventricular systolic function  The right ventricle is moderately dilated.  The left ventricle is normal in size.  There is severe biatrial enlargement.  There is mod-severe to severe (3-4+) tricuspid regurgitation.  Right ventricular systolic pressure is elevated, consistent with moderate to  severe pulmonary hypertension.  There is moderate to mod-severe (2-3+) mitral regurgitation.ERO 0.26 cm2/ RV  38 ml)  Moderate valvular aortic stenosis JACOB 1.1cm2/MSG 20 mmHg/DI 0.34     As compared with the last sudy 3/22/2021 there has been a slight increase in  the severity of AS ( JACOB was 1.3, now 1.1cm2) and estimated PA systolcic  pressus ( was 48 mmHg + RAP, now 53 mmHg = RAP). There has been no significant  change in LV/RV systolic performance or chamber size.  ______________________________________________________________________________  Left Ventricle  The left ventricle is normal in size. There is normal left ventricular wall  thickness. Left ventricular systolic function is normal. The visual ejection  fraction is 60-65%. Left ventricular diastolic function is indeterminate. No  regional wall motion abnormalities noted. There is no thrombus seen in the  left ventricle.     Right Ventricle  The right ventricle is moderately dilated. There is normal right ventricular  wall thickness. Mildly decreased right ventricular systolic function. There is  no mass or thrombus in the right ventricle.     Atria  There is severe biatrial enlargement. There is no atrial shunt seen. The left  atrial appendage is not well visualized.     Mitral Valve  The mitral valve leaflets are moderately thickened. There is moderate to mod-  severe (2-3+) mitral regurgitation. The mean mitral valve gradient is 5.4  mmHg. There is mild mitral stenosis.     Tricuspid Valve  Normal tricuspid valve. There is mod-severe to severe (3-4+) tricuspid  regurgitation. The right  ventricular systolic pressure is elevated at 53.3  mmHg. Right ventricular systolic pressure is elevated, consistent with  moderate to severe pulmonary hypertension.     Aortic Valve  There is moderate trileaflet aortic sclerosis. No aortic regurgitation is  present. Moderate valvular aortic stenosis.     Pulmonic Valve  The pulmonic valve is not well visualized.     Vessels  The aortic root is normal size. Normal size ascending aorta. Dilation of the  inferior vena cava is present with normal respiratory variation in diameter.  The pulmonary artery is normal size.     Pericardium  The pericardium appears normal. There is no pleural effusion.     Rhythm  The rhythm was atrial fibrillation.  ______________________________________________________________________________  MMode/2D Measurements & Calculations  IVSd: 0.95 cm     LVIDd: 4.0 cm  LVIDs: 3.4 cm  LVPWd: 1.4 cm  FS: 13.3 %  LV mass(C)d: 157.3 grams  LV mass(C)dI: 97.6 grams/m2  Ao root diam: 2.5 cm  LA dimension: 4.6 cm  asc Aorta Diam: 3.2 cm  LA/Ao: 1.8  LVOT diam: 2.0 cm  LVOT area: 3.1 cm2  LA Volume (BP): 143.0 ml  LA Volume Index (BP): 88.8 ml/m2  RWT: 0.70     Doppler Measurements & Calculations  MV E max el: 120.0 cm/sec  MV max P.4 mmHg  MV mean P.4 mmHg  MV V2 VTI: 23.2 cm  MVA(VTI): 2.7 cm2  MV dec slope: 1139 cm/sec2  MV dec time: 0.11 sec  Ao V2 max: 306.5 cm/sec  Ao max P.0 mmHg  Ao V2 mean: 206.6 cm/sec  Ao mean P.1 mmHg  Ao V2 VTI: 58.2 cm  JACOB(I,D): 1.1 cm2  JACOB(V,D): 1.0 cm2  LV V1 max P.4 mmHg  LV V1 max: 104.3 cm/sec  LV V1 VTI: 20.2 cm  SV(LVOT): 61.8 ml  SI(LVOT): 38.3 ml/m2  TR max el: 365.1 cm/sec  TR max P.3 mmHg  AV El Ratio (DI): 0.34  JACOB Index (cm2/m2): 0.66  E/E' av.3  Lateral E/e': 12.8  Medial E/e': 19.7     ______________________________________________________________________________  Report approved by: Dr. Aman Hutchinson 2022 01:41 PM               Discharge Medications   Current  Discharge Medication List      START taking these medications    Details   digoxin (LANOXIN) 125 MCG tablet Take 1 tablet (125 mcg) by mouth daily    Associated Diagnoses: Persistent atrial fibrillation (H)      salicylic acid (COMPOUND W MAX STRENGTH) 17 % external gel Apply topically daily    Associated Diagnoses: Other viral warts      senna-docusate (SENOKOT-S/PERICOLACE) 8.6-50 MG tablet Take 1 tablet by mouth 2 times daily as needed for constipation    Associated Diagnoses: Constipation, unspecified constipation type         CONTINUE these medications which have CHANGED    Details   apixaban ANTICOAGULANT (ELIQUIS ANTICOAGULANT) 2.5 MG tablet Take 1 tablet (2.5 mg) by mouth 2 times daily    Associated Diagnoses: Persistent atrial fibrillation (H)         CONTINUE these medications which have NOT CHANGED    Details   acetaminophen (TYLENOL) 500 MG tablet Take 1,000 mg by mouth 2 times daily as needed for mild pain       albuterol (PROAIR HFA/PROVENTIL HFA/VENTOLIN HFA) 108 (90 Base) MCG/ACT inhaler USE 2 INHALATIONS ORALLY   INTO THE LUNGS EVERY 6  HOURS as needed  Qty: 18 g, Refills: 0    Comments: Pharmacy may dispense brand covered by insurance (Proair, or proventil or ventolin or generic albuterol inhaler)  Associated Diagnoses: ILD (interstitial lung disease) (H)      donepezil (ARICEPT) 5 MG tablet Take 1 tablet (5 mg) by mouth At Bedtime  Qty: 90 tablet, Refills: 3    Associated Diagnoses: Memory loss      folic acid (FOLVITE) 1 MG tablet TAKE 1 TABLET DAILY.  Qty: 90 tablet, Refills: 3    Associated Diagnoses: Mixed hyperlipidemia      gabapentin (NEURONTIN) 100 MG capsule Take 1 capsule (100 mg) by mouth 3 times daily  Qty: 90 capsule, Refills: 11    Associated Diagnoses: Nerve pain      hydroxychloroquine (PLAQUENIL) 200 MG tablet Take 200 mg by mouth daily      KLOR-CON 20 MEQ CR tablet TAKE 1 TABLET DAILY  Qty: 90 tablet, Refills: 3    Associated Diagnoses: Hypokalemia      levothyroxine  (SYNTHROID/LEVOTHROID) 75 MCG tablet TAKE 1 TABLET DAILY  Qty: 90 tablet, Refills: 2    Associated Diagnoses: Hypothyroidism, unspecified type      melatonin 5 MG tablet Take 5 mg by mouth nightly as needed for sleep      Methotrexate Sodium (METHOTREXATE PO) Take 2.5 mg by mouth 10 tablets weekly - Wednesdays. Splits the dose as 5 tablets in AM & 5 tablets in PM      omeprazole (PRILOSEC) 20 MG DR capsule TAKE 1 CAPSULE TWICE DAILY,30-60 MINUTES PRIOR TO     MEALS  Qty: 180 capsule, Refills: 2    Associated Diagnoses: Esophageal stricture      sildenafil (REVATIO) 20 MG tablet TAKE 1 TABLET BY MOUTH THREE TIMES DAILY. GENERIC FOR REVATIO. CALL [phone] TO REFILL  Qty: 90 tablet, Refills: 5    Associated Diagnoses: Pulmonary hypertension (H)      simvastatin (ZOCOR) 40 MG tablet Take 1 tablet (40 mg) by mouth At Bedtime  Qty: 90 tablet, Refills: 3    Associated Diagnoses: Hyperlipidemia LDL goal <100      torsemide (DEMADEX) 20 MG tablet Take 1 tablet (20 mg) by mouth 2 times daily  Qty: 180 tablet, Refills: 3    Associated Diagnoses: Bilateral leg edema      bromfenac (PROLENSA) 0.07 % ophthalmic solution Place 1 drop into the right eye daily      multivitamin (CENTRUM SILVER) tablet Take 1 tablet by mouth daily      Skin Protectants, Misc. (EUCERIN) cream Apply topically 2 times daily Apply to areas dry skin topically two times a day for dry skin and Apply to areas of dry skin topically as needed for dry skin daily      Vitamin D, Cholecalciferol, 10 MCG (400 UNIT) TABS Take 1,600 Units by mouth daily         STOP taking these medications       metoprolol succinate ER (TOPROL-XL) 25 MG 24 hr tablet Comments:   Reason for Stopping:         NIFEdipine ER (ADALAT CC) 60 MG 24 hr tablet Comments:   Reason for Stopping:             Allergies   Allergies   Allergen Reactions     No Known Allergies

## 2022-03-25 ENCOUNTER — TRANSITIONAL CARE UNIT VISIT (OUTPATIENT)
Dept: GERIATRICS | Facility: CLINIC | Age: 82
End: 2022-03-25
Payer: MEDICARE

## 2022-03-25 ENCOUNTER — LAB REQUISITION (OUTPATIENT)
Dept: LAB | Facility: CLINIC | Age: 82
End: 2022-03-25
Payer: MEDICARE

## 2022-03-25 DIAGNOSIS — E78.2 MIXED HYPERLIPIDEMIA: ICD-10-CM

## 2022-03-25 DIAGNOSIS — D63.8 ANEMIA IN OTHER CHRONIC DISEASES CLASSIFIED ELSEWHERE: ICD-10-CM

## 2022-03-25 DIAGNOSIS — R53.81 PHYSICAL DECONDITIONING: ICD-10-CM

## 2022-03-25 DIAGNOSIS — E87.6 HYPOKALEMIA: ICD-10-CM

## 2022-03-25 DIAGNOSIS — K21.9 GASTROESOPHAGEAL REFLUX DISEASE WITHOUT ESOPHAGITIS: ICD-10-CM

## 2022-03-25 DIAGNOSIS — I48.20 CHRONIC ATRIAL FIBRILLATION (H): ICD-10-CM

## 2022-03-25 DIAGNOSIS — M06.9 RHEUMATOID ARTHRITIS, INVOLVING UNSPECIFIED SITE, UNSPECIFIED WHETHER RHEUMATOID FACTOR PRESENT (H): ICD-10-CM

## 2022-03-25 DIAGNOSIS — L40.9 PSORIASIS: ICD-10-CM

## 2022-03-25 DIAGNOSIS — R53.1 WEAKNESS: ICD-10-CM

## 2022-03-25 DIAGNOSIS — Z71.89 ADVANCED DIRECTIVES, COUNSELING/DISCUSSION: ICD-10-CM

## 2022-03-25 DIAGNOSIS — M34.9 SCLERODERMA (H): ICD-10-CM

## 2022-03-25 DIAGNOSIS — B07.9 VIRAL WART ON FINGER: ICD-10-CM

## 2022-03-25 DIAGNOSIS — G31.84 MCI (MILD COGNITIVE IMPAIRMENT): ICD-10-CM

## 2022-03-25 DIAGNOSIS — J96.01 ACUTE RESPIRATORY FAILURE WITH HYPOXIA (H): ICD-10-CM

## 2022-03-25 DIAGNOSIS — I27.20 PULMONARY HYPERTENSION (H): ICD-10-CM

## 2022-03-25 DIAGNOSIS — I50.33 ACUTE ON CHRONIC DIASTOLIC CONGESTIVE HEART FAILURE (H): Primary | ICD-10-CM

## 2022-03-25 LAB — SARS-COV-2 RNA RESP QL NAA+PROBE: NEGATIVE

## 2022-03-25 PROCEDURE — 36415 COLL VENOUS BLD VENIPUNCTURE: CPT | Performed by: NURSE PRACTITIONER

## 2022-03-25 PROCEDURE — 99310 SBSQ NF CARE HIGH MDM 45: CPT | Performed by: NURSE PRACTITIONER

## 2022-03-25 PROCEDURE — 86481 TB AG RESPONSE T-CELL SUSP: CPT | Performed by: NURSE PRACTITIONER

## 2022-03-25 NOTE — PROGRESS NOTES
[facility] GERIATRICS    PRIMARY CARE PROVIDER AND CLINIC:  Carissa Burroughs MD, [address] / [place] [address]  Chief Complaint   Patient presents with     Hospital F/U      [place] Medical Record Number:  [id number]  Place of Service where encounter took place:  [facility] (TCU) [[id number]]    Jaqueline Canales  is a 81 year old  (1940), admitted to the above facility from  [facility]. Hospital stay 3/18/22 through 3/24/22.  HPI:    80 yo female with hx chronic dCHF, a fib, pHTN hospitalized due to hypoxia, increased edema, acute hypoxic respiratory failure. BNP 5818 at admission, CXR with mild pulmonary vascular congestion. Diuresed with IV lasix, resumed PTA torsemide. Some orthostasis but no symptoms. Requires o2, wean as able. Started digoxin for rate control, BB and CCB stopped. Lymphedema wraps to legs. Apixaban dose decreased to 2.5 mg BID at discharge. Hypokalemia replaced. Finger warts treated with salicylic acid gel. Anemia - given partial dose IV iron, further work up as outpatient. HLD on statin. Scleroderma, RA, Psoriasis: on PTA folic acid, methotrexate (wednesdays), plaquenil. GERD managed with PPI. On Aricept due to mild cognitive impairment. To [facility] for therapies.     Patient seen for initial TCU visit. Reports feeling tired today. BPs range /55-62. Unfortunately medication changes mentioned above were not reflected on discharge med orders and patient received both BB and CCB after admitting to this TCU, corrected orders today to those recommended by cardiology. No headaches, dizziness, bowel or bladder concerns. Does report occasional dyspnea/tightness in chest on and off since admitting. Sats 93% on 1-2 lpm of Oxygen. Reviewed code status - DNR/DNI confirmed.     CODE STATUS/ADVANCE DIRECTIVES DISCUSSION:  No CPR- Do NOT Intubate  DNR / DNI  ALLERGIES:   Allergies   Allergen Reactions     No Known Allergies       PAST MEDICAL HISTORY:   Past  Medical History:   Diagnosis Date     Amaurosis fugax 5-11    Right     Carotid artery stenosis      Esophageal reflux 3/11/2004     HELICOBACTER PYLORI INFECTION 7/11/2006     hx of CA in situ RT breast-1990.mastectomy 4/17/2007     Hyperlipidemia LDL goal <70 6/20/2011     Lung cancer (H)     squamous cell lung ca left lower lobe     OBESITY NOS 3/11/2004     OSTEOPOROSIS NOS 3/11/2004     Other psoriasis 3/11/2004     Pulmonary hypertension (H) 04/15/2019    Right heart catheterization demonstrated moderate pulmonary arterial hypertension with a mean PA pressure of 34 mmHg, no reversibility with inhaled nitric oxide, elevated right and left-sided filling pressures, low normal cardiac index of 2.1.  Seen by Dr. Shay Marquez.  Started on sildenafil (Revatio) 20 mg 3 times daily.     RA (rheumatoid arthritis) (H) 2/8/2010     Raynaud's syndrome 4/10/2006     Scleroderma (H)      Sleep apnea     CPAP      PAST SURGICAL HISTORY:   has a past surgical history that includes NONSPECIFIC PROCEDURE (1990); Tonsillectomy; Repair hammer toe; [place]ization; Endarterectomy carotid (2011); COLONOSCOPY THRU STOMA, DIAGNOSTIC (2001 2011); colonoscopy (5/24/16); Breast surgery; Right Heart Catheterization (N/A, 4/15/2019); and Colonoscopy (N/A, 6/28/2019).  FAMILY HISTORY: family history includes Alzheimer Disease in her sister; Cancer in her nephew; Cancer (age of onset: 31) in her sister; Cancer - colorectal in her mother; Cerebrovascular Disease in her father and maternal grandmother; Diabetes in her maternal grandmother and maternal uncle; Gastrointestinal Disease in her brother, sister, and sister; Heart Disease in her sister; Mental Illness in her maternal grandmother.  SOCIAL HISTORY:   reports that she quit smoking about 30 years ago. Her smoking use included cigarettes. She started smoking about 60 years ago. She has a 30.00 pack-year smoking history. She has never used smokeless tobacco. She reports previous alcohol use.  She reports that she does not use drugs.  Patient's living condition: lives with spouse    Post Discharge Medication Reconciliation Status: discharge medications reconciled and changed, per note/orders  Current Outpatient Medications   Medication Sig     acetaminophen (TYLENOL) 500 MG tablet Take 1,000 mg by mouth 2 times daily as needed for mild pain      albuterol (PROAIR HFA/PROVENTIL HFA/VENTOLIN HFA) 108 (90 Base) MCG/ACT inhaler USE 2 INHALATIONS ORALLY   INTO THE LUNGS EVERY 6  HOURS as needed     apixaban ANTICOAGULANT (ELIQUIS ANTICOAGULANT) 2.5 MG tablet Take 1 tablet (2.5 mg) by mouth 2 times daily     bromfenac (PROLENSA) 0.07 % ophthalmic solution Place 1 drop into the right eye daily      digoxin (LANOXIN) 125 MCG tablet Take 1 tablet (125 mcg) by mouth daily     donepezil (ARICEPT) 5 MG tablet Take 1 tablet (5 mg) by mouth At Bedtime     folic acid (FOLVITE) 1 MG tablet TAKE 1 TABLET DAILY.     gabapentin (NEURONTIN) 100 MG capsule Take 1 capsule (100 mg) by mouth 3 times daily     hydroxychloroquine (PLAQUENIL) 200 MG tablet Take 200 mg by mouth daily     KLOR-CON 20 MEQ CR tablet TAKE 1 TABLET DAILY     levothyroxine (SYNTHROID/LEVOTHROID) 75 MCG tablet TAKE 1 TABLET DAILY     melatonin 5 MG tablet Take 5 mg by mouth nightly as needed for sleep     Methotrexate Sodium (METHOTREXATE PO) Take 2.5 mg by mouth 10 tablets weekly - Wednesdays. Splits the dose as 5 tablets in AM & 5 tablets in PM     multivitamin (CENTRUM SILVER) tablet Take 1 tablet by mouth daily     omeprazole (PRILOSEC) 20 MG DR capsule TAKE 1 CAPSULE TWICE DAILY,30-60 MINUTES PRIOR TO     MEALS     salicylic acid (COMPOUND W MAX STRENGTH) 17 % external gel Apply topically daily     sildenafil (REVATIO) 20 MG tablet TAKE 1 TABLET BY MOUTH THREE TIMES DAILY. GENERIC FOR REVATIO. CALL [phone] TO REFILL     simvastatin (ZOCOR) 40 MG tablet Take 1 tablet (40 mg) by mouth At Bedtime     Skin Protectants, Misc. (EUCERIN) cream Apply  topically 2 times daily Apply to areas dry skin topically two times a day for dry skin and Apply to areas of dry skin topically as needed for dry skin daily     torsemide (DEMADEX) 20 MG tablet Take 1 tablet (20 mg) by mouth 2 times daily     Vitamin D, Cholecalciferol, 10 MCG (400 UNIT) TABS Take 1,600 Units by mouth daily     No current facility-administered medications for this visit.       ROS:  10 point ROS of systems including Constitutional, Eyes, Respiratory, Cardiovascular, Gastroenterology, Genitourinary, Integumentary, Musculoskeletal, Psychiatric were all negative except for pertinent positives noted in my HPI.    Vitals:  BP 95/60   Pulse 83   Temp 98  F (36.7  C)   Resp 20   Wt 64.2 kg (141 lb 9.6 oz)   SpO2 96%   BMI 25.90 kg/m    Exam:  GENERAL APPEARANCE:  Alert, in no distress, pleasant, cooperative, oriented x self and place  EYES:  EOM, lids, pupils and irises normal, sclera clear and conjunctiva normal, no discharge or mattering on lids or lashes noted  ENT:  Mouth normal, moist mucous membranes, nose normal without drainage or crusting, external ears without lesions, hearing acuity intact  NECK: supple, symmetrical, trachea midline  RESP:  respiratory effort normal, no chest wall tenderness, no respiratory distress, Lung sounds clear, patient is on oxygen per NC  CV:  Auscultation of heart done, rate and rhythm controlled and regular, no murmur, no rub or gallop. Edema +1 bilateral lower extremities.   ABDOMEN:  normal bowel sounds, soft, nontender, no palpable masses.  M/S:   Gait and station not assessed, no tenderness or swelling of the joints; able to move all extremities, digits normal  SKIN:  Inspection and palpation of skin and subcutaneous tissue: warts on right fingers  NEURO: cranial nerves 2-12 grossly intact, no facial asymmetry, no speech deficits and able to follow directions, moves all extremities symmetrically  PSYCH:  insight and judgement and memory appear impaired,  affect and mood normal     Lab/Diagnostic data:  Most Recent 3 CBC's:Recent Labs   Lab Test 03/20/22  0807 03/19/22  0824 03/18/22  1541 01/13/22  0000 11/04/21  1319   WBC  --  5.2 7.4  --  6.9   HGB 11.5* 10.9* 12.7 12.6 12.9   MCV  --  96 97 102 98   PLT  --  170 197 173  --      Most Recent 3 BMP's:Recent Labs   Lab Test 03/24/22  0759 03/23/22  0627 03/22/22  0649 03/21/22  1904 03/21/22  0647    137  --   --  135   POTASSIUM 3.3* 3.6 3.7   < > 3.1*   CHLORIDE 95 95  --   --  96   CO2 40* 39*  --   --  36*   BUN 10 9  --   --  10   CR 0.63 0.63 0.62  --  0.55   ANIONGAP 1* 3  --   --  3   TU 9.3 9.3  --   --  8.6   GLC 93 86  --   --  82    < > = values in this interval not displayed.     Most Recent TSH and T4:Recent Labs   Lab Test 07/26/19  0934 10/27/15  0925 08/17/15  0909   TSH 3.10   < > 4.53*   T4  --   --  0.96    < > = values in this interval not displayed.       ASSESSMENT/PLAN:  Acute on chronic diastolic congestive heart failure (H)  Hypokalemia  Chronic atrial fibrillation (H)  Pulmonary hypertension (H)  Acute on chronic, continue clarified cardiac meds: apixaban 2.5 mg BID, digoxin 125 mcg daily, KCL 20 meq daily, revatio 20 mg TID, demadex 20 mg BID, monitor vs and wt and f/u ranges next visit. Cardiology f/u as planned    Acute respiratory failure with hypoxia (H)  Acute, improving. Continue albuterol inhaler QID PRN, wean off oxygen as able. Encourage IS use.     Viral wart on finger  Ongoing - continue PTA salicylic acid 17% gel to wart daily, monitor.     Hypothyroidism  Continue PTA levothyroxine 75 mcg daily.     Anemia in other chronic diseases classified elsewhere  Appears chronic, continue MVI daily, outpatient work up.     Mixed hyperlipidemia  Chronic, continue PTA Zocor 40 mg daily.     Scleroderma (H)  Rheumatoid arthritis, involving unspecified site, unspecified whether rheumatoid factor present (H)  Psoriasis  Chronic, continue PTA folic acid 1 mg daily, plaquenil 200 mg  daily, methotrexate 25 mg weekly, rheumatology f/u per home routine.     Gastroesophageal reflux disease without esophagitis  No symptoms with PTA Prilosec 20 mg BID, monitor.     MCI (mild cognitive impairment)  Chronic, continue Aricept 5 mg daily, monitor for safety.     Physical deconditioning  Acute on chronic, continue tylenol 1000 mg BID PRN pain, gabapentin 100 mg TID, monitor progress in therapies.     Advanced directives, counseling/discussion  Reviewed code status - DNR/DNI confirmed.     Orders:  1. DNR/DNI  2. Changed apixaban to 2.5 mg BID  3. Stop metoprolol and nifedipine  4. Resume digoxin 125 mcg daily  5. Salycilic acid gel to warts right hand daily  6. CBC and BMP on 3/29    Total unit/floor time of 36 minutes spent consisted of the following: examination of patient, reviewing the record including pertinent labs and imaging. More than 50% of this time was spent in coordination of care with the patient, nursing staff, and other healthcare providers. This time was spent on discussing the care plan including labs, discharge/safe placement, and specialty follow up. Additional specific discussion included review of code status, clarification of all cardiac meds, oxygen management and plan to wean, expected TCU course/routine/discharge planning, medication/order review with nursing for a total of over 19 min.     Electronically signed by:  AUBRIE Smith CNP

## 2022-03-27 LAB
GAMMA INTERFERON BACKGROUND BLD IA-ACNC: 0.01 IU/ML
M TB IFN-G BLD-IMP: NEGATIVE
M TB IFN-G CD4+ BCKGRND COR BLD-ACNC: 2.27 IU/ML
MITOGEN IGNF BCKGRD COR BLD-ACNC: 0.01 IU/ML
MITOGEN IGNF BCKGRD COR BLD-ACNC: 0.01 IU/ML
QUANTIFERON MITOGEN: 2.28 IU/ML
QUANTIFERON NIL TUBE: 0.01 IU/ML
QUANTIFERON TB1 TUBE: 0.02 IU/ML
QUANTIFERON TB2 TUBE: 0.02

## 2022-03-28 ENCOUNTER — DOCUMENTATION ONLY (OUTPATIENT)
Dept: OTHER | Facility: CLINIC | Age: 82
End: 2022-03-28
Payer: MEDICARE

## 2022-03-28 ENCOUNTER — LAB REQUISITION (OUTPATIENT)
Dept: LAB | Facility: CLINIC | Age: 82
End: 2022-03-28
Payer: MEDICARE

## 2022-03-28 ENCOUNTER — TELEPHONE (OUTPATIENT)
Dept: GERIATRICS | Facility: CLINIC | Age: 82
End: 2022-03-28
Payer: MEDICARE

## 2022-03-28 DIAGNOSIS — Z00.01 ENCOUNTER FOR GENERAL ADULT MEDICAL EXAMINATION WITH ABNORMAL FINDINGS: ICD-10-CM

## 2022-03-28 DIAGNOSIS — R53.1 WEAKNESS: ICD-10-CM

## 2022-03-28 PROCEDURE — U0003 INFECTIOUS AGENT DETECTION BY NUCLEIC ACID (DNA OR RNA); SEVERE ACUTE RESPIRATORY SYNDROME CORONAVIRUS 2 (SARS-COV-2) (CORONAVIRUS DISEASE [COVID-19]), AMPLIFIED PROBE TECHNIQUE, MAKING USE OF HIGH THROUGHPUT TECHNOLOGIES AS DESCRIBED BY CMS-2020-01-R: HCPCS | Performed by: NURSE PRACTITIONER

## 2022-03-28 NOTE — TELEPHONE ENCOUNTER
[place] [facility]ERIATRIC SERVICES TELEPHONE ENCOUNTER    Chief Complaint   Patient presents with     Results       Jaqueline Canales is a 81 year old  (1940),Nurse called today to report: CXR of 3/24/22 shows LLL infiltrate. Remains on oxygen.     ASSESSMENT/PLAN  Pneumonia    Start Augmentin 875 mg PO BID x 7 days     Start Azithromycin 500 mg PO today, then 250 mg PO daily on 3/29, 3/30, 3/31 and 4/1      Electronically signed by:   AUBRIE Smith CNP

## 2022-03-28 NOTE — PROGRESS NOTES
[facility]  INITIAL VISIT NOTE  March 29, 2022    PRIMARY CARE PROVIDER AND CLINIC:  Car[facility] [address] / [place] [address]    CHIEF COMPLAINT:  Hospital follow-up/Initial visit    HPI:    Jaqueline Canales is a 81 year old  (1940) female who was seen at [facility] March [facility], 20[facility] for an initial visit.     Medical history is notable for cognitive impairment, chronic HFpEF, permanent atrial fibrillation, chronic lower extremity lymphedema, pulmonary hypertension, aortic stenosis, MR/TR, hypertension, dyslipidemia, hypothyroidism, breast and lung cancer, rheumatoid arthritis, scleroderma, interstitial lung disease, GERD, facial nerve palsy, and SRINIVAS.     Summary of hospital course:  Patient was hospitalized at [facility] from March 18 through March 24, 2022 for acute on chronic heart failure with mild hypoxic respiratory failure.  EKG showed atrial fibrillation (per my personal review).  Chest x-ray showed severe enlargement of the cardiac silhouette, moderate to severe mitral annular calcifications, parenchymal fibrosis, and no focal opacities.  BNP was elevated at 5818.  CBC was unremarkable.  BMP showed creatinine of 0.8, sodium of 135, and calcium of 10.2.  Screening for COVID-19 was negative.  Doppler study of bilateral lower extremities was negative for DVT.  Echocardiogram revealed LV ejection fraction of 60-65%, mildly decreased RV systolic function, 3-4+ tricuspid rotation, 2-3+ mitral regurgitation, moderate aortic stenosis, and moderate to severe pulmonary hypertension.  Patient was placed on low-salt diet, 2000 mL fluid restriction, and diuresed with IV furosemide.  PTA metoprolol and nifedipine were held due to hypotension.  Digoxin was added to her cardiac regimen.  Patient's cardiac status and leg edema improved.  Diuretic therapy transitioned to oral torsemide.  TCU was recommended per therapies.    Patient is admitted to this  facility for medical management, nursing care, and rehab.     Of note, history was obtained from patient, facility RN, and extensive review of the chart.    Today's visit:  Patient was seen in her room, while sitting in a wheelchair.  She appears frail but in no acute distress.  She was started on antibiotics for left lower lobe pneumonia noted on chest x-ray on March 27.  She is currently on oxygen at 1 L/min.  She has occasional cough productive of white sputum.  She denies fever, chills, chest pain, palpitation, dyspnea, nausea, vomiting, abdominal pain, urinary symptoms.  She reports that she had a bowel movement yesterday.  Unfortunately she did not look at her stool to see if there is hematochezia or melena.      CODE STATUS:   DNR / DNI    PAST MEDICAL HISTORY:   Cognitive impairment, SLUMS score of 16/30 in May 2021  Left lower lobe squamous cell lung cancer (diagnosed in December 2015), s/p radiation therapy  Right breast cancer, s/p mastectomy in 1990  Permanent atrial fibrillation, on apixaban  Chronic HFpEF, LV ejection fraction 60-65% per echo on March 18, 2022  Mildly decreased RV systolic function  Moderate-severe pulmonary hypertension  Moderate aortic stenosis, JACOB 1.3 cm  per echo on March 18, 2022  3-4+ severe tricuspid and 2-3+ mitral regurgitation  Hypertension  Dyslipidemia  High-grade right internal carotid artery stenosis, s/p endarterectomy in July 2011  Hypothyroidism  Rheumatoid arthritis  Scleroderma  Interstitial lung disease  Raynaud syndrome  Chronic bilateral lower extremity lymphedema  GERD  SRINIVAS, noncompliant with CPAP  Psoriasis  Right ring finger warts  Helicobacter pylori infection  Osteoporosis  UTI  Left facial nerve palsy in May 2021    Past Medical History:   Diagnosis Date     Amaurosis fugax 5-11    Right     Carotid artery stenosis      Esophageal reflux 3/11/2004     HELICOBACTER PYLORI INFECTION 7/11/2006     hx of CA in situ RT breast-1990.mastectomy 4/17/2007      Hyperlipidemia LDL goal <70 2011     Lung cancer (H)     squamous cell lung ca left lower lobe     OBESITY NOS 3/11/2004     OSTEOPOROSIS NOS 3/11/2004     Other psoriasis 3/11/2004     Pulmonary hypertension (H) 04/15/2019    Right heart catheterization demonstrated moderate pulmonary arterial hypertension with a mean PA pressure of 34 mmHg, no reversibility with inhaled nitric oxide, elevated right and left-sided filling pressures, low normal cardiac index of 2.1.  Seen by Dr. Shay Marquez.  Started on sildenafil (Revatio) 20 mg 3 times daily.     RA (rheumatoid arthritis) (H) 2010     Raynaud's syndrome 4/10/2006     Scleroderma (H)      Sleep apnea     CPAP       PAST SURGICAL HISTORY:   Past Surgical History:   Procedure Laterality Date     BREAST SURGERY       COLONOSCOPY  15     COLONOSCOPY N/A 2019    Procedure: Colonoscopy, With Polypectomy And Biopsy;  Surgeon: Neto Kaminski MD;  Location:  GI     CONIZATION       CV RIGHT HEART CATH MEASUREMENTS RECORDED N/A 4/15/2019    Procedure: Heart Cath Right Heart Cath;  Surgeon: Naresh Cyr MD;  Location:  HEART CARDIAC CATH LAB     ENDARTERECTOMY CAROTID       REPAIR HAMMER TOE       TONSILLECTOMY       ZZC NONSPECIFIC PROCEDURE      mastectomy RT breast     ZZHC COLONOSCOPY THRU STOMA, DIAGNOSTIC  2001    diverticulosis       FAMILY HISTORY:   Family History   Problem Relation Age of Onset     Cancer - colorectal Mother      Cerebrovascular Disease Father      Cancer Sister 31        ovarian     Heart Disease Sister      Gastrointestinal Disease Sister         crohns     Gastrointestinal Disease Sister         diverticulitis     Gastrointestinal Disease Brother         diverticulitis     Alzheimer Disease Sister      Cerebrovascular Disease Maternal Grandmother      Diabetes Maternal Grandmother      Mental Illness Maternal Grandmother      Diabetes Maternal Uncle      Cancer Nephew                 SOCIAL HISTORY:  Social History     Tobacco Use     Smoking status: Former Smoker     Packs/day: 1.00     Years: 30.00     Pack years: 30.00     Types: Cigarettes     Start date:      Quit date: 3/11/1992     Years since quittin.0     Smokeless tobacco: Never Used   Substance Use Topics     Alcohol use: Not Currently     Alcohol/week: 0.0 standard drinks       MEDICATIONS:  Current Outpatient Medications   Medication Sig Dispense Refill     acetaminophen (TYLENOL) 500 MG tablet Take 1,000 mg by mouth 2 times daily as needed for mild pain        albuterol (PROAIR HFA/PROVENTIL HFA/VENTOLIN HFA) 108 (90 Base) MCG/ACT inhaler USE 2 INHALATIONS ORALLY   INTO THE LUNGS EVERY 6  HOURS as needed 18 g 0     apixaban ANTICOAGULANT (ELIQUIS ANTICOAGULANT) 2.5 MG tablet Take 1 tablet (2.5 mg) by mouth 2 times daily       bromfenac (PROLENSA) 0.07 % ophthalmic solution Place 1 drop into the right eye daily        digoxin (LANOXIN) 125 MCG tablet Take 1 tablet (125 mcg) by mouth daily       donepezil (ARICEPT) 5 MG tablet Take 1 tablet (5 mg) by mouth At Bedtime 90 tablet 3     folic acid (FOLVITE) 1 MG tablet TAKE 1 TABLET DAILY. 90 tablet 3     gabapentin (NEURONTIN) 100 MG capsule Take 1 capsule (100 mg) by mouth 3 times daily 90 capsule 11     hydroxychloroquine (PLAQUENIL) 200 MG tablet Take 200 mg by mouth daily       KLOR-CON 20 MEQ CR tablet TAKE 1 TABLET DAILY 90 tablet 3     levothyroxine (SYNTHROID/LEVOTHROID) 75 MCG tablet TAKE 1 TABLET DAILY 90 tablet 2     melatonin 5 MG tablet Take 5 mg by mouth nightly as needed for sleep       Methotrexate Sodium (METHOTREXATE PO) Take 2.5 mg by mouth 10 tablets weekly - . Splits the dose as 5 tablets in AM & 5 tablets in PM       multivitamin (CENTRUM SILVER) tablet Take 1 tablet by mouth daily       omeprazole (PRILOSEC) 20 MG DR capsule TAKE 1 CAPSULE TWICE DAILY,30-60 MINUTES PRIOR TO     MEALS 180 capsule 2     salicylic acid (COMPOUND W MAX  "STRENGTH) 17 % external gel Apply topically daily       sildenafil (REVATIO) 20 MG tablet TAKE 1 TABLET BY MOUTH THREE TIMES DAILY. GENERIC FOR REVATIO. CALL [phone] TO REFILL 90 tablet 5     simvastatin (ZOCOR) 40 MG tablet Take 1 tablet (40 mg) by mouth At Bedtime 90 tablet 3     Skin Protectants, Misc. (EUCERIN) cream Apply topically 2 times daily Apply to areas dry skin topically two times a day for dry skin and Apply to areas of dry skin topically as needed for dry skin daily       torsemide (DEMADEX) 20 MG tablet Take 1 tablet (20 mg) by mouth 2 times daily 180 tablet 3     Vitamin D, Cholecalciferol, 10 MCG (400 UNIT) TABS Take 1,600 Units by mouth daily         Post Discharge Medication Reconciliation Status: discharge medications reconciled, continue medications without change.        ALLERGIES:  Allergies   Allergen Reactions     No Known Allergies        ROS:  10 point ROS were negative other than the symptoms noted above in the HPI.    PHYSICAL EXAM:  Vital signs were reviewed in the chart.  Vital Signs: /59   Pulse 81   Temp (!) 96.5  F (35.8  C)   Resp 18   Ht 1.575 m (5' 2\")   Wt 57.5 kg (126 lb 12.8 oz)   SpO2 95%   BMI 23.19 kg/m    General: Frail appearing but comfortable and in no acute distress  HEENT: No conjunctival pallor  Cardiovascular: Normal S1, S2, irregularly irregular rhythm; grade 3/6 systolic murmur best audible in LUSB  Respiratory: Lungs clear to auscultation bilaterally  GI: Abdomen soft, non-tender, non-distended, +BS  Extremities: No significant LE edema; compression stockings are on  Neuro: CX II-XII grossly intact; ROM in all four extremities grossly intact  Psych: Alert and oriented x2-3; normal affect  Skin: No acute rash    LABORATORY/IMAGING DATA:  All relevant labs and imaging data were reviewed personally today.    Chemistry profile (March 29, 2022): Sodium 137, potassium 4.8, BUN 18, creatinine 0.84, glucose 93, calcium 9.7      Most Recent 3 " CBC's:Recent Labs   Lab Test 03/20/22  0807 03/19/22  0824 03/18/22  1541 01/13/22  0000 11/04/21  1319   WBC  --  5.2 7.4  --  6.9   HGB 11.5* 10.9* 12.7 12.6 12.9   MCV  --  96 97 102 98   PLT  --  170 197 173  --      Most Recent 3 BMP's:Recent Labs   Lab Test 03/24/22  0759 03/23/22  0627 03/22/22  0649 03/21/22  1904 03/21/22  0647    137  --   --  135   POTASSIUM 3.3* 3.6 3.7   < > 3.1*   CHLORIDE 95 95  --   --  96   CO2 40* 39*  --   --  36*   BUN 10 9  --   --  10   CR 0.63 0.63 0.62  --  0.55   ANIONGAP 1* 3  --   --  3   TU 9.3 9.3  --   --  8.6   GLC 93 86  --   --  82    < > = values in this interval not displayed.         ASSESSMENT/PLAN:   Acute on chronic HFpEF (LVEF 60-65%),  Chronic right-sided heart failure,  Moderate-severe pulmonary hypertension,  Acute hypoxic respiratory failure,  Moderate aortic stenosis,  2-3+ mitral regurgitation,  3-4+ tricuspid regurgitation,  Chronic bilateral lower extremity edema  Patient was diuresed with IV furosemide with subsequent transition to oral torsemide.  Digoxin was initiated in the hospital.  PTA metoprolol was held due to hypotension.   Patient currently weighs 126.8 LBS and appears almost euvolemic.  She is on oxygen at 1 L/min.  Plan:  Low-salt diet  Continue supplemental oxygen at 1 L/min, wean as able  Continue torsemide 20 mg p.o. twice daily  Continue digoxin 125 mcg p.o. daily  Continue potassium chloride 20 mEq p.o. daily  Continue Revatio 20 mg p.o. 3 times daily   Continue albuterol inhaler, 2 puffs every 6 hours PRN  Monitor daily weight, volume status, and lower extremity edema  Follow-up with cardiology and Dr. Marquez as scheduled    Left lower lobe pneumonia.  Noted on chest x-ray on March 27.  Started on Augmentin and azithromycin on March 28.  She is afebrile and hemodynamic stable.  Plan:  Continue Augmentin 875 mg p.o. twice daily through April 4  Continue azithromycin 250 mg p.o. daily through April 1     Permanent atrial  fibrillation with RVR.  PTA metoprolol ER (25 mg p.o. daily) was held in the hospital due to hypotension.  Rate is currently controlled.  Plan:  Continue chronic anticoagulation therapy with apixaban 2.5 mg p.o. twice daily  Continue to monitor heart rate     Benign essential hypertension.  PTA metoprolol and nifedipine were held in the hospital due to hypotension.  Blood pressure is running soft.  Plan:  Continue torsemide 20 mg p.o. twice daily  Monitor blood pressure     Dyslipidemia.  Plan:  Continue PTA simvastatin 40 mg p.o. at bedtime     Hypothyroidism.  Last TSH was 3.1 in July 2019.  Plan:  Continue PTA levothyroxine 75 mcg p.o. daily  Check TSH on March 30     Rheumatoid arthritis,  Scleroderma,  Pulmonary fibrosis.  She follows with Dr. Bustillos of rheumatology.  It seems that she is no longer on Remicade.  Plan:  Continue PTA methotrexate 25 mg p.o. every Wednesday  Continue PTA hydroxychloroquine 200 mg p.o. daily  Continue folic acid 1 mg p.o. daily  Follow up with Dr. Bustillos as scheduled    History of left lower lobe squamous cell lung cancer (diagnosed in December 2015), s/p radiation therapy,  History of right breast cancer, s/p mastectomy in 1990.  Plan:  Follow-up as outpatient     GERD.  Chronic.  Plan:  Continue PTA omeprazole 20 mg p.o. twice daily    Mild anemia.  Iron profile on March 19 showed serum iron of 29, TIBC of 26 5, and iron saturation of 11%.  Patient was given IV iron in the hospital but not completed due to soft blood pressures concerning for allergic reaction.  Last hemoglobin was 11.5 on March 20.  Plan:  Monitor hemoglobin periodically  Monitor for signs and symptoms of GI bleed     SRINIVAS.  Noncompliant with CPAP.  Plan:   Monitor O2 sats    Cognitive impairment.  SLUMS score of 16/30 in May 2021.  On today's examination, she is oriented times 2-3.  Plan:  Continue PTA donepezil 5 mg p.o. at bedtime  Formal cognitive re-evaluation per OT    Physical  deconditioning.  Plan:  Continue PT/OT evaluation and therapy            Orders written by provider at facility:  TSH on March 30, DX: Hypothyroidism      Recommendation by provider at facility:  Follow-up on CBC from today, March 29  Resume low-dose metoprolol when able        Electronically signed by:  Rosalva Lara MD

## 2022-03-29 ENCOUNTER — TRANSITIONAL CARE UNIT VISIT (OUTPATIENT)
Dept: GERIATRICS | Facility: CLINIC | Age: 82
End: 2022-03-29
Payer: MEDICARE

## 2022-03-29 ENCOUNTER — LAB REQUISITION (OUTPATIENT)
Dept: LAB | Facility: CLINIC | Age: 82
End: 2022-03-29
Payer: MEDICARE

## 2022-03-29 VITALS
HEART RATE: 81 BPM | TEMPERATURE: 96.5 F | DIASTOLIC BLOOD PRESSURE: 59 MMHG | HEIGHT: 62 IN | OXYGEN SATURATION: 95 % | WEIGHT: 126.8 LBS | BODY MASS INDEX: 23.34 KG/M2 | RESPIRATION RATE: 18 BRPM | SYSTOLIC BLOOD PRESSURE: 101 MMHG

## 2022-03-29 DIAGNOSIS — Z85.118 HISTORY OF LUNG CANCER: ICD-10-CM

## 2022-03-29 DIAGNOSIS — J18.9 PNEUMONIA OF LEFT LOWER LOBE DUE TO INFECTIOUS ORGANISM: ICD-10-CM

## 2022-03-29 DIAGNOSIS — R60.0 BILATERAL LOWER EXTREMITY EDEMA: ICD-10-CM

## 2022-03-29 DIAGNOSIS — I50.812 CHRONIC RIGHT-SIDED HEART FAILURE (H): ICD-10-CM

## 2022-03-29 DIAGNOSIS — I48.21 PERMANENT ATRIAL FIBRILLATION (H): ICD-10-CM

## 2022-03-29 DIAGNOSIS — R53.81 PHYSICAL DECONDITIONING: ICD-10-CM

## 2022-03-29 DIAGNOSIS — D64.9 ANEMIA, UNSPECIFIED TYPE: ICD-10-CM

## 2022-03-29 DIAGNOSIS — M34.9 SCLERODERMA (H): ICD-10-CM

## 2022-03-29 DIAGNOSIS — Z85.3 HISTORY OF BREAST CANCER: ICD-10-CM

## 2022-03-29 DIAGNOSIS — I50.33 ACUTE ON CHRONIC HEART FAILURE WITH PRESERVED EJECTION FRACTION (HFPEF) (H): Primary | ICD-10-CM

## 2022-03-29 DIAGNOSIS — E03.9 HYPOTHYROIDISM, UNSPECIFIED: ICD-10-CM

## 2022-03-29 DIAGNOSIS — J96.01 ACUTE RESPIRATORY FAILURE WITH HYPOXIA (H): ICD-10-CM

## 2022-03-29 DIAGNOSIS — E03.9 HYPOTHYROIDISM, UNSPECIFIED TYPE: ICD-10-CM

## 2022-03-29 DIAGNOSIS — R41.89 COGNITIVE IMPAIRMENT: ICD-10-CM

## 2022-03-29 DIAGNOSIS — I10 ESSENTIAL HYPERTENSION: ICD-10-CM

## 2022-03-29 DIAGNOSIS — K21.9 GASTROESOPHAGEAL REFLUX DISEASE, UNSPECIFIED WHETHER ESOPHAGITIS PRESENT: ICD-10-CM

## 2022-03-29 DIAGNOSIS — G47.33 OSA (OBSTRUCTIVE SLEEP APNEA): ICD-10-CM

## 2022-03-29 DIAGNOSIS — M06.9 RHEUMATOID ARTHRITIS, INVOLVING UNSPECIFIED SITE, UNSPECIFIED WHETHER RHEUMATOID FACTOR PRESENT (H): ICD-10-CM

## 2022-03-29 DIAGNOSIS — J84.10 PULMONARY FIBROSIS (H): ICD-10-CM

## 2022-03-29 DIAGNOSIS — E78.5 DYSLIPIDEMIA: ICD-10-CM

## 2022-03-29 DIAGNOSIS — I35.0 MODERATE AORTIC STENOSIS: ICD-10-CM

## 2022-03-29 LAB
ANION GAP SERPL CALCULATED.3IONS-SCNC: 5 MMOL/L (ref 3–14)
BUN SERPL-MCNC: 18 MG/DL (ref 7–30)
CALCIUM SERPL-MCNC: 9.7 MG/DL (ref 8.5–10.1)
CHLORIDE BLD-SCNC: 94 MMOL/L (ref 94–109)
CO2 SERPL-SCNC: 38 MMOL/L (ref 20–32)
CREAT SERPL-MCNC: 0.84 MG/DL (ref 0.52–1.04)
ERYTHROCYTE [DISTWIDTH] IN BLOOD BY AUTOMATED COUNT: 16.6 % (ref 10–15)
GFR SERPL CREATININE-BSD FRML MDRD: 69 ML/MIN/1.73M2
GLUCOSE BLD-MCNC: 93 MG/DL (ref 70–99)
HCT VFR BLD AUTO: 37.5 % (ref 35–47)
HGB BLD-MCNC: 11.9 G/DL (ref 11.7–15.7)
MCH RBC QN AUTO: 31.5 PG (ref 26.5–33)
MCHC RBC AUTO-ENTMCNC: 31.7 G/DL (ref 31.5–36.5)
MCV RBC AUTO: 99 FL (ref 78–100)
PLAT MORPH BLD: ABNORMAL
PLATELET # BLD AUTO: ABNORMAL 10*3/UL
POTASSIUM BLD-SCNC: 4.8 MMOL/L (ref 3.4–5.3)
RBC # BLD AUTO: 3.78 10E6/UL (ref 3.8–5.2)
RBC MORPH BLD: ABNORMAL
SARS-COV-2 RNA RESP QL NAA+PROBE: NEGATIVE
SODIUM SERPL-SCNC: 137 MMOL/L (ref 133–144)
WBC # BLD AUTO: 6.7 10E3/UL (ref 4–11)

## 2022-03-29 PROCEDURE — 36415 COLL VENOUS BLD VENIPUNCTURE: CPT | Performed by: NURSE PRACTITIONER

## 2022-03-29 PROCEDURE — 99306 1ST NF CARE HIGH MDM 50: CPT | Performed by: INTERNAL MEDICINE

## 2022-03-29 PROCEDURE — 85041 AUTOMATED RBC COUNT: CPT | Performed by: NURSE PRACTITIONER

## 2022-03-29 PROCEDURE — 80048 BASIC METABOLIC PNL TOTAL CA: CPT | Performed by: NURSE PRACTITIONER

## 2022-03-29 NOTE — LETTER
3/29/2022        RE: Jaqueline Canales  [address]  [facility] [address]        [facility]  INITIAL VISIT NOTE  March 29, 2022    PRIMARY CARE PROVIDER AND CLINIC:  Carissa Burroughs [address] / [place] [address]    CHIEF COMPLAINT:  Hospital follow-up/Initial visit    HPI:    Jaqueline Canales is a 81 year old  (1940) female who was seen at [facility] on March 29, 2022 for an initial visit.     Medical history is notable for cognitive impairment, chronic HFpEF, permanent atrial fibrillation, chronic lower extremity lymphedema, pulmonary hypertension, aortic stenosis, MR/TR, hypertension, dyslipidemia, hypothyroidism, breast and lung cancer, rheumatoid arthritis, scleroderma, interstitial lung disease, GERD, facial nerve palsy, and SRINIVAS.     Summary of hospital course:  Patient was hospitalized at [facility] from March 18 through March 24, 2022 for acute on chronic heart failure with mild hypoxic respiratory failure.  EKG showed atrial fibrillation (per my personal review).  Chest x-ray showed severe enlargement of the cardiac silhouette, moderate to severe mitral annular calcifications, parenchymal fibrosis, and no focal opacities.  BNP was elevated at 5818.  CBC was unremarkable.  BMP showed creatinine of 0.8, sodium of 135, and calcium of 10.2.  Screening for COVID-19 was negative.  Doppler study of bilateral lower extremities was negative for DVT.  Echocardiogram revealed LV ejection fraction of 60-65%, mildly decreased RV systolic function, 3-4+ tricuspid rotation, 2-3+ mitral regurgitation, moderate aortic stenosis, and moderate to severe pulmonary hypertension.  Patient was placed on low-salt diet, 2000 mL fluid restriction, and diuresed with IV furosemide.  PTA metoprolol and nifedipine were held due to hypotension.  Digoxin was added to her cardiac regimen.  Patient's cardiac status and leg edema improved.  Diuretic therapy transitioned  to oral torsemide.  TCU was recommended per therapies.    Patient is admitted to this facility for medical management, nursing care, and rehab.     Of note, history was obtained from patient, facility RN, and extensive review of the chart.    Today's visit:  Patient was seen in her room, while sitting in a wheelchair.  She appears frail but in no acute distress.  She was started on antibiotics for left lower lobe pneumonia noted on chest x-ray on March 27.  She is currently on oxygen at 1 L/min.  She has occasional cough productive of white sputum.  She denies fever, chills, chest pain, palpitation, dyspnea, nausea, vomiting, abdominal pain, urinary symptoms.  She reports that she had a bowel movement yesterday.  Unfortunately she did not look at her stool to see if there is hematochezia or melena.      CODE STATUS:   DNR / DNI    PAST MEDICAL HISTORY:   Cognitive impairment, SLUMS score of 16/30 in May 2021  Left lower lobe squamous cell lung cancer (diagnosed in December 2015), s/p radiation therapy  Right breast cancer, s/p mastectomy in 1990  Permanent atrial fibrillation, on apixaban  Chronic HFpEF, LV ejection fraction 60-65% per echo on March 18, 2022  Mildly decreased RV systolic function  Moderate-severe pulmonary hypertension  Moderate aortic stenosis, JACOB 1.3 cm  per echo on March 18, 2022  3-4+ severe tricuspid and 2-3+ mitral regurgitation  Hypertension  Dyslipidemia  High-grade right internal carotid artery stenosis, s/p endarterectomy in July 2011  Hypothyroidism  Rheumatoid arthritis  Scleroderma  Interstitial lung disease  Raynaud syndrome  Chronic bilateral lower extremity lymphedema  GERD  SRINIVAS, noncompliant with CPAP  Psoriasis  Right ring finger warts  Helicobacter pylori infection  Osteoporosis  UTI  Left facial nerve palsy in May 2021    Past Medical History:   Diagnosis Date     Amaurosis fugax 5-11    Right     Carotid artery stenosis      Esophageal reflux 3/11/2004     HELICOBACTER PYLORI  INFECTION 7/11/2006     hx of CA in situ RT breast-1990.mastectomy 4/17/2007     Hyperlipidemia LDL goal <70 6/20/2011     Lung cancer (H)     squamous cell lung ca left lower lobe     OBESITY NOS 3/11/2004     OSTEOPOROSIS NOS 3/11/2004     Other psoriasis 3/11/2004     Pulmonary hypertension (H) 04/15/2019    Right heart catheterization demonstrated moderate pulmonary arterial hypertension with a mean PA pressure of 34 mmHg, no reversibility with inhaled nitric oxide, elevated right and left-sided filling pressures, low normal cardiac index of 2.1.  Seen by Dr. Shay Marquez.  Started on sildenafil (Revatio) 20 mg 3 times daily.     RA (rheumatoid arthritis) (H) 2/8/2010     Raynaud's syndrome 4/10/2006     Scleroderma (H)      Sleep apnea     CPAP       PAST SURGICAL HISTORY:   Past Surgical History:   Procedure Laterality Date     BREAST SURGERY       COLONOSCOPY  5/24/16     COLONOSCOPY N/A 6/28/2019    Procedure: Colonoscopy, With Polypectomy And Biopsy;  Surgeon: Neto Kaminski MD;  Location:  GI     CONIZATION       CV RIGHT HEART CATH MEASUREMENTS RECORDED N/A 4/15/2019    Procedure: Heart Cath Right Heart Cath;  Surgeon: Naresh Cyr MD;  Location:  HEART CARDIAC CATH LAB     ENDARTERECTOMY CAROTID  2011     REPAIR HAMMER TOE       TONSILLECTOMY       ZZC NONSPECIFIC PROCEDURE  1990    mastectomy RT breast     ZZHC COLONOSCOPY THRU STOMA, DIAGNOSTIC  2001 2011    diverticulosis       FAMILY HISTORY:   Family History   Problem Relation Age of Onset     Cancer - colorectal Mother      Cerebrovascular Disease Father      Cancer Sister 31        ovarian     Heart Disease Sister      Gastrointestinal Disease Sister         crohns     Gastrointestinal Disease Sister         diverticulitis     Gastrointestinal Disease Brother         diverticulitis     Alzheimer Disease Sister      Cerebrovascular Disease Maternal Grandmother      Diabetes Maternal Grandmother      Mental Illness Maternal  Grandmother      Diabetes Maternal Uncle      Cancer Nephew                SOCIAL HISTORY:  Social History     Tobacco Use     Smoking status: Former Smoker     Packs/day: 1.00     Years: 30.00     Pack years: 30.00     Types: Cigarettes     Start date:      Quit date: 3/11/1992     Years since quittin.0     Smokeless tobacco: Never Used   Substance Use Topics     Alcohol use: Not Currently     Alcohol/week: 0.0 standard drinks       MEDICATIONS:  Current Outpatient Medications   Medication Sig Dispense Refill     acetaminophen (TYLENOL) 500 MG tablet Take 1,000 mg by mouth 2 times daily as needed for mild pain        albuterol (PROAIR HFA/PROVENTIL HFA/VENTOLIN HFA) 108 (90 Base) MCG/ACT inhaler USE 2 INHALATIONS ORALLY   INTO THE LUNGS EVERY 6  HOURS as needed 18 g 0     apixaban ANTICOAGULANT (ELIQUIS ANTICOAGULANT) 2.5 MG tablet Take 1 tablet (2.5 mg) by mouth 2 times daily       bromfenac (PROLENSA) 0.07 % ophthalmic solution Place 1 drop into the right eye daily        digoxin (LANOXIN) 125 MCG tablet Take 1 tablet (125 mcg) by mouth daily       donepezil (ARICEPT) 5 MG tablet Take 1 tablet (5 mg) by mouth At Bedtime 90 tablet 3     folic acid (FOLVITE) 1 MG tablet TAKE 1 TABLET DAILY. 90 tablet 3     gabapentin (NEURONTIN) 100 MG capsule Take 1 capsule (100 mg) by mouth 3 times daily 90 capsule 11     hydroxychloroquine (PLAQUENIL) 200 MG tablet Take 200 mg by mouth daily       KLOR-CON 20 MEQ CR tablet TAKE 1 TABLET DAILY 90 tablet 3     levothyroxine (SYNTHROID/LEVOTHROID) 75 MCG tablet TAKE 1 TABLET DAILY 90 tablet 2     melatonin 5 MG tablet Take 5 mg by mouth nightly as needed for sleep       Methotrexate Sodium (METHOTREXATE PO) Take 2.5 mg by mouth 10 tablets weekly - . Splits the dose as 5 tablets in AM & 5 tablets in PM       multivitamin (CENTRUM SILVER) tablet Take 1 tablet by mouth daily       omeprazole (PRILOSEC) 20 MG DR capsule TAKE 1 CAPSULE TWICE DAILY,30-60  "MINUTES PRIOR TO     MEALS 180 capsule 2     salicylic acid (COMPOUND W MAX STRENGTH) 17 % external gel Apply topically daily       sildenafil (REVATIO) 20 MG tablet TAKE 1 TABLET BY MOUTH THREE TIMES DAILY. GENERIC FOR REVATIO. CALL [phone] TO REFILL 90 tablet 5     simvastatin (ZOCOR) 40 MG tablet Take 1 tablet (40 mg) by mouth At Bedtime 90 tablet 3     Skin Protectants, Misc. (EUCERIN) cream Apply topically 2 times daily Apply to areas dry skin topically two times a day for dry skin and Apply to areas of dry skin topically as needed for dry skin daily       torsemide (DEMADEX) 20 MG tablet Take 1 tablet (20 mg) by mouth 2 times daily 180 tablet 3     Vitamin D, Cholecalciferol, 10 MCG (400 UNIT) TABS Take 1,600 Units by mouth daily         Post Discharge Medication Reconciliation Status: discharge medications reconciled, continue medications without change.        ALLERGIES:  Allergies   Allergen Reactions     No Known Allergies        ROS:  10 point ROS were negative other than the symptoms noted above in the HPI.    PHYSICAL EXAM:  Vital signs were reviewed in the chart.  Vital Signs: /59   Pulse 81   Temp (!) 96.5  F (35.8  C)   Resp 18   Ht 1.575 m (5' 2\")   Wt 57.5 kg (126 lb 12.8 oz)   SpO2 95%   BMI 23.19 kg/m    General: Frail appearing but comfortable and in no acute distress  HEENT: No conjunctival pallor  Cardiovascular: Normal S1, S2, irregularly irregular rhythm; grade 3/6 systolic murmur best audible in LUSB  Respiratory: Lungs clear to auscultation bilaterally  GI: Abdomen soft, non-tender, non-distended, +BS  Extremities: No significant LE edema; compression stockings are on  Neuro: CX II-XII grossly intact; ROM in all four extremities grossly intact  Psych: Alert and oriented x2-3; normal affect  Skin: No acute rash    LABORATORY/IMAGING DATA:  All relevant labs and imaging data were reviewed personally today.      Most Recent 3 CBC's:Recent Labs   Lab Test 03/20/22  0807 " 03/19/22  0824 03/18/22  1541 01/13/22  0000 11/04/21  1319   WBC  --  5.2 7.4  --  6.9   HGB 11.5* 10.9* 12.7 12.6 12.9   MCV  --  96 97 102 98   PLT  --  170 197 173  --      Most Recent 3 BMP's:Recent Labs   Lab Test 03/24/22  0759 03/23/22  0627 03/22/22  0649 03/21/22  1904 03/21/22  0647    137  --   --  135   POTASSIUM 3.3* 3.6 3.7   < > 3.1*   CHLORIDE 95 95  --   --  96   CO2 40* 39*  --   --  36*   BUN 10 9  --   --  10   CR 0.63 0.63 0.62  --  0.55   ANIONGAP 1* 3  --   --  3   TU 9.3 9.3  --   --  8.6   GLC 93 86  --   --  82    < > = values in this interval not displayed.         ASSESSMENT/PLAN:   Acute on chronic HFpEF (LVEF 60-65%),  Chronic right-sided heart failure,  Moderate-severe pulmonary hypertension,  Acute hypoxic respiratory failure,  Moderate aortic stenosis,  2-3+ mitral regurgitation,  3-4+ tricuspid regurgitation,  Chronic bilateral lower extremity edema  Patient was diuresed with IV furosemide with subsequent transition to oral torsemide.  Digoxin was initiated in the hospital.  PTA metoprolol was held due to hypotension.   Patient currently weighs 126.8 LBS and appears almost euvolemic.  She is on oxygen at 1 L/min.  Plan:  Low-salt diet  Continue supplemental oxygen at 1 L/min, wean as able  Continue torsemide 20 mg p.o. twice daily  Continue digoxin 125 mcg p.o. daily  Continue potassium chloride 20 mEq p.o. daily  Continue Revatio 20 mg p.o. 3 times daily   Continue albuterol inhaler, 2 puffs every 6 hours PRN  Monitor daily weight, volume status, and lower extremity edema  Follow-up with cardiology and Dr. Marquez as scheduled    Left lower lobe pneumonia.  Noted on chest x-ray on March 27.  Started on Augmentin and azithromycin on March 28.  She is afebrile and hemodynamic stable.  Plan:  Continue Augmentin 875 mg p.o. twice daily through April 4  Continue azithromycin 250 mg p.o. daily through April 1     Permanent atrial fibrillation with RVR.  PTA metoprolol ER (25 mg  p.o. daily) was held in the hospital due to hypotension.  Rate is currently controlled.  Plan:  Continue chronic anticoagulation therapy with apixaban 2.5 mg p.o. twice daily  Continue to monitor heart rate     Benign essential hypertension.  PTA metoprolol and nifedipine were held in the hospital due to hypotension.  Blood pressure is running soft.  Plan:  Continue torsemide 20 mg p.o. twice daily  Monitor blood pressure     Dyslipidemia.  Plan:  Continue PTA simvastatin 40 mg p.o. at bedtime     Hypothyroidism.  Last TSH was 3.1 in July 2019.  Plan:  Continue PTA levothyroxine 75 mcg p.o. daily  Check TSH on March 30     Rheumatoid arthritis,  Scleroderma,  Pulmonary fibrosis.  She follows with Dr. Bustillos of rheumatology.  It seems that she is no longer on Remicade.  Plan:  Continue PTA methotrexate 25 mg p.o. every Wednesday  Continue PTA hydroxychloroquine 200 mg p.o. daily  Continue folic acid 1 mg p.o. daily  Follow up with Dr. Bustillos as scheduled    History of left lower lobe squamous cell lung cancer (diagnosed in December 2015), s/p radiation therapy,  History of right breast cancer, s/p mastectomy in 1990.  Plan:  Follow-up as outpatient     GERD.  Chronic.  Plan:  Continue PTA omeprazole 20 mg p.o. twice daily    Mild anemia.  Iron profile on March 19 showed serum iron of 29, TIBC of 26 5, and iron saturation of 11%.  Patient was given IV iron in the hospital but not completed due to soft blood pressures concerning for allergic reaction.  Last hemoglobin was 11.5 on March 20.  Plan:  Monitor hemoglobin periodically  Monitor for signs and symptoms of GI bleed     SRINIVAS.  Noncompliant with CPAP.  Plan:   Monitor O2 sats    Cognitive impairment.  SLUMS score of 16/30 in May 2021.  On today's examination, she is oriented times 2-3.  Plan:  Continue PTA donepezil 5 mg p.o. at bedtime  Formal cognitive re-evaluation per OT    Physical deconditioning.  Plan:  Continue PT/OT evaluation and  therapy            Orders written by provider at facility:  TSH on March 30, DX: Hypothyroidism      Recommendation by provider at facility:  Follow-up on CBC and BMP from today, March 29  Resume low-dose metoprolol when able        Electronically signed by:  Rosalva Lara MD                          Sincerely,        Rosalva Lara MD

## 2022-03-30 ENCOUNTER — LAB REQUISITION (OUTPATIENT)
Dept: LAB | Facility: CLINIC | Age: 82
End: 2022-03-30
Payer: MEDICARE

## 2022-03-30 VITALS
SYSTOLIC BLOOD PRESSURE: 92 MMHG | BODY MASS INDEX: 23.48 KG/M2 | RESPIRATION RATE: 18 BRPM | TEMPERATURE: 98 F | WEIGHT: 128.4 LBS | HEART RATE: 85 BPM | DIASTOLIC BLOOD PRESSURE: 56 MMHG | OXYGEN SATURATION: 91 %

## 2022-03-30 DIAGNOSIS — E03.9 HYPOTHYROIDISM, UNSPECIFIED: ICD-10-CM

## 2022-03-30 NOTE — PROGRESS NOTES
[facility]    Chief Complaint   Patient presents with     RECHECK     HPI:  Jaqueline Canales is a 81 year old  (1940), who is being seen today for an episodic care visit at: [facility] (TCU) [[id number]].     Per recent TCU provider progress notes:   80 yo female with hx chronic dCHF, a fib, pHTN hospitalized due to hypoxia, increased edema, acute hypoxic respiratory failure. BNP 5818 at admission, CXR with mild pulmonary vascular congestion. Diuresed with IV lasix, resumed PTA torsemide. Some orthostasis but no symptoms. Requires o2, wean as able. Started digoxin for rate control, BB and CCB stopped. Lymphedema wraps to legs. Apixaban dose decreased to 2.5 mg BID at discharge. Hypokalemia replaced. Finger warts treated with salicylic acid gel. Anemia - given partial dose IV iron, further work up as outpatient. HLD on statin. Scleroderma, RA, Psoriasis: on PTA folic acid, methotrexate (wednesdays), plaquenil. GERD managed with PPI. On Aricept due to mild cognitive impairment. To TCU for therapies.     Today's concern is: patient seen for episodic TCU visit. CXR last week with LLL infiltrate, started antibiotics. Saturation down to 80% at times in therapies, oxygen flow rate increased to 2-3 lpm per NC. Denies chest pain. Does have dyspnea with exertion and occasional moist cough. BP range /57-62 and sats 93% on 2.5 lpm per NC of oxygen. Weight up 3 lbs since admission. Walks 24 ft with 2WW and CGA.     Allergies, and PMH/PSH reviewed in [facility] today.    REVIEW OF SYSTEMS:  4 point ROS including Respiratory, CV, GI and , other than that noted in the HPI,  is negative    Objective:   BP 92/56   Pulse 85   Temp 98  F (36.7  C)   Resp 18   Wt 58.2 kg (128 lb 6.4 oz)   SpO2 91%   BMI 23.48 kg/m    GENERAL APPEARANCE:  Alert, in no distress, pleasant, cooperative, oriented x self, place  EYES:  EOM, lids, pupils and irises normal, sclera clear and conjunctiva normal, no discharge or  mattering on lids or lashes noted  ENT:  Mouth normal, moist mucous membranes, nose normal without drainage or crusting, external ears without lesions, hearing acuity intact  NECK: supple, symmetrical, trachea midline  RESP:  respiratory effort normal, no chest wall tenderness, no respiratory distress, Lung sounds crackles LLL, patient is on oxygen per NC  CV:  Auscultation of heart done, rate and rhythm controlled and regular, no murmur, no rub or gallop. Edema none bilateral lower extremities.   ABDOMEN:  normal bowel sounds, soft, nontender, no palpable masses.  M/S:   Gait and station walks with assist , no tenderness or swelling of the joints; able to move all extremities, digits normal  NEURO: cranial nerves 2-12 grossly intact, no facial asymmetry, no speech deficits and able to follow directions, moves all extremities symmetrically  PSYCH:  insight and judgement and memory appears impaired, affect and mood normal     Most Recent 3 CBC's:  Recent Labs   Lab Test 03/29/22  0553 03/20/22  0807 03/19/22  0824 03/18/22  1541 01/13/22  0000   WBC 6.7  --  5.2 7.4  --    HGB 11.9 11.5* 10.9* 12.7 12.6   MCV 99  --  96 97 102   PLT  --   --  170 197 173     Most Recent 3 BMP's:  Recent Labs   Lab Test 03/29/22  0553 03/24/22  0759 03/23/22  0627    136 137   POTASSIUM 4.8 3.3* 3.6   CHLORIDE 94 95 95   CO2 38* 40* 39*   BUN 18 10 9   CR 0.84 0.63 0.63   ANIONGAP 5 1* 3   TU 9.7 9.3 9.3   GLC 93 93 86     Most Recent TSH and T4:  Recent Labs   Lab Test 07/26/19  0934 10/27/15  0925 08/17/15  0909   TSH 3.10   < > 4.53*   T4  --   --  0.96    < > = values in this interval not displayed.       Assessment/Plan:  Acute on chronic HFpEF   Chronic right-sided heart failure  Moderate-severe pulmonary hypertension  Acute hypoxic respiratory failure  Moderate aortic stenosis  Chronic bilateral lower extremity edema  Permanent atrial fibrillation with RVR  Benign essential hypertension  Dyslipidemia  Acute on chronic.  At this time remains on oxygen - recently diagnosed with pneumonia. Continue oxygen 1-3 lpm per NC, wean as able. Continue torsemide 20 mg p.o. twice daily, digoxin 125 mcg p.o. daily, potassium chloride 20 mEq p.o. daily, Revatio 20 mg p.o. 3 times daily, albuterol inhaler 2 puffs every 6 hours PRN, apixaban 2.5 mg p.o. twice daily, simvastatin 40 mg p.o. at bedtime. Monitor vs, wt and review ranges next visit. Cardiology f/u as recommended.     Left lower lobe pneumonia  Recently noted. Continue course of Augmentin and azithromycin, add short term scheduled DuoNebs. Monitor respiratory status. CBC and BMP on 4/4.      Hypothyroidism  Continue PTA levothyroxine 75 mcg p.o. daily. Check TSH 4/4 if not done this week.      Rheumatoid arthritis  Scleroderma  Pulmonary fibrosis  Chronic. Continue PTA methotrexate 25 mg p.o. every Wednesday, hydroxychloroquine 200 mg p.o. daily, folic acid 1 mg p.o. daily. Follow up with Dr. Bustillos as scheduled.      GERD  No symptoms. Continue PTA omeprazole 20 mg p.o. twice daily.     Mild anemia  Last Hgb 11.9. Monitor hemoglobin periodically     SRINIVAS  Noncompliant with CPAP. Monitor sats and RESP status.     Cognitive impairment  Chronic. Continue PTA donepezil 5 mg p.o. at bedtime, monitor for safety.     Physical deconditioning  Continue PT/OT evaluation and therapy and f/u with progress next visit.      Orders:  1. O2 2-3 lpm per NC to keep sats at or above 90%, wean as able diagnosis hypoxemia  2. DuoNebs 1 TID x 5 days diagnosis pneumonia   3. CBC, BMP 4/4 diagnosis pneumonia    Electronically signed by: Vivian Shukla, AUBRIE CNP

## 2022-03-31 ENCOUNTER — TRANSITIONAL CARE UNIT VISIT (OUTPATIENT)
Dept: GERIATRICS | Facility: CLINIC | Age: 82
End: 2022-03-31
Payer: MEDICARE

## 2022-03-31 ENCOUNTER — TELEPHONE (OUTPATIENT)
Dept: FAMILY MEDICINE | Facility: CLINIC | Age: 82
End: 2022-03-31

## 2022-03-31 ENCOUNTER — LAB REQUISITION (OUTPATIENT)
Dept: LAB | Facility: CLINIC | Age: 82
End: 2022-03-31
Payer: MEDICARE

## 2022-03-31 DIAGNOSIS — E78.5 DYSLIPIDEMIA: ICD-10-CM

## 2022-03-31 DIAGNOSIS — Z00.01 ENCOUNTER FOR GENERAL ADULT MEDICAL EXAMINATION WITH ABNORMAL FINDINGS: ICD-10-CM

## 2022-03-31 DIAGNOSIS — R53.81 PHYSICAL DECONDITIONING: ICD-10-CM

## 2022-03-31 DIAGNOSIS — I10 ESSENTIAL HYPERTENSION: ICD-10-CM

## 2022-03-31 DIAGNOSIS — R41.89 COGNITIVE IMPAIRMENT: ICD-10-CM

## 2022-03-31 DIAGNOSIS — M34.9 SCLERODERMA (H): ICD-10-CM

## 2022-03-31 DIAGNOSIS — I50.812 CHRONIC RIGHT-SIDED HEART FAILURE (H): ICD-10-CM

## 2022-03-31 DIAGNOSIS — R60.0 BILATERAL LOWER EXTREMITY EDEMA: ICD-10-CM

## 2022-03-31 DIAGNOSIS — I50.33 ACUTE ON CHRONIC HEART FAILURE WITH PRESERVED EJECTION FRACTION (HFPEF) (H): Primary | ICD-10-CM

## 2022-03-31 DIAGNOSIS — J96.01 ACUTE RESPIRATORY FAILURE WITH HYPOXIA (H): ICD-10-CM

## 2022-03-31 DIAGNOSIS — D64.9 ANEMIA, UNSPECIFIED TYPE: ICD-10-CM

## 2022-03-31 DIAGNOSIS — I48.21 PERMANENT ATRIAL FIBRILLATION (H): ICD-10-CM

## 2022-03-31 DIAGNOSIS — E03.9 HYPOTHYROIDISM, UNSPECIFIED TYPE: ICD-10-CM

## 2022-03-31 DIAGNOSIS — J84.10 PULMONARY FIBROSIS (H): ICD-10-CM

## 2022-03-31 DIAGNOSIS — G47.33 OSA (OBSTRUCTIVE SLEEP APNEA): ICD-10-CM

## 2022-03-31 DIAGNOSIS — M06.9 RHEUMATOID ARTHRITIS, INVOLVING UNSPECIFIED SITE, UNSPECIFIED WHETHER RHEUMATOID FACTOR PRESENT (H): ICD-10-CM

## 2022-03-31 DIAGNOSIS — I35.0 MODERATE AORTIC STENOSIS: ICD-10-CM

## 2022-03-31 DIAGNOSIS — J18.9 PNEUMONIA OF LEFT LOWER LOBE DUE TO INFECTIOUS ORGANISM: ICD-10-CM

## 2022-03-31 DIAGNOSIS — I27.20 PULMONARY HYPERTENSION (H): ICD-10-CM

## 2022-03-31 DIAGNOSIS — K21.9 GASTROESOPHAGEAL REFLUX DISEASE, UNSPECIFIED WHETHER ESOPHAGITIS PRESENT: ICD-10-CM

## 2022-03-31 LAB — SARS-COV-2 RNA RESP QL NAA+PROBE: NEGATIVE

## 2022-03-31 PROCEDURE — U0003 INFECTIOUS AGENT DETECTION BY NUCLEIC ACID (DNA OR RNA); SEVERE ACUTE RESPIRATORY SYNDROME CORONAVIRUS 2 (SARS-COV-2) (CORONAVIRUS DISEASE [COVID-19]), AMPLIFIED PROBE TECHNIQUE, MAKING USE OF HIGH THROUGHPUT TECHNOLOGIES AS DESCRIBED BY CMS-2020-01-R: HCPCS | Performed by: NURSE PRACTITIONER

## 2022-03-31 PROCEDURE — 99309 SBSQ NF CARE MODERATE MDM 30: CPT | Performed by: NURSE PRACTITIONER

## 2022-03-31 NOTE — TELEPHONE ENCOUNTER
called concerned as pt has not been discharged, and unsure why they would say this.  Writer called over to [facility] and could not get anyone, but will try again.  Gave  update and will follow up when more info received.     Number given for [facility] [phone]

## 2022-03-31 NOTE — TELEPHONE ENCOUNTER
Pt's  called, CTC on file. He was concerned about the pt and her wellbeing. He confirmed she is at TCU. This RN tried to get more information if she is experiencing new or concerning symptoms or what he is concerned about. He is concerned pt was not being cared for. Pt was last seen on 3/29/22 by provider and RN reached out to TCU. TCU confirms pt was doing well and actually just discharged about half an hour ago. Called  who states he will reach out to pt. He has TCU number and clinic number to call if pt is concerned regarding new or worsening symptoms. He stated his understanding.     Anisha AVALOS RN  [facility]

## 2022-03-31 NOTE — TELEPHONE ENCOUNTER
is upset with TCU care Jolene.       She was a patient  in the hospital and was transferred.     He will try to speak to a person in charge about her care.     Day Covarrubias RN  -[facility]

## 2022-04-03 ENCOUNTER — LAB REQUISITION (OUTPATIENT)
Dept: LAB | Facility: CLINIC | Age: 82
End: 2022-04-03
Payer: MEDICARE

## 2022-04-03 DIAGNOSIS — J18.9 PNEUMONIA, UNSPECIFIED ORGANISM: ICD-10-CM

## 2022-04-03 DIAGNOSIS — E03.9 HYPOTHYROIDISM, UNSPECIFIED: ICD-10-CM

## 2022-04-04 ENCOUNTER — PATIENT OUTREACH (OUTPATIENT)
Dept: CARE COORDINATION | Facility: CLINIC | Age: 82
End: 2022-04-04
Payer: MEDICARE

## 2022-04-04 ENCOUNTER — LAB REQUISITION (OUTPATIENT)
Dept: LAB | Facility: CLINIC | Age: 82
End: 2022-04-04
Payer: MEDICARE

## 2022-04-04 DIAGNOSIS — Z00.01 ENCOUNTER FOR GENERAL ADULT MEDICAL EXAMINATION WITH ABNORMAL FINDINGS: ICD-10-CM

## 2022-04-04 LAB
ANION GAP SERPL CALCULATED.3IONS-SCNC: 4 MMOL/L (ref 3–14)
BUN SERPL-MCNC: 17 MG/DL (ref 7–30)
CALCIUM SERPL-MCNC: 8.9 MG/DL (ref 8.5–10.1)
CHLORIDE BLD-SCNC: 105 MMOL/L (ref 94–109)
CO2 SERPL-SCNC: 26 MMOL/L (ref 20–32)
CREAT SERPL-MCNC: 1 MG/DL (ref 0.52–1.04)
ERYTHROCYTE [DISTWIDTH] IN BLOOD BY AUTOMATED COUNT: 16.4 % (ref 10–15)
GFR SERPL CREATININE-BSD FRML MDRD: 56 ML/MIN/1.73M2
GLUCOSE BLD-MCNC: 146 MG/DL (ref 70–99)
HCT VFR BLD AUTO: 35.7 % (ref 35–47)
HGB BLD-MCNC: 11.3 G/DL (ref 11.7–15.7)
MCH RBC QN AUTO: 32.8 PG (ref 26.5–33)
MCHC RBC AUTO-ENTMCNC: 31.7 G/DL (ref 31.5–36.5)
MCV RBC AUTO: 104 FL (ref 78–100)
PLAT MORPH BLD: ABNORMAL
PLATELET # BLD AUTO: 70 10E3/UL (ref 150–450)
POTASSIUM BLD-SCNC: 3.8 MMOL/L (ref 3.4–5.3)
RBC # BLD AUTO: 3.45 10E6/UL (ref 3.8–5.2)
RBC MORPH BLD: ABNORMAL
SODIUM SERPL-SCNC: 135 MMOL/L (ref 133–144)
TSH SERPL DL<=0.005 MIU/L-ACNC: 1.91 MU/L (ref 0.4–4)
WBC # BLD AUTO: 9.1 10E3/UL (ref 4–11)

## 2022-04-04 PROCEDURE — 36415 COLL VENOUS BLD VENIPUNCTURE: CPT | Performed by: NURSE PRACTITIONER

## 2022-04-04 PROCEDURE — U0005 INFEC AGEN DETEC AMPLI PROBE: HCPCS | Performed by: NURSE PRACTITIONER

## 2022-04-04 PROCEDURE — P9604 ONE-WAY ALLOW PRORATED TRIP: HCPCS | Performed by: NURSE PRACTITIONER

## 2022-04-04 PROCEDURE — 85027 COMPLETE CBC AUTOMATED: CPT | Performed by: NURSE PRACTITIONER

## 2022-04-04 PROCEDURE — 84443 ASSAY THYROID STIM HORMONE: CPT | Performed by: NURSE PRACTITIONER

## 2022-04-04 PROCEDURE — 80048 BASIC METABOLIC PNL TOTAL CA: CPT | Performed by: NURSE PRACTITIONER

## 2022-04-04 NOTE — PROGRESS NOTES
Clinic Care Coordination Contact  Care Conference    S-(situation): [facility].    B-(background):Patient was inpatient at 3/18/22-3/24/22 at [facility] due to acute on chronic diastolic CHF, mild acute hypoxic respiratory failure, atrial fibrillation, pulmonary hypertension, hypokalemia, iron deficiency anemia.  Patient was discharged to [facility] for rehabilitation.    A-(assessment): Clinical Product Navigator attended TCU Care Conference via phone with patient, TCU interdisciplinary team and family.    Therapy: Speech; coughing with regular textures noted and a longer time with chewing, left over foods in mouth.  Tried softer foods and more moistened foods for swallowing.  On thin liquids.  Working to minimize risk of choking and aspiration, drink every few bites, sitting upright, mouth as moist as possible between meals and brushing at least twice/day.  End goal of swallowing-trial of softer regular foods in bite sizes.  Appetite is down, not a fan of ground meat.  Working towards moistened foods less ground (finger nail size).  Take pills with applesauce/pudding; noted coughing with pills & thin liquids.  Dietary will send message to Dietician regarding family request for Ensure supplement.  PT/OT: Barriers noted are oxygen 2-2.5 LPM, pain in leg, blood pressure has been on low side some days.  Encouraging water intake and activity and fatigue.  Independent getting in and out of bed with grab bar, sitting/down set-up, ambulating 250 feet SBA with walker, increasing arm/leg strength, endurance and balance.  Stairs were done, rails both side, 3 steps up and down CGA and this was very taxing for patient.  Transfers CGA to min assist, dressing is minimal assistance, SBA to supervision of toileting (for balance).  SLUMS 16/30, will continue to work on memory and problem solving.    Nursing: Today is last day of antibiotics, doing nebulizer 3 times  "daily, lungs were clear this morning.  Oxygen is 2-2.5 LPM.  Patient is up a couple of lbs since admission, edema in ankles, compression stockings will be applied.      Concern with medication management when at home.  Writer recommended pill packs as an option to ensure patient is being set up with correct medication regimen and family appreciative of this recommendation.  Patient's current pharmacy is [facility]; noted [facility] has a pill pack program \"SimpleDose.\"    R-(recommendations/plan): Anticipate 1-2 more weeks until discharge, working to wean off of oxygen, increase endurance, strength, balance.      RN CPN will place Primary Care-Care Coordination referral following TCU discharge.    Melissa Behl BSN, RN, PHN, [facility]  Clinical Product Navigator  [phone]   "

## 2022-04-05 VITALS
HEART RATE: 67 BPM | TEMPERATURE: 98 F | RESPIRATION RATE: 18 BRPM | BODY MASS INDEX: 22.86 KG/M2 | OXYGEN SATURATION: 93 % | DIASTOLIC BLOOD PRESSURE: 61 MMHG | WEIGHT: 125 LBS | SYSTOLIC BLOOD PRESSURE: 97 MMHG

## 2022-04-05 LAB — SARS-COV-2 RNA RESP QL NAA+PROBE: NEGATIVE

## 2022-04-06 ENCOUNTER — TRANSITIONAL CARE UNIT VISIT (OUTPATIENT)
Dept: GERIATRICS | Facility: CLINIC | Age: 82
End: 2022-04-06
Payer: MEDICARE

## 2022-04-06 DIAGNOSIS — K21.9 GASTROESOPHAGEAL REFLUX DISEASE, UNSPECIFIED WHETHER ESOPHAGITIS PRESENT: ICD-10-CM

## 2022-04-06 DIAGNOSIS — R41.89 COGNITIVE IMPAIRMENT: ICD-10-CM

## 2022-04-06 DIAGNOSIS — J18.9 PNEUMONIA OF LEFT LOWER LOBE DUE TO INFECTIOUS ORGANISM: ICD-10-CM

## 2022-04-06 DIAGNOSIS — R53.81 PHYSICAL DECONDITIONING: ICD-10-CM

## 2022-04-06 DIAGNOSIS — M34.9 SCLERODERMA (H): ICD-10-CM

## 2022-04-06 DIAGNOSIS — I50.33 ACUTE ON CHRONIC HEART FAILURE WITH PRESERVED EJECTION FRACTION (HFPEF) (H): Primary | ICD-10-CM

## 2022-04-06 DIAGNOSIS — E03.9 HYPOTHYROIDISM, UNSPECIFIED TYPE: ICD-10-CM

## 2022-04-06 DIAGNOSIS — G47.33 OSA (OBSTRUCTIVE SLEEP APNEA): ICD-10-CM

## 2022-04-06 DIAGNOSIS — I50.812 CHRONIC RIGHT-SIDED HEART FAILURE (H): ICD-10-CM

## 2022-04-06 DIAGNOSIS — R60.0 BILATERAL LOWER EXTREMITY EDEMA: ICD-10-CM

## 2022-04-06 DIAGNOSIS — J96.01 ACUTE RESPIRATORY FAILURE WITH HYPOXIA (H): ICD-10-CM

## 2022-04-06 DIAGNOSIS — J84.10 PULMONARY FIBROSIS (H): ICD-10-CM

## 2022-04-06 DIAGNOSIS — I27.20 PULMONARY HYPERTENSION (H): ICD-10-CM

## 2022-04-06 DIAGNOSIS — I10 ESSENTIAL HYPERTENSION: ICD-10-CM

## 2022-04-06 DIAGNOSIS — E78.5 DYSLIPIDEMIA: ICD-10-CM

## 2022-04-06 DIAGNOSIS — M06.9 RHEUMATOID ARTHRITIS, INVOLVING UNSPECIFIED SITE, UNSPECIFIED WHETHER RHEUMATOID FACTOR PRESENT (H): ICD-10-CM

## 2022-04-06 DIAGNOSIS — I48.21 PERMANENT ATRIAL FIBRILLATION (H): ICD-10-CM

## 2022-04-06 DIAGNOSIS — I35.0 MODERATE AORTIC STENOSIS: ICD-10-CM

## 2022-04-06 DIAGNOSIS — D64.9 ANEMIA, UNSPECIFIED TYPE: ICD-10-CM

## 2022-04-06 PROCEDURE — 99310 SBSQ NF CARE HIGH MDM 45: CPT | Performed by: NURSE PRACTITIONER

## 2022-04-06 NOTE — PROGRESS NOTES
[facility] GERIATRICS    Chief Complaint   Patient presents with     RECHECK     HPI:  Jaqueline Canales is a 81 year old  (1940), who is being seen today for an episodic care visit at: [facility] (TCU) [[id number]].     Per recent TCU provider progress notes:   82 yo female with hx chronic dCHF, a fib, pHTN hospitalized due to hypoxia, increased edema, acute hypoxic respiratory failure. BNP 5818 at admission, CXR with mild pulmonary vascular congestion. Diuresed with IV lasix, resumed PTA torsemide. Some orthostasis but no symptoms. Requires o2, wean as able. Started digoxin for rate control, BB and CCB stopped. Lymphedema wraps to legs. Apixaban dose decreased to 2.5 mg BID at discharge. Hypokalemia replaced. Finger warts treated with salicylic acid gel. Anemia - given partial dose IV iron, further work up as outpatient. HLD on statin. Scleroderma, RA, Psoriasis: on PTA folic acid, methotrexate (wednesdays), plaquenil. GERD managed with PPI. On Aricept due to mild cognitive impairment. To TCU for therapies.     Today's concern is: patient seen for episodic TCU visit. Completed antibiotics for LLL pneumonia. O2 Saturation 94% with 2.5 lpm oxygen per NC. Improved activity tolerance in therapies. Denies chest pain. Does have dyspnea with exertion and occasional moist cough. BP range 95-97/56-67. Weight stable at 125 lbs. Transfers with 2ww and assist of 1. NP met today with patient's son and  to review status, care, progress.     Allergies, and PMH/PSH reviewed in [facility] today.    REVIEW OF SYSTEMS:  4 point ROS including Respiratory, CV, GI and , other than that noted in the HPI,  is negative    Objective:   BP 97/61   Pulse 67   Temp 98  F (36.7  C)   Resp 18   Wt 56.7 kg (125 lb)   SpO2 93%   BMI 22.86 kg/m    GENERAL APPEARANCE:  Alert, in no distress, pleasant, cooperative, oriented x self, place  EYES:  EOM, lids, pupils and irises normal, sclera clear and conjunctiva normal, no  discharge or mattering on lids or lashes noted  ENT:  Mouth normal, moist mucous membranes, nose normal without drainage or crusting, external ears without lesions, hearing acuity intact  RESP:  respiratory effort normal, no chest wall tenderness, no respiratory distress, Lung sounds diminished LLL, patient is on oxygen per NC  CV:  Auscultation of heart done, rate and rhythm controlled and regular, soft systolic murmur, no rub or gallop. Edema trace bilateral lower extremities.   ABDOMEN:  normal bowel sounds, soft, nontender, no palpable masses.  M/S:   Gait and station walks with assist, no tenderness or swelling of the joints; able to move all extremities, digits normal  NEURO: cranial nerves 2-12 grossly intact, no facial asymmetry, no speech deficits and able to follow directions, moves all extremities symmetrically  PSYCH:  insight and judgement and memory appears impaired, affect and mood normal     Most Recent 3 CBC's:  Recent Labs   Lab Test 04/04/22  1020 03/29/22  0553 03/20/22  0807 03/19/22  0824 03/18/22  1541   WBC 9.1 6.7  --  5.2 7.4   HGB 11.3* 11.9 11.5* 10.9* 12.7   * 99  --  96 97   PLT 70*  --   --  170 197     Most Recent 3 BMP's:  Recent Labs   Lab Test 04/04/22  1036 03/29/22  0553 03/24/22  0759    137 136   POTASSIUM 3.8 4.8 3.3*   CHLORIDE 105 94 95   CO2 26 38* 40*   BUN 17 18 10   CR 1.00 0.84 0.63   ANIONGAP 4 5 1*   TU 8.9 9.7 9.3   * 93 93     Most Recent TSH and T4:  Recent Labs   Lab Test 04/04/22  1036 10/27/15  0925 08/17/15  0909   TSH 1.91   < > 4.53*   T4  --   --  0.96    < > = values in this interval not displayed.       Assessment/Plan:  Acute on chronic HFpEF   Chronic right-sided heart failure  Moderate-severe pulmonary hypertension  Acute hypoxic respiratory failure  Moderate aortic stenosis  Chronic bilateral lower extremity edema  Permanent atrial fibrillation with RVR  Benign essential hypertension  Dyslipidemia  Acute on chronic. At this time  remains on oxygen - recently diagnosed with pneumonia. Continue oxygen 1-3 lpm per NC, wean as able. Continue torsemide 20 mg p.o. twice daily, digoxin 125 mcg p.o. daily, potassium chloride 20 mEq p.o. daily, Revatio 20 mg p.o. 3 times daily, albuterol inhaler 2 puffs every 6 hours PRN, apixaban 2.5 mg p.o. twice daily, simvastatin 40 mg p.o. at bedtime. Monitor vs, wt and review ranges next visit. Cardiology f/u to be scheduled.      Left lower lobe pneumonia  Recently noted. Completed course of Augmentin and azithromycin, short term scheduled DuoNebs. Monitor respiratory status. CBC and BMP recheck PRN.      Hypothyroidism  Continue PTA levothyroxine 75 mcg p.o. daily. TSH normal 1.91 on 4/4/22. Monitor per PCP routine.      Rheumatoid arthritis  Scleroderma  Pulmonary fibrosis  Chronic. Continue PTA methotrexate 25 mg p.o. every Wednesday, hydroxychloroquine 200 mg p.o. daily, folic acid 1 mg p.o. daily. Follow up with Dr. Bustillos as scheduled.      GERD  No symptoms. Continue PTA omeprazole 20 mg p.o. twice daily.     Mild anemia  Last Hgb 11.3. Monitor hemoglobin periodically     SRINIVAS  Noncompliant with CPAP. Monitor sats and RESP status.     Cognitive impairment  Chronic. Continue PTA donepezil 5 mg p.o. at bedtime, monitor for safety.     Physical deconditioning  Continue PT/OT evaluation and therapy and f/u with progress next visit.      Family mentioned buttock/coccyx discomfort while sitting in wheelchair - recommend gel cushion to chair and APM to reduce pressure, monitor skin integrity.     Orders:  1. Cardiology f/u to be scheduled: [facility] and Dr Marquez    Total unit/floor time of 38 minutes spent consisted of the following: examination of patient, reviewing the record including pertinent labs and imaging. More than 50% of this time was spent in coordination of care with the patient, nursing staff, and other healthcare providers. This time was spent on discussing the care plan including  labs, discharge/safe placement, and specialty follow up. Additional specific discussion included met with patient and family to review status, labs, progress in therapies, recent changes in medications/orders and f/u recommendations and plan for a total of over 20 min.     Electronically signed by: AUBRIE Smith CNP

## 2022-04-07 ENCOUNTER — LAB REQUISITION (OUTPATIENT)
Dept: LAB | Facility: CLINIC | Age: 82
End: 2022-04-07
Payer: MEDICARE

## 2022-04-07 VITALS
WEIGHT: 125.4 LBS | TEMPERATURE: 98.1 F | DIASTOLIC BLOOD PRESSURE: 64 MMHG | RESPIRATION RATE: 16 BRPM | OXYGEN SATURATION: 93 % | BODY MASS INDEX: 22.94 KG/M2 | HEART RATE: 63 BPM | SYSTOLIC BLOOD PRESSURE: 100 MMHG

## 2022-04-07 DIAGNOSIS — Z00.01 ENCOUNTER FOR GENERAL ADULT MEDICAL EXAMINATION WITH ABNORMAL FINDINGS: ICD-10-CM

## 2022-04-07 PROCEDURE — U0005 INFEC AGEN DETEC AMPLI PROBE: HCPCS | Performed by: NURSE PRACTITIONER

## 2022-04-07 NOTE — PROGRESS NOTES
[facility] GERIATRICS    Chief Complaint   Patient presents with     RECHECK     HPI:  Jaqueline Canales is a 81 year old  (1940), who is being seen today for an episodic care visit at: [facility] (TCU) [[id number]].     Per recent TCU provider progress notes:   80 yo female with hx chronic dCHF, a fib, pHTN hospitalized due to hypoxia, increased edema, acute hypoxic respiratory failure. BNP 5818 at admission, CXR with mild pulmonary vascular congestion. Diuresed with IV lasix, resumed PTA torsemide. Some orthostasis but no symptoms. Requires o2, wean as able. Started digoxin for rate control, BB and CCB stopped. Lymphedema wraps to legs. Apixaban dose decreased to 2.5 mg BID at discharge. Hypokalemia replaced. Finger warts treated with salicylic acid gel. Anemia - given partial dose IV iron, further work up as outpatient. HLD on statin. Scleroderma, RA, Psoriasis: on PTA folic acid, methotrexate (wednesdays), plaquenil. GERD managed with PPI. On Aricept due to mild cognitive impairment. To TCU for therapies.     Today's concern is: patient seen for episodic TCU visit. Dyspnea and oxygen saturations improving, O2 Saturation 94% with 2 lpm oxygen per NC. Improved activity tolerance in therapies. Walks 15 ft with 2WW and CGA. BP range /54-67. Weight stable.     Allergies, and PMH/PSH reviewed in [facility] today.    REVIEW OF SYSTEMS:  4 point ROS including Respiratory, CV, GI and , other than that noted in the HPI,  is negative    Objective:   /64   Pulse 63   Temp 98.1  F (36.7  C)   Resp 16   Wt 56.9 kg (125 lb 6.4 oz)   SpO2 93%   BMI 22.94 kg/m    GENERAL APPEARANCE:  Alert, in no distress, cooperative  ENT:  Mouth normal, moist mucous membranes, normal hearing acuity  EYES:  Conjunctiva and lids normal  RESP:  no respiratory distress, on oxygen per NC  CV: trace LE edema  NEURO:   No facial asymmetry, speech clear  PSYCH: affect and mood normal     Most Recent 3 CBC's:  Recent Labs    Lab Test 04/04/22  1020 03/29/22  0553 03/20/22  0807 03/19/22  0824 03/18/22  1541   WBC 9.1 6.7  --  5.2 7.4   HGB 11.3* 11.9 11.5* 10.9* 12.7   * 99  --  96 97   PLT 70*  --   --  170 197     Most Recent 3 BMP's:  Recent Labs   Lab Test 04/04/22  1036 03/29/22  0553 03/24/22  0759    137 136   POTASSIUM 3.8 4.8 3.3*   CHLORIDE 105 94 95   CO2 26 38* 40*   BUN 17 18 10   CR 1.00 0.84 0.63   ANIONGAP 4 5 1*   TU 8.9 9.7 9.3   * 93 93     Most Recent TSH and T4:  Recent Labs   Lab Test 04/04/22  1036 10/27/15  0925 08/17/15  0909   TSH 1.91   < > 4.53*   T4  --   --  0.96    < > = values in this interval not displayed.       Assessment/Plan:  Acute on chronic HFpEF   Chronic right-sided heart failure  Moderate-severe pulmonary hypertension  Acute hypoxic respiratory failure  Moderate aortic stenosis  Chronic bilateral lower extremity edema  Permanent atrial fibrillation with RVR  Benign essential hypertension  Dyslipidemia  Acute on chronic. At this time remains on oxygen, wean as able. Continue torsemide 20 mg p.o. twice daily, digoxin 125 mcg p.o. daily, potassium chloride 20 mEq p.o. daily, Revatio 20 mg p.o. 3 times daily, albuterol inhaler 2 puffs every 6 hours PRN, apixaban 2.5 mg p.o. twice daily, simvastatin 40 mg p.o. at bedtime. Monitor vs, wt and review ranges next visit. Cardiology f/u 4/19/22.     Left lower lobe pneumonia  Acute onset at TCU, improving. Completed course of Augmentin and azithromycin, short term scheduled DuoNebs. Monitor respiratory status. CBC and BMP recheck PRN.      Hypothyroidism  Continue PTA levothyroxine 75 mcg p.o. daily. TSH normal 1.91 on 4/4/22. Monitor per PCP routine.      Rheumatoid arthritis  Scleroderma  Pulmonary fibrosis  Chronic. Continue PTA methotrexate 25 mg p.o. every Wednesday, hydroxychloroquine 200 mg p.o. daily, folic acid 1 mg p.o. daily. Follow up with Dr. Bustillos as scheduled.      GERD  No symptoms. Continue PTA omeprazole 20 mg  p.o. twice daily.     Mild anemia  Last Hgb 11.3. Monitor hemoglobin periodically     SRINIVAS  Noncompliant with CPAP. Monitor sats and RESP status.     Cognitive impairment  Chronic. Continue PTA donepezil 5 mg p.o. at bedtime, monitor for safety.     Physical deconditioning  Continue PT/OT evaluation and therapy and f/u with progress next visit.      Orders:  No new orders    Electronically signed by: AUBRIE Smith CNP

## 2022-04-08 ENCOUNTER — TELEPHONE (OUTPATIENT)
Dept: GERIATRICS | Facility: CLINIC | Age: 82
End: 2022-04-08

## 2022-04-08 ENCOUNTER — TRANSITIONAL CARE UNIT VISIT (OUTPATIENT)
Dept: GERIATRICS | Facility: CLINIC | Age: 82
End: 2022-04-08
Payer: MEDICARE

## 2022-04-08 DIAGNOSIS — E03.9 HYPOTHYROIDISM, UNSPECIFIED TYPE: ICD-10-CM

## 2022-04-08 DIAGNOSIS — E78.5 DYSLIPIDEMIA: ICD-10-CM

## 2022-04-08 DIAGNOSIS — R41.89 COGNITIVE IMPAIRMENT: ICD-10-CM

## 2022-04-08 DIAGNOSIS — R53.81 PHYSICAL DECONDITIONING: ICD-10-CM

## 2022-04-08 DIAGNOSIS — I50.812 CHRONIC RIGHT-SIDED HEART FAILURE (H): ICD-10-CM

## 2022-04-08 DIAGNOSIS — R60.0 BILATERAL LOWER EXTREMITY EDEMA: ICD-10-CM

## 2022-04-08 DIAGNOSIS — M06.9 RHEUMATOID ARTHRITIS, INVOLVING UNSPECIFIED SITE, UNSPECIFIED WHETHER RHEUMATOID FACTOR PRESENT (H): ICD-10-CM

## 2022-04-08 DIAGNOSIS — J96.01 ACUTE RESPIRATORY FAILURE WITH HYPOXIA (H): ICD-10-CM

## 2022-04-08 DIAGNOSIS — I27.20 PULMONARY HYPERTENSION (H): ICD-10-CM

## 2022-04-08 DIAGNOSIS — K21.9 GASTROESOPHAGEAL REFLUX DISEASE, UNSPECIFIED WHETHER ESOPHAGITIS PRESENT: ICD-10-CM

## 2022-04-08 DIAGNOSIS — J84.10 PULMONARY FIBROSIS (H): ICD-10-CM

## 2022-04-08 DIAGNOSIS — I50.33 ACUTE ON CHRONIC HEART FAILURE WITH PRESERVED EJECTION FRACTION (HFPEF) (H): Primary | ICD-10-CM

## 2022-04-08 DIAGNOSIS — M34.9 SCLERODERMA (H): ICD-10-CM

## 2022-04-08 DIAGNOSIS — I48.21 PERMANENT ATRIAL FIBRILLATION (H): ICD-10-CM

## 2022-04-08 DIAGNOSIS — I35.0 MODERATE AORTIC STENOSIS: ICD-10-CM

## 2022-04-08 DIAGNOSIS — G47.33 OSA (OBSTRUCTIVE SLEEP APNEA): ICD-10-CM

## 2022-04-08 DIAGNOSIS — J18.9 PNEUMONIA OF LEFT LOWER LOBE DUE TO INFECTIOUS ORGANISM: ICD-10-CM

## 2022-04-08 DIAGNOSIS — I10 ESSENTIAL HYPERTENSION: ICD-10-CM

## 2022-04-08 DIAGNOSIS — D64.9 ANEMIA, UNSPECIFIED TYPE: ICD-10-CM

## 2022-04-08 LAB — SARS-COV-2 RNA RESP QL NAA+PROBE: NEGATIVE

## 2022-04-08 PROCEDURE — 99309 SBSQ NF CARE MODERATE MDM 30: CPT | Performed by: NURSE PRACTITIONER

## 2022-04-09 NOTE — TELEPHONE ENCOUNTER
[facility] TELEPHONE ENCOUNTER    Chief Complaint   Patient presents with     Hypotension       Jaqueline Canales is a 81 year old  (1940),Nurse called today to report: SBP 91/52, HR 68. Registered Nurse staff wondering on parameters of sildenafil tonight.     ASSESSMENT/PLAN      Add HOLD parameters to both sildenafil and torsemide. HOLD if SBP<100    Monitor respiratory status as directed    Monitor BP and HR Q shift as directed per on site routine    Follow up with cardiology on 4/12/22 as directed    Electronically signed by:   AUBRIE Stoddard CNP

## 2022-04-11 ENCOUNTER — LAB REQUISITION (OUTPATIENT)
Dept: LAB | Facility: CLINIC | Age: 82
End: 2022-04-11
Payer: MEDICARE

## 2022-04-11 DIAGNOSIS — Z00.01 ENCOUNTER FOR GENERAL ADULT MEDICAL EXAMINATION WITH ABNORMAL FINDINGS: ICD-10-CM

## 2022-04-11 LAB — SARS-COV-2 RNA RESP QL NAA+PROBE: NEGATIVE

## 2022-04-11 PROCEDURE — U0005 INFEC AGEN DETEC AMPLI PROBE: HCPCS | Performed by: NURSE PRACTITIONER

## 2022-04-12 ENCOUNTER — TRANSITIONAL CARE UNIT VISIT (OUTPATIENT)
Dept: GERIATRICS | Facility: CLINIC | Age: 82
End: 2022-04-12
Payer: MEDICARE

## 2022-04-12 ENCOUNTER — PATIENT OUTREACH (OUTPATIENT)
Dept: CARE COORDINATION | Facility: CLINIC | Age: 82
End: 2022-04-12

## 2022-04-12 VITALS
RESPIRATION RATE: 18 BRPM | HEART RATE: 80 BPM | TEMPERATURE: 97.9 F | SYSTOLIC BLOOD PRESSURE: 104 MMHG | OXYGEN SATURATION: 96 % | DIASTOLIC BLOOD PRESSURE: 67 MMHG | HEIGHT: 62 IN | WEIGHT: 128.8 LBS | BODY MASS INDEX: 23.7 KG/M2

## 2022-04-12 DIAGNOSIS — J84.10 PULMONARY FIBROSIS (H): ICD-10-CM

## 2022-04-12 DIAGNOSIS — I10 ESSENTIAL HYPERTENSION: ICD-10-CM

## 2022-04-12 DIAGNOSIS — J18.9 PNEUMONIA OF LEFT LOWER LOBE DUE TO INFECTIOUS ORGANISM: ICD-10-CM

## 2022-04-12 DIAGNOSIS — I35.0 MODERATE AORTIC STENOSIS: ICD-10-CM

## 2022-04-12 DIAGNOSIS — D64.9 ANEMIA, UNSPECIFIED TYPE: ICD-10-CM

## 2022-04-12 DIAGNOSIS — M34.9 SCLERODERMA (H): ICD-10-CM

## 2022-04-12 DIAGNOSIS — I50.33 ACUTE ON CHRONIC HEART FAILURE WITH PRESERVED EJECTION FRACTION (HFPEF) (H): Primary | ICD-10-CM

## 2022-04-12 DIAGNOSIS — J96.01 ACUTE RESPIRATORY FAILURE WITH HYPOXIA (H): ICD-10-CM

## 2022-04-12 DIAGNOSIS — R53.81 PHYSICAL DECONDITIONING: ICD-10-CM

## 2022-04-12 DIAGNOSIS — E78.5 DYSLIPIDEMIA: ICD-10-CM

## 2022-04-12 DIAGNOSIS — I50.812 CHRONIC RIGHT-SIDED HEART FAILURE (H): ICD-10-CM

## 2022-04-12 DIAGNOSIS — E03.9 HYPOTHYROIDISM, UNSPECIFIED TYPE: ICD-10-CM

## 2022-04-12 DIAGNOSIS — K21.9 GASTROESOPHAGEAL REFLUX DISEASE, UNSPECIFIED WHETHER ESOPHAGITIS PRESENT: ICD-10-CM

## 2022-04-12 DIAGNOSIS — I27.20 PULMONARY HYPERTENSION (H): ICD-10-CM

## 2022-04-12 DIAGNOSIS — R60.0 BILATERAL LOWER EXTREMITY EDEMA: ICD-10-CM

## 2022-04-12 DIAGNOSIS — R41.89 COGNITIVE IMPAIRMENT: ICD-10-CM

## 2022-04-12 DIAGNOSIS — G47.33 OSA (OBSTRUCTIVE SLEEP APNEA): ICD-10-CM

## 2022-04-12 DIAGNOSIS — I48.21 PERMANENT ATRIAL FIBRILLATION (H): ICD-10-CM

## 2022-04-12 DIAGNOSIS — M06.9 RHEUMATOID ARTHRITIS, INVOLVING UNSPECIFIED SITE, UNSPECIFIED WHETHER RHEUMATOID FACTOR PRESENT (H): ICD-10-CM

## 2022-04-12 PROCEDURE — 99309 SBSQ NF CARE MODERATE MDM 30: CPT | Performed by: NURSE PRACTITIONER

## 2022-04-12 NOTE — PROGRESS NOTES
S-(situation): TCU Discharge    B-(background): Patient tentative discharge to home from TCU planned for 4/22/22.    A-(assessment): During TCU care conference, angelito Flores (phone [phone]) inquired if PCP would contact him to discuss PCP's recommendation/opinion as to whether patient would be safe to discharge home or if assisted living would be recommended.  Mark is concerned regarding patient safety if discharged home.    R-(recommendations/plan): To PCP to advise.    Melissa Behl BSN, RN, PHN, CCM  RN Clinical Product Navigator  [phone]

## 2022-04-12 NOTE — PROGRESS NOTES
"[facility] GERIATRICS    Chief Complaint   Patient presents with     RECHECK     HPI:  Jaqueline Canales is a 81 year old  (1940), who is being seen today for an episodic care visit at: [facility] (TCU) [[id number]].     Per recent TCU provider progress notes:   80 yo female with hx chronic dCHF, a fib, pHTN hospitalized due to hypoxia, increased edema, acute hypoxic respiratory failure. BNP 5818 at admission, CXR with mild pulmonary vascular congestion. Diuresed with IV lasix, resumed PTA torsemide. Some orthostasis but no symptoms. Requires o2, wean as able. Started digoxin for rate control, BB and CCB stopped. Lymphedema wraps to legs. Apixaban dose decreased to 2.5 mg BID at discharge. Hypokalemia replaced. Finger warts treated with salicylic acid gel. Anemia - given partial dose IV iron, further work up as outpatient. HLD on statin. Scleroderma, RA, Psoriasis: on PTA folic acid, methotrexate (wednesdays), plaquenil. GERD managed with PPI. On Aricept due to mild cognitive impairment. To TCU for therapies.     Today's concern is: patient seen for episodic TCU visit. Improved respiratory status, O2 Saturation 96% with 1.5 lpm oxygen per NC. Improved activity tolerance in therapies. Denies chest pain. BP range /53-69. Weight stable at 125 lbs. Sit to stand with 2ww, has been found up in room without assist by staff.     Allergies, and PMH/PSH reviewed in [facility] today.    REVIEW OF SYSTEMS:  4 point ROS including Respiratory, CV, GI and , other than that noted in the HPI,  is negative    Objective:   /67   Pulse 80   Temp 97.9  F (36.6  C)   Resp 18   Ht 1.575 m (5' 2\")   Wt 58.4 kg (128 lb 12.8 oz)   SpO2 96%   BMI 23.56 kg/m    GENERAL APPEARANCE:  Alert, in no distress, pleasant, cooperative, oriented x self, place  EYES:  sclera clear and conjunctiva normal, no discharge or mattering on lids or lashes noted  ENT:  Mouth normal, moist mucous membranes, nose normal without drainage " or crusting, external ears without lesions, hearing acuity intact  RESP:  respiratory effort normal, no chest wall tenderness, no respiratory distress, Lung sounds clear at bases, patient is on oxygen per NC  CV:  Auscultation of heart done, rate and rhythm controlled and regular, soft systolic murmur, no rub or gallop. Edema trace bilateral lower extremities.   ABDOMEN:  normal bowel sounds, soft, nontender, no palpable masses.  M/S:   Gait and station walks with assist, no tenderness or swelling of the joints; able to move all extremities, digits normal  NEURO: cranial nerves 2-12 grossly intact, no facial asymmetry, no speech deficits and able to follow directions, moves all extremities symmetrically  PSYCH:  insight and judgement and memory appears impaired, affect and mood normal     Most Recent 3 CBC's:  Recent Labs   Lab Test 04/04/22  1020 03/29/22  0553 03/20/22  0807 03/19/22  0824 03/18/22  1541   WBC 9.1 6.7  --  5.2 7.4   HGB 11.3* 11.9 11.5* 10.9* 12.7   * 99  --  96 97   PLT 70*  --   --  170 197     Most Recent 3 BMP's:  Recent Labs   Lab Test 04/04/22  1036 03/29/22  0553 03/24/22  0759    137 136   POTASSIUM 3.8 4.8 3.3*   CHLORIDE 105 94 95   CO2 26 38* 40*   BUN 17 18 10   CR 1.00 0.84 0.63   ANIONGAP 4 5 1*   TU 8.9 9.7 9.3   * 93 93     Most Recent TSH and T4:  Recent Labs   Lab Test 04/04/22  1036 10/27/15  0925 08/17/15  0909   TSH 1.91   < > 4.53*   T4  --   --  0.96    < > = values in this interval not displayed.     Assessment/Plan:  Acute on chronic HFpEF   Chronic right-sided heart failure  Moderate-severe pulmonary hypertension  Acute hypoxic respiratory failure  Moderate aortic stenosis  Chronic bilateral lower extremity edema  Permanent atrial fibrillation with RVR  Benign essential hypertension  Dyslipidemia  Acute on chronic. At this time remains on oxygen but rate of flow decreased to 1.5 lpm per NC, wean as able. Continue torsemide 20 mg p.o. twice daily,  digoxin 125 mcg p.o. daily, potassium chloride 20 mEq p.o. daily, Revatio 20 mg p.o. 3 times daily, albuterol inhaler 2 puffs every 6 hours PRN, apixaban 2.5 mg p.o. twice daily, simvastatin 40 mg p.o. at bedtime. Monitor vs, wt and review ranges next visit. Cardiology f/u missed today - rescheduled for next week 4/18.       Left lower lobe pneumonia  Improved symptoms, completed course of Augmentin and azithromycin, short term scheduled DuoNebs. Monitor respiratory status. CBC and BMP recheck PRN.      Hypothyroidism  Continue PTA levothyroxine 75 mcg p.o. daily. TSH normal 1.91 on 4/4/22. Monitor per PCP routine.      Rheumatoid arthritis  Scleroderma  Pulmonary fibrosis  Chronic. Continue PTA methotrexate 25 mg p.o. every Wednesday, hydroxychloroquine 200 mg p.o. daily, folic acid 1 mg p.o. daily. Follow up with Dr. Bustillos as scheduled.      GERD  No symptoms. Continue PTA omeprazole 20 mg p.o. twice daily.     Mild anemia  Last Hgb 11.3. Monitor hemoglobin periodically     SRINIVAS  Noncompliant with CPAP. Monitor sats and RESP status.     Cognitive impairment  Chronic. Continue PTA donepezil 5 mg p.o. at bedtime, monitor for safety.     Physical deconditioning  Continue PT/OT evaluation and therapy and f/u with progress next visit.      Orders:  No new orders    Electronically signed by: AUBRIE Smith CNP

## 2022-04-12 NOTE — PROGRESS NOTES
Clinic Care Coordination Contact  Care Conference    S-(situation): [facility].    B-(background): Patient was inpatient at 3/18/22-3/24/22 at [facility] due to acute on chronic diastolic CHF, mild acute hypoxic respiratory failure, atrial fibrillation, pulmonary hypertension, hypokalemia, iron deficiency anemia.  Patient was discharged to [place] [facility] TCU for rehabilitation.    Last Care Conference was on 4/4/22.      A-(assessment): Clinical Product Navigator attended TCU Care Conference via phone with patient, TCU interdisciplinary team, son Mark and spouse.    Therapy: Speech has been trialing bigger pieces of food, however, patient tends to gravitate more towards the smaller pieces of food.  The bigger pieces take more effort.  Speech working on swallowing exercises and will continue on this.  Grooming/hygiene with supervision from wheelchair.  Feeding- set up.  Transfers with CGA.  Lower body dressing minimal assistance, max assist for socks/shoes, upper body dressing minimal assistance.  Toileting is minimal assistance.  Working on increasing strength and endurance.  Walking with 4ww, distance is not an issue, SBA.  Blood pressure has been more stable.  Working on oxygen weaning. Decreased from 2.5 LPM last week, down to 1.5 LPM this week.  Plan is to wean off of oxygen prior to discharge.  Plan to do a CPT.    Dietary: Heart healthy diet, minced and moist food (dysphagia level 2).  Eating 50-75% of meals.  Ensure Plus once/day.  Current weight 128.8 lbs; daily weights; 123-128 lbs are current range (lower than home).  Patient likes cold Ensure Plus (not over ice), staff will put this in fridge for patient.      Care conference is to discuss patient needing extra help at time of discharge and discharge planning.  Patient will need help with laundry, grocery shopping, bathing, AM/PM cares, food prepared/cut up, use walker at all times.    Moving to  an apartment/senior living facility discussed.  Spouse stated he is very against moving; cost was cited as a concern.  SW discussed resources that would be affordable.    SW will explore different options for food delivery/vendors that will have the correct food consistency for patient.    R-(recommendations/plan): Patient will need to be independent in transfers, wean off of oxygen, patient be more self sufficient/initiating for the day in getting ready.    Anticipate discharge to home with home care and other private pay services around 4/22/22.    RN CPN will place Primary Care-Care Coordination referral following TCU discharge.    Melissa Behl BSN, RN, PHN, [facility]  Clinical Product Navigator  [phone]

## 2022-04-14 ENCOUNTER — LAB REQUISITION (OUTPATIENT)
Dept: LAB | Facility: CLINIC | Age: 82
End: 2022-04-14
Payer: MEDICARE

## 2022-04-14 ENCOUNTER — TRANSITIONAL CARE UNIT VISIT (OUTPATIENT)
Dept: GERIATRICS | Facility: CLINIC | Age: 82
End: 2022-04-14
Payer: MEDICARE

## 2022-04-14 VITALS
WEIGHT: 130.6 LBS | OXYGEN SATURATION: 93 % | HEART RATE: 74 BPM | DIASTOLIC BLOOD PRESSURE: 60 MMHG | BODY MASS INDEX: 23.89 KG/M2 | RESPIRATION RATE: 18 BRPM | SYSTOLIC BLOOD PRESSURE: 95 MMHG | TEMPERATURE: 98.1 F

## 2022-04-14 DIAGNOSIS — I35.0 MODERATE AORTIC STENOSIS: ICD-10-CM

## 2022-04-14 DIAGNOSIS — M06.9 RHEUMATOID ARTHRITIS, INVOLVING UNSPECIFIED SITE, UNSPECIFIED WHETHER RHEUMATOID FACTOR PRESENT (H): ICD-10-CM

## 2022-04-14 DIAGNOSIS — E78.5 DYSLIPIDEMIA: ICD-10-CM

## 2022-04-14 DIAGNOSIS — R60.0 BILATERAL LOWER EXTREMITY EDEMA: ICD-10-CM

## 2022-04-14 DIAGNOSIS — Z00.01 ENCOUNTER FOR GENERAL ADULT MEDICAL EXAMINATION WITH ABNORMAL FINDINGS: ICD-10-CM

## 2022-04-14 DIAGNOSIS — I48.21 PERMANENT ATRIAL FIBRILLATION (H): ICD-10-CM

## 2022-04-14 DIAGNOSIS — E03.9 HYPOTHYROIDISM, UNSPECIFIED TYPE: ICD-10-CM

## 2022-04-14 DIAGNOSIS — D64.9 ANEMIA, UNSPECIFIED TYPE: ICD-10-CM

## 2022-04-14 DIAGNOSIS — I10 ESSENTIAL HYPERTENSION: ICD-10-CM

## 2022-04-14 DIAGNOSIS — R53.81 PHYSICAL DECONDITIONING: ICD-10-CM

## 2022-04-14 DIAGNOSIS — K21.9 GASTROESOPHAGEAL REFLUX DISEASE, UNSPECIFIED WHETHER ESOPHAGITIS PRESENT: ICD-10-CM

## 2022-04-14 DIAGNOSIS — I50.33 ACUTE ON CHRONIC HEART FAILURE WITH PRESERVED EJECTION FRACTION (HFPEF) (H): Primary | ICD-10-CM

## 2022-04-14 DIAGNOSIS — M34.9 SCLERODERMA (H): ICD-10-CM

## 2022-04-14 DIAGNOSIS — J18.9 PNEUMONIA OF LEFT LOWER LOBE DUE TO INFECTIOUS ORGANISM: ICD-10-CM

## 2022-04-14 DIAGNOSIS — J84.10 PULMONARY FIBROSIS (H): ICD-10-CM

## 2022-04-14 DIAGNOSIS — I27.20 PULMONARY HYPERTENSION (H): ICD-10-CM

## 2022-04-14 DIAGNOSIS — R41.89 COGNITIVE IMPAIRMENT: ICD-10-CM

## 2022-04-14 LAB — SARS-COV-2 RNA RESP QL NAA+PROBE: NEGATIVE

## 2022-04-14 PROCEDURE — 99309 SBSQ NF CARE MODERATE MDM 30: CPT | Performed by: NURSE PRACTITIONER

## 2022-04-14 PROCEDURE — U0005 INFEC AGEN DETEC AMPLI PROBE: HCPCS | Performed by: NURSE PRACTITIONER

## 2022-04-14 NOTE — PROGRESS NOTES
[facility] GERIATRICS    Chief Complaint   Patient presents with     RECHECK     HPI:  Jaqueline Canales is a 81 year old  (1940), who is being seen today for an episodic care visit at: [facility] (TCU) [[id number]].     Per recent TCU provider progress notes:   82 yo female with hx chronic dCHF, a fib, pHTN hospitalized due to hypoxia, increased edema, acute hypoxic respiratory failure. BNP 5818 at admission, CXR with mild pulmonary vascular congestion. Diuresed with IV lasix, resumed PTA torsemide. Some orthostasis but no symptoms. Requires o2, wean as able. Started digoxin for rate control, BB and CCB stopped. Lymphedema wraps to legs. Apixaban dose decreased to 2.5 mg BID at discharge. Hypokalemia replaced. Finger warts treated with salicylic acid gel. Anemia - given partial dose IV iron, further work up as outpatient. HLD on statin. Scleroderma, RA, Psoriasis: on PTA folic acid, methotrexate (wednesdays), plaquenil. GERD managed with PPI. On Aricept due to mild cognitive impairment. To [facility] for therapies.     Today's concern is: patient seen for follow up of respiratory and fluid status and progress in rehab. Improved respiratory status per patient with less cough and decreased dyspnea, O2 Saturation 96% with 1.5 lpm oxygen per NC. Now able to walk 250 ft with 2ww and CGA. Denies chest pain. Bowels are regular. BP range /58-66. Weight up 4 lbs in the past two weeks.     Allergies, and PMH/PSH reviewed in [facility] today.    REVIEW OF SYSTEMS:  4 point ROS including Respiratory, CV, GI and , other than that noted in the HPI,  is negative    Objective:   BP 95/60   Pulse 74   Temp 98.1  F (36.7  C)   Resp 18   Wt 59.2 kg (130 lb 9.6 oz)   SpO2 93%   BMI 23.89 kg/m    GENERAL APPEARANCE:  Alert, in no distress, cooperative  ENT:  Mouth normal, moist mucous membranes, normal hearing acuity  EYES:  Conjunctiva and lids normal  RESP:  no respiratory distress, LS clear, on oxygen per NC  CV: HRR,  +1 lower extremity edema  GI: abdomen soft, nontender  NEURO:   No facial asymmetry, speech clear  PSYCH:  oriented X 3, affect and mood normal     Most Recent 3 CBC's:  Recent Labs   Lab Test 04/04/22  1020 03/29/22  0553 03/20/22  0807 03/19/22  0824 03/18/22  1541   WBC 9.1 6.7  --  5.2 7.4   HGB 11.3* 11.9 11.5* 10.9* 12.7   * 99  --  96 97   PLT 70*  --   --  170 197     Most Recent 3 BMP's:  Recent Labs   Lab Test 04/04/22  1036 03/29/22  0553 03/24/22  0759    137 136   POTASSIUM 3.8 4.8 3.3*   CHLORIDE 105 94 95   CO2 26 38* 40*   BUN 17 18 10   CR 1.00 0.84 0.63   ANIONGAP 4 5 1*   TU 8.9 9.7 9.3   * 93 93     Most Recent TSH and T4:  Recent Labs   Lab Test 04/04/22  1036 10/27/15  0925 08/17/15  0909   TSH 1.91   < > 4.53*   T4  --   --  0.96    < > = values in this interval not displayed.     Assessment/Plan:  Acute on chronic HFpEF   Moderate-severe pulmonary hypertension  Acute hypoxic respiratory failure  Chronic bilateral lower extremity edema  Permanent atrial fibrillation with RVR  Benign essential hypertension  Dyslipidemia  Acute on chronic. Improving, will continue to wean O2. Continue torsemide 20 mg p.o. twice daily, digoxin 125 mcg p.o. daily, potassium chloride 20 mEq p.o. daily, Revatio 20 mg p.o. 3 times daily, albuterol inhaler 2 puffs every 6 hours PRN, apixaban 2.5 mg p.o. twice daily, simvastatin 40 mg p.o. at bedtime. Monitor vs, wt and review ranges next visit. Cardiology f/u rescheduled for next week 4/18.       Left lower lobe pneumonia  Resolved. Monitor respiratory status. CBC and BMP recheck PRN.      Hypothyroidism  Stable with levothyroxine 75 mcg p.o. daily. Monitor per PCP routine.      Rheumatoid arthritis  Scleroderma  Pulmonary fibrosis  Chronic. Continue PTA methotrexate 25 mg p.o. every Wednesday, hydroxychloroquine 200 mg p.o. daily, folic acid 1 mg p.o. daily. Follow up rheumatology as scheduled.      GERD  Stable with PTA omeprazole 20 mg p.o.  twice daily.     Mild anemia  Last Hgb 11.3. Monitor hemoglobin periodically    Cognitive impairment  Chronic. Continue PTA donepezil 5 mg p.o. at bedtime, monitor for safety.     Physical deconditioning  Continue PT/OT evaluation and therapy and f/u with progress next visit.      Orders: None    Recommendations:  Continue to wean o2 as tolerated  F/U C[facility] clinic as scheduled    Electronically signed by: AUBRIE Smith CNP

## 2022-04-18 ENCOUNTER — LAB REQUISITION (OUTPATIENT)
Dept: LAB | Facility: CLINIC | Age: 82
End: 2022-04-18
Payer: MEDICARE

## 2022-04-18 VITALS
SYSTOLIC BLOOD PRESSURE: 106 MMHG | BODY MASS INDEX: 24.69 KG/M2 | DIASTOLIC BLOOD PRESSURE: 62 MMHG | HEIGHT: 62 IN | TEMPERATURE: 98.8 F | WEIGHT: 134.2 LBS | RESPIRATION RATE: 18 BRPM | HEART RATE: 74 BPM | OXYGEN SATURATION: 97 %

## 2022-04-18 DIAGNOSIS — Z00.01 ENCOUNTER FOR GENERAL ADULT MEDICAL EXAMINATION WITH ABNORMAL FINDINGS: ICD-10-CM

## 2022-04-18 PROCEDURE — U0003 INFECTIOUS AGENT DETECTION BY NUCLEIC ACID (DNA OR RNA); SEVERE ACUTE RESPIRATORY SYNDROME CORONAVIRUS 2 (SARS-COV-2) (CORONAVIRUS DISEASE [COVID-19]), AMPLIFIED PROBE TECHNIQUE, MAKING USE OF HIGH THROUGHPUT TECHNOLOGIES AS DESCRIBED BY CMS-2020-01-R: HCPCS | Performed by: NURSE PRACTITIONER

## 2022-04-18 NOTE — PROGRESS NOTES
Cardiology Progress Note    Patient seen today in follow up of: Post hospital follow-up  Primary cardiologist: Dr. 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    Jaqueline was hospitalized at [facility] on 3/18/2022 after presenting with complaints of worsening peripheral edema and redness. She had been seeing her primary care provider, Dr. Burroughs who been adjusting her torsemide.  While in the emergency department, she was noted to be hypoxic with ambulation and thus she was admitted for further evaluation.  NT proBNP was 5800.  Chest x-ray showed mild vascular congestion.  She was admitted and diuresed with IV Lasix.  She did have some hypotension requiring her diuretics to be held for a day.  Her legs were wrapped with improvement.  She was transition back to her oral torsemide.  Her NT proBNP did not change significantly with diuresis. She did have an echo this admission on 3/19.  Her EF was normal.  RV function was mildly decreased.  Her RV was moderately dilated. Additionally, 3-4+ TR was noted as well as 2-3+ mitral regurgitation.  Additionally, she had a moderate aortic stenosis.  Compared to her prior study from 1 year ago, her aortic stenosis appears slightly worse.  Her RVSP was 53 mmHg plus the right atrial pressure.  Last year it was 48. + RAP.    She did have some issues with rapid atrial fibrillation in the setting of her metoprolol XL being held.  She was started on digoxin for this.  Eliquis was continued for thromboembolic prevention.  If blood pressures did not improve, was recommended metoprolol XL be reevaluated as an  outpatient.    She was discharged to a TCU for rehab where she is still residing. Following her discharge, she was found to be more hypoxic with exertion. CXR showed a LLL infiltrate at the end of April and she was started on antibiotics. She has been requiring supplemental oxygen but has been slowly improving int he last few weeks. She is having less shortness of breath and O2 requirements are improving. She has continued on Torsemide 20 mg twice daily.    Jaqueline is back today for post hospital follow up. She is not able to provide much history today other than she is eager to hopefully be going home soon. She lives with her  and will likely require additional assistance. She was unable to stand on our scale today for a weight. Her lower extremity edema has been reasonably well controlled.     ASSESSMENT/PLAN:  Jaqueline Canales is a pleasant 81 year old female with a history of pulmonary hypertension maintained on Revatio, Raynaud's syndrome, chronic atrial fibrillation, hyperlipidemia, carotid artery disease status post right carotid endarterectomy in 2009, moderate to severe TR, moderate to severe MR, Bell's palsy and iron deficiency anemia who is here today for post hospital follow up.    As noted, she presented to the ED with concerns of leg swelling and redness and was admitted with hypoxia and an acute congestive heart failure. She was diuresed with IV lasix with improvement. Echo as above showed normal LV function, moderate RV dysfunction, 3 - 4 + TR, moderate aortic stenosis, and 2 - 3+ MR. RVSP was slightly higher than prior at 53 mmHg plus the RAP.  She improved with diuresis however required supplemental O2 at discharge. She is also now recovering from pneumonia.     At this point, she is on Torsemide 20 mg twice daily. Today, she appears relatively compensated. I do not have daily weights from her facility however I note from their notes she is perhaps up a few pounds. Renal function has been  stable on recent labs. For now, I suggested continuing her current diuretics unchanged. We reviewed the importance of daily weights as she plans to transition home in the coming days.     Her heart rates appear to be controlled with her atrial fibrillation. She is now on Digoxin as metoprolol XL was stopped due to hypotension in the hospital. BP is marginal in the 90s today thus will defer restarting that but would aim to in the future if BP stabilizes. She is on Eliquis for thromboembolic prevention.    She has pulmonary hypertension and follows with Dr. Marquez. She missed her recent appointment with Martha Gant PA-C recently. She remains on Revatio. She has follow up with Dr. Marquez in July. An echo is scheduled prior to that as well and we can reassess her RV function and valvular disease. I suggested she return to see Ms. Gant or myself in about a month to ensure she continues to do well at home and her respiratory and volume status stay stable. She was agreeable. In the meantime, I asked her to contact us with other questions or concerns.     Orders this Visit:  Orders Placed This Encounter   Procedures     Basic metabolic panel     Follow-Up with Cardiology - Pulmonary Hypertension     No orders of the defined types were placed in this encounter.    Medications Discontinued During This Encounter   Medication Reason     melatonin 5 MG tablet Medication Reconciliation Clean Up       CURRENT MEDICATIONS:  Current Outpatient Medications   Medication Sig Dispense Refill     acetaminophen (TYLENOL) 500 MG tablet Take 1,000 mg by mouth 2 times daily as needed for mild pain        bromfenac (PROLENSA) 0.07 % ophthalmic solution Place 1 drop into the right eye daily        donepezil (ARICEPT) 5 MG tablet Take 1 tablet (5 mg) by mouth At Bedtime 90 tablet 3     folic acid (FOLVITE) 1 MG tablet TAKE 1 TABLET DAILY. 90 tablet 3     gabapentin (NEURONTIN) 100 MG capsule Take 1 capsule (100 mg) by mouth 3 times  daily 90 capsule 11     hydroxychloroquine (PLAQUENIL) 200 MG tablet Take 200 mg by mouth daily       levothyroxine (SYNTHROID/LEVOTHROID) 75 MCG tablet TAKE 1 TABLET DAILY 90 tablet 2     Methotrexate Sodium (METHOTREXATE PO) Take 2.5 mg by mouth 10 tablets weekly - Wednesdays. Splits the dose as 5 tablets in AM & 5 tablets in PM       multivitamin (CENTRUM SILVER) tablet Take 1 tablet by mouth daily       omeprazole (PRILOSEC) 20 MG DR capsule TAKE 1 CAPSULE TWICE DAILY,30-60 MINUTES PRIOR TO     MEALS 180 capsule 2     sildenafil (REVATIO) 20 MG tablet TAKE 1 TABLET BY MOUTH THREE TIMES DAILY. GENERIC FOR REVATIO. CALL [phone] TO REFILL 90 tablet 5     Skin Protectants, Misc. (EUCERIN) cream Apply topically 2 times daily Apply to areas dry skin topically two times a day for dry skin and Apply to areas of dry skin topically as needed for dry skin daily       Vitamin D, Cholecalciferol, 10 MCG (400 UNIT) TABS Take 1,600 Units by mouth daily       ACE/ARB/ARNI NOT PRESCRIBED (INTENTIONAL) Please choose reason not prescribed from choices below. (Patient not taking: Reported on 4/19/2022)       albuterol (PROAIR HFA/PROVENTIL HFA/VENTOLIN HFA) 108 (90 Base) MCG/ACT inhaler USE 2 INHALATIONS ORALLY   INTO THE LUNGS EVERY 6  HOURS as needed 18 g 0     apixaban ANTICOAGULANT (ELIQUIS ANTICOAGULANT) 2.5 MG tablet Take 1 tablet (2.5 mg) by mouth 2 times daily 60 tablet 0     BETA BLOCKER NOT PRESCRIBED (INTENTIONAL) Please choose reason not prescribed from choices below. (Patient not taking: Reported on 4/19/2022)       digoxin (LANOXIN) 125 MCG tablet Take 1 tablet (125 mcg) by mouth daily 30 tablet 0     miconazole (MICATIN) 2 % external cream Apply topically 2 times daily       potassium chloride ER (KLOR-CON) 20 MEQ CR tablet Take 1 tablet (20 mEq) by mouth daily 30 tablet 0     salicylic acid (COMPOUND W MAX STRENGTH) 17 % external gel Apply topically daily 7 g 0     simvastatin (ZOCOR) 40 MG tablet Take 1 tablet  (40 mg) by mouth At Bedtime 30 tablet 0     torsemide (DEMADEX) 20 MG tablet Take 1 tablet (20 mg) by mouth 2 times daily 60 tablet 0     ALLERGIES  Allergies   Allergen Reactions     No Known Allergies      PAST MEDICAL HISTORY:  Past Medical History:   Diagnosis Date     Amaurosis fugax 5-11    Right     Carotid artery stenosis      Esophageal reflux 3/11/2004     HELICOBACTER PYLORI INFECTION 7/11/2006     hx of CA in situ RT breast-1990.mastectomy 4/17/2007     Hyperlipidemia LDL goal <70 6/20/2011     Lung cancer (H)     squamous cell lung ca left lower lobe     OBESITY NOS 3/11/2004     OSTEOPOROSIS NOS 3/11/2004     Other psoriasis 3/11/2004     Pulmonary hypertension (H) 04/15/2019    Right heart catheterization demonstrated moderate pulmonary arterial hypertension with a mean PA pressure of 34 mmHg, no reversibility with inhaled nitric oxide, elevated right and left-sided filling pressures, low normal cardiac index of 2.1.  Seen by Dr. Shay Marquez.  Started on sildenafil (Revatio) 20 mg 3 times daily.     RA (rheumatoid arthritis) (H) 2/8/2010     Raynaud's syndrome 4/10/2006     Scleroderma (H)      Sleep apnea     CPAP     PAST SURGICAL HISTORY:  Past Surgical History:   Procedure Laterality Date     BREAST SURGERY       COLONOSCOPY  5/24/16     COLONOSCOPY N/A 6/28/2019    Procedure: Colonoscopy, With Polypectomy And Biopsy;  Surgeon: Neto Kaminski MD;  Location:  GI     CONIZATION       CV RIGHT HEART CATH MEASUREMENTS RECORDED N/A 4/15/2019    Procedure: Heart Cath Right Heart Cath;  Surgeon: Naresh Cyr MD;  Location:  HEART CARDIAC CATH LAB     ENDARTERECTOMY CAROTID  2011     REPAIR HAMMER TOE       TONSILLECTOMY       ZZC NONSPECIFIC PROCEDURE  1990    mastectomy RT breast     ZZHC COLONOSCOPY THRU STOMA, DIAGNOSTIC  2001 2011    diverticulosis     FAMILY HISTORY:  Family History   Problem Relation Age of Onset     Cancer - colorectal Mother      Cerebrovascular Disease  Father      Cancer Sister 31        ovarian     Heart Disease Sister      Gastrointestinal Disease Sister         crohns     Gastrointestinal Disease Sister         diverticulitis     Gastrointestinal Disease Brother         diverticulitis     Alzheimer Disease Sister      Cerebrovascular Disease Maternal Grandmother      Diabetes Maternal Grandmother      Mental Illness Maternal Grandmother      Diabetes Maternal Uncle      Cancer Nephew              SOCIAL HISTORY:  Social History     Socioeconomic History     Marital status:      Spouse name: None     Number of children: None     Years of education: None     Highest education level: None   Tobacco Use     Smoking status: Former Smoker     Packs/day: 1.00     Years: 30.00     Pack years: 30.00     Types: Cigarettes     Start date:      Quit date: 3/11/1992     Years since quittin.1     Smokeless tobacco: Never Used   Substance and Sexual Activity     Alcohol use: Not Currently     Alcohol/week: 0.0 standard drinks     Drug use: No     Sexual activity: Not Currently     Partners: Male   Other Topics Concern     Parent/sibling w/ CABG, MI or angioplasty before 65F 55M? No     Social Determinants of Health     Financial Resource Strain: Low Risk      Difficulty of Paying Living Expenses: Not hard at all   Transportation Needs: No Transportation Needs     Lack of Transportation (Medical): No     Lack of Transportation (Non-Medical): No   Social Connections: Unknown     Frequency of Social Gatherings with Friends and Family: More than three times a week     Marital Status:      Review of Systems:  Skin:  Negative     Eyes:  Positive for glasses  ENT:  Negative    Respiratory:  Positive for cough  Cardiovascular:    Positive for;edema  Gastroenterology: Negative    Genitourinary:  Positive for urinary frequency;nocturia  Musculoskeletal:  Positive for arthritis  Neurologic:  Positive for    Psychiatric:  Negative    Heme/Lymph/Imm:   Negative    Endocrine:  Positive for thyroid disorder     Physical Exam:  Vitals: BP 94/54   Pulse 72   SpO2 92%    Wt Readings from Last 4 Encounters:   04/18/22 60.9 kg (134 lb 3.2 oz)   04/14/22 59.2 kg (130 lb 9.6 oz)   04/12/22 58.4 kg (128 lb 12.8 oz)   04/07/22 56.9 kg (125 lb 6.4 oz)     GEN: well nourished, in no acute distress.  HEENT:  Pupils equal, round. Sclerae nonicteric.   C/V:  IRR, III/VI systolic murmur heard throughout the precordium  RESP: Respirations are unlabored. Breath sounds are diminished at the left base, otherwise clear. No wheeze or rales.  GI: Abdomen soft, nontender.  EXTREM: 1+  LE edema. Legs are wrapped.  NEURO: Alert and oriented, cooperative.  SKIN: Warm and dry.     Recent Lab Results:  LIPID RESULTS:  Lab Results   Component Value Date    CHOL 139 10/25/2018    HDL 58 10/25/2018    LDL 56 10/25/2018    TRIG 125 10/25/2018    CHOLHDLRATIO 2.6 06/01/2015     LIVER ENZYME RESULTS:  Lab Results   Component Value Date    AST 25 03/23/2022    AST 49 01/13/2022    ALT 15 03/23/2022    ALT 31 01/13/2022     CBC RESULTS:  Lab Results   Component Value Date    WBC 9.1 04/04/2022    WBC 6.6 09/23/2021    RBC 3.45 (L) 04/04/2022    RBC 4.62 05/10/2021    HGB 11.3 (L) 04/04/2022    HGB 12.6 01/13/2022    HCT 35.7 04/04/2022    HCT 38.2 01/13/2022     (H) 04/04/2022     01/13/2022    MCH 32.8 04/04/2022    MCH 33.6 01/13/2022    MCHC 31.7 04/04/2022    MCHC 32.8 01/13/2022    RDW 16.4 (H) 04/04/2022    RDW 13.6 01/13/2022    PLT 70 (L) 04/04/2022     01/13/2022     BMP RESULTS:  Lab Results   Component Value Date     04/04/2022     05/10/2021    POTASSIUM 3.8 04/04/2022    POTASSIUM 4.2 05/10/2021    CHLORIDE 105 04/04/2022    CHLORIDE 95 05/10/2021    CO2 26 04/04/2022    CO2 39 (H) 05/10/2021    ANIONGAP 4 04/04/2022    ANIONGAP <1 (L) 05/10/2021     (H) 04/04/2022    GLC 72 05/10/2021    BUN 17 04/04/2022    BUN 17 05/10/2021    CR 1.00  04/04/2022    CR 0.680 01/13/2022    GFRESTIMATED 56 (L) 04/04/2022    GFRESTIMATED 83 05/10/2021    GFRESTBLACK >90 05/10/2021    TU 8.9 04/04/2022    TU 9.1 05/10/2021      A1C RESULTS:  Lab Results   Component Value Date    A1C 5.4 05/02/2021     INR RESULTS:  Lab Results   Component Value Date    INR 1.07 05/02/2021    INR 1.05 04/15/2019     Total time today was 40 minutes review notes, imaging, labs, patient visit, orders and documentation.     Trish Sutton PA-C  [facility]

## 2022-04-19 ENCOUNTER — OFFICE VISIT (OUTPATIENT)
Dept: CARDIOLOGY | Facility: CLINIC | Age: 82
End: 2022-04-19
Payer: MEDICARE

## 2022-04-19 ENCOUNTER — DISCHARGE SUMMARY NURSING HOME (OUTPATIENT)
Dept: GERIATRICS | Facility: CLINIC | Age: 82
End: 2022-04-19
Payer: MEDICARE

## 2022-04-19 VITALS — OXYGEN SATURATION: 92 % | DIASTOLIC BLOOD PRESSURE: 54 MMHG | SYSTOLIC BLOOD PRESSURE: 94 MMHG | HEART RATE: 72 BPM

## 2022-04-19 DIAGNOSIS — J18.9 PNEUMONIA OF LEFT LOWER LOBE DUE TO INFECTIOUS ORGANISM: ICD-10-CM

## 2022-04-19 DIAGNOSIS — I27.20 PULMONARY HYPERTENSION (H): Primary | ICD-10-CM

## 2022-04-19 DIAGNOSIS — R53.81 PHYSICAL DECONDITIONING: ICD-10-CM

## 2022-04-19 DIAGNOSIS — D64.9 ANEMIA, UNSPECIFIED TYPE: ICD-10-CM

## 2022-04-19 DIAGNOSIS — M34.9 SCLERODERMA (H): ICD-10-CM

## 2022-04-19 DIAGNOSIS — I10 ESSENTIAL HYPERTENSION: ICD-10-CM

## 2022-04-19 DIAGNOSIS — I27.20 PULMONARY HYPERTENSION (H): ICD-10-CM

## 2022-04-19 DIAGNOSIS — E78.5 DYSLIPIDEMIA: ICD-10-CM

## 2022-04-19 DIAGNOSIS — R41.89 COGNITIVE IMPAIRMENT: ICD-10-CM

## 2022-04-19 DIAGNOSIS — R60.0 BILATERAL LOWER EXTREMITY EDEMA: ICD-10-CM

## 2022-04-19 DIAGNOSIS — M06.9 RHEUMATOID ARTHRITIS, INVOLVING UNSPECIFIED SITE, UNSPECIFIED WHETHER RHEUMATOID FACTOR PRESENT (H): ICD-10-CM

## 2022-04-19 DIAGNOSIS — I48.21 PERMANENT ATRIAL FIBRILLATION (H): ICD-10-CM

## 2022-04-19 DIAGNOSIS — E03.9 HYPOTHYROIDISM, UNSPECIFIED TYPE: ICD-10-CM

## 2022-04-19 DIAGNOSIS — J84.10 PULMONARY FIBROSIS (H): ICD-10-CM

## 2022-04-19 DIAGNOSIS — I35.0 MODERATE AORTIC STENOSIS: ICD-10-CM

## 2022-04-19 DIAGNOSIS — I50.33 ACUTE ON CHRONIC HEART FAILURE WITH PRESERVED EJECTION FRACTION (HFPEF) (H): Primary | ICD-10-CM

## 2022-04-19 DIAGNOSIS — K21.9 GASTROESOPHAGEAL REFLUX DISEASE, UNSPECIFIED WHETHER ESOPHAGITIS PRESENT: ICD-10-CM

## 2022-04-19 LAB — SARS-COV-2 RNA RESP QL NAA+PROBE: NEGATIVE

## 2022-04-19 PROCEDURE — 99316 NF DSCHRG MGMT 30 MIN+: CPT | Performed by: NURSE PRACTITIONER

## 2022-04-19 PROCEDURE — 99215 OFFICE O/P EST HI 40 MIN: CPT | Performed by: PHYSICIAN ASSISTANT

## 2022-04-19 RX ORDER — MICONAZOLE NITRATE 20 MG/G
CREAM TOPICAL 2 TIMES DAILY PRN
COMMUNITY

## 2022-04-19 NOTE — PROGRESS NOTES
[facility] GERIATRICS DISCHARGE SUMMARY  PATIENT'S NAME: Jaqueline Canales  YOB: 1940  MEDICAL RECORD NUMBER:  [id number]  Place of Service where encounter took place:  [facility] (TCU) [[id number]]    PRIMARY CARE PROVIDER AND CLINIC RESPONSIBLE AFTER TRANSFER:   Carissa Burroughs MD, [address] / [place] [address]    G Provider     Transferring providers: AUBRIE Smith CNP, Dr. Jane MD  Recent Hospitalization/ED:  [facility] Hospital stay 3/18/22 to 3/24/22.  Date of SNF Admission: 3/24/22  Date of SNF (anticipated) Discharge: 4/23/33  Discharged to: previous independent home  Cognitive Scores: SLUMS: 16/30  Physical Function: Ambulating 250 ft with 2ww  DME: Home Oxygen    CODE STATUS/ADVANCE DIRECTIVES DISCUSSION:  No CPR- Do NOT Intubate   ALLERGIES: No known allergies    NURSING FACILITY COURSE   Medication Changes/Rationale:     None     Per recent TCU provider progress notes:   80 yo female with hx chronic dCHF, a fib, pHTN hospitalized due to hypoxia, increased edema, acute hypoxic respiratory failure. BNP 5818 at admission, CXR with mild pulmonary vascular congestion. Diuresed with IV lasix, resumed PTA torsemide. Some orthostasis but no symptoms. Requires O2, wean as able. Started digoxin for rate control, BB and CCB stopped. Lymphedema wraps to legs. Apixaban dose decreased to 2.5 mg BID at discharge. Hypokalemia replaced. Finger warts treated with salicylic acid gel. Anemia - given partial dose IV iron, further work up as outpatient. HLD on statin. Scleroderma, RA, Psoriasis: on PTA folic acid, methotrexate (wednesdays), plaquenil. GERD managed with PPI. On Aricept due to mild cognitive impairment. To TCU for therapies.     Patient seen for discharge visit. Reports feeling well today. Continues to require oxygen as sats drop to 86% on room air at rest. With 2 lpm sats are 96%. No headaches, dizziness, chest pain, bowel or bladder  issues. Denies pain. BP range /59-76. Home with home care as ordered below.     Summary of nursing facility stay:   Acute on chronic HFpEF   Moderate-severe pulmonary hypertension  Acute hypoxic respiratory failure  Chronic bilateral lower extremity edema  Permanent atrial fibrillation with RVR  Benign essential hypertension  Dyslipidemia  Acute on chronic. Improved but not able to wean off oxygen. Continue torsemide 20 mg p.o. twice daily, digoxin 125 mcg p.o. daily, potassium chloride 20 mEq p.o. daily, Revatio 20 mg p.o. 3 times daily, albuterol inhaler 2 puffs every 6 hours PRN, apixaban 2.5 mg p.o. twice daily, simvastatin 40 mg p.o. at bedtime. Monitor vs, wt and review ranges next PCP visit. Cardiology f/u as planned. Continue oxygen 1-3 lpm per NC continuously.     Left lower lobe pneumonia  Resolved. Monitor respiratory status.      Hypothyroidism  Stable with levothyroxine 75 mcg p.o. daily. Monitor per PCP routine.      Rheumatoid arthritis  Scleroderma  Pulmonary fibrosis  Chronic. Continue PTA methotrexate 25 mg p.o. every Wednesday, hydroxychloroquine 200 mg p.o. daily, folic acid 1 mg p.o. daily. Follow up rheumatology as scheduled.      GERD  Stable with PTA omeprazole 20 mg p.o. twice daily.     Mild anemia  Last Hgb 11.3. Monitor hemoglobin periodically    Cognitive impairment  Chronic. Continue PTA donepezil 5 mg p.o. at bedtime, monitor for safety.     Physical deconditioning  Improving to baseline. Home with home care as recommended.     Discharge Medications:  Current Outpatient Medications   Medication Sig Dispense Refill     acetaminophen (TYLENOL) 500 MG tablet Take 1,000 mg by mouth 2 times daily as needed for mild pain        albuterol (PROAIR HFA/PROVENTIL HFA/VENTOLIN HFA) 108 (90 Base) MCG/ACT inhaler USE 2 INHALATIONS ORALLY   INTO THE LUNGS EVERY 6  HOURS as needed 18 g 0     apixaban ANTICOAGULANT (ELIQUIS ANTICOAGULANT) 2.5 MG tablet Take 1 tablet (2.5 mg) by mouth 2 times  daily       bromfenac (PROLENSA) 0.07 % ophthalmic solution Place 1 drop into the right eye daily        digoxin (LANOXIN) 125 MCG tablet Take 1 tablet (125 mcg) by mouth daily       donepezil (ARICEPT) 5 MG tablet Take 1 tablet (5 mg) by mouth At Bedtime 90 tablet 3     folic acid (FOLVITE) 1 MG tablet TAKE 1 TABLET DAILY. 90 tablet 3     gabapentin (NEURONTIN) 100 MG capsule Take 1 capsule (100 mg) by mouth 3 times daily 90 capsule 11     hydroxychloroquine (PLAQUENIL) 200 MG tablet Take 200 mg by mouth daily       KLOR-CON 20 MEQ CR tablet TAKE 1 TABLET DAILY 90 tablet 3     levothyroxine (SYNTHROID/LEVOTHROID) 75 MCG tablet TAKE 1 TABLET DAILY 90 tablet 2     Methotrexate Sodium (METHOTREXATE PO) Take 2.5 mg by mouth 10 tablets weekly - Wednesdays. Splits the dose as 5 tablets in AM & 5 tablets in PM       miconazole (MICATIN) 2 % external cream Apply topically 2 times daily       multivitamin (CENTRUM SILVER) tablet Take 1 tablet by mouth daily       omeprazole (PRILOSEC) 20 MG DR capsule TAKE 1 CAPSULE TWICE DAILY,30-60 MINUTES PRIOR TO     MEALS 180 capsule 2     salicylic acid (COMPOUND W MAX STRENGTH) 17 % external gel Apply topically daily       sildenafil (REVATIO) 20 MG tablet TAKE 1 TABLET BY MOUTH THREE TIMES DAILY. GENERIC FOR REVATIO. CALL [phone] TO REFILL 90 tablet 5     simvastatin (ZOCOR) 40 MG tablet Take 1 tablet (40 mg) by mouth At Bedtime 90 tablet 3     Skin Protectants, Misc. (EUCERIN) cream Apply topically 2 times daily Apply to areas dry skin topically two times a day for dry skin and Apply to areas of dry skin topically as needed for dry skin daily       torsemide (DEMADEX) 20 MG tablet Take 1 tablet (20 mg) by mouth 2 times daily 180 tablet 3     Vitamin D, Cholecalciferol, 10 MCG (400 UNIT) TABS Take 1,600 Units by mouth daily       ACE/ARB/ARNI NOT PRESCRIBED (INTENTIONAL) Please choose reason not prescribed from choices below. (Patient not taking: Reported on 4/19/2022)        "BETA BLOCKER NOT PRESCRIBED (INTENTIONAL) Please choose reason not prescribed from choices below. (Patient not taking: Reported on 4/19/2022)        Controlled medications:   not applicable/none     Past Medical History:   Past Medical History:   Diagnosis Date     Amaurosis fugax 5-11    Right     Carotid artery stenosis      Esophageal reflux 3/11/2004     HELICOBACTER PYLORI INFECTION 7/11/2006     hx of CA in situ RT breast-1990.mastectomy 4/17/2007     Hyperlipidemia LDL goal <70 6/20/2011     Lung cancer (H)     squamous cell lung ca left lower lobe     OBESITY NOS 3/11/2004     OSTEOPOROSIS NOS 3/11/2004     Other psoriasis 3/11/2004     Pulmonary hypertension (H) 04/15/2019    Right heart catheterization demonstrated moderate pulmonary arterial hypertension with a mean PA pressure of 34 mmHg, no reversibility with inhaled nitric oxide, elevated right and left-sided filling pressures, low normal cardiac index of 2.1.  Seen by Dr. Shay Marquez.  Started on sildenafil (Revatio) 20 mg 3 times daily.     RA (rheumatoid arthritis) (H) 2/8/2010     Raynaud's syndrome 4/10/2006     Scleroderma (H)      Sleep apnea     CPAP     Physical Exam:   Vitals: /62   Pulse 74   Temp 98.8  F (37.1  C)   Resp 18   Ht 1.575 m (5' 2\")   Wt 60.9 kg (134 lb 3.2 oz)   SpO2 97%   BMI 24.55 kg/m    BMI: Body mass index is 24.55 kg/m .  GENERAL APPEARANCE:  Alert, in no distress, cooperative  ENT:  Mouth normal, moist mucous membranes, normal hearing acuity  EYES:  Conjunctiva and lids normal  RESP:  no respiratory distress, LS clear, on oxygen per NC  CV: HRR, +1 lower extremity edema  GI: abdomen soft, nontender  NEURO:   No facial asymmetry, speech clear  PSYCH:  oriented X 3, affect and mood normal     SNF labs:   Most Recent 3 CBC's:Recent Labs   Lab Test 04/04/22  1020 03/29/22  0553 03/20/22  0807 03/19/22  0824 03/18/22  1541   WBC 9.1 6.7  --  5.2 7.4   HGB 11.3* 11.9 11.5* 10.9* 12.7   * 99  --  96 97 "   PLT 70*  --   --  170 197     Most Recent 3 BMP's:Recent Labs   Lab Test 22  1036 22  0553 22  0759    137 136   POTASSIUM 3.8 4.8 3.3*   CHLORIDE 105 94 95   CO2 26 38* 40*   BUN 17 18 10   CR 1.00 0.84 0.63   ANIONGAP 4 5 1*   TU 8.9 9.7 9.3   * 93 93       DISCHARGE PLAN:    Follow up labs: No labs orders/due    Medical Follow Up:      Follow up with primary care provider in 2-3 weeks  Follow up with specialist cardiology as recommended     Current [place] scheduled appointments:     Future Appointments   Date Time Provider Department Center   2022 10:30 AM [facility]   [facility] 11[facility]   2022 10:00 AM [id number]   2022 11:30 AM Shay Marquez MD [facility]       Discharge Services: [facility] for PT, OT, RN and HHA    Discharge Instructions Verbalized to Patient at Discharge:     Oxygen at 1-3 liters/minute via nasal cannula.     TOTAL DISCHARGE TIME:   Greater than 30 minutes  Electronically signed by:  AUBRIE Smith CNP       MEDICAL NECESSITY STATEMENT FOR DME    Demographic Information on Jaqueline Canales:    Jaqueline Canales  Gender: female  : 1940  [address]  [facility] [id number] [phone] (home) [phone] (work)    Medical Record: [id number]  Social Security Number: [id number]  Primary Care Provider: Carissa Burroughs  Insurance: Payor: MEDICARE / Plan: MEDICARE / Product Type: Medicare /        Acute on chronic heart failure with preserved ejection fraction (HFpEF) (H)  Pulmonary hypertension (H)  Moderate aortic stenosis  Bilateral lower extremity edema  Permanent atrial fibrillation (H)  Essential hypertension  Dyslipidemia  Pneumonia of left lower lobe due to infectious organism  Hypothyroidism, unspecified type  Rheumatoid arthritis, involving unspecified site, unspecified whether rheumatoid factor present (H)  Scleroderma  "(H)  Pulmonary fibrosis (H)  Gastroesophageal reflux disease, unspecified whether esophagitis present  Anemia, unspecified type  Cognitive impairment  Physical deconditioning      DME:  OXYGEN: 1-3 lpm per NC to keep sats at or above 90% with portable tank and tubing. 4/19/22 room air sats 86% at rest      VITAL SIGNS:  Vitals: /62   Pulse 74   Temp 98.8  F (37.1  C)   Resp 18   Ht 1.575 m (5' 2\")   Wt 60.9 kg (134 lb 3.2 oz)   SpO2 97%   BMI 24.55 kg/m    BMI= Body mass index is 24.55 kg/m .   see above visit for exam    MEDICAL NECESSITY STATEMENT : Length of need 3 months     Acute on chronic heart failure with preserved ejection fraction (HFpEF) (H)  Pulmonary hypertension (H)  Moderate aortic stenosis  Bilateral lower extremity edema  Permanent atrial fibrillation (H)  Essential hypertension  Dyslipidemia  Pneumonia of left lower lobe due to infectious organism  Hypothyroidism, unspecified type  Rheumatoid arthritis, involving unspecified site, unspecified whether rheumatoid factor present (H)  Scleroderma (H)  Pulmonary fibrosis (H)  Gastroesophageal reflux disease, unspecified whether esophagitis present  Anemia, unspecified type  Cognitive impairment  Physical deconditioning     ELECTRONICALLY SIGNED BY ANAHI CERTIFIED PROVIDER:  AUBRIE Smith CNP   NPI [id number]   [facility]  [address] [place] [address], [address]  [place] [address]                  "

## 2022-04-19 NOTE — LETTER
4/19/2022    Carissa Burroughs MD  [address]  [place] [address]    RE: Jaqueline Canales       Dear Colleague,     I had the pleasure of seeing Jaqueline Canales in the [facility].    Cardiology Progress Note    Patient seen today in follow up of: Post hospital follow-up  Primary cardiologist: Dr. 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    Jaqueline was hospitalized at [facility] on 3/18/2022 after presenting with complaints of worsening peripheral edema and redness. She had been seeing her primary care provider, Dr. Burroughs who been adjusting her torsemide.  While in the emergency department, she was noted to be hypoxic with ambulation and thus she was admitted for further evaluation.  NT proBNP was 5800.  Chest x-ray showed mild vascular congestion.  She was admitted and diuresed with IV Lasix.  She did have some hypotension requiring her diuretics to be held for a day.  Her legs were wrapped with improvement.  She was transition back to her oral torsemide.  Her NT proBNP did not change significantly with diuresis. She did have an echo this admission on 3/19.  Her EF was normal.  RV function was mildly decreased.  Her RV was moderately dilated. Additionally, 3-4+ TR was noted as well as 2-3+ mitral regurgitation.  Additionally, she had a moderate aortic stenosis.  Compared to her prior study from 1 year ago, her aortic stenosis appears slightly worse.  Her RVSP was 53 mmHg plus the right atrial pressure.  Last year it was 48. + RAP.    She did have some issues with rapid atrial fibrillation in the setting of her  metoprolol XL being held.  She was started on digoxin for this.  Eliquis was continued for thromboembolic prevention.  If blood pressures did not improve, was recommended metoprolol XL be reevaluated as an outpatient.    She was discharged to a TCU for rehab where she is still residing. Following her discharge, she was found to be more hypoxic with exertion. CXR showed a LLL infiltrate at the end of April and she was started on antibiotics. She has been requiring supplemental oxygen but has been slowly improving int he last few weeks. She is having less shortness of breath and O2 requirements are improving. She has continued on Torsemide 20 mg twice daily.    Jaqueline is back today for post hospital follow up. She is not able to provide much history today other than she is eager to hopefully be going home soon. She lives with her  and will likely require additional assistance. She was unable to stand on our scale today for a weight. Her lower extremity edema has been reasonably well controlled.     ASSESSMENT/PLAN:  Jaqueline Canales is a pleasant 81 year old female with a history of pulmonary hypertension maintained on Revatio, Raynaud's syndrome, chronic atrial fibrillation, hyperlipidemia, carotid artery disease status post right carotid endarterectomy in 2009, moderate to severe TR, moderate to severe MR, Bell's palsy and iron deficiency anemia who is here today for post hospital follow up.    As noted, she presented to the ED with concerns of leg swelling and redness and was admitted with hypoxia and an acute congestive heart failure. She was diuresed with IV lasix with improvement. Echo as above showed normal LV function, moderate RV dysfunction, 3 - 4 + TR, moderate aortic stenosis, and 2 - 3+ MR. RVSP was slightly higher than prior at 53 mmHg plus the RAP.  She improved with diuresis however required supplemental O2 at discharge. She is also now recovering from pneumonia.     At this point, she is on  Torsemide 20 mg twice daily. Today, she appears relatively compensated. I do not have daily weights from her facility however I note from their notes she is perhaps up a few pounds. Renal function has been stable on recent labs. For now, I suggested continuing her current diuretics unchanged. We reviewed the importance of daily weights as she plans to transition home in the coming days.     Her heart rates appear to be controlled with her atrial fibrillation. She is now on Digoxin as metoprolol XL was stopped due to hypotension in the hospital. BP is marginal in the 90s today thus will defer restarting that but would aim to in the future if BP stabilizes. She is on Eliquis for thromboembolic prevention.    She has pulmonary hypertension and follows with Dr. Marquez. She missed her recent appointment with Martha Gant PA-C recently. She remains on Revatio. She has follow up with Dr. Marquez in July. An echo is scheduled prior to that as well and we can reassess her RV function and valvular disease. I suggested she return to see Ms. Gant or myself in about a month to ensure she continues to do well at home and her respiratory and volume status stay stable. She was agreeable. In the meantime, I asked her to contact us with other questions or concerns.     Orders this Visit:  Orders Placed This Encounter   Procedures     Basic metabolic panel     Follow-Up with Cardiology - Pulmonary Hypertension     No orders of the defined types were placed in this encounter.    Medications Discontinued During This Encounter   Medication Reason     melatonin 5 MG tablet Medication Reconciliation Clean Up       CURRENT MEDICATIONS:  Current Outpatient Medications   Medication Sig Dispense Refill     acetaminophen (TYLENOL) 500 MG tablet Take 1,000 mg by mouth 2 times daily as needed for mild pain        bromfenac (PROLENSA) 0.07 % ophthalmic solution Place 1 drop into the right eye daily        donepezil (ARICEPT) 5 MG tablet Take  1 tablet (5 mg) by mouth At Bedtime 90 tablet 3     folic acid (FOLVITE) 1 MG tablet TAKE 1 TABLET DAILY. 90 tablet 3     gabapentin (NEURONTIN) 100 MG capsule Take 1 capsule (100 mg) by mouth 3 times daily 90 capsule 11     hydroxychloroquine (PLAQUENIL) 200 MG tablet Take 200 mg by mouth daily       levothyroxine (SYNTHROID/LEVOTHROID) 75 MCG tablet TAKE 1 TABLET DAILY 90 tablet 2     Methotrexate Sodium (METHOTREXATE PO) Take 2.5 mg by mouth 10 tablets weekly - Wednesdays. Splits the dose as 5 tablets in AM & 5 tablets in PM       multivitamin (CENTRUM SILVER) tablet Take 1 tablet by mouth daily       omeprazole (PRILOSEC) 20 MG DR capsule TAKE 1 CAPSULE TWICE DAILY,30-60 MINUTES PRIOR TO     MEALS 180 capsule 2     sildenafil (REVATIO) 20 MG tablet TAKE 1 TABLET BY MOUTH THREE TIMES DAILY. GENERIC FOR REVATIO. CALL [phone] TO REFILL 90 tablet 5     Skin Protectants, Misc. (EUCERIN) cream Apply topically 2 times daily Apply to areas dry skin topically two times a day for dry skin and Apply to areas of dry skin topically as needed for dry skin daily       Vitamin D, Cholecalciferol, 10 MCG (400 UNIT) TABS Take 1,600 Units by mouth daily       ACE/ARB/ARNI NOT PRESCRIBED (INTENTIONAL) Please choose reason not prescribed from choices below. (Patient not taking: Reported on 4/19/2022)       albuterol (PROAIR HFA/PROVENTIL HFA/VENTOLIN HFA) 108 (90 Base) MCG/ACT inhaler USE 2 INHALATIONS ORALLY   INTO THE LUNGS EVERY 6  HOURS as needed 18 g 0     apixaban ANTICOAGULANT (ELIQUIS ANTICOAGULANT) 2.5 MG tablet Take 1 tablet (2.5 mg) by mouth 2 times daily 60 tablet 0     BETA BLOCKER NOT PRESCRIBED (INTENTIONAL) Please choose reason not prescribed from choices below. (Patient not taking: Reported on 4/19/2022)       digoxin (LANOXIN) 125 MCG tablet Take 1 tablet (125 mcg) by mouth daily 30 tablet 0     miconazole (MICATIN) 2 % external cream Apply topically 2 times daily       potassium chloride ER (KLOR-CON) 20 MEQ  CR tablet Take 1 tablet (20 mEq) by mouth daily 30 tablet 0     salicylic acid (COMPOUND W MAX STRENGTH) 17 % external gel Apply topically daily 7 g 0     simvastatin (ZOCOR) 40 MG tablet Take 1 tablet (40 mg) by mouth At Bedtime 30 tablet 0     torsemide (DEMADEX) 20 MG tablet Take 1 tablet (20 mg) by mouth 2 times daily 60 tablet 0     ALLERGIES  Allergies   Allergen Reactions     No Known Allergies      PAST MEDICAL HISTORY:  Past Medical History:   Diagnosis Date     Amaurosis fugax 5-11    Right     Carotid artery stenosis      Esophageal reflux 3/11/2004     HELICOBACTER PYLORI INFECTION 7/11/2006     hx of CA in situ RT breast-1990.mastectomy 4/17/2007     Hyperlipidemia LDL goal <70 6/20/2011     Lung cancer (H)     squamous cell lung ca left lower lobe     OBESITY NOS 3/11/2004     OSTEOPOROSIS NOS 3/11/2004     Other psoriasis 3/11/2004     Pulmonary hypertension (H) 04/15/2019    Right heart catheterization demonstrated moderate pulmonary arterial hypertension with a mean PA pressure of 34 mmHg, no reversibility with inhaled nitric oxide, elevated right and left-sided filling pressures, low normal cardiac index of 2.1.  Seen by Dr. Shay Marquez.  Started on sildenafil (Revatio) 20 mg 3 times daily.     RA (rheumatoid arthritis) (H) 2/8/2010     Raynaud's syndrome 4/10/2006     Scleroderma (H)      Sleep apnea     CPAP     PAST SURGICAL HISTORY:  Past Surgical History:   Procedure Laterality Date     BREAST SURGERY       COLONOSCOPY  5/24/16     COLONOSCOPY N/A 6/28/2019    Procedure: Colonoscopy, With Polypectomy And Biopsy;  Surgeon: Neto Kaminski MD;  Location:  GI     CONIZATION       CV RIGHT HEART CATH MEASUREMENTS RECORDED N/A 4/15/2019    Procedure: Heart Cath Right Heart Cath;  Surgeon: Naresh Cyr MD;  Location:  HEART CARDIAC CATH LAB     ENDARTERECTOMY CAROTID  2011     REPAIR HAMMER TOE       TONSILLECTOMY       ZZC NONSPECIFIC PROCEDURE  1990    mastectomy RT breast      ZZHC COLONOSCOPY THRU STOMA, DIAGNOSTIC  2001    diverticulosis     FAMILY HISTORY:  Family History   Problem Relation Age of Onset     Cancer - colorectal Mother      Cerebrovascular Disease Father      Cancer Sister 31        ovarian     Heart Disease Sister      Gastrointestinal Disease Sister         crohns     Gastrointestinal Disease Sister         diverticulitis     Gastrointestinal Disease Brother         diverticulitis     Alzheimer Disease Sister      Cerebrovascular Disease Maternal Grandmother      Diabetes Maternal Grandmother      Mental Illness Maternal Grandmother      Diabetes Maternal Uncle      Cancer Nephew              SOCIAL HISTORY:  Social History     Socioeconomic History     Marital status:      Spouse name: None     Number of children: None     Years of education: None     Highest education level: None   Tobacco Use     Smoking status: Former Smoker     Packs/day: 1.00     Years: 30.00     Pack years: 30.00     Types: Cigarettes     Start date:      Quit date: 3/11/1992     Years since quittin.1     Smokeless tobacco: Never Used   Substance and Sexual Activity     Alcohol use: Not Currently     Alcohol/week: 0.0 standard drinks     Drug use: No     Sexual activity: Not Currently     Partners: Male   Other Topics Concern     Parent/sibling w/ CABG, MI or angioplasty before 65F 55M? No     Social Determinants of Health     Financial Resource Strain: Low Risk      Difficulty of Paying Living Expenses: Not hard at all   Transportation Needs: No Transportation Needs     Lack of Transportation (Medical): No     Lack of Transportation (Non-Medical): No   Social Connections: Unknown     Frequency of Social Gatherings with Friends and Family: More than three times a week     Marital Status:      Review of Systems:  Skin:  Negative     Eyes:  Positive for glasses  ENT:  Negative    Respiratory:  Positive for cough  Cardiovascular:    Positive  for;edema  Gastroenterology: Negative    Genitourinary:  Positive for urinary frequency;nocturia  Musculoskeletal:  Positive for arthritis  Neurologic:  Positive for    Psychiatric:  Negative    Heme/Lymph/Imm:  Negative    Endocrine:  Positive for thyroid disorder     Physical Exam:  Vitals: BP 94/54   Pulse 72   SpO2 92%    Wt Readings from Last 4 Encounters:   04/18/22 60.9 kg (134 lb 3.2 oz)   04/14/22 59.2 kg (130 lb 9.6 oz)   04/12/22 58.4 kg (128 lb 12.8 oz)   04/07/22 56.9 kg (125 lb 6.4 oz)     GEN: well nourished, in no acute distress.  HEENT:  Pupils equal, round. Sclerae nonicteric.   C/V:  IRR, III/VI systolic murmur heard throughout the precordium  RESP: Respirations are unlabored. Breath sounds are diminished at the left base, otherwise clear. No wheeze or rales.  GI: Abdomen soft, nontender.  EXTREM: 1+  LE edema. Legs are wrapped.  NEURO: Alert and oriented, cooperative.  SKIN: Warm and dry.     Recent Lab Results:  LIPID RESULTS:  Lab Results   Component Value Date    CHOL 139 10/25/2018    HDL 58 10/25/2018    LDL 56 10/25/2018    TRIG 125 10/25/2018    CHOLHDLRATIO 2.6 06/01/2015     LIVER ENZYME RESULTS:  Lab Results   Component Value Date    AST 25 03/23/2022    AST 49 01/13/2022    ALT 15 03/23/2022    ALT 31 01/13/2022     CBC RESULTS:  Lab Results   Component Value Date    WBC 9.1 04/04/2022    WBC 6.6 09/23/2021    RBC 3.45 (L) 04/04/2022    RBC 4.62 05/10/2021    HGB 11.3 (L) 04/04/2022    HGB 12.6 01/13/2022    HCT 35.7 04/04/2022    HCT 38.2 01/13/2022     (H) 04/04/2022     01/13/2022    MCH 32.8 04/04/2022    MCH 33.6 01/13/2022    MCHC 31.7 04/04/2022    MCHC 32.8 01/13/2022    RDW 16.4 (H) 04/04/2022    RDW 13.6 01/13/2022    PLT 70 (L) 04/04/2022     01/13/2022     BMP RESULTS:  Lab Results   Component Value Date     04/04/2022     05/10/2021    POTASSIUM 3.8 04/04/2022    POTASSIUM 4.2 05/10/2021    CHLORIDE 105 04/04/2022    CHLORIDE 95  05/10/2021    CO2 26 04/04/2022    CO2 39 (H) 05/10/2021    ANIONGAP 4 04/04/2022    ANIONGAP <1 (L) 05/10/2021     (H) 04/04/2022    GLC 72 05/10/2021    BUN 17 04/04/2022    BUN 17 05/10/2021    CR 1.00 04/04/2022    CR 0.680 01/13/2022    GFRESTIMATED 56 (L) 04/04/2022    GFRESTIMATED 83 05/10/2021    GFRESTBLACK >90 05/10/2021    TU 8.9 04/04/2022    TU 9.1 05/10/2021      A1C RESULTS:  Lab Results   Component Value Date    A1C 5.4 05/02/2021     INR RESULTS:  Lab Results   Component Value Date    INR 1.07 05/02/2021    INR 1.05 04/15/2019     Total time today was 40 minutes review notes, imaging, labs, patient visit, orders and documentation.     Trish Sutton PA-C  [facility]    Thank you for allowing me to participate in the care of your patient.      Sincerely,     Trish Sutton PA-C     [facility]  cc:   No referring provider defined for this encounter.

## 2022-04-19 NOTE — PATIENT INSTRUCTIONS
Call CORE nurse for any questions or concerns Mon-Fri 8am-4pm:                                                 #[phone]                                       For concerns after hours:                                               #[phone], option 2     1: Medication changes: no medication changes today  2: Plan from today: daily weights at home  -  wrap your legs if possible when you get home  -  see Martha or me back in 1 month

## 2022-04-20 DIAGNOSIS — B07.8 OTHER VIRAL WARTS: ICD-10-CM

## 2022-04-20 DIAGNOSIS — E78.5 HYPERLIPIDEMIA LDL GOAL <100: ICD-10-CM

## 2022-04-20 DIAGNOSIS — I48.19 PERSISTENT ATRIAL FIBRILLATION (H): ICD-10-CM

## 2022-04-20 DIAGNOSIS — E87.6 HYPOKALEMIA: ICD-10-CM

## 2022-04-20 DIAGNOSIS — J84.9 ILD (INTERSTITIAL LUNG DISEASE) (H): ICD-10-CM

## 2022-04-20 DIAGNOSIS — R60.0 BILATERAL LEG EDEMA: ICD-10-CM

## 2022-04-20 RX ORDER — ALBUTEROL SULFATE 90 UG/1
AEROSOL, METERED RESPIRATORY (INHALATION)
Qty: 18 G | Refills: 0 | Status: SHIPPED | OUTPATIENT
Start: 2022-04-20 | End: 2022-06-01

## 2022-04-20 RX ORDER — DIGOXIN 125 MCG
125 TABLET ORAL DAILY
Qty: 30 TABLET | Refills: 0 | Status: SHIPPED | OUTPATIENT
Start: 2022-04-20 | End: 2022-06-01

## 2022-04-20 RX ORDER — TORSEMIDE 20 MG/1
20 TABLET ORAL 2 TIMES DAILY
Qty: 60 TABLET | Refills: 0 | Status: SHIPPED | OUTPATIENT
Start: 2022-04-20 | End: 2022-06-24

## 2022-04-20 RX ORDER — POTASSIUM CHLORIDE 1500 MG/1
20 TABLET, EXTENDED RELEASE ORAL DAILY
Qty: 30 TABLET | Refills: 0 | Status: SHIPPED | OUTPATIENT
Start: 2022-04-20 | End: 2022-06-24

## 2022-04-20 RX ORDER — SIMVASTATIN 40 MG
40 TABLET ORAL AT BEDTIME
Qty: 30 TABLET | Refills: 0 | Status: SHIPPED | OUTPATIENT
Start: 2022-04-20 | End: 2022-06-24

## 2022-04-21 ENCOUNTER — LAB REQUISITION (OUTPATIENT)
Dept: LAB | Facility: CLINIC | Age: 82
End: 2022-04-21
Payer: MEDICARE

## 2022-04-21 DIAGNOSIS — Z00.01 ENCOUNTER FOR GENERAL ADULT MEDICAL EXAMINATION WITH ABNORMAL FINDINGS: ICD-10-CM

## 2022-04-21 LAB — SARS-COV-2 RNA RESP QL NAA+PROBE: NEGATIVE

## 2022-04-21 PROCEDURE — U0005 INFEC AGEN DETEC AMPLI PROBE: HCPCS | Performed by: NURSE PRACTITIONER

## 2022-04-25 ENCOUNTER — PATIENT OUTREACH (OUTPATIENT)
Dept: CARE COORDINATION | Facility: CLINIC | Age: 82
End: 2022-04-25
Payer: MEDICARE

## 2022-04-25 ENCOUNTER — TELEPHONE (OUTPATIENT)
Dept: FAMILY MEDICINE | Facility: CLINIC | Age: 82
End: 2022-04-25

## 2022-04-25 ENCOUNTER — PATIENT OUTREACH (OUTPATIENT)
Dept: NURSING | Facility: CLINIC | Age: 82
End: 2022-04-25
Payer: MEDICARE

## 2022-04-25 DIAGNOSIS — I50.9 ACUTE CHF (CONGESTIVE HEART FAILURE) (H): Primary | ICD-10-CM

## 2022-04-25 DIAGNOSIS — R53.81 PHYSICAL DECONDITIONING: ICD-10-CM

## 2022-04-25 NOTE — PROGRESS NOTES
S-(situation): TCU Discharge    B-(background): Patient was inpatient at 3/18/22-3/24/22 at [facility] due to acute on chronic diastolic CHF, mild acute hypoxic respiratory failure, atrial fibrillation, pulmonary hypertension, hypokalemia, iron deficiency anemia.  Patient was discharged to [facility] TCU for rehabilitation.     A-(assessment): Patient was discharged to home with home oxygen, home care services on 4/23/22.    R-(recommendations/plan): Care Coordination referral placed.  CPN will monitor for any newly identified care navigation needs in 1 month.    Melissa Behl BSN, RN, PHN, CCM  RN Clinical Product Navigator  [phone]

## 2022-04-25 NOTE — TELEPHONE ENCOUNTER
Alma Delia from [facility] calling, requesting Delayed start of care for SN until 4/26/22. PCP please advise.   Provider at [facility] ordered referral.     Future visit:  4/27/2022 11:30 AM (Arrive by 11:10 AM) Carissa Burroughs MD [facility]     Callback: [phone]- ok to leave a detailed VM.       Lisset Ingram RN  [facility]

## 2022-04-25 NOTE — LETTER
[facility] CARE COORDINATION  [address]  [place] [address]    April 25, 2022    Jaqueline Canales  [address]  [place] [address]      Dear Jaqueline,    I am a clinic care coordinator who works with Carissa Burroughs MD at [facility] . I wanted to thank you for spending the time to talk with me.  Below is a description of clinic care coordination and how I can further assist you.      The clinic care coordination team is made up of a registered nurse,  and community health worker who understand the health care system. The goal of clinic care coordination is to help you manage your health and improve access to the health care system in the most efficient manner. The team can assist you in meeting your health care goals by providing education, coordinating services, strengthening the communication among your providers and supporting you with any resource needs.    Please feel free to contact me with any questions or concerns. We are focused on providing you with the highest-quality healthcare experience possible and that all starts with you.     Sincerely,      Lali Parkinson RN, BSN, PHN  Primary Care / Care Coordinator   [facility]  E-mail [email or web address]   [phone]

## 2022-04-25 NOTE — TELEPHONE ENCOUNTER
"Sat discharged from [facility]    Indra and  both on C2C calling to follow up on this     Was not given good info on what med(s) to take     She was \"only taking 1 pill per day\" they are confused on what pt has been taking and not taking     Reviewed med list with them:     Has Albuterol PRN   Apixiban (Eliquis) - BID   Digoxin 125mcg daily   Donepezil 5mg at bedtime   Folic acid 1mg daily   Gabapentin 100mg TID   Levothyroxine 75mcg every day   Omeprazole 20mg BID   Potassium 20mEq daily   Topical wart gel   Sildenafil 20mg - three times daily for pulmonary HTN   Simvastatin 40mg at bedtime   Torsemide 20mg BID     Also recommended hospital follow up, scheduled virtually:     Appointments in Next Year    Apr 25, 2022  9:15 AM  Phone Visit with AJ HACKETT RN  [facility] ([facility] ) [phone]   Apr 27, 2022 11:30 AM  (Arrive by 11:10 AM)  Provider Visit with Carissa Burroughs MD  [facility] ([facility] ) [phone]   May 23, 2022 10:30 AM  LAB with [facility]  [facility] ([facility] ) [phone]   May 23, 2022 11:00 AM  RETURN PRIMARY PULMONARY with JIMMY Castorena  [facility] ([facility] ) [phone]   Jul 19, 2022 10:00 AM  ECHO COMPLETE with [id number]  [facility] ([facility] ) [phone]   Jul 19, 2022 11:30 AM  (Arrive by 11:15 AM)  RETURN PRIMARY PULMONARY with Shay Marquez MD  [facility] ([facility] ) [phone]        Ricardo Chavarria RN  [facility]     "

## 2022-04-26 ENCOUNTER — MEDICAL CORRESPONDENCE (OUTPATIENT)
Dept: FAMILY MEDICINE | Facility: CLINIC | Age: 82
End: 2022-04-26

## 2022-04-26 ENCOUNTER — TELEPHONE (OUTPATIENT)
Dept: FAMILY MEDICINE | Facility: CLINIC | Age: 82
End: 2022-04-26
Payer: MEDICARE

## 2022-04-26 NOTE — TELEPHONE ENCOUNTER
Detailed message left for Alma Delia with provider response below     Carmen PORTER, Triage RN  [facility]

## 2022-04-26 NOTE — TELEPHONE ENCOUNTER
Georgi called for home care orders:     Appointments in Next Year    Apr 27, 2022 11:30 AM  (Arrive by 11:10 AM)  Provider Visit with Carissa Burroughs MD  [facility] ([facility] ) [phone]   May 23, 2022 10:30 AM  LAB with [facility]  [facility] ([facility] ) [phone]   May 23, 2022 11:00 AM  RETURN PRIMARY PULMONARY with JIMMY Castorena  [facility] ([facility] ) [phone]   Jul 19, 2022 10:00 AM  ECHO COMPLETE with [id number]  [facility] ([facility] [place] ) [phone]   Jul 19, 2022 11:30 AM  (Arrive by 11:15 AM)  RETURN PRIMARY PULMONARY with Shay Marquez MD  [facility] ([facility] [place] ) [phone]        1x/weekly home nursing visits     And med management     For 9 week episode     Also asking for PT and OT evaluations     Verbal given     Ricardo Chavarria RN  [facility]

## 2022-04-27 ENCOUNTER — VIRTUAL VISIT (OUTPATIENT)
Dept: FAMILY MEDICINE | Facility: CLINIC | Age: 82
End: 2022-04-27
Payer: MEDICARE

## 2022-04-27 DIAGNOSIS — E78.5 HYPERLIPIDEMIA LDL GOAL <100: ICD-10-CM

## 2022-04-27 DIAGNOSIS — Z71.89 ADVANCE CARE PLANNING: Primary | ICD-10-CM

## 2022-04-27 DIAGNOSIS — J84.9 ILD (INTERSTITIAL LUNG DISEASE) (H): ICD-10-CM

## 2022-04-27 DIAGNOSIS — I50.32 CHRONIC DIASTOLIC HEART FAILURE (H): ICD-10-CM

## 2022-04-27 DIAGNOSIS — R60.0 BILATERAL LEG EDEMA: ICD-10-CM

## 2022-04-27 DIAGNOSIS — M62.81 GENERALIZED MUSCLE WEAKNESS: ICD-10-CM

## 2022-04-27 DIAGNOSIS — I27.20 PULMONARY HYPERTENSION (H): ICD-10-CM

## 2022-04-27 DIAGNOSIS — I48.19 PERSISTENT ATRIAL FIBRILLATION (H): ICD-10-CM

## 2022-04-27 PROCEDURE — 99214 OFFICE O/P EST MOD 30 MIN: CPT | Mod: 95 | Performed by: INTERNAL MEDICINE

## 2022-04-27 NOTE — PROGRESS NOTES
Jaqueline is a 81 year old who is being evaluated via a billable telephone visit.      What phone number would you like to be contacted at? [phone]  How would you like to obtain your AVS? Buck Parsons is a 81 year old who presents for the following health issues    HPI       Advance care planning  Routine follow up on multiple concerns including diastolic CHF, hyperlipidemia, pulmonary hypertension         HPI:   Patient Jaqueline Canales is a very pleasant 81 year old female with history of CHF, pulmonary hypertension today for routine telephone visit for routine follow up of recent actue on chronic CHF exacerbation symptoms requiring hospitalization. Patient's weight has been decreasing again with diuretic therapy. No chest pains at this time. Leg lymphedema symptoms have improved from prior baseline. Patient is scheduled for follow up with CHF clinic at the [facility] clinic in [place] going forward. No fever or chills.      Current Medications:     Current Outpatient Medications   Medication Sig Dispense Refill     ACE/ARB/ARNI NOT PRESCRIBED (INTENTIONAL) Please choose reason not prescribed from choices below. (Patient not taking: Reported on 4/19/2022)       acetaminophen (TYLENOL) 500 MG tablet Take 1,000 mg by mouth 2 times daily as needed for mild pain        albuterol (PROAIR HFA/PROVENTIL HFA/VENTOLIN HFA) 108 (90 Base) MCG/ACT inhaler USE 2 INHALATIONS ORALLY   INTO THE LUNGS EVERY 6  HOURS as needed 18 g 0     apixaban ANTICOAGULANT (ELIQUIS ANTICOAGULANT) 2.5 MG tablet Take 1 tablet (2.5 mg) by mouth 2 times daily 60 tablet 0     BETA BLOCKER NOT PRESCRIBED (INTENTIONAL) Please choose reason not prescribed from choices below. (Patient not taking: Reported on 4/19/2022)       bromfenac (PROLENSA) 0.07 % ophthalmic solution Place 1 drop into the right eye daily        digoxin (LANOXIN) 125 MCG tablet Take 1 tablet (125 mcg) by mouth daily 30 tablet 0     donepezil (ARICEPT) 5  MG tablet Take 1 tablet (5 mg) by mouth At Bedtime 90 tablet 3     folic acid (FOLVITE) 1 MG tablet TAKE 1 TABLET DAILY. 90 tablet 3     gabapentin (NEURONTIN) 100 MG capsule Take 1 capsule (100 mg) by mouth 3 times daily 90 capsule 11     hydroxychloroquine (PLAQUENIL) 200 MG tablet Take 200 mg by mouth daily       levothyroxine (SYNTHROID/LEVOTHROID) 75 MCG tablet TAKE 1 TABLET DAILY 90 tablet 2     Methotrexate Sodium (METHOTREXATE PO) Take 2.5 mg by mouth 10 tablets weekly - Wednesdays. Splits the dose as 5 tablets in AM & 5 tablets in PM       miconazole (MICATIN) 2 % external cream Apply topically 2 times daily       multivitamin (CENTRUM SILVER) tablet Take 1 tablet by mouth daily       omeprazole (PRILOSEC) 20 MG DR capsule TAKE 1 CAPSULE TWICE DAILY,30-60 MINUTES PRIOR TO     MEALS 180 capsule 2     potassium chloride ER (KLOR-CON) 20 MEQ CR tablet Take 1 tablet (20 mEq) by mouth daily 30 tablet 0     salicylic acid (COMPOUND W MAX STRENGTH) 17 % external gel Apply topically daily 7 g 0     sildenafil (REVATIO) 20 MG tablet TAKE 1 TABLET BY MOUTH THREE TIMES DAILY. GENERIC FOR REVATIO. CALL [phone] TO REFILL 90 tablet 5     simvastatin (ZOCOR) 40 MG tablet Take 1 tablet (40 mg) by mouth At Bedtime 30 tablet 0     Skin Protectants, Misc. (EUCERIN) cream Apply topically 2 times daily Apply to areas dry skin topically two times a day for dry skin and Apply to areas of dry skin topically as needed for dry skin daily       torsemide (DEMADEX) 20 MG tablet Take 1 tablet (20 mg) by mouth 2 times daily 60 tablet 0     Vitamin D, Cholecalciferol, 10 MCG (400 UNIT) TABS Take 1,600 Units by mouth daily           Allergies:      Allergies   Allergen Reactions     No Known Allergies             Past Medical History:     Past Medical History:   Diagnosis Date     Amaurosis fugax 5-11    Right     Carotid artery stenosis      Esophageal reflux 3/11/2004     HELICOBACTER PYLORI INFECTION 7/11/2006     hx of CA in situ  RT breast-1990.mastectomy 4/17/2007     Hyperlipidemia LDL goal <70 6/20/2011     Lung cancer (H)     squamous cell lung ca left lower lobe     OBESITY NOS 3/11/2004     OSTEOPOROSIS NOS 3/11/2004     Other psoriasis 3/11/2004     Pulmonary hypertension (H) 04/15/2019    Right heart catheterization demonstrated moderate pulmonary arterial hypertension with a mean PA pressure of 34 mmHg, no reversibility with inhaled nitric oxide, elevated right and left-sided filling pressures, low normal cardiac index of 2.1.  Seen by Dr. Shay Marquez.  Started on sildenafil (Revatio) 20 mg 3 times daily.     RA (rheumatoid arthritis) (H) 2/8/2010     Raynaud's syndrome 4/10/2006     Scleroderma (H)      Sleep apnea     CPAP         Past Surgical History:     Past Surgical History:   Procedure Laterality Date     BREAST SURGERY       COLONOSCOPY  5/24/16     COLONOSCOPY N/A 6/28/2019    Procedure: Colonoscopy, With Polypectomy And Biopsy;  Surgeon: Neto Kaminski MD;  Location:  GI     [facility]       CV RIGHT HEART CATH MEASUREMENTS RECORDED N/A 4/15/2019    Procedure: Heart Cath Right Heart Cath;  Surgeon: Naresh Cyr MD;  Location:  HEART CARDIAC CATH LAB     ENDARTERECTOMY CAROTID  2011     REPAIR HAMMER TOE       TONSILLECTOMY       ZZC NONSPECIFIC PROCEDURE  1990    mastectomy RT breast     ZZHC COLONOSCOPY THRU STOMA, DIAGNOSTIC  2001 2011    diverticulosis         Family Medical History:     Family History   Problem Relation Age of Onset     Cancer - colorectal Mother      Cerebrovascular Disease Father      Cancer Sister 31        ovarian     Heart Disease Sister      Gastrointestinal Disease Sister         crohns     Gastrointestinal Disease Sister         diverticulitis     Gastrointestinal Disease Brother         diverticulitis     Alzheimer Disease Sister      Cerebrovascular Disease Maternal Grandmother      Diabetes Maternal Grandmother      Mental Illness Maternal Grandmother      Diabetes  Maternal Uncle      Cancer Nephew                  Social History:     Social History     Socioeconomic History     Marital status:      Spouse name: Not on file     Number of children: Not on file     Years of education: Not on file     Highest education level: Not on file   Occupational History     Not on file   Tobacco Use     Smoking status: Former Smoker     Packs/day: 1.00     Years: 30.00     Pack years: 30.00     Types: Cigarettes     Start date:      Quit date: 3/11/1992     Years since quittin.1     Smokeless tobacco: Never Used   Substance and Sexual Activity     Alcohol use: Not Currently     Alcohol/week: 0.0 standard drinks     Drug use: No     Sexual activity: Not Currently     Partners: Male   Other Topics Concern     Parent/sibling w/ CABG, MI or angioplasty before 65F 55M? No   Social History Narrative     Not on file     Social Determinants of Health     Financial Resource Strain: Low Risk      Difficulty of Paying Living Expenses: Not hard at all   Food Insecurity: Not on file   Transportation Needs: No Transportation Needs     Lack of Transportation (Medical): No     Lack of Transportation (Non-Medical): No   Physical Activity: Not on file   Stress: Not on file   Social Connections: Unknown     Frequency of Communication with Friends and Family: Not on file     Frequency of Social Gatherings with Friends and Family: More than three times a week     Attends Yazdanism Services: Not on file     Active Member of Clubs or Organizations: Not on file     Attends Club or Organization Meetings: Not on file     Marital Status:    Intimate Partner Violence: Not on file   Housing Stability: Not on file           Review of System:     Constitutional: Negative for fever or chills  Skin: Negative for rashes  Ears/Nose/Throat: Negative for nasal congestion, sore throat  Respiratory: No shortness of breath, dyspnea on exertion, cough, or hemoptysis  Cardiovascular: Negative for  chest pain  Gastrointestinal: Negative for nausea, vomiting, positive for chronic GERD  Genitourinary: Negative for dysuria, hematuria  Musculoskeletal: Negative for myalgias  Neurologic: Negative for headaches, positive for worsening short term memory loss symptoms  Psychiatric: Negative for depression, anxiety  Hematologic/Lymphatic/Immunologic: positive for worsening of chronic bilateral leg edema symptoms  Endocrine: positive for hypothyroidism  Behavioral: Negative for tobacco use       Physical Exam:   There were no vitals taken for this visit.    RESP: no cough over the phone   NEURO: worsening short term memory loss symptoms present  PSYCH: mentation appears normal, affect normal        Diagnostic Test Results:     Last Comprehensive Metabolic Panel:  Sodium   Date Value Ref Range Status   04/04/2022 135 133 - 144 mmol/L Final   05/10/2021 133 133 - 144 mmol/L Final     Potassium   Date Value Ref Range Status   04/04/2022 3.8 3.4 - 5.3 mmol/L Final   05/10/2021 4.2 3.4 - 5.3 mmol/L Final     Chloride   Date Value Ref Range Status   04/04/2022 105 94 - 109 mmol/L Final   05/10/2021 95 94 - 109 mmol/L Final     Carbon Dioxide   Date Value Ref Range Status   05/10/2021 39 (H) 20 - 32 mmol/L Final     Carbon Dioxide (CO2)   Date Value Ref Range Status   04/04/2022 26 20 - 32 mmol/L Final     Anion Gap   Date Value Ref Range Status   04/04/2022 4 3 - 14 mmol/L Final   05/10/2021 <1 (L) 3 - 14 mmol/L Final     Glucose   Date Value Ref Range Status   04/04/2022 146 (H) 70 - 99 mg/dL Final   05/10/2021 72 70 - 99 mg/dL Final     Urea Nitrogen   Date Value Ref Range Status   04/04/2022 17 7 - 30 mg/dL Final   05/10/2021 17 7 - 30 mg/dL Final     Creatinine   Date Value Ref Range Status   04/04/2022 1.00 0.52 - 1.04 mg/dL Final   01/13/2022 0.680 mg/dL Final     GFR Estimate   Date Value Ref Range Status   04/04/2022 56 (L) >60 mL/min/1.73m2 Final     Comment:     Effective December 21, 2021 eGFRcr in adults is  calculated using the 2021 CKD-EPI creatinine equation which includes age and gender (Nayely et al., NEJ, DOI: 10.1056/YVYSyu1207874)   05/10/2021 83 >60 mL/min/[1.73_m2] Final     Comment:     Non  GFR Calc  Starting 12/18/2018, serum creatinine based estimated GFR (eGFR) will be   calculated using the Chronic Kidney Disease Epidemiology Collaboration   (CKD-EPI) equation.       Calcium   Date Value Ref Range Status   04/04/2022 8.9 8.5 - 10.1 mg/dL Final   05/10/2021 9.1 8.5 - 10.1 mg/dL Final         ASSESSMENT/PLAN:       (Z71.89) Advance care planning  (primary encounter diagnosis)  (M62.81) Generalized muscle weakness  Comment: chronic weakness with difficulty managing ADL's, worsening over the past several years  Plan: I have spoken to both the patient and her  Sin and her son Indra over the phone today for a telephone conference call regarding advance care planning. Patient does not want to move into a nursing home or assisted living, instead, the patient and her  Sin have decided to look into the option of moving to [facility] to be closer to her son's family.       (I27.20) Pulmonary hypertension (H)  (R60.0) Bilateral leg edema  (I48.19) Persistent atrial fibrillation (H)  (I50.32) Chronic diastolic heart failure (H)    Comment: routine follow up of recent actue on chronic CHF exacerbation symptms. Patient's weight had decreased with current low dose diuretic therapy. no chest pains. Leg lymphedema symptoms have stablized with the Torsemide medication.  Plan: continue patient's current torsemide (DEMADEX) 20 MG tablet medication, potassium chloride ER (KLOR-CON M) 20 MEQ CR         Tablet medication.        continue to follow up with CHF clinic at the [facility] clinic in [place] going forward. continue low salt diet  continue Eliquis medication for atrial fibrillation      (E78.5) Hyperlipidemia LDL goal <100  Comment: stable.  Plan: continue current  therapy      (J84.9) ILD (interstitial lung disease) (H)  Comment: stable. Patient is followed by pulmonary medicine clinic  Plan: albuterol (PROAIR HFA/PROVENTIL HFA/VENTOLIN         HFA) 108 (90 Base) MCG/ACT inhaler, continue pulmonology clinic follow up going forward.         Follow Up Plan:     Patient is instructed to return to Internal Medicine clinic for follow-up visit in 1 week.      Phone call duration: 30 minutes    Carissa Burroughs MD  Internal Medicine  [facility]

## 2022-04-28 ENCOUNTER — MEDICAL CORRESPONDENCE (OUTPATIENT)
Dept: HEALTH INFORMATION MANAGEMENT | Facility: CLINIC | Age: 82
End: 2022-04-28

## 2022-05-02 ENCOUNTER — TELEPHONE (OUTPATIENT)
Dept: FAMILY MEDICINE | Facility: CLINIC | Age: 82
End: 2022-05-02
Payer: MEDICARE

## 2022-05-02 NOTE — TELEPHONE ENCOUNTER
Verbal approval given for the homecare request below. Homecare/Hospice agency to fax orders for provider signature.    Continuation of care for:    PT 1 time a week for 3 weeks, then 1 time every other week for 4 weeks.    Marion Jensen RN  [facility]

## 2022-05-04 ENCOUNTER — MEDICAL CORRESPONDENCE (OUTPATIENT)
Dept: HEALTH INFORMATION MANAGEMENT | Facility: CLINIC | Age: 82
End: 2022-05-04

## 2022-05-04 DIAGNOSIS — Z53.9 DIAGNOSIS NOT YET DEFINED: Primary | ICD-10-CM

## 2022-05-04 PROCEDURE — G0180 MD CERTIFICATION HHA PATIENT: HCPCS | Performed by: INTERNAL MEDICINE

## 2022-05-05 ENCOUNTER — MEDICAL CORRESPONDENCE (OUTPATIENT)
Dept: HEALTH INFORMATION MANAGEMENT | Facility: CLINIC | Age: 82
End: 2022-05-05
Payer: MEDICARE

## 2022-05-05 NOTE — TELEPHONE ENCOUNTER
Dilan called wanting to know how many Liters of Oxygen the patient should be on and if the home care should be weaning the patients oxygen     RN also wants to report that patient has been taking the Eliquis 5mg twice a day instead of 2.5mg twice a day  Patient did not discontinue the metoprolol and  Nefidipin   And the patient never started the digoxin   SW Eval verbal order given

## 2022-05-09 ENCOUNTER — VIRTUAL VISIT (OUTPATIENT)
Dept: FAMILY MEDICINE | Facility: CLINIC | Age: 82
End: 2022-05-09
Payer: MEDICARE

## 2022-05-09 DIAGNOSIS — I27.20 PULMONARY HYPERTENSION (H): Primary | ICD-10-CM

## 2022-05-09 DIAGNOSIS — I48.19 PERSISTENT ATRIAL FIBRILLATION (H): ICD-10-CM

## 2022-05-09 DIAGNOSIS — J84.9 ILD (INTERSTITIAL LUNG DISEASE) (H): ICD-10-CM

## 2022-05-09 DIAGNOSIS — I50.32 CHRONIC DIASTOLIC HEART FAILURE (H): ICD-10-CM

## 2022-05-09 DIAGNOSIS — R60.0 BILATERAL LEG EDEMA: ICD-10-CM

## 2022-05-09 DIAGNOSIS — E78.5 HYPERLIPIDEMIA LDL GOAL <100: ICD-10-CM

## 2022-05-09 DIAGNOSIS — C34.32 MALIGNANT NEOPLASM OF LOWER LOBE OF LEFT LUNG (H): ICD-10-CM

## 2022-05-09 PROCEDURE — 99214 OFFICE O/P EST MOD 30 MIN: CPT | Mod: 95 | Performed by: INTERNAL MEDICINE

## 2022-05-10 NOTE — PROGRESS NOTES
Jaqueline is a 81 year old who is being evaluated via a billable telephone visit.      What phone number would you like to be contacted at? [phone]  How would you like to obtain your AVS? Buck Parsons is a 81 year old who presents for the following health issues    HPI     Follow up on multiple concerns including diastolic CHF, hyperlipidemia, pulmonary hypertension       HPI:   Patient Jaqueline Canales is a very pleasant 81 year old female with history of CHF, pulmonary hypertension today for routine telephone visit for routine 1 week follow up of recent actue on chronic CHF exacerbation symptms. Patient's weight has been decreasing again with diuretic therapy. No chest pains at this time. Leg lymphedema symptoms have improved from prior baseline. Patient is scheduled for follow up with CHF clinic at the [facility] Cardiology clinic in [place] going forward. No fever or chills.      Current Medications:     Current Outpatient Medications   Medication Sig Dispense Refill     ACE/ARB/ARNI NOT PRESCRIBED (INTENTIONAL) Please choose reason not prescribed from choices below. (Patient not taking: Reported on 4/19/2022)       acetaminophen (TYLENOL) 500 MG tablet Take 1,000 mg by mouth 2 times daily as needed for mild pain        albuterol (PROAIR HFA/PROVENTIL HFA/VENTOLIN HFA) 108 (90 Base) MCG/ACT inhaler USE 2 INHALATIONS ORALLY   INTO THE LUNGS EVERY 6  HOURS as needed 18 g 0     apixaban ANTICOAGULANT (ELIQUIS ANTICOAGULANT) 2.5 MG tablet Take 1 tablet (2.5 mg) by mouth 2 times daily 60 tablet 0     BETA BLOCKER NOT PRESCRIBED (INTENTIONAL) Please choose reason not prescribed from choices below. (Patient not taking: Reported on 4/19/2022)       bromfenac (PROLENSA) 0.07 % ophthalmic solution Place 1 drop into the right eye daily        digoxin (LANOXIN) 125 MCG tablet Take 1 tablet (125 mcg) by mouth daily 30 tablet 0     donepezil (ARICEPT) 5 MG tablet Take 1 tablet (5 mg) by mouth At Bedtime 90  tablet 3     folic acid (FOLVITE) 1 MG tablet TAKE 1 TABLET DAILY. 90 tablet 3     gabapentin (NEURONTIN) 100 MG capsule Take 1 capsule (100 mg) by mouth 3 times daily 90 capsule 11     hydroxychloroquine (PLAQUENIL) 200 MG tablet Take 200 mg by mouth daily       levothyroxine (SYNTHROID/LEVOTHROID) 75 MCG tablet TAKE 1 TABLET DAILY 90 tablet 2     Methotrexate Sodium (METHOTREXATE PO) Take 2.5 mg by mouth 10 tablets weekly - Wednesdays. Splits the dose as 5 tablets in AM & 5 tablets in PM       miconazole (MICATIN) 2 % external cream Apply topically 2 times daily       multivitamin (CENTRUM SILVER) tablet Take 1 tablet by mouth daily       omeprazole (PRILOSEC) 20 MG DR capsule TAKE 1 CAPSULE TWICE DAILY,30-60 MINUTES PRIOR TO     MEALS 180 capsule 2     potassium chloride ER (KLOR-CON) 20 MEQ CR tablet Take 1 tablet (20 mEq) by mouth daily 30 tablet 0     salicylic acid (COMPOUND W MAX STRENGTH) 17 % external gel Apply topically daily 7 g 0     sildenafil (REVATIO) 20 MG tablet TAKE 1 TABLET BY MOUTH THREE TIMES DAILY. GENERIC FOR REVATIO. CALL [phone] TO REFILL 90 tablet 5     simvastatin (ZOCOR) 40 MG tablet Take 1 tablet (40 mg) by mouth At Bedtime 30 tablet 0     Skin Protectants, Misc. (EUCERIN) cream Apply topically 2 times daily Apply to areas dry skin topically two times a day for dry skin and Apply to areas of dry skin topically as needed for dry skin daily       torsemide (DEMADEX) 20 MG tablet Take 1 tablet (20 mg) by mouth 2 times daily 60 tablet 0     Vitamin D, Cholecalciferol, 10 MCG (400 UNIT) TABS Take 1,600 Units by mouth daily           Allergies:      Allergies   Allergen Reactions     No Known Allergies             Past Medical History:     Past Medical History:   Diagnosis Date     Amaurosis fugax 5-11    Right     Carotid artery stenosis      Esophageal reflux 3/11/2004     HELICOBACTER PYLORI INFECTION 7/11/2006     hx of CA in situ RT breast-1990.mastectomy 4/17/2007      Hyperlipidemia LDL goal <70 6/20/2011     Lung cancer (H)     squamous cell lung ca left lower lobe     OBESITY NOS 3/11/2004     OSTEOPOROSIS NOS 3/11/2004     Other psoriasis 3/11/2004     Pulmonary hypertension (H) 04/15/2019    Right heart catheterization demonstrated moderate pulmonary arterial hypertension with a mean PA pressure of 34 mmHg, no reversibility with inhaled nitric oxide, elevated right and left-sided filling pressures, low normal cardiac index of 2.1.  Seen by Dr. Shay Marquez.  Started on sildenafil (Revatio) 20 mg 3 times daily.     RA (rheumatoid arthritis) (H) 2/8/2010     Raynaud's syndrome 4/10/2006     Scleroderma (H)      Sleep apnea     CPAP         Past Surgical History:     Past Surgical History:   Procedure Laterality Date     BREAST SURGERY       COLONOSCOPY  5/24/16     COLONOSCOPY N/A 6/28/2019    Procedure: Colonoscopy, With Polypectomy And Biopsy;  Surgeon: Neto Kaminski MD;  Location:  GI     CONIZATION       CV RIGHT HEART CATH MEASUREMENTS RECORDED N/A 4/15/2019    Procedure: Heart Cath Right Heart Cath;  Surgeon: Naresh Cyr MD;  Location:  HEART CARDIAC CATH LAB     ENDARTERECTOMY CAROTID  2011     REPAIR HAMMER TOE       TONSILLECTOMY       ZZC NONSPECIFIC PROCEDURE  1990    mastectomy RT breast     ZZHC COLONOSCOPY THRU STOMA, DIAGNOSTIC  2001 2011    diverticulosis         Family Medical History:     Family History   Problem Relation Age of Onset     Cancer - colorectal Mother      Cerebrovascular Disease Father      Cancer Sister 31        ovarian     Heart Disease Sister      Gastrointestinal Disease Sister         crohns     Gastrointestinal Disease Sister         diverticulitis     Gastrointestinal Disease Brother         diverticulitis     Alzheimer Disease Sister      Cerebrovascular Disease Maternal Grandmother      Diabetes Maternal Grandmother      Mental Illness Maternal Grandmother      Diabetes Maternal Uncle      Cancer Nephew                   Social History:     Social History     Socioeconomic History     Marital status:      Spouse name: Not on file     Number of children: Not on file     Years of education: Not on file     Highest education level: Not on file   Occupational History     Not on file   Tobacco Use     Smoking status: Former Smoker     Packs/day: 1.00     Years: 30.00     Pack years: 30.00     Types: Cigarettes     Start date:      Quit date: 3/11/1992     Years since quittin.1     Smokeless tobacco: Never Used   Substance and Sexual Activity     Alcohol use: Not Currently     Alcohol/week: 0.0 standard drinks     Drug use: No     Sexual activity: Not Currently     Partners: Male   Other Topics Concern     Parent/sibling w/ CABG, MI or angioplasty before 65F 55M? No   Social History Narrative     Not on file     Social Determinants of Health     Financial Resource Strain: Low Risk      Difficulty of Paying Living Expenses: Not hard at all   Food Insecurity: Not on file   Transportation Needs: No Transportation Needs     Lack of Transportation (Medical): No     Lack of Transportation (Non-Medical): No   Physical Activity: Not on file   Stress: Not on file   Social Connections: Unknown     Frequency of Communication with Friends and Family: Not on file     Frequency of Social Gatherings with Friends and Family: More than three times a week     Attends [employer] Services: Not on file     Active Member of Clubs or Organizations: Not on file     Attends Club or Organization Meetings: Not on file     Marital Status:    Intimate Partner Violence: Not on file   Housing Stability: Not on file           Review of System:     Constitutional: Negative for fever or chills  Skin: Negative for rashes  Ears/Nose/Throat: Negative for nasal congestion, sore throat  Respiratory: No shortness of breath, dyspnea on exertion, cough, or hemoptysis  Cardiovascular: Negative for chest pain  Gastrointestinal: Negative for  nausea, vomiting, positive for chronic GERD  Genitourinary: Negative for dysuria, hematuria  Musculoskeletal: Negative for myalgias  Neurologic: Negative for headaches, positive for worsening short term memory loss symptoms  Psychiatric: Negative for depression, anxiety  Hematologic/Lymphatic/Immunologic: positive for worsening of chronic bilateral leg edema symptoms  Endocrine: positive for hypothyroidism  Behavioral: Negative for tobacco use       Physical Exam:   There were no vitals taken for this visit.    RESP: no cough over the phone   NEURO: worsening short term memory loss symptoms present  PSYCH: mentation appears normal, affect normal        Diagnostic Test Results:     Last Comprehensive Metabolic Panel:  Sodium   Date Value Ref Range Status   04/04/2022 135 133 - 144 mmol/L Final   05/10/2021 133 133 - 144 mmol/L Final     Potassium   Date Value Ref Range Status   04/04/2022 3.8 3.4 - 5.3 mmol/L Final   05/10/2021 4.2 3.4 - 5.3 mmol/L Final     Chloride   Date Value Ref Range Status   04/04/2022 105 94 - 109 mmol/L Final   05/10/2021 95 94 - 109 mmol/L Final     Carbon Dioxide   Date Value Ref Range Status   05/10/2021 39 (H) 20 - 32 mmol/L Final     Carbon Dioxide (CO2)   Date Value Ref Range Status   04/04/2022 26 20 - 32 mmol/L Final     Anion Gap   Date Value Ref Range Status   04/04/2022 4 3 - 14 mmol/L Final   05/10/2021 <1 (L) 3 - 14 mmol/L Final     Glucose   Date Value Ref Range Status   04/04/2022 146 (H) 70 - 99 mg/dL Final   05/10/2021 72 70 - 99 mg/dL Final     Urea Nitrogen   Date Value Ref Range Status   04/04/2022 17 7 - 30 mg/dL Final   05/10/2021 17 7 - 30 mg/dL Final     Creatinine   Date Value Ref Range Status   04/04/2022 1.00 0.52 - 1.04 mg/dL Final   01/13/2022 0.680 mg/dL Final     GFR Estimate   Date Value Ref Range Status   04/04/2022 56 (L) >60 mL/min/1.73m2 Final     Comment:     Effective December 21, 2021 eGFRcr in adults is calculated using the 2021 CKD-EPI creatinine  equation which includes age and gender (Nayely hansen al., [place], DOI: 10.1056/IIJWzn3940855)   05/10/2021 83 >60 mL/min/[1.73_m2] Final     Comment:     Non  GFR Calc  Starting 12/18/2018, serum creatinine based estimated GFR (eGFR) will be   calculated using the Chronic Kidney Disease Epidemiology Collaboration   (CKD-EPI) equation.       Calcium   Date Value Ref Range Status   04/04/2022 8.9 8.5 - 10.1 mg/dL Final   05/10/2021 9.1 8.5 - 10.1 mg/dL Final         ASSESSMENT/PLAN:     (I27.20) Pulmonary hypertension (H)  (R60.0) Bilateral leg edema  (I48.19) Persistent atrial fibrillation (H)  (I50.32) Chronic diastolic heart failure (H)    Comment: routine 1 week follow up of recent actue on chronic CHF exacerbation symptms. Patient's weight had decreased with current low dose diuretic therapy. no chest pains. Leg lymphedema symptoms have stablized with the increased Torsemide medication.  Plan: continue patient's current torsemide (DEMADEX) 20 MG tablet medication 3xDay, potassium chloride ER (KLOR-CON M) 20 MEQ CR         Tablet        continue to follow up with CHF clinic at the [facility] Cardiology clinic in [place] going forward. continue low salt diet  continue Eliquis medication for atrial fibrillation  Continue outpatient home oxygen therapy until the patient's next upcoming in person Cardiology clinic appointment for reevaluation.     (E78.5) Hyperlipidemia LDL goal <100  Comment: stable.  Plan: continue current therapy      (C34.32) Malignant neoplasm of lower lobe of left lung (H)  (J84.9) ILD (interstitial lung disease) (H)  Comment: stable. Patient is followed by pulmonary medicine clinic  Plan: albuterol (PROAIR HFA/PROVENTIL HFA/VENTOLIN         HFA) 108 (90 Base) MCG/ACT inhaler, continue pulmonology clinic follow up going forward.         Follow Up Plan:     Patient is instructed to return to Internal Medicine clinic for follow-up visit in 1 week.      Phone call duration: 30 minutes    Carissa COLLINS  MD Manjeet  Internal Medicine  [facility]

## 2022-05-11 ENCOUNTER — MEDICAL CORRESPONDENCE (OUTPATIENT)
Dept: HEALTH INFORMATION MANAGEMENT | Facility: CLINIC | Age: 82
End: 2022-05-11
Payer: MEDICARE

## 2022-05-12 ENCOUNTER — MEDICAL CORRESPONDENCE (OUTPATIENT)
Dept: FAMILY MEDICINE | Facility: CLINIC | Age: 82
End: 2022-05-12

## 2022-05-12 ENCOUNTER — TELEPHONE (OUTPATIENT)
Dept: FAMILY MEDICINE | Facility: CLINIC | Age: 82
End: 2022-05-12
Payer: MEDICARE

## 2022-05-12 NOTE — TELEPHONE ENCOUNTER
Left message for returned phone call to discuss med admin and nursing service needs.   Wendi Espinosa RN on 5/23/2022 at 1:15 PM      ------------------------------------------------------  Homecare Orders Requested:     Dilan with [facility] called to request orders for the following Services:     for:assistance with medications and help finding private pay resources for  additional assistance in the home, including occasional meals.     [employer] requested orders.      Angie Masterson RN

## 2022-05-22 NOTE — PROGRESS NOTES
Date of Visit:  05/23/22      [facility]      Primary PH cardiologist: Dr. Marquez      HPI:  Ms. Jaqueline Canales is a pleasant 81 year old female with a past medical history significant for carotid artery stenosis s/p endarterectomy in 2009, reflux, breast cancer, lung cancer, Bell's palsy, iron deficiency anemia, chronic atrial fibrillation, and hyperlipidemia. She also has scleroderma and Raynaud's with associated pulmonary hypertension with mild to moderate TR, maintained on monotherapy with Revatio.      Jaqueline was hospitalized at [facility] in mid March after presenting with complaints of worsening peripheral edema and redness. She had been seeing her primary care provider, who been adjusting her torsemide.  While in the emergency department, she was noted to be hypoxic with ambulation and thus she was admitted for IV diuresis complicated by some transient hypotension. Echo during this admission showed EF was normal, RV was moderately dilated with mildly decreased function. She had 3-4+ TR as well as 2-3+ mitral regurgitation and moderate aortic stenosis. When compared to her prior study from 1 year ago, her aortic stenosis appears slightly worse. Her Toprol XL was held due to low BP, and then she had issues with rapid afib. Digoxin was then added, as was Eliquis for thromboembolic prevention. She was then discharged to a TCU.    Jaqueline returned to clinic in mid April and saw Trish Sutton PA-C. Prior to this, she was again noted to be hypoxemic at the TCU and CXR showed a LLL infiltrate so she was placed on abx. At the time of her visit she appeared to be improving from a pulmonary standpoint and oxygen requirements were coming down. However, she remained weak and was unable to stand on a scale for a weight. Her edema was felt to be under good control and torsemide was continued at 20mg BID unchanged.     Today, I am meeting Jaqueline in  "clinic for follow up. She tells me that she has now been back at home with her  the last few weeks. She thinks things are going ok, and says her breathing has been \"pretty good.\" She thinks her swelling is also under reasonable control, though her  says it might be a bit more than usual. She says she is able to ambulate around her home with a walker and doesn't usually have to stop and rest. No new CP, palpitations, dizziness, or presyncope. Unfortunately, she is a somewhat poor historian and does not know which medications she is taking. She tells me that someone comes to prepare her meds for her, but she is unsure whether someone comes in to help with other things. Notably, on arrival, her sats on room air were recorded at 80%, though she does also have Raynaud's. She tells me that currently she just uses her oxygen as needed, though does not check sats at home.     Labs were performed prior to our visit today and were reviewed: Na 136, K 3.4, BUN 20, SCR 0.57      CURRENT PULMONARY HYPERTENSION REGIMEN:    PAH Rx: Revatio 20mg TID    Diuretics: torsemide 20mg BID, spironolactone 25mg daily     Oxygen: 1.5LPM NC PRN     Anticoagulation: Eliquis  Indication: afib      ASSESSMENT/PLAN:      1. Pulmonary hypertension:   --Ms. Canales has PAH secondary to her CTD (group 1), and is maintained on monotherapy with Revatio. She also has Raynaud's. Echo from March during her hospital stay showed moderately dilated RV with mildly reduced RV function. Clinically, she is a somewhat difficult historian but relays that her breathing is stable.    --She has longstanding LE edema, which she relays is unchanged. Weight appears to be stable, though not routinely checking at home. As renal function is preserved, will continue torsemide 20mg BID and spironolactone as is [As below, pt no longer on spironolactone--ok to stay off.] In addition, she may benefit from lymphedema wraps. Will reach out to PCP to confirm she has " home health services in place.    --Today she was hypoxemic, though unclear if this is accurate given her Raynaud's. She is currently wearing 1.5LPM at home on a PRN basis. Again, will see if she has any home nursing services to help monitor this more closely.    2. Atrial fibrillation.   --HR appear to be rate controlled. During her last hospital stay she was changed from Toprpol XL to digoxin due to hypotension. However, I am unclear if she is taking this. [Confirmed post visit.]   --She remains on Eliquis for thromboembolic prevention. No reports of new bleeding.     3. Aortic stenosis.    --Per echo in March, there has been a slight increase in the severity of AS ( JACOB was 1.3, now 1.1cm2) though LV function has remained unchanged. Continue to follow. As above, repeat echo planned in July.     [Addendum: Post visit received message from home RN that there were multiple medication discrepancies from our list to what she is taking. Accurate med list per RN:]    Has Albuterol PRN   Apixiban (Eliquis) - BID   Digoxin 125mcg daily   Donepezil 5mg at bedtime   Folic acid 1mg daily   Gabapentin 100mg TID   Levothyroxine 75mcg every day   Omeprazole 20mg BID   Potassium 20mEq daily   Topical wart gel   Sildenafil 20mg - three times daily for pulmonary HTN   Simvastatin 40mg at bedtime   Torsemide 20mg BID        Follow up plan: Return in July to see Dr. Marquez with labs and echocardiogram.  We are happy to see the patient back sooner with any new concerns.       Orders this Visit:  Orders Placed This Encounter   Procedures     Basic metabolic panel     N terminal pro BNP outpatient     CBC with platelets     Orders Placed This Encounter   Medications     spironolactone (ALDACTONE) 25 MG tablet     Sig: Take 25 mg by mouth daily     metoprolol succinate ER (TOPROL XL) 25 MG 24 hr tablet     Sig: Take 25 mg by mouth daily     prednisoLONE acetate (PRED MILD) 0.12 % ophthalmic suspension     Si-2 drops 4 times daily      NIFEdipine ER (ADALAT CC) 60 MG 24 hr tablet     Sig: Take 60 mg by mouth daily     melatonin 5 MG CAPS     There are no discontinued medications.        CURRENT MEDICATIONS:  Current Outpatient Medications   Medication Sig Dispense Refill     acetaminophen (TYLENOL) 500 MG tablet Take 1,000 mg by mouth 2 times daily as needed for mild pain        albuterol (PROAIR HFA/PROVENTIL HFA/VENTOLIN HFA) 108 (90 Base) MCG/ACT inhaler USE 2 INHALATIONS ORALLY   INTO THE LUNGS EVERY 6  HOURS as needed 18 g 0     apixaban ANTICOAGULANT (ELIQUIS ANTICOAGULANT) 2.5 MG tablet Take 1 tablet (2.5 mg) by mouth 2 times daily (Patient taking differently: Take 5 mg by mouth 2 times daily) 60 tablet 0     bromfenac (PROLENSA) 0.07 % ophthalmic solution Place 1 drop into the right eye daily        folic acid (FOLVITE) 1 MG tablet TAKE 1 TABLET DAILY. 90 tablet 3     levothyroxine (SYNTHROID/LEVOTHROID) 75 MCG tablet TAKE 1 TABLET DAILY 90 tablet 2     melatonin 5 MG CAPS        Methotrexate Sodium (METHOTREXATE PO) Take 2.5 mg by mouth 10 tablets weekly - Wednesdays. Splits the dose as 5 tablets in AM & 5 tablets in PM       metoprolol succinate ER (TOPROL XL) 25 MG 24 hr tablet Take 25 mg by mouth daily       miconazole (MICATIN) 2 % external cream Apply topically 2 times daily       multivitamin (CENTRUM SILVER) tablet Take 1 tablet by mouth daily       NIFEdipine ER (ADALAT CC) 60 MG 24 hr tablet Take 60 mg by mouth daily       omeprazole (PRILOSEC) 20 MG DR capsule TAKE 1 CAPSULE TWICE DAILY,30-60 MINUTES PRIOR TO     MEALS 180 capsule 2     potassium chloride ER (KLOR-CON) 20 MEQ CR tablet Take 1 tablet (20 mEq) by mouth daily 30 tablet 0     prednisoLONE acetate (PRED MILD) 0.12 % ophthalmic suspension 1-2 drops 4 times daily       salicylic acid (COMPOUND W MAX STRENGTH) 17 % external gel Apply topically daily 7 g 0     sildenafil (REVATIO) 20 MG tablet TAKE 1 TABLET BY MOUTH THREE TIMES DAILY. GENERIC FOR REVATIO. CALL  [phone] TO REFILL 90 tablet 5     simvastatin (ZOCOR) 40 MG tablet Take 1 tablet (40 mg) by mouth At Bedtime 30 tablet 0     Skin Protectants, Misc. (EUCERIN) cream Apply topically 2 times daily Apply to areas dry skin topically two times a day for dry skin and Apply to areas of dry skin topically as needed for dry skin daily       spironolactone (ALDACTONE) 25 MG tablet Take 25 mg by mouth daily       torsemide (DEMADEX) 20 MG tablet Take 1 tablet (20 mg) by mouth 2 times daily 60 tablet 0     Vitamin D, Cholecalciferol, 10 MCG (400 UNIT) TABS Take 1,600 Units by mouth daily       ACE/ARB/ARNI NOT PRESCRIBED (INTENTIONAL) Please choose reason not prescribed from choices below. (Patient not taking: No sig reported)       BETA BLOCKER NOT PRESCRIBED (INTENTIONAL) Please choose reason not prescribed from choices below. (Patient not taking: No sig reported)       digoxin (LANOXIN) 125 MCG tablet Take 1 tablet (125 mcg) by mouth daily (Patient not taking: Reported on 5/23/2022) 30 tablet 0     donepezil (ARICEPT) 5 MG tablet Take 1 tablet (5 mg) by mouth At Bedtime (Patient not taking: Reported on 5/23/2022) 90 tablet 3     gabapentin (NEURONTIN) 100 MG capsule Take 1 capsule (100 mg) by mouth 3 times daily (Patient not taking: Reported on 5/23/2022) 90 capsule 11     hydroxychloroquine (PLAQUENIL) 200 MG tablet Take 200 mg by mouth daily (Patient not taking: Reported on 5/23/2022)         ALLERGIES     Allergies   Allergen Reactions     No Known Allergies        PAST MEDICAL HISTORY:  Past Medical History:   Diagnosis Date     Amaurosis fugax 5-11    Right     Carotid artery stenosis      Esophageal reflux 3/11/2004     HELICOBACTER PYLORI INFECTION 7/11/2006     hx of CA in situ RT breast-1990.mastectomy 4/17/2007     Hyperlipidemia LDL goal <70 6/20/2011     Lung cancer (H)     squamous cell lung ca left lower lobe     OBESITY NOS 3/11/2004     OSTEOPOROSIS NOS 3/11/2004     Other psoriasis 3/11/2004      "Pulmonary hypertension (H) 04/15/2019    Right heart catheterization demonstrated moderate pulmonary arterial hypertension with a mean PA pressure of 34 mmHg, no reversibility with inhaled nitric oxide, elevated right and left-sided filling pressures, low normal cardiac index of 2.1.  Seen by Dr. Shay Marquez.  Started on sildenafil (Revatio) 20 mg 3 times daily.     RA (rheumatoid arthritis) (H) 2/8/2010     Raynaud's syndrome 4/10/2006     Scleroderma (H)      Sleep apnea     CPAP         Review of Systems:  Cardiovascular: negative for chest pain, palpitations, orthopnea, pos chronic LE edema  Constitutional: negative for chills, sweats, fevers   Resp: Negative for dyspnea at rest, dyspnea on exertion, known chronic lung disease  HEENT: Negative for new visual changes, frequent headaches  Gastrointestinal: negative for abdominal pain, diarrhea, nausea, vomiting  Hematologic/lymphatic: pos for current systemic anticoagulation, neg hx of blood clots  Neurological: negative for focal weakness, LOC, seizures, syncope/presyncope       Physical Exam:  Vitals: /52   Pulse 63   Ht 1.575 m (5' 2\")   Wt 58.1 kg (128 lb)   SpO2 98%   BMI 23.41 kg/m     Wt Readings from Last 4 Encounters:   05/23/22 58.1 kg (128 lb)   04/18/22 60.9 kg (134 lb 3.2 oz)   04/14/22 59.2 kg (130 lb 9.6 oz)   04/12/22 58.4 kg (128 lb 12.8 oz)       GEN:  In general, this is an elderly  female in no acute distress on RA, in a wheelchair. Placed on oxygen during our exam. Accompanied by her  who uses a walker as well.   HEENT:  Pupils grossly equal, sclerae nonicteric.   NECK: Supple, trachea midline. No significant JVD while upright.   C/V:  Irregular rhythm, 3/6 ASHER heard at both R+LSB. No rub or gallop. No S3 or RV heave.   RESP: Respirations are unlabored. No use of accessory muscles. Clear to auscultation bilaterally without wheezing, rales, or rhonchi.  GI: Abdomen soft, nontender, nondistended.   EXTREM: 2+ " bilateral chronic LE edema. No cyanosis or clubbing.  NEURO: Alert and oriented, cooperative. Gait not formally assessed. No obvious focal deficits.   SKIN: Warm and dry.       Recent Lab Results:  LIVER ENZYME RESULTS:  Lab Results   Component Value Date    AST 25 03/23/2022    AST 49 01/13/2022    ALT 15 03/23/2022    ALT 31 01/13/2022       CBC RESULTS:  Lab Results   Component Value Date    WBC 9.1 04/04/2022    WBC 6.6 09/23/2021    RBC 3.45 (L) 04/04/2022    RBC 4.62 05/10/2021    HGB 11.3 (L) 04/04/2022    HGB 12.6 01/13/2022    HCT 35.7 04/04/2022    HCT 38.2 01/13/2022     (H) 04/04/2022     01/13/2022    MCH 32.8 04/04/2022    MCH 33.6 01/13/2022    MCHC 31.7 04/04/2022    MCHC 32.8 01/13/2022    RDW 16.4 (H) 04/04/2022    RDW 13.6 01/13/2022    PLT 70 (L) 04/04/2022     01/13/2022       BMP RESULTS:  Lab Results   Component Value Date     05/23/2022     05/10/2021    POTASSIUM 3.4 05/23/2022    POTASSIUM 4.2 05/10/2021    CHLORIDE 95 05/23/2022    CHLORIDE 95 05/10/2021    CO2 38 (H) 05/23/2022    CO2 39 (H) 05/10/2021    ANIONGAP 3 05/23/2022    ANIONGAP <1 (L) 05/10/2021    GLC 89 05/23/2022    GLC 72 05/10/2021    BUN 20 05/23/2022    BUN 17 05/10/2021    CR 0.57 05/23/2022    CR 0.680 01/13/2022    GFRESTIMATED 90 05/23/2022    GFRESTIMATED 83 05/10/2021    GFRESTBLACK >90 05/10/2021    TU 9.1 05/23/2022    TU 9.1 05/10/2021          Recent Labs   Lab Test 03/21/22  0647 03/18/22  1541 04/15/21  1304 03/22/21  1425 06/26/20  0935 12/17/19  1245 12/03/19  0624   NTBNPI 5,482* 5,818*  --   --   --   --  3,098*   NTBNP  --   --  3,524* 4,485* 3,045*   < >  --     < > = values in this interval not displayed.       Most recent testing:      Echocardiogram  3/19/22  Interpretation Summary     The rhythm was atrial fibrillation.  Left ventricular systolic function is normal.  The visual ejection fraction is 60-65%.  Mildly decreased right ventricular systolic function  The  right ventricle is moderately dilated.  The left ventricle is normal in size.  There is severe biatrial enlargement.  There is mod-severe to severe (3-4+) tricuspid regurgitation.  Right ventricular systolic pressure is elevated, consistent with moderate to  severe pulmonary hypertension.  There is moderate to mod-severe (2-3+) mitral regurgitation.ERO 0.26 cm2/ RV  38 ml)  Moderate valvular aortic stenosis JACOB 1.1cm2/MSG 20 mmHg/DI 0.34     As compared with the last sudy 3/22/2021 there has been a slight increase in  the severity of AS ( JACOB was 1.3, now 1.1cm2) and estimated PA systolcic  pressus ( was 48 mmHg + RAP, now 53 mmHg = RAP). There has been no significant  change in LV/RV systolic performance or chamber size.    [facility]  4/15/2019  Systemic blood pressure 140-150 throughout the case.    BASELINE:  1) Elevated right-sided filling pressures (mean RA 16 mmHg)  2) Moderate to severe pulmonary hypertension (mean PA 35 mmHg) secondary to intrinsic pulmonary vascular disease (PVRI 14 rod*m2) and elevated left-sided filling pressures (PCWP 20 mmHg).  3) O2 sats at baseline 93%  4) Mildly depressed CO/CI:  3.8/1.9    NO 80 PPM:  1) Pulmonary artery pressures did not change (mean PA 34 mmHg)  2) O2 sats improved to 100%  3) CO/CI improved 4.2/2.1 Right sided filling pressures are severely elevated.Left sided filling pressures are moderately elevated. Severely elevated pulmonary artery hypertension.Left ventricular filling pressures are moderately elevated .Normal cardiac output level    NYHA Functional Class:  3      A total of 30 minutes was spent today performing chart and history review, gathering HPI, physical exam, pre and post visit documentation, and care coordination.      Martha Gant PA-C  [facility]  Pager [phone]

## 2022-05-23 ENCOUNTER — OFFICE VISIT (OUTPATIENT)
Dept: CARDIOLOGY | Facility: CLINIC | Age: 82
End: 2022-05-23
Attending: PHYSICIAN ASSISTANT
Payer: MEDICARE

## 2022-05-23 ENCOUNTER — TELEPHONE (OUTPATIENT)
Dept: CARDIOLOGY | Facility: CLINIC | Age: 82
End: 2022-05-23

## 2022-05-23 ENCOUNTER — MEDICAL CORRESPONDENCE (OUTPATIENT)
Dept: FAMILY MEDICINE | Facility: CLINIC | Age: 82
End: 2022-05-23

## 2022-05-23 ENCOUNTER — PATIENT OUTREACH (OUTPATIENT)
Dept: CARE COORDINATION | Facility: CLINIC | Age: 82
End: 2022-05-23
Payer: MEDICARE

## 2022-05-23 ENCOUNTER — LAB (OUTPATIENT)
Dept: LAB | Facility: CLINIC | Age: 82
End: 2022-05-23
Payer: MEDICARE

## 2022-05-23 VITALS
DIASTOLIC BLOOD PRESSURE: 52 MMHG | HEIGHT: 62 IN | WEIGHT: 128 LBS | SYSTOLIC BLOOD PRESSURE: 112 MMHG | HEART RATE: 63 BPM | OXYGEN SATURATION: 98 % | BODY MASS INDEX: 23.55 KG/M2

## 2022-05-23 DIAGNOSIS — R06.02 SHORTNESS OF BREATH: ICD-10-CM

## 2022-05-23 DIAGNOSIS — R06.09 OTHER FORM OF DYSPNEA: ICD-10-CM

## 2022-05-23 DIAGNOSIS — I27.20 PULMONARY HYPERTENSION (H): ICD-10-CM

## 2022-05-23 LAB
ANION GAP SERPL CALCULATED.3IONS-SCNC: 3 MMOL/L (ref 3–14)
BUN SERPL-MCNC: 20 MG/DL (ref 7–30)
CALCIUM SERPL-MCNC: 9.1 MG/DL (ref 8.5–10.1)
CHLORIDE BLD-SCNC: 95 MMOL/L (ref 94–109)
CO2 SERPL-SCNC: 38 MMOL/L (ref 20–32)
CREAT SERPL-MCNC: 0.57 MG/DL (ref 0.52–1.04)
GFR SERPL CREATININE-BSD FRML MDRD: 90 ML/MIN/1.73M2
GLUCOSE BLD-MCNC: 89 MG/DL (ref 70–99)
POTASSIUM BLD-SCNC: 3.4 MMOL/L (ref 3.4–5.3)
SODIUM SERPL-SCNC: 136 MMOL/L (ref 133–144)

## 2022-05-23 PROCEDURE — 36415 COLL VENOUS BLD VENIPUNCTURE: CPT | Performed by: PHYSICIAN ASSISTANT

## 2022-05-23 PROCEDURE — 99214 OFFICE O/P EST MOD 30 MIN: CPT | Performed by: PHYSICIAN ASSISTANT

## 2022-05-23 PROCEDURE — 80048 BASIC METABOLIC PNL TOTAL CA: CPT | Performed by: PHYSICIAN ASSISTANT

## 2022-05-23 RX ORDER — SPIRONOLACTONE 25 MG/1
25 TABLET ORAL DAILY
COMMUNITY
End: 2022-05-25 | Stop reason: ALTCHOICE

## 2022-05-23 RX ORDER — METOPROLOL SUCCINATE 25 MG/1
25 TABLET, EXTENDED RELEASE ORAL DAILY
COMMUNITY
End: 2022-05-25 | Stop reason: ALTCHOICE

## 2022-05-23 NOTE — TELEPHONE ENCOUNTER
Spoke with homecare  [facility]. Updated MAR via fax. Requested VO for home therapy for lymphedema wrapping. Orders faxed to [facility].     Wendi Espinosa RN on 5/23/2022 at 2:52 PM

## 2022-05-23 NOTE — PATIENT INSTRUCTIONS
Thank you for visiting the Pulmonary Hypertension Clinic today.      Today we discussed:   No medication changes today, but we will work on confirming that our list is accurate.  I will reach out to Dr. Burroughs to see if we can help get someone in your home to wrap your legs.    We will reschedule you to see Dr. Champion in July with an echo and labs, here at [facility].       Additional Instructions:    1. Continue staying active and eat a heart healthy, low sodium diet.     2. Please keep current list of medications with you at all times.     3. Remember to weigh yourself daily after voiding and write it down on a log. If you have gained/lost 2 pounds overnight or 5 pounds in a week contact us for medication adjustments or further instructions.    4. Please call us immediately if you have syncope (fainting or passing out), chest pain, worsening edema (swelling or weight gain), or general worsening in how you are feeling.       -----------------------------------------------------------------------------------------------------------------    If you have questions or concerns please contact us at:    Wendi Espinosa RN, BSN   [facility] (Schedule,Prior Auth)  Nurse Coordinator      Clinic   Pulmonary Hypertension    Pulmonary Hypertension  [facility]              [facility]  (P)[phone]     (P) [phone]         (F)[phone]                ** Please note that you will NOT receive a reminder call regarding your scheduled testing, reminder calls are for provider appointments only.  If you are scheduled for testing within the [email or web address] system you may receive a call regarding pre-registration for billing purposes only.**     --------------------------------------------------------------------------------------------------------------    Interested in joining a support group?    Pulmonary Hypertension  Association  [email or web address].phassociation.org/  **Look at the Events Tab** They even have Support Groups that you can call into    [facility] Support Group  Second Saturday of the Month from 1-3 PM   Location: [address] [place], [place] [address] [address] (Currently Virtual)  Leader: Eleni Almodovar   Phone: [phone]   Email: [email or web address]

## 2022-05-23 NOTE — LETTER
5/23/2022    Carissa Burroughs MD  [address] [facility] [address]  [facility] [address]    RE: Jaqueline Canales       Dear Colleague,     I had the pleasure of seeing Jaqueline Canales in the [facility].      Date of Visit:  05/23/22      [facility]      Primary PH cardiologist: Dr. Marquez      HPI:  Ms. Jaqueline Canales is a pleasant 81 year old female with a past medical history significant for carotid artery stenosis s/p endarterectomy in 2009, reflux, breast cancer, lung cancer, Bell's palsy, iron deficiency anemia, chronic atrial fibrillation, and hyperlipidemia. She also has scleroderma and Raynaud's with associated pulmonary hypertension with mild to moderate TR, maintained on monotherapy with Revatio.      Jaqueline was hospitalized at [facility] in mid March after presenting with complaints of worsening peripheral edema and redness. She had been seeing her primary care provider, who been adjusting her torsemide.  While in the emergency department, she was noted to be hypoxic with ambulation and thus she was admitted for IV diuresis complicated by some transient hypotension. Echo during this admission showed EF was normal, RV was moderately dilated with mildly decreased function. She had 3-4+ TR as well as 2-3+ mitral regurgitation and moderate aortic stenosis. When compared to her prior study from 1 year ago, her aortic stenosis appears slightly worse. Her Toprol XL was held due to low BP, and then she had issues with rapid afib. Digoxin was then added, as was Eliquis for thromboembolic prevention. She was then discharged to a TCU.    Jaqueline returned to clinic in mid April and saw Trish Sutton PA-C. Prior to this, she was again noted to be hypoxemic at the TCU and CXR showed a LLL infiltrate so she was placed on abx. At the time of her visit she appeared to be improving from a pulmonary standpoint and oxygen requirements were coming down.  "However, she remained weak and was unable to stand on a scale for a weight. Her edema was felt to be under good control and torsemide was continued at 20mg BID unchanged.     Today, I am meeting Jaqueline in clinic for follow up. She tells me that she has now been back at home with her  the last few weeks. She thinks things are going ok, and says her breathing has been \"pretty good.\" She thinks her swelling is also under reasonable control, though her  says it might be a bit more than usual. She says she is able to ambulate around her home with a walker and doesn't usually have to stop and rest. No new CP, palpitations, dizziness, or presyncope. Unfortunately, she is a somewhat poor historian and does not know which medications she is taking. She tells me that someone comes to prepare her meds for her, but she is unsure whether someone comes in to help with other things. Notably, on arrival, her sats on room air were recorded at 80%, though she does also have Raynaud's. She tells me that currently she just uses her oxygen as needed, though does not check sats at home.     Labs were performed prior to our visit today and were reviewed: Na 136, K 3.4, BUN 20, SCR 0.57      CURRENT PULMONARY HYPERTENSION REGIMEN:    PAH Rx: Revatio 20mg TID    Diuretics: torsemide 20mg BID, spironolactone 25mg daily     Oxygen: 1.5LPM NC PRN     Anticoagulation: Eliquis  Indication: afib      ASSESSMENT/PLAN:      1. Pulmonary hypertension:   --Ms. Canales has PAH secondary to her CTD (group 1), and is maintained on monotherapy with Revatio. She also has Raynaud's. Echo from March during her hospital stay showed moderately dilated RV with mildly reduced RV function. Clinically, she is a somewhat difficult historian but relays that her breathing is stable.    --She has longstanding LE edema, which she relays is unchanged. Weight appears to be stable, though not routinely checking at home. As renal function is preserved, will " continue torsemide 20mg BID and spironolactone as is. In addition, she may benefit from lymphedema wraps. Will reach out to PCP to confirm she has home health services in place.    --Today she was hypoxemic, though unclear if this is accurate given her Raynaud's. She is currently wearing 1.5LPM at home on a PRN basis. Again, will see if she has any home nursing services to help monitor this more closely.    2. Atrial fibrillation.   --HR appear to be rate controlled. During her last hospital stay she was changed from Toprpol XL to digoxin due to hypotension. However, I am unclear if she is taking this. Will try to confirm med list with home health.   --She remains on Eliquis for thromboembolic prevention. No reports of new bleeding.     3. Aortic stenosis.    --Per echo in March, there has been a slight increase in the severity of AS ( JACOB was 1.3, now 1.1cm2) though LV function has remained unchanged. Continue to follow. As above, repeat echo planned in July.       Follow up plan: Return in July to see Dr. Marquez with labs and echocardiogram.  We are happy to see the patient back sooner with any new concerns.       Orders this Visit:  Orders Placed This Encounter   Procedures     Basic metabolic panel     N terminal pro BNP outpatient     CBC with platelets     Orders Placed This Encounter   Medications     spironolactone (ALDACTONE) 25 MG tablet     Sig: Take 25 mg by mouth daily     metoprolol succinate ER (TOPROL XL) 25 MG 24 hr tablet     Sig: Take 25 mg by mouth daily     prednisoLONE acetate (PRED MILD) 0.12 % ophthalmic suspension     Si-2 drops 4 times daily     NIFEdipine ER (ADALAT CC) 60 MG 24 hr tablet     Sig: Take 60 mg by mouth daily     melatonin 5 MG CAPS     There are no discontinued medications.        CURRENT MEDICATIONS:  Current Outpatient Medications   Medication Sig Dispense Refill     acetaminophen (TYLENOL) 500 MG tablet Take 1,000 mg by mouth 2 times daily as needed for mild pain         albuterol (PROAIR HFA/PROVENTIL HFA/VENTOLIN HFA) 108 (90 Base) MCG/ACT inhaler USE 2 INHALATIONS ORALLY   INTO THE LUNGS EVERY 6  HOURS as needed 18 g 0     apixaban ANTICOAGULANT (ELIQUIS ANTICOAGULANT) 2.5 MG tablet Take 1 tablet (2.5 mg) by mouth 2 times daily (Patient taking differently: Take 5 mg by mouth 2 times daily) 60 tablet 0     bromfenac (PROLENSA) 0.07 % ophthalmic solution Place 1 drop into the right eye daily        folic acid (FOLVITE) 1 MG tablet TAKE 1 TABLET DAILY. 90 tablet 3     levothyroxine (SYNTHROID/LEVOTHROID) 75 MCG tablet TAKE 1 TABLET DAILY 90 tablet 2     melatonin 5 MG CAPS        Methotrexate Sodium (METHOTREXATE PO) Take 2.5 mg by mouth 10 tablets weekly - Wednesdays. Splits the dose as 5 tablets in AM & 5 tablets in PM       metoprolol succinate ER (TOPROL XL) 25 MG 24 hr tablet Take 25 mg by mouth daily       miconazole (MICATIN) 2 % external cream Apply topically 2 times daily       multivitamin (CENTRUM SILVER) tablet Take 1 tablet by mouth daily       NIFEdipine ER (ADALAT CC) 60 MG 24 hr tablet Take 60 mg by mouth daily       omeprazole (PRILOSEC) 20 MG DR capsule TAKE 1 CAPSULE TWICE DAILY,30-60 MINUTES PRIOR TO     MEALS 180 capsule 2     potassium chloride ER (KLOR-CON) 20 MEQ CR tablet Take 1 tablet (20 mEq) by mouth daily 30 tablet 0     prednisoLONE acetate (PRED MILD) 0.12 % ophthalmic suspension 1-2 drops 4 times daily       salicylic acid (COMPOUND W MAX STRENGTH) 17 % external gel Apply topically daily 7 g 0     sildenafil (REVATIO) 20 MG tablet TAKE 1 TABLET BY MOUTH THREE TIMES DAILY. GENERIC FOR REVATIO. CALL [phone] TO REFILL 90 tablet 5     simvastatin (ZOCOR) 40 MG tablet Take 1 tablet (40 mg) by mouth At Bedtime 30 tablet 0     Skin Protectants, Misc. (EUCERIN) cream Apply topically 2 times daily Apply to areas dry skin topically two times a day for dry skin and Apply to areas of dry skin topically as needed for dry skin daily       spironolactone  (ALDACTONE) 25 MG tablet Take 25 mg by mouth daily       torsemide (DEMADEX) 20 MG tablet Take 1 tablet (20 mg) by mouth 2 times daily 60 tablet 0     Vitamin D, Cholecalciferol, 10 MCG (400 UNIT) TABS Take 1,600 Units by mouth daily       ACE/ARB/ARNI NOT PRESCRIBED (INTENTIONAL) Please choose reason not prescribed from choices below. (Patient not taking: No sig reported)       BETA BLOCKER NOT PRESCRIBED (INTENTIONAL) Please choose reason not prescribed from choices below. (Patient not taking: No sig reported)       digoxin (LANOXIN) 125 MCG tablet Take 1 tablet (125 mcg) by mouth daily (Patient not taking: Reported on 5/23/2022) 30 tablet 0     donepezil (ARICEPT) 5 MG tablet Take 1 tablet (5 mg) by mouth At Bedtime (Patient not taking: Reported on 5/23/2022) 90 tablet 3     gabapentin (NEURONTIN) 100 MG capsule Take 1 capsule (100 mg) by mouth 3 times daily (Patient not taking: Reported on 5/23/2022) 90 capsule 11     hydroxychloroquine (PLAQUENIL) 200 MG tablet Take 200 mg by mouth daily (Patient not taking: Reported on 5/23/2022)         ALLERGIES     Allergies   Allergen Reactions     No Known Allergies        PAST MEDICAL HISTORY:  Past Medical History:   Diagnosis Date     Amaurosis fugax 5-11    Right     Carotid artery stenosis      Esophageal reflux 3/11/2004     HELICOBACTER PYLORI INFECTION 7/11/2006     hx of CA in situ RT breast-1990.mastectomy 4/17/2007     Hyperlipidemia LDL goal <70 6/20/2011     Lung cancer (H)     squamous cell lung ca left lower lobe     OBESITY NOS 3/11/2004     OSTEOPOROSIS NOS 3/11/2004     Other psoriasis 3/11/2004     Pulmonary hypertension (H) 04/15/2019    Right heart catheterization demonstrated moderate pulmonary arterial hypertension with a mean PA pressure of 34 mmHg, no reversibility with inhaled nitric oxide, elevated right and left-sided filling pressures, low normal cardiac index of 2.1.  Seen by Dr. Shay Marquez.  Started on sildenafil (Revatio) 20 mg 3 times  "daily.     RA (rheumatoid arthritis) (H) 2/8/2010     Raynaud's syndrome 4/10/2006     Scleroderma (H)      Sleep apnea     CPAP         Review of Systems:  Cardiovascular: negative for chest pain, palpitations, orthopnea, pos chronic LE edema  Constitutional: negative for chills, sweats, fevers   Resp: Negative for dyspnea at rest, dyspnea on exertion, known chronic lung disease  HEENT: Negative for new visual changes, frequent headaches  Gastrointestinal: negative for abdominal pain, diarrhea, nausea, vomiting  Hematologic/lymphatic: pos for current systemic anticoagulation, neg hx of blood clots  Neurological: negative for focal weakness, LOC, seizures, syncope/presyncope       Physical Exam:  Vitals: /52   Pulse 63   Ht 1.575 m (5' 2\")   Wt 58.1 kg (128 lb)   SpO2 98%   BMI 23.41 kg/m     Wt Readings from Last 4 Encounters:   05/23/22 58.1 kg (128 lb)   04/18/22 60.9 kg (134 lb 3.2 oz)   04/14/22 59.2 kg (130 lb 9.6 oz)   04/12/22 58.4 kg (128 lb 12.8 oz)       GEN:  In general, this is an elderly  female in no acute distress on RA, in a wheelchair. Placed on oxygen during our exam. Accompanied by her  who uses a walker as well.   HEENT:  Pupils grossly equal, sclerae nonicteric.   NECK: Supple, trachea midline. No significant JVD while upright.   C/V:  Irregular rhythm, 3/6 ASHER heard at both R+LSB. No rub or gallop. No S3 or RV heave.   RESP: Respirations are unlabored. No use of accessory muscles. Clear to auscultation bilaterally without wheezing, rales, or rhonchi.  GI: Abdomen soft, nontender, nondistended.   EXTREM: 2+ bilateral chronic LE edema. No cyanosis or clubbing.  NEURO: Alert and oriented, cooperative. Gait not formally assessed. No obvious focal deficits.   SKIN: Warm and dry.       Recent Lab Results:  LIVER ENZYME RESULTS:  Lab Results   Component Value Date    AST 25 03/23/2022    AST 49 01/13/2022    ALT 15 03/23/2022    ALT 31 01/13/2022       CBC RESULTS:  Lab " Results   Component Value Date    WBC 9.1 04/04/2022    WBC 6.6 09/23/2021    RBC 3.45 (L) 04/04/2022    RBC 4.62 05/10/2021    HGB 11.3 (L) 04/04/2022    HGB 12.6 01/13/2022    HCT 35.7 04/04/2022    HCT 38.2 01/13/2022     (H) 04/04/2022     01/13/2022    MCH 32.8 04/04/2022    MCH 33.6 01/13/2022    MCHC 31.7 04/04/2022    MCHC 32.8 01/13/2022    RDW 16.4 (H) 04/04/2022    RDW 13.6 01/13/2022    PLT 70 (L) 04/04/2022     01/13/2022       BMP RESULTS:  Lab Results   Component Value Date     05/23/2022     05/10/2021    POTASSIUM 3.4 05/23/2022    POTASSIUM 4.2 05/10/2021    CHLORIDE 95 05/23/2022    CHLORIDE 95 05/10/2021    CO2 38 (H) 05/23/2022    CO2 39 (H) 05/10/2021    ANIONGAP 3 05/23/2022    ANIONGAP <1 (L) 05/10/2021    GLC 89 05/23/2022    GLC 72 05/10/2021    BUN 20 05/23/2022    BUN 17 05/10/2021    CR 0.57 05/23/2022    CR 0.680 01/13/2022    GFRESTIMATED 90 05/23/2022    GFRESTIMATED 83 05/10/2021    GFRESTBLACK >90 05/10/2021    TU 9.1 05/23/2022    TU 9.1 05/10/2021          Recent Labs   Lab Test 03/21/22  0647 03/18/22  1541 04/15/21  1304 03/22/21  1425 06/26/20  0935 12/17/19  1245 12/03/19  0624   NTBNPI 5,482* 5,818*  --   --   --   --  3,098*   NTBNP  --   --  3,524* 4,485* 3,045*   < >  --     < > = values in this interval not displayed.       Most recent testing:      Echocardiogram  3/19/22  Interpretation Summary     The rhythm was atrial fibrillation.  Left ventricular systolic function is normal.  The visual ejection fraction is 60-65%.  Mildly decreased right ventricular systolic function  The right ventricle is moderately dilated.  The left ventricle is normal in size.  There is severe biatrial enlargement.  There is mod-severe to severe (3-4+) tricuspid regurgitation.  Right ventricular systolic pressure is elevated, consistent with moderate to  severe pulmonary hypertension.  There is moderate to mod-severe (2-3+) mitral regurgitation.ERO 0.26 cm2/  RV  38 ml)  Moderate valvular aortic stenosis JACOB 1.1cm2/MSG 20 mmHg/DI 0.34     As compared with the last sudy 3/22/2021 there has been a slight increase in  the severity of AS ( JACOB was 1.3, now 1.1cm2) and estimated PA systolcic  pressus ( was 48 mmHg + RAP, now 53 mmHg = RAP). There has been no significant  change in LV/RV systolic performance or chamber size.    RHC  4/15/2019  Systemic blood pressure 140-150 throughout the case.    BASELINE:  1) Elevated right-sided filling pressures (mean RA 16 mmHg)  2) Moderate to severe pulmonary hypertension (mean PA 35 mmHg) secondary to intrinsic pulmonary vascular disease (PVRI 14 rod*m2) and elevated left-sided filling pressures (PCWP 20 mmHg).  3) O2 sats at baseline 93%  4) Mildly depressed CO/CI:  3.8/1.9    NO 80 PPM:  1) Pulmonary artery pressures did not change (mean PA 34 mmHg)  2) O2 sats improved to 100%  3) CO/CI improved 4.2/2.1 Right sided filling pressures are severely elevated.Left sided filling pressures are moderately elevated. Severely elevated pulmonary artery hypertension.Left ventricular filling pressures are moderately elevated .Normal cardiac output level    NYHA Functional Class:  3      A total of 30 minutes was spent today performing chart and history review, gathering HPI, physical exam, pre and post visit documentation, and care coordination.      Martha Gant PA-C  [facility]  Pager [phone]    Thank you for allowing me to participate in the care of your patient.      Sincerely,     JIMMY Castorena     [facility]    cc:   Trish Sutton PA-C  [address]  [address]  [place] [address]

## 2022-05-23 NOTE — PROGRESS NOTES
S-(situation): TCU Discharge Progression    B-(background): Patient was discharged on 4/23/22 from [facility] on [facility] to home.    A-(assessment): Patient declined primary care-care coordination services.  Patient had follow-up with PCP as directed.  No new network navigation needs identified.    R-(recommendations/plan): RN Clinical Product Navigator will perform no further monitoring/outreaches at this time.    Melissa Behl BSN, RN, PHN, [facility]  RN Clinical Product Navigator  [phone]

## 2022-05-24 ENCOUNTER — CARE COORDINATION (OUTPATIENT)
Dept: CARDIOLOGY | Facility: CLINIC | Age: 82
End: 2022-05-24
Payer: MEDICARE

## 2022-05-24 DIAGNOSIS — K22.2 ESOPHAGEAL STRICTURE: ICD-10-CM

## 2022-05-24 NOTE — TELEPHONE ENCOUNTER
Routing refill request to provider for review/approval because:  Osteoporosis dx.   Soco Cheung, RN

## 2022-05-24 NOTE — PROGRESS NOTES
5/24/2022 Call from Dilan from [facility] [phone] with discrepancy to mediation list they received after visit. May leave a message on Dilan phone with instructions. Medication list they received is below as reviewed in detail with Dilan.    Per Dilan, home care does medications for the patient and from the list they were sent three drugs patient is currently NOT taking include  1. Metoprolol - Dilan reports was stopped at [facility] - patient has supply if Martha wants to restart  2. Nifedipine -  Dilan reports was stopped at [facility] - patient has supply if Martha wants to restart  3. Spironolactone -  Dilan reports was never started and patient does not have supply -  if Martha wants to start they will need a prescription sent to pharmacy    Messaged to Martha Gant PA-C to review and advise.   Messaged to Martha's [place] nurse team.  Chhaya Martin RN 05/24/22 11:30 AM

## 2022-05-25 DIAGNOSIS — I48.19 PERSISTENT ATRIAL FIBRILLATION (H): ICD-10-CM

## 2022-05-25 NOTE — PROGRESS NOTES
Updated MAR per last TCU discharge. Medications Metoprolol, Nifedipine, spironolactone discontinue okay per Martha Gant. Mar congruent with current medication list.     Wendi Espinosa RN on 5/25/2022 at 10:35 AM

## 2022-05-25 NOTE — PROGRESS NOTES
Called homecare nurse back to okay discontinue of medications: metoprolol, nifedipine, spronolactone per Martha Espionsa RN on 5/25/2022 at 10:40 AM

## 2022-05-26 ENCOUNTER — MEDICAL CORRESPONDENCE (OUTPATIENT)
Dept: FAMILY MEDICINE | Facility: CLINIC | Age: 82
End: 2022-05-26

## 2022-05-31 ENCOUNTER — MEDICAL CORRESPONDENCE (OUTPATIENT)
Dept: FAMILY MEDICINE | Facility: CLINIC | Age: 82
End: 2022-05-31

## 2022-05-31 DIAGNOSIS — I48.19 PERSISTENT ATRIAL FIBRILLATION (H): ICD-10-CM

## 2022-05-31 DIAGNOSIS — J84.9 ILD (INTERSTITIAL LUNG DISEASE) (H): ICD-10-CM

## 2022-05-31 NOTE — TELEPHONE ENCOUNTER
"Homecare RN requesting medication refills on behalf of patient.     Digoxin discontinued 5/23/22 with notes \"Patient not taking\". Home care RN reports patient did not stop taking medication and is currently take Digoxin daily.    Patient accidentally threw away Albuterol inhaler.       Routing refill request to provider for review/approval because:  Drug not active on patient's medication list          "

## 2022-06-01 RX ORDER — ALBUTEROL SULFATE 90 UG/1
AEROSOL, METERED RESPIRATORY (INHALATION)
Qty: 18 G | Refills: 0 | Status: SHIPPED | OUTPATIENT
Start: 2022-06-01

## 2022-06-01 RX ORDER — DIGOXIN 125 MCG
125 TABLET ORAL DAILY
Qty: 30 TABLET | Refills: 0 | Status: SHIPPED | OUTPATIENT
Start: 2022-06-01 | End: 2022-06-30

## 2022-06-02 ENCOUNTER — TELEPHONE (OUTPATIENT)
Dept: FAMILY MEDICINE | Facility: CLINIC | Age: 82
End: 2022-06-02
Payer: MEDICARE

## 2022-06-02 ENCOUNTER — TELEPHONE (OUTPATIENT)
Dept: CARDIOLOGY | Facility: CLINIC | Age: 82
End: 2022-06-02
Payer: MEDICARE

## 2022-06-02 DIAGNOSIS — M79.89 LEG SWELLING: Primary | ICD-10-CM

## 2022-06-02 NOTE — TELEPHONE ENCOUNTER
Called and spoke with Anushka LAMB with [facility] care. Reported plan for lymphedema wrapping with patient is planned for this patient per request of cardiology.     Wendi Espinosa RN on 6/2/2022 at 12:03 PM

## 2022-06-02 NOTE — TELEPHONE ENCOUNTER
The Home Care/Assisted Living/Nursing Facility is calling regarding an established patient.  Has the patient seen Home Care in the past or is currently residing in Assisted Living or Nursing Facility? Yes.     Anushka calling from [facility] requesting the following orders that are within the Home Care, Assisted Living or Nursing Home Eval and Treatment standing order and can be signed as standing order signature required by RN.    Preferred Call Back Number: [phone], confidential     PT/OT/Speech Therapy   OT 2W2W, verbal approval given.     Any additional Orders:  Are there any orders requested, not stated above, that are outside of the standing order and must be routed to a licensed practitioner for approval?    No    Writer has verified [employer] will send fax to have orders signed.    Anisha AVALOS RN  [facility]

## 2022-06-03 ENCOUNTER — MEDICAL CORRESPONDENCE (OUTPATIENT)
Dept: FAMILY MEDICINE | Facility: CLINIC | Age: 82
End: 2022-06-03

## 2022-06-08 ENCOUNTER — TELEPHONE (OUTPATIENT)
Dept: FAMILY MEDICINE | Facility: CLINIC | Age: 82
End: 2022-06-08
Payer: MEDICARE

## 2022-06-08 NOTE — TELEPHONE ENCOUNTER
Guadalupe from [facility] pharmacy called pt to find out if she needed a refill on sildenafil (REVATIO) 20 MG tablet rx. Pt said she didn't know and asked Guadalupe to call her PCP.     Per chart review, pt has HC services. Triage called [facility] care intake and left message for RN Steve.  Asked that RN call [facility] if pt needs sildenafil (REVATIO) 20 MG tablet refills at [phone].

## 2022-06-13 ENCOUNTER — MEDICAL CORRESPONDENCE (OUTPATIENT)
Dept: HEALTH INFORMATION MANAGEMENT | Facility: CLINIC | Age: 82
End: 2022-06-13

## 2022-06-20 ENCOUNTER — TELEPHONE (OUTPATIENT)
Dept: FAMILY MEDICINE | Facility: CLINIC | Age: 82
End: 2022-06-20
Payer: MEDICARE

## 2022-06-20 NOTE — TELEPHONE ENCOUNTER
Carmen from [facility] called requesting approval of continued PT orders - 1 x a wk for 3 wks and then once a wk for 6 weeks.     Verbal approval given.    FYI-    PT has been unable to check pulse oximeter in her finger. Pt continues to use Oxygen at 1.5 liters. Pt does not report SOB. PT will need a call back if any other recommendations on how to get oxygen level.     Triage advised she try earlobe. Pt verbalized agreement with plan.

## 2022-06-21 ENCOUNTER — TELEPHONE (OUTPATIENT)
Dept: FAMILY MEDICINE | Facility: CLINIC | Age: 82
End: 2022-06-21
Payer: MEDICARE

## 2022-06-21 ENCOUNTER — TELEPHONE (OUTPATIENT)
Dept: FAMILY MEDICINE | Facility: CLINIC | Age: 82
End: 2022-06-21

## 2022-06-21 NOTE — TELEPHONE ENCOUNTER
Patient returning call regarding triage for weight loss.  Weight today was 119 lb.  Patient is using scale at her home and unsure if it is accurate.  States she is not eating well but trying to change those eating habits.  Patient drinks 1 ensure drink a day.  No medication changes that she is aware of but also states 'a nurse comes and does my medication.'      Marion Jensen RN

## 2022-06-21 NOTE — TELEPHONE ENCOUNTER
The Home Care/Assisted Living/Nursing Facility is calling regarding an established patient.  Has the patient seen Home Care in the past or is currently residing in Assisted Living or Nursing Facility? Yes.     Dilan calling from [facility] requesting the following orders that are within the Home Care, Assisted Living or Nursing Home Eval and Treatment standing order and can be signed as standing order signature required by RN.    Preferred Call Back Number: [phone]    Home Care Visits Continuation     1 time per week for 4 weeks  Every other week for 4 weeks  3 prn visits     Any additional Orders:  Are there any orders requested, not stated above, that are outside of the standing order and must be routed to a licensed practitioner for approval?    No    Writer has verified [employer] will send fax to have orders signed.    Dilan from [facility] also reporting significant weight loss stating that patient's weight is now 119.3. Dilan is not present with patient to triage patient.     See triage encounter on 6/21/22    Rickey De La Torre RN  [facility]

## 2022-06-21 NOTE — TELEPHONE ENCOUNTER
Writer called patient to attempt to triage symptoms of significant weight loss reported on 6/21/22 by [facility].    Patient Contact    Attempt # 1    On call back - triage weight loss     Left voicemail to call triage back    Rickey De La Torre RN  [facility]

## 2022-06-22 NOTE — TELEPHONE ENCOUNTER
Noted. Verbal approval for orders given during PT call. She was also advised to try getting oxygen level from earlobe. This encounter will be closed.

## 2022-06-24 ENCOUNTER — VIRTUAL VISIT (OUTPATIENT)
Dept: FAMILY MEDICINE | Facility: CLINIC | Age: 82
End: 2022-06-24
Payer: MEDICARE

## 2022-06-24 DIAGNOSIS — E87.6 HYPOKALEMIA: ICD-10-CM

## 2022-06-24 DIAGNOSIS — R60.0 BILATERAL LEG EDEMA: ICD-10-CM

## 2022-06-24 DIAGNOSIS — Z76.0 ENCOUNTER FOR MEDICATION REFILL: Primary | ICD-10-CM

## 2022-06-24 DIAGNOSIS — E78.5 HYPERLIPIDEMIA LDL GOAL <100: ICD-10-CM

## 2022-06-24 DIAGNOSIS — E78.2 MIXED HYPERLIPIDEMIA: ICD-10-CM

## 2022-06-24 DIAGNOSIS — R41.3 MEMORY LOSS: ICD-10-CM

## 2022-06-24 PROCEDURE — 99214 OFFICE O/P EST MOD 30 MIN: CPT | Mod: 95 | Performed by: INTERNAL MEDICINE

## 2022-06-24 RX ORDER — SIMVASTATIN 40 MG
40 TABLET ORAL AT BEDTIME
Qty: 90 TABLET | Refills: 0 | Status: SHIPPED | OUTPATIENT
Start: 2022-06-24

## 2022-06-24 RX ORDER — POTASSIUM CHLORIDE 1500 MG/1
20 TABLET, EXTENDED RELEASE ORAL DAILY
Qty: 90 TABLET | Refills: 0 | Status: SHIPPED | OUTPATIENT
Start: 2022-06-24 | End: 2023-02-07

## 2022-06-24 RX ORDER — TORSEMIDE 20 MG/1
20 TABLET ORAL 2 TIMES DAILY
Qty: 60 TABLET | Refills: 3 | Status: SHIPPED | OUTPATIENT
Start: 2022-06-24 | End: 2022-10-20

## 2022-06-24 RX ORDER — DONEPEZIL HYDROCHLORIDE 5 MG/1
5 TABLET, FILM COATED ORAL AT BEDTIME
Qty: 90 TABLET | Refills: 1 | Status: SHIPPED | OUTPATIENT
Start: 2022-06-24 | End: 2022-07-15

## 2022-06-24 RX ORDER — FOLIC ACID 1 MG/1
1000 TABLET ORAL DAILY
Qty: 90 TABLET | Refills: 3 | Status: SHIPPED | OUTPATIENT
Start: 2022-06-24 | End: 2022-09-19

## 2022-06-24 NOTE — PROGRESS NOTES
Jaqueline is a 81 year old who is being evaluated via a billable telephone visit.      What phone number would you like to be contacted at? [phone]  How would you like to obtain your AVS? Buck Parsons is a 81 year old who presents for the following health issues    HPI     Routine follow up on multiple concerns including memory loss, diastolic CHF, hyperlipidemia, pulmonary hypertension   Medication refills    HPI:   Patient Jaqueline Canales is a very pleasant 81 year old female with history of CHF, pulmonary hypertension today for routine telephone visit for routine follow up of chronic memory loss, CHF symptms. Patient's weight has been decreasing again with diuretic therapy. No chest pains at this time. Leg lymphedema symptoms have improved from prior baseline. Patient is scheduled for follow up with CHF clinic at the [facility] clinic in [place] going forward. No fever or chills.      Current Medications:     Current Outpatient Medications   Medication Sig Dispense Refill     ACE/ARB/ARNI NOT PRESCRIBED (INTENTIONAL) Please choose reason not prescribed from choices below.       acetaminophen (TYLENOL) 500 MG tablet Take 1,000 mg by mouth 2 times daily as needed for mild pain        albuterol (PROAIR HFA/PROVENTIL HFA/VENTOLIN HFA) 108 (90 Base) MCG/ACT inhaler USE 2 INHALATIONS ORALLY   INTO THE LUNGS EVERY 6  HOURS as needed 18 g 0     apixaban ANTICOAGULANT (ELIQUIS) 2.5 MG tablet Take 1 tablet (2.5 mg) by mouth 2 times daily 90 tablet 3     BETA BLOCKER NOT PRESCRIBED (INTENTIONAL) Please choose reason not prescribed from choices below.       bromfenac (PROLENSA) 0.07 % ophthalmic solution Place 1 drop into the right eye daily        digoxin (LANOXIN) 125 MCG tablet Take 1 tablet (125 mcg) by mouth daily 30 tablet 0     donepezil (ARICEPT) 5 MG tablet Take 1 tablet (5 mg) by mouth At Bedtime 90 tablet 1     folic acid (FOLVITE) 1 MG tablet Take 1 tablet (1,000 mcg) by mouth daily  90 tablet 3     gabapentin (NEURONTIN) 100 MG capsule Take 1 capsule (100 mg) by mouth 3 times daily 90 capsule 11     hydroxychloroquine (PLAQUENIL) 200 MG tablet Take 200 mg by mouth daily       levothyroxine (SYNTHROID/LEVOTHROID) 75 MCG tablet TAKE 1 TABLET DAILY 90 tablet 2     melatonin 5 MG CAPS        Methotrexate Sodium (METHOTREXATE PO) Take 2.5 mg by mouth 10 tablets weekly - Wednesdays. Splits the dose as 5 tablets in AM & 5 tablets in PM       miconazole (MICATIN) 2 % external cream Apply topically 2 times daily       multivitamin (CENTRUM SILVER) tablet Take 1 tablet by mouth daily       omeprazole (PRILOSEC) 20 MG DR capsule TAKE 1 CAPSULE TWICE DAILY,30-60 MINUTES PRIOR TO     MEALS 180 capsule 2     omeprazole (PRILOSEC) 20 MG DR capsule Take 1 capsule (20 mg) by mouth 2 times daily 180 capsule 3     potassium chloride ER (KLOR-CON) 20 MEQ CR tablet Take 1 tablet (20 mEq) by mouth daily 90 tablet 0     prednisoLONE acetate (PRED MILD) 0.12 % ophthalmic suspension 1-2 drops 4 times daily       salicylic acid (COMPOUND W MAX STRENGTH) 17 % external gel Apply topically daily 7 g 0     sildenafil (REVATIO) 20 MG tablet TAKE 1 TABLET BY MOUTH THREE TIMES DAILY. GENERIC FOR REVATIO. CALL [phone] TO REFILL 90 tablet 5     simvastatin (ZOCOR) 40 MG tablet Take 1 tablet (40 mg) by mouth At Bedtime 90 tablet 0     Skin Protectants, Misc. (EUCERIN) cream Apply topically 2 times daily Apply to areas dry skin topically two times a day for dry skin and Apply to areas of dry skin topically as needed for dry skin daily       torsemide (DEMADEX) 20 MG tablet Take 1 tablet (20 mg) by mouth 2 times daily 60 tablet 3     Vitamin D, Cholecalciferol, 10 MCG (400 UNIT) TABS Take 1,600 Units by mouth daily           Allergies:      Allergies   Allergen Reactions     No Known Allergies             Past Medical History:     Past Medical History:   Diagnosis Date     Amaurosis fugax 5-11    Right     Carotid artery  stenosis      Esophageal reflux 3/11/2004     HELICOBACTER PYLORI INFECTION 7/11/2006     hx of CA in situ RT breast-1990.mastectomy 4/17/2007     Hyperlipidemia LDL goal <70 6/20/2011     Lung cancer (H)     squamous cell lung ca left lower lobe     OBESITY NOS 3/11/2004     OSTEOPOROSIS NOS 3/11/2004     Other psoriasis 3/11/2004     Pulmonary hypertension (H) 04/15/2019    Right heart catheterization demonstrated moderate pulmonary arterial hypertension with a mean PA pressure of 34 mmHg, no reversibility with inhaled nitric oxide, elevated right and left-sided filling pressures, low normal cardiac index of 2.1.  Seen by Dr. Shay Marquez.  Started on sildenafil (Revatio) 20 mg 3 times daily.     RA (rheumatoid arthritis) (H) 2/8/2010     Raynaud's syndrome 4/10/2006     Scleroderma (H)      Sleep apnea     CPAP         Past Surgical History:     Past Surgical History:   Procedure Laterality Date     BREAST SURGERY       COLONOSCOPY  5/24/16     COLONOSCOPY N/A 6/28/2019    Procedure: Colonoscopy, With Polypectomy And Biopsy;  Surgeon: Neto Kaminski MD;  Location:  GI     CONIZATION       CV RIGHT HEART CATH MEASUREMENTS RECORDED N/A 4/15/2019    Procedure: Heart Cath Right Heart Cath;  Surgeon: Naresh Cyr MD;  Location:  HEART CARDIAC CATH LAB     ENDARTERECTOMY CAROTID  2011     REPAIR HAMMER TOE       TONSILLECTOMY       ZZC NONSPECIFIC PROCEDURE  1990    mastectomy RT breast     ZZHC COLONOSCOPY THRU STOMA, DIAGNOSTIC  2001 2011    diverticulosis         Family Medical History:     Family History   Problem Relation Age of Onset     Cancer - colorectal Mother      Cerebrovascular Disease Father      Cancer Sister 31        ovarian     Heart Disease Sister      Gastrointestinal Disease Sister         crohns     Gastrointestinal Disease Sister         diverticulitis     Gastrointestinal Disease Brother         diverticulitis     Alzheimer Disease Sister      Cerebrovascular Disease  Maternal Grandmother      Diabetes Maternal Grandmother      Mental Illness Maternal Grandmother      Diabetes Maternal Uncle      Cancer Nephew                  Social History:     Social History     Socioeconomic History     Marital status:      Spouse name: Not on file     Number of children: Not on file     Years of education: Not on file     Highest education level: Not on file   Occupational History     Not on file   Tobacco Use     Smoking status: Former Smoker     Packs/day: 1.00     Years: 30.00     Pack years: 30.00     Types: Cigarettes     Start date:      Quit date: 3/11/1992     Years since quittin.3     Smokeless tobacco: Never Used   Substance and Sexual Activity     Alcohol use: Not Currently     Alcohol/week: 0.0 standard drinks     Drug use: No     Sexual activity: Not Currently     Partners: Male   Other Topics Concern     Parent/sibling w/ CABG, MI or angioplasty before 65F 55M? No   Social History Narrative     Not on file     Social Determinants of Health     Financial Resource Strain: Low Risk      Difficulty of Paying Living Expenses: Not hard at all   Food Insecurity: Not on file   Transportation Needs: No Transportation Needs     Lack of Transportation (Medical): No     Lack of Transportation (Non-Medical): No   Physical Activity: Not on file   Stress: Not on file   Social Connections: Not on file   Intimate Partner Violence: Not on file   Housing Stability: Not on file           Review of System:     Constitutional: Negative for fever or chills  Skin: Negative for rashes  Ears/Nose/Throat: Negative for nasal congestion, sore throat  Respiratory: No shortness of breath, dyspnea on exertion, cough, or hemoptysis  Cardiovascular: Negative for chest pain  Gastrointestinal: Negative for nausea, vomiting, positive for chronic GERD  Genitourinary: Negative for dysuria, hematuria  Musculoskeletal: Negative for myalgias  Neurologic: Negative for headaches, positive for  worsening short term memory loss symptoms  Psychiatric: Negative for depression, anxiety  Hematologic/Lymphatic/Immunologic: positive for worsening of chronic bilateral leg edema symptoms  Endocrine: positive for hypothyroidism  Behavioral: Negative for tobacco use       Physical Exam:   There were no vitals taken for this visit.    RESP: no cough over the phone   NEURO: worsening short term memory loss symptoms present  PSYCH: affect normal        Diagnostic Test Results:     Last Comprehensive Metabolic Panel:  Sodium   Date Value Ref Range Status   05/23/2022 136 133 - 144 mmol/L Final   05/10/2021 133 133 - 144 mmol/L Final     Potassium   Date Value Ref Range Status   05/23/2022 3.4 3.4 - 5.3 mmol/L Final   05/10/2021 4.2 3.4 - 5.3 mmol/L Final     Chloride   Date Value Ref Range Status   05/23/2022 95 94 - 109 mmol/L Final   05/10/2021 95 94 - 109 mmol/L Final     Carbon Dioxide   Date Value Ref Range Status   05/10/2021 39 (H) 20 - 32 mmol/L Final     Carbon Dioxide (CO2)   Date Value Ref Range Status   05/23/2022 38 (H) 20 - 32 mmol/L Final     Anion Gap   Date Value Ref Range Status   05/23/2022 3 3 - 14 mmol/L Final   05/10/2021 <1 (L) 3 - 14 mmol/L Final     Glucose   Date Value Ref Range Status   05/23/2022 89 70 - 99 mg/dL Final   05/10/2021 72 70 - 99 mg/dL Final     Urea Nitrogen   Date Value Ref Range Status   05/23/2022 20 7 - 30 mg/dL Final   05/10/2021 17 7 - 30 mg/dL Final     Creatinine   Date Value Ref Range Status   05/23/2022 0.57 0.52 - 1.04 mg/dL Final   01/13/2022 0.680 mg/dL Final     GFR Estimate   Date Value Ref Range Status   05/23/2022 90 >60 mL/min/1.73m2 Final     Comment:     Effective December 21, 2021 eGFRcr in adults is calculated using the 2021 CKD-EPI creatinine equation which includes age and gender (Nayely hansen al., NEJM, DOI: 10.1056/LEEUlf0922631)   05/10/2021 83 >60 mL/min/[1.73_m2] Final     Comment:     Non  GFR Calc  Starting 12/18/2018, serum creatinine  based estimated GFR (eGFR) will be   calculated using the Chronic Kidney Disease Epidemiology Collaboration   (CKD-EPI) equation.       Calcium   Date Value Ref Range Status   05/23/2022 9.1 8.5 - 10.1 mg/dL Final   05/10/2021 9.1 8.5 - 10.1 mg/dL Final         ASSESSMENT/PLAN:     Jaqueline was seen today for weight loss.    Diagnoses and all orders for this visit:    Encounter for medication refill    Memory loss  -     donepezil (ARICEPT) 5 MG tablet; Take 1 tablet (5 mg) by mouth At Bedtime    Mixed hyperlipidemia  -     folic acid (FOLVITE) 1 MG tablet; Take 1 tablet (1,000 mcg) by mouth daily    Hypokalemia  -     potassium chloride ER (KLOR-CON) 20 MEQ CR tablet; Take 1 tablet (20 mEq) by mouth daily    Hyperlipidemia LDL goal <100  -     simvastatin (ZOCOR) 40 MG tablet; Take 1 tablet (40 mg) by mouth At Bedtime    Bilateral leg edema  -     torsemide (DEMADEX) 20 MG tablet; Take 1 tablet (20 mg) by mouth 2 times daily          Follow Up Plan:     Patient is instructed to return to Internal Medicine clinic for follow-up visit in 1 month.      Phone call duration: 30 minutes    Carissa Burroughs MD  Internal Medicine  [facility]

## 2022-06-25 ENCOUNTER — MEDICAL CORRESPONDENCE (OUTPATIENT)
Dept: HEALTH INFORMATION MANAGEMENT | Facility: CLINIC | Age: 82
End: 2022-06-25

## 2022-06-27 DIAGNOSIS — I48.19 PERSISTENT ATRIAL FIBRILLATION (H): ICD-10-CM

## 2022-06-29 ENCOUNTER — TELEPHONE (OUTPATIENT)
Dept: FAMILY MEDICINE | Facility: CLINIC | Age: 82
End: 2022-06-29

## 2022-06-29 NOTE — TELEPHONE ENCOUNTER
Anushka OT with [facility] calling ot request verbal orders for continued OT/Lymphedema Therapy visits 1x/week for 3 weeks.    Verbal approval given per protocol

## 2022-06-30 ENCOUNTER — MEDICAL CORRESPONDENCE (OUTPATIENT)
Dept: HEALTH INFORMATION MANAGEMENT | Facility: CLINIC | Age: 82
End: 2022-06-30

## 2022-06-30 RX ORDER — DIGOXIN 125 MCG
TABLET ORAL
Qty: 90 TABLET | Refills: 1 | Status: SHIPPED | OUTPATIENT
Start: 2022-06-30

## 2022-06-30 NOTE — TELEPHONE ENCOUNTER
Routing refill request to provider for review/approval because:  Labs out of range:  No results found for: DIGOXIN    Day Covarrubias RN  [facility]

## 2022-07-01 ENCOUNTER — TELEPHONE (OUTPATIENT)
Dept: FAMILY MEDICINE | Facility: CLINIC | Age: 82
End: 2022-07-01

## 2022-07-01 DIAGNOSIS — I48.19 PERSISTENT ATRIAL FIBRILLATION (H): Primary | ICD-10-CM

## 2022-07-01 DIAGNOSIS — J84.10 PULMONARY FIBROSIS (H): ICD-10-CM

## 2022-07-01 NOTE — TELEPHONE ENCOUNTER
Home care PT called, they are having trouble getting a pulse oximeter reading on pt's finger     Asking if PCP would send a DME order to [facility] for an [place] Pulse Ox? They don't carry them through  Home Medical     She didn't have their fax number     Carmen PORTER, Triage RN  [facility]

## 2022-07-05 ENCOUNTER — MEDICAL CORRESPONDENCE (OUTPATIENT)
Dept: HEALTH INFORMATION MANAGEMENT | Facility: CLINIC | Age: 82
End: 2022-07-05

## 2022-07-06 ENCOUNTER — MEDICAL CORRESPONDENCE (OUTPATIENT)
Dept: HEALTH INFORMATION MANAGEMENT | Facility: CLINIC | Age: 82
End: 2022-07-06

## 2022-07-06 ENCOUNTER — TELEPHONE (OUTPATIENT)
Dept: FAMILY MEDICINE | Facility: CLINIC | Age: 82
End: 2022-07-06

## 2022-07-06 NOTE — TELEPHONE ENCOUNTER
Called [employer] - ([phone])    Fax number for DMEs: [phone]    Faxed DME order in Epic     Called and LVM to notify Carmen PORTER Triage RN  [facility] Internal Medicine [facility]

## 2022-07-06 NOTE — TELEPHONE ENCOUNTER
Patient called wanting to know when her home care nurse was going to be out to set up her pills, patient not sure who she works for or her contact info    Upon chart review found patient is open with [facility] care     Number given for [facility] for patient to call and check on nurse visits    Gonzales Caballero RN

## 2022-07-10 DIAGNOSIS — R41.3 MEMORY LOSS: ICD-10-CM

## 2022-07-13 NOTE — TELEPHONE ENCOUNTER
Virtual visit 6/24/22 Dr. Burroughs.    Please refill as appropriate.  Thank you,    Janice Carcamo RN  [facility]

## 2022-07-15 RX ORDER — DONEPEZIL HYDROCHLORIDE 5 MG/1
TABLET, FILM COATED ORAL
Qty: 90 TABLET | Refills: 3 | Status: SHIPPED | OUTPATIENT
Start: 2022-07-15

## 2022-07-21 NOTE — TELEPHONE ENCOUNTER
Per chart review, patient seen via VV with Dr. Burroughs on 6/24/22 and discussed weight loss.    Rickey De La Torre RN  [facility]

## 2022-07-25 ENCOUNTER — LAB REQUISITION (OUTPATIENT)
Dept: LAB | Facility: CLINIC | Age: 82
End: 2022-07-25
Payer: MEDICARE

## 2022-07-25 ENCOUNTER — TELEPHONE (OUTPATIENT)
Dept: FAMILY MEDICINE | Facility: CLINIC | Age: 82
End: 2022-07-25

## 2022-07-25 DIAGNOSIS — I50.32 CHRONIC DIASTOLIC (CONGESTIVE) HEART FAILURE (H): ICD-10-CM

## 2022-07-25 LAB
ALBUMIN SERPL-MCNC: 3.6 G/DL (ref 3.4–5)
ALT SERPL W P-5'-P-CCNC: 24 U/L (ref 0–50)
AST SERPL W P-5'-P-CCNC: 41 U/L (ref 0–45)
BASOPHILS # BLD AUTO: 0 10E3/UL (ref 0–0.2)
BASOPHILS NFR BLD AUTO: 1 %
CK SERPL-CCNC: 63 U/L (ref 30–225)
EOSINOPHIL # BLD AUTO: 0.1 10E3/UL (ref 0–0.7)
EOSINOPHIL NFR BLD AUTO: 2 %
ERYTHROCYTE [DISTWIDTH] IN BLOOD BY AUTOMATED COUNT: 15.1 % (ref 10–15)
HCT VFR BLD AUTO: 41.1 % (ref 35–47)
HGB BLD-MCNC: 13.3 G/DL (ref 11.7–15.7)
HOLD SPECIMEN: NORMAL
IMM GRANULOCYTES # BLD: 0 10E3/UL
IMM GRANULOCYTES NFR BLD: 0 %
LYMPHOCYTES # BLD AUTO: 0.7 10E3/UL (ref 0.8–5.3)
LYMPHOCYTES NFR BLD AUTO: 13 %
MCH RBC QN AUTO: 31.8 PG (ref 26.5–33)
MCHC RBC AUTO-ENTMCNC: 32.4 G/DL (ref 31.5–36.5)
MCV RBC AUTO: 98 FL (ref 78–100)
MONOCYTES # BLD AUTO: 0.3 10E3/UL (ref 0–1.3)
MONOCYTES NFR BLD AUTO: 5 %
NEUTROPHILS # BLD AUTO: 4.3 10E3/UL (ref 1.6–8.3)
NEUTROPHILS NFR BLD AUTO: 79 %
NRBC # BLD AUTO: 0 10E3/UL
NRBC BLD AUTO-RTO: 0 /100
PLATELET # BLD AUTO: 9 10E3/UL (ref 150–450)
RBC # BLD AUTO: 4.18 10E6/UL (ref 3.8–5.2)
WBC # BLD AUTO: 5.4 10E3/UL (ref 4–11)

## 2022-07-25 PROCEDURE — 82040 ASSAY OF SERUM ALBUMIN: CPT | Mod: ORL | Performed by: STUDENT IN AN ORGANIZED HEALTH CARE EDUCATION/TRAINING PROGRAM

## 2022-07-25 PROCEDURE — 82550 ASSAY OF CK (CPK): CPT | Mod: ORL | Performed by: STUDENT IN AN ORGANIZED HEALTH CARE EDUCATION/TRAINING PROGRAM

## 2022-07-25 PROCEDURE — 85025 COMPLETE CBC W/AUTO DIFF WBC: CPT | Mod: ORL | Performed by: STUDENT IN AN ORGANIZED HEALTH CARE EDUCATION/TRAINING PROGRAM

## 2022-07-25 PROCEDURE — 84460 ALANINE AMINO (ALT) (SGPT): CPT | Mod: ORL | Performed by: STUDENT IN AN ORGANIZED HEALTH CARE EDUCATION/TRAINING PROGRAM

## 2022-07-25 PROCEDURE — 84450 TRANSFERASE (AST) (SGOT): CPT | Mod: ORL | Performed by: STUDENT IN AN ORGANIZED HEALTH CARE EDUCATION/TRAINING PROGRAM

## 2022-07-25 NOTE — TELEPHONE ENCOUNTER
Carmen from [facility] called requesting approval of PT visit the week of 08/08/2022.     Verbal approval given.

## 2022-07-27 ENCOUNTER — TELEPHONE (OUTPATIENT)
Dept: FAMILY MEDICINE | Facility: CLINIC | Age: 82
End: 2022-07-27

## 2022-07-27 ENCOUNTER — MEDICAL CORRESPONDENCE (OUTPATIENT)
Dept: HEALTH INFORMATION MANAGEMENT | Facility: CLINIC | Age: 82
End: 2022-07-27

## 2022-07-27 NOTE — TELEPHONE ENCOUNTER
"Received a call from the patient stating \"she has no idea what medications she is supposed to be taking\" and the patient did not \"have time right now\" to run through her medications that she has at home with the triage nurse. Patient has the medications but she does not know dosing or what time she is supposed to be taking these medications.    Patient is requesting someone come out to her house to help her set up her medications. Patient states she had a home care RN coming out to her house to help her but they are \"done now.\"    Routing to PCP for review/recommendations. Another home care referral for skilled RN to assist with setting up patient's medications?    Veroinka Nagy, RN        "

## 2022-07-27 NOTE — TELEPHONE ENCOUNTER
Carmen PT from [facility] called requesting a continuation of care-     1x/wk for 3 weeks   For balance training   Verbal okay given per policy     Can we leave a detailed message on this number? YES  Phone number patient can be reached at: [phone]    Leeanne Louise, RN  [facility] Triage

## 2022-07-28 ENCOUNTER — MEDICAL CORRESPONDENCE (OUTPATIENT)
Dept: HEALTH INFORMATION MANAGEMENT | Facility: CLINIC | Age: 82
End: 2022-07-28

## 2022-08-19 ENCOUNTER — TELEPHONE (OUTPATIENT)
Dept: FAMILY MEDICINE | Facility: CLINIC | Age: 82
End: 2022-08-19

## 2022-08-19 NOTE — TELEPHONE ENCOUNTER
Dilan, RN with [facility], calling to request verbal orders for SN visits 1x/week for 2 weeks, then 1x/every other week for 6 weeks, and 4 visits PRN to manage CHF and medications.   OT to eval and treat for oxygen safety and ADLs.   SW to eval for long term care planning.     Verbal approval given per protocol.        Dilan requesting current O2 orders and weight parameters for CHF management. Relayed information below, Dilan has no further questions.   Today's weight at home 10:30am prior to breakfast: 124.8 lbs (unsure if patient voided prior to weight)      Per 5/23/22 Cardiology OV notes:  - Oxygen: 1.5LPM NC PRN   - Remember to weigh yourself daily after voiding and write it down on a log. If you have gained/lost 2 pounds overnight or 5 pounds in a week contact us for medication adjustments or further instructions.

## 2022-08-24 ENCOUNTER — MEDICAL CORRESPONDENCE (OUTPATIENT)
Dept: HEALTH INFORMATION MANAGEMENT | Facility: CLINIC | Age: 82
End: 2022-08-24

## 2022-08-26 ENCOUNTER — TELEPHONE (OUTPATIENT)
Dept: CARDIOLOGY | Facility: CLINIC | Age: 82
End: 2022-08-26

## 2022-08-26 NOTE — TELEPHONE ENCOUNTER
Letter sent in early august per patient request to remind of appointments Called and let a VM for patient as a reminder of appointments on 8/31 and 9/1. Requested a call back when message received.      Wendi Espinosa RN on 8/26/2022 at 10:28 AM

## 2022-08-30 DIAGNOSIS — Z53.9 DIAGNOSIS NOT YET DEFINED: Primary | ICD-10-CM

## 2022-08-30 PROCEDURE — G0180 MD CERTIFICATION HHA PATIENT: HCPCS | Performed by: INTERNAL MEDICINE

## 2022-08-31 ENCOUNTER — HOSPITAL ENCOUNTER (OUTPATIENT)
Dept: CARDIOLOGY | Facility: CLINIC | Age: 82
Discharge: HOME | End: 2022-08-31
Attending: INTERNAL MEDICINE | Admitting: INTERNAL MEDICINE
Payer: MEDICARE

## 2022-08-31 ENCOUNTER — LAB (OUTPATIENT)
Dept: LAB | Facility: CLINIC | Age: 82
End: 2022-08-31
Payer: MEDICARE

## 2022-08-31 DIAGNOSIS — R06.02 SOB (SHORTNESS OF BREATH): ICD-10-CM

## 2022-08-31 DIAGNOSIS — G51.0 BELL'S PALSY: ICD-10-CM

## 2022-08-31 DIAGNOSIS — I27.20 PULMONARY HYPERTENSION (H): ICD-10-CM

## 2022-08-31 LAB
ANION GAP SERPL CALCULATED.3IONS-SCNC: 2 MMOL/L (ref 3–14)
BUN SERPL-MCNC: 20 MG/DL (ref 7–30)
CALCIUM SERPL-MCNC: 9.7 MG/DL (ref 8.5–10.1)
CHLORIDE BLD-SCNC: 97 MMOL/L (ref 94–109)
CO2 SERPL-SCNC: 38 MMOL/L (ref 20–32)
CREAT SERPL-MCNC: 0.66 MG/DL (ref 0.52–1.04)
GFR SERPL CREATININE-BSD FRML MDRD: 87 ML/MIN/1.73M2
GLUCOSE BLD-MCNC: 76 MG/DL (ref 70–99)
LVEF ECHO: NORMAL
NT-PROBNP SERPL-MCNC: 5023 PG/ML (ref 0–1800)
POTASSIUM BLD-SCNC: 4 MMOL/L (ref 3.4–5.3)
SODIUM SERPL-SCNC: 137 MMOL/L (ref 133–144)

## 2022-08-31 PROCEDURE — 80048 BASIC METABOLIC PNL TOTAL CA: CPT | Performed by: INTERNAL MEDICINE

## 2022-08-31 PROCEDURE — 36415 COLL VENOUS BLD VENIPUNCTURE: CPT | Performed by: INTERNAL MEDICINE

## 2022-08-31 PROCEDURE — 83880 ASSAY OF NATRIURETIC PEPTIDE: CPT | Performed by: INTERNAL MEDICINE

## 2022-08-31 PROCEDURE — 93306 TTE W/DOPPLER COMPLETE: CPT | Mod: 26 | Performed by: INTERNAL MEDICINE

## 2022-08-31 PROCEDURE — 93306 TTE W/DOPPLER COMPLETE: CPT

## 2022-09-01 ENCOUNTER — TELEPHONE (OUTPATIENT)
Dept: FAMILY MEDICINE | Facility: CLINIC | Age: 82
End: 2022-09-01

## 2022-09-01 ENCOUNTER — OFFICE VISIT (OUTPATIENT)
Dept: CARDIOLOGY | Facility: CLINIC | Age: 82
End: 2022-09-01
Payer: MEDICARE

## 2022-09-01 VITALS
BODY MASS INDEX: 22.67 KG/M2 | HEIGHT: 62 IN | SYSTOLIC BLOOD PRESSURE: 102 MMHG | DIASTOLIC BLOOD PRESSURE: 66 MMHG | WEIGHT: 123.2 LBS | OXYGEN SATURATION: 91 % | HEART RATE: 65 BPM

## 2022-09-01 DIAGNOSIS — I27.20 PULMONARY HYPERTENSION (H): Primary | ICD-10-CM

## 2022-09-01 DIAGNOSIS — R26.89 BALANCE PROBLEMS: ICD-10-CM

## 2022-09-01 DIAGNOSIS — R06.02 SOB (SHORTNESS OF BREATH): ICD-10-CM

## 2022-09-01 PROCEDURE — 99214 OFFICE O/P EST MOD 30 MIN: CPT | Performed by: INTERNAL MEDICINE

## 2022-09-01 NOTE — PROGRESS NOTES
Carissa Burroughs MD  [address], [address]  [place] [address]    Arthritis and Rheumatology  [facility]    RE:      Jaqueline Canales  MRN:  [id number]  :   1940      IMPRESSION:   1.  Pulmonary artery hypertension.   2.  Scleroderma.   3.  Severe tricuspid regurgitation.   4.  A 65 pound weight loss.   5.  Cataracts.  6. History of iron deficiency  7. Aortic stenosis  8. Poor balance but no falling  9. Fragile living arrangement    Plan :  1. She is too fragile to consider for valve repair/replacement.  I discussed this in detail.  She is comfortable with medical management  2. I believe she should have balance and neuro evaluation  3.  She and her  live in their own home which might be a marginal situation in view of the fragility of both.          Dear Dr. Burroughs:      I had the pleasure of seeing your patient, Jaqueline Canales, for followup.      She has been walking with a walker because of poor balance.Her balance continues to deteriorate but she has not fallen.  There is no history of neuropathy, trauma.    She had previous iron deficiency anemia which was replaced by Injectafer in a parenteral form.  She felt better after having received this.  She had been previously instructed to see you for evaluation of why she was losing iron.She has no interim history of melena, hematochezia, bruising    She has no interim history of paroxysmal nocturnal dyspnea.  She has stable dependent edema without ulcerations.  She has no interim history of chest pain, tightness, heaviness, paroxysmal nocturnal dyspnea or orthopnea.    Examination   Fragile appearing, increased JVP with CV of TR, murmur of TR and aortic stenosis, RV lift, peripheral edema, no ulcerations    Current Outpatient Medications   Medication     acetaminophen (TYLENOL) 500 MG tablet     albuterol (PROAIR HFA/PROVENTIL HFA/VENTOLIN HFA) 108 (90 Base) MCG/ACT inhaler     apixaban ANTICOAGULANT (ELIQUIS) 2.5  MG tablet     BETA BLOCKER NOT PRESCRIBED (INTENTIONAL)     bromfenac (PROLENSA) 0.07 % ophthalmic solution     digoxin (LANOXIN) 125 MCG tablet     donepezil (ARICEPT) 5 MG tablet     folic acid (FOLVITE) 1 MG tablet     gabapentin (NEURONTIN) 100 MG capsule     hydroxychloroquine (PLAQUENIL) 200 MG tablet     levothyroxine (SYNTHROID/LEVOTHROID) 75 MCG tablet     melatonin 5 MG CAPS     Methotrexate Sodium (METHOTREXATE PO)     miconazole (MICATIN) 2 % external cream     multivitamin (CENTRUM SILVER) tablet     omeprazole (PRILOSEC) 20 MG DR capsule     omeprazole (PRILOSEC) 20 MG DR capsule     potassium chloride ER (KLOR-CON) 20 MEQ CR tablet     prednisoLONE acetate (PRED MILD) 0.12 % ophthalmic suspension     salicylic acid (COMPOUND W MAX STRENGTH) 17 % external gel     sildenafil (REVATIO) 20 MG tablet     simvastatin (ZOCOR) 40 MG tablet     Skin Protectants, Misc. (EUCERIN) cream     torsemide (DEMADEX) 20 MG tablet     Vitamin D, Cholecalciferol, 10 MCG (400 UNIT) TABS     ACE/ARB/ARNI NOT PRESCRIBED (INTENTIONAL)     No current facility-administered medications for this visit.      Latest Reference Range & Units 07/25/22 14:30 08/31/22 13:36   Sodium 133 - 144 mmol/L  137   Potassium 3.4 - 5.3 mmol/L  4.0   Chloride 94 - 109 mmol/L  97   Carbon Dioxide 20 - 32 mmol/L  38 (H)   Urea Nitrogen 7 - 30 mg/dL  20   Creatinine 0.52 - 1.04 mg/dL  0.66   GFR Estimate >60 mL/min/1.73m2  87   Calcium 8.5 - 10.1 mg/dL  9.7   Anion Gap 3 - 14 mmol/L  2 (L)   Albumin 3.4 - 5.0 g/dL 3.6    ALT 0 - 50 U/L 24    AST 0 - 45 U/L 41    CK Total 30 - 225 U/L 63    N-Terminal Pro Bnp 0 - 1,800 pg/mL  5,023 (H)   Glucose 70 - 99 mg/dL  76   WBC 4.0 - 11.0 10e3/uL 5.4    Hemoglobin 11.7 - 15.7 g/dL 13.3    Hematocrit 35.0 - 47.0 % 41.1    Platelet Count 150 - 450 10e3/uL 9 (LL)    RBC Count 3.80 - 5.20 10e6/uL 4.18    MCV 78 - 100 fL 98    MCH 26.5 - 33.0 pg 31.8    MCHC 31.5 - 36.5 g/dL 32.4    RDW 10.0 - 15.0 % 15.1 (H)    %  Neutrophils % 79    % Lymphocytes % 13    % Monocytes % 5    % Eosinophils % 2    % Basophils % 1    Absolute Basophils 0.0 - 0.2 10e3/uL 0.0    Absolute Eosinophils 0.0 - 0.7 10e3/uL 0.1    Absolute Immature Granulocytes <=0.4 10e3/uL 0.0    Absolute Lymphocytes 0.8 - 5.3 10e3/uL 0.7 (L)    Absolute Monocytes 0.0 - 1.3 10e3/uL 0.3    % Immature Granulocytes % 0    Absolute Neutrophils 1.6 - 8.3 10e3/uL 4.3    Absolute NRBCs 10e3/uL 0.0      Wt Readings from Last 24 Encounters:   22 58.1 kg (128 lb)   22 60.9 kg (134 lb 3.2 oz)   22 59.2 kg (130 lb 9.6 oz)   22 58.4 kg (128 lb 12.8 oz)   22 56.9 kg (125 lb 6.4 oz)   22 56.7 kg (125 lb)   22 58.2 kg (128 lb 6.4 oz)   22 57.5 kg (126 lb 12.8 oz)   22 64.2 kg (141 lb 9.6 oz)   22 55.9 kg (123 lb 3.2 oz)   22 60.8 kg (134 lb)   21 61.7 kg (136 lb)   21 61.2 kg (135 lb)   21 59.3 kg (130 lb 12.8 oz)   21 60.8 kg (134 lb)   10/13/21 59.9 kg (132 lb)   21 63 kg (139 lb)   21 64.4 kg (142 lb)   21 66.7 kg (147 lb)   21 65.6 kg (144 lb 9.6 oz)   21 65.5 kg (144 lb 4.8 oz)   21 66.1 kg (145 lb 12.8 oz)   21 66.2 kg (146 lb)   21 63.5 kg (140 lb)       Name: GINA MCKEON  MRN: [id number]  : 1940  Study Date: 2022 02:11 PM  Age: 81 yrs  Gender: Female  Patient Location: [facility]  Reason For Study: Pulmonary hypertension (H), SOB (shortness of breath),  Bell's pa  Ordering Physician: YONNY LINDSAY  Referring Physician: Berto Alvarado MD  Performed By: Carmen Leong RDCS     BSA: 1.5 m2  Height: 62 in  Weight: 120 lb  HR: 80  BP: 121/71 mmHg  ______________________________________________________________________________  Procedure  Complete Echo Adult.     ______________________________________________________________________________  Interpretation Summary     Left ventricular systolic function is  normal.  The visual ejection fraction is 65-70%.  No regional wall motion abnormalities noted.  The right ventricular systolic function is mildly reduced.  There is severe (4+) tricuspid regurgitation.  Right ventricular systolic pressure is elevated, consistent with severe  pulmonary hypertension.  There is moderate (2+) mitral regurgitation.  Moderate valvular aortic stenosis.  The mean AoV pressure gradient is 32mmHg.  The right ventricle is moderate to severely dilated.  Compared to 3/22, severe TR and pulmonary hypertension is unchanged. Aortic  stenosis has progressed, mean gradient increased from 21 to 32mm. The study  was technically difficult.  ______________________________________________________________________________  Left Ventricle  The left ventricle is normal in size. There is mild concentric left  ventricular hypertrophy. Left ventricular systolic function is normal. The  visual ejection fraction is 65-70%. Left ventricular diastolic function is  indeterminate. No regional wall motion abnormalities noted.     Right Ventricle  The right ventricle is moderate to severely dilated. The right ventricular  systolic function is mildly reduced.     Atria  There is severe biatrial enlargement.     Mitral Valve  There is severe mitral annular calcification. The mitral valve leaflets are  moderately thickened. There is moderate (2+) mitral regurgitation.     Tricuspid Valve  There is severe (4+) tricuspid regurgitation. The right ventricular systolic  pressure is approximated at 55.7 mmHg plus the right atrial pressure. Right  ventricular systolic pressure is elevated, consistent with severe pulmonary  hypertension. IVC diameter >2.1 cm collapsing <50% with sniff suggests a high  RA pressure estimated at 15 mmHg or greater.     Aortic Valve  The aortic valve is trileaflet. There is trace aortic regurgitation. Moderate  valvular aortic stenosis. The mean AoV pressure gradient is 32mmHg.     Pulmonic Valve  The  pulmonic valve is not well seen, but is grossly normal.     Vessels  The aortic root is normal size.     Pericardium  There is no pericardial effusion.     Rhythm  The rhythm was atrial fibrillation.     ______________________________________________________________________________  MMode/2D Measurements & Calculations  IVSd: 0.97 cm  LVIDd: 4.1 cm  LVIDs: 2.6 cm  LVPWd: 1.2 cm  FS: 34.8 %  LV mass(C)d: 150.6 grams  LV mass(C)dI: 97.9 grams/m2  Ao root diam: 2.4 cm  LA dimension: 4.9 cm     asc Aorta Diam: 3.1 cm  LA/Ao: 2.0  LVOT diam: 2.1 cm  LVOT area: 3.4 cm2  LA Volume (BP): 157.0 ml  LA Volume Index (BP): 101.9 ml/m2  RWT: 0.61     Doppler Measurements & Calculations  MV E max el: 125.5 cm/sec  MV max P.0 mmHg  MV mean PG: 3.1 mmHg  MV V2 VTI: 22.7 cm  MVA(VTI): 3.3 cm2  MV dec time: 0.19 sec     Ao V2 max: 362.9 cm/sec  Ao max P.0 mmHg  Ao V2 mean: 261.9 cm/sec  Ao mean P.0 mmHg  Ao V2 VTI: 74.2 cm  JACOB(I,D): 1.0 cm2  JACOB(V,D): 1.1 cm2  LV V1 max P.9 mmHg  LV V1 max: 121.4 cm/sec  LV V1 VTI: 22.0 cm  MR ERO: 0.16 cm2  MR volume: 27.6 ml  SV(LVOT): 74.3 ml  SI(LVOT): 48.3 ml/m2  PA acc time: 0.05 sec  TR max el: 372.9 cm/sec  TR max P.7 mmHg  AV El Ratio (DI): 0.33  JACOB Index (cm2/m2): 0.65  E/E' av.0  Lateral E/e': 15.6  Medial E/e': 24.5      Remote heart catheterization     Hemodynamics    Systemic blood pressure 140-150 throughout the case.    BASELINE:  1) Elevated right-sided filling pressures (mean RA 16 mmHg)  2) Moderate to severe pulmonary hypertension (mean PA 35 mmHg) secondary to intrinsic pulmonary vascular disease (PVRI 14 rod*m2) and elevated left-sided filling pressures (PCWP 20 mmHg).  3) O2 sats at baseline 93%  4) Mildly depressed CO/CI:  3.8/1.9    NO 80 PPM:  1) Pulmonary artery pressures did not change (mean PA 34 mmHg)  2) O2 sats improved to 100%  3) CO/CI improved 4.2/2.1 Right sided filling pressures are severely elevated.Left sided filling pressures are  moderately elevated. Severely elevated pulmonary artery hypertension.Left ventricular filling pressures are moderately elevated .Normal cardiac output level.

## 2022-09-01 NOTE — PATIENT INSTRUCTIONS
Medication Changes:  No medication changes at this time. Please continue current medication regiment.     Patient Instructions:  1. Continue staying active and eat a heart healthy diet.    2. Please keep current list of medications with you at all times.    3. Remember to weigh yourself daily after voiding and before you consume any food or beverages and log the numbers.  If you have gained 2 pounds overnight or 5 pounds in a week contact us immediately for medication adjustments or further instructions.    4. **Please call us immediately if you have any syncope (fainting or passing out), chest pain, edema (swelling or weight gain), or decline in your functional status (general decline in how you are feeling).    5. Patients on Remodulin (treprostinil) or Veletri (epoprostenol): Please make sure that you have your backup pump and supplies with you at all times, your mixing instructions, and contact information for your specialty pharmacy.    Follow up Appointment Information:  PT balance evaluation  Neurology Referral  Follow up with Dr. Burroughs    2 month follow up          For scheduling at [facility] please call [phone]  For scheduling at the [facility] please call [phone]  We are located on the second floor [address] at the [facility].  Our address is     [address] [place] [address],   [address]  [place] [address]    Thank you for allowing us to be a part of your care here at the [facility]    If you have questions or concerns please contact us at:    Wendi Espinosa RN, BSN     Nurse Coordinator       Pulmonary Hypertension     [facility]   (Phone)[phone]  (Fax)[phone]    ** Please note that you will NOT receive a reminder call regarding your scheduled testing, reminder calls are for provider appointments only.  If you are scheduled for testing within the [facility] system you may receive a call regarding pre-registration for billing  purposes only.**     Remember to weigh yourself daily after voiding and before you consume any food or beverages and log the numbers.  If you have gained/lost 2 pounds overnight or 5 pounds in a week contact us immediately for medication adjustments or further instructions.    Support Group:  Pulmonary Hypertension Association  Https://www.phassociation.org/  **Look at the Events Tab** They even have Support Groups that you can call into    [facility] PH Support Group  Second Saturday of the Month from 1-3 PM   Location: [address] [place] [id number]  Leader: Eleni Almodovar  Phone: [phone]  Email: [email or web address]

## 2022-09-01 NOTE — TELEPHONE ENCOUNTER
[facility], called requesting approval for SW visit within 21 days to help pt and family with community resources.     Verbal approval given.

## 2022-09-01 NOTE — LETTER
2022    Carissa Burroughs MD  [address]  [place] [address]    RE: Jaqueline Canales       Dear Colleague,     I had the pleasure of seeing Jaqueline Canales in the [facility].      Carissa Burroughs MD  [facility] [place]  [address], [address]  [place] [address]    Arthritis and Rheumatology  [facility]    RE:      Jaqueline Canales  MRN:  [id number]  :   1940      IMPRESSION:   1.  Pulmonary artery hypertension.   2.  Scleroderma.   3.  Severe tricuspid regurgitation.   4.  A 65 pound weight loss.   5.  Cataracts.  6. History of iron deficiency  7. Aortic stenosis  8. Poor balance but no falling  9. Fragile living arrangement    Plan :  1. She is too fragile to consider for valve repair/replacement.  I discussed this in detail.  She is comfortable with medical management  2. I believe she should have balance and neuro evaluation  3.  She and her  live in their own home which might be a marginal situation in view of the fragility of both.          Dear Dr. Burroughs:      I had the pleasure of seeing your patient, Jaqueline Canales, for followup.      She has been walking with a walker because of poor balance.Her balance continues to deteriorate but she has not fallen.  There is no history of neuropathy, trauma.    She had previous iron deficiency anemia which was replaced by Injectafer in a parenteral form.  She felt better after having received this.  She had been previously instructed to see you for evaluation of why she was losing iron.She has no interim history of melena, hematochezia, bruising    She has no interim history of paroxysmal nocturnal dyspnea.  She has stable dependent edema without ulcerations.  She has no interim history of chest pain, tightness, heaviness, paroxysmal nocturnal dyspnea or orthopnea.    Examination   Fragile appearing, increased JVP with CV of TR, murmur of TR and aortic stenosis, RV lift, peripheral edema, no  ulcerations    Current Outpatient Medications   Medication     acetaminophen (TYLENOL) 500 MG tablet     albuterol (PROAIR HFA/PROVENTIL HFA/VENTOLIN HFA) 108 (90 Base) MCG/ACT inhaler     apixaban ANTICOAGULANT (ELIQUIS) 2.5 MG tablet     BETA BLOCKER NOT PRESCRIBED (INTENTIONAL)     bromfenac (PROLENSA) 0.07 % ophthalmic solution     digoxin (LANOXIN) 125 MCG tablet     donepezil (ARICEPT) 5 MG tablet     folic acid (FOLVITE) 1 MG tablet     gabapentin (NEURONTIN) 100 MG capsule     hydroxychloroquine (PLAQUENIL) 200 MG tablet     levothyroxine (SYNTHROID/LEVOTHROID) 75 MCG tablet     melatonin 5 MG CAPS     Methotrexate Sodium (METHOTREXATE PO)     miconazole (MICATIN) 2 % external cream     multivitamin (CENTRUM SILVER) tablet     omeprazole (PRILOSEC) 20 MG DR capsule     omeprazole (PRILOSEC) 20 MG DR capsule     potassium chloride ER (KLOR-CON) 20 MEQ CR tablet     prednisoLONE acetate (PRED MILD) 0.12 % ophthalmic suspension     salicylic acid (COMPOUND W MAX STRENGTH) 17 % external gel     sildenafil (REVATIO) 20 MG tablet     simvastatin (ZOCOR) 40 MG tablet     Skin Protectants, Misc. (EUCERIN) cream     torsemide (DEMADEX) 20 MG tablet     Vitamin D, Cholecalciferol, 10 MCG (400 UNIT) TABS     ACE/ARB/ARNI NOT PRESCRIBED (INTENTIONAL)     No current facility-administered medications for this visit.      Latest Reference Range & Units 07/25/22 14:30 08/31/22 13:36   Sodium 133 - 144 mmol/L  137   Potassium 3.4 - 5.3 mmol/L  4.0   Chloride 94 - 109 mmol/L  97   Carbon Dioxide 20 - 32 mmol/L  38 (H)   Urea Nitrogen 7 - 30 mg/dL  20   Creatinine 0.52 - 1.04 mg/dL  0.66   GFR Estimate >60 mL/min/1.73m2  87   Calcium 8.5 - 10.1 mg/dL  9.7   Anion Gap 3 - 14 mmol/L  2 (L)   Albumin 3.4 - 5.0 g/dL 3.6    ALT 0 - 50 U/L 24    AST 0 - 45 U/L 41    CK Total 30 - 225 U/L 63    N-Terminal Pro Bnp 0 - 1,800 pg/mL  5,023 (H)   Glucose 70 - 99 mg/dL  76   WBC 4.0 - 11.0 10e3/uL 5.4    Hemoglobin 11.7 - 15.7 g/dL 13.3     Hematocrit 35.0 - 47.0 % 41.1    Platelet Count 150 - 450 10e3/uL 9 (LL)    RBC Count 3.80 - 5.20 10e6/uL 4.18    MCV 78 - 100 fL 98    MCH 26.5 - 33.0 pg 31.8    MCHC 31.5 - 36.5 g/dL 32.4    RDW 10.0 - 15.0 % 15.1 (H)    % Neutrophils % 79    % Lymphocytes % 13    % Monocytes % 5    % Eosinophils % 2    % Basophils % 1    Absolute Basophils 0.0 - 0.2 10e3/uL 0.0    Absolute Eosinophils 0.0 - 0.7 10e3/uL 0.1    Absolute Immature Granulocytes <=0.4 10e3/uL 0.0    Absolute Lymphocytes 0.8 - 5.3 10e3/uL 0.7 (L)    Absolute Monocytes 0.0 - 1.3 10e3/uL 0.3    % Immature Granulocytes % 0    Absolute Neutrophils 1.6 - 8.3 10e3/uL 4.3    Absolute NRBCs 10e3/uL 0.0      Wt Readings from Last 24 Encounters:   22 58.1 kg (128 lb)   22 60.9 kg (134 lb 3.2 oz)   22 59.2 kg (130 lb 9.6 oz)   22 58.4 kg (128 lb 12.8 oz)   22 56.9 kg (125 lb 6.4 oz)   22 56.7 kg (125 lb)   22 58.2 kg (128 lb 6.4 oz)   22 57.5 kg (126 lb 12.8 oz)   22 64.2 kg (141 lb 9.6 oz)   22 55.9 kg (123 lb 3.2 oz)   22 60.8 kg (134 lb)   21 61.7 kg (136 lb)   21 61.2 kg (135 lb)   21 59.3 kg (130 lb 12.8 oz)   21 60.8 kg (134 lb)   10/13/21 59.9 kg (132 lb)   21 63 kg (139 lb)   21 64.4 kg (142 lb)   21 66.7 kg (147 lb)   21 65.6 kg (144 lb 9.6 oz)   21 65.5 kg (144 lb 4.8 oz)   21 66.1 kg (145 lb 12.8 oz)   21 66.2 kg (146 lb)   21 63.5 kg (140 lb)       Name: GINA MCKEON  MRN: [id number]  : 1940  Study Date: 2022 02:11 PM  Age: 81 yrs  Gender: Female  Patient Location: [facility]  Reason For Study: Pulmonary hypertension (H), SOB (shortness of breath),  Bell's pa  Ordering Physician: YONNY LINDSYA  Referring Physician: Berto Alvarado MD  Performed By: Carmen Leong AJ     BSA: 1.5 m2  Height: 62 in  Weight: 120 lb  HR: 80  BP: 121/71  mmHg  ______________________________________________________________________________  Procedure  Complete Echo Adult.     ______________________________________________________________________________  Interpretation Summary     Left ventricular systolic function is normal.  The visual ejection fraction is 65-70%.  No regional wall motion abnormalities noted.  The right ventricular systolic function is mildly reduced.  There is severe (4+) tricuspid regurgitation.  Right ventricular systolic pressure is elevated, consistent with severe  pulmonary hypertension.  There is moderate (2+) mitral regurgitation.  Moderate valvular aortic stenosis.  The mean AoV pressure gradient is 32mmHg.  The right ventricle is moderate to severely dilated.  Compared to 3/22, severe TR and pulmonary hypertension is unchanged. Aortic  stenosis has progressed, mean gradient increased from 21 to 32mm. The study  was technically difficult.  ______________________________________________________________________________  Left Ventricle  The left ventricle is normal in size. There is mild concentric left  ventricular hypertrophy. Left ventricular systolic function is normal. The  visual ejection fraction is 65-70%. Left ventricular diastolic function is  indeterminate. No regional wall motion abnormalities noted.     Right Ventricle  The right ventricle is moderate to severely dilated. The right ventricular  systolic function is mildly reduced.     Atria  There is severe biatrial enlargement.     Mitral Valve  There is severe mitral annular calcification. The mitral valve leaflets are  moderately thickened. There is moderate (2+) mitral regurgitation.     Tricuspid Valve  There is severe (4+) tricuspid regurgitation. The right ventricular systolic  pressure is approximated at 55.7 mmHg plus the right atrial pressure. Right  ventricular systolic pressure is elevated, consistent with severe pulmonary  hypertension. IVC diameter >2.1 cm collapsing  <50% with sniff suggests a high  RA pressure estimated at 15 mmHg or greater.     Aortic Valve  The aortic valve is trileaflet. There is trace aortic regurgitation. Moderate  valvular aortic stenosis. The mean AoV pressure gradient is 32mmHg.     Pulmonic Valve  The pulmonic valve is not well seen, but is grossly normal.     Vessels  The aortic root is normal size.     Pericardium  There is no pericardial effusion.     Rhythm  The rhythm was atrial fibrillation.     ______________________________________________________________________________  MMode/2D Measurements & Calculations  IVSd: 0.97 cm  LVIDd: 4.1 cm  LVIDs: 2.6 cm  LVPWd: 1.2 cm  FS: 34.8 %  LV mass(C)d: 150.6 grams  LV mass(C)dI: 97.9 grams/m2  Ao root diam: 2.4 cm  LA dimension: 4.9 cm     asc Aorta Diam: 3.1 cm  LA/Ao: 2.0  LVOT diam: 2.1 cm  LVOT area: 3.4 cm2  LA Volume (BP): 157.0 ml  LA Volume Index (BP): 101.9 ml/m2  RWT: 0.61     Doppler Measurements & Calculations  MV E max el: 125.5 cm/sec  MV max P.0 mmHg  MV mean PG: 3.1 mmHg  MV V2 VTI: 22.7 cm  MVA(VTI): 3.3 cm2  MV dec time: 0.19 sec     Ao V2 max: 362.9 cm/sec  Ao max P.0 mmHg  Ao V2 mean: 261.9 cm/sec  Ao mean P.0 mmHg  Ao V2 VTI: 74.2 cm  JACOB(I,D): 1.0 cm2  JACOB(V,D): 1.1 cm2  LV V1 max P.9 mmHg  LV V1 max: 121.4 cm/sec  LV V1 VTI: 22.0 cm  MR ERO: 0.16 cm2  MR volume: 27.6 ml  SV(LVOT): 74.3 ml  SI(LVOT): 48.3 ml/m2  PA acc time: 0.05 sec  TR max el: 372.9 cm/sec  TR max P.7 mmHg  AV El Ratio (DI): 0.33  JACOB Index (cm2/m2): 0.65  E/E' av.0  Lateral E/e': 15.6  Medial E/e': 24.5      Remote heart catheterization     Hemodynamics    Systemic blood pressure 140-150 throughout the case.    BASELINE:  1) Elevated right-sided filling pressures (mean RA 16 mmHg)  2) Moderate to severe pulmonary hypertension (mean PA 35 mmHg) secondary to intrinsic pulmonary vascular disease (PVRI 14 rod*m2) and elevated left-sided filling pressures (PCWP 20 mmHg).  3) O2 sats at  baseline 93%  4) Mildly depressed CO/CI:  3.8/1.9    NO 80 PPM:  1) Pulmonary artery pressures did not change (mean PA 34 mmHg)  2) O2 sats improved to 100%  3) CO/CI improved 4.2/2.1 Right sided filling pressures are severely elevated.Left sided filling pressures are moderately elevated. Severely elevated pulmonary artery hypertension.Left ventricular filling pressures are moderately elevated .Normal cardiac output level.             Thank you for allowing me to participate in the care of your patient.      Sincerely,     Shay Marquez MD     [facility]  cc:   No referring provider defined for this encounter.

## 2022-09-02 ENCOUNTER — VIRTUAL VISIT (OUTPATIENT)
Dept: FAMILY MEDICINE | Facility: CLINIC | Age: 82
End: 2022-09-02
Payer: MEDICARE

## 2022-09-02 ENCOUNTER — MEDICAL CORRESPONDENCE (OUTPATIENT)
Dept: HEALTH INFORMATION MANAGEMENT | Facility: CLINIC | Age: 82
End: 2022-09-02

## 2022-09-02 DIAGNOSIS — E03.9 HYPOTHYROIDISM, UNSPECIFIED TYPE: ICD-10-CM

## 2022-09-02 DIAGNOSIS — R41.3 MEMORY LOSS: ICD-10-CM

## 2022-09-02 DIAGNOSIS — E78.2 MIXED HYPERLIPIDEMIA: ICD-10-CM

## 2022-09-02 DIAGNOSIS — J84.10 PULMONARY FIBROSIS (H): ICD-10-CM

## 2022-09-02 DIAGNOSIS — R60.0 BILATERAL LEG EDEMA: ICD-10-CM

## 2022-09-02 DIAGNOSIS — J84.9 ILD (INTERSTITIAL LUNG DISEASE) (H): ICD-10-CM

## 2022-09-02 DIAGNOSIS — Z71.89 ADVANCED CARE PLANNING/COUNSELING DISCUSSION: Primary | ICD-10-CM

## 2022-09-02 DIAGNOSIS — I27.20 PULMONARY HYPERTENSION (H): ICD-10-CM

## 2022-09-02 DIAGNOSIS — I48.19 PERSISTENT ATRIAL FIBRILLATION (H): ICD-10-CM

## 2022-09-02 PROCEDURE — 99214 OFFICE O/P EST MOD 30 MIN: CPT | Mod: 95 | Performed by: INTERNAL MEDICINE

## 2022-09-02 NOTE — PROGRESS NOTES
Jaqueline is a 81 year old who is being evaluated via a billable telephone visit.      What phone number would you like to be contacted at? [phone]  How would you like to obtain your AVS? Buck Parsons is a 81 year old who presents for the following health issues    HPI     Routine follow up on multiple concerns including pulmonary hypertension, diastolic CHF, hyperlipidemia, pulmonary hypertension   Advanced care planning discussion    HPI:   Patient Jaqueline Canales is a very pleasant 81 year old female with history of CHF, pulmonary hypertension today for routine telephone visit for routine follow up of pulmonary hypertension,  CHF symptms. No chest pains at this time. Patient is also followed by [facility]iology clinic in [place] going forward. No fever or chills.      Current Medications:     Current Outpatient Medications   Medication Sig Dispense Refill     ACE/ARB/ARNI NOT PRESCRIBED (INTENTIONAL) Please choose reason not prescribed from choices below.       acetaminophen (TYLENOL) 500 MG tablet Take 1,000 mg by mouth 2 times daily as needed for mild pain        albuterol (PROAIR HFA/PROVENTIL HFA/VENTOLIN HFA) 108 (90 Base) MCG/ACT inhaler USE 2 INHALATIONS ORALLY   INTO THE LUNGS EVERY 6  HOURS as needed 18 g 0     apixaban ANTICOAGULANT (ELIQUIS) 2.5 MG tablet Take 1 tablet (2.5 mg) by mouth 2 times daily 90 tablet 3     BETA BLOCKER NOT PRESCRIBED (INTENTIONAL) Please choose reason not prescribed from choices below.       bromfenac (PROLENSA) 0.07 % ophthalmic solution Place 1 drop into the right eye daily        digoxin (LANOXIN) 125 MCG tablet TAKE 1 TABLET DAILY 90 tablet 1     donepezil (ARICEPT) 5 MG tablet TAKE 1 TABLET AT BEDTIME 90 tablet 3     folic acid (FOLVITE) 1 MG tablet Take 1 tablet (1,000 mcg) by mouth daily 90 tablet 3     gabapentin (NEURONTIN) 100 MG capsule Take 1 capsule (100 mg) by mouth 3 times daily 90 capsule 11     hydroxychloroquine (PLAQUENIL) 200 MG  tablet Take 200 mg by mouth daily       levothyroxine (SYNTHROID/LEVOTHROID) 75 MCG tablet TAKE 1 TABLET DAILY 90 tablet 2     melatonin 5 MG CAPS        Methotrexate Sodium (METHOTREXATE PO) Take 2.5 mg by mouth 10 tablets weekly - Wednesdays. Splits the dose as 5 tablets in AM & 5 tablets in PM       miconazole (MICATIN) 2 % external cream Apply topically 2 times daily       multivitamin (CENTRUM SILVER) tablet Take 1 tablet by mouth daily       omeprazole (PRILOSEC) 20 MG DR capsule TAKE 1 CAPSULE TWICE DAILY,30-60 MINUTES PRIOR TO     MEALS 180 capsule 2     omeprazole (PRILOSEC) 20 MG DR capsule Take 1 capsule (20 mg) by mouth 2 times daily 180 capsule 3     potassium chloride ER (KLOR-CON) 20 MEQ CR tablet Take 1 tablet (20 mEq) by mouth daily 90 tablet 0     prednisoLONE acetate (PRED MILD) 0.12 % ophthalmic suspension 1-2 drops 4 times daily       salicylic acid (COMPOUND W MAX STRENGTH) 17 % external gel Apply topically daily 7 g 0     sildenafil (REVATIO) 20 MG tablet TAKE 1 TABLET BY MOUTH THREE TIMES DAILY. GENERIC FOR REVATIO. CALL [phone] TO REFILL 90 tablet 5     simvastatin (ZOCOR) 40 MG tablet Take 1 tablet (40 mg) by mouth At Bedtime 90 tablet 0     Skin Protectants, Misc. (EUCERIN) cream Apply topically 2 times daily Apply to areas dry skin topically two times a day for dry skin and Apply to areas of dry skin topically as needed for dry skin daily       torsemide (DEMADEX) 20 MG tablet Take 1 tablet (20 mg) by mouth 2 times daily 60 tablet 3     Vitamin D, Cholecalciferol, 10 MCG (400 UNIT) TABS Take 1,600 Units by mouth daily           Allergies:      Allergies   Allergen Reactions     No Known Allergies             Past Medical History:     Past Medical History:   Diagnosis Date     Amaurosis fugax 5-11    Right     Carotid artery stenosis      Esophageal reflux 3/11/2004     HELICOBACTER PYLORI INFECTION 7/11/2006     hx of CA in situ RT breast-1990.mastectomy 4/17/2007     Hyperlipidemia  LDL goal <70 2011     Lung cancer (H)     squamous cell lung ca left lower lobe     OBESITY NOS 3/11/2004     OSTEOPOROSIS NOS 3/11/2004     Other psoriasis 3/11/2004     Pulmonary hypertension (H) 04/15/2019    Right heart catheterization demonstrated moderate pulmonary arterial hypertension with a mean PA pressure of 34 mmHg, no reversibility with inhaled nitric oxide, elevated right and left-sided filling pressures, low normal cardiac index of 2.1.  Seen by Dr. Shay Marquez.  Started on sildenafil (Revatio) 20 mg 3 times daily.     RA (rheumatoid arthritis) (H) 2010     Raynaud's syndrome 4/10/2006     Scleroderma (H)      Sleep apnea     CPAP         Past Surgical History:     Past Surgical History:   Procedure Laterality Date     BREAST SURGERY       COLONOSCOPY  16     COLONOSCOPY N/A 2019    Procedure: Colonoscopy, With Polypectomy And Biopsy;  Surgeon: Neto Kaminski MD;  Location:  GI     CONIZATION       CV RIGHT HEART CATH MEASUREMENTS RECORDED N/A 4/15/2019    Procedure: Heart Cath Right Heart Cath;  Surgeon: Naresh Cyr MD;  Location:  HEART CARDIAC CATH LAB     ENDARTERECTOMY CAROTID       REPAIR HAMMER TOE       TONSILLECTOMY       ZZC NONSPECIFIC PROCEDURE      mastectomy RT breast     ZZHC COLONOSCOPY THRU STOMA, DIAGNOSTIC  2001    diverticulosis         Family Medical History:     Family History   Problem Relation Age of Onset     Cancer - colorectal Mother      Cerebrovascular Disease Father      Cancer Sister 31        ovarian     Heart Disease Sister      Gastrointestinal Disease Sister         crohns     Gastrointestinal Disease Sister         diverticulitis     Gastrointestinal Disease Brother         diverticulitis     Alzheimer Disease Sister      Cerebrovascular Disease Maternal Grandmother      Diabetes Maternal Grandmother      Mental Illness Maternal Grandmother      Diabetes Maternal Uncle      Cancer Nephew                   Social History:     Social History     Socioeconomic History     Marital status:      Spouse name: Not on file     Number of children: Not on file     Years of education: Not on file     Highest education level: Not on file   Occupational History     Not on file   Tobacco Use     Smoking status: Former Smoker     Packs/day: 1.00     Years: 30.00     Pack years: 30.00     Types: Cigarettes     Start date:      Quit date: 3/11/1992     Years since quittin.4     Smokeless tobacco: Never Used   Substance and Sexual Activity     Alcohol use: Not Currently     Alcohol/week: 0.0 standard drinks     Drug use: No     Sexual activity: Not Currently     Partners: Male   Other Topics Concern     Parent/sibling w/ CABG, MI or angioplasty before 65F 55M? No   Social History Narrative     Not on file     Social Determinants of Health     Financial Resource Strain: Low Risk      Difficulty of Paying Living Expenses: Not hard at all   Food Insecurity: Not on file   Transportation Needs: No Transportation Needs     Lack of Transportation (Medical): No     Lack of Transportation (Non-Medical): No   Physical Activity: Not on file   Stress: Not on file   Social Connections: Not on file   Intimate Partner Violence: Not on file   Housing Stability: Not on file           Review of System:     Constitutional: Negative for fever or chills  Skin: Negative for rashes  Ears/Nose/Throat: Negative for nasal congestion, sore throat  Respiratory: No shortness of breath, dyspnea on exertion, cough, or hemoptysis  Cardiovascular: Negative for chest pain  Gastrointestinal: Negative for nausea, vomiting, positive for chronic GERD  Genitourinary: Negative for dysuria, hematuria  Musculoskeletal: Negative for myalgias  Neurologic: Negative for headaches, positive for worsening short term memory loss symptoms  Psychiatric: Negative for depression, anxiety  Hematologic/Lymphatic/Immunologic: positive for worsening of chronic  bilateral leg edema symptoms  Endocrine: positive for hypothyroidism  Behavioral: Negative for tobacco use       Physical Exam:   There were no vitals taken for this visit.    RESP: no cough over the phone   NEURO: worsening short term memory loss symptoms present  PSYCH: affect normal        Diagnostic Test Results:     Last Comprehensive Metabolic Panel:  Sodium   Date Value Ref Range Status   08/31/2022 137 133 - 144 mmol/L Final   05/10/2021 133 133 - 144 mmol/L Final     Potassium   Date Value Ref Range Status   08/31/2022 4.0 3.4 - 5.3 mmol/L Final   05/10/2021 4.2 3.4 - 5.3 mmol/L Final     Chloride   Date Value Ref Range Status   08/31/2022 97 94 - 109 mmol/L Final   05/10/2021 95 94 - 109 mmol/L Final     Carbon Dioxide   Date Value Ref Range Status   05/10/2021 39 (H) 20 - 32 mmol/L Final     Carbon Dioxide (CO2)   Date Value Ref Range Status   08/31/2022 38 (H) 20 - 32 mmol/L Final     Anion Gap   Date Value Ref Range Status   08/31/2022 2 (L) 3 - 14 mmol/L Final   05/10/2021 <1 (L) 3 - 14 mmol/L Final     Glucose   Date Value Ref Range Status   08/31/2022 76 70 - 99 mg/dL Final   05/10/2021 72 70 - 99 mg/dL Final     Urea Nitrogen   Date Value Ref Range Status   08/31/2022 20 7 - 30 mg/dL Final   05/10/2021 17 7 - 30 mg/dL Final     Creatinine   Date Value Ref Range Status   08/31/2022 0.66 0.52 - 1.04 mg/dL Final   01/13/2022 0.680 mg/dL Final     GFR Estimate   Date Value Ref Range Status   08/31/2022 87 >60 mL/min/1.73m2 Final     Comment:     Effective December 21, 2021 eGFRcr in adults is calculated using the 2021 CKD-EPI creatinine equation which includes age and gender (Nayely hansen al., NEJM, DOI: 10.1056/USKLpa1150007)   05/10/2021 83 >60 mL/min/[1.73_m2] Final     Comment:     Non  GFR Calc  Starting 12/18/2018, serum creatinine based estimated GFR (eGFR) will be   calculated using the Chronic Kidney Disease Epidemiology Collaboration   (CKD-EPI) equation.       Calcium   Date Value  Ref Range Status   08/31/2022 9.7 8.5 - 10.1 mg/dL Final   05/10/2021 9.1 8.5 - 10.1 mg/dL Final         ASSESSMENT/PLAN:     Jaqueline was seen today for recheck medication.    Diagnoses and all orders for this visit:    Advanced care planning/counseling discussion  Memory loss  -   Patient and her  both clearly stated their opinions, which were to age in place, to remain in their own home in [place]. They are not planning on moving to [place] to be closer to family or moving to assisted living/nursing home.   - continue donepezil (ARICEPT) 5 MG tablet; Take 1 tablet (5 mg) by mouth At Bedtime  - continue home healthcare assistance    Mixed hyperlipidemia  -     folic acid (FOLVITE) 1 MG tablet; Take 1 tablet (1,000 mcg) by mouth daily    Hypokalemia  -     potassium chloride ER (KLOR-CON) 20 MEQ CR tablet; Take 1 tablet (20 mEq) by mouth daily    Hyperlipidemia LDL goal <100  -     simvastatin (ZOCOR) 40 MG tablet; Take 1 tablet (40 mg) by mouth At Bedtime    Pulmonary hypertension (H)  Pulmonary fibrosis (H)  ILD (interstitial lung disease) (H)  Bilateral leg edema  -     torsemide (DEMADEX) 20 MG tablet; Take 1 tablet (20 mg) by mouth 2 times daily        Follow Up Plan:     Patient is instructed to return to Internal Medicine clinic for follow-up visit in 1 month.      Phone call duration: 30 minutes    Carissa Burroughs MD  Internal Medicine  [facility]

## 2022-09-08 ENCOUNTER — MEDICAL CORRESPONDENCE (OUTPATIENT)
Dept: HEALTH INFORMATION MANAGEMENT | Facility: CLINIC | Age: 82
End: 2022-09-08

## 2022-09-14 ENCOUNTER — MEDICAL CORRESPONDENCE (OUTPATIENT)
Dept: FAMILY MEDICINE | Facility: CLINIC | Age: 82
End: 2022-09-14

## 2022-09-14 ENCOUNTER — TELEPHONE (OUTPATIENT)
Dept: FAMILY MEDICINE | Facility: CLINIC | Age: 82
End: 2022-09-14

## 2022-09-14 DIAGNOSIS — E78.2 MIXED HYPERLIPIDEMIA: ICD-10-CM

## 2022-09-14 NOTE — TELEPHONE ENCOUNTER
Patient told refill is already in system and will be approved shortly.     Day Covarrubias RN  [facility]

## 2022-09-19 RX ORDER — FOLIC ACID 1 MG/1
TABLET ORAL
Qty: 90 TABLET | Refills: 2 | Status: SHIPPED | OUTPATIENT
Start: 2022-09-19

## 2022-09-19 NOTE — TELEPHONE ENCOUNTER
Prescription approved per [facility] Refill Protocol.    Kailyn Blackmon RN  [facility] Triage Team

## 2022-09-21 ENCOUNTER — MEDICAL CORRESPONDENCE (OUTPATIENT)
Dept: HEALTH INFORMATION MANAGEMENT | Facility: CLINIC | Age: 82
End: 2022-09-21

## 2022-09-22 ENCOUNTER — HOSPITAL ENCOUNTER (OUTPATIENT)
Dept: PHYSICAL THERAPY | Facility: CLINIC | Age: 82
Discharge: HOME | End: 2022-09-22
Payer: MEDICARE

## 2022-09-22 DIAGNOSIS — R26.89 BALANCE PROBLEMS: Primary | ICD-10-CM

## 2022-09-22 PROCEDURE — 97161 PT EVAL LOW COMPLEX 20 MIN: CPT | Mod: GP | Performed by: PHYSICAL THERAPIST

## 2022-09-22 PROCEDURE — 97110 THERAPEUTIC EXERCISES: CPT | Mod: GP | Performed by: PHYSICAL THERAPIST

## 2022-09-22 NOTE — PROGRESS NOTES
[facility]                                                                                   OUTPATIENT PHYSICAL THERAPY FUNCTIONAL EVALUATION  PLAN OF TREATMENT FOR OUTPATIENT REHABILITATION  (COMPLETE FOR INITIAL CLAIMS ONLY)  Patient's Last Name, First Name, M.I.  YOB: 1940  Jaqueline Canales     Provider's Name   [facility]   Medical Record No.  [id number]     Start of Care Date:  09/22/22   Onset Date:   9/1/22   Type:     _X__PT   ____OT  ____SLP Medical Diagnosis:  Balance impairments     PT Diagnosis:  Balance impairments Visits from SOC:  1                              __________________________________________________________________________________  Plan of Treatment/Functional Goals:  balance training, gait training, neuromuscular re-education, ROM, strengthening, stretching        GOALS  DGI  Patient will be able to perform DGI without required use of RW for at least 4 conditions  12/21/22    5x sit to stand  Patient will be able to peform 5x sit to stand with use of hands improving score by at least 10 seconds.  12/21/22    HEP  Patient will be able to perform appropriate HEP to therapist without difficulty or loss of balance  12/21/22       Therapy Frequency:  1 time/week   Predicted Duration of Therapy Intervention:  12 weeks    NICHELLE BONILLA, PT                                    I CERTIFY THE NEED FOR THESE SERVICES FURNISHED UNDER        THIS PLAN OF TREATMENT AND WHILE UNDER MY CARE     (Physician attestation of this document indicates review and certification of the therapy plan).                Certification Date From:  09/22/22   Certification Date To:  12/21/22    Referring Provider:  Dr. Carissa Burroughs    Initial Assessment  See Epic Evaluation- Start of Care Date: 09/22/22

## 2022-09-28 ENCOUNTER — TELEPHONE (OUTPATIENT)
Dept: FAMILY MEDICINE | Facility: CLINIC | Age: 82
End: 2022-09-28

## 2022-09-28 ENCOUNTER — MEDICAL CORRESPONDENCE (OUTPATIENT)
Dept: HEALTH INFORMATION MANAGEMENT | Facility: CLINIC | Age: 82
End: 2022-09-28

## 2022-09-28 NOTE — TELEPHONE ENCOUNTER
FYI-     Carolyn from [facility] wants doctor Manjeet aware of home care recommendations for pt after discharge from [facility]. Per SW, pt needs a high level of support to stay at home. SW discussed the option  assistive living, but both pt and  are not interested on that and wish to stay in there home. Pt's  Sin is [facility].  Pt doesn't qualify for [facility] services, but is willing to get ongoing services from [facility] with private pay. That way, both pt and  will be getting services from the same agency. Services recommended by [facility] are home health aid. To help with sponge baths and lower body care- dressing putting on and removing compression garments 2 to 3 times a week. Skin care- applying lotion, assist with house cleaning, laundry do errands like grocery shopping, filling portable tanks and changing tubing.     [facility] does not have a date of home care discharge but  wants PCP aware of plan.     Carolyn can be reached at [phone] if any further questions.

## 2022-10-06 DIAGNOSIS — I27.20 PULMONARY HYPERTENSION (H): ICD-10-CM

## 2022-10-07 RX ORDER — SILDENAFIL CITRATE 20 MG/1
TABLET ORAL
Qty: 270 TABLET | Refills: 2 | Status: SHIPPED | OUTPATIENT
Start: 2022-10-07

## 2022-10-07 NOTE — TELEPHONE ENCOUNTER
Prescription approved per [facility] Refill Protocol.  Jolene Garcias RN

## 2022-10-11 ENCOUNTER — MEDICAL CORRESPONDENCE (OUTPATIENT)
Dept: HEALTH INFORMATION MANAGEMENT | Facility: CLINIC | Age: 82
End: 2022-10-11

## 2022-10-18 ENCOUNTER — NURSE TRIAGE (OUTPATIENT)
Dept: CARDIOLOGY | Facility: CLINIC | Age: 82
End: 2022-10-18

## 2022-10-18 ENCOUNTER — NURSE TRIAGE (OUTPATIENT)
Dept: NURSING | Facility: CLINIC | Age: 82
End: 2022-10-18

## 2022-10-18 DIAGNOSIS — M79.89 LEG SWELLING: Primary | ICD-10-CM

## 2022-10-18 DIAGNOSIS — R06.09 DOE (DYSPNEA ON EXERTION): ICD-10-CM

## 2022-10-18 NOTE — TELEPHONE ENCOUNTER
Called patient to follow-up on any new s/s. Patient does not ambulate much besides to/from the bathroom and to bed otherwise is sitting most of the time. She does report it is a little more difficult with breathing going to/from the bathroom. Reports her BLE are very swollen. Denies any medications missed. Home care nurse sets up medications. Patient does report increased sodium intake lately as she has been eating chips. Discussed importance of adhering to low sodium foods and food types that have increased sodium. Patient verbalized understanding. Weight gain from last office visit 123lb per reported weight from homecare nurse at 147lb. Greater than 20lb weight gain. Patient does not want to go to the hospital if she does not have to. Discussed I would update the provider regarding and follow-up with her and homecare nurse.

## 2022-10-18 NOTE — TELEPHONE ENCOUNTER
Nurse Triage SBAR    Situation:   Order Request     Background:   RN form [facility] calling.    Assessment:   1) She need home order for nursing care:    -2x a week for 1 week   -1x a week for 3 weeks   -4 visit PRN    2) Please let Dr Daly know she has gained more than 20 lbs in one week.  3)When should she see Dr. Burroughs for her neck well check/ general assessment?    Recommendation:   Please call home care RN back at [phone] and update her regarding the above needs.     ISRAEL BENSON RN on 10/18/2022 at 4:52 PM    Reason for Disposition    [1] Follow-up call from patient regarding patient's clinical status AND [2] information NON-URGENT    Protocols used: PCP CALL - NO TRIAGE-A-

## 2022-10-18 NOTE — TELEPHONE ENCOUNTER
Reviewed labs with Martha Gant. Reviewed chart and it appears PCP had increased torsemide to 20mg TID. Will continue to monitor weekly labs as ordered to homecare.   Wendi Espinosa RN on 10/21/2022 at 3:46 PM      -----------------------------------------------------  Date: 10/18/2022    Time of Call: 2:57 PM     Diagnosis:  Edema, pulmonary hypertension     [ VORB ] Ordering provider: Dr. Marquez  Order: Labs CBC, CMP, BNP to be completed on 10/19 or 10/20 at the latest for future medication management. Labs will also be completed weekly for 3 weeks. Lab orders faxed to [facility]     Order received by: Wendi Espinosa RN       Follow-up/additional notes: Called and notified [facility] homecare nurse and lab orders faxed to [phone]

## 2022-10-18 NOTE — TELEPHONE ENCOUNTER
Homecare nurse called back and discussed she only sees patient for re certifications about every 2 months and other staff visits weekly to set up medications. Discussed it is in the care plan to check weights weekly and notify MD of weight gain greater than 5lb. Discussed to please report to supervisor as this is not being completed for the patient and is a preventable occurrence.   -----------------------------  Called homecare RN back and requested a call back regarding patient status. Voicemail left and personal phone number to be reached at.     Wendi Espinosa RN on 10/18/2022 at 1:26 PM

## 2022-10-18 NOTE — TELEPHONE ENCOUNTER
"Dilan RN from [facility] called because of extreme weight gain and edema in the patient. Patient has her meals delivered but has been eating them a long time. Breathing is ok- she uses oxygen at 1.5-2 L with O2 sat at 95%. Lungs do not sound wet, no abnormal breath sounds. /74, HR 67.  Reason for Disposition    Nursing judgment    Nursing judgment    Additional Information    Negative: Sounds like a life-threatening emergency to the triager    Negative: Information only call about a Well Adult (no illness or injury)    Negative: Caller can't be reached by phone    Negative: Nursing judgment    Negative: Nursing judgment    Negative: Nursing judgment    Answer Assessment - Initial Assessment Questions  1. REASON FOR CALL: \"What is your main concern right now?\"      Greater than 20 pound weight gain since seen in clinic 9/1/22, puffy  2. ONSET: \"When did the weight gain start?\"     Patient's last weights from RN visits (seen every week) are:  133, 137, 139, 142. Today weight on patient's scale is 147.4, on the nurse's scale 144.6  3. SEVERITY: \"How bad is the weight gain, puffiness?\"      Arms and legs are puffy, legs have 3+ edema, patient is dragging her legs to walk  4. FEVER: \"Do you have a fever?\"     No  5. OTHER SYMPTOMS: \"Do you have any other new symptoms?\"      no  6. TREATMENTS AND RESPONSE: \"What have you done so far to try to make this better? What medicines have you used?\"      On Torsemide    Protocols used: NO PROTOCOL [id number]    "

## 2022-10-19 ENCOUNTER — LAB REQUISITION (OUTPATIENT)
Dept: LAB | Facility: CLINIC | Age: 82
End: 2022-10-19
Payer: MEDICARE

## 2022-10-19 DIAGNOSIS — I50.32 CHRONIC DIASTOLIC (CONGESTIVE) HEART FAILURE (H): ICD-10-CM

## 2022-10-19 LAB
ALBUMIN SERPL-MCNC: 3.5 G/DL (ref 3.4–5)
ALP SERPL-CCNC: 161 U/L (ref 40–150)
ALT SERPL W P-5'-P-CCNC: 42 U/L (ref 0–50)
ANION GAP SERPL CALCULATED.3IONS-SCNC: 8 MMOL/L (ref 3–14)
AST SERPL W P-5'-P-CCNC: 46 U/L (ref 0–45)
BILIRUB SERPL-MCNC: 1.1 MG/DL (ref 0.2–1.3)
BUN SERPL-MCNC: 24 MG/DL (ref 7–30)
CALCIUM SERPL-MCNC: 9.8 MG/DL (ref 8.5–10.1)
CHLORIDE BLD-SCNC: 95 MMOL/L (ref 94–109)
CO2 SERPL-SCNC: 34 MMOL/L (ref 20–32)
CREAT SERPL-MCNC: 0.59 MG/DL (ref 0.52–1.04)
ERYTHROCYTE [DISTWIDTH] IN BLOOD BY AUTOMATED COUNT: 17.4 % (ref 10–15)
GFR SERPL CREATININE-BSD FRML MDRD: 89 ML/MIN/1.73M2
GLUCOSE BLD-MCNC: 87 MG/DL (ref 70–99)
HCT VFR BLD AUTO: 37.4 % (ref 35–47)
HGB BLD-MCNC: 12 G/DL (ref 11.7–15.7)
MCH RBC QN AUTO: 32.5 PG (ref 26.5–33)
MCHC RBC AUTO-ENTMCNC: 32.1 G/DL (ref 31.5–36.5)
MCV RBC AUTO: 101 FL (ref 78–100)
NT-PROBNP SERPL-MCNC: 7362 PG/ML (ref 0–1800)
PLAT MORPH BLD: ABNORMAL
PLATELET # BLD AUTO: ABNORMAL 10*3/UL
POTASSIUM BLD-SCNC: 4.2 MMOL/L (ref 3.4–5.3)
PROT SERPL-MCNC: 6.8 G/DL (ref 6.8–8.8)
RBC # BLD AUTO: 3.69 10E6/UL (ref 3.8–5.2)
RBC MORPH BLD: ABNORMAL
SODIUM SERPL-SCNC: 137 MMOL/L (ref 133–144)
WBC # BLD AUTO: 5.7 10E3/UL (ref 4–11)

## 2022-10-19 PROCEDURE — 85048 AUTOMATED LEUKOCYTE COUNT: CPT | Mod: ORL | Performed by: INTERNAL MEDICINE

## 2022-10-19 PROCEDURE — 80053 COMPREHEN METABOLIC PANEL: CPT | Mod: ORL | Performed by: INTERNAL MEDICINE

## 2022-10-19 PROCEDURE — 83880 ASSAY OF NATRIURETIC PEPTIDE: CPT | Mod: ORL | Performed by: INTERNAL MEDICINE

## 2022-10-19 PROCEDURE — 85014 HEMATOCRIT: CPT | Mod: ORL | Performed by: INTERNAL MEDICINE

## 2022-10-19 NOTE — TELEPHONE ENCOUNTER
Called pt and left voicemail for patient to call back.    Also called [facility] and left voicemial for RN to call back.

## 2022-10-19 NOTE — TELEPHONE ENCOUNTER
Contact - [facility] RN    Attempt #1    Was call answered?  No.  Left message on confidential voicemail of home care RN to call back to triage regarding patient's symptoms    On callback - please triage weight gain     Rickey De La Torre RN  [facility]

## 2022-10-20 ENCOUNTER — VIRTUAL VISIT (OUTPATIENT)
Dept: FAMILY MEDICINE | Facility: CLINIC | Age: 82
End: 2022-10-20
Payer: MEDICARE

## 2022-10-20 DIAGNOSIS — I27.20 PULMONARY HYPERTENSION (H): ICD-10-CM

## 2022-10-20 DIAGNOSIS — E78.5 HYPERLIPIDEMIA LDL GOAL <100: ICD-10-CM

## 2022-10-20 DIAGNOSIS — J84.10 PULMONARY FIBROSIS (H): ICD-10-CM

## 2022-10-20 DIAGNOSIS — R41.3 MEMORY LOSS: ICD-10-CM

## 2022-10-20 DIAGNOSIS — Z71.89 ADVANCED CARE PLANNING/COUNSELING DISCUSSION: ICD-10-CM

## 2022-10-20 DIAGNOSIS — I50.32 CHRONIC DIASTOLIC HEART FAILURE (H): Primary | ICD-10-CM

## 2022-10-20 DIAGNOSIS — R60.0 BILATERAL LEG EDEMA: ICD-10-CM

## 2022-10-20 DIAGNOSIS — J84.9 ILD (INTERSTITIAL LUNG DISEASE) (H): ICD-10-CM

## 2022-10-20 DIAGNOSIS — E78.2 MIXED HYPERLIPIDEMIA: ICD-10-CM

## 2022-10-20 PROCEDURE — 99443 PR PHYSICIAN TELEPHONE EVALUATION 21-30 MIN: CPT | Mod: 95 | Performed by: INTERNAL MEDICINE

## 2022-10-20 RX ORDER — TORSEMIDE 20 MG/1
20 TABLET ORAL 3 TIMES DAILY
Qty: 90 TABLET | Refills: 3
Start: 2022-10-20

## 2022-10-20 NOTE — PROGRESS NOTES
Jaqueline is a 81 year old who is being evaluated via a billable telephone visit.      What phone number would you like to be contacted at? [phone]  How would you like to obtain your AVS? Buck Parsons is a 81 year old who presents for the following health issues    HPI     follow up on multiple concerns including pulmonary hypertension, diastolic CHF, hyperlipidemia, pulmonary hypertension   Advanced care planning discussion    HPI:   Patient Jaqueline Canales is a very pleasant 81 year old female with history of CHF, pulmonary hypertension today for routine telephone visit for routine follow up of pulmonary hypertension,  CHF symptms. No chest pains at this time. Patient is also followed by [facility] Cardiology clinic in [place] going forward. No fever or chills. Leg edema symptoms recently not well controlled on Torsemide 2xDay. Patient also has been eating more salty chips in her diet lately.      Current Medications:     Current Outpatient Medications   Medication Sig Dispense Refill     ACE/ARB/ARNI NOT PRESCRIBED (INTENTIONAL) Please choose reason not prescribed from choices below. (Patient not taking: Reported on 10/24/2022)       acetaminophen (TYLENOL) 500 MG tablet Take 1,000 mg by mouth 2 times daily as needed for mild pain        albuterol (PROAIR HFA/PROVENTIL HFA/VENTOLIN HFA) 108 (90 Base) MCG/ACT inhaler USE 2 INHALATIONS ORALLY   INTO THE LUNGS EVERY 6  HOURS as needed 18 g 0     apixaban ANTICOAGULANT (ELIQUIS) 2.5 MG tablet Take 1 tablet (2.5 mg) by mouth 2 times daily 90 tablet 3     BETA BLOCKER NOT PRESCRIBED (INTENTIONAL) Please choose reason not prescribed from choices below.       bromfenac (PROLENSA) 0.07 % ophthalmic solution Place 1 drop into the right eye daily        digoxin (LANOXIN) 125 MCG tablet TAKE 1 TABLET DAILY 90 tablet 1     donepezil (ARICEPT) 5 MG tablet TAKE 1 TABLET AT BEDTIME 90 tablet 3     folic acid (FOLVITE) 1 MG tablet TAKE 1 TABLET DAILY 90 tablet 2      gabapentin (NEURONTIN) 100 MG capsule Take 1 capsule (100 mg) by mouth 3 times daily 90 capsule 11     hydroxychloroquine (PLAQUENIL) 200 MG tablet Take 200 mg by mouth daily       levothyroxine (SYNTHROID/LEVOTHROID) 75 MCG tablet TAKE 1 TABLET DAILY 90 tablet 2     melatonin 5 MG CAPS        Methotrexate Sodium (METHOTREXATE PO) Take 2.5 mg by mouth 10 tablets weekly - Wednesdays. Splits the dose as 5 tablets in AM & 5 tablets in PM       miconazole (MICATIN) 2 % external cream Apply topically 2 times daily       multivitamin (CENTRUM SILVER) tablet Take 1 tablet by mouth daily       omeprazole (PRILOSEC) 20 MG DR capsule TAKE 1 CAPSULE TWICE DAILY,30-60 MINUTES PRIOR TO     MEALS 180 capsule 2     omeprazole (PRILOSEC) 20 MG DR capsule Take 1 capsule (20 mg) by mouth 2 times daily 180 capsule 3     potassium chloride ER (KLOR-CON) 20 MEQ CR tablet Take 1 tablet (20 mEq) by mouth daily 90 tablet 0     prednisoLONE acetate (PRED MILD) 0.12 % ophthalmic suspension 1-2 drops 4 times daily       salicylic acid (COMPOUND W MAX STRENGTH) 17 % external gel Apply topically daily 7 g 0     sildenafil (REVATIO) 20 MG tablet TAKE 1 TABLET BY MOUTH THREE TIMES DAILY. GENERIC FOR REVATIO. 270 tablet 2     simvastatin (ZOCOR) 40 MG tablet Take 1 tablet (40 mg) by mouth At Bedtime 90 tablet 0     Skin Protectants, Misc. (EUCERIN) cream Apply topically 2 times daily Apply to areas dry skin topically two times a day for dry skin and Apply to areas of dry skin topically as needed for dry skin daily       torsemide (DEMADEX) 20 MG tablet Take 1 tablet (20 mg) by mouth 3 times daily 90 tablet 3     Vitamin D, Cholecalciferol, 10 MCG (400 UNIT) TABS Take 1,600 Units by mouth daily           Allergies:      Allergies   Allergen Reactions     No Known Allergies             Past Medical History:     Past Medical History:   Diagnosis Date     Amaurosis fugax 5-11    Right     Carotid artery stenosis      Esophageal reflux 3/11/2004      HELICOBACTER PYLORI INFECTION 7/11/2006     hx of CA in situ RT breast-1990.mastectomy 4/17/2007     Hyperlipidemia LDL goal <70 6/20/2011     Lung cancer (H)     squamous cell lung ca left lower lobe     OBESITY NOS 3/11/2004     OSTEOPOROSIS NOS 3/11/2004     Other psoriasis 3/11/2004     Pulmonary hypertension (H) 04/15/2019    Right heart catheterization demonstrated moderate pulmonary arterial hypertension with a mean PA pressure of 34 mmHg, no reversibility with inhaled nitric oxide, elevated right and left-sided filling pressures, low normal cardiac index of 2.1.  Seen by Dr. Shay Marquez.  Started on sildenafil (Revatio) 20 mg 3 times daily.     RA (rheumatoid arthritis) (H) 2/8/2010     Raynaud's syndrome 4/10/2006     Scleroderma (H)      Sleep apnea     CPAP         Past Surgical History:     Past Surgical History:   Procedure Laterality Date     BREAST SURGERY       COLONOSCOPY  5/24/16     COLONOSCOPY N/A 6/28/2019    Procedure: Colonoscopy, With Polypectomy And Biopsy;  Surgeon: Neto Kaminski MD;  Location:  GI     CONIZATION       CV RIGHT HEART CATH MEASUREMENTS RECORDED N/A 4/15/2019    Procedure: Heart Cath Right Heart Cath;  Surgeon: Naresh Cyr MD;  Location:  HEART CARDIAC CATH LAB     ENDARTERECTOMY CAROTID  2011     REPAIR HAMMER TOE       TONSILLECTOMY       ZZC NONSPECIFIC PROCEDURE  1990    mastectomy RT breast     ZZHC COLONOSCOPY THRU STOMA, DIAGNOSTIC  2001 2011    diverticulosis         Family Medical History:     Family History   Problem Relation Age of Onset     Cancer - colorectal Mother      Cerebrovascular Disease Father      Cancer Sister 31        ovarian     Heart Disease Sister      Gastrointestinal Disease Sister         crohns     Gastrointestinal Disease Sister         diverticulitis     Gastrointestinal Disease Brother         diverticulitis     Alzheimer Disease Sister      Cerebrovascular Disease Maternal Grandmother      Diabetes Maternal  Grandmother      Mental Illness Maternal Grandmother      Diabetes Maternal Uncle      Cancer Nephew                  Social History:     Social History     Socioeconomic History     Marital status:      Spouse name: Not on file     Number of children: Not on file     Years of education: Not on file     Highest education level: Not on file   Occupational History     Not on file   Tobacco Use     Smoking status: Former     Packs/day: 1.00     Years: 30.00     Pack years: 30.00     Types: Cigarettes     Start date:      Quit date: 3/11/1992     Years since quittin.6     Smokeless tobacco: Never   Substance and Sexual Activity     Alcohol use: Not Currently     Alcohol/week: 0.0 standard drinks     Drug use: No     Sexual activity: Not Currently     Partners: Male   Other Topics Concern     Parent/sibling w/ CABG, MI or angioplasty before 65F 55M? No   Social History Narrative     Not on file     Social Determinants of Health     Financial Resource Strain: Low Risk      Difficulty of Paying Living Expenses: Not hard at all   Food Insecurity: Not on file   Transportation Needs: No Transportation Needs     Lack of Transportation (Medical): No     Lack of Transportation (Non-Medical): No   Physical Activity: Not on file   Stress: Not on file   Social Connections: Not on file   Intimate Partner Violence: Not on file   Housing Stability: Not on file           Review of System:     Constitutional: Negative for fever or chills  Skin: Negative for rashes  Ears/Nose/Throat: Negative for nasal congestion, sore throat  Respiratory: No shortness of breath, dyspnea on exertion, cough, or hemoptysis  Cardiovascular: Negative for chest pain  Gastrointestinal: Negative for nausea, vomiting, positive for chronic GERD  Genitourinary: Negative for dysuria, hematuria  Musculoskeletal: Negative for myalgias  Neurologic: Negative for headaches, positive for worsening short term memory loss symptoms  Psychiatric:  Negative for depression, anxiety  Hematologic/Lymphatic/Immunologic: positive for worsening of chronic bilateral leg edema symptoms  Endocrine: positive for hypothyroidism  Behavioral: Negative for tobacco use       Physical Exam:   There were no vitals taken for this visit.    RESP: no cough over the phone   NEURO: worsening short term memory loss symptoms present  PSYCH: affect normal        Diagnostic Test Results:     Last Comprehensive Metabolic Panel:  Sodium   Date Value Ref Range Status   10/19/2022 137 133 - 144 mmol/L Final   05/10/2021 133 133 - 144 mmol/L Final     Potassium   Date Value Ref Range Status   10/19/2022 4.2 3.4 - 5.3 mmol/L Final   05/10/2021 4.2 3.4 - 5.3 mmol/L Final     Chloride   Date Value Ref Range Status   10/19/2022 95 94 - 109 mmol/L Final   05/10/2021 95 94 - 109 mmol/L Final     Carbon Dioxide   Date Value Ref Range Status   05/10/2021 39 (H) 20 - 32 mmol/L Final     Carbon Dioxide (CO2)   Date Value Ref Range Status   10/19/2022 34 (H) 20 - 32 mmol/L Final     Anion Gap   Date Value Ref Range Status   10/19/2022 8 3 - 14 mmol/L Final   05/10/2021 <1 (L) 3 - 14 mmol/L Final     Glucose   Date Value Ref Range Status   10/19/2022 87 70 - 99 mg/dL Final   05/10/2021 72 70 - 99 mg/dL Final     Urea Nitrogen   Date Value Ref Range Status   10/19/2022 24 7 - 30 mg/dL Final   05/10/2021 17 7 - 30 mg/dL Final     Creatinine   Date Value Ref Range Status   10/19/2022 0.59 0.52 - 1.04 mg/dL Final   01/13/2022 0.680 mg/dL Final     GFR Estimate   Date Value Ref Range Status   10/19/2022 89 >60 mL/min/1.73m2 Final     Comment:     Effective December 21, 2021 eGFRcr in adults is calculated using the 2021 CKD-EPI creatinine equation which includes age and gender (Nayely hansen al., NEJM, DOI: 10.1056/KIHNou4554927)   05/10/2021 83 >60 mL/min/[1.73_m2] Final     Comment:     Non  GFR Calc  Starting 12/18/2018, serum creatinine based estimated GFR (eGFR) will be   calculated using the  Chronic Kidney Disease Epidemiology Collaboration   (CKD-EPI) equation.       Calcium   Date Value Ref Range Status   10/19/2022 9.8 8.5 - 10.1 mg/dL Final   05/10/2021 9.1 8.5 - 10.1 mg/dL Final         ASSESSMENT/PLAN:     Jaqueline was seen today for recheck medication.    Diagnoses and all orders for this visit:    Advanced care planning/counseling discussion  Memory loss  -   Patient and her  both clearly stated their opinions, which were to age in place, to remain in their own home in [place]. They are not planning on moving to [place] to be closer to family or moving to assisted living/nursing home.   - continue donepezil (ARICEPT) 5 MG tablet; Take 1 tablet (5 mg) by mouth At Bedtime  - continue home healthcare assistance    Mixed hyperlipidemia  -     folic acid (FOLVITE) 1 MG tablet; Take 1 tablet (1,000 mcg) by mouth daily    Hypokalemia  -     potassium chloride ER (KLOR-CON) 20 MEQ CR tablet; Take 1 tablet (20 mEq) by mouth daily    Hyperlipidemia LDL goal <100  -     simvastatin (ZOCOR) 40 MG tablet; Take 1 tablet (40 mg) by mouth At Bedtime    Chronic diastolic heart failure  Pulmonary hypertension (H)  Pulmonary fibrosis (H)  ILD (interstitial lung disease) (H)  Bilateral leg edema  -    increase torsemide (DEMADEX) 20 MG tablet to 3 times daily  - Patient also has been eating more salty chips in her diet lately.  Patient is advised to avoid salty foods going forward.       Follow Up Plan:     Patient is instructed to return to Internal Medicine clinic for follow-up visit in 1 week.      telephone visit duration: 30 minutes    Carissa Burroughs MD  Internal Medicine  [facility]

## 2022-10-23 ENCOUNTER — MEDICAL CORRESPONDENCE (OUTPATIENT)
Dept: HEALTH INFORMATION MANAGEMENT | Facility: CLINIC | Age: 82
End: 2022-10-23

## 2022-10-24 ENCOUNTER — VIRTUAL VISIT (OUTPATIENT)
Dept: FAMILY MEDICINE | Facility: CLINIC | Age: 82
End: 2022-10-24
Payer: MEDICARE

## 2022-10-24 DIAGNOSIS — I27.20 PULMONARY HYPERTENSION (H): ICD-10-CM

## 2022-10-24 DIAGNOSIS — E78.5 HYPERLIPIDEMIA LDL GOAL <100: ICD-10-CM

## 2022-10-24 DIAGNOSIS — J84.10 PULMONARY FIBROSIS (H): Primary | ICD-10-CM

## 2022-10-24 DIAGNOSIS — R60.0 BILATERAL LEG EDEMA: ICD-10-CM

## 2022-10-24 DIAGNOSIS — Z71.89 ADVANCED CARE PLANNING/COUNSELING DISCUSSION: ICD-10-CM

## 2022-10-24 DIAGNOSIS — R41.3 MEMORY LOSS: ICD-10-CM

## 2022-10-24 DIAGNOSIS — I50.32 CHRONIC DIASTOLIC HEART FAILURE (H): ICD-10-CM

## 2022-10-24 PROCEDURE — 99443 PR PHYSICIAN TELEPHONE EVALUATION 21-30 MIN: CPT | Mod: 95 | Performed by: INTERNAL MEDICINE

## 2022-10-24 NOTE — PROGRESS NOTES
Jaqueline is a 81 year old who is being evaluated via a billable telephone visit.      What phone number would you like to be contacted at? [phone]  How would you like to obtain your AVS? Yoditcordell Parsons is a 81 year old who presents for the following health issues    HPI     follow up on multiple concerns including pulmonary hypertension, diastolic CHF, hyperlipidemia, pulmonary hypertension   Advanced care planning discussion    HPI:   Patient Jaqueline Canales is a very pleasant 81 year old female with history of CHF, pulmonary hypertension today for routine telephone visit for routine follow up of pulmonary hypertension,  CHF symptms. No chest pains at this time. Patient is also followed by [facility] Cardiology clinic in [place] going forward. No fever or chills. Leg edema symptoms recently not well controlled on Torsemide 2xDay. Patient also has been eating more salty chips in her diet lately.      Current Medications:     Current Outpatient Medications   Medication Sig Dispense Refill     acetaminophen (TYLENOL) 500 MG tablet Take 1,000 mg by mouth 2 times daily as needed for mild pain        albuterol (PROAIR HFA/PROVENTIL HFA/VENTOLIN HFA) 108 (90 Base) MCG/ACT inhaler USE 2 INHALATIONS ORALLY   INTO THE LUNGS EVERY 6  HOURS as needed 18 g 0     apixaban ANTICOAGULANT (ELIQUIS) 2.5 MG tablet Take 1 tablet (2.5 mg) by mouth 2 times daily 90 tablet 3     BETA BLOCKER NOT PRESCRIBED (INTENTIONAL) Please choose reason not prescribed from choices below.       bromfenac (PROLENSA) 0.07 % ophthalmic solution Place 1 drop into the right eye daily        digoxin (LANOXIN) 125 MCG tablet TAKE 1 TABLET DAILY 90 tablet 1     donepezil (ARICEPT) 5 MG tablet TAKE 1 TABLET AT BEDTIME 90 tablet 3     folic acid (FOLVITE) 1 MG tablet TAKE 1 TABLET DAILY 90 tablet 2     gabapentin (NEURONTIN) 100 MG capsule Take 1 capsule (100 mg) by mouth 3 times daily 90 capsule 11     hydroxychloroquine (PLAQUENIL) 200 MG  tablet Take 200 mg by mouth daily       levothyroxine (SYNTHROID/LEVOTHROID) 75 MCG tablet TAKE 1 TABLET DAILY 90 tablet 2     melatonin 5 MG CAPS        Methotrexate Sodium (METHOTREXATE PO) Take 2.5 mg by mouth 10 tablets weekly - Wednesdays. Splits the dose as 5 tablets in AM & 5 tablets in PM       miconazole (MICATIN) 2 % external cream Apply topically 2 times daily       multivitamin (CENTRUM SILVER) tablet Take 1 tablet by mouth daily       omeprazole (PRILOSEC) 20 MG DR capsule TAKE 1 CAPSULE TWICE DAILY,30-60 MINUTES PRIOR TO     MEALS 180 capsule 2     omeprazole (PRILOSEC) 20 MG DR capsule Take 1 capsule (20 mg) by mouth 2 times daily 180 capsule 3     potassium chloride ER (KLOR-CON) 20 MEQ CR tablet Take 1 tablet (20 mEq) by mouth daily 90 tablet 0     prednisoLONE acetate (PRED MILD) 0.12 % ophthalmic suspension 1-2 drops 4 times daily       salicylic acid (COMPOUND W MAX STRENGTH) 17 % external gel Apply topically daily 7 g 0     sildenafil (REVATIO) 20 MG tablet TAKE 1 TABLET BY MOUTH THREE TIMES DAILY. GENERIC FOR REVATIO. 270 tablet 2     simvastatin (ZOCOR) 40 MG tablet Take 1 tablet (40 mg) by mouth At Bedtime 90 tablet 0     Skin Protectants, Misc. (EUCERIN) cream Apply topically 2 times daily Apply to areas dry skin topically two times a day for dry skin and Apply to areas of dry skin topically as needed for dry skin daily       torsemide (DEMADEX) 20 MG tablet Take 1 tablet (20 mg) by mouth 3 times daily 90 tablet 3     Vitamin D, Cholecalciferol, 10 MCG (400 UNIT) TABS Take 1,600 Units by mouth daily       ACE/ARB/ARNI NOT PRESCRIBED (INTENTIONAL) Please choose reason not prescribed from choices below. (Patient not taking: Reported on 10/24/2022)           Allergies:      Allergies   Allergen Reactions     No Known Allergies             Past Medical History:     Past Medical History:   Diagnosis Date     Amaurosis fugax 5-11    Right     Carotid artery stenosis      Esophageal reflux  3/11/2004     HELICOBACTER PYLORI INFECTION 7/11/2006     hx of CA in situ RT breast-1990.mastectomy 4/17/2007     Hyperlipidemia LDL goal <70 6/20/2011     Lung cancer (H)     squamous cell lung ca left lower lobe     OBESITY NOS 3/11/2004     OSTEOPOROSIS NOS 3/11/2004     Other psoriasis 3/11/2004     Pulmonary hypertension (H) 04/15/2019    Right heart catheterization demonstrated moderate pulmonary arterial hypertension with a mean PA pressure of 34 mmHg, no reversibility with inhaled nitric oxide, elevated right and left-sided filling pressures, low normal cardiac index of 2.1.  Seen by Dr. Shay Marquez.  Started on sildenafil (Revatio) 20 mg 3 times daily.     RA (rheumatoid arthritis) (H) 2/8/2010     Raynaud's syndrome 4/10/2006     Scleroderma (H)      Sleep apnea     CPAP         Past Surgical History:     Past Surgical History:   Procedure Laterality Date     BREAST SURGERY       COLONOSCOPY  5/24/16     COLONOSCOPY N/A 6/28/2019    Procedure: Colonoscopy, With Polypectomy And Biopsy;  Surgeon: Neto Kaminski MD;  Location:  GI     CONIZATION       CV RIGHT HEART CATH MEASUREMENTS RECORDED N/A 4/15/2019    Procedure: Heart Cath Right Heart Cath;  Surgeon: Naresh Cyr MD;  Location:  HEART CARDIAC CATH LAB     ENDARTERECTOMY CAROTID  2011     REPAIR HAMMER TOE       TONSILLECTOMY       ZZC NONSPECIFIC PROCEDURE  1990    mastectomy RT breast     ZZHC COLONOSCOPY THRU STOMA, DIAGNOSTIC  2001 2011    diverticulosis         Family Medical History:     Family History   Problem Relation Age of Onset     Cancer - colorectal Mother      Cerebrovascular Disease Father      Cancer Sister 31        ovarian     Heart Disease Sister      Gastrointestinal Disease Sister         crohns     Gastrointestinal Disease Sister         diverticulitis     Gastrointestinal Disease Brother         diverticulitis     Alzheimer Disease Sister      Cerebrovascular Disease Maternal Grandmother      Diabetes  Maternal Grandmother      Mental Illness Maternal Grandmother      Diabetes Maternal Uncle      Cancer Nephew                  Social History:     Social History     Socioeconomic History     Marital status:      Spouse name: Not on file     Number of children: Not on file     Years of education: Not on file     Highest education level: Not on file   Occupational History     Not on file   Tobacco Use     Smoking status: Former     Packs/day: 1.00     Years: 30.00     Pack years: 30.00     Types: Cigarettes     Start date:      Quit date: 3/11/1992     Years since quittin.6     Smokeless tobacco: Never   Substance and Sexual Activity     Alcohol use: Not Currently     Alcohol/week: 0.0 standard drinks     Drug use: No     Sexual activity: Not Currently     Partners: Male   Other Topics Concern     Parent/sibling w/ CABG, MI or angioplasty before 65F 55M? No   Social History Narrative     Not on file     Social Determinants of Health     Financial Resource Strain: Low Risk      Difficulty of Paying Living Expenses: Not hard at all   Food Insecurity: Not on file   Transportation Needs: No Transportation Needs     Lack of Transportation (Medical): No     Lack of Transportation (Non-Medical): No   Physical Activity: Not on file   Stress: Not on file   Social Connections: Not on file   Intimate Partner Violence: Not on file   Housing Stability: Not on file           Review of System:     Constitutional: Negative for fever or chills  Skin: Negative for rashes  Ears/Nose/Throat: Negative for nasal congestion, sore throat  Respiratory: No shortness of breath, dyspnea on exertion, cough, or hemoptysis  Cardiovascular: Negative for chest pain  Gastrointestinal: Negative for nausea, vomiting, positive for chronic GERD  Genitourinary: Negative for dysuria, hematuria  Musculoskeletal: Negative for myalgias  Neurologic: Negative for headaches, positive for worsening short term memory loss  symptoms  Psychiatric: Negative for depression, anxiety  Hematologic/Lymphatic/Immunologic: positive for worsening of chronic bilateral leg edema symptoms  Endocrine: positive for hypothyroidism  Behavioral: Negative for tobacco use       Physical Exam:   There were no vitals taken for this visit.    RESP: no cough over the phone   NEURO: worsening short term memory loss symptoms present  PSYCH: affect normal        Diagnostic Test Results:     Last Comprehensive Metabolic Panel:  Sodium   Date Value Ref Range Status   10/19/2022 137 133 - 144 mmol/L Final   05/10/2021 133 133 - 144 mmol/L Final     Potassium   Date Value Ref Range Status   10/19/2022 4.2 3.4 - 5.3 mmol/L Final   05/10/2021 4.2 3.4 - 5.3 mmol/L Final     Chloride   Date Value Ref Range Status   10/19/2022 95 94 - 109 mmol/L Final   05/10/2021 95 94 - 109 mmol/L Final     Carbon Dioxide   Date Value Ref Range Status   05/10/2021 39 (H) 20 - 32 mmol/L Final     Carbon Dioxide (CO2)   Date Value Ref Range Status   10/19/2022 34 (H) 20 - 32 mmol/L Final     Anion Gap   Date Value Ref Range Status   10/19/2022 8 3 - 14 mmol/L Final   05/10/2021 <1 (L) 3 - 14 mmol/L Final     Glucose   Date Value Ref Range Status   10/19/2022 87 70 - 99 mg/dL Final   05/10/2021 72 70 - 99 mg/dL Final     Urea Nitrogen   Date Value Ref Range Status   10/19/2022 24 7 - 30 mg/dL Final   05/10/2021 17 7 - 30 mg/dL Final     Creatinine   Date Value Ref Range Status   10/19/2022 0.59 0.52 - 1.04 mg/dL Final   01/13/2022 0.680 mg/dL Final     GFR Estimate   Date Value Ref Range Status   10/19/2022 89 >60 mL/min/1.73m2 Final     Comment:     Effective December 21, 2021 eGFRcr in adults is calculated using the 2021 CKD-EPI creatinine equation which includes age and gender (Nayely hansen al., NEJ, DOI: 10.1056/SFJMuv0491864)   05/10/2021 83 >60 mL/min/[1.73_m2] Final     Comment:     Non  GFR Calc  Starting 12/18/2018, serum creatinine based estimated GFR (eGFR) will be    calculated using the Chronic Kidney Disease Epidemiology Collaboration   (CKD-EPI) equation.       Calcium   Date Value Ref Range Status   10/19/2022 9.8 8.5 - 10.1 mg/dL Final   05/10/2021 9.1 8.5 - 10.1 mg/dL Final         ASSESSMENT/PLAN:     Jaqueline was seen today for recheck medication.    Diagnoses and all orders for this visit:    Advanced care planning/counseling discussion  Memory loss  -   Patient and her  both clearly stated their opinions, which were to age in place, to remain in their own home in [place]. They are not planning on moving to [place] to be closer to family or moving to assisted living/nursing home.   - continue donepezil (ARICEPT) 5 MG tablet; Take 1 tablet (5 mg) by mouth At Bedtime  - continue home healthcare assistance    Hyperlipidemia LDL goal <100  -     simvastatin (ZOCOR) 40 MG tablet; Take 1 tablet (40 mg) by mouth At Bedtime    Chronic diastolic heart failure  Pulmonary hypertension (H)  Pulmonary fibrosis (H)  Bilateral leg edema  -  Leg edema symptoms mildly improving according to patient's report today  -  continue torsemide (DEMADEX) 20 MG tablet 3 times daily  - Patient also has been eating more salty chips in her diet lately.  Patient is advised to avoid salty foods going forward.       Follow Up Plan:     Patient is instructed to return to Internal Medicine clinic for follow-up visit in 1 week.      telephone visit duration: 30 minutes    Carissa Burroughs MD  Internal Medicine  [facility]

## 2022-10-25 ENCOUNTER — TELEPHONE (OUTPATIENT)
Dept: FAMILY MEDICINE | Facility: CLINIC | Age: 82
End: 2022-10-25

## 2022-10-25 ENCOUNTER — LAB (OUTPATIENT)
Dept: LAB | Facility: CLINIC | Age: 82
End: 2022-10-25
Payer: MEDICARE

## 2022-10-25 DIAGNOSIS — E78.5 HYPERLIPIDEMIA LDL GOAL <100: ICD-10-CM

## 2022-10-25 DIAGNOSIS — R06.09 DOE (DYSPNEA ON EXERTION): ICD-10-CM

## 2022-10-25 DIAGNOSIS — I48.19 PERSISTENT ATRIAL FIBRILLATION (H): ICD-10-CM

## 2022-10-25 DIAGNOSIS — M79.89 LEG SWELLING: ICD-10-CM

## 2022-10-25 DIAGNOSIS — I27.20 PULMONARY HYPERTENSION (H): Primary | ICD-10-CM

## 2022-10-25 LAB
DIGOXIN SERPL-MCNC: 0.9 NG/ML (ref 0.6–2)
ERYTHROCYTE [DISTWIDTH] IN BLOOD BY AUTOMATED COUNT: 17.6 % (ref 10–15)
HCT VFR BLD AUTO: 36.3 % (ref 35–47)
HGB BLD-MCNC: 11.7 G/DL (ref 11.7–15.7)
MCH RBC QN AUTO: 32.3 PG (ref 26.5–33)
MCHC RBC AUTO-ENTMCNC: 32.2 G/DL (ref 31.5–36.5)
MCV RBC AUTO: 100 FL (ref 78–100)
NT-PROBNP SERPL-MCNC: 6598 PG/ML (ref 0–1800)
PLATELET # BLD AUTO: 55 10E3/UL (ref 150–450)
RBC # BLD AUTO: 3.62 10E6/UL (ref 3.8–5.2)
WBC # BLD AUTO: 6.3 10E3/UL (ref 4–11)

## 2022-10-25 PROCEDURE — 80061 LIPID PANEL: CPT

## 2022-10-25 PROCEDURE — 36415 COLL VENOUS BLD VENIPUNCTURE: CPT

## 2022-10-25 PROCEDURE — 80162 ASSAY OF DIGOXIN TOTAL: CPT

## 2022-10-25 PROCEDURE — 85027 COMPLETE CBC AUTOMATED: CPT

## 2022-10-25 PROCEDURE — 80053 COMPREHEN METABOLIC PANEL: CPT

## 2022-10-25 PROCEDURE — 83880 ASSAY OF NATRIURETIC PEPTIDE: CPT

## 2022-10-25 NOTE — TELEPHONE ENCOUNTER
"Kenisha calling from [facility] ([phone], okay to leave detailed message).     Kenisha reports she saw Dr. Burroughs had placed lab orders for Digoxin level and Lipid reflex to direct LDL panel and now the orders are not there.     Kenisha reports a nurse is going out to see patient today to draw labs around 1 pm and they want to make sure they do not need to \"stick her\" more than once.     They will be drawing the standing order labs from Dr. Marquez (cardiology).     There are no future orders from Dr. Burroughs in chart.     Routing to Dr. Burroughs to please advise if you need any labs drawn? Patient will be getting a BNP, CBC with platelet, and CMP drawn today per cardiology.     Kenisha will need a call back by 12-13:30 pm today to have additional labs drawn today.     Kaycee Harmon, RN BSN MSN  [facility]    "

## 2022-10-26 ENCOUNTER — TELEPHONE (OUTPATIENT)
Dept: CARDIOLOGY | Facility: CLINIC | Age: 82
End: 2022-10-26

## 2022-10-26 ENCOUNTER — MEDICAL CORRESPONDENCE (OUTPATIENT)
Dept: HEALTH INFORMATION MANAGEMENT | Facility: CLINIC | Age: 82
End: 2022-10-26

## 2022-10-26 DIAGNOSIS — I27.20 PULMONARY HYPERTENSION (H): Primary | ICD-10-CM

## 2022-10-26 DIAGNOSIS — Z53.9 DIAGNOSIS NOT YET DEFINED: Primary | ICD-10-CM

## 2022-10-26 LAB
ALBUMIN SERPL-MCNC: 3.7 G/DL (ref 3.4–5)
ALP SERPL-CCNC: 168 U/L (ref 40–150)
ALT SERPL W P-5'-P-CCNC: 46 U/L (ref 0–50)
ANION GAP SERPL CALCULATED.3IONS-SCNC: 6 MMOL/L (ref 3–14)
AST SERPL W P-5'-P-CCNC: 45 U/L (ref 0–45)
BILIRUB SERPL-MCNC: 1 MG/DL (ref 0.2–1.3)
BUN SERPL-MCNC: 26 MG/DL (ref 7–30)
CALCIUM SERPL-MCNC: 9.5 MG/DL (ref 8.5–10.1)
CHLORIDE BLD-SCNC: 95 MMOL/L (ref 94–109)
CHOLEST SERPL-MCNC: 136 MG/DL
CO2 SERPL-SCNC: 37 MMOL/L (ref 20–32)
CREAT SERPL-MCNC: 0.66 MG/DL (ref 0.52–1.04)
FASTING STATUS PATIENT QL REPORTED: NO
GFR SERPL CREATININE-BSD FRML MDRD: 87 ML/MIN/1.73M2
GLUCOSE BLD-MCNC: 97 MG/DL (ref 70–99)
HDLC SERPL-MCNC: 76 MG/DL
LDLC SERPL CALC-MCNC: 34 MG/DL
NONHDLC SERPL-MCNC: 60 MG/DL
POTASSIUM BLD-SCNC: 3.1 MMOL/L (ref 3.4–5.3)
PROT SERPL-MCNC: 7.1 G/DL (ref 6.8–8.8)
SODIUM SERPL-SCNC: 138 MMOL/L (ref 133–144)
TRIGL SERPL-MCNC: 131 MG/DL

## 2022-10-26 PROCEDURE — G0179 MD RECERTIFICATION HHA PT: HCPCS | Performed by: INTERNAL MEDICINE

## 2022-10-26 NOTE — TELEPHONE ENCOUNTER
"Pt reports feeling \"okay\" however legs are still swollen. Weight today is 137.5 on home scale. This has decreased from 147lb reported on 10/18.     Appointment noted with Dr. Burroughs on 10/31. Telephone follow-up made with Dr. Marquez on 11/9 as patient does not wish to come into clinic at [facility] location.   Wendi Espinosa RN on 10/26/2022 at 2:46 PM      -----------------------------------------------------  Patient and homecare previously reported 20lb weight gain and significant edema to BLE. Dr. Burroughs had changed torsemide to TID and per follow-up on 10/24/22 this has been working for patient. Dr. Burroughs is managing diuretics at this time. Plan for pt to follow-up with Dr. Fernández in 1-2 weeks via phone. Called patient and left a VM to call back to schedule    Wendi Espinosa RN on 10/26/2022 at 10:55 AM    "

## 2022-10-27 ENCOUNTER — APPOINTMENT (OUTPATIENT)
Dept: GENERAL RADIOLOGY | Facility: CLINIC | Age: 82
End: 2022-10-27
Attending: EMERGENCY MEDICINE
Payer: MEDICARE

## 2022-10-27 ENCOUNTER — HOSPITAL ENCOUNTER (EMERGENCY)
Facility: CLINIC | Age: 82
Discharge: HOME | End: 2022-10-27
Attending: EMERGENCY MEDICINE | Admitting: EMERGENCY MEDICINE
Payer: MEDICARE

## 2022-10-27 ENCOUNTER — VIRTUAL VISIT (OUTPATIENT)
Dept: FAMILY MEDICINE | Facility: CLINIC | Age: 82
End: 2022-10-27
Payer: MEDICARE

## 2022-10-27 VITALS
RESPIRATION RATE: 16 BRPM | SYSTOLIC BLOOD PRESSURE: 106 MMHG | HEART RATE: 81 BPM | DIASTOLIC BLOOD PRESSURE: 79 MMHG | TEMPERATURE: 97.5 F | OXYGEN SATURATION: 98 %

## 2022-10-27 DIAGNOSIS — E78.5 HYPERLIPIDEMIA LDL GOAL <100: ICD-10-CM

## 2022-10-27 DIAGNOSIS — I50.33 ACUTE ON CHRONIC DIASTOLIC (CONGESTIVE) HEART FAILURE (H): Primary | ICD-10-CM

## 2022-10-27 DIAGNOSIS — J84.10 PULMONARY FIBROSIS (H): ICD-10-CM

## 2022-10-27 DIAGNOSIS — L03.116 CELLULITIS OF LEFT LOWER EXTREMITY: ICD-10-CM

## 2022-10-27 DIAGNOSIS — R60.0 BILATERAL LOWER EXTREMITY EDEMA: ICD-10-CM

## 2022-10-27 DIAGNOSIS — R60.0 BILATERAL LEG EDEMA: ICD-10-CM

## 2022-10-27 DIAGNOSIS — I27.20 PULMONARY HYPERTENSION (H): ICD-10-CM

## 2022-10-27 LAB
ALBUMIN SERPL-MCNC: 3.4 G/DL (ref 3.4–5)
ALP SERPL-CCNC: 175 U/L (ref 40–150)
ALT SERPL W P-5'-P-CCNC: 46 U/L (ref 0–50)
ANION GAP SERPL CALCULATED.3IONS-SCNC: 2 MMOL/L (ref 3–14)
AST SERPL W P-5'-P-CCNC: 68 U/L (ref 0–45)
BASOPHILS # BLD AUTO: 0 10E3/UL (ref 0–0.2)
BASOPHILS NFR BLD AUTO: 0 %
BILIRUB SERPL-MCNC: 1.7 MG/DL (ref 0.2–1.3)
BUN SERPL-MCNC: 28 MG/DL (ref 7–30)
CALCIUM SERPL-MCNC: 9.3 MG/DL (ref 8.5–10.1)
CHLORIDE BLD-SCNC: 90 MMOL/L (ref 94–109)
CO2 SERPL-SCNC: 40 MMOL/L (ref 20–32)
CREAT SERPL-MCNC: 0.6 MG/DL (ref 0.52–1.04)
EOSINOPHIL # BLD AUTO: 0.1 10E3/UL (ref 0–0.7)
EOSINOPHIL NFR BLD AUTO: 1 %
ERYTHROCYTE [DISTWIDTH] IN BLOOD BY AUTOMATED COUNT: 17.3 % (ref 10–15)
GFR SERPL CREATININE-BSD FRML MDRD: 89 ML/MIN/1.73M2
GLUCOSE BLD-MCNC: 94 MG/DL (ref 70–99)
HCO3 BLDV-SCNC: 45 MMOL/L (ref 21–28)
HCT VFR BLD AUTO: 37.9 % (ref 35–47)
HGB BLD-MCNC: 11.9 G/DL (ref 11.7–15.7)
IMM GRANULOCYTES # BLD: 0 10E3/UL
IMM GRANULOCYTES NFR BLD: 0 %
LACTATE BLD-SCNC: 0.8 MMOL/L
LYMPHOCYTES # BLD AUTO: 0.4 10E3/UL (ref 0.8–5.3)
LYMPHOCYTES NFR BLD AUTO: 6 %
MCH RBC QN AUTO: 31.9 PG (ref 26.5–33)
MCHC RBC AUTO-ENTMCNC: 31.4 G/DL (ref 31.5–36.5)
MCV RBC AUTO: 102 FL (ref 78–100)
MONOCYTES # BLD AUTO: 0.4 10E3/UL (ref 0–1.3)
MONOCYTES NFR BLD AUTO: 5 %
NEUTROPHILS # BLD AUTO: 6.6 10E3/UL (ref 1.6–8.3)
NEUTROPHILS NFR BLD AUTO: 88 %
NRBC # BLD AUTO: 0 10E3/UL
NRBC BLD AUTO-RTO: 0 /100
NT-PROBNP SERPL-MCNC: 6763 PG/ML (ref 0–1800)
PCO2 BLDV: 75 MM HG (ref 40–50)
PH BLDV: 7.38 [PH] (ref 7.32–7.43)
PLAT MORPH BLD: ABNORMAL
PLATELET # BLD AUTO: ABNORMAL 10*3/UL
PO2 BLDV: 21 MM HG (ref 25–47)
POTASSIUM BLD-SCNC: 4.1 MMOL/L (ref 3.4–5.3)
PROT SERPL-MCNC: 7.4 G/DL (ref 6.8–8.8)
RBC # BLD AUTO: 3.73 10E6/UL (ref 3.8–5.2)
RBC MORPH BLD: ABNORMAL
SAO2 % BLDV: 29 % (ref 94–100)
SODIUM SERPL-SCNC: 132 MMOL/L (ref 133–144)
TROPONIN I SERPL HS-MCNC: 30 NG/L
WBC # BLD AUTO: 7.5 10E3/UL (ref 4–11)

## 2022-10-27 PROCEDURE — 85018 HEMOGLOBIN: CPT | Performed by: EMERGENCY MEDICINE

## 2022-10-27 PROCEDURE — 85048 AUTOMATED LEUKOCYTE COUNT: CPT | Performed by: EMERGENCY MEDICINE

## 2022-10-27 PROCEDURE — 36415 COLL VENOUS BLD VENIPUNCTURE: CPT | Performed by: EMERGENCY MEDICINE

## 2022-10-27 PROCEDURE — 83880 ASSAY OF NATRIURETIC PEPTIDE: CPT | Performed by: EMERGENCY MEDICINE

## 2022-10-27 PROCEDURE — 83605 ASSAY OF LACTIC ACID: CPT

## 2022-10-27 PROCEDURE — 82803 BLOOD GASES ANY COMBINATION: CPT

## 2022-10-27 PROCEDURE — 84484 ASSAY OF TROPONIN QUANT: CPT | Performed by: EMERGENCY MEDICINE

## 2022-10-27 PROCEDURE — 93005 ELECTROCARDIOGRAM TRACING: CPT

## 2022-10-27 PROCEDURE — 99443 PR PHYSICIAN TELEPHONE EVALUATION 21-30 MIN: CPT | Mod: 95 | Performed by: INTERNAL MEDICINE

## 2022-10-27 PROCEDURE — 250N000011 HC RX IP 250 OP 636: Performed by: EMERGENCY MEDICINE

## 2022-10-27 PROCEDURE — 96365 THER/PROPH/DIAG IV INF INIT: CPT

## 2022-10-27 PROCEDURE — 99285 EMERGENCY DEPT VISIT HI MDM: CPT | Mod: 25

## 2022-10-27 PROCEDURE — 71045 X-RAY EXAM CHEST 1 VIEW: CPT

## 2022-10-27 PROCEDURE — 96375 TX/PRO/DX INJ NEW DRUG ADDON: CPT

## 2022-10-27 PROCEDURE — 80053 COMPREHEN METABOLIC PANEL: CPT | Performed by: EMERGENCY MEDICINE

## 2022-10-27 RX ORDER — CEPHALEXIN 500 MG/1
500 CAPSULE ORAL 4 TIMES DAILY
Qty: 40 CAPSULE | Refills: 0 | Status: SHIPPED | OUTPATIENT
Start: 2022-10-27 | End: 2022-11-06

## 2022-10-27 RX ORDER — CEFAZOLIN SODIUM 2 G/100ML
2 INJECTION, SOLUTION INTRAVENOUS ONCE
Status: COMPLETED | OUTPATIENT
Start: 2022-10-27 | End: 2022-10-27

## 2022-10-27 RX ORDER — FUROSEMIDE 10 MG/ML
40 INJECTION INTRAMUSCULAR; INTRAVENOUS ONCE
Status: COMPLETED | OUTPATIENT
Start: 2022-10-27 | End: 2022-10-27

## 2022-10-27 RX ADMIN — CEFAZOLIN SODIUM 2 G: 2 INJECTION, SOLUTION INTRAVENOUS at 17:21

## 2022-10-27 RX ADMIN — FUROSEMIDE 40 MG: 10 INJECTION, SOLUTION INTRAMUSCULAR; INTRAVENOUS at 17:17

## 2022-10-27 ASSESSMENT — ACTIVITIES OF DAILY LIVING (ADL): ADLS_ACUITY_SCORE: 33

## 2022-10-27 NOTE — ED NOTES
Rapid Assessment Note    History:   Jaqueline Terry is a 81 year old female who presents with leg swelling. She has bilateral leg swelling and leg weakness. She states that her legs are leaking. She has decreased her salt intake. She is on diuretics. No blood thinners. Denies chest pain.     Exam:   General:  Alert, interactive  Cardiovascular:  Well perfused  Lungs:  No respiratory distress, no accessory muscle use  Neuro:  Moving all 4 extremities  Skin:  Dressings to lower legs  Psych:  Normal affect    Plan of Care:   I evaluated the patient and developed an initial plan of care. I discussed this plan and explained that I, or one of my partners, would be returning to complete the evaluation.     I, Carmen Joseph, am serving as a scribe to document services personally performed by Abdoul Drummond MD, based on my observations and the provider's statements to me.    11/5/2018  [facility] [facility]    Portions of this medical record were completed by a scribe. UPON MY REVIEW AND AUTHENTICATION BY ELECTRONIC SIGNATURE, this confirms (a) I performed the applicable clinical services, and (b) the record is accurate.        Abdoul Drummond MD  10/27/22 1911

## 2022-10-27 NOTE — PROGRESS NOTES
Jaqueline is a 81 year old who is being evaluated via a billable telephone visit.      What phone number would you like to be contacted at? [phone]  How would you like to obtain your AVS? Buck Parsons is a 81 year old who presents for the following health issues    HPI     follow up on multiple concerns including pulmonary hypertension, diastolic CHF, hyperlipidemia, pulmonary hypertension   Advanced care planning discussion    HPI:   Patient Jaqueline Canales is a very pleasant 81 year old female with history of CHF, pulmonary hypertension today for routine telephone visit for routine follow up of pulmonary hypertension,  CHF symptms. No chest pains at this time. Patient is also followed by [facility] clinic in [place] going forward. No fever or chills. Leg edema symptoms recently not well controlled on Torsemide 2xDay. Patient also has been eating more salty chips in her diet lately.      Current Medications:     Current Outpatient Medications   Medication Sig Dispense Refill     ACE/ARB/ARNI NOT PRESCRIBED (INTENTIONAL) Please choose reason not prescribed from choices below.       acetaminophen (TYLENOL) 500 MG tablet Take 1,000 mg by mouth 2 times daily as needed for mild pain        albuterol (PROAIR HFA/PROVENTIL HFA/VENTOLIN HFA) 108 (90 Base) MCG/ACT inhaler USE 2 INHALATIONS ORALLY   INTO THE LUNGS EVERY 6  HOURS as needed 18 g 0     apixaban ANTICOAGULANT (ELIQUIS) 2.5 MG tablet Take 1 tablet (2.5 mg) by mouth 2 times daily 90 tablet 3     BETA BLOCKER NOT PRESCRIBED (INTENTIONAL) Please choose reason not prescribed from choices below.       bromfenac (PROLENSA) 0.07 % ophthalmic solution Place 1 drop into the right eye daily        digoxin (LANOXIN) 125 MCG tablet TAKE 1 TABLET DAILY 90 tablet 1     donepezil (ARICEPT) 5 MG tablet TAKE 1 TABLET AT BEDTIME 90 tablet 3     folic acid (FOLVITE) 1 MG tablet TAKE 1 TABLET DAILY 90 tablet 2     gabapentin (NEURONTIN) 100 MG capsule  Take 1 capsule (100 mg) by mouth 3 times daily 90 capsule 11     hydroxychloroquine (PLAQUENIL) 200 MG tablet Take 200 mg by mouth daily       levothyroxine (SYNTHROID/LEVOTHROID) 75 MCG tablet TAKE 1 TABLET DAILY 90 tablet 2     melatonin 5 MG CAPS        Methotrexate Sodium (METHOTREXATE PO) Take 2.5 mg by mouth 10 tablets weekly - Wednesdays. Splits the dose as 5 tablets in AM & 5 tablets in PM       miconazole (MICATIN) 2 % external cream Apply topically 2 times daily       multivitamin (CENTRUM SILVER) tablet Take 1 tablet by mouth daily       omeprazole (PRILOSEC) 20 MG DR capsule TAKE 1 CAPSULE TWICE DAILY,30-60 MINUTES PRIOR TO     MEALS 180 capsule 2     omeprazole (PRILOSEC) 20 MG DR capsule Take 1 capsule (20 mg) by mouth 2 times daily 180 capsule 3     potassium chloride ER (KLOR-CON) 20 MEQ CR tablet Take 1 tablet (20 mEq) by mouth daily 90 tablet 0     prednisoLONE acetate (PRED MILD) 0.12 % ophthalmic suspension 1-2 drops 4 times daily       salicylic acid (COMPOUND W MAX STRENGTH) 17 % external gel Apply topically daily 7 g 0     sildenafil (REVATIO) 20 MG tablet TAKE 1 TABLET BY MOUTH THREE TIMES DAILY. GENERIC FOR REVATIO. 270 tablet 2     simvastatin (ZOCOR) 40 MG tablet Take 1 tablet (40 mg) by mouth At Bedtime 90 tablet 0     Skin Protectants, Misc. (EUCERIN) cream Apply topically 2 times daily Apply to areas dry skin topically two times a day for dry skin and Apply to areas of dry skin topically as needed for dry skin daily       torsemide (DEMADEX) 20 MG tablet Take 1 tablet (20 mg) by mouth 3 times daily 90 tablet 3     Vitamin D, Cholecalciferol, 10 MCG (400 UNIT) TABS Take 1,600 Units by mouth daily           Allergies:      Allergies   Allergen Reactions     No Known Allergies             Past Medical History:     Past Medical History:   Diagnosis Date     Amaurosis fugax 5-11    Right     Carotid artery stenosis      Esophageal reflux 3/11/2004     HELICOBACTER PYLORI INFECTION 7/11/2006      hx of CA in situ RT breast-1990.mastectomy 4/17/2007     Hyperlipidemia LDL goal <70 6/20/2011     Lung cancer (H)     squamous cell lung ca left lower lobe     OBESITY NOS 3/11/2004     OSTEOPOROSIS NOS 3/11/2004     Other psoriasis 3/11/2004     Pulmonary hypertension (H) 04/15/2019    Right heart catheterization demonstrated moderate pulmonary arterial hypertension with a mean PA pressure of 34 mmHg, no reversibility with inhaled nitric oxide, elevated right and left-sided filling pressures, low normal cardiac index of 2.1.  Seen by Dr. Shay Marquez.  Started on sildenafil (Revatio) 20 mg 3 times daily.     RA (rheumatoid arthritis) (H) 2/8/2010     Raynaud's syndrome 4/10/2006     Scleroderma (H)      Sleep apnea     CPAP         Past Surgical History:     Past Surgical History:   Procedure Laterality Date     BREAST SURGERY       COLONOSCOPY  5/24/16     COLONOSCOPY N/A 6/28/2019    Procedure: Colonoscopy, With Polypectomy And Biopsy;  Surgeon: Neto Kaminski MD;  Location:  GI     [facility]       CV RIGHT HEART CATH MEASUREMENTS RECORDED N/A 4/15/2019    Procedure: Heart Cath Right Heart Cath;  Surgeon: Naresh Cyr MD;  Location:  HEART CARDIAC CATH LAB     ENDARTERECTOMY CAROTID  2011     REPAIR HAMMER TOE       TONSILLECTOMY       ZZC NONSPECIFIC PROCEDURE  1990    mastectomy RT breast     ZZHC COLONOSCOPY THRU STOMA, DIAGNOSTIC  2001 2011    diverticulosis         Family Medical History:     Family History   Problem Relation Age of Onset     Cancer - colorectal Mother      Cerebrovascular Disease Father      Cancer Sister 31        ovarian     Heart Disease Sister      Gastrointestinal Disease Sister         crohns     Gastrointestinal Disease Sister         diverticulitis     Gastrointestinal Disease Brother         diverticulitis     Alzheimer Disease Sister      Cerebrovascular Disease Maternal Grandmother      Diabetes Maternal Grandmother      Mental Illness Maternal  Grandmother      Diabetes Maternal Uncle      Cancer Nephew                  Social History:     Social History     Socioeconomic History     Marital status:      Spouse name: Not on file     Number of children: Not on file     Years of education: Not on file     Highest education level: Not on file   Occupational History     Not on file   Tobacco Use     Smoking status: Former     Packs/day: 1.00     Years: 30.00     Pack years: 30.00     Types: Cigarettes     Start date:      Quit date: 3/11/1992     Years since quittin.6     Smokeless tobacco: Never   Substance and Sexual Activity     Alcohol use: Not Currently     Alcohol/week: 0.0 standard drinks     Drug use: No     Sexual activity: Not Currently     Partners: Male   Other Topics Concern     Parent/sibling w/ CABG, MI or angioplasty before 65F 55M? No   Social History Narrative     Not on file     Social Determinants of Health     Financial Resource Strain: Low Risk      Difficulty of Paying Living Expenses: Not hard at all   Food Insecurity: Not on file   Transportation Needs: No Transportation Needs     Lack of Transportation (Medical): No     Lack of Transportation (Non-Medical): No   Physical Activity: Not on file   Stress: Not on file   Social Connections: Not on file   Intimate Partner Violence: Not on file   Housing Stability: Not on file           Review of System:     Constitutional: Negative for fever or chills  Skin: Negative for rashes  Ears/Nose/Throat: Negative for nasal congestion, sore throat  Respiratory: No shortness of breath, dyspnea on exertion, cough, or hemoptysis  Cardiovascular: Negative for chest pain  Gastrointestinal: Negative for nausea, vomiting, positive for chronic GERD  Genitourinary: Negative for dysuria, hematuria  Musculoskeletal: Negative for myalgias  Neurologic: Negative for headaches, positive for worsening short term memory loss symptoms  Psychiatric: Negative for depression,  anxiety  Hematologic/Lymphatic/Immunologic: positive for worsening of chronic bilateral leg edema symptoms  Endocrine: positive for hypothyroidism  Behavioral: Negative for tobacco use       Physical Exam:   There were no vitals taken for this visit.    RESP: no cough over the phone   NEURO: worsening short term memory loss symptoms present  PSYCH: affect normal        Diagnostic Test Results:     Last Comprehensive Metabolic Panel:  Sodium   Date Value Ref Range Status   10/25/2022 138 133 - 144 mmol/L Final   05/10/2021 133 133 - 144 mmol/L Final     Potassium   Date Value Ref Range Status   10/25/2022 3.1 (L) 3.4 - 5.3 mmol/L Final   05/10/2021 4.2 3.4 - 5.3 mmol/L Final     Chloride   Date Value Ref Range Status   10/25/2022 95 94 - 109 mmol/L Final   05/10/2021 95 94 - 109 mmol/L Final     Carbon Dioxide   Date Value Ref Range Status   05/10/2021 39 (H) 20 - 32 mmol/L Final     Carbon Dioxide (CO2)   Date Value Ref Range Status   10/25/2022 37 (H) 20 - 32 mmol/L Final     Anion Gap   Date Value Ref Range Status   10/25/2022 6 3 - 14 mmol/L Final   05/10/2021 <1 (L) 3 - 14 mmol/L Final     Glucose   Date Value Ref Range Status   10/25/2022 97 70 - 99 mg/dL Final   05/10/2021 72 70 - 99 mg/dL Final     Urea Nitrogen   Date Value Ref Range Status   10/25/2022 26 7 - 30 mg/dL Final   05/10/2021 17 7 - 30 mg/dL Final     Creatinine   Date Value Ref Range Status   10/25/2022 0.66 0.52 - 1.04 mg/dL Final   01/13/2022 0.680 mg/dL Final     GFR Estimate   Date Value Ref Range Status   10/25/2022 87 >60 mL/min/1.73m2 Final     Comment:     Effective December 21, 2021 eGFRcr in adults is calculated using the 2021 CKD-EPI creatinine equation which includes age and gender (Nayely hansen al., NEJM, DOI: 10.1056/MDOKou7906787)   05/10/2021 83 >60 mL/min/[1.73_m2] Final     Comment:     Non  GFR Calc  Starting 12/18/2018, serum creatinine based estimated GFR (eGFR) will be   calculated using the Chronic Kidney  Disease Epidemiology Collaboration   (CKD-EPI) equation.       Calcium   Date Value Ref Range Status   10/25/2022 9.5 8.5 - 10.1 mg/dL Final   05/10/2021 9.1 8.5 - 10.1 mg/dL Final         ASSESSMENT/PLAN:     Jaqueline was seen today for recheck medication.    Diagnoses and all orders for this visit:    Acute on Chronic diastolic heart failure exacerbation  Pulmonary hypertension (H)  Pulmonary fibrosis (H)  Bilateral leg edema  -  Leg edema symptoms worsened again according to patient's report today  - Patient also has been eating more salty chips in her diet lately.  -  continue torsemide (DEMADEX) 20 MG tablet 3 times daily    Patient is advised to go to the [facility] ER for evaluation of CHF exacerbation today      Hyperlipidemia LDL goal <100  -     simvastatin (ZOCOR) 40 MG tablet; Take 1 tablet (40 mg) by mouth At Bedtime        Follow Up Plan:     Patient is instructed to go to the [facility] ER for evaluation of CHF exacerbation today.      telephone visit duration: 30 minutes    Carissa Burroughs MD  Internal Medicine  [facility]

## 2022-10-27 NOTE — ED PROVIDER NOTES
History     Chief Complaint:  Leg Swelling       HPI   Jaqueline Canales is an 81 year old female who presents with lower extremity swelling.  She has a history of CHF and bilateral lower extremity edema, and she is currently on torsemide.  She states that she noticed some weeping and redness to both lower legs starting yesterday and worsening today.  The left lower extremity is worse than the right.  She denies any fevers, chest pain, or shortness of breath.  She feels otherwise at her baseline.  Her 's home health nurse recommended she be evaluated due to concern for cellulitis.    ROS:  Review of Systems   All other systems reviewed and are negative.        Allergies:  No Known Allergies     Medications:    ACE/ARB/ARNI NOT PRESCRIBED (INTENTIONAL)  acetaminophen (TYLENOL) 500 MG tablet  albuterol (PROAIR HFA/PROVENTIL HFA/VENTOLIN HFA) 108 (90 Base) MCG/ACT inhaler  apixaban ANTICOAGULANT (ELIQUIS) 2.5 MG tablet  BETA BLOCKER NOT PRESCRIBED (INTENTIONAL)  bromfenac (PROLENSA) 0.07 % ophthalmic solution  digoxin (LANOXIN) 125 MCG tablet  donepezil (ARICEPT) 5 MG tablet  folic acid (FOLVITE) 1 MG tablet  gabapentin (NEURONTIN) 100 MG capsule  hydroxychloroquine (PLAQUENIL) 200 MG tablet  levothyroxine (SYNTHROID/LEVOTHROID) 75 MCG tablet  melatonin 5 MG CAPS  Methotrexate Sodium (METHOTREXATE PO)  miconazole (MICATIN) 2 % external cream  multivitamin (CENTRUM SILVER) tablet  omeprazole (PRILOSEC) 20 MG DR capsule  omeprazole (PRILOSEC) 20 MG DR capsule  potassium chloride ER (KLOR-CON) 20 MEQ CR tablet  prednisoLONE acetate (PRED MILD) 0.12 % ophthalmic suspension  salicylic acid (COMPOUND W MAX STRENGTH) 17 % external gel  sildenafil (REVATIO) 20 MG tablet  simvastatin (ZOCOR) 40 MG tablet  Skin Protectants, Misc. (EUCERIN) cream  torsemide (DEMADEX) 20 MG tablet  Vitamin D, Cholecalciferol, 10 MCG (400 UNIT) TABS        Past Medical History:    Past Medical History:   Diagnosis Date     Amaurosis fugax  5-11     Carotid artery stenosis      Esophageal reflux 3/11/2004     HELICOBACTER PYLORI INFECTION 7/11/2006     hx of CA in situ RT breast-1990.mastectomy 4/17/2007     Hyperlipidemia LDL goal <70 6/20/2011     Lung cancer (H)      OBESITY NOS 3/11/2004     OSTEOPOROSIS NOS 3/11/2004     Other psoriasis 3/11/2004     Pulmonary hypertension (H) 04/15/2019     RA (rheumatoid arthritis) (H) 2/8/2010     Raynaud's syndrome 4/10/2006     Scleroderma (H)      Sleep apnea        Past Surgical History:    Past Surgical History:   Procedure Laterality Date     BREAST SURGERY       COLONOSCOPY  5/24/16     COLONOSCOPY N/A 6/28/2019    Procedure: Colonoscopy, With Polypectomy And Biopsy;  Surgeon: Neto Kaminski MD;  Location:  GI     [facility]ATION       CV RIGHT HEART CATH MEASUREMENTS RECORDED N/A 4/15/2019    Procedure: Heart Cath Right Heart Cath;  Surgeon: Naresh Cyr MD;  Location:  HEART CARDIAC CATH LAB     ENDARTERECTOMY CAROTID  2011     REPAIR HAMMER TOE       TONSILLECTOMY       ZZC NONSPECIFIC PROCEDURE  1990    mastectomy RT breast     ZZHC COLONOSCOPY THRU STOMA, DIAGNOSTIC  2001 2011    diverticulosis        Family History:    family history includes Alzheimer Disease in her sister; Cancer in her nephew; Cancer (age of onset: 31) in her sister; Cancer - colorectal in her mother; Cerebrovascular Disease in her father and maternal grandmother; Diabetes in her maternal grandmother and maternal uncle; Gastrointestinal Disease in her brother, sister, and sister; Heart Disease in her sister; Mental Illness in her maternal grandmother.    Social History:   reports that she quit smoking about 30 years ago. Her smoking use included cigarettes. She started smoking about 60 years ago. She has a 30.00 pack-year smoking history. She has never used smokeless tobacco. She reports that she does not currently use alcohol. She reports that she does not use drugs.  PCP: Carissa Burroughs     Physical Exam      Patient Vitals for the past 24 hrs:   BP Temp Temp src Pulse Resp SpO2   10/27/22 1557 -- -- -- -- -- 90 %   10/27/22 1457 110/69 97.5  F (36.4  C) Oral 81 16 --        Physical Exam  Nursing note and vitals reviewed.  Constitutional:  Oriented to person, place, and time. Cooperative.   HENT:   Nose:    Nose normal.   Mouth/Throat:   Facemask in place.   Eyes:    Conjunctivae normal and EOM are normal.      Pupils are equal, round, and reactive to light.   Neck:    Trachea normal.   Cardiovascular:  Normal rate, irregularly irregular rhythm, normal heart sounds and normal pulses.  3/6 systolic murmur heard.  Pulmonary/Chest:  Effort normal but some diffuse crackles present.   Abdominal:   Soft. Normal appearance and bowel sounds are normal.      There is no tenderness.      There is no rebound and no CVA tenderness.   Musculoskeletal:  Extremities atraumatic x 4.   Lymphadenopathy:  No cervical adenopathy.   Neurological:   Alert and oriented to person, place, and time. Normal strength.      No cranial nerve deficit or sensory deficit. GCS eye subscore is 4. GCS verbal subscore is 5. GCS motor subscore is 6.   Skin:    Bilateral lower extremity edema is present with erythema to the left lower leg and warmth to the left lower leg no current weeping present.  Some mild erythema to the right lower leg but no weeping or warmth present.   Psychiatric:   Normal mood and affect.      Emergency Department Course   ECG  ECG taken at 1802. ECG read at 1807   Atrial fibrillation with premature ventricular or aberrantly conducted complexes. Right bundle branch block. Left anterior fascicular block. Bifascicular block. Possible anterior infarct, age undetermined. Abnormal ECG   No significant change as compared to prior, dated 3/22/22.  Rate 78 bpm. ME interval * ms. QRS duration 114 ms. QT/QTc 432/492 ms. P-R-T axes * -71 103.       Imaging:  XR Chest Port 1 View   Final Result   IMPRESSION: Cardiomegaly with central  vascular congestion and diffuse interstitial coarsening likely related to chronic fibrotic changes. No definite superimposed edema/acute airspace disease. No definite pleural effusion. Mitral annulus calcifications.         Report per radiology    Laboratory:  Labs Ordered and Resulted from Time of ED Arrival to Time of ED Departure   COMPREHENSIVE METABOLIC PANEL - Abnormal       Result Value    Sodium 132 (*)     Potassium 4.1      Chloride 90 (*)     Carbon Dioxide (CO2) 40 (*)     Anion Gap 2 (*)     Urea Nitrogen 28      Creatinine 0.60      Calcium 9.3      Glucose 94      Alkaline Phosphatase 175 (*)     AST 68 (*)     ALT 46      Protein Total 7.4      Albumin 3.4      Bilirubin Total 1.7 (*)     GFR Estimate 89     NT PROBNP INPATIENT - Abnormal    N terminal Pro BNP Inpatient 6,763 (*)    CBC WITH PLATELETS AND DIFFERENTIAL - Abnormal    WBC Count 7.5      RBC Count 3.73 (*)     Hemoglobin 11.9      Hematocrit 37.9       (*)     MCH 31.9      MCHC 31.4 (*)     RDW 17.3 (*)     Platelet Count        % Neutrophils 88      % Lymphocytes 6      % Monocytes 5      % Eosinophils 1      % Basophils 0      % Immature Granulocytes 0      NRBCs per 100 WBC 0      Absolute Neutrophils 6.6      Absolute Lymphocytes 0.4 (*)     Absolute Monocytes 0.4      Absolute Eosinophils 0.1      Absolute Basophils 0.0      Absolute Immature Granulocytes 0.0      Absolute NRBCs 0.0     RBC AND PLATELET MORPHOLOGY - Abnormal    Platelet Assessment Platelets Clumped (*)     RBC Morphology Confirmed RBC Indices     ISTAT GASES LACTATE VENOUS POCT - Abnormal    Lactic Acid POCT 0.8      Bicarbonate Venous POCT 45 (*)     O2 Sat, Venous POCT 29 (*)     pCO2V Venous POCT 75 (*)     pH Venous POCT 7.38      pO2 Venous POCT 21 (*)    TROPONIN I - Normal    Troponin I High Sensitivity 30        Emergency Department Course:         Reviewed:  I reviewed nursing notes, vitals, past medical history, Care Everywhere and  [facility]    Assessments:  1655 I obtained history and examined the patient as noted above.     Interventions:  Medications   furosemide (LASIX) injection 40 mg (40 mg Intravenous Given 10/27/22 1717)   ceFAZolin (ANCEF) 2 g in 100 mL D5W intermittent infusion (0 g Intravenous Stopped 10/27/22 1813)        Disposition:  The patient was discharged to home.     Impression & Plan    Medical Decision Making:  This is an 81-year-old female who came in for further evaluation of worsening edema in both lower legs as well as erythema which apparently just started as well in the last day or so.  Her left leg it does appear to be consistent with cellulitis, and she certainly could have the start of cellulitis in her right lower extremity as well.  However I suspect that is more from her edema. She does not appear septic or toxic, and her blood work is unremarkable and reassuring.  I felt it was reasonable to provide her an IV dose of Lasix as well as IV antibiotics.  She had a chest x-ray obtained ordered from triage as well, and that appears to show some chronic changes as opposed to CHF or pneumonia.  She feels comfortable going home, which I think is reasonable.  In reviewing her notes, it appears that she has diuresed quite a bit and has even lost quite a bit of water weight.  Therefore I think that she is safe for discharge with antibiotics and no change to her outpatient diuretic at this time.  I recommended close outpatient follow-up and elevating her legs is much as possible over the next several days.  She should return here with any concerns or worsening symptoms as well.      Diagnosis:    ICD-10-CM    1. Cellulitis of left lower extremity  L03.116       2. Bilateral lower extremity edema  R60.0            Discharge Medications:  New Prescriptions    CEPHALEXIN (KEFLEX) 500 MG CAPSULE    Take 1 capsule (500 mg) by mouth 4 times daily for 10 days        10/27/2022   Aubrey Trejo MD Lashkowitz, Seth H,  MD  10/27/22 1826

## 2022-10-27 NOTE — ED TRIAGE NOTES
3 days of leg swelling an weeping.     Triage Assessment     Row Name 10/27/22 8012       Triage Assessment (Adult)    Airway WDL WDL       Respiratory WDL    Respiratory WDL WDL       Skin Circulation/Temperature WDL    Skin Circulation/Temperature WDL --  Swelling to both LE       Cardiac WDL    Cardiac WDL WDL       Peripheral/Neurovascular WDL    Peripheral Neurovascular WDL WDL       Cognitive/Neuro/Behavioral WDL    Cognitive/Neuro/Behavioral WDL WDL               no

## 2022-10-28 ENCOUNTER — TELEPHONE (OUTPATIENT)
Dept: FAMILY MEDICINE | Facility: CLINIC | Age: 82
End: 2022-10-28

## 2022-10-28 LAB
ATRIAL RATE - MUSE: NORMAL BPM
DIASTOLIC BLOOD PRESSURE - MUSE: NORMAL MMHG
INTERPRETATION ECG - MUSE: NORMAL
P AXIS - MUSE: NORMAL DEGREES
PR INTERVAL - MUSE: NORMAL MS
QRS DURATION - MUSE: 114 MS
QT - MUSE: 432 MS
QTC - MUSE: 492 MS
R AXIS - MUSE: -71 DEGREES
SYSTOLIC BLOOD PRESSURE - MUSE: NORMAL MMHG
T AXIS - MUSE: 103 DEGREES
VENTRICULAR RATE- MUSE: 78 BPM

## 2022-10-28 NOTE — TELEPHONE ENCOUNTER
S-(situation): Indira from [facility] called to give an update on the patient and to relay her concerns.    B-(background): Patient was seen yesterday in the ED and was diagnosed with Cellulitis of left lower extremity. Patient and her  have not picked up the antibiotic that was prescribed and Indira is working with them on this.     A-(assessment): Indira reports that patient's leg is really swollen, red, and has a shiny look. Indira states that at the bottom of the leg it looks like it could open up. Patient was not wearing her compression wraps when Indira came for the visit. Patient's weight was 137.5 on 10/25 and was 139.2 today. Patient denies any SOB or chest pain and is currently on 2 liters of O2 NC. Indira reports all the VS were WNL, but patient has poor circulation to her hands. Idnira also reports that patient has difficulty walking and moving around due to energy.     R-(recommendations): Patient has an appointment with you on 10/31 (telephone). Indira is suggesting that home living be talked about during this appointment. Indira also states that she does not believe the torsemide is helping the patient. (Medication was increased during the ED visit).    Kailyn Blackmon RN  [facility] Triage Team

## 2022-10-31 ENCOUNTER — VIRTUAL VISIT (OUTPATIENT)
Dept: FAMILY MEDICINE | Facility: CLINIC | Age: 82
End: 2022-10-31
Payer: MEDICARE

## 2022-10-31 DIAGNOSIS — R60.0 BILATERAL LEG EDEMA: ICD-10-CM

## 2022-10-31 DIAGNOSIS — I50.33 ACUTE ON CHRONIC DIASTOLIC (CONGESTIVE) HEART FAILURE (H): Primary | ICD-10-CM

## 2022-10-31 DIAGNOSIS — E78.5 HYPERLIPIDEMIA LDL GOAL <100: ICD-10-CM

## 2022-10-31 DIAGNOSIS — J84.10 PULMONARY FIBROSIS (H): ICD-10-CM

## 2022-10-31 DIAGNOSIS — I27.20 PULMONARY HYPERTENSION (H): ICD-10-CM

## 2022-10-31 PROCEDURE — 99443 PR PHYSICIAN TELEPHONE EVALUATION 21-30 MIN: CPT | Mod: 95 | Performed by: INTERNAL MEDICINE

## 2022-10-31 NOTE — PROGRESS NOTES
Jaqueline is a 81 year old who is being evaluated via a billable telephone visit.      What phone number would you like to be contacted at? [phone]  How would you like to obtain your AVS? Yoditcordell Parsons is a 81 year old who presents for the following health issues    HPI     follow up on multiple concerns including pulmonary hypertension, diastolic CHF, hyperlipidemia, pulmonary hypertension   Advanced care planning discussion    HPI:   Patient Jaqueline Canales is a very pleasant 81 year old female with history of CHF, pulmonary hypertension today for routine telephone visit for routine follow up of pulmonary hypertension,  CHF symptms. No chest pains at this time. Patient is also followed by [facility] clinic in [place] going forward. No fever or chills. Leg edema symptoms recently not well controlled on Torsemide 2xDay. Patient also has been eating more salty chips in her diet lately.      Current Medications:     Current Outpatient Medications   Medication Sig Dispense Refill     acetaminophen (TYLENOL) 500 MG tablet Take 1,000 mg by mouth 2 times daily as needed for mild pain        albuterol (PROAIR HFA/PROVENTIL HFA/VENTOLIN HFA) 108 (90 Base) MCG/ACT inhaler USE 2 INHALATIONS ORALLY   INTO THE LUNGS EVERY 6  HOURS as needed 18 g 0     apixaban ANTICOAGULANT (ELIQUIS) 2.5 MG tablet Take 1 tablet (2.5 mg) by mouth 2 times daily 90 tablet 3     BETA BLOCKER NOT PRESCRIBED (INTENTIONAL) Please choose reason not prescribed from choices below.       bromfenac (PROLENSA) 0.07 % ophthalmic solution Place 1 drop into the right eye daily        cephALEXin (KEFLEX) 500 MG capsule Take 1 capsule (500 mg) by mouth 4 times daily for 10 days 40 capsule 0     digoxin (LANOXIN) 125 MCG tablet TAKE 1 TABLET DAILY 90 tablet 1     donepezil (ARICEPT) 5 MG tablet TAKE 1 TABLET AT BEDTIME 90 tablet 3     folic acid (FOLVITE) 1 MG tablet TAKE 1 TABLET DAILY 90 tablet 2     gabapentin (NEURONTIN) 100 MG  capsule Take 1 capsule (100 mg) by mouth 3 times daily 90 capsule 11     hydroxychloroquine (PLAQUENIL) 200 MG tablet Take 200 mg by mouth daily       levothyroxine (SYNTHROID/LEVOTHROID) 75 MCG tablet TAKE 1 TABLET DAILY 90 tablet 2     melatonin 5 MG CAPS        Methotrexate Sodium (METHOTREXATE PO) Take 2.5 mg by mouth 10 tablets weekly - Wednesdays. Splits the dose as 5 tablets in AM & 5 tablets in PM       miconazole (MICATIN) 2 % external cream Apply topically 2 times daily       multivitamin (CENTRUM SILVER) tablet Take 1 tablet by mouth daily       omeprazole (PRILOSEC) 20 MG DR capsule TAKE 1 CAPSULE TWICE DAILY,30-60 MINUTES PRIOR TO     MEALS 180 capsule 2     omeprazole (PRILOSEC) 20 MG DR capsule Take 1 capsule (20 mg) by mouth 2 times daily 180 capsule 3     potassium chloride ER (KLOR-CON) 20 MEQ CR tablet Take 1 tablet (20 mEq) by mouth daily 90 tablet 0     prednisoLONE acetate (PRED MILD) 0.12 % ophthalmic suspension 1-2 drops 4 times daily       salicylic acid (COMPOUND W MAX STRENGTH) 17 % external gel Apply topically daily 7 g 0     sildenafil (REVATIO) 20 MG tablet TAKE 1 TABLET BY MOUTH THREE TIMES DAILY. GENERIC FOR REVATIO. 270 tablet 2     simvastatin (ZOCOR) 40 MG tablet Take 1 tablet (40 mg) by mouth At Bedtime 90 tablet 0     Skin Protectants, Misc. (EUCERIN) cream Apply topically 2 times daily Apply to areas dry skin topically two times a day for dry skin and Apply to areas of dry skin topically as needed for dry skin daily       torsemide (DEMADEX) 20 MG tablet Take 1 tablet (20 mg) by mouth 3 times daily 90 tablet 3     Vitamin D, Cholecalciferol, 10 MCG (400 UNIT) TABS Take 1,600 Units by mouth daily       ACE/ARB/ARNI NOT PRESCRIBED (INTENTIONAL) Please choose reason not prescribed from choices below. (Patient not taking: Reported on 10/31/2022)           Allergies:      Allergies   Allergen Reactions     No Known Allergies             Past Medical History:     Past Medical History:    Diagnosis Date     Amaurosis fugax 5-11    Right     Carotid artery stenosis      Esophageal reflux 3/11/2004     HELICOBACTER PYLORI INFECTION 7/11/2006     hx of CA in situ RT breast-1990.mastectomy 4/17/2007     Hyperlipidemia LDL goal <70 6/20/2011     Lung cancer (H)     squamous cell lung ca left lower lobe     OBESITY NOS 3/11/2004     OSTEOPOROSIS NOS 3/11/2004     Other psoriasis 3/11/2004     Pulmonary hypertension (H) 04/15/2019    Right heart catheterization demonstrated moderate pulmonary arterial hypertension with a mean PA pressure of 34 mmHg, no reversibility with inhaled nitric oxide, elevated right and left-sided filling pressures, low normal cardiac index of 2.1.  Seen by Dr. Shay Marquez.  Started on sildenafil (Revatio) 20 mg 3 times daily.     RA (rheumatoid arthritis) (H) 2/8/2010     Raynaud's syndrome 4/10/2006     Scleroderma (H)      Sleep apnea     CPAP         Past Surgical History:     Past Surgical History:   Procedure Laterality Date     BREAST SURGERY       COLONOSCOPY  5/24/16     COLONOSCOPY N/A 6/28/2019    Procedure: Colonoscopy, With Polypectomy And Biopsy;  Surgeon: Neto Kaminski MD;  Location:  GI     CONIZATION       CV RIGHT HEART CATH MEASUREMENTS RECORDED N/A 4/15/2019    Procedure: Heart Cath Right Heart Cath;  Surgeon: Naresh Cyr MD;  Location:  HEART CARDIAC CATH LAB     ENDARTERECTOMY CAROTID  2011     REPAIR HAMMER TOE       TONSILLECTOMY       ZZC NONSPECIFIC PROCEDURE  1990    mastectomy RT breast     ZZHC COLONOSCOPY THRU STOMA, DIAGNOSTIC  2001 2011    diverticulosis         Family Medical History:     Family History   Problem Relation Age of Onset     Cancer - colorectal Mother      Cerebrovascular Disease Father      Cancer Sister 31        ovarian     Heart Disease Sister      Gastrointestinal Disease Sister         crohns     Gastrointestinal Disease Sister         diverticulitis     Gastrointestinal Disease Brother          diverticulitis     Alzheimer Disease Sister      Cerebrovascular Disease Maternal Grandmother      Diabetes Maternal Grandmother      Mental Illness Maternal Grandmother      Diabetes Maternal Uncle      Cancer Nephew                  Social History:     Social History     Socioeconomic History     Marital status:      Spouse name: Not on file     Number of children: Not on file     Years of education: Not on file     Highest education level: Not on file   Occupational History     Not on file   Tobacco Use     Smoking status: Former     Packs/day: 1.00     Years: 30.00     Pack years: 30.00     Types: Cigarettes     Start date:      Quit date: 3/11/1992     Years since quittin.6     Smokeless tobacco: Never   Substance and Sexual Activity     Alcohol use: Not Currently     Alcohol/week: 0.0 standard drinks     Drug use: No     Sexual activity: Not Currently     Partners: Male   Other Topics Concern     Parent/sibling w/ CABG, MI or angioplasty before 65F 55M? No   Social History Narrative     Not on file     Social Determinants of Health     Financial Resource Strain: Low Risk      Difficulty of Paying Living Expenses: Not hard at all   Food Insecurity: Not on file   Transportation Needs: No Transportation Needs     Lack of Transportation (Medical): No     Lack of Transportation (Non-Medical): No   Physical Activity: Not on file   Stress: Not on file   Social Connections: Not on file   Intimate Partner Violence: Not on file   Housing Stability: Not on file           Review of System:     Constitutional: Negative for fever or chills  Skin: Negative for rashes  Ears/Nose/Throat: Negative for nasal congestion, sore throat  Respiratory: No shortness of breath, dyspnea on exertion, cough, or hemoptysis  Cardiovascular: Negative for chest pain  Gastrointestinal: Negative for nausea, vomiting, positive for chronic GERD  Genitourinary: Negative for dysuria, hematuria  Musculoskeletal: Negative for  myalgias  Neurologic: Negative for headaches, positive for worsening short term memory loss symptoms  Psychiatric: Negative for depression, anxiety  Hematologic/Lymphatic/Immunologic: positive for worsening of chronic bilateral leg edema symptoms  Endocrine: positive for hypothyroidism  Behavioral: Negative for tobacco use       Physical Exam:   There were no vitals taken for this visit.    RESP: no cough over the phone   NEURO: worsening short term memory loss symptoms present  PSYCH: affect normal        Diagnostic Test Results:     Last Comprehensive Metabolic Panel:  Sodium   Date Value Ref Range Status   10/27/2022 132 (L) 133 - 144 mmol/L Final   05/10/2021 133 133 - 144 mmol/L Final     Potassium   Date Value Ref Range Status   10/27/2022 4.1 3.4 - 5.3 mmol/L Final     Comment:     Specimen slightly hemolyzed, potassium may be falsely elevated.   05/10/2021 4.2 3.4 - 5.3 mmol/L Final     Chloride   Date Value Ref Range Status   10/27/2022 90 (L) 94 - 109 mmol/L Final   05/10/2021 95 94 - 109 mmol/L Final     Carbon Dioxide   Date Value Ref Range Status   05/10/2021 39 (H) 20 - 32 mmol/L Final     Carbon Dioxide (CO2)   Date Value Ref Range Status   10/27/2022 40 (H) 20 - 32 mmol/L Final     Anion Gap   Date Value Ref Range Status   10/27/2022 2 (L) 3 - 14 mmol/L Final   05/10/2021 <1 (L) 3 - 14 mmol/L Final     Glucose   Date Value Ref Range Status   10/27/2022 94 70 - 99 mg/dL Final   05/10/2021 72 70 - 99 mg/dL Final     Urea Nitrogen   Date Value Ref Range Status   10/27/2022 28 7 - 30 mg/dL Final   05/10/2021 17 7 - 30 mg/dL Final     Creatinine   Date Value Ref Range Status   10/27/2022 0.60 0.52 - 1.04 mg/dL Final   01/13/2022 0.680 mg/dL Final     GFR Estimate   Date Value Ref Range Status   10/27/2022 89 >60 mL/min/1.73m2 Final     Comment:     Effective December 21, 2021 eGFRcr in adults is calculated using the 2021 CKD-EPI creatinine equation which includes age and gender (Nayely et al., NEJ, DOI:  10.1056/YAXIqm9285046)   05/10/2021 83 >60 mL/min/[1.73_m2] Final     Comment:     Non  GFR Calc  Starting 12/18/2018, serum creatinine based estimated GFR (eGFR) will be   calculated using the Chronic Kidney Disease Epidemiology Collaboration   (CKD-EPI) equation.       Calcium   Date Value Ref Range Status   10/27/2022 9.3 8.5 - 10.1 mg/dL Final   05/10/2021 9.1 8.5 - 10.1 mg/dL Final         ASSESSMENT/PLAN:     Jaqueline was seen today for recheck medication.    Diagnoses and all orders for this visit:    Acute on Chronic diastolic heart failure exacerbation  Pulmonary hypertension (H)  Pulmonary fibrosis (H)  Bilateral leg edema  -  Telephone visit for follow up of chronic Leg edema symptoms  - Patient also has been eating more salty chips in her diet lately.  -  torsemide (DEMADEX) 20 MG tablet 3 times daily  - Patient is advised to improve compliance with low salt diet going forward for CHF      Hyperlipidemia LDL goal <100  -     simvastatin (ZOCOR) 40 MG tablet; Take 1 tablet (40 mg) by mouth At Bedtime        Follow Up Plan:     Telephone visit for follow up in 3 days      telephone visit duration: 30 minutes    Carissa Burroughs MD  Internal Medicine  [facility]

## 2022-11-01 ENCOUNTER — APPOINTMENT (OUTPATIENT)
Dept: GENERAL RADIOLOGY | Facility: CLINIC | Age: 82
End: 2022-11-01
Attending: EMERGENCY MEDICINE
Payer: MEDICARE

## 2022-11-01 ENCOUNTER — TRANSFERRED RECORDS (OUTPATIENT)
Dept: HEALTH INFORMATION MANAGEMENT | Facility: CLINIC | Age: 82
End: 2022-11-01

## 2022-11-01 ENCOUNTER — HOSPITAL ENCOUNTER (EMERGENCY)
Facility: CLINIC | Age: 82
Discharge: HOME | End: 2022-11-01
Attending: EMERGENCY MEDICINE | Admitting: EMERGENCY MEDICINE
Payer: MEDICARE

## 2022-11-01 ENCOUNTER — TELEPHONE (OUTPATIENT)
Dept: CARDIOLOGY | Facility: CLINIC | Age: 82
End: 2022-11-01

## 2022-11-01 VITALS
OXYGEN SATURATION: 93 % | HEART RATE: 67 BPM | SYSTOLIC BLOOD PRESSURE: 106 MMHG | RESPIRATION RATE: 16 BRPM | TEMPERATURE: 97.4 F | DIASTOLIC BLOOD PRESSURE: 72 MMHG

## 2022-11-01 DIAGNOSIS — I50.9 CHRONIC CONGESTIVE HEART FAILURE, UNSPECIFIED HEART FAILURE TYPE (H): ICD-10-CM

## 2022-11-01 DIAGNOSIS — R60.0 LEG EDEMA: ICD-10-CM

## 2022-11-01 LAB
ALBUMIN SERPL-MCNC: 3.4 G/DL (ref 3.4–5)
ALP SERPL-CCNC: 163 U/L (ref 40–150)
ALT SERPL W P-5'-P-CCNC: 33 U/L (ref 0–50)
ANION GAP SERPL CALCULATED.3IONS-SCNC: 3 MMOL/L (ref 3–14)
AST SERPL W P-5'-P-CCNC: 51 U/L (ref 0–45)
ATRIAL RATE - MUSE: NORMAL BPM
BASOPHILS # BLD AUTO: 0 10E3/UL (ref 0–0.2)
BASOPHILS NFR BLD AUTO: 1 %
BILIRUB SERPL-MCNC: 1.1 MG/DL (ref 0.2–1.3)
BUN SERPL-MCNC: 22 MG/DL (ref 7–30)
CALCIUM SERPL-MCNC: 9.5 MG/DL (ref 8.5–10.1)
CHLORIDE BLD-SCNC: 93 MMOL/L (ref 94–109)
CO2 SERPL-SCNC: 38 MMOL/L (ref 20–32)
CREAT SERPL-MCNC: 0.56 MG/DL (ref 0.52–1.04)
DIASTOLIC BLOOD PRESSURE - MUSE: NORMAL MMHG
EOSINOPHIL # BLD AUTO: 0.1 10E3/UL (ref 0–0.7)
EOSINOPHIL NFR BLD AUTO: 1 %
ERYTHROCYTE [DISTWIDTH] IN BLOOD BY AUTOMATED COUNT: 17.6 % (ref 10–15)
GFR SERPL CREATININE-BSD FRML MDRD: >90 ML/MIN/1.73M2
GLUCOSE BLD-MCNC: 83 MG/DL (ref 70–99)
HCT VFR BLD AUTO: 39.5 % (ref 35–47)
HGB BLD-MCNC: 12.3 G/DL (ref 11.7–15.7)
IMM GRANULOCYTES # BLD: 0 10E3/UL
IMM GRANULOCYTES NFR BLD: 1 %
INTERPRETATION ECG - MUSE: NORMAL
LYMPHOCYTES # BLD AUTO: 0.4 10E3/UL (ref 0.8–5.3)
LYMPHOCYTES NFR BLD AUTO: 7 %
MCH RBC QN AUTO: 31.5 PG (ref 26.5–33)
MCHC RBC AUTO-ENTMCNC: 31.1 G/DL (ref 31.5–36.5)
MCV RBC AUTO: 101 FL (ref 78–100)
MONOCYTES # BLD AUTO: 0.3 10E3/UL (ref 0–1.3)
MONOCYTES NFR BLD AUTO: 6 %
NEUTROPHILS # BLD AUTO: 5.2 10E3/UL (ref 1.6–8.3)
NEUTROPHILS NFR BLD AUTO: 84 %
NRBC # BLD AUTO: 0 10E3/UL
NRBC BLD AUTO-RTO: 0 /100
NT-PROBNP SERPL-MCNC: 4931 PG/ML (ref 0–1800)
P AXIS - MUSE: NORMAL DEGREES
PLAT MORPH BLD: ABNORMAL
PLATELET # BLD AUTO: ABNORMAL 10*3/UL
POTASSIUM BLD-SCNC: 3.8 MMOL/L (ref 3.4–5.3)
PR INTERVAL - MUSE: NORMAL MS
PROT SERPL-MCNC: 7.1 G/DL (ref 6.8–8.8)
QRS DURATION - MUSE: 108 MS
QT - MUSE: 384 MS
QTC - MUSE: 405 MS
R AXIS - MUSE: -65 DEGREES
RBC # BLD AUTO: 3.91 10E6/UL (ref 3.8–5.2)
RBC MORPH BLD: ABNORMAL
SODIUM SERPL-SCNC: 134 MMOL/L (ref 133–144)
SYSTOLIC BLOOD PRESSURE - MUSE: NORMAL MMHG
T AXIS - MUSE: 72 DEGREES
TROPONIN I SERPL HS-MCNC: 33 NG/L
VENTRICULAR RATE- MUSE: 67 BPM
WBC # BLD AUTO: 6 10E3/UL (ref 4–11)

## 2022-11-01 PROCEDURE — 99285 EMERGENCY DEPT VISIT HI MDM: CPT | Mod: 25

## 2022-11-01 PROCEDURE — 84484 ASSAY OF TROPONIN QUANT: CPT | Performed by: EMERGENCY MEDICINE

## 2022-11-01 PROCEDURE — 71046 X-RAY EXAM CHEST 2 VIEWS: CPT

## 2022-11-01 PROCEDURE — 93005 ELECTROCARDIOGRAM TRACING: CPT

## 2022-11-01 PROCEDURE — 80053 COMPREHEN METABOLIC PANEL: CPT | Performed by: EMERGENCY MEDICINE

## 2022-11-01 PROCEDURE — 36415 COLL VENOUS BLD VENIPUNCTURE: CPT | Performed by: EMERGENCY MEDICINE

## 2022-11-01 PROCEDURE — 83880 ASSAY OF NATRIURETIC PEPTIDE: CPT | Performed by: EMERGENCY MEDICINE

## 2022-11-01 PROCEDURE — 85018 HEMOGLOBIN: CPT | Performed by: EMERGENCY MEDICINE

## 2022-11-01 ASSESSMENT — ENCOUNTER SYMPTOMS
CHILLS: 0
FEVER: 0
VOMITING: 0
NAUSEA: 0
SHORTNESS OF BREATH: 0

## 2022-11-01 ASSESSMENT — ACTIVITIES OF DAILY LIVING (ADL)
ADLS_ACUITY_SCORE: 35
ADLS_ACUITY_SCORE: 35

## 2022-11-01 NOTE — TELEPHONE ENCOUNTER
Torsemide has been increased 3 times without any improvement in weight. Weeping on lower left extremity. Indira sent patient to ER d/t 10 lb weight gain in 5 days. Patient has F/U with Dr. Marquez next week. Mita Garcia RN on 11/1/2022 at 12:59 PM

## 2022-11-01 NOTE — ED NOTES
Bed: ED25  Expected date:   Expected time:   Means of arrival:   Comments:  CAMILA Parry F cellulitis eta 1228

## 2022-11-01 NOTE — ED PROVIDER NOTES
History   Chief Complaint:  Leg Swelling       The history is provided by the patient, a caregiver and the EMS personnel.      Jaqueline Canales is a 81 year old female on Eliquis with history of hyperlipidemia, hypothyroidism, carotid artery stenosis, lung cancer, and pulmonary hypertension who presents via EMS from home with report of increased swelling to the bilateral lower extremities with weeping to the left leg and a 5 lb weight increase since 5 days ago with concerns of heart failure. She was seen here in the ED 5 days ago where they treated her for bilateral leg swelling and potential cellulitis. She was given IV furosemide. She was discharged and placed on a course of cephalexin, which she has been taking. Since then, her home health nurse has noted the weight gain and slight changes to the leg swelling. Here in the ED, the patient is unaware of the weight gain and changes to her legs since her last visit. She notes she takes torsemide at home and elevates her legs, especially at night. She lives at home with her . She denies any recent sick encounters. She denies shortness of breath, fever, chills, nausea, and emesis.    Review of Systems   Constitutional: Negative for chills and fever.   Respiratory: Negative for shortness of breath.    Cardiovascular: Positive for leg swelling (bilateral, left leg weeping).   Gastrointestinal: Negative for nausea and vomiting.   All other systems reviewed and are negative.      Allergies:  No Known Allergies    Medications:  Eliquis  Cephalexin  Digoxin  Donepezil   Plaquenil  Levothyroxine   Omeprazole  Prednisolone  Sildenafil  Simvastatin   Torsemide   CPAP    Past Medical History:     Carotid artery stenosis  GERD  Hyperlipidemia  Lung cancer  Osteoporosis    Pulmonary hypertension  Rheumatoid arthritis  Scleroderma  SRINIVAS  Diverticulosis   Hypothyroidism      Past Surgical History:   Colonoscopy x2  Conization  Heart cath, right  Endarterectomy  carotid  Repair hammer toe  Tonsillectomy  Mastectomy, right  Colonoscopy through stoma x2     Family History:    Colorectal cancer  Cerebrovascular disease  Ovarian cancer  Heart disease  Crohn's disease  Diverticulitis   Diabetes     Social History:  The patient presents via EMS.  The patient presents alone.  PCP: Carissa Burroughs MD    Physical Exam     Patient Vitals for the past 24 hrs:   BP Temp Temp src Pulse Resp SpO2   11/01/22 1434 -- 98.4  F (36.9  C) Temporal -- -- --   11/01/22 1404 112/72 -- -- 67 -- 99 %   11/01/22 1245 -- -- -- 65 -- --   11/01/22 1241 100/62 -- -- -- 16 99 %       Physical Exam    General: Alert and cooperative with exam. Patient in mild distress. Normal mentation.  Nontoxic/non ill appearance  Head:  Scalp is NC/AT  Eyes:  No scleral icterus, PERRL  ENT:  The external nose and ears are normal.  Neck:  Normal range of motion without rigidity.  CV:  Irregular rate and rhythm  Resp:  Breath sounds are clear bilaterally    Non-labored, no retractions or accessory muscle use  GI:  Abdomen is soft, no distension, no tenderness. No peritoneal signs  MS:  2+ pitting edema to bilateral lower extremities with some mild weeping and left lower extremity  Skin:  Warm and dry, No rash or lesions noted.  Neuro: Oriented x 3. No gross motor deficits.    Emergency Department Course   ECG  ECG results from 11/01/22   EKG 12 lead     Value    Systolic Blood Pressure     Diastolic Blood Pressure     Ventricular Rate 67    Atrial Rate *    WA Interval *    QRS Duration 108        QTc 405    P Axis *    R AXIS -65    T Axis 72    Interpretation ECG      Atrial fibrillation  Left anterior fascicular block  Minimal voltage criteria for LVH, may be normal variant ( [place] product )  Nonspecific T wave abnormality  Abnormal ECG         Imaging:  Chest XR,  PA & LAT   Final Result   IMPRESSION: Stable to decreased interstitial prominence bilaterally.   This could be underlying chronic interstitial  lung disease versus mild   improvement of interstitial edema. No new focal airspace disease.   Stable cardiomegaly. Prominent mitral annular calcification appears   stable.      RADHA PELAEZ MD            SYSTEM ID:  [id number]        Report per radiology    Laboratory:  Labs Ordered and Resulted from Time of ED Arrival to Time of ED Departure   COMPREHENSIVE METABOLIC PANEL - Abnormal       Result Value    Sodium 134      Potassium 3.8      Chloride 93 (*)     Carbon Dioxide (CO2) 38 (*)     Anion Gap 3      Urea Nitrogen 22      Creatinine 0.56      Calcium 9.5      Glucose 83      Alkaline Phosphatase 163 (*)     AST 51 (*)     ALT 33      Protein Total 7.1      Albumin 3.4      Bilirubin Total 1.1      GFR Estimate >90     NT PROBNP INPATIENT - Abnormal    N terminal Pro BNP Inpatient 4,931 (*)    CBC WITH PLATELETS AND DIFFERENTIAL - Abnormal    WBC Count 6.0      RBC Count 3.91      Hemoglobin 12.3      Hematocrit 39.5       (*)     MCH 31.5      MCHC 31.1 (*)     RDW 17.6 (*)     Platelet Count        % Neutrophils 84      % Lymphocytes 7      % Monocytes 6      % Eosinophils 1      % Basophils 1      % Immature Granulocytes 1      NRBCs per 100 WBC 0      Absolute Neutrophils 5.2      Absolute Lymphocytes 0.4 (*)     Absolute Monocytes 0.3      Absolute Eosinophils 0.1      Absolute Basophils 0.0      Absolute Immature Granulocytes 0.0      Absolute NRBCs 0.0     RBC AND PLATELET MORPHOLOGY - Abnormal    Platelet Assessment Platelets Clumped (*)     RBC Morphology Confirmed RBC Indices     TROPONIN I - Normal    Troponin I High Sensitivity 33          Emergency Department Course:    Reviewed:  I reviewed nursing notes, vitals, past medical history and Care Everywhere    Assessments:  1245 I obtained history and examined the patient as noted above.   1508 I spoke with the patient's .  1500 I rechecked the patient and explained findings.    Disposition:  The patient was discharged to home.      Impression & Plan     Medical Decision Making:  Jaqueline Canales is a 81 year old female with a PMH of CHF who presents for evaluation of lower extremity edema.  On evaluation patient is afebrile and well appearance.  She does have pitting lower extremity edema with associated venous stasis changes though no evidence of cellulitis or infectious process.  Patient is afebrile and white count is normal.  She has no significant warmth or concerning erythema to her lower extremities.  Recommended that patient complete previously prescribed antibiotic course.  Additionally patient does not appear to be in CHF exacerbation.  Work of breathing is normal with normal oxygen saturations.  Chest x-ray is stable to improved as compared to previous.  BNP is reduced from previous.  At this time I feel patient is safe and appropriate for continued outpatient management.  Patient is already on significant dose of torsemide; no indication for medication change.  I did recommend close follow-up with PCP to ensure continued improvement and return precautions were discussed.  Patient was discharged home.      Diagnosis:    ICD-10-CM    1. Chronic congestive heart failure, unspecified heart failure type (H)  I50.9       2. Leg edema  R60.0           Scribe Disclosure:  Melany PERDOMO, am serving as a scribe at 12:40 PM on 11/1/2022 to document services personally performed by Daniel Alvarenga DO based on my observations and the provider's statements to me.            Daniel Alvarenga DO  11/01/22 [id number]

## 2022-11-01 NOTE — ED NOTES
Sin again did not answer phone call. Per previous RN Chidi, Dr. Louise was able to contact  earlier, and was told that he will be home for when pt is discharged. Georgi with EMS given this information.

## 2022-11-01 NOTE — TELEPHONE ENCOUNTER
[facility] Call Center    Phone Message    May a detailed message be left on voicemail: yes     Reason for Call: Other: Indira from [facility] wanted to update care team pt went to [facility] via ambulance for 5lb weight gain in 2 days and legs swelling, medication has not helped and no labs were taken, Please reach out to Indira to discuss     Action Taken: Message routed to:  [facility] ([facility]): Cardio    Travel Screening: Not Applicable

## 2022-11-01 NOTE — DISCHARGE INSTRUCTIONS
Continue torsemide as previously prescribed    Complete antibiotic course (Keflex) as previously prescribed

## 2022-11-01 NOTE — ED NOTES
Pt cleaned up, new pull ups applied and pt repositioned. AVS reviewed with pt, no questions. Wheelchair ride sey up.  called and update left via voicemail.

## 2022-11-01 NOTE — ED TRIAGE NOTES
Pt present via EMS, per EMS pt coming from home ambulates with walker A&O x3.  Pt has right mastectomy, breat cancer, pt here cellulitis for Thursday and pt today presents with left leg edema and 10lbs weight gain since discharge. Denies cp sob.  Hoping for change in abx

## 2022-11-03 ENCOUNTER — VIRTUAL VISIT (OUTPATIENT)
Dept: FAMILY MEDICINE | Facility: CLINIC | Age: 82
End: 2022-11-03
Payer: MEDICARE

## 2022-11-03 ENCOUNTER — MEDICAL CORRESPONDENCE (OUTPATIENT)
Dept: HEALTH INFORMATION MANAGEMENT | Facility: CLINIC | Age: 82
End: 2022-11-03

## 2022-11-03 DIAGNOSIS — E78.5 HYPERLIPIDEMIA LDL GOAL <100: ICD-10-CM

## 2022-11-03 DIAGNOSIS — I27.20 PULMONARY HYPERTENSION (H): ICD-10-CM

## 2022-11-03 DIAGNOSIS — R60.0 BILATERAL LEG EDEMA: ICD-10-CM

## 2022-11-03 DIAGNOSIS — I50.33 ACUTE ON CHRONIC DIASTOLIC (CONGESTIVE) HEART FAILURE (H): Primary | ICD-10-CM

## 2022-11-03 DIAGNOSIS — J84.10 PULMONARY FIBROSIS (H): ICD-10-CM

## 2022-11-03 PROCEDURE — 99443 PR PHYSICIAN TELEPHONE EVALUATION 21-30 MIN: CPT | Mod: 95 | Performed by: INTERNAL MEDICINE

## 2022-11-03 NOTE — PROGRESS NOTES
Jaqueline is a 81 year old who is being evaluated via a billable telephone visit.      What phone number would you like to be contacted at? [phone]  How would you like to obtain your AVS? Buck Parsons is a 81 year old who presents for the following health issues    HPI     follow up on multiple concerns including pulmonary hypertension, diastolic CHF, hyperlipidemia, pulmonary hypertension   Advanced care planning discussion    HPI:   Patient Jaqueline Canales is a very pleasant 81 year old female with history of CHF, pulmonary hypertension today for routine telephone visit for routine follow up of pulmonary hypertension,  CHF symptms. No chest pains at this time. Patient is also followed by [facility] Cardiology clinic in [place] going forward. No fever or chills. Leg edema symptoms recently not well controlled on Torsemide 2xDay. Patient also has been eating more salty chips in her diet lately.      Current Medications:     Current Outpatient Medications   Medication Sig Dispense Refill     ACE/ARB/ARNI NOT PRESCRIBED (INTENTIONAL) Please choose reason not prescribed from choices below.       acetaminophen (TYLENOL) 500 MG tablet Take 1,000 mg by mouth 2 times daily as needed for mild pain        albuterol (PROAIR HFA/PROVENTIL HFA/VENTOLIN HFA) 108 (90 Base) MCG/ACT inhaler USE 2 INHALATIONS ORALLY   INTO THE LUNGS EVERY 6  HOURS as needed 18 g 0     apixaban ANTICOAGULANT (ELIQUIS) 2.5 MG tablet Take 1 tablet (2.5 mg) by mouth 2 times daily 90 tablet 3     BETA BLOCKER NOT PRESCRIBED (INTENTIONAL) Please choose reason not prescribed from choices below.       bromfenac (PROLENSA) 0.07 % ophthalmic solution Place 1 drop into the right eye daily        cephALEXin (KEFLEX) 500 MG capsule Take 1 capsule (500 mg) by mouth 4 times daily for 10 days 40 capsule 0     digoxin (LANOXIN) 125 MCG tablet TAKE 1 TABLET DAILY 90 tablet 1     donepezil (ARICEPT) 5 MG tablet TAKE 1 TABLET AT BEDTIME 90 tablet 3      folic acid (FOLVITE) 1 MG tablet TAKE 1 TABLET DAILY 90 tablet 2     gabapentin (NEURONTIN) 100 MG capsule Take 1 capsule (100 mg) by mouth 3 times daily 90 capsule 11     hydroxychloroquine (PLAQUENIL) 200 MG tablet Take 200 mg by mouth daily       levothyroxine (SYNTHROID/LEVOTHROID) 75 MCG tablet TAKE 1 TABLET DAILY 90 tablet 2     melatonin 5 MG CAPS        Methotrexate Sodium (METHOTREXATE PO) Take 2.5 mg by mouth 10 tablets weekly - Wednesdays. Splits the dose as 5 tablets in AM & 5 tablets in PM       miconazole (MICATIN) 2 % external cream Apply topically 2 times daily       multivitamin (CENTRUM SILVER) tablet Take 1 tablet by mouth daily       omeprazole (PRILOSEC) 20 MG DR capsule TAKE 1 CAPSULE TWICE DAILY,30-60 MINUTES PRIOR TO     MEALS 180 capsule 2     omeprazole (PRILOSEC) 20 MG DR capsule Take 1 capsule (20 mg) by mouth 2 times daily 180 capsule 3     potassium chloride ER (KLOR-CON) 20 MEQ CR tablet Take 1 tablet (20 mEq) by mouth daily 90 tablet 0     prednisoLONE acetate (PRED MILD) 0.12 % ophthalmic suspension 1-2 drops 4 times daily       salicylic acid (COMPOUND W MAX STRENGTH) 17 % external gel Apply topically daily 7 g 0     sildenafil (REVATIO) 20 MG tablet TAKE 1 TABLET BY MOUTH THREE TIMES DAILY. GENERIC FOR REVATIO. 270 tablet 2     simvastatin (ZOCOR) 40 MG tablet Take 1 tablet (40 mg) by mouth At Bedtime 90 tablet 0     Skin Protectants, Misc. (EUCERIN) cream Apply topically 2 times daily Apply to areas dry skin topically two times a day for dry skin and Apply to areas of dry skin topically as needed for dry skin daily       torsemide (DEMADEX) 20 MG tablet Take 1 tablet (20 mg) by mouth 3 times daily 90 tablet 3     Vitamin D, Cholecalciferol, 10 MCG (400 UNIT) TABS Take 1,600 Units by mouth daily           Allergies:      Allergies   Allergen Reactions     No Known Allergies             Past Medical History:     Past Medical History:   Diagnosis Date     Amaurosis fugax 5-11     Right     Carotid artery stenosis      Esophageal reflux 3/11/2004     HELICOBACTER PYLORI INFECTION 7/11/2006     hx of CA in situ RT breast-1990.mastectomy 4/17/2007     Hyperlipidemia LDL goal <70 6/20/2011     Lung cancer (H)     squamous cell lung ca left lower lobe     OBESITY NOS 3/11/2004     OSTEOPOROSIS NOS 3/11/2004     Other psoriasis 3/11/2004     Pulmonary hypertension (H) 04/15/2019    Right heart catheterization demonstrated moderate pulmonary arterial hypertension with a mean PA pressure of 34 mmHg, no reversibility with inhaled nitric oxide, elevated right and left-sided filling pressures, low normal cardiac index of 2.1.  Seen by Dr. Shay Marquez.  Started on sildenafil (Revatio) 20 mg 3 times daily.     RA (rheumatoid arthritis) (H) 2/8/2010     Raynaud's syndrome 4/10/2006     Scleroderma (H)      Sleep apnea     CPAP         Past Surgical History:     Past Surgical History:   Procedure Laterality Date     BREAST SURGERY       COLONOSCOPY  5/24/16     COLONOSCOPY N/A 6/28/2019    Procedure: Colonoscopy, With Polypectomy And Biopsy;  Surgeon: Neto Kaminski MD;  Location:  GI     CONIZATION       CV RIGHT HEART CATH MEASUREMENTS RECORDED N/A 4/15/2019    Procedure: Heart Cath Right Heart Cath;  Surgeon: Naresh Cyr MD;  Location:  HEART CARDIAC CATH LAB     ENDARTERECTOMY CAROTID  2011     REPAIR HAMMER TOE       TONSILLECTOMY       ZZC NONSPECIFIC PROCEDURE  1990    mastectomy RT breast     ZZHC COLONOSCOPY THRU STOMA, DIAGNOSTIC  2001 2011    diverticulosis         Family Medical History:     Family History   Problem Relation Age of Onset     Cancer - colorectal Mother      Cerebrovascular Disease Father      Cancer Sister 31        ovarian     Heart Disease Sister      Gastrointestinal Disease Sister         crohns     Gastrointestinal Disease Sister         diverticulitis     Gastrointestinal Disease Brother         diverticulitis     Alzheimer Disease Sister       Cerebrovascular Disease Maternal Grandmother      Diabetes Maternal Grandmother      Mental Illness Maternal Grandmother      Diabetes Maternal Uncle      Cancer Nephew                  Social History:     Social History     Socioeconomic History     Marital status:      Spouse name: Not on file     Number of children: Not on file     Years of education: Not on file     Highest education level: Not on file   Occupational History     Not on file   Tobacco Use     Smoking status: Former     Packs/day: 1.00     Years: 30.00     Pack years: 30.00     Types: Cigarettes     Start date:      Quit date: 3/11/1992     Years since quittin.6     Smokeless tobacco: Never   Substance and Sexual Activity     Alcohol use: Not Currently     Alcohol/week: 0.0 standard drinks     Drug use: No     Sexual activity: Not Currently     Partners: Male   Other Topics Concern     Parent/sibling w/ CABG, MI or angioplasty before 65F 55M? No   Social History Narrative     Not on file     Social Determinants of Health     Financial Resource Strain: Low Risk      Difficulty of Paying Living Expenses: Not hard at all   Food Insecurity: Not on file   Transportation Needs: No Transportation Needs     Lack of Transportation (Medical): No     Lack of Transportation (Non-Medical): No   Physical Activity: Not on file   Stress: Not on file   Social Connections: Not on file   Intimate Partner Violence: Not on file   Housing Stability: Not on file           Review of System:     Constitutional: Negative for fever or chills  Skin: Negative for rashes  Ears/Nose/Throat: Negative for nasal congestion, sore throat  Respiratory: No shortness of breath, dyspnea on exertion, cough, or hemoptysis  Cardiovascular: Negative for chest pain  Gastrointestinal: Negative for nausea, vomiting, positive for chronic GERD  Genitourinary: Negative for dysuria, hematuria  Musculoskeletal: Negative for myalgias  Neurologic: Negative for headaches,  positive for worsening short term memory loss symptoms  Psychiatric: Negative for depression, anxiety  Hematologic/Lymphatic/Immunologic: positive for worsening of chronic bilateral leg edema symptoms  Endocrine: positive for hypothyroidism  Behavioral: Negative for tobacco use       Physical Exam:   There were no vitals taken for this visit.    RESP: no cough over the phone   NEURO: worsening short term memory loss symptoms present  PSYCH: affect normal        Diagnostic Test Results:     Last Comprehensive Metabolic Panel:  Sodium   Date Value Ref Range Status   11/01/2022 134 133 - 144 mmol/L Final   05/10/2021 133 133 - 144 mmol/L Final     Potassium   Date Value Ref Range Status   11/01/2022 3.8 3.4 - 5.3 mmol/L Final   05/10/2021 4.2 3.4 - 5.3 mmol/L Final     Chloride   Date Value Ref Range Status   11/01/2022 93 (L) 94 - 109 mmol/L Final   05/10/2021 95 94 - 109 mmol/L Final     Carbon Dioxide   Date Value Ref Range Status   05/10/2021 39 (H) 20 - 32 mmol/L Final     Carbon Dioxide (CO2)   Date Value Ref Range Status   11/01/2022 38 (H) 20 - 32 mmol/L Final     Anion Gap   Date Value Ref Range Status   11/01/2022 3 3 - 14 mmol/L Final   05/10/2021 <1 (L) 3 - 14 mmol/L Final     Glucose   Date Value Ref Range Status   11/01/2022 83 70 - 99 mg/dL Final   05/10/2021 72 70 - 99 mg/dL Final     Urea Nitrogen   Date Value Ref Range Status   11/01/2022 22 7 - 30 mg/dL Final   05/10/2021 17 7 - 30 mg/dL Final     Creatinine   Date Value Ref Range Status   11/01/2022 0.56 0.52 - 1.04 mg/dL Final   01/13/2022 0.680 mg/dL Final     GFR Estimate   Date Value Ref Range Status   11/01/2022 >90 >60 mL/min/1.73m2 Final     Comment:     Effective December 21, 2021 eGFRcr in adults is calculated using the 2021 CKD-EPI creatinine equation which includes age and gender (Nayely et al., NEJM, DOI: 10.1056/GKDZol5275542)   05/10/2021 83 >60 mL/min/[1.73_m2] Final     Comment:     Non  GFR Calc  Starting 12/18/2018,  serum creatinine based estimated GFR (eGFR) will be   calculated using the Chronic Kidney Disease Epidemiology Collaboration   (CKD-EPI) equation.       Calcium   Date Value Ref Range Status   11/01/2022 9.5 8.5 - 10.1 mg/dL Final   05/10/2021 9.1 8.5 - 10.1 mg/dL Final         ASSESSMENT/PLAN:     Jaqueline was seen today for recheck medication.    Diagnoses and all orders for this visit:    Acute on Chronic diastolic heart failure exacerbation  Pulmonary hypertension (H)  Pulmonary fibrosis (H)  Bilateral leg edema  -  follow up of chronic Leg edema symptoms  - Patient also has been eating more salty chips in her diet lately.  -  Continue torsemide (DEMADEX) 20 MG tablet 3 times daily  - Patient is advised to improve compliance with low salt diet going forward for CHF  - follow up with CHF clinic at the [facility] cardiology clinic in [place] next week  - continue home healthcare    Hyperlipidemia LDL goal <100  -     simvastatin (ZOCOR) 40 MG tablet; Take 1 tablet (40 mg) by mouth At Bedtime        Follow Up Plan:     Telephone visit for follow up in 1 week      telephone visit duration: 30 minutes    Carissa Burroughs MD  Internal Medicine  [facility]

## 2022-11-04 ENCOUNTER — MEDICAL CORRESPONDENCE (OUTPATIENT)
Dept: HEALTH INFORMATION MANAGEMENT | Facility: CLINIC | Age: 82
End: 2022-11-04

## 2022-11-07 ENCOUNTER — TELEPHONE (OUTPATIENT)
Dept: FAMILY MEDICINE | Facility: CLINIC | Age: 82
End: 2022-11-07

## 2022-11-07 NOTE — PROGRESS NOTES
Phone visit x 60     Carissa Burroughs MD  [facility] - [address], [address]  [place] [address]    Arthritis and Rheumatology  [facility]    RE:      Jaqueline Canales  MRN:  [id number]  :   1940    I personally spoke with her primary internist, Dr. Burroughs as well as [facility]:  The patient meets the criteria for Vulnerable Adult and should be admitted to the hospitalist service for acute treatment of decompensated heart failure and kept in hospital until alternative living and care arrangements can be realized!!    I personally spent 60 minutes coordinating care with our people at [facility], speaking with Dr. Burroughs and [facility]    Labs for ER: CBC, BNP, CMP, pre-albumin      IMPRESSION:   1.  Pulmonary artery hypertension.   2.  Scleroderma.   3.  Severe tricuspid regurgitation.   4.  A 65 pound weight loss.   5.  Cataracts.  6. History of iron deficiency  7. Aortic stenosis  8. Poor balance but no falling  9. Fragile living arrangement        Called Indira EASTMAN back. Ongoing issues with patient and spouse in home. Patient is unable to care for self with the assistance of spouse. Homecare reports concern moving forward and have had many discussions with patient regarding needs. Medicare will not continue to provide services in home as these are chronic issues for patient and they are forced to discharge as of .  Patient/spouse are hesitant to private pay for nursing for in home despite need. Per homecare staff patient acuity is warranted for assisted living needs. Patient is aware of this. Patient has ongoing inability to set up meds/self care and spouse will be completing this moving forward. Sp[facility]. Minimal family support in the state.      Patient weight is 148lb reported today, +3 edema to BLE. Was in ED on 10/27 for LE cellulitis and  with IV diuretics admin and discharged home. Continues with 20+lb weight gain. 2lpm NC with SOB minimal exertion.  Diuretics managed by Dr. Burroughs, recently torsemide increased to 20mg TID.      Wt Readings from Last 24 Encounters:   09/01/22 55.9 kg (123 lb 3.2 oz)   05/23/22 58.1 kg (128 lb)   04/18/22 60.9 kg (134 lb 3.2 oz)   04/14/22 59.2 kg (130 lb 9.6 oz)   04/12/22 58.4 kg (128 lb 12.8 oz)   04/07/22 56.9 kg (125 lb 6.4 oz)   04/05/22 56.7 kg (125 lb)   03/30/22 58.2 kg (128 lb 6.4 oz)   03/29/22 57.5 kg (126 lb 12.8 oz)   03/24/22 64.2 kg (141 lb 9.6 oz)   03/24/22 55.9 kg (123 lb 3.2 oz)   01/07/22 60.8 kg (134 lb)   12/23/21 61.7 kg (136 lb)   11/22/21 61.2 kg (135 lb)   11/12/21 59.3 kg (130 lb 12.8 oz)   11/04/21 60.8 kg (134 lb)   10/13/21 59.9 kg (132 lb)   09/13/21 63 kg (139 lb)   08/11/21 64.4 kg (142 lb)   07/28/21 66.7 kg (147 lb)   07/14/21 65.6 kg (144 lb 9.6 oz)   07/12/21 65.5 kg (144 lb 4.8 oz)   07/09/21 66.1 kg (145 lb 12.8 oz)   06/30/21 66.2 kg (146 lb)     Wendi Espinosa RN on 11/8/2022 at 4:06 PM      Dear Dr. Burroughs:      She has been walking with a walker because of poor balance.Her balance continues to deteriorate but she has not fallen.  There is no history of neuropathy, trauma.    She had previous iron deficiency anemia which was replaced by Injectafer in a parenteral form.  She felt better after having received this.  She had been previously instructed to see you for evaluation of why she was losing iron.She has no interim history of melena, hematochezia, bruising    She has no interim history of paroxysmal nocturnal dyspnea.  She has stable dependent edema without ulcerations.  She has no interim history of chest pain, tightness, heaviness, paroxysmal nocturnal dyspnea or orthopnea.    Examination   Fragile appearing, increased JVP with CV of TR, murmur of TR and aortic stenosis, RV lift, peripheral edema, no ulcerations    Current Outpatient Medications   Medication     ACE/ARB/ARNI NOT PRESCRIBED (INTENTIONAL)     acetaminophen (TYLENOL) 500 MG tablet     albuterol (PROAIR HFA/PROVENTIL  HFA/VENTOLIN HFA) 108 (90 Base) MCG/ACT inhaler     apixaban ANTICOAGULANT (ELIQUIS) 2.5 MG tablet     BETA BLOCKER NOT PRESCRIBED (INTENTIONAL)     bromfenac (PROLENSA) 0.07 % ophthalmic solution     cephALEXin (KEFLEX) 500 MG capsule     digoxin (LANOXIN) 125 MCG tablet     donepezil (ARICEPT) 5 MG tablet     folic acid (FOLVITE) 1 MG tablet     gabapentin (NEURONTIN) 100 MG capsule     hydroxychloroquine (PLAQUENIL) 200 MG tablet     levothyroxine (SYNTHROID/LEVOTHROID) 75 MCG tablet     melatonin 5 MG CAPS     Methotrexate Sodium (METHOTREXATE PO)     miconazole (MICATIN) 2 % external cream     multivitamin (CENTRUM SILVER) tablet     omeprazole (PRILOSEC) 20 MG DR capsule     omeprazole (PRILOSEC) 20 MG DR capsule     potassium chloride ER (KLOR-CON) 20 MEQ CR tablet     prednisoLONE acetate (PRED MILD) 0.12 % ophthalmic suspension     salicylic acid (COMPOUND W MAX STRENGTH) 17 % external gel     sildenafil (REVATIO) 20 MG tablet     simvastatin (ZOCOR) 40 MG tablet     Skin Protectants, Misc. (EUCERIN) cream     torsemide (DEMADEX) 20 MG tablet     Vitamin D, Cholecalciferol, 10 MCG (400 UNIT) TABS     No current facility-administered medications for this visit.      Latest Reference Range & Units 07/25/22 14:30 08/31/22 13:36   Sodium 133 - 144 mmol/L  137   Potassium 3.4 - 5.3 mmol/L  4.0   Chloride 94 - 109 mmol/L  97   Carbon Dioxide 20 - 32 mmol/L  38 (H)   Urea Nitrogen 7 - 30 mg/dL  20   Creatinine 0.52 - 1.04 mg/dL  0.66   GFR Estimate >60 mL/min/1.73m2  87   Calcium 8.5 - 10.1 mg/dL  9.7   Anion Gap 3 - 14 mmol/L  2 (L)   Albumin 3.4 - 5.0 g/dL 3.6    ALT 0 - 50 U/L 24    AST 0 - 45 U/L 41    CK Total 30 - 225 U/L 63    N-Terminal Pro Bnp 0 - 1,800 pg/mL  5,023 (H)   Glucose 70 - 99 mg/dL  76   WBC 4.0 - 11.0 10e3/uL 5.4    Hemoglobin 11.7 - 15.7 g/dL 13.3    Hematocrit 35.0 - 47.0 % 41.1    Platelet Count 150 - 450 10e3/uL 9 (LL)    RBC Count 3.80 - 5.20 10e6/uL 4.18    MCV 78 - 100 fL 98    MCH  26.5 - 33.0 pg 31.8    MCHC 31.5 - 36.5 g/dL 32.4    RDW 10.0 - 15.0 % 15.1 (H)    % Neutrophils % 79    % Lymphocytes % 13    % Monocytes % 5    % Eosinophils % 2    % Basophils % 1    Absolute Basophils 0.0 - 0.2 10e3/uL 0.0    Absolute Eosinophils 0.0 - 0.7 10e3/uL 0.1    Absolute Immature Granulocytes <=0.4 10e3/uL 0.0    Absolute Lymphocytes 0.8 - 5.3 10e3/uL 0.7 (L)    Absolute Monocytes 0.0 - 1.3 10e3/uL 0.3    % Immature Granulocytes % 0    Absolute Neutrophils 1.6 - 8.3 10e3/uL 4.3    Absolute NRBCs 10e3/uL 0.0        Name: GINA MCKEON  MRN: [id number]  : 1940  Study Date: 2022 02:11 PM  Age: 81 yrs  Gender: Female  Patient Location: [facility]  Reason For Study: Pulmonary hypertension (H), SOB (shortness of breath),  Bell's pa  Ordering Physician: YONNY LINDSAY  Referring Physician: Berto Alvarado MD  Performed By: [facility] Leong [facility]     BSA: 1.5 m2  Height: 62 in  Weight: 120 lb  HR: 80  BP: 121/71 mmHg  ______________________________________________________________________________  Procedure  Complete Echo Adult.     ______________________________________________________________________________  Interpretation Summary     Left ventricular systolic function is normal.  The visual ejection fraction is 65-70%.  No regional wall motion abnormalities noted.  The right ventricular systolic function is mildly reduced.  There is severe (4+) tricuspid regurgitation.  Right ventricular systolic pressure is elevated, consistent with severe  pulmonary hypertension.  There is moderate (2+) mitral regurgitation.  Moderate valvular aortic stenosis.  The mean AoV pressure gradient is 32mmHg.  The right ventricle is moderate to severely dilated.  Compared to 3/22, severe TR and pulmonary hypertension is unchanged. Aortic  stenosis has progressed, mean gradient increased from 21 to 32mm. The study  was technically  difficult.  ______________________________________________________________________________  Left Ventricle  The left ventricle is normal in size. There is mild concentric left  ventricular hypertrophy. Left ventricular systolic function is normal. The  visual ejection fraction is 65-70%. Left ventricular diastolic function is  indeterminate. No regional wall motion abnormalities noted.     Right Ventricle  The right ventricle is moderate to severely dilated. The right ventricular  systolic function is mildly reduced.     Atria  There is severe biatrial enlargement.     Mitral Valve  There is severe mitral annular calcification. The mitral valve leaflets are  moderately thickened. There is moderate (2+) mitral regurgitation.     Tricuspid Valve  There is severe (4+) tricuspid regurgitation. The right ventricular systolic  pressure is approximated at 55.7 mmHg plus the right atrial pressure. Right  ventricular systolic pressure is elevated, consistent with severe pulmonary  hypertension. IVC diameter >2.1 cm collapsing <50% with sniff suggests a high  RA pressure estimated at 15 mmHg or greater.     Aortic Valve  The aortic valve is trileaflet. There is trace aortic regurgitation. Moderate  valvular aortic stenosis. The mean AoV pressure gradient is 32mmHg.     Pulmonic Valve  The pulmonic valve is not well seen, but is grossly normal.     Vessels  The aortic root is normal size.     Pericardium  There is no pericardial effusion.     Rhythm  The rhythm was atrial fibrillation.     ______________________________________________________________________________  MMode/2D Measurements & Calculations  IVSd: 0.97 cm  LVIDd: 4.1 cm  LVIDs: 2.6 cm  LVPWd: 1.2 cm  FS: 34.8 %  LV mass(C)d: 150.6 grams  LV mass(C)dI: 97.9 grams/m2  Ao root diam: 2.4 cm  LA dimension: 4.9 cm     asc Aorta Diam: 3.1 cm  LA/Ao: 2.0  LVOT diam: 2.1 cm  LVOT area: 3.4 cm2  LA Volume (BP): 157.0 ml  LA Volume Index (BP): 101.9 ml/m2  RWT: 0.61      Doppler Measurements & Calculations  MV E max el: 125.5 cm/sec  MV max P.0 mmHg  MV mean PG: 3.1 mmHg  MV V2 VTI: 22.7 cm  MVA(VTI): 3.3 cm2  MV dec time: 0.19 sec     Ao V2 max: 362.9 cm/sec  Ao max P.0 mmHg  Ao V2 mean: 261.9 cm/sec  Ao mean P.0 mmHg  Ao V2 VTI: 74.2 cm  JACOB(I,D): 1.0 cm2  JACOB(V,D): 1.1 cm2  LV V1 max P.9 mmHg  LV V1 max: 121.4 cm/sec  LV V1 VTI: 22.0 cm  MR ERO: 0.16 cm2  MR volume: 27.6 ml  SV(LVOT): 74.3 ml  SI(LVOT): 48.3 ml/m2  PA acc time: 0.05 sec  TR max el: 372.9 cm/sec  TR max P.7 mmHg  AV El Ratio (DI): 0.33  JACOB Index (cm2/m2): 0.65  E/E' av.0  Lateral E/e': 15.6  Medial E/e': 24.5      Remote heart catheterization     Hemodynamics    Systemic blood pressure 140-150 throughout the case.    BASELINE:  1) Elevated right-sided filling pressures (mean RA 16 mmHg)  2) Moderate to severe pulmonary hypertension (mean PA 35 mmHg) secondary to intrinsic pulmonary vascular disease (PVRI 14 rod*m2) and elevated left-sided filling pressures (PCWP 20 mmHg).  3) O2 sats at baseline 93%  4) Mildly depressed CO/CI:  3.8/1.9    NO 80 PPM:  1) Pulmonary artery pressures did not change (mean PA 34 mmHg)  2) O2 sats improved to 100%  3) CO/CI improved 4.2/2.1 Right sided filling pressures are severely elevated.Left sided filling pressures are moderately elevated. Severely elevated pulmonary artery hypertension.Left ventricular filling pressures are moderately elevated .Normal cardiac output level.

## 2022-11-07 NOTE — TELEPHONE ENCOUNTER
Jessica, RN from [facility] calling with update. She is currently treating spouse but w/ concern for for pt.  She was told pt's  homecare nurse that pt is being discharged from home care services in 1-2 weeks. She is currently not receiving skilled nursing at this time  Pt is needs leg wraps and was recently treated for Cellulitis/ weeping of BLE. She is requesting for new homecare order for skilled nursing to help with  Woundcare, lymph wraps, personal cares, and medication mgmt.  Please advise.    Thank you,  [facility] Triage RN

## 2022-11-08 ENCOUNTER — TELEPHONE (OUTPATIENT)
Dept: FAMILY MEDICINE | Facility: CLINIC | Age: 82
End: 2022-11-08

## 2022-11-08 ENCOUNTER — TELEPHONE (OUTPATIENT)
Dept: CARDIOLOGY | Facility: CLINIC | Age: 82
End: 2022-11-08

## 2022-11-08 ENCOUNTER — LAB REQUISITION (OUTPATIENT)
Dept: LAB | Facility: CLINIC | Age: 82
End: 2022-11-08
Payer: MEDICARE

## 2022-11-08 DIAGNOSIS — I50.82 BIVENTRICULAR HEART FAILURE (H): ICD-10-CM

## 2022-11-08 DIAGNOSIS — I11.0 HYPERTENSIVE HEART DISEASE WITH HEART FAILURE (H): ICD-10-CM

## 2022-11-08 DIAGNOSIS — I50.32 CHRONIC DIASTOLIC (CONGESTIVE) HEART FAILURE (H): ICD-10-CM

## 2022-11-08 LAB
ALBUMIN SERPL-MCNC: 3.3 G/DL (ref 3.4–5)
ALP SERPL-CCNC: 163 U/L (ref 40–150)
ALT SERPL W P-5'-P-CCNC: 34 U/L (ref 0–50)
ANION GAP SERPL CALCULATED.3IONS-SCNC: 9 MMOL/L (ref 3–14)
AST SERPL W P-5'-P-CCNC: 43 U/L (ref 0–45)
BILIRUB SERPL-MCNC: 1 MG/DL (ref 0.2–1.3)
BUN SERPL-MCNC: 20 MG/DL (ref 7–30)
CALCIUM SERPL-MCNC: 9.3 MG/DL (ref 8.5–10.1)
CHLORIDE BLD-SCNC: 94 MMOL/L (ref 94–109)
CO2 SERPL-SCNC: 34 MMOL/L (ref 20–32)
CREAT SERPL-MCNC: 0.73 MG/DL (ref 0.52–1.04)
ERYTHROCYTE [DISTWIDTH] IN BLOOD BY AUTOMATED COUNT: 17.1 % (ref 10–15)
GFR SERPL CREATININE-BSD FRML MDRD: 82 ML/MIN/1.73M2
GLUCOSE BLD-MCNC: 77 MG/DL (ref 70–99)
HCT VFR BLD AUTO: 35.5 % (ref 35–47)
HGB BLD-MCNC: 11.5 G/DL (ref 11.7–15.7)
HOLD SPECIMEN: NORMAL
MCH RBC QN AUTO: 32.9 PG (ref 26.5–33)
MCHC RBC AUTO-ENTMCNC: 32.4 G/DL (ref 31.5–36.5)
MCV RBC AUTO: 101 FL (ref 78–100)
NT-PROBNP SERPL-MCNC: 6877 PG/ML (ref 0–1800)
PLAT MORPH BLD: ABNORMAL
PLATELET # BLD AUTO: ABNORMAL 10*3/UL
POTASSIUM BLD-SCNC: 3.3 MMOL/L (ref 3.4–5.3)
PROT SERPL-MCNC: 6.9 G/DL (ref 6.8–8.8)
RBC # BLD AUTO: 3.5 10E6/UL (ref 3.8–5.2)
RBC MORPH BLD: ABNORMAL
SODIUM SERPL-SCNC: 137 MMOL/L (ref 133–144)
WBC # BLD AUTO: 5.5 10E3/UL (ref 4–11)

## 2022-11-08 PROCEDURE — 80053 COMPREHEN METABOLIC PANEL: CPT | Mod: ORL | Performed by: INTERNAL MEDICINE

## 2022-11-08 PROCEDURE — 85014 HEMATOCRIT: CPT | Mod: ORL | Performed by: INTERNAL MEDICINE

## 2022-11-08 PROCEDURE — 83880 ASSAY OF NATRIURETIC PEPTIDE: CPT | Mod: ORL | Performed by: INTERNAL MEDICINE

## 2022-11-08 NOTE — TELEPHONE ENCOUNTER
[facility] Call Center    Phone Message    May a detailed message be left on voicemail: yes     Reason for Call: Other: Indira wanted to update pt has gained 4lbs and to also make him aware current home care is ending on 11/17/2022, pt needs to find new aganecy      Action Taken: Message routed to:  [facility] Cardio    Travel Screening: Not Applicable

## 2022-11-08 NOTE — TELEPHONE ENCOUNTER
Called Indira EATSMAN back. Ongoing issues with patient and spouse in home. Patient is unable to care for self with the assistance of spouse. Homecare reports concern moving forward and have had many discussions with patient regarding needs. Medicare will not continue to provide services in home as these are chronic issues for patient and they are forced to discharge as of 11/17.  Patient/spouse are hesitant to private pay for nursing for in home despite need. Per homecare staff patient acuity is warranted for assisted living needs. Patient is aware of this. Patient has ongoing inability to set up meds/self care and spouse will be completing this moving forward. Sp[facility]. Minimal family support in the state.    Patient weight is 148lb reported today, +3 edema to BLE. Was in ED on 10/27 for LE cellulitis and 11/1 with IV diuretics admin and discharged home. Continues with 20+lb weight gain. 2lpm NC with SOB minimal exertion. Diuretics managed by Dr. Burroughs, recently torsemide increased to 20mg TID.     Wendi Espinosa RN on 11/8/2022 at 4:06 PM

## 2022-11-08 NOTE — TELEPHONE ENCOUNTER
Reason for Call:  Home care discharge    Detailed comments: Message from [facility] nurse Dilan:  Next week patient will be discharged from home care. Per nurse patient is not able to take care of herself. Pt and her  are refusing to pay someone privately. Medicare will not continue to pay for  maintenance care.. they will only pay for skilled nursing. They(patient and her ) also have signed agreement that patient will be discharged next week (was signed over a month ago). Any questions please contact [facility] - [phone]    Call taken on 11/8/2022 at 3:25 PM by Nallely Felder

## 2022-11-09 ENCOUNTER — PATIENT OUTREACH (OUTPATIENT)
Dept: CARE COORDINATION | Facility: CLINIC | Age: 82
End: 2022-11-09

## 2022-11-09 ENCOUNTER — TRANSFERRED RECORDS (OUTPATIENT)
Dept: HEALTH INFORMATION MANAGEMENT | Facility: CLINIC | Age: 82
End: 2022-11-09

## 2022-11-09 ENCOUNTER — HOSPITAL ENCOUNTER (INPATIENT)
Facility: CLINIC | Age: 82
LOS: 5 days | Discharge: NURSING, REHAB OR LONG-TERM CARE | DRG: 291 | End: 2022-11-14
Attending: EMERGENCY MEDICINE | Admitting: INTERNAL MEDICINE
Payer: MEDICARE

## 2022-11-09 ENCOUNTER — APPOINTMENT (OUTPATIENT)
Dept: GENERAL RADIOLOGY | Facility: CLINIC | Age: 82
DRG: 291 | End: 2022-11-09
Attending: EMERGENCY MEDICINE
Payer: MEDICARE

## 2022-11-09 ENCOUNTER — VIRTUAL VISIT (OUTPATIENT)
Dept: CARDIOLOGY | Facility: CLINIC | Age: 82
End: 2022-11-09
Attending: INTERNAL MEDICINE
Payer: MEDICARE

## 2022-11-09 DIAGNOSIS — I27.20 PULMONARY HYPERTENSION (H): ICD-10-CM

## 2022-11-09 DIAGNOSIS — I50.9 ACUTE ON CHRONIC HEART FAILURE, UNSPECIFIED HEART FAILURE TYPE (H): Primary | ICD-10-CM

## 2022-11-09 DIAGNOSIS — I48.19 PERSISTENT ATRIAL FIBRILLATION (H): ICD-10-CM

## 2022-11-09 DIAGNOSIS — I50.21 ACUTE HFREF (HEART FAILURE WITH REDUCED EJECTION FRACTION) (H): ICD-10-CM

## 2022-11-09 DIAGNOSIS — J84.10 PULMONARY FIBROSIS (H): ICD-10-CM

## 2022-11-09 LAB
ALBUMIN SERPL-MCNC: 3.3 G/DL (ref 3.4–5)
ALP SERPL-CCNC: 180 U/L (ref 40–150)
ALT SERPL W P-5'-P-CCNC: 36 U/L (ref 0–50)
ANION GAP SERPL CALCULATED.3IONS-SCNC: 4 MMOL/L (ref 3–14)
AST SERPL W P-5'-P-CCNC: 43 U/L (ref 0–45)
BASOPHILS # BLD AUTO: 0 10E3/UL (ref 0–0.2)
BASOPHILS NFR BLD AUTO: 1 %
BILIRUB SERPL-MCNC: 1 MG/DL (ref 0.2–1.3)
BUN SERPL-MCNC: 18 MG/DL (ref 7–30)
CALCIUM SERPL-MCNC: 9.5 MG/DL (ref 8.5–10.1)
CHLORIDE BLD-SCNC: 94 MMOL/L (ref 94–109)
CO2 SERPL-SCNC: 37 MMOL/L (ref 20–32)
CREAT SERPL-MCNC: 0.61 MG/DL (ref 0.52–1.04)
DIGOXIN SERPL-MCNC: 1.4 UG/L
EOSINOPHIL # BLD AUTO: 0.1 10E3/UL (ref 0–0.7)
EOSINOPHIL NFR BLD AUTO: 1 %
ERYTHROCYTE [DISTWIDTH] IN BLOOD BY AUTOMATED COUNT: 17.3 % (ref 10–15)
GFR SERPL CREATININE-BSD FRML MDRD: 89 ML/MIN/1.73M2
GLUCOSE BLD-MCNC: 111 MG/DL (ref 70–99)
HCT VFR BLD AUTO: 41 % (ref 35–47)
HGB BLD-MCNC: 12.8 G/DL (ref 11.7–15.7)
HOLD SPECIMEN: NORMAL
IMM GRANULOCYTES # BLD: 0 10E3/UL
IMM GRANULOCYTES NFR BLD: 1 %
LYMPHOCYTES # BLD AUTO: 0.5 10E3/UL (ref 0.8–5.3)
LYMPHOCYTES NFR BLD AUTO: 9 %
MCH RBC QN AUTO: 31.6 PG (ref 26.5–33)
MCHC RBC AUTO-ENTMCNC: 31.2 G/DL (ref 31.5–36.5)
MCV RBC AUTO: 101 FL (ref 78–100)
MONOCYTES # BLD AUTO: 0.4 10E3/UL (ref 0–1.3)
MONOCYTES NFR BLD AUTO: 7 %
NEUTROPHILS # BLD AUTO: 4.8 10E3/UL (ref 1.6–8.3)
NEUTROPHILS NFR BLD AUTO: 81 %
NRBC # BLD AUTO: 0 10E3/UL
NRBC BLD AUTO-RTO: 0 /100
NT-PROBNP SERPL-MCNC: 7923 PG/ML (ref 0–1800)
PLATELET # BLD AUTO: 103 10E3/UL (ref 150–450)
POTASSIUM BLD-SCNC: 3.4 MMOL/L (ref 3.4–5.3)
POTASSIUM BLD-SCNC: 3.6 MMOL/L (ref 3.4–5.3)
PREALB SERPL IA-MCNC: 17 MG/DL (ref 15–45)
PROT SERPL-MCNC: 7.3 G/DL (ref 6.8–8.8)
RBC # BLD AUTO: 4.05 10E6/UL (ref 3.8–5.2)
SARS-COV-2 RNA RESP QL NAA+PROBE: NEGATIVE
SODIUM SERPL-SCNC: 135 MMOL/L (ref 133–144)
TSH SERPL DL<=0.005 MIU/L-ACNC: 3.06 MU/L (ref 0.4–4)
WBC # BLD AUTO: 5.8 10E3/UL (ref 4–11)

## 2022-11-09 PROCEDURE — 36415 COLL VENOUS BLD VENIPUNCTURE: CPT | Performed by: EMERGENCY MEDICINE

## 2022-11-09 PROCEDURE — 84134 ASSAY OF PREALBUMIN: CPT | Performed by: EMERGENCY MEDICINE

## 2022-11-09 PROCEDURE — 80162 ASSAY OF DIGOXIN TOTAL: CPT | Performed by: EMERGENCY MEDICINE

## 2022-11-09 PROCEDURE — 84132 ASSAY OF SERUM POTASSIUM: CPT | Performed by: INTERNAL MEDICINE

## 2022-11-09 PROCEDURE — 36415 COLL VENOUS BLD VENIPUNCTURE: CPT | Performed by: INTERNAL MEDICINE

## 2022-11-09 PROCEDURE — 93005 ELECTROCARDIOGRAM TRACING: CPT

## 2022-11-09 PROCEDURE — 99214 OFFICE O/P EST MOD 30 MIN: CPT | Mod: 95 | Performed by: INTERNAL MEDICINE

## 2022-11-09 PROCEDURE — 250N000011 HC RX IP 250 OP 636: Performed by: EMERGENCY MEDICINE

## 2022-11-09 PROCEDURE — 250N000013 HC RX MED GY IP 250 OP 250 PS 637: Performed by: EMERGENCY MEDICINE

## 2022-11-09 PROCEDURE — 71046 X-RAY EXAM CHEST 2 VIEWS: CPT

## 2022-11-09 PROCEDURE — C9803 HOPD COVID-19 SPEC COLLECT: HCPCS

## 2022-11-09 PROCEDURE — 99223 1ST HOSP IP/OBS HIGH 75: CPT | Mod: AI | Performed by: INTERNAL MEDICINE

## 2022-11-09 PROCEDURE — 84443 ASSAY THYROID STIM HORMONE: CPT | Performed by: EMERGENCY MEDICINE

## 2022-11-09 PROCEDURE — 83880 ASSAY OF NATRIURETIC PEPTIDE: CPT | Performed by: EMERGENCY MEDICINE

## 2022-11-09 PROCEDURE — 99285 EMERGENCY DEPT VISIT HI MDM: CPT | Mod: 25

## 2022-11-09 PROCEDURE — 210N000002 HC R&B HEART CARE

## 2022-11-09 PROCEDURE — 80053 COMPREHEN METABOLIC PANEL: CPT | Performed by: EMERGENCY MEDICINE

## 2022-11-09 PROCEDURE — U0003 INFECTIOUS AGENT DETECTION BY NUCLEIC ACID (DNA OR RNA); SEVERE ACUTE RESPIRATORY SYNDROME CORONAVIRUS 2 (SARS-COV-2) (CORONAVIRUS DISEASE [COVID-19]), AMPLIFIED PROBE TECHNIQUE, MAKING USE OF HIGH THROUGHPUT TECHNOLOGIES AS DESCRIBED BY CMS-2020-01-R: HCPCS | Performed by: EMERGENCY MEDICINE

## 2022-11-09 PROCEDURE — 85025 COMPLETE CBC W/AUTO DIFF WBC: CPT | Performed by: EMERGENCY MEDICINE

## 2022-11-09 PROCEDURE — 96374 THER/PROPH/DIAG INJ IV PUSH: CPT

## 2022-11-09 RX ORDER — ONDANSETRON 4 MG/1
4 TABLET, ORALLY DISINTEGRATING ORAL EVERY 6 HOURS PRN
Status: DISCONTINUED | OUTPATIENT
Start: 2022-11-09 | End: 2022-11-14 | Stop reason: HOSPADM

## 2022-11-09 RX ORDER — FUROSEMIDE 10 MG/ML
60 INJECTION INTRAMUSCULAR; INTRAVENOUS ONCE
Status: COMPLETED | OUTPATIENT
Start: 2022-11-09 | End: 2022-11-09

## 2022-11-09 RX ORDER — POTASSIUM CHLORIDE 1500 MG/1
40 TABLET, EXTENDED RELEASE ORAL ONCE
Status: COMPLETED | OUTPATIENT
Start: 2022-11-09 | End: 2022-11-09

## 2022-11-09 RX ORDER — FUROSEMIDE 10 MG/ML
80 INJECTION INTRAMUSCULAR; INTRAVENOUS EVERY 8 HOURS
Status: DISCONTINUED | OUTPATIENT
Start: 2022-11-10 | End: 2022-11-12

## 2022-11-09 RX ORDER — ONDANSETRON 2 MG/ML
4 INJECTION INTRAMUSCULAR; INTRAVENOUS EVERY 6 HOURS PRN
Status: DISCONTINUED | OUTPATIENT
Start: 2022-11-09 | End: 2022-11-14 | Stop reason: HOSPADM

## 2022-11-09 RX ADMIN — FUROSEMIDE 60 MG: 10 INJECTION, SOLUTION INTRAMUSCULAR; INTRAVENOUS at 18:58

## 2022-11-09 RX ADMIN — POTASSIUM CHLORIDE 40 MEQ: 1500 TABLET, EXTENDED RELEASE ORAL at 23:39

## 2022-11-09 ASSESSMENT — ENCOUNTER SYMPTOMS
UNEXPECTED WEIGHT CHANGE: 1
COUGH: 1
FEVER: 0

## 2022-11-09 ASSESSMENT — ACTIVITIES OF DAILY LIVING (ADL)
ADLS_ACUITY_SCORE: 35

## 2022-11-09 NOTE — ED TRIAGE NOTES
"Pt here stating, \"Oh I am a mess. I guess I need to be admitted.\"  Pt has leg swelling and in chart the notes stating that she is unable to care for self at home.         Triage Assessment     Row Name 11/09/22 2306       Triage Assessment (Adult)    Airway WDL WDL       Skin Circulation/Temperature WDL    Skin Circulation/Temperature WDL WDL       Cardiac WDL    Cardiac WDL WDL       Peripheral/Neurovascular WDL    Peripheral Neurovascular WDL WDL       Cognitive/Neuro/Behavioral WDL    Cognitive/Neuro/Behavioral WDL WDL              "

## 2022-11-09 NOTE — LETTER
2022      RE: Jaqueline Canales  [address]  [facility] [address]       Dear Colleague,    Thank you for the opportunity to participate in the care of your patient, Jaqueline Canales, at the [facility]. Please see a copy of my visit note below.    Phone visit x 60     Carissa Burroughs MD  [address], [address]  [place] [address]    [facility]thritis and Rhe[facility]  [facility]    RE:      Jaqueline Canales  MRN:  [id number]  :   1940    I personally spoke with her primary internist, Dr. Burroughs as well as LEIGHANN ER:  The patient meets the criteria for Vulnerable Adult and should be admitted to the hospitalist service for acute treatment of decompensated heart failure and kept in hospital until alternative living and care arrangements can be realized!!    I personally spent 60 minutes coordinating care with our people at [facility], speaking with Dr. Burroughs and LEIGHANN ER    Labs for ER: CBC, BNP, CMP, pre-albumin      IMPRESSION:   1.  Pulmonary artery hypertension.   2.  Scleroderma.   3.  Severe tricuspid regurgitation.   4.  A 65 pound weight loss.   5.  Cataracts.  6. History of iron deficiency  7. Aortic stenosis  8. Poor balance but no falling  9. Fragile living arrangement        Called Indira EASTMAN back. Ongoing issues with patient and spouse in home. Patient is unable to care for self with the assistance of spouse. Homecare reports concern moving forward and have had many discussions with patient regarding needs. Medicare will not continue to provide services in home as these are chronic issues for patient and they are forced to discharge as of .  Patient/spouse are hesitant to private pay for nursing for in home despite need. Per homecare staff patient acuity is warranted for assisted living needs. Patient is aware of this. Patient has ongoing inability to set up meds/self care and spouse  will be completing this moving forward. Sp[facility]. Minimal family support in the state.      Patient weight is 148lb reported today, +3 edema to BLE. Was in ED on 10/27 for LE cellulitis and 11/1 with IV diuretics admin and discharged home. Continues with 20+lb weight gain. 2lpm NC with SOB minimal exertion. Diuretics managed by Dr. Burroughs, recently torsemide increased to 20mg TID.      Wt Readings from Last 24 Encounters:   09/01/22 55.9 kg (123 lb 3.2 oz)   05/23/22 58.1 kg (128 lb)   04/18/22 60.9 kg (134 lb 3.2 oz)   04/14/22 59.2 kg (130 lb 9.6 oz)   04/12/22 58.4 kg (128 lb 12.8 oz)   04/07/22 56.9 kg (125 lb 6.4 oz)   04/05/22 56.7 kg (125 lb)   03/30/22 58.2 kg (128 lb 6.4 oz)   03/29/22 57.5 kg (126 lb 12.8 oz)   03/24/22 64.2 kg (141 lb 9.6 oz)   03/24/22 55.9 kg (123 lb 3.2 oz)   01/07/22 60.8 kg (134 lb)   12/23/21 61.7 kg (136 lb)   11/22/21 61.2 kg (135 lb)   11/12/21 59.3 kg (130 lb 12.8 oz)   11/04/21 60.8 kg (134 lb)   10/13/21 59.9 kg (132 lb)   09/13/21 63 kg (139 lb)   08/11/21 64.4 kg (142 lb)   07/28/21 66.7 kg (147 lb)   07/14/21 65.6 kg (144 lb 9.6 oz)   07/12/21 65.5 kg (144 lb 4.8 oz)   07/09/21 66.1 kg (145 lb 12.8 oz)   06/30/21 66.2 kg (146 lb)     Wendi Espinosa RN on 11/8/2022 at 4:06 PM      Dear Dr. Burroughs:      She has been walking with a walker because of poor balance.Her balance continues to deteriorate but she has not fallen.  There is no history of neuropathy, trauma.    She had previous iron deficiency anemia which was replaced by Injectafer in a parenteral form.  She felt better after having received this.  She had been previously instructed to see you for evaluation of why she was losing iron.She has no interim history of melena, hematochezia, bruising    She has no interim history of paroxysmal nocturnal dyspnea.  She has stable dependent edema without ulcerations.  She has no interim history of chest pain, tightness, heaviness, paroxysmal  nocturnal dyspnea or orthopnea.    Examination   Fragile appearing, increased JVP with CV of TR, murmur of TR and aortic stenosis, RV lift, peripheral edema, no ulcerations    Current Outpatient Medications   Medication     ACE/ARB/ARNI NOT PRESCRIBED (INTENTIONAL)     acetaminophen (TYLENOL) 500 MG tablet     albuterol (PROAIR HFA/PROVENTIL HFA/VENTOLIN HFA) 108 (90 Base) MCG/ACT inhaler     apixaban ANTICOAGULANT (ELIQUIS) 2.5 MG tablet     BETA BLOCKER NOT PRESCRIBED (INTENTIONAL)     bromfenac (PROLENSA) 0.07 % ophthalmic solution     cephALEXin (KEFLEX) 500 MG capsule     digoxin (LANOXIN) 125 MCG tablet     donepezil (ARICEPT) 5 MG tablet     folic acid (FOLVITE) 1 MG tablet     gabapentin (NEURONTIN) 100 MG capsule     hydroxychloroquine (PLAQUENIL) 200 MG tablet     levothyroxine (SYNTHROID/LEVOTHROID) 75 MCG tablet     melatonin 5 MG CAPS     Methotrexate Sodium (METHOTREXATE PO)     miconazole (MICATIN) 2 % external cream     multivitamin (CENTRUM SILVER) tablet     omeprazole (PRILOSEC) 20 MG DR capsule     omeprazole (PRILOSEC) 20 MG DR capsule     potassium chloride ER (KLOR-CON) 20 MEQ CR tablet     prednisoLONE acetate (PRED MILD) 0.12 % ophthalmic suspension     salicylic acid (COMPOUND W MAX STRENGTH) 17 % external gel     sildenafil (REVATIO) 20 MG tablet     simvastatin (ZOCOR) 40 MG tablet     Skin Protectants, Misc. (EUCERIN) cream     torsemide (DEMADEX) 20 MG tablet     Vitamin D, Cholecalciferol, 10 MCG (400 UNIT) TABS     No current facility-administered medications for this visit.      Latest Reference Range & Units 07/25/22 14:30 08/31/22 13:36   Sodium 133 - 144 mmol/L  137   Potassium 3.4 - 5.3 mmol/L  4.0   Chloride 94 - 109 mmol/L  97   Carbon Dioxide 20 - 32 mmol/L  38 (H)   Urea Nitrogen 7 - 30 mg/dL  20   Creatinine 0.52 - 1.04 mg/dL  0.66   GFR Estimate >60 mL/min/1.73m2  87   Calcium 8.5 - 10.1 mg/dL  9.7   Anion Gap 3 - 14 mmol/L  2 (L)   Albumin 3.4 - 5.0 g/dL 3.6    ALT 0 - 50  U/L 24    AST 0 - 45 U/L 41    CK Total 30 - 225 U/L 63    N-Terminal Pro Bnp 0 - 1,800 pg/mL  5,023 (H)   Glucose 70 - 99 mg/dL  76   WBC 4.0 - 11.0 10e3/uL 5.4    Hemoglobin 11.7 - 15.7 g/dL 13.3    Hematocrit 35.0 - 47.0 % 41.1    Platelet Count 150 - 450 10e3/uL 9 (LL)    RBC Count 3.80 - 5.20 10e6/uL 4.18    MCV 78 - 100 fL 98    MCH 26.5 - 33.0 pg 31.8    MCHC 31.5 - 36.5 g/dL 32.4    RDW 10.0 - 15.0 % 15.1 (H)    % Neutrophils % 79    % Lymphocytes % 13    % Monocytes % 5    % Eosinophils % 2    % Basophils % 1    Absolute Basophils 0.0 - 0.2 10e3/uL 0.0    Absolute Eosinophils 0.0 - 0.7 10e3/uL 0.1    Absolute Immature Granulocytes <=0.4 10e3/uL 0.0    Absolute Lymphocytes 0.8 - 5.3 10e3/uL 0.7 (L)    Absolute Monocytes 0.0 - 1.3 10e3/uL 0.3    % Immature Granulocytes % 0    Absolute Neutrophils 1.6 - 8.3 10e3/uL 4.3    Absolute NRBCs 10e3/uL 0.0        Name: GINA MCKEON  MRN: [id number]  : 1940  Study Date: 2022 02:11 PM  Age: 81 yrs  Gender: Female  Patient Location: [facility]  Reason For Study: Pulmonary hypertension (H), SOB (shortness of breath),  Bell's pa  Ordering Physician: YONNY LINDSAY  Referring Physician: Berto Alvarado MD  Performed By: Carmen Leong, [facility]     BSA: 1.5 m2  Height: 62 in  Weight: 120 lb  HR: 80  BP: 121/71 mmHg  ______________________________________________________________________________  Procedure  Complete Echo Adult.     ______________________________________________________________________________  Interpretation Summary     Left ventricular systolic function is normal.  The visual ejection fraction is 65-70%.  No regional wall motion abnormalities noted.  The right ventricular systolic function is mildly reduced.  There is severe (4+) tricuspid regurgitation.  Right ventricular systolic pressure is elevated, consistent with severe  pulmonary hypertension.  There is moderate (2+) mitral regurgitation.  Moderate valvular aortic  stenosis.  The mean AoV pressure gradient is 32mmHg.  The right ventricle is moderate to severely dilated.  Compared to 3/22, severe TR and pulmonary hypertension is unchanged. Aortic  stenosis has progressed, mean gradient increased from 21 to 32mm. The study  was technically difficult.  ______________________________________________________________________________  Left Ventricle  The left ventricle is normal in size. There is mild concentric left  ventricular hypertrophy. Left ventricular systolic function is normal. The  visual ejection fraction is 65-70%. Left ventricular diastolic function is  indeterminate. No regional wall motion abnormalities noted.     Right Ventricle  The right ventricle is moderate to severely dilated. The right ventricular  systolic function is mildly reduced.     Atria  There is severe biatrial enlargement.     Mitral Valve  There is severe mitral annular calcification. The mitral valve leaflets are  moderately thickened. There is moderate (2+) mitral regurgitation.     Tricuspid Valve  There is severe (4+) tricuspid regurgitation. The right ventricular systolic  pressure is approximated at 55.7 mmHg plus the right atrial pressure. Right  ventricular systolic pressure is elevated, consistent with severe pulmonary  hypertension. IVC diameter >2.1 cm collapsing <50% with sniff suggests a high  RA pressure estimated at 15 mmHg or greater.     Aortic Valve  The aortic valve is trileaflet. There is trace aortic regurgitation. Moderate  valvular aortic stenosis. The mean AoV pressure gradient is 32mmHg.     Pulmonic Valve  The pulmonic valve is not well seen, but is grossly normal.     Vessels  The aortic root is normal size.     Pericardium  There is no pericardial effusion.     Rhythm  The rhythm was atrial fibrillation.     ______________________________________________________________________________  MMode/2D Measurements & Calculations  IVSd: 0.97 cm  LVIDd: 4.1 cm  LVIDs: 2.6  cm  LVPWd: 1.2 cm  FS: 34.8 %  LV mass(C)d: 150.6 grams  LV mass(C)dI: 97.9 grams/m2  Ao root diam: 2.4 cm  LA dimension: 4.9 cm     asc Aorta Diam: 3.1 cm  LA/Ao: 2.0  LVOT diam: 2.1 cm  LVOT area: 3.4 cm2  LA Volume (BP): 157.0 ml  LA Volume Index (BP): 101.9 ml/m2  RWT: 0.61     Doppler Measurements & Calculations  MV E max el: 125.5 cm/sec  MV max P.0 mmHg  MV mean PG: 3.1 mmHg  MV V2 VTI: 22.7 cm  MVA(VTI): 3.3 cm2  MV dec time: 0.19 sec     Ao V2 max: 362.9 cm/sec  Ao max P.0 mmHg  Ao V2 mean: 261.9 cm/sec  Ao mean P.0 mmHg  Ao V2 VTI: 74.2 cm  JACOB(I,D): 1.0 cm2  JACOB(V,D): 1.1 cm2  LV V1 max P.9 mmHg  LV V1 max: 121.4 cm/sec  LV V1 VTI: 22.0 cm  MR ERO: 0.16 cm2  MR volume: 27.6 ml  SV(LVOT): 74.3 ml  SI(LVOT): 48.3 ml/m2  PA acc time: 0.05 sec  TR max el: 372.9 cm/sec  TR max P.7 mmHg  AV El Ratio (DI): 0.33  JACOB Index (cm2/m2): 0.65  E/E' av.0  Lateral E/e': 15.6  Medial E/e': 24.5      Remote heart catheterization     Hemodynamics    Systemic blood pressure 140-150 throughout the case.    BASELINE:  1) Elevated right-sided filling pressures (mean RA 16 mmHg)  2) Moderate to severe pulmonary hypertension (mean PA 35 mmHg) secondary to intrinsic pulmonary vascular disease (PVRI 14 rod*m2) and elevated left-sided filling pressures (PCWP 20 mmHg).  3) O2 sats at baseline 93%  4) Mildly depressed CO/CI:  3.8/1.9    NO 80 PPM:  1) Pulmonary artery pressures did not change (mean PA 34 mmHg)  2) O2 sats improved to 100%  3) CO/CI improved 4.2/2.1 Right sided filling pressures are severely elevated.Left sided filling pressures are moderately elevated. Severely elevated pulmonary artery hypertension.Left ventricular filling pressures are moderately elevated .Normal cardiac output level.               Please do not hesitate to contact me if you have any questions/concerns.     Sincerely,     Shya Marquez MD

## 2022-11-09 NOTE — TELEPHONE ENCOUNTER
Per Dr Burroughs:    change 11/11 telephone visit to video visit appointment to address home healthcare referral. Medicare requires video visit or in person visit for home healthcare referral.     Rebeca LUIS RN  EP Triage

## 2022-11-09 NOTE — PROGRESS NOTES
Social Work Intervention  [facility]    Data/Intervention:    Patient Name:  Jaqueline Canales  /Age:  1940 (81 year old)    Visit Type: Staff notification  Referral Source: Cardiology  Reason for Referral:  VA concerns at home    Collaborated With:    -[facility] Care Coordinator Kae Adame    Psychosocial Information/Concerns:  Received notification from Wendi that Jaqueline has been receiving home care services for some time and things have been recerted beyond what Medicare is paying because Jaqueline has chronic vs acute issues. Wendi reported she spoke with the home care agency and was told that due to Jaqueline's needs she really needs to be in [facility] as her  is unable to care for her along with his own needs but there is hesitancy to private pay for [facility]. Wendi indicated that the home care agency said they have filed a VA report as well. Wendi asked if there is a way to push for [facility] when Jaqueline is inpatient as she was sent to the hospital today.   I replied to Wendi and explained that due to it being private pay there really is non way to force Jaqueline to go to [facility], especially if she is her own decision maker or if her decision maker is not agreeable due to costs or whatever the concern is. I explained that Jaqueline/family ultimately have to be agreeable and willing and able to pay the costs associated with [facility], plus it can take a long time to get into [facility], there are often wait lists, so it wouldn't be an immediate thing even if the hospital pushed for it. I also noted Jaqueline would likely be stuck in the hospital for some time waiting for [facility] to be arranged. I explained that something that would be quicker would be hiring private pay help at home but this gets expensive as well. I noted that if other family is involved it would be helpful to get them involved in a conversation about options and long term safety. I notified Wendi that I would reach out to the  ED/inpatient SW to notify them of the concerns.     Intervention/Education/Resources Provided:  I contacted [facility] ED and spoke with the Care Coordinator Kae. I explained the above information and concerns and reported wanting her to be aware of them. I also explained the information I provided to Wendi in response to her initial message. Kae thanked me for the information, and noted that Jaqueline had not been seen yet but she would definitely send a note out to the team and follow Jaqueline's case.     Assessment/Plan:  No follow up is planned at this time on my part.     BRADEN Lopez, [facility]    [facility]  Ph: [phone], Pgr: [phone]  11/9/2022

## 2022-11-09 NOTE — TELEPHONE ENCOUNTER
11/11 visit switched to video visit per Dr Burroughs. Appt notes added.   Lucretia Bennett, CMA

## 2022-11-10 ENCOUNTER — APPOINTMENT (OUTPATIENT)
Dept: OCCUPATIONAL THERAPY | Facility: CLINIC | Age: 82
DRG: 291 | End: 2022-11-10
Attending: INTERNAL MEDICINE
Payer: MEDICARE

## 2022-11-10 LAB
ANION GAP SERPL CALCULATED.3IONS-SCNC: 5 MMOL/L (ref 3–14)
BUN SERPL-MCNC: 16 MG/DL (ref 7–30)
CALCIUM SERPL-MCNC: 9.2 MG/DL (ref 8.5–10.1)
CHLORIDE BLD-SCNC: 97 MMOL/L (ref 94–109)
CO2 SERPL-SCNC: 34 MMOL/L (ref 20–32)
CREAT SERPL-MCNC: 0.51 MG/DL (ref 0.52–1.04)
ERYTHROCYTE [DISTWIDTH] IN BLOOD BY AUTOMATED COUNT: 17.1 % (ref 10–15)
GFR SERPL CREATININE-BSD FRML MDRD: >90 ML/MIN/1.73M2
GLUCOSE BLD-MCNC: 96 MG/DL (ref 70–99)
HCT VFR BLD AUTO: 36.3 % (ref 35–47)
HGB BLD-MCNC: 11.7 G/DL (ref 11.7–15.7)
HOLD SPECIMEN: NORMAL
MCH RBC QN AUTO: 32.2 PG (ref 26.5–33)
MCHC RBC AUTO-ENTMCNC: 32.2 G/DL (ref 31.5–36.5)
MCV RBC AUTO: 100 FL (ref 78–100)
PLAT MORPH BLD: ABNORMAL
PLATELET # BLD AUTO: 131 10E3/UL (ref 150–450)
POTASSIUM BLD-SCNC: 3.5 MMOL/L (ref 3.4–5.3)
POTASSIUM BLD-SCNC: 3.5 MMOL/L (ref 3.4–5.3)
RBC # BLD AUTO: 3.63 10E6/UL (ref 3.8–5.2)
RBC MORPH BLD: ABNORMAL
SODIUM SERPL-SCNC: 136 MMOL/L (ref 133–144)
WBC # BLD AUTO: 6.1 10E3/UL (ref 4–11)

## 2022-11-10 PROCEDURE — 84132 ASSAY OF SERUM POTASSIUM: CPT | Performed by: EMERGENCY MEDICINE

## 2022-11-10 PROCEDURE — 210N000002 HC R&B HEART CARE

## 2022-11-10 PROCEDURE — 250N000013 HC RX MED GY IP 250 OP 250 PS 637: Performed by: INTERNAL MEDICINE

## 2022-11-10 PROCEDURE — 250N000011 HC RX IP 250 OP 636: Performed by: INTERNAL MEDICINE

## 2022-11-10 PROCEDURE — 97535 SELF CARE MNGMENT TRAINING: CPT | Mod: GO

## 2022-11-10 PROCEDURE — 82310 ASSAY OF CALCIUM: CPT | Performed by: INTERNAL MEDICINE

## 2022-11-10 PROCEDURE — 36415 COLL VENOUS BLD VENIPUNCTURE: CPT | Performed by: EMERGENCY MEDICINE

## 2022-11-10 PROCEDURE — 97165 OT EVAL LOW COMPLEX 30 MIN: CPT | Mod: GO

## 2022-11-10 PROCEDURE — 99222 1ST HOSP IP/OBS MODERATE 55: CPT | Performed by: INTERNAL MEDICINE

## 2022-11-10 PROCEDURE — 85027 COMPLETE CBC AUTOMATED: CPT | Performed by: INTERNAL MEDICINE

## 2022-11-10 PROCEDURE — 99233 SBSQ HOSP IP/OBS HIGH 50: CPT | Performed by: INTERNAL MEDICINE

## 2022-11-10 PROCEDURE — 97530 THERAPEUTIC ACTIVITIES: CPT | Mod: GO

## 2022-11-10 RX ORDER — BISACODYL 10 MG
10 SUPPOSITORY, RECTAL RECTAL DAILY PRN
Status: DISCONTINUED | OUTPATIENT
Start: 2022-11-10 | End: 2022-11-14 | Stop reason: HOSPADM

## 2022-11-10 RX ORDER — LIDOCAINE 40 MG/G
CREAM TOPICAL
Status: DISCONTINUED | OUTPATIENT
Start: 2022-11-10 | End: 2022-11-14 | Stop reason: HOSPADM

## 2022-11-10 RX ORDER — PROCHLORPERAZINE 25 MG
12.5 SUPPOSITORY, RECTAL RECTAL EVERY 12 HOURS PRN
Status: DISCONTINUED | OUTPATIENT
Start: 2022-11-10 | End: 2022-11-14 | Stop reason: HOSPADM

## 2022-11-10 RX ORDER — DIGOXIN 125 MCG
125 TABLET ORAL DAILY
Status: DISCONTINUED | OUTPATIENT
Start: 2022-11-10 | End: 2022-11-14 | Stop reason: HOSPADM

## 2022-11-10 RX ORDER — POLYETHYLENE GLYCOL 3350 17 G/17G
17 POWDER, FOR SOLUTION ORAL DAILY PRN
Status: DISCONTINUED | OUTPATIENT
Start: 2022-11-10 | End: 2022-11-14 | Stop reason: HOSPADM

## 2022-11-10 RX ORDER — POTASSIUM CHLORIDE 1500 MG/1
20 TABLET, EXTENDED RELEASE ORAL ONCE
Status: COMPLETED | OUTPATIENT
Start: 2022-11-10 | End: 2022-11-10

## 2022-11-10 RX ORDER — ACETAMINOPHEN 650 MG/1
650 SUPPOSITORY RECTAL EVERY 6 HOURS PRN
Status: DISCONTINUED | OUTPATIENT
Start: 2022-11-10 | End: 2022-11-14 | Stop reason: HOSPADM

## 2022-11-10 RX ORDER — SILDENAFIL CITRATE 20 MG/1
20 TABLET ORAL 3 TIMES DAILY
Status: DISCONTINUED | OUTPATIENT
Start: 2022-11-10 | End: 2022-11-14 | Stop reason: HOSPADM

## 2022-11-10 RX ORDER — POTASSIUM CHLORIDE 1500 MG/1
20 TABLET, EXTENDED RELEASE ORAL ONCE
Status: DISCONTINUED | OUTPATIENT
Start: 2022-11-10 | End: 2022-11-10

## 2022-11-10 RX ORDER — PROCHLORPERAZINE MALEATE 5 MG
5 TABLET ORAL EVERY 6 HOURS PRN
Status: DISCONTINUED | OUTPATIENT
Start: 2022-11-10 | End: 2022-11-14 | Stop reason: HOSPADM

## 2022-11-10 RX ORDER — ACETAMINOPHEN 325 MG/1
650 TABLET ORAL EVERY 6 HOURS PRN
Status: DISCONTINUED | OUTPATIENT
Start: 2022-11-10 | End: 2022-11-14 | Stop reason: HOSPADM

## 2022-11-10 RX ADMIN — POTASSIUM CHLORIDE 20 MEQ: 1500 TABLET, EXTENDED RELEASE ORAL at 10:59

## 2022-11-10 RX ADMIN — SILDENAFIL 20 MG: 20 TABLET, FILM COATED ORAL at 17:07

## 2022-11-10 RX ADMIN — FUROSEMIDE 80 MG: 10 INJECTION, SOLUTION INTRAMUSCULAR; INTRAVENOUS at 02:23

## 2022-11-10 RX ADMIN — FUROSEMIDE 80 MG: 10 INJECTION, SOLUTION INTRAMUSCULAR; INTRAVENOUS at 11:00

## 2022-11-10 RX ADMIN — FUROSEMIDE 80 MG: 10 INJECTION, SOLUTION INTRAMUSCULAR; INTRAVENOUS at 18:07

## 2022-11-10 RX ADMIN — APIXABAN 2.5 MG: 2.5 TABLET, FILM COATED ORAL at 12:40

## 2022-11-10 RX ADMIN — APIXABAN 2.5 MG: 2.5 TABLET, FILM COATED ORAL at 21:25

## 2022-11-10 RX ADMIN — DIGOXIN 125 MCG: 125 TABLET ORAL at 12:40

## 2022-11-10 RX ADMIN — SILDENAFIL 20 MG: 20 TABLET, FILM COATED ORAL at 21:25

## 2022-11-10 ASSESSMENT — ACTIVITIES OF DAILY LIVING (ADL)
ADLS_ACUITY_SCORE: 36
ADLS_ACUITY_SCORE: 36
ADLS_ACUITY_SCORE: 22
ADLS_ACUITY_SCORE: 36
ADLS_ACUITY_SCORE: 35
ADLS_ACUITY_SCORE: 35
ADLS_ACUITY_SCORE: 36
ADLS_ACUITY_SCORE: 36
ADLS_ACUITY_SCORE: 35
ADLS_ACUITY_SCORE: 36
ADLS_ACUITY_SCORE: 35
ADLS_ACUITY_SCORE: 36

## 2022-11-10 NOTE — PROGRESS NOTES
Physical Therapy: Orders received. Chart reviewed and discussed with care team.? Physical Therapy not indicated due to patient w/ CHF/OT needs, no specific PT needs at this time.? Defer discharge recommendations to OT.? Will complete orders.      Lymph: patient presents w/ chronic lymphedema with her own velcro wraps present. She has no inpatient skilled lymph needs at this time, defer velcro wraps to nursing as per her normal wear schedule. Will complete lymph orders.         Luz Nielson, PT

## 2022-11-10 NOTE — ED NOTES
The patient pressed the call light button. She requested a pillow under her butt and a female pure wick. The nurse, Rachel, assisted me with both. A chucks and pad were also placed on her and below her

## 2022-11-10 NOTE — PROGRESS NOTES
"[facility]    Medicine Progress Note - Hospitalist Service       Date of Admission:  11/9/2022    Assessment & Plan   Jaqueline Canales is a 81 year old female with hx of HFpEF on 1-2L home O2, severe tricuspid regurgitation, severe pulmonary hypertension, moderate mitral regurgitation, moderate aortic stenosis, chronic a-fib on chronic AC with apixaban, SRINIVAS, hx of NSCLC, and RA on chronic immunosuppressive tx who was admitted on 11/9/2022 for acute CHF exacerbation and need for safe discharge planning.      Acute on chronic HFpEF  Severe tricuspid regurgitation  Severe pulmonary hypertension   Moderate mitral regurgitation   Moderate aortic stenosis   Chronic hypoxic respiratory failure  * At baseline on \"1.2l\" home O2, ?1-2L  * Follows with Dr. Marquez at [facility]  * Most recent TTE on 8/31/22: EF 65-70%, valvular findings as above  * Presented on this admission with weight gain (20+ lbs per chart review), lower extremity edema, dyspnea on exertion. CXR on arrival showed pulmonary venous congestion with patchy airspace disease that may represent edema, similar to recent CXR on 11/1  * PTA on torsemide 20 mg TID. Initiated on Lasix 80 mg IV q8h on admission  * Cardiology consulted on admission  - Continues on Lasix 80 mg IV q8h; on K replacement protocol  - Resume PTA sildenafil once confirmed by PharmD  - Lymphedema consult  - Cardiac rehab  - Cardiology consult pending    Chronic atrial fibrillation  - Resume PTA digoxin and apixaban once confirmed by PharmD     Obstructive sleep apnea  * Not on CPAP at home  - Nocturnal O2 as needed while hospitalized    Thrombocytopenia, Improved  Platelet count on arrival 103k. Baseline appears to be 50-70k though with frequent issues with clumping so unclear accuracy.  - Platelets improved, 131k today - 11/10    Safeguarding concern  See SW note from 11/9/2022. Lives at home with her  and has been receiving home care services though home care " agency feels patient needs higher level of care. Hesitancy to private pay for shelter noted. VA report previously filed  - Patient remains hesitant to consider higher level of care; will continue to discuss  - SW consulted    Otherwise chronic and stable medical conditions: Resume home meds once confirmed by PharmD  GERD  Hypothyroidism, TSH normal this admission  Rheumatoid arthritis   Hyperlipidemia     Diet: 2 Gram Sodium Diet Other - please comment    DVT Prophylaxis: DOAC   Luther Catheter: Not present  Code Status: Full Code         Disposition: Expected discharge once euvolemic and cleared by therapies, will likely need TCU vs LTC; 3-5 more days    The patient's care was discussed with the Bedside Nurse, Care Coordinator/ and Patient.    Charleen Coombs MD  Hospitalist Service  [facility]  Contact information available via [facility] Paging/Directory    ______________________________________________________________________    Interval History   Admitted last night. Offers no complaints - denies cp/sob, dizziness/lightheadedness. Diuresing well: Net neg 2.3L since arrival.     Data reviewed today: I reviewed all medications, new labs and imaging results over the last 24 hours. I personally reviewed no images or EKG's today.    Physical Exam   Vital Signs: Temp: 98  F (36.7  C) Temp src: Oral BP: 121/76 Pulse: 85   Resp: 25 SpO2: 98 % O2 Device: Nasal cannula Oxygen Delivery: 3 LPM  Weight: 147 lbs 0 oz  Constitutional: Resting comfortably, NAD  HEENT: Sclera white, MMM  Respiratory: Breathing non-labored. +bibasilar crackles  Cardiovascular: Heart irregular rhythm, harsh systolic murmur throughout precordium. 3+ BLE edema  GI: +BS, abd soft/NT  Skin/Integument: No rash  Musculoskeletal: Normal muscle bulk and tone  Neuro: Alert and appropriate, LOCO  Psych: Calm and cooperative    Data   Recent Labs   Lab 11/10/22  0427 11/09/22  2125 11/09/22  1822 11/08/22  1236   WBC 6.1  --   5.8 5.5   HGB 11.7  --  12.8 11.5*     --  101* 101*   *  --  103*  --      --  135 137   POTASSIUM 3.5  3.5 3.6 3.4 3.3*   CHLORIDE 97  --  94 94   CO2 34*  --  37* 34*   BUN 16  --  18 20   CR 0.51*  --  0.61 0.73   ANIONGAP 5  --  4 9   TU 9.2  --  9.5 9.3   GLC 96  --  111* 77   ALBUMIN  --   --  3.3* 3.3*   PROTTOTAL  --   --  7.3 6.9   BILITOTAL  --   --  1.0 1.0   ALKPHOS  --   --  180* 163*   ALT  --   --  36 34   AST  --   --  43 43         Recent Results (from the past 24 hour(s))   Chest XR,  PA & LAT    Narrative    EXAM: XR CHEST 2 VIEWS  LOCATION: [facility]  DATE/TIME: 11/9/2022 6:50 PM    INDICATION: SOB, check for CHf or effusion  COMPARISON: 11/01/2022      Impression    IMPRESSION: Similar to prior. Pulmonary venous congestion with patchy airspace disease that may represent edema. There may be left basilar consolidation and/or atelectasis. No large effusions or pneumothorax. Unchanged cardiac size. Aortic   atherosclerosis. Mitral annular calcifications. Old right ninth rib fracture.       Medications     - MEDICATION INSTRUCTIONS -       ACE/ARB/ARNI NOT PRESCRIBED       BETA BLOCKER NOT PRESCRIBED         furosemide  80 mg Intravenous Q8H     sodium chloride (PF)  3 mL Intracatheter Q8H

## 2022-11-10 NOTE — ED PROVIDER NOTES
History   Chief Complaint:  Leg Swelling       HPI   Jaqueline Canales is a 81 year old female with history of leg swelling who presents with leg swelling. The patient's  reports that the patient has edema in both of her legs and also reports that the patient has been urinating a lot lately. This is her 3rd ED visit over the past two weeks.  She was seen in the ED on 10/27/22 for possible LE cellulitis discharged on keflex. She was then seen again on 11/1 for bilateral lower extremity edema and discharged home. Her diuretic are managed by Dr. Manjeet madrigal increased her torsemide to 20 mg TID approximately one week ago.  Her doctor's virtual visit note from this morning reports concerns about the patient having a possible congestive heart failure. The patient denies fever, pain during urination, chest pain, but she does indicate having cough. The doctor also increased patient's torsemide medication form 2 pills to 3 pills a day a week ago.  She was told to come to ED by her cardiologist due to acute treatment of decompensated heart failure and he feels she has venerable adult and he recommended that she be discharged form hospital to a alternative living care facility. His note reported that the patient has had recent weight gain of 20+ pound, three plus edema to both legs, shortness of breath with minimal exertion, on 2lpm NC oxygen .    Review of Systems   Constitutional: Positive for unexpected weight change. Negative for fever.   Respiratory: Positive for cough.    Cardiovascular: Positive for leg swelling. Negative for chest pain.   All other systems reviewed and are negative.    Allergies:  The patient has no known allergies.     Medications:  Tylenol  Eliquis  Lanoxin  Aricept  Folvite  Neurontin   Plaquenil  Synthroid  Centrum  [facility]  Klor-con  Revatio  Zocor  Eucerin  Demadex    Past Medical History:     Psoriasis  Obesity  Osteoporosis  Esophageal reflux  Lumbago  Raynaud's syndrome  helicobacter  pylori infection  Malignant fugax of right eye  Advance care planning  Diverticulosis  Hyperlipidemia  S/P carotid endarterectomy  SRINIVAS  Constipation  Hypothyroidism  Hiatal hernia  IVCD  Chest pain   Pulmonary hypertension  Status post coronary angiogram  Benign essential hypertension  Chronic diastolic heart failure  Leg swelling     Past Surgical History:    Breast surgery  Colonoscopy  Conization  CV right heart cath measurement  Endarterectomy carotid  Repair hammer toe  Tonsillectomy  ZZC nonspecific procedure  ZZHC colonoscopy     Family History:    Cancer- colorectal - mother  Cerebrovascular disease - father  Cancer - sister  Heart disease- sister    Social History:  The patient presents to the ED with her .  PCP: Carissa Burroughs       Physical Exam     Patient Vitals for the past 24 hrs:   BP Temp Temp src Pulse Resp SpO2   11/09/22 1834 113/78 -- -- 75 23 --   11/09/22 1810 -- -- -- 84 -- --   11/09/22 1805 109/81 -- -- 109 -- 94 %   11/09/22 1238 -- -- -- 76 -- 92 %   11/09/22 1235 106/57 97.6  F (36.4  C) Temporal -- 16 --       Physical Exam  Nursing note and vitals reviewed.  Constitutional:  Appears well-developed and well-nourished. No visible respiratory distress.  HENT:   Head:    Atraumatic.   Mouth/Throat:   Oropharynx is clear and moist. No oropharyngeal exudate.   Eyes:    Pupils are equal, round, and reactive to light.   Neck:    Normal range of motion. Neck supple.      No tracheal deviation present. No thyromegaly present.   Cardiovascular:  Normal rate, regular rhythm, no murmur   Pulmonary/Chest: Crackles in both lung bases.  Abdominal:   Soft. Bowel sounds are normal. Exhibits no distension and      no mass. There is no tenderness.      There is no rebound and no guarding.   Musculoskeletal:  Exhibits 2+ pitting edema to both legs .   Lymphadenopathy:  No cervical adenopathy.   Neurological:   Alert and oriented to person, place, and time.   Skin:    Skin is warm and dry. No  rash noted. No pallor.       Emergency Department Course   ECG  ECG:  ECG taken at 1809, ECG read at 1839  Atrial fibrillation   Left anterior fascicular block  Anterior infarct, age undertermined  Abnormal ECG  Rate 78 bpm. LA interval * ms. QRS duration 98 ms. QT/QTc 384/437 ms. P-R-T axes * -69 45.      Imaging:  Chest XR,  PA & LAT   Final Result   IMPRESSION: Similar to prior. Pulmonary venous congestion with patchy airspace disease that may represent edema. There may be left basilar consolidation and/or atelectasis. No large effusions or pneumothorax. Unchanged cardiac size. Aortic    atherosclerosis. Mitral annular calcifications. Old right ninth rib fracture.        Report per radiology    Laboratory:  Labs Ordered and Resulted from Time of ED Arrival to Time of ED Departure   COMPREHENSIVE METABOLIC PANEL - Abnormal       Result Value    Sodium 135      Potassium 3.4      Chloride 94      Carbon Dioxide (CO2) 37 (*)     Anion Gap 4      Urea Nitrogen 18      Creatinine 0.61      Calcium 9.5      Glucose 111 (*)     Alkaline Phosphatase 180 (*)     AST 43      ALT 36      Protein Total 7.3      Albumin 3.3 (*)     Bilirubin Total 1.0      GFR Estimate 89     NT PROBNP INPATIENT - Abnormal    N terminal Pro BNP Inpatient 7,923 (*)    CBC WITH PLATELETS AND DIFFERENTIAL - Abnormal    WBC Count 5.8      RBC Count 4.05      Hemoglobin 12.8      Hematocrit 41.0       (*)     MCH 31.6      MCHC 31.2 (*)     RDW 17.3 (*)     Platelet Count 103 (*)     % Neutrophils 81      % Lymphocytes 9      % Monocytes 7      % Eosinophils 1      % Basophils 1      % Immature Granulocytes 1      NRBCs per 100 WBC 0      Absolute Neutrophils 4.8      Absolute Lymphocytes 0.5 (*)     Absolute Monocytes 0.4      Absolute Eosinophils 0.1      Absolute Basophils 0.0      Absolute Immature Granulocytes 0.0      Absolute NRBCs 0.0     PREALBUMIN - Normal    Prealbumin 17     DIGOXIN LEVEL - Normal    Digoxin 1.4     TSH WITH  FREE T4 REFLEX - Normal    TSH 3.06     COVID-19 VIRUS (CORONAVIRUS) BY PCR - Normal    SARS CoV2 PCR Negative     POTASSIUM - Normal    Potassium 3.6        Emergency Department Course:     Reviewed:  I reviewed nursing notes, vitals, past medical history     Assessments:  1805 I obtained history and examined the patient as noted above.    I rechecked the patient and explained findings.     Consults:  1950 Consulted with Dr. Chandra hospitalist    Interventions:  1858 Lasix 60 mg IV    Disposition:  The patient was admitted to the hospital under the care of Dr. Chandra, hospitalist.     Impression & Plan     Medical Decision Making:  I found this patient have acute congestive heart failure with shortness of breath and bilateral leg edema failing outpatient management with increased dose of diuretic.  Reportedly the patient has been having a difficult time managing at home with her  and her physician recommended that she be evaluated for long term care placement.  She does not have any clinical signs of infection or DVT at this time.  There was no sign of COVID-19 infection either.  She was admitted to the cardiac telemetry unit under the care of the hospitalist.    Diagnosis:    ICD-10-CM    1. Acute HFrEF (heart failure with reduced ejection fraction) (H)  I50.21           Discharge Medications:  New Prescriptions    No medications on file       Scribe Disclosure:  I, ROSEMARY VAZQUEZ, am serving as a scribe at 6:05 PM on 11/9/2022 to document services personally performed by Johanna Garner MD, based on my observations and the provider's statements to me.              Johanna Garner MD  11/10/22 7297

## 2022-11-10 NOTE — PROGRESS NOTES
"   11/10/22 1536   Appointment Info   Signing Clinician's Name / Credentials (OT) Kayla Miramontes   Living Environment   People in Home spouse   Current Living Arrangements house   Home Accessibility stairs to enter home;stairs within home   Number of Stairs, Main Entrance 2   Number of Stairs, Within Home, Primary greater than 10 stairs   Transportation Anticipated car, drives self   Living Environment Comments Laundry in basemet but pt does not need to do. Bedroom on main level, typically sleeps in recliner   Self-Care   Usual Activity Tolerance moderate   Current Activity Tolerance fair   Equipment Currently Used at Home walker, standard   Activity/Exercise/Self-Care Comment Pt poor historian. Difficult for her to recall PLOF. Frequently states, \"My short-term memory is very small\". States she sometimes gets dress but can get help, shes does some cooking but not the cleaning. When asked who assists with IALDs she states, \"someone comes to help\" but unable to states how often.  assists with medications.   General Information   Onset of Illness/Injury or Date of Surgery 11/09/22   Referring Physician Charleen Coombs MD   Additional Occupational Profile Info/Pertinent History of Current Problem Jaqueline Canales is a 81 year old female with hx of HFpEF on 1-2L home O2, severe tricuspid regurgitation, severe pulmonary hypertension, moderate mitral regurgitation, moderate aortic stenosis, chronic a-fib on chronic AC with apixaban, SRINIVAS, hx of NSCLC, and RA on chronic immunosuppressive tx who was admitted on 11/9/2022 for acute CHF exacerbation and need for safe discharge planning.   Existing Precautions/Restrictions fall;oxygen therapy device and L/min   General Observations and Info On O2 at baseline   Cognitive Status Examination   Orientation Status person;place   Cognitive Status Comments Unable to states purpose for admission or reason for her \"poor health\". Pt aware of her poor memory but not clear " about how she is managing this at home for safety.   Visual Perception   Visual Impairment/Limitations corrective lenses full-time   Posture   Posture kyphosis   Range of Motion Comprehensive   General Range of Motion upper extremity range of motion deficits identified   Comment, General Range of Motion B UEs up to approx 120deg.   Strength Comprehensive (MMT)   General Manual Muscle Testing (MMT) Assessment upper extremity strength deficits identified   Comment, General Manual Muscle Testing (MMT) Assessment generalized weakness with 4/5 strength throughout   Transfers   Transfers sit-stand transfer   Transfer Comments CGA   Activities of Daily Living   BADL Assessment/Intervention other (see comments)  (Delines at this time.)   Clinical Impression   Criteria for Skilled Therapeutic Interventions Met (OT) Yes, treatment indicated   OT Diagnosis impaired ADLs and funcitonal mobility   OT Problem List-Impairments impacting ADL problems related to;activity tolerance impaired;balance;cognition;mobility;strength   Assessment of Occupational Performance 1-3 Performance Deficits   Identified Performance Deficits dressing, functional mobility, home management   Planned Therapy Interventions (OT) ADL retraining;bed mobility training;cognition;strengthening;transfer training;risk factor education;progressive activity/exercise   Clinical Decision Making Complexity (OT) low complexity   Risk & Benefits of therapy have been explained evaluation/treatment results reviewed;care plan/treatment goals reviewed   OT Total Evaluation Time   OT Eval, Low Complexity Minutes (93024) 10   OT Goals   Therapy Frequency (OT) Daily   OT Predicted Duration/Target Date for Goal Attainment 11/17/22   OT Goals Hygiene/Grooming;Lower Body Dressing;Toilet Transfer/Toileting;Cognition;OT Goal 1   OT: Hygiene/Grooming modified independent   OT: Lower Body Dressing Modified independent   OT: Toilet Transfer/Toileting Modified independent   OT:  "Cognitive Patient/caregiver will verbalize understanding of cognitive assessment results/recommendations as needed for safe discharge planning   OT: Goal 1 Patient will verbalizes 3 strategies to manage heart failure with only minimal vcs.   Interventions   Interventions Quick Adds Therapeutic Activity;Self-Care/Home Management   Self-Care/Home Management   Self-Care/Home Mgmt/ADL, Compensatory, Meal Prep Minutes (28273) 10   Treatment Detail/Skilled Intervention Reviewed CHF hand out with patient. Pt not very receptive to education. States, \"you know I don't have a good memory\". Encouraged to read throughout packets to get a better idea of why she is admit to the hospitl. Education should be reviewed again, hopefully with .   Therapeutic Activities   Therapeutic Activity Minutes (49075) 15   Symptoms noted during/after treatment fatigue   Treatment Detail/Skilled Intervention Ambulates in room with FWW and SBA-CGA. Very slow, steady gait speed. No LOB noted. 2L of O2. B LE edema wear on (velcros wraps). Tolerates well. VSS   OT Discharge Planning   OT Plan activity tolerance, TBD, review CHF education   OT Discharge Recommendation (DC Rec) Transitional Care Facility;home with assist;home with home care occupational therapy   OT Rationale for DC Rec Patient is questionable historian. Need to check PLOF with . Pt presents with STM deficits. Anticipate patient is near baseline with SBA-CGA needed for ambulation with FWW and assist with most-all IADLs d/t STM deficits. Pending actual support at home, patient may be able to return to normal baseline support. However, per MD note, \"There are also concerns for her ability to manage at home.   Patient has been reluctant to pursue this due to cost and her preference to remain in her home, though she is becoming more open to this.\" If support not available, TCU warrented to increase funcitonal mobility and self-cares.   OT Brief overview of current status CGA " for functional mobility, STM deficits   Total Session Time   Timed Code Treatment Minutes 25   Total Session Time (sum of timed and untimed services) 35

## 2022-11-10 NOTE — PROGRESS NOTES
Cardiac rehab: Orders received. Chart reviewed and discussed with care team.? Cardiac rehab not indicated due to patient was admitted w/ CHF.? For CHF diagnosis, patient will be seen by OT.? Will complete orders for CR.

## 2022-11-10 NOTE — H&P
"[facility]    History and Physical - Hospitalist Service       Date of Admission:  11/9/2022    Assessment & Plan   Jaqueline Canales is a 81 year old female with HFpEF, severe tricuspid regurgitation, severe pulmonary hypertension, moderate mitral regurgitation, moderate aortic stenosis, atrial fibrillation on chronic anticoagulation, hypothyroidism, GERD, hyperlipidemia, history of NSCLC, RA, SRINIVAS who presents with lower extremity edema and inability to manage at home. Admitted on 11/9/2022.     Acute on chronic HFpEF  Severe tricuspid regurgitation  Severe pulmonary hypertension   Moderate mitral regurgitation   Moderate aortic stenosis   Chronic hypoxic respiratory failure  *Presents with weight gain, lower extremity edema, dyspnea on exertion  *CXR with similar findings to 11/1, pulmonary venous congestion with patchy airspace disease that may represent edema  *Chronically on oxygen continuously (patient reports \"1.2L\", ?1-2L)  *Last echocardiogram 8/31/22: EF 65-70%, valvular findings as above  *Follows with Dr. Marquez at [facility], per notes is 20+ lbs up on weight  *PTA on torsemide 20 mg TID   - Cardiology consulted   - Furosemide 80 IV q8H  - Resume prior to admission sildenafil when confirmed by pharmacy   - Echocardiogram deferred as recently completed   - Strict I/O, daily weights  - Daily BMP while diuresing  - Telemetry  - Potassium replacement protocol     Atrial fibrillation  - HR controlled  - Continue prior to admission apixaban when confirmed by pharmacy   - Continue prior to admission digoxin when confirmed by pharmacy     Vulnerable adult  See SW note from 11/9/2022   - PT/OT/SW consulted     Obstructive sleep apnea  Does not wear CPAP per patient  - Oxygen overnight     GERD  - Continue prior to admission PPI when confirmed by pharmacy     Hypothyroidism  TSH normal  - Continue prior to admission levothyroxine when confirmed by pharmacy     Rheumatoid arthritis   - " Continue prior to admission hydroxychloroquine, folic acid when confirmed by pharmacy   - Hold methotrexate for now     Hyperlipidemia  - Continue prior to admission simvastatin when confirmed by pharmacy     Thrombocytopenia  Platelet count 103k. Baseline appears to be 50-70k though with frequent issues with clumping so unclear accuracy.  - CBC in AM      Diet:  2 gram sodium restricted diet   DVT Prophylaxis: DOAC  Luther Catheter: Not present  Code Status:   Full code     Disposition Plan   Admit to inpatient. Anticipate greater than or equal to 2 midnights prior to discharge.      Entered: Desean Chandra MD 11/09/2022, 8:53 PM     The patient's care was discussed with the Patient and ED provider    Clinically Significant Risk Factors Present on Admission        # Hypokalemia: Lowest K = 3.3 mmol/L (Ref range: 3.4-5.3) in last 2 days, will replace as needed  # Hyponatremia: Lowest Na = 135 mmol/L (Ref range: 136-145) in last 2 days, will monitor as appropriate      # Hypoalbuminemia: Lowest albumin = 3.3 g/dL (Ref range: 3.5-5.2) at 11/9/2022  6:22 PM, will monitor as appropriate  # Drug Induced Coagulation Defect: home medication list includes an anticoagulant medication  # Thrombocytopenia: Lowest platelets = 103 (Ref range: 150-450) in last 2 days, will monitor for bleeding   # Hypertension: home medication list includes antihypertensive(s)                  Desean Chandra MD  [facility]    ______________________________________________________________________    Chief Complaint   Leg swelling and weight gain for several weeks    History of Present Illness   Jaqueline Canales is a 81 year old female who presents with the above chief complaint.    History is obtained from the patient, who is a poor historian, and review of the medical record. The patient was sent into the Emergency Department at recommendation of cardiology (Dr. Marquez) due to persistent lower extremity edema and  "20+ pound weight gain that patient reports has been present for several weeks.  There are also concerns for her ability to manage at home, she has been receiving home care services, though these are no longer going to be covered and it is felt she needs a higher level of care.  Patient has been reluctant to pursue this due to cost and her preference to remain in her home, though she is becoming more open to this.  She denies any chest pain.  She reports she has been compliant with her medications.  She reports she is on \"1.2 L\" chronically.  Denies any fevers or chills.  Denies any cough.  Denies any abdominal pain, nausea, vomiting.    In the Emergency Department, the patient was seen by Dr. Garner, with whom I discussed the patient's presenting symptoms and emergency department course.  Initial vital signs were a temperature of 97.6F, HR 76, /57, RR 16, SpO2 92% on room air. Work-up included NtpBNP 7923, CXR pending. She received furosemide 60 mg IV and Hospitalists were contacted for admission to the hospital.     Review of Systems    Complete 10 point review of systems assessed and negative except as noted in HPI.    Past Medical History    I have reviewed this patient's medical history and updated it with pertinent information if needed.   Past Medical History:   Diagnosis Date     Amaurosis fugax 5-11    Right     Carotid artery stenosis      Esophageal reflux 3/11/2004     HELICOBACTER PYLORI INFECTION 7/11/2006     hx of CA in situ RT breast-1990.mastectomy 4/17/2007     Hyperlipidemia LDL goal <70 6/20/2011     Lung cancer (H)     squamous cell lung ca left lower lobe     OBESITY NOS 3/11/2004     OSTEOPOROSIS NOS 3/11/2004     Other psoriasis 3/11/2004     Pulmonary hypertension (H) 04/15/2019    Right heart catheterization demonstrated moderate pulmonary arterial hypertension with a mean PA pressure of 34 mmHg, no reversibility with inhaled nitric oxide, elevated right and left-sided filling " pressures, low normal cardiac index of 2.1.  Seen by Dr. Shay Marquez.  Started on sildenafil (Revatio) 20 mg 3 times daily.     RA (rheumatoid arthritis) (H) 2010     Raynaud's syndrome 4/10/2006     Scleroderma (H)      Sleep apnea     CPAP       Past Surgical History   I have reviewed this patient's surgical history and updated it with pertinent information if needed.  Past Surgical History:   Procedure Laterality Date     BREAST SURGERY       COLONOSCOPY  16     COLONOSCOPY N/A 2019    Procedure: Colonoscopy, With Polypectomy And Biopsy;  Surgeon: Neto Kaminski MD;  Location:  GI     CONIZATION       CV RIGHT HEART CATH MEASUREMENTS RECORDED N/A 4/15/2019    Procedure: Heart Cath Right Heart Cath;  Surgeon: Naresh Cyr MD;  Location:  HEART CARDIAC CATH LAB     ENDARTERECTOMY CAROTID       REPAIR HAMMER TOE       TONSILLECTOMY       ZZC NONSPECIFIC PROCEDURE      mastectomy RT breast     ZZHC COLONOSCOPY THRU STOMA, DIAGNOSTIC  2001    diverticulosis         Social History   I have reviewed this patient's social history and updated it with pertinent information if needed.  Social History     Tobacco Use     Smoking status: Former     Packs/day: 1.00     Years: 30.00     Pack years: 30.00     Types: Cigarettes     Start date:      Quit date: 3/11/1992     Years since quittin.6     Smokeless tobacco: Never   Substance Use Topics     Alcohol use: Not Currently     Alcohol/week: 0.0 standard drinks     Drug use: No     Lives in [place] with her . Typically ambulates with a walker.     Family History   I have reviewed this patient's family history and updated it with pertinent information if needed.   Family History   Problem Relation Age of Onset     Cancer - colorectal Mother      Cerebrovascular Disease Father      Cancer Sister 31        ovarian     Heart Disease Sister      Gastrointestinal Disease Sister         crohns     Gastrointestinal  Disease Sister         diverticulitis     Gastrointestinal Disease Brother         diverticulitis     Alzheimer Disease Sister      Cerebrovascular Disease Maternal Grandmother      Diabetes Maternal Grandmother      Mental Illness Maternal Grandmother      Diabetes Maternal Uncle      Cancer Nephew                 Prior to Admission Medications  - Pending pharmacy reconciliation   Prior to Admission Medications   Prescriptions Last Dose Informant Patient Reported? Taking?   ACE/ARB/ARNI NOT PRESCRIBED (INTENTIONAL)   No No   Sig: Please choose reason not prescribed from choices below.   BETA BLOCKER NOT PRESCRIBED (INTENTIONAL)   No No   Sig: Please choose reason not prescribed from choices below.   Methotrexate Sodium (METHOTREXATE PO)  Other Yes No   Sig: Take 2.5 mg by mouth 10 tablets weekly - . Splits the dose as 5 tablets in AM & 5 tablets in PM   Skin Protectants, Misc. (EUCERIN) cream  Other Yes No   Sig: Apply topically 2 times daily Apply to areas dry skin topically two times a day for dry skin and Apply to areas of dry skin topically as needed for dry skin daily   Vitamin D, Cholecalciferol, 10 MCG (400 UNIT) TABS  Other Yes No   Sig: Take 1,600 Units by mouth daily   acetaminophen (TYLENOL) 500 MG tablet  Other Yes No   Sig: Take 1,000 mg by mouth 2 times daily as needed for mild pain    albuterol (PROAIR HFA/PROVENTIL HFA/VENTOLIN HFA) 108 (90 Base) MCG/ACT inhaler   No No   Sig: USE 2 INHALATIONS ORALLY   INTO THE LUNGS EVERY 6  HOURS as needed   apixaban ANTICOAGULANT (ELIQUIS) 2.5 MG tablet   Yes No   Sig: Take 1 tablet (2.5 mg) by mouth 2 times daily   bromfenac (PROLENSA) 0.07 % ophthalmic solution  Other Yes No   Sig: Place 1 drop into the right eye daily    digoxin (LANOXIN) 125 MCG tablet   No No   Sig: TAKE 1 TABLET DAILY   donepezil (ARICEPT) 5 MG tablet   No No   Sig: TAKE 1 TABLET AT BEDTIME   folic acid (FOLVITE) 1 MG tablet   No No   Sig: TAKE 1 TABLET DAILY   gabapentin  (NEURONTIN) 100 MG capsule   No No   Sig: Take 1 capsule (100 mg) by mouth 3 times daily   hydroxychloroquine (PLAQUENIL) 200 MG tablet   Yes No   Sig: Take 200 mg by mouth daily   levothyroxine (SYNTHROID/LEVOTHROID) 75 MCG tablet   No No   Sig: TAKE 1 TABLET DAILY   melatonin 5 MG CAPS   Yes No   miconazole (MICATIN) 2 % external cream   Yes No   Sig: Apply topically 2 times daily   multivitamin (CENTRUM SILVER) tablet  Other Yes No   Sig: Take 1 tablet by mouth daily   omeprazole (PRILOSEC) 20 MG DR capsule   No No   Sig: TAKE 1 CAPSULE TWICE DAILY,30-60 MINUTES PRIOR TO     MEALS   omeprazole (PRILOSEC) 20 MG DR capsule   Yes No   Sig: Take 1 capsule (20 mg) by mouth 2 times daily   potassium chloride ER (KLOR-CON) 20 MEQ CR tablet   No No   Sig: Take 1 tablet (20 mEq) by mouth daily   prednisoLONE acetate (PRED MILD) 0.12 % ophthalmic suspension   Yes No   Si-2 drops 4 times daily   salicylic acid (COMPOUND W MAX STRENGTH) 17 % external gel   No No   Sig: Apply topically daily   sildenafil (REVATIO) 20 MG tablet   No No   Sig: TAKE 1 TABLET BY MOUTH THREE TIMES DAILY. GENERIC FOR REVATIO.   simvastatin (ZOCOR) 40 MG tablet   No No   Sig: Take 1 tablet (40 mg) by mouth At Bedtime   torsemide (DEMADEX) 20 MG tablet   No No   Sig: Take 1 tablet (20 mg) by mouth 3 times daily      Facility-Administered Medications: None     Allergies   Allergies   Allergen Reactions     No Known Allergies        Physical Exam   Vital Signs: Temp: 97.6  F (36.4  C) Temp src: Temporal BP: 105/79 Pulse: 67   Resp: 25 SpO2: 100 % O2 Device: Nasal cannula Oxygen Delivery: 3 LPM  Weight: 0 lbs 0 oz    Constitutional: NAD  Eyes: PERRL, EOMI  HENT: Oropharynx clear, MMM  Respiratory: Inspiratory crackles L>R, normal respiratory effort on nasal cannula    Cardiovascular: Irregularly irregular, III/VI holosystolic murmur at apex. 3+ pitting edema to waist  GI: Soft, non-tender, non-distended. No rebound tenderness or guarding.    Skin:  Warm, dry   Neurologic: Alert. Poor historian, defers to the chart or answers 'I don't know' to questions regarding medications and other parts of her history. Oriented to place, month, not able to state date or year.   Psychiatric: Normal affect, appropriate      Data   Data reviewed today: I reviewed all medications, new labs and imaging results over the last 24 hours. I personally reviewed the EKG tracing showing atrial fibrillation  and the chest x-ray image(s) showing pulonary venous congestion with patchy infiltrates.    Recent Labs   Lab 11/09/22  1822 11/08/22  1236   WBC 5.8 5.5   HGB 12.8 11.5*   * 101*   *  --     137   POTASSIUM 3.4 3.3*   CHLORIDE 94 94   CO2 37* 34*   BUN 18 20   CR 0.61 0.73   ANIONGAP 4 9   TU 9.5 9.3   * 77   ALBUMIN 3.3* 3.3*   PROTTOTAL 7.3 6.9   BILITOTAL 1.0 1.0   ALKPHOS 180* 163*   ALT 36 34   AST 43 43       Recent Results (from the past 24 hour(s))   Chest XR,  PA & LAT    Narrative    EXAM: XR CHEST 2 VIEWS  LOCATION: [facility]  DATE/TIME: 11/9/2022 6:50 PM    INDICATION: SOB, check for CHf or effusion  COMPARISON: 11/01/2022      Impression    IMPRESSION: Similar to prior. Pulmonary venous congestion with patchy airspace disease that may represent edema. There may be left basilar consolidation and/or atelectasis. No large effusions or pneumothorax. Unchanged cardiac size. Aortic   atherosclerosis. Mitral annular calcifications. Old right ninth rib fracture.

## 2022-11-10 NOTE — PLAN OF CARE
RECEIVING UNIT ED HANDOFF REVIEW    ED Nurse Handoff Report was reviewed by: Dejon Coffey RN on November 10, 2022 at 7:58 AM

## 2022-11-10 NOTE — CONSULTS
CARDIOLOGY CONSULT    REASON FOR CONSULT: CHF exacerbation      PRIMARY CARE PHYSICIAN:  Carissa Burroughs    HISTORY OF PRESENT ILLNESS:  Ms. Canales is an 81 year old woman with PMH significant for CHFpEF, severe TR, severe pulmonary hypertension, moderate aortic stenosis who was admitted after seeing her cardiologist Dr. Marquez in clinic yesterday for volume overload.  In review, she was sent into the Emergency Department at recommendation of cardiology (Dr. Marquez) due to persistent lower extremity edema and 20+ pound weight gain that patient reports has been present for several weeks.  There are also concerns for her ability to manage at home.   Patient has been reluctant to pursue this due to cost and her preference to remain in her home, though she is becoming more open to this.    She is a rather difficult historian.  She does report increased leg swelling and weight gain. S he denies any exertional chest pain, chest discomfort or shortness of breath.  She is on supplemental Ox at home chronically 1-2L.  Her diuretics were recently increased by her primary MD to torsemide 20 mg TID without success in fluid removal.  She is also maintained on revatio 20 mg TID.       In the Emergency Department, she was noted to have an NtpBNP 7923, CXR demonstrates pulmonary edema.  Her weight was 147 pounds on admission and was 123 pounds on 9/1/22.    She received furosemide 60 mg IV in the ED.         PAST MEDICAL HISTORY:  1.  Severe pulmonary artery hypertension  2.  Scleroderma  3.  Severe tricuspid regurgitation  4.  Moderate aortic stenosis  5.  Iron deficiency anemia  6.  Rheumatoid arthritis  7.  SRINIVAS on CPAP  8.  Permanet atrial fibrillation    MEDICATIONS:  Current Facility-Administered Medications   Medication     acetaminophen (TYLENOL) tablet 650 mg    Or     acetaminophen (TYLENOL) Suppository 650 mg     bisacodyl (DULCOLAX) suppository 10 mg     furosemide (LASIX) injection 80 mg     lidocaine (LMX4) cream      lidocaine 1 % 0.1-1 mL     melatonin tablet 1 mg     ondansetron (ZOFRAN ODT) ODT tab 4 mg    Or     ondansetron (ZOFRAN) injection 4 mg     Patient is already receiving anticoagulation with heparin, enoxaparin (LOVENOX), warfarin (COUMADIN)  or other anticoagulant medication     polyethylene glycol (MIRALAX) Packet 17 g     potassium chloride ER (KLOR-CON M) CR tablet 20 mEq     prochlorperazine (COMPAZINE) injection 5 mg    Or     prochlorperazine (COMPAZINE) tablet 5 mg    Or     prochlorperazine (COMPAZINE) suppository 12.5 mg     Reason ACE/ARB/ARNI order not selected     Reason beta blocker not prescribed     sodium chloride (PF) 0.9% PF flush 3 mL     sodium chloride (PF) 0.9% PF flush 3 mL       ALLERGIES:  Allergies   Allergen Reactions     No Known Allergies        SOCIAL HISTORY:  Prior smoking history.  No significant ETOH. Lives in [place] in a home with her .      FAMILY HISTORY:  Assessed and noncontributory    REVIEW OF SYSTEMS:  A complete ROS was obtained and the pertinent positives are outlined in the history of present illness above.  The remainder of systems is negative.      PHYSICAL EXAM:  Temp: 98  F (36.7  C) Temp src: Oral BP: 121/76 Pulse: 85   Resp: 25 SpO2: 98 % O2 Device: Nasal cannula Oxygen Delivery: 3 LPM  Vital Signs with Ranges  Temp:  [97.6  F (36.4  C)-98.2  F (36.8  C)] 98  F (36.7  C)  Pulse:  [] 85  Resp:  [8-25] 25  BP: (105-123)/(57-83) 121/76  SpO2:  [92 %-100 %] 98 %  147 lbs 0 oz    Constitutional: awake, alert, no distress  Eyes: PERRL, sclera nonicteric  ENT: trachea midline  Respiratory: CTAB  Cardiovascular: II/VI holosystolic murmur,  + JVD   GI: distended, + BS  Lymph/Hematologic: no lymphadenopathy  Skin: dry, no rash  Musculoskeletal: good muscle tone, +2-3 edema bilatearlly  Neurologic: no focal deficits  Neuropsychiatric: appropriate affact    DATA:    Echocardiogram dated 8/31/22 images reviewed perosnally  Left ventricular systolic function  is normal.  The visual ejection fraction is 65-70%.  No regional wall motion abnormalities noted.  The right ventricular systolic function is mildly reduced.  There is severe (4+) tricuspid regurgitation.  Right ventricular systolic pressure is elevated, consistent with severe  pulmonary hypertension.  There is moderate (2+) mitral regurgitation.  Moderate valvular aortic stenosis.  The mean AoV pressure gradient is 32mmHg.  The right ventricle is moderate to severely dilated.  Compared to 3/22, severe TR and pulmonary hypertension is unchanged. Aortic  stenosis has progressed, mean gradient increased from 21 to 32mm. The study  was technically difficult.        ASSESSMENT:  1.  Acute on chronic CHFpEF: with significant volume overload.  Prior dry weight around 123 pounds (currently 147 pounds)  2.  Severe pulmonary artery hypertension:  On revatio 20 mg TID, follows with Dr. Marquez  3.  Multi-valvular heart disease with severe TR, moderate MR, moderate aortic stenosis  4.  Permanent atrial fibrillation:  On eliquis, digoxin  5.  Failure to thrive/vulnerable adult:  Unable to care for self at home   6.  Scleroderma/rheumatoid arthritis      RECOMMENDATIONS:  1. Agree with IV diuresis as ordered by primary service  2.  Continue outpatient sildenafil, digoxin, eliquis.  Consider adding spironolactone  3.  Social work to address placement at discharge, patient is reportedly a vulnerable adult and unable to care for herself at home.      Rachel Mckeon MD [facility]  November 10, 2022        Clinically Significant Risk Factors Present on Admission       # Hypokalemia: Lowest K = 3.3 mmol/L (Ref range: 3.4-5.3) in last 2 days, will replace as needed  # Hyponatremia: Lowest Na = 135 mmol/L (Ref range: 136-145) in last 2 days, will monitor as appropriate      # Hypoalbuminemia: Lowest albumin = 3.3 g/dL (Ref range: 3.5-5.2) at 11/9/2022  6:22 PM, will monitor as appropriate   # Drug Induced  "Coagulation Defect: home medication list includes an anticoagulant medication  # Thrombocytopenia: Lowest platelets = 103 (Ref range: 150-450) in last 2 days, will monitor for bleeding   # Overweight: Estimated body mass index is 26.89 kg/m  as calculated from the following:    Height as of this encounter: 1.575 m (5' 2\").    Weight as of this encounter: 66.7 kg (147 lb).    "

## 2022-11-10 NOTE — ED NOTES
[facility]  ED Nurse Handoff Report    ED Chief complaint: Leg Swelling      ED Diagnosis:   Final diagnoses:   Acute HFrEF (heart failure with reduced ejection fraction) (H)       Code Status: Per hospitalist    Allergies:   Allergies   Allergen Reactions    No Known Allergies        Patient Story:  Pt reports  lower extremity edema and inability to manage  her care at home.   Focused Assessment: A/O x4 with bilateral 2+ LATANYA.  +PP via doppler. Cap refill> 2 seconds. Pt able to wiggle toes. Bilateral legs elevated on pillows. Lungs with good air movement. Sating at 96% on O2 3L/NC.    Treatments and/or interventions provided: CXR,Cardiac monitor, rhythm controlled A-Fib rate 80,Lab, lasix  O2 3L/NC, pure-wick.  Patient's response to treatments and/or interventions: responding well to treatment.   Results for orders placed or performed during the hospital encounter of 11/09/22   Chest XR,  PA & LAT     Status: None    Narrative    EXAM: XR CHEST 2 VIEWS  LOCATION: [facility]  DATE/TIME: 11/9/2022 6:50 PM    INDICATION: SOB, check for CHf or effusion  COMPARISON: 11/01/2022      Impression    IMPRESSION: Similar to prior. Pulmonary venous congestion with patchy airspace disease that may represent edema. There may be left basilar consolidation and/or atelectasis. No large effusions or pneumothorax. Unchanged cardiac size. Aortic   atherosclerosis. Mitral annular calcifications. Old right ninth rib fracture.   Comprehensive metabolic panel     Status: Abnormal   Result Value Ref Range    Sodium 135 133 - 144 mmol/L    Potassium 3.4 3.4 - 5.3 mmol/L    Chloride 94 94 - 109 mmol/L    Carbon Dioxide (CO2) 37 (H) 20 - 32 mmol/L    Anion Gap 4 3 - 14 mmol/L    Urea Nitrogen 18 7 - 30 mg/dL    Creatinine 0.61 0.52 - 1.04 mg/dL    Calcium 9.5 8.5 - 10.1 mg/dL    Glucose 111 (H) 70 - 99 mg/dL    Alkaline Phosphatase 180 (H) 40 - 150 U/L    AST 43 0 - 45 U/L    ALT 36 0 - 50 U/L     Protein Total 7.3 6.8 - 8.8 g/dL    Albumin 3.3 (L) 3.4 - 5.0 g/dL    Bilirubin Total 1.0 0.2 - 1.3 mg/dL    GFR Estimate 89 >60 mL/min/1.73m2   Nt probnp inpatient (BNP)     Status: Abnormal   Result Value Ref Range    N terminal Pro BNP Inpatient 7,923 (H) 0 - 1,800 pg/mL   Prealbumin     Status: Normal   Result Value Ref Range    Prealbumin 17 15 - 45 mg/dL   Digoxin level     Status: Normal   Result Value Ref Range    Digoxin 1.4   ug/L   TSH with free T4 reflex     Status: Normal   Result Value Ref Range    TSH 3.06 0.40 - 4.00 mU/L   Asymptomatic COVID-19 Virus (Coronavirus) by PCR Nasopharyngeal     Status: Normal    Specimen: Nasopharyngeal; Swab   Result Value Ref Range    SARS CoV2 PCR Negative Negative    Narrative    Testing was performed using the Xpert Xpress SARS-CoV-2 Assay on the   Cepheid Gene-Xpert Instrument Systems. Additional information about   this Emergency Use Authorization (EUA) assay can be found via the Lab   Guide. This test should be ordered for the detection of SARS-CoV-2 in   individuals who meet SARS-CoV-2 clinical and/or epidemiological   criteria. Test performance is unknown in asymptomatic patients. This   test is for in vitro diagnostic use under the FDA EUA for   laboratories certified under CLIA to perform high complexity testing.   This test has not been FDA cleared or approved. A negative result   does not rule out the presence of PCR inhibitors in the specimen or   target RNA in concentration below the limit of detection for the   assay. The possibility of a false negative should be considered if   the patient's recent exposure or clinical presentation suggests   COVID-19. This test was validated by the [facility]. This laboratory is certified under the Clinical Laboratory Improvement Amendments of 1988 (CLIA-88) as qualified to perform high complexity laboratory testing.     CBC with platelets and differential     Status: Abnormal    Result Value Ref Range    WBC Count 5.8 4.0 - 11.0 10e3/uL    RBC Count 4.05 3.80 - 5.20 10e6/uL    Hemoglobin 12.8 11.7 - 15.7 g/dL    Hematocrit 41.0 35.0 - 47.0 %     (H) 78 - 100 fL    MCH 31.6 26.5 - 33.0 pg    MCHC 31.2 (L) 31.5 - 36.5 g/dL    RDW 17.3 (H) 10.0 - 15.0 %    Platelet Count 103 (L) 150 - 450 10e3/uL    % Neutrophils 81 %    % Lymphocytes 9 %    % Monocytes 7 %    % Eosinophils 1 %    % Basophils 1 %    % Immature Granulocytes 1 %    NRBCs per 100 WBC 0 <1 /100    Absolute Neutrophils 4.8 1.6 - 8.3 10e3/uL    Absolute Lymphocytes 0.5 (L) 0.8 - 5.3 10e3/uL    Absolute Monocytes 0.4 0.0 - 1.3 10e3/uL    Absolute Eosinophils 0.1 0.0 - 0.7 10e3/uL    Absolute Basophils 0.0 0.0 - 0.2 10e3/uL    Absolute Immature Granulocytes 0.0 <=0.4 10e3/uL    Absolute NRBCs 0.0 10e3/uL   [place] Draw     Status: None    Narrative    The following orders were created for panel order [place] Draw.  Procedure                               Abnormality         Status                     ---------                               -----------         ------                     Extra Blue Top Tube[[id number]]                              Final result                 Please view results for these tests on the individual orders.   Extra Blue Top Tube     Status: None   Result Value Ref Range    Hold Specimen [facility]    CBC with platelets differential     Status: Abnormal    Narrative    The following orders were created for panel order CBC with platelets differential.  Procedure                               Abnormality         Status                     ---------                               -----------         ------                     CBC with platelets and d...[[id number]]  Abnormal            Final result                 Please view results for these tests on the individual orders.     To be done/followed up on inpatient unit: Monitor    Does this patient have any cognitive concerns?:  no    Activity level -  Baseline/Home:  Walker and Total Care  Activity Level - Current:   Total Care    Patient's Preferred language: English   Needed?: No    Isolation: None  Infection: Not Applicable  Patient tested for COVID 19 prior to admission: YES  Bariatric?: No    Vital Signs:   Vitals:    11/09/22 1810 11/09/22 1834 11/09/22 1905 11/09/22 2130   BP:  113/78 105/79 123/76   Pulse: 84 75 67 70   Resp:  23 25 20   Temp:       TempSrc:       SpO2:   100% 96%       Cardiac Rhythm:     Was the PSS-3 completed:   Yes  What interventions are required if any?             Family Comments: None present in ED at this time  OBS brochure/video discussed/provided to patient/family: N/A              Name of person given brochure if not patient: N/A              Relationship to patient: N/A    For the majority of the shift this patient's behavior was Green.   Behavioral interventions performed were N/A.    ED NURSE PHONE NUMBER: [phone]

## 2022-11-10 NOTE — PHARMACY-ADMISSION MEDICATION HISTORY
Pharmacy Medication History  Admission medication history interview status for the 11/9/2022  admission is complete. See EPIC admission navigator for prior to admission medications     Location of Interview: Outside patient room but on unit  Medication history sources: Sanjiv and Iveth home care nurse Dilanjabier Vanessa [phone]    Significant changes made to the medication list:  Add AREDS  Changed several medications to prn    In the past week, patient estimated taking medication this percent of the time: unknown    Additional medication history information:   Pt does not know her medications and  was unable to be reached.      Home care nurse stated pt is taking apixaban 2.5mg BID but the most recent fill on 9/20/22 was for 5mg tabs #180.  Unable to verify w/pt or .  Apixaban was filled for 2.5mg BID back in April of 2022.     Medication reconciliation completed by provider prior to medication history? No    Time spent in this activity: 30+ minutes    Prior to Admission medications    Medication Sig Last Dose Taking? Auth Provider Long Term End Date   acetaminophen (TYLENOL) 500 MG tablet Take 1,000 mg by mouth 2 times daily as needed for mild pain   at prn Yes Reported, Patient     albuterol (PROAIR HFA/PROVENTIL HFA/VENTOLIN HFA) 108 (90 Base) MCG/ACT inhaler USE 2 INHALATIONS ORALLY   INTO THE LUNGS EVERY 6  HOURS as needed prn Yes Carissa Burroughs MD Yes    apixaban ANTICOAGULANT (ELIQUIS) 2.5 MG tablet Home care nurse reports pt is taking 2.5mg BID.  Last fill was on 9/20/22 #180 for 5mg tablets.  Unable to verify what dose pt is actually taking.  Yes Martha Gant PA     digoxin (LANOXIN) 125 MCG tablet TAKE 1 TABLET DAILY  Yes Carissa Burroughs MD Yes    donepezil (ARICEPT) 5 MG tablet TAKE 1 TABLET AT BEDTIME  Yes Carissa Burroughs MD     folic acid (FOLVITE) 1 MG tablet TAKE 1 TABLET DAILY  Yes Carissa Burroughs MD     gabapentin (NEURONTIN) 100 MG capsule Take 1 capsule (100 mg)  by mouth 3 times daily  Yes Carissa Burroughs MD Yes    hydroxychloroquine (PLAQUENIL) 200 MG tablet Take 200 mg by mouth daily  Yes Unknown, Entered By History     levothyroxine (SYNTHROID/LEVOTHROID) 75 MCG tablet TAKE 1 TABLET DAILY  Yes Carissa Burroughs MD Yes    melatonin 5 MG CAPS Take 5 mg by mouth nightly as needed  Yes Reported, Patient     Methotrexate Sodium (METHOTREXATE PO) Take 2.5 mg by mouth 10 tablets weekly - Wednesdays. Splits the dose as 5 tablets in AM & 5 tablets in PM 11/9/2022 Yes Reported, Patient     miconazole (MICATIN) 2 % external cream Apply topically 2 times daily as needed  at prn Yes Reported, Patient     Multiple Vitamins-Minerals (ICAPS AREDS FORMULA PO) Take 1 capsule by mouth 2 times daily  Yes Unknown, Entered By History     multivitamin (CENTRUM SILVER) tablet Take 1 tablet by mouth daily  Yes Unknown, Entered By History     omeprazole (PRILOSEC) 20 MG DR capsule TAKE 1 CAPSULE TWICE DAILY,30-60 MINUTES PRIOR TO     MEALS  Yes Carissa Burroughs MD     potassium chloride ER (KLOR-CON) 20 MEQ CR tablet Take 1 tablet (20 mEq) by mouth daily  Yes Carissa Burroughs MD     sildenafil (REVATIO) 20 MG tablet TAKE 1 TABLET BY MOUTH THREE TIMES DAILY. GENERIC FOR REVATIO.  Yes Carissa Burroughs MD Yes    simvastatin (ZOCOR) 40 MG tablet Take 1 tablet (40 mg) by mouth At Bedtime  Yes Carissa Burroughs MD Yes    torsemide (DEMADEX) 20 MG tablet Take 1 tablet (20 mg) by mouth 3 times daily  Yes Carissa Burroughs MD Yes    Vitamin D, Cholecalciferol, 10 MCG (400 UNIT) TABS Take 1,600 Units by mouth daily  Yes Unknown, Entered By History     ACE/ARB/ARNI NOT PRESCRIBED (INTENTIONAL) Please choose reason not prescribed from choices below.   Vivian Shukla APRN CNP Yes    BETA BLOCKER NOT PRESCRIBED (INTENTIONAL) Please choose reason not prescribed from choices below.   Vivian Shukla, AUBRIE CNP Yes    salicylic acid (COMPOUND W MAX STRENGTH) 17 % external gel Apply  topically daily  Patient not taking: Reported on 11/10/2022 Not Taking  Vivian Shukla APRN CNP     Skin Protectants, Misc. (EUCERIN) cream Apply topically 2 times daily Apply to areas dry skin topically two times a day for dry skin and Apply to areas of dry skin topically as needed for dry skin daily  Patient not taking: Reported on 11/10/2022 Not Taking  Reported, Patient         The information provided in this note is only as accurate as the sources available at the time of update(s)

## 2022-11-10 NOTE — ED NOTES
The patient pressed the call light button. She requested food. I asked the nurse if I could retrieve some for her. Nurse obtained some food and gave it to the patient.

## 2022-11-11 ENCOUNTER — APPOINTMENT (OUTPATIENT)
Dept: OCCUPATIONAL THERAPY | Facility: CLINIC | Age: 82
DRG: 291 | End: 2022-11-11
Payer: MEDICARE

## 2022-11-11 LAB
ANION GAP SERPL CALCULATED.3IONS-SCNC: 7 MMOL/L (ref 3–14)
BUN SERPL-MCNC: 15 MG/DL (ref 7–30)
CALCIUM SERPL-MCNC: 9.1 MG/DL (ref 8.5–10.1)
CHLORIDE BLD-SCNC: 95 MMOL/L (ref 94–109)
CO2 SERPL-SCNC: 35 MMOL/L (ref 20–32)
CREAT SERPL-MCNC: 0.52 MG/DL (ref 0.52–1.04)
GFR SERPL CREATININE-BSD FRML MDRD: >90 ML/MIN/1.73M2
GLUCOSE BLD-MCNC: 85 MG/DL (ref 70–99)
POTASSIUM BLD-SCNC: 3.3 MMOL/L (ref 3.4–5.3)
POTASSIUM BLD-SCNC: 4 MMOL/L (ref 3.4–5.3)
SODIUM SERPL-SCNC: 137 MMOL/L (ref 133–144)

## 2022-11-11 PROCEDURE — 250N000013 HC RX MED GY IP 250 OP 250 PS 637: Performed by: INTERNAL MEDICINE

## 2022-11-11 PROCEDURE — 97110 THERAPEUTIC EXERCISES: CPT | Mod: GO | Performed by: OCCUPATIONAL THERAPIST

## 2022-11-11 PROCEDURE — 36415 COLL VENOUS BLD VENIPUNCTURE: CPT | Performed by: INTERNAL MEDICINE

## 2022-11-11 PROCEDURE — 80048 BASIC METABOLIC PNL TOTAL CA: CPT | Performed by: INTERNAL MEDICINE

## 2022-11-11 PROCEDURE — 99232 SBSQ HOSP IP/OBS MODERATE 35: CPT | Performed by: INTERNAL MEDICINE

## 2022-11-11 PROCEDURE — 210N000002 HC R&B HEART CARE

## 2022-11-11 PROCEDURE — 84132 ASSAY OF SERUM POTASSIUM: CPT | Performed by: INTERNAL MEDICINE

## 2022-11-11 PROCEDURE — 250N000011 HC RX IP 250 OP 636: Performed by: INTERNAL MEDICINE

## 2022-11-11 RX ORDER — LEVOTHYROXINE SODIUM 75 UG/1
75 TABLET ORAL DAILY
Status: DISCONTINUED | OUTPATIENT
Start: 2022-11-11 | End: 2022-11-14 | Stop reason: HOSPADM

## 2022-11-11 RX ORDER — HYDROXYCHLOROQUINE SULFATE 200 MG/1
200 TABLET, FILM COATED ORAL DAILY
Status: DISCONTINUED | OUTPATIENT
Start: 2022-11-11 | End: 2022-11-14 | Stop reason: HOSPADM

## 2022-11-11 RX ORDER — POTASSIUM CHLORIDE 1500 MG/1
40 TABLET, EXTENDED RELEASE ORAL ONCE
Status: COMPLETED | OUTPATIENT
Start: 2022-11-11 | End: 2022-11-11

## 2022-11-11 RX ORDER — FOLIC ACID 1 MG/1
1000 TABLET ORAL DAILY
Status: DISCONTINUED | OUTPATIENT
Start: 2022-11-11 | End: 2022-11-14 | Stop reason: HOSPADM

## 2022-11-11 RX ORDER — POTASSIUM CHLORIDE 1500 MG/1
20 TABLET, EXTENDED RELEASE ORAL DAILY
Status: DISCONTINUED | OUTPATIENT
Start: 2022-11-11 | End: 2022-11-14 | Stop reason: HOSPADM

## 2022-11-11 RX ORDER — DONEPEZIL HYDROCHLORIDE 5 MG/1
5 TABLET, FILM COATED ORAL AT BEDTIME
Status: DISCONTINUED | OUTPATIENT
Start: 2022-11-11 | End: 2022-11-14 | Stop reason: HOSPADM

## 2022-11-11 RX ORDER — DIGOXIN 125 MCG
125 TABLET ORAL DAILY
Status: DISCONTINUED | OUTPATIENT
Start: 2022-11-11 | End: 2022-11-11

## 2022-11-11 RX ORDER — GABAPENTIN 100 MG/1
100 CAPSULE ORAL 3 TIMES DAILY
Status: DISCONTINUED | OUTPATIENT
Start: 2022-11-11 | End: 2022-11-14 | Stop reason: HOSPADM

## 2022-11-11 RX ADMIN — GABAPENTIN 100 MG: 100 CAPSULE ORAL at 21:36

## 2022-11-11 RX ADMIN — GABAPENTIN 100 MG: 100 CAPSULE ORAL at 08:18

## 2022-11-11 RX ADMIN — DONEPEZIL HYDROCHLORIDE 5 MG: 5 TABLET, FILM COATED ORAL at 21:36

## 2022-11-11 RX ADMIN — OMEPRAZOLE 20 MG: 20 CAPSULE, DELAYED RELEASE ORAL at 15:57

## 2022-11-11 RX ADMIN — POTASSIUM CHLORIDE 20 MEQ: 1500 TABLET, EXTENDED RELEASE ORAL at 09:43

## 2022-11-11 RX ADMIN — FOLIC ACID 1000 MCG: 1 TABLET ORAL at 08:18

## 2022-11-11 RX ADMIN — FUROSEMIDE 80 MG: 10 INJECTION, SOLUTION INTRAMUSCULAR; INTRAVENOUS at 02:34

## 2022-11-11 RX ADMIN — GABAPENTIN 100 MG: 100 CAPSULE ORAL at 15:57

## 2022-11-11 RX ADMIN — OMEPRAZOLE 20 MG: 20 CAPSULE, DELAYED RELEASE ORAL at 09:50

## 2022-11-11 RX ADMIN — APIXABAN 2.5 MG: 2.5 TABLET, FILM COATED ORAL at 21:36

## 2022-11-11 RX ADMIN — FUROSEMIDE 80 MG: 10 INJECTION, SOLUTION INTRAMUSCULAR; INTRAVENOUS at 17:45

## 2022-11-11 RX ADMIN — POTASSIUM CHLORIDE 40 MEQ: 1500 TABLET, EXTENDED RELEASE ORAL at 08:19

## 2022-11-11 RX ADMIN — LEVOTHYROXINE SODIUM 75 MCG: 75 TABLET ORAL at 08:19

## 2022-11-11 RX ADMIN — SILDENAFIL 20 MG: 20 TABLET, FILM COATED ORAL at 21:36

## 2022-11-11 RX ADMIN — DIGOXIN 125 MCG: 125 TABLET ORAL at 08:19

## 2022-11-11 RX ADMIN — SILDENAFIL 20 MG: 20 TABLET, FILM COATED ORAL at 08:19

## 2022-11-11 RX ADMIN — SILDENAFIL 20 MG: 20 TABLET, FILM COATED ORAL at 15:57

## 2022-11-11 RX ADMIN — HYDROXYCHLOROQUINE SULFATE 200 MG: 200 TABLET ORAL at 08:19

## 2022-11-11 RX ADMIN — APIXABAN 2.5 MG: 2.5 TABLET, FILM COATED ORAL at 09:43

## 2022-11-11 RX ADMIN — FUROSEMIDE 80 MG: 10 INJECTION, SOLUTION INTRAMUSCULAR; INTRAVENOUS at 09:43

## 2022-11-11 ASSESSMENT — ACTIVITIES OF DAILY LIVING (ADL)
ADLS_ACUITY_SCORE: 28
ADLS_ACUITY_SCORE: 28
ADLS_ACUITY_SCORE: 32
ADLS_ACUITY_SCORE: 36
ADLS_ACUITY_SCORE: 36
ADLS_ACUITY_SCORE: 32
ADLS_ACUITY_SCORE: 36
ADLS_ACUITY_SCORE: 28
ADLS_ACUITY_SCORE: 36

## 2022-11-11 NOTE — CONSULTS
CLINICAL NUTRITION SERVICES - BRIEF NOTE    Consult received for Nutrition Education - Dietitian to instruct patient on 2 gram sodium diet     Patient seen in room this afternoon. She states she doesn't even remember what happened yesterday so won't remember anything I tell her today and then closed her eyes to continue sleeping. Left handouts at bedside for her.   Patient has received low sodium diet education in the past, most recently in March 2022.   Not currently appropriate for diet education. Would recommend outpatient referral upon discharge as appropriate.     Kalpana Quiroga RD, LD

## 2022-11-11 NOTE — PROGRESS NOTES
[facility]  Cardiology Progress Note    Outpatient cardiologist: Dr. Marquez  Date of Service (when I saw the patient): 11/11/2022  Summary: Jaqueline Canales is a 81 year old female with history of for CHFpEF, severe TR, severe pulmonary hypertension, moderate aortic stenosis who was admitted on 11/9/2022 after a visit with Dr. Marquez for acute on chronic heart failure.  Interval History   Diuresing well. Weight down from 147 lbs on admission to 133 lbs. Peripheral edema improved today.  Assessment & Plan   1.  Acute on chronic CHFpEF: with significant volume overload.  Prior dry weight around 123 pounds. Admitted at 147 lbs.  Typically on Torsemide 20 mg BID.   -  Diuresing well on IV lasix 80 mg TID.  2.  Severe pulmonary artery hypertension:  On revatio 20 mg TID, follows with Dr. Marquez  3.  Multi-valvular heart disease with severe TR, moderate MR, moderate aortic stenosis   4.  Permanent atrial fibrillation:  On eliquis, digoxin. Rates controlled.  5.  Failure to thrive/vulnerable adult:  Unable to care for self at home   6.  Scleroderma/rheumatoid arthritis.  7.  Hypokalemia. Secondary to diuresis. Replace.    Plan:  1.  Continue diuresis. She is responding well. She remains about 10 lbs up from her estimated dry weight.   2.  Continue Eliquis, digoxin for atrial fibrillation.  3.  Continue sildenafil.  4.  Social work consulted for discharge planning given concerns about ability of her and her  to manage at home.    No further cardiology recommendations. Would continue IV diuresis until she is near her dry weight then transition back to Torsemide. She should follow up with Dr. Marquez's team after discharge. Cardiology will sign off. Please do not hesitant to contact with any questions.     Trish Sutton PA-C    Physical Exam   Temp: 97.8  F (36.6  C) Temp src: Oral BP: 92/63 Pulse: 85   Resp: 14 SpO2: 92 % O2 Device: Nasal cannula Oxygen Delivery: 2 LPM  Vitals:     11/09/22 2302 11/11/22 0559   Weight: 66.7 kg (147 lb) 60.4 kg (133 lb 1.6 oz)     Vital Signs with Ranges  Temp:  [97.1  F (36.2  C)-98.2  F (36.8  C)] 97.8  F (36.6  C)  Pulse:  [58-85] 85  Resp:  [0-26] 14  BP: ()/(63-90) 92/63  SpO2:  [90 %-98 %] 92 %  11/06 0700 - 11/11 0659  In: 315 [P.O.:315]  Out: 5400 [Urine:5400]  Net: -5085  Constitutional: NAD.   Respiratory: CTAB.  Cardiovascular: IRR, s1s2  GI: soft, BS+  Skin: warm, no rashes  Musculoskeletal: Moving all extremities. velcro wraps in place. 2+ LE edema, improving.  Neurologic: Alert, oriented x 3  Neuropsychiatric: Normal affect   Data   Results for orders placed or performed during the hospital encounter of 11/09/22 (from the past 24 hour(s))   Basic metabolic panel   Result Value Ref Range    Sodium 137 133 - 144 mmol/L    Potassium 3.3 (L) 3.4 - 5.3 mmol/L    Chloride 95 94 - 109 mmol/L    Carbon Dioxide (CO2) 35 (H) 20 - 32 mmol/L    Anion Gap 7 3 - 14 mmol/L    Urea Nitrogen 15 7 - 30 mg/dL    Creatinine 0.52 0.52 - 1.04 mg/dL    Calcium 9.1 8.5 - 10.1 mg/dL    Glucose 85 70 - 99 mg/dL    GFR Estimate >90 >60 mL/min/1.73m2     *Note: Due to a large number of results and/or encounters for the requested time period, some results have not been displayed. A complete set of results can be found in Results Review.      Tele AF with CVR    Medications     - MEDICATION INSTRUCTIONS -       ACE/ARB/ARNI NOT PRESCRIBED       BETA BLOCKER NOT PRESCRIBED         apixaban ANTICOAGULANT  2.5 mg Oral BID     digoxin  125 mcg Oral Daily     donepezil  5 mg Oral At Bedtime     folic acid  1,000 mcg Oral Daily     furosemide  80 mg Intravenous Q8H     gabapentin  100 mg Oral TID     hydroxychloroquine  200 mg Oral Daily     levothyroxine  75 mcg Oral Daily     omeprazole  20 mg Oral BID AC     potassium chloride ER  20 mEq Oral Daily     sildenafil  20 mg Oral TID     sodium chloride (PF)  3 mL Intracatheter Q8H

## 2022-11-11 NOTE — CONSULTS
[facility]    Received [facility] Consult from Dr. Chandar.    Reviewed Jaqueline chart and note they are admitted for lower extremity edema. Echocardiogram on 8/31/22 shows LVEF 65-70%.  This is their 2nd admission(s) in the past year for concerns of heart failure.    Given above information, Jaqueline meets criteria for general cardiology as patients EF is >40%. Jaqueline has been mainly followed by our PHTN Team and Dr. Marquez.      She has an already arranged follow-up with JIMMY Castorena on 11/28.     Future Appointments   Date Time Provider Department Center   11/12/2022 10:30 PM Anushka Siegel, OT [facility]   11/28/2022 12:30 PM [facility]   11/28/2022  1:30 PM Martha Gant PA [facility]   2/2/2023  2:00 PM Jeffrey Green MD [facility]       Please call with any further questions.    Linnette Davenport RN, BAN  [facility] [place]  [facility] Care Coordinator  [phone]

## 2022-11-11 NOTE — PLAN OF CARE
Neuro- to self, situation, and place   Most Recent Vitals- Temp: 97.1  F (36.2  C) Temp src: Oral BP: 115/75 Pulse: 58   Resp: 14 SpO2: 97 % O2 Device: Nasal cannula   Tele/Cardiac- afib CVR 60-80s, Hx of tricuspid regurg  Resp- 1.5L NC - baseline O2 at home, hx of Raynauds - ear probe    Activity- 1+, walker, gait belt   Pain- LE r/t excessive edema   Drips-   Drains/Tubes- external cath for baseline incontinence and diuresing   Skin- perineum and sacrum katelynn redness, BLE 2-3+ in feet, ankle, calfs, mepilex like barrier on skin where lymphedema irritates skin.   GI/- adequate UO  Aggression Color- Green  COVID status- Negative  Plan- Diuresing 60mg lasix BID, c/s PT and SW, cardiac rehab   Misc- Lymphedema wraps from home, PT ordered to place wraps on per patients routine at home and signed off. 3rd admission in 2 weeks and lives at home with  who is not independent. K+ replacement.     BIRGIT STEWART RN

## 2022-11-11 NOTE — PLAN OF CARE
Pt stable over night with no c/o pain or SOB.  Pt has been incontinent of urine.  VSS, tele:Afib CVR.  Pt has been sleeping between cares. No new issues note.   The ABCs of the Annual Wellness Visit  Subsequent Medicare Wellness Visit    Chief Complaint:  Medicare wellness exam    Subjective    History of Present Illness:  Luma Cruz is a 78 y.o. female who presents for a Subsequent Medicare Wellness Visit.    The following portions of the patient's history were reviewed and updated as appropriate: allergies, current medications, past family history, past medical history, past social history, past surgical history and problem list.     Compared to one year ago, the patient feels her physical health is worse.  She is having some back issues that are bothering her.    Compared to one year ago, the patient feels her mental health is the same.    Recent Hospitalizations:  She was not admitted to the hospital during the last year.       Current Medical Providers:  Patient Care Team:  Joseph Walker MD as PCP - General (Family Medicine)    Outpatient Medications Prior to Visit   Medication Sig Dispense Refill   • albuterol (2.5 MG/3ML) 0.083% nebulizer solution 3 mL, albuterol (5 MG/ML) 0.5% nebulizer solution 0.5 mL Inhale Every 4 (Four) Hours.     • azelastine (ASTELIN) 0.1 % nasal spray 1 spray into the nostril(s) as directed by provider 2 (Two) Times a Day.     • Brovana 15 MCG/2ML nebulizer solution USE 1 VIAL IN NEBULIZER EVERY 12 HOURS - Morning And Evening 120 mL 11   • budesonide (PULMICORT) 0.5 MG/2ML nebulizer solution USE 1 VIAL  IN  NEBULIZER TWICE  DAILY - Rinse Mouth After Treatment 60 each 11   • fluticasone (FLONASE) 50 MCG/ACT nasal spray 2 sprays into the nostril(s) as directed by provider Daily.     • levocetirizine (XYZAL) 5 MG tablet Take 1 tablet by mouth Every Evening. 90 tablet 3   • multivitamin with minerals tablet tablet Take 1 tablet by mouth Daily.     • Nebusal 3 % nebulizer solution inhale 1 vial by nebulizer TWICE DAILY 240 mL 4   • amLODIPine (NORVASC) 2.5 MG tablet Take 1 tablet by mouth Every Night. 90 tablet 1   • budesonide  (PULMICORT) 0.25 MG/2ML nebulizer solution Take 0.25 mg by nebulization Daily. Pt unsure of exact dosage     • clonazePAM (KlonoPIN) 0.5 MG tablet Take 1 tablet by mouth 2 (Two) Times a Day As Needed for Anxiety. 90 tablet 1   • estradiol (ESTRACE) 1 MG tablet Take 1 tablet by mouth Daily. 90 tablet 1   • losartan (COZAAR) 50 MG tablet Take 1 tablet by mouth Daily. 90 tablet 1   • pantoprazole (PROTONIX) 40 MG EC tablet Take 1 tablet by mouth Daily. 90 tablet 1   • pravastatin (PRAVACHOL) 20 MG tablet Take 1 tablet by mouth Daily. 90 tablet 1   • sucralfate (CARAFATE) 1 g tablet Take 1 tablet by mouth 3 (Three) Times a Day. 90 tablet 0     No facility-administered medications prior to visit.       No opioid medication identified on active medication list. I have reviewed chart for other potential  high risk medication/s and harmful drug interactions in the elderly.          Aspirin is not on active medication list.  Aspirin use is not indicated based on review of current medical condition/s. Risk of harm outweighs potential benefits.  .    Patient Active Problem List   Diagnosis   • Seasonal allergies   • Mixed hyperlipidemia   • Menopausal and postmenopausal disorder   • GERD without esophagitis   • Essential hypertension   • Generalized anxiety disorder   • Fungal pneumonia   • Bronchiectasis without complication (HCC)   • Chronic cough   • Mucus plugging of bronchi   • Airway clearance impairment   • Other long term (current) drug therapy     Advance Care Planning   Advance Directive is not on file.  ACP discussion was held with the patient during this visit. Patient has an advance directive (not in EMR), copy requested.  She thinks her daughter would probably make decisions if needed.    Review of Systems:  Review of Systems   Constitutional: Negative for chills and fever.   Respiratory: Negative for cough and shortness of breath.    Cardiovascular: Negative for chest pain and palpitations.   Gastrointestinal:  "Negative for abdominal pain, nausea and vomiting.         Objective       Vitals:    06/24/22 1315   BP: 119/74   BP Location: Right arm   Patient Position: Sitting   Pulse: 76   Temp: 98.4 °F (36.9 °C)   TempSrc: Oral   Weight: 51.5 kg (113 lb 9.6 oz)   Height: 157.5 cm (62.01\")   PainSc:   5   PainLoc: Back     BMI Readings from Last 1 Encounters:   06/24/22 20.77 kg/m²   BMI is within normal parameters. No follow-up required.    Does the patient have evidence of cognitive impairment? No    Physical Exam  Vitals and nursing note reviewed.   Constitutional:       General: She is not in acute distress.     Appearance: She is not ill-appearing.   HENT:      Right Ear: Tympanic membrane and ear canal normal.      Left Ear: Tympanic membrane and ear canal normal.      Ears:      Comments: Hearing is diminished bilaterally.  Aids are in place.     Mouth/Throat:      Mouth: Mucous membranes are moist.      Comments: Pharynx appears normal  Eyes:      Extraocular Movements: Extraocular movements intact.      Pupils: Pupils are equal, round, and reactive to light.      Comments: Binocular vision is 20/20 with correction.   Neck:      Thyroid: No thyromegaly.   Cardiovascular:      Rate and Rhythm: Normal rate and regular rhythm.      Heart sounds: No murmur heard.  Pulmonary:      Effort: Pulmonary effort is normal.      Breath sounds: Normal breath sounds.   Abdominal:      General: There is no distension.      Palpations: Abdomen is soft. There is no mass.      Tenderness: There is no abdominal tenderness.   Musculoskeletal:      Cervical back: Normal range of motion.   Skin:     Findings: No lesion or rash.   Neurological:      General: No focal deficit present.      Mental Status: She is oriented to person, place, and time.      Cranial Nerves: No cranial nerve deficit.   Psychiatric:         Mood and Affect: Mood normal.       The following data was reviewed by Joseph Walker MD on 06/24/2022.  Lab Results "   Component Value Date    WBC 5.89 06/09/2022    HGB 12.5 06/09/2022    HCT 40.2 06/09/2022    MCV 91.2 06/09/2022     06/09/2022     Lab Results   Component Value Date    GLUCOSE 88 06/09/2022    BUN 33 (H) 06/09/2022    CREATININE 0.91 06/09/2022     06/09/2022    K 4.3 06/09/2022     06/09/2022    CO2 26.0 06/09/2022    CALCIUM 9.7 06/09/2022    PROTEINTOT 7.0 06/09/2022    ALBUMIN 4.60 06/09/2022    ALT 12 06/09/2022    AST 23 06/09/2022    ALKPHOS 66 06/09/2022    BILITOT 0.2 06/09/2022    EGFRIFNONA 62 12/28/2021    GLOB 2.4 06/09/2022    AGRATIO 1.9 06/09/2022    BCR 36.3 (H) 06/09/2022    ANIONGAP 10.0 06/09/2022      Lab Results   Component Value Date    CHOL 168 06/09/2022    CHLPL 157 03/10/2020    TRIG 89 06/09/2022    HDL 76 (H) 06/09/2022    LDL 76 06/09/2022     Lab Results   Component Value Date    TSH 1.370 06/09/2022     No results found for: HGBA1C       HEALTH RISK ASSESSMENT    Smoking Status:  Social History     Tobacco Use   Smoking Status Never Smoker   Smokeless Tobacco Never Used     Alcohol Consumption:  Social History     Substance and Sexual Activity   Alcohol Use Not Currently     Fall Risk Screen:    STEADI Fall Risk Assessment was completed, and patient is at LOW risk for falls.Assessment completed on:6/9/2022    Depression Screening:  PHQ-2/PHQ-9 Depression Screening 6/9/2022   Retired PHQ-9 Total Score -   Retired Total Score -   Little Interest or Pleasure in Doing Things 0-->not at all   Feeling Down, Depressed or Hopeless 0-->not at all   PHQ-9: Brief Depression Severity Measure Score 0       Health Habits and Functional and Cognitive Screening:  Functional & Cognitive Status 6/24/2022   Do you have difficulty preparing food and eating? No   Do you have difficulty bathing yourself, getting dressed or grooming yourself? No   Do you have difficulty using the toilet? No   Do you have difficulty moving around from place to place? No   Do you have trouble with steps  or getting out of a bed or a chair? No   Current Diet Well Balanced Diet   Dental Exam Up to date        Dental Exam Comment -   Eye Exam Not up to date        Eye Exam Comment -   Exercise (times per week) 0 times per week   Current Exercises Include No Regular Exercise   Current Exercise Activities Include -   Do you need help using the phone?  No   Are you deaf or do you have serious difficulty hearing?  Yes   Do you need help with transportation? No   Do you need help shopping? No   Do you need help preparing meals?  No   Do you need help with housework?  No   Do you need help with laundry? No   Do you need help taking your medications? No   Do you need help managing money? No   Do you ever drive or ride in a car without wearing a seat belt? No   Have you felt unusual stress, anger or loneliness in the last month? No   Who do you live with? Spouse   If you need help, do you have trouble finding someone available to you? No   Have you been bothered in the last four weeks by sexual problems? No   Do you have difficulty concentrating, remembering or making decisions? No       Age-appropriate Screening Schedule:  Refer to the list below for future screening recommendations based on patient's age, sex and/or medical conditions. Orders for these recommended tests are listed in the plan section. The patient has been provided with a written plan.    Health Maintenance   Topic Date Due   • TDAP/TD VACCINES (1 - Tdap) Never done   • INFLUENZA VACCINE  10/01/2022   • DXA SCAN  05/03/2023   • LIPID PANEL  06/09/2023   • ZOSTER VACCINE  Discontinued                       Assessment and Plan:       CMS Preventative Services Quick Reference  Risk Factors Identified During Encounter  Cardiovascular Disease  Depression/Dysphoria  Hearing Problem  Immunizations Discussed/Encouraged (specific Immunizations; Tdap and Influenza    The above risks/problems have been discussed with the patient.  Follow up actions/plans if indicated  are seen below in the Assessment/Plan Section.  Pertinent information has been shared with the patient in the After Visit Summary.    Today, we have reviewed her care.  Luma Grey seems to be doing well overall.  We have reviewed her recent blood work and x-rays.  Her problems seem to be under good control overall.  We moved ahead with refills of her medications as a courtesy today.  The clonazepam continues to be of significant benefit for her.  No other changes anticipated at this time.    Diagnoses and all orders for this visit:    1. Physical exam (Primary)    2. Essential hypertension  -     amLODIPine (NORVASC) 2.5 MG tablet; Take 1 tablet by mouth Every Night.  Dispense: 90 tablet; Refill: 1  -     losartan (COZAAR) 50 MG tablet; Take 1 tablet by mouth Daily.  Dispense: 90 tablet; Refill: 1    3. Generalized anxiety disorder  -     clonazePAM (KlonoPIN) 0.5 MG tablet; Take 1 tablet by mouth 2 (Two) Times a Day As Needed for Anxiety.  Dispense: 90 tablet; Refill: 1    4. Menopausal and postmenopausal disorder  -     estradiol (ESTRACE) 1 MG tablet; Take 1 tablet by mouth Daily.  Dispense: 90 tablet; Refill: 1    5. GERD without esophagitis  -     pantoprazole (PROTONIX) 40 MG EC tablet; Take 1 tablet by mouth Daily.  Dispense: 90 tablet; Refill: 1    6. Mixed hyperlipidemia  -     pravastatin (PRAVACHOL) 20 MG tablet; Take 1 tablet by mouth Daily.  Dispense: 90 tablet; Refill: 1    7. Screen for colon cancer  -     Cologuard - Stool, Per Rectum; Future        Follow Up:   Return in about 6 months (around 12/24/2022) for Recheck.     An After Visit Summary and PPPS were given to the patient.

## 2022-11-11 NOTE — PLAN OF CARE
Admission. Pt arrived to room at around 0830. Oriented to room and call light system and expectations for safety.     Pt forgetful, disoriented to time. Follows commands. Ax1-2 gaitbelt walker. NC weaned to 2 LPM from 3 LPM. Pt is normally on 1.5 LPM NC at home. Telemetry afib CVR. Receiving Lasix and diuresing well. Edema to LE - pt has lymphedema consult placed. Cards, SW, OT, PT consulted.     Heart Failure Care Map  GOALS TO BE MET BEFORE DISCHARGE:    1. Decrease congestion and/or edema with diuretic therapy to achieve near optimal volume status.     Dyspnea improved: No, further care required to meet this goal. Please explain Need to wean O2 further   Edema improved: No, further care required to meet this goal. Please explain just admitted        Net I/O and Weights since admission:   10/11 2300 - 11/10 2259  In: 75 [P.O.:75]  Out: 3200 [Urine:3200]  Net: -3125     Vitals:    11/09/22 2302   Weight: 66.7 kg (147 lb)       2.  O2 sats > 90% on room air, or at prior home O2 therapy level.      Able to wean O2 this shift to keep sats above 90%?: No, further care required to meet this goal. Please explain able to wean to 2LPM   Does patient use Home O2? Yes-  1.5 LPM          Current oxygenation status:   SpO2: 98 %     O2 Device: Nasal cannula, Oxygen Delivery: 2 LPM    3.  Tolerates ambulation and mobility near baseline.     Ambulation: No, further care required to meet this goal. Please explain difficulty ambulating at baseline. Uses walker. Able to move small distances   Times patient ambulated with staff this shift: 4    Please review the Heart Failure Care Map for additional HF goal outcomes.    Dejon Coffey, ANGELINA  11/10/2022

## 2022-11-11 NOTE — PROGRESS NOTES
"[facility]    Medicine Progress Note - Hospitalist Service       Date of Admission:  11/9/2022    Assessment & Plan   Jaqueline Canales is a 81 year old female with hx of mild dementia, HFpEF on 1-2L home O2, severe tricuspid regurgitation, severe pulmonary hypertension, moderate mitral regurgitation, moderate aortic stenosis, chronic a-fib on chronic AC with apixaban, SRINIVAS, hx of NSCLC, and RA on chronic immunosuppressive tx who was admitted on 11/9/2022 for acute CHF exacerbation and need for safe discharge planning.      Acute on chronic HFpEF  Severe tricuspid regurgitation  Severe pulmonary hypertension   Moderate mitral regurgitation   Moderate aortic stenosis   Chronic hypoxic respiratory failure  * At baseline on \"1.2l\" home O2, ?1-2L  * Follows with Dr. Marquez at [facility]  * Most recent TTE on 8/31/22: EF 65-70%, valvular findings as above  * Presented on this admission with weight gain (20+ lbs per chart review), lower extremity edema, dyspnea on exertion. CXR on arrival showed pulmonary venous congestion with patchy airspace disease that may represent edema, similar to recent CXR on 11/1  * PTA on torsemide 20 mg TID and sildenafil 20 mg TID  * Initiated on Lasix 80 mg IV q8h on admission  * Cardiology consulted on admission  - Breathing comfortably on baseline 1-2L O2 per NC  - Continues on Lasix 80 mg IV q8h  - Continues on PTA sildenafil   - Lymphedema therapy  - Cardiac rehab recommending TCU  - Cardiology following    Chronic atrial fibrillation  * PTA on digoxin 125 mcg daily, apixaban 2.5 mg BID  - Continues on PTA meds    Hypokalemia  * PTA on KCl 20 meq daily  - Continues on home potassium replacement  - Also on potassium replacement protocol     Obstructive sleep apnea  * Not on CPAP at home  - Nocturnal O2 while hospitalized    Thrombocytopenia, Improved  Platelet count on arrival 103k. Baseline appears to be 50-70k though with frequent issues with clumping so unclear " accuracy.  - Platelets improved, 131k today - 11/10    Safeguarding concern  See SW note from 11/9/2022. Lives at home with her  and has been receiving home care services though home care agency feels patient needs higher level of care. Hesitancy to private pay for [facility] noted. [facility] previously [facility]  - Patient agreeable to TCU. SW following    Otherwise chronic and stable medical conditions: Resume home meds once confirmed by PharmD  GERD  Hypothyroidism, TSH normal this admission  Rheumatoid arthritis   Hyperlipidemia     Diet: 2 Gram Sodium Diet Other - please comment    DVT Prophylaxis: DOAC   Luther Catheter: Not present  Code Status: Full Code         Disposition: Expected discharge once euvolemic and cleared by therapies, will likely need TCU vs LTC; 3-4 more days. Will be here through the weekend    The patient's care was discussed with the Bedside Nurse, Care Coordinator/, Patient and Patient's Family.    Charleen Coombs MD  Hospitalist Service  [facility]  Contact information available via [facility] Paging/Directory    ______________________________________________________________________    Interval History   No acute events overnight. Tolerating diuresis. Offers no complaints - denies cp/sob, dizziness/lightheadedness. Patient's  updated over the phone    Data reviewed today: I reviewed all medications, new labs and imaging results over the last 24 hours. I personally reviewed no images or EKG's today.    Physical Exam   Vital Signs: Temp: 97.8  F (36.6  C) Temp src: Oral BP: 92/63 Pulse: 85   Resp: 14 SpO2: 92 % O2 Device: Nasal cannula Oxygen Delivery: 2 LPM  Weight: 133 lbs 1.6 oz  Constitutional: Resting comfortably, NAD  HEENT: Sclera white, MMM  Respiratory: Breathing non-labored. Lungs CTAB - no wheezes/crackles/rhonchi  Cardiovascular: Heart irregular rhythm, harsh systolic murmur throughout precordium. 3+ BLE edema  GI: +BS, abd  soft/NT  Skin/Integument: No rash  Musculoskeletal: Normal muscle bulk and tone  Neuro: Alert and appropriate, forgetful. LOCO  Psych: Calm and cooperative    Data   Recent Labs   Lab 11/11/22  0610 11/10/22  0427 11/09/22  2125 11/09/22  1822 11/08/22  1236   WBC  --  6.1  --  5.8 5.5   HGB  --  11.7  --  12.8 11.5*   MCV  --  100  --  101* 101*   PLT  --  131*  --  103*  --     136  --  135 137   POTASSIUM 3.3* 3.5  3.5 3.6 3.4 3.3*   CHLORIDE 95 97  --  94 94   CO2 35* 34*  --  37* 34*   BUN 15 16  --  18 20   CR 0.52 0.51*  --  0.61 0.73   ANIONGAP 7 5  --  4 9   TU 9.1 9.2  --  9.5 9.3   GLC 85 96  --  111* 77   ALBUMIN  --   --   --  3.3* 3.3*   PROTTOTAL  --   --   --  7.3 6.9   BILITOTAL  --   --   --  1.0 1.0   ALKPHOS  --   --   --  180* 163*   ALT  --   --   --  36 34   AST  --   --   --  43 43         No results found for this or any previous visit (from the past 24 hour(s)).    Medications     - MEDICATION INSTRUCTIONS -       ACE/ARB/ARNI NOT PRESCRIBED       BETA BLOCKER NOT PRESCRIBED         apixaban ANTICOAGULANT  2.5 mg Oral BID     digoxin  125 mcg Oral Daily     donepezil  5 mg Oral At Bedtime     folic acid  1,000 mcg Oral Daily     furosemide  80 mg Intravenous Q8H     gabapentin  100 mg Oral TID     hydroxychloroquine  200 mg Oral Daily     levothyroxine  75 mcg Oral Daily     omeprazole  20 mg Oral BID AC     potassium chloride ER  20 mEq Oral Daily     sildenafil  20 mg Oral TID     sodium chloride (PF)  3 mL Intracatheter Q8H

## 2022-11-12 LAB
ANION GAP SERPL CALCULATED.3IONS-SCNC: 4 MMOL/L (ref 3–14)
BUN SERPL-MCNC: 16 MG/DL (ref 7–30)
CALCIUM SERPL-MCNC: 9.2 MG/DL (ref 8.5–10.1)
CHLORIDE BLD-SCNC: 93 MMOL/L (ref 94–109)
CO2 SERPL-SCNC: 39 MMOL/L (ref 20–32)
CREAT SERPL-MCNC: 0.64 MG/DL (ref 0.52–1.04)
GFR SERPL CREATININE-BSD FRML MDRD: 88 ML/MIN/1.73M2
GLUCOSE BLD-MCNC: 117 MG/DL (ref 70–99)
POTASSIUM BLD-SCNC: 3 MMOL/L (ref 3.4–5.3)
POTASSIUM BLD-SCNC: 4.8 MMOL/L (ref 3.4–5.3)
SODIUM SERPL-SCNC: 136 MMOL/L (ref 133–144)

## 2022-11-12 PROCEDURE — 36415 COLL VENOUS BLD VENIPUNCTURE: CPT | Performed by: INTERNAL MEDICINE

## 2022-11-12 PROCEDURE — 250N000011 HC RX IP 250 OP 636: Performed by: INTERNAL MEDICINE

## 2022-11-12 PROCEDURE — 99232 SBSQ HOSP IP/OBS MODERATE 35: CPT | Performed by: INTERNAL MEDICINE

## 2022-11-12 PROCEDURE — 250N000013 HC RX MED GY IP 250 OP 250 PS 637: Performed by: INTERNAL MEDICINE

## 2022-11-12 PROCEDURE — 210N000002 HC R&B HEART CARE

## 2022-11-12 PROCEDURE — 84132 ASSAY OF SERUM POTASSIUM: CPT | Performed by: INTERNAL MEDICINE

## 2022-11-12 PROCEDURE — 80048 BASIC METABOLIC PNL TOTAL CA: CPT | Performed by: INTERNAL MEDICINE

## 2022-11-12 RX ORDER — FUROSEMIDE 10 MG/ML
80 INJECTION INTRAMUSCULAR; INTRAVENOUS
Status: DISCONTINUED | OUTPATIENT
Start: 2022-11-12 | End: 2022-11-13

## 2022-11-12 RX ORDER — POTASSIUM CHLORIDE 1500 MG/1
40 TABLET, EXTENDED RELEASE ORAL ONCE
Status: COMPLETED | OUTPATIENT
Start: 2022-11-12 | End: 2022-11-12

## 2022-11-12 RX ORDER — POTASSIUM CHLORIDE 1500 MG/1
20 TABLET, EXTENDED RELEASE ORAL ONCE
Status: COMPLETED | OUTPATIENT
Start: 2022-11-12 | End: 2022-11-12

## 2022-11-12 RX ADMIN — SILDENAFIL 20 MG: 20 TABLET, FILM COATED ORAL at 10:01

## 2022-11-12 RX ADMIN — DIGOXIN 125 MCG: 125 TABLET ORAL at 10:01

## 2022-11-12 RX ADMIN — POTASSIUM CHLORIDE 40 MEQ: 1500 TABLET, EXTENDED RELEASE ORAL at 07:46

## 2022-11-12 RX ADMIN — POTASSIUM CHLORIDE 20 MEQ: 1500 TABLET, EXTENDED RELEASE ORAL at 10:01

## 2022-11-12 RX ADMIN — FUROSEMIDE 80 MG: 10 INJECTION, SOLUTION INTRAMUSCULAR; INTRAVENOUS at 16:00

## 2022-11-12 RX ADMIN — APIXABAN 2.5 MG: 2.5 TABLET, FILM COATED ORAL at 10:01

## 2022-11-12 RX ADMIN — SILDENAFIL 20 MG: 20 TABLET, FILM COATED ORAL at 21:13

## 2022-11-12 RX ADMIN — FUROSEMIDE 80 MG: 10 INJECTION, SOLUTION INTRAMUSCULAR; INTRAVENOUS at 02:02

## 2022-11-12 RX ADMIN — GABAPENTIN 100 MG: 100 CAPSULE ORAL at 15:59

## 2022-11-12 RX ADMIN — FOLIC ACID 1000 MCG: 1 TABLET ORAL at 10:01

## 2022-11-12 RX ADMIN — DONEPEZIL HYDROCHLORIDE 5 MG: 5 TABLET, FILM COATED ORAL at 21:13

## 2022-11-12 RX ADMIN — OMEPRAZOLE 20 MG: 20 CAPSULE, DELAYED RELEASE ORAL at 10:01

## 2022-11-12 RX ADMIN — LEVOTHYROXINE SODIUM 75 MCG: 75 TABLET ORAL at 10:01

## 2022-11-12 RX ADMIN — APIXABAN 2.5 MG: 2.5 TABLET, FILM COATED ORAL at 21:13

## 2022-11-12 RX ADMIN — OMEPRAZOLE 20 MG: 20 CAPSULE, DELAYED RELEASE ORAL at 15:59

## 2022-11-12 RX ADMIN — GABAPENTIN 100 MG: 100 CAPSULE ORAL at 10:01

## 2022-11-12 RX ADMIN — GABAPENTIN 100 MG: 100 CAPSULE ORAL at 21:13

## 2022-11-12 RX ADMIN — HYDROXYCHLOROQUINE SULFATE 200 MG: 200 TABLET ORAL at 10:01

## 2022-11-12 RX ADMIN — SILDENAFIL 20 MG: 20 TABLET, FILM COATED ORAL at 15:59

## 2022-11-12 ASSESSMENT — ACTIVITIES OF DAILY LIVING (ADL)
ADLS_ACUITY_SCORE: 32
ADLS_ACUITY_SCORE: 28
ADLS_ACUITY_SCORE: 32
ADLS_ACUITY_SCORE: 32

## 2022-11-12 NOTE — PLAN OF CARE
Neuro: A/O x3, forgetful at times  CV/Rhythm: Afib w/ cvr  Resp/02: LS with crackles at bases, on 1.5 L NC, HANEY  GI/Diet: 2g NA diet, BM today  : good response to IV lasix, purewick in use  Skin/Incisions/Sites: Dry flaky skin, pale, red lisa LE  Pulses/CMS: +2 radial/ +1 pedal  Edema: +2-3 LE  Activity/Falls Risk: Up with chair and bathroom with walker/GB/Asst 1  Lines/Drains/IVs: SL   Labs/BGM: K 3.0- replaced to 4.8, Cr .64  VS/Pain: VSS, Pt c/o of occ back/butt soreness relieved with repositioning  DC Plan: TCU at discharge once on PO diuretics- possibly Monday  Other:

## 2022-11-12 NOTE — PLAN OF CARE
Neuro: A/O x3, forgetful at times  CV/Rhythm: Afib w/ cvr  Resp/02: LS dim with crackles, HANEY on 1.5 L NC  GI/Diet: 2g NA diet, last BM 11-9  : Purewick in place, good response to diuresis  Skin/Incisions/Sites: pale, blanchable coccyx, lisa LE  Pulses/CMS: +2 radial/ +1 pedal  Edema: +2-3 LE- lymph wraps on   Activity/Falls Risk: Up with walker and assist 1/GB to chair today  Lines/Drains/IVs: SL  Labs/BGM: K 3.3- replaced to 4.0  Test/Procedures:   VS/Pain: VSS, pt denies any c/o of pain  DC Plan: TCU once diuresis complete  Other:  and son updated via phone today

## 2022-11-12 NOTE — PLAN OF CARE
Pt here with acute on chronic heart failure. A&Ox4. Neuros including intermittent forgetfulness. VSS. Tele afib CVR. 2g NA diet. Up with A1 GBW. Denies pain. Pt scoring green on the Aggression Stop Light Tool. Plan for TCU at discharge. Discharge possibly Monday.

## 2022-11-12 NOTE — PROGRESS NOTES
"[facility]    Medicine Progress Note - Hospitalist Service       Date of Admission:  11/9/2022    Assessment & Plan   Jaqueline Canales is a 81 year old female with hx of mild dementia, HFpEF on 1-2L home O2, severe tricuspid regurgitation, severe pulmonary hypertension, moderate mitral regurgitation, moderate aortic stenosis, chronic a-fib on chronic AC with apixaban, SRINIVAS, hx of NSCLC, and RA on chronic immunosuppressive tx who was admitted on 11/9/2022 for acute CHF exacerbation and need for safe discharge planning.      Acute on chronic HFpEF, Improved  Severe tricuspid regurgitation  Severe pulmonary hypertension   Moderate mitral regurgitation   Moderate aortic stenosis   Chronic hypoxic respiratory failure  * At baseline on \"1.2l\" home O2, ?1-2L  * Follows with Dr. Marquez at [facility]  * Most recent TTE on 8/31/22: EF 65-70%, valvular findings as above  * Presented on this admission with weight gain (20+ lbs per chart review), lower extremity edema, dyspnea on exertion. CXR on arrival showed pulmonary venous congestion with patchy airspace disease that may represent edema, similar to recent CXR on 11/1  * PTA on torsemide 20 mg TID and sildenafil 20 mg TID  * Initiated on Lasix 80 mg IV q8h on admission  * Cardiology consulted on admission  - Net neg 7L this admission. Breathing comfortably on baseline 1-2L O2 per NC  - Decrease Lasix from 80 mg IV q8h to BID today, possible transition to home torsemide tomorrow, 11/13  - Continues on PTA sildenafil   - Lymphedema therapy  - Cardiac rehab recommending TCU  - Cardiology has now signed off    Chronic atrial fibrillation  * PTA on digoxin 125 mcg daily, apixaban 2.5 mg BID  - Continues on PTA meds    Hypokalemia  * PTA on KCl 20 meq daily  - Continues on home potassium replacement  - Also on potassium replacement protocol     Obstructive sleep apnea  * Not on CPAP at home  - Nocturnal O2 while hospitalized    Thrombocytopenia, " Improved  Platelet count on arrival 103k. Baseline appears to be 50-70k though with frequent issues with clumping so unclear accuracy.  - Platelets improved to 131k on 11/10    Safeguarding concern  See SW note from 11/9/2022. Lives at home with her  and has been receiving home care services though home care agency feels patient needs higher level of care. Hesitancy to private pay for YAHAIRA noted. [facility]  - Patient agreeable to TCU. SW following    Otherwise chronic and stable medical conditions: Resume home meds once confirmed by PharmD  GERD  Hypothyroidism, TSH normal this admission  Rheumatoid arthritis   Hyperlipidemia     Diet: 2 Gram Sodium Diet Other - please comment    DVT Prophylaxis: DOAC   Luther Catheter: Not present  Code Status: Full Code         Disposition: Expected discharge to TCU once transitioned to oral diuretics, ?Mon    The patient's care was discussed with the Bedside Nurse.    Charleen Coombs MD  Hospitalist Service  [facility]  Contact information available via [facility] Paging/Directory    ______________________________________________________________________    Interval History   No acute events overnight. Tolerating diuresis. Offers no complaints - denies cp/sob, dizziness/lightheadedness. Patient's  last updated yesterday.    Data reviewed today: I reviewed all medications, new labs and imaging results over the last 24 hours. I personally reviewed no images or EKG's today.    Physical Exam   Vital Signs: Temp: 98.1  F (36.7  C) Temp src: Oral BP: 95/60 Pulse: 70   Resp: 17 SpO2: 98 % O2 Device: Nasal cannula Oxygen Delivery: 1.5 LPM  Weight: 135 lbs 3.2 oz  Constitutional: Resting comfortably, NAD  HEENT: Sclera white, MMM  Respiratory: Breathing non-labored. Lungs CTAB - no wheezes/crackles/rhonchi  Cardiovascular: Heart irregular rhythm, harsh systolic murmur throughout precordium. 2+ BLE edema  GI: +BS, abd  soft/NT  Skin/Integument: No rash  Musculoskeletal: Normal muscle bulk and tone  Neuro: Alert and appropriate, forgetful. LOCO  Psych: Calm and cooperative    Data   Recent Labs   Lab 11/12/22  0604 11/11/22  1350 11/11/22  0610 11/10/22  0427 11/09/22  2125 11/09/22  1822 11/08/22  1236   WBC  --   --   --  6.1  --  5.8 5.5   HGB  --   --   --  11.7  --  12.8 11.5*   MCV  --   --   --  100  --  101* 101*   PLT  --   --   --  131*  --  103*  --      --  137 136  --  135 137   POTASSIUM 3.0* 4.0 3.3* 3.5  3.5   < > 3.4 3.3*   CHLORIDE 93*  --  95 97  --  94 94   CO2 39*  --  35* 34*  --  37* 34*   BUN 16  --  15 16  --  18 20   CR 0.64  --  0.52 0.51*  --  0.61 0.73   ANIONGAP 4  --  7 5  --  4 9   TU 9.2  --  9.1 9.2  --  9.5 9.3   *  --  85 96  --  111* 77   ALBUMIN  --   --   --   --   --  3.3* 3.3*   PROTTOTAL  --   --   --   --   --  7.3 6.9   BILITOTAL  --   --   --   --   --  1.0 1.0   ALKPHOS  --   --   --   --   --  180* 163*   ALT  --   --   --   --   --  36 34   AST  --   --   --   --   --  43 43    < > = values in this interval not displayed.         No results found for this or any previous visit (from the past 24 hour(s)).    Medications     - MEDICATION INSTRUCTIONS -       ACE/ARB/ARNI NOT PRESCRIBED       BETA BLOCKER NOT PRESCRIBED         apixaban ANTICOAGULANT  2.5 mg Oral BID     digoxin  125 mcg Oral Daily     donepezil  5 mg Oral At Bedtime     folic acid  1,000 mcg Oral Daily     furosemide  80 mg Intravenous Q8H     gabapentin  100 mg Oral TID     hydroxychloroquine  200 mg Oral Daily     levothyroxine  75 mcg Oral Daily     omeprazole  20 mg Oral BID AC     potassium chloride  20 mEq Oral Once     potassium chloride ER  20 mEq Oral Daily     sildenafil  20 mg Oral TID     sodium chloride (PF)  3 mL Intracatheter Q8H

## 2022-11-13 LAB
ANION GAP SERPL CALCULATED.3IONS-SCNC: 7 MMOL/L (ref 3–14)
BUN SERPL-MCNC: 12 MG/DL (ref 7–30)
CALCIUM SERPL-MCNC: 9.4 MG/DL (ref 8.5–10.1)
CHLORIDE BLD-SCNC: 93 MMOL/L (ref 94–109)
CO2 SERPL-SCNC: 36 MMOL/L (ref 20–32)
CREAT SERPL-MCNC: 0.54 MG/DL (ref 0.52–1.04)
GFR SERPL CREATININE-BSD FRML MDRD: >90 ML/MIN/1.73M2
GLUCOSE BLD-MCNC: 102 MG/DL (ref 70–99)
POTASSIUM BLD-SCNC: 4.3 MMOL/L (ref 3.4–5.3)
SODIUM SERPL-SCNC: 136 MMOL/L (ref 133–144)

## 2022-11-13 PROCEDURE — 36415 COLL VENOUS BLD VENIPUNCTURE: CPT | Performed by: INTERNAL MEDICINE

## 2022-11-13 PROCEDURE — 82310 ASSAY OF CALCIUM: CPT | Performed by: INTERNAL MEDICINE

## 2022-11-13 PROCEDURE — 250N000013 HC RX MED GY IP 250 OP 250 PS 637: Performed by: INTERNAL MEDICINE

## 2022-11-13 PROCEDURE — 210N000002 HC R&B HEART CARE

## 2022-11-13 PROCEDURE — 99233 SBSQ HOSP IP/OBS HIGH 50: CPT | Performed by: INTERNAL MEDICINE

## 2022-11-13 RX ORDER — TORSEMIDE 20 MG/1
20 TABLET ORAL 3 TIMES DAILY
Status: DISCONTINUED | OUTPATIENT
Start: 2022-11-13 | End: 2022-11-14 | Stop reason: HOSPADM

## 2022-11-13 RX ADMIN — ACETAMINOPHEN 650 MG: 325 TABLET ORAL at 22:06

## 2022-11-13 RX ADMIN — DONEPEZIL HYDROCHLORIDE 5 MG: 5 TABLET, FILM COATED ORAL at 21:49

## 2022-11-13 RX ADMIN — GABAPENTIN 100 MG: 100 CAPSULE ORAL at 21:49

## 2022-11-13 RX ADMIN — SILDENAFIL 20 MG: 20 TABLET, FILM COATED ORAL at 15:41

## 2022-11-13 RX ADMIN — GABAPENTIN 100 MG: 100 CAPSULE ORAL at 15:41

## 2022-11-13 RX ADMIN — DIGOXIN 125 MCG: 125 TABLET ORAL at 08:16

## 2022-11-13 RX ADMIN — TORSEMIDE 20 MG: 20 TABLET ORAL at 21:49

## 2022-11-13 RX ADMIN — TORSEMIDE 20 MG: 20 TABLET ORAL at 15:41

## 2022-11-13 RX ADMIN — OMEPRAZOLE 20 MG: 20 CAPSULE, DELAYED RELEASE ORAL at 15:41

## 2022-11-13 RX ADMIN — APIXABAN 2.5 MG: 2.5 TABLET, FILM COATED ORAL at 08:16

## 2022-11-13 RX ADMIN — FOLIC ACID 1000 MCG: 1 TABLET ORAL at 08:17

## 2022-11-13 RX ADMIN — HYDROXYCHLOROQUINE SULFATE 200 MG: 200 TABLET ORAL at 08:17

## 2022-11-13 RX ADMIN — SILDENAFIL 20 MG: 20 TABLET, FILM COATED ORAL at 08:17

## 2022-11-13 RX ADMIN — OMEPRAZOLE 20 MG: 20 CAPSULE, DELAYED RELEASE ORAL at 08:16

## 2022-11-13 RX ADMIN — SILDENAFIL 20 MG: 20 TABLET, FILM COATED ORAL at 21:49

## 2022-11-13 RX ADMIN — APIXABAN 2.5 MG: 2.5 TABLET, FILM COATED ORAL at 21:49

## 2022-11-13 RX ADMIN — LEVOTHYROXINE SODIUM 75 MCG: 75 TABLET ORAL at 08:17

## 2022-11-13 RX ADMIN — GABAPENTIN 100 MG: 100 CAPSULE ORAL at 08:17

## 2022-11-13 ASSESSMENT — ACTIVITIES OF DAILY LIVING (ADL)
ADLS_ACUITY_SCORE: 32

## 2022-11-13 NOTE — PLAN OF CARE
Neuro: A/O x3, forgetful at times  CV/Rhythm: afib w/ cvr  Resp/02: LS dim at bases, HANEY on 1-2 L NC  GI/Diet: BM 11-12, on 2g NA diet  : occ incontinence due to purewick out of place, AM demadex held, but afternoon dose given  Skin/Incisions/Sites: pale skin, blanchable coccyx, scattered scabs on arms, face, red lisa LE  Pulses/CMS: +2 radial/ +1 pedal  Edema: +2-3, lymph wraps on legs elevated  Activity/Falls Risk: Up with assist of 2 to chair with walker and GB, turned and repo Q2 while in bed  Lines/Drains/IVs: SL  Labs/BGM: K 4.3, Cr 5.4  VS/Pain: VSS, pt denies any c/o of pain  DC Plan: plan to discharge to TCU possibly tomorrow pending bed availability  Other:

## 2022-11-13 NOTE — PLAN OF CARE
Pt here with acute on chronic heart failure. A&Ox4. Neuros including intermittent forgetfulness. VSS. Tele afib CVR. 2g NA diet. Up with A2 GBW. Denies pain. Pt scoring green on the Aggression Stop Light Tool. Plan for TCU at discharge. Discharge possibly Monday.

## 2022-11-13 NOTE — PROGRESS NOTES
"[facility]    Medicine Progress Note - Hospitalist Service       Date of Admission:  11/9/2022    Assessment & Plan   Jaqueline Canales is a 81 year old female with hx of mild dementia, HFpEF on 1-2L home O2, severe tricuspid regurgitation, severe pulmonary hypertension, moderate mitral regurgitation, moderate aortic stenosis, chronic a-fib on chronic AC with apixaban, SRINIVAS, hx of NSCLC, and RA on chronic immunosuppressive tx who was admitted on 11/9/2022 for acute CHF exacerbation and need for safe discharge planning.      Acute on chronic HFpEF, Resolving  Severe tricuspid regurgitation  Severe pulmonary hypertension   Moderate mitral regurgitation   Moderate aortic stenosis   Chronic hypoxic respiratory failure  * At baseline on \"1.2l\" home O2, ?1-2L  * Follows with Dr. Marquez at [facility]  * Most recent TTE on 8/31/22: EF 65-70%, valvular findings as above  * Presented on this admission with weight gain (20+ lbs per chart review), lower extremity edema, dyspnea on exertion. CXR on arrival showed pulmonary venous congestion with patchy airspace disease that may represent edema, similar to recent CXR on 11/1  * PTA on torsemide 20 mg TID and sildenafil 20 mg TID  * Initiated on Lasix 80 mg IV q8h on admission  * Cardiology consulted on admission  - Net neg 7L this admission. Breathing comfortably on baseline 1-2L O2 per NC  - Resume home torsemide today, 11/13   - Continues on PTA sildenafil   - Cardiac rehab recommending TCU. Continue lymphedema therapy at TCU  - Cardiology has now signed off    Chronic atrial fibrillation  * PTA on digoxin 125 mcg daily, apixaban 2.5 mg BID  - Continues on PTA meds    Hypokalemia, Resolved  * PTA on KCl 20 meq daily  - Continues on home potassium replacement  - Also on potassium replacement protocol     Obstructive sleep apnea  * Not on CPAP at home  - Nocturnal O2 while hospitalized    Thrombocytopenia, Improved  Platelet count on arrival 103k. Baseline " appears to be 50-70k though with frequent issues with clumping so unclear accuracy.  - Platelets improved to 131k on 11/10  - Repeat CBC in AM    Safeguarding concern  See SW note from 11/9/2022. Lives at home with her  and has been receiving home care services though home care agency feels patient needs higher level of care. Hesitancy to private pay for YAHAIRA noted. [facility] report previously [facility]  - Patient agreeable to TCU. SW following    Otherwise chronic and stable medical conditions: Resume home meds once confirmed by PharmD  GERD  Hypothyroidism, TSH normal this admission  Rheumatoid arthritis   Hyperlipidemia     Diet: 2 Gram Sodium Diet Other - please comment  Room Service    DVT Prophylaxis: DOAC   Luther Catheter: Not present  Code Status: Full Code         Disposition: Expected discharge to TCU tomorrow if placement found    The patient's care was discussed with the Bedside Nurse, Care Coordinator/, Patient and Patient's Family.    Charleen Coombs MD  Hospitalist Service  [facility]  Contact information available via [facility] Paging/Directory    ______________________________________________________________________    Interval History   Increased confusion overnight which has now resolved. Reports feeling tired this morning. HR intermittently higher compared to baseline - possibly due to slight over-diuresis  - Resume home torsemide today; hold AM dose  - Discharge to TCU hopefully tomorrow  - Plan of care discussed with bedside RN, BEN, and     Data reviewed today: I reviewed all medications, new labs and imaging results over the last 24 hours. I personally reviewed no images or EKG's today.    Physical Exam   Vital Signs: Temp: 98  F (36.7  C) Temp src: Oral BP: 102/62 Pulse: 96   Resp: 16 SpO2: 94 % O2 Device: Nasal cannula Oxygen Delivery: 1.5 LPM  Weight: 132 lbs 8 oz  Constitutional: Resting comfortably, NAD  HEENT: Sclera white, MMM  Respiratory:  Breathing non-labored. Fine crackles at bilateral bases  Cardiovascular: Heart irregular rhythm, harsh systolic murmur throughout precordium. 2+ BLE edema  GI: +BS, abd soft/NT  Skin/Integument: No rash  Musculoskeletal: Normal muscle bulk and tone  Neuro: Alert and appropriate, intermittent situational confusion, forgetful. LOCO  Psych: Calm and cooperative    Data   Recent Labs   Lab 11/13/22  0541 11/12/22  1520 11/12/22  0604 11/11/22  1350 11/11/22  0610 11/10/22  0427 11/09/22  2125 11/09/22  1822 11/08/22  1236   WBC  --   --   --   --   --  6.1  --  5.8 5.5   HGB  --   --   --   --   --  11.7  --  12.8 11.5*   MCV  --   --   --   --   --  100  --  101* 101*   PLT  --   --   --   --   --  131*  --  103*  --      --  136  --  137 136  --  135 137   POTASSIUM 4.3 4.8 3.0*   < > 3.3* 3.5  3.5   < > 3.4 3.3*   CHLORIDE 93*  --  93*  --  95 97  --  94 94   CO2 36*  --  39*  --  35* 34*  --  37* 34*   BUN 12  --  16  --  15 16  --  18 20   CR 0.54  --  0.64  --  0.52 0.51*  --  0.61 0.73   ANIONGAP 7  --  4  --  7 5  --  4 9   TU 9.4  --  9.2  --  9.1 9.2  --  9.5 9.3   *  --  117*  --  85 96  --  111* 77   ALBUMIN  --   --   --   --   --   --   --  3.3* 3.3*   PROTTOTAL  --   --   --   --   --   --   --  7.3 6.9   BILITOTAL  --   --   --   --   --   --   --  1.0 1.0   ALKPHOS  --   --   --   --   --   --   --  180* 163*   ALT  --   --   --   --   --   --   --  36 34   AST  --   --   --   --   --   --   --  43 43    < > = values in this interval not displayed.         No results found for this or any previous visit (from the past 24 hour(s)).    Medications     - MEDICATION INSTRUCTIONS -       ACE/ARB/ARNI NOT PRESCRIBED       BETA BLOCKER NOT PRESCRIBED         apixaban ANTICOAGULANT  2.5 mg Oral BID     digoxin  125 mcg Oral Daily     donepezil  5 mg Oral At Bedtime     folic acid  1,000 mcg Oral Daily     gabapentin  100 mg Oral TID     hydroxychloroquine  200 mg Oral Daily     levothyroxine  75  mcg Oral Daily     omeprazole  20 mg Oral BID AC     potassium chloride ER  20 mEq Oral Daily     sildenafil  20 mg Oral TID     sodium chloride (PF)  3 mL Intracatheter Q8H     torsemide  20 mg Oral TID

## 2022-11-13 NOTE — CONSULTS
Care Management Initial Consult    General Information  Assessment completed with: Spouse or significant other, Patient's  Sin  Type of CM/SW Visit: Initial Assessment    Primary Care Provider verified and updated as needed: Yes   Readmission within the last 30 days:        Reason for Consult: discharge planning  Advance Care Planning: Advance Care Planning Reviewed: present on chart          Communication Assessment  Patient's communication style: spoken language (English or Bilingual)    Hearing Difficulty or Deaf: no   Wear Glasses or Blind: yes    Cognitive  Cognitive/Neuro/Behavioral: .WDL except  Level of Consciousness: alert  Arousal Level: other (see comments)  Orientation: oriented x 4  Mood/Behavior: calm, cooperative  Best Language: 0 - No aphasia  Speech: clear, spontaneous    Living Environment:   People in home: spouse  Sin  Current living Arrangements: house      Able to return to prior arrangements:         Family/Social Support:  Care provided by: self  Provides care for: no one  Marital Status:     Sin       Description of Support System: Supportive, Involved    Support Assessment: Adequate family and caregiver support, Adequate social supports    Current Resources:   Patient receiving home care services: Yes  Skilled Home Care Services: Skilled Nursing  Community Resources: None  Equipment currently used at home: walker, standard  Supplies currently used at home: None    Employment/Financial:  Employment Status: retired        Financial Concerns: No concerns identified   Referral to Financial Worker: No       Lifestyle & Psychosocial Needs:  Social Determinants of Health     Tobacco Use: Medium Risk     Smoking Tobacco Use: Former     Smokeless Tobacco Use: Never     Passive Exposure: Not on file   Alcohol Use: Not on file   Financial Resource Strain: Low Risk      Difficulty of Paying Living Expenses: Not hard at all   Food Insecurity: Not on file   Transportation Needs: No  Transportation Needs     Lack of Transportation (Medical): No     Lack of Transportation (Non-Medical): No   Physical Activity: Not on file   Stress: Not on file   Social Connections: Not on file   Intimate Partner Violence: Not on file   Depression: Not at risk     PHQ-2 Score: 0   Housing Stability: Not on file       Functional Status:  Prior to admission patient needed assistance:              Mental Health Status:          Chemical Dependency Status:                Values/Beliefs:  Spiritual, Cultural Beliefs, Mandaeism Practices, Values that affect care: no               Additional Information:  Per care management/social work consult for discharge planning and TCU placement on discharge.  Patient was admitted on 11-9-22 with CHF exacerbation.  The tentative date of discharge is 11-14-22.  Reviewed chart and call placed to patient's  to discuss discharge plans.  Per patient's 's report, they live in a house with 2 steps to enter and 10 + steps inside to the basement where the laundry is.  Patient doesn't have to use the steps.  The bedroom is on the main floor.  Patient has been sleeping in the recliner.  Patient uses a standard walker at baseline.  Patient assists with some cooking, but no cleaning.  Patient's  assists with meds.  Patient is open to [facility] for nursing.  Reviewed the therapy discharge recommendations of TCU placement on discharge and patient's  is in agreement.  Patient's  states that the plan after TCU is to move to [place] where their son is.  Patient's  states that patient has been to [facility] twice in the past so this would be an option.  He would like for patient to be close to home as he doesn't drive he gets rides.  Referrals sent, via the discharge navigator, to check bed availability, at [facility], [facility], and [facility].      Will continue to follow.      BRADEN Brown, [facility]  Social Work  Supervisor  [phone]  [facility]

## 2022-11-14 VITALS
BODY MASS INDEX: 23.96 KG/M2 | HEIGHT: 62 IN | OXYGEN SATURATION: 93 % | DIASTOLIC BLOOD PRESSURE: 69 MMHG | HEART RATE: 84 BPM | TEMPERATURE: 97.9 F | SYSTOLIC BLOOD PRESSURE: 96 MMHG | WEIGHT: 130.2 LBS | RESPIRATION RATE: 19 BRPM

## 2022-11-14 LAB
ANION GAP SERPL CALCULATED.3IONS-SCNC: 7 MMOL/L (ref 3–14)
BUN SERPL-MCNC: 16 MG/DL (ref 7–30)
CALCIUM SERPL-MCNC: 9.1 MG/DL (ref 8.5–10.1)
CHLORIDE BLD-SCNC: 92 MMOL/L (ref 94–109)
CO2 SERPL-SCNC: 36 MMOL/L (ref 20–32)
CREAT SERPL-MCNC: 0.63 MG/DL (ref 0.52–1.04)
ERYTHROCYTE [DISTWIDTH] IN BLOOD BY AUTOMATED COUNT: 17 % (ref 10–15)
GFR SERPL CREATININE-BSD FRML MDRD: 88 ML/MIN/1.73M2
GLUCOSE BLD-MCNC: 83 MG/DL (ref 70–99)
HCT VFR BLD AUTO: 35.9 % (ref 35–47)
HGB BLD-MCNC: 11.3 G/DL (ref 11.7–15.7)
MCH RBC QN AUTO: 31.5 PG (ref 26.5–33)
MCHC RBC AUTO-ENTMCNC: 31.5 G/DL (ref 31.5–36.5)
MCV RBC AUTO: 100 FL (ref 78–100)
PLAT MORPH BLD: NORMAL
PLATELET # BLD AUTO: 124 10E3/UL (ref 150–450)
POTASSIUM BLD-SCNC: 3.8 MMOL/L (ref 3.4–5.3)
RBC # BLD AUTO: 3.59 10E6/UL (ref 3.8–5.2)
RBC MORPH BLD: NORMAL
SODIUM SERPL-SCNC: 135 MMOL/L (ref 133–144)
WBC # BLD AUTO: 5.4 10E3/UL (ref 4–11)

## 2022-11-14 PROCEDURE — 250N000013 HC RX MED GY IP 250 OP 250 PS 637: Performed by: INTERNAL MEDICINE

## 2022-11-14 PROCEDURE — 85027 COMPLETE CBC AUTOMATED: CPT | Performed by: INTERNAL MEDICINE

## 2022-11-14 PROCEDURE — 250N000013 HC RX MED GY IP 250 OP 250 PS 637: Performed by: HOSPITALIST

## 2022-11-14 PROCEDURE — 82310 ASSAY OF CALCIUM: CPT | Performed by: INTERNAL MEDICINE

## 2022-11-14 PROCEDURE — 99239 HOSP IP/OBS DSCHRG MGMT >30: CPT | Performed by: HOSPITALIST

## 2022-11-14 PROCEDURE — 36415 COLL VENOUS BLD VENIPUNCTURE: CPT | Performed by: INTERNAL MEDICINE

## 2022-11-14 RX ORDER — POTASSIUM CHLORIDE 1500 MG/1
20 TABLET, EXTENDED RELEASE ORAL ONCE
Status: COMPLETED | OUTPATIENT
Start: 2022-11-14 | End: 2022-11-14

## 2022-11-14 RX ADMIN — POTASSIUM CHLORIDE 20 MEQ: 1500 TABLET, EXTENDED RELEASE ORAL at 08:14

## 2022-11-14 RX ADMIN — LEVOTHYROXINE SODIUM 75 MCG: 75 TABLET ORAL at 08:14

## 2022-11-14 RX ADMIN — FOLIC ACID 1000 MCG: 1 TABLET ORAL at 08:14

## 2022-11-14 RX ADMIN — TORSEMIDE 20 MG: 20 TABLET ORAL at 08:15

## 2022-11-14 RX ADMIN — APIXABAN 2.5 MG: 2.5 TABLET, FILM COATED ORAL at 08:14

## 2022-11-14 RX ADMIN — DIGOXIN 125 MCG: 125 TABLET ORAL at 08:15

## 2022-11-14 RX ADMIN — POTASSIUM CHLORIDE 20 MEQ: 1500 TABLET, EXTENDED RELEASE ORAL at 08:21

## 2022-11-14 RX ADMIN — OMEPRAZOLE 20 MG: 20 CAPSULE, DELAYED RELEASE ORAL at 08:15

## 2022-11-14 RX ADMIN — HYDROXYCHLOROQUINE SULFATE 200 MG: 200 TABLET ORAL at 08:15

## 2022-11-14 RX ADMIN — SILDENAFIL 20 MG: 20 TABLET, FILM COATED ORAL at 08:15

## 2022-11-14 RX ADMIN — GABAPENTIN 100 MG: 100 CAPSULE ORAL at 08:15

## 2022-11-14 ASSESSMENT — ACTIVITIES OF DAILY LIVING (ADL)
ADLS_ACUITY_SCORE: 32

## 2022-11-14 NOTE — PROGRESS NOTES
Care Management Discharge Note    Discharge Date: 11/14/2022       Discharge Disposition: Transitional Care    Discharge Services: Other (see comment) (therapy)    Discharge DME: Oxygen    Discharge Transportation:  [employer] [facility] wheelchair transport at 13:15    Private pay costs discussed: Not applicable    PAS Confirmation Code: [id number]  Patient/family educated on Medicare website which has current facility and service quality ratings: no    Education Provided on the Discharge Plan:  yes  Persons Notified of Discharge Plans: patient's   Patient/Family in Agreement with the Plan: yes    Handoff Referral Completed: Yes    Additional Information:  Received calls back from [facility] and [facility].  They both have double rooms available for patient today.  Call paced to update patient's .  He would prefer that patient go to [facility].  He is asking for transport to be arranged.  Explained that this will be private pay.  Patient's  is in agreement.  Call placed to  [employer] [facility] to arrange for wheelchair transport with oxygen at 13:15 today.  Patient and  informed of the plan and in agreement to the plan.  Call placed to update [facility] and faxed the orders and the PAS.      PAS-RR    D: Per DHS regulation, SW completed and submitted PAS-RR to [place] [employer] Direct Connect via the Senior [employer] Line.  PAS-RR confirmation # is : [id number].    I: SW spoke with patient's  and they are aware a PAS-RR has been submitted.  BEN reviewed with patient's  that they may be contacted for a follow up appointment within 10 days of hospital discharge if their SNF stay is < 30 days.  Contact information for [facility] was also provided.    A: Patient's  verbalized understanding.    P: Further questions may be directed to [facility] at #[phone], option #4 for PAS-RR staff.      BRADEN Brown, [facility]    [phone]  M  [facility]

## 2022-11-14 NOTE — PROGRESS NOTES
Alert, disoriented to situation. VSS on 2L O2. Tele A fib CVR. Denies CP/SOB/pain. Up strong A2 w/ walker. Purewick in place.  Sin updated in regards to pt transfer to [place]. Pt transported by [facility] transport at 1315 to [facility].

## 2022-11-14 NOTE — DISCHARGE SUMMARY
"[facility]    Discharge Summary  Hospitalist    Date of Admission:  11/9/2022  Date of Discharge:  11/14/2022  Discharging Provider: Dahiana Travis DO  Date of Service (when I saw the patient): 11/14/22    Discharge Diagnoses   Acute on chronic HFpEF, Resolving  Severe tricuspid regurgitation  Severe pulmonary hypertension   Moderate mitral regurgitation   Moderate aortic stenosis   Chronic hypoxic respiratory failure  Chronic atrial fibrillation  Hypokalemia, Resolved  Obstructive sleep apnea  Thrombocytopenia, Improved  Safeguarding concern  GERD  Hypothyroidism  Rheumatoid arthritis   Hyperlipidemia    History of Present Illness   Jaqueline Canales is an 81 year old female with hx of mild dementia, HFpEF on 1-2L home O2, severe tricuspid regurgitation, severe pulmonary hypertension, moderate mitral regurgitation, moderate aortic stenosis, chronic a-fib on chronic AC with apixaban, SRINIVAS, hx of NSCLC, and RA on chronic immunosuppressive tx who was admitted on 11/9/2022 for acute CHF exacerbation and need for safe discharge planning.     Hospital Course   Jaqueline Canales was admitted on 11/9/2022.  The following problems were addressed during her hospitalization:    Acute on chronic HFpEF, Resolving  Severe tricuspid regurgitation  Severe pulmonary hypertension   Moderate mitral regurgitation   Moderate aortic stenosis   Chronic hypoxic respiratory failure  * At baseline on \"1.2l\" home O2, ?1-2L  * Follows [facility] Dr. Marquez at [facility]  * Most recent TTE on 8/31/22: EF 65-70%, valvular findings as above  * Presented on this admission with weight gain (20+ lbs per chart review), lower extremity edema, dyspnea on exertion. CXR on arrival showed pulmonary venous congestion with patchy airspace disease that may represent edema, similar to recent CXR on 11/1  * PTA on torsemide 20 mg TID and sildenafil 20 mg TID  * Initiated on Lasix 80 mg IV q8h on admission  * Cardiology consulted on " admission  - Net neg 7L this admission. Breathing comfortably on baseline 1-2L O2 per NC  - Resume home torsemide  - Continues on PTA sildenafil   - Cardiac rehab recommending TCU. Continue lymphedema therapy at TCU  - trend daily weights on discharge  - follow up with cardiology and PCP     Chronic atrial fibrillation  * PTA on digoxin 125 mcg daily, apixaban 2.5 mg BID  - Continues on PTA meds     Hypokalemia, Resolved  * PTA on KCl 20 meq daily  - Continues on home potassium replacement     Obstructive sleep apnea  * Not on CPAP at home  - Nocturnal O2 while hospitalized     Thrombocytopenia, Improved  Platelet count on arrival 103k. Baseline appears to be 50-70k though with frequent issues with clumping so unclear accuracy.  - Platelets 124k on 11/14/22     Safeguarding concern  See SW note from 11/9/2022. Lives at home with her  and has been receiving home care services though home care agency feels patient needs higher level of care. Hesitancy to private pay for half-way noted. [facility] report previously filed  - Patient agreeable to TCU, went to [facility].  following     Otherwise chronic and stable medical conditions: Resume home meds once confirmed by PharmD  GERD  Hypothyroidism, TSH normal this admission  Rheumatoid arthritis   Hyperlipidemia    Dahiana Travis, DO    Significant Results and Procedures   See above    Pending Results   NA    Code Status   Full Code       Primary Care Physician   Carissa Burroughs    Physical Exam   Temp: 97.9  F (36.6  C) Temp src: Oral BP: 96/69 Pulse: 84   Resp: 19 SpO2: 93 % O2 Device: Oxymask Oxygen Delivery: 2 LPM  Vitals:    11/12/22 0500 11/13/22 0600 11/14/22 0551   Weight: 61.3 kg (135 lb 3.2 oz) 60.1 kg (132 lb 8 oz) 59.1 kg (130 lb 3.2 oz)     Vital Signs with Ranges  Temp:  [97.9  F (36.6  C)-100.7  F (38.2  C)] 97.9  F (36.6  C)  Pulse:  [] 84  Resp:  [16-22] 19  BP: ()/(62-85) 96/69  SpO2:  [91 %-98 %] 93 %  I/O last 3 completed shifts:  In: 655  [P.O.:655]  Out: 850 [Urine:850]    Patient seen and examined. No chest pain, shortness of breath, nausea, vomiting. Breathing feels at baseline on oxygen. No increased swelling. No complaints. Stable for discharge to TCU    Constitutional: Awake, alert, cooperative, no apparent distress.  Eyes: Conjunctiva and pupils examined and normal.  HEENT: Moist mucous membranes, normal dentition.  Respiratory: Clear to auscultation bilaterally, crackles at bases, on 2LPM O2  Cardiovascular: Regular rate and rhythm, normal S1 and S2, and no murmur noted.  GI: Soft, non-distended, non-tender, normal bowel sounds.  Lymph/Hematologic: No anterior cervical or supraclavicular adenopathy.  Skin: No rashes, no cyanosis, +1 lower extremity edema.  Musculoskeletal: No joint swelling, erythema or tenderness.  Neurologic: Cranial nerves 2-12 intact, normal strength and sensation.  Psychiatric: Alert, oriented to person, place, no obvious anxiety or depression. forgetful    Discharge Disposition   Discharged to TCU ([facility])  Condition at discharge: Stable    Consultations This Hospital Stay   CARDIOLOGY IP CONSULT  CORE CLINIC EVALUATION IP CONSULT  OCCUPATIONAL THERAPY ADULT IP CONSULT  NUTRITION SERVICES ADULT IP CONSULT  CARE MANAGEMENT / SOCIAL WORK IP CONSULT  CARE MANAGEMENT / SOCIAL WORK IP CONSULT  OCCUPATIONAL THERAPY ADULT IP CONSULT  PHYSICAL THERAPY ADULT IP CONSULT  LYMPHEDEMA THERAPY IP CONSULT  CARDIAC REHAB IP CONSULT  PHYSICAL THERAPY ADULT IP CONSULT  OCCUPATIONAL THERAPY ADULT IP CONSULT  LYMPHEDEMA THERAPY IP CONSULT    Time Spent on this Encounter   Dahiana PERDOMO DO, personally saw the patient today and spent greater than 30 minutes discharging this patient.    Discharge Orders      Basic metabolic panel     Follow-Up with Cardiology LIZZIE      Primary Care - Care Coordination Referral      General info for SNF    Length of Stay Estimate: Short Term Care: Estimated # of Days <30  Condition at Discharge:  Stable  Level of care:skilled   Rehabilitation Potential: Good  Admission H&P remains valid and up-to-date: Yes  Recent Chemotherapy: N/A  Use Nursing Home Standing Orders: Yes     Mantoux instructions    Give two-step Mantoux (PPD) Per Facility Policy Yes     Follow Up and recommended labs and tests    Follow up with TCU physician.  The following labs/tests are recommended: BMP within 1 week.     Reason for your hospital stay    Exacerbation of congestive heart failure and pulmonary hypertension which was treated with IV diuretics     Daily weights    Call Provider for weight gain of more than 2 pounds per day or 5 pounds per week.     Activity - Up with nursing assistance     Physical Therapy Adult Consult    Evaluate and treat as clinically indicated.    Reason:  Generalized weakness, deconditioning     Occupational Therapy Adult Consult    Evaluate and treat as clinically indicated.    Reason:  Cognitive impairment     Lymphedema Therapy Adult Consult    Evaluate and treat as clinically indicated.    Reason: CHF with chronic BLE edema     Oxygen (SNF/TCU) Discharge     Fall precautions     Diet    Follow this diet upon discharge: 2 Gram Sodium Diet     Discharge Medications   Discharge Medication List as of 11/14/2022  1:08 PM      CONTINUE these medications which have CHANGED    Details   apixaban ANTICOAGULANT (ELIQUIS) 2.5 MG tablet Take 1 tablet (2.5 mg) by mouth 2 times daily, Disp-90 tablet, R-3, Transitional         CONTINUE these medications which have NOT CHANGED    Details   acetaminophen (TYLENOL) 500 MG tablet Take 1,000 mg by mouth 2 times daily as needed for mild pain , Historical      albuterol (PROAIR HFA/PROVENTIL HFA/VENTOLIN HFA) 108 (90 Base) MCG/ACT inhaler USE 2 INHALATIONS ORALLY   INTO THE LUNGS EVERY 6  HOURS as needed, Disp-18 g, R-0, E-PrescribePharmacy may dispense brand covered by insurance (Proair, or proventil or ventolin or generic albuterol inhaler)      digoxin (LANOXIN) 125 MCG  tablet TAKE 1 TABLET DAILY, Disp-90 tablet, R-1, E-Prescribe      donepezil (ARICEPT) 5 MG tablet TAKE 1 TABLET AT BEDTIME, Disp-90 tablet, R-3, E-Prescribe      folic acid (FOLVITE) 1 MG tablet TAKE 1 TABLET DAILY, Disp-90 tablet, R-2, E-Prescribe      gabapentin (NEURONTIN) 100 MG capsule Take 1 capsule (100 mg) by mouth 3 times daily, Disp-90 capsule, R-11, E-Prescribe      hydroxychloroquine (PLAQUENIL) 200 MG tablet Take 200 mg by mouth daily, Historical      levothyroxine (SYNTHROID/LEVOTHROID) 75 MCG tablet TAKE 1 TABLET DAILY, Disp-90 tablet, R-2, E-Prescribe      melatonin 5 MG CAPS Take 5 mg by mouth nightly as needed, Historical      Methotrexate Sodium (METHOTREXATE PO) Take 2.5 mg by mouth 10 tablets weekly - Wednesdays. Splits the dose as 5 tablets in AM & 5 tablets in PM, Historical      miconazole (MICATIN) 2 % external cream Apply topically 2 times daily as neededHistorical      !! Multiple Vitamins-Minerals (ICAPS AREDS FORMULA PO) Take 1 capsule by mouth 2 times daily, Historical      !! multivitamin (CENTRUM SILVER) tablet Take 1 tablet by mouth daily, Historical      omeprazole (PRILOSEC) 20 MG DR capsule TAKE 1 CAPSULE TWICE DAILY,30-60 MINUTES PRIOR TO     MEALS, Disp-180 capsule, R-2, E-Prescribe      potassium chloride ER (KLOR-CON) 20 MEQ CR tablet Take 1 tablet (20 mEq) by mouth daily, Disp-90 tablet, R-0, E-Prescribe      sildenafil (REVATIO) 20 MG tablet TAKE 1 TABLET BY MOUTH THREE TIMES DAILY. GENERIC FOR REVATIO., Disp-270 tablet, R-2, E-Prescribe      simvastatin (ZOCOR) 40 MG tablet Take 1 tablet (40 mg) by mouth At Bedtime, Disp-90 tablet, R-0, E-Prescribe      torsemide (DEMADEX) 20 MG tablet Take 1 tablet (20 mg) by mouth 3 times daily, Disp-90 tablet, R-3, No Print OutDose increased      Vitamin D, Cholecalciferol, 10 MCG (400 UNIT) TABS Take 1,600 Units by mouth daily, Historical      !! ACE/ARB/ARNI NOT PRESCRIBED (INTENTIONAL) Reason ACE/ARB was Not Prescribed: IntoleranceNo  Print Out      !! BETA BLOCKER NOT PRESCRIBED (INTENTIONAL) Reason Beta Blocker was Not Prescribed: IntoleranceNo Print Out      Skin Protectants, Misc. (EUCERIN) cream Apply topically 2 times daily Apply to areas dry skin topically two times a day for dry skin and Apply to areas of dry skin topically as needed for dry skin dailyHistorical       !! - Potential duplicate medications found. Please discuss with provider.      STOP taking these medications       salicylic acid (COMPOUND W MAX STRENGTH) 17 % external gel Comments:   Reason for Stopping:             Allergies   Allergies   Allergen Reactions     No Known Allergies      Data   Most Recent 3 CBC's:  Recent Labs   Lab Test 11/14/22  0611 11/10/22  0427 11/09/22  1822   WBC 5.4 6.1 5.8   HGB 11.3* 11.7 12.8    100 101*   * 131* 103*      Most Recent 3 BMP's:  Recent Labs   Lab Test 11/14/22  0611 11/13/22  0541 11/12/22  1520 11/12/22  0604    136  --  136   POTASSIUM 3.8 4.3 4.8 3.0*   CHLORIDE 92* 93*  --  93*   CO2 36* 36*  --  39*   BUN 16 12  --  16   CR 0.63 0.54  --  0.64   ANIONGAP 7 7  --  4   TU 9.1 9.4  --  9.2   GLC 83 102*  --  117*     Most Recent 2 LFT's:  Recent Labs   Lab Test 11/09/22  1822 11/08/22  1236   AST 43 43   ALT 36 34   ALKPHOS 180* 163*   BILITOTAL 1.0 1.0     Most Recent INR's and Anticoagulation Dosing History:  Anticoagulation Dose History     Recent Dosing and Labs Latest Ref Rng & Units 12/4/2015 4/15/2019 5/2/2021    INR 0.86 - 1.14 0.95 1.05 1.07        Most Recent 3 Troponin's:  Recent Labs   Lab Test 05/02/21  2132 11/25/19  1654 10/23/16  0533   TROPI <0.015 <0.015 <0.015  The 99th percentile for upper reference range is 0.045 ug/L.  Troponin values in   the range of 0.045 - 0.120 ug/L may be associated with risks of adverse   clinical events.       Most Recent Cholesterol Panel:  Recent Labs   Lab Test 10/25/22  1425   CHOL 136   LDL 34   HDL 76   TRIG 131     Most Recent 6 Bacteria Isolates From  Any Culture (See EPIC Reports for Culture Details):  Recent Labs   Lab Test 05/02/21  2217   CULT >100,000 colonies/mL  Escherichia coli  *  10,000 to 50,000 colonies/mL  Strain 2  Escherichia coli  *     Most Recent TSH, T4 and A1c Labs:  Recent Labs   Lab Test 11/09/22  1822 04/04/22  1036 05/02/21  2132 10/27/15  0925 08/17/15  0909   TSH 3.06   < >  --    < > 4.53*   T4  --   --   --   --  0.96   A1C  --   --  5.4   < >  --     < > = values in this interval not displayed.     Results for orders placed or performed during the hospital encounter of 11/09/22   Chest XR,  PA & LAT    Narrative    EXAM: XR CHEST 2 VIEWS  LOCATION: [facility]  DATE/TIME: 11/9/2022 6:50 PM    INDICATION: SOB, check for CHf or effusion  COMPARISON: 11/01/2022      Impression    IMPRESSION: Similar to prior. Pulmonary venous congestion with patchy airspace disease that may represent edema. There may be left basilar consolidation and/or atelectasis. No large effusions or pneumothorax. Unchanged cardiac size. Aortic   atherosclerosis. Mitral annular calcifications. Old right ninth rib fracture.     *Note: Due to a large number of results and/or encounters for the requested time period, some results have not been displayed. A complete set of results can be found in Results Review.

## 2022-11-14 NOTE — PROGRESS NOTES
Pt here with chf exacerbation. A&O.  VS- 02 2L nc (on home 02 baseline), soft bp's in hospital in 90 range systolic.  Tele afib cvr.  Cardiac diet,  Has not been out of bed this shift, but previous shift stated up with strong ax2.  Denies pain. Pt scoring green on the Aggression Stop Light Tool. Plan- discharge to tcu today.

## 2022-11-14 NOTE — PLAN OF CARE
"Occupational Therapy Discharge Summary    Reason for therapy discharge:    Discharged to transitional care facility.    Progress towards therapy goal(s). See goals on Care Plan in [facility] electronic health record for goal details.  Goals partially met.  Barriers to achieving goals:   discharge from facility.    Therapy recommendation(s):    Continued therapy is recommended.  Rationale/Recommendations:  see below.   \"Pt limited due to impaired activity tolerance, strength, balance, impaired STM and will require cont'd therapy at TCU to increase ADl and functional mobility I and safety toPLOF, if home will need 24 hr A with all ADl/IADLs and functional mobility and home RN, OT and PT.\"    Writing therapist did not treat pt, note written based on previous treating therapist notes and recommendations. Please see chart and flow sheet for additional details.          "

## 2022-11-15 ENCOUNTER — LAB REQUISITION (OUTPATIENT)
Dept: LAB | Facility: CLINIC | Age: 82
End: 2022-11-15
Payer: MEDICARE

## 2022-11-15 DIAGNOSIS — I50.9 HEART FAILURE, UNSPECIFIED (H): ICD-10-CM

## 2022-11-16 PROCEDURE — 80048 BASIC METABOLIC PNL TOTAL CA: CPT

## 2022-11-17 ENCOUNTER — TELEPHONE (OUTPATIENT)
Dept: CARDIOLOGY | Facility: CLINIC | Age: 82
End: 2022-11-17

## 2022-11-17 LAB
ANION GAP SERPL CALCULATED.3IONS-SCNC: 12 MMOL/L (ref 7–15)
BUN SERPL-MCNC: 16 MG/DL (ref 8–23)
CALCIUM SERPL-MCNC: 8.6 MG/DL (ref 8.8–10.2)
CHLORIDE SERPL-SCNC: 92 MMOL/L (ref 98–107)
CREAT SERPL-MCNC: 0.59 MG/DL (ref 0.51–0.95)
DEPRECATED HCO3 PLAS-SCNC: 33 MMOL/L (ref 22–29)
GFR SERPL CREATININE-BSD FRML MDRD: 89 ML/MIN/1.73M2
GLUCOSE SERPL-MCNC: 90 MG/DL (ref 70–99)
POTASSIUM SERPL-SCNC: 3.6 MMOL/L (ref 3.4–5.3)
SODIUM SERPL-SCNC: 137 MMOL/L (ref 136–145)

## 2022-11-17 NOTE — TELEPHONE ENCOUNTER
Patient was admitted to [facility] on 11/9/22 after a visit with Dr. Marquez for acute on chronic heart failure and need for safe discharge planning.     PMH: CHFpEF, severe TR, severe pulmonary hypertension-Revatio, moderate aortic stenosis, A. Fib-Eliquis, Digoxin, scleroderma/rheumatoid arthritis.    IV Lasix diuresed 14 lbs.    No cardiology medication changes made at time of discharge.    RN called [facility] TCU to confirm the follow up plan for patient with Care Coordinator.     Pt was dx'd as COVID positive on 11/16/22 and is on antivirals currently. Will be out of isolation on 11/26/22 per Care Coordinator.    RN confirmed with Care Coordinator that patient has labs scheduled on 11/28/22 at 1230, followed with an OV at 1325 with LIZZIE Martha Gant at our [facility]. RN confirmed with Care Coordinator that patient is weighed daily and to send daily wt diary, VS, MAR and most recent provider note with patient to f/u visit. MELVI Hammond RN.

## 2022-11-23 ENCOUNTER — TELEPHONE (OUTPATIENT)
Dept: CARDIOLOGY | Facility: CLINIC | Age: 82
End: 2022-11-23

## 2022-11-23 NOTE — TELEPHONE ENCOUNTER
Called to confirm patient plans to be at her appt on 12/28. Pt has transport from the TCU and spouse will be coming to appointment.   - PT and spouse plan to relocate to [place] with son, end of December.   -PT care will need to be transferred to [place] as they do not want to live in [facility].     Discussed to bring information with to that appointment, phone number of son, location of living so that way coordination of care can be made.     Wendi Espinosa RN on 11/23/2022 at 10:54 AM

## 2022-11-30 ENCOUNTER — LAB REQUISITION (OUTPATIENT)
Dept: LAB | Facility: CLINIC | Age: 82
End: 2022-11-30
Payer: MEDICARE

## 2022-11-30 DIAGNOSIS — I50.22 CHRONIC SYSTOLIC (CONGESTIVE) HEART FAILURE (H): ICD-10-CM

## 2022-12-01 LAB
ANION GAP SERPL CALCULATED.3IONS-SCNC: 10 MMOL/L (ref 7–15)
BUN SERPL-MCNC: 19.9 MG/DL (ref 8–23)
CALCIUM SERPL-MCNC: 9.6 MG/DL (ref 8.8–10.2)
CHLORIDE SERPL-SCNC: 89 MMOL/L (ref 98–107)
CREAT SERPL-MCNC: 0.67 MG/DL (ref 0.51–0.95)
DEPRECATED HCO3 PLAS-SCNC: 39 MMOL/L (ref 22–29)
GFR SERPL CREATININE-BSD FRML MDRD: 87 ML/MIN/1.73M2
GLUCOSE SERPL-MCNC: 84 MG/DL (ref 70–99)
NT-PROBNP SERPL-MCNC: 5517 PG/ML (ref 0–1800)
POTASSIUM SERPL-SCNC: 3.5 MMOL/L (ref 3.4–5.3)
SODIUM SERPL-SCNC: 138 MMOL/L (ref 136–145)

## 2022-12-01 PROCEDURE — 80048 BASIC METABOLIC PNL TOTAL CA: CPT

## 2022-12-01 PROCEDURE — 83880 ASSAY OF NATRIURETIC PEPTIDE: CPT

## 2022-12-01 PROCEDURE — 36415 COLL VENOUS BLD VENIPUNCTURE: CPT

## 2022-12-01 PROCEDURE — P9603 ONE-WAY ALLOW PRORATED MILES: HCPCS

## 2022-12-05 NOTE — ADDENDUM NOTE
Encounter addended by: Mica Kruger, PT on: 12/5/2022 1:02 PM   Actions taken: Clinical Note Signed, Episode resolved

## 2022-12-05 NOTE — PROGRESS NOTES
[facility]    Outpatient Physical Therapy Discharge Note  Patient: Jaqueline Canales  : 1940    Patient seen for one time evaluation and treatment.  Patient did not return for further treatment and current status is unknown.  Please see initial evaluation for further information.    Plan:  Discharge from therapy.     If patient would like to return to therapy, they will need a new PT order from referring provider.    Mica Kruger, PT   2022

## 2022-12-07 ENCOUNTER — TELEPHONE (OUTPATIENT)
Dept: CARDIOLOGY | Facility: CLINIC | Age: 82
End: 2022-12-07

## 2022-12-07 NOTE — TELEPHONE ENCOUNTER
Called to follow-up with patient following hospitalization. Noted in care everywhere patient discharged home with home care services- Called patient and left a message for a call back to follow-up.     Wendi Espinosa RN on 12/7/2022 at 10:47 AM

## 2022-12-14 ENCOUNTER — TELEPHONE (OUTPATIENT)
Dept: CARDIOLOGY | Facility: CLINIC | Age: 82
End: 2022-12-14

## 2022-12-14 NOTE — TELEPHONE ENCOUNTER
Oxygen orders faxed to [facility] oxygen in [facility]. Called and updated son to contact cardiology clinic for any further management needs as oxygen and cardiology referral has been completed.     PH: [phone] [employer] fax  Wendi Espinosa RN on 12/21/2022 at 1:20 PM      ------------------------------------------------------------------------------------------------------------  Spoke with Brant at the [facility] to confirm referral was received. Referral was received and message was left with the PH team with direct number for any question. Brant stated the patient would be reached for scheduling.   Wendi Espinosa RN on 12/20/2022 at 10:54 AM    ---------------------------------------------------------------  Patient son was at home and received voicemail from self that was left on patient home number regarding touch base following TCU discharge.     Son Indra Longoria [phone] moved patient to [place]. Patient spouse is in [place] and unfortunately had a fall and is at a hospital in an ICU. Patient moved on 12/8/22 to [facility] and has a follow-up to establish with a PCP on 1/8/22.     Medication list reviewed with son he was sent home from the TCU, patient does have supplies of medications on hand at location. Son requested need for oxygen supply company in [place]. [employer] [phone] and request for cardiologist locally.     Referral information faxed to [facility] PH: [phone]. Fax: [phone] included face sheet, recent office visit note, medication record. Number for Medical records is [phone]

## 2022-12-16 DIAGNOSIS — I48.19 PERSISTENT ATRIAL FIBRILLATION (H): ICD-10-CM

## 2022-12-23 DIAGNOSIS — I48.19 PERSISTENT ATRIAL FIBRILLATION (H): ICD-10-CM

## 2022-12-23 RX ORDER — APIXABAN 5 MG/1
TABLET, FILM COATED ORAL
Qty: 180 TABLET | Refills: 2 | OUTPATIENT
Start: 2022-12-23

## 2023-01-11 NOTE — TELEPHONE ENCOUNTER
Patient established care in [place] with PCP. All referral completed to PH clinic in [facility]. New oxygen supply company set up with PCP locally. Transfer of care is completed.   Wendi Espinosa RN on 1/11/2023 at 9:59 AM

## 2023-03-23 ENCOUNTER — TELEPHONE (OUTPATIENT)
Dept: FAMILY MEDICINE | Facility: CLINIC | Age: 83
End: 2023-03-23
Payer: MEDICARE

## 2023-03-23 NOTE — TELEPHONE ENCOUNTER
office in [place], [place] want Dr Burroughs to call. The pt passed away March 10 at the son's home is [place]. The  want to talk to  about pt medical history.

## 2024-12-04 NOTE — ED PROVIDER NOTES
Emergency Department Attending Supervision Note  3/18/2022  6:51 PM      I evaluated this patient in conjunction with Analy Luke PA-        ED course:    Jaqueline Rodriguez is a 81 year old female who presents to the ED because of swelling I her legs. She was seen in conjunction with Analy Luke. Patient is noting increasing swelling in her legs this week. She does not have more shortness of breath or chest pain. She does have heart failure, and is supposed to be a Demadex 20 mg twice a day. Unfortunately, the person who put her pills out is no longer doing it and she may have gotten mixed up. She is also Eliquis, has Paroxysmal Atrial Fibrillation.     On exam here, she has some rales and occasional expiratory weakness at her bases. I did not feel an enlarged liver, her legs are however 2-4 plus swollen. She has a 2/6 diffuse heart murmur      workup included BNP which is elevated over 5,000. Ultra sound of the legs which does not show a clot and chest x-ray which shows significant cardiomegaly with little change from pervious. She was given Lasix and she will be brought in to better manage her heart failure and try to arrange her home situation so she can be on proper medications.         Diagnosis    ICD-10-CM    1. Acute on chronic heart failure, unspecified heart failure type (H)  I50.9    2. Leg swelling  M79.89          Marcell Avitia MD Steinman, Randall Ira, MD  03/19/22 0112     59

## (undated) DEVICE — MANIFOLD KIT ANGIO AUTOMATED 014613

## (undated) DEVICE — KIT HAND CONTROL ANGIOTOUCH ACIST 65CM AT-P65

## (undated) DEVICE — TOTE ANGIO CORP PC15AT SAN32CC83O

## (undated) DEVICE — INTRODUCER CATH VASC 5FRX10CM  MPIS-501-NT-U-SST

## (undated) DEVICE — DEFIB PRO-PADZ LVP LQD GEL ADULT 8900-2105-01

## (undated) DEVICE — SYR ANGIOGRAPHY MULTIUSE KIT ACIST 014612

## (undated) DEVICE — Device

## (undated) DEVICE — INTRO SHEATH 7FRX10CM PINNACLE RSS702

## (undated) RX ORDER — LIDOCAINE HYDROCHLORIDE 10 MG/ML
INJECTION, SOLUTION EPIDURAL; INFILTRATION; INTRACAUDAL; PERINEURAL
Status: DISPENSED
Start: 2019-04-15

## (undated) RX ORDER — HEPARIN SODIUM 1000 [USP'U]/ML
INJECTION, SOLUTION INTRAVENOUS; SUBCUTANEOUS
Status: DISPENSED
Start: 2019-04-15

## (undated) RX ORDER — LIDOCAINE 40 MG/G
CREAM TOPICAL
Status: DISPENSED
Start: 2017-11-15